# Patient Record
Sex: FEMALE | Race: WHITE | NOT HISPANIC OR LATINO | Employment: OTHER | URBAN - METROPOLITAN AREA
[De-identification: names, ages, dates, MRNs, and addresses within clinical notes are randomized per-mention and may not be internally consistent; named-entity substitution may affect disease eponyms.]

---

## 2017-01-26 ENCOUNTER — HOSPITAL ENCOUNTER (OUTPATIENT)
Dept: MAMMOGRAPHY | Facility: CLINIC | Age: 67
Discharge: HOME | End: 2017-01-26
Attending: FAMILY MEDICINE | Admitting: FAMILY MEDICINE
Payer: MEDICARE

## 2017-01-26 DIAGNOSIS — Z12.31 VISIT FOR SCREENING MAMMOGRAM: ICD-10-CM

## 2017-01-26 PROCEDURE — G0202 SCR MAMMO BI INCL CAD: HCPCS

## 2017-02-06 ENCOUNTER — OFFICE VISIT (OUTPATIENT)
Dept: FAMILY MEDICINE | Facility: CLINIC | Age: 67
End: 2017-02-06
Payer: COMMERCIAL

## 2017-02-06 VITALS
TEMPERATURE: 99.5 F | HEART RATE: 103 BPM | HEIGHT: 63 IN | RESPIRATION RATE: 10 BRPM | SYSTOLIC BLOOD PRESSURE: 110 MMHG | OXYGEN SATURATION: 99 % | DIASTOLIC BLOOD PRESSURE: 88 MMHG

## 2017-02-06 DIAGNOSIS — M53.3 SACROILIAC JOINT PAIN: ICD-10-CM

## 2017-02-06 DIAGNOSIS — G89.29 CHRONIC BILATERAL LOW BACK PAIN WITHOUT SCIATICA: ICD-10-CM

## 2017-02-06 DIAGNOSIS — J20.9 ACUTE BRONCHITIS WITH SYMPTOMS > 10 DAYS: Primary | ICD-10-CM

## 2017-02-06 DIAGNOSIS — M54.50 CHRONIC BILATERAL LOW BACK PAIN WITHOUT SCIATICA: ICD-10-CM

## 2017-02-06 DIAGNOSIS — Z98.1 S/P LUMBAR SPINAL FUSION: ICD-10-CM

## 2017-02-06 DIAGNOSIS — F33.1 MAJOR DEPRESSIVE DISORDER, RECURRENT EPISODE, MODERATE (H): ICD-10-CM

## 2017-02-06 DIAGNOSIS — F41.9 ANXIETY: ICD-10-CM

## 2017-02-06 PROCEDURE — 99214 OFFICE O/P EST MOD 30 MIN: CPT | Performed by: FAMILY MEDICINE

## 2017-02-06 RX ORDER — AZITHROMYCIN 250 MG/1
TABLET, FILM COATED ORAL
Qty: 6 TABLET | Refills: 0 | Status: SHIPPED | OUTPATIENT
Start: 2017-02-06 | End: 2017-05-23

## 2017-02-06 RX ORDER — CYCLOBENZAPRINE HCL 10 MG
10 TABLET ORAL 2 TIMES DAILY PRN
Qty: 180 TABLET | Refills: 1 | Status: SHIPPED | OUTPATIENT
Start: 2017-02-06 | End: 2017-11-27

## 2017-02-06 RX ORDER — GABAPENTIN 600 MG/1
600 TABLET ORAL 3 TIMES DAILY
Qty: 90 TABLET | Refills: 0 | Status: SHIPPED | OUTPATIENT
Start: 2017-02-06 | End: 2017-04-13

## 2017-02-06 RX ORDER — SERTRALINE HYDROCHLORIDE 100 MG/1
200 TABLET, FILM COATED ORAL DAILY
Qty: 180 TABLET | Refills: 1 | Status: SHIPPED | OUTPATIENT
Start: 2017-02-06 | End: 2017-08-15

## 2017-02-06 RX ORDER — QUETIAPINE FUMARATE 25 MG/1
25 TABLET, FILM COATED ORAL DAILY PRN
Qty: 30 TABLET | Refills: 3 | Status: SHIPPED | OUTPATIENT
Start: 2017-02-06 | End: 2018-07-16

## 2017-02-06 RX ORDER — OXYCODONE AND ACETAMINOPHEN 10; 325 MG/1; MG/1
2 TABLET ORAL EVERY 6 HOURS PRN
Qty: 180 TABLET | Refills: 0 | Status: SHIPPED | OUTPATIENT
Start: 2017-04-06 | End: 2017-06-01

## 2017-02-06 RX ORDER — OXYCODONE AND ACETAMINOPHEN 10; 325 MG/1; MG/1
2 TABLET ORAL EVERY 6 HOURS PRN
Qty: 180 TABLET | Refills: 0 | Status: SHIPPED | OUTPATIENT
Start: 2017-02-06 | End: 2017-06-01

## 2017-02-06 RX ORDER — OXYCODONE AND ACETAMINOPHEN 10; 325 MG/1; MG/1
2 TABLET ORAL EVERY 6 HOURS PRN
Qty: 180 TABLET | Refills: 0 | Status: SHIPPED | OUTPATIENT
Start: 2017-03-06 | End: 2017-06-01

## 2017-02-06 RX ORDER — GABAPENTIN 300 MG/1
300 CAPSULE ORAL 3 TIMES DAILY
Qty: 90 CAPSULE | Refills: 5 | Status: SHIPPED | OUTPATIENT
Start: 2017-02-06 | End: 2017-11-27

## 2017-02-06 NOTE — PATIENT INSTRUCTIONS
Increase the gabapentin to 900 mg three times daily (300mg + 600mg)    Try to minimize the use of the percocet.      Azithromycin - antibiotic for the bronchitis    Sarah Barriga M.D.        Thank you for choosing [facility].  You may be receiving a survey in the mail from [facility] regarding your visit today.  Please take a few minutes to complete and return the survey to let us know how we are doing.      Our Clinic hours are:  Mondays    7:20 am - 7 pm  Tues -  Fri  7:20 am - 5 pm    Clinic Phone: [phone]    The clinic lab opens at 7:30 am Mon - Fri and appointments are required.    [facility]. [phone]  Monday-Thursday 8 am - 7pm  Tues/Wed/Fri 8 am - 5:30 pm

## 2017-02-06 NOTE — PROGRESS NOTES
"  SUBJECTIVE:                                                    Jessica Ellison is a 66 year old female who presents to clinic today for the following health issues:      Acute Illness   Acute illness concerns: cough, loss of voice, body aches  Onset: 1 month     Fever: YES    Chills/Sweats: YES    Headache (location?): YES    Sinus Pressure:no    Conjunctivitis:  no    Ear Pain: no    Rhinorrhea: YES    Congestion: no    Sore Throat: YES     Cough: YES-productive of green sputum    Wheeze: no     Decreased Appetite: YES    Nausea: YES    Vomiting: YES    Diarrhea:  no     Dysuria/Freq.: no     Fatigue/Achiness: YES    Sick/Strep Exposure: YES- family    Loss of voice 10 days ago     Therapies Tried and outcome: night quil and day quil     last week -  has lost three of his best friends in two weeks.         Chronic Pain Follow-Up       Type / Location of Pain: low back  Analgesia/pain control:       Recent changes:  same      Overall control: Tolerable with discomfort  Activity level/function:      Daily activities:  Unable to perform most daily activities - chores, hobbies, social activities, driving    Work:  Unable to work  Adverse effects:  No  Adherance    Taking medication as directed?  Yes    Participating in other treatments: yes  Risk Factors:    Sleep:  Fair    Mood/anxiety:  Worsened - out of her sertraline since last week    Recent family or social stressors:  Three funerals - husbands friends- in the past few weeks    Other aggravating factors: none  PHQ-9 SCORE 2016   Total Score - - -   Total Score 7 5 14     DAJUAN-7 SCORE 10/22/2014 2015 2016   Total Score 13 3 -   Total Score - - 4     Encounter-Level CSA:     There are no encounter-level csa.             Problem list and histories reviewed & adjusted, as indicated.  Additional history: as documented    /88 mmHg  Pulse 103  Temp(Src) 99.5  F (37.5  C) (Tympanic)  Resp 10  Ht 5' 3\" (1.6 m)  Wt "   SpO2 99%  Breastfeeding? No   EXAM: GENERAL APPEARANCE: Alert, no acute distress  HENT: Ears and TMs normal, oral mucosa and posterior oropharynx normal, very weak sounding voice  RESP: lungs clear to auscultation   CV: normal rate, regular rhythm, no murmur or gallop  ABDOMEN: soft, no organomegaly, masses or tenderness    ASSESSMENT/PLAN:      ICD-10-CM    1. Acute bronchitis with symptoms > 10 days J20.9 azithromycin (ZITHROMAX) 250 MG tablet   2. Chronic bilateral low back pain without sciatica M54.5 oxyCODONE-acetaminophen (PERCOCET)  MG per tablet    G89.29 oxyCODONE-acetaminophen (PERCOCET)  MG per tablet     oxyCODONE-acetaminophen (PERCOCET)  MG per tablet     gabapentin (NEURONTIN) 300 MG capsule     gabapentin (NEURONTIN) 600 MG tablet   3. Sacroiliac joint pain M53.3 oxyCODONE-acetaminophen (PERCOCET)  MG per tablet     oxyCODONE-acetaminophen (PERCOCET)  MG per tablet     oxyCODONE-acetaminophen (PERCOCET)  MG per tablet   4. Major depressive disorder, recurrent episode, moderate (H) F33.1 sertraline (ZOLOFT) 100 MG tablet   5. S/P lumbar spinal fusion Z98.1 cyclobenzaprine (FLEXERIL) 10 MG tablet   6. Anxiety F41.9 QUEtiapine (SEROQUEL) 25 MG tablet     Wasn't out of her pain medications, had some left over at home from last summers surgery/rehab.    Given the length of symptoms (4 weeks) - recommend empiric treatment for mycoplasma with azithromycin.    Would like to further increase the gabapentin for the pain.  She is in agreement with this.  Again encouraged to minimize narcotic use.  She continues to work with her back specialist.    Out of her sertraline for a week - will restart this.  Moods worse due to several recent losses.     Patient Instructions   Increase the gabapentin to 900 mg three times daily (300mg + 600mg)    Try to minimize the use of the percocet.      Azithromycin - antibiotic for the bronchitis    Sarah Barriga M.D.        Thank you  for choosing [facility].  You may be receiving a survey in the mail from [facility] regarding your visit today.  Please take a few minutes to complete and return the survey to let us know how we are doing.      Our Clinic hours are:  Mondays    7:20 am - 7 pm  Tues -  Fri  7:20 am - 5 pm    Clinic Phone: [phone]    The clinic lab opens at 7:30 am Mon - Fri and appointments are required.    [facility]. [phone]  Monday-Thursday 8 am - 7pm  Tues/Wed/Fri 8 am - 5:30 pm

## 2017-02-06 NOTE — Clinical Note
My Depression Action Plan  Name: Jessica Ellison   Date of Birth 1950  Date: 2/6/2017    My doctor: Sarah Barriga   My clinic: [facility]  [address] [facility] [address]  [phone]          GREEN    ZONE   Good Control    What it looks like:     Things are going generally well. You have normal up s and down s. You may even feel depressed from time to time, but bad moods usually last less than a day.   What you need to do:  1. Continue to care for yourself (see self care plan)  2. Check your depression survival kit and update it as needed  3. Follow your physician s recommendations including any medication.  4. Do not stop taking medication unless you consult with your physician first.           YELLOW         ZONE Getting Worse    What it looks like:     Depression is starting to interfere with your life.     It may be hard to get out of bed; you may be starting to isolate yourself from others.    Symptoms of depression are starting to last most all day and this has happened for several days.     You may have suicidal thoughts but they are not constant.   What you need to do:     1. Call your care team, your response to treatment will improve if you keep your care team informed of your progress. Yellow periods are signs an adjustment may need to be made.     2. Continue your self-care, even if you have to fake it!    3. Talk to someone in your support network    4. Open up your depression survival kit           RED    ZONE Medical Alert - Get Help    What it looks like:     Depression is seriously interfering with your life.     You may experience these or other symptoms: You can t get out of bed most days, can t work or engage in other necessary activities, you have trouble taking care of basic hygiene, or basic responsibilities, thoughts of suicide or death that will not go away, self-injurious behavior.     What you need to do:  1. Call your care team  and request a same-day appointment. If they are not available (weekends or after hours) call your local crisis line, emergency room or 911.      Electronically signed by: Sonja Krishnamurthy, February 6, 2017    Depression Self Care Plan / Survival Kit    Self-Care for Depression  Here s the deal. Your body and mind are really not as separate as most people think.  What you do and think affects how you feel and how you feel influences what you do and think. This means if you do things that people who feel good do, it will help you feel better.  Sometimes this is all it takes.  There is also a place for medication and therapy depending on how severe your depression is, so be sure to consult with your medical provider and/ or Behavioral Health Consultant if your symptoms are worsening or not improving.     In order to better manage my stress, I will:    Exercise  Get some form of exercise, every day. This will help reduce pain and release endorphins, the  feel good  chemicals in your brain. This is almost as good as taking antidepressants!  This is not the same as joining a gym and then never going! (they count on that by the way ) It can be as simple as just going for a walk or doing some gardening, anything that will get you moving.      Hygiene   Maintain good hygiene (Get out of bed in the morning, Make your bed, Brush your teeth, Take a shower, and Get dressed like you were going to work, even if you are unemployed).  If your clothes don't fit try to get ones that do.    Diet  I will strive to eat foods that are good for me, drink plenty of water, and avoid excessive sugar, caffeine, alcohol, and other mood-altering substances.  Some foods that are helpful in depression are: complex carbohydrates, B vitamins, flaxseed, fish or fish oil, fresh fruits and vegetables.    Psychotherapy  I agree to participate in Individual Therapy (if recommended).    Medication  If prescribed medications, I agree to take them.  Missing  doses can result in serious side effects.  I understand that drinking alcohol, or other illicit drug use, may cause potential side effects.  I will not stop my medication abruptly without first discussing it with my provider.    Staying Connected With Others  I will stay in touch with my friends, family members, and my primary care provider/team.    Use your imagination  Be creative.  We all have a creative side; it doesn t matter if it s oil painting, sand castles, or mud pies! This will also kick up the endorphins.    Witness Beauty  (AKA stop and smell the roses) Take a look outside, even in mid-winter. Notice colors, textures. Watch the squirrels and birds.     Service to others  Be of service to others.  There is always someone else in need.  By helping others we can  get out of ourselves  and remember the really important things.  This also provides opportunities for practicing all the other parts of the program.    Humor  Laugh and be silly!  Adjust your TV habits for less news and crime-drama and more comedy.    Control your stress  Try breathing deep, massage therapy, biofeedback, and meditation. Find time to relax each day.     My support system    Clinic Contact:  Phone number:    Contact 1:  Phone number:    Contact 2:  Phone number:    [facility]/:  Phone number:    Therapist:  Phone number:    Local crisis center:    Phone number:    Other community support:  Phone number:

## 2017-02-06 NOTE — NURSING NOTE
"Chief Complaint   Patient presents with     URI     Refill Request       Initial /88 mmHg  Pulse 103  Temp(Src) 99.5  F (37.5  C) (Tympanic)  Resp 10  Ht 5' 3\" (1.6 m)  Wt   SpO2 99%  Breastfeeding? No Estimated body mass index is 29.59 kg/(m^2) as calculated from the following:    Height as of this encounter: 5' 3\" (1.6 m).    Weight as of 6/22/16: 167 lb (75.751 kg).  Medication Reconciliation: complete     Sonja Krishnamurthy MA      "

## 2017-02-06 NOTE — MR AVS SNAPSHOT
After Visit Summary   2/6/2017    Jessica Ellison    MRN: [id number]           Patient Information     Date Of Birth          1950        Visit Information        Provider Department      2/6/2017 11:20 AM Sarah Barriga MD [facility]        Today's Diagnoses     Acute bronchitis with symptoms > 10 days    -  1     Chronic bilateral low back pain without sciatica         Sacroiliac joint pain         Major depressive disorder, recurrent episode, moderate (H)         S/P lumbar spinal fusion         Anxiety           Care Instructions    Increase the gabapentin to 900 mg three times daily (300mg + 600mg)    Try to minimize the use of the percocet.      Azithromycin - antibiotic for the bronchitis    Sarah Barriga M.D.        Thank you for choosing [facility].  You may be receiving a survey in the mail from [employer] regarding your visit today.  Please take a few minutes to complete and return the survey to let us know how we are doing.      Our Clinic hours are:  Mondays    7:20 am - 7 pm  Tues -  Fri  7:20 am - 5 pm    Clinic Phone: [phone]    The clinic lab opens at 7:30 am Mon - Fri and appointments are required.    [facility]  Ph. [phone]  Monday-Thursday 8 am - 7pm  Tues/Wed/Fri 8 am - 5:30 pm               Follow-ups after your visit        Who to contact     If you have questions or need follow up information about today's clinic visit or your schedule please contact [facility] directly at [phone].  Normal or non-critical lab and imaging results will be communicated to you by MyChart, letter or phone within 4 business days after the clinic has received the results. If you do not hear from us within 7 days, please contact the clinic through [employer] or phone. If you have a critical or abnormal lab result, we will notify you by phone as soon as possible.  Submit refill requests through [employer] or call your  "pharmacy and they will forward the refill request to us. Please allow 3 business days for your refill to be completed.          Additional Information About Your Visit        [employer] Information     [email or web address] gives you secure access to your electronic health record. If you see a primary care provider, you can also send messages to your care team and make appointments. If you have questions, please call your primary care clinic.  If you do not have a primary care provider, please call [phone] and they will assist you.        Care EveryWhere ID     This is your Care EveryWhere ID. This could be used by other organizations to access your [place] medical records  [id number]        Your Vitals Were     Pulse Temperature Respirations Height Pulse Oximetry Breastfeeding?    103 99.5  F (37.5  C) (Tympanic) 10 5' 3\" (1.6 m) 99% No       Blood Pressure from Last 3 Encounters:   02/06/17 110/88   09/07/16 88/66   06/22/16 136/85    Weight from Last 3 Encounters:   06/22/16 167 lb (75.751 kg)   06/02/16 170 lb (77.111 kg)   01/29/16 166 lb (75.297 kg)              We Performed the Following     DEPRESSION ACTION PLAN (DAP)          Today's Medication Changes          These changes are accurate as of: 2/6/17 11:54 AM.  If you have any questions, ask your nurse or doctor.               Start taking these medicines.        Dose/Directions    azithromycin 250 MG tablet   Commonly known as:  ZITHROMAX   Used for:  Acute bronchitis with symptoms > 10 days   Started by:  Sarah Barriga MD        Two tablets first day, then one tablet daily for four days.   Quantity:  6 tablet   Refills:  0         These medicines have changed or have updated prescriptions.        Dose/Directions    * gabapentin 300 MG capsule   Commonly known as:  NEURONTIN   This may have changed:    - how much to take  - additional instructions   Used for:  Chronic bilateral low back pain without sciatica   Changed by:  Sarah Barriga MD        Dose:  300 " mg   Take 1 capsule (300 mg) by mouth 3 times daily To be taken with the 600 mg pill   Quantity:  90 capsule   Refills:  5       * gabapentin 600 MG tablet   Commonly known as:  NEURONTIN   This may have changed:  You were already taking a medication with the same name, and this prescription was added. Make sure you understand how and when to take each.   Used for:  Chronic bilateral low back pain without sciatica   Changed by:  Sarah Barriga MD        Dose:  600 mg   Take 1 tablet (600 mg) by mouth 3 times daily To be taken with the 300 mg pill   Quantity:  90 tablet   Refills:  0       * Notice:  This list has 2 medication(s) that are the same as other medications prescribed for you. Read the directions carefully, and ask your doctor or other care provider to review them with you.         Where to get your medicines      These medications were sent to [facility] [address]  [address] [facility] [address]     Phone:  [phone]    - azithromycin 250 MG tablet  - cyclobenzaprine 10 MG tablet  - gabapentin 300 MG capsule  - gabapentin 600 MG tablet  - QUEtiapine 25 MG tablet  - sertraline 100 MG tablet      Some of these will need a paper prescription and others can be bought over the counter.  Ask your nurse if you have questions.     Bring a paper prescription for each of these medications    - oxyCODONE-acetaminophen  MG per tablet  - oxyCODONE-acetaminophen  MG per tablet  - oxyCODONE-acetaminophen  MG per tablet             Primary Care Provider Office Phone # Fax #    Sarah Barriga [phone]       [facility] [address] [facility] [address]        Thank you!     Thank you for choosing [facility]  for your care. Our goal is always to provide you with excellent care. Hearing back from our patients is one way we can continue to improve our services. Please take a few  minutes to complete the written survey that you may receive in the mail after your visit with us. Thank you!             Your Updated Medication List - Protect others around you: Learn how to safely use, store and throw away your medicines at www.disposemymeds.org.          This list is accurate as of: 2/6/17 11:54 AM.  Always use your most recent med list.                   Brand Name Dispense Instructions for use    acyclovir 5 % ointment    ZOVIRAX    15 g    Apply topically 6 times daily       aspirin 325 MG tablet     180 tablet    Take 1 tablet (325 mg) by mouth daily       azithromycin 250 MG tablet    ZITHROMAX    6 tablet    Two tablets first day, then one tablet daily for four days.       calcium carbonate-vitamin D 600-400 MG-UNIT Chew    CALTRATE 600+D    180 tablet    Take 1 chew tab by mouth 2 times daily       cyclobenzaprine 10 MG tablet    FLEXERIL    180 tablet    Take 1 tablet (10 mg) by mouth 2 times daily as needed for muscle spasms       EPINEPHrine 0.3 MG/0.3ML injection     2 each    Inject 0.3 mLs (0.3 mg) into the muscle once as needed for anaphylaxis       * gabapentin 300 MG capsule    NEURONTIN    90 capsule    Take 1 capsule (300 mg) by mouth 3 times daily To be taken with the 600 mg pill       * gabapentin 600 MG tablet    NEURONTIN    90 tablet    Take 1 tablet (600 mg) by mouth 3 times daily To be taken with the 300 mg pill       levothyroxine 50 MCG tablet    LEVOTHROID    90 tablet    Take 1 tablet (50 mcg) by mouth daily       lisinopril 20 MG tablet    PRINIVIL/ZESTRIL    90 tablet    Take 1 tablet (20 mg) by mouth daily       multivitamin CF formula chewable tablet    CHOICEFUL    100 tablet    Take 1 tablet by mouth daily       * oxyCODONE-acetaminophen  MG per tablet    PERCOCET    180 tablet    Take 2 tablets by mouth every 6 hours as needed for moderate to severe pain Max 6/day       * oxyCODONE-acetaminophen  MG per tablet   Start taking on:  3/6/2017     PERCOCET    180 tablet    Take 2 tablets by mouth every 6 hours as needed for moderate to severe pain Max 6/day       * oxyCODONE-acetaminophen  MG per tablet   Start taking on:  4/6/2017    PERCOCET    180 tablet    Take 2 tablets by mouth every 6 hours as needed for moderate to severe pain Max 6/day       QUEtiapine 25 MG tablet    SEROQUEL    30 tablet    Take 1 tablet (25 mg) by mouth daily as needed Take 25 mg Every 6 hours As needed for anxiety.       sertraline 100 MG tablet    ZOLOFT    180 tablet    Take 2 tablets (200 mg) by mouth daily       traZODone 150 MG tablet    DESYREL    90 tablet    Take 1 tablet (150 mg) by mouth nightly as needed for sleep       * Notice:  This list has 5 medication(s) that are the same as other medications prescribed for you. Read the directions carefully, and ask your doctor or other care provider to review them with you.

## 2017-02-07 ASSESSMENT — PATIENT HEALTH QUESTIONNAIRE - PHQ9: SUM OF ALL RESPONSES TO PHQ QUESTIONS 1-9: 14

## 2017-03-22 ENCOUNTER — TRANSFERRED RECORDS (OUTPATIENT)
Dept: HEALTH INFORMATION MANAGEMENT | Facility: CLINIC | Age: 67
End: 2017-03-22

## 2017-04-17 ENCOUNTER — TELEPHONE (OUTPATIENT)
Dept: FAMILY MEDICINE | Facility: CLINIC | Age: 67
End: 2017-04-17

## 2017-04-17 NOTE — TELEPHONE ENCOUNTER
The prescription is for #12, taking 1 tablet every 4 to 6 hours as needed for pain from [facility] @ [address] [place] [address].  [address], [place], phone #[phone]    Jolene Avendaño, PharmD [facility]  Phone [phone]  Fax [phone]

## 2017-04-17 NOTE — TELEPHONE ENCOUNTER
Okay to fill, but would need to limit herself to TWO pills in 24 hours if she continues to take her percocet due to the tylenol limitations. You can let her know that I was notified of the prescription, in the future, she should contact me with a prescription as it's in violation of our controlled substance agreement.    Sarah Barriga M.D.

## 2017-04-17 NOTE — TELEPHONE ENCOUNTER
Per our contract, patient is supposed to let us know.  How many Norco is she getting?    Sarah Barriga M.D.

## 2017-04-17 NOTE — TELEPHONE ENCOUNTER
Jessica presented with a short term hydrocodone/APAP 5/325mg prescription from her dentist to fill today at the [facility].    In light of her ongoing prescription for percocet she has on contract with you, please advise if we should have her talk toy you prior to filling.    Thank you,  Jolene Avendaño, PharmD [facility] [place]  Phone [phone]  Fax [phone]

## 2017-05-08 DIAGNOSIS — I67.1 CEREBRAL ANEURYSM, NONRUPTURED: Primary | ICD-10-CM

## 2017-05-09 ENCOUNTER — CARE COORDINATION (OUTPATIENT)
Dept: NEUROLOGY | Facility: CLINIC | Age: 67
End: 2017-05-09

## 2017-05-09 NOTE — PROGRESS NOTES
Patient scheduled for a cerebral angiogram on 7/27/17 at 11:00 AM. Informed patient: Someone will need to drive you home from the hospital. Be sure to have someone that can stay with you through the night. Do not eat after 3:00 AM; you may drink clear liquids (includes water, Jell-O, clear broth, apple juice or any non-carbonated beverage that you can see through) until 9:00 AM. You may take your medications with a sip of water the morning of the procedure. Please go to the Gold waiting area at the [facility] to check in at 9:30 AM. Patient verbalized understanding. Map and instructions sent to patient.

## 2017-05-09 NOTE — PATIENT INSTRUCTIONS
You are scheduled for a cerebral angiogram on 7/27/17 at 11:00 AM. Someone will need to drive you home from the hospital. Be sure to have someone that can stay with you through the night. Do not eat after 3:00 AM; you may drink clear liquids (includes water, Jell-O, clear broth, apple juice or any non-carbonated beverage that you can see through) until 9:00 AM. You may take your medications with a sip of water the morning of the procedure. Please go to the Gold waiting area at the [facility] to check in at 9:30 AM.    If you have any questions please contact me at [phone], option 3.    Dirk Bailon RN, CNRN  Stroke & Endovascular Care Coordinator

## 2017-05-22 ENCOUNTER — TELEPHONE (OUTPATIENT)
Dept: FAMILY MEDICINE | Facility: CLINIC | Age: 67
End: 2017-05-22

## 2017-05-22 NOTE — TELEPHONE ENCOUNTER
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     Phone Number patient can be reached at:  Home number on file [phone] (home)    Best Time:  any    Can we leave a detailed message on this number:  YES    Call taken on 5/22/2017 at 12:33 PM by Sonja Davison

## 2017-05-23 ENCOUNTER — OFFICE VISIT (OUTPATIENT)
Dept: FAMILY MEDICINE | Facility: CLINIC | Age: 67
End: 2017-05-23
Payer: COMMERCIAL

## 2017-05-23 VITALS
WEIGHT: 161.8 LBS | SYSTOLIC BLOOD PRESSURE: 178 MMHG | DIASTOLIC BLOOD PRESSURE: 110 MMHG | BODY MASS INDEX: 28.67 KG/M2 | HEART RATE: 80 BPM | HEIGHT: 63 IN

## 2017-05-23 DIAGNOSIS — I10 ESSENTIAL HYPERTENSION WITH GOAL BLOOD PRESSURE LESS THAN 140/90: ICD-10-CM

## 2017-05-23 PROCEDURE — 99214 OFFICE O/P EST MOD 30 MIN: CPT | Performed by: FAMILY MEDICINE

## 2017-05-23 RX ORDER — LISINOPRIL AND HYDROCHLOROTHIAZIDE 12.5; 2 MG/1; MG/1
2 TABLET ORAL DAILY
Qty: 180 TABLET | Refills: 1 | Status: SHIPPED | OUTPATIENT
Start: 2017-05-23 | End: 2017-06-01

## 2017-05-23 NOTE — NURSING NOTE
"Chief Complaint   Patient presents with     Hypertension     pt. here today with concerns about BP being elevated X 1-2 weeks. pt. brought her machine with her to appt. and her reading today was 170/111 to compare to office reading.        Initial BP (!) 178/110 (BP Location: Right arm, Patient Position: Chair, Cuff Size: Adult Regular)  Pulse 80  Ht 5' 3\" (1.6 m)  Wt 161 lb 12.8 oz (73.4 kg)  BMI 28.66 kg/m2 Estimated body mass index is 28.66 kg/(m^2) as calculated from the following:    Height as of this encounter: 5' 3\" (1.6 m).    Weight as of this encounter: 161 lb 12.8 oz (73.4 kg).  Medication Reconciliation: complete     Jessica Vail, CMA      "

## 2017-05-23 NOTE — PROGRESS NOTES
Subjective:  Jessica Ellison is a 66 year old female   Chief Complaint   Patient presents with     Hypertension     pt. here today with concerns about BP being elevated X 1-2 weeks. pt. brought her machine with her to appt. and her reading today was 170/111 to compare to office reading.     She has been experiencing some headaches with these elevated readings also. She does not think that there has been any significant change in her lifestyle that would explain this. She has been compliant in taking her medications and has not had any recent change in her regimen. She recalls that many years ago she had a diuretic as part of her regimen and is not sure why this was discontinued. She is a recovering alcoholic and so it is possible that her blood pressure meds were reduced when she obtains sobriety and she did not need as much medication. She states that she is still abstaining from alcohol. She has not increased her salt intake or changed her activity level and has had no significant change in her weight. Patient denies any exertional chest pain, dyspnea, palpitations, syncope, orthopnea, edema or paroxysmal nocturnal dyspnea.     She also wants me to check her abdomen, as she feels would seems to be a mass in the epigastric area        Encounter Diagnosis   Name Primary?     Essential hypertension with goal blood pressure less than 140/90        ROS:other than noted above, general, HEENT, respiratory, cardiac, gastrointestinal systems are negative    Medical, surgical, social, and family histories, medications and allergies reviewed and updated.    Objective:  Exam:    GENERAL APPEARANCE ADULT: Alert, no acute distress  EYES: PERRL, EOM normal, conjunctiva and lids normal  NECK: No adenopathy,masses or thyromegaly  RESP: lungs clear to auscultation   CV: normal rate, regular rhythm, no murmur or gallop  Abdomen: Soft, nontender, no mass, no ventral hernia  MS: extremities normal, no peripheral  edema      ASSESSMENT:  1. Essential hypertension with goal blood pressure less than 140/90        PLAN:  Orders Placed This Encounter     lisinopril-hydrochlorothiazide (PRINZIDE/ZESTORETIC) 20-12.5 MG per tablet, 2 tablets daily        Patient Instructions   She has an appointment with Dr. Barriga in about one week. Will follow-up at that time to see how the blood pressure is responding      Thank you for choosing [facility].  You may be receiving a survey in the mail from [employer] regarding your visit today.  Please take a few minutes to complete and return the survey to let us know how we are doing.      Our Clinic hours are:  Mondays    7:20 am - 7 pm  Tues -  Fri  7:20 am - 5 pm    Clinic Phone: [phone]    Keep apptmt next week with Danny  The clinic lab opens at 7:30 am Mon - Fri and appointments are required.    [facility]  Ph. [phone]  Monday-Thursday 8 am - 7pm  Tues/Wed/Fri 8 am - 5:30 pm

## 2017-05-23 NOTE — PATIENT INSTRUCTIONS
Thank you for choosing [facility].  You may be receiving a survey in the mail from [facility] regarding your visit today.  Please take a few minutes to complete and return the survey to let us know how we are doing.      Our Clinic hours are:  Mondays    7:20 am - 7 pm  Tues -  Fri  7:20 am - 5 pm    Clinic Phone: [phone]    Keep apptmt next week with DrAmmishel  The clinic lab opens at 7:30 am Mon - Fri and appointments are required.    [facility]  Ph. [phone]  Monday-Thursday 8 am - 7pm  Tues/Wed/Fri 8 am - 5:30 pm

## 2017-05-23 NOTE — MR AVS SNAPSHOT
After Visit Summary   5/23/2017    Jessica Ellison    MRN: [id number]           Patient Information     Date Of Birth          1950        Visit Information        Provider Department      5/23/2017 10:40 AM SYLVIA Noel MD [facility]        Today's Diagnoses     Essential hypertension with goal blood pressure less than 140/90          Care Instructions          Thank you for choosing [facility].  You may be receiving a survey in the mail from [facility] regarding your visit today.  Please take a few minutes to complete and return the survey to let us know how we are doing.      Our Clinic hours are:  Mondays    7:20 am - 7 pm  Tues -  Fri  7:20 am - 5 pm    Clinic Phone: [phone]    Keep apptmt next week with DrAmy  The clinic lab opens at 7:30 am Mon - Fri and appointments are required.    [facility]  Ph. [phone]  Monday-Thursday 8 am - 7pm  Tues/Wed/Fri 8 am - 5:30 pm             Follow-ups after your visit        Your next 10 appointments already scheduled     Jun 01, 2017 10:40 AM CDT   [facility]   [facility]    [id number] [facility] [id number] [phone]            Jul 27, 2017  9:30 AM CDT   Procedure 6.5 hour with U2A ROOM 7   [address] [facility] [place] ([facility])    [address] [id number]               Jul 27, 2017 11:00 AM CDT   IR CAROTID CEREBRAL ANGIOGRAM BILATERAL with UUIR1   [facility] [place], Interventional Radiology ([facility])    [address] [id number] [phone]           1. Your doctor will need to do a history and physical within 30 days before this procedure. 2. Your doctor will determine whether you need a 12 lead EKG, as well as which medications should not be taken the morning of the exam. 3.  Laboratory tests are to be obtained by your doctor prior to the exam (creatinine, Hgb/Hct, platelet count, and INR) 4. If you have allergies to x-ray contrast or iodine, contact your doctor or a Radiology nurse prior to the exam day for instructions. 5. Someone will need to drive you to and from the hospital. 6. Bring a list of all drugs you are taking; include supplements and over-the-counter medications. 7. Wear comfortable clothes and leave your valuables at home. 8. If you are or may be pregnant, contact your doctor or a Radiology nurse prior to the day of the exam. 9.  If you have diabetes, check with your doctor or a Radiology nurse to see if your insulin needs to be adjusted for the exam. 10. If you are taking Coumadin (to thin you blood) please contact your doctor or a Radiology nurse at least 5 days before the exam for special instructions. 11. You should not have received barium (x-ray contrast) within 48 hours of this exam. 12. The day before your exam you may eat your regular diet and are encouraged to drink at least 2 quarts of clear liquids. Drink no alcoholic beverages for 24 hours prior to the exam. 13. If you have a colostomy you will need to irrigate it with tap water at 8PM the evening before and again at 6AM the morning of the exam. 14. Do not smoke for 24 hours prior to the procedure. 15. Do not eat any solid food or milk products for 6 hours prior to the exam. You may drink clear liquids until 2 hours prior to the exam. Clear liquids include the following: water, Jell-O, clear broth, apple juice or any non-carbonated drink that you can see through (no pop!) 16. The morning of the exam you may brush your teeth and take medications as directed with a sip of water. 17. Tell the Radiology nurse if you have any allergies. 18. You will be asked to empty your bladder before the exam begins. 19. Following the exam you will need to remain on complete bedrest for 4-6 hours. The nurse will monitor your  "vital signs, puncture site, and the pulses and temperature of the arm or leg that was punctured. 20. When discharged, you cannot drive until morning, and an adult must be with you until then. You should stay in the [facility] area overnight.              Who to contact     If you have questions or need follow up information about today's clinic visit or your schedule please contact [facility] directly at [phone].  Normal or non-critical lab and imaging results will be communicated to you by [employer], letter or phone within 4 business days after the clinic has received the results. If you do not hear from us within 7 days, please contact the clinic through [employer] or phone. If you have a critical or abnormal lab result, we will notify you by phone as soon as possible.  Submit refill requests through [employer] or call your pharmacy and they will forward the refill request to us. Please allow 3 business days for your refill to be completed.          Additional Information About Your Visit        [employer] Information     [employer] gives you secure access to your electronic health record. If you see a primary care provider, you can also send messages to your care team and make appointments. If you have questions, please call your primary care clinic.  If you do not have a primary care provider, please call [phone] and they will assist you.        Care EveryWhere ID     This is your Care EveryWhere ID. This could be used by other organizations to access your [facility] medical records  [id number]        Your Vitals Were     Pulse Height BMI (Body Mass Index)             80 5' 3\" (1.6 m) 28.66 kg/m2          Blood Pressure from Last 3 Encounters:   05/23/17 (!) 178/110   02/06/17 110/88   09/07/16 (!) 88/66    Weight from Last 3 Encounters:   05/23/17 161 lb 12.8 oz (73.4 kg)   06/22/16 167 lb (75.8 kg)   06/02/16 170 lb (77.1 kg)              Today, you had the following     No orders found for " display         Today's Medication Changes          These changes are accurate as of: 5/23/17 11:16 AM.  If you have any questions, ask your nurse or doctor.               Start taking these medicines.        Dose/Directions    lisinopril-hydrochlorothiazide 20-12.5 MG per tablet   Commonly known as:  PRINZIDE/ZESTORETIC   Used for:  Essential hypertension with goal blood pressure less than 140/90   Replaces:  lisinopril 20 MG tablet   Started by:  SYLVIA Noel MD        Dose:  2 tablet   Take 2 tablets by mouth daily   Quantity:  180 tablet   Refills:  1         Stop taking these medicines if you haven't already. Please contact your care team if you have questions.     lisinopril 20 MG tablet   Commonly known as:  PRINIVIL/ZESTRIL   Replaced by:  lisinopril-hydrochlorothiazide 20-12.5 MG per tablet   Stopped by:  SYLVIA Noel MD                Where to get your medicines      Some of these will need a paper prescription and others can be bought over the counter.  Ask your nurse if you have questions.     Bring a paper prescription for each of these medications     lisinopril-hydrochlorothiazide 20-12.5 MG per tablet                Primary Care Provider Office Phone # Fax #    Sarah Barriga [phone]       [address] [address]        Thank you!     Thank you for choosing [facility]  for your care. Our goal is always to provide you with excellent care. Hearing back from our patients is one way we can continue to improve our services. Please take a few minutes to complete the written survey that you may receive in the mail after your visit with us. Thank you!             Your Updated Medication List - Protect others around you: Learn how to safely use, store and throw away your medicines at www.disposemymeds.org.          This list is accurate as of: 5/23/17 11:16 AM.  Always use your most recent med list.                   Brand Name  Dispense Instructions for use    acyclovir 5 % ointment    ZOVIRAX    15 g    Apply topically 6 times daily       aspirin 325 MG tablet     180 tablet    Take 1 tablet (325 mg) by mouth daily       calcium carbonate-vitamin D 600-400 MG-UNIT Chew    CALTRATE 600+D    180 tablet    Take 1 chew tab by mouth 2 times daily       cyclobenzaprine 10 MG tablet    FLEXERIL    180 tablet    Take 1 tablet (10 mg) by mouth 2 times daily as needed for muscle spasms       EPINEPHrine 0.3 MG/0.3ML injection     2 each    Inject 0.3 mLs (0.3 mg) into the muscle once as needed for anaphylaxis       * gabapentin 300 MG capsule    NEURONTIN    90 capsule    Take 1 capsule (300 mg) by mouth 3 times daily To be taken with the 600 mg pill       * gabapentin 600 MG tablet    NEURONTIN    90 tablet    TAKE ONE TABLET BY MOUTH THREE TIMES A DAY (TO BE TAKEN WITH THE 300MG CAPSULE)       levothyroxine 50 MCG tablet    LEVOTHROID    90 tablet    Take 1 tablet (50 mcg) by mouth daily       lisinopril-hydrochlorothiazide 20-12.5 MG per tablet    PRINZIDE/ZESTORETIC    180 tablet    Take 2 tablets by mouth daily       multivitamin CF formula chewable tablet    CHOICEFUL    100 tablet    Take 1 tablet by mouth daily       * oxyCODONE-acetaminophen  MG per tablet    PERCOCET    180 tablet    Take 2 tablets by mouth every 6 hours as needed for moderate to severe pain Max 6/day       * oxyCODONE-acetaminophen  MG per tablet    PERCOCET    180 tablet    Take 2 tablets by mouth every 6 hours as needed for moderate to severe pain Max 6/day       * oxyCODONE-acetaminophen  MG per tablet    PERCOCET    180 tablet    Take 2 tablets by mouth every 6 hours as needed for moderate to severe pain Max 6/day       QUEtiapine 25 MG tablet    SEROQUEL    30 tablet    Take 1 tablet (25 mg) by mouth daily as needed Take 25 mg Every 6 hours As needed for anxiety.       sertraline 100 MG tablet    ZOLOFT    180 tablet    Take 2 tablets (200 mg) by  mouth daily       traZODone 150 MG tablet    DESYREL    90 tablet    Take 1 tablet (150 mg) by mouth nightly as needed for sleep       * Notice:  This list has 5 medication(s) that are the same as other medications prescribed for you. Read the directions carefully, and ask your doctor or other care provider to review them with you.

## 2017-06-01 ENCOUNTER — OFFICE VISIT (OUTPATIENT)
Dept: FAMILY MEDICINE | Facility: CLINIC | Age: 67
End: 2017-06-01
Payer: COMMERCIAL

## 2017-06-01 VITALS
BODY MASS INDEX: 27.46 KG/M2 | DIASTOLIC BLOOD PRESSURE: 87 MMHG | HEART RATE: 94 BPM | WEIGHT: 155 LBS | SYSTOLIC BLOOD PRESSURE: 138 MMHG | TEMPERATURE: 98.7 F | HEIGHT: 63 IN

## 2017-06-01 DIAGNOSIS — R94.4 DECREASED GFR: ICD-10-CM

## 2017-06-01 DIAGNOSIS — E03.9 HYPOTHYROIDISM, UNSPECIFIED: ICD-10-CM

## 2017-06-01 DIAGNOSIS — M54.50 CHRONIC BILATERAL LOW BACK PAIN WITHOUT SCIATICA: Primary | ICD-10-CM

## 2017-06-01 DIAGNOSIS — M53.3 SACROILIAC JOINT PAIN: ICD-10-CM

## 2017-06-01 DIAGNOSIS — G89.29 CHRONIC BILATERAL LOW BACK PAIN WITHOUT SCIATICA: Primary | ICD-10-CM

## 2017-06-01 DIAGNOSIS — R23.2 HOT FLASHES: ICD-10-CM

## 2017-06-01 DIAGNOSIS — K43.9 VENTRAL HERNIA WITHOUT OBSTRUCTION OR GANGRENE: ICD-10-CM

## 2017-06-01 DIAGNOSIS — I10 ESSENTIAL HYPERTENSION WITH GOAL BLOOD PRESSURE LESS THAN 140/90: ICD-10-CM

## 2017-06-01 LAB
ANION GAP SERPL CALCULATED.3IONS-SCNC: 13 MMOL/L (ref 3–14)
BASOPHILS # BLD AUTO: 0 10E9/L (ref 0–0.2)
BASOPHILS NFR BLD AUTO: 0.5 %
BUN SERPL-MCNC: 36 MG/DL (ref 7–30)
CALCIUM SERPL-MCNC: 9.8 MG/DL (ref 8.5–10.1)
CHLORIDE SERPL-SCNC: 102 MMOL/L (ref 94–109)
CO2 SERPL-SCNC: 19 MMOL/L (ref 20–32)
CREAT SERPL-MCNC: 1.2 MG/DL (ref 0.52–1.04)
DIFFERENTIAL METHOD BLD: ABNORMAL
EOSINOPHIL # BLD AUTO: 0.1 10E9/L (ref 0–0.7)
EOSINOPHIL NFR BLD AUTO: 2.1 %
ERYTHROCYTE [DISTWIDTH] IN BLOOD BY AUTOMATED COUNT: 15.4 % (ref 10–15)
GFR SERPL CREATININE-BSD FRML MDRD: 45 ML/MIN/1.7M2
GLUCOSE SERPL-MCNC: 105 MG/DL (ref 70–99)
HCT VFR BLD AUTO: 37.6 % (ref 35–47)
HGB BLD-MCNC: 12.1 G/DL (ref 11.7–15.7)
LYMPHOCYTES # BLD AUTO: 0.7 10E9/L (ref 0.8–5.3)
LYMPHOCYTES NFR BLD AUTO: 16.7 %
MCH RBC QN AUTO: 31 PG (ref 26.5–33)
MCHC RBC AUTO-ENTMCNC: 32.2 G/DL (ref 31.5–36.5)
MCV RBC AUTO: 96 FL (ref 78–100)
MONOCYTES # BLD AUTO: 0.6 10E9/L (ref 0–1.3)
MONOCYTES NFR BLD AUTO: 13 %
NEUTROPHILS # BLD AUTO: 2.9 10E9/L (ref 1.6–8.3)
NEUTROPHILS NFR BLD AUTO: 67.7 %
PLATELET # BLD AUTO: 202 10E9/L (ref 150–450)
POTASSIUM SERPL-SCNC: 4.1 MMOL/L (ref 3.4–5.3)
RBC # BLD AUTO: 3.9 10E12/L (ref 3.8–5.2)
SODIUM SERPL-SCNC: 134 MMOL/L (ref 133–144)
T4 FREE SERPL-MCNC: 1 NG/DL (ref 0.76–1.46)
TSH SERPL DL<=0.005 MIU/L-ACNC: 0.19 MU/L (ref 0.4–4)
WBC # BLD AUTO: 4.3 10E9/L (ref 4–11)

## 2017-06-01 PROCEDURE — 99214 OFFICE O/P EST MOD 30 MIN: CPT | Performed by: FAMILY MEDICINE

## 2017-06-01 PROCEDURE — 84439 ASSAY OF FREE THYROXINE: CPT | Performed by: FAMILY MEDICINE

## 2017-06-01 PROCEDURE — 85025 COMPLETE CBC W/AUTO DIFF WBC: CPT | Performed by: FAMILY MEDICINE

## 2017-06-01 PROCEDURE — 36415 COLL VENOUS BLD VENIPUNCTURE: CPT | Performed by: FAMILY MEDICINE

## 2017-06-01 PROCEDURE — 80048 BASIC METABOLIC PNL TOTAL CA: CPT | Performed by: FAMILY MEDICINE

## 2017-06-01 PROCEDURE — 84443 ASSAY THYROID STIM HORMONE: CPT | Performed by: FAMILY MEDICINE

## 2017-06-01 RX ORDER — OXYCODONE AND ACETAMINOPHEN 10; 325 MG/1; MG/1
2 TABLET ORAL EVERY 6 HOURS PRN
Qty: 180 TABLET | Refills: 0 | Status: ON HOLD | OUTPATIENT
Start: 2017-06-01 | End: 2017-07-27

## 2017-06-01 RX ORDER — OXYCODONE AND ACETAMINOPHEN 10; 325 MG/1; MG/1
2 TABLET ORAL EVERY 6 HOURS PRN
Qty: 180 TABLET | Refills: 0 | Status: SHIPPED | OUTPATIENT
Start: 2017-06-01 | End: 2017-09-07

## 2017-06-01 RX ORDER — LISINOPRIL AND HYDROCHLOROTHIAZIDE 12.5; 2 MG/1; MG/1
1 TABLET ORAL DAILY
Qty: 180 TABLET | Refills: 1 | Status: SHIPPED | OUTPATIENT
Start: 2017-06-01 | End: 2017-12-11

## 2017-06-01 RX ORDER — OXYCODONE AND ACETAMINOPHEN 10; 325 MG/1; MG/1
2 TABLET ORAL EVERY 6 HOURS PRN
Qty: 180 TABLET | Refills: 0 | Status: SHIPPED | OUTPATIENT
Start: 2017-06-01 | End: 2017-07-24

## 2017-06-01 NOTE — MR AVS SNAPSHOT
After Visit Summary   6/1/2017    Jessica Ellison    MRN: [id number]           Patient Information     Date Of Birth          1950        Visit Information        Provider Department      6/1/2017 10:40 AM Sarah Barriga MD [facility] [place] 2]        Today's Diagnoses     Chronic bilateral low back pain without sciatica    -  1    Sacroiliac joint pain        Ventral hernia without obstruction or gangrene        Hot flashes        Essential hypertension with goal blood pressure less than 140/90        Hypothyroidism, unspecified           Follow-ups after your visit        Additional Services     GENERAL SURG ADULT REFERRAL       Your provider has referred you to: G: [facility] [phone]   [email or web address]    Please be aware that coverage of these services is subject to the terms and limitations of your health insurance plan.  Call member services at your health plan with any benefit or coverage questions.      Please bring the following with you to your appointment:    (1) Any X-Rays, CTs or MRIs which have been performed.  Contact the facility where they were done to arrange for  prior to your scheduled appointment.   (2) List of current medications   (3) This referral request   (4) Any documents/labs given to you for this referral                  Your next 10 appointments already scheduled     Jul 27, 2017  9:30 AM CDT   Procedure 6.5 hour with U2A ROOM 7   [address] [facility] ([facility])    [address] [address]               Jul 27, 2017 11:00 AM CDT   IR CAROTID CEREBRAL ANGIOGRAM BILATERAL with UUIR1   [facility], Interventional Radiology ([facility])    [address] [id number] [phone]           1. Your doctor will need to do a history and physical within 30  days before this procedure. 2. Your doctor will determine whether you need a 12 lead EKG, as well as which medications should not be taken the morning of the exam. 3. Laboratory tests are to be obtained by your doctor prior to the exam (creatinine, Hgb/Hct, platelet count, and INR) 4. If you have allergies to x-ray contrast or iodine, contact your doctor or a Radiology nurse prior to the exam day for instructions. 5. Someone will need to drive you to and from the hospital. 6. Bring a list of all drugs you are taking; include supplements and over-the-counter medications. 7. Wear comfortable clothes and leave your valuables at home. 8. If you are or may be pregnant, contact your doctor or a Radiology nurse prior to the day of the exam. 9.  If you have diabetes, check with your doctor or a Radiology nurse to see if your insulin needs to be adjusted for the exam. 10. If you are taking Coumadin (to thin you blood) please contact your doctor or a Radiology nurse at least 5 days before the exam for special instructions. 11. You should not have received barium (x-ray contrast) within 48 hours of this exam. 12. The day before your exam you may eat your regular diet and are encouraged to drink at least 2 quarts of clear liquids. Drink no alcoholic beverages for 24 hours prior to the exam. 13. If you have a colostomy you will need to irrigate it with tap water at 8PM the evening before and again at 6AM the morning of the exam. 14. Do not smoke for 24 hours prior to the procedure. 15. Do not eat any solid food or milk products for 6 hours prior to the exam. You may drink clear liquids until 2 hours prior to the exam. Clear liquids include the following: water, Jell-O, clear broth, apple juice or any non-carbonated drink that you can see through (no pop!) 16. The morning of the exam you may brush your teeth and take medications as directed with a sip of water. 17. Tell the Radiology nurse if you have any allergies. 18. You will  "be asked to empty your bladder before the exam begins. 19. Following the exam you will need to remain on complete bedrest for 4-6 hours. The nurse will monitor your vital signs, puncture site, and the pulses and temperature of the arm or leg that was punctured. 20. When discharged, you cannot drive until morning, and an adult must be with you until then. You should stay in the [facility] area overnight.              Who to contact     If you have questions or need follow up information about today's clinic visit or your schedule please contact [facility] directly at [phone].  Normal or non-critical lab and imaging results will be communicated to you by [employer], letter or phone within 4 business days after the clinic has received the results. If you do not hear from us within 7 days, please contact the clinic through [employer] or phone. If you have a critical or abnormal lab result, we will notify you by phone as soon as possible.  Submit refill requests through [employer] or call your pharmacy and they will forward the refill request to us. Please allow 3 business days for your refill to be completed.          Additional Information About Your Visit        MyChart Information     [employer] gives you secure access to your electronic health record. If you see a primary care provider, you can also send messages to your care team and make appointments. If you have questions, please call your primary care clinic.  If you do not have a primary care provider, please call [phone] and they will assist you.        Care EveryWhere ID     This is your Care EveryWhere ID. This could be used by other organizations to access your [place] medical records  [id number]        Your Vitals Were     Pulse Temperature Height BMI (Body Mass Index)          94 98.7  F (37.1  C) (Tympanic) 5' 3\" (1.6 m) 27.46 kg/m2         Blood Pressure from Last 3 Encounters:   06/01/17 138/87   05/23/17 (!) 178/110   02/06/17 " 110/88    Weight from Last 3 Encounters:   06/01/17 155 lb (70.3 kg)   05/23/17 161 lb 12.8 oz (73.4 kg)   06/22/16 167 lb (75.8 kg)              We Performed the Following     Basic metabolic panel     CBC with platelets differential     GENERAL SURG ADULT REFERRAL     TSH with free T4 reflex          Today's Medication Changes          These changes are accurate as of: 6/1/17 11:15 AM.  If you have any questions, ask your nurse or doctor.               These medicines have changed or have updated prescriptions.        Dose/Directions    lisinopril-hydrochlorothiazide 20-12.5 MG per tablet   Commonly known as:  PRINZIDE/ZESTORETIC   This may have changed:  how much to take   Used for:  Essential hypertension with goal blood pressure less than 140/90        Dose:  1 tablet   Take 1 tablet by mouth daily   Quantity:  180 tablet   Refills:  1            Where to get your medicines      These medications were sent to [facility] [address]  [address] [address] [facility] [address]     Phone:  [phone]     lisinopril-hydrochlorothiazide 20-12.5 MG per tablet         Some of these will need a paper prescription and others can be bought over the counter.  Ask your nurse if you have questions.     Bring a paper prescription for each of these medications     oxyCODONE-acetaminophen  MG per tablet    oxyCODONE-acetaminophen  MG per tablet    oxyCODONE-acetaminophen  MG per tablet                Primary Care Provider Office Phone # Fax #    Sarah Barriga [phone]       [facility] [address] [address] [facility] [address]        Thank you!     Thank you for choosing [facility]  for your care. Our goal is always to provide you with excellent care. Hearing back from our patients is one way we can continue to improve our services. Please take a few minutes to complete the written survey that you may  receive in the mail after your visit with us. Thank you!             Your Updated Medication List - Protect others around you: Learn how to safely use, store and throw away your medicines at www.disposemymeds.org.          This list is accurate as of: 6/1/17 11:15 AM.  Always use your most recent med list.                   Brand Name Dispense Instructions for use    acyclovir 5 % ointment    ZOVIRAX    15 g    Apply topically 6 times daily       aspirin 325 MG tablet     180 tablet    Take 1 tablet (325 mg) by mouth daily       calcium carbonate-vitamin D 600-400 MG-UNIT Chew    CALTRATE 600+D    180 tablet    Take 1 chew tab by mouth 2 times daily       cyclobenzaprine 10 MG tablet    FLEXERIL    180 tablet    Take 1 tablet (10 mg) by mouth 2 times daily as needed for muscle spasms       EPINEPHrine 0.3 MG/0.3ML injection     2 each    Inject 0.3 mLs (0.3 mg) into the muscle once as needed for anaphylaxis       * gabapentin 300 MG capsule    NEURONTIN    90 capsule    Take 1 capsule (300 mg) by mouth 3 times daily To be taken with the 600 mg pill       * gabapentin 600 MG tablet    NEURONTIN    90 tablet    TAKE ONE TABLET BY MOUTH THREE TIMES A DAY (TO BE TAKEN WITH THE 300MG CAPSULE)       levothyroxine 50 MCG tablet    LEVOTHROID    90 tablet    Take 1 tablet (50 mcg) by mouth daily       lisinopril-hydrochlorothiazide 20-12.5 MG per tablet    PRINZIDE/ZESTORETIC    180 tablet    Take 1 tablet by mouth daily       multivitamin CF formula chewable tablet    CHOICEFUL    100 tablet    Take 1 tablet by mouth daily       * oxyCODONE-acetaminophen  MG per tablet    PERCOCET    180 tablet    Take 2 tablets by mouth every 6 hours as needed for moderate to severe pain Max 6/day       * oxyCODONE-acetaminophen  MG per tablet    PERCOCET    180 tablet    Take 2 tablets by mouth every 6 hours as needed for moderate to severe pain Max 6/day       * oxyCODONE-acetaminophen  MG per tablet    PERCOCET    180  tablet    Take 2 tablets by mouth every 6 hours as needed for moderate to severe pain Max 6/day       QUEtiapine 25 MG tablet    SEROQUEL    30 tablet    Take 1 tablet (25 mg) by mouth daily as needed Take 25 mg Every 6 hours As needed for anxiety.       sertraline 100 MG tablet    ZOLOFT    180 tablet    Take 2 tablets (200 mg) by mouth daily       traZODone 150 MG tablet    DESYREL    90 tablet    Take 1 tablet (150 mg) by mouth nightly as needed for sleep       * Notice:  This list has 5 medication(s) that are the same as other medications prescribed for you. Read the directions carefully, and ask your doctor or other care provider to review them with you.

## 2017-06-01 NOTE — NURSING NOTE
"Chief Complaint   Patient presents with     Recheck Medication       Initial /87  Pulse 94  Temp 98.7  F (37.1  C) (Tympanic)  Ht 5' 3\" (1.6 m)  Wt 155 lb (70.3 kg)  BMI 27.46 kg/m2 Estimated body mass index is 27.46 kg/(m^2) as calculated from the following:    Height as of this encounter: 5' 3\" (1.6 m).    Weight as of this encounter: 155 lb (70.3 kg).  Medication Reconciliation: complete    "

## 2017-06-01 NOTE — PROGRESS NOTES
"  SUBJECTIVE:                                                    Jessica Ellison is a 66 year old female who presents to clinic today for the following health issues:      Medication Followup of oxyCODONE-acetaminophen (PERCOCET)  MG per tablet    Taking Medication as prescribed: \"about 4 a day\" - prescribed to use up to six.  She has stretched her last three month prescriptions until almost 4 months, which is encouraging.     Side Effects:  None    Medication Helping Symptoms:  yes     Medication Followup of gabapentin (NEURONTIN) 600 MG     Taking Medication as prescribed: yes, 900 mg TIB    Side Effects:  None-pt has a question about dose, too much?    Medication Helping Symptoms:  Unknown    No side effects, we discussed FDA approved dosing for gabapentin.      Pt has concerns about hot flashes; started 4 months ago.  All day long, not just night sweats.  Unsure if any fever.   Up to date on cancer screenings.   No particular change in pain.  She does admit that she's had a hard \"mass\" in her upper abdomen that is painful at times.      Did have some norco (pharamcy notified me) in April after some dental work.      Does have some right hip pain from trochanteric bursitis.  Last injected sometime in March at her pain clinic.    /87  Pulse 94  Temp 98.7  F (37.1  C) (Tympanic)  Ht 5' 3\" (1.6 m)  Wt 155 lb (70.3 kg)  BMI 27.46 kg/m2  EXAM: GENERAL APPEARANCE: Alert, no acute distress  RESP: lungs clear to auscultation   CV: normal rate, regular rhythm, no murmur or gallop  ABDOMEN: visible bulging of the epigastric area.  When she laid down, I pressed on the epigastrium and felt a hernia reduce, this was then tender.  Palpable small facial defect 2 cm in size or so.  She has a well healed vertical scar just lateral to the umbilicus.      Results for orders placed or performed in visit on 06/01/17   CBC with platelets differential   Result Value Ref Range    WBC 4.3 4.0 - 11.0 10e9/L    RBC Count " 3.90 3.8 - 5.2 10e12/L    Hemoglobin 12.1 11.7 - 15.7 g/dL    Hematocrit 37.6 35.0 - 47.0 %    MCV 96 78 - 100 fl    MCH 31.0 26.5 - 33.0 pg    MCHC 32.2 31.5 - 36.5 g/dL    RDW 15.4 (H) 10.0 - 15.0 %    Platelet Count 202 150 - 450 10e9/L    Diff Method Automated Method     % Neutrophils 67.7 %    % Lymphocytes 16.7 %    % Monocytes 13.0 %    % Eosinophils 2.1 %    % Basophils 0.5 %    Absolute Neutrophil 2.9 1.6 - 8.3 10e9/L    Absolute Lymphocytes 0.7 (L) 0.8 - 5.3 10e9/L    Absolute Monocytes 0.6 0.0 - 1.3 10e9/L    Absolute Eosinophils 0.1 0.0 - 0.7 10e9/L    Absolute Basophils 0.0 0.0 - 0.2 10e9/L     ASSESSMENT/PLAN:      ICD-10-CM    1. Chronic bilateral low back pain without sciatica M54.5 oxyCODONE-acetaminophen (PERCOCET)  MG per tablet    G89.29 oxyCODONE-acetaminophen (PERCOCET)  MG per tablet     oxyCODONE-acetaminophen (PERCOCET)  MG per tablet   2. Sacroiliac joint pain M53.3 oxyCODONE-acetaminophen (PERCOCET)  MG per tablet     oxyCODONE-acetaminophen (PERCOCET)  MG per tablet     oxyCODONE-acetaminophen (PERCOCET)  MG per tablet   3. Ventral hernia without obstruction or gangrene K43.9    4. Hot flashes R23.2 GENERAL SURG ADULT REFERRAL     CBC with platelets differential   5. Essential hypertension with goal blood pressure less than 140/90 I10 Basic metabolic panel     lisinopril-hydrochlorothiazide (PRINZIDE/ZESTORETIC) 20-12.5 MG per tablet   6. Hypothyroidism, unspecified E03.9 TSH with free T4 reflex        Pain medication refilled. Again, encouraged to use as little as possible.  She continues to see a pain clinic for injections, etc.     Suspect she has a ventral/epigastric hernia based on history and exam today.  Refer to general surgery.  Discussed the general nature of hernias and complications, that not all need repair, timing of repair can be elective depending on symptoms and schedules.  Referred to Surgery for consultation.  No restrictions of  activities depending on symptoms.  If ever not able to reduce patient understands that this is a surgical emergency and she should present to the ER at once.    Dr. Noel recently increased her lisinopril and added hydrochlorothiazide due to elevated blood pressure, however then she stared having hypotension, so she has cut back to 20/12.5 just one tab a day.  Will check renal function/lytes. She will keep an eye on blood pressure.  If her blood pressure continues to spike along with the hot flashes, etc, may be worth ruling out pheochromocytoma.      Check TSH to r/o hyperthyroidism with the hot flashes.  WBC is normal, doubt infectious at this point.     Sarah Barriga M.D.

## 2017-06-02 NOTE — PROGRESS NOTES
TSH is a little low, but free T4 is normal.  Continue current dose of levothyroxine.      Kidney function has declined slightly.  Increase water intake and plan to recheck blood work in 3-4 weeks.  If this continues to decline we'll need to further evaluate.      White blood count and hemoglobin/platelets are normal.    Sarah Barriga M.D.

## 2017-07-18 ENCOUNTER — TRANSFERRED RECORDS (OUTPATIENT)
Dept: HEALTH INFORMATION MANAGEMENT | Facility: CLINIC | Age: 67
End: 2017-07-18

## 2017-07-24 ENCOUNTER — OFFICE VISIT (OUTPATIENT)
Dept: FAMILY MEDICINE | Facility: CLINIC | Age: 67
End: 2017-07-24
Payer: COMMERCIAL

## 2017-07-24 VITALS
DIASTOLIC BLOOD PRESSURE: 69 MMHG | WEIGHT: 155 LBS | RESPIRATION RATE: 18 BRPM | HEIGHT: 63 IN | HEART RATE: 92 BPM | SYSTOLIC BLOOD PRESSURE: 107 MMHG | BODY MASS INDEX: 27.46 KG/M2

## 2017-07-24 DIAGNOSIS — Z01.818 PREOP GENERAL PHYSICAL EXAM: Primary | ICD-10-CM

## 2017-07-24 DIAGNOSIS — I67.1 CEREBRAL ANEURYSM, NONRUPTURED: ICD-10-CM

## 2017-07-24 DIAGNOSIS — I10 ESSENTIAL HYPERTENSION WITH GOAL BLOOD PRESSURE LESS THAN 140/90: ICD-10-CM

## 2017-07-24 DIAGNOSIS — E03.9 HYPOTHYROIDISM, UNSPECIFIED: ICD-10-CM

## 2017-07-24 PROCEDURE — 80048 BASIC METABOLIC PNL TOTAL CA: CPT | Performed by: FAMILY MEDICINE

## 2017-07-24 PROCEDURE — 36415 COLL VENOUS BLD VENIPUNCTURE: CPT | Performed by: FAMILY MEDICINE

## 2017-07-24 PROCEDURE — 99215 OFFICE O/P EST HI 40 MIN: CPT | Performed by: FAMILY MEDICINE

## 2017-07-24 NOTE — PATIENT INSTRUCTIONS
Thank you for choosing [facility] (formerly [facility]  You may be receiving a survey in the mail from Hyun Mcneill regarding your visit today.  Please take a few minutes to complete and return the survey to let us know how we are doing.      Our Clinic hours are:  Mondays    7:20 am - 7 pm  Tues -  Fri  7:20 am - 5 pm    Clinic Phone: [phone]    The clinic lab opens at 7:30 am Mon - Fri and appointments are required.    [facility]. [phone]  Monday-Thursday 8 am - 7pm  Tues/Wed/Fri 8 am - 5:30 pm         Before Your Surgery      Call your surgeon if there is any change in your health. This includes signs of a cold or flu (such as a sore throat, runny nose, cough, rash or fever).    Do not smoke, drink alcohol or take over the counter medicine (unless your surgeon or primary care doctor tells you to) for the 24 hours before and after surgery.    If you take prescribed drugs: Follow your doctor s orders about which medicines to take and which to stop until after surgery.    Eating and drinking prior to surgery: follow the instructions from your surgeon    Take a shower or bath the night before surgery. Use the soap your surgeon gave you to gently clean your skin. If you do not have soap from your surgeon, use your regular soap. Do not shave or scrub the surgery site.  Wear clean pajamas and have clean sheets on your bed.

## 2017-07-24 NOTE — NURSING NOTE
"Chief Complaint   Patient presents with     Pre-Op Exam       Initial /69  Pulse 92  Resp 18  Ht 5' 3\" (1.6 m)  Wt 155 lb (70.3 kg)  Breastfeeding? No  BMI 27.46 kg/m2 Estimated body mass index is 27.46 kg/(m^2) as calculated from the following:    Height as of this encounter: 5' 3\" (1.6 m).    Weight as of this encounter: 155 lb (70.3 kg).  Medication Reconciliation: complete    "

## 2017-07-24 NOTE — PROGRESS NOTES
[facility]  [address]  [facility] [address]  [phone]  Dept: [phone]    PRE-OP EVALUATION:  Today's date: 2017    Jessica Ellison (: 1950) presents for pre-operative evaluation assessment as requested by Dr. García.  She requires evaluation and anesthesia risk assessment prior to undergoing surgery/procedure for monitoring of cerebral aneurysm.  Proposed procedure: cerebral angiography of the left internal carotid artery with right femoral arteriotomy    Date of Surgery/ Procedure: 17  Time of Surgery/ Procedure: 9:30 AM  Hospital/Surgical Facility: [facility]  Primary Physician: Sarah Barriga  Type of Anesthesia Anticipated: General    Patient has a Health Care Directive or Living Will:  NO    1. NO - Do you have a history of heart attack, stroke, stent, bypass or surgery on an artery in the head, neck, heart or legs?  2. NO - Do you ever have any pain or discomfort in your chest?  3. NO - Do you have a history of  Heart Failure?  4. NO - Are you troubled by shortness of breath when: walking on the level, up a slight hill or at night?  5. NO - Do you currently have a cold, bronchitis or other respiratory infection?  6. NO - Do you have a cough, shortness of breath or wheezing?  7. NO - Do you sometimes get pains in the calves of your legs when you walk?  8. NO - Do you or anyone in your family have previous history of blood clots?  9. NO - Do you or does anyone in your family have a serious bleeding problem such as prolonged bleeding following surgeries or cuts?  10. NO - Have you ever had problems with anemia or been told to take iron pills?  11. NO - Have you had any abnormal blood loss such as black, tarry or bloody stools, or abnormal vaginal bleeding?  12. YES - Have you ever had a blood transfusion?  13. NO - Have you or any of your relatives ever had problems with anesthesia?  14. NO - Do you have sleep apnea, excessive snoring or daytime  drowsiness?  15. NO - Do you have any prosthetic heart valves?  16. NO - Do you have prosthetic joints?  17. NO - Is there any chance that you may be pregnant?        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       HYPERTENSION - Patient has longstanding history of mod-severe HTN , currently denies any symptoms referable to elevated blood pressure. Specifically denies chest pain, palpitations, dyspnea, orthopnea, PND or peripheral edema. Blood pressure readings have been in normal range. Current medication regimen is as listed below. Patient denies any side effects of medication.                                                                                                                                                                                                                                                                                                    .  HYPOTHYROIDISM - Patient has a longstanding history of chronic Hypothyroidism. Patient has been doing well, noting no tremor, insomnia, hair loss or changes in skin texture. Last TSH value of 0.19 on 6/1/2017. Continues to take medications as directed, without adverse reactions or side effects.                                                                                                                                                                                                                        .    MEDICAL HISTORY:                                                    Patient Active Problem List    Diagnosis Date Noted     Hypothyroidism, unspecified 09/07/2016     Priority: Medium     Chronic bilateral low back pain without sciatica 06/02/2016     Priority: Medium     Essential  hypertension with goal blood pressure less than 140/90 06/02/2016     Priority: Medium     S/P lumbar spinal fusion 10/19/2015     Priority: Medium     Bee allergy status 06/30/2015     Priority: Medium     Cerebral aneurysm, nonruptured 07/24/2014     Priority: Medium     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 Marti       Major depressive disorder, recurrent episode, moderate (H) 07/10/2013     Priority: Medium     Advanced directives, counseling/discussion 09/05/2012     Priority: Medium     Patient does not have an Advance/Health Care Directive (HCD), has information at home.     Sonja Krishnamurthy  September 5, 2012         Anxiety 06/20/2012     Priority: Medium     CARDIOVASCULAR SCREENING; LDL GOAL LESS THAN 160 10/31/2010     Priority: Medium     ETOH abuse 03/19/2010     Priority: Medium      katie drinking, DUIs and probation violation       Insomnia 12/21/2009     Priority: Medium     Back pain 12/21/2009     Priority: Medium     Patient is followed by [facility] for ongoing prescription of narcotic pain medicine.  Med: oxycodone/acetaminophen 10/325 for chronic back pain.   Maximum use per month: 180  Expected duration: unknown  Narcotic agreement on file: YES 3/19/2010  Clinic visit recommended: Q 3 months    April 2013 - rhizotomy for SI (left) Jhon Mckay MD    Follows at [facility] - severe low back pain with work related injuries.  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  Spinal stenosis at most lumbar levels  Improvement with past epidural steroid injections  SI joint dysfunction with the pain        Past Medical History:   Diagnosis Date     Anemia      Aneurysm (H)      Chemical dependency (H)     Chem Dep RX     Depression      History of blood transfusion      Hypertension      Menopausal symptoms      Other chronic pain     Lower back and hips     Sleep disorder       Thyroid disease      Tobacco abuse      Past Surgical History:   Procedure Laterality Date     APPENDECTOMY  1960     C PART REMOVAL COLON W ANASTOMOSIS  5/2005    diverticulitis     C SPINE FUSION,ANTER,8+ SGMTS  2007    Anterior posterior fusion 10 levels, hardware removal and re-fusion 2009     cerebral aneurysm  2014    coiled     COLONOSCOPY  4/19/2005     HC EXCISION BREAST LESION, OPEN >=1      core biopsy 2006, lumpectomy 2006     orif left femoral neck Left 6/3/2016    stress fracture.  done at [facility]     SURGICAL HISTORY OF -   2004    Skin Graft     Current Outpatient Prescriptions   Medication Sig Dispense Refill     oxyCODONE-acetaminophen (PERCOCET)  MG per tablet Take 2 tablets by mouth every 6 hours as needed for moderate to severe pain Max 6/day 180 tablet 0     oxyCODONE-acetaminophen (PERCOCET)  MG per tablet Take 2 tablets by mouth every 6 hours as needed for moderate to severe pain Max 6/day 180 tablet 0     lisinopril-hydrochlorothiazide (PRINZIDE/ZESTORETIC) 20-12.5 MG per tablet Take 1 tablet by mouth daily 180 tablet 1     gabapentin (NEURONTIN) 600 MG tablet TAKE ONE TABLET BY MOUTH THREE TIMES A DAY (TO BE TAKEN WITH THE 300MG CAPSULE) 90 tablet 5     sertraline (ZOLOFT) 100 MG tablet Take 2 tablets (200 mg) by mouth daily 180 tablet 1     cyclobenzaprine (FLEXERIL) 10 MG tablet Take 1 tablet (10 mg) by mouth 2 times daily as needed for muscle spasms 180 tablet 1     QUEtiapine (SEROQUEL) 25 MG tablet Take 1 tablet (25 mg) by mouth daily as needed Take 25 mg Every 6 hours As needed for anxiety. 30 tablet 3     gabapentin (NEURONTIN) 300 MG capsule Take 1 capsule (300 mg) by mouth 3 times daily To be taken with the 600 mg pill 90 capsule 5     levothyroxine (LEVOTHROID) 50 MCG tablet Take 1 tablet (50 mcg) by mouth daily 90 tablet 3     traZODone (DESYREL) 150 MG tablet Take 1 tablet (150 mg) by mouth nightly as needed for sleep 90 tablet 3     acyclovir (ZOVIRAX) 5 %  "ointment Apply topically 6 times daily 15 g 3     EPINEPHrine (EPIPEN) 0.3 MG/0.3ML injection Inject 0.3 mLs (0.3 mg) into the muscle once as needed for anaphylaxis 2 each 3     calcium carbonate-vitamin D (CALTRATE 600+D) 600-400 MG-UNIT CHEW Take 1 chew tab by mouth 2 times daily 180 tablet 3     aspirin 325 MG tablet Take 1 tablet (325 mg) by mouth daily 180 tablet 6     multivitamin CF formula (CHOICEFUL) chewable tablet Take 1 tablet by mouth daily 100 tablet 0     OTC products: None, except as noted above    Allergies   Allergen Reactions     Bee Venom       Latex Allergy: NO    Social History   Substance Use Topics     Smoking status: Former Smoker     Quit date: 1/1/2004     Smokeless tobacco: Never Used     Alcohol use No      Comment: ETOH dependency, DUI 3/15/10     History   Drug Use No       REVIEW OF SYSTEMS:                                                    C: NEGATIVE for fever, chills, change in weight  E/M: NEGATIVE for ear, mouth and throat problems  R: NEGATIVE for significant cough or SOB  CV: NEGATIVE for chest pain, palpitations or peripheral edema    EXAM:                                                    /69  Pulse 92  Resp 18  Ht 5' 3\" (1.6 m)  Wt 155 lb (70.3 kg)  Breastfeeding? No  BMI 27.46 kg/m2  GENERAL APPEARANCE: healthy, alert and no distress  HENT: ear canals and TM's normal and nose and mouth without ulcers or lesions  RESP: lungs clear to auscultation - no rales, rhonchi or wheezes  CV: regular rate and rhythm, normal S1 S2, no S3 or S4 and no murmur, click or rub   ABDOMEN: soft, nontender, no HSM or masses and bowel sounds normal  NEURO: Normal strength and tone, sensory exam grossly normal, mentation intact and speech normal    DIAGNOSTICS:                                                    EKG: Not indicated due to non-vascular surgery and low risk of event (age <65 and without cardiac risk factors)    Recent Labs   Lab Test  06/01/17   1118 06/03/16 06/02/16   " 1635   08/05/15   0905   12/16/14   1040   HGB  12.1   --   11.7   --   10.2*   < >  9.8*   PLT  202   --   214   --   207   < >  278   INR   --    --    --    --   0.96   --   1.10   NA  134   --   141   < >  144   < >  138   POTASSIUM  4.1  4.2  3.8   < >  3.9   < >  4.1   CR  1.20*  0.79  1.01   < >  0.92   < >  1.28*    < > = values in this interval not displayed.        IMPRESSION:                                                    Reason for surgery/procedure: sedation for cerebral angiogram for monitoring of previous non-ruptured aneurysm  Diagnosis/reason for consult: preoperative evaluation and medical risk assessment    The proposed surgical procedure is considered LOW risk.    REVISED CARDIAC RISK INDEX  The patient has the following serious cardiovascular risks for perioperative complications such as (MI, PE, VFib and 3  AV Block):  No serious cardiac risks  INTERPRETATION: 0 risks: Class I (very low risk - 0.4% complication rate)    The patient has the following additional risks for perioperative complications:  High tolerance to opioid analgesics due to chronic use of oxycodone/acetaminopehn      ICD-10-CM    1. Preop general physical exam Z01.818    2. Cerebral aneurysm, nonruptured I67.1    3. Essential hypertension with goal blood pressure less than 140/90 I10        RECOMMENDATIONS:                                                          --Patient is to take all scheduled medications on the day of surgery EXCEPT for modifications listed below.    Anticoagulant or Antiplatelet Medication Use  ASPIRIN: Bleeding risk is low for this procedure and aspirin 81 mg daily should be continued in the perioperative period        ACE Inhibitor or Angiotensin Receptor Blocker (ARB) Use  Ace inhibitor or Angiotensin Receptor Blocker (ARB) and should HOLD this medication for the 24 hours prior to surgery.          APPROVAL GIVEN to proceed with proposed procedure, without further diagnostic evaluation        Signed Electronically by: Sarah Barriga MD    Copy of this evaluation report is provided to requesting physician.    [facility] Preop Guidelines

## 2017-07-24 NOTE — MR AVS SNAPSHOT
After Visit Summary   7/24/2017    Jessica Ellison    MRN: [id number]           Patient Information     Date Of Birth          1950        Visit Information        Provider Department      7/24/2017 1:20 PM Sarah Barriga MD [facility]        Today's Diagnoses     Preop general physical exam    -  1    Cerebral aneurysm, nonruptured        Essential hypertension with goal blood pressure less than 140/90        Hypothyroidism, unspecified          Care Instructions          Thank you for choosing [facility].  You may be receiving a survey in the mail from Hyun Mcneill regarding your visit today.  Please take a few minutes to complete and return the survey to let us know how we are doing.      Our Clinic hours are:  Mondays    7:20 am - 7 pm  Tues -  Fri  7:20 am - 5 pm    Clinic Phone: [phone]    The clinic lab opens at 7:30 am Mon - Fri and appointments are required.    [facility]. [phone]  Monday-Thursday 8 am - 7pm  Tues/Wed/Fri 8 am - 5:30 pm         Before Your Surgery      Call your surgeon if there is any change in your health. This includes signs of a cold or flu (such as a sore throat, runny nose, cough, rash or fever).    Do not smoke, drink alcohol or take over the counter medicine (unless your surgeon or primary care doctor tells you to) for the 24 hours before and after surgery.    If you take prescribed drugs: Follow your doctor s orders about which medicines to take and which to stop until after surgery.    Eating and drinking prior to surgery: follow the instructions from your surgeon    Take a shower or bath the night before surgery. Use the soap your surgeon gave you to gently clean your skin. If you do not have soap from your surgeon, use your regular soap. Do not shave or scrub the surgery site.  Wear clean pajamas and have clean sheets on your bed.           Follow-ups after your visit        Your next 10  appointments already scheduled     Jul 27, 2017  9:30 AM CDT   Procedure 6.5 hour with U2A ROOM 7   [address] [facility] [place] ([facility])    [address] [address]               Jul 27, 2017 11:00 AM CDT   IR CAROTID CEREBRAL ANGIOGRAM BILATERAL with UUIR3   [facility], [place], Interventional Radiology ([facility])    [address] [id number] [phone]           1. Your doctor will need to do a history and physical within 30 days before this procedure. 2. Your doctor will determine whether you need a 12 lead EKG, as well as which medications should not be taken the morning of the exam. 3. Laboratory tests are to be obtained by your doctor prior to the exam (creatinine, Hgb/Hct, platelet count, and INR) 4. If you have allergies to x-ray contrast or iodine, contact your doctor or a Radiology nurse prior to the exam day for instructions. 5. Someone will need to drive you to and from the hospital. 6. Bring a list of all drugs you are taking; include supplements and over-the-counter medications. 7. Wear comfortable clothes and leave your valuables at home. 8. If you are or may be pregnant, contact your doctor or a Radiology nurse prior to the day of the exam. 9.  If you have diabetes, check with your doctor or a Radiology nurse to see if your insulin needs to be adjusted for the exam. 10. If you are taking Coumadin (to thin you blood) please contact your doctor or a Radiology nurse at least 5 days before the exam for special instructions. 11. You should not have received barium (x-ray contrast) within 48 hours of this exam. 12. The day before your exam you may eat your regular diet and are encouraged to drink at least 2 quarts of clear liquids. Drink no alcoholic beverages for 24 hours prior to the exam. 13. If you have a colostomy you will need to irrigate it with tap water at 8PM the  evening before and again at 6AM the morning of the exam. 14. Do not smoke for 24 hours prior to the procedure. 15. Do not eat any solid food or milk products for 6 hours prior to the exam. You may drink clear liquids until 2 hours prior to the exam. Clear liquids include the following: water, Jell-O, clear broth, apple juice or any non-carbonated drink that you can see through (no pop!) 16. The morning of the exam you may brush your teeth and take medications as directed with a sip of water. 17. Tell the Radiology nurse if you have any allergies. 18. You will be asked to empty your bladder before the exam begins. 19. Following the exam you will need to remain on complete bedrest for 4-6 hours. The nurse will monitor your vital signs, puncture site, and the pulses and temperature of the arm or leg that was punctured. 20. When discharged, you cannot drive until morning, and an adult must be with you until then. You should stay in the [place] area overnight.              Who to contact     If you have questions or need follow up information about today's clinic visit or your schedule please contact [facility] directly at [phone].  Normal or non-critical lab and imaging results will be communicated to you by [employer], letter or phone within 4 business days after the clinic has received the results. If you do not hear from us within 7 days, please contact the clinic through [employer] or phone. If you have a critical or abnormal lab result, we will notify you by phone as soon as possible.  Submit refill requests through [employer] or call your pharmacy and they will forward the refill request to us. Please allow 3 business days for your refill to be completed.          Additional Information About Your Visit        [employer] Information     [employer] gives you secure access to your electronic health record. If you see a primary care provider, you can also send messages to your care team and make  "appointments. If you have questions, please call your primary care clinic.  If you do not have a primary care provider, please call [phone] and they will assist you.        Care EveryWhere ID     This is your Care EveryWhere ID. This could be used by other organizations to access your [place] medical records  [id number]        Your Vitals Were     Pulse Respirations Height Breastfeeding? BMI (Body Mass Index)       92 18 5' 3\" (1.6 m) No 27.46 kg/m2        Blood Pressure from Last 3 Encounters:   07/24/17 107/69   06/01/17 138/87   05/23/17 (!) 178/110    Weight from Last 3 Encounters:   07/24/17 155 lb (70.3 kg)   06/01/17 155 lb (70.3 kg)   05/23/17 161 lb 12.8 oz (73.4 kg)              Today, you had the following     No orders found for display         Today's Medication Changes          These changes are accurate as of: 7/24/17  1:52 PM.  If you have any questions, ask your nurse or doctor.               These medicines have changed or have updated prescriptions.        Dose/Directions    * oxyCODONE-acetaminophen  MG per tablet   Commonly known as:  PERCOCET   This may have changed:  Another medication with the same name was removed. Continue taking this medication, and follow the directions you see here.   Used for:  Chronic bilateral low back pain without sciatica, Sacroiliac joint pain   Changed by:  Sarah Barriga MD        Dose:  2 tablet   Take 2 tablets by mouth every 6 hours as needed for moderate to severe pain Max 6/day   Quantity:  180 tablet   Refills:  0       * oxyCODONE-acetaminophen  MG per tablet   Commonly known as:  PERCOCET   This may have changed:  Another medication with the same name was removed. Continue taking this medication, and follow the directions you see here.   Used for:  Chronic bilateral low back pain without sciatica, Sacroiliac joint pain   Changed by:  Sarah Barriga MD        Dose:  2 tablet   Take 2 tablets by mouth every 6 hours as needed for " moderate to severe pain Max 6/day   Quantity:  180 tablet   Refills:  0       * Notice:  This list has 2 medication(s) that are the same as other medications prescribed for you. Read the directions carefully, and ask your doctor or other care provider to review them with you.             Primary Care Provider Office Phone # Fax #    Sarah Barriga [phone]       [facility] [address] [facility] [id number]        Equal Access to Services     CHRISTEN MOURA : Hadii aad ku hadasho Soomaali, waaxda luqadaha, qaybta kaalmada adeegyada, waxay idiin hayaan adeeg kharash la'aan . So [facility] [phone].    ATENCIÓN: Si patrickla espga, tiene a mendez disposición servicios gratuitos de asistencia lingüística. [facility] [phone].    We comply with applicable federal civil rights laws and [place] laws. We do not discriminate on the basis of race, color, national origin, age, disability sex, sexual orientation or gender identity.            Thank you!     Thank you for choosing [facility]  for your care. Our goal is always to provide you with excellent care. Hearing back from our patients is one way we can continue to improve our services. Please take a few minutes to complete the written survey that you may receive in the mail after your visit with us. Thank you!             Your Updated Medication List - Protect others around you: Learn how to safely use, store and throw away your medicines at www.disposemymeds.org.          This list is accurate as of: 7/24/17  1:52 PM.  Always use your most recent med list.                   Brand Name Dispense Instructions for use Diagnosis    acyclovir 5 % ointment    ZOVIRAX    15 g    Apply topically 6 times daily    HSV (herpes simplex virus) infection       aspirin 325 MG tablet     180 tablet    Take 1 tablet (325 mg) by mouth daily    Cerebral aneurysm, nonruptured       calcium carbonate-vitamin D 600-400 MG-UNIT Chew    CALTRATE  600+D    180 tablet    Take 1 chew tab by mouth 2 times daily    Back pain       cyclobenzaprine 10 MG tablet    FLEXERIL    180 tablet    Take 1 tablet (10 mg) by mouth 2 times daily as needed for muscle spasms    S/P lumbar spinal fusion       EPINEPHrine 0.3 MG/0.3ML injection 2-pack    EPIPEN/ADRENACLICK/or ANY BX GENERIC EQUIV    2 each    Inject 0.3 mLs (0.3 mg) into the muscle once as needed for anaphylaxis    Allergy to bee sting       * gabapentin 300 MG capsule    NEURONTIN    90 capsule    Take 1 capsule (300 mg) by mouth 3 times daily To be taken with the 600 mg pill    Chronic bilateral low back pain without sciatica       * gabapentin 600 MG tablet    NEURONTIN    90 tablet    TAKE ONE TABLET BY MOUTH THREE TIMES A DAY (TO BE TAKEN WITH THE 300MG CAPSULE)    Chronic bilateral low back pain without sciatica       levothyroxine 50 MCG tablet    LEVOTHROID    90 tablet    Take 1 tablet (50 mcg) by mouth daily    Hypothyroidism, unspecified       lisinopril-hydrochlorothiazide 20-12.5 MG per tablet    PRINZIDE/ZESTORETIC    180 tablet    Take 1 tablet by mouth daily    Essential hypertension with goal blood pressure less than 140/90       multivitamin CF formula chewable tablet    CHOICEFUL    100 tablet    Take 1 tablet by mouth daily    Postmenopause       * oxyCODONE-acetaminophen  MG per tablet    PERCOCET    180 tablet    Take 2 tablets by mouth every 6 hours as needed for moderate to severe pain Max 6/day    Chronic bilateral low back pain without sciatica, Sacroiliac joint pain       * oxyCODONE-acetaminophen  MG per tablet    PERCOCET    180 tablet    Take 2 tablets by mouth every 6 hours as needed for moderate to severe pain Max 6/day    Chronic bilateral low back pain without sciatica, Sacroiliac joint pain       QUEtiapine 25 MG tablet    SEROQUEL    30 tablet    Take 1 tablet (25 mg) by mouth daily as needed Take 25 mg Every 6 hours As needed for anxiety.    Anxiety       sertraline  100 MG tablet    ZOLOFT    180 tablet    Take 2 tablets (200 mg) by mouth daily    Major depressive disorder, recurrent episode, moderate (H)       traZODone 150 MG tablet    DESYREL    90 tablet    Take 1 tablet (150 mg) by mouth nightly as needed for sleep    Insomnia, unspecified type       * Notice:  This list has 4 medication(s) that are the same as other medications prescribed for you. Read the directions carefully, and ask your doctor or other care provider to review them with you.

## 2017-07-25 LAB
ANION GAP SERPL CALCULATED.3IONS-SCNC: 5 MMOL/L (ref 3–14)
BUN SERPL-MCNC: 26 MG/DL (ref 7–30)
CALCIUM SERPL-MCNC: 9.8 MG/DL (ref 8.5–10.1)
CHLORIDE SERPL-SCNC: 102 MMOL/L (ref 94–109)
CO2 SERPL-SCNC: 28 MMOL/L (ref 20–32)
CREAT SERPL-MCNC: 1 MG/DL (ref 0.52–1.04)
GFR SERPL CREATININE-BSD FRML MDRD: 56 ML/MIN/1.7M2
GLUCOSE SERPL-MCNC: 86 MG/DL (ref 70–99)
POTASSIUM SERPL-SCNC: 4.5 MMOL/L (ref 3.4–5.3)
SODIUM SERPL-SCNC: 135 MMOL/L (ref 133–144)

## 2017-07-25 ASSESSMENT — PATIENT HEALTH QUESTIONNAIRE - PHQ9: SUM OF ALL RESPONSES TO PHQ QUESTIONS 1-9: 4

## 2017-07-27 ENCOUNTER — APPOINTMENT (OUTPATIENT)
Dept: INTERVENTIONAL RADIOLOGY/VASCULAR | Facility: CLINIC | Age: 67
End: 2017-07-27
Attending: NEUROLOGICAL SURGERY
Payer: MEDICARE

## 2017-07-27 ENCOUNTER — HOSPITAL ENCOUNTER (OUTPATIENT)
Facility: CLINIC | Age: 67
Discharge: HOME | End: 2017-07-27
Attending: NEUROLOGICAL SURGERY | Admitting: NEUROLOGICAL SURGERY
Payer: MEDICARE

## 2017-07-27 ENCOUNTER — APPOINTMENT (OUTPATIENT)
Dept: MEDSURG UNIT | Facility: CLINIC | Age: 67
End: 2017-07-27
Attending: NEUROLOGICAL SURGERY
Payer: MEDICARE

## 2017-07-27 VITALS
RESPIRATION RATE: 16 BRPM | TEMPERATURE: 97.7 F | SYSTOLIC BLOOD PRESSURE: 99 MMHG | DIASTOLIC BLOOD PRESSURE: 64 MMHG | HEART RATE: 90 BPM | OXYGEN SATURATION: 94 %

## 2017-07-27 DIAGNOSIS — I67.1 CEREBRAL ANEURYSM, NONRUPTURED: ICD-10-CM

## 2017-07-27 LAB
APTT PPP: 29 SEC (ref 22–37)
CREAT SERPL-MCNC: 0.83 MG/DL (ref 0.52–1.04)
ERYTHROCYTE [DISTWIDTH] IN BLOOD BY AUTOMATED COUNT: 14.6 % (ref 10–15)
GFR SERPL CREATININE-BSD FRML MDRD: 69 ML/MIN/1.7M2
HCT VFR BLD AUTO: 34.5 % (ref 35–47)
HGB BLD-MCNC: 10.9 G/DL (ref 11.7–15.7)
INR PPP: 0.94 (ref 0.86–1.14)
MCH RBC QN AUTO: 31.8 PG (ref 26.5–33)
MCHC RBC AUTO-ENTMCNC: 31.6 G/DL (ref 31.5–36.5)
MCV RBC AUTO: 101 FL (ref 78–100)
PLATELET # BLD AUTO: 181 10E9/L (ref 150–450)
RBC # BLD AUTO: 3.43 10E12/L (ref 3.8–5.2)
WBC # BLD AUTO: 4.7 10E9/L (ref 4–11)

## 2017-07-27 PROCEDURE — 82565 ASSAY OF CREATININE: CPT | Performed by: PSYCHIATRY & NEUROLOGY

## 2017-07-27 PROCEDURE — C1769 GUIDE WIRE: HCPCS

## 2017-07-27 PROCEDURE — 27210888 ZZH ACCESSORY CR7

## 2017-07-27 PROCEDURE — 36223 PLACE CATH CAROTID/INOM ART: CPT

## 2017-07-27 PROCEDURE — C1887 CATHETER, GUIDING: HCPCS

## 2017-07-27 PROCEDURE — 25000132 ZZH RX MED GY IP 250 OP 250 PS 637: Mod: GY | Performed by: NEUROLOGICAL SURGERY

## 2017-07-27 PROCEDURE — 25000128 H RX IP 250 OP 636: Performed by: NEUROLOGICAL SURGERY

## 2017-07-27 PROCEDURE — 25000128 H RX IP 250 OP 636: Performed by: PSYCHIATRY & NEUROLOGY

## 2017-07-27 PROCEDURE — 99153 MOD SED SAME PHYS/QHP EA: CPT

## 2017-07-27 PROCEDURE — 27210908 ZZH NEEDLE CR4

## 2017-07-27 PROCEDURE — 85027 COMPLETE CBC AUTOMATED: CPT | Performed by: PSYCHIATRY & NEUROLOGY

## 2017-07-27 PROCEDURE — A9270 NON-COVERED ITEM OR SERVICE: HCPCS | Mod: GY | Performed by: NEUROLOGICAL SURGERY

## 2017-07-27 PROCEDURE — 85610 PROTHROMBIN TIME: CPT | Performed by: PSYCHIATRY & NEUROLOGY

## 2017-07-27 PROCEDURE — C1760 CLOSURE DEV, VASC: HCPCS

## 2017-07-27 PROCEDURE — 40000168 ZZH STATISTIC PP CARE STAGE 3

## 2017-07-27 PROCEDURE — 85730 THROMBOPLASTIN TIME PARTIAL: CPT | Performed by: PSYCHIATRY & NEUROLOGY

## 2017-07-27 PROCEDURE — 99152 MOD SED SAME PHYS/QHP 5/>YRS: CPT

## 2017-07-27 PROCEDURE — 27210907 ZZH KIT CR9

## 2017-07-27 RX ORDER — PROCHLORPERAZINE 25 MG
25 SUPPOSITORY, RECTAL RECTAL EVERY 12 HOURS PRN
Status: DISCONTINUED | OUTPATIENT
Start: 2017-07-27 | End: 2017-07-27 | Stop reason: HOSPADM

## 2017-07-27 RX ORDER — METOCLOPRAMIDE HYDROCHLORIDE 5 MG/ML
10 INJECTION INTRAMUSCULAR; INTRAVENOUS EVERY 6 HOURS PRN
Status: DISCONTINUED | OUTPATIENT
Start: 2017-07-27 | End: 2017-07-27 | Stop reason: HOSPADM

## 2017-07-27 RX ORDER — LABETALOL HYDROCHLORIDE 5 MG/ML
10-40 INJECTION, SOLUTION INTRAVENOUS EVERY 10 MIN PRN
Status: DISCONTINUED | OUTPATIENT
Start: 2017-07-27 | End: 2017-07-27 | Stop reason: HOSPADM

## 2017-07-27 RX ORDER — FENTANYL CITRATE 50 UG/ML
25-50 INJECTION, SOLUTION INTRAMUSCULAR; INTRAVENOUS EVERY 5 MIN PRN
Status: DISCONTINUED | OUTPATIENT
Start: 2017-07-27 | End: 2017-07-27 | Stop reason: HOSPADM

## 2017-07-27 RX ORDER — OXYCODONE AND ACETAMINOPHEN 5; 325 MG/1; MG/1
1-2 TABLET ORAL EVERY 4 HOURS PRN
Status: DISCONTINUED | OUTPATIENT
Start: 2017-07-27 | End: 2017-07-27 | Stop reason: HOSPADM

## 2017-07-27 RX ORDER — METOCLOPRAMIDE 10 MG/1
10 TABLET ORAL EVERY 6 HOURS PRN
Status: DISCONTINUED | OUTPATIENT
Start: 2017-07-27 | End: 2017-07-27 | Stop reason: HOSPADM

## 2017-07-27 RX ORDER — ONDANSETRON 4 MG/1
4 TABLET, ORALLY DISINTEGRATING ORAL EVERY 6 HOURS PRN
Status: DISCONTINUED | OUTPATIENT
Start: 2017-07-27 | End: 2017-07-27 | Stop reason: HOSPADM

## 2017-07-27 RX ORDER — NALOXONE HYDROCHLORIDE 0.4 MG/ML
.1-.4 INJECTION, SOLUTION INTRAMUSCULAR; INTRAVENOUS; SUBCUTANEOUS
Status: DISCONTINUED | OUTPATIENT
Start: 2017-07-27 | End: 2017-07-27 | Stop reason: HOSPADM

## 2017-07-27 RX ORDER — PROCHLORPERAZINE MALEATE 5 MG
5-10 TABLET ORAL EVERY 6 HOURS PRN
Status: DISCONTINUED | OUTPATIENT
Start: 2017-07-27 | End: 2017-07-27 | Stop reason: HOSPADM

## 2017-07-27 RX ORDER — FLUMAZENIL 0.1 MG/ML
0.2 INJECTION, SOLUTION INTRAVENOUS
Status: DISCONTINUED | OUTPATIENT
Start: 2017-07-27 | End: 2017-07-27 | Stop reason: HOSPADM

## 2017-07-27 RX ORDER — IODIXANOL 320 MG/ML
100 INJECTION, SOLUTION INTRAVASCULAR ONCE
Status: COMPLETED | OUTPATIENT
Start: 2017-07-27 | End: 2017-07-27

## 2017-07-27 RX ORDER — HYDRALAZINE HYDROCHLORIDE 20 MG/ML
10-20 INJECTION INTRAMUSCULAR; INTRAVENOUS
Status: DISCONTINUED | OUTPATIENT
Start: 2017-07-27 | End: 2017-07-27 | Stop reason: HOSPADM

## 2017-07-27 RX ORDER — LIDOCAINE 40 MG/G
CREAM TOPICAL
Status: DISCONTINUED | OUTPATIENT
Start: 2017-07-27 | End: 2017-07-27 | Stop reason: HOSPADM

## 2017-07-27 RX ORDER — SODIUM CHLORIDE 9 MG/ML
INJECTION, SOLUTION INTRAVENOUS CONTINUOUS
Status: DISCONTINUED | OUTPATIENT
Start: 2017-07-27 | End: 2017-07-27 | Stop reason: HOSPADM

## 2017-07-27 RX ORDER — ONDANSETRON 2 MG/ML
4 INJECTION INTRAMUSCULAR; INTRAVENOUS EVERY 6 HOURS PRN
Status: DISCONTINUED | OUTPATIENT
Start: 2017-07-27 | End: 2017-07-27 | Stop reason: HOSPADM

## 2017-07-27 RX ADMIN — MIDAZOLAM HYDROCHLORIDE 4 MG: 1 INJECTION, SOLUTION INTRAMUSCULAR; INTRAVENOUS at 11:39

## 2017-07-27 RX ADMIN — MIDAZOLAM HYDROCHLORIDE 4 MG: 1 INJECTION, SOLUTION INTRAMUSCULAR; INTRAVENOUS at 16:52

## 2017-07-27 RX ADMIN — HYDRALAZINE HYDROCHLORIDE 10 MG: 20 INJECTION INTRAMUSCULAR; INTRAVENOUS at 12:18

## 2017-07-27 RX ADMIN — OXYCODONE HYDROCHLORIDE AND ACETAMINOPHEN 2 TABLET: 5; 325 TABLET ORAL at 12:43

## 2017-07-27 RX ADMIN — IODIXANOL 15 ML: 320 INJECTION, SOLUTION INTRAVASCULAR at 12:02

## 2017-07-27 RX ADMIN — SODIUM CHLORIDE: 9 INJECTION, SOLUTION INTRAVENOUS at 10:11

## 2017-07-27 RX ADMIN — Medication 7 ML: at 11:38

## 2017-07-27 RX ADMIN — FENTANYL CITRATE 200 MCG: 50 INJECTION, SOLUTION INTRAMUSCULAR; INTRAVENOUS at 16:51

## 2017-07-27 RX ADMIN — FENTANYL CITRATE 200 MCG: 50 INJECTION, SOLUTION INTRAMUSCULAR; INTRAVENOUS at 11:37

## 2017-07-27 ASSESSMENT — VISUAL ACUITY
OU: NORMAL ACUITY

## 2017-07-27 NOTE — H&P
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 Her follow-up angiogram in 2015 did not reveal any evidence of residual. She is here for 2-year follow up angiogram. She is currently taking Aspirin. Patient complains of having intermittent speech problems for the past 2 years where she has been having difficulty getting her words out. Its been happening every month lasting few days for the past 2 years. It is not associated with any weakness, numbness or other problems. During conversation, she was having these problems and she became emotional. When asked, patient denies there problems being associated with her anxiety disorder.     Medical History:  Past Medical History:   Diagnosis Date     Anemia      Aneurysm (H)      Chemical dependency (H)     Chem Dep RX     Depression      History of blood transfusion      Hypertension      Menopausal symptoms      Other chronic pain     Lower back and hips     Sleep disorder      Thyroid disease      Tobacco abuse        Surgical History:  Past Surgical History:   Procedure Laterality Date     APPENDECTOMY  1960     C PART REMOVAL COLON W ANASTOMOSIS  5/2005    diverticulitis     C SPINE FUSION,ANTER,8+ SGMTS  2007    Anterior posterior fusion 10 levels, hardware removal and re-fusion 2009     cerebral aneurysm  2014    coiled     COLONOSCOPY  4/19/2005     HC EXCISION BREAST LESION, OPEN >=1      core biopsy 2006, lumpectomy 2006     orif left femoral neck Left 6/3/2016    stress fracture.  done at [facility]     SURGICAL HISTORY OF -   2004    Skin Graft       Family History:  Family History   Problem Relation Age of Onset     CANCER Mother      breast/lung     Alcohol/Drug Mother      Depression Mother      Cancer -  colorectal Father      Alcohol/Drug Father      DIABETES Maternal Grandmother      DIABETES Maternal Grandfather      DIABETES Paternal Grandmother      DIABETES Paternal Grandfather      CANCER Paternal Grandfather      GASTROINTESTINAL DISEASE Paternal Grandfather      Congenital Anomalies Son      Hypertension Brother        Social History:  Social History     Social History     Marital status:      Spouse name: N/A     Number of children: N/A     Years of education: N/A     Occupational History     Not on file.     Social History Main Topics     Smoking status: Former Smoker     Quit date: 1/1/2004     Smokeless tobacco: Never Used     Alcohol use No      Comment: ETOH dependency, DUI 3/15/10     Drug use: No     Sexual activity: Not Currently     Partners: Male     Birth control/ protection: Surgical      Comment: VAS     Other Topics Concern     Parent/Sibling W/ Cabg, Mi Or Angioplasty Before 65f 55m? No     Social History Narrative     Allergies:  Allergies   Allergen Reactions     Bee Venom      Is there a contrast allergy?  No    Medications:  Prescriptions Prior to Admission   Medication Sig Dispense Refill Last Dose     oxyCODONE-acetaminophen (PERCOCET)  MG per tablet Take 2 tablets by mouth every 6 hours as needed for moderate to severe pain Max 6/day 180 tablet 0 7/26/2017 at Unknown time     lisinopril-hydrochlorothiazide (PRINZIDE/ZESTORETIC) 20-12.5 MG per tablet Take 1 tablet by mouth daily 180 tablet 1 7/26/2017 at 0800     gabapentin (NEURONTIN) 600 MG tablet TAKE ONE TABLET BY MOUTH THREE TIMES A DAY (TO BE TAKEN WITH THE 300MG CAPSULE) 90 tablet 5 7/26/2017 at 2200     sertraline (ZOLOFT) 100 MG tablet Take 2 tablets (200 mg) by mouth daily 180 tablet 1 7/27/2017 at 0800     cyclobenzaprine (FLEXERIL) 10 MG tablet Take 1 tablet (10 mg) by mouth 2 times daily as needed for muscle spasms 180 tablet 1 Past Week at Unknown time     QUEtiapine (SEROQUEL) 25 MG tablet Take 1 tablet (25  mg) by mouth daily as needed Take 25 mg Every 6 hours As needed for anxiety. 30 tablet 3 Past Month at Unknown time     gabapentin (NEURONTIN) 300 MG capsule Take 1 capsule (300 mg) by mouth 3 times daily To be taken with the 600 mg pill 90 capsule 5 2017 at 2200     levothyroxine (LEVOTHROID) 50 MCG tablet Take 1 tablet (50 mcg) by mouth daily 90 tablet 3 2017 at 0800     traZODone (DESYREL) 150 MG tablet Take 1 tablet (150 mg) by mouth nightly as needed for sleep 90 tablet 3 2017 at 2100     calcium carbonate-vitamin D (CALTRATE 600+D) 600-400 MG-UNIT CHEW Take 1 chew tab by mouth 2 times daily (Patient taking differently: Take 1 chew tab by mouth daily ) 180 tablet 3 2017 at 100     multivitamin CF formula (CHOICEFUL) chewable tablet Take 1 tablet by mouth daily 100 tablet 0 2017 at 0800     aspirin 325 MG tablet Take 1 tablet (325 mg) by mouth daily 180 tablet 6 2017 at 0800     acyclovir (ZOVIRAX) 5 % ointment Apply topically 6 times daily 15 g 3 Not Taking     EPINEPHrine (EPIPEN) 0.3 MG/0.3ML injection Inject 0.3 mLs (0.3 mg) into the muscle once as needed for anaphylaxis 2 each 3 Taking     ROS:  The 10 point Review of Systems is negative other than noted in the HPI or here.    PHYSICAL EXAMINATION  Temp: 97.7  F (36.5  C) Temp  Min: 97.7  F (36.5  C)  Max: 97.7  F (36.5  C)  Resp: 16 Resp  Min: 16  Max: 16    No Data Recorded  BP: (!) 152/97   Systolic (24hrs), Av , Min:152 , Max:152   Diastolic (24hrs), Av, Min:97, Max:97    Cardio:  RRR  Pulmonary:  no respiratory distress  Abdomen:  soft    Neurologic  Mental Status:  fully alert, attentive and oriented, follows commands, speech hesitancy noted. No evidence of dysarthria or aphasia.  Cranial Nerves:  visual fields intact, PERRL, EOMI with normal smooth pursuit, facial sensation intact and symmetric, facial movements symmetric, hearing not formally tested but intact to conversation, palate elevation symmetric and  uvula midline, no dysarthria, shoulder shrug strong bilaterally, tongue protrusion midline  Motor:  strength 5/5 throughout upper and lower extremities  Sensory:  intact/symmetric to light touch and pin prick throughout upper and lower extremities  Coordination:  FNF and HS intact without dysmetria    Pre-procedure National Institutes of Health Stroke Scale:   Not applicable    LABS  (most recent Cr, BUN, GFR, PLT, INR, PTT within the past 7 days):    Recent Labs  Lab 07/27/17  1000 07/24/17  1355   CR  --  1.00   BUN  --  26   GFRESTIMATED  --  56*   GFRESTBLACK  --  67     --         Platelet Function P2Y12 (PRU):  Not applicable    ASSESSMENT:  L ICA superior hypophyseal aneurysm s/p stent assisted coil embolization    PLAN:  - Diagnostic cerebral angiogram today  - Likely same day discharge after angiogram      PRE-PROCEDURE SEDATION ASSESSMENT     Pre-Procedure Sedation Assessment done at 1000.    Expected Level:  Moderate Sedation    Indication:  Sedation is required to allow for neurointerventional procedure.    Consent obtained from patient after discussing the risks, benefits and alternatives.     PO Intake:  Appropriately NPO for procedure    ASA Class:  Class 2 - MILD SYSTEMIC DISEASE, NO ACUTE PROBLEMS, NO FUNCTIONAL LIMITATIONS.    Mallampati:  Grade 2:  Soft palate, base of uvula, tonsillar pillars, and portion of posterior pharyngeal wall visible    History and physical reviewed and no updates needed. I have reviewed the lab findings, diagnostic data, medications, and the plan for sedation. I have determined this patient to be an appropriate candidate for the planned sedation and procedure and have reassessed the patient IMMEDIATELY PRIOR to sedation and procedure.    Patient was discussed with the Attending, Dr. García, who agrees with the plan.    Prashanth SBethanie Male   Pager:[phone]        I have reviewed the history.I have seen and examined the patient myself and agree with the assessment and  plan above.  JUD García MD

## 2017-07-27 NOTE — IP AVS SNAPSHOT
[address] [address]                                       After Visit Summary   7/27/2017    Jessica Ellison    MRN: [id number]           After Visit Summary Signature Page     I have received my discharge instructions, and my questions have been answered. I have discussed any challenges I see with this plan with the nurse or doctor.    ..........................................................................................................................................  Patient/Patient Representative Signature      ..........................................................................................................................................  Patient Representative Print Name and Relationship to Patient    ..................................................               ................................................  Date                                            Time    ..........................................................................................................................................  Reviewed by Signature/Title    ...................................................              ..............................................  Date                                                            Time

## 2017-07-27 NOTE — PROGRESS NOTES
1030 Pt on 2a prepped and ready for cerebral angiogram. PIV placed and labs sent. H&P current. Pt consented.

## 2017-07-27 NOTE — PROGRESS NOTES
1215 Hydralazine 10mg given for elevated BP. 1245 Percocet 2 tabs given for chronic back pain. Pt eating and drinking at this time. 1430 Pt states back pain is improving. Pt up amb in [place], susan well. Pt states that her upper inner thigh feels numb. Dr. Lobo here to speak with pt and pt's  and to assess leg. 1500 Dc instructions reviewed with pt, copy given to pt. PIV dc'd. Pt dc'd home acc. .

## 2017-07-27 NOTE — PROCEDURES
[facility], [place]     Endovascular Surgical Neuroradiology Post-Procedure Note    Pre-Procedure Diagnosis: History of unruptured Lt superior hypophyseal aneurysm s/p clipping  Post-Procedure Diagnosis:  History of unruptured Lt superior hypophyseal aneurysm s/p clipping     Procedure(s):  Diagnostic cervicocerebral angiography    Findings:  Stable coil mass and no residual in previously treated aneurysm. No in stent stenosis.     Primary Surgeon:  Dr. Vaishali García  Secondary Surgeon:  Not applicable  Secondary Surgeon Review:  None  Fellow:  Prashanth Lobo MD; Rohan Gonzales MD; Fouzia Quezada MD   Additional Assistants: None    Prior to the start of the procedure and with procedural staff participation, I verbally confirmed: the patient s identity using two indicators, relevant allergies, that the procedure was appropriate and matched the consent or emergent situation, and that the correct equipment/implants were available. Immediately prior to starting the procedure I conducted the Time Out with the procedural staff and re-confirmed the patient s name, procedure, and site/side. (The Joint Commission universal protocol was followed.)  Yes    PRU value: Not applicable    Anesthesia:  Conscious Sedation  Medications:  Fentanyl, Midazolam 200mcg & 4mg  Puncture site:  Right Femoral Artery    Fluoroscopy time (minutes):  12.4 mins  Radiation dose (mGy):  224.9    Contrast amount (mL):  15     TICI score:  Not applicable  Deviation from protocol:  No    Estimated blood loss (mL):  Minimal    IA tPA infusion start time:  Not applicable  Time of first pass of mechanical reperfusion device:  Not applicable    Closure:  Device; Angioseal   Procedure complications: None    Disposition:  Home after recovery.        Sedation Post-Procedure Summary    Sedatives: Fentanyl and Midazolam (Versed)    Vital signs and pulse oximetry were monitored and remained stable throughout the procedure, and  sedation was maintained until the procedure was complete.  The patient was monitored by staff until sedation discharge criteria were met.    Patient tolerance:  Oxygen Desaturation down to 85%, resolved with:   Supplementing/Increasing oxygen.    Time of sedation in minutes: 40 minutes from beginning to end of physician one to one monitoring.    Prashanth NICOLE Male  Pager:  [phone]

## 2017-07-27 NOTE — DISCHARGE INSTRUCTIONS
[facility]         Interventional Radiology  Discharge Instructions Post Angiogram (NEURO)    AFTER YOU GO HOME  ? DO NOT drive or operate machinery at home or at work for at least 24 hours  ? If you were given sedation; we recommend that an adult stay with you for the first 24 hours  ? DO relax and take it easy for 24 hours  ? DO drink plenty of fluids  ? DO resume your regular diet, unless otherwise instructed by your Primary Physician  ? DO NOT SMOKE FOR AT LEAST 24 HOURS, if you have been given any medications that were to help you relax or sedate you during your procedure  ? DO NOT drink alcoholic beverages the day of your procedure  ? DO NOT do any strenuous exercise or lifting for at least 2 days following your procedure  ? DO NOT take a bath or shower for at least 12 hours following your procedure  ? DO NOT scrub the procedure site vigorously for 5 days  ? DO NOT make any important or legal decisions for 24 hours following your procedure if you were given sedation    CALL THE PHYSICIAN IF:  ? You start bleeding from the procedure site.  If you do start to bleed from that site, lie down flat and hold pressure on the site.  Your physician will tell you if you need to return to the hospital.  It is common to have a small bruise or lump at the site  ? You have numbness, coolness or change in color of the arm or leg of the puncture site  ? You experience changes in your vision, hearing, balance, coordination, speech, thinking or memory  ? You experience weakness in one or more extremity  ? You experience pain or redness at the puncture site  ? You develop nausea or vomiting  ? You develop hives or a rash or unexplained itching  ? You develop a temperature of 101 degrees F or greater    ADDITIONAL INSTRUCTIONS  ? Support the puncture site for coughing, sneezing, or moving your bowels for the first 48 hours  ? No tub bath, hot tubs, or swimming for 5 days  ? No lotion or powder to the puncture  site for 3 days  ? Instruction booklet given: Angioseal Device    [facility] INTERVENTIONAL RADIOLOGY DEPARTMENT  Procedure Physician:         Dr. García         Date of procedure: July 27, 2017  Telephone Numbers: [phone] Monday-Friday 8:00 am to 4:30 pm  [phone] After 4:30 pm Monday-Friday, Weekends & Holidays.   Ask for the Neuro-Radiologist on call.  Someone is on call 24 hrs/day  [facility] toll free number: [phone] Monday-Friday 8:00 am to 4:30 pm  [facility] Emergency Dept: [phone]

## 2017-07-27 NOTE — PROGRESS NOTES
D: Patient arrived at 1100. Table ready at 1105              Jessica Ellison underwent a  Cerebral  angiogram  by Dr Gonzales, Dr. García on 7/27/2017   Time out done. Yes @1110               Patient identity confirmed with 2 identifiers  Yes               Site marked  No image guided               Support staff present for case :Dr. Ricky Gonzales in facility                                                           RN  Starr Acosta RN,BSN in facility                                                            [facility].Valentino RTR, Yulisa RTR    A:  Patient moved to table, monitoring equipment applied. Insertion site prepared by technologist.    Start time of procedure: 1110   Puncture made to : RFA at 1121 5Fr sheath    Sedation Medication given:  Yes, see MAR                Medication total dose given- Versed  4 mg                                                        Fentanyl    200 Mcg                                                         IR RN Monitoring Times: Start:1100                                                   [id number]     Introducer removed, Manual pressure held for 5 minutes,   Closure device deployed    Groin site appearance : WDL   End time of procedure : 1150       R:  Patient   Rc'd pt from 2A non-accompanied. Consent verified. Pt c/o anxiousness and back pain.Trasported via stretcher to IR-3 . Transferred in supine position to procedure table, pt c/o back pain that is chronic. Placed on continuous SP02 and cardiac monitoring,BP Q10 min. Pt hypertensive, notified MD no new orders at this time. Prepped and draped per policy by Yulisa RTR. See VS flowsheet, MAR for further information.  1145- 5Fr sheath removed by MD Gonzales Angioseal closure device used [id number] [id number] EXP 2018-05-31 to RFA. 1150 Hemastasis achieved, site covered with  tegaderm.  No oozing, bleeding, or hematoma noted to site. Pt educated on care site and importance to lay flat until further instructed.  Pt transferred  to 2A via stretcher.  Report given and site checked with receiving RN.  No bleeding, oozing, or hematoma noted to site.         P:  Patient to recover in 2A    Follow up : with MD as needed   Post Procedure Education:  The following information has been reviewed with patient    1. Maintain bedrest with RLE extremity straight as ordered by MD    2. Notify nurse immediately if having any bleeding from site, any changes in sensation,or changes in strength.   3. Notify nurse if you have to go the bathroom, the staff will assist you.   4. Nurses will be doing frequent assessments post procedure, which will include                   checking the pulses in your feet, groin/access site, vital signs, and neuro exam.    5. Please press your call light if you, or your family, have any questions or concerns        regarding your care.    Learner's response to angiogram education:  patient needs reinforcement due to sedation, patient verbalizes understanding.

## 2017-07-27 NOTE — PROGRESS NOTES
Acute care note:    Patient mentioning having numbness in her right leg post-procedure. It is restricted to the Rt groin and frontal thigh area up to the knee. No numbness in distal RLE and no numbness in face or arm. Symptoms likely due to local lidocaine injection during procedure. Patient continues to be concerned and nervous about her speech difficulty. Examination is again consistent with speech hesitancy rather than dysarthria or aphasia. She also requested another pocket card for her LVIS stent and its MRI compatibility for future reference. No other concerns or questions:    Plan:  1. Ok to discharge as planned. RLE numbness is not concerning for cerebral ischemic event.  2. F/U in stroke clinic per patient's request given her recurrent speech problems; although suspicion of recurrent TIAs is very low  3. Will attempt to get her a new LVIS stent card that specifies MRI compatibility  4. F/U MRA head in 2 years and f/u with Dr. García in 2 years    Prashanth Male  ESN fellow

## 2017-07-27 NOTE — IP AVS SNAPSHOT
MRN:[id number]                      After Visit Summary   7/27/2017    Jessica Ellison    MRN: [id number]           Visit Information        Department      7/27/2017  9:40 AM [address] [facility]          Review of your medicines      UNREVIEWED medicines. Ask your doctor about these medicines        Dose / Directions    acyclovir 5 % ointment   Commonly known as:  ZOVIRAX   Used for:  HSV (herpes simplex virus) infection        Apply topically 6 times daily   Quantity:  15 g   Refills:  3       aspirin 325 MG tablet   Used for:  Cerebral aneurysm, nonruptured        Dose:  325 mg   Take 1 tablet (325 mg) by mouth daily   Quantity:  180 tablet   Refills:  6       calcium carbonate-vitamin D 600-400 MG-UNIT Chew   Commonly known as:  CALTRATE 600+D   Used for:  Back pain        Dose:  1 chew tab   Take 1 chew tab by mouth 2 times daily   Quantity:  180 tablet   Refills:  3       cyclobenzaprine 10 MG tablet   Commonly known as:  FLEXERIL   Used for:  S/P lumbar spinal fusion        Dose:  10 mg   Take 1 tablet (10 mg) by mouth 2 times daily as needed for muscle spasms   Quantity:  180 tablet   Refills:  1       EPINEPHrine 0.3 MG/0.3ML injection 2-pack   Commonly known as:  EPIPEN/ADRENACLICK/or ANY BX GENERIC EQUIV   Used for:  Allergy to bee sting        Dose:  0.3 mg   Inject 0.3 mLs (0.3 mg) into the muscle once as needed for anaphylaxis   Quantity:  2 each   Refills:  3       * gabapentin 300 MG capsule   Commonly known as:  NEURONTIN   Used for:  Chronic bilateral low back pain without sciatica        Dose:  300 mg   Take 1 capsule (300 mg) by mouth 3 times daily To be taken with the 600 mg pill   Quantity:  90 capsule   Refills:  5       * gabapentin 600 MG tablet   Commonly known as:  NEURONTIN   Used for:  Chronic bilateral low back pain without sciatica        TAKE ONE TABLET BY MOUTH THREE TIMES A DAY (TO BE TAKEN WITH THE 300MG CAPSULE)   Quantity:  90 tablet   Refills:  5        levothyroxine 50 MCG tablet   Commonly known as:  LEVOTHROID   Used for:  Hypothyroidism, unspecified        Dose:  50 mcg   Take 1 tablet (50 mcg) by mouth daily   Quantity:  90 tablet   Refills:  3       lisinopril-hydrochlorothiazide 20-12.5 MG per tablet   Commonly known as:  PRINZIDE/ZESTORETIC   Used for:  Essential hypertension with goal blood pressure less than 140/90        Dose:  1 tablet   Take 1 tablet by mouth daily   Quantity:  180 tablet   Refills:  1       multivitamin CF formula chewable tablet   Commonly known as:  CHOICEFUL   Used for:  Postmenopause        Dose:  1 tablet   Take 1 tablet by mouth daily   Quantity:  100 tablet   Refills:  0       oxyCODONE-acetaminophen  MG per tablet   Commonly known as:  PERCOCET   Used for:  Chronic bilateral low back pain without sciatica, Sacroiliac joint pain        Dose:  2 tablet   Take 2 tablets by mouth every 6 hours as needed for moderate to severe pain Max 6/day   Quantity:  180 tablet   Refills:  0       QUEtiapine 25 MG tablet   Commonly known as:  SEROQUEL   Used for:  Anxiety        Dose:  25 mg   Take 1 tablet (25 mg) by mouth daily as needed Take 25 mg Every 6 hours As needed for anxiety.   Quantity:  30 tablet   Refills:  3       sertraline 100 MG tablet   Commonly known as:  ZOLOFT   Used for:  Major depressive disorder, recurrent episode, moderate (H)        Dose:  200 mg   Take 2 tablets (200 mg) by mouth daily   Quantity:  180 tablet   Refills:  1       traZODone 150 MG tablet   Commonly known as:  DESYREL   Used for:  Insomnia, unspecified type        Dose:  150 mg   Take 1 tablet (150 mg) by mouth nightly as needed for sleep   Quantity:  90 tablet   Refills:  3       * Notice:  This list has 2 medication(s) that are the same as other medications prescribed for you. Read the directions carefully, and ask your doctor or other care provider to review them with you.             Protect others around you: Learn how to safely use, store  and throw away your medicines at www.disposemymeds.org.         Follow-ups after your visit         Care Instructions        Further instructions from your care team       [facility]         Interventional Radiology  Discharge Instructions Post Angiogram (NEURO)    AFTER YOU GO HOME  ? DO NOT drive or operate machinery at home or at work for at least 24 hours  ? If you were given sedation; we recommend that an adult stay with you for the first 24 hours  ? DO relax and take it easy for 24 hours  ? DO drink plenty of fluids  ? DO resume your regular diet, unless otherwise instructed by your Primary Physician  ? DO NOT SMOKE FOR AT LEAST 24 HOURS, if you have been given any medications that were to help you relax or sedate you during your procedure  ? DO NOT drink alcoholic beverages the day of your procedure  ? DO NOT do any strenuous exercise or lifting for at least 2 days following your procedure  ? DO NOT take a bath or shower for at least 12 hours following your procedure  ? DO NOT scrub the procedure site vigorously for 5 days  ? DO NOT make any important or legal decisions for 24 hours following your procedure if you were given sedation    CALL THE PHYSICIAN IF:  ? You start bleeding from the procedure site.  If you do start to bleed from that site, lie down flat and hold pressure on the site.  Your physician will tell you if you need to return to the hospital.  It is common to have a small bruise or lump at the site  ? You have numbness, coolness or change in color of the arm or leg of the puncture site  ? You experience changes in your vision, hearing, balance, coordination, speech, thinking or memory  ? You experience weakness in one or more extremity  ? You experience pain or redness at the puncture site  ? You develop nausea or vomiting  ? You develop hives or a rash or unexplained itching  ? You develop a temperature of 101 degrees F or greater    ADDITIONAL INSTRUCTIONS  ? Support the  puncture site for coughing, sneezing, or moving your bowels for the first 48 hours  ? No tub bath, hot tubs, or swimming for 5 days  ? No lotion or powder to the puncture site for 3 days  ? Instruction booklet given: Angioseal Device    [facility] INTERVENTIONAL RADIOLOGY DEPARTMENT  Procedure Physician:         Dr. García         Date of procedure: July 27, 2017  Telephone Numbers: [phone] Monday-Friday 8:00 am to 4:30 pm  [phone] After 4:30 pm Monday-Friday, Weekends & Holidays.   Ask for the Neuro-Radiologist on call.  Someone is on call 24 hrs/day  [facility] toll free number: [phone] Monday-Friday 8:00 am to 4:30 pm  [facility] Emergency Dept: [phone]         Additional Information About Your Visit        MyChart Information     [employer] gives you secure access to your electronic health record. If you see a primary care provider, you can also send messages to your care team and make appointments. If you have questions, please call your primary care clinic.  If you do not have a primary care provider, please call [phone] and they will assist you.        Care EveryWhere ID     This is your Care EveryWhere ID. This could be used by other organizations to access your [place] medical records  [id number]        Your Vitals Were     Blood Pressure Pulse Temperature Respirations Pulse Oximetry       135/83 70 97.7  F (36.5  C) (Oral) 16 98%        Primary Care Provider Office Phone # Fax #    Sarah Barriga [phone]      Equal Access to Services     [facility] : Hadii aad ku hadasho Soomaali, waaxda luqadaha, qaybta kaalmada adeegyada, waxay annmarie cota . So [facility] [phone].    ATENCIÓN: Si habla español, tiene a mendez disposición servicios gratuitos de asistencia lingüística. Llame al [phone].    We comply with applicable federal civil rights laws and [place] laws. We do not discriminate on the basis of race, color, national origin, age, disability sex,  sexual orientation or gender identity.            Thank you!     Thank you for choosing [facility] for your care. Our goal is always to provide you with excellent care. Hearing back from our patients is one way we can continue to improve our services. Please take a few minutes to complete the written survey that you may receive in the mail after you visit with us. Thank you!             Medication List: This is a list of all your medications and when to take them. Check marks below indicate your daily home schedule. Keep this list as a reference.      Medications           Morning Afternoon Evening Bedtime As Needed    acyclovir 5 % ointment   Commonly known as:  ZOVIRAX   Apply topically 6 times daily                                aspirin 325 MG tablet   Take 1 tablet (325 mg) by mouth daily                                calcium carbonate-vitamin D 600-400 MG-UNIT Chew   Commonly known as:  CALTRATE 600+D   Take 1 chew tab by mouth 2 times daily                                cyclobenzaprine 10 MG tablet   Commonly known as:  FLEXERIL   Take 1 tablet (10 mg) by mouth 2 times daily as needed for muscle spasms                                EPINEPHrine 0.3 MG/0.3ML injection 2-pack   Commonly known as:  EPIPEN/ADRENACLICK/or ANY BX GENERIC EQUIV   Inject 0.3 mLs (0.3 mg) into the muscle once as needed for anaphylaxis                                * gabapentin 300 MG capsule   Commonly known as:  NEURONTIN   Take 1 capsule (300 mg) by mouth 3 times daily To be taken with the 600 mg pill                                * gabapentin 600 MG tablet   Commonly known as:  NEURONTIN   TAKE ONE TABLET BY MOUTH THREE TIMES A DAY (TO BE TAKEN WITH THE 300MG CAPSULE)                                levothyroxine 50 MCG tablet   Commonly known as:  LEVOTHROID   Take 1 tablet (50 mcg) by mouth daily                                lisinopril-hydrochlorothiazide 20-12.5 MG per tablet   Commonly known as:  PRINZIDE/ZESTORETIC   Take 1  tablet by mouth daily                                multivitamin CF formula chewable tablet   Commonly known as:  CHOICEFUL   Take 1 tablet by mouth daily                                oxyCODONE-acetaminophen  MG per tablet   Commonly known as:  PERCOCET   Take 2 tablets by mouth every 6 hours as needed for moderate to severe pain Max 6/day                                QUEtiapine 25 MG tablet   Commonly known as:  SEROQUEL   Take 1 tablet (25 mg) by mouth daily as needed Take 25 mg Every 6 hours As needed for anxiety.                                sertraline 100 MG tablet   Commonly known as:  ZOLOFT   Take 2 tablets (200 mg) by mouth daily                                traZODone 150 MG tablet   Commonly known as:  DESYREL   Take 1 tablet (150 mg) by mouth nightly as needed for sleep                                * Notice:  This list has 2 medication(s) that are the same as other medications prescribed for you. Read the directions carefully, and ask your doctor or other care provider to review them with you.

## 2017-07-31 ENCOUNTER — CARE COORDINATION (OUTPATIENT)
Dept: NEUROLOGY | Facility: CLINIC | Age: 67
End: 2017-07-31

## 2017-07-31 NOTE — PROGRESS NOTES
Neuro-Interventional Discharge Coordination Note     Responsible Attending physician: Dr. 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 Findings:  Stable coil mass and no residual in previously treated aneurysm. No in stent stenosis.                      Date of Discharge: 7/27/17    Discharge to: Home    Current Contact number: [phone]    Current Status: Patient states she is doing fine. Denies bleeding, drainage, swelling, redness, pain at groin puncture. Speech has been fine. No other issues. Patient is aware of the plan. Patient has my contact information and was encouraged to call with questions/concerns.     Plan: Follow-up with Dr. García in 2 years (7/2019) with MRA without contrast prior to appointment - will contact patient closer to that time (msg sent). Follow-up appointment with stroke clinic as patient had intermittent speech problems (low suspicion for TIA, however) - message sent to scheduling to contact patient to make appointment.

## 2017-08-15 DIAGNOSIS — F33.1 MAJOR DEPRESSIVE DISORDER, RECURRENT EPISODE, MODERATE (H): ICD-10-CM

## 2017-08-15 RX ORDER — SERTRALINE HYDROCHLORIDE 100 MG/1
TABLET, FILM COATED ORAL
Qty: 180 TABLET | Refills: 1 | Status: SHIPPED | OUTPATIENT
Start: 2017-08-15 | End: 2018-03-19

## 2017-08-15 NOTE — TELEPHONE ENCOUNTER
sertraline    Last Written Prescription Date: 02/06/2017  Last Fill Quantity: 180, # refills: 1  Last Office Visit with G primary care provider:  07/24/2017        Last PHQ-9 score on record=   PHQ-9 SCORE 7/24/2017   Total Score -   Total Score 4

## 2017-08-23 ENCOUNTER — CARE COORDINATION (OUTPATIENT)
Dept: NEUROLOGY | Facility: CLINIC | Age: 67
End: 2017-08-23

## 2017-09-07 ENCOUNTER — TELEPHONE (OUTPATIENT)
Dept: FAMILY MEDICINE | Facility: CLINIC | Age: 67
End: 2017-09-07

## 2017-09-07 DIAGNOSIS — M54.50 CHRONIC BILATERAL LOW BACK PAIN WITHOUT SCIATICA: ICD-10-CM

## 2017-09-07 DIAGNOSIS — M53.3 SACROILIAC JOINT PAIN: ICD-10-CM

## 2017-09-07 DIAGNOSIS — G47.00 INSOMNIA, UNSPECIFIED TYPE: ICD-10-CM

## 2017-09-07 DIAGNOSIS — I10 ESSENTIAL HYPERTENSION WITH GOAL BLOOD PRESSURE LESS THAN 140/90: ICD-10-CM

## 2017-09-07 DIAGNOSIS — G89.29 CHRONIC BILATERAL LOW BACK PAIN WITHOUT SCIATICA: ICD-10-CM

## 2017-09-07 RX ORDER — TRAZODONE HYDROCHLORIDE 150 MG/1
150 TABLET ORAL
Qty: 90 TABLET | Refills: 3 | Status: SHIPPED | OUTPATIENT
Start: 2017-09-07 | End: 2018-07-16

## 2017-09-07 RX ORDER — OXYCODONE AND ACETAMINOPHEN 10; 325 MG/1; MG/1
2 TABLET ORAL EVERY 6 HOURS PRN
Qty: 180 TABLET | Refills: 0 | Status: SHIPPED | OUTPATIENT
Start: 2017-09-07 | End: 2017-09-18

## 2017-09-07 RX ORDER — LISINOPRIL AND HYDROCHLOROTHIAZIDE 12.5; 2 MG/1; MG/1
1 TABLET ORAL DAILY
Qty: 180 TABLET | Refills: 1 | Status: CANCELLED | OUTPATIENT
Start: 2017-09-07

## 2017-09-07 NOTE — TELEPHONE ENCOUNTER
Pt calling stating she will run out of medication before her appt with you on 9-18-17.    lisinopril-hydrochlorothiazide (PRINZIDE/ZESTORETIC) 20-12.5 MG per tablet        Last Written Prescription Date: 06/01/17  Last Fill Quantity: 180, # refills: 1  Last Office Visit with [facility], [facility] or  Health prescribing provider: 07/24/17  Next 5 appointments (look out 90 days)     Sep 18, 2017 12:40 PM CDT   [facility]    [address] [address] [address] [address]   [phone]                   Potassium   Date Value Ref Range Status   07/24/2017 4.5 3.4 - 5.3 mmol/L Final     Creatinine   Date Value Ref Range Status   07/27/2017 0.83 0.52 - 1.04 mg/dL Final     BP Readings from Last 3 Encounters:   07/27/17 99/64   07/24/17 107/69   06/01/17 138/87       oxyCODONE-acetaminophen (PERCOCET)  MG per tablet        Last Written Prescription Date:  06/01/17  Last Fill Quantity: 180,   # refills: 0  Last Office Visit with [facility], [facility] or  Health prescribing provider: 07/24/17  Future Office visit:    Next 5 appointments (look out 90 days)     Sep 18, 2017 12:40 PM CDT   [facility]   [facility]    [address] [address] [address]   [phone]                   Routing refill request to provider for review/approval because:  Drug not on the [facility], [facility] or [facility] refill protocol or controlled substance    traZODone (DESYREL) 150 MG tablet         Last Written Prescription Date: 09/07/16  Last Fill Quantity: 90; # refills: 3  Last Office Visit with [facility], [facility] or [facility] prescribing provider:  07/24/17   Next 5 appointments (look out 90 days)     Sep 18, 2017 12:40 PM CDT   SHORT with [facility]iga MD   [facility]    [address] [facility] [address]   [phone]                   Last PHQ-9 score on  record=   PHQ-9 SCORE 7/24/2017   Total Score -   Total Score 4       Lab Results   Component Value Date    AST 13 02/06/2015     Lab Results   Component Value Date    ALT 19 02/06/2015       [facility]

## 2017-09-07 NOTE — TELEPHONE ENCOUNTER
Patient notified of Rx filled by provider and MD's recommendation regarding appt  Patient verbalized understanding.    Please call patient when Rx is ready for  on Percocet.    Tigist GONZALEZ Rn

## 2017-09-07 NOTE — TELEPHONE ENCOUNTER
Patient reports that she has a few tablets left of Oxycodone  She is taking 4 tablets per day  She has 3 tablets left of Trazodone, she uses for sleep  Requesting Rx for these meds until her appt 9/18/17 with PCP    Please advise  Pharm ready    Routing to provider.    Tigist GONZALEZ Rn

## 2017-09-12 RX ORDER — TRAZODONE HYDROCHLORIDE 150 MG/1
TABLET ORAL
Qty: 90 TABLET | Refills: 3 | OUTPATIENT
Start: 2017-09-12

## 2017-09-18 ENCOUNTER — OFFICE VISIT (OUTPATIENT)
Dept: FAMILY MEDICINE | Facility: CLINIC | Age: 67
End: 2017-09-18
Payer: COMMERCIAL

## 2017-09-18 VITALS
DIASTOLIC BLOOD PRESSURE: 79 MMHG | HEART RATE: 84 BPM | BODY MASS INDEX: 26.05 KG/M2 | WEIGHT: 147 LBS | HEIGHT: 63 IN | SYSTOLIC BLOOD PRESSURE: 111 MMHG

## 2017-09-18 DIAGNOSIS — R63.4 LOSS OF WEIGHT: ICD-10-CM

## 2017-09-18 DIAGNOSIS — Z23 NEED FOR PROPHYLACTIC VACCINATION AND INOCULATION AGAINST INFLUENZA: Primary | ICD-10-CM

## 2017-09-18 DIAGNOSIS — G89.29 CHRONIC BILATERAL LOW BACK PAIN WITHOUT SCIATICA: ICD-10-CM

## 2017-09-18 DIAGNOSIS — M54.50 CHRONIC BILATERAL LOW BACK PAIN WITHOUT SCIATICA: ICD-10-CM

## 2017-09-18 DIAGNOSIS — I95.9 HYPOTENSION, UNSPECIFIED HYPOTENSION TYPE: ICD-10-CM

## 2017-09-18 DIAGNOSIS — E03.9 HYPOTHYROIDISM, UNSPECIFIED: ICD-10-CM

## 2017-09-18 DIAGNOSIS — M53.3 SACROILIAC JOINT PAIN: ICD-10-CM

## 2017-09-18 DIAGNOSIS — K21.9 GASTROESOPHAGEAL REFLUX DISEASE WITHOUT ESOPHAGITIS: ICD-10-CM

## 2017-09-18 LAB
BASOPHILS # BLD AUTO: 0 10E9/L (ref 0–0.2)
BASOPHILS NFR BLD AUTO: 0.6 %
DIFFERENTIAL METHOD BLD: ABNORMAL
EOSINOPHIL # BLD AUTO: 0.3 10E9/L (ref 0–0.7)
EOSINOPHIL NFR BLD AUTO: 5.3 %
ERYTHROCYTE [DISTWIDTH] IN BLOOD BY AUTOMATED COUNT: 13.3 % (ref 10–15)
HCT VFR BLD AUTO: 32.5 % (ref 35–47)
HGB BLD-MCNC: 10.4 G/DL (ref 11.7–15.7)
LYMPHOCYTES # BLD AUTO: 0.9 10E9/L (ref 0.8–5.3)
LYMPHOCYTES NFR BLD AUTO: 18.6 %
MCH RBC QN AUTO: 32.3 PG (ref 26.5–33)
MCHC RBC AUTO-ENTMCNC: 32 G/DL (ref 31.5–36.5)
MCV RBC AUTO: 101 FL (ref 78–100)
MONOCYTES # BLD AUTO: 0.8 10E9/L (ref 0–1.3)
MONOCYTES NFR BLD AUTO: 16 %
NEUTROPHILS # BLD AUTO: 2.8 10E9/L (ref 1.6–8.3)
NEUTROPHILS NFR BLD AUTO: 59.5 %
PLATELET # BLD AUTO: 224 10E9/L (ref 150–450)
RBC # BLD AUTO: 3.22 10E12/L (ref 3.8–5.2)
WBC # BLD AUTO: 4.7 10E9/L (ref 4–11)

## 2017-09-18 PROCEDURE — 36415 COLL VENOUS BLD VENIPUNCTURE: CPT | Performed by: FAMILY MEDICINE

## 2017-09-18 PROCEDURE — 90686 IIV4 VACC NO PRSV 0.5 ML IM: CPT | Performed by: FAMILY MEDICINE

## 2017-09-18 PROCEDURE — G0008 ADMIN INFLUENZA VIRUS VAC: HCPCS | Performed by: FAMILY MEDICINE

## 2017-09-18 PROCEDURE — 99214 OFFICE O/P EST MOD 30 MIN: CPT | Mod: 25 | Performed by: FAMILY MEDICINE

## 2017-09-18 PROCEDURE — 84443 ASSAY THYROID STIM HORMONE: CPT | Performed by: FAMILY MEDICINE

## 2017-09-18 PROCEDURE — 85025 COMPLETE CBC W/AUTO DIFF WBC: CPT | Performed by: FAMILY MEDICINE

## 2017-09-18 PROCEDURE — 80048 BASIC METABOLIC PNL TOTAL CA: CPT | Performed by: FAMILY MEDICINE

## 2017-09-18 RX ORDER — OMEPRAZOLE 40 MG/1
40 CAPSULE, DELAYED RELEASE ORAL DAILY
Qty: 30 CAPSULE | Refills: 1 | Status: SHIPPED | OUTPATIENT
Start: 2017-09-18 | End: 2017-11-27

## 2017-09-18 RX ORDER — ESTRADIOL 0.1 MG/G
2 CREAM VAGINAL
COMMUNITY
End: 2018-07-16

## 2017-09-18 RX ORDER — OXYCODONE AND ACETAMINOPHEN 10; 325 MG/1; MG/1
2 TABLET ORAL EVERY 6 HOURS PRN
Qty: 180 TABLET | Refills: 0 | Status: SHIPPED | OUTPATIENT
Start: 2017-10-07 | End: 2017-12-11

## 2017-09-18 RX ORDER — OXYCODONE AND ACETAMINOPHEN 10; 325 MG/1; MG/1
2 TABLET ORAL EVERY 6 HOURS PRN
Qty: 180 TABLET | Refills: 0 | Status: SHIPPED | OUTPATIENT
Start: 2017-11-07 | End: 2017-12-11

## 2017-09-18 RX ORDER — LEVOTHYROXINE SODIUM 50 UG/1
50 TABLET ORAL DAILY
Qty: 90 TABLET | Refills: 3 | Status: SHIPPED | OUTPATIENT
Start: 2017-09-18 | End: 2018-07-16

## 2017-09-18 RX ORDER — OXYCODONE AND ACETAMINOPHEN 10; 325 MG/1; MG/1
2 TABLET ORAL EVERY 6 HOURS PRN
Qty: 180 TABLET | Refills: 0 | Status: SHIPPED | OUTPATIENT
Start: 2017-12-07 | End: 2017-12-11

## 2017-09-18 ASSESSMENT — PATIENT HEALTH QUESTIONNAIRE - PHQ9: SUM OF ALL RESPONSES TO PHQ QUESTIONS 1-9: 0

## 2017-09-18 NOTE — PATIENT INSTRUCTIONS
Stay off the lisinopril/hctz for a few weeks- keep track of your blood pressure and bring the results with preoperative evaluation in a few weeks.   If your blood pressure is running 140/90 restart just a 1/2 of the blood pressure pill.     Start omeprazole 40 mg daily - for the heartburn.    Sarah Barriga M.D.

## 2017-09-18 NOTE — PROGRESS NOTES
"  SUBJECTIVE:   Jessica Ellison is a 66 year old female who presents to clinic today for the following health issues:    Chief Complaint   Patient presents with     Recheck Medication     needs percocet refilled.      Medication Problem     Not taking blood pressure medication due to low blood pressure.      Flu Shot       Hypothyroidism Follow-up      Since last visit, patient describes the following symptoms: Weight stable, no hair loss, no skin changes, no constipation, no loose stools      Amount of exercise or physical activity: limited due to pain    Problems taking medications regularly: No    Medication side effects: none    Diet: regular (no restrictions)      Low blood pressure started last week.  Had had some dental extractions recently and was not on antibiotics.  No fever/chills.  No urinary symptoms, no cough, sore throat, etc.   Her blood pressure cuff will not read the blood pressure because it's so low.  Friday night she was at a [facility] and she had a pre-syncopal episode.    Stopped the lisinopril three days ago.      ROS: 5 point ROS negative except as noted above in HPI, including Gen., Resp., CV, GI &  system review.    /79  Pulse 84  Ht 5' 3\" (1.6 m)  Wt 147 lb (66.7 kg)  Breastfeeding? No  BMI 26.04 kg/m2    EXAM: GENERAL APPEARANCE: Alert, no acute distress  NECK: No adenopathy,masses or thyromegaly  RESP: lungs clear to auscultation   CV: normal rate, regular rhythm, no murmur or gallop  ABDOMEN: soft, no organomegaly, masses or tenderness  Large ventral hernia    Wt Readings from Last 5 Encounters:   09/18/17 147 lb (66.7 kg)   07/24/17 155 lb (70.3 kg)   06/01/17 155 lb (70.3 kg)   05/23/17 161 lb 12.8 oz (73.4 kg)   06/22/16 167 lb (75.8 kg)     ASSESSMENT/PLAN:      ICD-10-CM    1. Need for prophylactic vaccination and inoculation against influenza Z23 FLU VAC, SPLIT VIRUS IM > 3 YO (QUADRIVALENT) [22040]     Vaccine Administration, Initial [86766]   2. Chronic " bilateral low back pain without sciatica M54.5 oxyCODONE-acetaminophen (PERCOCET)  MG per tablet    G89.29 oxyCODONE-acetaminophen (PERCOCET)  MG per tablet     oxyCODONE-acetaminophen (PERCOCET)  MG per tablet   3. Sacroiliac joint pain M53.3 oxyCODONE-acetaminophen (PERCOCET)  MG per tablet     oxyCODONE-acetaminophen (PERCOCET)  MG per tablet     oxyCODONE-acetaminophen (PERCOCET)  MG per tablet   4. Hypothyroidism, unspecified E03.9 levothyroxine (LEVOTHROID) 50 MCG tablet     TSH with free T4 reflex   5. Hypotension, unspecified hypotension type I95.9 Basic metabolic panel     CBC with platelets differential   6. Loss of weight R63.4    7. Gastroesophageal reflux disease without esophagitis K21.9 omeprazole (PRILOSEC) 40 MG capsule     Hypotension of unclear etiology. R/o significant anemia, kidney disease, etc.      She will be seeing me in a few weeks for preoperative evaluation for ventral hernia and vag hyst.     Pain medications refilled- post dated.    Patient Instructions   Stay off the lisinopril/hctz for a few weeks- keep track of your blood pressure and bring the results with preoperative evaluation in a few weeks.   If your blood pressure is running 140/90 restart just a 1/2 of the blood pressure pill.       Sarah Barriga M.D.                Injectable Influenza Immunization Documentation    1.  Is the person to be vaccinated sick today?     2. Does the person to be vaccinated have an allergy to a component   of the vaccine?     3. Has the person to be vaccinated ever had a serious reaction   to influenza vaccine in the past?     4. Has the person to be vaccinated ever had Guillain-Barré syndrome?     Form completed by Sonja Krishnamurthy MA

## 2017-09-18 NOTE — NURSING NOTE
"Chief Complaint   Patient presents with     Recheck Medication     needs percocet refilled.      Medication Problem     Not taking blood pressure medication due to low blood pressure.      Flu Shot       Initial /79  Pulse 84  Ht 5' 3\" (1.6 m)  Wt 147 lb (66.7 kg)  Breastfeeding? No  BMI 26.04 kg/m2 Estimated body mass index is 26.04 kg/(m^2) as calculated from the following:    Height as of this encounter: 5' 3\" (1.6 m).    Weight as of this encounter: 147 lb (66.7 kg).  Medication Reconciliation: complete    "

## 2017-09-18 NOTE — LETTER
[facility]  [address] [address] [facility] [address] [facility] [address] [address]  Phone: [phone]      9/20/2017     Jessica Ellison  [address]   [address] [facility] [address] [address]      Dear Jessica:    Thank you for allowing me to participate in your care. Your recent test results were reviewed and listed below.        Your results are provided below for your review    Hemoglobin is mildly decreased again, not enough to hold up surgery, but something to follow up on after surgery is complete.     Kidney function has declined slightly.  Make sure you're getting enough fluids.  Plan also to recheck this in one month.  May be good to keep you off the hydrochlorothiazide portion of your blood pressure medication if we have to add back the lisinopril.  It's possible that this is low because of your recent low blood pressures.     Thyroid is in normal range.     Results for orders placed or performed in visit on 09/18/17   TSH with free T4 reflex   Result Value Ref Range    TSH 1.02 0.40 - 4.00 mU/L   Basic metabolic panel   Result Value Ref Range    Sodium 135 133 - 144 mmol/L    Potassium 4.2 3.4 - 5.3 mmol/L    Chloride 100 94 - 109 mmol/L    Carbon Dioxide 29 20 - 32 mmol/L    Anion Gap 6 3 - 14 mmol/L    Glucose 90 70 - 99 mg/dL    Urea Nitrogen 24 7 - 30 mg/dL    Creatinine 1.17 (H) 0.52 - 1.04 mg/dL    GFR Estimate 46 (L) >60 mL/min/1.7m2    GFR Estimate If Black 56 (L) >60 mL/min/1.7m2    Calcium 9.2 8.5 - 10.1 mg/dL   CBC with platelets differential   Result Value Ref Range    WBC 4.7 4.0 - 11.0 10e9/L    RBC Count 3.22 (L) 3.8 - 5.2 10e12/L    Hemoglobin 10.4 (L) 11.7 - 15.7 g/dL    Hematocrit 32.5 (L) 35.0 - 47.0 %     (H) 78 - 100 fl    MCH 32.3 26.5 - 33.0 pg    MCHC 32.0 31.5 - 36.5 g/dL    RDW 13.3 10.0 - 15.0 %    Platelet Count 224 150 - 450 10e9/L    Diff Method Automated Method     % Neutrophils 59.5 %    % Lymphocytes 18.6 %    % Monocytes 16.0 %    % Eosinophils 5.3 %    % Basophils  0.6 %    Absolute Neutrophil 2.8 1.6 - 8.3 10e9/L    Absolute Lymphocytes 0.9 0.8 - 5.3 10e9/L    Absolute Monocytes 0.8 0.0 - 1.3 10e9/L    Absolute Eosinophils 0.3 0.0 - 0.7 10e9/L    Absolute Basophils 0.0 0.0 - 0.2 10e9/L               Thank you for choosing [facility]. As a result, please continue with the treatment plan discussed in the office. Return as discussed or sooner if symptoms worsen or fail to improve. If you have any further questions or concerns, please do not hesitate to contact us.      Sincerely,        Dr. Sarah Barriga/[facility]

## 2017-09-18 NOTE — MR AVS SNAPSHOT
After Visit Summary   9/18/2017    Jessica Ellison    MRN: [id number]           Patient Information     Date Of Birth          1950        Visit Information        Provider Department      9/18/2017 12:40 PM Sarah Barriga MD [facility]        Today's Diagnoses     Need for prophylactic vaccination and inoculation against influenza    -  1    Chronic bilateral low back pain without sciatica        Sacroiliac joint pain        Hypothyroidism, unspecified        Hypotension, unspecified hypotension type        Loss of weight        Gastroesophageal reflux disease without esophagitis          Care Instructions    Stay off the lisinopril/hctz for a few weeks- keep track of your blood pressure and bring the results with preoperative evaluation in a few weeks.   If your blood pressure is running 140/90 restart just a 1/2 of the blood pressure pill.     Start omeprazole 40 mg daily - for the heartburn.    Sarah Barriga M.D.            Follow-ups after your visit        Your next 10 appointments already scheduled     Oct 03, 2017  1:20 PM CDT   (Arrive by 1:05 PM)   New Stroke with Pia Buck MD   [facility] ([facility] [place])    [address] [place] [address] [phone]              Who to contact     If you have questions or need follow up information about today's clinic visit or your schedule please contact [facility] directly at [phone].  Normal or non-critical lab and imaging results will be communicated to you by MyChart, letter or phone within 4 business days after the clinic has received the results. If you do not hear from us within 7 days, please contact the clinic through MyChart or phone. If you have a critical or abnormal lab result, we will notify you by phone as soon as possible.  Submit refill requests through [email or web address] or call your pharmacy and they will forward the  "refill request to us. Please allow 3 business days for your refill to be completed.          Additional Information About Your Visit        [employer] Information     [employer] gives you secure access to your electronic health record. If you see a primary care provider, you can also send messages to your care team and make appointments. If you have questions, please call your primary care clinic.  If you do not have a primary care provider, please call [phone] and they will assist you.        Care EveryWhere ID     This is your Care EveryWhere ID. This could be used by other organizations to access your [facility] medical records  [id number]        Your Vitals Were     Pulse Height Breastfeeding? BMI (Body Mass Index)          84 5' 3\" (1.6 m) No 26.04 kg/m2         Blood Pressure from Last 3 Encounters:   09/18/17 111/79   07/27/17 99/64   07/24/17 107/69    Weight from Last 3 Encounters:   09/18/17 147 lb (66.7 kg)   07/24/17 155 lb (70.3 kg)   06/01/17 155 lb (70.3 kg)              We Performed the Following     Basic metabolic panel     CBC with platelets differential     FLU VAC, SPLIT VIRUS IM > 3 YO (QUADRIVALENT) [60558]     TSH with free T4 reflex     Vaccine Administration, Initial [31533]          Today's Medication Changes          These changes are accurate as of: 9/18/17  1:01 PM.  If you have any questions, ask your nurse or doctor.               Start taking these medicines.        Dose/Directions    omeprazole 40 MG capsule   Commonly known as:  priLOSEC   Used for:  Gastroesophageal reflux disease without esophagitis        Dose:  40 mg   Take 1 capsule (40 mg) by mouth daily Take 30-60 minutes before a meal.   Quantity:  30 capsule   Refills:  1         These medicines have changed or have updated prescriptions.        Dose/Directions    calcium carbonate-vitamin D 600-400 MG-UNIT Chew   Commonly known as:  CALTRATE 600+D   This may have changed:  when to take this   Used for:  Back pain        " Dose:  1 chew tab   Take 1 chew tab by mouth 2 times daily   Quantity:  180 tablet   Refills:  3       * oxyCODONE-acetaminophen  MG per tablet   Commonly known as:  PERCOCET   This may have changed:  You were already taking a medication with the same name, and this prescription was added. Make sure you understand how and when to take each.   Used for:  Chronic bilateral low back pain without sciatica, Sacroiliac joint pain        Dose:  2 tablet   Start taking on:  10/7/2017   Take 2 tablets by mouth every 6 hours as needed for moderate to severe pain Max 6/day   Quantity:  180 tablet   Refills:  0       * oxyCODONE-acetaminophen  MG per tablet   Commonly known as:  PERCOCET   This may have changed:  You were already taking a medication with the same name, and this prescription was added. Make sure you understand how and when to take each.   Used for:  Chronic bilateral low back pain without sciatica, Sacroiliac joint pain        Dose:  2 tablet   Start taking on:  11/7/2017   Take 2 tablets by mouth every 6 hours as needed for moderate to severe pain Max 6/day   Quantity:  180 tablet   Refills:  0       * oxyCODONE-acetaminophen  MG per tablet   Commonly known as:  PERCOCET   This may have changed:  Another medication with the same name was added. Make sure you understand how and when to take each.   Used for:  Chronic bilateral low back pain without sciatica, Sacroiliac joint pain        Dose:  2 tablet   Start taking on:  12/7/2017   Take 2 tablets by mouth every 6 hours as needed for moderate to severe pain Max 6/day   Quantity:  180 tablet   Refills:  0       * Notice:  This list has 3 medication(s) that are the same as other medications prescribed for you. Read the directions carefully, and ask your doctor or other care provider to review them with you.         Where to get your medicines      These medications were sent to [place], [place] - [address]  [address] [facility] [address]     Phone:  [phone]     levothyroxine 50 MCG tablet    omeprazole 40 MG capsule         Some of these will need a paper prescription and others can be bought over the counter.  Ask your nurse if you have questions.     Bring a paper prescription for each of these medications     oxyCODONE-acetaminophen  MG per tablet    oxyCODONE-acetaminophen  MG per tablet    oxyCODONE-acetaminophen  MG per tablet                Primary Care Provider Office Phone # Fax #    Sarah Barriga [phone]       [address]  [facility] [address]        Equal Access to Services     CHRISTEN MOURA : Brittanie doveo [place], waaxda luqadaha, qaybta kaalmada adehussainyada, tawana maharaj. So [facility] [phone].    ATENCIÓN: Si habla español, tiene a mendez disposición servicios gratuitos de asistencia lingüística. Llame al [phone].    We comply with applicable federal civil rights laws and [place] laws. We do not discriminate on the basis of race, color, national origin, age, disability sex, sexual orientation or gender identity.            Thank you!     Thank you for choosing [facility]  for your care. Our goal is always to provide you with excellent care. Hearing back from our patients is one way we can continue to improve our services. Please take a few minutes to complete the written survey that you may receive in the mail after your visit with us. Thank you!             Your Updated Medication List - Protect others around you: Learn how to safely use, store and throw away your medicines at www.disposemymeds.org.          This list is accurate as of: 9/18/17  1:01 PM.  Always use your most recent med list.                   Brand Name Dispense Instructions for use Diagnosis    acyclovir 5 % ointment    ZOVIRAX    15 g    Apply topically 6 times daily    HSV (herpes simplex virus)  infection       aspirin 325 MG tablet     180 tablet    Take 1 tablet (325 mg) by mouth daily    Cerebral aneurysm, nonruptured       calcium carbonate-vitamin D 600-400 MG-UNIT Chew    CALTRATE 600+D    180 tablet    Take 1 chew tab by mouth 2 times daily    Back pain       cyclobenzaprine 10 MG tablet    FLEXERIL    180 tablet    Take 1 tablet (10 mg) by mouth 2 times daily as needed for muscle spasms    S/P lumbar spinal fusion       EPINEPHrine 0.3 MG/0.3ML injection 2-pack    EPIPEN/ADRENACLICK/or ANY BX GENERIC EQUIV    2 each    Inject 0.3 mLs (0.3 mg) into the muscle once as needed for anaphylaxis    Allergy to bee sting       estradiol 0.1 MG/GM cream    ESTRACE     Place 2 g vaginally twice a week        * gabapentin 300 MG capsule    NEURONTIN    90 capsule    Take 1 capsule (300 mg) by mouth 3 times daily To be taken with the 600 mg pill    Chronic bilateral low back pain without sciatica       * gabapentin 600 MG tablet    NEURONTIN    90 tablet    TAKE ONE TABLET BY MOUTH THREE TIMES A DAY (TO BE TAKEN WITH THE 300MG CAPSULE)    Chronic bilateral low back pain without sciatica       levothyroxine 50 MCG tablet    LEVOTHROID    90 tablet    Take 1 tablet (50 mcg) by mouth daily    Hypothyroidism, unspecified       lisinopril-hydrochlorothiazide 20-12.5 MG per tablet    PRINZIDE/ZESTORETIC    180 tablet    Take 1 tablet by mouth daily    Essential hypertension with goal blood pressure less than 140/90       multivitamin CF formula chewable tablet    CHOICEFUL    100 tablet    Take 1 tablet by mouth daily    Postmenopause       omeprazole 40 MG capsule    priLOSEC    30 capsule    Take 1 capsule (40 mg) by mouth daily Take 30-60 minutes before a meal.    Gastroesophageal reflux disease without esophagitis       * oxyCODONE-acetaminophen  MG per tablet   Start taking on:  10/7/2017    PERCOCET    180 tablet    Take 2 tablets by mouth every 6 hours as needed for moderate to severe pain Max 6/day     Chronic bilateral low back pain without sciatica, Sacroiliac joint pain       * oxyCODONE-acetaminophen  MG per tablet   Start taking on:  11/7/2017    PERCOCET    180 tablet    Take 2 tablets by mouth every 6 hours as needed for moderate to severe pain Max 6/day    Chronic bilateral low back pain without sciatica, Sacroiliac joint pain       * oxyCODONE-acetaminophen  MG per tablet   Start taking on:  12/7/2017    PERCOCET    180 tablet    Take 2 tablets by mouth every 6 hours as needed for moderate to severe pain Max 6/day    Chronic bilateral low back pain without sciatica, Sacroiliac joint pain       QUEtiapine 25 MG tablet    SEROQUEL    30 tablet    Take 1 tablet (25 mg) by mouth daily as needed Take 25 mg Every 6 hours As needed for anxiety.    Anxiety       sertraline 100 MG tablet    ZOLOFT    180 tablet    TAKE TWO TABLETS BY MOUTH EVERY DAY    Major depressive disorder, recurrent episode, moderate (H)       traZODone 150 MG tablet    DESYREL    90 tablet    Take 1 tablet (150 mg) by mouth nightly as needed for sleep    Insomnia, unspecified type       * Notice:  This list has 5 medication(s) that are the same as other medications prescribed for you. Read the directions carefully, and ask your doctor or other care provider to review them with you.

## 2017-09-19 LAB
ANION GAP SERPL CALCULATED.3IONS-SCNC: 6 MMOL/L (ref 3–14)
BUN SERPL-MCNC: 24 MG/DL (ref 7–30)
CALCIUM SERPL-MCNC: 9.2 MG/DL (ref 8.5–10.1)
CHLORIDE SERPL-SCNC: 100 MMOL/L (ref 94–109)
CO2 SERPL-SCNC: 29 MMOL/L (ref 20–32)
CREAT SERPL-MCNC: 1.17 MG/DL (ref 0.52–1.04)
GFR SERPL CREATININE-BSD FRML MDRD: 46 ML/MIN/1.7M2
GLUCOSE SERPL-MCNC: 90 MG/DL (ref 70–99)
POTASSIUM SERPL-SCNC: 4.2 MMOL/L (ref 3.4–5.3)
SODIUM SERPL-SCNC: 135 MMOL/L (ref 133–144)
TSH SERPL DL<=0.005 MIU/L-ACNC: 1.02 MU/L (ref 0.4–4)

## 2017-09-20 NOTE — ADDENDUM NOTE
Encounter addended by: Vaishali García MD on: 9/20/2017  9:25 AM<BR>     Actions taken: Sign clinical note

## 2017-09-21 ENCOUNTER — PRE VISIT (OUTPATIENT)
Dept: NEUROLOGY | Facility: CLINIC | Age: 67
End: 2017-09-21

## 2017-09-21 NOTE — TELEPHONE ENCOUNTER
1.  Date/reason for appt: 10/3/17, Stroke    2.  Referring provider: unknown     3.  Call to patient (Yes / No - short description): No, records are in epic.    4.  Previous care at / records requested from:   [facility]

## 2017-10-03 ENCOUNTER — OFFICE VISIT (OUTPATIENT)
Dept: NEUROLOGY | Facility: CLINIC | Age: 67
End: 2017-10-03

## 2017-10-03 VITALS — HEIGHT: 63 IN | DIASTOLIC BLOOD PRESSURE: 73 MMHG | HEART RATE: 93 BPM | SYSTOLIC BLOOD PRESSURE: 123 MMHG

## 2017-10-03 DIAGNOSIS — G45.9 TRANSIENT CEREBRAL ISCHEMIA, UNSPECIFIED TYPE: ICD-10-CM

## 2017-10-03 DIAGNOSIS — R51.9 NONINTRACTABLE EPISODIC HEADACHE, UNSPECIFIED HEADACHE TYPE: Primary | ICD-10-CM

## 2017-10-03 ASSESSMENT — ENCOUNTER SYMPTOMS
TREMORS: 0
SLEEP DISTURBANCES DUE TO BREATHING: 0
POLYDIPSIA: 0
MEMORY LOSS: 1
WEAKNESS: 0
TACHYCARDIA: 1
BOWEL INCONTINENCE: 0
SINUS PAIN: 0
CHILLS: 0
DIZZINESS: 1
FATIGUE: 1
HOARSE VOICE: 0
TASTE DISTURBANCE: 0
JOINT SWELLING: 0
INSOMNIA: 0
EYE IRRITATION: 0
RECTAL BLEEDING: 0
LIGHT-HEADEDNESS: 0
WEIGHT GAIN: 0
PARALYSIS: 0
LOSS OF CONSCIOUSNESS: 0
LEG SWELLING: 0
EYE WATERING: 0
ABDOMINAL PAIN: 1
EYE REDNESS: 0
PALPITATIONS: 1
POLYPHAGIA: 0
NERVOUS/ANXIOUS: 1
DOUBLE VISION: 0
JAUNDICE: 0
HYPERTENSION: 1
MYALGIAS: 1
PANIC: 0
HYPOTENSION: 1
FEVER: 0
NIGHT SWEATS: 1
SEIZURES: 0
BRUISES/BLEEDS EASILY: 0
BACK PAIN: 1
NAUSEA: 0
LEG PAIN: 0
VOMITING: 0
ALTERED TEMPERATURE REGULATION: 1
SORE THROAT: 0
BLOOD IN STOOL: 0
MUSCLE CRAMPS: 0
TINGLING: 0
INCREASED ENERGY: 1
DECREASED CONCENTRATION: 0
NUMBNESS: 0
EYE PAIN: 0
SYNCOPE: 0
SMELL DISTURBANCE: 0
DEPRESSION: 1
TROUBLE SWALLOWING: 0
STIFFNESS: 0
HALLUCINATIONS: 0
NECK PAIN: 1
CLAUDICATION: 0
SINUS CONGESTION: 0
BLOATING: 0
ORTHOPNEA: 0
WEIGHT LOSS: 1
RECTAL PAIN: 0
NECK MASS: 0
DISTURBANCES IN COORDINATION: 0
DIARRHEA: 0
DECREASED APPETITE: 1
MUSCLE WEAKNESS: 0
CONSTIPATION: 0
ARTHRALGIAS: 1
HEARTBURN: 0
HEADACHES: 1
SPEECH CHANGE: 1
SWOLLEN GLANDS: 0

## 2017-10-03 ASSESSMENT — PAIN SCALES - GENERAL: PAINLEVEL: EXTREME PAIN (8)

## 2017-10-03 NOTE — MR AVS SNAPSHOT
After Visit Summary   10/3/2017    Jessica Ellison    MRN: [id number]           Patient Information     Date Of Birth          1950        Visit Information        Provider Department      10/3/2017 1:20 PM Pia Buck MD [facility] Neurology        Today's Diagnoses     Nonintractable episodic headache, unspecified headache type    -  1    Transient cerebral ischemia, unspecified type           Follow-ups after your visit        Your next 10 appointments already scheduled     Oct 09, 2017  7:00 AM CDT   MR BRAIN FOR STROKE COMPLETE with [address] MRI ([facility])    [address] [facility] [address]   [phone]           Take your medicines as usual, unless your doctor tells you not to. Bring a list of your current medicines to your exam (including vitamins, minerals and over-the-counter drugs). Also bring the results of similar scans you may have had.  Please remove any body piercings and hair extensions before you arrive.  Follow your doctor s orders. If you do not, we may have to postpone your exam.  You will not have contrast for this exam. You do not need to do anything special to prepare.  The MRI machine uses a strong magnet. Please wear clothes without metal (snaps, zippers). A sweatsuit works well, or we may give you a hospital gown.   **IMPORTANT** THE INSTRUCTIONS BELOW ARE ONLY FOR THOSE PATIENTS WHO HAVE BEEN TOLD THEY WILL RECEIVE SEDATION OR GENERAL ANESTHESIA DURING THEIR MRI PROCEDURE:  IF YOU WILL RECEIVE SEDATION (take medicine to help you relax during your exam):   You must get the medicine from your doctor before you arrive. Bring the medicine to the exam. Do not take it at home.   Arrive one hour early. Bring someone who can take you home after the test. Your medicine will make you sleepy. After the exam, you may not drive, take a bus or take a taxi by yourself.   No eating 8 hours before your exam. You may have  clear liquids up until 4 hours before your exam. (Clear liquids include water, clear tea, black coffee and fruit juice without pulp.)  IF YOU WILL RECEIVE ANESTHESIA (be asleep for your exam):   Arrive 1 1/2 hours early. Bring someone who can take you home after the test. You may not drive, take a bus or take a taxi by yourself.   No eating 8 hours before your exam. You may have clear liquids up until 4 hours before your exam. (Clear liquids include water, clear tea, black coffee and fruit juice without pulp.)   You will spend four to five hours in the recovery room.  Please call the Imaging Department at your exam site with any questions.            Oct 09, 2017  8:00 AM CDT   MR HEAD W/O & W CONTRAST ANGIOGRAM with [facility]   [facility] MRI ([facility])    [address] [facility] [address] [phone]           Take your medicines as usual, unless your doctor tells you not to. Bring a list of your current medicines to your exam (including vitamins, minerals and over-the-counter drugs).  You will be given intravenous contrast for this exam. To prepare:   The day before your exam, drink extra fluids at least six 8-ounce glasses (unless your doctor tells you to restrict your fluids).   Have a blood test (creatinine test) within 30 days of your exam. Go to your clinic or Diagnostic Imaging Department for this test.  The MRI machine uses a strong magnet. Please wear clothes without metal (snaps, zippers). A sweatsuit works well, or we may give you a hospital gown.  Please remove any body piercings and hair extensions before you arrive. You will also remove watches, jewelry, hairpins, wallets, dentures, partial dental plates and hearing aids. You may wear contact lenses, and you may be able to wear your rings. We have a safe place to keep your personal items, but it is safer to leave them at home.   **IMPORTANT** THE INSTRUCTIONS BELOW ARE ONLY FOR THOSE PATIENTS WHO HAVE BEEN TOLD THEY  WILL RECEIVE SEDATION OR GENERAL ANESTHESIA DURING THEIR MRI PROCEDURE:  IF YOU WILL RECEIVE SEDATION (take medicine to help you relax during your exam):   You must get the medicine from your doctor before you arrive. Bring the medicine to the exam. Do not take it at home.   Arrive one hour early. Bring someone who can take you home after the test. Your medicine will make you sleepy. After the exam, you may not drive, take a bus or take a taxi by yourself.   No eating 8 hours before your exam. You may have clear liquids up until 4 hours before your exam. (Clear liquids include water, clear tea, black coffee and fruit juice without pulp.)  IF YOU WILL RECEIVE ANESTHESIA (be asleep for your exam):   Arrive 1 1/2 hours early. Bring someone who can take you home after the test. You may not drive, take a bus or take a taxi by yourself.   No eating 8 hours before your exam. You may have clear liquids up until 4 hours before your exam. (Clear liquids include water, clear tea, black coffee and fruit juice without pulp.)  Please call the Imaging Department at your exam site with any questions.            Oct 09, 2017  8:30 AM CDT   MR NECK W CONTRAST ANGIOGRAM with [facility]   [facility] ([facility])    [address] [facility] [address] [phone]           Take your medicines as usual, unless your doctor tells you not to. Bring a list of your current medicines to your exam (including vitamins, minerals and over-the-counter drugs).  You will be given intravenous contrast for this exam. To prepare:   The day before your exam, drink extra fluids at least six 8-ounce glasses (unless your doctor tells you to restrict your fluids).   Have a blood test (creatinine test) within 30 days of your exam. Go to your clinic or Diagnostic Imaging Department for this test.  The MRI machine uses a strong magnet. Please wear clothes without metal (snaps, zippers). A sweatsuit works well, or we may give you  a hospital gown.  Please remove any body piercings and hair extensions before you arrive. You will also remove watches, jewelry, hairpins, wallets, dentures, partial dental plates and hearing aids. You may wear contact lenses, and you may be able to wear your rings. We have a safe place to keep your personal items, but it is safer to leave them at home.   **IMPORTANT** THE INSTRUCTIONS BELOW ARE ONLY FOR THOSE PATIENTS WHO HAVE BEEN TOLD THEY WILL RECEIVE SEDATION OR GENERAL ANESTHESIA DURING THEIR MRI PROCEDURE:  IF YOU WILL RECEIVE SEDATION (take medicine to help you relax during your exam):   You must get the medicine from your doctor before you arrive. Bring the medicine to the exam. Do not take it at home.   Arrive one hour early. Bring someone who can take you home after the test. Your medicine will make you sleepy. After the exam, you may not drive, take a bus or take a taxi by yourself.   No eating 8 hours before your exam. You may have clear liquids up until 4 hours before your exam. (Clear liquids include water, clear tea, black coffee and fruit juice without pulp.)  IF YOU WILL RECEIVE ANESTHESIA (be asleep for your exam):   Arrive 1 1/2 hours early. Bring someone who can take you home after the test. You may not drive, take a bus or take a taxi by yourself.   No eating 8 hours before your exam. You may have clear liquids up until 4 hours before your exam. (Clear liquids include water, clear tea, black coffee and fruit juice without pulp.)  Please call the Imaging Department at your exam site with any questions.            Jan 16, 2018  1:50 PM CST   (Arrive by 1:35 PM)   Return Stroke with Pia Buck MD   [facility] Neurology ([facility])    [address]  3rd Floor  [facility] [address] [phone]              Future tests that were ordered for you today     Open Future Orders        Priority Expected Expires Ordered    MRI Brain for stroke complete  "Routine  10/3/2018 10/3/2017    MRI Angiogram head w & w/o contrast Routine  10/3/2018 10/3/2017    MRI Angiogram neck w contrast Routine  10/3/2018 10/3/2017            Who to contact     Please call your clinic at [phone] to:    Ask questions about your health    Make or cancel appointments    Discuss your medicines    Learn about your test results    Speak to your doctor   If you have compliments or concerns about an experience at your clinic, or if you wish to file a complaint, please contact [facility] Patient Relations at [phone] or email us at [email or web address]         Additional Information About Your Visit        SMR SITEharRounds Information     [employer] gives you secure access to your electronic health record. If you see a primary care provider, you can also send messages to your care team and make appointments. If you have questions, please call your primary care clinic.  If you do not have a primary care provider, please call [phone] and they will assist you.      [employer] is an electronic gateway that provides easy, online access to your medical records. With [employer], you can request a clinic appointment, read your test results, renew a prescription or communicate with your care team.     To access your existing account, please contact your [facility] or call [phone] for assistance.        Care EveryWhere ID     This is your Care EveryWhere ID. This could be used by other organizations to access your [facility] medical records  [id number]        Your Vitals Were     Pulse Height                93 1.6 m (5' 3\")           Blood Pressure from Last 3 Encounters:   10/03/17 123/73   09/18/17 111/79   07/27/17 99/64    Weight from Last 3 Encounters:   09/18/17 66.7 kg (147 lb)   07/24/17 70.3 kg (155 lb)   06/01/17 70.3 kg (155 lb)                 Today's Medication Changes          These changes are accurate as of: 10/3/17  3:05 " PM.  If you have any questions, ask your nurse or doctor.               These medicines have changed or have updated prescriptions.        Dose/Directions    calcium carbonate-vitamin D 600-400 MG-UNIT Chew   Commonly known as:  CALTRATE 600+D   This may have changed:  when to take this   Used for:  Back pain        Dose:  1 chew tab   Take 1 chew tab by mouth 2 times daily   Quantity:  180 tablet   Refills:  3                Primary Care Provider Office Phone # Fax #    Sarah Barriga [phone] [address] [address] [facility] [address]        Equal Access to Services     [facility]: Hadii radha ku hadasho Soomaali, waaxda luqadaha, qaybta kaalmada adeegyaalesha, tawana cota . So [facility] [phone].    ATENCIÓN: Si habla español, tiene a mendez disposición servicios gratuitos de asistencia lingüística. [facility] [phone].    We comply with applicable federal civil rights laws and [place] laws. We do not discriminate on the basis of race, color, national origin, age, disability, sex, sexual orientation, or gender identity.            Thank you!     Thank you for choosing [facility] NEUROLOGY  for your care. Our goal is always to provide you with excellent care. Hearing back from our patients is one way we can continue to improve our services. Please take a few minutes to complete the written survey that you may receive in the mail after your visit with us. Thank you!             Your Updated Medication List - Protect others around you: Learn how to safely use, store and throw away your medicines at www.disposemymeds.org.          This list is accurate as of: 10/3/17  3:05 PM.  Always use your most recent med list.                   Brand Name Dispense Instructions for use Diagnosis    acyclovir 5 % ointment    ZOVIRAX    15 g    Apply topically 6 times daily    HSV (herpes simplex virus) infection       aspirin 325 MG tablet     180 tablet    Take 1 tablet (325 mg) by  mouth daily    Cerebral aneurysm, nonruptured       calcium carbonate-vitamin D 600-400 MG-UNIT Chew    CALTRATE 600+D    180 tablet    Take 1 chew tab by mouth 2 times daily    Back pain       cyclobenzaprine 10 MG tablet    FLEXERIL    180 tablet    Take 1 tablet (10 mg) by mouth 2 times daily as needed for muscle spasms    S/P lumbar spinal fusion       EPINEPHrine 0.3 MG/0.3ML injection 2-pack    EPIPEN/ADRENACLICK/or ANY BX GENERIC EQUIV    2 each    Inject 0.3 mLs (0.3 mg) into the muscle once as needed for anaphylaxis    Allergy to bee sting       estradiol 0.1 MG/GM cream    ESTRACE     Place 2 g vaginally twice a week        * gabapentin 300 MG capsule    NEURONTIN    90 capsule    Take 1 capsule (300 mg) by mouth 3 times daily To be taken with the 600 mg pill    Chronic bilateral low back pain without sciatica       * gabapentin 600 MG tablet    NEURONTIN    90 tablet    TAKE ONE TABLET BY MOUTH THREE TIMES A DAY (TO BE TAKEN WITH THE 300MG CAPSULE)    Chronic bilateral low back pain without sciatica       levothyroxine 50 MCG tablet    LEVOTHROID    90 tablet    Take 1 tablet (50 mcg) by mouth daily    Hypothyroidism, unspecified       lisinopril-hydrochlorothiazide 20-12.5 MG per tablet    PRINZIDE/ZESTORETIC    180 tablet    Take 1 tablet by mouth daily    Essential hypertension with goal blood pressure less than 140/90       multivitamin CF formula chewable tablet    CHOICEFUL    100 tablet    Take 1 tablet by mouth daily    Postmenopause       omeprazole 40 MG capsule    priLOSEC    30 capsule    Take 1 capsule (40 mg) by mouth daily Take 30-60 minutes before a meal.    Gastroesophageal reflux disease without esophagitis       * oxyCODONE-acetaminophen  MG per tablet   Start taking on:  10/7/2017    PERCOCET    180 tablet    Take 2 tablets by mouth every 6 hours as needed for moderate to severe pain Max 6/day    Chronic bilateral low back pain without sciatica, Sacroiliac joint pain       *  oxyCODONE-acetaminophen  MG per tablet   Start taking on:  11/7/2017    PERCOCET    180 tablet    Take 2 tablets by mouth every 6 hours as needed for moderate to severe pain Max 6/day    Chronic bilateral low back pain without sciatica, Sacroiliac joint pain       * oxyCODONE-acetaminophen  MG per tablet   Start taking on:  12/7/2017    PERCOCET    180 tablet    Take 2 tablets by mouth every 6 hours as needed for moderate to severe pain Max 6/day    Chronic bilateral low back pain without sciatica, Sacroiliac joint pain       QUEtiapine 25 MG tablet    SEROQUEL    30 tablet    Take 1 tablet (25 mg) by mouth daily as needed Take 25 mg Every 6 hours As needed for anxiety.    Anxiety       sertraline 100 MG tablet    ZOLOFT    180 tablet    TAKE TWO TABLETS BY MOUTH EVERY DAY    Major depressive disorder, recurrent episode, moderate (H)       traZODone 150 MG tablet    DESYREL    90 tablet    Take 1 tablet (150 mg) by mouth nightly as needed for sleep    Insomnia, unspecified type       * Notice:  This list has 5 medication(s) that are the same as other medications prescribed for you. Read the directions carefully, and ask your doctor or other care provider to review them with you.

## 2017-10-03 NOTE — PROGRESS NOTES
STROKE CLINIC NOTE     Jessica Ellison is a 66 year old female referred to our clinic from Sarah Barriga. She used to work as charge nurse in orthopedics/surgery.       HPI     This is a 66 year old with history of HTN, hypothyroidism, L.superior hypophyseal aneurysm s/p coiling. Most recently a diagnostic angio was done in July 2017 and the patient had a stable coil mass and no residual in previously treated aneurysm. The reason the patient was referred to the stroke clinic was for her intermittent speech problems. She has chronic back problems for which she is on Diazepam, Flexeril , gabapentin, percocet, Trazodone. She is also on Trazodone and Sertraline for depression.    She started having these spells about 4 years back. These spells can last from 1.5- 4 days. She attributed these spells to the aneurysm in the past. But as she continued to have these spells after the coiling was done, She started attributing these spells from flexeril. She has these spells at a frequency of 1-2 spells/ month. Lately( since July), her spells have decreased in frequency. She almost cancelled her appointment. She has had only one spell in the last month. She can have associated headache and anxiety with these spells occasionally.      Stroke risk factors     CAD/MI: No  A.Fib: No   CHF: No  JOY : No   Diabetes: No  HTN: Yes  DVT/PE: No  HLD: Yes  Carotid artery disease: No  Valve disease: No  Cancer: No  Smoking: No  Alcohol: No  illicit drug use: No    PMH     Past Medical History:   Diagnosis Date     Anemia      Aneurysm (H)      Chemical dependency (H)     Chem Dep RX     Depression      History of blood transfusion      Hypertension      Menopausal symptoms      Other chronic pain     Lower back and hips     Sleep disorder      Thyroid disease      Tobacco abuse        Past Surgical History:   Procedure Laterality Date     APPENDECTOMY  1960     C PART REMOVAL COLON W ANASTOMOSIS  5/2005    diverticulitis     C SPINE  FUSION,ANTER,8+ SGMTS  2007    Anterior posterior fusion 10 levels, hardware removal and re-fusion 2009     cerebral aneurysm  2014    coiled     COLONOSCOPY  4/19/2005     HC EXCISION BREAST LESION, OPEN >=1      core biopsy 2006, lumpectomy 2006     orif left femoral neck Left 6/3/2016    stress fracture.  done at [facility]     SURGICAL HISTORY OF -   2004    Skin Graft       Medications: I have reviewed this patient's current medications.  Current Outpatient Prescriptions   Medication     estradiol (ESTRACE) 0.1 MG/GM cream     [START ON 12/7/2017] oxyCODONE-acetaminophen (PERCOCET)  MG per tablet     [START ON 11/7/2017] oxyCODONE-acetaminophen (PERCOCET)  MG per tablet     [START ON 10/7/2017] oxyCODONE-acetaminophen (PERCOCET)  MG per tablet     levothyroxine (LEVOTHROID) 50 MCG tablet     omeprazole (PRILOSEC) 40 MG capsule     traZODone (DESYREL) 150 MG tablet     sertraline (ZOLOFT) 100 MG tablet     lisinopril-hydrochlorothiazide (PRINZIDE/ZESTORETIC) 20-12.5 MG per tablet     gabapentin (NEURONTIN) 600 MG tablet     cyclobenzaprine (FLEXERIL) 10 MG tablet     QUEtiapine (SEROQUEL) 25 MG tablet     gabapentin (NEURONTIN) 300 MG capsule     acyclovir (ZOVIRAX) 5 % ointment     EPINEPHrine (EPIPEN) 0.3 MG/0.3ML injection     calcium carbonate-vitamin D (CALTRATE 600+D) 600-400 MG-UNIT CHEW     multivitamin CF formula (CHOICEFUL) chewable tablet     aspirin 325 MG tablet     No current facility-administered medications for this visit.        Allergies   Allergen Reactions     Bee Venom          ROS     General Symptoms: Yes  Skin Symptoms: No  HENT Symptoms: Yes  EYE SYMPTOMS: Yes  HEART SYMPTOMS: Yes  LUNG SYMPTOMS: No  INTESTINAL SYMPTOMS: Yes  URINARY SYMPTOMS: No  GYNECOLOGIC SYMPTOMS: No  BREAST SYMPTOMS: No  SKELETAL SYMPTOMS: Yes  BLOOD SYMPTOMS: Yes  NERVOUS SYSTEM SYMPTOMS: Yes  MENTAL HEALTH SYMPTOMS: Yes  Fever: No  Loss of appetite: Yes  Weight loss: Yes  Weight gain:  No  Fatigue: Yes  Night sweats: Yes  Chills: No  Increased stress: Yes  Excessive hunger: No  Excessive thirst: No  Feeling hot or cold when others believe the temperature is normal: Yes  Loss of height: No  Post-operative complications: No  Surgical site pain: No  Hallucinations: No  Change in or Loss of Energy: Yes  Hyperactivity: No  Confusion: No  Ear pain: Yes  Ear discharge: No  Hearing loss: No  Tinnitus: No  Nosebleeds: No  Congestion: No  Sinus pain: No  Trouble swallowing: No   Voice hoarseness: No  Mouth sores: No  Sore throat: No  Tooth pain: Yes  Gum tenderness: Yes  Bleeding gums: No  Change in taste: No  Change in sense of smell: No  Dry mouth: No  Hearing aid used: No  Neck lump: No  Eye pain: No  Vision loss: No  Dry eyes: No  Watery eyes: No  Eye bulging: No  Double vision: No  Flashing of lights: No  Spots: Yes  Floaters: Yes  Redness: No  Crossed eyes: No  Tunnel Vision: No  Yellowing of eyes: No  Eye irritation: No  Chest pain or pressure: No  Fast or irregular heartbeat: Yes  Pain in legs with walking: No  Swelling in feet or ankles: No  Trouble breathing while lying down: No  Fingers or Toes appear blue: No  High blood pressure: Yes  Low blood pressure: Yes  Fainting: No  Murmurs: No  Chest pain on exertion: No  Chest pain at rest: No  Cramping pain in leg during exercise: No  Pacemaker: No  Varicose veins: Yes  Edema or swelling: No  Fast heart beat: Yes  Wake up at night with shortness of breath: No  Heart flutters: No  Light-headedness: No  Heart burn or indigestion: No  Nausea: No  Vomiting: No  Abdominal pain: Yes  Bloating: No  Constipation: No  Diarrhea: No  Blood in stool: No  Black stools: No  Rectal or Anal pain: No  Fecal incontinence: No  Rectal bleeding: No  Yellowing of skin or eyes: No  Change in stools: No  Hemorrhoids: No  Back pain: Yes  Muscle aches: Yes  Neck pain: Yes  Swollen joints: No  Joint pain: Yes  Bone pain: Yes  Muscle cramps: No  Muscle weakness: No  Joint  "stiffness: No  Anemia: Yes  Swollen glands: No  Easy bleeding or bruising: No  Trouble with coordination: No  Dizziness or trouble with balance: Yes  Fainting or black-out spells: No  Memory loss: Yes  Headache: Yes  Seizures: No  Speech problems: Yes  Tingling: No  Tremor: No  Weakness: No  Difficulty walking: No  Paralysis: No  Numbness: No  Nervous or Anxious: Yes  Depression: Yes  Trouble sleeping: No  Trouble thinking or concentrating: No  Mood changes: Yes  Panic attacks: No    Objective       VITALS  /73  Pulse 93  Ht 1.6 m (5' 3\")    PHYSICAL EXAMINATION    General:  patient lying in bed without any acute distress    HEENT:  normocephalic/atraumatic  Cardio:  RRR  Pulmonary:  no respiratory distress  Abdomen:  soft, non-tender, bowel sounds present  Extremities:  no edema  Skin:  intact, warm/dry     Neurologic  Mental Status:  fully alert, attentive and oriented, follows commands, speech clear and fluent  Cranial Nerves:  visual fields intact, PERRL, EOMI with normal smooth pursuit, facial sensation intact and symmetric, facial movements symmetric, hearing not formally tested but intact to conversation, palate elevation symmetric and uvula midline, shoulder shrug strong bilaterally, tongue protrusion midline  Motor:  no abnormal movements, strength 5/5 throughout upper and lower extremities  Reflexes:  2+ and symmetric throughout  Sensory:  intact/symmetric to light touch and pin prick throughout upper and lower extremities  Coordination:  FNF and HS intact without dysmetria  Station/Gait:  Uses cane 2/2 chronic back pain. Difficulty with tandem walking.    LABS/IMAGING and other ancillary data     Recent Labs   Lab Test  11/02/15   1119  10/03/13   0813   CHOL  224*  230*   HDL  78  69   LDL  127  139*   TRIG  96  111   CHOLHDLRATIO  2.9  3.0       ASSESSMENT     # Spells- Late life migraine equivalent without headache ( likely) vs Medication related  Unlikely to be seizures or TIA's    PLAN "     Will get an MRI Brain, MRA head and neck.  Return to the clinic in 3 months.  Discussed with her to maintain a headache and spell diary.      Anatoliy BRICENO  2:19 PM October 3, 2017  PGY-5, Vascular Neurology Fellow    ATTENDING NOTE    Patient was seen and examined with Dr. Horne. I agree with the note above except as noted. Spells of speech difficulty. Lasting more tan 24 hours. Unclear etiology - possibly, late life onset migraine equivalent. We will image since she has not had any TIA/stroke related work up.

## 2017-10-03 NOTE — LETTER
10/3/2017       RE: Jessica Ellison  [address]   [id number]  [facility] [address]     Dear Colleague,    Thank you for referring your patient, Jessica Ellison, to the [facility] NEUROLOGY at [facility]. Please see a copy of my visit note below.    STROKE CLINIC NOTE     Jessica Ellison is a 66 year old female referred to our clinic from Sarah Barriga. She used to work as charge nurse in orthopedics/surgery.       HPI     This is a 66 year old with history of HTN, hypothyroidism, L.superior hypophyseal aneurysm s/p coiling. Most recently a diagnostic angio was done in July 2017 and the patient had a stable coil mass and no residual in previously treated aneurysm. The reason the patient was referred to the stroke clinic was for her intermittent speech problems. She has chronic back problems for which she is on Diazepam, Flexeril , gabapentin, percocet, Trazodone. She is also on Trazodone and Sertraline for depression.    She started having these spells about 4 years back. These spells can last from 1.5- 4 days. She attributed these spells to the aneurysm in the past. But as she continued to have these spells after the coiling was done, She started attributing these spells from flexeril. She has these spells at a frequency of 1-2 spells/ month. Lately( since July), her spells have decreased in frequency. She almost cancelled her appointment. She has had only one spell in the last month. She can have associated headache and anxiety with these spells occasionally.      Stroke risk factors     CAD/MI: No  A.Fib: No   CHF: No  JOY : No   Diabetes: No  HTN: Yes  DVT/PE: No  HLD: Yes  Carotid artery disease: No  Valve disease: No  Cancer: No  Smoking: No  Alcohol: No  illicit drug use: No    PMH     Past Medical History:   Diagnosis Date     Anemia      Aneurysm (H)      Chemical dependency (H)     Chem Dep RX     Depression      History of blood transfusion       Hypertension      Menopausal symptoms      Other chronic pain     Lower back and hips     Sleep disorder      Thyroid disease      Tobacco abuse        Past Surgical History:   Procedure Laterality Date     APPENDECTOMY  1960     C PART REMOVAL COLON W ANASTOMOSIS  5/2005    diverticulitis     C SPINE FUSION,ANTER,8+ SGMTS  2007    Anterior posterior fusion 10 levels, hardware removal and re-fusion 2009     cerebral aneurysm  2014    coiled     COLONOSCOPY  4/19/2005     HC EXCISION BREAST LESION, OPEN >=1      core biopsy 2006, lumpectomy 2006     orif left femoral neck Left 6/3/2016    stress fracture.  done at [facility]     SURGICAL HISTORY OF -   2004    Skin Graft       Medications: I have reviewed this patient's current medications.  Current Outpatient Prescriptions   Medication     estradiol (ESTRACE) 0.1 MG/GM cream     [START ON 12/7/2017] oxyCODONE-acetaminophen (PERCOCET)  MG per tablet     [START ON 11/7/2017] oxyCODONE-acetaminophen (PERCOCET)  MG per tablet     [START ON 10/7/2017] oxyCODONE-acetaminophen (PERCOCET)  MG per tablet     levothyroxine (LEVOTHROID) 50 MCG tablet     omeprazole (PRILOSEC) 40 MG capsule     traZODone (DESYREL) 150 MG tablet     sertraline (ZOLOFT) 100 MG tablet     lisinopril-hydrochlorothiazide (PRINZIDE/ZESTORETIC) 20-12.5 MG per tablet     gabapentin (NEURONTIN) 600 MG tablet     cyclobenzaprine (FLEXERIL) 10 MG tablet     QUEtiapine (SEROQUEL) 25 MG tablet     gabapentin (NEURONTIN) 300 MG capsule     acyclovir (ZOVIRAX) 5 % ointment     EPINEPHrine (EPIPEN) 0.3 MG/0.3ML injection     calcium carbonate-vitamin D (CALTRATE 600+D) 600-400 MG-UNIT CHEW     multivitamin CF formula (CHOICEFUL) chewable tablet     aspirin 325 MG tablet     No current facility-administered medications for this visit.        Allergies   Allergen Reactions     Bee Venom          ROS     General Symptoms: Yes  Skin Symptoms: No  HENT Symptoms: Yes  EYE SYMPTOMS: Yes  HEART  SYMPTOMS: Yes  LUNG SYMPTOMS: No  INTESTINAL SYMPTOMS: Yes  URINARY SYMPTOMS: No  GYNECOLOGIC SYMPTOMS: No  BREAST SYMPTOMS: No  SKELETAL SYMPTOMS: Yes  BLOOD SYMPTOMS: Yes  NERVOUS SYSTEM SYMPTOMS: Yes  MENTAL HEALTH SYMPTOMS: Yes  Fever: No  Loss of appetite: Yes  Weight loss: Yes  Weight gain: No  Fatigue: Yes  Night sweats: Yes  Chills: No  Increased stress: Yes  Excessive hunger: No  Excessive thirst: No  Feeling hot or cold when others believe the temperature is normal: Yes  Loss of height: No  Post-operative complications: No  Surgical site pain: No  Hallucinations: No  Change in or Loss of Energy: Yes  Hyperactivity: No  Confusion: No  Ear pain: Yes  Ear discharge: No  Hearing loss: No  Tinnitus: No  Nosebleeds: No  Congestion: No  Sinus pain: No  Trouble swallowing: No   Voice hoarseness: No  Mouth sores: No  Sore throat: No  Tooth pain: Yes  Gum tenderness: Yes  Bleeding gums: No  Change in taste: No  Change in sense of smell: No  Dry mouth: No  Hearing aid used: No  Neck lump: No  Eye pain: No  Vision loss: No  Dry eyes: No  Watery eyes: No  Eye bulging: No  Double vision: No  Flashing of lights: No  Spots: Yes  Floaters: Yes  Redness: No  Crossed eyes: No  Tunnel Vision: No  Yellowing of eyes: No  Eye irritation: No  Chest pain or pressure: No  Fast or irregular heartbeat: Yes  Pain in legs with walking: No  Swelling in feet or ankles: No  Trouble breathing while lying down: No  Fingers or Toes appear blue: No  High blood pressure: Yes  Low blood pressure: Yes  Fainting: No  Murmurs: No  Chest pain on exertion: No  Chest pain at rest: No  Cramping pain in leg during exercise: No  Pacemaker: No  Varicose veins: Yes  Edema or swelling: No  Fast heart beat: Yes  Wake up at night with shortness of breath: No  Heart flutters: No  Light-headedness: No  Heart burn or indigestion: No  Nausea: No  Vomiting: No  Abdominal pain: Yes  Bloating: No  Constipation: No  Diarrhea: No  Blood in stool: No  Black stools:  "No  Rectal or Anal pain: No  Fecal incontinence: No  Rectal bleeding: No  Yellowing of skin or eyes: No  Change in stools: No  Hemorrhoids: No  Back pain: Yes  Muscle aches: Yes  Neck pain: Yes  Swollen joints: No  Joint pain: Yes  Bone pain: Yes  Muscle cramps: No  Muscle weakness: No  Joint stiffness: No  Anemia: Yes  Swollen glands: No  Easy bleeding or bruising: No  Trouble with coordination: No  Dizziness or trouble with balance: Yes  Fainting or black-out spells: No  Memory loss: Yes  Headache: Yes  Seizures: No  Speech problems: Yes  Tingling: No  Tremor: No  Weakness: No  Difficulty walking: No  Paralysis: No  Numbness: No  Nervous or Anxious: Yes  Depression: Yes  Trouble sleeping: No  Trouble thinking or concentrating: No  Mood changes: Yes  Panic attacks: No    Objective       VITALS  /73  Pulse 93  Ht 1.6 m (5' 3\")    PHYSICAL EXAMINATION    General:  patient lying in bed without any acute distress    HEENT:  normocephalic/atraumatic  Cardio:  RRR  Pulmonary:  no respiratory distress  Abdomen:  soft, non-tender, bowel sounds present  Extremities:  no edema  Skin:  intact, warm/dry     Neurologic  Mental Status:  fully alert, attentive and oriented, follows commands, speech clear and fluent  Cranial Nerves:  visual fields intact, PERRL, EOMI with normal smooth pursuit, facial sensation intact and symmetric, facial movements symmetric, hearing not formally tested but intact to conversation, palate elevation symmetric and uvula midline, shoulder shrug strong bilaterally, tongue protrusion midline  Motor:  no abnormal movements, strength 5/5 throughout upper and lower extremities  Reflexes:  2+ and symmetric throughout  Sensory:  intact/symmetric to light touch and pin prick throughout upper and lower extremities  Coordination:  FNF and HS intact without dysmetria  Station/Gait:  Uses cane 2/2 chronic back pain. Difficulty with tandem walking.    LABS/IMAGING and other ancillary data     Recent Labs "   Lab Test  11/02/15   1119  10/03/13   0813   CHOL  224*  230*   HDL  78  69   LDL  127  139*   TRIG  96  111   CHOLHDLRATIO  2.9  3.0       ASSESSMENT     # Spells- Late life migraine equivalent without headache ( likely) vs Medication related  Unlikely to be seizures or TIA's    PLAN     Will get an MRI Brain, MRA head and neck.  Return to the clinic in 3 months.  Discussed with her to maintain a headache and spell diary.      Anatoliy BRICENO  2:19 PM October 3, 2017  PGY-5, Vascular Neurology Fellow    ATTENDING NOTE    Patient was seen and examined with Dr. Horne. I agree with the note above except as noted. Spells of speech difficulty. Lasting more tan 24 hours. Unclear etiology - possibly, late life onset migraine equivalent. We will image since she has not had any TIA/stroke related work up.     Again, thank you for allowing me to participate in the care of your patient.      Sincerely,    Pia Buck MD

## 2017-10-05 ENCOUNTER — TRANSFERRED RECORDS (OUTPATIENT)
Dept: HEALTH INFORMATION MANAGEMENT | Facility: CLINIC | Age: 67
End: 2017-10-05

## 2017-10-06 ENCOUNTER — TELEPHONE (OUTPATIENT)
Dept: FAMILY MEDICINE | Facility: CLINIC | Age: 67
End: 2017-10-06

## 2017-10-06 NOTE — TELEPHONE ENCOUNTER
"MRI's need an indication for ordering.  Her spine surgeon/pain doctor may be the best one to determine if updated imaging is necessary.  This is an expensive test to just \"add on\" when there is not an indication for this.    Sarah Barriga M.D.    "

## 2017-10-06 NOTE — TELEPHONE ENCOUNTER
"Reason for Call: Request for an order or referral:    Order or referral being requested: MRI Spine    Date needed: as soon as possible-MRI    Has the patient been seen by the PCP for this problem? YES    Additional comments: pt calling wondering if you could \"tack on an MRI for her spine\". She is having an MRI at the  on Monday for a stroke workup and said it's been awhile since she's had one on her spine.    Phone number Patient can be reached at:  Home number on file [phone] (home)    Best Time:  any    Can we leave a detailed message on this number?  YES    Call taken on 10/6/2017 at 11:06 AM by Bertha Hale    "

## 2017-10-09 ENCOUNTER — TELEPHONE (OUTPATIENT)
Dept: NEUROLOGY | Facility: CLINIC | Age: 67
End: 2017-10-09

## 2017-10-09 NOTE — TELEPHONE ENCOUNTER
Both numbers for [facility] are incorrect. LVMM for patient on both numbers to return call with which [facility] she would like orders faxed to.

## 2017-10-09 NOTE — TELEPHONE ENCOUNTER
----- Message from Adams Villarreal sent at 10/6/2017  3:54 PM CDT -----  Regarding: MRI ORDERS  Contact: [phone]  Hello!      Jessica is requesting her MRI orders be faxed to [facility] at FAX: [phone] PHONE: [phone] due to insurance purposes. Jessica can be reached at either [phone] or [phone] (ok to leave a message)      Thanks!  Vee in Call Center

## 2017-10-24 ENCOUNTER — TRANSFERRED RECORDS (OUTPATIENT)
Dept: HEALTH INFORMATION MANAGEMENT | Facility: CLINIC | Age: 67
End: 2017-10-24

## 2017-10-31 ENCOUNTER — TRANSFERRED RECORDS (OUTPATIENT)
Dept: HEALTH INFORMATION MANAGEMENT | Facility: CLINIC | Age: 67
End: 2017-10-31

## 2017-11-06 ENCOUNTER — OFFICE VISIT (OUTPATIENT)
Dept: FAMILY MEDICINE | Facility: CLINIC | Age: 67
End: 2017-11-06
Payer: COMMERCIAL

## 2017-11-06 VITALS
WEIGHT: 152 LBS | SYSTOLIC BLOOD PRESSURE: 142 MMHG | DIASTOLIC BLOOD PRESSURE: 103 MMHG | HEART RATE: 65 BPM | RESPIRATION RATE: 18 BRPM | BODY MASS INDEX: 26.93 KG/M2 | HEIGHT: 63 IN

## 2017-11-06 DIAGNOSIS — G89.29 CHRONIC BILATERAL LOW BACK PAIN WITHOUT SCIATICA: ICD-10-CM

## 2017-11-06 DIAGNOSIS — D64.9 ANEMIA, UNSPECIFIED TYPE: ICD-10-CM

## 2017-11-06 DIAGNOSIS — F33.1 MAJOR DEPRESSIVE DISORDER, RECURRENT EPISODE, MODERATE (H): ICD-10-CM

## 2017-11-06 DIAGNOSIS — I10 ESSENTIAL HYPERTENSION WITH GOAL BLOOD PRESSURE LESS THAN 140/90: ICD-10-CM

## 2017-11-06 DIAGNOSIS — E55.9 VITAMIN D DEFICIENCY: ICD-10-CM

## 2017-11-06 DIAGNOSIS — E03.9 HYPOTHYROIDISM, UNSPECIFIED TYPE: ICD-10-CM

## 2017-11-06 DIAGNOSIS — M54.50 CHRONIC BILATERAL LOW BACK PAIN WITHOUT SCIATICA: ICD-10-CM

## 2017-11-06 DIAGNOSIS — Z01.818 PREOP GENERAL PHYSICAL EXAM: Primary | ICD-10-CM

## 2017-11-06 DIAGNOSIS — N81.11 CYSTOCELE, MIDLINE: ICD-10-CM

## 2017-11-06 DIAGNOSIS — K43.9 VENTRAL HERNIA WITHOUT OBSTRUCTION OR GANGRENE: ICD-10-CM

## 2017-11-06 LAB
BASOPHILS # BLD AUTO: 0 10E9/L (ref 0–0.2)
BASOPHILS NFR BLD AUTO: 0.4 %
DIFFERENTIAL METHOD BLD: ABNORMAL
EOSINOPHIL # BLD AUTO: 0.4 10E9/L (ref 0–0.7)
EOSINOPHIL NFR BLD AUTO: 7.6 %
ERYTHROCYTE [DISTWIDTH] IN BLOOD BY AUTOMATED COUNT: 12.6 % (ref 10–15)
HCT VFR BLD AUTO: 30.8 % (ref 35–47)
HGB BLD-MCNC: 9.6 G/DL (ref 11.7–15.7)
HGB BLD-MCNC: 9.6 G/DL (ref 11.7–15.7)
LYMPHOCYTES # BLD AUTO: 1.1 10E9/L (ref 0.8–5.3)
LYMPHOCYTES NFR BLD AUTO: 23.8 %
MCH RBC QN AUTO: 30.9 PG (ref 26.5–33)
MCHC RBC AUTO-ENTMCNC: 31.2 G/DL (ref 31.5–36.5)
MCV RBC AUTO: 99 FL (ref 78–100)
MONOCYTES # BLD AUTO: 0.5 10E9/L (ref 0–1.3)
MONOCYTES NFR BLD AUTO: 10.7 %
NEUTROPHILS # BLD AUTO: 2.6 10E9/L (ref 1.6–8.3)
NEUTROPHILS NFR BLD AUTO: 57.5 %
PLATELET # BLD AUTO: 236 10E9/L (ref 150–450)
RBC # BLD AUTO: 3.11 10E12/L (ref 3.8–5.2)
WBC # BLD AUTO: 4.6 10E9/L (ref 4–11)

## 2017-11-06 PROCEDURE — 82306 VITAMIN D 25 HYDROXY: CPT | Performed by: FAMILY MEDICINE

## 2017-11-06 PROCEDURE — 85025 COMPLETE CBC W/AUTO DIFF WBC: CPT | Performed by: FAMILY MEDICINE

## 2017-11-06 PROCEDURE — 83540 ASSAY OF IRON: CPT | Performed by: FAMILY MEDICINE

## 2017-11-06 PROCEDURE — 36415 COLL VENOUS BLD VENIPUNCTURE: CPT | Performed by: FAMILY MEDICINE

## 2017-11-06 PROCEDURE — 82728 ASSAY OF FERRITIN: CPT | Performed by: FAMILY MEDICINE

## 2017-11-06 PROCEDURE — 93000 ELECTROCARDIOGRAM COMPLETE: CPT | Performed by: FAMILY MEDICINE

## 2017-11-06 PROCEDURE — 99214 OFFICE O/P EST MOD 30 MIN: CPT | Performed by: FAMILY MEDICINE

## 2017-11-06 PROCEDURE — 83550 IRON BINDING TEST: CPT | Performed by: FAMILY MEDICINE

## 2017-11-06 PROCEDURE — 80048 BASIC METABOLIC PNL TOTAL CA: CPT | Performed by: FAMILY MEDICINE

## 2017-11-06 RX ORDER — METOPROLOL SUCCINATE 25 MG/1
25 TABLET, EXTENDED RELEASE ORAL DAILY
Qty: 30 TABLET | Refills: 1 | Status: SHIPPED | OUTPATIENT
Start: 2017-11-06 | End: 2017-12-11 | Stop reason: DRUGHIGH

## 2017-11-06 RX ORDER — FERROUS SULFATE 325(65) MG
325 TABLET ORAL 2 TIMES DAILY
Qty: 60 TABLET | Refills: 2 | Status: SHIPPED | OUTPATIENT
Start: 2017-11-06 | End: 2019-01-02

## 2017-11-06 ASSESSMENT — ANXIETY QUESTIONNAIRES
6. BECOMING EASILY ANNOYED OR IRRITABLE: MORE THAN HALF THE DAYS
IF YOU CHECKED OFF ANY PROBLEMS ON THIS QUESTIONNAIRE, HOW DIFFICULT HAVE THESE PROBLEMS MADE IT FOR YOU TO DO YOUR WORK, TAKE CARE OF THINGS AT HOME, OR GET ALONG WITH OTHER PEOPLE: SOMEWHAT DIFFICULT
7. FEELING AFRAID AS IF SOMETHING AWFUL MIGHT HAPPEN: SEVERAL DAYS
2. NOT BEING ABLE TO STOP OR CONTROL WORRYING: SEVERAL DAYS
5. BEING SO RESTLESS THAT IT IS HARD TO SIT STILL: NOT AT ALL
3. WORRYING TOO MUCH ABOUT DIFFERENT THINGS: MORE THAN HALF THE DAYS
1. FEELING NERVOUS, ANXIOUS, OR ON EDGE: MORE THAN HALF THE DAYS
GAD7 TOTAL SCORE: 9

## 2017-11-06 ASSESSMENT — PATIENT HEALTH QUESTIONNAIRE - PHQ9
SUM OF ALL RESPONSES TO PHQ QUESTIONS 1-9: 13
5. POOR APPETITE OR OVEREATING: SEVERAL DAYS

## 2017-11-06 NOTE — LETTER
[facility]  [address] [address]  [phone]        November 6, 2017    Regarding:  Jessica Ellisno     [address] [address] [facility] [address]              To Whom It May Concern;      Jessica Ellison is under my medical care.  She is going to be having surgery on 11/15/2017 and will not be able to do the SCRAM monitoring that is required due to surgery on her abdomen/diaphragm.            Sincerely,        Sarah Barriga MD

## 2017-11-06 NOTE — MR AVS SNAPSHOT
After Visit Summary   11/6/2017    Jessica Ellison    MRN: [id number]           Patient Information     Date Of Birth          1950        Visit Information        Provider Department      11/6/2017 1:00 PM Sarah Barriga MD [facility]        Today's Diagnoses     Preop general physical exam    -  1    Ventral hernia without obstruction or gangrene        Cystocele, midline        Essential hypertension with goal blood pressure less than 140/90        Chronic bilateral low back pain without sciatica        Hypothyroidism, unspecified type        Anemia, unspecified type        Major depressive disorder, recurrent episode, moderate (H)        Vitamin D deficiency           Care Instructions      Start metoprolol 25 mg daily     Before Your Surgery      Call your surgeon if there is any change in your health. This includes signs of a cold or flu (such as a sore throat, runny nose, cough, rash or fever).    Do not smoke, drink alcohol or take over the counter medicine (unless your surgeon or primary care doctor tells you to) for the 24 hours before and after surgery.    If you take prescribed drugs: Follow your doctor s orders about which medicines to take and which to stop until after surgery.    Eating and drinking prior to surgery: follow the instructions from your surgeon    Take a shower or bath the night before surgery. Use the soap your surgeon gave you to gently clean your skin. If you do not have soap from your surgeon, use your regular soap. Do not shave or scrub the surgery site.  Wear clean pajamas and have clean sheets on your bed.           Follow-ups after your visit        Additional Services     MENTAL HEALTH REFERRAL       Your provider has referred you to: [facility]: [facility] - Counseling (Individual/Couples/Family) - [facility] [phone]   [email or web address]    *Patient will be contacted by [facility]'s scheduling partner, [facility]), to schedule an appointment.  Patients may also call P to schedule.    All scheduling is subject to the client's specific insurance plan & benefits, provider/location availability, and provider clinical specialities.  Please arrive 15 minutes early for your first appointment and bring your completed paperwork.    Please be aware that coverage of these services is subject to the terms and limitations of your health insurance plan.  Call member services at your health plan with any benefit or coverage questions.                  Your next 10 appointments already scheduled     Nov 27, 2017 11:20 AM CST   PHYSICAL with Sarah Barriga MD   [facility] ([facility])    [address] [facility] [address] [phone]            Jan 16, 2018  1:50 PM CST   (Arrive by 1:35 PM)   Return Stroke with Pai Buck MD   [facility] Neu[facility] ([facility])    [address] [place] [address]  3rd Floor  [facility] [address] [phone]              Who to contact     If you have questions or need follow up information about today's clinic visit or your schedule please contact [facility] directly at [phone].  Normal or non-critical lab and imaging results will be communicated to you by MyChart, letter or phone within 4 business days after the clinic has received the results. If you do not hear from us within 7 days, please contact the clinic through MyChart or phone. If you have a critical or abnormal lab result, we will notify you by phone as soon as possible.  Submit refill requests through [facility] or call your pharmacy and they will forward the refill request to us. Please allow 3 business days for your refill to be completed.          Additional Information About Your Visit        [facility] Information     [facility] gives you  "secure access to your electronic health record. If you see a primary care provider, you can also send messages to your care team and make appointments. If you have questions, please call your primary care clinic.  If you do not have a primary care provider, please call [phone] and they will assist you.        Care EveryWhere ID     This is your Care EveryWhere ID. This could be used by other organizations to access your [place] medical records  [id number]        Your Vitals Were     Pulse Respirations Height BMI (Body Mass Index)          65 18 5' 3\" (1.6 m) 26.93 kg/m2         Blood Pressure from Last 3 Encounters:   11/06/17 (!) 142/103   10/03/17 123/73   09/18/17 111/79    Weight from Last 3 Encounters:   11/06/17 152 lb (68.9 kg)   09/18/17 147 lb (66.7 kg)   07/24/17 155 lb (70.3 kg)              We Performed the Following     Basic metabolic panel     CBC with platelets differential     EKG 12-lead complete w/read - Clinics     Ferritin     Hemoglobin     Iron and iron binding capacity     MENTAL HEALTH REFERRAL     Vitamin D Deficiency          Today's Medication Changes          These changes are accurate as of: 11/6/17  2:54 PM.  If you have any questions, ask your nurse or doctor.               Start taking these medicines.        Dose/Directions    ferrous sulfate 325 (65 FE) MG tablet   Commonly known as:  IRON   Used for:  Anemia, unspecified type   Started by:  Sarah Barriga MD        Dose:  325 mg   Take 1 tablet (325 mg) by mouth 2 times daily   Quantity:  60 tablet   Refills:  2       metoprolol 25 MG 24 hr tablet   Commonly known as:  TOPROL-XL   Used for:  Essential hypertension with goal blood pressure less than 140/90   Started by:  Sarah Barriga MD        Dose:  25 mg   Take 1 tablet (25 mg) by mouth daily   Quantity:  30 tablet   Refills:  1         These medicines have changed or have updated prescriptions.        Dose/Directions    calcium carbonate-vitamin D 600-400 MG-UNIT " Chew   Commonly known as:  CALTRATE 600+D   This may have changed:  when to take this   Used for:  Back pain        Dose:  1 chew tab   Take 1 chew tab by mouth 2 times daily   Quantity:  180 tablet   Refills:  3            Where to get your medicines      These [place] [facility] [place] - [address] [facility]  [address] [facility] [address]     Phone:  [phone]     ferrous sulfate 325 (65 FE) MG tablet    metoprolol 25 MG 24 hr tablet                Primary Care Provider Office Phone # Fax #    Sarah Barriga [phone] [address]  [facility] [address]        Equal Access to Services     CHRISTEN MOURA : Hadii aad ku hadasho Socandidoali, waaxda luqadaha, qaybta kaalmada adeegyada, waxay annmarie maharaj. So [facility] [phone].    ATENCIÓN: Si habla español, tiene a mendez disposición servicios gratuitos de asistencia lingüística. [facility] [phone].    We comply with applicable federal civil rights laws and [place] laws. We do not discriminate on the basis of race, color, national origin, age, disability, sex, sexual orientation, or gender identity.            Thank you!     Thank you for choosing [facility]  for your care. Our goal is always to provide you with excellent care. Hearing back from our patients is one way we can continue to improve our services. Please take a few minutes to complete the written survey that you may receive in the mail after your visit with us. Thank you!             Your Updated Medication List - Protect others around you: Learn how to safely use, store and throw away your medicines at www.disposemymeds.org.          This list is accurate as of: 11/6/17  2:54 PM.  Always use your most recent med list.                   Brand Name Dispense Instructions for use Diagnosis    acyclovir 5 % ointment    ZOVIRAX    15 g    Apply topically 6 times daily     HSV (herpes simplex virus) infection       aspirin 325 MG tablet     180 tablet    Take 1 tablet (325 mg) by mouth daily    Cerebral aneurysm, nonruptured       calcium carbonate-vitamin D 600-400 MG-UNIT Chew    CALTRATE 600+D    180 tablet    Take 1 chew tab by mouth 2 times daily    Back pain       cyclobenzaprine 10 MG tablet    FLEXERIL    180 tablet    Take 1 tablet (10 mg) by mouth 2 times daily as needed for muscle spasms    S/P lumbar spinal fusion       EPINEPHrine 0.3 MG/0.3ML injection 2-pack    EPIPEN/ADRENACLICK/or ANY BX GENERIC EQUIV    2 each    Inject 0.3 mLs (0.3 mg) into the muscle once as needed for anaphylaxis    Allergy to bee sting       estradiol 0.1 MG/GM cream    ESTRACE     Place 2 g vaginally twice a week        ferrous sulfate 325 (65 FE) MG tablet    IRON    60 tablet    Take 1 tablet (325 mg) by mouth 2 times daily    Anemia, unspecified type       * gabapentin 300 MG capsule    NEURONTIN    90 capsule    Take 1 capsule (300 mg) by mouth 3 times daily To be taken with the 600 mg pill    Chronic bilateral low back pain without sciatica       * gabapentin 600 MG tablet    NEURONTIN    90 tablet    TAKE ONE TABLET BY MOUTH THREE TIMES A DAY (TO BE TAKEN WITH THE 300MG CAPSULE)    Chronic bilateral low back pain without sciatica       levothyroxine 50 MCG tablet    LEVOTHROID    90 tablet    Take 1 tablet (50 mcg) by mouth daily    Hypothyroidism, unspecified       lisinopril-hydrochlorothiazide 20-12.5 MG per tablet    PRINZIDE/ZESTORETIC    180 tablet    Take 1 tablet by mouth daily    Essential hypertension with goal blood pressure less than 140/90       metoprolol 25 MG 24 hr tablet    TOPROL-XL    30 tablet    Take 1 tablet (25 mg) by mouth daily    Essential hypertension with goal blood pressure less than 140/90       multivitamin CF formula chewable tablet    CHOICEFUL    100 tablet    Take 1 tablet by mouth daily    Postmenopause       omeprazole 40 MG capsule    priLOSEC    30  capsule    Take 1 capsule (40 mg) by mouth daily Take 30-60 minutes before a meal.    Gastroesophageal reflux disease without esophagitis       * oxyCODONE-acetaminophen  MG per tablet    PERCOCET    180 tablet    Take 2 tablets by mouth every 6 hours as needed for moderate to severe pain Max 6/day    Chronic bilateral low back pain without sciatica, Sacroiliac joint pain       * oxyCODONE-acetaminophen  MG per tablet   Start taking on:  11/7/2017    PERCOCET    180 tablet    Take 2 tablets by mouth every 6 hours as needed for moderate to severe pain Max 6/day    Chronic bilateral low back pain without sciatica, Sacroiliac joint pain       * oxyCODONE-acetaminophen  MG per tablet   Start taking on:  12/7/2017    PERCOCET    180 tablet    Take 2 tablets by mouth every 6 hours as needed for moderate to severe pain Max 6/day    Chronic bilateral low back pain without sciatica, Sacroiliac joint pain       QUEtiapine 25 MG tablet    SEROQUEL    30 tablet    Take 1 tablet (25 mg) by mouth daily as needed Take 25 mg Every 6 hours As needed for anxiety.    Anxiety       sertraline 100 MG tablet    ZOLOFT    180 tablet    TAKE TWO TABLETS BY MOUTH EVERY DAY    Major depressive disorder, recurrent episode, moderate (H)       traZODone 150 MG tablet    DESYREL    90 tablet    Take 1 tablet (150 mg) by mouth nightly as needed for sleep    Insomnia, unspecified type       * Notice:  This list has 5 medication(s) that are the same as other medications prescribed for you. Read the directions carefully, and ask your doctor or other care provider to review them with you.

## 2017-11-06 NOTE — NURSING NOTE
"Chief Complaint   Patient presents with     Pre-Op Exam     Depression       Initial BP (!) 153/97  Pulse 65  Resp 18  Ht 5' 3\" (1.6 m)  Wt 152 lb (68.9 kg)  BMI 26.93 kg/m2 Estimated body mass index is 26.93 kg/(m^2) as calculated from the following:    Height as of this encounter: 5' 3\" (1.6 m).    Weight as of this encounter: 152 lb (68.9 kg).  Medication Reconciliation: complete   Shannen Gaytan CMA      "

## 2017-11-06 NOTE — PATIENT INSTRUCTIONS
Start metoprolol 25 mg daily     Before Your Surgery      Call your surgeon if there is any change in your health. This includes signs of a cold or flu (such as a sore throat, runny nose, cough, rash or fever).    Do not smoke, drink alcohol or take over the counter medicine (unless your surgeon or primary care doctor tells you to) for the 24 hours before and after surgery.    If you take prescribed drugs: Follow your doctor s orders about which medicines to take and which to stop until after surgery.    Eating and drinking prior to surgery: follow the instructions from your surgeon    Take a shower or bath the night before surgery. Use the soap your surgeon gave you to gently clean your skin. If you do not have soap from your surgeon, use your regular soap. Do not shave or scrub the surgery site.  Wear clean pajamas and have clean sheets on your bed.

## 2017-11-06 NOTE — PROGRESS NOTES
[facility]  [address]  [facility] [address]  [phone]  Dept: [phone]    PRE-OP EVALUATION:  Today's date: 2017    Jessica Ellison (: 1950) presents for pre-operative evaluation assessment as requested by Dr. ALEENA Potts and Dr. Vj guerin .  She requires evaluation and anesthesia risk assessment prior to undergoing surgery/procedure for treatment of  prolapse uterus and ventral hernia  .  Proposed procedure: Laparoscopic  hysterectomy     Date of Surgery/ Procedure: 11/15/17  Time of Surgery/ Procedure: 7:30 am   Hospital/Surgical Facility:  [facility]   Fax number for surgical facility:    Primary Physician: Sarah Barriga  Type of Anesthesia Anticipated: General    Patient has a Health Care Directive or Living Will:  NO    1. NO - Do you have a history of heart attack, stroke, stent, bypass or surgery on an artery in the head, neck, heart or legs?  2. NO - Do you ever have any pain or discomfort in your chest?  3. NO - Do you have a history of  Heart Failure?  4. NO - Are you troubled by shortness of breath when: walking on the level, up a slight hill or at night?  5. NO - Do you currently have a cold, bronchitis or other respiratory infection?  6. NO - Do you have a cough, shortness of breath or wheezing?  7. YES - Do you sometimes get pains in the calves of your legs when you walk? Has neurogenic claudication from her back  8. YES - Do you or anyone in your family have previous history of blood clots? Brother, no personal history of clotting.  9. NO - Do you or does anyone in your family have a serious bleeding problem such as prolonged bleeding following surgeries or cuts?  10. NO - Have you ever had problems with anemia or been told to take iron pills?  11. NO - Have you had any abnormal blood loss such as black, tarry or bloody stools, or abnormal vaginal bleeding?  12. YES - Have you ever had a blood transfusion?    13. NO - Have you or any of your  relatives ever had problems with anesthesia?  14. NO - Do you have sleep apnea, excessive snoring or daytime drowsiness?  15. NO - Do you have any prosthetic heart valves?  16. NO - Do you have prosthetic joints?  17. NO - Is there any chance that you may be pregnant?        HPI:                                                      Brief HPI related to upcoming procedure: patient has a large ventral hernia and cystocele/uterine prolapse and will be undergoing repair of the hernia and hysterectomy.      HYPERTENSION - Patient has longstanding history of mod-severe HTN , currently denies any symptoms referable to elevated blood pressure. Specifically denies chest pain, palpitations, dyspnea, orthopnea, PND or peripheral edema. Blood pressure readings have been in normal range. Current medication regimen is as listed below. Patient denies any side effects of medication.                                                                                                                                                                                          .  DEPRESSION/Anxiety - Patient has a long history of Depression of moderate severity requiring medication for control with recent symptoms being waxing and waning..Current symptoms of depression include anxiety and worry over the upcoming surgery.                                                                                                                                                                                    .  HYPOTHYROIDISM - Patient has a longstanding history of chronic Hypothyroidism. Patient has been doing well, noting no tremor, insomnia, hair loss or changes in skin texture. Last TSH value of 1.02 on 9/18/2017. Continues to take medications as directed, without adverse reactions or side effects.                                                                                                                                                                                                                         .    MEDICAL HISTORY:                                                    Patient Active Problem List    Diagnosis Date Noted     Hypothyroidism, unspecified 09/07/2016     Priority: Medium     Chronic bilateral low back pain without sciatica 06/02/2016     Priority: Medium     Essential hypertension with goal blood pressure less than 140/90 06/02/2016     Priority: Medium     S/P lumbar spinal fusion 10/19/2015     Priority: Medium     Bee allergy status 06/30/2015     Priority: Medium     Cerebral aneurysm, nonruptured 07/24/2014     Priority: Medium     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 Marti       Major depressive disorder, recurrent episode, moderate (H) 07/10/2013     Priority: Medium     Advanced directives, counseling/discussion 09/05/2012     Priority: Medium     Patient does not have an Advance/Health Care Directive (HCD), has information at home.     Sonja Krishnamurthy  September 5, 2012         Anxiety 06/20/2012     Priority: Medium     CARDIOVASCULAR SCREENING; LDL GOAL LESS THAN 160 10/31/2010     Priority: Medium     ETOH abuse 03/19/2010     Priority: Medium      katie drinking, DUIs and probation violation       Insomnia 12/21/2009     Priority: Medium     Back pain 12/21/2009     Priority: Medium     Patient is followed by DOMINIQUE COPPOLA for ongoing prescription of narcotic pain medicine.  Med: oxycodone/acetaminophen 10/325 for chronic back pain.   Maximum use per month: 180  Expected duration: unknown  Narcotic agreement on file: YES 3/19/2010  Clinic visit recommended: Q 3 months    April 2013 - rhizotomy for SI (left) Jhon Mckay MD    Follows at [facility] - severe low back pain with work related injuries.  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  Spinal stenosis at most lumbar levels  Improvement with past  epidural steroid injections  SI joint dysfunction with the pain        Past Medical History:   Diagnosis Date     Anemia      Aneurysm (H)      Chemical dependency (H)     Chem Dep RX     Depression      History of blood transfusion      Hypertension      Menopausal symptoms      Other chronic pain     Lower back and hips     Sleep disorder      Thyroid disease      Tobacco abuse      Past Surgical History:   Procedure Laterality Date     APPENDECTOMY  1960     C PART REMOVAL COLON W ANASTOMOSIS  5/2005    diverticulitis     C SPINE FUSION,ANTER,8+ SGMTS  2007    Anterior posterior fusion 10 levels, hardware removal and re-fusion 2009     cerebral aneurysm  2014    coiled     COLONOSCOPY  4/19/2005     HC EXCISION BREAST LESION, OPEN >=1      core biopsy 2006, lumpectomy 2006     orif left femoral neck Left 6/3/2016    stress fracture.  done at [facility]     SURGICAL HISTORY OF -   2004    Skin Graft     Current Outpatient Prescriptions   Medication Sig Dispense Refill     metoprolol (TOPROL-XL) 25 MG 24 hr tablet Take 1 tablet (25 mg) by mouth daily 30 tablet 1     estradiol (ESTRACE) 0.1 MG/GM cream Place 2 g vaginally twice a week       [START ON 12/7/2017] oxyCODONE-acetaminophen (PERCOCET)  MG per tablet Take 2 tablets by mouth every 6 hours as needed for moderate to severe pain Max 6/day 180 tablet 0     levothyroxine (LEVOTHROID) 50 MCG tablet Take 1 tablet (50 mcg) by mouth daily 90 tablet 3     traZODone (DESYREL) 150 MG tablet Take 1 tablet (150 mg) by mouth nightly as needed for sleep 90 tablet 3     sertraline (ZOLOFT) 100 MG tablet TAKE TWO TABLETS BY MOUTH EVERY  tablet 1     lisinopril-hydrochlorothiazide (PRINZIDE/ZESTORETIC) 20-12.5 MG per tablet Take 1 tablet by mouth daily 180 tablet 1     gabapentin (NEURONTIN) 600 MG tablet TAKE ONE TABLET BY MOUTH THREE TIMES A DAY (TO BE TAKEN WITH THE 300MG CAPSULE) 90 tablet 5     cyclobenzaprine (FLEXERIL) 10 MG tablet Take 1 tablet (10 mg) by  mouth 2 times daily as needed for muscle spasms 180 tablet 1     QUEtiapine (SEROQUEL) 25 MG tablet Take 1 tablet (25 mg) by mouth daily as needed Take 25 mg Every 6 hours As needed for anxiety. 30 tablet 3     gabapentin (NEURONTIN) 300 MG capsule Take 1 capsule (300 mg) by mouth 3 times daily To be taken with the 600 mg pill 90 capsule 5     EPINEPHrine (EPIPEN) 0.3 MG/0.3ML injection Inject 0.3 mLs (0.3 mg) into the muscle once as needed for anaphylaxis 2 each 3     calcium carbonate-vitamin D (CALTRATE 600+D) 600-400 MG-UNIT CHEW Take 1 chew tab by mouth 2 times daily (Patient taking differently: Take 1 chew tab by mouth daily ) 180 tablet 3     multivitamin CF formula (CHOICEFUL) chewable tablet Take 1 tablet by mouth daily 100 tablet 0     aspirin 325 MG tablet Take 1 tablet (325 mg) by mouth daily 180 tablet 6     [START ON 11/7/2017] oxyCODONE-acetaminophen (PERCOCET)  MG per tablet Take 2 tablets by mouth every 6 hours as needed for moderate to severe pain Max 6/day (Patient not taking: Reported on 11/6/2017) 180 tablet 0     oxyCODONE-acetaminophen (PERCOCET)  MG per tablet Take 2 tablets by mouth every 6 hours as needed for moderate to severe pain Max 6/day (Patient not taking: Reported on 11/6/2017) 180 tablet 0     omeprazole (PRILOSEC) 40 MG capsule Take 1 capsule (40 mg) by mouth daily Take 30-60 minutes before a meal. (Patient not taking: Reported on 11/6/2017) 30 capsule 1     acyclovir (ZOVIRAX) 5 % ointment Apply topically 6 times daily (Patient not taking: Reported on 11/6/2017) 15 g 3     OTC products: None, except as noted above    Allergies   Allergen Reactions     Bee Venom       Latex Allergy: NO    Social History   Substance Use Topics     Smoking status: Former Smoker     Quit date: 1/1/2004     Smokeless tobacco: Never Used     Alcohol use No      Comment: ETOH dependency, DUI 3/15/10     History   Drug Use No       REVIEW OF SYSTEMS:                                             "        C: NEGATIVE for fever, chills, change in weight  E/M: NEGATIVE for ear, mouth and throat problems  R: NEGATIVE for significant cough or SOB  CV: NEGATIVE for chest pain, palpitations or peripheral edema    EXAM:                                                    BP (!) 142/103  Pulse 65  Resp 18  Ht 5' 3\" (1.6 m)  Wt 152 lb (68.9 kg)  BMI 26.93 kg/m2  GENERAL APPEARANCE: healthy, alert and no distress  HENT: ear canals and TM's normal and nose and mouth without ulcers or lesions  RESP: lungs clear to auscultation - no rales, rhonchi or wheezes  CV: regular rate and rhythm, normal S1 S2, no S3 or S4 and no murmur, click or rub   ABDOMEN: soft, nontender, no HSM or masses and bowel sounds normal  NEURO: Normal strength and tone, sensory exam grossly normal, mentation intact and speech normal    DIAGNOSTICS:                                                    EKG: appears normal, NSR, normal axis, normal intervals, no acute ST/T changes c/w ischemia, no LVH by voltage criteria, unchanged from previous tracings    Recent Labs   Lab Test  09/18/17   1308  07/27/17   1000  07/24/17   1355   08/05/15   0905   HGB  10.4*  10.9*   --    < >  10.2*   PLT  224  181   --    < >  207   INR   --   0.94   --    --   0.96   NA  135   --   135   < >  144   POTASSIUM  4.2   --   4.5   < >  3.9   CR  1.17*  0.83  1.00   < >  0.92    < > = values in this interval not displayed.      Results for orders placed or performed in visit on 11/06/17   Hemoglobin   Result Value Ref Range    Hemoglobin 9.6 (L) 11.7 - 15.7 g/dL         IMPRESSION:                                                    Reason for surgery/procedure: ventral hernia and cystocele/prolapse  Diagnosis/reason for consult: preoperative evaluation and medical risk assessment    The proposed surgical procedure is considered INTERMEDIATE risk.    REVISED CARDIAC RISK INDEX  The patient has the following serious cardiovascular risks for perioperative complications " such as (MI, PE, VFib and 3  AV Block):  No serious cardiac risks  INTERPRETATION: 0 risks: Class I (very low risk - 0.4% complication rate)    The patient has the following additional risks for perioperative complications:  No identified additional risks      ICD-10-CM    1. Preop general physical exam Z01.818 EKG 12-lead complete w/read - Clinics     Hemoglobin   2. Ventral hernia without obstruction or gangrene K43.9    3. Cystocele, midline N81.11    4. Essential hypertension with goal blood pressure less than 140/90 I10 Basic metabolic panel     metoprolol (TOPROL-XL) 25 MG 24 hr tablet   5. Chronic bilateral low back pain without sciatica M54.5     G89.29    6. Hypothyroidism, unspecified type E03.9    7. Anemia, unspecified type D64.9 Hemoglobin   8. Major depressive disorder, recurrent episode, moderate (H) F33.1 Vitamin D Deficiency     MENTAL HEALTH REFERRAL   9. Vitamin D deficiency  E55.9 Vitamin D Deficiency     Referral placed for counseling at our clinic (had seen Akosua Carl previously).    RECOMMENDATIONS:                                                      --Consult hospital rounder / IM to assist post-op medical management    Cardiovascular Risk  Beta blockers recommended Metoprolol Succinate XR (Toprol XL) 25 mg daily (low dose) - will start this due to suboptimal blood pressure control as well.       Anemia  Anemia and does not require treatment prior to surgery.  Monitor Hemoglobin postoperatively.  Will start her on Ferrous sulfate 325 mg twice daily       HYPERTENSION:  Start metoprolol xl 25 mg daily     --Patient is to take all scheduled medications on the day of surgery EXCEPT for modifications listed below.    Anticoagulant or Antiplatelet Medication Use  ASPIRIN: Patient is at increased risk of thrombosis (cerebral aneurysm s/p coil) and aspirin 81 mg daily should be continued in the perioperative period        ACE Inhibitor or Angiotensin Receptor Blocker (ARB) Use  Ace inhibitor  or Angiotensin Receptor Blocker (ARB) and should HOLD this medication for the 24 hours prior to surgery.          APPROVAL GIVEN to proceed with proposed procedure, without further diagnostic evaluation       Signed Electronically by: Sarah Barriga MD    Copy of this evaluation report is provided to requesting physician.    [facility] Preop Guidelines

## 2017-11-07 LAB
ANION GAP SERPL CALCULATED.3IONS-SCNC: 7 MMOL/L (ref 3–14)
BUN SERPL-MCNC: 15 MG/DL (ref 7–30)
CALCIUM SERPL-MCNC: 8.8 MG/DL (ref 8.5–10.1)
CHLORIDE SERPL-SCNC: 106 MMOL/L (ref 94–109)
CO2 SERPL-SCNC: 25 MMOL/L (ref 20–32)
CREAT SERPL-MCNC: 0.87 MG/DL (ref 0.52–1.04)
DEPRECATED CALCIDIOL+CALCIFEROL SERPL-MC: 45 UG/L (ref 20–75)
FERRITIN SERPL-MCNC: 21 NG/ML (ref 8–252)
GFR SERPL CREATININE-BSD FRML MDRD: 65 ML/MIN/1.7M2
GLUCOSE SERPL-MCNC: 80 MG/DL (ref 70–99)
IRON SATN MFR SERPL: 11 % (ref 15–46)
IRON SERPL-MCNC: 35 UG/DL (ref 35–180)
POTASSIUM SERPL-SCNC: 4.4 MMOL/L (ref 3.4–5.3)
SODIUM SERPL-SCNC: 138 MMOL/L (ref 133–144)
TIBC SERPL-MCNC: 317 UG/DL (ref 240–430)

## 2017-11-07 ASSESSMENT — ANXIETY QUESTIONNAIRES: GAD7 TOTAL SCORE: 9

## 2017-11-08 ENCOUNTER — TELEPHONE (OUTPATIENT)
Dept: FAMILY MEDICINE | Facility: CLINIC | Age: 67
End: 2017-11-08

## 2017-11-08 NOTE — TELEPHONE ENCOUNTER
"Yesterday, pt dropped off a copy of her Pre-op PE with Dr. Barriga from 11/6.  She was asking that Dr. Barriga change the name of the surgery.  Dr. Barriga states that she approved the surgery and the pt will have to sign the procedure consent and the entire name of the procedure being done will be on that consent.   Pt also wanted to remove \"ETOH Abuse\" and \"Alcohol Abuse\" from her problem list - MD is also not removing this form her history.    L.M. for pt that these changes will NOT be made on her Pre-op PE, per MD  "

## 2017-11-15 ENCOUNTER — TRANSFERRED RECORDS (OUTPATIENT)
Dept: HEALTH INFORMATION MANAGEMENT | Facility: CLINIC | Age: 67
End: 2017-11-15

## 2017-11-21 ENCOUNTER — TELEPHONE (OUTPATIENT)
Dept: FAMILY MEDICINE | Facility: CLINIC | Age: 67
End: 2017-11-21

## 2017-11-21 NOTE — TELEPHONE ENCOUNTER
Reason for Call: Request for an order or referral:    Order or referral being requested: Pt was discharged from the hospital 11/17/17 and she finishes her antibiotic Friday.  She is supposed to see Dr. Barriga and get a UC when antibiotics are finished, but Dr. Barriga schedule is booked.  Pt is scheduled for her yearly PE on Monday, 11/27 and she wants to know if this can wait that long or if she can just get an order for the UC and get the results at Monday's appt?    Date needed: at your convenience    Has the patient been seen by the PCP for this problem? NO    Additional comments:     Phone number Patient can be reached at:  Home number on file [phone] (home)    Best Time:  any    Can we leave a detailed message on this number?  YES    Call taken on 11/21/2017 at 11:41 AM by Cait Ortiz

## 2017-11-21 NOTE — TELEPHONE ENCOUNTER
Patient last dose of the antibiotics is on Friday evening.  Patient does have a physical appt on Monday but does not want this done at that visit because then the physical will not be covered by her insurance.  Patient is willing to schedule to separate appts or leave a sample on Saturday.  Patient can come in for UC on Saturday at [facility].  Please review and advise.    Thank you  Jolene MCDANIELS RN

## 2017-11-22 NOTE — TELEPHONE ENCOUNTER
Can wait until Monday.  I will not do an additional charge on the visit just for a urine culture.    Sarah Barriga M.D.

## 2017-11-27 ENCOUNTER — OFFICE VISIT (OUTPATIENT)
Dept: FAMILY MEDICINE | Facility: CLINIC | Age: 67
End: 2017-11-27
Payer: COMMERCIAL

## 2017-11-27 VITALS
DIASTOLIC BLOOD PRESSURE: 97 MMHG | HEART RATE: 71 BPM | HEIGHT: 63 IN | RESPIRATION RATE: 18 BRPM | SYSTOLIC BLOOD PRESSURE: 140 MMHG

## 2017-11-27 DIAGNOSIS — E03.9 HYPOTHYROIDISM, UNSPECIFIED TYPE: ICD-10-CM

## 2017-11-27 DIAGNOSIS — Z11.59 ENCOUNTER FOR HCV SCREENING TEST FOR LOW RISK PATIENT: ICD-10-CM

## 2017-11-27 DIAGNOSIS — M54.5 CHRONIC BILATERAL LOW BACK PAIN, WITH SCIATICA PRESENCE UNSPECIFIED: ICD-10-CM

## 2017-11-27 DIAGNOSIS — D64.9 NORMOCYTIC ANEMIA: ICD-10-CM

## 2017-11-27 DIAGNOSIS — N39.0 URINARY TRACT INFECTION, ACUTE: ICD-10-CM

## 2017-11-27 DIAGNOSIS — Z00.00 MEDICARE ANNUAL WELLNESS VISIT, SUBSEQUENT: Primary | ICD-10-CM

## 2017-11-27 DIAGNOSIS — I10 ESSENTIAL HYPERTENSION WITH GOAL BLOOD PRESSURE LESS THAN 140/90: ICD-10-CM

## 2017-11-27 DIAGNOSIS — Z23 NEED FOR VACCINATION: ICD-10-CM

## 2017-11-27 DIAGNOSIS — Z98.1 S/P LUMBAR SPINAL FUSION: ICD-10-CM

## 2017-11-27 DIAGNOSIS — G89.29 CHRONIC BILATERAL LOW BACK PAIN, WITH SCIATICA PRESENCE UNSPECIFIED: ICD-10-CM

## 2017-11-27 DIAGNOSIS — G89.29 CHRONIC BILATERAL LOW BACK PAIN WITHOUT SCIATICA: ICD-10-CM

## 2017-11-27 DIAGNOSIS — M54.50 CHRONIC BILATERAL LOW BACK PAIN WITHOUT SCIATICA: ICD-10-CM

## 2017-11-27 LAB
AMPHETAMINES UR QL: NOT DETECTED NG/ML
BARBITURATES UR QL SCN: NOT DETECTED NG/ML
BASOPHILS # BLD AUTO: 0 10E9/L (ref 0–0.2)
BASOPHILS NFR BLD AUTO: 0.6 %
BENZODIAZ UR QL SCN: ABNORMAL NG/ML
BUPRENORPHINE UR QL: NOT DETECTED NG/ML
CANNABINOIDS UR QL: NOT DETECTED NG/ML
COCAINE UR QL SCN: NOT DETECTED NG/ML
D-METHAMPHET UR QL: NOT DETECTED NG/ML
DIFFERENTIAL METHOD BLD: ABNORMAL
EOSINOPHIL # BLD AUTO: 0.4 10E9/L (ref 0–0.7)
EOSINOPHIL NFR BLD AUTO: 7.6 %
ERYTHROCYTE [DISTWIDTH] IN BLOOD BY AUTOMATED COUNT: 12.7 % (ref 10–15)
HCT VFR BLD AUTO: 35.2 % (ref 35–47)
HGB BLD-MCNC: 10.8 G/DL (ref 11.7–15.7)
LYMPHOCYTES # BLD AUTO: 0.8 10E9/L (ref 0.8–5.3)
LYMPHOCYTES NFR BLD AUTO: 15.1 %
MCH RBC QN AUTO: 29.8 PG (ref 26.5–33)
MCHC RBC AUTO-ENTMCNC: 30.7 G/DL (ref 31.5–36.5)
MCV RBC AUTO: 97 FL (ref 78–100)
METHADONE UR QL SCN: NOT DETECTED NG/ML
MONOCYTES # BLD AUTO: 0.5 10E9/L (ref 0–1.3)
MONOCYTES NFR BLD AUTO: 9.9 %
NEUTROPHILS # BLD AUTO: 3.6 10E9/L (ref 1.6–8.3)
NEUTROPHILS NFR BLD AUTO: 66.8 %
OPIATES UR QL SCN: ABNORMAL NG/ML
OXYCODONE UR QL SCN: ABNORMAL NG/ML
PCP UR QL SCN: NOT DETECTED NG/ML
PLATELET # BLD AUTO: 217 10E9/L (ref 150–450)
PROPOXYPH UR QL: NOT DETECTED NG/ML
RBC # BLD AUTO: 3.62 10E12/L (ref 3.8–5.2)
RETICS # AUTO: 30.6 10E9/L (ref 25–95)
RETICS/RBC NFR AUTO: 0.8 % (ref 0.5–2)
TRICYCLICS UR QL SCN: NOT DETECTED NG/ML
WBC # BLD AUTO: 5.4 10E9/L (ref 4–11)

## 2017-11-27 PROCEDURE — 99397 PER PM REEVAL EST PAT 65+ YR: CPT | Mod: 25 | Performed by: FAMILY MEDICINE

## 2017-11-27 PROCEDURE — 90472 IMMUNIZATION ADMIN EACH ADD: CPT | Performed by: FAMILY MEDICINE

## 2017-11-27 PROCEDURE — 86803 HEPATITIS C AB TEST: CPT | Performed by: FAMILY MEDICINE

## 2017-11-27 PROCEDURE — 85045 AUTOMATED RETICULOCYTE COUNT: CPT | Performed by: FAMILY MEDICINE

## 2017-11-27 PROCEDURE — G0009 ADMIN PNEUMOCOCCAL VACCINE: HCPCS | Performed by: FAMILY MEDICINE

## 2017-11-27 PROCEDURE — 90715 TDAP VACCINE 7 YRS/> IM: CPT | Performed by: FAMILY MEDICINE

## 2017-11-27 PROCEDURE — 85025 COMPLETE CBC W/AUTO DIFF WBC: CPT | Performed by: FAMILY MEDICINE

## 2017-11-27 PROCEDURE — 36415 COLL VENOUS BLD VENIPUNCTURE: CPT | Performed by: FAMILY MEDICINE

## 2017-11-27 PROCEDURE — 85060 BLOOD SMEAR INTERPRETATION: CPT | Performed by: FAMILY MEDICINE

## 2017-11-27 PROCEDURE — 82607 VITAMIN B-12: CPT | Performed by: FAMILY MEDICINE

## 2017-11-27 PROCEDURE — 80306 DRUG TEST PRSMV INSTRMNT: CPT | Performed by: FAMILY MEDICINE

## 2017-11-27 PROCEDURE — 87086 URINE CULTURE/COLONY COUNT: CPT | Performed by: FAMILY MEDICINE

## 2017-11-27 PROCEDURE — 99214 OFFICE O/P EST MOD 30 MIN: CPT | Mod: 25 | Performed by: FAMILY MEDICINE

## 2017-11-27 PROCEDURE — 90670 PCV13 VACCINE IM: CPT | Performed by: FAMILY MEDICINE

## 2017-11-27 RX ORDER — GABAPENTIN 600 MG/1
600 TABLET ORAL 3 TIMES DAILY
Qty: 90 TABLET | Refills: 5 | Status: SHIPPED | OUTPATIENT
Start: 2017-11-27 | End: 2018-07-04

## 2017-11-27 RX ORDER — METOPROLOL SUCCINATE 50 MG/1
50 TABLET, EXTENDED RELEASE ORAL DAILY
Qty: 90 TABLET | Refills: 3 | Status: SHIPPED | OUTPATIENT
Start: 2017-11-27 | End: 2018-04-12

## 2017-11-27 RX ORDER — CYCLOBENZAPRINE HCL 10 MG
10 TABLET ORAL 2 TIMES DAILY PRN
Qty: 180 TABLET | Refills: 1 | Status: SHIPPED | OUTPATIENT
Start: 2017-11-27 | End: 2018-07-16

## 2017-11-27 RX ORDER — METOPROLOL SUCCINATE 25 MG/1
25 TABLET, EXTENDED RELEASE ORAL DAILY
Qty: 90 TABLET | Refills: 3 | Status: CANCELLED | OUTPATIENT
Start: 2017-11-27

## 2017-11-27 ASSESSMENT — PATIENT HEALTH QUESTIONNAIRE - PHQ9: SUM OF ALL RESPONSES TO PHQ QUESTIONS 1-9: 7

## 2017-11-27 NOTE — MR AVS SNAPSHOT
After Visit Summary   11/27/2017    Jessica Ellison    MRN: [id number]           Patient Information     Date Of Birth          1950        Visit Information        Provider Department      11/27/2017 11:20 AM Sarah Barriga MD [facility]        Today's Diagnoses     Medicare annual wellness visit, subsequent    -  1    Essential hypertension with goal blood pressure less than 140/90        Chronic bilateral low back pain without sciatica        S/P lumbar spinal fusion        Urinary tract infection, acute        Hypothyroidism, unspecified type        Chronic bilateral low back pain, with sciatica presence unspecified        Normocytic anemia        Encounter for HCV screening test for low risk patient          Care Instructions      I'll let you know on blood/urine from today    Updated tetanus/pertussis (Adacel)  We also gave you Prevnar (pneurmonia) shot today    Check with [facility] on the last colonoscopy - you may need upper endoscopy and colonoscopy to further evaluate the low hemoglobin.  If blood work is normal/unremarkable, we'll also want you to see hematologist.    Sarah Barriga M.D.      Thank you for choosing [facility].  You may be receiving a survey in the mail from [employer] regarding your visit today.  Please take a few minutes to complete and return the survey to let us know how we are doing.      Our Clinic hours are:  Mondays    7:20 am - 7 pm  Tues -  Fri  7:20 am - 5 pm    Clinic Phone: [phone]    The clinic lab opens at 7:30 am Mon - Fri and appointments are required.    [facility]  Ph. [phone]  Monday-Thursday 8 am - 7pm  Tues/Wed/Fri 8 am - 5:30 pm         Preventive Health Recommendations    Female Ages 65 +    Yearly exam:     See your health care provider every year in order to  o Review health changes.   o Discuss preventive care.    o Review your medicines if your doctor has prescribed any.      You no  longer need a yearly Pap test unless you've had an abnormal Pap test in the past 10 years. If you have vaginal symptoms, such as bleeding or discharge, be sure to talk with your provider about a Pap test.      Every 1 to 2 years, have a mammogram.  If you are over 69, talk with your health care provider about whether or not you want to continue having screening mammograms.      Every 10 years, have a colonoscopy. Or, have a yearly FIT test (stool test). These exams will check for colon cancer.       Have a cholesterol test every 5 years, or more often if your doctor advises it.       Have a diabetes test (fasting glucose) every three years. If you are at risk for diabetes, you should have this test more often.       At age 65, have a bone density scan (DEXA) to check for osteoporosis (brittle bone disease).    Shots:    Get a flu shot each year.    Get a tetanus shot every 10 years.    Talk to your doctor about your pneumonia vaccines. There are now two you should receive - Pneumovax (PPSV 23) and Prevnar (PCV 13).    Talk to your doctor about the shingles vaccine.    Talk to your doctor about the hepatitis B vaccine.    Nutrition:     Eat at least 5 servings of fruits and vegetables each day.      Eat whole-grain bread, whole-wheat pasta and brown rice instead of white grains and rice.      Talk to your provider about Calcium and Vitamin D.     Lifestyle    Exercise at least 150 minutes a week (30 minutes a day, 5 days a week). This will help you control your weight and prevent disease.      Limit alcohol to one drink per day.      No smoking.       Wear sunscreen to prevent skin cancer.       See your dentist twice a year for an exam and cleaning.      See your eye doctor every 1 to 2 years to screen for conditions such as glaucoma, macular degeneration and cataracts.          Follow-ups after your visit        Your next 10 appointments already scheduled     Jan 16, 2018  1:50 PM CST   (Arrive by 1:35 PM)   Return  "Stroke with Pia Buck MD   [facility] ([facility])    [address]  3rd Floor  [place] [address] [phone]              Who to contact     If you have questions or need follow up information about today's clinic visit or your schedule please contact [facility] directly at [phone].  Normal or non-critical lab and imaging results will be communicated to you by [employer], letter or phone within 4 business days after the clinic has received the results. If you do not hear from us within 7 days, please contact the clinic through [employer] or phone. If you have a critical or abnormal lab result, we will notify you by phone as soon as possible.  Submit refill requests through [employer] or call your pharmacy and they will forward the refill request to us. Please allow 3 business days for your refill to be completed.          Additional Information About Your Visit        [employer] Information     [employer] gives you secure access to your electronic health record. If you see a primary care provider, you can also send messages to your care team and make appointments. If you have questions, please call your primary care clinic.  If you do not have a primary care provider, please call [phone] and they will assist you.        Care EveryWhere ID     This is your Care EveryWhere ID. This could be used by other organizations to access your [place] medical records  [id number]        Your Vitals Were     Pulse Respirations Height Breastfeeding?          71 18 5' 3\" (1.6 m) Yes         Blood Pressure from Last 3 Encounters:   11/27/17 (!) 140/97   11/06/17 (!) 142/103   10/03/17 123/73    Weight from Last 3 Encounters:   11/06/17 152 lb (68.9 kg)   09/18/17 147 lb (66.7 kg)   07/24/17 155 lb (70.3 kg)              We Performed the Following     Blood Morphology Pathologist Review     CBC with platelets differential     Drug Abuse Screen Panel " 13, Urine (Pain Care Package)     Hepatitis C antibody     Reticulocyte Count     Urine Culture Aerobic Bacterial     Vitamin B12          Today's Medication Changes          These changes are accurate as of: 11/27/17 12:18 PM.  If you have any questions, ask your nurse or doctor.               These medicines have changed or have updated prescriptions.        Dose/Directions    calcium carbonate-vitamin D 600-400 MG-UNIT Chew   Commonly known as:  CALTRATE 600+D   This may have changed:  when to take this   Used for:  Back pain        Dose:  1 chew tab   Take 1 chew tab by mouth 2 times daily   Quantity:  180 tablet   Refills:  3       gabapentin 600 MG tablet   Commonly known as:  NEURONTIN   This may have changed:  See the new instructions.   Used for:  Chronic bilateral low back pain without sciatica   Changed by:  Sarah Barriga MD        Dose:  600 mg   Take 1 tablet (600 mg) by mouth 3 times daily   Quantity:  90 tablet   Refills:  5       * metoprolol 25 MG 24 hr tablet   Commonly known as:  TOPROL-XL   This may have changed:  Another medication with the same name was added. Make sure you understand how and when to take each.   Used for:  Essential hypertension with goal blood pressure less than 140/90   Changed by:  Sarah Barriga MD        Dose:  25 mg   Take 1 tablet (25 mg) by mouth daily   Quantity:  30 tablet   Refills:  1       * metoprolol 50 MG 24 hr tablet   Commonly known as:  TOPROL-XL   This may have changed:  You were already taking a medication with the same name, and this prescription was added. Make sure you understand how and when to take each.   Used for:  Essential hypertension with goal blood pressure less than 140/90   Changed by:  Sarah Barriga MD        Dose:  50 mg   Take 1 tablet (50 mg) by mouth daily   Quantity:  90 tablet   Refills:  3       * Notice:  This list has 2 medication(s) that are the same as other medications prescribed for you. Read the directions carefully,  and ask your doctor or other care provider to review them with you.         Where to get your medicines      [place] [facility] [place] [address] [facility]  [address] [facility] [address]     Phone:  [phone]     cyclobenzaprine 10 MG tablet    gabapentin 600 MG tablet    metoprolol 50 MG 24 hr tablet                Primary Care Provider Office Phone # Fax #    Sarah Barriga [phone] [address]  [facility] [address]        Equal Access to Services     [facility]: Hadii aad ku hadasho Soomaali, waaxda luqadaha, qaybta kaalmada adeegyada, waxangela idiin hayaan adeeg sue cota . So [facility] [phone].    ATENCIÓN: Si habla español, tiene a mendez disposición servicios gratuitos de asistencia lingüística. Ll[facility] [phone].    We comply with applicable federal civil rights laws and [place] laws. We do not discriminate on the basis of race, color, national origin, age, disability, sex, sexual orientation, or gender identity.            Thank you!     Thank you for choosing [facility]  for your care. Our goal is always to provide you with excellent care. Hearing back from our patients is one way we can continue to improve our services. Please take a few minutes to complete the written survey that you may receive in the mail after your visit with us. Thank you!             Your Updated Medication List - Protect others around you: Learn how to safely use, store and throw away your medicines at www.disposemymeds.org.          This list is accurate as of: 11/27/17 12:18 PM.  Always use your most recent med list.                   Brand Name Dispense Instructions for use Diagnosis    acyclovir 5 % ointment    ZOVIRAX    15 g    Apply topically 6 times daily    HSV (herpes simplex virus) infection       aspirin 325 MG tablet     180 tablet    Take 1 tablet (325 mg) by mouth daily     Cerebral aneurysm, nonruptured       calcium carbonate-vitamin D 600-400 MG-UNIT Chew    CALTRATE 600+D    180 tablet    Take 1 chew tab by mouth 2 times daily    Back pain       cyclobenzaprine 10 MG tablet    FLEXERIL    180 tablet    Take 1 tablet (10 mg) by mouth 2 times daily as needed for muscle spasms    S/P lumbar spinal fusion       EPINEPHrine 0.3 MG/0.3ML injection 2-pack    EPIPEN/ADRENACLICK/or ANY BX GENERIC EQUIV    2 each    Inject 0.3 mLs (0.3 mg) into the muscle once as needed for anaphylaxis    Allergy to bee sting       estradiol 0.1 MG/GM cream    ESTRACE     Place 2 g vaginally twice a week        ferrous sulfate 325 (65 FE) MG tablet    IRON    60 tablet    Take 1 tablet (325 mg) by mouth 2 times daily    Anemia, unspecified type       gabapentin 600 MG tablet    NEURONTIN    90 tablet    Take 1 tablet (600 mg) by mouth 3 times daily    Chronic bilateral low back pain without sciatica       levothyroxine 50 MCG tablet    LEVOTHROID    90 tablet    Take 1 tablet (50 mcg) by mouth daily    Hypothyroidism, unspecified       lisinopril-hydrochlorothiazide 20-12.5 MG per tablet    PRINZIDE/ZESTORETIC    180 tablet    Take 1 tablet by mouth daily    Essential hypertension with goal blood pressure less than 140/90       * metoprolol 25 MG 24 hr tablet    TOPROL-XL    30 tablet    Take 1 tablet (25 mg) by mouth daily    Essential hypertension with goal blood pressure less than 140/90       * metoprolol 50 MG 24 hr tablet    TOPROL-XL    90 tablet    Take 1 tablet (50 mg) by mouth daily    Essential hypertension with goal blood pressure less than 140/90       multivitamin CF formula chewable tablet    CHOICEFUL    100 tablet    Take 1 tablet by mouth daily    Postmenopause       * oxyCODONE-acetaminophen  MG per tablet    PERCOCET    180 tablet    Take 2 tablets by mouth every 6 hours as needed for moderate to severe pain Max 6/day    Chronic bilateral low back pain without sciatica, Sacroiliac  joint pain       * oxyCODONE-acetaminophen  MG per tablet    PERCOCET    180 tablet    Take 2 tablets by mouth every 6 hours as needed for moderate to severe pain Max 6/day    Chronic bilateral low back pain without sciatica, Sacroiliac joint pain       * oxyCODONE-acetaminophen  MG per tablet   Start taking on:  12/7/2017    PERCOCET    180 tablet    Take 2 tablets by mouth every 6 hours as needed for moderate to severe pain Max 6/day    Chronic bilateral low back pain without sciatica, Sacroiliac joint pain       QUEtiapine 25 MG tablet    SEROQUEL    30 tablet    Take 1 tablet (25 mg) by mouth daily as needed Take 25 mg Every 6 hours As needed for anxiety.    Anxiety       sertraline 100 MG tablet    ZOLOFT    180 tablet    TAKE TWO TABLETS BY MOUTH EVERY DAY    Major depressive disorder, recurrent episode, moderate (H)       traZODone 150 MG tablet    DESYREL    90 tablet    Take 1 tablet (150 mg) by mouth nightly as needed for sleep    Insomnia, unspecified type       * Notice:  This list has 5 medication(s) that are the same as other medications prescribed for you. Read the directions carefully, and ask your doctor or other care provider to review them with you.

## 2017-11-27 NOTE — PROGRESS NOTES
SUBJECTIVE:   Jessica Ellison is a 67 year old female who presents for Preventive Visit.      Are you in the first 12 months of your Medicare Part B coverage?  No    Healthy Habits:    Do you get at least three servings of calcium containing foods daily (dairy, green leafy vegetables, etc.)? yes    Amount of exercise or daily activities, outside of work: pool exercises    Problems taking medications regularly No    Medication side effects: No    Have you had an eye exam in the past two years? yes    Do you see a dentist twice per year? yes    Do you have sleep apnea, excessive snoring or daytime drowsiness?trazodone helps    COGNITIVE SCREEN  1) Repeat 3 items (Banana, Sunrise, Chair)    2) Clock draw: NORMAL  3) 3 item recall: Recalls 3 objects  Results: 3 items recalled: COGNITIVE IMPAIRMENT LESS LIKELY    Mini-CogTM Copyright S Álvaro. Licensed by the author for use in [place] [employer]; reprinted with permission (nicolás@[facility]). All rights reserved.        Recently was admitted to [place] with sepsis secondary to klebsiella UTI.          Chronic Pain Follow-Up       Type / Location of Pain: back  Analgesia/pain control:       Recent changes:  same      Overall control: Tolerable with discomfort  Activity level/function:      Daily activities:  Able to do light housework, cooking    Work:  not applicable  Adverse effects:  No  Adherance    Taking medication as directed?  Yes - though only taking gabapentin 600 mg three times daily rather than 900 mg three times daily     Participating in other treatments: yes  Risk Factors:    Sleep:  Fair    Mood/anxiety:  stable    Recent family or social stressors:  Recently hospitalized with sepsis that was found when she was in preoperative evaluation for her hysterectomy/ventral hernia repair    Other aggravating factors: none  PHQ-9 SCORE 9/18/2017 11/6/2017 11/27/2017   Total Score - - -   Total Score 0 13 7     DAJUAN-7 SCORE 5/22/2015 6/2/2016 11/6/2017   Total  Score 3 - -   Total Score - 4 9     Encounter-Level CSA:     There are no encounter-level csa.              Reviewed and updated as needed this visit by clinical staffTobacco  Allergies  Med Hx  Surg Hx  Fam Hx  Soc Hx        Reviewed and updated as needed this visit by Provider        Social History   Substance Use Topics     Smoking status: Former Smoker     Quit date: 1/1/2004     Smokeless tobacco: Never Used     Alcohol use No      Comment: ETOH dependency, DUI 3/15/10       The patient does not drink >3 drinks per day nor >7 drinks per week.     Today's PHQ-2 Score:   PHQ-2 ( 1999 Pf[employer]) 7/23/2017 11/2/2015   Q1: Little interest or pleasure in doing things 1 0   Q2: Feeling down, depressed or hopeless 1 1   PHQ-2 Score 2 1   Q1: Little interest or pleasure in doing things - -   Q2: Feeling down, depressed or hopeless - -   PHQ-2 Score - -         Do you feel safe in your environment - Yes    Do you have a Health Care Directive?: No: Advance care planning reviewed with patient; information given to patient to review.      Current providers sharing in care for this patient include: Patient Care Team:  Sarah Barriga MD as PCP - General  Nay Bailon RN as Nurse Coordinator (Neurology)      Hearing impairment: No    Ability to successfully perform activities of daily living: Yes, no assistance needed     Fall risk:         Home safety:  none identified      The following health maintenance items are reviewed in Epic and correct as of today:  Health Maintenance   Topic Date Due     HEPATITIS C SCREENING  [birth year]/27/1968     URINE DRUG SCREEN Q1 YR  10/06/2012     ADVANCE DIRECTIVE PLANNING Q5 YRS  09/05/2017     FALL RISK ASSESSMENT  10/27/2017     PNEUMOCOCCAL (1 of 2 - PCV13) 10/27/2017     DEPRESSION ACTION PLAN Q1 YR  02/06/2018     PHQ-9 Q6 MONTHS  05/06/2018     TSH Q1 YEAR  09/18/2018     DAJUAN QUESTIONNAIRE 1 YEAR  11/06/2018     TETANUS IMMUNIZATION (SYSTEM ASSIGNED)  12/12/2018     MAMMO SCREEN Q2  "YR (SYSTEM ASSIGNED)  01/26/2019     PAP Q5 YEARS  11/02/2020     HPV Q5 YEARS (Complete with PAP)  11/02/2020     LIPID SCREEN Q5 YR FEMALE (SYSTEM ASSIGNED)  11/02/2020     COLON CANCER SCREEN (SYSTEM ASSIGNED)  12/03/2020     INFLUENZA VACCINE (SYSTEM ASSIGNED)  Completed     DEXA SCAN SCREENING (SYSTEM ASSIGNED)  Completed     Labs reviewed in EPIC  BP Readings from Last 3 Encounters:   11/27/17 (!) 140/97   11/06/17 (!) 142/103   10/03/17 123/73    Wt Readings from Last 3 Encounters:   11/06/17 152 lb (68.9 kg)   09/18/17 147 lb (66.7 kg)   07/24/17 155 lb (70.3 kg)                      Pneumonia Vaccine:Adults age 65+ who have not received previous Pneumovax (PPSV23) or PCV13 as an adult: Should first be given PCV13 AND then should be given PPSV23 6-12 months after PCV13    ROS:  Constitutional, HEENT, cardiovascular, pulmonary, gi and gu systems are negative, except as otherwise noted.  Urinary retention - difficult to void due to uterine prolapse.        OBJECTIVE:   BP (!) 140/97  Pulse 71  Resp 18  Ht 5' 3\" (1.6 m)  Breastfeeding? Yes Estimated body mass index is 26.93 kg/(m^2) as calculated from the following:    Height as of 11/6/17: 5' 3\" (1.6 m).    Weight as of 11/6/17: 152 lb (68.9 kg).  EXAM:   GENERAL: healthy, alert and no distress  EYES: Eyes grossly normal to inspection, PERRL and conjunctivae and sclerae normal  HENT: ear canals and TM's normal, nose and mouth without ulcers or lesions  NECK: no adenopathy, no asymmetry, masses, or scars and thyroid normal to palpation  RESP: lungs clear to auscultation - no rales, rhonchi or wheezes  Breast:  No discrete masses  CV: regular rate and rhythm, normal S1 S2, no S3 or S4, no murmur, click or rub, no peripheral edema and peripheral pulses strong  ABDOMEN: soft, nontender, no hepatosplenomegaly, midline ventral hernia noted  MS: no gross musculoskeletal defects noted, no edema  SKIN: no suspicious lesions or rashes  NEURO: Normal strength and " tone, mentation intact and speech normal  PSYCH: mentation appears normal, affect normal/bright    ASSESSMENT / PLAN:   1. Medicare annual wellness visit, subsequent       2. Essential hypertension with goal blood pressure less than 140/90  Increase metoprolol to 50 mg daily  - metoprolol (TOPROL-XL) 50 MG 24 hr tablet; Take 1 tablet (50 mg) by mouth daily  Dispense: 90 tablet; Refill: 3    3. Chronic bilateral low back pain without sciatica  Continue to follow with ortho/pain.  Has prescription to fill mid December  - gabapentin (NEURONTIN) 600 MG tablet; Take 1 tablet (600 mg) by mouth 3 times daily  Dispense: 90 tablet; Refill: 5  - Drug Abuse Screen Panel 13, Urine (Pain Care Package)    4. S/P lumbar spinal fusion     - cyclobenzaprine (FLEXERIL) 10 MG tablet; Take 1 tablet (10 mg) by mouth 2 times daily as needed for muscle spasms  Dispense: 180 tablet; Refill: 1    5. Urinary tract infection, acute  Recent sepsis/uti - do urine culture today - finished her cipro on Friday 11/24/2017.  - Urine Culture Aerobic Bacterial    6. Hypothyroidism, unspecified type  TSH done recently and normal.     7. Chronic bilateral low back pain, with sciatica presence unspecified       8. Normocytic anemia  Patient will [facility] on when her last colonoscopy was.  No blood in stools that she's aware .  With family history of colon cancer, she's about two years overdue for colonoscopy if the last one we have recorded is accurate.  Otherwise, may need EGD/colonosocpy  - Blood Morphology Pathologist Review  - CBC with platelets differential  - Reticulocyte Count  Vit B12    9. Encounter for HCV screening test for low risk patient     - Hepatitis C antibody       End of Life Planning:  Patient currently has an advanced directive: No.  I have verified the patient's ablity to prepare an advanced directive/make health care decisions.  Literature was provided to assist patient in preparing an advanced directive.    COUNSELING:  Reviewed  "preventive health counseling, as reflected in patient instructions       Hepatitis C screening        Estimated body mass index is 26.93 kg/(m^2) as calculated from the following:    Height as of 11/6/17: 5' 3\" (1.6 m).    Weight as of 11/6/17: 152 lb (68.9 kg).     reports that she quit smoking about 13 years ago. She has never used smokeless tobacco.        Appropriate preventive services were discussed with this patient, including applicable screening as appropriate for cardiovascular disease, diabetes, osteopenia/osteoporosis, and glaucoma.  As appropriate for age/gender, discussed screening for colorectal cancer, prostate cancer, breast cancer, and cervical cancer. Checklist reviewing preventive services available has been given to the patient.    Reviewed patients plan of care and provided an AVS. The Basic Care Plan (routine screening as documented in Health Maintenance) for Jessica meets the Care Plan requirement. This Care Plan has been established and reviewed with the Patient.    Counseling Resources:  ATP IV Guidelines  Pooled Cohorts Equation Calculator  Breast Cancer Risk Calculator  FRAX Risk Assessment  ICSI Preventive Guidelines  Dietary Guidelines for Americans, 2010  USDA's MyPlate  ASA Prophylaxis  Lung CA Screening    Sarah Barriga MD  [facility]  "

## 2017-11-27 NOTE — PATIENT INSTRUCTIONS
I'll let you know on blood/urine from today    Updated tetanus/pertussis (Adacel)  We also gave you Prevnar (pneurmonia) shot today    Check with [facility] on the last colonoscopy - you may need upper endoscopy and colonoscopy to further evaluate the low hemoglobin.  If blood work is normal/unremarkable, we'll also want you to see hematologist.    Sarah Barriga M.D.      Thank you for choosing [facility].  You may be receiving a survey in the mail from [employer]ne[employer] regarding your visit today.  Please take a few minutes to complete and return the survey to let us know how we are doing.      Our Clinic hours are:  Mondays    7:20 am - 7 pm  Tues -  Fri  7:20 am - 5 pm    Clinic Phone: [phone]    The clinic lab opens at 7:30 am Mon - Fri and appointments are required.    [facility]. [phone]  Monday-Thursday 8 am - 7pm  Tues/Wed/Fri 8 am - 5:30 pm         Preventive Health Recommendations    Female Ages 65 +    Yearly exam:     See your health care provider every year in order to  o Review health changes.   o Discuss preventive care.    o Review your medicines if your doctor has prescribed any.      You no longer need a yearly Pap test unless you've had an abnormal Pap test in the past 10 years. If you have vaginal symptoms, such as bleeding or discharge, be sure to talk with your provider about a Pap test.      Every 1 to 2 years, have a mammogram.  If you are over 69, talk with your health care provider about whether or not you want to continue having screening mammograms.      Every 10 years, have a colonoscopy. Or, have a yearly FIT test (stool test). These exams will check for colon cancer.       Have a cholesterol test every 5 years, or more often if your doctor advises it.       Have a diabetes test (fasting glucose) every three years. If you are at risk for diabetes, you should have this test more often.       At age 65, have a bone density scan (DEXA) to check for  osteoporosis (brittle bone disease).    Shots:    Get a flu shot each year.    Get a tetanus shot every 10 years.    Talk to your doctor about your pneumonia vaccines. There are now two you should receive - Pneumovax (PPSV 23) and Prevnar (PCV 13).    Talk to your doctor about the shingles vaccine.    Talk to your doctor about the hepatitis B vaccine.    Nutrition:     Eat at least 5 servings of fruits and vegetables each day.      Eat whole-grain bread, whole-wheat pasta and brown rice instead of white grains and rice.      Talk to your provider about Calcium and Vitamin D.     Lifestyle    Exercise at least 150 minutes a week (30 minutes a day, 5 days a week). This will help you control your weight and prevent disease.      Limit alcohol to one drink per day.      No smoking.       Wear sunscreen to prevent skin cancer.       See your dentist twice a year for an exam and cleaning.      See your eye doctor every 1 to 2 years to screen for conditions such as glaucoma, macular degeneration and cataracts.

## 2017-11-27 NOTE — NURSING NOTE
"Chief Complaint   Patient presents with     Wellness Visit       Initial BP (!) 138/92  Pulse 72  Resp 18  Ht 5' 3\" (1.6 m)  Breastfeeding? Yes Estimated body mass index is 26.93 kg/(m^2) as calculated from the following:    Height as of 11/6/17: 5' 3\" (1.6 m).    Weight as of 11/6/17: 152 lb (68.9 kg).  Medication Reconciliation: complete    "

## 2017-11-28 ENCOUNTER — TRANSFERRED RECORDS (OUTPATIENT)
Dept: HEALTH INFORMATION MANAGEMENT | Facility: CLINIC | Age: 67
End: 2017-11-28

## 2017-11-28 LAB
BACTERIA SPEC CULT: NO GROWTH
HCV AB SERPL QL IA: NONREACTIVE
Lab: NORMAL
SPECIMEN SOURCE: NORMAL
VIT B12 SERPL-MCNC: 323 PG/ML (ref 193–986)

## 2017-11-29 LAB — COPATH REPORT: NORMAL

## 2017-12-04 ENCOUNTER — TELEPHONE (OUTPATIENT)
Dept: FAMILY MEDICINE | Facility: CLINIC | Age: 67
End: 2017-12-04

## 2017-12-04 DIAGNOSIS — N39.0 RECURRENT UTI: Primary | ICD-10-CM

## 2017-12-04 RX ORDER — SULFAMETHOXAZOLE/TRIMETHOPRIM 800-160 MG
1 TABLET ORAL DAILY
Qty: 90 TABLET | Refills: 1 | Status: SHIPPED | OUTPATIENT
Start: 2017-12-04 | End: 2019-01-02

## 2017-12-04 NOTE — TELEPHONE ENCOUNTER
Reason for Call:  Patient has questions of labs taken last week.    Detailed comments: patient is calling stating she has her surgery rescheduled for 12/15, so I made her an appt for 12/11 for a pre-op. She is worried about her Hgb and UTI and is wondering if she should have those tests prior to her Pre-Op. She also does not understand the results of her latest labs from last week. Please call her.    Phone Number Patient can be reached at: Home number on file [phone] (home)    Best Time: any    Can we leave a detailed message on this number? YES   Kamilah Shannon  Clinic Station  Flex      Call taken on 12/4/2017 at 3:50 PM by Kamilah Shannon

## 2017-12-04 NOTE — TELEPHONE ENCOUNTER
Patient was informed of the information below.  Patient agrees with the plan and understands.      Thank you  Jolene MCDANIELS RN

## 2017-12-04 NOTE — TELEPHONE ENCOUNTER
Pt is concerned about her frequent UTI's and asking if she should be on prophylactic ABXs?  Pt is having a Lap Hysterectomy and Ventral Hernia on 12/15/17.  Advise.  Bill

## 2017-12-04 NOTE — TELEPHONE ENCOUNTER
I think a six month trial of prophylactic antibiotic is not unreasonable.   We can discuss the anemia further at her preoperative evaluation appointment.     Prescription for Bactrim 1 tab daily sent to pharmacy.    Sarah Barriga M.D.

## 2017-12-07 ENCOUNTER — TRANSFERRED RECORDS (OUTPATIENT)
Dept: HEALTH INFORMATION MANAGEMENT | Facility: CLINIC | Age: 67
End: 2017-12-07

## 2017-12-11 ENCOUNTER — OFFICE VISIT (OUTPATIENT)
Dept: FAMILY MEDICINE | Facility: CLINIC | Age: 67
End: 2017-12-11
Payer: COMMERCIAL

## 2017-12-11 VITALS
OXYGEN SATURATION: 92 % | TEMPERATURE: 97.4 F | HEART RATE: 63 BPM | SYSTOLIC BLOOD PRESSURE: 140 MMHG | DIASTOLIC BLOOD PRESSURE: 88 MMHG | HEIGHT: 63 IN | RESPIRATION RATE: 18 BRPM

## 2017-12-11 DIAGNOSIS — M53.3 SACROILIAC JOINT PAIN: ICD-10-CM

## 2017-12-11 DIAGNOSIS — F33.1 MAJOR DEPRESSIVE DISORDER, RECURRENT EPISODE, MODERATE (H): ICD-10-CM

## 2017-12-11 DIAGNOSIS — Z01.818 PREOP GENERAL PHYSICAL EXAM: Primary | ICD-10-CM

## 2017-12-11 DIAGNOSIS — M54.50 CHRONIC BILATERAL LOW BACK PAIN WITHOUT SCIATICA: ICD-10-CM

## 2017-12-11 DIAGNOSIS — K43.9 VENTRAL HERNIA WITHOUT OBSTRUCTION OR GANGRENE: ICD-10-CM

## 2017-12-11 DIAGNOSIS — E03.9 HYPOTHYROIDISM, UNSPECIFIED TYPE: ICD-10-CM

## 2017-12-11 DIAGNOSIS — N81.11 CYSTOCELE, MIDLINE: ICD-10-CM

## 2017-12-11 DIAGNOSIS — I10 ESSENTIAL HYPERTENSION WITH GOAL BLOOD PRESSURE LESS THAN 140/90: ICD-10-CM

## 2017-12-11 DIAGNOSIS — G89.29 CHRONIC BILATERAL LOW BACK PAIN WITHOUT SCIATICA: ICD-10-CM

## 2017-12-11 PROCEDURE — 99215 OFFICE O/P EST HI 40 MIN: CPT | Performed by: FAMILY MEDICINE

## 2017-12-11 RX ORDER — OXYCODONE AND ACETAMINOPHEN 10; 325 MG/1; MG/1
2 TABLET ORAL EVERY 6 HOURS PRN
Qty: 180 TABLET | Refills: 0 | Status: SHIPPED | OUTPATIENT
Start: 2018-01-05 | End: 2018-04-12

## 2017-12-11 RX ORDER — OXYCODONE AND ACETAMINOPHEN 10; 325 MG/1; MG/1
2 TABLET ORAL EVERY 6 HOURS PRN
Qty: 180 TABLET | Refills: 0 | Status: SHIPPED | OUTPATIENT
Start: 2018-03-07 | End: 2018-04-12

## 2017-12-11 RX ORDER — OXYCODONE AND ACETAMINOPHEN 10; 325 MG/1; MG/1
2 TABLET ORAL EVERY 6 HOURS PRN
Qty: 180 TABLET | Refills: 0 | Status: SHIPPED | OUTPATIENT
Start: 2018-02-07 | End: 2018-04-12

## 2017-12-11 NOTE — PATIENT INSTRUCTIONS
Thank you for choosing [facility].  You may be receiving a survey in the mail from Hyun Mcneill regarding your visit today.  Please take a few minutes to complete and return the survey to let us know how we are doing.      Our Clinic hours are:  Mondays    7:20 am - 7 pm  Tues -  Fri  7:20 am - 5 pm    Clinic Phone: [phone]    The clinic lab opens at 7:30 am Mon - Fri and appointments are required.    [facility]. [phone]  Monday-Thursday 8 am - 7pm  Tues/Wed/Fri 8 am - 5:30 pm         Before Your Surgery      Call your surgeon if there is any change in your health. This includes signs of a cold or flu (such as a sore throat, runny nose, cough, rash or fever).    Do not smoke, drink alcohol or take over the counter medicine (unless your surgeon or primary care doctor tells you to) for the 24 hours before and after surgery.    If you take prescribed drugs: Follow your doctor s orders about which medicines to take and which to stop until after surgery.    Eating and drinking prior to surgery: follow the instructions from your surgeon    Take a shower or bath the night before surgery. Use the soap your surgeon gave you to gently clean your skin. If you do not have soap from your surgeon, use your regular soap. Do not shave or scrub the surgery site.  Wear clean pajamas and have clean sheets on your bed.

## 2017-12-11 NOTE — MR AVS SNAPSHOT
After Visit Summary   12/11/2017    Jessica Ellison    MRN: [id number]           Patient Information     Date Of Birth          1950        Visit Information        Provider Department      12/11/2017 10:20 AM Sarah Barriga MD [facility][note 3]        Today's Diagnoses     Preop general physical exam    -  1    Ventral hernia without obstruction or gangrene        Cystocele, midline        Chronic bilateral low back pain without sciatica        Sacroiliac joint pain        Essential hypertension with goal blood pressure less than 140/90        Major depressive disorder, recurrent episode, moderate (H)        Hypothyroidism, unspecified type          Care Instructions          Thank you for choosing [facility].  You may be receiving a survey in the mail from [employer] regarding your visit today.  Please take a few minutes to complete and return the survey to let us know how we are doing.      Our Clinic hours are:  Mondays    7:20 am - 7 pm  Tues -  Fri  7:20 am - 5 pm    Clinic Phone: [phone]    The clinic lab opens at 7:30 am Mon - Fri and appointments are required.    [facility]. [phone]  Monday-Thursday 8 am - 7pm  Tues/Wed/Fri 8 am - 5:30 pm         Before Your Surgery      Call your surgeon if there is any change in your health. This includes signs of a cold or flu (such as a sore throat, runny nose, cough, rash or fever).    Do not smoke, drink alcohol or take over the counter medicine (unless your surgeon or primary care doctor tells you to) for the 24 hours before and after surgery.    If you take prescribed drugs: Follow your doctor s orders about which medicines to take and which to stop until after surgery.    Eating and drinking prior to surgery: follow the instructions from your surgeon    Take a shower or bath the night before surgery. Use the soap your surgeon gave you to gently clean your skin. If you do not have soap from  "your surgeon, use your regular soap. Do not shave or scrub the surgery site.  Wear clean pajamas and have clean sheets on your bed.           Follow-ups after your visit        Your next 10 appointments already scheduled     Jan 16, 2018  1:50 PM CST   (Arrive by 1:35 PM)   Return Stroke with Pia Buck MD   [facility] ([facility])    [address]  [address] [place] [address] [phone]              Who to contact     If you have questions or need follow up information about today's clinic visit or your schedule please contact [facility] directly at [phone].  Normal or non-critical lab and imaging results will be communicated to you by KitBoosthart, letter or phone within 4 business days after the clinic has received the results. If you do not hear from us within 7 days, please contact the clinic through [employer]osthart or phone. If you have a critical or abnormal lab result, we will notify you by phone as soon as possible.  Submit refill requests through [employer] or call your pharmacy and they will forward the refill request to us. Please allow 3 business days for your refill to be completed.          Additional Information About Your Visit        Kit[employer]ost[employer]SaleMove Information     [employer] gives you secure access to your electronic health record. If you see a primary care provider, you can also send messages to your care team and make appointments. If you have questions, please call your primary care clinic.  If you do not have a primary care provider, please call [phone] and they will assist you.        Care EveryWhere ID     This is your Care EveryWhere ID. This could be used by other organizations to access your [place] medical records  [id number]        Your Vitals Were     Pulse Temperature Respirations Height Pulse Oximetry       63 97.4  F (36.3  C) (Tympanic) 18 5' 3\" (1.6 m) 92%        Blood Pressure from Last 3 Encounters: "   12/11/17 140/88   11/27/17 (!) 140/97   11/06/17 (!) 142/103    Weight from Last 3 Encounters:   11/06/17 152 lb (68.9 kg)   09/18/17 147 lb (66.7 kg)   07/24/17 155 lb (70.3 kg)              Today, you had the following     No orders found for display         Today's Medication Changes          These changes are accurate as of: 12/11/17 11:25 AM.  If you have any questions, ask your nurse or doctor.               These medicines have changed or have updated prescriptions.        Dose/Directions    calcium carbonate-vitamin D 600-400 MG-UNIT Chew   Commonly known as:  CALTRATE 600+D   This may have changed:  when to take this   Used for:  Back pain        Dose:  1 chew tab   Take 1 chew tab by mouth 2 times daily   Quantity:  180 tablet   Refills:  3       metoprolol 50 MG 24 hr tablet   Commonly known as:  TOPROL-XL   This may have changed:  Another medication with the same name was removed. Continue taking this medication, and follow the directions you see here.   Used for:  Essential hypertension with goal blood pressure less than 140/90        Dose:  50 mg   Take 1 tablet (50 mg) by mouth daily   Quantity:  90 tablet   Refills:  3            Where to get your medicines      Some of these will need a paper prescription and others can be bought over the counter.  Ask your nurse if you have questions.     Bring a paper prescription for each of these medications     oxyCODONE-acetaminophen  MG per tablet    oxyCODONE-acetaminophen  MG per tablet    oxyCODONE-acetaminophen  MG per tablet                Primary Care Provider Office Phone # Fax #    Sarah Barriga [phone] [address] [address]        Equal Access to Services     [facility]: Brittanie Stapleton, darryn gilliam, tawana schafer. [facility] [phone].    ATENCIÓN: Si habla español, tiene a mendez disposición servicios gratuitos  de asistencia lingüística. Adriane hong [phone].    We comply with applicable federal civil rights laws and [place] laws. We do not discriminate on the basis of race, color, national origin, age, disability, sex, sexual orientation, or gender identity.            Thank you!     Thank you for choosing [facility]  for your care. Our goal is always to provide you with excellent care. Hearing back from our patients is one way we can continue to improve our services. Please take a few minutes to complete the written survey that you may receive in the mail after your visit with us. Thank you!             Your Updated Medication List - Protect others around you: Learn how to safely use, store and throw away your medicines at www.disposemymeds.org.          This list is accurate as of: 12/11/17 11:25 AM.  Always use your most recent med list.                   Brand Name Dispense Instructions for use Diagnosis    acyclovir 5 % ointment    ZOVIRAX    15 g    Apply topically 6 times daily    HSV (herpes simplex virus) infection       aspirin 325 MG tablet     180 tablet    Take 1 tablet (325 mg) by mouth daily    Cerebral aneurysm, nonruptured       calcium carbonate-vitamin D 600-400 MG-UNIT Chew    CALTRATE 600+D    180 tablet    Take 1 chew tab by mouth 2 times daily    Back pain       cyclobenzaprine 10 MG tablet    FLEXERIL    180 tablet    Take 1 tablet (10 mg) by mouth 2 times daily as needed for muscle spasms    S/P lumbar spinal fusion       EPINEPHrine 0.3 MG/0.3ML injection 2-pack    EPIPEN/ADRENACLICK/or ANY BX GENERIC EQUIV    2 each    Inject 0.3 mLs (0.3 mg) into the muscle once as needed for anaphylaxis    Allergy to bee sting       estradiol 0.1 MG/GM cream    ESTRACE     Place 2 g vaginally twice a week        ferrous sulfate 325 (65 FE) MG tablet    IRON    60 tablet    Take 1 tablet (325 mg) by mouth 2 times daily    Anemia, unspecified type       gabapentin 600 MG tablet    NEURONTIN     90 tablet    Take 1 tablet (600 mg) by mouth 3 times daily    Chronic bilateral low back pain without sciatica       levothyroxine 50 MCG tablet    LEVOTHROID    90 tablet    Take 1 tablet (50 mcg) by mouth daily    Hypothyroidism, unspecified       metoprolol 50 MG 24 hr tablet    TOPROL-XL    90 tablet    Take 1 tablet (50 mg) by mouth daily    Essential hypertension with goal blood pressure less than 140/90       multivitamin CF formula chewable tablet    CHOICEFUL    100 tablet    Take 1 tablet by mouth daily    Postmenopause       * oxyCODONE-acetaminophen  MG per tablet   Start taking on:  1/5/2018    PERCOCET    180 tablet    Take 2 tablets by mouth every 6 hours as needed for moderate to severe pain Max 6/day    Chronic bilateral low back pain without sciatica, Sacroiliac joint pain       * oxyCODONE-acetaminophen  MG per tablet   Start taking on:  2/7/2018    PERCOCET    180 tablet    Take 2 tablets by mouth every 6 hours as needed for moderate to severe pain Max 6/day    Chronic bilateral low back pain without sciatica, Sacroiliac joint pain       * oxyCODONE-acetaminophen  MG per tablet   Start taking on:  3/7/2018    PERCOCET    180 tablet    Take 2 tablets by mouth every 6 hours as needed for moderate to severe pain Max 6/day    Chronic bilateral low back pain without sciatica, Sacroiliac joint pain       QUEtiapine 25 MG tablet    SEROQUEL    30 tablet    Take 1 tablet (25 mg) by mouth daily as needed Take 25 mg Every 6 hours As needed for anxiety.    Anxiety       sertraline 100 MG tablet    ZOLOFT    180 tablet    TAKE TWO TABLETS BY MOUTH EVERY DAY    Major depressive disorder, recurrent episode, moderate (H)       sulfamethoxazole-trimethoprim 800-160 MG per tablet    BACTRIM DS/SEPTRA DS    90 tablet    Take 1 tablet by mouth daily    Recurrent UTI       traZODone 150 MG tablet    DESYREL    90 tablet    Take 1 tablet (150 mg) by mouth nightly as needed for sleep    Insomnia,  unspecified type       * Notice:  This list has 3 medication(s) that are the same as other medications prescribed for you. Read the directions carefully, and ask your doctor or other care provider to review them with you.

## 2017-12-11 NOTE — PROGRESS NOTES
[facility]  [address]  [facility] [address]  [phone]  Dept: [phone]    PRE-OP EVALUATION:  Today's date: 2017    Jessica Ellison (: 1950) presents for pre-operative evaluation assessment as requested by Dr. Jesus.  She requires evaluation and anesthesia risk assessment prior to undergoing surgery/procedure for treatment of ventral hernia .  Proposed procedure: Laparoscopic  hysterectomy     Date of Surgery/ Procedure: 12/15/17  Time of Surgery/ Procedure: 9:00 AM  Hospital/Surgical Facility: [facility]   Fax number for surgical facility:   Primary Physician: Sarah Barriga  Type of Anesthesia Anticipated: General    Patient has a Health Care Directive or Living Will:  NO    1. NO - Do you have a history of heart attack, stroke, stent, bypass or surgery on an artery in the head, neck, heart or legs?  2. NO - Do you ever have any pain or discomfort in your chest?  3. NO - Do you have a history of  Heart Failure?  4. NO - Are you troubled by shortness of breath when: walking on the level, up a slight hill or at night?  5. NO - Do you currently have a cold, bronchitis or other respiratory infection?  6. NO - Do you have a cough, shortness of breath or wheezing?  7. YES - DO YOU SOMETIMES GET PAINS IN THE CALVES OF YOUR LEGS WHEN YOU WALK? Back pain/radicular  8. NO - DO YOU OR ANYONE IN YOUR FAMILY HAVE PREVIOUS HISTORY OF BLOOD CLOTS?   9. NO - Do you or does anyone in your family have a serious bleeding problem such as prolonged bleeding following surgeries or cuts?  10. NO - Have you ever had problems with anemia or been told to take iron pills?  11. NO - Have you had any abnormal blood loss such as black, tarry or bloody stools, or abnormal vaginal bleeding?  12. YES - HAVE YOU EVER HAD A BLOOD TRANSFUSION? A few weeks ago in the hospital at [place]  13. NO - Have you or any of your relatives ever had problems with anesthesia?  14. NO - Do you have sleep  apnea, excessive snoring or daytime drowsiness?  15. NO - Do you have any prosthetic heart valves?  16. NO - Do you have prosthetic joints?  17. NO - Is there any chance that you may be pregnant?        HPI:                                                      Brief HPI related to upcoming procedure:  patient has a large ventral hernia and cystocele/uterine prolapse and will be undergoing repair of the hernia and hysterectomy.     Was discharged from the hospital just yesterday where she was hospitalized at [place] for dehydration.  Second urine culture was negative, no antibiotic started.       HYPERTENSION - Patient has longstanding history of mod-severe HTN , currently denies any symptoms referable to elevated blood pressure. Specifically denies chest pain, palpitations, dyspnea, orthopnea, PND or peripheral edema. Blood pressure readings have been in normal range. Current medication regimen is as listed below. Patient denies any side effects of medication.  Lisinopril/hctz was stopped while she was in the hospital.                                                                                                                                                                                            DEPRESSION/Anxiety - Patient has a long history of Depression of moderate severity requiring medication for control with recent symptoms being waxing and waning..Current symptoms of depression include anxiety and worry over the upcoming surgery.                                                                                                                                                                                    .  HYPOTHYROIDISM - Patient has a longstanding history of chronic Hypothyroidism. Patient has been doing well, noting no tremor, insomnia, hair loss or changes in skin texture. Last TSH value of 1.02 on 9/18/2017. Continues to take medications as directed, without adverse reactions or side effects.                                             MEDICAL HISTORY:                                                    Patient Active Problem List    Diagnosis Date Noted     Hypothyroidism 09/07/2016     Priority: Medium     Chronic bilateral low back pain without sciatica 06/02/2016     Priority: Medium     Essential hypertension with goal blood pressure less than 140/90 06/02/2016     Priority: Medium     S/P lumbar spinal fusion 10/19/2015     Priority: Medium     Bee allergy status 06/30/2015     Priority: Medium     Cerebral aneurysm, nonruptured 07/24/2014     Priority: Medium     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 Marti       Major depressive disorder, recurrent episode, moderate (H) 07/10/2013     Priority: Medium     Advanced directives, counseling/discussion 09/05/2012     Priority: Medium     Patient does not have an Advance/Health Care Directive (HCD), has information at home.     Sonja Krishnamurthy  September 5, 2012         Anxiety 06/20/2012     Priority: Medium     CARDIOVASCULAR SCREENING; LDL GOAL LESS THAN 160 10/31/2010     Priority: Medium     ETOH abuse 03/19/2010     Priority: Medium      katie drinking, DUIs and probation violation       Insomnia 12/21/2009     Priority: Medium     Back pain 12/21/2009     Priority: Medium     Patient is followed by [facility]INI[facility] COPPOLA for ongoing prescription of narcotic pain medicine.  Med: oxycodone/acetaminophen 10/325 for chronic back pain.   Maximum use per month: 180  Expected duration: unknown  Narcotic agreement on file: YES 3/19/2010  Clinic visit recommended: Q 3 months    April 2013 - rhizotomy for SI (left) Jhon Mckay MD    Follows at [facility] - severe low back pain with work related injuries.  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  Spinal stenosis at most lumbar levels  Improvement with past epidural steroid injections  SI joint  dysfunction with the pain        Past Medical History:   Diagnosis Date     Anemia      Aneurysm (H)      Chemical dependency (H)     Chem Dep RX     Depression      History of blood transfusion      Hypertension      Menopausal symptoms      Other chronic pain     Lower back and hips     Sleep disorder      Thyroid disease      Tobacco abuse      Past Surgical History:   Procedure Laterality Date     APPENDECTOMY  1960     C PART REMOVAL COLON W ANASTOMOSIS  5/2005    diverticulitis     C SPINE FUSION,ANTER,8+ SGMTS  2007    Anterior posterior fusion 10 levels, hardware removal and re-fusion 2009     cerebral aneurysm  2014    coiled     COLONOSCOPY  4/19/2005     HC EXCISION BREAST LESION, OPEN >=1      core biopsy 2006, lumpectomy 2006     orif left femoral neck Left 6/3/2016    stress fracture.  done at [facility]     SURGICAL HISTORY OF -   2004    Skin Graft     Current Outpatient Prescriptions   Medication Sig Dispense Refill     [START ON 3/7/2018] oxyCODONE-acetaminophen (PERCOCET)  MG per tablet Take 2 tablets by mouth every 6 hours as needed for moderate to severe pain Max 6/day 180 tablet 0     [START ON 2/7/2018] oxyCODONE-acetaminophen (PERCOCET)  MG per tablet Take 2 tablets by mouth every 6 hours as needed for moderate to severe pain Max 6/day 180 tablet 0     [START ON 1/5/2018] oxyCODONE-acetaminophen (PERCOCET)  MG per tablet Take 2 tablets by mouth every 6 hours as needed for moderate to severe pain Max 6/day 180 tablet 0     sulfamethoxazole-trimethoprim (BACTRIM DS/SEPTRA DS) 800-160 MG per tablet Take 1 tablet by mouth daily 90 tablet 1     gabapentin (NEURONTIN) 600 MG tablet Take 1 tablet (600 mg) by mouth 3 times daily 90 tablet 5     cyclobenzaprine (FLEXERIL) 10 MG tablet Take 1 tablet (10 mg) by mouth 2 times daily as needed for muscle spasms 180 tablet 1     metoprolol (TOPROL-XL) 50 MG 24 hr tablet Take 1 tablet (50 mg) by mouth daily 90 tablet 3     ferrous sulfate  (IRON) 325 (65 FE) MG tablet Take 1 tablet (325 mg) by mouth 2 times daily 60 tablet 2     estradiol (ESTRACE) 0.1 MG/GM cream Place 2 g vaginally twice a week       levothyroxine (LEVOTHROID) 50 MCG tablet Take 1 tablet (50 mcg) by mouth daily 90 tablet 3     traZODone (DESYREL) 150 MG tablet Take 1 tablet (150 mg) by mouth nightly as needed for sleep 90 tablet 3     sertraline (ZOLOFT) 100 MG tablet TAKE TWO TABLETS BY MOUTH EVERY  tablet 1     acyclovir (ZOVIRAX) 5 % ointment Apply topically 6 times daily 15 g 3     EPINEPHrine (EPIPEN) 0.3 MG/0.3ML injection Inject 0.3 mLs (0.3 mg) into the muscle once as needed for anaphylaxis 2 each 3     calcium carbonate-vitamin D (CALTRATE 600+D) 600-400 MG-UNIT CHEW Take 1 chew tab by mouth 2 times daily (Patient taking differently: Take 1 chew tab by mouth daily ) 180 tablet 3     multivitamin CF formula (CHOICEFUL) chewable tablet Take 1 tablet by mouth daily 100 tablet 0     aspirin 325 MG tablet Take 1 tablet (325 mg) by mouth daily 180 tablet 6     [DISCONTINUED] metoprolol (TOPROL-XL) 25 MG 24 hr tablet Take 1 tablet (25 mg) by mouth daily 30 tablet 1     QUEtiapine (SEROQUEL) 25 MG tablet Take 1 tablet (25 mg) by mouth daily as needed Take 25 mg Every 6 hours As needed for anxiety. 30 tablet 3     OTC products: None, except as noted above    Allergies   Allergen Reactions     Bee Venom       Latex Allergy: NO    Social History   Substance Use Topics     Smoking status: Former Smoker     Quit date: 1/1/2004     Smokeless tobacco: Never Used     Alcohol use No      Comment: ETOH dependency, DUI 3/15/10     History   Drug Use No       REVIEW OF SYSTEMS:                                                    C: NEGATIVE for fever, chills, change in weight  E/M: NEGATIVE for ear, mouth and throat problems  R: NEGATIVE for significant cough or SOB  CV: NEGATIVE for chest pain, palpitations or peripheral edema    EXAM:                                                   "  /88  Pulse 63  Temp 97.4  F (36.3  C) (Tympanic)  Resp 18  Ht 5' 3\" (1.6 m)  SpO2 92%  GENERAL APPEARANCE: healthy, alert and no distress  HENT: ear canals and TM's normal and nose and mouth without ulcers or lesions  RESP: lungs clear to auscultation - no rales, rhonchi or wheezes  CV: regular rate and rhythm, normal S1 S2, no S3 or S4 and no murmur, click or rub   ABDOMEN: soft, nontender, no HSM or masses and bowel sounds normal  NEURO: Normal strength and tone, sensory exam grossly normal, mentation intact and speech normal     DIAGNOSTICS:                                                    EKG from 11/6/2017 attached    More recent lab work from [facility] from over the weekend reviewed.      Recent Labs   Lab Test  11/27/17   1229  11/06/17   1426  09/18/17   1308  07/27/17   1000   08/05/15   0905   HGB  10.8*  9.6*  9.6*  10.4*  10.9*   < >  10.2*   PLT  217  236  224  181   < >  207   INR   --    --    --   0.94   --   0.96   NA   --   138  135   --    < >  144   POTASSIUM   --   4.4  4.2   --    < >  3.9   CR   --   0.87  1.17*  0.83   < >  0.92    < > = values in this interval not displayed.        IMPRESSION:                                                    Reason for surgery/procedure: hysterectomy and ventral hernia  Diagnosis/reason for consult: preoperative evaluation and medical risk assessment.     The proposed surgical procedure is considered INTERMEDIATE risk.    REVISED CARDIAC RISK INDEX  The patient has the following serious cardiovascular risks for perioperative complications such as (MI, PE, VFib and 3  AV Block):  No serious cardiac risks  INTERPRETATION: 0 risks: Class I (very low risk - 0.4% complication rate)    The patient has the following additional risks for perioperative complications:  High tolerance to opioid analgesics due to chronic narcotic use for back pain      ICD-10-CM    1. Preop general physical exam Z01.818    2. Ventral hernia without obstruction or gangrene " K43.9    3. Cystocele, midline N81.11    4. Chronic bilateral low back pain without sciatica M54.5 oxyCODONE-acetaminophen (PERCOCET)  MG per tablet    G89.29 oxyCODONE-acetaminophen (PERCOCET)  MG per tablet     oxyCODONE-acetaminophen (PERCOCET)  MG per tablet   5. Sacroiliac joint pain M53.3 oxyCODONE-acetaminophen (PERCOCET)  MG per tablet     oxyCODONE-acetaminophen (PERCOCET)  MG per tablet     oxyCODONE-acetaminophen (PERCOCET)  MG per tablet   6. Essential hypertension with goal blood pressure less than 140/90 I10    7. Major depressive disorder, recurrent episode, moderate (H) F33.1    8. Hypothyroidism, unspecified type E03.9        RECOMMENDATIONS:                                                      --Consult hospital rounder / IM to assist post-op medical management    Anemia  Anemia and does not require treatment prior to surgery.  Monitor Hemoglobin postoperatively.        --Patient is to take all scheduled medications on the day of surgery EXCEPT for modifications listed below.    Anticoagulant or Antiplatelet Medication Use  ASPIRIN: Patient is at increased risk of thrombosis (e.g. MI, CVA) and aspirin 81 mg daily should be continued in the perioperative period          APPROVAL GIVEN to proceed with proposed procedure, without further diagnostic evaluation       Signed Electronically by: Sarah Barriga MD    Copy of this evaluation report is provided to requesting physician.    [place] Preop Guidelines

## 2017-12-11 NOTE — NURSING NOTE
"Chief Complaint   Patient presents with     Pre-Op Exam       Initial /88  Pulse 63  Temp 97.4  F (36.3  C) (Tympanic)  Resp 18  Ht 5' 3\" (1.6 m)  SpO2 92% Estimated body mass index is 26.93 kg/(m^2) as calculated from the following:    Height as of 11/6/17: 5' 3\" (1.6 m).    Weight as of 11/6/17: 152 lb (68.9 kg).  Medication Reconciliation: complete    "

## 2017-12-16 ENCOUNTER — TRANSFERRED RECORDS (OUTPATIENT)
Dept: HEALTH INFORMATION MANAGEMENT | Facility: CLINIC | Age: 67
End: 2017-12-16

## 2018-01-09 ENCOUNTER — TRANSFERRED RECORDS (OUTPATIENT)
Dept: HEALTH INFORMATION MANAGEMENT | Facility: CLINIC | Age: 68
End: 2018-01-09

## 2018-02-13 DIAGNOSIS — F41.9 ANXIETY: ICD-10-CM

## 2018-02-13 NOTE — TELEPHONE ENCOUNTER
"Requested Prescriptions   Pending Prescriptions Disp Refills     QUEtiapine (SEROQUEL) 25 MG tablet [Pharmacy Med Name: QUETIAPINE FUMARATE 25MG TABS]  Last Written Prescription Date:  02/06/2017  Last Fill Quantity: 30,  # refills: 3   Last office visit: 12/11/2017 with prescribing provider:  Linus   Future Office Visit:     30 tablet 3     Sig: TAKE ONE TABLET BY MOUTH DAILY AS NEEDED FOR ANXIETY    Antipsychotic Medications Failed    2/13/2018 12:22 PM       Failed - BP is less then 140/90    BP Readings from Last 3 Encounters:   12/11/17 140/88   11/27/17 (!) 140/97   11/06/17 (!) 142/103                Failed - Lipid panel on file within the past 12 months    Recent Labs   Lab Test  11/02/15   1119   CHOL  224*   TRIG  96   HDL  78   LDL  127   VLDL  19   CHOLHDLRATIO  2.9              Passed - Patient is 12 years of age or older       Passed - CBC on file in past 12 months    Recent Labs   Lab Test  11/27/17   1229   WBC  5.4   RBC  3.62*   HGB  10.8*   HCT  35.2   PLT  217            Passed - Heart Rate on file within past 12 months    Pulse Readings from Last 3 Encounters:   12/11/17 63   11/27/17 71   11/06/17 65              Passed - A1c or Glucose on file in past 12 months    Recent Labs   Lab Test  11/06/17   1426   GLC  80       Please review patients last 3 weights. If a weight gain of >10 lbs exists, you may refill the prescription once after instructing the patient to schedule an appointment within the next 30 days.    Wt Readings from Last 3 Encounters:   11/06/17 152 lb (68.9 kg)   09/18/17 147 lb (66.7 kg)   07/24/17 155 lb (70.3 kg)            Passed - Patient is not pregnant       Passed - No positve pregnancy test on file in past 12 months       Passed - Recent or future visit with authorizing provider's specialty    Patient had office visit in the last 6 months or has a visit in the next 30 days with authorizing provider.  See \"Patient Info\" tab in inbasket, or \"Choose Columns\" in Meds & " Orders section of the refill encounter.            Sam StewartR)

## 2018-02-14 ENCOUNTER — TRANSFERRED RECORDS (OUTPATIENT)
Dept: HEALTH INFORMATION MANAGEMENT | Facility: CLINIC | Age: 68
End: 2018-02-14

## 2018-02-14 RX ORDER — QUETIAPINE FUMARATE 25 MG/1
TABLET, FILM COATED ORAL
Qty: 30 TABLET | Refills: 3 | Status: SHIPPED | OUTPATIENT
Start: 2018-02-14 | End: 2019-06-13

## 2018-02-14 NOTE — TELEPHONE ENCOUNTER
Routing refill request to provider for review/approval because:  Labs not current:  Lipids    Thank you  Jolene MCDANIELS RN

## 2018-03-19 DIAGNOSIS — F33.1 MAJOR DEPRESSIVE DISORDER, RECURRENT EPISODE, MODERATE (H): ICD-10-CM

## 2018-03-19 NOTE — TELEPHONE ENCOUNTER
"Requested Prescriptions   Pending Prescriptions Disp Refills     sertraline (ZOLOFT) 100 MG tablet [Pharmacy Med Name: SERTRALINE HCL 100MG TABS]  Last Written Prescription Date:  08/15/17  Last Fill Quantity: 180,  # refills: 1   Last office visit: 12/11/2017 with prescribing provider:  12/11/17   Future Office Visit:     180 tablet 1     Sig: TAKE TWO TABLETS BY MOUTH EVERY DAY    SSRIs Protocol Failed    3/19/2018  3:57 PM       Failed - PHQ-9 score less than 5 in past 6 months    Please review last PHQ-9 score.   PHQ-9 SCORE 9/18/2017 11/6/2017 11/27/2017   Total Score - - -   Total Score 0 13 7          Passed - Patient is age 18 or older       Passed - No active pregnancy on record       Passed - No positive pregnancy test in last 12 months       Passed - Recent (6 mo) or future (30 days) visit within the authorizing provider's specialty    Patient had office visit in the last 6 months or has a visit in the next 30 days with authorizing provider or within the authorizing provider's specialty.  See \"Patient Info\" tab in inbasket, or \"Choose Columns\" in Meds & Orders section of the refill encounter.              "

## 2018-03-21 RX ORDER — SERTRALINE HYDROCHLORIDE 100 MG/1
TABLET, FILM COATED ORAL
Qty: 180 TABLET | Refills: 1 | Status: SHIPPED | OUTPATIENT
Start: 2018-03-21 | End: 2018-07-16

## 2018-03-28 ENCOUNTER — HOSPITAL ENCOUNTER (OUTPATIENT)
Dept: MAMMOGRAPHY | Facility: CLINIC | Age: 68
Discharge: HOME | End: 2018-03-28
Attending: FAMILY MEDICINE | Admitting: FAMILY MEDICINE
Payer: MEDICARE

## 2018-03-28 DIAGNOSIS — Z12.31 VISIT FOR SCREENING MAMMOGRAM: ICD-10-CM

## 2018-03-28 PROCEDURE — 77063 BREAST TOMOSYNTHESIS BI: CPT

## 2018-04-03 ENCOUNTER — TELEPHONE (OUTPATIENT)
Dept: FAMILY MEDICINE | Facility: CLINIC | Age: 68
End: 2018-04-03

## 2018-04-03 NOTE — TELEPHONE ENCOUNTER
S-(situation): weak and sleeping alot    B-(background): patient was different a month ago when they went on vacation.  She was alert and active.  Now she is up for 3 hours and has to lay down.  I have spoken to the patient on the phone she is laying down and sounds weak.  She asks me to speak to her , Vj.  Patient is on many medications that can make her drowsy.  She also has a low hgb in the past and is on Feso4.  Last labs done in 11/2017.  Patient rates her pain 7/10 but seems to doze off while we are talking.  I have asked the  to count her pills.     A-(assessment): weakness, fatigue    R-(recommendations): to go to the Ed for assessment. Raquel SALINAS RN

## 2018-04-03 NOTE — TELEPHONE ENCOUNTER
"Reason for call:  Patient reporting a symptom    Symptom or request: Pt's back and hip pain are \"more intense\" and her fatigue is, \"a lot more than usual.\"  No fever.  Pt has an appt with Dr. Barriga 4/12 and she wants to know if she should wait that long to be seen?      Duration (how long have symptoms been present): Ongoing, getting worse    Have you been treated for this before? Yes    Additional comments:     Phone Number patient can be reached at:  Home number on file [phone] (home)    Best Time:  any    Can we leave a detailed message on this number:  YES    Call taken on 4/3/2018 at 12:34 PM by Cait Ortiz    "

## 2018-04-12 ENCOUNTER — OFFICE VISIT (OUTPATIENT)
Dept: FAMILY MEDICINE | Facility: CLINIC | Age: 68
End: 2018-04-12
Payer: COMMERCIAL

## 2018-04-12 VITALS
SYSTOLIC BLOOD PRESSURE: 133 MMHG | RESPIRATION RATE: 18 BRPM | WEIGHT: 162 LBS | BODY MASS INDEX: 28.7 KG/M2 | DIASTOLIC BLOOD PRESSURE: 98 MMHG | HEIGHT: 63 IN | HEART RATE: 73 BPM | TEMPERATURE: 97.8 F

## 2018-04-12 DIAGNOSIS — I10 ESSENTIAL HYPERTENSION WITH GOAL BLOOD PRESSURE LESS THAN 140/90: ICD-10-CM

## 2018-04-12 DIAGNOSIS — M53.3 SACROILIAC JOINT PAIN: ICD-10-CM

## 2018-04-12 DIAGNOSIS — D63.8 ANEMIA IN OTHER CHRONIC DISEASES CLASSIFIED ELSEWHERE: ICD-10-CM

## 2018-04-12 DIAGNOSIS — G47.19 EXCESSIVE DAYTIME SLEEPINESS: ICD-10-CM

## 2018-04-12 DIAGNOSIS — R53.83 OTHER FATIGUE: ICD-10-CM

## 2018-04-12 DIAGNOSIS — G89.29 CHRONIC BILATERAL LOW BACK PAIN WITHOUT SCIATICA: Primary | ICD-10-CM

## 2018-04-12 DIAGNOSIS — M54.50 CHRONIC BILATERAL LOW BACK PAIN WITHOUT SCIATICA: Primary | ICD-10-CM

## 2018-04-12 LAB
BASOPHILS # BLD AUTO: 0 10E9/L (ref 0–0.2)
BASOPHILS NFR BLD AUTO: 0.6 %
DIFFERENTIAL METHOD BLD: ABNORMAL
EOSINOPHIL # BLD AUTO: 0.5 10E9/L (ref 0–0.7)
EOSINOPHIL NFR BLD AUTO: 10.5 %
ERYTHROCYTE [DISTWIDTH] IN BLOOD BY AUTOMATED COUNT: 13.7 % (ref 10–15)
HCT VFR BLD AUTO: 31.8 % (ref 35–47)
HGB BLD-MCNC: 10 G/DL (ref 11.7–15.7)
LYMPHOCYTES # BLD AUTO: 1.1 10E9/L (ref 0.8–5.3)
LYMPHOCYTES NFR BLD AUTO: 22.2 %
MCH RBC QN AUTO: 29.4 PG (ref 26.5–33)
MCHC RBC AUTO-ENTMCNC: 31.4 G/DL (ref 31.5–36.5)
MCV RBC AUTO: 94 FL (ref 78–100)
MONOCYTES # BLD AUTO: 0.5 10E9/L (ref 0–1.3)
MONOCYTES NFR BLD AUTO: 10.7 %
NEUTROPHILS # BLD AUTO: 2.7 10E9/L (ref 1.6–8.3)
NEUTROPHILS NFR BLD AUTO: 56 %
PLATELET # BLD AUTO: 187 10E9/L (ref 150–450)
RBC # BLD AUTO: 3.4 10E12/L (ref 3.8–5.2)
TSH SERPL DL<=0.005 MIU/L-ACNC: 1.9 MU/L (ref 0.4–4)
WBC # BLD AUTO: 4.8 10E9/L (ref 4–11)

## 2018-04-12 PROCEDURE — 85025 COMPLETE CBC W/AUTO DIFF WBC: CPT | Performed by: FAMILY MEDICINE

## 2018-04-12 PROCEDURE — 99214 OFFICE O/P EST MOD 30 MIN: CPT | Performed by: FAMILY MEDICINE

## 2018-04-12 PROCEDURE — 84443 ASSAY THYROID STIM HORMONE: CPT | Performed by: FAMILY MEDICINE

## 2018-04-12 PROCEDURE — 36415 COLL VENOUS BLD VENIPUNCTURE: CPT | Performed by: FAMILY MEDICINE

## 2018-04-12 RX ORDER — OXYCODONE AND ACETAMINOPHEN 10; 325 MG/1; MG/1
2 TABLET ORAL EVERY 6 HOURS PRN
Qty: 180 TABLET | Refills: 0 | Status: SHIPPED | OUTPATIENT
Start: 2018-04-12 | End: 2018-07-16

## 2018-04-12 RX ORDER — OXYCODONE AND ACETAMINOPHEN 10; 325 MG/1; MG/1
2 TABLET ORAL EVERY 6 HOURS PRN
Qty: 180 TABLET | Refills: 0 | Status: SHIPPED | OUTPATIENT
Start: 2018-06-12 | End: 2018-07-16

## 2018-04-12 RX ORDER — OXYCODONE AND ACETAMINOPHEN 10; 325 MG/1; MG/1
2 TABLET ORAL EVERY 6 HOURS PRN
Qty: 180 TABLET | Refills: 0 | Status: SHIPPED | OUTPATIENT
Start: 2018-05-11 | End: 2018-07-16

## 2018-04-12 RX ORDER — METOPROLOL SUCCINATE 50 MG/1
75 TABLET, EXTENDED RELEASE ORAL DAILY
Qty: 135 TABLET | Refills: 3 | Status: SHIPPED | OUTPATIENT
Start: 2018-04-12 | End: 2019-05-16

## 2018-04-12 ASSESSMENT — PAIN SCALES - GENERAL: PAINLEVEL: MILD PAIN (2)

## 2018-04-12 NOTE — PROGRESS NOTES
SUBJECTIVE:   Jessica Ellison is a 67 year old female who presents to clinic today for the following health issues:      Chief Complaint   Patient presents with     Hernia     Patient has had hernia repaired in December and she feels like her hernia is back. Patient doesn't recall doing anything to re-trigger this. Patient rates pain at a 2/10 no specific pain just uncomfortable.      Medication Request     Patient will need percocet refilled. Patient was able to go to [place] and with the warm weather she was able to not take as many while being there.       SUBJECTIVE:  Jessica Ellison, a 67 year old female scheduled an appointment to discuss the following issues:     Chronic bilateral low back pain without sciatica  Sacroiliac joint pain  Sees pain clinic for injection for above causes of pain.  I did query [place]  today and she is not getting other narcotics.  She does get 10 mg diazepam #60 from Dr. Banerjee.  Doesn't appear to get these monthly, last prescription was filled 2/14/2018.    Other fatigue    Anemia in other chronic diseases classified elsewhere- has had peripheral smear done, seems stable.     Excessive daytime sleepiness - has never had a sleep study. We did talk about the multiple medications she's on including narcotics, trazodone, etc which can lead to increased fatigue, poor sleep, etc. May be helpful to get actigraphy/sleep study to r/o central apnea, parasomnias, etc.     Essential hypertension with goal blood pressure less than 140/90 - blood pressure still running high despite 50 mg metoprolol xl.      Patient had ventral hernia repaired by Dr. Jesus at [facility] 12/2017 when she had her hysterectomy done. She feels she's having more upper abdominal pain and bulging.  I have asked her to f/u with Dr. Jesus.  No Mesh was used.  She is having bowel movements, no vomiting, no obstructive symptoms.      Medical, social, surgical, and family histories reviewed.    ROS:  5 point ROS negative  "except as noted above in HPI, including Gen., Resp., CV, GI &  system review.    OBJECTIVE:  BP (!) 133/98  Pulse 73  Temp 97.8  F (36.6  C) (Tympanic)  Resp 18  Ht 5' 3\" (1.6 m)  Wt 162 lb (73.5 kg)  Breastfeeding? No  BMI 28.7 kg/m2  EXAM:  GENERAL APPEARANCE: Alert, no acute distress  RESP: lungs clear to auscultation   CV: normal rate, regular rhythm, no murmur or gallop  ABDOMEN: soft, no organomegaly or masses , bowel sounds normal, tender epigastric mild no rebound/guarding.  No definite hernia palpated    ASSESSMENT/PLAN:    (M54.5,  G89.29) Chronic bilateral low back pain without sciatica  (primary encounter diagnosis)  Comment:  Encouraged her to re-start the gabapentin. She admits to not being good at taking this.  I explained to her that we do need to find alternatives to her pain and will need to start backing down on the percocet, or she will need to find a pain clinic to manage.  She needs to be taking her gabapentin regularly as I feel this would allow us to use less of the narcotics.    Plan: oxyCODONE-acetaminophen (PERCOCET)  MG         per tablet, oxyCODONE-acetaminophen (PERCOCET)          MG per tablet, oxyCODONE-acetaminophen         (PERCOCET)  MG per tablet             (M53.3) Sacroiliac joint pain  Comment:    Plan: oxyCODONE-acetaminophen (PERCOCET)  MG         per tablet, oxyCODONE-acetaminophen (PERCOCET)          MG per tablet, oxyCODONE-acetaminophen         (PERCOCET)  MG per tablet             (R53.83) Other fatigue  Comment:    Plan: CBC with platelets differential, TSH with free         T4 reflex             (D63.8) Anemia in other chronic diseases classified elsewhere  Comment: stable  Plan: CBC with platelets differential             (G47.19) Excessive daytime sleepiness  Comment:    Plan: SLEEP EVALUATION & MANAGEMENT REFERRAL - [facility]  [phone]        (Age 2 and up)             (I10) " Essential hypertension with goal blood pressure less than 140/90  Comment:  Not well controlled, increase metoprolol to 75 mg daily.   Plan: metoprolol succinate (TOPROL-XL) 50 MG 24 hr         tablet           Sarah Barriga M.D.          Patient Instructions     Increase your metoprolol to 75 mg daily (1.5 of the 50 mg pills).     Start taking you Gabapentin again.      Thank you for choosing [facility].  You may be receiving a survey in the mail from [facility] regarding your visit today.  Please take a few minutes to complete and return the survey to let us know how we are doing.      Our Clinic hours are:  Mondays    7:20 am - 7 pm  Tues -  Fri  7:20 am - 5 pm    Clinic Phone: [phone]    The clinic lab opens at 7:30 am Mon - Fri and appointments are required.    [facility]. [phone]  Monday-Thursday 8 am - 7pm  Tues/Wed/Fri 8 am - 5:30 pm

## 2018-04-12 NOTE — MR AVS SNAPSHOT
After Visit Summary   4/12/2018    Jessica Ellison    MRN: [id number]           Patient Information     Date Of Birth          1950        Visit Information        Provider Department      4/12/2018 9:40 AM Sarah Barriga MD [facility]        Today's Diagnoses     Chronic bilateral low back pain without sciatica    -  1    Sacroiliac joint pain        Other fatigue        Anemia in other chronic diseases classified elsewhere        Excessive daytime sleepiness        Essential hypertension with goal blood pressure less than 140/90          Care Instructions      Increase your metoprolol to 75 mg daily (1.5 of the 50 mg pills).     Start taking you Gabapentin again.      Thank you for choosing [facility].  You may be receiving a survey in the mail from [employer] regarding your visit today.  Please take a few minutes to complete and return the survey to let us know how we are doing.      Our Clinic hours are:  Mondays    7:20 am - 7 pm  Tues -  Fri  7:20 am - 5 pm    Clinic Phone: [phone]    The clinic lab opens at 7:30 am Mon - Fri and appointments are required.    [facility]. [phone]  Monday-Thursday 8 am - 7pm  Tues/Wed/Fri 8 am - 5:30 pm                 Follow-ups after your visit        Additional Services     SLEEP EVALUATION & MANAGEMENT REFERRAL - ADULT -[facility]  [phone] (Age 2 and up)       Please be aware that coverage of these services is subject to the terms and limitations of your health insurance plan.  Call member services at your health plan with any benefit or coverage questions.      Please bring the following to your appointment:    >>   List of current medications   >>   This referral request   >>   Any documents/labs given to you for this referral                      Your next 10 appointments already scheduled     Apr 17, 2018 12:50 PM CDT   (Arrive by 12:35 PM)   Return Stroke with  "Pia Buck MD   [facility] ([facility])    [address]  3rd Floor  [place] [address] [phone]              Future tests that were ordered for you today     Open Future Orders        Priority Expected Expires Ordered    SLEEP EVALUATION & MANAGEMENT REFERRAL - ADULT -[facility]  [phone] (Age 2 and up) Routine  4/12/2019 4/12/2018            Who to contact     If you have questions or need follow up information about today's clinic visit or your schedule please contact [facility] directly at [phone].  Normal or non-critical lab and imaging results will be communicated to you by MyChart, letter or phone within 4 business days after the clinic has received the results. If you do not hear from us within 7 days, please contact the clinic through [email or web address] or phone. If you have a critical or abnormal lab result, we will notify you by phone as soon as possible.  Submit refill requests through [employer] or call your pharmacy and they will forward the refill request to us. Please allow 3 business days for your refill to be completed.          Additional Information About Your Visit        [email or web address] Information     [employer] gives you secure access to your electronic health record. If you see a primary care provider, you can also send messages to your care team and make appointments. If you have questions, please call your primary care clinic.  If you do not have a primary care provider, please call [phone] and they will assist you.        Care EveryWhere ID     This is your Care EveryWhere ID. This could be used by other organizations to access your [place] medical records  [id number]        Your Vitals Were     Pulse Temperature Respirations Height Breastfeeding? BMI (Body Mass Index)    73 97.8  F (36.6  C) (Tympanic) 18 5' 3\" (1.6 m) No 28.7 kg/m2       Blood Pressure from Last 3 Encounters:   04/12/18 " (!) 133/98   12/11/17 140/88   11/27/17 (!) 140/97    Weight from Last 3 Encounters:   04/12/18 162 lb (73.5 kg)   11/06/17 152 lb (68.9 kg)   09/18/17 147 lb (66.7 kg)              We Performed the Following     CBC with platelets differential     DEPRESSION ACTION PLAN (DAP)     TSH with free T4 reflex          Today's Medication Changes          These changes are accurate as of 4/12/18 10:13 AM.  If you have any questions, ask your nurse or doctor.               These medicines have changed or have updated prescriptions.        Dose/Directions    calcium carbonate-vitamin D 600-400 MG-UNIT Chew   Commonly known as:  CALTRATE 600+D   This may have changed:  when to take this   Used for:  Back pain        Dose:  1 chew tab   Take 1 chew tab by mouth 2 times daily   Quantity:  180 tablet   Refills:  3       metoprolol succinate 50 MG 24 hr tablet   Commonly known as:  TOPROL-XL   This may have changed:  how much to take   Used for:  Essential hypertension with goal blood pressure less than 140/90   Changed by:  Sarah Barriga MD        Dose:  75 mg   Take 1.5 tablets (75 mg) by mouth daily   Quantity:  135 tablet   Refills:  3            Where to get your medicines      These medications were sent to [place], [place] - [address] [facility]  [address] [place] [facility] [address]     Phone:  [phone]     metoprolol succinate 50 MG 24 hr tablet         Some of these will need a paper prescription and others can be bought over the counter.  Ask your nurse if you have questions.     Bring a paper prescription for each of these medications     oxyCODONE-acetaminophen  MG per tablet    oxyCODONE-acetaminophen  MG per tablet    oxyCODONE-acetaminophen  MG per tablet                Primary Care Provider Office Phone # Fax #    Sarah Barriga [phone] [address]  [facility] [address]        Equal Access to Services      CHRISTEN [facility] HospitalSYLVIA : Hadii aad ku benji [place], waaxda luqadaha, qaybta kaalmada adezaida, tawana annmarie blancaolinda garcia meganjosh cota . So [facility] [phone].    ATENCIÓN: Si michael najera, tiene a mendez disposición servicios gratuitos de asistencia lingüística. Adriane al [phone].    We comply with applicable federal civil rights laws and [place] laws. We do not discriminate on the basis of race, color, national origin, age, disability, sex, sexual orientation, or gender identity.            Thank you!     Thank you for choosing [facility]  for your care. Our goal is always to provide you with excellent care. Hearing back from our patients is one way we can continue to improve our services. Please take a few minutes to complete the written survey that you may receive in the mail after your visit with us. Thank you!             Your Updated Medication List - Protect others around you: Learn how to safely use, store and throw away your medicines at www.disposemymeds.org.          This list is accurate as of 4/12/18 10:13 AM.  Always use your most recent med list.                   Brand Name Dispense Instructions for use Diagnosis    acyclovir 5 % ointment    ZOVIRAX    15 g    Apply topically 6 times daily    HSV (herpes simplex virus) infection       aspirin 325 MG tablet     180 tablet    Take 1 tablet (325 mg) by mouth daily    Cerebral aneurysm, nonruptured       calcium carbonate-vitamin D 600-400 MG-UNIT Chew    CALTRATE 600+D    180 tablet    Take 1 chew tab by mouth 2 times daily    Back pain       cyclobenzaprine 10 MG tablet    FLEXERIL    180 tablet    Take 1 tablet (10 mg) by mouth 2 times daily as needed for muscle spasms    S/P lumbar spinal fusion       EPINEPHrine 0.3 MG/0.3ML injection 2-pack    EPIPEN/ADRENACLICK/or ANY BX GENERIC EQUIV    2 each    Inject 0.3 mLs (0.3 mg) into the muscle once as needed for anaphylaxis    Allergy to bee sting       estradiol 0.1 MG/GM cream     ESTRACE     Place 2 g vaginally twice a week        ferrous sulfate 325 (65 Fe) MG tablet    IRON    60 tablet    Take 1 tablet (325 mg) by mouth 2 times daily    Anemia, unspecified type       gabapentin 600 MG tablet    NEURONTIN    90 tablet    Take 1 tablet (600 mg) by mouth 3 times daily    Chronic bilateral low back pain without sciatica       levothyroxine 50 MCG tablet    LEVOTHROID    90 tablet    Take 1 tablet (50 mcg) by mouth daily    Hypothyroidism, unspecified       metoprolol succinate 50 MG 24 hr tablet    TOPROL-XL    135 tablet    Take 1.5 tablets (75 mg) by mouth daily    Essential hypertension with goal blood pressure less than 140/90       multivitamin CF formula chewable tablet    CHOICEFUL    100 tablet    Take 1 tablet by mouth daily    Postmenopause       * oxyCODONE-acetaminophen  MG per tablet    PERCOCET    180 tablet    Take 2 tablets by mouth every 6 hours as needed for moderate to severe pain Max 6/day    Chronic bilateral low back pain without sciatica, Sacroiliac joint pain       * oxyCODONE-acetaminophen  MG per tablet   Start taking on:  5/11/2018    PERCOCET    180 tablet    Take 2 tablets by mouth every 6 hours as needed for moderate to severe pain Max 6/day    Chronic bilateral low back pain without sciatica, Sacroiliac joint pain       * oxyCODONE-acetaminophen  MG per tablet   Start taking on:  6/12/2018    PERCOCET    180 tablet    Take 2 tablets by mouth every 6 hours as needed for moderate to severe pain Max 6/day    Chronic bilateral low back pain without sciatica, Sacroiliac joint pain       * QUEtiapine 25 MG tablet    SEROQUEL    30 tablet    Take 1 tablet (25 mg) by mouth daily as needed Take 25 mg Every 6 hours As needed for anxiety.    Anxiety       * QUEtiapine 25 MG tablet    SEROquel    30 tablet    TAKE ONE TABLET BY MOUTH DAILY AS NEEDED FOR ANXIETY    Anxiety       sertraline 100 MG tablet    ZOLOFT    180 tablet    TAKE TWO TABLETS BY MOUTH  EVERY DAY    Major depressive disorder, recurrent episode, moderate (H)       sulfamethoxazole-trimethoprim 800-160 MG per tablet    BACTRIM DS/SEPTRA DS    90 tablet    Take 1 tablet by mouth daily    Recurrent UTI       traZODone 150 MG tablet    DESYREL    90 tablet    Take 1 tablet (150 mg) by mouth nightly as needed for sleep    Insomnia, unspecified type       * Notice:  This list has 5 medication(s) that are the same as other medications prescribed for you. Read the directions carefully, and ask your doctor or other care provider to review them with you.

## 2018-04-12 NOTE — NURSING NOTE
"Chief Complaint   Patient presents with     Hernia     Patient has had hernia repaired in December and she feels like her hernia is back. Patient doesn't recall doing anything to re-trigger this. Patient rates pain at a 2/10 no specific pain just uncomfortable.      Medication Request     Patient will need percocet refilled. Patient was able to go to [place] and with the warm weather she was able to not take as many while being there.        Initial BP (!) 159/91  Pulse 77  Temp 97.8  F (36.6  C) (Tympanic)  Resp 18  Ht 5' 3\" (1.6 m)  Wt 162 lb (73.5 kg)  Breastfeeding? No  BMI 28.7 kg/m2 Estimated body mass index is 28.7 kg/(m^2) as calculated from the following:    Height as of this encounter: 5' 3\" (1.6 m).    Weight as of this encounter: 162 lb (73.5 kg).  Medication Reconciliation: complete    "

## 2018-04-12 NOTE — PATIENT INSTRUCTIONS
Increase your metoprolol to 75 mg daily (1.5 of the 50 mg pills).     Start taking you Gabapentin again.      Thank you for choosing [facility].  You may be receiving a survey in the mail from [employer] regarding your visit today.  Please take a few minutes to complete and return the survey to let us know how we are doing.      Our Clinic hours are:  Mondays    7:20 am - 7 pm  Tues -  Fri  7:20 am - 5 pm    Clinic Phone: [phone]    The clinic lab opens at 7:30 am Mon - Fri and appointments are required.    [facility]. [phone]  Monday-Thursday 8 am - 7pm  Tues/Wed/Fri 8 am - 5:30 pm

## 2018-04-17 ENCOUNTER — TELEPHONE (OUTPATIENT)
Dept: FAMILY MEDICINE | Facility: CLINIC | Age: 68
End: 2018-04-17

## 2018-04-17 ENCOUNTER — OFFICE VISIT (OUTPATIENT)
Dept: NEUROLOGY | Facility: CLINIC | Age: 68
End: 2018-04-17
Payer: COMMERCIAL

## 2018-04-17 VITALS
WEIGHT: 161.9 LBS | HEART RATE: 69 BPM | BODY MASS INDEX: 28.69 KG/M2 | SYSTOLIC BLOOD PRESSURE: 157 MMHG | HEIGHT: 63 IN | DIASTOLIC BLOOD PRESSURE: 103 MMHG

## 2018-04-17 DIAGNOSIS — I10 ESSENTIAL HYPERTENSION WITH GOAL BLOOD PRESSURE LESS THAN 140/90: Primary | ICD-10-CM

## 2018-04-17 DIAGNOSIS — D32.0 BENIGN NEOPLASM OF CEREBRAL MENINGES (H): Primary | ICD-10-CM

## 2018-04-17 DIAGNOSIS — R47.89 SPELL OF CHANGE IN SPEECH: Primary | ICD-10-CM

## 2018-04-17 ASSESSMENT — PAIN SCALES - GENERAL: PAINLEVEL: MILD PAIN (3)

## 2018-04-17 NOTE — TELEPHONE ENCOUNTER
Pt was seen on 4/12/18 with B/P of 159/91, 133/98. Metoprolol was increased from 50 mg to 75 mg @ that time.  Today, @ Neurology appt, B/P was 152/92. 157/103.  Five days of increased dose.  Advise.  DaynaN

## 2018-04-17 NOTE — PROGRESS NOTES
[facility] NEUROLOGY  [address]  [address] [facility] [address]          April 17, 2018    Jessica Ellison                                                                                                                       [address]  [facility] [address]    Ms. Ellison is apleasant 67 year old female seen in follow up for speech difficulties. She was initially seen in October, 2017. Her PMH is significant for a history of HTN, hypothyroidism, L.superior hypophyseal aneurysm s/p coiling. A diagnostic angio was done in July 2017 and the patient had a stable coil mass and no residual.  She has chronic back problems for which she is on Flexeril , gabapentin, oxycodone, Trazodone. She is  on Trazodone and Sertraline for depression. Her flexeril is PRN and takes seroquel very rarely. Details of her speech difficulties are described in our note of 10/3/2017. Since we saw her last, she has had a brain MRI/MRA and neck MRA done at [facility]. There were no acute strokes on MRI. 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   Imaging was reviewed with patient. Since we saw her last - she has had 3 stereotypic spells of speech arrest - these were similar to prior spells and last about 1.5 days. No other accompanying symptoms. No accompanying headache. Her BP is elevated in clinic today and she will call her PCP. She used to be on Lisinopril which was discontinued prior  to surgery.     Other Interval history    Hysterectomy  Cystocele  Rectocele repairs    Exam is documented below.    ASSESSMENT/PLAN  Spells of speech difficulty. Likely late life onset migrainous symptoms without headache.  Less likely TIA.  She is on aspirin for her stent.  Will complete MCOT monitor and if results are unremarkable, no further follow up is needed. She will call us after monitor is complete and we will communicate results and need for further follow  up. Right no no scheduled follow up is needed.....  She has long standing history of  back and hip pain which affects her exam as documented below.    Encounter Diagnosis   Name Primary?     Spell of change in speech Yes     Orders Placed This Encounter   Procedures     Cardiac Mobile Telemetry Monitor       Social History   Substance Use Topics     Smoking status: Former Smoker     Quit date: 1/1/2004     Smokeless tobacco: Never Used     Alcohol use No      Comment: ETOH dependency, DUI 3/15/10     Current Outpatient Prescriptions   Medication     [START ON 6/12/2018] oxyCODONE-acetaminophen (PERCOCET)  MG per tablet     [START ON 5/11/2018] oxyCODONE-acetaminophen (PERCOCET)  MG per tablet     oxyCODONE-acetaminophen (PERCOCET)  MG per tablet     metoprolol succinate (TOPROL-XL) 50 MG 24 hr tablet     sertraline (ZOLOFT) 100 MG tablet     QUEtiapine (SEROQUEL) 25 MG tablet     gabapentin (NEURONTIN) 600 MG tablet     cyclobenzaprine (FLEXERIL) 10 MG tablet     ferrous sulfate (IRON) 325 (65 FE) MG tablet     levothyroxine (LEVOTHROID) 50 MCG tablet     traZODone (DESYREL) 150 MG tablet     QUEtiapine (SEROQUEL) 25 MG tablet     EPINEPHrine (EPIPEN) 0.3 MG/0.3ML injection     calcium carbonate-vitamin D (CALTRATE 600+D) 600-400 MG-UNIT CHEW     multivitamin CF formula (CHOICEFUL) chewable tablet     aspirin 325 MG tablet     sulfamethoxazole-trimethoprim (BACTRIM DS/SEPTRA DS) 800-160 MG per tablet     estradiol (ESTRACE) 0.1 MG/GM cream     acyclovir (ZOVIRAX) 5 % ointment     No current facility-administered medications for this visit.        Past Medical History:   Diagnosis Date     Anemia      Aneurysm (H)      Chemical dependency (H)     Chem Dep RX     Depression      History of blood transfusion      Hypertension      Menopausal symptoms      Other chronic pain     Lower back and hips     Sleep disorder      Thyroid disease      Tobacco abuse      EXAM    BP (!) 152/92 (BP Location: Right  "arm, Patient Position: Sitting, Cuff Size: Adult Regular)  Pulse 69  Ht 1.6 m (5' 3\")  Wt 73.4 kg (161 lb 14.4 oz)  BMI 28.68 kg/m2    BP (!) 157/103  Pulse 69  Ht 1.6 m (5' 3\")  Wt 73.4 kg (161 lb 14.4 oz)  BMI 28.68 kg/m2    Orientation: Normal; Language normal; Attention: DLROW; normal  Cranial nerves: 2-12 examined normal  Motor: FFM normal bilaterally; No drift; Strength normal in both UE and LE;  Has difficult movement at  Hips due to pain but strength seems normal    SA EF EE WE FE; HF KF KE DF EHL tested  Has bursitis of R hip    Has significant back pain being addressed at [facility]    Sensory: no deficits to LT  Co-ordination normal in both UE - cannot cross legs for HS  Reflexes symmetric - decreased at ankle  But symmetric and normal ow    Gait: slow antalgic gait uses cane for many years          Pia Buck MD    "

## 2018-04-17 NOTE — LETTER
4/17/2018       RE: Jessica Ellison  [address]  [facility] [address]     Dear Colleague,    Thank you for referring your patient, Jessica Ellison, to the [facility] NEUROLOGY at [facility]. Please see a copy of my visit note below.          [facility] NEUROLOGY  [address]  3rd Floor  [facility] [address]          April 17, 2018    Jessica Ellison                                                                                                                     [address]  [facility] [address]    Ms. Ellison is apleasant 67 year old female seen in follow up for speech difficulties. She was initially seen in October, 2017. Her PMH is significant for a history of HTN, hypothyroidism, L.superior hypophyseal aneurysm s/p coiling.  A diagnostic angio was done in July 2017 and the patient had a stable coil mass and no residual.  She has chronic back problems for which she is on Flexeril , gabapentin, oxycodone, Trazodone. She is  on Trazodone and Sertraline for depression. Her flexeril is PRN and takes seroquel very rarely. Details of her speech difficulties are described in our note of 10/3/2017. Since we saw her last, she has had a brain MRI/MRA and neck MRA done at [facility]. There were no acute strokes on MRI. 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   Imaging was reviewed with patient. Since we saw her last - she has had 3 stereotypic spells of speech arrest - these were similar to prior spells and last about 1.5 days. No other accompanying symptoms. No accompanying headache. Her BP is elevated in clinic today and she will call her PCP. She used to be on Lisinopril which was discontinued prior  to surgery.     Other Interval history    Hysterectomy  Cystocele  Rectocele repairs    Exam is documented below.    ASSESSMENT/PLAN  Spells of speech difficulty. Likely  late life onset migrainous symptoms without headache.  Less likely TIA.  She is on aspirin for her stent.  Will complete MCOT monitor and if results are unremarkable, no further follow up is needed. She will call us after monitor is complete and we will communicate results and need for further follow up. Right no no scheduled follow up is needed.....  She has long standing history of  back and hip pain which affects her exam as documented below.    Encounter Diagnosis   Name Primary?     Spell of change in speech Yes     Orders Placed This Encounter   Procedures     Cardiac Mobile Telemetry Monitor       Social History   Substance Use Topics     Smoking status: Former Smoker     Quit date: 1/1/2004     Smokeless tobacco: Never Used     Alcohol use No      Comment: ETOH dependency, DUI 3/15/10     Current Outpatient Prescriptions   Medication     [START ON 6/12/2018] oxyCODONE-acetaminophen (PERCOCET)  MG per tablet     [START ON 5/11/2018] oxyCODONE-acetaminophen (PERCOCET)  MG per tablet     oxyCODONE-acetaminophen (PERCOCET)  MG per tablet     metoprolol succinate (TOPROL-XL) 50 MG 24 hr tablet     sertraline (ZOLOFT) 100 MG tablet     QUEtiapine (SEROQUEL) 25 MG tablet     gabapentin (NEURONTIN) 600 MG tablet     cyclobenzaprine (FLEXERIL) 10 MG tablet     ferrous sulfate (IRON) 325 (65 FE) MG tablet     levothyroxine (LEVOTHROID) 50 MCG tablet     traZODone (DESYREL) 150 MG tablet     QUEtiapine (SEROQUEL) 25 MG tablet     EPINEPHrine (EPIPEN) 0.3 MG/0.3ML injection     calcium carbonate-vitamin D (CALTRATE 600+D) 600-400 MG-UNIT CHEW     multivitamin CF formula (CHOICEFUL) chewable tablet     aspirin 325 MG tablet     sulfamethoxazole-trimethoprim (BACTRIM DS/SEPTRA DS) 800-160 MG per tablet     estradiol (ESTRACE) 0.1 MG/GM cream     acyclovir (ZOVIRAX) 5 % ointment     No current facility-administered medications for this visit.        Past Medical History:   Diagnosis Date     Anemia       "Aneurysm (H)      Chemical dependency (H)     Chem Dep RX     Depression      History of blood transfusion      Hypertension      Menopausal symptoms      Other chronic pain     Lower back and hips     Sleep disorder      Thyroid disease      Tobacco abuse      EXAM    BP (!) 152/92 (BP Location: Right arm, Patient Position: Sitting, Cuff Size: Adult Regular)  Pulse 69  Ht 1.6 m (5' 3\")  Wt 73.4 kg (161 lb 14.4 oz)  BMI 28.68 kg/m2    BP (!) 157/103  Pulse 69  Ht 1.6 m (5' 3\")  Wt 73.4 kg (161 lb 14.4 oz)  BMI 28.68 kg/m2    Orientation: Normal; Language normal; Attention: DLROW; normal  Cranial nerves: 2-12 examined normal  Motor: FFM normal bilaterally; No drift; Strength normal in both UE and LE;  Has difficult movement at  Hips due to pain but strength seems normal    SA EF EE WE FE; HF KF KE DF EHL tested  Has bursitis of R hip    Has significant back pain being addressed at [facility]    Sensory: no deficits to LT  Co-ordination normal in both UE - cannot cross legs for HS  Reflexes symmetric - decreased at ankle  But symmetric and normal ow    Gait: slow antalgic gait uses cane for many years    Again, thank you for allowing me to participate in the care of your patient.      Sincerely,    Pia Buck MD      "

## 2018-04-17 NOTE — TELEPHONE ENCOUNTER
Reason for call:  Patient reporting a symptom    Symptom or request: Pt is currently @ [facility], after seeing a Neurologist and was told to call her PCP to get her BP meds adjusted, due to her BP reading in clinic today.  157/103 - 67p    Duration (how long have symptoms been present): today    Have you been treated for this before? Yes    Additional comments:     Phone Number patient can be reached at:  Cell number on file:    Telephone Information:   Mobile [phone]       Best Time:  any    Can we leave a detailed message on this number:  YES    Call taken on 4/17/2018 at 1:15 PM by Cait Ortiz

## 2018-04-17 NOTE — MR AVS SNAPSHOT
After Visit Summary   4/17/2018    Jessica Ellison    MRN: [id number]           Patient Information     Date Of Birth          1950        Visit Information        Provider Department      4/17/2018 12:50 PM Pia Buck MD [facility] Neurology        Today's Diagnoses     Spell of change in speech    -  1       Follow-ups after your visit        Follow-up notes from your care team     Return if symptoms worsen or fail to improve, for BP Recheck.      Your next 10 appointments already scheduled     Apr 19, 2018  3:00 PM CDT   (Arrive by 2:45 PM)   Event Monitor Visit with  Cvc Monitor [facility] ([facility] and [facility] [place])    [address] [facility]  [address] [place] [id number] [phone]              Future tests that were ordered for you today     Open Future Orders        Priority Expected Expires Ordered    Cardiac Mobile Telemetry Monitor Routine  6/1/2018 4/17/2018    MRI Brain with & without gadolinium [[id number]] Routine  4/17/2019 4/17/2018            Who to contact     Please call your clinic at [phone] to:    Ask questions about your health    Make or cancel appointments    Discuss your medicines    Learn about your test results    Speak to your doctor            Additional Information About Your Visit        [employer] Information     [employer] gives you secure access to your electronic health record. If you see a primary care provider, you can also send messages to your care team and make appointments. If you have questions, please call your primary care clinic.  If you do not have a primary care provider, please call [phone] and they will assist you.      [employer] is an electronic gateway that provides easy, online access to your medical records. With [employer], you can request a clinic appointment, read your test results, renew a prescription or communicate with your care team.     To access your existing account, please contact  "your [facility] or call [phone] for assistance.        Care EveryWhere ID     This is your Care EveryWhere ID. This could be used by other organizations to access your [place] medical records  [id number]        Your Vitals Were     Pulse Height BMI (Body Mass Index)             69 1.6 m (5' 3\") 28.68 kg/m2          Blood Pressure from Last 3 Encounters:   04/17/18 (!) 157/103   04/12/18 (!) 133/98   12/11/17 140/88    Weight from Last 3 Encounters:   04/17/18 73.4 kg (161 lb 14.4 oz)   04/12/18 73.5 kg (162 lb)   11/06/17 68.9 kg (152 lb)                 Today's Medication Changes          These changes are accurate as of 4/17/18  3:11 PM.  If you have any questions, ask your nurse or doctor.               These medicines have changed or have updated prescriptions.        Dose/Directions    calcium carbonate-vitamin D 600-400 MG-UNIT Chew   Commonly known as:  CALTRATE 600+D   This may have changed:  when to take this   Used for:  Back pain        Dose:  1 chew tab   Take 1 chew tab by mouth 2 times daily   Quantity:  180 tablet   Refills:  3       gabapentin 600 MG tablet   Commonly known as:  NEURONTIN   This may have changed:  how much to take   Used for:  Chronic bilateral low back pain without sciatica        Dose:  600 mg   Take 1 tablet (600 mg) by mouth 3 times daily   Quantity:  90 tablet   Refills:  5                Primary Care Provider Office Phone # Fax #    Sarah Barriga [phone] [address] [facility] [address]        Equal Access to Services     [facility]: Hadii radha doveo [place], waaxda luqadaha, qaybta kaalmada tawana carroll idibranden maharaj. So [facility] [phone].    ATENCIÓN: Si habla español, tiene a mendez disposición servicios gratuitos de asistencia lingüística. Llame al [phone].    We comply with applicable federal civil rights laws and [place] laws. We do not discriminate on the " basis of race, color, national origin, age, disability, sex, sexual orientation, or gender identity.            Thank you!     Thank you for choosing [facility] NEUROLOGY  for your care. Our goal is always to provide you with excellent care. Hearing back from our patients is one way we can continue to improve our services. Please take a few minutes to complete the written survey that you may receive in the mail after your visit with us. Thank you!             Your Updated Medication List - Protect others around you: Learn how to safely use, store and throw away your medicines at www.disposemymeds.org.          This list is accurate as of 4/17/18  3:11 PM.  Always use your most recent med list.                   Brand Name Dispense Instructions for use Diagnosis    acyclovir 5 % ointment    ZOVIRAX    15 g    Apply topically 6 times daily    HSV (herpes simplex virus) infection       aspirin 325 MG tablet     180 tablet    Take 1 tablet (325 mg) by mouth daily    Cerebral aneurysm, nonruptured       calcium carbonate-vitamin D 600-400 MG-UNIT Chew    CALTRATE 600+D    180 tablet    Take 1 chew tab by mouth 2 times daily    Back pain       cyclobenzaprine 10 MG tablet    FLEXERIL    180 tablet    Take 1 tablet (10 mg) by mouth 2 times daily as needed for muscle spasms    S/P lumbar spinal fusion       EPINEPHrine 0.3 MG/0.3ML injection 2-pack    EPIPEN/ADRENACLICK/or ANY BX GENERIC EQUIV    2 each    Inject 0.3 mLs (0.3 mg) into the muscle once as needed for anaphylaxis    Allergy to bee sting       estradiol 0.1 MG/GM cream    ESTRACE     Place 2 g vaginally twice a week        ferrous sulfate 325 (65 Fe) MG tablet    IRON    60 tablet    Take 1 tablet (325 mg) by mouth 2 times daily    Anemia, unspecified type       gabapentin 600 MG tablet    NEURONTIN    90 tablet    Take 1 tablet (600 mg) by mouth 3 times daily    Chronic bilateral low back pain without sciatica       levothyroxine 50 MCG tablet    LEVOTHROID    90  tablet    Take 1 tablet (50 mcg) by mouth daily    Hypothyroidism, unspecified       metoprolol succinate 50 MG 24 hr tablet    TOPROL-XL    135 tablet    Take 1.5 tablets (75 mg) by mouth daily    Essential hypertension with goal blood pressure less than 140/90       multivitamin CF formula chewable tablet    CHOICEFUL    100 tablet    Take 1 tablet by mouth daily    Postmenopause       * oxyCODONE-acetaminophen  MG per tablet    PERCOCET    180 tablet    Take 2 tablets by mouth every 6 hours as needed for moderate to severe pain Max 6/day    Chronic bilateral low back pain without sciatica, Sacroiliac joint pain       * oxyCODONE-acetaminophen  MG per tablet   Start taking on:  5/11/2018    PERCOCET    180 tablet    Take 2 tablets by mouth every 6 hours as needed for moderate to severe pain Max 6/day    Chronic bilateral low back pain without sciatica, Sacroiliac joint pain       * oxyCODONE-acetaminophen  MG per tablet   Start taking on:  6/12/2018    PERCOCET    180 tablet    Take 2 tablets by mouth every 6 hours as needed for moderate to severe pain Max 6/day    Chronic bilateral low back pain without sciatica, Sacroiliac joint pain       * QUEtiapine 25 MG tablet    SEROQUEL    30 tablet    Take 1 tablet (25 mg) by mouth daily as needed Take 25 mg Every 6 hours As needed for anxiety.    Anxiety       * QUEtiapine 25 MG tablet    SEROquel    30 tablet    TAKE ONE TABLET BY MOUTH DAILY AS NEEDED FOR ANXIETY    Anxiety       sertraline 100 MG tablet    ZOLOFT    180 tablet    TAKE TWO TABLETS BY MOUTH EVERY DAY    Major depressive disorder, recurrent episode, moderate (H)       sulfamethoxazole-trimethoprim 800-160 MG per tablet    BACTRIM DS/SEPTRA DS    90 tablet    Take 1 tablet by mouth daily    Recurrent UTI       traZODone 150 MG tablet    DESYREL    90 tablet    Take 1 tablet (150 mg) by mouth nightly as needed for sleep    Insomnia, unspecified type       * Notice:  This list has 5  medication(s) that are the same as other medications prescribed for you. Read the directions carefully, and ask your doctor or other care provider to review them with you.

## 2018-04-18 RX ORDER — LISINOPRIL 5 MG/1
5 TABLET ORAL DAILY
Qty: 30 TABLET | Refills: 1 | Status: SHIPPED | OUTPATIENT
Start: 2018-04-18 | End: 2018-06-21

## 2018-04-19 ENCOUNTER — ALLIED HEALTH/NURSE VISIT (OUTPATIENT)
Dept: CARDIOLOGY | Facility: CLINIC | Age: 68
End: 2018-04-19
Payer: COMMERCIAL

## 2018-04-19 DIAGNOSIS — R47.89 SPELL OF CHANGE IN SPEECH: ICD-10-CM

## 2018-04-19 PROCEDURE — 40000988 CARDIAC MOBILE TELEMETRY MONITOR

## 2018-04-19 PROCEDURE — 93228 REMOTE 30 DAY ECG REV/REPORT: CPT | Performed by: INTERNAL MEDICINE

## 2018-04-19 NOTE — MR AVS SNAPSHOT
After Visit Summary   4/19/2018    Jessica Ellison    MRN: [id number]           Patient Information     Date Of Birth          1950        Visit Information        Provider Department      4/19/2018 3:00 PM Tech, [facility] Cvc Monitor, [facility]        Today's Diagnoses     Spell of change in speech           Follow-ups after your visit        Who to contact     If you have questions or need follow up information about today's clinic visit or your schedule please contact [facility] directly at [phone].  Normal or non-critical lab and imaging results will be communicated to you by [facility] [employer], letter or phone within 4 business days after the clinic has received the results. If you do not hear from us within 7 days, please contact the clinic through rubberitt or phone. If you have a critical or abnormal lab result, we will notify you by phone as soon as possible.  Submit refill requests through [employer] or call your pharmacy and they will forward the refill request to us. Please allow 3 business days for your refill to be completed.          Additional Information About Your Visit        MyChart Information     [employer] gives you secure access to your electronic health record. If you see a primary care provider, you can also send messages to your care team and make appointments. If you have questions, please call your primary care clinic.  If you do not have a primary care provider, please call [phone] and they will assist you.        Care EveryWhere ID     This is your Care EveryWhere ID. This could be used by other organizations to access your [place] medical records  [id number]         Blood Pressure from Last 3 Encounters:   04/17/18 (!) 157/103   04/12/18 (!) 133/98   12/11/17 140/88    Weight from Last 3 Encounters:   04/17/18 73.4 kg (161 lb 14.4 oz)   04/12/18 73.5 kg (162 lb)   11/06/17 68.9 kg (152 lb)              We Performed the Following     Cardiac Mobile  Telemetry Monitor          Today's Medication Changes          These changes are accurate as of 4/19/18 11:59 PM.  If you have any questions, ask your nurse or doctor.               These medicines have changed or have updated prescriptions.        Dose/Directions    calcium carbonate-vitamin D 600-400 MG-UNIT Chew   Commonly known as:  CALTRATE 600+D   This may have changed:  when to take this   Used for:  Back pain        Dose:  1 chew tab   Take 1 chew tab by mouth 2 times daily   Quantity:  180 tablet   Refills:  3       gabapentin 600 MG tablet   Commonly known as:  NEURONTIN   This may have changed:  how much to take   Used for:  Chronic bilateral low back pain without sciatica        Dose:  600 mg   Take 1 tablet (600 mg) by mouth 3 times daily   Quantity:  90 tablet   Refills:  5                Primary Care Provider Office Phone # Fax #    Sarah Barriga [phone] [address] [facility] [address]        Equal Access to Services     [facility]: Hadii radha cevallos hadasho [place], waaxda luqadaha, qaybta kaalmada adeegyada, tawana cota . So [facility] [phone].    ATENCIÓN: Si habla español, tiene a mendez disposición servicios gratuitos de asistencia lingüística. Adriane al [phone].    We comply with applicable federal civil rights laws and [place] laws. We do not discriminate on the basis of race, color, national origin, age, disability, sex, sexual orientation, or gender identity.            Thank you!     Thank you for choosing [facility]  for your care. Our goal is always to provide you with excellent care. Hearing back from our patients is one way we can continue to improve our services. Please take a few minutes to complete the written survey that you may receive in the mail after your visit with us. Thank you!             Your Updated Medication List - Protect others around you: Learn how to safely use, store and throw away your  medicines at www.disposemymeds.org.          This list is accurate as of 4/19/18 11:59 PM.  Always use your most recent med list.                   Brand Name Dispense Instructions for use Diagnosis    acyclovir 5 % ointment    ZOVIRAX    15 g    Apply topically 6 times daily    HSV (herpes simplex virus) infection       aspirin 325 MG tablet     180 tablet    Take 1 tablet (325 mg) by mouth daily    Cerebral aneurysm, nonruptured       calcium carbonate-vitamin D 600-400 MG-UNIT Chew    CALTRATE 600+D    180 tablet    Take 1 chew tab by mouth 2 times daily    Back pain       cyclobenzaprine 10 MG tablet    FLEXERIL    180 tablet    Take 1 tablet (10 mg) by mouth 2 times daily as needed for muscle spasms    S/P lumbar spinal fusion       EPINEPHrine 0.3 MG/0.3ML injection 2-pack    EPIPEN/ADRENACLICK/or ANY BX GENERIC EQUIV    2 each    Inject 0.3 mLs (0.3 mg) into the muscle once as needed for anaphylaxis    Allergy to bee sting       estradiol 0.1 MG/GM cream    ESTRACE     Place 2 g vaginally twice a week        ferrous sulfate 325 (65 Fe) MG tablet    IRON    60 tablet    Take 1 tablet (325 mg) by mouth 2 times daily    Anemia, unspecified type       gabapentin 600 MG tablet    NEURONTIN    90 tablet    Take 1 tablet (600 mg) by mouth 3 times daily    Chronic bilateral low back pain without sciatica       levothyroxine 50 MCG tablet    LEVOTHROID    90 tablet    Take 1 tablet (50 mcg) by mouth daily    Hypothyroidism, unspecified       lisinopril 5 MG tablet    PRINIVIL/ZESTRIL    30 tablet    Take 1 tablet (5 mg) by mouth daily    Essential hypertension with goal blood pressure less than 140/90       metoprolol succinate 50 MG 24 hr tablet    TOPROL-XL    135 tablet    Take 1.5 tablets (75 mg) by mouth daily    Essential hypertension with goal blood pressure less than 140/90       multivitamin CF formula chewable tablet    CHOICEFUL    100 tablet    Take 1 tablet by mouth daily    Postmenopause       *  oxyCODONE-acetaminophen  MG per tablet    PERCOCET    180 tablet    Take 2 tablets by mouth every 6 hours as needed for moderate to severe pain Max 6/day    Chronic bilateral low back pain without sciatica, Sacroiliac joint pain       * oxyCODONE-acetaminophen  MG per tablet   Start taking on:  5/11/2018    PERCOCET    180 tablet    Take 2 tablets by mouth every 6 hours as needed for moderate to severe pain Max 6/day    Chronic bilateral low back pain without sciatica, Sacroiliac joint pain       * oxyCODONE-acetaminophen  MG per tablet   Start taking on:  6/12/2018    PERCOCET    180 tablet    Take 2 tablets by mouth every 6 hours as needed for moderate to severe pain Max 6/day    Chronic bilateral low back pain without sciatica, Sacroiliac joint pain       * QUEtiapine 25 MG tablet    SEROQUEL    30 tablet    Take 1 tablet (25 mg) by mouth daily as needed Take 25 mg Every 6 hours As needed for anxiety.    Anxiety       * QUEtiapine 25 MG tablet    SEROquel    30 tablet    TAKE ONE TABLET BY MOUTH DAILY AS NEEDED FOR ANXIETY    Anxiety       sertraline 100 MG tablet    ZOLOFT    180 tablet    TAKE TWO TABLETS BY MOUTH EVERY DAY    Major depressive disorder, recurrent episode, moderate (H)       sulfamethoxazole-trimethoprim 800-160 MG per tablet    BACTRIM DS/SEPTRA DS    90 tablet    Take 1 tablet by mouth daily    Recurrent UTI       traZODone 150 MG tablet    DESYREL    90 tablet    Take 1 tablet (150 mg) by mouth nightly as needed for sleep    Insomnia, unspecified type       * Notice:  This list has 5 medication(s) that are the same as other medications prescribed for you. Read the directions carefully, and ask your doctor or other care provider to review them with you.

## 2018-04-20 NOTE — NURSING NOTE
Per Dr. Buck, patient to have 30 cardionet monitor placed.  Diagnosis: spells and dizziness  Monitor placed: Yes  Patient Instructed: Yes  Patient verbalized understanding: Yes  Holter # [id number]  Card ID: [id number]     Placed by Eli MAGALLANES

## 2018-04-20 NOTE — TELEPHONE ENCOUNTER
I have attempted to contact this patient by phone with the following results: left message to return my call on answering machine.    Leann Spicer RN

## 2018-04-25 NOTE — TELEPHONE ENCOUNTER
LM to call clinic/RN, on home #, concerning Lisinopril dose increase.  Unable to reach pt, have been trying since 4/17/18. Nohemi

## 2018-05-22 ENCOUNTER — TRANSFERRED RECORDS (OUTPATIENT)
Dept: HEALTH INFORMATION MANAGEMENT | Facility: CLINIC | Age: 68
End: 2018-05-22

## 2018-06-21 ENCOUNTER — TELEPHONE (OUTPATIENT)
Dept: FAMILY MEDICINE | Facility: CLINIC | Age: 68
End: 2018-06-21

## 2018-06-21 DIAGNOSIS — G89.29 CHRONIC BILATERAL LOW BACK PAIN WITHOUT SCIATICA: ICD-10-CM

## 2018-06-21 DIAGNOSIS — I10 ESSENTIAL HYPERTENSION WITH GOAL BLOOD PRESSURE LESS THAN 140/90: ICD-10-CM

## 2018-06-21 DIAGNOSIS — M54.50 CHRONIC BILATERAL LOW BACK PAIN WITHOUT SCIATICA: ICD-10-CM

## 2018-06-21 NOTE — TELEPHONE ENCOUNTER
"Requested Prescriptions   Pending Prescriptions Disp Refills     gabapentin (NEURONTIN) 300 MG capsule [Pharmacy Med Name: GABAPENTIN 300MG CAPS]  Last Written Prescription Date:  11/27/17  Last Fill Quantity: 90,  # refills: 5   Last office visit: 4/12/2018 with prescribing provider:  04/12/18   Future Office Visit:     90 capsule 5     Sig: TAKE ONE CAPSULE BY MOUTH THREE TIMES A DAY (TO BE TAKEN WITH THE 600MG TABLET).    There is no refill protocol information for this order        lisinopril (PRINIVIL/ZESTRIL) 5 MG tablet [Pharmacy Med Name: LISINOPRIL 5MG TABS]  Last Written Prescription Date:  04/18/18  Last Fill Quantity: 30,  # refills: 1   Last office visit: 4/12/2018 with prescribing provider:  04/12/18   Future Office Visit:     30 tablet 1     Sig: TAKE ONE TABLET BY MOUTH EVERY DAY    ACE Inhibitors (Including Combos) Protocol Failed    6/21/2018 12:19 PM       Failed - Blood pressure under 140/90 in past 12 months    BP Readings from Last 3 Encounters:   04/17/18 (!) 157/103   04/12/18 (!) 133/98   12/11/17 140/88          Passed - Recent (12 mo) or future (30 days) visit within the authorizing provider's specialty    Patient had office visit in the last 12 months or has a visit in the next 30 days with authorizing provider or within the authorizing provider's specialty.  See \"Patient Info\" tab in inbasket, or \"Choose Columns\" in Meds & Orders section of the refill encounter.           Passed - Patient is age 18 or older       Passed - No active pregnancy on record       Passed - Normal serum creatinine on file in past 12 months    Recent Labs   Lab Test  11/06/17   1426   CR  0.87          Passed - Normal serum potassium on file in past 12 months    Recent Labs   Lab Test  11/06/17   1426   POTASSIUM  4.4          Passed - No positive pregnancy test in past 12 months          "

## 2018-06-25 ENCOUNTER — TELEPHONE (OUTPATIENT)
Dept: NEUROLOGY | Facility: CLINIC | Age: 68
End: 2018-06-25

## 2018-06-25 NOTE — TELEPHONE ENCOUNTER
[facility] Call Center    Phone Message    May a detailed message be left on voicemail: yes    Reason for Call: Requesting Results   Name/type of test: 30 days Event Monitor  Date of test: 4/19/18  Was test done at a location other than [facility] (Please fill in the location if not [facility])?: No      Action Taken: Message routed to:  [facility] ([facility] Neurology

## 2018-06-26 RX ORDER — LISINOPRIL 5 MG/1
TABLET ORAL
Qty: 30 TABLET | Refills: 0 | Status: SHIPPED | OUTPATIENT
Start: 2018-06-26 | End: 2018-07-16

## 2018-06-26 RX ORDER — GABAPENTIN 600 MG/1
600 TABLET ORAL 3 TIMES DAILY
Qty: 90 TABLET | Refills: 5 | Status: CANCELLED | OUTPATIENT
Start: 2018-06-26

## 2018-06-26 RX ORDER — GABAPENTIN 300 MG/1
CAPSULE ORAL
Qty: 90 CAPSULE | Refills: 5 | OUTPATIENT
Start: 2018-06-26

## 2018-06-26 NOTE — TELEPHONE ENCOUNTER
There is no atrial fibrillation. So this is good news. There was a dizzy spell, but there was only regular rhytm at that time. Her heart rate ran fast a few times (though it was regular) and she should talk to PCP about that. No further follow up with me is needed.

## 2018-06-26 NOTE — TELEPHONE ENCOUNTER
[facility] informing patient call has been routed to Dr. Buck for review of cardiac monitoring results.

## 2018-06-26 NOTE — TELEPHONE ENCOUNTER
Patient has scheduled an appointment to discuss medication with provider on 7/16/18.  Refill the LIsinopril for one month.  Patient states her blood pressures are goos at home. Patient will have this checked at the .  Patient would like the gabapentin refilled but would like to change the dose,  She would like 600 mg 3 times a day.  The refill came in for 300 mg tablets.  Will send to provider to review. Patient has enough medication until 7/5/18.    Thank you  Jolene MCDANIELS RN

## 2018-06-27 NOTE — TELEPHONE ENCOUNTER
Informed patient of results below. She verbalized understanding and requests a copy of the result as well as notifying her primary care provider, Dr. Sarah Barriga of the results. Message sent to Dr. Barriga. Patient has my contact information and was encouraged to call with questions/concerns.

## 2018-07-04 RX ORDER — GABAPENTIN 600 MG/1
600 TABLET ORAL 3 TIMES DAILY
Qty: 90 TABLET | Refills: 5 | Status: SHIPPED | OUTPATIENT
Start: 2018-07-04 | End: 2019-06-13

## 2018-07-05 NOTE — TELEPHONE ENCOUNTER
Message left for patient to return call to clinic.  CSS - ok to deliver message below.    Fely GONZALEZ RN

## 2018-07-06 ENCOUNTER — ALLIED HEALTH/NURSE VISIT (OUTPATIENT)
Dept: FAMILY MEDICINE | Facility: CLINIC | Age: 68
End: 2018-07-06
Payer: COMMERCIAL

## 2018-07-06 VITALS — DIASTOLIC BLOOD PRESSURE: 88 MMHG | SYSTOLIC BLOOD PRESSURE: 138 MMHG
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
## 2018-07-06 NOTE — PROGRESS NOTES
"Pt brought in to clinic by her spouse. Reports she fell outside of [place] prior to coming in here and wanted to get her checked out. States there was a step she tripped on and was not using any of her assistive devices (has both a 4ww and a cane). Ambulated in to and through clinic with her 4ww.     Some dried blood on right toe, right knee bruised on medial side, approx nickel sized bruise on her right cheek. Says she did not  hit her head. Denies headache. Denies dizziness or any LOC with the fall. Says she has fallen before.    Ice pack wrapped in paper towels and applied to R knee.  Agreed to having BP checked: 138/88    Spouse had first stated the situation was an \"emergency,\" when attempting to room pt and ER visit was suggested. Assessment done and situation not emergent; informed pt & spouse there are no appts at clinic today. It is recommended she go to [facility]gent [facility] at [place] for further assessment, which is available at noon.  Agreed to this but first she was going to go home and take her \"meds.\"    Kathleen DAWN RN      "

## 2018-07-06 NOTE — TELEPHONE ENCOUNTER
Message left for patient to return call to clinic.  CSS - ok to deliver message below    Thank you  Jolene MCDANIELS RN

## 2018-07-16 ENCOUNTER — OFFICE VISIT (OUTPATIENT)
Dept: FAMILY MEDICINE | Facility: CLINIC | Age: 68
End: 2018-07-16
Payer: COMMERCIAL

## 2018-07-16 VITALS
TEMPERATURE: 97.4 F | OXYGEN SATURATION: 94 % | RESPIRATION RATE: 18 BRPM | DIASTOLIC BLOOD PRESSURE: 78 MMHG | HEIGHT: 63 IN | HEART RATE: 89 BPM | SYSTOLIC BLOOD PRESSURE: 124 MMHG

## 2018-07-16 DIAGNOSIS — Z98.1 S/P LUMBAR SPINAL FUSION: Primary | ICD-10-CM

## 2018-07-16 DIAGNOSIS — I10 ESSENTIAL HYPERTENSION WITH GOAL BLOOD PRESSURE LESS THAN 140/90: ICD-10-CM

## 2018-07-16 DIAGNOSIS — M53.3 SACROILIAC JOINT PAIN: ICD-10-CM

## 2018-07-16 DIAGNOSIS — G89.29 CHRONIC BILATERAL LOW BACK PAIN WITHOUT SCIATICA: ICD-10-CM

## 2018-07-16 DIAGNOSIS — E03.9 HYPOTHYROIDISM, UNSPECIFIED TYPE: ICD-10-CM

## 2018-07-16 DIAGNOSIS — M54.50 CHRONIC BILATERAL LOW BACK PAIN WITHOUT SCIATICA: ICD-10-CM

## 2018-07-16 DIAGNOSIS — F33.1 MAJOR DEPRESSIVE DISORDER, RECURRENT EPISODE, MODERATE (H): ICD-10-CM

## 2018-07-16 DIAGNOSIS — G47.00 INSOMNIA, UNSPECIFIED TYPE: ICD-10-CM

## 2018-07-16 DIAGNOSIS — M25.461 EFFUSION OF RIGHT KNEE: ICD-10-CM

## 2018-07-16 PROCEDURE — 99214 OFFICE O/P EST MOD 30 MIN: CPT | Performed by: FAMILY MEDICINE

## 2018-07-16 RX ORDER — OXYCODONE AND ACETAMINOPHEN 10; 325 MG/1; MG/1
2 TABLET ORAL EVERY 6 HOURS PRN
Qty: 180 TABLET | Refills: 0 | Status: SHIPPED | OUTPATIENT
Start: 2018-08-12 | End: 2019-01-28

## 2018-07-16 RX ORDER — LEVOTHYROXINE SODIUM 50 UG/1
50 TABLET ORAL DAILY
Qty: 90 TABLET | Refills: 3 | Status: SHIPPED | OUTPATIENT
Start: 2018-07-16 | End: 2019-06-13

## 2018-07-16 RX ORDER — OXYCODONE AND ACETAMINOPHEN 10; 325 MG/1; MG/1
2 TABLET ORAL EVERY 6 HOURS PRN
Qty: 180 TABLET | Refills: 0 | Status: SHIPPED | OUTPATIENT
Start: 2018-10-12 | End: 2019-01-28

## 2018-07-16 RX ORDER — OXYCODONE AND ACETAMINOPHEN 10; 325 MG/1; MG/1
2 TABLET ORAL EVERY 6 HOURS PRN
Qty: 180 TABLET | Refills: 0 | Status: SHIPPED | OUTPATIENT
Start: 2018-09-12 | End: 2019-01-28

## 2018-07-16 RX ORDER — CYCLOBENZAPRINE HCL 10 MG
10 TABLET ORAL 2 TIMES DAILY PRN
Qty: 180 TABLET | Refills: 1 | Status: SHIPPED | OUTPATIENT
Start: 2018-07-16 | End: 2019-06-13

## 2018-07-16 RX ORDER — TRAZODONE HYDROCHLORIDE 150 MG/1
150 TABLET ORAL
Qty: 90 TABLET | Refills: 3 | Status: SHIPPED | OUTPATIENT
Start: 2018-07-16 | End: 2019-06-13

## 2018-07-16 RX ORDER — LISINOPRIL 5 MG/1
5 TABLET ORAL DAILY
Qty: 90 TABLET | Refills: 3 | Status: SHIPPED | OUTPATIENT
Start: 2018-07-16 | End: 2019-01-02

## 2018-07-16 RX ORDER — SERTRALINE HYDROCHLORIDE 100 MG/1
TABLET, FILM COATED ORAL
Qty: 180 TABLET | Refills: 3 | Status: SHIPPED | OUTPATIENT
Start: 2018-07-16 | End: 2019-01-28

## 2018-07-16 ASSESSMENT — ANXIETY QUESTIONNAIRES
6. BECOMING EASILY ANNOYED OR IRRITABLE: SEVERAL DAYS
GAD7 TOTAL SCORE: 5
3. WORRYING TOO MUCH ABOUT DIFFERENT THINGS: SEVERAL DAYS
5. BEING SO RESTLESS THAT IT IS HARD TO SIT STILL: NOT AT ALL
7. FEELING AFRAID AS IF SOMETHING AWFUL MIGHT HAPPEN: SEVERAL DAYS
1. FEELING NERVOUS, ANXIOUS, OR ON EDGE: SEVERAL DAYS
2. NOT BEING ABLE TO STOP OR CONTROL WORRYING: SEVERAL DAYS
IF YOU CHECKED OFF ANY PROBLEMS ON THIS QUESTIONNAIRE, HOW DIFFICULT HAVE THESE PROBLEMS MADE IT FOR YOU TO DO YOUR WORK, TAKE CARE OF THINGS AT HOME, OR GET ALONG WITH OTHER PEOPLE: SOMEWHAT DIFFICULT

## 2018-07-16 ASSESSMENT — PAIN SCALES - GENERAL: PAINLEVEL: EXTREME PAIN (8)

## 2018-07-16 ASSESSMENT — PATIENT HEALTH QUESTIONNAIRE - PHQ9: 5. POOR APPETITE OR OVEREATING: NOT AT ALL

## 2018-07-16 NOTE — MR AVS SNAPSHOT
After Visit Summary   7/16/2018    Jessica Ellison    MRN: [id number]           Patient Information     Date Of Birth          1950        Visit Information        Provider Department      7/16/2018 11:20 AM Sarah Barriga MD [facility]        Today's Diagnoses     Hypothyroidism, unspecified type    -  1    S/P lumbar spinal fusion        Essential hypertension with goal blood pressure less than 140/90        Chronic bilateral low back pain without sciatica        Sacroiliac joint pain        Major depressive disorder, recurrent episode, moderate (H)        Insomnia, unspecified type          Care Instructions          Thank you for choosing [facility].  You may be receiving a survey in the mail from [employer] regarding your visit today.  Please take a few minutes to complete and return the survey to let us know how we are doing.      Our Clinic hours are:  Mondays    7:20 am - 7 pm  Tues - Fri  7:20 am - 5 pm    Clinic Phone: [phone]    The clinic lab opens at 7:30 am Mon - Fri and appointments are required.    [facility]  Ph. [phone]  Monday  8 am - 7pm  Tues - Fri 8 am - 5:30 pm                 Follow-ups after your visit        Who to contact     If you have questions or need follow up information about today's clinic visit or your schedule please contact [facility] directly at [phone].  Normal or non-critical lab and imaging results will be communicated to you by MyChart, letter or phone within 4 business days after the clinic has received the results. If you do not hear from us within 7 days, please contact the clinic through MyChart or phone. If you have a critical or abnormal lab result, we will notify you by phone as soon as possible.  Submit refill requests through [employer] or call your pharmacy and they will forward the refill request to us. Please allow 3 business days for your refill to be  "completed.          Additional Information About Your Visit        [employer] Information     [employer] gives you secure access to your electronic health record. If you see a primary care provider, you can also send messages to your care team and make appointments. If you have questions, please call your primary care clinic.  If you do not have a primary care provider, please call [phone] and they will assist you.        Care EveryWhere ID     This is your Care EveryWhere ID. This could be used by other organizations to access your [facility] medical records  [id number]        Your Vitals Were     Pulse Temperature Respirations Height Pulse Oximetry Breastfeeding?    89 97.4  F (36.3  C) (Tympanic) 18 5' 3\" (1.6 m) 94% No       Blood Pressure from Last 3 Encounters:   07/16/18 124/78   07/06/18 138/88   04/17/18 (!) 157/103    Weight from Last 3 Encounters:   04/17/18 161 lb 14.4 oz (73.4 kg)   04/12/18 162 lb (73.5 kg)   11/06/17 152 lb (68.9 kg)              Today, you had the following     No orders found for display         Today's Medication Changes          These changes are accurate as of 7/16/18 11:50 AM.  If you have any questions, ask your nurse or doctor.               These medicines have changed or have updated prescriptions.        Dose/Directions    calcium carbonate-vitamin D 600-400 MG-UNIT Chew   Commonly known as:  CALTRATE 600+D   This may have changed:  when to take this   Used for:  Back pain        Dose:  1 chew tab   Take 1 chew tab by mouth 2 times daily   Quantity:  180 tablet   Refills:  3       lisinopril 5 MG tablet   Commonly known as:  PRINIVIL/ZESTRIL   This may have changed:  See the new instructions.   Used for:  Essential hypertension with goal blood pressure less than 140/90   Changed by:  Sarah Barriga MD        Dose:  5 mg   Take 1 tablet (5 mg) by mouth daily   Quantity:  90 tablet   Refills:  3       * oxyCODONE-acetaminophen  MG per tablet   Commonly known as:  " PERCOCET   This may have changed:  These instructions start on 8/12/2018. If you are unsure what to do until then, ask your doctor or other care provider.   Used for:  Chronic bilateral low back pain without sciatica, Sacroiliac joint pain   Changed by:  Sarah Barriga MD        Dose:  2 tablet   Start taking on:  8/12/2018   Take 2 tablets by mouth every 6 hours as needed for moderate to severe pain Max 6/day   Quantity:  180 tablet   Refills:  0       * oxyCODONE-acetaminophen  MG per tablet   Commonly known as:  PERCOCET   This may have changed:  These instructions start on 9/12/2018. If you are unsure what to do until then, ask your doctor or other care provider.   Used for:  Chronic bilateral low back pain without sciatica, Sacroiliac joint pain   Changed by:  Sarah Barriga MD        Dose:  2 tablet   Start taking on:  9/12/2018   Take 2 tablets by mouth every 6 hours as needed for moderate to severe pain Max 6/day   Quantity:  180 tablet   Refills:  0       * oxyCODONE-acetaminophen  MG per tablet   Commonly known as:  PERCOCET   This may have changed:  These instructions start on 10/12/2018. If you are unsure what to do until then, ask your doctor or other care provider.   Used for:  Chronic bilateral low back pain without sciatica, Sacroiliac joint pain   Changed by:  Sarah Barriga MD        Dose:  2 tablet   Start taking on:  10/12/2018   Take 2 tablets by mouth every 6 hours as needed for moderate to severe pain Max 6/day   Quantity:  180 tablet   Refills:  0       QUEtiapine 25 MG tablet   Commonly known as:  SEROquel   This may have changed:  Another medication with the same name was removed. Continue taking this medication, and follow the directions you see here.   Used for:  Anxiety   Changed by:  Sarah Barriga MD        TAKE ONE TABLET BY MOUTH DAILY AS NEEDED FOR ANXIETY   Quantity:  30 tablet   Refills:  3       * Notice:  This list has 3 medication(s) that are the same as  other medications prescribed for you. Read the directions carefully, and ask your doctor or other care provider to review them with you.         Where to get your medicines      These medications were sent to [facility] [place] - [place], [place] - [address] [facility]  [address] [facility] [place], [place] [facility] [place] [address]     Phone:  [phone]     cyclobenzaprine 10 MG tablet    levothyroxine 50 MCG tablet    lisinopril 5 MG tablet    sertraline 100 MG tablet    traZODone 150 MG tablet         Some of these will need a paper prescription and others can be bought over the counter.  Ask your nurse if you have questions.     Bring a paper prescription for each of these medications     oxyCODONE-acetaminophen  MG per tablet    oxyCODONE-acetaminophen  MG per tablet    oxyCODONE-acetaminophen  MG per tablet               Information about OPIOIDS     PRESCRIPTION OPIOIDS: WHAT YOU NEED TO KNOW   We gave you an opioid (narcotic) pain medicine. It is important to manage your pain, but opioids are not always the best choice. You should first try all the other options your care team gave you. Take this medicine for as short a time (and as few doses) as possible.     These medicines have risks:    DO NOT drive when on new or higher doses of pain medicine. These medicines can affect your alertness and reaction times, and you could be arrested for driving under the influence (DUI). If you need to use opioids long-term, talk to your care team about driving.    DO NOT operate heave machinery    DO NOT do any other dangerous activities while taking these medicines.     DO NOT drink any alcohol while taking these medicines.      If the opioid prescribed includes acetaminophen, DO NOT take with any other medicines that contain acetaminophen. Read all labels carefully. Look for the word  acetaminophen  or  Tylenol.  Ask your pharmacist if you have questions or are unsure.    You can get addicted to  pain medicines, especially if you have a history of addiction (chemical, alcohol or substance dependence). Talk to your care team about ways to reduce this risk.    Store your pills in a secure place, locked if possible. We will not replace any lost or stolen medicine. If you don t finish your medicine, please throw away (dispose) as directed by your pharmacist. The [place] Pollution Control Agency has more information about safe disposal: https://www.pca.[facility].[place].us/living-green/managing-unwanted-medications.     All opioids tend to cause constipation. Drink plenty of water and eat foods that have a lot of fiber, such as fruits, vegetables, prune juice, apple juice and high-fiber cereal. Take a laxative (Miralax, milk of magnesia, Colace, Senna) if you don t move your bowels at least every other day.          Primary Care Provider Office Phone # Fax #    Sarah Barriga [phone] [address] [facility] [address]        Equal Access to Services     CHIRSTEN MOURA : Hadii aad ku hadasho Soomaali, waaxda luqadaha, qaybta kaalmada adeegyada, waxay idiin hayмария cota . So [facility] [phone].    ATENCIÓN: Si habla español, tiene a mendez disposición servicios gratuitos de asistencia lingüística. Llame al [phone].    We comply with applicable federal civil rights laws and [place] laws. We do not discriminate on the basis of race, color, national origin, age, disability, sex, sexual orientation, or gender identity.            Thank you!     Thank you for choosing [facility]  for your care. Our goal is always to provide you with excellent care. Hearing back from our patients is one way we can continue to improve our services. Please take a few minutes to complete the written survey that you may receive in the mail after your visit with us. Thank you!             Your Updated Medication List - Protect others around you: Learn how to safely use, store and  throw away your medicines at www.disposemymeds.org.          This list is accurate as of 7/16/18 11:50 AM.  Always use your most recent med list.                   Brand Name Dispense Instructions for use Diagnosis    acyclovir 5 % ointment    ZOVIRAX    15 g    Apply topically 6 times daily    HSV (herpes simplex virus) infection       aspirin 325 MG tablet     180 tablet    Take 1 tablet (325 mg) by mouth daily    Cerebral aneurysm, nonruptured       calcium carbonate-vitamin D 600-400 MG-UNIT Chew    CALTRATE 600+D    180 tablet    Take 1 chew tab by mouth 2 times daily    Back pain       cyclobenzaprine 10 MG tablet    FLEXERIL    180 tablet    Take 1 tablet (10 mg) by mouth 2 times daily as needed for muscle spasms    S/P lumbar spinal fusion       EPINEPHrine 0.3 MG/0.3ML injection 2-pack    EPIPEN/ADRENACLICK/or ANY BX GENERIC EQUIV    2 each    Inject 0.3 mLs (0.3 mg) into the muscle once as needed for anaphylaxis    Allergy to bee sting       ferrous sulfate 325 (65 Fe) MG tablet    IRON    60 tablet    Take 1 tablet (325 mg) by mouth 2 times daily    Anemia, unspecified type       gabapentin 600 MG tablet    NEURONTIN    90 tablet    Take 1 tablet (600 mg) by mouth 3 times daily    Chronic bilateral low back pain without sciatica       levothyroxine 50 MCG tablet    LEVOTHROID    90 tablet    Take 1 tablet (50 mcg) by mouth daily    Hypothyroidism, unspecified type       lisinopril 5 MG tablet    PRINIVIL/ZESTRIL    90 tablet    Take 1 tablet (5 mg) by mouth daily    Essential hypertension with goal blood pressure less than 140/90       metoprolol succinate 50 MG 24 hr tablet    TOPROL-XL    135 tablet    Take 1.5 tablets (75 mg) by mouth daily    Essential hypertension with goal blood pressure less than 140/90       multivitamin CF formula chewable tablet    CHOICEFUL    100 tablet    Take 1 tablet by mouth daily    Postmenopause       * oxyCODONE-acetaminophen  MG per tablet   Start taking on:   8/12/2018    PERCOCET    180 tablet    Take 2 tablets by mouth every 6 hours as needed for moderate to severe pain Max 6/day    Chronic bilateral low back pain without sciatica, Sacroiliac joint pain       * oxyCODONE-acetaminophen  MG per tablet   Start taking on:  9/12/2018    PERCOCET    180 tablet    Take 2 tablets by mouth every 6 hours as needed for moderate to severe pain Max 6/day    Chronic bilateral low back pain without sciatica, Sacroiliac joint pain       * oxyCODONE-acetaminophen  MG per tablet   Start taking on:  10/12/2018    PERCOCET    180 tablet    Take 2 tablets by mouth every 6 hours as needed for moderate to severe pain Max 6/day    Chronic bilateral low back pain without sciatica, Sacroiliac joint pain       QUEtiapine 25 MG tablet    SEROquel    30 tablet    TAKE ONE TABLET BY MOUTH DAILY AS NEEDED FOR ANXIETY    Anxiety       sertraline 100 MG tablet    ZOLOFT    180 tablet    TAKE TWO TABLETS BY MOUTH EVERY DAY    Major depressive disorder, recurrent episode, moderate (H)       sulfamethoxazole-trimethoprim 800-160 MG per tablet    BACTRIM DS/SEPTRA DS    90 tablet    Take 1 tablet by mouth daily    Recurrent UTI       traZODone 150 MG tablet    DESYREL    90 tablet    Take 1 tablet (150 mg) by mouth nightly as needed for sleep    Insomnia, unspecified type       * Notice:  This list has 3 medication(s) that are the same as other medications prescribed for you. Read the directions carefully, and ask your doctor or other care provider to review them with you.

## 2018-07-16 NOTE — PROGRESS NOTES
SUBJECTIVE:   Jessica Ellison is a 67 year old female who presents to clinic today for the following health issues:      Hypertension Follow-up      Outpatient blood pressures are being checked at home.  Results are 130/70.    Low Salt Diet: not monitoring salt    Depression Followup    Status since last visit: Stable     See PHQ-9 for current symptoms.  Other associated symptoms: None    Complicating factors:   Significant life event:  Yes-  Patient fell recently so she is in more pain and limited on her activites   Current substance abuse:  None  Anxiety or Panic symptoms:  No    PHQ-9 9/18/2017 11/6/2017 11/27/2017   Total Score 0 13 7   Q9: Suicide Ideation Not at all Not at all Not at all     In the past two weeks have you had thoughts of suicide or self-harm?  No.    Do you have concerns about your personal safety or the safety of others?   No  PHQ-9  English  PHQ-9   Any Language  Suicide Assessment Five-step Evaluation and Treatment (SAFE-T)  Hypothyroidism Follow-up      Since last visit, patient describes the following symptoms: Weight stable, no hair loss, no skin changes, no constipation, no loose stools      Amount of exercise or physical activity: None    Problems taking medications regularly: No    Medication side effects: none    Diet: regular (no restrictions) and low salt        Back Pain Follow Up      Description:   Location of pain:  bilateral  Character of pain: sharp and gnawing  Pain radiation: radiates into the right buttocks  Since last visit, pain is:  unchanged  New numbness or weakness in legs, not attributed to pain:  no     Intensity: moderate - is using her gabapentin more faithfully which has helped    History:   Pain interferes with job: Not applicable  Therapies tried without relief: Physical Therapy  Therapies tried with relief: opioids and steroid injection             Accompanying Signs & Symptoms:  Risk of Fracture:  None  Risk of Cauda Equina:  None  Risk of Infection:   "None  Risk of Cancer:  None         Problem list and histories reviewed & adjusted, as indicated.  Additional history: as documented        Reviewed and updated as needed this visit by clinical staff  Tobacco  Allergies  Med Hx  Surg Hx  Fam Hx  Soc Hx      Reviewed and updated as needed this visit by Provider          /78  Pulse 89  Temp 97.4  F (36.3  C) (Tympanic)  Resp 18  Ht 5' 3\" (1.6 m)  SpO2 94%  Breastfeeding? No  EXAM: GENERAL APPEARANCE: Alert, no acute distress  RESP: lungs clear to auscultation   CV: normal rate, regular rhythm, no murmur or gallop  ABDOMEN: soft, no organomegaly, masses or tenderness  MS: knee exam ecchymosis-right medial knee, effusion-moderate, range of motion limited to 70 degrees in flexion, collateral ligaments intact to stress, exam limited by pain  PSYCH: mentation appears normal., affect and mood normal      ASSESSMENT/PLAN:      ICD-10-CM    1. S/P lumbar spinal fusion Z98.1 cyclobenzaprine (FLEXERIL) 10 MG tablet   2. Essential hypertension with goal blood pressure less than 140/90 I10 lisinopril (PRINIVIL/ZESTRIL) 5 MG tablet   3. Chronic bilateral low back pain without sciatica M54.5 oxyCODONE-acetaminophen (PERCOCET)  MG per tablet    G89.29 oxyCODONE-acetaminophen (PERCOCET)  MG per tablet     oxyCODONE-acetaminophen (PERCOCET)  MG per tablet   4. Sacroiliac joint pain M53.3 oxyCODONE-acetaminophen (PERCOCET)  MG per tablet     oxyCODONE-acetaminophen (PERCOCET)  MG per tablet     oxyCODONE-acetaminophen (PERCOCET)  MG per tablet   5. Major depressive disorder, recurrent episode, moderate (H) F33.1 sertraline (ZOLOFT) 100 MG tablet   6. Insomnia, unspecified type G47.00 traZODone (DESYREL) 150 MG tablet   7. Hypothyroidism, unspecified type E03.9 levothyroxine (LEVOTHROID) 50 MCG tablet   8. Effusion of right knee M25.461      Monitor the knee pain.  May need MRI/ortho referral if not improving over time.     Sarah PLATT" RICK Barriga.      Patient Instructions         Thank you for choosing [facility].  You may be receiving a survey in the mail from [employer] [facility] regarding your visit today.  Please take a few minutes to complete and return the survey to let us know how we are doing.      Our Clinic hours are:  Mondays    7:20 am - 7 pm  Tues -  Fri  7:20 am - 5 pm    Clinic Phone: [phone]    The clinic lab opens at 7:30 am Mon - Fri and appointments are required.    [facility]. [phone]  Monday  8 am - 7pm  Tues - Fri 8 am - 5:30 pm

## 2018-07-16 NOTE — PATIENT INSTRUCTIONS
Thank you for choosing [facility].  You may be receiving a survey in the mail from [facility] regarding your visit today.  Please take a few minutes to complete and return the survey to let us know how we are doing.      Our Clinic hours are:  Mondays    7:20 am - 7 pm  Tues - Fri  7:20 am - 5 pm    Clinic Phone: [phone]    The clinic lab opens at 7:30 am Mon - Fri and appointments are required.    [facility] [place] [facility]. [phone]  Monday  8 am - 7pm  Tues - Fri 8 am - 5:30 pm

## 2018-07-17 ASSESSMENT — PATIENT HEALTH QUESTIONNAIRE - PHQ9: SUM OF ALL RESPONSES TO PHQ QUESTIONS 1-9: 8

## 2018-07-17 ASSESSMENT — ANXIETY QUESTIONNAIRES: GAD7 TOTAL SCORE: 5

## 2018-07-18 ENCOUNTER — TELEPHONE (OUTPATIENT)
Dept: FAMILY MEDICINE | Facility: CLINIC | Age: 68
End: 2018-07-18

## 2018-07-18 NOTE — TELEPHONE ENCOUNTER
Patients insurance is not covering the cyclobenzaprine 10mg tablets, a PA is required or change in med.     Ins: [employer] Part D  Id: [id number]  Phone Number: [phone]    Thank You-  Carla Marquez, [facility] [place]

## 2018-07-19 NOTE — TELEPHONE ENCOUNTER
Central Prior Authorization Team   Phone: [phone]    PA Initiation    Medication: Flexeril 10mg  Insurance Company: [employer] - Phone [phone] Fax [phone]  Pharmacy Filling the Rx: [place], [place] - [address]  Filling Pharmacy Phone: [phone]  Filling Pharmacy Fax: [phone]  Start Date: 7/19/2018

## 2018-07-19 NOTE — TELEPHONE ENCOUNTER
Prior Authorization Approval    Authorization Effective Date: 4/20/2018  Authorization Expiration Date: 7/19/2019  Medication: Flexeril 10mg-APPROVED  Approved Dose/Quantity:    Reference #:     Insurance Company: [employer] [phone] Fax [phone]  Expected CoPay:       CoPay Card Available:      Foundation Assistance Needed:    Which Pharmacy is filling the prescription (Not needed for infusion/clinic administered): [facility] [place], [place] - [address]  Pharmacy Notified: Yes  Patient Notified: Yes

## 2018-07-19 NOTE — TELEPHONE ENCOUNTER
PA submitted to [facility] PA POOL 7/18/18 by MAHSA Chi    Prior Authorization Retail Medication Request    Medication/Dose: Flexeril 10mg  ICD code (if different than what is on RX):    Previously Tried and Failed:  zanaflex 2 and 4 mg; tizanidine hcl 2 mg  Rationale:  Patient has used since 2011     Insurance Name:  [employer] Part D  Insurance ID:  [id number]      Pharmacy Information (if different than what is on RX)  Name:    Phone:

## 2018-08-03 ENCOUNTER — TRANSFERRED RECORDS (OUTPATIENT)
Dept: HEALTH INFORMATION MANAGEMENT | Facility: CLINIC | Age: 68
End: 2018-08-03

## 2018-09-24 DIAGNOSIS — G89.29 CHRONIC BILATERAL LOW BACK PAIN WITHOUT SCIATICA: ICD-10-CM

## 2018-09-24 DIAGNOSIS — M54.50 CHRONIC BILATERAL LOW BACK PAIN WITHOUT SCIATICA: ICD-10-CM

## 2018-09-24 RX ORDER — GABAPENTIN 300 MG/1
300 CAPSULE ORAL 3 TIMES DAILY
Qty: 90 CAPSULE | Refills: 5 | OUTPATIENT
Start: 2018-09-24

## 2018-10-04 ENCOUNTER — TELEPHONE (OUTPATIENT)
Dept: FAMILY MEDICINE | Facility: CLINIC | Age: 68
End: 2018-10-04

## 2018-10-04 NOTE — TELEPHONE ENCOUNTER
Reason for Call:  Dose of gabapentin    Detailed comments: patient is calling stating that she went to the Pharmacy at [facility] and they had no record of a 300 mg tablet for this. She did  the 600 mg order, but she thinks she should have 300mg also to equal 900 mg. Please advise.    Phone Number Patient can be reached at: Home number on file [phone] (home)    Best Time: any    Can we leave a detailed message on this number? YES   Kamilah Shannon  Clinic Station [place] [facility]      Call taken on 10/4/2018 at 2:43 PM by Kamilah Shannon

## 2018-10-04 NOTE — TELEPHONE ENCOUNTER
LM to call clinic/RN to clarify med order.  Gabapentin 600 mg 1 tab 3 x/day, last written on 7/4/18 for #90 + 5 refills.  KPavleRN

## 2018-10-09 NOTE — TELEPHONE ENCOUNTER
LM to call clinic/Rn if she has questions concerning her Gabapentin.  Will close this encounter.  KpavelRPHILIPP

## 2018-11-01 ENCOUNTER — TRANSFERRED RECORDS (OUTPATIENT)
Dept: HEALTH INFORMATION MANAGEMENT | Facility: CLINIC | Age: 68
End: 2018-11-01

## 2018-12-19 ENCOUNTER — ALLIED HEALTH/NURSE VISIT (OUTPATIENT)
Dept: FAMILY MEDICINE | Facility: CLINIC | Age: 68
End: 2018-12-19
Payer: COMMERCIAL

## 2018-12-19 DIAGNOSIS — Z98.1 S/P LUMBAR SPINAL FUSION: ICD-10-CM

## 2018-12-19 DIAGNOSIS — M54.9 BACK PAIN: Primary | ICD-10-CM

## 2018-12-19 PROCEDURE — 99207 ZZC NO CHARGE NURSE ONLY: CPT

## 2018-12-21 ENCOUNTER — TELEPHONE (OUTPATIENT)
Dept: FAMILY MEDICINE | Facility: CLINIC | Age: 68
End: 2018-12-21

## 2018-12-21 NOTE — TELEPHONE ENCOUNTER
Reason for Call:  Order for inpatient rehab    Detailed comments: patient is calling from long term and stating that she just found out there is a place for her at rehab at [facility], but she needs a MD order or letter for this. I told her she most likely would need to be seen so the  Could speak to her and access her. She also mentioned that she may need her medications refilled. Again, she is in residential. You can call [phone] and talk to her , she gave permission and he can text her and then she can use the residential phone to call us back.    Phone Number Patient can be reached at: Other phone number:  [phone]    Best Time: any    Can we leave a detailed message on this number? YES   Kamilah Shannon  Clinic Station  [facility]      Call taken on 12/21/2018 at 2:02 PM by Kamilah Shannon

## 2018-12-21 NOTE — TELEPHONE ENCOUNTER
Left message on answering machine for patient to call back.    Patient may schedule with another provider.    Thank you  Jolene MCDANIELS RN

## 2018-12-24 ENCOUNTER — ALLIED HEALTH/NURSE VISIT (OUTPATIENT)
Dept: FAMILY MEDICINE | Facility: CLINIC | Age: 68
End: 2018-12-24
Payer: COMMERCIAL

## 2018-12-24 ENCOUNTER — TELEPHONE (OUTPATIENT)
Dept: FAMILY MEDICINE | Facility: CLINIC | Age: 68
End: 2018-12-24

## 2018-12-24 DIAGNOSIS — Z98.1 S/P LUMBAR SPINAL FUSION: Primary | ICD-10-CM

## 2018-12-24 PROCEDURE — 99207 ZZC NO CHARGE NURSE ONLY: CPT

## 2018-12-24 NOTE — PROGRESS NOTES
Spouse came into clinic today to request a letter for rehab.  Patient is currently incarcerated and will be released on 1/2/19.  Spouse states he would like a letter for the rehab.      Spouse was advised the patient should be seen in clinic.  Patient can come in on 1/2/19.  Spouse was advised to schedule with any provider.  Spouse would like Dr. Barriga to be notified and if he will work her in the schedule.      Will send to provider to review and advise.    Thank you    Jolene MCDANIELS RN

## 2018-12-31 NOTE — TELEPHONE ENCOUNTER
I'm not sure what she needs a letter for, but agree that she needs an apt.    Sarah Barriga M.D.

## 2018-12-31 NOTE — TELEPHONE ENCOUNTER
I left a message for the patient to return call.  [facility] can assist pt's spouse with scheduling same day appt for 1/2/19 with Dr. Sarah Barriga. 10am may be best time to offer??     Kathleen DAWN RN

## 2018-12-31 NOTE — TELEPHONE ENCOUNTER
"Pt has an appt for 1/9/19 with Dr. Barriga.  There are no openings in [facility] with her or any other provider scheduled for 1/2/19, the date they want to come. There is also no openings with any provider in [place] on that date.    Called and spoke with spouse, offered appt 1/4/19 with RAUL Livingston:  \"Again that's not gonna work. She's in the process of going to [facility] for treatment.\"   She will get out of [facility] long-term on the 2nd, has to be to [facility] in the afternoon by 2pm on the 2nd.  Just hoping Dr. Barriga would be able to see her briefly: \"No exam no nothing, to assure yes you need rehab.\"    Advise,   Kathleen DAWN, RN            "

## 2018-12-31 NOTE — TELEPHONE ENCOUNTER
Spoke with spouse, schedule in 10am hold spot with Dr. Barriga on 1/2/19. Wants to keep 1/9/19 appt at this time, will discuss at 1/2/19 appt, whether to keep it or not. Not sure that pt would be out of treatment for that appt??     Kathleen DAWN RN

## 2019-01-02 ENCOUNTER — OFFICE VISIT (OUTPATIENT)
Dept: FAMILY MEDICINE | Facility: CLINIC | Age: 69
End: 2019-01-02
Payer: COMMERCIAL

## 2019-01-02 ENCOUNTER — TRANSFERRED RECORDS (OUTPATIENT)
Dept: HEALTH INFORMATION MANAGEMENT | Facility: CLINIC | Age: 69
End: 2019-01-02

## 2019-01-02 VITALS
HEART RATE: 108 BPM | HEIGHT: 63 IN | BODY MASS INDEX: 28.68 KG/M2 | OXYGEN SATURATION: 96 % | TEMPERATURE: 98.9 F | RESPIRATION RATE: 18 BRPM | DIASTOLIC BLOOD PRESSURE: 70 MMHG | SYSTOLIC BLOOD PRESSURE: 102 MMHG

## 2019-01-02 DIAGNOSIS — G89.29 CHRONIC BILATERAL LOW BACK PAIN WITHOUT SCIATICA: ICD-10-CM

## 2019-01-02 DIAGNOSIS — I10 ESSENTIAL HYPERTENSION WITH GOAL BLOOD PRESSURE LESS THAN 140/90: ICD-10-CM

## 2019-01-02 DIAGNOSIS — M54.50 CHRONIC BILATERAL LOW BACK PAIN WITHOUT SCIATICA: ICD-10-CM

## 2019-01-02 DIAGNOSIS — F10.10 ETOH ABUSE: Primary | ICD-10-CM

## 2019-01-02 LAB
ALT SERPL-CCNC: 13 U/L (ref 0–45)
AST SERPL-CCNC: 22 U/L (ref 0–40)
CREAT SERPL-MCNC: 1.46 MG/DL (ref 0.6–1.1)
GFR SERPL CREATININE-BSD FRML MDRD: 36 ML/MIN/1.73M2
GLUCOSE SERPL-MCNC: 86 MG/DL (ref 70–125)
POTASSIUM SERPL-SCNC: 4.8 MMOL/L (ref 3.5–5)
TSH SERPL-ACNC: 0.15 UIU/ML (ref 0.3–5)

## 2019-01-02 PROCEDURE — 99213 OFFICE O/P EST LOW 20 MIN: CPT | Performed by: FAMILY MEDICINE

## 2019-01-02 RX ORDER — LISINOPRIL 5 MG/1
20 TABLET ORAL DAILY
Qty: 1 TABLET | Refills: 0 | COMMUNITY
Start: 2019-01-02 | End: 2019-02-21

## 2019-01-02 RX ORDER — AMLODIPINE BESYLATE 2.5 MG/1
10 TABLET ORAL 2 TIMES DAILY
Qty: 1 TABLET | Refills: 0 | COMMUNITY
Start: 2019-01-02 | End: 2019-01-28

## 2019-01-02 RX ORDER — AMLODIPINE BESYLATE 2.5 MG/1
2.5 TABLET ORAL 2 TIMES DAILY
COMMUNITY
Start: 2016-06-06 | End: 2019-01-02

## 2019-01-02 ASSESSMENT — PAIN SCALES - GENERAL: PAINLEVEL: EXTREME PAIN (8)

## 2019-01-02 NOTE — PROGRESS NOTES
"  SUBJECTIVE:   Jessica Ellison is a 68 year old female who presents to clinic today for the following health issues:      Chief Complaint   Patient presents with     Referral     Patient needs a referral for rehab at [facility] for 21 days.      Relapsed with her drinking, probation violation and spent 4 months in [facility].    Her  feels she needs a letter saying she needs inpatient CD treatment.      She has had a change in some of her blood pressure medications, those adjustments were noted on her med list.      Plans 21 days at [facility] for inpatient treatment and then will go back home.  She will see me on discharge from her alcohol treatment.     Has an apt to see a surgeon regarding an abdominal wall hernia that has been causing her pain.     She's been on vicodin rather than percocet the past few months and \"at reduced doses\" while in [facility].  We discussed that this is a good thing and that the less chemicals she is using overall, the better.  No refills at this time as she has some at home having been in [facility] x 4 months.       /70   Pulse 108   Temp 98.9  F (37.2  C) (Tympanic)   Resp 18   Ht 1.6 m (5' 3\")   SpO2 96%   BMI 28.68 kg/m    EXAM: GENERAL APPEARANCE ADULT: Alert, no acute distress  PSYCH: mentation appears normal., affect and mood normal    ASSESSMENT/PLAN:      ICD-10-CM    1. ETOH abuse F10.10    2. Essential hypertension with goal blood pressure less than 140/90 I10 lisinopril (PRINIVIL/ZESTRIL) 5 MG tablet   3. Chronic bilateral low back pain without sciatica M54.5     G89.29    letter written for rehabilitation stay.    Sarah Barriga M.D.      Patient Instructions         Thank you for choosing [facility].  You may be receiving a survey in the mail from [employer] regarding your visit today.  Please take a few minutes to complete and return the survey to let us know how we are doing.      Our Clinic hours are:  Mondays    7:20 am - 7 pm  Tues -  Fri  7:20 am - 5 " pm    Clinic Phone: [phone]    The clinic lab opens at 7:30 am Mon - Fri and appointments are required.    [facility]. [phone]  Monday  8 am - 7pm  Tues - Fri 8 am - 5:30 pm

## 2019-01-02 NOTE — LETTER
[facility]  [address] [address]  [phone]        January 2, 2019    Regarding:  Jessica Ellison     [address] [address]              To Whom It May Concern;      Jessica Ellison is under my medical care.  She is in need of inpatient CD/alcohol treatment at this time.  Plans to go to [facility] for a 21 day program.            Sincerely,        Sarah Barriga MD

## 2019-01-02 NOTE — PATIENT INSTRUCTIONS
Thank you for choosing [facility].  You may be receiving a survey in the mail from [facility] regarding your visit today.  Please take a few minutes to complete and return the survey to let us know how we are doing.      Our Clinic hours are:  Mondays    7:20 am - 7 pm  Tues - Fri  7:20 am - 5 pm    Clinic Phone: [phone]    The clinic lab opens at 7:30 am Mon - Fri and appointments are required.    [facility]. [phone]  Monday  8 am - 7pm  Tues - Fri 8 am - 5:30 pm

## 2019-01-05 ENCOUNTER — TRANSFERRED RECORDS (OUTPATIENT)
Dept: HEALTH INFORMATION MANAGEMENT | Facility: CLINIC | Age: 69
End: 2019-01-05

## 2019-01-06 ENCOUNTER — TRANSFERRED RECORDS (OUTPATIENT)
Dept: HEALTH INFORMATION MANAGEMENT | Facility: CLINIC | Age: 69
End: 2019-01-06

## 2019-01-06 LAB
CREAT SERPL-MCNC: 0.87 MG/DL (ref 0.6–1.1)
GFR SERPL CREATININE-BSD FRML MDRD: >60 ML/MIN/1.73M2
GLUCOSE SERPL-MCNC: 86 MG/DL (ref 70–125)
POTASSIUM SERPL-SCNC: 4.8 MMOL/L (ref 3.5–5)

## 2019-01-13 LAB
CREAT SERPL-MCNC: 0.8 MG/DL (ref 0.6–1.1)
GFR SERPL CREATININE-BSD FRML MDRD: >60 ML/MIN/1.73M2
GLUCOSE SERPL-MCNC: 84 MG/DL (ref 70–125)
POTASSIUM SERPL-SCNC: 4.6 MMOL/L (ref 3.5–5)

## 2019-01-16 ENCOUNTER — AMBULATORY - HEALTHEAST (OUTPATIENT)
Dept: PULMONOLOGY | Facility: OTHER | Age: 69
End: 2019-01-16

## 2019-01-16 ENCOUNTER — COMMUNICATION - HEALTHEAST (OUTPATIENT)
Dept: ADMINISTRATIVE | Facility: CLINIC | Age: 69
End: 2019-01-16

## 2019-01-16 DIAGNOSIS — J43.9 EMPHYSEMA OF LUNG (H): ICD-10-CM

## 2019-01-17 ENCOUNTER — COMMUNICATION - HEALTHEAST (OUTPATIENT)
Dept: RESPIRATORY THERAPY | Facility: CLINIC | Age: 69
End: 2019-01-17

## 2019-01-22 ENCOUNTER — COMMUNICATION - HEALTHEAST (OUTPATIENT)
Dept: RESPIRATORY THERAPY | Facility: CLINIC | Age: 69
End: 2019-01-22

## 2019-01-23 ENCOUNTER — COMMUNICATION - HEALTHEAST (OUTPATIENT)
Dept: RESPIRATORY THERAPY | Facility: CLINIC | Age: 69
End: 2019-01-23

## 2019-01-28 ENCOUNTER — OFFICE VISIT (OUTPATIENT)
Dept: FAMILY MEDICINE | Facility: CLINIC | Age: 69
End: 2019-01-28
Payer: COMMERCIAL

## 2019-01-28 VITALS
HEIGHT: 63 IN | HEART RATE: 66 BPM | TEMPERATURE: 97.4 F | RESPIRATION RATE: 18 BRPM | OXYGEN SATURATION: 94 % | SYSTOLIC BLOOD PRESSURE: 84 MMHG | BODY MASS INDEX: 28.68 KG/M2 | DIASTOLIC BLOOD PRESSURE: 56 MMHG

## 2019-01-28 DIAGNOSIS — M54.50 CHRONIC BILATERAL LOW BACK PAIN WITHOUT SCIATICA: ICD-10-CM

## 2019-01-28 DIAGNOSIS — M53.3 SACROILIAC JOINT PAIN: ICD-10-CM

## 2019-01-28 DIAGNOSIS — F41.9 ANXIETY: ICD-10-CM

## 2019-01-28 DIAGNOSIS — F33.1 MAJOR DEPRESSIVE DISORDER, RECURRENT EPISODE, MODERATE (H): Primary | ICD-10-CM

## 2019-01-28 DIAGNOSIS — F11.90 CHRONIC NARCOTIC USE: ICD-10-CM

## 2019-01-28 DIAGNOSIS — R94.4 DECREASED GFR: ICD-10-CM

## 2019-01-28 DIAGNOSIS — M54.5 CHRONIC BILATERAL LOW BACK PAIN, WITH SCIATICA PRESENCE UNSPECIFIED: ICD-10-CM

## 2019-01-28 DIAGNOSIS — G89.29 CHRONIC BILATERAL LOW BACK PAIN WITHOUT SCIATICA: ICD-10-CM

## 2019-01-28 DIAGNOSIS — G89.29 CHRONIC BILATERAL LOW BACK PAIN, WITH SCIATICA PRESENCE UNSPECIFIED: ICD-10-CM

## 2019-01-28 LAB
ANION GAP SERPL CALCULATED.3IONS-SCNC: 8 MMOL/L (ref 3–14)
BUN SERPL-MCNC: 32 MG/DL (ref 7–30)
CALCIUM SERPL-MCNC: 8.9 MG/DL (ref 8.5–10.1)
CHLORIDE SERPL-SCNC: 106 MMOL/L (ref 94–109)
CO2 SERPL-SCNC: 24 MMOL/L (ref 20–32)
CREAT SERPL-MCNC: 1.55 MG/DL (ref 0.52–1.04)
GFR SERPL CREATININE-BSD FRML MDRD: 34 ML/MIN/{1.73_M2}
GLUCOSE SERPL-MCNC: 95 MG/DL (ref 70–99)
POTASSIUM SERPL-SCNC: 5.2 MMOL/L (ref 3.4–5.3)
SODIUM SERPL-SCNC: 138 MMOL/L (ref 133–144)

## 2019-01-28 PROCEDURE — 36415 COLL VENOUS BLD VENIPUNCTURE: CPT | Performed by: FAMILY MEDICINE

## 2019-01-28 PROCEDURE — 99214 OFFICE O/P EST MOD 30 MIN: CPT | Performed by: FAMILY MEDICINE

## 2019-01-28 PROCEDURE — 80048 BASIC METABOLIC PNL TOTAL CA: CPT | Performed by: FAMILY MEDICINE

## 2019-01-28 RX ORDER — OXYCODONE AND ACETAMINOPHEN 10; 325 MG/1; MG/1
2 TABLET ORAL EVERY 6 HOURS PRN
Qty: 120 TABLET | Refills: 0 | Status: SHIPPED | OUTPATIENT
Start: 2019-03-31 | End: 2019-06-13

## 2019-01-28 RX ORDER — DULOXETIN HYDROCHLORIDE 60 MG/1
60 CAPSULE, DELAYED RELEASE ORAL DAILY
Qty: 90 CAPSULE | Refills: 3 | Status: SHIPPED | OUTPATIENT
Start: 2019-01-28 | End: 2019-01-30

## 2019-01-28 RX ORDER — OXYCODONE AND ACETAMINOPHEN 10; 325 MG/1; MG/1
2 TABLET ORAL EVERY 6 HOURS PRN
Qty: 120 TABLET | Refills: 0 | Status: SHIPPED | OUTPATIENT
Start: 2019-03-01 | End: 2019-06-13

## 2019-01-28 RX ORDER — OXYCODONE AND ACETAMINOPHEN 10; 325 MG/1; MG/1
2 TABLET ORAL EVERY 6 HOURS PRN
Qty: 120 TABLET | Refills: 0 | Status: SHIPPED | OUTPATIENT
Start: 2019-04-30 | End: 2019-06-13

## 2019-01-28 ASSESSMENT — PAIN SCALES - GENERAL: PAINLEVEL: SEVERE PAIN (6)

## 2019-01-28 ASSESSMENT — PATIENT HEALTH QUESTIONNAIRE - PHQ9: SUM OF ALL RESPONSES TO PHQ QUESTIONS 1-9: 17

## 2019-01-28 NOTE — LETTER
[facility]  01/28/19    Patient: Jessica Ellison  YOB: 1950  Medical Record Number: [id number]                                                                  Opioid / Opioid Plus Controlled Substance Agreement    I understand that my care provider has prescribed an opioid (narcotic) controlled substance to help manage my condition(s). I am taking this medicine to help me function or work. I know this is strong medicine, and that it can cause serious side effects. Opioid medicine can be sedating, addicting and may cause a dependency on the drug. They can affect my ability to drive or think, and cause depression. They need to be taken exactly as prescribed. Combining opioids with certain medicines or chemicals (such as cocaine, sedatives and tranquilizers, sleeping pills, meth) can be dangerous or even fatal. Also, if I stop opioids suddenly, I may have severe withdrawal symptoms. Last, I understand that opioids do not work for all types of pain nor for all patients. If not helpful, I may be asked to stop them.    I am also being prescribed a benzodiazepine (tranquilizer) controlled substance (by pain clinic).   I understand this type of medication is sedating, and can increase the risk of death when taken together with opioids.  I have talked to my care team about the option of having a prescription for Narcan to use to reverse the opioid medicine, in case I get too sleepy. I will be very careful to take my medicines only as directed.    The risks, benefits, and side effects of these medicine(s) were explained to me. I agree that:    1. I will take part in other treatments as advised by my care team. This may be psychiatry or counseling, physical therapy, behavioral therapy, group treatment or a referral to a pain clinic. I will reduce or stop my medicine when my care team tells me to do so.  2. I will take my medicines as prescribed. I will not change the dose or schedule unless  my care team tells me to. There will be no refills if I  run out early.   I may be contactedwithout warning and asked to complete a urine drug test or pill count at any time.   3. I will keep all my appointments, and understand this is part of the monitoring of opioids. My care team may require an office visit for EVERY opioid/controlled substance refill. If I miss appointments or don t follow instructions, my care team may stop my medicine.  4. I will not ask other providers to prescribe controlled substances, and I will not accept controlled substances from other people. If I need another prescribed controlled substance for a new reason, I will tell my care team within 1 business day.  5. I will use one pharmacy to fill all of my controlled substance prescriptions, and it is up to me to make sure that I do not run out of my medicines on weekends or holidays. If my care team is willing to refill my opioid prescription without a visit, I must request refills only during office hours, refills may take up to 3 days to process, and it may take up to 5 to 7 days for my medicine to be mailed and ready at my pharmacy. Prescriptions will not be mailed anywhere except my pharmacy.        [id number]  Rev 12/18         Registration to scan to EHR                             Page 1 of 2               Controlled Substance Agreement Opioid        [facility]  01/28/19  Patient: Jessica Ellison  YOB: 1950  Medical Record Number: [id number]                                                                  6. I am responsible for my prescriptions. If the medicine/prescription is lost or stolen, it will not be replaced. I also agree not to share controlled substance medicines with anyone.  7. I agree to not use ANY illegal or recreational drugs. This includes marijuana, cocaine, bath salts or other drugs. I agree not to use alcohol unless my care team says I may.          I agree to give urine samples  whenever asked. If I don t give a urine sample, the care team may stop my medicine.    8. If I enroll in the [place] Medical Marijuana program, I will tell my care team. I will also sign an agreement to share my medical records with my care team.   9. I will bring in my list of medicines (or my medicine bottles) each time I come to the clinic.   10. I will tell my care team right away if I become pregnant or have a new medical problem treated outside of my regular clinic.  11. I understand that this medicine can affect my thinking and judgment. It may be unsafe for me to drive, use machinery and do dangerous tasks. I will not do any of these things until I know how the medicine affects me. If my dose changes, I will wait to see how it affects me. I will contact my care team if I have concerns about medicine side effects.    I understand that if I do not follow any of the conditions above, my prescriptions or treatment may be stopped.      I agree that my provider, clinic care team, and pharmacy may work with any city, state or federal law enforcement agency that investigates the misuse, sale, or other diversion of my controlled medicine. I will allow my provider to discuss my care with or share a copy of this agreement with any other treating provider, pharmacy or emergency room where I receive care. I agree to give up (waive) any right of privacy or confidentiality with respect to these consents.     I have read this agreement and have asked questions about anything I did not understand.      ________________________________________________________________________  Patient signature - Date/Time -  Jessica ARLETH Ellison                                      ________________________________________________________________________  Witness signature                                                            ________________________________________________________________________  Provider signature Mamie Barriga,  MD      [id number]  Rev 12/18         Registration to scan to EHR                         Page 2 of 2                   Controlled Substance Agreement Opioid           Page 1 of 2  Opioid Pain Medicines (also known as Narcotics)  What You Need to Know    What are opioids?   Opioids are pain medicines that must be prescribed by a doctor.  They are also known as narcotics.    Examples are:     morphine (MS Contin, Michelle)    oxycodone (Oxycontin)    oxycodone and acetaminophen (Percocet)    hydrocodone and acetaminophen (Vicodin, Norco)     fentanyl patch (Duragesic)     hydromorphone (Dilaudid)     methadone     What do opioids do well?   Opioids are best for short-term pain after a surgery or injury. They also work well for cancer pain. Unlike other pain medicines, they do not cause liver or kidney failure or ulcers. They may help some people with long-lasting (chronic) pain.     What do opioids NOT do well?   Opioids never get rid of pain entirely, and they do not work well for most patients with chronic pain. Opioids do not reduce swelling, one of the causes of pain. They also don t work well for nerve pain.                           For informational purposes only.  Not to replace the advice of your care provider.  Copyright [address] [facility]. All right reserved. [employer] 379726-Mst 02/18.      Page 2 of 2    Risks and side effects   Talk to your doctor before you start or decide to keep taking one of these medicines. Side effects include:    Lowering your breathing rate enough to cause death    Overdose, including death, especially if taking higher than prescribed doses    Long-term opioid use    Worse depression symptoms; less pleasure in things you usually enjoy    Feeling tired or sluggish    Slower thoughts or cloudy thinking    Being more sensitive to pain over time; pain is harder to control    Trouble sleeping or restless sleep    Changes in hormone levels (for example, less  testosterone)    Changes in sex drive or ability to have sex    Constipation    Unsafe driving    Itching and sweating    Feeling dizzy    Nausea, vomiting and dry mouth    What else should I know about opioids?  When someone takes opioids for too long or too often, they become dependent. This means that if you stop or reduce the medicine too quickly, you will have withdrawal symptoms.    Dependence is not the same as addiction. Addiction is when people keep using a substance that harms their body, their mind or their relations with others. If you have a history of drug or alcohol abuse, taking opioids can cause a relapse.    Over time, opioids don t work as well. Most people will need higher and higher doses. The higher the dose, the more serious the side effects. We don t know the long-term effects of opioids.      Prescribed opioids aren't the best way to manage chronic pain    Other ways to manage pain include:      Ibuprofen or acetaminophen.  You should always try this first.      Treat health problems that may be causing pain.      acupuncture or massage, deep breathing, meditation, visual imagery, aromatherapy.      Use heat or ice at the pain site      Physical therapy and exercise      Stop smoking      See a counselor or therapist                                                  People who have used opioids for a long time may have a lower quality of life, worse depression, higher levels of pain and more visits to doctors.    Never share your opioids with others. Be sure to store opioids in a secure place, locked if possible.Young children can easily swallow them and overdose.     You can overdose on opioids.  Signs of overdose include decrease or loss of consciousness, slowed breathing, trouble waking and blue lips.  If someone is worried about overdose, they should call 911.    If you are at risk for overdose, you may get naloxone (Narcan, a medicine that reverses the effects of opioids.  If you  overdose, a friend or family member can give you Narcan while waiting for the ambulance.  They need to know the signs of overdose and how to give Narcan.    While you're taking opioids:    Don't use alcohol or street drugs. Taking them together can cause death.    Don't take any of these medicines unless your doctor says its okay.  Taking these with opioids can cause death.    Benzodiazepines (such as lorazepam         or diazepam)    Muscle relaxers (such as cyclobenzaprine)    sleeping pills    other opioids    Safe disposal of opioids  Find your area drug take-back program, your pharmacy mail-back program, buy a special disposal bag (such as Deterra) from your pharmacy or flush them down the toilet.  Use the guidelines at:  www.fda.gov/drugs/resourcesforyou

## 2019-01-28 NOTE — PROGRESS NOTES
"  SUBJECTIVE:   Jessica Ellison is a 68 year old female who presents to clinic today for the following health issues:  Chief Complaint   Patient presents with     Hernia     Patient has 2 hernias. 1 is located on RLQ and the other one is by belly button. Patient has pain and would like to discuss plan.      Hospital F/U         Hospital Follow-up Visit:    Hospital/Nursing Home/IP Rehab Facility: [facility]  Date of Admission: 1/6/19  Date of Discharge: 1/16/19  Reason(s) for Admission: Bronchitis (Primary Dx);   COPD exacerbation (H)            Problems taking medications regularly:  None       Medication changes since discharge: None       Problems adhering to non-medication therapy:  None     Summary of hospitalization:  [facility] discharge summary reviewed  Diagnostic Tests/Treatments reviewed.  Follow up needed: GI, PFT/pulmonary, surgery (ventral hernia)  Other Healthcare Providers Involved in Patient s Care:         None  Update since discharge: stable.      Post Discharge Medication Reconciliation: discharge medications reconciled and changed, per note/orders (see AVS).  Plan of care communicated with patient     Coding guidelines for this visit:  Type of Medical   Decision Making Face-to-Face Visit       within 7 Days of discharge Face-to-Face Visit        within 14 days of discharge   Moderate Complexity [id number]   High Complexity 27384 04449          Patient was NOT receiving narcotics to any great extent while at [facility].  She feels she was, we reviewed their \"charges\" and I see that on 1/12 they gave her oxycodone 10 mg x 2, after that they changed to toradol.      She tells me she takes two of the percocet 10/325 mg twice daily - taking only one at a time \"does not help the pain\".    We talked about using the time in nursing home and rehabilitation/hospital as a good time to reduce the quantity of narcotics she is taking.  We discussed that with the diazepam prescription she also has (though she " "tells me she doesn't use this regularly) that she is at high risk of overdose.   She will also be given a narcan prescription.    Wants to cut back on her blood pressure medications, feels her blood pressure has been running low and the medications were started in [facility] \"when they weren't controlling my pain\".        BP (!) 84/56   Pulse 66   Temp 97.4  F (36.3  C) (Tympanic)   Resp 18   Ht 1.6 m (5' 3\")   SpO2 94%   BMI 28.68 kg/m    EXAM: GENERAL APPEARANCE ADULT: Alert, no acute distress  MS: extremities normal, no peripheral edema  PSYCH: mentation appears normal., affect and mood normal    ASSESSMENT/PLAN:      ICD-10-CM    1. Major depressive disorder, recurrent episode, moderate (H) F33.1 DULoxetine (CYMBALTA) 60 MG capsule   2. Chronic bilateral low back pain without sciatica M54.5 oxyCODONE-acetaminophen (PERCOCET)  MG per tablet    G89.29 oxyCODONE-acetaminophen (PERCOCET)  MG per tablet     oxyCODONE-acetaminophen (PERCOCET)  MG per tablet     naloxone (NARCAN) 4 MG/0.1ML nasal spray   3. Sacroiliac joint pain M53.3 oxyCODONE-acetaminophen (PERCOCET)  MG per tablet     oxyCODONE-acetaminophen (PERCOCET)  MG per tablet     oxyCODONE-acetaminophen (PERCOCET)  MG per tablet   4. Chronic bilateral low back pain, with sciatica presence unspecified M54.5     G89.29    5. Anxiety F41.9 DULoxetine (CYMBALTA) 60 MG capsule   6. Decreased GFR R94.4 Basic metabolic panel   7. Chronic narcotic use F11.90 naloxone (NARCAN) 4 MG/0.1ML nasal spray   see above.  New narcotic contract signed.   Dropping percocet from 180/month to 120/month with plans to further reduce over time.     Patient Instructions   Discontinue Sertraline    Start Cymbalta 60 mg daily - for depression and chronic pain.      Limit your narcotic use to 120/month.      Miralax - polyethylene glycol you can titrate this to keep your bowels more regular.      Discontinue Amlodipine    Have blood pressure checked " with RN in 2-3 weeks so we can further adjust your medication for blood pressure if needed.    Sarah Barriga M.D.        Our Clinic hours are:  Mondays    7:20 am - 7 pm  Tues -  Fri  7:20 am - 5 pm    Clinic Phone: [phone]    The clinic lab opens at 7:30 am Mon - Fri and appointments are required.    [facility]. [phone]  Monday  8 am - 7pm  Tues - Fri 8 am - 5:30 pm

## 2019-01-28 NOTE — PATIENT INSTRUCTIONS
Discontinue Sertraline    Start Cymbalta 60 mg daily - for depression and chronic pain.      Limit your narcotic use to 120/month.      Miralax - polyethylene glycol you can titrate this to keep your bowels more regular.      Discontinue Amlodipine    Have blood pressure checked with RN in 2-3 weeks so we can further adjust your medication for blood pressure if needed.    Sarah Barriga M.D.        Our Clinic hours are:  Mondays    7:20 am - 7 pm  Tues -  Fri  7:20 am - 5 pm    Clinic Phone: [phone]    The clinic lab opens at 7:30 am Mon - Fri and appointments are required.    [facility]  Ph. [phone]  Monday  8 am - 7pm  Tues - Fri 8 am - 5:30 pm

## 2019-01-28 NOTE — RESULT ENCOUNTER NOTE
Kidney function has declined rather significantly in the past year.  I would stop the lisinopril and we'll plan to recheck in 2-3 weeks.  Increase fluid/water intake.     Sarah Barriga M.D.

## 2019-01-30 ENCOUNTER — TELEPHONE (OUTPATIENT)
Dept: FAMILY MEDICINE | Facility: CLINIC | Age: 69
End: 2019-01-30

## 2019-01-30 ENCOUNTER — COMMUNICATION - HEALTHEAST (OUTPATIENT)
Dept: RESPIRATORY THERAPY | Facility: CLINIC | Age: 69
End: 2019-01-30

## 2019-01-30 DIAGNOSIS — F33.1 MAJOR DEPRESSIVE DISORDER, RECURRENT EPISODE, MODERATE (H): Primary | ICD-10-CM

## 2019-01-30 RX ORDER — VENLAFAXINE HYDROCHLORIDE 75 MG/1
75 CAPSULE, EXTENDED RELEASE ORAL DAILY
Qty: 90 CAPSULE | Refills: 3 | Status: SHIPPED | OUTPATIENT
Start: 2019-01-30 | End: 2019-06-13 | Stop reason: DRUGHIGH

## 2019-01-30 NOTE — TELEPHONE ENCOUNTER
Fax received from [facility] [employer].    Medication Cymbalta 60mg capsules  Sig: Take 1 Capsule daily  Qty: 90  Refills 3    The medication is non formulary on members plan  Current formulary alternatives include: Duloxetine, Lyrica capsule, Lyrica oral solution Savella, Savella Titration pack    New script or start PA?    Case # [id number]  If they do not receive information for PA by 2/2/19 they will issue a determination.  Phone [phone]  Fax [phone]

## 2019-01-30 NOTE — TELEPHONE ENCOUNTER
I'm confused.  Cymbalta is not covered but DULOXETINE is.  This is the same drug.  Duloxetine is the generic for Cymbalta.  Can we check into this further?    Otherwise, try for prior authorization.    Sarah Barriga M.D.

## 2019-01-30 NOTE — TELEPHONE ENCOUNTER
Writer contacted the [facility] and the medication Duloxetine is covered.  Writer contacted the pharmacy the medication  is still expensive for the patient.  It is $138.00 for 90 days with insurance and she has a high deductible.  Patient does not want to pay for this. Is there another option she could try?    Thank you    Jolene MCDANIELS RN

## 2019-01-30 NOTE — TELEPHONE ENCOUNTER
The issue is that we're trying the cymbalta for both her depression AND the chronic pain.  The other listed medications (lyrica, savella) are not meant to treat depression - just chronic pain.     Let's try Effexor rather than the Cymbalta.  Should be cheaper.      She can let me know after a few weeks how this is working for her, we may need to adjust the dose.     Should stop the Sertraline when she starts the Effexor.    Sarah Barriga M.D.

## 2019-01-30 NOTE — TELEPHONE ENCOUNTER
Patient was notified and will try the Effexor.  Patient will stop the sertraline when she starts the Effexor.    Jolene MCDANIELS RN

## 2019-02-15 ENCOUNTER — COMMUNICATION - HEALTHEAST (OUTPATIENT)
Dept: RESPIRATORY THERAPY | Facility: CLINIC | Age: 69
End: 2019-02-15

## 2019-02-21 ENCOUNTER — OFFICE VISIT (OUTPATIENT)
Dept: FAMILY MEDICINE | Facility: CLINIC | Age: 69
End: 2019-02-21
Payer: COMMERCIAL

## 2019-02-21 VITALS
DIASTOLIC BLOOD PRESSURE: 70 MMHG | HEART RATE: 71 BPM | HEIGHT: 63 IN | WEIGHT: 176 LBS | SYSTOLIC BLOOD PRESSURE: 118 MMHG | BODY MASS INDEX: 31.18 KG/M2 | TEMPERATURE: 98 F | RESPIRATION RATE: 18 BRPM | OXYGEN SATURATION: 94 %

## 2019-02-21 DIAGNOSIS — R94.4 DECREASED GFR: ICD-10-CM

## 2019-02-21 DIAGNOSIS — G89.4 CHRONIC PAIN SYNDROME: Primary | ICD-10-CM

## 2019-02-21 DIAGNOSIS — I10 ESSENTIAL HYPERTENSION WITH GOAL BLOOD PRESSURE LESS THAN 140/90: ICD-10-CM

## 2019-02-21 LAB
ANION GAP SERPL CALCULATED.3IONS-SCNC: 7 MMOL/L (ref 3–14)
BUN SERPL-MCNC: 14 MG/DL (ref 7–30)
CALCIUM SERPL-MCNC: 8.8 MG/DL (ref 8.5–10.1)
CHLORIDE SERPL-SCNC: 106 MMOL/L (ref 94–109)
CO2 SERPL-SCNC: 24 MMOL/L (ref 20–32)
CREAT SERPL-MCNC: 0.87 MG/DL (ref 0.52–1.04)
GFR SERPL CREATININE-BSD FRML MDRD: 69 ML/MIN/{1.73_M2}
GLUCOSE SERPL-MCNC: 80 MG/DL (ref 70–99)
POTASSIUM SERPL-SCNC: 4.3 MMOL/L (ref 3.4–5.3)
SODIUM SERPL-SCNC: 137 MMOL/L (ref 133–144)

## 2019-02-21 PROCEDURE — 99214 OFFICE O/P EST MOD 30 MIN: CPT | Performed by: FAMILY MEDICINE

## 2019-02-21 PROCEDURE — 80048 BASIC METABOLIC PNL TOTAL CA: CPT | Performed by: FAMILY MEDICINE

## 2019-02-21 PROCEDURE — 80307 DRUG TEST PRSMV CHEM ANLYZR: CPT | Mod: 90 | Performed by: FAMILY MEDICINE

## 2019-02-21 PROCEDURE — 36415 COLL VENOUS BLD VENIPUNCTURE: CPT | Performed by: FAMILY MEDICINE

## 2019-02-21 PROCEDURE — 99000 SPECIMEN HANDLING OFFICE-LAB: CPT | Performed by: FAMILY MEDICINE

## 2019-02-21 ASSESSMENT — ANXIETY QUESTIONNAIRES
6. BECOMING EASILY ANNOYED OR IRRITABLE: SEVERAL DAYS
5. BEING SO RESTLESS THAT IT IS HARD TO SIT STILL: NOT AT ALL
GAD7 TOTAL SCORE: 5
7. FEELING AFRAID AS IF SOMETHING AWFUL MIGHT HAPPEN: SEVERAL DAYS
3. WORRYING TOO MUCH ABOUT DIFFERENT THINGS: SEVERAL DAYS
1. FEELING NERVOUS, ANXIOUS, OR ON EDGE: NOT AT ALL
2. NOT BEING ABLE TO STOP OR CONTROL WORRYING: SEVERAL DAYS

## 2019-02-21 ASSESSMENT — PATIENT HEALTH QUESTIONNAIRE - PHQ9
5. POOR APPETITE OR OVEREATING: SEVERAL DAYS
SUM OF ALL RESPONSES TO PHQ QUESTIONS 1-9: 6

## 2019-02-21 ASSESSMENT — PAIN SCALES - GENERAL: PAINLEVEL: MODERATE PAIN (4)

## 2019-02-21 ASSESSMENT — MIFFLIN-ST. JEOR: SCORE: 1307.46

## 2019-02-21 NOTE — PATIENT INSTRUCTIONS
Our Clinic hours are:  Mondays    7:20 am - 7 pm  Tues -  Fri  7:20 am - 5 pm    Clinic Phone: [phone]    The clinic lab opens at 7:30 am Mon - Fri and appointments are required.    [facility]. [phone]  Monday  8 am - 7pm  Tues - Fri 8 am - 5:30 pm

## 2019-02-21 NOTE — PROGRESS NOTES
"  SUBJECTIVE:   Jessica Ellison is a 68 year old female who presents to clinic today for the following health issues:      Hypertension Follow-up      Outpatient blood pressures are being checked at home.  Results are low 140's.    Low Salt Diet: not monitoring salt    Depression Followup    Status since last visit: Stable     See PHQ-9 for current symptoms.  Other associated symptoms: None    Complicating factors:   Significant life event:  Yes-  trying to get in to outpatient CD treatment   Current substance abuse:  None  Anxiety or Panic symptoms:  Yes-  Anxiety    Feels there has been some improvement on the Effexor - wants to maintain on the same dose currently.     PHQ 11/27/2017 7/16/2018 1/28/2019   PHQ-9 Total Score 7 8 17   Q9: Suicide Ideation Not at all Not at all Several days     In the past two weeks have you had thoughts of suicide or self-harm?  No.    Do you have concerns about your personal safety or the safety of others?   No  PHQ-9  English  PHQ-9   Any Language  Suicide Assessment Five-step Evaluation and Treatment (SAFE-T)    Amount of exercise or physical activity: 3x a week pool therapy    Problems taking medications regularly: No    Medication side effects: none    Diet: regular (no restrictions)       /70   Pulse 71   Temp 98  F (36.7  C) (Tympanic)   Resp 18   Ht 1.6 m (5' 3\")   Wt 79.8 kg (176 lb)   SpO2 94%   BMI 31.18 kg/m    EXAM: GENERAL APPEARANCE ADULT: Alert, no acute distress  PSYCH: mentation appears normal., affect and mood normal    ASSESSMENT/PLAN:      ICD-10-CM    1. Chronic pain syndrome G89.4 Drug  Screen Comprehensive , Urine with Reported Meds (MedTox) (Pain Care Package)   2. Essential hypertension with goal blood pressure less than 140/90 I10 Basic metabolic panel   3. Decreased GFR R94.4 Basic metabolic panel     Due for urine tox screen as part of  policy on chronic narcotic use.    Blood pressure is good today, GFR was decreased at last visit and " lisinopril was held.  Will recheck BMP today.  It appears (from hospital records, etc) that she is very sensitive to fluid status and with rehydration her GFR tends to improve.  Encouraged good hydration.     Sarah Barriga M.D.    Patient Instructions       Our Clinic hours are:  Mondays    7:20 am - 7 pm  Tues -  Fri  7:20 am - 5 pm    Clinic Phone: [phone]    The clinic lab opens at 7:30 am Mon - Fri and appointments are required.    [facility] [place]  Ph. [phone]  Monday  8 am - 7pm  Tues - Fri 8 am - 5:30 pm

## 2019-02-22 ASSESSMENT — ANXIETY QUESTIONNAIRES: GAD7 TOTAL SCORE: 5

## 2019-02-25 ENCOUNTER — TRANSFERRED RECORDS (OUTPATIENT)
Dept: HEALTH INFORMATION MANAGEMENT | Facility: CLINIC | Age: 69
End: 2019-02-25

## 2019-02-25 ENCOUNTER — RECORDS - HEALTHEAST (OUTPATIENT)
Dept: PULMONOLOGY | Facility: OTHER | Age: 69
End: 2019-02-25

## 2019-02-25 ENCOUNTER — RECORDS - HEALTHEAST (OUTPATIENT)
Dept: ADMINISTRATIVE | Facility: OTHER | Age: 69
End: 2019-02-25

## 2019-02-25 ENCOUNTER — OFFICE VISIT - HEALTHEAST (OUTPATIENT)
Dept: PULMONOLOGY | Facility: OTHER | Age: 69
End: 2019-02-25

## 2019-02-25 DIAGNOSIS — J43.2 CENTRILOBULAR EMPHYSEMA (H): ICD-10-CM

## 2019-02-25 DIAGNOSIS — J43.9 EMPHYSEMA, UNSPECIFIED (H): ICD-10-CM

## 2019-02-25 ASSESSMENT — MIFFLIN-ST. JEOR: SCORE: 1287.94

## 2019-02-27 LAB — PAIN DRUG SCR UR W RPTD MEDS: NORMAL

## 2019-02-28 DIAGNOSIS — I67.1 CEREBRAL ANEURYSM, NONRUPTURED: Primary | ICD-10-CM

## 2019-02-28 NOTE — RESULT ENCOUNTER NOTE
"Drug test is as it should be EXCEPT there is no gabapentin present in her system.  This would indicate that she is perhaps not taking the medication as it is prescribed.  You should see me to discuss this.    The \"unexpected\" benzodiazepines are from the diazepam prescribed by another physician.      It also contains norpropoxyphene - this is not a drug available in the [place] any longer.  If you have old drugs at home, you should not be taking them.  I am not going to send this for confirmatory testing because it's is more than like a false positive.    Sarah Barriga M.D.      "

## 2019-02-28 NOTE — PROGRESS NOTES
Patient due for follow-up with Dr. García with MRA prior in 7/2019. Order placed. Message sent to scheduling to contact patient to make appointment.

## 2019-03-06 ENCOUNTER — PRE VISIT (OUTPATIENT)
Dept: NEUROSURGERY | Facility: CLINIC | Age: 69
End: 2019-03-06

## 2019-03-18 ENCOUNTER — OFFICE VISIT (OUTPATIENT)
Dept: FAMILY MEDICINE | Facility: CLINIC | Age: 69
End: 2019-03-18
Payer: COMMERCIAL

## 2019-03-18 VITALS
HEART RATE: 69 BPM | WEIGHT: 173.2 LBS | HEIGHT: 63 IN | SYSTOLIC BLOOD PRESSURE: 140 MMHG | RESPIRATION RATE: 12 BRPM | OXYGEN SATURATION: 96 % | TEMPERATURE: 98.2 F | BODY MASS INDEX: 30.69 KG/M2 | DIASTOLIC BLOOD PRESSURE: 92 MMHG

## 2019-03-18 DIAGNOSIS — K43.9 VENTRAL HERNIA WITHOUT OBSTRUCTION OR GANGRENE: ICD-10-CM

## 2019-03-18 DIAGNOSIS — D50.9 IRON DEFICIENCY ANEMIA, UNSPECIFIED IRON DEFICIENCY ANEMIA TYPE: ICD-10-CM

## 2019-03-18 DIAGNOSIS — K42.9 UMBILICAL HERNIA WITHOUT OBSTRUCTION AND WITHOUT GANGRENE: ICD-10-CM

## 2019-03-18 DIAGNOSIS — I67.1 CEREBRAL ANEURYSM, NONRUPTURED: ICD-10-CM

## 2019-03-18 DIAGNOSIS — Z01.818 PREOP GENERAL PHYSICAL EXAM: Primary | ICD-10-CM

## 2019-03-18 DIAGNOSIS — K80.70 CALCULUS OF GALLBLADDER AND BILE DUCT WITHOUT CHOLECYSTITIS OR OBSTRUCTION: ICD-10-CM

## 2019-03-18 DIAGNOSIS — E03.9 HYPOTHYROIDISM, UNSPECIFIED TYPE: ICD-10-CM

## 2019-03-18 LAB
ANION GAP SERPL CALCULATED.3IONS-SCNC: 6 MMOL/L (ref 3–14)
BUN SERPL-MCNC: 18 MG/DL (ref 7–30)
CALCIUM SERPL-MCNC: 8.9 MG/DL (ref 8.5–10.1)
CHLORIDE SERPL-SCNC: 104 MMOL/L (ref 94–109)
CO2 SERPL-SCNC: 26 MMOL/L (ref 20–32)
CREAT SERPL-MCNC: 0.78 MG/DL (ref 0.52–1.04)
ERYTHROCYTE [DISTWIDTH] IN BLOOD BY AUTOMATED COUNT: 13.8 % (ref 10–15)
FERRITIN SERPL-MCNC: 22 NG/ML (ref 8–252)
GFR SERPL CREATININE-BSD FRML MDRD: 79 ML/MIN/{1.73_M2}
GLUCOSE SERPL-MCNC: 71 MG/DL (ref 70–99)
HCT VFR BLD AUTO: 34.7 % (ref 35–47)
HGB BLD-MCNC: 10.7 G/DL (ref 11.7–15.7)
INR PPP: 1 (ref 0.86–1.14)
IRON SATN MFR SERPL: 16 % (ref 15–46)
IRON SERPL-MCNC: 51 UG/DL (ref 35–180)
MCH RBC QN AUTO: 27 PG (ref 26.5–33)
MCHC RBC AUTO-ENTMCNC: 30.8 G/DL (ref 31.5–36.5)
MCV RBC AUTO: 87 FL (ref 78–100)
PLATELET # BLD AUTO: 204 10E9/L (ref 150–450)
POTASSIUM SERPL-SCNC: 4.3 MMOL/L (ref 3.4–5.3)
RBC # BLD AUTO: 3.97 10E12/L (ref 3.8–5.2)
SODIUM SERPL-SCNC: 136 MMOL/L (ref 133–144)
TIBC SERPL-MCNC: 324 UG/DL (ref 240–430)
TSH SERPL DL<=0.005 MIU/L-ACNC: 1.08 MU/L (ref 0.4–4)
WBC # BLD AUTO: 6 10E9/L (ref 4–11)

## 2019-03-18 PROCEDURE — 36415 COLL VENOUS BLD VENIPUNCTURE: CPT | Performed by: NURSE PRACTITIONER

## 2019-03-18 PROCEDURE — 85027 COMPLETE CBC AUTOMATED: CPT | Performed by: NURSE PRACTITIONER

## 2019-03-18 PROCEDURE — 85610 PROTHROMBIN TIME: CPT | Performed by: NURSE PRACTITIONER

## 2019-03-18 PROCEDURE — 80048 BASIC METABOLIC PNL TOTAL CA: CPT | Performed by: NURSE PRACTITIONER

## 2019-03-18 PROCEDURE — 93000 ELECTROCARDIOGRAM COMPLETE: CPT | Performed by: NURSE PRACTITIONER

## 2019-03-18 PROCEDURE — 84443 ASSAY THYROID STIM HORMONE: CPT | Performed by: NURSE PRACTITIONER

## 2019-03-18 PROCEDURE — 83550 IRON BINDING TEST: CPT | Performed by: NURSE PRACTITIONER

## 2019-03-18 PROCEDURE — 82728 ASSAY OF FERRITIN: CPT | Performed by: NURSE PRACTITIONER

## 2019-03-18 PROCEDURE — 83540 ASSAY OF IRON: CPT | Performed by: NURSE PRACTITIONER

## 2019-03-18 PROCEDURE — 99215 OFFICE O/P EST HI 40 MIN: CPT | Performed by: NURSE PRACTITIONER

## 2019-03-18 ASSESSMENT — MIFFLIN-ST. JEOR: SCORE: 1294.76

## 2019-03-18 NOTE — PROGRESS NOTES
[facility]  [address]  [facility] [address]  [phone]  Dept: [phone]    PRE-OP EVALUATION:  Today's date: 3/18/2019    Jessica Ellison (: 1950) presents for pre-operative evaluation assessment as requested by Dr. Jesus.  She requires evaluation and anesthesia risk assessment prior to undergoing surgery/procedure for treatment of hernias .    Proposed Surgery/ Procedure: cholecystectomy, and hernia repair x2   Date of Surgery/ Procedure: 3/20/19  Time of Surgery/ Procedure: 730  Hospital/Surgical Facility: [facility]   Fax number for surgical facility: [phone]  Primary Physician: Sarah Barriga  Type of Anesthesia Anticipated: General    Patient has a Health Care Directive or Living Will:  NO    1. YES - DO YOU HAVE A HISTORY OF HEART ATTACK, STROKE, STENT, BYPASS OR SURGERY ON AN ARTERY IN THE HEAD, NECK, HEART OR LEG? Cerebral aneurysm , s/p stent, last MRA-brain 10/24/17 unremarkable   2. NO - Do you ever have any pain or discomfort in your chest?  3. NO - Do you have a history of  Heart Failure?  4. NO - Are you troubled by shortness of breath when: walking on the level, up a slight hill or at night?  5. NO - Do you currently have a cold, bronchitis or other respiratory infection?  6. NO - Do you have a cough, shortness of breath or wheezing?  7. NO - Do you sometimes get pains in the calves of your legs when you walk?  8. NO - Do you or anyone in your family have previous history of blood clots?  9. NO - Do you or does anyone in your family have a serious bleeding problem such as prolonged bleeding following surgeries or cuts?  10. YES - HAVE YOU EVER HAD PROBLEMS WITH ANEMIA OR BEEN TOLD TO TAKE IRON PILLS? Currently taking iron for anemia. Last colonoscopy 2016 unremarkable.   11. NO - Have you had any abnormal blood loss such as black, tarry or bloody stools, or abnormal vaginal bleeding?  12. YES - HAVE YOU EVER HAD A BLOOD  TRANSFUSION? 2017- after hysterectomy  13. NO - Have you or any of your relatives ever had problems with anesthesia?  14. NO - Do you have sleep apnea, excessive snoring or daytime drowsiness?  15. NO - Do you have any prosthetic heart valves?  16. NO - Do you have prosthetic joints?  17. NO - Is there any chance that you may be pregnant?      HPI:     HPI related to upcoming procedure: Ventral, umbilical hernias causing pain, without bowel obstruction. Gallbladder- small stone per ultrasound 01/2019 with mild dilation of bile and pancreatic ducts.      ANEMIA- Patient has a longstanding history of anemia. Hemoglobin has been stable. She is on oral iron supplementation. She denies dizziness, bloody or black stools, dyspnea, epigastric pain.    HYPERTENSION - Patient has longstanding history of HTN , currently denies any symptoms referable to elevated blood pressure. Specifically denies chest pain, palpitations, dyspnea, orthopnea, PND or peripheral edema. Blood pressure readings have been labile. Current medication regimen is as listed below. Patient denies any side effects of medication.                                                                                                                                                                                          .  HYPOTHYROIDISM - Patient has a longstanding history of chronic Hypothyroidism. Patient has been doing well, noting no tremor, insomnia, hair loss or changes in skin texture. Continues to take medications as directed, without adverse reactions or side effects. Last TSH   Lab Results   Component Value Date    TSH 0.15 (A) 01/02/2019   .                                                                                                                                                                                                                        .    MEDICAL HISTORY:     Patient Active Problem List    Diagnosis Date Noted     Spell of change in  speech 04/17/2018     Priority: Medium     Hypothyroidism 09/07/2016     Priority: Medium     Chronic bilateral low back pain without sciatica 06/02/2016     Priority: Medium     Essential hypertension with goal blood pressure less than 140/90 06/02/2016     Priority: Medium     S/P lumbar spinal fusion 10/19/2015     Priority: Medium     Bee allergy status 06/30/2015     Priority: Medium     Cerebral aneurysm, nonruptured 07/24/2014     Priority: Medium     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 Marti       Major depressive disorder, recurrent episode, moderate (H) 07/10/2013     Priority: Medium     Advanced directives, counseling/discussion 09/05/2012     Priority: Medium     Patient does not have an Advance/Health Care Directive (HCD), has information at home.     Sonja Krishnamurthy  September 5, 2012         Anxiety 06/20/2012     Priority: Medium     CARDIOVASCULAR SCREENING; LDL GOAL LESS THAN 160 10/31/2010     Priority: Medium     ETOH abuse 03/19/2010     Priority: Medium      katie drinking, DUIs and probation violation       Insomnia 12/21/2009     Priority: Medium     Back pain 12/21/2009     Priority: Medium     Patient is followed by DOMINIQUE COPPOLA for ongoing prescription of narcotic pain medicine.  Med: oxycodone/acetaminophen 10/325 for chronic back pain.   Maximum use per month: 120  Expected duration: unknown  Narcotic agreement on file: YES 3/19/2010  Clinic visit recommended: Q 3 months    April 2013 - rhizotomy for SI (left) Jhon Mckay MD    Follows at [facility] - severe low back pain with work related injuries.  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  Spinal stenosis at most lumbar levels  Improvement with past epidural steroid injections  SI joint dysfunction with the pain        Past Medical History:   Diagnosis Date     Anemia      Aneurysm (H)      Chemical  dependency (H)     Chem Dep RX     Depression      History of blood transfusion      Hypertension      Menopausal symptoms      Other chronic pain     Lower back and hips     Sleep disorder      Thyroid disease      Tobacco abuse      Past Surgical History:   Procedure Laterality Date     APPENDECTOMY  1960     C PART REMOVAL COLON W ANASTOMOSIS  5/2005    diverticulitis     C SPINE FUSION,ANTER,8+ SGMTS  2007    Anterior posterior fusion 10 levels, hardware removal and re-fusion 2009     cerebral aneurysm  2014    coiled     COLONOSCOPY  4/19/2005     HC EXCISION BREAST LESION, OPEN >=1      core biopsy 2006, lumpectomy 2006     LAP VENTRAL HERNIA REPAIR  12/2017    [place]     LAPAROSCOPIC ASSISTED HYSTERECTOMY VAGINAL  12/2017     orif left femoral neck Left 6/3/2016    stress fracture.  done at [facility]     SURGICAL HISTORY OF -   2004    Skin Graft     Current Outpatient Medications   Medication Sig Dispense Refill     aspirin 325 MG tablet Take 1 tablet (325 mg) by mouth daily 180 tablet 6     calcium carbonate-vitamin D (CALTRATE 600+D) 600-400 MG-UNIT CHEW Take 1 chew tab by mouth 2 times daily (Patient taking differently: Take 1 chew tab by mouth daily ) 180 tablet 3     cyclobenzaprine (FLEXERIL) 10 MG tablet Take 1 tablet (10 mg) by mouth 2 times daily as needed for muscle spasms 180 tablet 1     gabapentin (NEURONTIN) 600 MG tablet Take 1 tablet (600 mg) by mouth 3 times daily 90 tablet 5     levothyroxine (LEVOTHROID) 50 MCG tablet Take 1 tablet (50 mcg) by mouth daily 90 tablet 3     metoprolol succinate (TOPROL-XL) 50 MG 24 hr tablet Take 1.5 tablets (75 mg) by mouth daily 135 tablet 3     multivitamin CF formula (CHOICEFUL) chewable tablet Take 1 tablet by mouth daily 100 tablet 0     order for DME Equipment being ordered: Plastic donut hole air filled cushion 1 Device 0     [START ON 4/30/2019] oxyCODONE-acetaminophen (PERCOCET)  MG per tablet Take 2 tablets by mouth every 6 hours as needed  for moderate to severe pain Max 4/day 120 tablet 0     [START ON 3/31/2019] oxyCODONE-acetaminophen (PERCOCET)  MG per tablet Take 2 tablets by mouth every 6 hours as needed for moderate to severe pain Max 4/day 120 tablet 0     oxyCODONE-acetaminophen (PERCOCET)  MG per tablet Take 2 tablets by mouth every 6 hours as needed for moderate to severe pain Max 4/day 120 tablet 0     QUEtiapine (SEROQUEL) 25 MG tablet TAKE ONE TABLET BY MOUTH DAILY AS NEEDED FOR ANXIETY 30 tablet 3     traZODone (DESYREL) 150 MG tablet Take 1 tablet (150 mg) by mouth nightly as needed for sleep 90 tablet 3     venlafaxine (EFFEXOR-XR) 75 MG 24 hr capsule Take 1 capsule (75 mg) by mouth daily 90 capsule 3     EPINEPHrine (EPIPEN) 0.3 MG/0.3ML injection Inject 0.3 mLs (0.3 mg) into the muscle once as needed for anaphylaxis (Patient not taking: Reported on 3/18/2019) 2 each 3     naloxone (NARCAN) 4 MG/0.1ML nasal spray Spray 1 spray (4 mg) into one nostril alternating nostrils as needed for opioid reversal every 2-3 minutes until assistance arrives (Patient not taking: Reported on 3/18/2019) 0.2 mL 1     OTC products: Aspirin    Allergies   Allergen Reactions     Bee Venom       Latex Allergy: NO    Social History     Tobacco Use     Smoking status: Former Smoker     Last attempt to quit: 1/1/2004     Years since quitting: 15.2     Smokeless tobacco: Never Used   Substance Use Topics     Alcohol use: No     Comment: ETOH dependency, DUI 3/15/10     History   Drug Use No       REVIEW OF SYSTEMS:   CONSTITUTIONAL: NEGATIVE for fever, chills, change in weight  INTEGUMENTARY/SKIN: NEGATIVE for worrisome rashes, moles or lesions  EYES: NEGATIVE for vision changes or irritation  ENT/MOUTH: NEGATIVE for ear, mouth and throat problems  RESP: NEGATIVE for significant cough or SOB  BREAST: NEGATIVE for masses, tenderness or discharge  CV: NEGATIVE for chest pain, palpitations or peripheral edema  GI: POSITIVE for Hx hernias and  "gallstone  : NEGATIVE for frequency, dysuria, or hematuria  MUSCULOSKELETAL: NEGATIVE for significant arthralgias or myalgia  NEURO: NEGATIVE for weakness, dizziness or paresthesias  ENDOCRINE: NEGATIVE for temperature intolerance, skin/hair changes  HEME: NEGATIVE for bleeding problems  PSYCHIATRIC: NEGATIVE for changes in mood or affect    EXAM:   BP (!) 140/92   Pulse 69   Temp 98.2  F (36.8  C) (Tympanic)   Resp 12   Ht 1.6 m (5' 3\")   Wt 78.6 kg (173 lb 3.2 oz)   SpO2 96%   BMI 30.68 kg/m      GENERAL APPEARANCE: healthy, alert and no distress     EYES: EOMI, PERRL     HENT: ear canals and TM's normal and nose and mouth without ulcers or lesions     NECK: no adenopathy, no asymmetry, masses, or scars and thyroid normal to palpation     RESP: lungs clear to auscultation - no rales, rhonchi or wheezes     CV: regular rates and rhythm, normal S1 S2, no S3 or S4 and no murmur, click or rub     ABDOMEN: bowel sounds normal, no palpable masses, no organomegaly and tender over ventral hernia and umbilical hernia just to the left of midline. No erythema, warmth. Hernia's are soft and reducible.     MS: extremities normal- no gross deformities noted, no evidence of inflammation in joints, FROM in all extremities.     SKIN: no suspicious lesions or rashes     NEURO: Normal strength and tone, sensory exam grossly normal, mentation intact and speech normal     PSYCH: mentation appears normal. and affect normal/bright     LYMPHATICS: No cervical adenopathy    DIAGNOSTICS:     EKG: Normal Sinus Rhythm, normal axis, normal intervals, no acute ST/T changes c/w ischemia, no LVH by voltage criteria, unchanged from previous tracings  Labs Resulted Today:   Results for orders placed or performed in visit on 03/18/19   CBC with platelets   Result Value Ref Range    WBC 6.0 4.0 - 11.0 10e9/L    RBC Count 3.97 3.8 - 5.2 10e12/L    Hemoglobin 10.7 (L) 11.7 - 15.7 g/dL    Hematocrit 34.7 (L) 35.0 - 47.0 %    MCV 87 78 - 100 fl "    MCH 27.0 26.5 - 33.0 pg    MCHC 30.8 (L) 31.5 - 36.5 g/dL    RDW 13.8 10.0 - 15.0 %    Platelet Count 204 150 - 450 10e9/L   INR   Result Value Ref Range    INR 1.00 0.86 - 1.14   Basic metabolic panel  (Ca, Cl, CO2, Creat, Gluc, K, Na, BUN)   Result Value Ref Range    Sodium 136 133 - 144 mmol/L    Potassium 4.3 3.4 - 5.3 mmol/L    Chloride 104 94 - 109 mmol/L    Carbon Dioxide 26 20 - 32 mmol/L    Anion Gap 6 3 - 14 mmol/L    Glucose 71 70 - 99 mg/dL    Urea Nitrogen 18 7 - 30 mg/dL    Creatinine 0.78 0.52 - 1.04 mg/dL    GFR Estimate 79 >60 mL/min/[1.73_m2]    GFR Estimate If Black >90 >60 mL/min/[1.73_m2]    Calcium 8.9 8.5 - 10.1 mg/dL   TSH with free T4 reflex   Result Value Ref Range    TSH 1.08 0.40 - 4.00 mU/L   Ferritin   Result Value Ref Range    Ferritin 22 8 - 252 ng/mL   Iron and iron binding capacity   Result Value Ref Range    Iron 51 35 - 180 ug/dL    Iron Binding Cap 324 240 - 430 ug/dL    Iron Saturation Index 16 15 - 46 %       Recent Labs   Lab Test 02/21/19  1453 01/28/19  1152  04/12/18  1019 11/27/17  1229  07/27/17  1000  08/05/15  0905   HGB  --   --   --  10.0* 10.8*   < > 10.9*   < > 10.2*   PLT  --   --   --  187 217   < > 181   < > 207   INR  --   --   --   --   --   --  0.94  --  0.96    138  --   --   --    < >  --    < > 144   POTASSIUM 4.3 5.2   < >  --   --    < >  --    < > 3.9   CR 0.87 1.55*   < >  --   --    < > 0.83   < > 0.92    < > = values in this interval not displayed.        IMPRESSION:   Reason for surgery/procedure: Ventral, umbilical hernias causing pain, without bowel obstruction. Gallbladder- small stone per ultrasound 01/2019 with mild dilation of bile and pancreatic ducts.      The proposed surgical procedure is considered INTERMEDIATE risk.    REVISED CARDIAC RISK INDEX  The patient has the following serious cardiovascular risks for perioperative complications such as (MI, PE, VFib and 3  AV Block):  No serious cardiac risks  INTERPRETATION: 0 risks:  Class I (very low risk - 0.4% complication rate)    The patient has the following additional risks for perioperative complications:  No identified additional risks      ICD-10-CM    1. Preop general physical exam Z01.818 EKG 12-lead complete w/read - Clinics     CBC with platelets     INR     Basic metabolic panel  (Ca, Cl, CO2, Creat, Gluc, K, Na, BUN)   2. Calculus of gallbladder and bile duct without cholecystitis or obstruction K80.70 INR   3. Ventral hernia without obstruction or gangrene K43.9 INR   4. Umbilical hernia without obstruction and without gangrene K42.9    5. Cerebral aneurysm, nonruptured I67.1 INR   6. Iron deficiency anemia, unspecified iron deficiency anemia type  D50.9 INR     Ferritin     Iron and iron binding capacity   7. Hypothyroidism, unspecified type E03.9 TSH with free T4 reflex       RECOMMENDATIONS:       Cardiovascular Risk  Patient is already on a Beta Blocker. Continue Betablocker therapy after surgery, using Beta blocker order set as necessary for NPO status.    Anemia  Anemia and does not require treatment prior to surgery.  Monitor Hemoglobin postoperatively.      --Patient is to take all scheduled medications on the day of surgery EXCEPT for modifications listed below.    Anticoagulant or Antiplatelet Medication Use  ASPIRIN: per Patient Neurology was consulted by the surgeon and it was advised that ASA be continued.         APPROVAL GIVEN to proceed with proposed procedure, without further diagnostic evaluation       Signed Electronically by: LA Gorman CNP    Copy of this evaluation report is provided to requesting physician.    [place] Preop Guidelines    Revised Cardiac Risk Index

## 2019-03-19 NOTE — RESULT ENCOUNTER NOTE
Bam Lopez    Here is a copy of your pre-op labs. Thyroid function is normal. The iron levels are normal. The anemia is stable. The kidney function and electrolytes are normal/stable. Please let us know if you have any questions.     Thanks for coming in to the clinic.    LA Gomes CNP

## 2019-03-20 ENCOUNTER — TRANSFERRED RECORDS (OUTPATIENT)
Dept: HEALTH INFORMATION MANAGEMENT | Facility: CLINIC | Age: 69
End: 2019-03-20

## 2019-03-26 ENCOUNTER — TELEPHONE (OUTPATIENT)
Dept: FAMILY MEDICINE | Facility: CLINIC | Age: 69
End: 2019-03-26

## 2019-03-26 ENCOUNTER — AMBULATORY - HEALTHEAST (OUTPATIENT)
Dept: PULMONOLOGY | Facility: OTHER | Age: 69
End: 2019-03-26

## 2019-03-26 DIAGNOSIS — J40 BRONCHITIS: ICD-10-CM

## 2019-03-26 NOTE — TELEPHONE ENCOUNTER
Patient reports she has been prescribed this by the pulmonology. Writer reviewed the notes from them and did not see in the OV.  Advised patient to contact pulmonologist and request the inhalers from them.  Advised the patient if that is not successful to contact the care team and I will send a message to PCP to review.  Patient agrees with the plan.    Jolene MCDANIELS RN

## 2019-03-26 NOTE — TELEPHONE ENCOUNTER
PATIENT REQUESTING REFILLS ON VENTOLIN AND COMBIVENT FROM DR. COPPOLA, I DONT SEE THESE ON HER ACTIVE MED LIST

## 2019-04-04 ENCOUNTER — MYC MEDICAL ADVICE (OUTPATIENT)
Dept: FAMILY MEDICINE | Facility: CLINIC | Age: 69
End: 2019-04-04

## 2019-04-04 DIAGNOSIS — F33.1 MAJOR DEPRESSIVE DISORDER, RECURRENT EPISODE, MODERATE (H): ICD-10-CM

## 2019-04-04 DIAGNOSIS — F41.9 ANXIETY: Primary | ICD-10-CM

## 2019-04-04 RX ORDER — VENLAFAXINE HYDROCHLORIDE 150 MG/1
150 CAPSULE, EXTENDED RELEASE ORAL DAILY
Qty: 90 CAPSULE | Refills: 1 | Status: SHIPPED | OUTPATIENT
Start: 2019-04-04 | End: 2019-09-26

## 2019-04-04 ASSESSMENT — ANXIETY QUESTIONNAIRES
3. WORRYING TOO MUCH ABOUT DIFFERENT THINGS: MORE THAN HALF THE DAYS
1. FEELING NERVOUS, ANXIOUS, OR ON EDGE: SEVERAL DAYS
6. BECOMING EASILY ANNOYED OR IRRITABLE: MORE THAN HALF THE DAYS
GAD7 TOTAL SCORE: 10
5. BEING SO RESTLESS THAT IT IS HARD TO SIT STILL: NOT AT ALL
IF YOU CHECKED OFF ANY PROBLEMS ON THIS QUESTIONNAIRE, HOW DIFFICULT HAVE THESE PROBLEMS MADE IT FOR YOU TO DO YOUR WORK, TAKE CARE OF THINGS AT HOME, OR GET ALONG WITH OTHER PEOPLE: SOMEWHAT DIFFICULT
2. NOT BEING ABLE TO STOP OR CONTROL WORRYING: MORE THAN HALF THE DAYS
7. FEELING AFRAID AS IF SOMETHING AWFUL MIGHT HAPPEN: MORE THAN HALF THE DAYS

## 2019-04-04 ASSESSMENT — PATIENT HEALTH QUESTIONNAIRE - PHQ9
SUM OF ALL RESPONSES TO PHQ QUESTIONS 1-9: 8
5. POOR APPETITE OR OVEREATING: SEVERAL DAYS

## 2019-04-04 NOTE — TELEPHONE ENCOUNTER
PHQ-9 SCORE 7/16/2018 1/28/2019 2/21/2019   PHQ-9 Total Score - - -   PHQ-9 Total Score 8 17 6     DAJUAN-7 SCORE 11/6/2017 7/16/2018 2/21/2019   Total Score - - -   Total Score 9 5 5       I don't see an updated PHQ/DAJUAN?    I'm okay with increasing the dose, but we should get updated screening tests.     New prescription for Effexor 150 mg can be sent to pharmacy when we determine where she wants it sent.    RN to get more information.    Sarah Barriga M.D.

## 2019-04-05 ASSESSMENT — ANXIETY QUESTIONNAIRES: GAD7 TOTAL SCORE: 10

## 2019-04-23 ENCOUNTER — TELEPHONE (OUTPATIENT)
Dept: NEUROLOGY | Facility: CLINIC | Age: 69
End: 2019-04-23

## 2019-04-23 NOTE — TELEPHONE ENCOUNTER
Patient states she needs sedation for MRA tomorrow, 4/23/19. Patient currently has prescription for valium 10 mg for anxiety. Patient informed that she may take valium 30 minutes prior to imaging. Patient verbalized understanding.

## 2019-04-24 ENCOUNTER — ANCILLARY PROCEDURE (OUTPATIENT)
Dept: MRI IMAGING | Facility: CLINIC | Age: 69
End: 2019-04-24
Attending: NEUROLOGICAL SURGERY
Payer: COMMERCIAL

## 2019-04-24 ENCOUNTER — OFFICE VISIT (OUTPATIENT)
Dept: NEUROSURGERY | Facility: CLINIC | Age: 69
End: 2019-04-24
Payer: COMMERCIAL

## 2019-04-24 VITALS
OXYGEN SATURATION: 94 % | HEIGHT: 63 IN | DIASTOLIC BLOOD PRESSURE: 91 MMHG | RESPIRATION RATE: 16 BRPM | HEART RATE: 66 BPM | WEIGHT: 173.8 LBS | BODY MASS INDEX: 30.79 KG/M2 | SYSTOLIC BLOOD PRESSURE: 161 MMHG | TEMPERATURE: 98 F

## 2019-04-24 DIAGNOSIS — G89.29 CHRONIC BILATERAL LOW BACK PAIN WITHOUT SCIATICA: ICD-10-CM

## 2019-04-24 DIAGNOSIS — I67.1 CEREBRAL ANEURYSM, NONRUPTURED: ICD-10-CM

## 2019-04-24 DIAGNOSIS — D32.0 BENIGN NEOPLASM OF CEREBRAL MENINGES (H): ICD-10-CM

## 2019-04-24 DIAGNOSIS — I67.1 CEREBRAL ANEURYSM, NONRUPTURED: Primary | ICD-10-CM

## 2019-04-24 DIAGNOSIS — M54.50 CHRONIC BILATERAL LOW BACK PAIN WITHOUT SCIATICA: ICD-10-CM

## 2019-04-24 DIAGNOSIS — Z98.890 S/P CRANIOTOMY: ICD-10-CM

## 2019-04-24 RX ORDER — DIAZEPAM 10 MG
10 TABLET ORAL EVERY 6 HOURS PRN
COMMUNITY
Start: 2020-07-31 | End: 2022-06-24

## 2019-04-24 RX ORDER — GADOBUTROL 604.72 MG/ML
7.5 INJECTION INTRAVENOUS ONCE
Status: COMPLETED | OUTPATIENT
Start: 2019-04-24 | End: 2019-04-24

## 2019-04-24 RX ADMIN — GADOBUTROL 7.5 ML: 604.72 INJECTION INTRAVENOUS at 08:31

## 2019-04-24 ASSESSMENT — PAIN SCALES - GENERAL: PAINLEVEL: SEVERE PAIN (7)

## 2019-04-24 ASSESSMENT — MIFFLIN-ST. JEOR: SCORE: 1297.48

## 2019-04-24 NOTE — LETTER
4/24/2019      RE: Jessica Ellison  [address]  [address] [facility] [address]       Dear Dr. Barriga:    We saw Ms. Jessica Ellison back [facility] for MRI/MRA follow-up of her left superior hypophyseal aneurysm, for which she underwent successful LVIS stent-assisted coil embolization in 07/2014. Currently, she is doing well. She notes of intermittent, right-sided headaches which seem to be tolerable.     PHYSICAL EXAM: On exam, her general appearance is good. She appears comfortable. Extraocular movements intact. She moves all extremities equally and with good coordination. Her general neurological examination is unchanged and normal. NIH stroke scale score is 0.     REVIEW OF STUDIES: MRA shows no recanalization of the aneurysm. There is some signal change within the stent which we believe is artifact. We do not believe this represents any thrombus. Overall, her MRA looks favorable.     IMPRESSION/PLAN: Given her clinical and radiographic stability, we will increase the duration of follow-up. We will see her back in 3 years with another MRA. She will remain on aspirin indefinitely.      Please do not hesitate to contact us with questions. We will keep you informed of her progress.     BRAXTON FULLER MD    I, Mary Ruggiero, am serving as a scribe to document services personally performed by Braxton Fuller MD, based upon my observations and the provider's statements to me. All documentation has been reviewed by the aforementioned doctor prior to being entered into the official medical record.

## 2019-04-24 NOTE — LETTER
4/24/2019       RE: Jessica Ellison  [address]  [address]  [facility] [address]     Dear Colleague,    Thank you for referring your patient, Jessica Ellison, to the [facility] NEUROS[facility]GERY at [facility]. Please see a copy of my visit note below.    Dear Dr. Barriga:    We saw Ms. Jessica Ellison back in [facility] for MRI/MRA follow-up of her left superior hypophyseal aneurysm, for which she underwent successful LVIS stent-assisted coil embolization in 07/2014. Currently, she is doing well. She notes of intermittent, right-sided headaches which seem to be tolerable.     PHYSICAL EXAM: On exam, her general appearance is good. She appears comfortable. Extraocular movements intact. She moves all extremities equally and with good coordination. Her general neurological examination is unchanged and normal. NIH stroke scale score is 0.     REVIEW OF STUDIES: MRA shows no recanalization of the aneurysm. There is some signal change within the stent which we believe is artifact. We do not believe this represents any thrombus. Overall, her MRA looks favorable.     IMPRESSION/PLAN: Given her clinical and radiographic stability, we will increase the duration of follow-up. We will see her back in 3 years with another MRA. She will remain on aspirin indefinitely.      Please do not hesitate to contact us with questions. We will keep you informed of her progress.     MD ELIZABETH ROGERS, Mary Ruggiero, am serving as a scribe to document services personally performed by Vaishali García MD, based upon my observations and the provider's statements to me. All documentation has been reviewed by the aforementioned doctor prior to being entered into the official medical record.

## 2019-04-24 NOTE — NURSING NOTE
Chief Complaint   Patient presents with     RECHECK     [facility] RETURN - CEREBRAL ANEURYSM     Results     Yolis Rankin

## 2019-04-24 NOTE — PATIENT INSTRUCTIONS
Follow-up with Dr. García in 3 years with an MRA prior to your appointment. We will contact you closer to that time to schedule.    If you have any questions please contact me at [phone], option 3.    Dirk Bailon RN, CNRN  Stroke & Endovascular Care Coordinator    Thank you for choosing [facility] for your health care needs.

## 2019-04-24 NOTE — PROGRESS NOTES
Dear Dr. Barriga:    We saw Ms. Jessica Ellison back [facility] for MRI/MRA follow-up of her left superior hypophyseal aneurysm, for which she underwent successful LVIS stent-assisted coil embolization in 07/2014. Currently, she is doing well. She notes of intermittent, right-sided headaches which seem to be tolerable.     PHYSICAL EXAM: On exam, her general appearance is good. She appears comfortable. Extraocular movements intact. She moves all extremities equally and with good coordination. Her general neurological examination is unchanged and normal. NIH stroke scale score is 0.     REVIEW OF STUDIES: MRA shows no recanalization of the aneurysm. There is some signal change within the stent which we believe is artifact. We do not believe this represents any thrombus. Overall, her MRA looks favorable.     IMPRESSION/PLAN: Given her clinical and radiographic stability, we will increase the duration of follow-up. We will see her back in 3 years with another MRA. She will remain on aspirin indefinitely.      Please do not hesitate to contact us with questions. We will keep you informed of her progress.     BRAXTON FULLER MD    IMary, am serving as a scribe to document services personally performed by Braxton Fuller MD, based upon my observations and the provider's statements to me. All documentation has been reviewed by the aforementioned doctor prior to being entered into the official medical record.

## 2019-05-16 ENCOUNTER — HOSPITAL ENCOUNTER (OUTPATIENT)
Dept: MAMMOGRAPHY | Facility: CLINIC | Age: 69
Discharge: HOME | End: 2019-05-16
Attending: FAMILY MEDICINE | Admitting: FAMILY MEDICINE
Payer: COMMERCIAL

## 2019-05-16 DIAGNOSIS — Z12.31 VISIT FOR SCREENING MAMMOGRAM: ICD-10-CM

## 2019-05-16 DIAGNOSIS — I10 ESSENTIAL HYPERTENSION WITH GOAL BLOOD PRESSURE LESS THAN 140/90: ICD-10-CM

## 2019-05-16 PROCEDURE — 77067 SCR MAMMO BI INCL CAD: CPT

## 2019-05-17 RX ORDER — METOPROLOL SUCCINATE 50 MG/1
TABLET, EXTENDED RELEASE ORAL
Qty: 135 TABLET | Refills: 3 | Status: SHIPPED | OUTPATIENT
Start: 2019-05-17 | End: 2019-12-02

## 2019-05-17 NOTE — TELEPHONE ENCOUNTER
"Requested Prescriptions   Pending Prescriptions Disp Refills     metoprolol succinate ER (TOPROL-XL) 50 MG 24 hr tablet [Pharmacy Med Name: METOPROLOL SUCCINATE ER 50MG TB24] 135 tablet 3     Sig: TAKE ONE AND ONE-HALF TABLETS BY MOUTH EVERY DAY       Beta-Blockers Protocol Failed - 5/16/2019  9:29 PM        Failed - Blood pressure under 140/90 in past 12 months     BP Readings from Last 3 Encounters:   04/24/19 (!) 161/91   03/18/19 (!) 140/92   02/21/19 118/70                 Passed - Patient is age 6 or older        Passed - Recent (12 mo) or future (30 days) visit within the authorizing provider's specialty     Patient had office visit in the last 12 months or has a visit in the next 30 days with authorizing provider or within the authorizing provider's specialty.  See \"Patient Info\" tab in inbasket, or \"Choose Columns\" in Meds & Orders section of the refill encounter.              Passed - Medication is active on med list        Last Written Prescription Date:  4/12/18  Last Fill Quantity: 135,  # refills: 3   Last office visit: 3/18/2019 with prescribing provider:  Linus   Future Office Visit:   Next 5 appointments (look out 90 days)    Jun 13, 2019  2:00 PM CDT  SHORT with [facility]iga MD  [facility] [address] [facility] [address]  [phone]           "

## 2019-05-17 NOTE — TELEPHONE ENCOUNTER
Routing refill request to provider for review/approval because:  Blood pressure not in range.    Leann Spicer RN

## 2019-06-13 ENCOUNTER — OFFICE VISIT (OUTPATIENT)
Dept: FAMILY MEDICINE | Facility: CLINIC | Age: 69
End: 2019-06-13
Payer: COMMERCIAL

## 2019-06-13 ENCOUNTER — TELEPHONE (OUTPATIENT)
Dept: FAMILY MEDICINE | Facility: CLINIC | Age: 69
End: 2019-06-13

## 2019-06-13 VITALS
OXYGEN SATURATION: 98 % | WEIGHT: 174 LBS | TEMPERATURE: 98.3 F | HEART RATE: 72 BPM | RESPIRATION RATE: 18 BRPM | DIASTOLIC BLOOD PRESSURE: 78 MMHG | HEIGHT: 63 IN | SYSTOLIC BLOOD PRESSURE: 128 MMHG | BODY MASS INDEX: 30.83 KG/M2

## 2019-06-13 DIAGNOSIS — E03.9 HYPOTHYROIDISM, UNSPECIFIED TYPE: ICD-10-CM

## 2019-06-13 DIAGNOSIS — G89.29 CHRONIC BILATERAL LOW BACK PAIN WITHOUT SCIATICA: Primary | ICD-10-CM

## 2019-06-13 DIAGNOSIS — G47.19 EXCESSIVE DAYTIME SLEEPINESS: ICD-10-CM

## 2019-06-13 DIAGNOSIS — M54.50 CHRONIC BILATERAL LOW BACK PAIN WITHOUT SCIATICA: Primary | ICD-10-CM

## 2019-06-13 DIAGNOSIS — Z98.1 S/P LUMBAR SPINAL FUSION: ICD-10-CM

## 2019-06-13 DIAGNOSIS — R09.89 LABILE HYPERTENSION: ICD-10-CM

## 2019-06-13 DIAGNOSIS — F41.9 ANXIETY: ICD-10-CM

## 2019-06-13 DIAGNOSIS — G47.00 INSOMNIA, UNSPECIFIED TYPE: ICD-10-CM

## 2019-06-13 DIAGNOSIS — M53.3 SACROILIAC JOINT PAIN: ICD-10-CM

## 2019-06-13 PROCEDURE — 83835 ASSAY OF METANEPHRINES: CPT | Mod: 90 | Performed by: FAMILY MEDICINE

## 2019-06-13 PROCEDURE — 99000 SPECIMEN HANDLING OFFICE-LAB: CPT | Performed by: FAMILY MEDICINE

## 2019-06-13 PROCEDURE — 36415 COLL VENOUS BLD VENIPUNCTURE: CPT | Performed by: FAMILY MEDICINE

## 2019-06-13 PROCEDURE — 99207 C PAF COMPLETED  NO CHARGE: CPT | Performed by: FAMILY MEDICINE

## 2019-06-13 PROCEDURE — 99214 OFFICE O/P EST MOD 30 MIN: CPT | Performed by: FAMILY MEDICINE

## 2019-06-13 RX ORDER — QUETIAPINE FUMARATE 25 MG/1
TABLET, FILM COATED ORAL
Qty: 90 TABLET | Refills: 3 | Status: SHIPPED | OUTPATIENT
Start: 2019-06-13 | End: 2020-03-16

## 2019-06-13 RX ORDER — OXYCODONE AND ACETAMINOPHEN 10; 325 MG/1; MG/1
2 TABLET ORAL EVERY 6 HOURS PRN
Qty: 120 TABLET | Refills: 0 | Status: SHIPPED | OUTPATIENT
Start: 2019-06-13 | End: 2019-09-26

## 2019-06-13 RX ORDER — CYCLOBENZAPRINE HCL 10 MG
10 TABLET ORAL 2 TIMES DAILY PRN
Qty: 180 TABLET | Refills: 1 | Status: SHIPPED | OUTPATIENT
Start: 2019-06-13 | End: 2020-03-16

## 2019-06-13 RX ORDER — QUETIAPINE FUMARATE 25 MG/1
TABLET, FILM COATED ORAL
Qty: 30 TABLET | Refills: 3 | OUTPATIENT
Start: 2019-06-13

## 2019-06-13 RX ORDER — GABAPENTIN 600 MG/1
600 TABLET ORAL 3 TIMES DAILY
Qty: 90 TABLET | Refills: 5 | Status: SHIPPED | OUTPATIENT
Start: 2019-06-13 | End: 2020-01-02

## 2019-06-13 RX ORDER — LEVOTHYROXINE SODIUM 50 UG/1
50 TABLET ORAL DAILY
Qty: 90 TABLET | Refills: 3 | Status: SHIPPED | OUTPATIENT
Start: 2019-06-13 | End: 2020-09-03

## 2019-06-13 RX ORDER — OXYCODONE AND ACETAMINOPHEN 10; 325 MG/1; MG/1
2 TABLET ORAL EVERY 6 HOURS PRN
Qty: 120 TABLET | Refills: 0 | Status: SHIPPED | OUTPATIENT
Start: 2019-07-12 | End: 2019-07-26

## 2019-06-13 RX ORDER — OXYCODONE AND ACETAMINOPHEN 10; 325 MG/1; MG/1
2 TABLET ORAL EVERY 6 HOURS PRN
Qty: 120 TABLET | Refills: 0 | Status: SHIPPED | OUTPATIENT
Start: 2019-08-12 | End: 2019-07-25

## 2019-06-13 RX ORDER — TRAZODONE HYDROCHLORIDE 150 MG/1
150 TABLET ORAL
Qty: 90 TABLET | Refills: 3 | Status: SHIPPED | OUTPATIENT
Start: 2019-06-13 | End: 2020-09-03

## 2019-06-13 ASSESSMENT — PAIN SCALES - GENERAL: PAINLEVEL: NO PAIN (0)

## 2019-06-13 ASSESSMENT — MIFFLIN-ST. JEOR: SCORE: 1298.39

## 2019-06-13 NOTE — PATIENT INSTRUCTIONS
Our Clinic hours are:  Mondays    7:20 am - 7 pm  Tues -  Fri  7:20 am - 5 pm    Clinic Phone: [phone]    The clinic lab opens at 7:30 am Mon - Fri and appointments are required.    [facility]. [phone]  Monday  8 am - 7pm  Tues - Fri 8 am - 5:30 pm

## 2019-06-13 NOTE — PROGRESS NOTES
Subjective     Jessica Ellison is a 68 year old female who presents to clinic today for the following health issues:    HPI   Chief Complaint   Patient presents with     Recheck Medication     Patient is out of medication and feels like it is helping control her pain.        Hypothyroidism Follow-up      Since last visit, patient describes the following symptoms: weight gain of 20 lbs and fatigue  TSH done three months ago and normal.      Chronic/Recurring Back Pain Follow Up      Where is your back pain located? (Select all that apply) low back bilateral    How would you describe your back pain?  sharp and stabbing    Since your last clinic visit for back pain, how has your pain changed? always present, but gets better and worse    Does your back pain interfere with your job? Not applicable    Since your last visit, have you tried any new treatment? No  Does see a pain clinic for regular injections.     MNPMP reviewed.  She had a fill of pain medication from a surgeon after her surgery in March, otherwise has only had refills from me.  She is NOT filling her gabapentin regularly and we discussed that at length.  She claimed to be taking it twice daily and sometimes three times daily but by refill history there is no way this is accurate.  I have told her in no uncertain terms that I will not continue to refill her narcotics if she is not willing to comply with other recommended treatments.      Hypertension Follow-up      Do you check your blood pressure regularly outside of the clinic? Yes     Are you following a low salt diet? Yes    Are your blood pressures ever more than 140 on the top number (systolic) OR more   than 90 on the bottom number (diastolic), for example 140/90? Yes - about 2 x week will get a headache and check her blood pressure - last week it was 170/120.  She takes a left over lisinopril when this happens.        Reviewed and updated as needed this visit by Provider         Review of Systems  "  ROS COMP: Constitutional, HEENT, cardiovascular, pulmonary, gi and gu systems are negative, except as otherwise noted.      Objective    /78   Pulse 72   Temp 98.3  F (36.8  C) (Tympanic)   Resp 18   Ht 1.6 m (5' 3\")   Wt 78.9 kg (174 lb)   SpO2 98%   Breastfeeding? No   BMI 30.82 kg/m    Body mass index is 30.82 kg/m .  Physical Exam   GENERAL: healthy, alert and no distress  NECK: no adenopathy, no asymmetry, masses, or scars and thyroid normal to palpation  RESP: lungs clear to auscultation - no rales, rhonchi or wheezes  CV: regular rates and rhythm, normal S1 S2, no S3 or S4, grade 2/6 systolic murmur heard best over the rusb, peripheral pulses strong and no peripheral edema  ABDOMEN: soft, nontender, no hepatosplenomegaly, no masses and bowel sounds normal  MS: no gross musculoskeletal defects noted, no edema    Diagnostic Test Results:  none         Assessment & Plan     1. Chronic bilateral low back pain without sciatica     - oxyCODONE-acetaminophen (PERCOCET)  MG per tablet; Take 2 tablets by mouth every 6 hours as needed for moderate to severe pain Max 4/day  Dispense: 120 tablet; Refill: 0  - oxyCODONE-acetaminophen (PERCOCET)  MG per tablet; Take 2 tablets by mouth every 6 hours as needed for moderate to severe pain Max 4/day  Dispense: 120 tablet; Refill: 0  - oxyCODONE-acetaminophen (PERCOCET)  MG per tablet; Take 2 tablets by mouth every 6 hours as needed for moderate to severe pain Max 4/day  Dispense: 120 tablet; Refill: 0  - gabapentin (NEURONTIN) 600 MG tablet; Take 1 tablet (600 mg) by mouth 3 times daily  Dispense: 90 tablet; Refill: 5    2. Sacroiliac joint pain     - oxyCODONE-acetaminophen (PERCOCET)  MG per tablet; Take 2 tablets by mouth every 6 hours as needed for moderate to severe pain Max 4/day  Dispense: 120 tablet; Refill: 0  - oxyCODONE-acetaminophen (PERCOCET)  MG per tablet; Take 2 tablets by mouth every 6 hours as needed for moderate " "to severe pain Max 4/day  Dispense: 120 tablet; Refill: 0  - oxyCODONE-acetaminophen (PERCOCET)  MG per tablet; Take 2 tablets by mouth every 6 hours as needed for moderate to severe pain Max 4/day  Dispense: 120 tablet; Refill: 0    3. S/P lumbar spinal fusion     - cyclobenzaprine (FLEXERIL) 10 MG tablet; Take 1 tablet (10 mg) by mouth 2 times daily as needed for muscle spasms  Dispense: 180 tablet; Refill: 1    4. Hypothyroidism, unspecified type   TSH recently normal  - levothyroxine (LEVOTHROID) 50 MCG tablet; Take 1 tablet (50 mcg) by mouth daily  Dispense: 90 tablet; Refill: 3    5. Insomnia, unspecified type     - traZODone (DESYREL) 150 MG tablet; Take 1 tablet (150 mg) by mouth nightly as needed for sleep  Dispense: 90 tablet; Refill: 3    6. Labile hypertension  Check metanephrines, if abnormal will need 24 hour urine collection  - Metanephrines Plasma Free    7. Excessive daytime sleepiness  Patient interested in seeing sleep.  Initially referred over a year ago.   - SLEEP EVALUATION & MANAGEMENT REFERRAL - ADULT -[facility]  [phone] (Age 2 and up); Future    8. Anxiety     - QUEtiapine (SEROQUEL) 25 MG tablet; TAKE ONE TABLET BY MOUTH DAILY AS NEEDED FOR ANXIETY  Dispense: 90 tablet; Refill: 3     BMI:   Estimated body mass index is 30.82 kg/m  as calculated from the following:    Height as of this encounter: 1.6 m (5' 3\").    Weight as of this encounter: 78.9 kg (174 lb).   Weight management plan: Discussed healthy diet and exercise guidelines            No follow-ups on file.    Sarah Barriga MD  [facility]    "

## 2019-06-13 NOTE — TELEPHONE ENCOUNTER
Routing refill request to provider for review/approval because:  Labs not current:  Lipids - ready.  Taking for anxiety   She was just seen today - notes not yet complete.    Fely GONZALEZ RN

## 2019-06-13 NOTE — TELEPHONE ENCOUNTER
"Requested Prescriptions   Pending Prescriptions Disp Refills     QUEtiapine (SEROQUEL) 25 MG tablet [Pharmacy Med Name: QUETIAPINE FUMARATE 25MG TABS] 30 tablet 3     Sig: TAKE ONE TABLET BY MOUTH DAILY AS NEEDED FOR ANXIETY       Antipsychotic Medications Failed - 6/13/2019  3:07 PM        Failed - Lipid panel on file within the past 12 months     Recent Labs   Lab Test 11/02/15  1119   CHOL 224*   TRIG 96   HDL 78      VLDL 19   CHOLHDLRATIO 2.9               Passed - Blood pressure under 140/90 in past 12 months     BP Readings from Last 3 Encounters:   06/13/19 128/78   04/24/19 (!) 161/91   03/18/19 (!) 140/92                 Passed - Patient is 12 years of age or older        Passed - CBC on file in past 12 months     Recent Labs   Lab Test 03/18/19  1400   WBC 6.0   RBC 3.97   HGB 10.7*   HCT 34.7*                    Passed - Heart Rate on file within past 12 months     Pulse Readings from Last 3 Encounters:   06/13/19 72   04/24/19 66   03/18/19 69               Passed - A1c or Glucose on file in past 12 months     Recent Labs   Lab Test 03/18/19  1400   GLC 71       Please review patients last 3 weights. If a weight gain of >10 lbs exists, you may refill the prescription once after instructing the patient to schedule an appointment within the next 30 days.    Wt Readings from Last 3 Encounters:   06/13/19 78.9 kg (174 lb)   04/24/19 78.8 kg (173 lb 12.8 oz)   03/18/19 78.6 kg (173 lb 3.2 oz)             Passed - Medication is active on med list        Passed - Patient is not pregnant        Passed - No positve pregnancy test on file in past 12 months        Passed - Recent (6 mo) or future (30 days) visit within the authorizing provider's specialty     Patient had office visit in the last 6 months or has a visit in the next 30 days with authorizing provider or within the authorizing provider's specialty.  See \"Patient Info\" tab in inbasket, or \"Choose Columns\" in Meds & Orders section of the " refill encounter.

## 2019-06-17 LAB
ANNOTATION COMMENT IMP: ABNORMAL
METANEPHS SERPL-SCNC: 0.1 NMOL/L (ref 0–0.49)
NORMETANEPHRINE SERPL-SCNC: 0.97 NMOL/L (ref 0–0.89)

## 2019-06-17 NOTE — RESULT ENCOUNTER NOTE
Normetanephrines are slightly elevated. Not high enough to be overly concerning, but we do need to do the 24 hour urine collection to further evaluate this.     I will order that, patient will need to come in to collect the collection supplies.     Sarah Barriga M.D.

## 2019-06-30 PROCEDURE — 83835 ASSAY OF METANEPHRINES: CPT | Mod: 90 | Performed by: FAMILY MEDICINE

## 2019-06-30 PROCEDURE — 99000 SPECIMEN HANDLING OFFICE-LAB: CPT | Performed by: FAMILY MEDICINE

## 2019-07-01 DIAGNOSIS — R09.89 LABILE HYPERTENSION: ICD-10-CM

## 2019-07-05 LAB
COLLECT DURATION TIME SPEC: 24 H
CREAT 24H UR-MRATE: 990 MG/D (ref 500–1400)
CREAT UR-MCNC: 60 MG/DL
METANEPH 24H UR-MCNC: 39 UG/L
METANEPH 24H UR-MRATE: 64 UG/D (ref 39–143)
METANEPH+NORMETANEPH UR-IMP: NORMAL
METANEPH/CREAT 24H UR: 65 UG/G CRT (ref 0–300)
NORMETANEPHRINE 24H UR-MCNC: 115 UG/L
NORMETANEPHRINE 24H UR-MRATE: 190 UG/D (ref 109–393)
NORMETANEPHRINE/CREAT 24H UR: 192 UG/G CRT (ref 0–400)
SPECIMEN VOL ?TM UR: 1650 ML

## 2019-07-23 DIAGNOSIS — M54.50 CHRONIC BILATERAL LOW BACK PAIN WITHOUT SCIATICA: ICD-10-CM

## 2019-07-23 DIAGNOSIS — M53.3 SACROILIAC JOINT PAIN: ICD-10-CM

## 2019-07-23 DIAGNOSIS — G89.29 CHRONIC BILATERAL LOW BACK PAIN WITHOUT SCIATICA: ICD-10-CM

## 2019-07-23 NOTE — TELEPHONE ENCOUNTER
Okay to be filled x 1 month by someone in clinic unless she can wait until my return in 2 days.    Sarah Barriga M.D.

## 2019-07-23 NOTE — TELEPHONE ENCOUNTER
Dr. Barriga,    Routing refill request to provider for review/approval because:  Drug not on the [facility] refill protocol  Narcotic with new law needs new RX. Raquel SALINAS RN

## 2019-07-25 RX ORDER — OXYCODONE AND ACETAMINOPHEN 10; 325 MG/1; MG/1
2 TABLET ORAL EVERY 6 HOURS PRN
Qty: 120 TABLET | Refills: 0 | Status: SHIPPED | OUTPATIENT
Start: 2019-08-12 | End: 2019-07-26

## 2019-07-26 ENCOUNTER — TELEPHONE (OUTPATIENT)
Dept: FAMILY MEDICINE | Facility: CLINIC | Age: 69
End: 2019-07-26

## 2019-07-26 ENCOUNTER — ALLIED HEALTH/NURSE VISIT (OUTPATIENT)
Dept: FAMILY MEDICINE | Facility: CLINIC | Age: 69
End: 2019-07-26
Payer: COMMERCIAL

## 2019-07-26 DIAGNOSIS — M54.50 CHRONIC BILATERAL LOW BACK PAIN WITHOUT SCIATICA: ICD-10-CM

## 2019-07-26 DIAGNOSIS — M54.50 CHRONIC BILATERAL LOW BACK PAIN WITHOUT SCIATICA: Primary | ICD-10-CM

## 2019-07-26 DIAGNOSIS — G89.29 CHRONIC BILATERAL LOW BACK PAIN WITHOUT SCIATICA: Primary | ICD-10-CM

## 2019-07-26 DIAGNOSIS — M53.3 SACROILIAC JOINT PAIN: ICD-10-CM

## 2019-07-26 DIAGNOSIS — G89.29 CHRONIC BILATERAL LOW BACK PAIN WITHOUT SCIATICA: ICD-10-CM

## 2019-07-26 PROCEDURE — 99207 ZZC NO CHARGE NURSE ONLY: CPT

## 2019-07-26 RX ORDER — OXYCODONE AND ACETAMINOPHEN 10; 325 MG/1; MG/1
2 TABLET ORAL EVERY 6 HOURS PRN
Qty: 120 TABLET | Refills: 0 | Status: SHIPPED | OUTPATIENT
Start: 2019-07-26 | End: 2019-09-26

## 2019-07-26 NOTE — TELEPHONE ENCOUNTER
Patient in clinic today to clarify her percocet RX dated 18.  The start date is 19 (this is defaulted when you hit reorder) however it should have been 19.  She was unable to fill her original July RX dated 19 (written 19) because it  due to new law.    New RX ready with today's date as start.    Fely GONZALEZ RN

## 2019-08-15 ENCOUNTER — TELEPHONE (OUTPATIENT)
Dept: FAMILY MEDICINE | Facility: CLINIC | Age: 69
End: 2019-08-15

## 2019-08-15 ENCOUNTER — APPOINTMENT (OUTPATIENT)
Dept: FAMILY MEDICINE | Facility: CLINIC | Age: 69
End: 2019-08-15
Payer: COMMERCIAL

## 2019-08-15 NOTE — TELEPHONE ENCOUNTER
Patient notified.  She states she is taking 600mg gabapentin daily.  She will discuss the pain clinic referral in Sept at [facility].  PCP is aware of the above info.  Fely GONZALEZ RN

## 2019-08-15 NOTE — TELEPHONE ENCOUNTER
Pt walked in asking for her Percocet script to be changed back to maximum of 6 tabs/day.   Percocet 4 tabs/day was written on 7/26/19 to  on 8/23/19(script is in the [facility])  Pt is also taking Aleve and Gabapentin but is not enough to cover her pain.  Scheduled appt for 9/26/19.  Lexa Khan

## 2019-09-26 ENCOUNTER — OFFICE VISIT (OUTPATIENT)
Dept: FAMILY MEDICINE | Facility: CLINIC | Age: 69
End: 2019-09-26
Payer: COMMERCIAL

## 2019-09-26 VITALS
RESPIRATION RATE: 18 BRPM | TEMPERATURE: 97.8 F | SYSTOLIC BLOOD PRESSURE: 120 MMHG | BODY MASS INDEX: 29.59 KG/M2 | OXYGEN SATURATION: 94 % | HEART RATE: 87 BPM | DIASTOLIC BLOOD PRESSURE: 88 MMHG | HEIGHT: 63 IN | WEIGHT: 167 LBS

## 2019-09-26 DIAGNOSIS — Z23 NEED FOR PROPHYLACTIC VACCINATION AND INOCULATION AGAINST INFLUENZA: ICD-10-CM

## 2019-09-26 DIAGNOSIS — G89.29 CHRONIC BILATERAL LOW BACK PAIN WITHOUT SCIATICA: ICD-10-CM

## 2019-09-26 DIAGNOSIS — Z98.1 S/P LUMBAR SPINAL FUSION: Primary | ICD-10-CM

## 2019-09-26 DIAGNOSIS — M54.50 CHRONIC BILATERAL LOW BACK PAIN WITHOUT SCIATICA: ICD-10-CM

## 2019-09-26 DIAGNOSIS — M53.3 SACROILIAC JOINT PAIN: ICD-10-CM

## 2019-09-26 DIAGNOSIS — F41.9 ANXIETY: ICD-10-CM

## 2019-09-26 DIAGNOSIS — F33.1 MAJOR DEPRESSIVE DISORDER, RECURRENT EPISODE, MODERATE (H): ICD-10-CM

## 2019-09-26 PROCEDURE — 99214 OFFICE O/P EST MOD 30 MIN: CPT | Mod: 25 | Performed by: FAMILY MEDICINE

## 2019-09-26 PROCEDURE — 90732 PPSV23 VACC 2 YRS+ SUBQ/IM: CPT | Performed by: FAMILY MEDICINE

## 2019-09-26 PROCEDURE — 90662 IIV NO PRSV INCREASED AG IM: CPT | Performed by: FAMILY MEDICINE

## 2019-09-26 PROCEDURE — G0008 ADMIN INFLUENZA VIRUS VAC: HCPCS | Performed by: FAMILY MEDICINE

## 2019-09-26 PROCEDURE — G0009 ADMIN PNEUMOCOCCAL VACCINE: HCPCS | Performed by: FAMILY MEDICINE

## 2019-09-26 RX ORDER — VENLAFAXINE HYDROCHLORIDE 150 MG/1
150 CAPSULE, EXTENDED RELEASE ORAL DAILY
Qty: 90 CAPSULE | Refills: 1 | Status: SHIPPED | OUTPATIENT
Start: 2019-09-26 | End: 2020-05-07

## 2019-09-26 RX ORDER — OXYCODONE AND ACETAMINOPHEN 10; 325 MG/1; MG/1
2 TABLET ORAL EVERY 6 HOURS PRN
Qty: 120 TABLET | Refills: 0 | Status: SHIPPED | OUTPATIENT
Start: 2019-09-26 | End: 2019-12-19

## 2019-09-26 RX ORDER — OXYCODONE AND ACETAMINOPHEN 10; 325 MG/1; MG/1
2 TABLET ORAL EVERY 6 HOURS PRN
Qty: 120 TABLET | Refills: 0 | Status: SHIPPED | OUTPATIENT
Start: 2019-09-26 | End: 2019-11-15

## 2019-09-26 ASSESSMENT — PAIN SCALES - GENERAL: PAINLEVEL: EXTREME PAIN (8)

## 2019-09-26 ASSESSMENT — PATIENT HEALTH QUESTIONNAIRE - PHQ9: SUM OF ALL RESPONSES TO PHQ QUESTIONS 1-9: 0

## 2019-09-26 ASSESSMENT — MIFFLIN-ST. JEOR: SCORE: 1266.64

## 2019-09-26 NOTE — LETTER
[facility]  [address] [address]  [phone]        September 26, 2019    Regarding:  Jessica Ellison     [address] [address]              To Whom It May Concern;      Jessica Ellison is under my medical care.  She wishes to travel to [place] and that may be beneficial (certainly not harmful) for her chronic back pain.  There are no medical contraindications to travel at this point.           Sincerely,        Sarah Barriga MD

## 2019-09-26 NOTE — PATIENT INSTRUCTIONS
Can call in 2 months for a refill, see me in 3 months.    Consider more comprehensive pain review with Dr. Banerjee and his pain clinic if the pain is not adequately controlled with the current pain medication.      Our Clinic hours are:  Mondays    7:20 am - 7 pm  Tues -  Fri  7:20 am - 5 pm    Clinic Phone: [phone]    The clinic lab opens at 7:30 am Mon - Fri and appointments are required.    [facility] [place]  Ph. [phone]  Monday  8 am - 7pm  Tues - Fri 8 am - 5:30 pm

## 2019-09-26 NOTE — PROGRESS NOTES
"Chief Complaint   Patient presents with     Recheck Medication     Patient is here to have medication refilled. Patient rates overall pain at a 8/10. Morning and night pain is worse.      Musculoskeletal Problem     Patient is seeing a ortho for right foot that will swell and have bumps. Patient has right knee pain as well.     Flu Shot       SUBJECTIVE:  Jessica Ellison, a 68 year old female scheduled an appointment to discuss the following issues:     Chronic bilateral low back pain without sciatica  Sacroiliac joint pain  Anxiety  Major depressive disorder, recurrent episode, moderate (H)  S/P lumbar spinal fusion    Patient tells me that she is now taking the gabapentin 600 mg three times daily \"since I chastised her at her last visit\".  This is discordant with her telling the nurse six weeks ago that she was only taking it once a day.  She has filled #270 in the past year and has a half full bottle with her today - last filled 3 months ago.     She called 6 weeks ago wanting a refill of her narcotics and wanted the dose increased, I declined to add more narcotics.  She certainly has reason for pain, and yet I'm not comfortable increasing her narcotics as the only additional options.  She hasn't been on the gabapentin regularly at recommended doses, etc.  She does see Dr. Banerjee at the [facility] and his last note was reviewed.      I did encourage her to see his pain clinic for more comprehensive treatment or adjustment of medication.  She also would like to do some PT.  She has right knee and now right foot/ankle pain and I wonder if some of this is due to her abnormal gait and kinetic chain dysfunction. She is willing to do more PT and would like to do that with [employer] as she is already swimming there.     Patient tells us that she received her 1 year pin through AA and this is congratulated.     She would like a letter of support from me for a trip to [place] with her family.  She feels the warmth " "would be good for her, however she has to have approval from the courts to go out of the country.      Medical, social, surgical, and family histories reviewed.    ROS:  5 point ROS negative except as noted above in HPI, including Gen., Resp., CV, GI &  system review.    OBJECTIVE:  /88   Pulse 87   Temp 97.8  F (36.6  C) (Tympanic)   Resp 18   Ht 1.6 m (5' 3\")   Wt 75.8 kg (167 lb)   SpO2 94%   Breastfeeding? No   BMI 29.58 kg/m    EXAM:  GENERAL APPEARANCE ADULT: Alert, no acute distress  PSYCH: mentation appears normal., affect and mood normal    ASSESSMENT/PLAN:    (Z98.1) S/P lumbar spinal fusion  (primary encounter diagnosis)  Comment:    Plan: oxyCODONE-acetaminophen (PERCOCET)  MG         per tablet, oxyCODONE-acetaminophen (PERCOCET)          MG per tablet             (M54.5,  G89.29) Chronic bilateral low back pain without sciatica  Comment:  As above, consider more comprehensive pain review with [facility]. I will continue to provide her percocet but I will not increase the dose at this time.   Continue Gabapentin 600 mg three times daily and I'll continue to follow refill dates.  [place]  was queried today  Plan: oxyCODONE-acetaminophen (PERCOCET)  MG         per tablet, oxyCODONE-acetaminophen (PERCOCET)          MG per tablet             (M53.3) Sacroiliac joint pain  Comment:    Plan: oxyCODONE-acetaminophen (PERCOCET)  MG         per tablet, oxyCODONE-acetaminophen (PERCOCET)          MG per tablet             (F41.9) Anxiety  Comment:    Plan: venlafaxine (EFFEXOR-XR) 150 MG 24 hr capsule             (F33.1) Major depressive disorder, recurrent episode, moderate (H)  Comment:    Plan: venlafaxine (EFFEXOR-XR) 150 MG 24 hr capsule                 Patient Instructions   Can call in 2 months for a refill, see me in 3 months.    Consider more comprehensive pain review with Dr. Banerjee and his pain clinic if the pain is not adequately controlled with " the current pain medication.      Our Clinic hours are:  Mondays    7:20 am - 7 pm  Tues -  Fri  7:20 am - 5 pm    Clinic Phone: [phone]    The clinic lab opens at 7:30 am Mon - Fri and appointments are required.    [facility]. [phone]  Monday  8 am - 7pm  Tues - Fri 8 am - 5:30 pm

## 2019-11-03 ENCOUNTER — HEALTH MAINTENANCE LETTER (OUTPATIENT)
Age: 69
End: 2019-11-03

## 2019-11-15 ENCOUNTER — TELEPHONE (OUTPATIENT)
Dept: FAMILY MEDICINE | Facility: CLINIC | Age: 69
End: 2019-11-15

## 2019-11-15 DIAGNOSIS — M53.3 SACROILIAC JOINT PAIN: ICD-10-CM

## 2019-11-15 DIAGNOSIS — M54.50 CHRONIC BILATERAL LOW BACK PAIN WITHOUT SCIATICA: ICD-10-CM

## 2019-11-15 DIAGNOSIS — Z98.1 S/P LUMBAR SPINAL FUSION: ICD-10-CM

## 2019-11-15 DIAGNOSIS — G89.29 CHRONIC BILATERAL LOW BACK PAIN WITHOUT SCIATICA: ICD-10-CM

## 2019-11-15 RX ORDER — OXYCODONE AND ACETAMINOPHEN 10; 325 MG/1; MG/1
2 TABLET ORAL EVERY 6 HOURS PRN
Qty: 120 TABLET | Refills: 0 | Status: SHIPPED | OUTPATIENT
Start: 2019-11-22 | End: 2019-12-19

## 2019-11-15 NOTE — TELEPHONE ENCOUNTER
"Reason for Call:  Medication or medication refill:    Do you use a [facility]?  Name of the pharmacy and phone number for the current request:  [facility] [phone]    Name of the medication requested: Oxycodone (refill not due until 11/23 which is a Saturday, could she get it Friday, 11/22.    Can we leave a detailed message on this number? YES    Phone number patient can be reached at: Home number on file [phone] (home)  Other request: Patient would like appointment with  because her blood pressure is \"out of whack\". Patient said she would talk to RN about blood pressure before she made appointment. Noticed she does have appointment with Dr. Barriga for 12/19/19    Best Time: Any    Call taken on 11/15/2019 at 12:11 PM by Kamilah Choudhary    Last Written Prescription Date:  9/26/19  Last Fill Quantity: 120,  # refills: 0   Last office visit: 9/26/2019 with prescribing provider:  Linus   Future Office Visit:   Next 5 appointments (look out 90 days)    Dec 19, 2019 11:00 AM CST  SHORT with Sarah Barriga MD  [facility] [place] ([facility] [place]) [address] [address] [phone]           "

## 2019-11-18 NOTE — TELEPHONE ENCOUNTER
Patient was notified and agrees with plan. Patient has enough medication. Patient will contact us if the blood pressure if it gets to low or does not help.    Thank you    Jolene MCDANIELS RN

## 2019-11-18 NOTE — TELEPHONE ENCOUNTER
What is her heart rate?  If low, we can't increase the dose.  If >70 or so, we may be able to go to 100 mg of the metoprolol xl daily (currently I believe she's taking 75 mg daily).      Sarah Barriga M.D.

## 2019-11-18 NOTE — TELEPHONE ENCOUNTER
Patient states she her heart is running about  on her at home machine. Patient is taking Metoprolol 75 mg daily.    Thank you    Jolene MCDANIELS RN

## 2019-11-18 NOTE — TELEPHONE ENCOUNTER
Increase to 100 mg/day.  Does she need a new prescription or does she have enough until she sees me?    Sarah Barriga M.D.

## 2019-11-18 NOTE — TELEPHONE ENCOUNTER
S-(situation): Patient reports her blood pressure has been elevated at the 180's / 100's.  Patient would like to increase her metoprolol.    B-(background): Patient reports she has noticed her blood pressure has been elevated lately.       A-(assessment): Patient reports she checks her blood pressure in the am before she takes her medication. The patient reports it has been about 180's /100's. Patient states she will check it later sometimes and it will still be diastolic at about 100. Patient does reports having headaches but they usually improve after medication. Patient has appointment on 12/19/19.     R-(recommendations): patient was advised to be seen. Patient is going to check her blood pressure a few hours after taking her medication and call us in a couple of days.  Will send to provider review and advise.     Thank you  Jolene MCDANIELS RN

## 2019-12-02 ENCOUNTER — OFFICE VISIT (OUTPATIENT)
Dept: FAMILY MEDICINE | Facility: CLINIC | Age: 69
End: 2019-12-02
Payer: COMMERCIAL

## 2019-12-02 VITALS
BODY MASS INDEX: 30.65 KG/M2 | HEART RATE: 69 BPM | RESPIRATION RATE: 14 BRPM | WEIGHT: 173 LBS | OXYGEN SATURATION: 94 % | TEMPERATURE: 98 F | HEIGHT: 63 IN | DIASTOLIC BLOOD PRESSURE: 80 MMHG | SYSTOLIC BLOOD PRESSURE: 130 MMHG

## 2019-12-02 DIAGNOSIS — I67.1 CEREBRAL ANEURYSM, NONRUPTURED: ICD-10-CM

## 2019-12-02 DIAGNOSIS — G89.29 CHRONIC BILATERAL LOW BACK PAIN WITHOUT SCIATICA: ICD-10-CM

## 2019-12-02 DIAGNOSIS — Z79.82 ASPIRIN LONG-TERM USE: ICD-10-CM

## 2019-12-02 DIAGNOSIS — F33.1 MAJOR DEPRESSIVE DISORDER, RECURRENT EPISODE, MODERATE (H): ICD-10-CM

## 2019-12-02 DIAGNOSIS — E03.9 HYPOTHYROIDISM, UNSPECIFIED TYPE: ICD-10-CM

## 2019-12-02 DIAGNOSIS — G47.00 INSOMNIA, UNSPECIFIED TYPE: ICD-10-CM

## 2019-12-02 DIAGNOSIS — Z01.818 PREOP GENERAL PHYSICAL EXAM: Primary | ICD-10-CM

## 2019-12-02 DIAGNOSIS — M54.50 CHRONIC BILATERAL LOW BACK PAIN WITHOUT SCIATICA: ICD-10-CM

## 2019-12-02 DIAGNOSIS — H02.9 EYELID ABNORMALITY: ICD-10-CM

## 2019-12-02 DIAGNOSIS — I10 ESSENTIAL HYPERTENSION WITH GOAL BLOOD PRESSURE LESS THAN 140/90: ICD-10-CM

## 2019-12-02 PROCEDURE — 99214 OFFICE O/P EST MOD 30 MIN: CPT | Performed by: NURSE PRACTITIONER

## 2019-12-02 RX ORDER — METOPROLOL SUCCINATE 50 MG/1
100 TABLET, EXTENDED RELEASE ORAL DAILY
Qty: 135 TABLET | Refills: 3 | COMMUNITY
Start: 2019-12-02 | End: 2019-12-19

## 2019-12-02 ASSESSMENT — MIFFLIN-ST. JEOR: SCORE: 1288.85

## 2019-12-02 NOTE — PROGRESS NOTES
[facility]  [address] [address]  [phone]  Dept: [phone]    PRE-OP EVALUATION:  Today's date: 2019    Jessica Ellison (: 1950) presents for pre-operative evaluation assessment as requested by Dr. Goldstein.  She requires evaluation and anesthesia risk assessment prior to undergoing surgery/procedure for treatment of bilateral eye lid ptosis.    Proposed Surgery/ Procedure: Blepharoplasty- both eyes  Date of Surgery/ Procedure: 19  Time of Surgery/ Procedure: 7:40am  Hospital/Surgical Facility: [facility]  Fax number for surgical facility: [phone]  Primary Physician: Sarah Barriga  Type of Anesthesia Anticipated: Local MAC    Patient has a Health Care Directive or Living Will:  NO    1. YES - Do you have a history of heart attack, stroke, stent, bypass or surgery on an artery in the head, neck, heart or legs? History of cerebral aneurysm   2. NO - Do you ever have any pain or discomfort in your chest?  3. NO - Do you have a history of  Heart Failure?  4. NO - Are you troubled by shortness of breath when: walking on the level, up a slight hill or at night?  5. NO - Do you currently have a cold, bronchitis or other respiratory infection?  6. NO - Do you have a cough, shortness of breath or wheezing?  7. NO - Do you sometimes get pains in the calves of your legs when you walk?  8. NO - Do you or anyone in your family have previous history of blood clots?  9. NO - Do you or does anyone in your family have a serious bleeding problem such as prolonged bleeding following surgeries or cuts?  10. YES - Have you ever had problems with anemia or been told to take iron pills? History of anemia- not on regular replacement  11. NO - Have you had any abnormal blood loss such as black, tarry or bloody stools, or abnormal vaginal bleeding?  12. YES - Have you ever had a blood transfusion? Post spinal fusion  13. NO - Have you or any of your  relatives ever had problems with anesthesia?  14. YES - Do you have sleep apnea, excessive snoring or daytime drowsiness? Snores  15. NO - Do you have any prosthetic heart valves?  16. NO - Do you have prosthetic joints?  17. NO - Is there any chance that you may be pregnant?      HPI:     HPI related to upcoming procedure: bilateral eyelid drooping, affecting vision.       DEPRESSION - Patient has a long history of Depression of moderate severity requiring medication for control with recent symptoms being stable..Current symptoms of depression include depressed mood, insomnia, anxiety.     SLEEP PROBLEM - Patient has a longstanding history of snoring. Patient has tried OTC medications with limited success.     Hypothyroidism: stable on current therapy.     Chronic back pain: followed by PCP- controlled with Percocet    MEDICAL HISTORY:     Patient Active Problem List    Diagnosis Date Noted     Spell of change in speech 04/17/2018     Priority: Medium     Hypothyroidism 09/07/2016     Priority: Medium     Chronic bilateral low back pain without sciatica 06/02/2016     Priority: Medium     Essential hypertension with goal blood pressure less than 140/90 06/02/2016     Priority: Medium     S/P lumbar spinal fusion 10/19/2015     Priority: Medium     Bee allergy status 06/30/2015     Priority: Medium     Cerebral aneurysm, nonruptured 07/24/2014     Priority: Medium     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 Marti       Major depressive disorder, recurrent episode, moderate (H) 07/10/2013     Priority: Medium     Advanced directives, counseling/discussion 09/05/2012     Priority: Medium     Patient does not have an Advance/Health Care Directive (HCD), has information at home.     Sonja Krishnamurthy  September 5, 2012         Anxiety 06/20/2012     Priority: Medium     CARDIOVASCULAR SCREENING; LDL GOAL LESS THAN 160  10/31/2010     Priority: Medium     ETOH abuse 03/19/2010     Priority: Medium      katie drinking, DUIs and probation violation       Insomnia 12/21/2009     Priority: Medium     Back pain 12/21/2009     Priority: Medium     Patient is followed by DOMINI[facility] COPPOLA for ongoing prescription of narcotic pain medicine.  Med: oxycodone/acetaminophen 10/325 for chronic back pain.   Maximum use per month: 120  Expected duration: unknown  Narcotic agreement on file: YES 3/19/2010  Clinic visit recommended: Q 3 months    April 2013 - rhizotomy for SI (left) Jhon Mckay MD    Follows at [facility] - severe low back pain with work related injuries.  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  Spinal stenosis at most lumbar levels  Improvement with past epidural steroid injections  SI joint dysfunction with the pain        Past Medical History:   Diagnosis Date     Anemia      Aneurysm (H)      Chemical dependency (H)     Chem Dep RX     Depression      History of blood transfusion      Hypertension      Menopausal symptoms      Other chronic pain     Lower back and hips     Sleep disorder      Thyroid disease      Tobacco abuse      Past Surgical History:   Procedure Laterality Date     APPENDECTOMY  1960     C PART REMOVAL COLON W ANASTOMOSIS  5/2005    diverticulitis     C SPINE FUSION,ANTER,8+ SGMTS  2007    Anterior posterior fusion 10 levels, hardware removal and re-fusion 2009     cerebral aneurysm  2014    coiled     COLONOSCOPY  4/19/2005      EXCISION BREAST LESION, OPEN >=1      core biopsy 2006, lumpectomy 2006     LAP VENTRAL HERNIA REPAIR  12/2017    [facility]     LAPAROSCOPIC ASSISTED HYSTERECTOMY VAGINAL  12/2017     orif left femoral neck Left 6/3/2016    stress fracture.  done at [facility]     SURGICAL HISTORY OF -   2004    Skin Graft     Current Outpatient Medications   Medication Sig Dispense Refill     aspirin 325 MG tablet Take 1 tablet (325 mg) by mouth daily 180 tablet 6      calcium carbonate-vitamin D (CALTRATE 600+D) 600-400 MG-UNIT CHEW Take 1 chew tab by mouth 2 times daily (Patient taking differently: Take 1 chew tab by mouth daily ) 180 tablet 3     cyclobenzaprine (FLEXERIL) 10 MG tablet Take 1 tablet (10 mg) by mouth 2 times daily as needed for muscle spasms 180 tablet 1     diazepam (VALIUM) 10 MG tablet Take 10 mg by mouth       EPINEPHrine (EPIPEN) 0.3 MG/0.3ML injection Inject 0.3 mLs (0.3 mg) into the muscle once as needed for anaphylaxis 2 each 3     gabapentin (NEURONTIN) 600 MG tablet Take 1 tablet (600 mg) by mouth 3 times daily 90 tablet 5     levothyroxine (LEVOTHROID) 50 MCG tablet Take 1 tablet (50 mcg) by mouth daily 90 tablet 3     metoprolol succinate ER (TOPROL-XL) 50 MG 24 hr tablet TAKE ONE AND ONE-HALF TABLETS BY MOUTH EVERY  tablet 3     multivitamin CF formula (CHOICEFUL) chewable tablet Take 1 tablet by mouth daily 100 tablet 0     naloxone (NARCAN) 4 MG/0.1ML nasal spray Spray 1 spray (4 mg) into one nostril alternating nostrils as needed for opioid reversal every 2-3 minutes until assistance arrives 0.2 mL 1     order for DME Equipment being ordered: Plastic donut hole air filled cushion 1 Device 0     oxyCODONE-acetaminophen (PERCOCET)  MG per tablet Take 2 tablets by mouth every 6 hours as needed for moderate to severe pain Max 4/day 120 tablet 0     oxyCODONE-acetaminophen (PERCOCET)  MG per tablet Take 2 tablets by mouth every 6 hours as needed for moderate to severe pain Max 4/day 120 tablet 0     QUEtiapine (SEROQUEL) 25 MG tablet TAKE ONE TABLET BY MOUTH DAILY AS NEEDED FOR ANXIETY 90 tablet 3     traZODone (DESYREL) 150 MG tablet Take 1 tablet (150 mg) by mouth nightly as needed for sleep 90 tablet 3     venlafaxine (EFFEXOR-XR) 150 MG 24 hr capsule Take 1 capsule (150 mg) by mouth daily 90 capsule 1     OTC products: None, except as noted above    Allergies   Allergen Reactions     Bee Venom       Latex Allergy: NO    Social  History     Tobacco Use     Smoking status: Former Smoker     Last attempt to quit: 1/1/2004     Years since quitting: 15.9     Smokeless tobacco: Never Used   Substance Use Topics     Alcohol use: No     Comment: ETOH dependency, DUI 3/15/10     History   Drug Use No       REVIEW OF SYSTEMS:   CONSTITUTIONAL: NEGATIVE for fever, chills, change in weight  INTEGUMENTARY/SKIN: NEGATIVE for worrisome rashes, moles or lesions  EYES: POSITIVE for eyelid phytosis   ENT/MOUTH: NEGATIVE for ear, mouth and throat problems  RESP: NEGATIVE for significant cough or SOB  BREAST: NEGATIVE for masses, tenderness or discharge  CV: NEGATIVE for chest pain, palpitations or peripheral edema  GI: NEGATIVE for nausea, abdominal pain, heartburn, or change in bowel habits  : NEGATIVE for frequency, dysuria, or hematuria  MUSCULOSKELETAL:POSITIVE  for chronic back pain  NEURO: NEGATIVE for weakness, dizziness or paresthesias  ENDOCRINE: NEGATIVE for temperature intolerance, skin/hair changes  HEME: NEGATIVE for bleeding problems  PSYCHIATRIC: NEGATIVE for changes in mood or affect    EXAM:   There were no vitals taken for this visit.    GENERAL APPEARANCE: healthy, alert and no distress     EYES: EOMI, PERRL     HENT: ear canals and TM's normal and nose and mouth without ulcers or lesions     NECK: no adenopathy, no asymmetry, masses, or scars and thyroid normal to palpation     RESP: lungs clear to auscultation - no rales, rhonchi or wheezes     CV: regular rates and rhythm, normal S1 S2, no S3 or S4 and no murmur, click or rub     ABDOMEN:  soft, nontender, no HSM or masses and bowel sounds normal     MS: extremities normal- no gross deformities noted, no evidence of inflammation in joints, FROM in all extremities.     SKIN: no suspicious lesions or rashes     NEURO: Normal strength and tone, sensory exam grossly normal, mentation intact and speech normal     PSYCH: mentation appears normal. and affect normal/bright     LYMPHATICS: No  cervical adenopathy    DIAGNOSTICS:   No labs or EKG required for low risk surgery (cataract, skin procedure, breast biopsy, etc)    Recent Labs   Lab Test 03/18/19  1400 02/21/19  1453  04/12/18  1019  07/27/17  1000   HGB 10.7*  --   --  10.0*   < > 10.9*     --   --  187   < > 181   INR 1.00  --   --   --   --  0.94    137   < >  --    < >  --    POTASSIUM 4.3 4.3   < >  --    < >  --    CR 0.78 0.87   < >  --    < > 0.83    < > = values in this interval not displayed.        IMPRESSION:   Reason for surgery/procedure: treatment of bilateral eye lid ptosis.  Diagnosis/reason for consult: Preop exam    The proposed surgical procedure is considered LOW risk.    REVISED CARDIAC RISK INDEX  The patient has the following serious cardiovascular risks for perioperative complications such as (MI, PE, VFib and 3  AV Block):  No serious cardiac risks  INTERPRETATION: 0 risks: Class I (very low risk - 0.4% complication rate)    The patient has the following additional risks for perioperative complications:  No identified additional risks    No diagnosis found.    RECOMMENDATIONS:     --Consult hospital rounder / IM to assist post-op medical management    Cardiovascular Risk  Patient is already on a Beta Blocker. Continue Betablocker therapy after surgery, using Beta blocker order set as necessary for NPO status.      --Patient is to take all scheduled medications on the day of surgery EXCEPT for modifications listed below.    Anticoagulant or Antiplatelet Medication Use  ASPIRIN: Continue  mg daily         APPROVAL GIVEN to proceed with proposed procedure, without further diagnostic evaluation       Signed Electronically by: LA Shahid CNP    Copy of this evaluation report is provided to requesting physician.    [facility] Preop Guidelines    Revised Cardiac Risk Index

## 2019-12-13 ENCOUNTER — HOSPITAL ENCOUNTER (EMERGENCY)
Facility: CLINIC | Age: 69
Discharge: HOME | End: 2019-12-14
Attending: FAMILY MEDICINE | Admitting: FAMILY MEDICINE
Payer: COMMERCIAL

## 2019-12-13 ENCOUNTER — APPOINTMENT (OUTPATIENT)
Dept: CT IMAGING | Facility: CLINIC | Age: 69
End: 2019-12-13
Attending: FAMILY MEDICINE
Payer: COMMERCIAL

## 2019-12-13 DIAGNOSIS — H02.89 EYELID BLEEDING: ICD-10-CM

## 2019-12-13 DIAGNOSIS — I10 HYPERTENSION, UNSPECIFIED TYPE: ICD-10-CM

## 2019-12-13 LAB
ALBUMIN SERPL-MCNC: 3.5 G/DL (ref 3.4–5)
ALP SERPL-CCNC: 86 U/L (ref 40–150)
ALT SERPL W P-5'-P-CCNC: 35 U/L (ref 0–50)
ANION GAP SERPL CALCULATED.3IONS-SCNC: 5 MMOL/L (ref 3–14)
APTT PPP: 37 SEC (ref 22–37)
AST SERPL W P-5'-P-CCNC: 14 U/L (ref 0–45)
BASOPHILS # BLD AUTO: 0.1 10E9/L (ref 0–0.2)
BASOPHILS NFR BLD AUTO: 1.5 %
BILIRUB SERPL-MCNC: 0.2 MG/DL (ref 0.2–1.3)
BUN SERPL-MCNC: 23 MG/DL (ref 7–30)
CALCIUM SERPL-MCNC: 8.8 MG/DL (ref 8.5–10.1)
CHLORIDE SERPL-SCNC: 109 MMOL/L (ref 94–109)
CO2 SERPL-SCNC: 27 MMOL/L (ref 20–32)
CREAT SERPL-MCNC: 0.82 MG/DL (ref 0.52–1.04)
DIFFERENTIAL METHOD BLD: ABNORMAL
EOSINOPHIL # BLD AUTO: 0.6 10E9/L (ref 0–0.7)
EOSINOPHIL NFR BLD AUTO: 10.7 %
ERYTHROCYTE [DISTWIDTH] IN BLOOD BY AUTOMATED COUNT: 12.8 % (ref 10–15)
GFR SERPL CREATININE-BSD FRML MDRD: 75 ML/MIN/{1.73_M2}
GLUCOSE SERPL-MCNC: 87 MG/DL (ref 70–99)
HCT VFR BLD AUTO: 37.4 % (ref 35–47)
HGB BLD-MCNC: 11.5 G/DL (ref 11.7–15.7)
IMM GRANULOCYTES # BLD: 0 10E9/L (ref 0–0.4)
IMM GRANULOCYTES NFR BLD: 0.4 %
INR PPP: 0.97 (ref 0.86–1.14)
LYMPHOCYTES # BLD AUTO: 1.4 10E9/L (ref 0.8–5.3)
LYMPHOCYTES NFR BLD AUTO: 24.9 %
MCH RBC QN AUTO: 29.3 PG (ref 26.5–33)
MCHC RBC AUTO-ENTMCNC: 30.7 G/DL (ref 31.5–36.5)
MCV RBC AUTO: 95 FL (ref 78–100)
MONOCYTES # BLD AUTO: 0.6 10E9/L (ref 0–1.3)
MONOCYTES NFR BLD AUTO: 10.5 %
NEUTROPHILS # BLD AUTO: 2.8 10E9/L (ref 1.6–8.3)
NEUTROPHILS NFR BLD AUTO: 52 %
NRBC # BLD AUTO: 0 10*3/UL
NRBC BLD AUTO-RTO: 0 /100
PLATELET # BLD AUTO: 206 10E9/L (ref 150–450)
POTASSIUM SERPL-SCNC: 4 MMOL/L (ref 3.4–5.3)
PROT SERPL-MCNC: 6.9 G/DL (ref 6.8–8.8)
RBC # BLD AUTO: 3.92 10E12/L (ref 3.8–5.2)
SODIUM SERPL-SCNC: 141 MMOL/L (ref 133–144)
WBC # BLD AUTO: 5.4 10E9/L (ref 4–11)

## 2019-12-13 PROCEDURE — 70450 CT HEAD/BRAIN W/O DYE: CPT

## 2019-12-13 PROCEDURE — 80053 COMPREHEN METABOLIC PANEL: CPT | Performed by: FAMILY MEDICINE

## 2019-12-13 PROCEDURE — 99285 EMERGENCY DEPT VISIT HI MDM: CPT | Mod: Z6 | Performed by: FAMILY MEDICINE

## 2019-12-13 PROCEDURE — 96374 THER/PROPH/DIAG INJ IV PUSH: CPT | Performed by: FAMILY MEDICINE

## 2019-12-13 PROCEDURE — 25000132 ZZH RX MED GY IP 250 OP 250 PS 637: Performed by: FAMILY MEDICINE

## 2019-12-13 PROCEDURE — 85025 COMPLETE CBC W/AUTO DIFF WBC: CPT | Performed by: FAMILY MEDICINE

## 2019-12-13 PROCEDURE — 85610 PROTHROMBIN TIME: CPT | Performed by: FAMILY MEDICINE

## 2019-12-13 PROCEDURE — 99285 EMERGENCY DEPT VISIT HI MDM: CPT | Mod: 25 | Performed by: FAMILY MEDICINE

## 2019-12-13 PROCEDURE — 25000128 H RX IP 250 OP 636: Performed by: FAMILY MEDICINE

## 2019-12-13 PROCEDURE — 85730 THROMBOPLASTIN TIME PARTIAL: CPT | Performed by: FAMILY MEDICINE

## 2019-12-13 RX ORDER — IPRATROPIUM BROMIDE AND ALBUTEROL SULFATE 2.5; .5 MG/3ML; MG/3ML
3 SOLUTION RESPIRATORY (INHALATION) ONCE
Status: DISCONTINUED | OUTPATIENT
Start: 2019-12-13 | End: 2019-12-13

## 2019-12-13 RX ORDER — HYDROMORPHONE HYDROCHLORIDE 1 MG/ML
0.5 INJECTION, SOLUTION INTRAMUSCULAR; INTRAVENOUS; SUBCUTANEOUS
Status: DISCONTINUED | OUTPATIENT
Start: 2019-12-13 | End: 2019-12-14 | Stop reason: HOSPADM

## 2019-12-13 RX ORDER — ALBUTEROL SULFATE 90 UG/1
2 AEROSOL, METERED RESPIRATORY (INHALATION) EVERY 4 HOURS PRN
Qty: 1 INHALER | Refills: 0 | Status: SHIPPED | OUTPATIENT
Start: 2019-12-13 | End: 2019-12-13

## 2019-12-13 RX ORDER — PREDNISONE 20 MG/1
40 TABLET ORAL ONCE
Status: DISCONTINUED | OUTPATIENT
Start: 2019-12-13 | End: 2019-12-13

## 2019-12-13 RX ORDER — PREDNISONE 20 MG/1
TABLET ORAL
Qty: 10 TABLET | Refills: 0 | Status: SHIPPED | OUTPATIENT
Start: 2019-12-13 | End: 2019-12-13

## 2019-12-13 RX ORDER — CLONIDINE HYDROCHLORIDE 0.1 MG/1
0.1 TABLET ORAL ONCE
Status: COMPLETED | OUTPATIENT
Start: 2019-12-13 | End: 2019-12-13

## 2019-12-13 RX ADMIN — HYDROMORPHONE HYDROCHLORIDE 0.5 MG: 1 INJECTION, SOLUTION INTRAMUSCULAR; INTRAVENOUS; SUBCUTANEOUS at 22:33

## 2019-12-13 RX ADMIN — HYDROMORPHONE HYDROCHLORIDE 0.5 MG: 1 INJECTION, SOLUTION INTRAMUSCULAR; INTRAVENOUS; SUBCUTANEOUS at 22:09

## 2019-12-13 RX ADMIN — CLONIDINE HYDROCHLORIDE 0.1 MG: 0.1 TABLET ORAL at 23:04

## 2019-12-13 ASSESSMENT — VISUAL ACUITY: OU: OTHER (SEE COMMENT)

## 2019-12-13 NOTE — ED AVS SNAPSHOT
[facility] Emergency Department  [address] [address]  Phone:  [phone]  Fax:  [phone]                                    Jessica Ellison   MRN: [id number]    Department:  [facility] Emergency Department   Date of Visit:  12/13/2019           After Visit Summary Signature Page    I have received my discharge instructions, and my questions have been answered. I have discussed any challenges I see with this plan with the nurse or doctor.    ..........................................................................................................................................  Patient/Patient Representative Signature      ..........................................................................................................................................  Patient Representative Print Name and Relationship to Patient    ..................................................               ................................................  Date                                   Time    ..........................................................................................................................................  Reviewed by Signature/Title    ...................................................              ..............................................  Date                                               Time          22EPIC Rev 08/18

## 2019-12-14 VITALS
WEIGHT: 160 LBS | SYSTOLIC BLOOD PRESSURE: 139 MMHG | TEMPERATURE: 98.1 F | DIASTOLIC BLOOD PRESSURE: 109 MMHG | RESPIRATION RATE: 9 BRPM | HEART RATE: 64 BPM | OXYGEN SATURATION: 91 % | BODY MASS INDEX: 28.34 KG/M2

## 2019-12-14 RX ORDER — CLONIDINE HYDROCHLORIDE 0.1 MG/1
0.1 TABLET ORAL EVERY 6 HOURS PRN
Status: DISCONTINUED | OUTPATIENT
Start: 2019-12-14 | End: 2019-12-14 | Stop reason: HOSPADM

## 2019-12-14 RX ORDER — CLONIDINE HYDROCHLORIDE 0.1 MG/1
0.1 TABLET ORAL EVERY 6 HOURS PRN
Qty: 2 TABLET | Refills: 0 | Status: SHIPPED | OUTPATIENT
Start: 2019-12-14 | End: 2020-03-16

## 2019-12-14 NOTE — ED PROVIDER NOTES
History     Chief Complaint   Patient presents with     Bleeding/Bruising     Eye Problem     HPI    Jessica Ellison is a 69 year old female who presents with bleeding from the right eye and a headache.  She had surgery on December 4 for ptosis done by Tomasa Goldstein.  She had not had problems subsequently until this evening.  She developed a headache earlier today.  She has some pain behind the right eye.  She often has headaches that she attributes to hypertension for which she is treated.  This evening she noticed blood coming out of her right thigh.  Her vision has been somewhat blurrier the last few days but she has had no vision loss.  She has not noticed any other bleeding.  She has not had any hemoptysis, hematemesis, hematochezia, hematuria.  She is had no recent medication changes.  She does not smoke or use alcohol.  Her symptoms began at about 6:30 PM.  He is not on blood thinners.  She has had a prior cerebral aneurysm that was coiled and stented 5 or 6 years ago.  Brain MRI in April of this year showed this to be stable.  She has not noticed any focal weakness or numbness in the upper or lower extremities.  She is had no cough or fever or cold symptoms.  She has been sober for a bit over 1 year.  She is on chronic narcotic therapy for chronic low back pain managed through the [facility].  She also takes Valium for anxiety and back pain.  She takes aspirin 325 mg daily.    Allergies:  Allergies   Allergen Reactions     Bee Venom        Problem List:    Patient Active Problem List    Diagnosis Date Noted     Spell of change in speech 04/17/2018     Priority: Medium     Hypothyroidism 09/07/2016     Priority: Medium     Chronic bilateral low back pain without sciatica 06/02/2016     Priority: Medium     Essential hypertension with goal blood pressure less than 140/90 06/02/2016     Priority: Medium     S/P lumbar spinal fusion 10/19/2015     Priority: Medium     Bee allergy status 06/30/2015      Priority: Medium     Cerebral aneurysm, nonruptured 07/24/2014     Priority: Medium     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 Marti       Major depressive disorder, recurrent episode, moderate (H) 07/10/2013     Priority: Medium     Advanced directives, counseling/discussion 09/05/2012     Priority: Medium     Patient does not have an Advance/Health Care Directive (HCD), has information at home.     Sonja Krishnamurthy  September 5, 2012         Anxiety 06/20/2012     Priority: Medium     CARDIOVASCULAR SCREENING; LDL GOAL LESS THAN 160 10/31/2010     Priority: Medium     ETOH abuse 03/19/2010     Priority: Medium      katie drinking, DUIs and probation violation       Insomnia 12/21/2009     Priority: Medium     Back pain 12/21/2009     Priority: Medium     Patient is followed by DOMINIQUE COPPOLA for ongoing prescription of narcotic pain medicine.  Med: oxycodone/acetaminophen 10/325 for chronic back pain.   Maximum use per month: 120  Expected duration: unknown  Narcotic agreement on file: YES 3/19/2010  Clinic visit recommended: Q 3 months    April 2013 - rhizotomy for SI (left) Jhon Mckay MD    Follows at [facility] - severe low back pain with work related injuries.  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  Spinal stenosis at most lumbar levels  Improvement with past epidural steroid injections  SI joint dysfunction with the pain          Past Medical History:    Past Medical History:   Diagnosis Date     Anemia      Aneurysm (H)      Chemical dependency (H)      Depression      History of blood transfusion      Hypertension      Menopausal symptoms      Other chronic pain      Sleep disorder      Thyroid disease      Tobacco abuse        Past Surgical History:    Past Surgical History:   Procedure Laterality Date     APPENDECTOMY  1960     C PART REMOVAL COLON W ANASTOMOSIS  5/2005     diverticulitis     C SPINE FUSION,ANTER,8+ SGMTS  2007    Anterior posterior fusion 10 levels, hardware removal and re-fusion 2009     cerebral aneurysm  2014    coiled     COLONOSCOPY  4/19/2005     HC EXCISION BREAST LESION, OPEN >=1      core biopsy 2006, lumpectomy 2006     LAP VENTRAL HERNIA REPAIR  12/2017    [place]     LAPAROSCOPIC ASSISTED HYSTERECTOMY VAGINAL  12/2017     orif left femoral neck Left 6/3/2016    stress fracture.  done at [facility]     SURGICAL HISTORY OF -   2004    Skin Graft       Family History:    Family History   Problem Relation Age of Onset     Cancer Mother         breast/lung     Alcohol/Drug Mother      Depression Mother      Cancer - colorectal Father      Alcohol/Drug Father      Diabetes Maternal Grandmother      Diabetes Maternal Grandfather      Diabetes Paternal Grandmother      Diabetes Paternal Grandfather      Cancer Paternal Grandfather      Gastrointestinal Disease Paternal Grandfather      Congenital Anomalies Son      Hypertension Brother        Social History:  Marital Status:   [2]  Social History     Tobacco Use     Smoking status: Former Smoker     Last attempt to quit: 1/1/2004     Years since quitting: 15.9     Smokeless tobacco: Never Used   Substance Use Topics     Alcohol use: No     Comment: ETOH dependency, DUI 3/15/10     Drug use: No        Medications:    cloNIDine (CATAPRES) 0.1 MG tablet  aspirin 325 MG tablet  calcium carbonate-vitamin D (CALTRATE 600+D) 600-400 MG-UNIT CHEW  cyclobenzaprine (FLEXERIL) 10 MG tablet  diazepam (VALIUM) 10 MG tablet  EPINEPHrine (EPIPEN) 0.3 MG/0.3ML injection  gabapentin (NEURONTIN) 600 MG tablet  levothyroxine (LEVOTHROID) 50 MCG tablet  metoprolol succinate ER (TOPROL-XL) 50 MG 24 hr tablet  multivitamin CF formula (CHOICEFUL) chewable tablet  naloxone (NARCAN) 4 MG/0.1ML nasal spray  order for DME  oxyCODONE-acetaminophen (PERCOCET)  MG per tablet  oxyCODONE-acetaminophen (PERCOCET)  MG per  tablet  QUEtiapine (SEROQUEL) 25 MG tablet  traZODone (DESYREL) 150 MG tablet  venlafaxine (EFFEXOR-XR) 150 MG 24 hr capsule          Review of Systems    All other systems are reviewed and are negative    Physical Exam   BP: (!) 195/115  Pulse: 65  Heart Rate: 67  Temp: 98.1  F (36.7  C)  Resp: 20  Weight: 72.6 kg (160 lb)  SpO2: 95 %      Physical Exam  Nursing note and vitals were reviewed.  Constitutional: Awake and alert, adequately nourished and developed appearing 69-year-old in no apparent discomfort, who does not appear acutely ill, and who answers questions appropriately and cooperates with examination.  HEENT: There is fresh blood and a few clots in the right conjunctival sac.  When I examine the eye the blood is coming from the superior portion of the eye but the exact site cannot be identified.  The anterior chamber is clear.  Cornea and conjunctiva are intact.  PERRL.  EOMI. her incision over the upper lid is clean, dry, well-healed.  EACs are clear.  TMs are normal.  Neck: Freely mobile.  Cardiovascular: Cardiac examination reveals normal heart rate and regular rhythm without murmur.  Pulmonary/Chest: Breathing is unlabored.  Breath sounds are clear and equal bilaterally.  There no retractions, tachypnea, rales, wheezes, or rhonchi.  Abdomen: Soft, nontender, no HSM or masses rebound or guarding.  Musculoskeletal: Extremities are warm and well-perfused and without edema  Neurological: Alert, oriented, thought content logical, coherent.  Motor strength intact in the upper and lower extremities.  Motor tone is normal.  Cerebellar testing is normal.  Cranial nerve testing is normal.  Skin: Warm, dry, no rashes.  Psychiatric: Affect broad and appropriate.      ED Course        Procedures               Critical Care time:  none               Results for orders placed or performed during the hospital encounter of 12/13/19 (from the past 24 hour(s))   Comprehensive metabolic panel   Result Value Ref Range     Sodium 141 133 - 144 mmol/L    Potassium 4.0 3.4 - 5.3 mmol/L    Chloride 109 94 - 109 mmol/L    Carbon Dioxide 27 20 - 32 mmol/L    Anion Gap 5 3 - 14 mmol/L    Glucose 87 70 - 99 mg/dL    Urea Nitrogen 23 7 - 30 mg/dL    Creatinine 0.82 0.52 - 1.04 mg/dL    GFR Estimate 75 >60 mL/min/[1.73_m2]    GFR Estimate If Black 86 >60 mL/min/[1.73_m2]    Calcium 8.8 8.5 - 10.1 mg/dL    Bilirubin Total 0.2 0.2 - 1.3 mg/dL    Albumin 3.5 3.4 - 5.0 g/dL    Protein Total 6.9 6.8 - 8.8 g/dL    Alkaline Phosphatase 86 40 - 150 U/L    ALT 35 0 - 50 U/L    AST 14 0 - 45 U/L   CBC with platelets differential   Result Value Ref Range    WBC 5.4 4.0 - 11.0 10e9/L    RBC Count 3.92 3.8 - 5.2 10e12/L    Hemoglobin 11.5 (L) 11.7 - 15.7 g/dL    Hematocrit 37.4 35.0 - 47.0 %    MCV 95 78 - 100 fl    MCH 29.3 26.5 - 33.0 pg    MCHC 30.7 (L) 31.5 - 36.5 g/dL    RDW 12.8 10.0 - 15.0 %    Platelet Count 206 150 - 450 10e9/L    Diff Method Automated Method     % Neutrophils 52.0 %    % Lymphocytes 24.9 %    % Monocytes 10.5 %    % Eosinophils 10.7 %    % Basophils 1.5 %    % Immature Granulocytes 0.4 %    Nucleated RBCs 0 0 /100    Absolute Neutrophil 2.8 1.6 - 8.3 10e9/L    Absolute Lymphocytes 1.4 0.8 - 5.3 10e9/L    Absolute Monocytes 0.6 0.0 - 1.3 10e9/L    Absolute Eosinophils 0.6 0.0 - 0.7 10e9/L    Absolute Basophils 0.1 0.0 - 0.2 10e9/L    Abs Immature Granulocytes 0.0 0 - 0.4 10e9/L    Absolute Nucleated RBC 0.0    INR   Result Value Ref Range    INR 0.97 0.86 - 1.14   Partial thromboplastin time   Result Value Ref Range    PTT 37 22 - 37 sec   CT Head w/o Contrast    Narrative    EXAM: CT HEAD W/O CONTRAST  LOCATION: [facility]  DATE/TIME: 12/13/2019 10:08 PM    INDICATION: Headache. Eye pain.    TECHNIQUE: Serial axial images were obtained through the brain without contrast. Dose reduction techniques were used.    CONTRAST: None.    COMPARISON: None.     FINDINGS:   INTRACRANIAL CONTENTS: Patient is status post left ICA  aneurysm coiling and stenting. Dense streak artifact from the coil mass locally obscures assessment. Allowing for this, there is no finding for intracranial hemorrhage, mass, or acute infarct.    Mild prominence of the lateral ventricles. Mild presumed sequela of chronic microvascular ischemic change. No mass effect or midline shift.    Cerebellar tonsils are normally positioned. Sella is largely obscured by streak artifact.    OSSEOUS STRUCTURES/SOFT TISSUES: Calvarium is intact, without suspicious lytic or blastic foci. No scalp hematoma.    VISUALIZED ORBITS/SINUSES/MASTOIDS: Visualized paranasal sinuses, middle ear cavities, and mastoid air cells are clear. Visualized orbits are unremarkable.      Impression    IMPRESSION:   1. Prior aneurysm coiling and left ICA stenting. Streak artifact locally obscures assessment.    2. No finding for intracranial hemorrhage, mass, or acute infarct.    3. Mild volume loss and presumed sequela of chronic microvascular ischemic change.         Medications   HYDROmorphone (PF) (DILAUDID) injection 0.5 mg (0.5 mg Intravenous Given 12/13/19 2233)   cloNIDine (CATAPRES) tablet 0.1 mg (has no administration in time range)   cloNIDine (CATAPRES) tablet 0.1 mg (0.1 mg Oral Given 12/13/19 2304)       Assessments & Plan (with Medical Decision Making)     69-year-old female presents 10 days status post bilateral ptosis surgery with bleeding from under her right lid.  I spoke with Dr. Larry Kellogg on-call for patient's surgeon Jolene Goldstein who said this is likely coming from a surgical incision on the back of the lid.  Patient had significant hypertension on arrival in the ED and received a dose of clonidine in part to help reduce the bleeding.  The clots were removed and the eye was irrigated.  On Dr. Kellogg's advice I placed cotton eye pad over the upper lid with foam tape and tincture of benzoin to keep it in place to pry gentle pressure to the lid.  We did this after her blood  pressure improved significantly with a dose of oral clonidine.  She is to leave this on until morning and then remove it and if she has further bleeding she should call her oculoplastic surgeon for further advice.    In addition with her headache, hypertension, and history of cerebral aneurysm status post coiling and stenting, we perform CT scan of the brain to look for evidence of a new problem or bleeding.  Findings were stable without evidence of a new problem there.    She is to stop her aspirin today and tomorrow and then obtain further advice from her surgeon.    She requested additional doses of clonidine to use if her blood pressure again becomes quite elevated.  I discussed with her that there is some risks to doing this and her blood pressure could become too low.  She does have a cuff at home and is able to monitor it.  I have given her 2 doses that she may use 6 hours apart if her systolic blood pressure is greater than 180 or diastolic greater than 110.  She will follow-up next week with Sarah Barriga her primary physician to review her blood pressure and more long-term management.  She has recently had her metoprolol dose increased by 50% and then by 100% in the last month.    I have reviewed the nursing notes.    I have reviewed the findings, diagnosis, plan and need for follow up with the patient.          Discharge Medication List as of 12/14/2019 12:10 AM      START taking these medications    Details   cloNIDine (CATAPRES) 0.1 MG tablet Take 1 tablet (0.1 mg) by mouth every 6 hours as needed (SBP>180 or DBP>110), Disp-2 tablet, R-0, Local Print             Final diagnoses:   Eyelid bleeding   Hypertension, unspecified type       12/13/2019   [facility] EMERGENCY DEPARTMENT     Shoaib Maria MD  12/14/19 0102

## 2019-12-14 NOTE — ED NOTES
Pt reports 1800 onset of bleeding from right eye. Pt eye currently oozing slightly. Pt has headache, had headache all day (Pt reports frequent headaches). Pt reports she cannot see our of right eye at this time.

## 2019-12-14 NOTE — ED TRIAGE NOTES
Pt states had spontaneous bleeding from right eye started approx 30 minutes ago. Pt on ASA 325mg daily. Denies injury to eye. Pt had upper lid lift on Wednesday 12/4. Pt also has aneurism that has been coiled in head. Pt had HA earlier this evening at 1800, now HA worse.

## 2019-12-19 ENCOUNTER — OFFICE VISIT (OUTPATIENT)
Dept: FAMILY MEDICINE | Facility: CLINIC | Age: 69
End: 2019-12-19
Payer: COMMERCIAL

## 2019-12-19 VITALS
OXYGEN SATURATION: 95 % | HEART RATE: 74 BPM | HEIGHT: 63 IN | RESPIRATION RATE: 16 BRPM | SYSTOLIC BLOOD PRESSURE: 136 MMHG | WEIGHT: 169.8 LBS | TEMPERATURE: 98 F | DIASTOLIC BLOOD PRESSURE: 88 MMHG | BODY MASS INDEX: 30.09 KG/M2

## 2019-12-19 DIAGNOSIS — M54.50 CHRONIC BILATERAL LOW BACK PAIN WITHOUT SCIATICA: Primary | ICD-10-CM

## 2019-12-19 DIAGNOSIS — I10 ESSENTIAL HYPERTENSION WITH GOAL BLOOD PRESSURE LESS THAN 140/90: ICD-10-CM

## 2019-12-19 DIAGNOSIS — M53.3 SACROILIAC JOINT PAIN: ICD-10-CM

## 2019-12-19 DIAGNOSIS — G89.29 CHRONIC BILATERAL LOW BACK PAIN WITHOUT SCIATICA: Primary | ICD-10-CM

## 2019-12-19 DIAGNOSIS — Z98.1 S/P LUMBAR SPINAL FUSION: ICD-10-CM

## 2019-12-19 PROCEDURE — 99214 OFFICE O/P EST MOD 30 MIN: CPT | Performed by: FAMILY MEDICINE

## 2019-12-19 RX ORDER — OXYCODONE AND ACETAMINOPHEN 10; 325 MG/1; MG/1
2 TABLET ORAL EVERY 6 HOURS PRN
Qty: 120 TABLET | Refills: 0 | Status: SHIPPED | OUTPATIENT
Start: 2020-01-17 | End: 2020-01-21

## 2019-12-19 RX ORDER — OXYCODONE AND ACETAMINOPHEN 10; 325 MG/1; MG/1
2 TABLET ORAL EVERY 6 HOURS PRN
Qty: 120 TABLET | Refills: 0 | Status: SHIPPED | OUTPATIENT
Start: 2019-12-19 | End: 2020-03-16

## 2019-12-19 RX ORDER — LISINOPRIL 5 MG/1
5 TABLET ORAL DAILY
Qty: 90 TABLET | Refills: 0 | Status: SHIPPED | OUTPATIENT
Start: 2019-12-19 | End: 2020-01-27

## 2019-12-19 RX ORDER — METOPROLOL SUCCINATE 50 MG/1
50 TABLET, EXTENDED RELEASE ORAL 2 TIMES DAILY
Qty: 135 TABLET | Refills: 3
Start: 2019-12-19 | End: 2020-01-27

## 2019-12-19 ASSESSMENT — MIFFLIN-ST. JEOR: SCORE: 1274.34

## 2019-12-19 NOTE — PATIENT INSTRUCTIONS
Start lisinopril 5 mg daily    See RN in 2 week for blood pressure recheck and blood work (kidney function).    Call in 2 months for refills of your pain medication.  See me in 3 months.      Sarah Barriga M.D.

## 2019-12-19 NOTE — PROGRESS NOTES
Subjective     Jessica Ellison is a 69 year old female who presents to clinic today for the following health issues:    HPI   Chief Complaint   Patient presents with     Pain Management     recheck/refill on medication     Chronic/Recurring Back Pain Follow Up      Where is your back pain located? (Select all that apply) low back bilateral    How would you describe your back pain?  gnawing and shooting    Since your last clinic visit for back pain, how has your pain changed? always present, but gets better and worse    Does your back pain interfere with your job? Not applicable    Since your last visit, have you tried any new treatment? No    Gets regular injections with pain clinic.  No real change or improvement.         PT made her a program for the [employer] - at [facility].  Going through a routine in the pool 2-3x/week.   Uses her cane in the community.  Uses a walker at home.     Wondering about doing PT after the first of the year  - feels like she'd have less pain if she was able to walk more upright.     Ortho - Dr. Mckay has done xrays of her back.      Plans to see [facility] for her hip/knee.       Hypertension Follow-up      Do you check your blood pressure regularly outside of the clinic? Yes     Are you following a low salt diet? Yes    Are your blood pressures ever more than 140 on the top number (systolic) OR more   than 90 on the bottom number (diastolic), for example 140/90? Yes    ER told  Her to split her metoprolol to twice daily rather than once daily as her morning numbers were running high before she would take her medication.   She feels her diastolic is always high at home.   Rechecked today by me: 160/90      BP Readings from Last 6 Encounters:   12/19/19 136/88   12/14/19 (!) 139/109   12/02/19 130/80   09/26/19 120/88   06/13/19 128/78   04/24/19 (!) 161/91       Reviewed and updated as needed this visit by Provider         Review of Systems   ROS COMP: Constitutional, HEENT,  "cardiovascular, pulmonary, gi and gu systems are negative, except as otherwise noted.      Objective    /88   Pulse 74   Temp 98  F (36.7  C) (Tympanic)   Resp 16   Ht 1.6 m (5' 3\")   Wt 77 kg (169 lb 12.8 oz)   SpO2 95%   BMI 30.08 kg/m    Body mass index is 30.08 kg/m .  Physical Exam   GENERAL: healthy, alert and no distress  NECK: no adenopathy, no asymmetry, masses, or scars and thyroid normal to palpation  RESP: lungs clear to auscultation - no rales, rhonchi or wheezes  CV: regular rate and rhythm, normal S1 S2, no S3 or S4, no murmur, click or rub, no peripheral edema and peripheral pulses strong  ABDOMEN: soft, nontender, no hepatosplenomegaly, no masses and bowel sounds normal  MS: no gross musculoskeletal defects noted, no edema    Diagnostic Test Results:  Labs reviewed in Epic        Assessment & Plan     1. Chronic bilateral low back pain without sciatica     - oxyCODONE-acetaminophen (PERCOCET)  MG per tablet; Take 2 tablets by mouth every 6 hours as needed for moderate to severe pain Max 4/day  Dispense: 120 tablet; Refill: 0  - oxyCODONE-acetaminophen (PERCOCET)  MG per tablet; Take 2 tablets by mouth every 6 hours as needed for moderate to severe pain Max 4/day  Dispense: 120 tablet; Refill: 0    2. Sacroiliac joint pain     - oxyCODONE-acetaminophen (PERCOCET)  MG per tablet; Take 2 tablets by mouth every 6 hours as needed for moderate to severe pain Max 4/day  Dispense: 120 tablet; Refill: 0  - oxyCODONE-acetaminophen (PERCOCET)  MG per tablet; Take 2 tablets by mouth every 6 hours as needed for moderate to severe pain Max 4/day  Dispense: 120 tablet; Refill: 0    3. S/P lumbar spinal fusion     - oxyCODONE-acetaminophen (PERCOCET)  MG per tablet; Take 2 tablets by mouth every 6 hours as needed for moderate to severe pain Max 4/day  Dispense: 120 tablet; Refill: 0  - oxyCODONE-acetaminophen (PERCOCET)  MG per tablet; Take 2 tablets by mouth every 6 " hours as needed for moderate to severe pain Max 4/day  Dispense: 120 tablet; Refill: 0    4. Essential hypertension with goal blood pressure less than 140/90  Add lisinopril, continue metoprolol  Recheck in 2 weeks with RN on blood pressure and bmp.  Has been on lisinopril before, discontinued almost a year ago due to hypotension and decreased renal function - unsure if due to the lisinopril or due to the hypotension.  Will start with 5 mg and adjust prn.   - lisinopril (PRINIVIL/ZESTRIL) 5 MG tablet; Take 1 tablet (5 mg) by mouth daily  Dispense: 90 tablet; Refill: 0  - Basic metabolic panel; Future  - metoprolol succinate ER (TOPROL-XL) 50 MG 24 hr tablet; Take 1 tablet (50 mg) by mouth 2 times daily  Dispense: 135 tablet; Refill: 3     Return in about 3 months (around 3/19/2020) for pain recheck.    Sarah Barriga MD  [facility]

## 2020-01-02 ENCOUNTER — ALLIED HEALTH/NURSE VISIT (OUTPATIENT)
Dept: FAMILY MEDICINE | Facility: CLINIC | Age: 70
End: 2020-01-02
Payer: COMMERCIAL

## 2020-01-02 ENCOUNTER — TELEPHONE (OUTPATIENT)
Dept: FAMILY MEDICINE | Facility: CLINIC | Age: 70
End: 2020-01-02

## 2020-01-02 VITALS — SYSTOLIC BLOOD PRESSURE: 148 MMHG | DIASTOLIC BLOOD PRESSURE: 91 MMHG | HEART RATE: 64 BPM

## 2020-01-02 DIAGNOSIS — I10 ESSENTIAL HYPERTENSION WITH GOAL BLOOD PRESSURE LESS THAN 140/90: ICD-10-CM

## 2020-01-02 DIAGNOSIS — G89.29 CHRONIC BILATERAL LOW BACK PAIN WITHOUT SCIATICA: ICD-10-CM

## 2020-01-02 DIAGNOSIS — M54.50 CHRONIC BILATERAL LOW BACK PAIN WITHOUT SCIATICA: ICD-10-CM

## 2020-01-02 DIAGNOSIS — I10 ESSENTIAL HYPERTENSION WITH GOAL BLOOD PRESSURE LESS THAN 140/90: Primary | ICD-10-CM

## 2020-01-02 LAB
ANION GAP SERPL CALCULATED.3IONS-SCNC: 3 MMOL/L (ref 3–14)
BUN SERPL-MCNC: 22 MG/DL (ref 7–30)
CALCIUM SERPL-MCNC: 9.2 MG/DL (ref 8.5–10.1)
CHLORIDE SERPL-SCNC: 107 MMOL/L (ref 94–109)
CO2 SERPL-SCNC: 28 MMOL/L (ref 20–32)
CREAT SERPL-MCNC: 0.84 MG/DL (ref 0.52–1.04)
GFR SERPL CREATININE-BSD FRML MDRD: 72 ML/MIN/{1.73_M2}
GLUCOSE SERPL-MCNC: 103 MG/DL (ref 70–99)
POTASSIUM SERPL-SCNC: 4.4 MMOL/L (ref 3.4–5.3)
SODIUM SERPL-SCNC: 138 MMOL/L (ref 133–144)

## 2020-01-02 PROCEDURE — 99207 ZZC NO CHARGE NURSE ONLY: CPT

## 2020-01-02 PROCEDURE — 80048 BASIC METABOLIC PNL TOTAL CA: CPT | Performed by: FAMILY MEDICINE

## 2020-01-02 PROCEDURE — 36415 COLL VENOUS BLD VENIPUNCTURE: CPT | Performed by: FAMILY MEDICINE

## 2020-01-02 RX ORDER — GABAPENTIN 600 MG/1
600 TABLET ORAL 3 TIMES DAILY
Qty: 90 TABLET | Refills: 5 | Status: SHIPPED | OUTPATIENT
Start: 2020-01-02 | End: 2020-11-04

## 2020-01-02 NOTE — TELEPHONE ENCOUNTER
Gabapentin refilled.  Increase lisinopril to 10 mg - may take two pills.  Recheck again with RN in 2 weeks to have blood pressure rechecked.    Sarah Barriga M.D.

## 2020-01-02 NOTE — TELEPHONE ENCOUNTER
eJssica Ellison is a 69 year old year old patient who comes in today for a Blood Pressure check because of medication change and ongoing blood pressure monitoring.  Recently added lisinopril 5 mg.      Patient is taking medication as prescribed  Patient is tolerating medications well.    Patient is monitoring Blood Pressure at home.  Average readings, only one significantly high blood pressure reading in the 170s systolic but she is running 140s-150s more than half of the time. Today home blood pressure cuff is 15 points higher diastolic 142/94, 144/91, 138/78 148/91  Current complaints: none    Disposition:  To Dr. Barriga for review.  She did have labs today.  She is also asking for a refill on her gabapentin.  Please advise.     Melissa Swenson RN

## 2020-01-16 NOTE — TELEPHONE ENCOUNTER
LM to call clinic/RN.  B/P running high, needs appt to discuss.  Does pt have enough meds to cover until appt.  BMP was normal on 11/6/17.  Bill   Palliative care encounter

## 2020-01-21 DIAGNOSIS — M54.50 CHRONIC BILATERAL LOW BACK PAIN WITHOUT SCIATICA: ICD-10-CM

## 2020-01-21 DIAGNOSIS — Z98.1 S/P LUMBAR SPINAL FUSION: ICD-10-CM

## 2020-01-21 DIAGNOSIS — M53.3 SACROILIAC JOINT PAIN: ICD-10-CM

## 2020-01-21 DIAGNOSIS — G89.29 CHRONIC BILATERAL LOW BACK PAIN WITHOUT SCIATICA: ICD-10-CM

## 2020-01-21 RX ORDER — OXYCODONE AND ACETAMINOPHEN 10; 325 MG/1; MG/1
2 TABLET ORAL EVERY 6 HOURS PRN
Qty: 120 TABLET | Refills: 0 | Status: SHIPPED | OUTPATIENT
Start: 2020-01-21 | End: 2020-03-16

## 2020-01-27 ENCOUNTER — ALLIED HEALTH/NURSE VISIT (OUTPATIENT)
Dept: FAMILY MEDICINE | Facility: CLINIC | Age: 70
End: 2020-01-27
Payer: COMMERCIAL

## 2020-01-27 ENCOUNTER — TELEPHONE (OUTPATIENT)
Dept: FAMILY MEDICINE | Facility: CLINIC | Age: 70
End: 2020-01-27

## 2020-01-27 VITALS — SYSTOLIC BLOOD PRESSURE: 128 MMHG | HEART RATE: 64 BPM | DIASTOLIC BLOOD PRESSURE: 88 MMHG

## 2020-01-27 DIAGNOSIS — Z01.30 BLOOD PRESSURE CHECK: Primary | ICD-10-CM

## 2020-01-27 DIAGNOSIS — I10 ESSENTIAL HYPERTENSION WITH GOAL BLOOD PRESSURE LESS THAN 140/90: ICD-10-CM

## 2020-01-27 PROCEDURE — 99207 ZZC NO CHARGE NURSE ONLY: CPT

## 2020-01-27 RX ORDER — LISINOPRIL 10 MG/1
10 TABLET ORAL DAILY
Qty: 90 TABLET | Refills: 1 | Status: SHIPPED | OUTPATIENT
Start: 2020-01-27 | End: 2020-03-16 | Stop reason: DRUGHIGH

## 2020-01-27 RX ORDER — METOPROLOL SUCCINATE 50 MG/1
50 TABLET, EXTENDED RELEASE ORAL 2 TIMES DAILY
Qty: 135 TABLET | Refills: 3 | Status: SHIPPED | OUTPATIENT
Start: 2020-01-27 | End: 2020-12-21

## 2020-01-27 RX ORDER — LISINOPRIL 5 MG/1
5 TABLET ORAL DAILY
Qty: 90 TABLET | Refills: 0 | Status: CANCELLED | OUTPATIENT
Start: 2020-01-27

## 2020-01-27 NOTE — NURSING NOTE
Patient is here today for B/P recheck as Lisinopril was increased to 10 mg as of 1-2-2020. Also taking Metoprolol 50 mg ER twice daily, patient says her sig was never changed and it says to take 1 1/2, but the patient verifies she is taking 2 of the 50 mg Metoprolol. Patient states she took her B/P medication this morning. Patient is asymptomatic today, reports she takes her B/P at home but did not bring in log, says it runs 140's on top number, says diastolic runs high but did not remember last reading.    B/P taken 3 times:    128/84, 134/92, and 128/88, pulse is 64.    Patient requesting new script for the Metoprolol to be sent to pharmacy to reflect current sig. Spoke to pharmacy today and they say they have old sig to take 1 1/2 daily of the Metoprolol 50 mg ER.     Will cue up both the Lisinopril and Metoprolol for review and send to provider for review in separate telephone encounter.      TWILA Liu

## 2020-01-27 NOTE — TELEPHONE ENCOUNTER
New prescription for lisinopril 10 mg  - notify patient she will only take ONE of the 10 mg pills.      Med list updated.  Sarah Barriga M.D.

## 2020-01-27 NOTE — TELEPHONE ENCOUNTER
Patient is here today for B/P recheck as Lisinopril was increased to 10 mg as of 1-2-2020. Also taking Metoprolol 50 mg ER twice daily, patient says her sig was never changed and it says to take 1 1/2, but the patient verifies she is taking 2 of the 50 mg Metoprolol. Patient states she took her B/P medication this morning. Patient is asymptomatic today, reports she takes her B/P at home but did not bring in log, says it runs 140's on top number, says diastolic runs high but did not remember last reading.    B/P taken 3 times:    128/84, 134/92, and 128/88, pulse is 64.    Patient requesting new script for the Metoprolol to be sent to pharmacy to reflect current sig as will be needing it refilled, spoke to pharmacy today and they say patient has old sig  to take 1 and 1/2 of the Metoprolol 50 mg ER daily.    Also have the Lisinopril cued up for review, says to take 5 mg daily. Do you want to change the sig to reflect 10 mg?    TWILA Liu

## 2020-02-10 ENCOUNTER — HEALTH MAINTENANCE LETTER (OUTPATIENT)
Age: 70
End: 2020-02-10

## 2020-03-12 DIAGNOSIS — M53.3 SACROILIAC JOINT PAIN: ICD-10-CM

## 2020-03-12 DIAGNOSIS — Z98.1 S/P LUMBAR SPINAL FUSION: ICD-10-CM

## 2020-03-12 DIAGNOSIS — G89.29 CHRONIC BILATERAL LOW BACK PAIN WITHOUT SCIATICA: ICD-10-CM

## 2020-03-12 DIAGNOSIS — M54.50 CHRONIC BILATERAL LOW BACK PAIN WITHOUT SCIATICA: ICD-10-CM

## 2020-03-12 NOTE — TELEPHONE ENCOUNTER
Requested Prescriptions   Pending Prescriptions Disp Refills     oxyCODONE-acetaminophen (PERCOCET)  MG per tablet [Pharmacy Med Name: OXYCODONE-ACETAMINOPHEN  TABS] 120 tablet 0     Sig: TAKE TWO TABLETS BY MOUTH EVERY 6 HOURS AS NEEDED FOR MODERATE TO SEVERE PAIN (DO NOT TAKE MORE THAN 4 TABLETS PER DAY)       There is no refill protocol information for this order              Last Written Prescription Date:  1/27/2020  Last Fill Quantity: 90,   # refills: 1  Last Office Visit: 12/19/2019 with Linus   Future Office visit:    Next 5 appointments (look out 90 days)    Mar 16, 2020 12:40 PM CDT  SHORT with Sarah Barriga MD  [facility] ([facility]) [id number] [facility] [id number] [phone]           Routing refill request to provider for review/approval because:  Drug not on the [facility], [facility] or  Health refill protocol or controlled substance

## 2020-03-15 RX ORDER — OXYCODONE AND ACETAMINOPHEN 10; 325 MG/1; MG/1
TABLET ORAL
Qty: 120 TABLET | Refills: 0 | OUTPATIENT
Start: 2020-03-15

## 2020-03-16 ENCOUNTER — VIRTUAL VISIT (OUTPATIENT)
Dept: FAMILY MEDICINE | Facility: CLINIC | Age: 70
End: 2020-03-16
Payer: COMMERCIAL

## 2020-03-16 DIAGNOSIS — G89.29 CHRONIC BILATERAL LOW BACK PAIN WITHOUT SCIATICA: ICD-10-CM

## 2020-03-16 DIAGNOSIS — I10 ESSENTIAL HYPERTENSION WITH GOAL BLOOD PRESSURE LESS THAN 140/90: Primary | ICD-10-CM

## 2020-03-16 DIAGNOSIS — F41.9 ANXIETY: ICD-10-CM

## 2020-03-16 DIAGNOSIS — M54.50 CHRONIC BILATERAL LOW BACK PAIN WITHOUT SCIATICA: ICD-10-CM

## 2020-03-16 DIAGNOSIS — Z98.1 S/P LUMBAR SPINAL FUSION: ICD-10-CM

## 2020-03-16 DIAGNOSIS — M53.3 SACROILIAC JOINT PAIN: ICD-10-CM

## 2020-03-16 PROCEDURE — 99441 ZZC PHYSICIAN TELEPHONE EVALUATION 5-10 MIN: CPT | Performed by: FAMILY MEDICINE

## 2020-03-16 RX ORDER — OXYCODONE AND ACETAMINOPHEN 10; 325 MG/1; MG/1
2 TABLET ORAL EVERY 6 HOURS PRN
Qty: 120 TABLET | Refills: 0 | Status: SHIPPED | OUTPATIENT
Start: 2020-03-16 | End: 2020-05-07

## 2020-03-16 RX ORDER — QUETIAPINE FUMARATE 25 MG/1
TABLET, FILM COATED ORAL
Qty: 90 TABLET | Refills: 3 | Status: SHIPPED | OUTPATIENT
Start: 2020-03-16 | End: 2021-08-05

## 2020-03-16 RX ORDER — LISINOPRIL 20 MG/1
20 TABLET ORAL DAILY
Qty: 90 TABLET | Refills: 0 | Status: SHIPPED | OUTPATIENT
Start: 2020-03-16 | End: 2020-07-03

## 2020-03-16 RX ORDER — CYCLOBENZAPRINE HCL 10 MG
10 TABLET ORAL 2 TIMES DAILY PRN
Qty: 180 TABLET | Refills: 1 | Status: SHIPPED | OUTPATIENT
Start: 2020-03-16 | End: 2021-03-15

## 2020-03-16 NOTE — PROGRESS NOTES
"Jessica Ellison is a 69 year old female who is being evaluated via a billable telephone visit.      The patient has been notified of following:     \"This telephone visit will be conducted via a call between you and your physician/provider. We have found that certain health care needs can be provided without the need for a physical exam.  This service lets us provide the care you need with a short phone conversation.  If a prescription is necessary we can send it directly to your pharmacy.  If lab work is needed we can place an order for that and you can then stop by our lab to have the test done at a later time.    If during the course of the call the physician/provider feels a telephone visit is not appropriate, you will not be charged for this service.\"       Jessica Ellison complains of    Chief Complaint   Patient presents with     Medication Request     Pain medication. Patient rates pain currently at a 6/10 with pain medication.      Medication Reconciliation     not taking gabapentin         I have reviewed and updated the patient's Past Medical History, Social History, Family History and Medication List.    ALLERGIES  Bee venom    Sonja Krishnamurthy MA   (MA signature)    Had a steroid injection with Dr. Banerjee 2/6/2020 and for a time was able to cut back on her oxycodone for a while, being in [place] also helped her pain as it was warmer, etc.   She is now back to 4 tablets daily.      Diastolic blood pressure seems to be creeping up a bit.  Often >100 diastolic.  SBP is often 130-140s.  We discussed that I'd like to increase her lisinopril dose to 20 mg daily, repeating kidney function in 1 month.    This patient was seen virtually during the COVID-19 outbreak in attempts to keep healthy patients out of the clinic (practicing social distancing).  I evaluated them by phone and this note reflects our conversation.          Assessment/Plan:  (I10) Essential hypertension with goal blood pressure less than " 140/90  (primary encounter diagnosis)  Comment:  Running high at home.   Plan: Basic metabolic panel, lisinopril (ZESTRIL) 20         MG tablet  Increase lisinopril to 20 mg daily  Recheck blood pressure in 1 month and bmp at that time as well.              (Z98.1) S/P lumbar spinal fusion  Comment:    Plan: cyclobenzaprine (FLEXERIL) 10 MG tablet,         oxyCODONE-acetaminophen (PERCOCET)  MG         per tablet             (M54.5,  G89.29) Chronic bilateral low back pain without sciatica  Comment:    Plan: oxyCODONE-acetaminophen (PERCOCET)  MG         per tablet             (M53.3) Sacroiliac joint pain  Comment:  Continue care with Dr. Banerjee  Plan: oxyCODONE-acetaminophen (PERCOCET)  MG         per tablet             (F41.9) Anxiety  Comment:    Plan: QUEtiapine (SEROQUEL) 25 MG tablet              This patient was seen virtually during the COVID-19 outbreak in attempts to keep healthy patients out of the clinic (practicing social distancing).  I evaluated them by phone and this note reflects our conversation.        I have reviewed the note as documented above.  This accurately captures the substance of my conversation with the patient, Jessica REINOSO Scot     Phone call contact time  Call Started at 1240  Call Ended at 1247    Sarah Barriga MD

## 2020-04-08 ENCOUNTER — MYC MEDICAL ADVICE (OUTPATIENT)
Dept: FAMILY MEDICINE | Facility: CLINIC | Age: 70
End: 2020-04-08

## 2020-04-09 ENCOUNTER — VIRTUAL VISIT (OUTPATIENT)
Dept: FAMILY MEDICINE | Facility: CLINIC | Age: 70
End: 2020-04-09
Payer: COMMERCIAL

## 2020-04-09 DIAGNOSIS — I10 ESSENTIAL HYPERTENSION WITH GOAL BLOOD PRESSURE LESS THAN 140/90: Primary | ICD-10-CM

## 2020-04-09 DIAGNOSIS — J43.2 CENTRILOBULAR EMPHYSEMA (H): ICD-10-CM

## 2020-04-09 DIAGNOSIS — B34.9 VIRAL SYNDROME: ICD-10-CM

## 2020-04-09 PROCEDURE — 99214 OFFICE O/P EST MOD 30 MIN: CPT | Mod: GT | Performed by: FAMILY MEDICINE

## 2020-04-09 RX ORDER — AMLODIPINE BESYLATE 5 MG/1
5 TABLET ORAL DAILY
Qty: 30 TABLET | Refills: 1 | Status: SHIPPED | OUTPATIENT
Start: 2020-04-09 | End: 2020-06-02

## 2020-04-09 RX ORDER — ALBUTEROL SULFATE 90 UG/1
2 AEROSOL, METERED RESPIRATORY (INHALATION) EVERY 6 HOURS
Qty: 1 INHALER | Refills: 0 | Status: SHIPPED | OUTPATIENT
Start: 2020-04-09 | End: 2020-08-31

## 2020-04-09 NOTE — PATIENT INSTRUCTIONS
Our Clinic hours are:  Mondays    7:20 am - 7 pm  Tues -  Fri  7:20 am - 5 pm    Clinic Phone: [phone]    The clinic lab opens at 7:30 am Mon - Fri and appointments are required.    [facility]. [phone]  Monday  8 am - 7pm  Tues - Fri 8 am - 5:30 pm

## 2020-04-09 NOTE — PROGRESS NOTES
"Jessica Ellison is a 69 year old female who is being evaluated via a billable video visit.      The patient has been notified of following:     \"This video visit will be conducted via a call between you and your physician/provider. We have found that certain health care needs can be provided without the need for an in-person physical exam.  This service lets us provide the care you need with a video conversation.  If a prescription is necessary we can send it directly to your pharmacy.  If lab work is needed we can place an order for that and you can then stop by our lab to have the test done at a later time.    Video visits are billed at different rates depending on your insurance coverage.  Please reach out to your insurance provider with any questions.    If during the course of the call the physician/provider feels a video visit is not appropriate, you will not be charged for this service.\"    Patient has given verbal consent for Video visit? Yes    How would you like to obtain your AVS? Awais    Patient would like the video invitation sent by: Text to cell phone: [phone]    Subjective    Hypertension Follow-up      Do you check your blood pressure regularly outside of the clinic? Yes     Are you following a low salt diet? No    Are your blood pressures ever more than 140 on the top number (systolic) OR more   than 90 on the bottom number (diastolic), for example 140/90? Yes running over 160/116 flushed and headaches      Jessica Ellison is a 69 year old female who presents to clinic today for the following health issues:    HPI     Per Patient:     Wondering what to do about the following: my BP remains so variable as do my headaches when hi. Since ^ Lisinopril to 20mgm, diastolic remains hi, rarely amorb144.  You suggested a kidney function test due about now, shall I schedule?  I've been sick:  gaggy, cough vomit which is rare for me-5 day duration.  Little appetite.  Have been having recurring hot " "flashes for about 6 mos.-much worse now-low grade temp but I think it \"broke\" last nite.  My back pain has ^ significantly, but my activity has decreased !!!  Some SOB, has happened occasionally since pneumonia last year, a bit more now-could I get another Combivent?          How many servings of fruits and vegetables do you eat daily?  2-3    On average, how many sweetened beverages do you drink each day (Examples: soda, juice, sweet tea, etc.  Do NOT count diet or artificially sweetened beverages)?   0    How many days per week do you exercise enough to make your heart beat faster? 3 or less    How many minutes a day do you exercise enough to make your heart beat faster? 9 or less    How many days per week do you miss taking your medication? 0    Video Start Time: 12:35                  Hot flashes/sweats over the past 5- 6days - bad enough that she has to go outdoors.  Lasts 4 minutes or so at a time.  Gets them more in the evening. 10 times or so.    Has had low grade temps, but is on aspirin daily and percocet.      Diarrhea - has resolved now.  Was able to eat last night.  No diarrhea afterwards.   No nausea.  Last night was the first time in the past 5-6 days that she was able to eat much. She was coughing/gagging.  Has never had that before.  Is feeling more short of breath.  Diagnosed with mild COPD/centrilobular emphysema but hasn't really required treatment for that up until this point.      Nobody else is ill at home currently and she's had no ill contacts that she's aware of.  Has been out to get groceries, etc.      No significant myalgias this last week.    Back is worse.      Hasn't checked blood pressure while she's having a hot flash/episode.  She's going to do that now while we're on the phone.    150/94 - that was with a wrist cuff    Stopped the gabapentin \"it just didn't seem like it was doing anything for me\".  Stopped it in January.      Her lisinopril was increased from 10 to 20 mg about " "three weeks ago - she states that it didn't really improve her blood pressure.  She also has HR that seems to run all over the place recently     Reviewed and updated as needed this visit by Provider         Review of Systems   ROS COMP: Constitutional, HEENT, cardiovascular, pulmonary, gi and gu systems are negative, except as otherwise noted.      Objective    There were no vitals taken for this visit.  Estimated body mass index is 30.08 kg/m  as calculated from the following:    Height as of 12/19/19: 1.6 m (5' 3\").    Weight as of 12/19/19: 77 kg (169 lb 12.8 oz).  Physical Exam   GENERAL: healthy, alert and no distress    Diagnostic Test Results:  Labs reviewed in Epic        Assessment & Plan       ICD-10-CM    1. Essential hypertension with goal blood pressure less than 140/90  I10 amLODIPine (NORVASC) 5 MG tablet     **Basic metabolic panel FUTURE anytime   2. Centrilobular emphysema (H)  J43.2 albuterol (PROAIR HFA/PROVENTIL HFA/VENTOLIN HFA) 108 (90 Base) MCG/ACT inhaler   3. Viral syndrome  B34.9    I suspect that this could be COVID 19 with the constellation of symptoms (hot flahes/chills, diarrhea - 40% get GI symptoms, shortness of breath).  Obviously more mild symptoms, but did warn her that occasionally people can decompensate every 8-10 days into the course of illness.  At this time will have her self quarantine at home until symptoms have resolved and then wait 72 hours.    She is in agreement.  IF symptoms worsen to the point of severe shortness of breath, she will seek further medical care.     With the variable blood pressure, the other possibility is that she could have labile hypertension, if this is ongoing, would also want to r/o pheochromocytoma.  I'm more suspicious that this has come with illness right now however.     Blood pressure wasn't well controlled even prior to her feeling ill.    I will add amlodipine 5 mg daily.  Continue the metoprolol and lisinopril.  Blood work recommended " "in about 2 weeks - need to check renal function and electrolytes.  Patient verbalized understanding and will make lab only apt in [place] or [place].    Wants albuterol inhaler.  Has centrilobular emphysema and has had inhalers in the past.  This is reasonable if she can get one.        BMI:   Estimated body mass index is 30.08 kg/m  as calculated from the following:    Height as of 12/19/19: 1.6 m (5' 3\").    Weight as of 12/19/19: 77 kg (169 lb 12.8 oz).               No follow-ups on file.    Sarah Barriga MD  [facility]    This patient was seen virtually during the COVID-19 outbreak in attempts to keep healthy patients out of the clinic per CDC guidelines (practicing social distancing).  I evaluated them by phone/evisit/video visit and the note reflects our conversation/visit.        Video-Visit Details    Type of service:  Video Visit    Video End Time (time video stopped): 1259    Originating Location (pt. Location): Home    Distant Location (provider location):  [facility]     Mode of Communication:  Video Conference via Reveal    No follow-ups on file.       Sarah Barriga MD    "

## 2020-04-10 ENCOUNTER — MYC MEDICAL ADVICE (OUTPATIENT)
Dept: FAMILY MEDICINE | Facility: CLINIC | Age: 70
End: 2020-04-10

## 2020-04-10 DIAGNOSIS — R09.89 LABILE HYPERTENSION: Primary | ICD-10-CM

## 2020-04-23 DIAGNOSIS — I10 ESSENTIAL HYPERTENSION WITH GOAL BLOOD PRESSURE LESS THAN 140/90: ICD-10-CM

## 2020-04-23 LAB
ANION GAP SERPL CALCULATED.3IONS-SCNC: 4 MMOL/L (ref 3–14)
BUN SERPL-MCNC: 16 MG/DL (ref 7–30)
CALCIUM SERPL-MCNC: 9 MG/DL (ref 8.5–10.1)
CHLORIDE SERPL-SCNC: 104 MMOL/L (ref 94–109)
CO2 SERPL-SCNC: 29 MMOL/L (ref 20–32)
CREAT SERPL-MCNC: 0.82 MG/DL (ref 0.52–1.04)
GFR SERPL CREATININE-BSD FRML MDRD: 74 ML/MIN/{1.73_M2}
GLUCOSE SERPL-MCNC: 87 MG/DL (ref 70–99)
POTASSIUM SERPL-SCNC: 4.2 MMOL/L (ref 3.4–5.3)
SODIUM SERPL-SCNC: 137 MMOL/L (ref 133–144)

## 2020-04-23 PROCEDURE — 80048 BASIC METABOLIC PNL TOTAL CA: CPT | Performed by: FAMILY MEDICINE

## 2020-04-23 PROCEDURE — 36415 COLL VENOUS BLD VENIPUNCTURE: CPT | Performed by: FAMILY MEDICINE

## 2020-04-24 NOTE — RESULT ENCOUNTER NOTE
Jessica,    All of the labs were normal or acceptable.    Please contact my office if you have questions.    Sarah Barriga M.D.

## 2020-04-28 ENCOUNTER — TELEPHONE (OUTPATIENT)
Dept: FAMILY MEDICINE | Facility: CLINIC | Age: 70
End: 2020-04-28

## 2020-04-28 NOTE — TELEPHONE ENCOUNTER
"The lab guide indicates \"if possible, abstain from medication for 72 hours\".  I would recommend, given her hypertension and her risk, that she go ahead and continue the medications she is currently taking and do the urine collection as is.    Sarah Barriga M.D.    "

## 2020-04-28 NOTE — TELEPHONE ENCOUNTER
Reason for Call: Request for an order or referral:    Order or referral being requested: Pt is doing a 48 hour urine collection, that Dr. Barriga ordered and it says on the container to abstain from taking meds 72 hours prior to collection.  Please call patient and advise.      Date needed: at your convenience    Has the patient been seen by the PCP for this problem? YES    Additional comments:     Phone number Patient can be reached at:  Cell number on file:    Telephone Information:   Mobile [phone]       Best Time:  any    Can we leave a detailed message on this number?  YES    Call taken on 4/28/2020 at 10:24 AM by Cait Ortiz

## 2020-04-28 NOTE — TELEPHONE ENCOUNTER
LM to call clinic/Rn to discuss 24 hour urine collection.  Spoke with lab and pt is to be off meds x 3 days(72hrs) prior to the 24 hour urine collection.     - Should pt stop ALL meds for 72 hrs?  Advise.  Bill

## 2020-04-28 NOTE — TELEPHONE ENCOUNTER
Pt will stop meds on Th - AM, start collecting after voiding on Sum - AM and turn urine into Lab on Monday AM.  Waiting on  reply on if pt should stopp ALL meds?  KPacarliR

## 2020-05-06 DIAGNOSIS — R09.89 LABILE HYPERTENSION: ICD-10-CM

## 2020-05-06 PROCEDURE — 83835 ASSAY OF METANEPHRINES: CPT | Mod: 90 | Performed by: FAMILY MEDICINE

## 2020-05-06 PROCEDURE — 82384 ASSAY THREE CATECHOLAMINES: CPT | Mod: 90 | Performed by: FAMILY MEDICINE

## 2020-05-06 PROCEDURE — 99000 SPECIMEN HANDLING OFFICE-LAB: CPT | Performed by: FAMILY MEDICINE

## 2020-05-09 LAB
CATECHOLS UR-IMP: NORMAL
COLLECT DURATION TIME SPEC: 24 H
CREAT 24H UR-MRATE: 968 MG/D (ref 500–1400)
CREAT UR-MCNC: 32 MG/DL
DOPAMINE 24H UR-MRATE: 154 UG/D (ref 71–485)
DOPAMINE UR-MCNC: 1 UG/L
DOPAMINE/CREAT UR: 159 UG/G CRT (ref 0–250)
EPINEPH 24H UR-MRATE: 3 UG/D (ref 1–14)
EPINEPH UR-MCNC: 51 UG/L
EPINEPH/CREAT UR: 3 UG/G CRT (ref 0–20)
NOREPINEPH 24H UR-MRATE: 39 UG/D (ref 14–120)
NOREPINEPH UR-MCNC: 13 UG/L
NOREPINEPH/CREAT UR: 41 UG/G CRT (ref 0–45)
SPECIMEN VOL ?TM UR: 3025 ML

## 2020-05-10 LAB
COLLECT DURATION TIME SPEC: 24 H
CREAT 24H UR-MRATE: 968 MG/D (ref 500–1400)
CREAT UR-MCNC: 32 MG/DL
METANEPH 24H UR-MCNC: 24 UG/L
METANEPH 24H UR-MRATE: 73 UG/D (ref 36–229)
METANEPH+NORMETANEPH UR-IMP: NORMAL
METANEPH/CREAT 24H UR: 75 UG/G CRT (ref 0–300)
NORMETANEPHRINE 24H UR-MCNC: 112 UG/L
NORMETANEPHRINE 24H UR-MRATE: 339 UG/D (ref 95–650)
NORMETANEPHRINE/CREAT 24H UR: 350 UG/G CRT (ref 0–400)
SPECIMEN VOL ?TM UR: 3025 ML

## 2020-06-22 DIAGNOSIS — G89.29 CHRONIC BILATERAL LOW BACK PAIN WITHOUT SCIATICA: ICD-10-CM

## 2020-06-22 DIAGNOSIS — M53.3 SACROILIAC JOINT PAIN: ICD-10-CM

## 2020-06-22 DIAGNOSIS — M54.50 CHRONIC BILATERAL LOW BACK PAIN WITHOUT SCIATICA: ICD-10-CM

## 2020-06-22 DIAGNOSIS — Z98.1 S/P LUMBAR SPINAL FUSION: ICD-10-CM

## 2020-06-23 RX ORDER — OXYCODONE AND ACETAMINOPHEN 10; 325 MG/1; MG/1
TABLET ORAL
Qty: 120 TABLET | Refills: 0 | OUTPATIENT
Start: 2020-06-23

## 2020-08-07 DIAGNOSIS — I10 ESSENTIAL HYPERTENSION WITH GOAL BLOOD PRESSURE LESS THAN 140/90: ICD-10-CM

## 2020-08-07 RX ORDER — AMLODIPINE BESYLATE 5 MG/1
TABLET ORAL
Qty: 30 TABLET | Refills: 1 | Status: SHIPPED | OUTPATIENT
Start: 2020-08-07 | End: 2020-09-03

## 2020-08-12 ENCOUNTER — ANCILLARY PROCEDURE (OUTPATIENT)
Dept: MAMMOGRAPHY | Facility: CLINIC | Age: 70
End: 2020-08-12
Attending: FAMILY MEDICINE
Payer: COMMERCIAL

## 2020-08-12 DIAGNOSIS — Z12.31 VISIT FOR SCREENING MAMMOGRAM: ICD-10-CM

## 2020-08-12 PROCEDURE — 77063 BREAST TOMOSYNTHESIS BI: CPT | Mod: TC

## 2020-08-12 PROCEDURE — 77067 SCR MAMMO BI INCL CAD: CPT | Mod: TC

## 2020-08-20 ENCOUNTER — MYC MEDICAL ADVICE (OUTPATIENT)
Dept: FAMILY MEDICINE | Facility: CLINIC | Age: 70
End: 2020-08-20

## 2020-08-20 NOTE — TELEPHONE ENCOUNTER
Dr. Barriga - patient asking for 90 day supply on ALL meds.  Do you need to see her for an appointment?  Or ok to fill x 90 days?    I will call her to clarify meds before sending.     Melissa Swenson RN

## 2020-08-20 NOTE — TELEPHONE ENCOUNTER
Patient states that she only needs a 90 day supply on the venlafaxine and she already has 90 on that. She will call as she needs meds refilled.    Melissa Swenson RN

## 2020-08-31 ENCOUNTER — TELEPHONE (OUTPATIENT)
Dept: FAMILY MEDICINE | Facility: CLINIC | Age: 70
End: 2020-08-31

## 2020-08-31 DIAGNOSIS — M53.3 SACROILIAC JOINT PAIN: ICD-10-CM

## 2020-08-31 DIAGNOSIS — M54.50 CHRONIC BILATERAL LOW BACK PAIN WITHOUT SCIATICA: ICD-10-CM

## 2020-08-31 DIAGNOSIS — Z98.1 S/P LUMBAR SPINAL FUSION: ICD-10-CM

## 2020-08-31 DIAGNOSIS — J43.2 CENTRILOBULAR EMPHYSEMA (H): ICD-10-CM

## 2020-08-31 DIAGNOSIS — G89.29 CHRONIC BILATERAL LOW BACK PAIN WITHOUT SCIATICA: ICD-10-CM

## 2020-08-31 DIAGNOSIS — G47.00 INSOMNIA, UNSPECIFIED TYPE: ICD-10-CM

## 2020-08-31 RX ORDER — OXYCODONE AND ACETAMINOPHEN 10; 325 MG/1; MG/1
TABLET ORAL
Qty: 120 TABLET | Refills: 0 | OUTPATIENT
Start: 2020-09-04

## 2020-08-31 RX ORDER — ALBUTEROL SULFATE 90 UG/1
AEROSOL, METERED RESPIRATORY (INHALATION)
Qty: 18 G | Refills: 0 | Status: ON HOLD | OUTPATIENT
Start: 2020-08-31 | End: 2023-08-16

## 2020-08-31 RX ORDER — TRAZODONE HYDROCHLORIDE 150 MG/1
TABLET ORAL
Qty: 90 TABLET | Refills: 0 | OUTPATIENT
Start: 2020-08-31

## 2020-08-31 NOTE — TELEPHONE ENCOUNTER
Needs video/virtual visit for the narcotic refill.  Can get that done this week.  Okay for a short supply of trazodone if she is out.  Sarah Barriga M.D.

## 2020-08-31 NOTE — TELEPHONE ENCOUNTER
Ventolin inhaler Prescription approved per [facility] Refill Protocol.    Routing refill request to provider for review/approval because:  1.  Percocet drug not on the [facility] refill protocol     2.  Trazodone drug interaction warning with venlafaxine.    Joann Yu RN

## 2020-08-31 NOTE — TELEPHONE ENCOUNTER
"Requested Prescriptions   Pending Prescriptions Disp Refills     traZODone (DESYREL) 150 MG tablet [Pharmacy Med Name: TRAZODONE HCL 150MG TABS] 90 tablet 3     Sig: TAKE ONE TABLET BY MOUTH NIGHTLY AS NEEDED FOR SLEEP.       Serotonin Modulators Passed - 8/31/2020 10:21 AM        Passed - Recent (12 mo) or future (30 days) visit within the authorizing provider's specialty     Patient has had an office visit with the authorizing provider or a provider within the authorizing providers department within the previous 12 mos or has a future within next 30 days. See \"Patient Info\" tab in inbasket, or \"Choose Columns\" in Meds & Orders section of the refill encounter.              Passed - Medication is active on med list        Passed - Patient is age 18 or older        Passed - No active pregnancy on record        Passed - No positive pregnancy test in past 12 months           oxyCODONE-acetaminophen (PERCOCET)  MG per tablet [Pharmacy Med Name: OXYCODONE-ACETAMINOPHEN  TABS] 120 tablet 0     Sig: TAKE TWO TABLETS BY MOUTH EVERY 6 HOURS AS NEEDED FOR MODERATE TO SEVERE PAIN (DO NOT TAKE MORE THAN 4 TABLETS PER DAY)       There is no refill protocol information for this order        VENTOLIN  (90 Base) MCG/ACT inhaler [Pharmacy Med Name: VENTOLIN HFA 108MCG/ACT AERS] 18 g 0     Sig: INHALE TWO PUFFS BY MOUTH EVERY 6 HOURS       Asthma Maintenance Inhalers - Anticholinergics Passed - 8/31/2020 10:21 AM        Passed - Patient is age 12 years or older        Passed - Recent (12 mo) or future (30 days) visit within the authorizing provider's specialty     Patient has had an office visit with the authorizing provider or a provider within the authorizing providers department within the previous 12 mos or has a future within next 30 days. See \"Patient Info\" tab in inbasket, or \"Choose Columns\" in Meds & Orders section of the refill encounter.              Passed - Medication is active on med list       " "Short-Acting Beta Agonist Inhalers Protocol  Passed - 8/31/2020 10:21 AM        Passed - Patient is age 12 or older        Passed - Recent (12 mo) or future (30 days) visit within the authorizing provider's specialty     Patient has had an office visit with the authorizing provider or a provider within the authorizing providers department within the previous 12 mos or has a future within next 30 days. See \"Patient Info\" tab in inbasket, or \"Choose Columns\" in Meds & Orders section of the refill encounter.              Passed - Medication is active on med list             "

## 2020-09-03 ENCOUNTER — VIRTUAL VISIT (OUTPATIENT)
Dept: FAMILY MEDICINE | Facility: CLINIC | Age: 70
End: 2020-09-03
Payer: COMMERCIAL

## 2020-09-03 DIAGNOSIS — M53.3 SACROILIAC JOINT PAIN: ICD-10-CM

## 2020-09-03 DIAGNOSIS — F41.9 ANXIETY: ICD-10-CM

## 2020-09-03 DIAGNOSIS — G89.29 CHRONIC BILATERAL LOW BACK PAIN WITHOUT SCIATICA: ICD-10-CM

## 2020-09-03 DIAGNOSIS — Z98.1 S/P LUMBAR SPINAL FUSION: ICD-10-CM

## 2020-09-03 DIAGNOSIS — F33.1 MAJOR DEPRESSIVE DISORDER, RECURRENT EPISODE, MODERATE (H): ICD-10-CM

## 2020-09-03 DIAGNOSIS — E03.9 HYPOTHYROIDISM, UNSPECIFIED TYPE: ICD-10-CM

## 2020-09-03 DIAGNOSIS — I10 ESSENTIAL HYPERTENSION WITH GOAL BLOOD PRESSURE LESS THAN 140/90: ICD-10-CM

## 2020-09-03 DIAGNOSIS — G47.00 INSOMNIA, UNSPECIFIED TYPE: ICD-10-CM

## 2020-09-03 DIAGNOSIS — M54.50 CHRONIC BILATERAL LOW BACK PAIN WITHOUT SCIATICA: ICD-10-CM

## 2020-09-03 PROCEDURE — 99214 OFFICE O/P EST MOD 30 MIN: CPT | Mod: 95 | Performed by: FAMILY MEDICINE

## 2020-09-03 RX ORDER — LEVOTHYROXINE SODIUM 50 UG/1
50 TABLET ORAL DAILY
Qty: 90 TABLET | Refills: 3 | Status: SHIPPED | OUTPATIENT
Start: 2020-09-03 | End: 2021-10-26

## 2020-09-03 RX ORDER — OXYCODONE AND ACETAMINOPHEN 10; 325 MG/1; MG/1
TABLET ORAL
Qty: 120 TABLET | Refills: 0 | Status: SHIPPED | OUTPATIENT
Start: 2020-09-04 | End: 2020-10-15

## 2020-09-03 RX ORDER — VENLAFAXINE HYDROCHLORIDE 75 MG/1
75 CAPSULE, EXTENDED RELEASE ORAL DAILY
Qty: 90 CAPSULE | Refills: 1 | Status: SHIPPED | OUTPATIENT
Start: 2020-09-03 | End: 2021-03-15

## 2020-09-03 RX ORDER — LISINOPRIL 20 MG/1
20 TABLET ORAL DAILY
Qty: 90 TABLET | Refills: 1 | Status: SHIPPED | OUTPATIENT
Start: 2020-09-03 | End: 2020-12-22 | Stop reason: SINTOL

## 2020-09-03 RX ORDER — AMLODIPINE BESYLATE 5 MG/1
5 TABLET ORAL DAILY
Qty: 90 TABLET | Refills: 1 | Status: SHIPPED | OUTPATIENT
Start: 2020-09-03 | End: 2020-12-21 | Stop reason: DRUGHIGH

## 2020-09-03 RX ORDER — VENLAFAXINE HYDROCHLORIDE 150 MG/1
CAPSULE, EXTENDED RELEASE ORAL
Qty: 90 CAPSULE | Refills: 1 | Status: SHIPPED | OUTPATIENT
Start: 2020-09-03 | End: 2021-03-15

## 2020-09-03 RX ORDER — TRAZODONE HYDROCHLORIDE 150 MG/1
150 TABLET ORAL
Qty: 90 TABLET | Refills: 3 | Status: SHIPPED | OUTPATIENT
Start: 2020-09-03 | End: 2021-08-05

## 2020-09-03 ASSESSMENT — PATIENT HEALTH QUESTIONNAIRE - PHQ9: SUM OF ALL RESPONSES TO PHQ QUESTIONS 1-9: 7

## 2020-09-03 NOTE — PATIENT INSTRUCTIONS
Health Maintenance reviewed and plan for update discussed.  Future lab orders placed  schedule Flu shot      Our Clinic hours are:  Mondays    7:20 am - 7 pm  Tues -  Fri  7:20 am - 5 pm    Clinic Phone: [phone]    The clinic lab opens at 7:30 am Mon - Fri and appointments are required.    [facility]  Ph. [phone]  Monday  8 am - 7pm  Tues - Fri 8 am - 5:30 pm

## 2020-09-03 NOTE — PROGRESS NOTES
"Jessica Ellison is a 69 year old female who is being evaluated via a billable telephone visit.      The patient has been notified of following:     \"This telephone visit will be conducted via a call between you and your physician/provider. We have found that certain health care needs can be provided without the need for a physical exam.  This service lets us provide the care you need with a short phone conversation.  If a prescription is necessary we can send it directly to your pharmacy.  If lab work is needed we can place an order for that and you can then stop by our lab to have the test done at a later time.    Telephone visits are billed at different rates depending on your insurance coverage. During this emergency period, for some insurers they may be billed the same as an in-person visit.  Please reach out to your insurance provider with any questions.    If during the course of the call the physician/provider feels a telephone visit is not appropriate, you will not be charged for this service.\"    Patient has given verbal consent for Telephone visit?  Yes    What phone number would you like to be contacted at? [phone]    How would you like to obtain your AVS? Awais Bhatt     Jessica Ellison is a 69 year old female who presents via phone visit today for the following health issues:    HPI  Chief Complaint   Patient presents with     Hypertension     Depression     Thyroid Disease     Flu Shot     reminded to complete     Medication Reconciliation     not taking gabapentin       Hypertension Follow-up      Do you check your blood pressure regularly outside of the clinic? Yes     Are you following a low salt diet? No    Are your blood pressures ever more than 140 on the top number (systolic) OR more   than 90 on the bottom number (diastolic), for example 140/90? No    Depression Followup    How are you doing with your depression since your last visit? Worsened with the changes in the " world    Are you having other symptoms that might be associated with depression? Yes:  crying more often    Have you had a significant life event?  No     Are you feeling anxious or having panic attacks?   No    Do you have any concerns with your use of alcohol or other drugs? No     Is seeing a small group of family/friends.  Having more crying spells.   Would like to adjust her effexor dose.     Social History     Tobacco Use     Smoking status: Former Smoker     Last attempt to quit: 2004     Years since quittin.6     Smokeless tobacco: Never Used   Substance Use Topics     Alcohol use: No     Comment: ETOH dependency, DUI 3/15/10     Drug use: No     PHQ 2019 2019 9/3/2020   PHQ-9 Total Score 8 0 7   Q9: Thoughts of better off dead/self-harm past 2 weeks Not at all Not at all Not at all     DAJUAN-7 SCORE 2018   Total Score - - -   Total Score 5 5 10     Last PHQ-9 9/3/2020   1.  Little interest or pleasure in doing things 1   2.  Feeling down, depressed, or hopeless 1   3.  Trouble falling or staying asleep, or sleeping too much 1   4.  Feeling tired or having little energy 1   5.  Poor appetite or overeating 2   6.  Feeling bad about yourself 1   7.  Trouble concentrating 0   8.  Moving slowly or restless 0   Q9: Thoughts of better off dead/self-harm past 2 weeks 0   PHQ-9 Total Score 7   Difficulty at work, home, or with people Somewhat difficult     In the past two weeks have you had thoughts of suicide or self-harm?  No.    Do you have concerns about your personal safety or the safety of others?   No    Suicide Assessment Five-step Evaluation and Treatment (SAFE-T)    Hypothyroidism Follow-up      Since last visit, patient describes the following symptoms: dry skin, depression, fatigue and hair loss      How many servings of fruits and vegetables do you eat daily?  0-1    On average, how many sweetened beverages do you drink each day (Examples: soda, juice, sweet tea,  etc.  Do NOT count diet or artificially sweetened beverages)?   0    How many days per week do you exercise enough to make your heart beat faster? 3 or less    How many minutes a day do you exercise enough to make your heart beat faster? 9 or less    How many days per week do you miss taking your medication? 0             Review of Systems   Constitutional, HEENT, cardiovascular, pulmonary, gi and gu systems are negative, except as otherwise noted.      Biggest bother is the hot flashes and sweating that comes with it.    Objective          Vitals:  No vitals were obtained today due to virtual visit.    healthy, alert and no distress  PSYCH: Alert and oriented times 3; coherent speech, normal   rate and volume, able to articulate logical thoughts, able   to abstract reason, no tangential thoughts, no hallucinations   or delusions  Her affect is normal  RESP: No cough, no audible wheezing, able to talk in full sentences  Remainder of exam unable to be completed due to telephone visits            Assessment/Plan:    Assessment & Plan     Chronic bilateral low back pain without sciatica   MN  reviewed today  - oxyCODONE-acetaminophen (PERCOCET)  MG per tablet; TAKE TWO TABLETS BY MOUTH EVERY 6 HOURS AS NEEDED FOR MODERATE TO SEVERE PAIN (DO NOT TAKE MORE THAN 4 TABLETS PER DAY)    Sacroiliac joint pain     - oxyCODONE-acetaminophen (PERCOCET)  MG per tablet; TAKE TWO TABLETS BY MOUTH EVERY 6 HOURS AS NEEDED FOR MODERATE TO SEVERE PAIN (DO NOT TAKE MORE THAN 4 TABLETS PER DAY)    S/P lumbar spinal fusion     - oxyCODONE-acetaminophen (PERCOCET)  MG per tablet; TAKE TWO TABLETS BY MOUTH EVERY 6 HOURS AS NEEDED FOR MODERATE TO SEVERE PAIN (DO NOT TAKE MORE THAN 4 TABLETS PER DAY)    Insomnia, unspecified type  stable  - traZODone (DESYREL) 150 MG tablet; Take 1 tablet (150 mg) by mouth nightly as needed for sleep    Anxiety  Increase to 225 mg daily on the Effexor XR  - venlafaxine (EFFEXOR-XR) 150 MG  "24 hr capsule; TAKE ONE CAPSULE BY MOUTH ONCE DAILY to take with a 75 mg for total of 225 mg daily  - venlafaxine (EFFEXOR-XR) 75 MG 24 hr capsule; Take 1 capsule (75 mg) by mouth daily Take with 150 mg for a total of 225 mg daily    Major depressive disorder, recurrent episode, moderate (H)  Increase to 225 mg daily on the Effexor XR  - venlafaxine (EFFEXOR-XR) 150 MG 24 hr capsule; TAKE ONE CAPSULE BY MOUTH ONCE DAILY to take with a 75 mg for total of 225 mg daily  - venlafaxine (EFFEXOR-XR) 75 MG 24 hr capsule; Take 1 capsule (75 mg) by mouth daily Take with 150 mg for a total of 225 mg daily    Essential hypertension with goal blood pressure less than 140/90     - lisinopril (ZESTRIL) 20 MG tablet; Take 1 tablet (20 mg) by mouth daily  - amLODIPine (NORVASC) 5 MG tablet; Take 1 tablet (5 mg) by mouth daily  - Basic metabolic panel; Future    Hypothyroidism, unspecified type  Due for labs  - levothyroxine (LEVOTHROID) 50 MCG tablet; Take 1 tablet (50 mcg) by mouth daily  - **TSH with free T4 reflex FUTURE anytime; Future     BMI:   Estimated body mass index is 30.08 kg/m  as calculated from the following:    Height as of 12/19/19: 1.6 m (5' 3\").    Weight as of 12/19/19: 77 kg (169 lb 12.8 oz).               No follow-ups on file.    Sarah Barriga MD  [facility]    Phone call duration:  12 minutes                "

## 2020-10-15 DIAGNOSIS — Z98.1 S/P LUMBAR SPINAL FUSION: ICD-10-CM

## 2020-10-15 DIAGNOSIS — M53.3 SACROILIAC JOINT PAIN: ICD-10-CM

## 2020-10-15 DIAGNOSIS — M54.50 CHRONIC BILATERAL LOW BACK PAIN WITHOUT SCIATICA: ICD-10-CM

## 2020-10-15 DIAGNOSIS — G89.29 CHRONIC BILATERAL LOW BACK PAIN WITHOUT SCIATICA: ICD-10-CM

## 2020-10-15 RX ORDER — OXYCODONE AND ACETAMINOPHEN 10; 325 MG/1; MG/1
TABLET ORAL
Qty: 120 TABLET | Refills: 0 | Status: SHIPPED | OUTPATIENT
Start: 2020-10-15 | End: 2020-11-04

## 2020-11-04 ENCOUNTER — OFFICE VISIT (OUTPATIENT)
Dept: FAMILY MEDICINE | Facility: CLINIC | Age: 70
End: 2020-11-04
Payer: COMMERCIAL

## 2020-11-04 VITALS
OXYGEN SATURATION: 91 % | HEART RATE: 84 BPM | HEIGHT: 62 IN | DIASTOLIC BLOOD PRESSURE: 70 MMHG | WEIGHT: 164 LBS | BODY MASS INDEX: 30.18 KG/M2 | SYSTOLIC BLOOD PRESSURE: 128 MMHG | TEMPERATURE: 97.4 F | RESPIRATION RATE: 16 BRPM

## 2020-11-04 DIAGNOSIS — R23.3 EASY BRUISING: ICD-10-CM

## 2020-11-04 DIAGNOSIS — R40.0 DAYTIME SLEEPINESS: ICD-10-CM

## 2020-11-04 DIAGNOSIS — R61 NIGHT SWEATS: ICD-10-CM

## 2020-11-04 DIAGNOSIS — D75.89 MACROCYTOSIS: ICD-10-CM

## 2020-11-04 DIAGNOSIS — M53.3 SACROILIAC JOINT PAIN: ICD-10-CM

## 2020-11-04 DIAGNOSIS — Z13.6 CARDIOVASCULAR SCREENING; LDL GOAL LESS THAN 160: ICD-10-CM

## 2020-11-04 DIAGNOSIS — G89.29 CHRONIC BILATERAL LOW BACK PAIN WITHOUT SCIATICA: ICD-10-CM

## 2020-11-04 DIAGNOSIS — E03.9 HYPOTHYROIDISM, UNSPECIFIED TYPE: ICD-10-CM

## 2020-11-04 DIAGNOSIS — J44.9 CHRONIC OBSTRUCTIVE PULMONARY DISEASE, UNSPECIFIED COPD TYPE (H): ICD-10-CM

## 2020-11-04 DIAGNOSIS — Z98.1 S/P LUMBAR SPINAL FUSION: ICD-10-CM

## 2020-11-04 DIAGNOSIS — E78.5 HYPERLIPIDEMIA LDL GOAL <100: ICD-10-CM

## 2020-11-04 DIAGNOSIS — M54.50 CHRONIC BILATERAL LOW BACK PAIN WITHOUT SCIATICA: ICD-10-CM

## 2020-11-04 DIAGNOSIS — G47.00 INSOMNIA, UNSPECIFIED TYPE: ICD-10-CM

## 2020-11-04 DIAGNOSIS — Z00.00 ENCOUNTER FOR MEDICARE ANNUAL WELLNESS EXAM: Primary | ICD-10-CM

## 2020-11-04 DIAGNOSIS — I10 ESSENTIAL HYPERTENSION WITH GOAL BLOOD PRESSURE LESS THAN 140/90: ICD-10-CM

## 2020-11-04 LAB
ALBUMIN SERPL-MCNC: 4 G/DL (ref 3.4–5)
ALP SERPL-CCNC: 72 U/L (ref 40–150)
ALT SERPL W P-5'-P-CCNC: 32 U/L (ref 0–50)
ANION GAP SERPL CALCULATED.3IONS-SCNC: 6 MMOL/L (ref 3–14)
AST SERPL W P-5'-P-CCNC: 28 U/L (ref 0–45)
BILIRUB SERPL-MCNC: 0.2 MG/DL (ref 0.2–1.3)
BUN SERPL-MCNC: 23 MG/DL (ref 7–30)
CALCIUM SERPL-MCNC: 8.7 MG/DL (ref 8.5–10.1)
CHLORIDE SERPL-SCNC: 108 MMOL/L (ref 94–109)
CHOLEST SERPL-MCNC: 253 MG/DL
CO2 SERPL-SCNC: 27 MMOL/L (ref 20–32)
CREAT SERPL-MCNC: 0.91 MG/DL (ref 0.52–1.04)
ERYTHROCYTE [DISTWIDTH] IN BLOOD BY AUTOMATED COUNT: 13.1 % (ref 10–15)
GFR SERPL CREATININE-BSD FRML MDRD: 65 ML/MIN/{1.73_M2}
GLUCOSE SERPL-MCNC: 88 MG/DL (ref 70–99)
HCT VFR BLD AUTO: 35.5 % (ref 35–47)
HDLC SERPL-MCNC: 100 MG/DL
HGB BLD-MCNC: 11.1 G/DL (ref 11.7–15.7)
LDLC SERPL CALC-MCNC: 120 MG/DL
MCH RBC QN AUTO: 32 PG (ref 26.5–33)
MCHC RBC AUTO-ENTMCNC: 31.3 G/DL (ref 31.5–36.5)
MCV RBC AUTO: 102 FL (ref 78–100)
NONHDLC SERPL-MCNC: 153 MG/DL
PLATELET # BLD AUTO: 206 10E9/L (ref 150–450)
POTASSIUM SERPL-SCNC: 4 MMOL/L (ref 3.4–5.3)
PROT SERPL-MCNC: 7.1 G/DL (ref 6.8–8.8)
RBC # BLD AUTO: 3.47 10E12/L (ref 3.8–5.2)
SODIUM SERPL-SCNC: 141 MMOL/L (ref 133–144)
TRIGL SERPL-MCNC: 167 MG/DL
TSH SERPL DL<=0.005 MIU/L-ACNC: 1.5 MU/L (ref 0.4–4)
WBC # BLD AUTO: 4.6 10E9/L (ref 4–11)

## 2020-11-04 PROCEDURE — 85027 COMPLETE CBC AUTOMATED: CPT | Performed by: FAMILY MEDICINE

## 2020-11-04 PROCEDURE — 80061 LIPID PANEL: CPT | Performed by: FAMILY MEDICINE

## 2020-11-04 PROCEDURE — 84443 ASSAY THYROID STIM HORMONE: CPT | Performed by: FAMILY MEDICINE

## 2020-11-04 PROCEDURE — 36415 COLL VENOUS BLD VENIPUNCTURE: CPT | Performed by: FAMILY MEDICINE

## 2020-11-04 PROCEDURE — G0439 PPPS, SUBSEQ VISIT: HCPCS | Performed by: FAMILY MEDICINE

## 2020-11-04 PROCEDURE — 99214 OFFICE O/P EST MOD 30 MIN: CPT | Mod: 25 | Performed by: FAMILY MEDICINE

## 2020-11-04 PROCEDURE — 82607 VITAMIN B-12: CPT | Performed by: FAMILY MEDICINE

## 2020-11-04 PROCEDURE — 80053 COMPREHEN METABOLIC PANEL: CPT | Performed by: FAMILY MEDICINE

## 2020-11-04 RX ORDER — OXYCODONE AND ACETAMINOPHEN 10; 325 MG/1; MG/1
TABLET ORAL
Qty: 120 TABLET | Refills: 0 | Status: SHIPPED | OUTPATIENT
Start: 2020-11-13 | End: 2020-12-21

## 2020-11-04 RX ORDER — TIOTROPIUM BROMIDE 18 UG/1
18 CAPSULE ORAL; RESPIRATORY (INHALATION) DAILY
Qty: 30 CAPSULE | Refills: 11 | Status: SHIPPED | OUTPATIENT
Start: 2020-11-04 | End: 2021-12-15

## 2020-11-04 ASSESSMENT — MIFFLIN-ST. JEOR: SCORE: 1227.15

## 2020-11-04 NOTE — PROGRESS NOTES
Pt called back and rescheduled appt to 9/12/20 @ 3:30 pm in [place]  SUBJECTIVE:   Jessica Ellison is a 70 year old female who presents for Preventive Visit.      Patient has been advised of split billing requirements and indicates understanding: Yes   Are you in the first 12 months of your Medicare coverage?  No    HPI  Do you feel safe in your environment? Yes    Have you ever done Advance Care Planning? (For example, a Health Directive, POLST, or a discussion with a medical provider or your loved ones about your wishes):       Fall risk  Fallen 2 or more times in the past year?: Yes  Any fall with injury in the past year?: Yes  Timed Up and Go Test (>13.5 is fall risk; contact physician) : 0.6    Cognitive Screening   1) Repeat 3 items (Leader, Season, Table)    2) Clock draw: NORMAL  3) 3 item recall: Recalls 3 objects  Results: NORMAL clock, 1-2 items recalled: COGNITIVE IMPAIRMENT LESS LIKELY    Mini-CogTM Copyright SUJATHA Rea. Licensed by the author for use in [facility]; reprinted with permission (nicolás@[facility]). All rights reserved.      Do you have sleep apnea, excessive snoring or daytime drowsiness?: yes    Reviewed and updated as needed this visit by clinical staff  Tobacco  Allergies  Meds      Soc Hx        Hypertension Follow-up      Do you check your blood pressure regularly outside of the clinic? Yes     Are you following a low salt diet? No    Are your blood pressures ever more than 140 on the top number (systolic) OR more   than 90 on the bottom number (diastolic), for example 140/90? No          Reviewed and updated as needed this visit by Provider                Social History     Tobacco Use     Smoking status: Former Smoker     Quit date: 2004     Years since quittin.8     Smokeless tobacco: Never Used   Substance Use Topics     Alcohol use: No     Comment: ETOH dependency, DUI 3/15/10         Alcohol Use 2017   Prescreen: >3 drinks/day or >7 drinks/week? The patient does not drink >3 drinks per day nor >7 drinks per week.  "              Current providers sharing in care for this patient include:   Patient Care Team:  Sarah Barriga MD as PCP - General  Sarah Barriga MD as Assigned PCP  Vaishali García MD as Assigned Neuroscience Provider    The following health maintenance items are reviewed in Epic and correct as of today:  Health Maintenance   Topic Date Due     COPD ACTION PLAN  1950     ADVANCE CARE PLANNING  09/05/2017     MEDICARE ANNUAL WELLNESS VISIT  11/27/2018     URINE DRUG SCREEN  02/21/2020     TSH W/FREE T4 REFLEX  03/18/2020     LIPID  11/02/2020     PHQ-9  03/03/2021     FALL RISK ASSESSMENT  09/03/2021     MAMMO SCREENING  08/12/2022     COLORECTAL CANCER SCREENING  03/30/2026     DTAP/TDAP/TD IMMUNIZATION (3 - Td) 11/27/2027     DEXA  Completed     SPIROMETRY  Completed     HEPATITIS C SCREENING  Completed     DEPRESSION ACTION PLAN  Completed     INFLUENZA VACCINE  Completed     Pneumococcal Vaccine: 65+ Years  Completed     ZOSTER IMMUNIZATION  Completed     Pneumococcal Vaccine: Pediatrics (0 to 5 Years) and At-Risk Patients (6 to 64 Years)  Aged Out     IPV IMMUNIZATION  Aged Out     MENINGITIS IMMUNIZATION  Aged Out     HEPATITIS B IMMUNIZATION  Aged Out     Lab work is in process      Review of Systems  Constitutional, HEENT, cardiovascular, pulmonary, gi and gu systems are negative, except as otherwise noted.    Continues to have diaphoresis \"drenched\" at times.  Catecholamines and metanephrines checked in May - normal.   She had hot flashes with a normal menopause and then just in the past year or two has started to have drenching sweats.  She is up to date on mammogram and colonoscopy.    OBJECTIVE:   BP (!) 140/76 (Cuff Size: Adult Regular)   Pulse 84   Temp 97.4  F (36.3  C) (Tympanic)   Resp 16   Ht 1.575 m (5' 2\")   Wt 74.4 kg (164 lb)   SpO2 91%   BMI 30.00 kg/m   Estimated body mass index is 30 kg/m  as calculated from the following:    Height as of this encounter: 1.575 " "m (5' 2\").    Weight as of this encounter: 74.4 kg (164 lb).  Physical Exam  GENERAL: healthy, alert and no distress  NECK: no adenopathy, no asymmetry, masses, or scars and thyroid normal to palpation  RESP: lungs clear to auscultation - no rales, rhonchi or wheezes  CV: regular rate and rhythm, normal S1 S2, no S3 or S4, no murmur, click or rub, no peripheral edema and peripheral pulses strong  ABDOMEN: soft, nontender, no hepatosplenomegaly, no masses and bowel sounds normal  MS: no gross musculoskeletal defects noted, no edema    Diagnostic Test Results:  Labs reviewed in Epic  Results for orders placed or performed in visit on 11/04/20 (from the past 24 hour(s))   CBC with platelets   Result Value Ref Range    WBC 4.6 4.0 - 11.0 10e9/L    RBC Count 3.47 (L) 3.8 - 5.2 10e12/L    Hemoglobin 11.1 (L) 11.7 - 15.7 g/dL    Hematocrit 35.5 35.0 - 47.0 %     (H) 78 - 100 fl    MCH 32.0 26.5 - 33.0 pg    MCHC 31.3 (L) 31.5 - 36.5 g/dL    RDW 13.1 10.0 - 15.0 %    Platelet Count 206 150 - 450 10e9/L       ASSESSMENT / PLAN:   1. Encounter for Medicare annual wellness exam       2. Chronic obstructive pulmonary disease, unspecified COPD type (H)   start spiriva.  Has had PFTs and pulmonology evaluation. CT scan at outside hospital did show emphysema  - tiotropium (SPIRIVA) 18 MCG inhaled capsule; Inhale 1 capsule (18 mcg) into the lungs daily  Dispense: 30 capsule; Refill: 11    3. Essential hypertension with goal blood pressure less than 140/90  Well controlled  - Comprehensive metabolic panel    4. Hypothyroidism, unspecified type  Due for TSH  - TSH with free T4 reflex    5. Chronic bilateral low back pain without sciatica  Chronic pain, wants to see another spine specialist  - oxyCODONE-acetaminophen (PERCOCET)  MG per tablet; TAKE TWO TABLETS BY MOUTH EVERY 6 HOURS AS NEEDED FOR MODERATE TO SEVERE PAIN ( DO NOT TAKE MORE THAN 4 TABLETS PER DAY)  Dispense: 120 tablet; Refill: 0    6. Sacroiliac joint " "pain     - oxyCODONE-acetaminophen (PERCOCET)  MG per tablet; TAKE TWO TABLETS BY MOUTH EVERY 6 HOURS AS NEEDED FOR MODERATE TO SEVERE PAIN ( DO NOT TAKE MORE THAN 4 TABLETS PER DAY)  Dispense: 120 tablet; Refill: 0    7. S/P lumbar spinal fusion     - oxyCODONE-acetaminophen (PERCOCET)  MG per tablet; TAKE TWO TABLETS BY MOUTH EVERY 6 HOURS AS NEEDED FOR MODERATE TO SEVERE PAIN ( DO NOT TAKE MORE THAN 4 TABLETS PER DAY)  Dispense: 120 tablet; Refill: 0    8. Easy bruising     - CBC with platelets    9. Daytime sleepiness   has been referred multiple times, hasn't followed through with apt.    She would like to see sleep at this time to work on her insomnia/daytime sleepiness.   - SLEEP EVALUATION & MANAGEMENT REFERRAL - [facility]  [phone] (Age 15 and up); Future    10. Insomnia, unspecified type     - SLEEP EVALUATION & MANAGEMENT REFERRAL - [facility]  [phone] (Age 15 and up); Future    11. Night sweats   r/o malignancy  utd on mammogram/colonosocpy  - CT Chest Abdomen Pelvis w/o & w Contrast; Future    12. CARDIOVASCULAR SCREENING; LDL GOAL LESS THAN 160     - Lipid panel reflex to direct LDL Fasting  - Comprehensive metabolic panel    Patient has been advised of split billing requirements and indicates understanding: Yes  COUNSELING:  Reviewed preventive health counseling, as reflected in patient instructions       Regular exercise       Healthy diet/nutrition    Estimated body mass index is 30 kg/m  as calculated from the following:    Height as of this encounter: 1.575 m (5' 2\").    Weight as of this encounter: 74.4 kg (164 lb).        She reports that she quit smoking about 16 years ago. She has never used smokeless tobacco.      Appropriate preventive services were discussed with this patient, including applicable screening as appropriate for cardiovascular disease, diabetes, osteopenia/osteoporosis, and glaucoma.  " As appropriate for age/gender, discussed screening for colorectal cancer, prostate cancer, breast cancer, and cervical cancer. Checklist reviewing preventive services available has been given to the patient.    Reviewed patients plan of care and provided an AVS. The Basic Care Plan (routine screening as documented in Health Maintenance) for Jessica meets the Care Plan requirement. This Care Plan has been established and reviewed with the Patient.    Counseling Resources:  ATP IV Guidelines  Pooled Cohorts Equation Calculator  Breast Cancer Risk Calculator  Breast Cancer: Medication to Reduce Risk  FRAX Risk Assessment  ICSI Preventive Guidelines  Dietary Guidelines for Americans, 2010  USDA's MyPlate  ASA Prophylaxis  Lung CA Screening    Sarah Barriga MD  [facility]    Identified Health Risks:

## 2020-11-05 LAB — VIT B12 SERPL-MCNC: 262 PG/ML (ref 193–986)

## 2020-11-05 RX ORDER — ATORVASTATIN CALCIUM 20 MG/1
20 TABLET, FILM COATED ORAL DAILY
Qty: 90 TABLET | Refills: 3 | Status: SHIPPED | OUTPATIENT
Start: 2020-11-05 | End: 2021-12-06

## 2020-11-05 NOTE — RESULT ENCOUNTER NOTE
Anemia is stable.  Red blood cell size is enlarged, so I'm checking a B12 level.  This is mild at this time, we'll otherwise monitor - recheck in 6 months.     Thyroid is in normal range.     The American Heart Association and American College of Cardiology recommends statins for anyone who's 10 year risk exceeds 7.5%, your risk is 935%, therefore a statin (cholesterol lowering drug) IS recommended.  I am going to send a prescription to the pharmacy for atorvastatin 20 mg daily.      Kidney and liver function are normal.     Sarah Barriga M.D.        The 10-year ASCVD risk score (Christy GUZMAN Jr., et al., 2013) is: 9.5%    Values used to calculate the score:      Age: 70 years      Sex: Female      Is Non- : No      Diabetic: No      Tobacco smoker: No      Systolic Blood Pressure: 128 mmHg      Is BP treated: Yes      HDL Cholesterol: 100 mg/dL      Total Cholesterol: 253 mg/dL

## 2020-11-09 ENCOUNTER — HOSPITAL ENCOUNTER (OUTPATIENT)
Dept: CT IMAGING | Facility: CLINIC | Age: 70
Discharge: HOME | End: 2020-11-09
Attending: FAMILY MEDICINE | Admitting: FAMILY MEDICINE
Payer: COMMERCIAL

## 2020-11-09 ENCOUNTER — TELEPHONE (OUTPATIENT)
Dept: FAMILY MEDICINE | Facility: CLINIC | Age: 70
End: 2020-11-09

## 2020-11-09 DIAGNOSIS — R61 NIGHT SWEATS: ICD-10-CM

## 2020-11-09 DIAGNOSIS — H57.12 ORBITAL PAIN, LEFT: ICD-10-CM
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
## 2020-11-09 PROCEDURE — 250N000011 HC RX IP 250 OP 636: Performed by: RADIOLOGY

## 2020-11-09 PROCEDURE — 250N000009 HC RX 250: Performed by: RADIOLOGY

## 2020-11-09 PROCEDURE — 71260 CT THORAX DX C+: CPT

## 2020-11-09 RX ORDER — IOPAMIDOL 755 MG/ML
80 INJECTION, SOLUTION INTRAVASCULAR ONCE
Status: COMPLETED | OUTPATIENT
Start: 2020-11-09 | End: 2020-11-09

## 2020-11-09 RX ADMIN — IOPAMIDOL 80 ML: 755 INJECTION, SOLUTION INTRAVENOUS at 08:25

## 2020-11-09 RX ADMIN — SODIUM CHLORIDE 60 ML: 9 INJECTION, SOLUTION INTRAVENOUS at 08:25

## 2020-11-09 NOTE — TELEPHONE ENCOUNTER
Reason for Call: Request for an order or referral:    Order or referral being requested: CT of orbit    Date needed: as soon as possible    Has the patient been seen by the PCP for this problem? YES    Additional comments: Ct is calling asking about an order for a CT of the orbit. Pt there having CT done. Pt states this should have been ordered with the chest/abd CT. Pt previously hit her head and stated she talk to PCP while in her last OV.     Phone number Patient can be reached at:  Home number on file [phone] (home)    Best Time:  any    Can we leave a detailed message on this number?  YES    Call taken on 11/9/2020 at 8:17 AM by Ida Clark

## 2020-11-09 NOTE — RESULT ENCOUNTER NOTE
CT scan shows no specific reasons for weight loss.  No solid masses that are concerning.   Lab work normal recently.    If you have any urinary tract infection symptoms, please reach out to my care team to get a urine done.    Sarah Barriga M.D.

## 2020-11-09 NOTE — TELEPHONE ENCOUNTER
We had a very busy last visit.  I don't actually recall recommending a CT of the orbit. I would need more information about this.    Sarah Barriga M.D.

## 2020-11-13 NOTE — TELEPHONE ENCOUNTER
Patient states that she fell at the end of driveway when her walker brakes malfunctioned.  This happened 2-3 months ago. She hit the left side of her head/face on a pillar at the end of her driveway.  She can't lay on it because it is painful.  It does feel and appears bruised from time to time.  She does not report confusion, dizziness or headaches.  Was not examined after the fall, did not lose consciousness.  She is wondering if she can get an order for CT of orbit?    Melissa wSenson RN

## 2020-11-17 ENCOUNTER — HOSPITAL ENCOUNTER (OUTPATIENT)
Dept: CT IMAGING | Facility: CLINIC | Age: 70
Discharge: HOME | End: 2020-11-17
Attending: FAMILY MEDICINE | Admitting: FAMILY MEDICINE
Payer: COMMERCIAL

## 2020-11-17 DIAGNOSIS — H57.12 ORBITAL PAIN, LEFT: ICD-10-CM
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
## 2020-11-17 PROCEDURE — 70486 CT MAXILLOFACIAL W/O DYE: CPT

## 2020-12-03 ENCOUNTER — HOSPITAL ENCOUNTER (EMERGENCY)
Facility: CLINIC | Age: 70
Discharge: HOME | End: 2020-12-03
Attending: FAMILY MEDICINE | Admitting: FAMILY MEDICINE
Payer: COMMERCIAL

## 2020-12-03 ENCOUNTER — TELEPHONE (OUTPATIENT)
Dept: FAMILY MEDICINE | Facility: CLINIC | Age: 70
End: 2020-12-03

## 2020-12-03 VITALS
DIASTOLIC BLOOD PRESSURE: 82 MMHG | OXYGEN SATURATION: 91 % | WEIGHT: 160 LBS | TEMPERATURE: 98.3 F | HEART RATE: 82 BPM | RESPIRATION RATE: 14 BRPM | SYSTOLIC BLOOD PRESSURE: 124 MMHG | BODY MASS INDEX: 29.26 KG/M2

## 2020-12-03 DIAGNOSIS — R13.10 DYSPHAGIA, UNSPECIFIED TYPE: ICD-10-CM

## 2020-12-03 LAB
ALBUMIN SERPL-MCNC: 3.6 G/DL (ref 3.4–5)
ALP SERPL-CCNC: 103 U/L (ref 40–150)
ALT SERPL W P-5'-P-CCNC: 24 U/L (ref 0–50)
ANION GAP SERPL CALCULATED.3IONS-SCNC: 9 MMOL/L (ref 3–14)
AST SERPL W P-5'-P-CCNC: 20 U/L (ref 0–45)
BASOPHILS # BLD AUTO: 0.1 10E9/L (ref 0–0.2)
BASOPHILS NFR BLD AUTO: 0.9 %
BILIRUB SERPL-MCNC: 0.2 MG/DL (ref 0.2–1.3)
BUN SERPL-MCNC: 24 MG/DL (ref 7–30)
CALCIUM SERPL-MCNC: 9.1 MG/DL (ref 8.5–10.1)
CHLORIDE SERPL-SCNC: 109 MMOL/L (ref 94–109)
CO2 SERPL-SCNC: 24 MMOL/L (ref 20–32)
CREAT SERPL-MCNC: 1.11 MG/DL (ref 0.52–1.04)
DEPRECATED S PYO AG THROAT QL EIA: NEGATIVE
DIFFERENTIAL METHOD BLD: ABNORMAL
EOSINOPHIL # BLD AUTO: 0.2 10E9/L (ref 0–0.7)
EOSINOPHIL NFR BLD AUTO: 4.3 %
ERYTHROCYTE [DISTWIDTH] IN BLOOD BY AUTOMATED COUNT: 13.7 % (ref 10–15)
GFR SERPL CREATININE-BSD FRML MDRD: 51 ML/MIN/{1.73_M2}
GLUCOSE SERPL-MCNC: 96 MG/DL (ref 70–99)
HCT VFR BLD AUTO: 34.6 % (ref 35–47)
HGB BLD-MCNC: 11.2 G/DL (ref 11.7–15.7)
IMM GRANULOCYTES # BLD: 0 10E9/L (ref 0–0.4)
IMM GRANULOCYTES NFR BLD: 0.2 %
LYMPHOCYTES # BLD AUTO: 1 10E9/L (ref 0.8–5.3)
LYMPHOCYTES NFR BLD AUTO: 18.7 %
MCH RBC QN AUTO: 33.1 PG (ref 26.5–33)
MCHC RBC AUTO-ENTMCNC: 32.4 G/DL (ref 31.5–36.5)
MCV RBC AUTO: 102 FL (ref 78–100)
MONOCYTES # BLD AUTO: 0.6 10E9/L (ref 0–1.3)
MONOCYTES NFR BLD AUTO: 10.3 %
NEUTROPHILS # BLD AUTO: 3.6 10E9/L (ref 1.6–8.3)
NEUTROPHILS NFR BLD AUTO: 65.6 %
NRBC # BLD AUTO: 0 10*3/UL
NRBC BLD AUTO-RTO: 0 /100
PLATELET # BLD AUTO: 174 10E9/L (ref 150–450)
POTASSIUM SERPL-SCNC: 4.1 MMOL/L (ref 3.4–5.3)
PROT SERPL-MCNC: 7 G/DL (ref 6.8–8.8)
RBC # BLD AUTO: 3.38 10E12/L (ref 3.8–5.2)
SODIUM SERPL-SCNC: 142 MMOL/L (ref 133–144)
SPECIMEN SOURCE: NORMAL
SPECIMEN SOURCE: NORMAL
STREP GROUP A PCR: NOT DETECTED
TROPONIN I SERPL-MCNC: <0.015 UG/L (ref 0–0.04)
WBC # BLD AUTO: 5.5 10E9/L (ref 4–11)

## 2020-12-03 PROCEDURE — 80053 COMPREHEN METABOLIC PANEL: CPT | Performed by: FAMILY MEDICINE

## 2020-12-03 PROCEDURE — 96365 THER/PROPH/DIAG IV INF INIT: CPT | Performed by: FAMILY MEDICINE

## 2020-12-03 PROCEDURE — 96361 HYDRATE IV INFUSION ADD-ON: CPT | Performed by: FAMILY MEDICINE

## 2020-12-03 PROCEDURE — 250N000011 HC RX IP 250 OP 636: Performed by: FAMILY MEDICINE

## 2020-12-03 PROCEDURE — C9803 HOPD COVID-19 SPEC COLLECT: HCPCS | Performed by: FAMILY MEDICINE

## 2020-12-03 PROCEDURE — 84484 ASSAY OF TROPONIN QUANT: CPT | Performed by: FAMILY MEDICINE

## 2020-12-03 PROCEDURE — 96375 TX/PRO/DX INJ NEW DRUG ADDON: CPT | Performed by: FAMILY MEDICINE

## 2020-12-03 PROCEDURE — 85025 COMPLETE CBC W/AUTO DIFF WBC: CPT | Performed by: FAMILY MEDICINE

## 2020-12-03 PROCEDURE — U0003 INFECTIOUS AGENT DETECTION BY NUCLEIC ACID (DNA OR RNA); SEVERE ACUTE RESPIRATORY SYNDROME CORONAVIRUS 2 (SARS-COV-2) (CORONAVIRUS DISEASE [COVID-19]), AMPLIFIED PROBE TECHNIQUE, MAKING USE OF HIGH THROUGHPUT TECHNOLOGIES AS DESCRIBED BY CMS-2020-01-R: HCPCS | Performed by: FAMILY MEDICINE

## 2020-12-03 PROCEDURE — 258N000003 HC RX IP 258 OP 636: Performed by: FAMILY MEDICINE

## 2020-12-03 PROCEDURE — 93005 ELECTROCARDIOGRAM TRACING: CPT | Performed by: FAMILY MEDICINE

## 2020-12-03 PROCEDURE — 87651 STREP A DNA AMP PROBE: CPT | Performed by: FAMILY MEDICINE

## 2020-12-03 PROCEDURE — 99284 EMERGENCY DEPT VISIT MOD MDM: CPT | Mod: 25 | Performed by: FAMILY MEDICINE

## 2020-12-03 PROCEDURE — 999N001174 HC STATISTIC STREP A RAPID: Performed by: FAMILY MEDICINE

## 2020-12-03 PROCEDURE — 99284 EMERGENCY DEPT VISIT MOD MDM: CPT | Performed by: FAMILY MEDICINE

## 2020-12-03 RX ORDER — DIPHENHYDRAMINE HYDROCHLORIDE 50 MG/ML
25 INJECTION INTRAMUSCULAR; INTRAVENOUS ONCE
Status: COMPLETED | OUTPATIENT
Start: 2020-12-03 | End: 2020-12-03

## 2020-12-03 RX ORDER — METHYLPREDNISOLONE SODIUM SUCCINATE 125 MG/2ML
125 INJECTION, POWDER, LYOPHILIZED, FOR SOLUTION INTRAMUSCULAR; INTRAVENOUS ONCE
Status: COMPLETED | OUTPATIENT
Start: 2020-12-03 | End: 2020-12-03

## 2020-12-03 RX ORDER — SODIUM CHLORIDE, SODIUM LACTATE, POTASSIUM CHLORIDE, CALCIUM CHLORIDE 600; 310; 30; 20 MG/100ML; MG/100ML; MG/100ML; MG/100ML
INJECTION, SOLUTION INTRAVENOUS CONTINUOUS
Status: DISCONTINUED | OUTPATIENT
Start: 2020-12-03 | End: 2020-12-03 | Stop reason: HOSPADM

## 2020-12-03 RX ORDER — PREDNISONE 20 MG/1
TABLET ORAL
Qty: 10 TABLET | Refills: 0 | Status: SHIPPED | OUTPATIENT
Start: 2020-12-03 | End: 2020-12-22

## 2020-12-03 RX ADMIN — FAMOTIDINE 20 MG: 20 INJECTION, SOLUTION INTRAVENOUS at 16:24

## 2020-12-03 RX ADMIN — METHYLPREDNISOLONE SODIUM SUCCINATE 125 MG: 125 INJECTION, POWDER, FOR SOLUTION INTRAMUSCULAR; INTRAVENOUS at 15:48

## 2020-12-03 RX ADMIN — DIPHENHYDRAMINE HYDROCHLORIDE 25 MG: 50 INJECTION, SOLUTION INTRAMUSCULAR; INTRAVENOUS at 15:52

## 2020-12-03 RX ADMIN — SODIUM CHLORIDE, POTASSIUM CHLORIDE, SODIUM LACTATE AND CALCIUM CHLORIDE 1000 ML: 600; 310; 30; 20 INJECTION, SOLUTION INTRAVENOUS at 15:48

## 2020-12-03 NOTE — TELEPHONE ENCOUNTER
Dr. Barriga, patient says she can only swallow her secretions 50 % of the time as it is painful, denies reflux problems.    Patient is advised ER at [place], patient agrees with the plan.    TWILA Liu

## 2020-12-03 NOTE — ED PROVIDER NOTES
History     Chief Complaint   Patient presents with     Oral Swelling     Throat swelling and taking lisinopril     HPI  Jessica Ellison is a 70 year old female, past medical history is significant for COPD, hypothyroidism, chronic bilateral low back pain, hypertension, depression, anxiety, alcohol abuse, insomnia, presents to the emergency department concerns of throat swelling.  History is obtained from the patient who states that she began with a sensation of swelling, some sore throat and difficulty swallowing 9 days prior to presentation.  Sore throat with increased difficulty swallowing localized around the level of thyroid cartilage by the patient's description.  She has not experienced any swelling of her lips tongue or in the back of her throat by her account.  She is able to drink clear fluids and finds apple juice to be soothing for this.  She has had no new medications or changes in medications recently and historically reports that she has been on and off an ACE inhibitor for many years consistently on it for about the last 9 months.  She reports good blood pressure control in clinic.  She states compliance with all of her medications.  She notes no chest pain or shortness of air above baseline; she notes that she is required the use of inhaled pulmonary medications with greater frequency over the last couple of months but not specifically over the last 1 week.  She identified no fever chills or sweats and again as needed no cough above or different from baseline.  He has no known Covid contacts or concern at this time.  She called her primary care provider office today was directed to the emergency department as she stated that she was having difficulties clearing secretions.  Her primary MD is Dr. Sarah Barriga.  She was tested for Covid last week and was negative.          Allergies:  Allergies   Allergen Reactions     Bee Venom        Problem List:    Patient Active Problem List    Diagnosis Date  Noted     Chronic obstructive pulmonary disease, unspecified COPD type (H) 11/04/2020     Priority: Medium     Blood pressure check 01/27/2020     Priority: Medium     Spell of change in speech 04/17/2018     Priority: Medium     Hypothyroidism 09/07/2016     Priority: Medium     Chronic bilateral low back pain without sciatica 06/02/2016     Priority: Medium     Essential hypertension with goal blood pressure less than 140/90 06/02/2016     Priority: Medium     S/P lumbar spinal fusion 10/19/2015     Priority: Medium     Bee allergy status 06/30/2015     Priority: Medium     Cerebral aneurysm, nonruptured 07/24/2014     Priority: Medium     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 Marti       Major depressive disorder, recurrent episode, moderate (H) 07/10/2013     Priority: Medium     Advanced directives, counseling/discussion 09/05/2012     Priority: Medium     Patient does not have an Advance/Health Care Directive (HCD), has information at home.     Sonja Krishnamurthy  September 5, 2012         Anxiety 06/20/2012     Priority: Medium     CARDIOVASCULAR SCREENING; LDL GOAL LESS THAN 160 10/31/2010     Priority: Medium     ETOH abuse 03/19/2010     Priority: Medium      katie drinking, DUIs and probation violation       Insomnia 12/21/2009     Priority: Medium     Back pain 12/21/2009     Priority: Medium     Patient is followed by DOMINIQUE COPPOLA for ongoing prescription of narcotic pain medicine.  Med: oxycodone/acetaminophen 10/325 for chronic back pain.   Maximum use per month: 120  Expected duration: unknown  Narcotic agreement on file: YES 3/19/2010  Clinic visit recommended: Q 3 months    April 2013 - rhizotomy for SI (left) Jhon Mckay MD    Follows at [facility] - severe low back pain with work related injuries.  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  Spinal stenosis at most lumbar  levels  Improvement with past epidural steroid injections  SI joint dysfunction with the pain          Past Medical History:    Past Medical History:   Diagnosis Date     Anemia      Aneurysm (H)      Chemical dependency (H)      Depression      History of blood transfusion      Hypertension      Menopausal symptoms      Other chronic pain      Sleep disorder      Thyroid disease      Tobacco abuse        Past Surgical History:    Past Surgical History:   Procedure Laterality Date     APPENDECTOMY  1960     C PART REMOVAL COLON W ANASTOMOSIS  2005    diverticulitis     C SPINE FUSION,ANTER,8+ SGMTS      Anterior posterior fusion 10 levels, hardware removal and re-fusion      cerebral aneurysm  2014    coiled     COLONOSCOPY  2005      EXCISION BREAST LESION, OPEN >=1      core biopsy , lumpectomy      LAP VENTRAL HERNIA REPAIR  2017    [place]     LAPAROSCOPIC ASSISTED HYSTERECTOMY VAGINAL  2017     orif left femoral neck Left 6/3/2016    stress fracture.  done at [facility]     SURGICAL HISTORY OF -       Skin Graft       Family History:    Family History   Problem Relation Age of Onset     Cancer Mother         breast/lung     Alcohol/Drug Mother      Depression Mother      Cancer - colorectal Father      Alcohol/Drug Father      Diabetes Maternal Grandmother      Diabetes Maternal Grandfather      Diabetes Paternal Grandmother      Diabetes Paternal Grandfather      Cancer Paternal Grandfather      Gastrointestinal Disease Paternal Grandfather      Congenital Anomalies Son      Hypertension Brother      Adrenal Disorder Brother         had adrenalectomies     Cerebral palsy Granddaughter        Social History:  Marital Status:   [2]  Social History     Tobacco Use     Smoking status: Former Smoker     Packs/day: 1.00     Years: 30.00     Pack years: 30.00     Quit date: 2004     Years since quittin.9     Smokeless tobacco: Never Used   Substance Use Topics      Alcohol use: No     Comment: ETOH dependency, DUI 3/15/10     Drug use: No        Medications:         predniSONE (DELTASONE) 20 MG tablet       amLODIPine (NORVASC) 5 MG tablet       aspirin 325 MG tablet       atorvastatin (LIPITOR) 20 MG tablet       calcium carbonate-vitamin D (CALTRATE 600+D) 600-400 MG-UNIT CHEW       cyclobenzaprine (FLEXERIL) 10 MG tablet       diazepam (VALIUM) 10 MG tablet       EPINEPHrine (EPIPEN) 0.3 MG/0.3ML injection       levothyroxine (LEVOTHROID) 50 MCG tablet       lisinopril (ZESTRIL) 20 MG tablet       metoprolol succinate ER (TOPROL-XL) 50 MG 24 hr tablet       naloxone (NARCAN) 4 MG/0.1ML nasal spray       oxyCODONE-acetaminophen (PERCOCET)  MG per tablet       QUEtiapine (SEROQUEL) 25 MG tablet       tiotropium (SPIRIVA) 18 MCG inhaled capsule       traZODone (DESYREL) 150 MG tablet       venlafaxine (EFFEXOR-XR) 150 MG 24 hr capsule       venlafaxine (EFFEXOR-XR) 75 MG 24 hr capsule       VENTOLIN  (90 Base) MCG/ACT inhaler          Review of Systems   All other systems reviewed and are negative.      Physical Exam   BP: 99/64  Pulse: 81  Temp: 98.3  F (36.8  C)  Resp: 16  Weight: 72.6 kg (160 lb)  SpO2: 91 %      Physical Exam  Vitals signs and nursing note reviewed.   Constitutional:       Appearance: Normal appearance.      Comments: Chronically ill, somewhat cachectic in appearance   HENT:      Head: Normocephalic and atraumatic.      Right Ear: Tympanic membrane normal.      Left Ear: Tympanic membrane normal.      Nose: Nose normal.      Mouth/Throat:      Mouth: Mucous membranes are dry.      Pharynx: Oropharynx is clear.      Comments: The oropharynx is clear without evidence of swelling of the lips or tongue soft palate or uvula.  There is no stridor and the patient is able to clear secretions with the bed angled at 45 degrees.  Eyes:      Extraocular Movements: Extraocular movements intact.      Pupils: Pupils are equal, round, and reactive to light.    Neck:      Musculoskeletal: Normal range of motion and neck supple.   Cardiovascular:      Rate and Rhythm: Normal rate and regular rhythm.      Pulses: Normal pulses.      Heart sounds: Normal heart sounds.   Pulmonary:      Effort: Pulmonary effort is normal.      Breath sounds: Normal breath sounds.   Abdominal:      General: Bowel sounds are normal.      Palpations: Abdomen is soft.   Musculoskeletal: Normal range of motion.   Skin:     General: Skin is warm and dry.      Capillary Refill: Capillary refill takes less than 2 seconds.   Neurological:      General: No focal deficit present.      Mental Status: She is alert and oriented to person, place, and time.   Psychiatric:         Mood and Affect: Mood normal.         Behavior: Behavior normal.         ED Course        Procedures               EKG Interpretation:      Interpreted by Shawn Barrera MD  Time reviewed:        Critical Care time:  none               Results for orders placed or performed during the hospital encounter of 12/03/20 (from the past 24 hour(s))   CBC with platelets differential   Result Value Ref Range    WBC 5.5 4.0 - 11.0 10e9/L    RBC Count 3.38 (L) 3.8 - 5.2 10e12/L    Hemoglobin 11.2 (L) 11.7 - 15.7 g/dL    Hematocrit 34.6 (L) 35.0 - 47.0 %     (H) 78 - 100 fl    MCH 33.1 (H) 26.5 - 33.0 pg    MCHC 32.4 31.5 - 36.5 g/dL    RDW 13.7 10.0 - 15.0 %    Platelet Count 174 150 - 450 10e9/L    Diff Method Automated Method     % Neutrophils 65.6 %    % Lymphocytes 18.7 %    % Monocytes 10.3 %    % Eosinophils 4.3 %    % Basophils 0.9 %    % Immature Granulocytes 0.2 %    Nucleated RBCs 0 0 /100    Absolute Neutrophil 3.6 1.6 - 8.3 10e9/L    Absolute Lymphocytes 1.0 0.8 - 5.3 10e9/L    Absolute Monocytes 0.6 0.0 - 1.3 10e9/L    Absolute Eosinophils 0.2 0.0 - 0.7 10e9/L    Absolute Basophils 0.1 0.0 - 0.2 10e9/L    Abs Immature Granulocytes 0.0 0 - 0.4 10e9/L    Absolute Nucleated RBC 0.0    Comprehensive metabolic panel    Result Value Ref Range    Sodium 142 133 - 144 mmol/L    Potassium 4.1 3.4 - 5.3 mmol/L    Chloride 109 94 - 109 mmol/L    Carbon Dioxide 24 20 - 32 mmol/L    Anion Gap 9 3 - 14 mmol/L    Glucose 96 70 - 99 mg/dL    Urea Nitrogen 24 7 - 30 mg/dL    Creatinine 1.11 (H) 0.52 - 1.04 mg/dL    GFR Estimate 51 (L) >60 mL/min/[1.73_m2]    GFR Estimate If Black 59 (L) >60 mL/min/[1.73_m2]    Calcium 9.1 8.5 - 10.1 mg/dL    Bilirubin Total 0.2 0.2 - 1.3 mg/dL    Albumin 3.6 3.4 - 5.0 g/dL    Protein Total 7.0 6.8 - 8.8 g/dL    Alkaline Phosphatase 103 40 - 150 U/L    ALT 24 0 - 50 U/L    AST 20 0 - 45 U/L   Troponin I   Result Value Ref Range    Troponin I ES <0.015 0.000 - 0.045 ug/L   Streptococcus A Rapid Scr w Reflx to PCR    Specimen: Throat   Result Value Ref Range    Strep Specimen Description Throat     Streptococcus Group A Rapid Screen Negative NEG^Negative       Medications   lactated ringers BOLUS 1,000 mL (0 mLs Intravenous Stopped 12/3/20 1807)     Followed by   lactated ringers infusion (has no administration in time range)   methylPREDNISolone sodium succinate (solu-MEDROL) injection 125 mg (125 mg Intravenous Given 12/3/20 1548)   diphenhydrAMINE (BENADRYL) injection 25 mg (25 mg Intravenous Given 12/3/20 1552)   famotidine (PEPCID) infusion 20 mg (0 mg Intravenous Stopped 12/3/20 1742)     6:30 PM    Assessments & Plan (with Medical Decision Making)     I have reviewed the nursing notes.    I have reviewed the findings, diagnosis, plan and need for follow up with the patient.       New Prescriptions    PREDNISONE (DELTASONE) 20 MG TABLET    Take two tablets (= 40mg) each day for 5 (five) days       Final diagnoses:   Dysphagia, unspecified type - Possibly ACE inhibitor related.       12/3/2020   [facility] EMERGENCY DEPT     Shawn Barrera MD  12/04/20 2461

## 2020-12-03 NOTE — ED AVS SNAPSHOT
[facility]  [address] [facility] [address]  Phone: [phone]  Fax: [phone]                                    Jessica Ellison   MRN: [id number]    Department: [facility]   Date of Visit: 12/3/2020           After Visit Summary Signature Page    I have received my discharge instructions, and my questions have been answered. I have discussed any challenges I see with this plan with the nurse or doctor.    ..........................................................................................................................................  Patient/Patient Representative Signature      ..........................................................................................................................................  Patient Representative Print Name and Relationship to Patient    ..................................................               ................................................  Date                                   Time    ..........................................................................................................................................  Reviewed by Signature/Title    ...................................................              ..............................................  Date                                               Time          22EPIC Rev 08/18

## 2020-12-03 NOTE — TELEPHONE ENCOUNTER
Reason for call:  Patient reporting a symptom    Symptom or request: Pt has been having difficulty swallowing for several days and it is getting worse.  Transferred call to Clinic RN as high priority    Duration (how long have symptoms been present): days    Have you been treated for this before? No    Additional comments:     Phone Number patient can be reached at:  Home number on file [phone] (home)    Best Time:  any    Can we leave a detailed message on this number:  YES    Call taken on 12/3/2020 at 11:11 AM by Cait Trevino

## 2020-12-03 NOTE — TELEPHONE ENCOUNTER
Dr. Barriga,    2nd triage.  This patient was not very forth coming with her symptoms.     S-(situation): swallowing difficulties    B-(background): was seen for medicare wellness exam on 11/4 and states she mentioned this then but it is worse now. Has had this for a few years but is worse now.  Is able to swallow water but it hurts.  Denies a sore throat.  Denies FB in throat.  Can complete sentences.  At first she states she does not have SOA and then she has some.  Patient states she has to position her head a certain way to be able to have food go down.  Has no cp.  Has never had a EGD.  Hx of ETOH.       A-(assessment): swallowing difficulties    R-(recommendations): Varices?, FB? No difficulty with breathing and is spitting out her spit as it hurts to swallow.  I have advised ER but she is wanting Dr. Barriga's opinion.  Please advise. Raquel SALINAS RN

## 2020-12-03 NOTE — TELEPHONE ENCOUNTER
Unfortunately I did not document about this when I saw her last month.      If this has been going on a month, it may be reasonable to get a video swallow evaluation to look at swallowing mechanism (dysphagia evaluation).      Does she have reflux symptoms?    If she's truly unable to handle her own secretions, then she needs to be seen more urgently in the ER.    Sarah Barriga M.D.

## 2020-12-04 ENCOUNTER — TELEPHONE (OUTPATIENT)
Dept: FAMILY MEDICINE | Facility: CLINIC | Age: 70
End: 2020-12-04

## 2020-12-04 ENCOUNTER — PATIENT OUTREACH (OUTPATIENT)
Dept: CARE COORDINATION | Facility: CLINIC | Age: 70
End: 2020-12-04

## 2020-12-04 DIAGNOSIS — I10 ESSENTIAL HYPERTENSION WITH GOAL BLOOD PRESSURE LESS THAN 140/90: ICD-10-CM

## 2020-12-04 DIAGNOSIS — Z71.89 OTHER SPECIFIED COUNSELING: ICD-10-CM

## 2020-12-04 LAB
SARS-COV-2 RNA SPEC QL NAA+PROBE: NOT DETECTED
SPECIMEN SOURCE: NORMAL

## 2020-12-04 NOTE — PROGRESS NOTES
Clinic Care Coordination Contact  [facility]/Voicemail    CHW Outreach attempted x 1.  Left message on patient's voicemail with call back information and requested return call.  Plan: CHW will try to reach patient again in 1-2 business days.    Gina ALLEN Community Health Worker  [facility] Clinic Care Coordination  [facility]  Phone: [phone]

## 2020-12-04 NOTE — RESULT ENCOUNTER NOTE
Group A Streptococcus PCR is NEGATIVE  No treatment or change in treatment [facility] ED lab result protocol - Strep protocol.

## 2020-12-04 NOTE — TELEPHONE ENCOUNTER
Patient was told to follow up with PCP on new blood pressure medication.  She had an allergic reaction to her ACE inhibitor.  Was told to stop lisinopril immediately (found this on her AVS from ED 12/3/2020).     She is still taking the amlodipine and the metoprolol. She is wondering if she needs to increase her other meds?    Melissa Swenson RN

## 2020-12-04 NOTE — TELEPHONE ENCOUNTER
Reason for call:  Patient reporting a symptom    Symptom or request: Pt states that she was in ER yesterday and she has been waiting all day for a call from Dr. Barriga's RN regarding BP meds.  Please call patient and advise.      Duration (how long have symptoms been present): ongoing    Have you been treated for this before? Yes    Additional comments:     Phone Number patient can be reached at:  Home number on file [phone] (home)    Best Time:  any    Can we leave a detailed message on this number:  YES    Call taken on 12/4/2020 at 4:43 PM by Cait Trevino

## 2020-12-04 NOTE — LETTER
[place] CARE COORDINATION  [facility]  [address] [place] [address]  December 7, 2020    Jessica Ellison     [address]  [facility] [address]      Dear Jessica,    I am a clinic community health worker who works with Sarah Barriga MD at [facility]. I have been trying to reach you recently to introduce Clinic Care Coordination and to ask if there is anything our team could provide you with.  Below is a description of clinic care coordination and how we can further assist you.      The clinic care coordination team is made up of a registered nurse,  and community health worker who understand the health care system. The goal of clinic care coordination is to help you manage your health and improve access to the health care system in the most efficient manner. The team can assist you in meeting your health care goals by providing education, coordinating services, strengthening the communication among your providers and supporting you with any resource needs.    Please feel free to contact me with any questions or concerns. We are focused on providing you with the highest-quality healthcare experience possible and that all starts with you.     Sincerely,     Gina ALLEN, Community Health Worker  [facility] Clinic Care Coordination  [facility]  Phone: [phone]

## 2020-12-04 NOTE — DISCHARGE INSTRUCTIONS
Discontinue your lisinopril.  You should not take this medication or any medication in the ACE inhibitor class again.

## 2020-12-07 RX ORDER — AMLODIPINE BESYLATE 10 MG/1
10 TABLET ORAL DAILY
Qty: 30 TABLET | Refills: 0 | Status: SHIPPED | OUTPATIENT
Start: 2020-12-07 | End: 2020-12-22

## 2020-12-07 NOTE — TELEPHONE ENCOUNTER
Pt was in the ER on 12/3/20 for reaction to Lisinopril.  Pt told to stop Lisinopril @ that time.  Pt takes Amlodipine 5 mg 1 tab/day and Metoprolol 50 mg 1 tab bid.  B/P yesterday was 150/105, 149/92.  B/P today 138/99.  Med adjustment?  ER f/u appt?  Advise.  [email or web address]

## 2020-12-07 NOTE — TELEPHONE ENCOUNTER
Increase amlodipine to 10 mg daily, can send a 1 month supply.    Keep an eye on blood pressure and let us know what blood pressure is running in 1-2 weeks.    Sarah Barriga M.D.

## 2020-12-07 NOTE — TELEPHONE ENCOUNTER
Has she been checking her blood pressure at home?  This would be helpful information in determining if we need to adjust her medication?    Did the swallowing difficulty resolve?    Sarah Barriga M.D.

## 2020-12-07 NOTE — PROGRESS NOTES
Clinic Care Coordination Contact  [facility]/Voicemail    CHW Outreach attempted x 2.  Left message on patient's voicemail with call back information and requested return call.  Plan: CHW sent care coordination introduction letter on 12/7 via [employer]. CHW will do no further outreaches at this time.    Gina ALLEN Community Health Worker  [facility] Clinic Care Coordination  [facility]  Phone: [phone]

## 2020-12-21 ENCOUNTER — TELEPHONE (OUTPATIENT)
Dept: FAMILY MEDICINE | Facility: CLINIC | Age: 70
End: 2020-12-21

## 2020-12-21 DIAGNOSIS — I10 ESSENTIAL HYPERTENSION WITH GOAL BLOOD PRESSURE LESS THAN 140/90: ICD-10-CM

## 2020-12-21 RX ORDER — METOPROLOL SUCCINATE 50 MG/1
TABLET, EXTENDED RELEASE ORAL
Qty: 270 TABLET | Refills: 1 | Status: SHIPPED | OUTPATIENT
Start: 2020-12-21 | End: 2020-12-22

## 2020-12-21 NOTE — TELEPHONE ENCOUNTER
Increase metoprolol ER to 100 mg in the am (2 of the 50 mg tablets) and 1 in the evening.  No pharmacy selected.  Prescription is pending.    The shortness of breath needs to be evaluated in clinic.    Sarah Barriga M.D.

## 2020-12-21 NOTE — TELEPHONE ENCOUNTER
Update on B/P's :   Pt is on Amlodipine 10 mg (was increased from 5 mg 2 weeks ago).  Pt states the average is 130's/90's.  B/P today 118/92.  HR is 80-90 but has went up to 115 @ times.  Pt is also on Metoprolol 50 mg 2x/day.  Feels more SOB,is taking Spiriva 1 tab/day.  No noticeable edema.  Advise.  KPacarliRN

## 2020-12-21 NOTE — TELEPHONE ENCOUNTER
Reason for Call:  BP readings and SOA    Detailed comments: patient is calling and stating that last Friday she called in some BP readings, and someone was suppose to call back. No encounter in from last Friday. They have been running 140's/90's but her pulse is high at 115. Patient also states that her SOA is worsening, so much that she is woken up in the night by SOA. Please advise.    Phone Number Patient can be reached at: Cell number on file:    Telephone Information:   Mobile [phone]       Best Time: any    Can we leave a detailed message on this number? YES   Kamilah Unger  Clinic Station        Call taken on 12/21/2020 at 2:58 PM by Kamilah Shannon

## 2020-12-22 ENCOUNTER — OFFICE VISIT (OUTPATIENT)
Dept: FAMILY MEDICINE | Facility: CLINIC | Age: 70
End: 2020-12-22
Payer: COMMERCIAL

## 2020-12-22 ENCOUNTER — APPOINTMENT (OUTPATIENT)
Dept: CT IMAGING | Facility: CLINIC | Age: 70
DRG: 189 | End: 2020-12-22
Attending: EMERGENCY MEDICINE
Payer: COMMERCIAL

## 2020-12-22 ENCOUNTER — ANCILLARY PROCEDURE (OUTPATIENT)
Dept: GENERAL RADIOLOGY | Facility: CLINIC | Age: 70
End: 2020-12-22
Attending: NURSE PRACTITIONER
Payer: COMMERCIAL

## 2020-12-22 ENCOUNTER — HOSPITAL ENCOUNTER (INPATIENT)
Facility: CLINIC | Age: 70
LOS: 3 days | Discharge: HOME | DRG: 189 | End: 2020-12-25
Attending: EMERGENCY MEDICINE | Admitting: INTERNAL MEDICINE
Payer: COMMERCIAL

## 2020-12-22 VITALS
OXYGEN SATURATION: 86 % | DIASTOLIC BLOOD PRESSURE: 82 MMHG | TEMPERATURE: 100 F | RESPIRATION RATE: 18 BRPM | SYSTOLIC BLOOD PRESSURE: 120 MMHG | BODY MASS INDEX: 29.44 KG/M2 | HEIGHT: 62 IN | HEART RATE: 88 BPM | WEIGHT: 160 LBS

## 2020-12-22 DIAGNOSIS — R93.89 ABNORMAL CT OF THE CHEST: ICD-10-CM

## 2020-12-22 DIAGNOSIS — R09.02 HYPOXEMIA: ICD-10-CM

## 2020-12-22 DIAGNOSIS — R05.9 COUGH: ICD-10-CM

## 2020-12-22 DIAGNOSIS — J44.9 CHRONIC OBSTRUCTIVE PULMONARY DISEASE, UNSPECIFIED COPD TYPE (H): Primary | ICD-10-CM

## 2020-12-22 DIAGNOSIS — R06.02 SHORTNESS OF BREATH: Primary | ICD-10-CM

## 2020-12-22 DIAGNOSIS — R06.02 SHORTNESS OF BREATH: ICD-10-CM

## 2020-12-22 DIAGNOSIS — I10 ESSENTIAL HYPERTENSION WITH GOAL BLOOD PRESSURE LESS THAN 140/90: ICD-10-CM

## 2020-12-22 DIAGNOSIS — Z20.828 VIRAL DISEASE EXPOSURE: ICD-10-CM

## 2020-12-22 PROBLEM — Z20.822 SUSPECTED COVID-19 VIRUS INFECTION: Status: ACTIVE | Noted: 2020-12-22

## 2020-12-22 LAB
ANION GAP SERPL CALCULATED.3IONS-SCNC: 8 MMOL/L (ref 3–14)
BASE EXCESS BLDV CALC-SCNC: 1.4 MMOL/L
BASOPHILS # BLD AUTO: 0 10E9/L (ref 0–0.2)
BASOPHILS NFR BLD AUTO: 0.7 %
BUN SERPL-MCNC: 15 MG/DL (ref 7–30)
CALCIUM SERPL-MCNC: 9 MG/DL (ref 8.5–10.1)
CHLORIDE SERPL-SCNC: 105 MMOL/L (ref 94–109)
CO2 SERPL-SCNC: 26 MMOL/L (ref 20–32)
CREAT SERPL-MCNC: 0.76 MG/DL (ref 0.52–1.04)
D DIMER PPP FEU-MCNC: 0.9 UG/ML FEU (ref 0–0.5)
DIFFERENTIAL METHOD BLD: ABNORMAL
EOSINOPHIL # BLD AUTO: 0.3 10E9/L (ref 0–0.7)
EOSINOPHIL NFR BLD AUTO: 7.8 %
ERYTHROCYTE [DISTWIDTH] IN BLOOD BY AUTOMATED COUNT: 13.9 % (ref 10–15)
FLUABV+SARS-COV-2+RSV PNL RESP NAA+PROBE: NEGATIVE
FLUABV+SARS-COV-2+RSV PNL RESP NAA+PROBE: NEGATIVE
GFR SERPL CREATININE-BSD FRML MDRD: 80 ML/MIN/{1.73_M2}
GLUCOSE SERPL-MCNC: 93 MG/DL (ref 70–99)
HCO3 BLDV-SCNC: 27 MMOL/L (ref 21–28)
HCT VFR BLD AUTO: 34.3 % (ref 35–47)
HGB BLD-MCNC: 11 G/DL (ref 11.7–15.7)
IMM GRANULOCYTES # BLD: 0 10E9/L (ref 0–0.4)
IMM GRANULOCYTES NFR BLD: 0.5 %
LABORATORY COMMENT REPORT: NORMAL
LYMPHOCYTES # BLD AUTO: 0.6 10E9/L (ref 0.8–5.3)
LYMPHOCYTES NFR BLD AUTO: 13.4 %
MCH RBC QN AUTO: 33.1 PG (ref 26.5–33)
MCHC RBC AUTO-ENTMCNC: 32.1 G/DL (ref 31.5–36.5)
MCV RBC AUTO: 103 FL (ref 78–100)
MONOCYTES # BLD AUTO: 0.5 10E9/L (ref 0–1.3)
MONOCYTES NFR BLD AUTO: 13.1 %
NEUTROPHILS # BLD AUTO: 2.7 10E9/L (ref 1.6–8.3)
NEUTROPHILS NFR BLD AUTO: 64.5 %
NRBC # BLD AUTO: 0 10*3/UL
NRBC BLD AUTO-RTO: 0 /100
O2/TOTAL GAS SETTING VFR VENT: NORMAL %
PCO2 BLDV: 48 MM HG (ref 40–50)
PH BLDV: 7.36 PH (ref 7.32–7.43)
PLATELET # BLD AUTO: 160 10E9/L (ref 150–450)
PO2 BLDV: 34 MM HG (ref 25–47)
POTASSIUM SERPL-SCNC: 3.4 MMOL/L (ref 3.4–5.3)
RBC # BLD AUTO: 3.32 10E12/L (ref 3.8–5.2)
RSV RNA SPEC QL NAA+PROBE: NORMAL
SARS-COV-2 RNA SPEC QL NAA+PROBE: NEGATIVE
SODIUM SERPL-SCNC: 139 MMOL/L (ref 133–144)
SPECIMEN SOURCE: NORMAL
TROPONIN I SERPL-MCNC: <0.015 UG/L (ref 0–0.04)
WBC # BLD AUTO: 4.1 10E9/L (ref 4–11)

## 2020-12-22 PROCEDURE — 250N000013 HC RX MED GY IP 250 OP 250 PS 637: Performed by: PHYSICIAN ASSISTANT

## 2020-12-22 PROCEDURE — 99214 OFFICE O/P EST MOD 30 MIN: CPT | Performed by: NURSE PRACTITIONER

## 2020-12-22 PROCEDURE — 99285 EMERGENCY DEPT VISIT HI MDM: CPT | Mod: 25 | Performed by: EMERGENCY MEDICINE

## 2020-12-22 PROCEDURE — 80048 BASIC METABOLIC PNL TOTAL CA: CPT | Performed by: EMERGENCY MEDICINE

## 2020-12-22 PROCEDURE — 85379 FIBRIN DEGRADATION QUANT: CPT | Performed by: EMERGENCY MEDICINE

## 2020-12-22 PROCEDURE — 99223 1ST HOSP IP/OBS HIGH 75: CPT | Mod: AI | Performed by: PHYSICIAN ASSISTANT

## 2020-12-22 PROCEDURE — U0003 INFECTIOUS AGENT DETECTION BY NUCLEIC ACID (DNA OR RNA); SEVERE ACUTE RESPIRATORY SYNDROME CORONAVIRUS 2 (SARS-COV-2) (CORONAVIRUS DISEASE [COVID-19]), AMPLIFIED PROBE TECHNIQUE, MAKING USE OF HIGH THROUGHPUT TECHNOLOGIES AS DESCRIBED BY CMS-2020-01-R: HCPCS | Performed by: NURSE PRACTITIONER

## 2020-12-22 PROCEDURE — 82803 BLOOD GASES ANY COMBINATION: CPT | Performed by: EMERGENCY MEDICINE

## 2020-12-22 PROCEDURE — 258N000003 HC RX IP 258 OP 636: Performed by: EMERGENCY MEDICINE

## 2020-12-22 PROCEDURE — 99285 EMERGENCY DEPT VISIT HI MDM: CPT | Performed by: EMERGENCY MEDICINE

## 2020-12-22 PROCEDURE — 120N000001 HC R&B MED SURG/OB

## 2020-12-22 PROCEDURE — 71275 CT ANGIOGRAPHY CHEST: CPT

## 2020-12-22 PROCEDURE — 250N000012 HC RX MED GY IP 250 OP 636 PS 637: Performed by: EMERGENCY MEDICINE

## 2020-12-22 PROCEDURE — 96360 HYDRATION IV INFUSION INIT: CPT | Mod: 59 | Performed by: EMERGENCY MEDICINE

## 2020-12-22 PROCEDURE — 250N000012 HC RX MED GY IP 250 OP 636 PS 637: Performed by: PHYSICIAN ASSISTANT

## 2020-12-22 PROCEDURE — 71046 X-RAY EXAM CHEST 2 VIEWS: CPT | Mod: FY | Performed by: RADIOLOGY

## 2020-12-22 PROCEDURE — 87636 SARSCOV2 & INF A&B AMP PRB: CPT | Performed by: EMERGENCY MEDICINE

## 2020-12-22 PROCEDURE — 250N000011 HC RX IP 250 OP 636: Performed by: EMERGENCY MEDICINE

## 2020-12-22 PROCEDURE — 250N000009 HC RX 250: Performed by: EMERGENCY MEDICINE

## 2020-12-22 PROCEDURE — 85025 COMPLETE CBC W/AUTO DIFF WBC: CPT | Performed by: EMERGENCY MEDICINE

## 2020-12-22 PROCEDURE — 250N000011 HC RX IP 250 OP 636: Performed by: PHYSICIAN ASSISTANT

## 2020-12-22 PROCEDURE — 84484 ASSAY OF TROPONIN QUANT: CPT | Performed by: EMERGENCY MEDICINE

## 2020-12-22 RX ORDER — SENNOSIDES A AND B 8.6 MG/1
1 TABLET, FILM COATED ORAL EVERY OTHER DAY
COMMUNITY
End: 2023-11-07

## 2020-12-22 RX ORDER — METOPROLOL SUCCINATE 50 MG/1
50 TABLET, EXTENDED RELEASE ORAL 2 TIMES DAILY
Qty: 180 TABLET | Refills: 1 | Status: SHIPPED | OUTPATIENT
Start: 2020-12-22 | End: 2021-10-12

## 2020-12-22 RX ORDER — PROCHLORPERAZINE MALEATE 5 MG
5 TABLET ORAL EVERY 6 HOURS PRN
Status: DISCONTINUED | OUTPATIENT
Start: 2020-12-22 | End: 2020-12-25 | Stop reason: HOSPADM

## 2020-12-22 RX ORDER — IOPAMIDOL 755 MG/ML
71 INJECTION, SOLUTION INTRAVASCULAR ONCE
Status: COMPLETED | OUTPATIENT
Start: 2020-12-22 | End: 2020-12-22

## 2020-12-22 RX ORDER — ACETAMINOPHEN 325 MG/1
650 TABLET ORAL EVERY 4 HOURS PRN
Status: DISCONTINUED | OUTPATIENT
Start: 2020-12-22 | End: 2020-12-25 | Stop reason: HOSPADM

## 2020-12-22 RX ORDER — LIDOCAINE 40 MG/G
CREAM TOPICAL
Status: DISCONTINUED | OUTPATIENT
Start: 2020-12-22 | End: 2020-12-25 | Stop reason: HOSPADM

## 2020-12-22 RX ORDER — AMOXICILLIN 250 MG
2 CAPSULE ORAL 2 TIMES DAILY PRN
Status: DISCONTINUED | OUTPATIENT
Start: 2020-12-22 | End: 2020-12-25 | Stop reason: HOSPADM

## 2020-12-22 RX ORDER — CYCLOBENZAPRINE HCL 10 MG
10 TABLET ORAL 2 TIMES DAILY PRN
Status: DISCONTINUED | OUTPATIENT
Start: 2020-12-22 | End: 2020-12-25 | Stop reason: HOSPADM

## 2020-12-22 RX ORDER — ACETAMINOPHEN 650 MG/1
650 SUPPOSITORY RECTAL EVERY 4 HOURS PRN
Status: DISCONTINUED | OUTPATIENT
Start: 2020-12-22 | End: 2020-12-25 | Stop reason: HOSPADM

## 2020-12-22 RX ORDER — AMLODIPINE BESYLATE 10 MG/1
10 TABLET ORAL DAILY
Qty: 90 TABLET | Refills: 3 | Status: SHIPPED | OUTPATIENT
Start: 2020-12-22 | End: 2021-04-22 | Stop reason: DRUGHIGH

## 2020-12-22 RX ORDER — ONDANSETRON 2 MG/ML
4 INJECTION INTRAMUSCULAR; INTRAVENOUS EVERY 6 HOURS PRN
Status: DISCONTINUED | OUTPATIENT
Start: 2020-12-22 | End: 2020-12-25 | Stop reason: HOSPADM

## 2020-12-22 RX ORDER — FERROUS SULFATE 325(65) MG
325 TABLET ORAL EVERY OTHER DAY
COMMUNITY
End: 2023-11-07

## 2020-12-22 RX ORDER — POLYETHYLENE GLYCOL 3350 17 G/17G
17 POWDER, FOR SOLUTION ORAL DAILY PRN
Status: DISCONTINUED | OUTPATIENT
Start: 2020-12-22 | End: 2020-12-25 | Stop reason: HOSPADM

## 2020-12-22 RX ORDER — AMOXICILLIN 250 MG
1 CAPSULE ORAL 2 TIMES DAILY PRN
Status: DISCONTINUED | OUTPATIENT
Start: 2020-12-22 | End: 2020-12-25 | Stop reason: HOSPADM

## 2020-12-22 RX ORDER — ASPIRIN 325 MG
325 TABLET ORAL DAILY
Status: DISCONTINUED | OUTPATIENT
Start: 2020-12-22 | End: 2020-12-25 | Stop reason: HOSPADM

## 2020-12-22 RX ORDER — MULTIPLE VITAMINS W/ MINERALS TAB 9MG-400MCG
1 TAB ORAL EVERY EVENING
COMMUNITY
End: 2022-06-24

## 2020-12-22 RX ORDER — ATORVASTATIN CALCIUM 20 MG/1
20 TABLET, FILM COATED ORAL DAILY
Status: DISCONTINUED | OUTPATIENT
Start: 2020-12-23 | End: 2020-12-25 | Stop reason: HOSPADM

## 2020-12-22 RX ORDER — QUETIAPINE FUMARATE 25 MG/1
25 TABLET, FILM COATED ORAL DAILY PRN
Status: DISCONTINUED | OUTPATIENT
Start: 2020-12-22 | End: 2020-12-25 | Stop reason: HOSPADM

## 2020-12-22 RX ORDER — OXYCODONE AND ACETAMINOPHEN 10; 325 MG/1; MG/1
2 TABLET ORAL EVERY 6 HOURS PRN
Status: DISCONTINUED | OUTPATIENT
Start: 2020-12-22 | End: 2020-12-25 | Stop reason: HOSPADM

## 2020-12-22 RX ORDER — TIOTROPIUM BROMIDE 18 UG/1
18 CAPSULE ORAL; RESPIRATORY (INHALATION) DAILY
Status: DISCONTINUED | OUTPATIENT
Start: 2020-12-23 | End: 2020-12-22 | Stop reason: CLARIF

## 2020-12-22 RX ORDER — ALBUTEROL SULFATE 90 UG/1
2 AEROSOL, METERED RESPIRATORY (INHALATION)
Status: DISCONTINUED | OUTPATIENT
Start: 2020-12-22 | End: 2020-12-25 | Stop reason: HOSPADM

## 2020-12-22 RX ORDER — LEVOTHYROXINE SODIUM 50 UG/1
50 TABLET ORAL DAILY
Status: DISCONTINUED | OUTPATIENT
Start: 2020-12-23 | End: 2020-12-25 | Stop reason: HOSPADM

## 2020-12-22 RX ORDER — ONDANSETRON 4 MG/1
4 TABLET, ORALLY DISINTEGRATING ORAL EVERY 6 HOURS PRN
Status: DISCONTINUED | OUTPATIENT
Start: 2020-12-22 | End: 2020-12-25 | Stop reason: HOSPADM

## 2020-12-22 RX ORDER — AMLODIPINE BESYLATE 10 MG/1
10 TABLET ORAL DAILY
Status: DISCONTINUED | OUTPATIENT
Start: 2020-12-23 | End: 2020-12-25 | Stop reason: HOSPADM

## 2020-12-22 RX ORDER — PROCHLORPERAZINE 25 MG
12.5 SUPPOSITORY, RECTAL RECTAL EVERY 12 HOURS PRN
Status: DISCONTINUED | OUTPATIENT
Start: 2020-12-22 | End: 2020-12-25 | Stop reason: HOSPADM

## 2020-12-22 RX ORDER — DIAZEPAM 5 MG
10 TABLET ORAL EVERY 6 HOURS PRN
Status: DISCONTINUED | OUTPATIENT
Start: 2020-12-22 | End: 2020-12-25 | Stop reason: HOSPADM

## 2020-12-22 RX ORDER — METOPROLOL SUCCINATE 50 MG/1
50 TABLET, EXTENDED RELEASE ORAL 2 TIMES DAILY
Status: DISCONTINUED | OUTPATIENT
Start: 2020-12-22 | End: 2020-12-25 | Stop reason: HOSPADM

## 2020-12-22 RX ADMIN — METOPROLOL SUCCINATE 50 MG: 50 TABLET, EXTENDED RELEASE ORAL at 20:55

## 2020-12-22 RX ADMIN — DEXAMETHASONE 6 MG: 2 TABLET ORAL at 16:18

## 2020-12-22 RX ADMIN — OXYCODONE AND ACETAMINOPHEN 2 TABLET: 10; 325 TABLET ORAL at 23:15

## 2020-12-22 RX ADMIN — IOPAMIDOL 71 ML: 755 INJECTION, SOLUTION INTRAVENOUS at 11:21

## 2020-12-22 RX ADMIN — ENOXAPARIN SODIUM 40 MG: 40 INJECTION SUBCUTANEOUS at 16:19

## 2020-12-22 RX ADMIN — OXYCODONE AND ACETAMINOPHEN 2 TABLET: 10; 325 TABLET ORAL at 16:28

## 2020-12-22 RX ADMIN — TRAZODONE HYDROCHLORIDE 150 MG: 100 TABLET ORAL at 23:15

## 2020-12-22 RX ADMIN — SODIUM CHLORIDE 500 ML: 9 INJECTION, SOLUTION INTRAVENOUS at 11:49

## 2020-12-22 RX ADMIN — SODIUM CHLORIDE 98 ML: 9 INJECTION, SOLUTION INTRAVENOUS at 11:21

## 2020-12-22 RX ADMIN — ASPIRIN 325 MG ORAL TABLET 325 MG: 325 PILL ORAL at 16:18

## 2020-12-22 RX ADMIN — DEXAMETHASONE 6 MG: 2 TABLET ORAL at 10:44

## 2020-12-22 ASSESSMENT — ENCOUNTER SYMPTOMS
CARDIOVASCULAR NEGATIVE: 1
SHORTNESS OF BREATH: 1
COUGH: 1
ENDOCRINE NEGATIVE: 1
GASTROINTESTINAL NEGATIVE: 1
CONSTITUTIONAL NEGATIVE: 1
PSYCHIATRIC NEGATIVE: 1
ALLERGIC/IMMUNOLOGIC NEGATIVE: 1
EYES NEGATIVE: 1
HEMATOLOGIC/LYMPHATIC NEGATIVE: 1
MUSCULOSKELETAL NEGATIVE: 1
NEUROLOGICAL NEGATIVE: 1

## 2020-12-22 ASSESSMENT — MIFFLIN-ST. JEOR
SCORE: 1255.13
SCORE: 1224.89
SCORE: 1209.01

## 2020-12-22 ASSESSMENT — ACTIVITIES OF DAILY LIVING (ADL)
ADLS_ACUITY_SCORE: 13
ADLS_ACUITY_SCORE: 13

## 2020-12-22 ASSESSMENT — PAIN SCALES - GENERAL: PAINLEVEL: SEVERE PAIN (7)

## 2020-12-22 NOTE — H&P
[facility]     History and Physical - Hospitalist Service       Date of Admission:  12/22/2020    Assessment & Plan   Jessica Ellison is a 70 year old female admitted on 12/22/2020. She presented to the emergency department for evaluation of progressive shortness of breath and was found to have hypoxic respiratory failure due to presumed COVID for which she is being admitted for further evaluation and treatment.     Acute respiratory failure with hypoxia  Presents with O2 sat 88% on room air, improves to 91% on 2L. VBG normal. Chest CT PE shows emphysematous changes, no PE, and bilateral groundglass opacities / interstitial infiltrates. High suspicion for COVID-19 infection, although initial COVID / influenza PCR was negative.   - Continue supplemental O2 to maintain sats > 92%  - CRP, ferritin, procalcitonin pending  - Steroids as noted below  - Address possible COVID below    Suspected COVID-19 virus infection  Presents with hypoxia, ground glass opacities on chest CT, elevated d-dimer. Onset of symptoms around 12/3. History sounds moderately concerning for COVID, although no known exposures.   - Initial PCR negative on 12/22/2020   - Future repeat PCR on 12/25/20  - Precautions: airborne, contact (continue despite negative PCR)  - Dexamethasone 6 mg daily started 12/22/2020, continue  - VTE prophylaxis: Lovenox 40 mg daily     Recent throat tightness / swelling, possible ACE induced angioedema  Evaluated in emergency department 12/3 for throat tightness, thought possibly to be angioedema due to lisinopril. Has been off of lisinopril since 12/3, throat tightness still present but improving. Improved slightly with outpatient steroids. Sounds like could possibly be esophageal spasm but overall unclear cause. No stridor or significant dysphagia.   - Monitor  - On steroids for possible COVID as noted above, with possible additional benefit for throat swelling  - Would recommend outpatient  follow-up with ENT for further evaluation if not improving    Chronic obstructive pulmonary disease  Evidence of emphysema on chest CT. Admit VBG normal as above. No wheezing noted on exam. Managed prior to admission with Spiriva and prn albuterol.  - Continue Spiriva and albuterol    Essential hypertension with goal blood pressure less than 140/90  Managed prior to admission with amlodipine 10 mg daily and metoprolol XL 50 mg bid. Of note, recently on lisinopril but stopped early December 2020 due to concerns regarding angioedema.  - Continue amlodipine and metoprolol with holding parameters    Cerebral aneurysm, non ruptured  History of non-ruptured aneurysm, s/p coil in 2014. Follows with neurosurgery Dr. García. Managed prior to admission with aspirin 325.  - Continue aspirin     Hypothyroidism  Managed prior to admission with levothyroxine 50 mcg daily, continue.    Chronic bilateral low back pain without sciatica  S/P lumbar spinal fusion  Chronic back pain managed prior to admission with Percocet 2 pills q 6 hours prn and prn flexeril 10 mg bid.  - Continue prn Percocet at home dosing  - Prn Flexeril available  Hyperlipidemia   Managed prior to admission with Lipitor 20 mg daily, continue.    Major depressive disorder, recurrent episode, moderate  Anxiety  Stable mood on admission. Managed prior to admission with Effexor 225 mg daily, prn Seroquel 25 mg daily, and prn Valium 10 mg q 6 hour.   - Continue scheduled Effexor  - Prn Seroquel and Valium available    Insomnia  Managed prior to admission with Trazodone 150 mg q hs prn, continue.       Diet:   Regular  DVT Prophylaxis: Enoxaparin (Lovenox) SQ  Bruce Catheter: not present  Code Status:   Full - discussed with patient on admission  Rule Out COVID-19 Handoff:  Jessica is NOT A LOW SUSPICION PUI (needs further investigation).    Follow these instructions:    If COVID test is positive -> continue isolation precautions    If 1st COVID test is negative ->  "continue isolation precautions    -  Order a repeat COVID test to be done 72 hours after the 1st test  -  Place \"PUI Isolation\" nurse communication order  -  Consider ID consult    If 2nd COVID test performed after 72 hours is negative -> consider discontinuing COVID-specific isolation precautions if clinical course atypical for COVID and/or an alternative diagnosis emerges       Disposition Plan   Expected discharge: 2+ days, recommended to prior living arrangement once improvement in respiratory status, weaned from supplemental O2.  Entered: Nay Chou PA-C 12/22/2020, 1:56 PM     The patient's care was discussed with the Attending Physician, Dr. Selena Crespo and Patient.    Nay Chou PA-C  [facility]   Contact information available via [facility] Paging/Directory      ______________________________________________________________________    Chief Complaint   Progressive shortness of breath     History is obtained from the patient, review of EMR, and emergency department sign out from Dr. James Weiss.     History of Present Illness   Jessica Ellison is a 70 year old female who presented to the emergency department for evaluation of progressively worsening shortness of breath.    Symptoms started between 2-3 weeks ago, around December 3rd and have been progressively worsening since onset.  Today she was seen in clinic and was noted to be hypoxic, sent to the emergency department.  She feels short of breath at rest, but worsens with exertion.   Has a cough, initially worse but now slightly improved compared to a few weeks ago.  Has felt feverish but no documented fever at home (max temperature around 99.8), no significant chills.  Some myalgias, although has chronic pain at baseline.   Has some throat discomfort / tightness but not pain.  Some nasal congestion and occasional headaches.    Denies loss of taste / smell.  No known ill contacts, has been isolating and " "masking, trying to stay home as much as possible.  Denies orthopnea, edema (slight right ankle swelling but not bilateral symptoms), or PND.  Known COPD with some chest tightness, but does not feel significant wheezy. Has been using inhalers which help briefly but symptoms return after a few hours.    Of note, patient was seen in the emergency department on Dec 3rd for throat tightness and swelling.  Problem has been intermittently occurring for the past 6 months, but was becoming more frequent and severe.   She describes it as a \"choking\" sensation with a tight and swollen throat, making it difficult to swallow.   The emergency department had suspicion for lisinopril induced angioedema and she was told to discontinue all ACEi medications. She was sent home with a 5 day prescription for prednisone.   She still feels her throat is slightly tight, but improving compared to a few weeks ago.    On review of systems she notes chronic pain, frequent hot flashes at baseline.  Denies abdominal pain, nausea, vomiting, diarrhea, constipation.  Urinating without difficulty, no dysuria.  Feels fatigued and slightly weak.   The remainder review of systems is negative.    Review of Systems    The 10 point Review of Systems is negative other than noted in the HPI or here.     Past Medical History    I have reviewed this patient's medical history and updated it with pertinent information if needed.   Past Medical History:   Diagnosis Date     Anemia      Aneurysm (H)      Chemical dependency (H)     Chem Dep RX     Depression      History of blood transfusion      Hypertension      Menopausal symptoms      Other chronic pain     Lower back and hips     Sleep disorder      Thyroid disease      Tobacco abuse        Past Surgical History   I have reviewed this patient's surgical history and updated it with pertinent information if needed.  Past Surgical History:   Procedure Laterality Date     APPENDECTOMY  1960     C PART REMOVAL " COLON W ANASTOMOSIS  2005    diverticulitis     C SPINE FUSION,ANTER,8+ SGMTS      Anterior posterior fusion 10 levels, hardware removal and re-fusion      cerebral aneurysm  2014    coiled     COLONOSCOPY  2005     HC EXCISION BREAST LESION, OPEN >=1      core biopsy , lumpectomy 2006     LAP VENTRAL HERNIA REPAIR  2017    [place]     LAPAROSCOPIC ASSISTED HYSTERECTOMY VAGINAL  2017     orif left femoral neck Left 6/3/2016    stress fracture.  done at [facility]     SURGICAL HISTORY OF -       Skin Graft       Social History   I have reviewed this patient's social history and updated it with pertinent information if needed.  Social History     Tobacco Use     Smoking status: Former Smoker     Packs/day: 1.00     Years: 30.00     Pack years: 30.00     Quit date: 2004     Years since quittin.9     Smokeless tobacco: Never Used   Substance Use Topics     Alcohol use: No     Comment: ETOH dependency, DUI 3/15/10     Drug use: No       Family History   I have reviewed this patient's family history and updated it with pertinent information if needed.  Family History   Problem Relation Age of Onset     Cancer Mother         breast/lung     Alcohol/Drug Mother      Depression Mother      Cancer - colorectal Father      Alcohol/Drug Father      Diabetes Maternal Grandmother      Diabetes Maternal Grandfather      Diabetes Paternal Grandmother      Diabetes Paternal Grandfather      Cancer Paternal Grandfather      Gastrointestinal Disease Paternal Grandfather      Congenital Anomalies Son      Hypertension Brother      Adrenal Disorder Brother         had adrenalectomies     Cerebral palsy Granddaughter        Prior to Admission Medications   Prior to Admission Medications   Prescriptions Last Dose Informant Patient Reported? Taking?   EPINEPHrine (EPIPEN) 0.3 MG/0.3ML injection never used Self No No   Sig: Inject 0.3 mLs (0.3 mg) into the muscle once as needed for anaphylaxis    QUEtiapine (SEROQUEL) 25 MG tablet Past Week at Unknown time Self No Yes   Sig: TAKE ONE TABLET BY MOUTH DAILY AS NEEDED FOR ANXIETY   Patient taking differently: Take 25 mg by mouth daily as needed (anxiety) TAKE ONE TABLET BY MOUTH DAILY AS NEEDED FOR ANXIETY   VENTOLIN  (90 Base) MCG/ACT inhaler 12/21/2020 at Unknown time Self No Yes   Sig: INHALE TWO PUFFS BY MOUTH EVERY 6 HOURS   Patient taking differently: Inhale 2 puffs into the lungs every 6 hours as needed    amLODIPine (NORVASC) 10 MG tablet 12/22/2020 at am Self No Yes   Sig: Take 1 tablet (10 mg) by mouth daily   aspirin 325 MG tablet 12/22/2020 at a\m Self No Yes   Sig: Take 1 tablet (325 mg) by mouth daily   atorvastatin (LIPITOR) 20 MG tablet 12/22/2020 at am Self No Yes   Sig: Take 1 tablet (20 mg) by mouth daily   calcium carbonate 600 mg-vitamin D 400 units (CALTRATE) 600-400 MG-UNIT per tablet 12/21/2020 at pm Self Yes Yes   Sig: Take 1 tablet by mouth every evening   cyclobenzaprine (FLEXERIL) 10 MG tablet Past Week at Unknown time Self No Yes   Sig: Take 1 tablet (10 mg) by mouth 2 times daily as needed for muscle spasms   diazepam (VALIUM) 10 MG tablet Past Month at Unknown time Self Yes Yes   Sig: Take 10 mg by mouth every 6 hours as needed    ferrous sulfate (FEROSUL) 325 (65 Fe) MG tablet Past Month at held due to constipation issues Self Yes Yes   Sig: Take 325 mg by mouth every other day Alternating days with Senna tab   levothyroxine (LEVOTHROID) 50 MCG tablet 12/22/2020 at am Self No Yes   Sig: Take 1 tablet (50 mcg) by mouth daily   metoprolol succinate ER (TOPROL-XL) 50 MG 24 hr tablet 12/22/2020 at am Self No Yes   Sig: Take 1 tablet (50 mg) by mouth 2 times daily   multivitamin w/minerals (THERA-VIT-M) tablet 12/21/2020 at pm Self Yes Yes   Sig: Take 1 tablet by mouth every evening   naloxone (NARCAN) 4 MG/0.1ML nasal spray never used Self No No   Sig: Spray 1 spray (4 mg) into one nostril alternating nostrils as needed for  opioid reversal every 2-3 minutes until assistance arrives   oxyCODONE-acetaminophen (PERCOCET)  MG per tablet 12/21/2020 at Unknown time Self No Yes   Sig: TAKE TWO TABLETS BY MOUTH EVERY 6 HOURS AS NEEDED FOR MODERATE TO SEVERE PAIN ( DO NOT TAKE MORE THAN 4 TABLETS PER DAY)   senna (SENOKOT) 8.6 MG tablet Past Week at Unknown time Self Yes Yes   Sig: Take 1 tablet by mouth every other day Alternating days with Iron tab   tiotropium (SPIRIVA) 18 MCG inhaled capsule 12/22/2020 at am Self No Yes   Sig: Inhale 1 capsule (18 mcg) into the lungs daily   traZODone (DESYREL) 150 MG tablet 12/21/2020 at pm Self No Yes   Sig: Take 1 tablet (150 mg) by mouth nightly as needed for sleep   venlafaxine (EFFEXOR-XR) 150 MG 24 hr capsule 12/22/2020 at am Self No Yes   Sig: TAKE ONE CAPSULE BY MOUTH ONCE DAILY to take with a 75 mg for total of 225 mg daily   Patient taking differently: Take 150 mg by mouth daily TAKE ONE CAPSULE BY MOUTH ONCE DAILY to take with a 75 mg for total of 225 mg daily   venlafaxine (EFFEXOR-XR) 75 MG 24 hr capsule 12/22/2020 at am Self No Yes   Sig: Take 1 capsule (75 mg) by mouth daily Take with 150 mg for a total of 225 mg daily   Patient taking differently: Take 75 mg by mouth daily Take with 150 mg for a total of 225 mg daily. Is taking the 75 mg some times 2 times a day, thought she was told to increased not sure      Facility-Administered Medications: None     Allergies   Allergies   Allergen Reactions     Bee Venom      Lisinopril Swelling     Oral swelling       Physical Exam   Vital Signs: Temp: 96.6  F (35.9  C) Temp src: Oral BP: (!) 139/97 Pulse: 99   Resp: 8 SpO2: 97 % O2 Device: Nasal cannula Oxygen Delivery: 2 LPM  Weight: 160 lbs 0 oz    Constitutional: Alert, oriented, cooperative. At times slightly emotional but not distressed. Appears nontoxic, speaking in full sentences.     Eyes: Eyes are clear, pupils are reactive. No scleral icterus.    HEENT: Oropharynx is clear and moist,  no lesions. Normocephalic, no evidence of cranial trauma.      Cardiovascular: Regular rhythm and rate, normal S1 and S2. No murmur, rubs, or gallops. Peripheral pulses intact bilaterally. No lower extremity edema.    Respiratory: Crackles at bilateral bases, right more prominent than left. No wheeze or rhonchi.     GI: Soft, non-distended. Non-tender, no rebound or guarding. No hepatosplenomegaly or masses appreciated. Normal bowel sounds.   Musculoskeletal: Without obvious deformity, normal range of motion. Normal muscle bulk and tone. Distal CMS intact.      Skin: Warm and dry, no rashes or ecchymoses. No mottling of skin.      Neurologic: Patient moves all extremities.  is symmetric. Gross strength and sensation are equal bilaterally.    Genitourinary: Deferred      Data   Data reviewed today: I reviewed all medications, new labs and imaging results over the last 24 hours. I personally reviewed the chest CT image(s) showing bilateral groundglass opacities.    Recent Labs   Lab 12/22/20  1015   WBC 4.1   HGB 11.0*   *         POTASSIUM 3.4   CHLORIDE 105   CO2 26   BUN 15   CR 0.76   ANIONGAP 8   DELILAH 9.0   GLC 93   TROPI <0.015     Recent Results (from the past 24 hour(s))   XR Chest 2 Views    Narrative    CHEST TWO VIEWS  12/22/2020 9:07 AM    HISTORY: Rule out pneumonia, fever, cough. Shortness of breath.    COMPARISON: Chest CT from 11/9/2020    FINDINGS: Emphysematous changes in the lungs are present. Mild  interstitial prominence is suggestive of chronic interstitial  scarring. No sign of pneumothorax, pneumonic consolidation, or pleural  fluid. Heart size is upper limits of normal. Pulmonary vasculature is  normal appearing. There is prominence of the right hilum, which is  most likely secondary to patient rotation. Small calcified right hilar  lymph nodes are noted. Surgical clips are seen in the right upper  quadrant. Scoliotic curvature of the spine. Posterior spinal  fusion  hardware of the lower thoracic and upper lumbar spine. No acute  osseous abnormality is seen.      Impression    IMPRESSION: Stable, nonacute chest.    KAMLESH SALAZAR MD   CT Chest Pulmonary Embolism w Contrast    Narrative    CT CHEST PULMONARY EMBOLISM WITH CONTRAST December 22, 2020 11:45 AM    CLINICAL HISTORY: Pulmonary embolus suspected, intermediate  probability, positive D-dimer. Three week history of dyspnea now with  new oxygen requirement. Elevated D-dimer. History of emphysema.  Evaluate for pulmonary embolism versus COVID-19 pneumonia versus other  acute cardiothoracic process.    TECHNIQUE: CT angiogram chest during arterial phase injection IV  contrast. 2D and 3D MIP reconstructions were performed by the CT  technologist. Dose reduction techniques were used.     CONTRAST: 71 mL Isovue-[id number].    COMPARISON: Chest x-ray from 12/22/2020. CT chest, abdomen and pelvis  from 11/19/2020.    FINDINGS:  ANGIOGRAM CHEST: Pulmonary arteries are normal caliber and negative  for pulmonary emboli. Thoracic aorta is negative for dissection. No CT  evidence of right heart strain.    LUNGS AND PLEURA: Upper lobe predominant centrilobular and paraseptal  emphysematous changes are present. There are patchy peripheral  predominant interstitial and ground-glass opacities present, which are  nonspecific. No pleural effusion. Central airways are patent. Benign  densely calcified granuloma in the medial right lower lobe (7-188).    MEDIASTINUM/AXILLAE: The heart size is borderline enlarged. No  pericardial effusion. Thoracic aorta is normal in course and caliber.  No enlarged axillary, supraclavicular, or hilar lymphadenopathy by CT  size criteria. There is a borderline enlarged right precarinal lymph  node measuring 12 mm in short axis, which is slightly enlarged to  comparison, previously 10 mm in short axis. Calcified infrahilar lymph  nodes are seen on the right.    UPPER ABDOMEN: There is a small subcentimeter  low-density observation  in the medial segment left hepatic lobe, anteriorly (series 4, image  121), which is too small to characterize, but stable in comparison and  statistically favors a cyst or hemangioma. No follow-up is necessary.  The gallbladder is surgically absent.    MUSCULOSKELETAL: Suspected remote, healed nondisplaced anterior right  rib fractures. Postsurgical changes of the thoracolumbar spine.  Multilevel degenerative changes of the spine. Mild to moderate  scoliotic curvature of the spine. No acute osseous abnormality.      Impression    IMPRESSION:  1.  No pulmonary artery embolism.  2.  Commonly reported imaging features of COVID 19 pneumonia are  present. Other processes such as influenza pneumonia and organizing  pneumonia can cause a similar imaging pattern.  3.  Upper lobe predominant centrilobular and paraseptal emphysema.  4.  Sequelae of chronic granulomatous infection.  5.  Single mildly enlarged mediastinal lymph node, likely reactive in  nature.    KAMLESH SALAZAR MD

## 2020-12-22 NOTE — PLAN OF CARE
"[facility] ADMISSION NOTE    Patient admitted to room 2313 at approximately 1450 via cart from emergency room. Patient was accompanied by transport tech.     Verbal SBAR report received from TWILA Neal prior to patient arrival.     Patient ambulated to bed independently. Patient alert and oriented X 3. The patient is not having any pain.  . Admission vital signs: Blood pressure (!) 149/89, pulse 94, temperature 98  F (36.7  C), temperature source Oral, resp. rate 16, height 1.6 m (5' 3\"), weight 72.6 kg (160 lb), SpO2 97 %, not currently breastfeeding. Patient was oriented to plan of care, call light, bed controls, tv, telephone, bathroom, and visiting hours.     Risk Assessment    The following safety risks were identified during admission: none. Yellow risk band applied: NO.     Skin Initial Assessment    This writer admitted this patient and completed a full skin assessment and Bennett score in the Adult PCS flowsheet. Appropriate interventions initiated as needed.     Scab on left elbow.     Bennett Risk Assessment  Sensory Perception: 4-->no impairment  Moisture: 4-->rarely moist  Activity: 3-->walks occasionally  Mobility: 3-->slightly limited  Nutrition: 3-->adequate  Friction and Shear: 3-->no apparent problem  Bennett Score: 20  Bed Support Surface: Atmos Air mattress  Reassessed using Bed Algorithm: No    Education    Patient has a Sesser to Observation order: No  Observation education completed and documented: N/A      Anuja Barr RN      "

## 2020-12-22 NOTE — PROGRESS NOTES
Subjective     Jessica Ellison is a 70 year old female who presents to clinic today for the following health issues:    HPI           Concern for COVID-19  About how many days ago did these symptoms start? Shortness of breath 2-3 weeks ago but is worsening. COVID negative 12/03/2020- no known sick contacts.  Is this your first visit for this illness? Yes  In the 14 days before your symptoms started, have you had close contact with someone with COVID-19 (Coronavirus)? No  Do you have a fever or chills? Yes, I felt feverish or had chills- low grade temps  Are you having new or worsening difficulty breathing? Yes   Please describe what kind of difficulty you are having breathing:Moderate dyspnea (short of breath with ADLs, shortness of breath that limits the ability to walk up one flight of stairs without needing to rest)  Do you have new or worsening cough? Yes, it's a dry cough.   Have you had any new or unexplained body aches? YES    Have you experienced any of the following NEW symptoms?    Headache: No    Sore throat: No    Loss of taste or smell: No    Chest pain: No    Diarrhea: No    Rash: No  What treatments have you tried? nothing  Who do you live with?   Are you, or a household member, a healthcare worker or a ? No  Do you live in a nursing home, group home, or shelter? No  Do you have a way to get food/medications if quarantined? Yes, I have a friend or family member who can help me.    Hx of COPD- has been stable. Only having wheezing if she has a coughing fit. Taking inhalers as prescribed. Has never been on oxygen. Quit smoking many years ago. No hx of DVT. Having right ankle swelling and RLE calf pain for 1-2 months. Denies chest pain, dizziness, diaphoresis, abdominal pain, sinus pressure, sore throat, urinary symptoms, n/v/d, rash. Does have an abrasion on her left elbow that is taking a long time to heal, denies drainage, redness, or swelling at the site.       ED/UC  Followup:    Facility:  [facility]  Date of visit: 12/3/2020  Reason for visit: Oral swelling  HPI  Jessica Ellison is a 70 year old female, past medical history is significant for COPD, hypothyroidism, chronic bilateral low back pain, hypertension, depression, anxiety, alcohol abuse, insomnia, presents to the emergency department concerns of throat swelling.  History is obtained from the patient who states that she began with a sensation of swelling, some sore throat and difficulty swallowing 9 days prior to presentation.  Sore throat with increased difficulty swallowing localized around the level of thyroid cartilage by the patient's description.  She has not experienced any swelling of her lips tongue or in the back of her throat by her account.  She is able to drink clear fluids and finds apple juice to be soothing for this.  She has had no new medications or changes in medications recently and historically reports that she has been on and off an ACE inhibitor for many years consistently on it for about the last 9 months.  She reports good blood pressure control in clinic.  She states compliance with all of her medications.  She notes no chest pain or shortness of air above baseline; she notes that she is required the use of inhaled pulmonary medications with greater frequency over the last couple of months but not specifically over the last 1 week.  She identified no fever chills or sweats and again as needed no cough above or different from baseline.  He has no known Covid contacts or concern at this time.  She called her primary care provider office today was directed to the emergency department as she stated that she was having difficulties clearing secretions.  Her primary MD is Dr. Sarah Barriga.  She was tested for Covid last week and was negative.     Current Status: trouble and pain when swallowing it is getting better.          Review of Systems   Constitutional, HEENT, cardiovascular,  "pulmonary, gi and gu systems are negative, except as otherwise noted.      Objective    /82   Pulse 88   Temp 100  F (37.8  C)   Resp 18   Ht 1.575 m (5' 2\")   Wt 72.6 kg (160 lb)   SpO2 (!) 86%   Breastfeeding No   BMI 29.26 kg/m    Body mass index is 29.26 kg/m .  Physical Exam   GENERAL: healthy, alert and no distress  EYES: Eyes grossly normal to inspection, PERRL and conjunctivae and sclerae normal  HENT: ear canals and TM's normal, nose and mouth without ulcers or lesions  NECK: no adenopathy, no asymmetry, masses, or scars and thyroid normal to palpation  RESP: decreased breath sounds throughout  CV: regular rates and rhythm, normal S1 S2, no S3 or S4, no murmur, click or rub, peripheral pulses strong and 1+ right lower extremity non-pitting edema to lateral ankle    MS: tenderness to palpation - right calf and spine exam shows curvature  SKIN: scabbed abrasion to left elbow, no drainage, redness, edema, warmth.  NEURO: Normal strength and tone, mentation intact and speech normal    CXR - PRELIMINARY READ:  CHEST TWO VIEWS  12/22/2020 9:07 AM     HISTORY: Rule out pneumonia, fever, cough. Shortness of breath.     COMPARISON: Chest CT from 11/9/2020     FINDINGS: Emphysematous changes in the lungs are present. Mild  interstitial prominence is suggestive of chronic interstitial  scarring. No sign of pneumothorax, pneumonic consolidation, or pleural  fluid. Heart size is upper limits of normal. Pulmonary vasculature is  normal appearing. There is prominence of the right hilum, which is  most likely secondary to patient rotation. Small calcified right hilar  lymph nodes are noted. Surgical clips are seen in the right upper  quadrant. Scoliotic curvature of the spine. Posterior spinal fusion  hardware of the lower thoracic and upper lumbar spine. No acute  osseous abnormality is seen.                                                                      IMPRESSION: Stable, nonacute " chest.        awaiting formal final interpretation from Radiologist at this time        Assessment & Plan     Shortness of breath    - XR Chest 2 Views  - Symptomatic COVID-19 Virus (Coronavirus) by PCR    Cough    - XR Chest 2 Views  - Symptomatic COVID-19 Virus (Coronavirus) by PCR    Hypoxemia    - Symptomatic COVID-19 Virus (Coronavirus) by PCR    Essential hypertension with goal blood pressure less than 140/90    - amLODIPine (NORVASC) 10 MG tablet; Take 1 tablet (10 mg) by mouth daily  - metoprolol succinate ER (TOPROL-XL) 50 MG 24 hr tablet; Take 1 tablet (50 mg) by mouth 2 times daily        CONSULTATION/REFERRAL to ER- patient referred to ER for further work up of hypoxemia and dyspnea. Report called to charge nurse.    No follow-ups on file.    [facility]

## 2020-12-22 NOTE — ED NOTES
Pt placed back on 2 liters of oxygen via nasal cannula due to oxygen sats dropping to 86% on room air.

## 2020-12-22 NOTE — ED NOTES
Pt sent from  clinic due to SOA x 3 weeks, increased weakness.    Pt's oxygen sats room air 88%,  Applied 2 liters per nasal cannula and oxygen sats at 96%.  Decreased oxygen via nasal cannula to 1 liter.

## 2020-12-22 NOTE — PATIENT INSTRUCTIONS
Our Clinic hours are:  Mondays    7:20 am - 7 pm  Tues -  Fri  7:20 am - 5 pm    Clinic Phone: [phone]    The clinic lab opens at 7:30 am Mon - Fri and appointments are required.    [facility]. [phone]  Monday  8 am - 7pm  Tues - Fri 8 am - 5:30 pm

## 2020-12-22 NOTE — ED NOTES
Oxygen increased back to 2L per nasal cannula due to pt lying down.  Oxygen sats dropped below 90% lying down on 1 liter per nasal cannula.

## 2020-12-22 NOTE — ED NOTES
Pt reports intermittent right arm pain last couple days, denies right arm pain at this time.   Pt concerned for right arm blood clot due to elevated d dimer.   Pt wanted noted in chart.  Provider updated

## 2020-12-22 NOTE — PLAN OF CARE
Patient alert and orientated. Vital signs stable. Afebrile. Up independently. Patient has chronic back pain and takes percocet at home for it. PRN percocet was given.     Patient is on 2L of oxygen via nasal cannula. Patient reports shortness of breath with activity and an infrequent dry cough.     Covid result negative, however special precautions maintained per MD note.

## 2020-12-22 NOTE — ED PROVIDER NOTES
History     Chief Complaint   Patient presents with     Shortness of Breath     sent by clinic, SOA for 2 weeks, fever, dry cough, sats 80-82% RA, CXR -, covid swab done, refused ambulance     HPI  Jessica Ellison is a 70 year old female who presents for evaluation for shortness of breath. On arrival patient reports over the last 3 weeks has had progressive shortness of breath.  Patient lives at home with her  who is reported to be asymptomatic.  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  Patient reports she has Spiriva which she has used and occasionally provides some relief but not instantaneously.  No exertional dyspnea, no prior history of DVT or PE.  She is a non-smoker.  Patient reports no fever or chills.  Patient reports she completed 5-day course of oral prednisone after presenting about 19 days earlier with throat swelling felt to be related to lisinopril therapy.  She was dyspneic and hypoxic and referred from clinic for further evaluation and care.  Patient was evaluated in the clinic after presenting with shortness of breath.  She was noted to be hypoxic on room air.  Patient arrived by car after refusing EMS transport.  She was tested for COVID-19 and that test result is pending.  A chest x-ray was obtained prior to arrival in the emergency department showing no pneumothorax and no consolidation or pleural effusions.  Emphysematous changes were noted.  Patient has multiple medical diagnoses including history of alcohol use disorder, history of anxiety, history of chronic bilateral low back pain, history of hypothyroidism.  Patient is currently on amlodipine 10 mg/day, atorvastatin, Flexeril, levothyroxine, metoprolol, naloxone, Seroquel, Spiriva, trazodone, Effexor, Ventolin, and Valium.    Chart Review  CHEST TWO VIEWS  12/22/2020 9:07 AM     HISTORY: Rule out pneumonia, fever, cough. Shortness of breath.     COMPARISON: Chest CT from 11/9/2020     FINDINGS:  Emphysematous changes in the lungs are present. Mild  interstitial prominence is suggestive of chronic interstitial  scarring. No sign of pneumothorax, pneumonic consolidation, or pleural  fluid. Heart size is upper limits of normal. Pulmonary vasculature is  normal appearing. There is prominence of the right hilum, which is  most likely secondary to patient rotation. Small calcified right hilar  lymph nodes are noted. Surgical clips are seen in the right upper  quadrant. Scoliotic curvature of the spine. Posterior spinal fusion  hardware of the lower thoracic and upper lumbar spine. No acute  osseous abnormality is seen.                                                                      IMPRESSION: Stable, nonacute chest.      Allergies:  Allergies   Allergen Reactions     Bee Venom      Lisinopril Swelling     Oral swelling       Problem List:    Patient Active Problem List    Diagnosis Date Noted     Shortness of breath 12/22/2020     Priority: Medium     Chronic obstructive pulmonary disease, unspecified COPD type (H) 11/04/2020     Priority: Medium     Blood pressure check 01/27/2020     Priority: Medium     Spell of change in speech 04/17/2018     Priority: Medium     Hypothyroidism 09/07/2016     Priority: Medium     Chronic bilateral low back pain without sciatica 06/02/2016     Priority: Medium     Essential hypertension with goal blood pressure less than 140/90 06/02/2016     Priority: Medium     S/P lumbar spinal fusion 10/19/2015     Priority: Medium     Bee allergy status 06/30/2015     Priority: Medium     Cerebral aneurysm, nonruptured 07/24/2014     Priority: Medium     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 Marti       Major depressive disorder, recurrent episode, moderate (H) 07/10/2013     Priority: Medium     Advanced directives, counseling/discussion 09/05/2012     Priority: Medium     Patient does  not have an Advance/Health Care Directive (HCD), has information at home.     Sonja Krishnamurthy  September 5, 2012         Anxiety 06/20/2012     Priority: Medium     CARDIOVASCULAR SCREENING; LDL GOAL LESS THAN 160 10/31/2010     Priority: Medium     ETOH abuse 03/19/2010     Priority: Medium      katie drinking, DUIs and probation violation       Insomnia 12/21/2009     Priority: Medium     Back pain 12/21/2009     Priority: Medium     Patient is followed by DOMINIQUE COPPOLA for ongoing prescription of narcotic pain medicine.  Med: oxycodone/acetaminophen 10/325 for chronic back pain.   Maximum use per month: 120  Expected duration: unknown  Narcotic agreement on file: YES 3/19/2010  Clinic visit recommended: Q 3 months    April 2013 - rhizotomy for SI (left) Jhon Mckay MD    Follows at [facility] - severe low back pain with work related injuries.  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  Spinal stenosis at most lumbar levels  Improvement with past epidural steroid injections  SI joint dysfunction with the pain          Past Medical History:    Past Medical History:   Diagnosis Date     Anemia      Aneurysm (H)      Chemical dependency (H)      Depression      History of blood transfusion      Hypertension      Menopausal symptoms      Other chronic pain      Sleep disorder      Thyroid disease      Tobacco abuse        Past Surgical History:    Past Surgical History:   Procedure Laterality Date     APPENDECTOMY  1960     C PART REMOVAL COLON W ANASTOMOSIS  5/2005    diverticulitis     C SPINE FUSION,ANTER,8+ SGMTS  2007    Anterior posterior fusion 10 levels, hardware removal and re-fusion 2009     cerebral aneurysm  2014    coiled     COLONOSCOPY  4/19/2005      EXCISION BREAST LESION, OPEN >=1      core biopsy 2006, lumpectomy 2006     LAP VENTRAL HERNIA REPAIR  12/2017    [place]     LAPAROSCOPIC ASSISTED HYSTERECTOMY VAGINAL  12/2017     orif left femoral neck Left 6/3/2016     stress fracture.  done at [facility]     SURGICAL HISTORY OF -       Skin Graft       Family History:    Family History   Problem Relation Age of Onset     Cancer Mother         breast/lung     Alcohol/Drug Mother      Depression Mother      Cancer - colorectal Father      Alcohol/Drug Father      Diabetes Maternal Grandmother      Diabetes Maternal Grandfather      Diabetes Paternal Grandmother      Diabetes Paternal Grandfather      Cancer Paternal Grandfather      Gastrointestinal Disease Paternal Grandfather      Congenital Anomalies Son      Hypertension Brother      Adrenal Disorder Brother         had adrenalectomies     Cerebral palsy Granddaughter        Social History:  Marital Status:   [2]  Social History     Tobacco Use     Smoking status: Former Smoker     Packs/day: 1.00     Years: 30.00     Pack years: 30.00     Quit date: 2004     Years since quittin.9     Smokeless tobacco: Never Used   Substance Use Topics     Alcohol use: No     Comment: ETOH dependency, DUI 3/15/10     Drug use: No        Medications:         amLODIPine (NORVASC) 10 MG tablet       aspirin 325 MG tablet       atorvastatin (LIPITOR) 20 MG tablet       calcium carbonate 600 mg-vitamin D 400 units (CALTRATE) 600-400 MG-UNIT per tablet       cyclobenzaprine (FLEXERIL) 10 MG tablet       diazepam (VALIUM) 10 MG tablet       ferrous sulfate (FEROSUL) 325 (65 Fe) MG tablet       levothyroxine (LEVOTHROID) 50 MCG tablet       metoprolol succinate ER (TOPROL-XL) 50 MG 24 hr tablet       multivitamin w/minerals (THERA-VIT-M) tablet       oxyCODONE-acetaminophen (PERCOCET)  MG per tablet       QUEtiapine (SEROQUEL) 25 MG tablet       senna (SENOKOT) 8.6 MG tablet       tiotropium (SPIRIVA) 18 MCG inhaled capsule       traZODone (DESYREL) 150 MG tablet       venlafaxine (EFFEXOR-XR) 150 MG 24 hr capsule       venlafaxine (EFFEXOR-XR) 75 MG 24 hr capsule       VENTOLIN  (90 Base) MCG/ACT inhaler        "EPINEPHrine (EPIPEN) 0.3 MG/0.3ML injection       naloxone (NARCAN) 4 MG/0.1ML nasal spray          Review of Systems   Constitutional: Negative.    HENT: Negative.    Eyes: Negative.    Respiratory: Positive for cough and shortness of breath.    Cardiovascular: Negative.    Gastrointestinal: Negative.    Endocrine: Negative.    Genitourinary: Negative.    Musculoskeletal: Negative.    Skin: Negative.    Allergic/Immunologic: Negative.    Neurological: Negative.    Hematological: Negative.    Psychiatric/Behavioral: Negative.    All other systems reviewed and are negative.      Physical Exam   BP: 128/81  Pulse: 86  Temp: 96.6  F (35.9  C)  Resp: 20  Height: 160 cm (5' 3\")  Weight: 72.6 kg (160 lb)  SpO2: (!) 88 %      Physical Exam  Constitutional:       General: She is not in acute distress.     Appearance: She is not ill-appearing, toxic-appearing or diaphoretic.   HENT:      Head: Normocephalic and atraumatic.   Eyes:      Extraocular Movements: Extraocular movements intact.      Pupils: Pupils are equal, round, and reactive to light.   Neck:      Musculoskeletal: Normal range of motion and neck supple.      Thyroid: No thyromegaly.      Vascular: No hepatojugular reflux or JVD.      Trachea: No tracheal deviation.   Cardiovascular:      Rate and Rhythm: Normal rate and regular rhythm.   Pulmonary:      Effort: Tachypnea present.      Breath sounds: Examination of the right-lower field reveals rhonchi. Examination of the left-lower field reveals rhonchi. Rhonchi present. No rales.   Abdominal:      Palpations: There is no hepatomegaly, splenomegaly or mass.      Tenderness: There is no abdominal tenderness. There is no guarding or rebound.   Musculoskeletal:      Right lower leg: She exhibits no tenderness. No edema.      Left lower leg: She exhibits no tenderness. No edema.   Lymphadenopathy:      Cervical: No cervical adenopathy.   Skin:     Capillary Refill: Capillary refill takes less than 2 seconds.      " Coloration: Skin is not cyanotic or pale.      Findings: No ecchymosis, erythema or rash.      Nails: There is no clubbing.     Neurological:      General: No focal deficit present.      Mental Status: She is alert and oriented to person, place, and time.   Psychiatric:         Mood and Affect: Mood normal. Mood is not anxious.         Behavior: Behavior normal. Behavior is not agitated.         ED Course        Procedures               EKG Interpretation:            Critical Care time:  none                  ED medications:  Medications   dexamethasone (DECADRON) tablet 6 mg (6 mg Oral Given 12/22/20 1044)   0.9% sodium chloride BOLUS (0 mLs Intravenous Stopped 12/22/20 1254)   iopamidol (ISOVUE-370) solution 71 mL (71 mLs Intravenous Given 12/22/20 1121)   sodium chloride 0.9 % bag 500mL for CT scan flush use (98 mLs Intravenous Given 12/22/20 1121)       ED Vitals:  Vitals:    12/22/20 1211 12/22/20 1230 12/22/20 1231 12/22/20 1300   BP:  137/87 137/87 (!) 139/97   Pulse: 93 85 89 99   Resp: 12  8    Temp:       TempSrc:       SpO2: 93%  96% 97%   Weight:       Height:           ED labs and imaging    Results for orders placed or performed during the hospital encounter of 12/22/20 (from the past 24 hour(s))   D dimer quantitative   Result Value Ref Range    D Dimer 0.9 (H) 0.0 - 0.50 ug/ml FEU   CBC with platelets differential   Result Value Ref Range    WBC 4.1 4.0 - 11.0 10e9/L    RBC Count 3.32 (L) 3.8 - 5.2 10e12/L    Hemoglobin 11.0 (L) 11.7 - 15.7 g/dL    Hematocrit 34.3 (L) 35.0 - 47.0 %     (H) 78 - 100 fl    MCH 33.1 (H) 26.5 - 33.0 pg    MCHC 32.1 31.5 - 36.5 g/dL    RDW 13.9 10.0 - 15.0 %    Platelet Count 160 150 - 450 10e9/L    Diff Method Automated Method     % Neutrophils 64.5 %    % Lymphocytes 13.4 %    % Monocytes 13.1 %    % Eosinophils 7.8 %    % Basophils 0.7 %    % Immature Granulocytes 0.5 %    Nucleated RBCs 0 0 /100    Absolute Neutrophil 2.7 1.6 - 8.3 10e9/L    Absolute  Lymphocytes 0.6 (L) 0.8 - 5.3 10e9/L    Absolute Monocytes 0.5 0.0 - 1.3 10e9/L    Absolute Eosinophils 0.3 0.0 - 0.7 10e9/L    Absolute Basophils 0.0 0.0 - 0.2 10e9/L    Abs Immature Granulocytes 0.0 0 - 0.4 10e9/L    Absolute Nucleated RBC 0.0    Basic metabolic panel   Result Value Ref Range    Sodium 139 133 - 144 mmol/L    Potassium 3.4 3.4 - 5.3 mmol/L    Chloride 105 94 - 109 mmol/L    Carbon Dioxide 26 20 - 32 mmol/L    Anion Gap 8 3 - 14 mmol/L    Glucose 93 70 - 99 mg/dL    Urea Nitrogen 15 7 - 30 mg/dL    Creatinine 0.76 0.52 - 1.04 mg/dL    GFR Estimate 80 >60 mL/min/[1.73_m2]    GFR Estimate If Black >90 >60 mL/min/[1.73_m2]    Calcium 9.0 8.5 - 10.1 mg/dL   Blood gas venous   Result Value Ref Range    Ph Venous 7.36 7.32 - 7.43 pH    PCO2 Venous 48 40 - 50 mm Hg    PO2 Venous 34 25 - 47 mm Hg    Bicarbonate Venous 27 21 - 28 mmol/L    Base Excess Venous 1.4 mmol/L    FIO2 28%    Troponin I   Result Value Ref Range    Troponin I ES <0.015 0.000 - 0.045 ug/L   CT Chest Pulmonary Embolism w Contrast    Narrative    CT CHEST PULMONARY EMBOLISM WITH CONTRAST December 22, 2020 11:45 AM    CLINICAL HISTORY: Pulmonary embolus suspected, intermediate  probability, positive D-dimer. Three week history of dyspnea now with  new oxygen requirement. Elevated D-dimer. History of emphysema.  Evaluate for pulmonary embolism versus COVID-19 pneumonia versus other  acute cardiothoracic process.    TECHNIQUE: CT angiogram chest during arterial phase injection IV  contrast. 2D and 3D MIP reconstructions were performed by the CT  technologist. Dose reduction techniques were used.     CONTRAST: 71 mL Isovue-370.    COMPARISON: Chest x-ray from 12/22/2020. CT chest, abdomen and pelvis  from 11/19/2020.    FINDINGS:  ANGIOGRAM CHEST: Pulmonary arteries are normal caliber and negative  for pulmonary emboli. Thoracic aorta is negative for dissection. No CT  evidence of right heart strain.    LUNGS AND PLEURA: Upper lobe  predominant centrilobular and paraseptal  emphysematous changes are present. There are patchy peripheral  predominant interstitial and ground-glass opacities present, which are  nonspecific. No pleural effusion. Central airways are patent. Benign  densely calcified granuloma in the medial right lower lobe (7-188).    MEDIASTINUM/AXILLAE: The heart size is borderline enlarged. No  pericardial effusion. Thoracic aorta is normal in course and caliber.  No enlarged axillary, supraclavicular, or hilar lymphadenopathy by CT  size criteria. There is a borderline enlarged right precarinal lymph  node measuring 12 mm in short axis, which is slightly enlarged to  comparison, previously 10 mm in short axis. Calcified infrahilar lymph  nodes are seen on the right.    UPPER ABDOMEN: There is a small subcentimeter low-density observation  in the medial segment left hepatic lobe, anteriorly (series 4, image  121), which is too small to characterize, but stable in comparison and  statistically favors a cyst or hemangioma. No follow-up is necessary.  The gallbladder is surgically absent.    MUSCULOSKELETAL: Suspected remote, healed nondisplaced anterior right  rib fractures. Postsurgical changes of the thoracolumbar spine.  Multilevel degenerative changes of the spine. Mild to moderate  scoliotic curvature of the spine. No acute osseous abnormality.      Impression    IMPRESSION:  1.  No pulmonary artery embolism.  2.  Commonly reported imaging features of COVID 19 pneumonia are  present. Other processes such as influenza pneumonia and organizing  pneumonia can cause a similar imaging pattern.  3.  Upper lobe predominant centrilobular and paraseptal emphysema.  4.  Sequelae of chronic granulomatous infection.  5.  Single mildly enlarged mediastinal lymph node, likely reactive in  nature.    KAMLESH SALAZAR MD           Assessments & Plan (with Medical Decision Making)   Assessment Summary and Clinical Impression: 70-year-old  female who presented to the emergency department with progressive shortness of breath with cough over the last 3 weeks.  Symptoms are likely multifactorial with underlying history of emphysema however clinical suspicion for COVID-19 pneumonia based on abnormality noted on CT chest.  She is admitted to medicine for acute respiratory failure with hypoxia, new oxygen requirement and for further care. Patient presented stating she has had 3 weeks of progressive dyspnea after she was evaluated for concern for ACE-induced angioedema with oral swelling and difficulty swallowing- 19 days prior.  She completed a 5 day course of prednisone by report after evaluation 19 days.  Patient reports since that evaluation she developed increasing shortness of breath.  She was notably hypoxic in clinic prior to presenting to the department for care.  On exam she was 96% on 2 L nasal cannula, she was in no acute distress.    ED course and Plan:  Reviewed the medical record.  Patient was evaluated in clinic prior to presenting to the department for care.  She was evaluated 19 days earlier- for dysphagia and ACE inhibitor induced angioedema. Patient tested negative for COVID-19 on December 3, 2020.  We discussed and reviewed possible causes for her symptoms including COVID-19 pneumonia, pulmonary embolism, COPD exacerbation with history of emphysema.  She did report some improvement with Spiriva.  She was monitored with frequent vital signs on telemetry.  Blood work was obtained.  Patient had XR hest imaging completed in clinic prior to presenting to the department.  Chest imaging was reviewed and the radiologist interpretation was also reviewed. Patient's work-up revealed a mildly elevated D-dimer at 0.9 when age-adjusted, normal troponin.  With chest x-ray not showing convincing evidence for COVID-19 pneumonia and underlying history of emphysema CT chest PE protocol was obtained to exclude a pulmonary embolism.  Radiology interpretation  of the chest CT with contrast was reviewed. See additional additional details in  the medical record.  I reviewed chest CTfindings with patient and encouraged patient that it was in her best interest to be admitted to the hospital despite her strong preference for going home.  Patient also agreed to be admitted.  I spoke with Dr. SUJATHA Crespo-at 1:30 PM who agreed to accept patient for admission after reviewing rationale for admission with new oxygen requirement working diagnosis CT findings and interventions in the emergency department.         ED to Inpatient Handoff:    Discussed with Dr Crespo at 1.35pm  Patient accepted for Inpatient Stay  Pending studies include SARS COV-2 PCR  Code Status: Not Addressed         Disclaimer: This note consists of symbols derived from keyboarding, dictation and/or voice recognition software. As a result, there may be errors in the script that have gone undetected. Please consider this when interpreting information found in this chart.  I have reviewed the nursing notes.    I have reviewed the findings, diagnosis, plan and need for follow up with the patient.       New Prescriptions    No medications on file       Final diagnoses:   Shortness of breath - Over the last 3 weeks.  Likely multifactorial with history of emphysema, now with concern for COVID-19 pneumonia   Abnormal CT of the chest       12/22/2020   [facility] EMERGENCY DEPT     Michael Weiss MD  12/22/20 9418

## 2020-12-23 LAB
ALBUMIN SERPL-MCNC: 3.3 G/DL (ref 3.4–5)
ALP SERPL-CCNC: 103 U/L (ref 40–150)
ALT SERPL W P-5'-P-CCNC: 25 U/L (ref 0–50)
ANION GAP SERPL CALCULATED.3IONS-SCNC: 5 MMOL/L (ref 3–14)
AST SERPL W P-5'-P-CCNC: 17 U/L (ref 0–45)
BASOPHILS # BLD AUTO: 0 10E9/L (ref 0–0.2)
BASOPHILS NFR BLD AUTO: 0 %
BILIRUB SERPL-MCNC: 0.4 MG/DL (ref 0.2–1.3)
BUN SERPL-MCNC: 20 MG/DL (ref 7–30)
CALCIUM SERPL-MCNC: 8.8 MG/DL (ref 8.5–10.1)
CHLORIDE SERPL-SCNC: 107 MMOL/L (ref 94–109)
CO2 SERPL-SCNC: 27 MMOL/L (ref 20–32)
CREAT SERPL-MCNC: 0.63 MG/DL (ref 0.52–1.04)
CRP SERPL-MCNC: 62.6 MG/L (ref 0–8)
DIFFERENTIAL METHOD BLD: ABNORMAL
EOSINOPHIL # BLD AUTO: 0 10E9/L (ref 0–0.7)
EOSINOPHIL NFR BLD AUTO: 0 %
ERYTHROCYTE [DISTWIDTH] IN BLOOD BY AUTOMATED COUNT: 13.7 % (ref 10–15)
FERRITIN SERPL-MCNC: 52 NG/ML (ref 8–252)
GFR SERPL CREATININE-BSD FRML MDRD: >90 ML/MIN/{1.73_M2}
GLUCOSE SERPL-MCNC: 138 MG/DL (ref 70–99)
HCT VFR BLD AUTO: 33.7 % (ref 35–47)
HGB BLD-MCNC: 10.7 G/DL (ref 11.7–15.7)
IMM GRANULOCYTES # BLD: 0 10E9/L (ref 0–0.4)
IMM GRANULOCYTES NFR BLD: 0.4 %
L PNEUMO1 AG UR QL IA: NORMAL
LYMPHOCYTES # BLD AUTO: 0.4 10E9/L (ref 0.8–5.3)
LYMPHOCYTES NFR BLD AUTO: 12.6 %
MCH RBC QN AUTO: 32.3 PG (ref 26.5–33)
MCHC RBC AUTO-ENTMCNC: 31.8 G/DL (ref 31.5–36.5)
MCV RBC AUTO: 102 FL (ref 78–100)
MONOCYTES # BLD AUTO: 0.1 10E9/L (ref 0–1.3)
MONOCYTES NFR BLD AUTO: 3.9 %
NEUTROPHILS # BLD AUTO: 2.4 10E9/L (ref 1.6–8.3)
NEUTROPHILS NFR BLD AUTO: 83.1 %
NRBC # BLD AUTO: 0 10*3/UL
NRBC BLD AUTO-RTO: 0 /100
PLATELET # BLD AUTO: 153 10E9/L (ref 150–450)
POTASSIUM SERPL-SCNC: 3.9 MMOL/L (ref 3.4–5.3)
PROCALCITONIN SERPL-MCNC: 0.53 NG/ML
PROT SERPL-MCNC: 7.2 G/DL (ref 6.8–8.8)
RBC # BLD AUTO: 3.31 10E12/L (ref 3.8–5.2)
S PNEUM AG SPEC QL: NORMAL
SARS-COV-2 RNA SPEC QL NAA+PROBE: NOT DETECTED
SODIUM SERPL-SCNC: 139 MMOL/L (ref 133–144)
SPECIMEN SOURCE: NORMAL
WBC # BLD AUTO: 2.9 10E9/L (ref 4–11)

## 2020-12-23 PROCEDURE — 80053 COMPREHEN METABOLIC PANEL: CPT | Performed by: PHYSICIAN ASSISTANT

## 2020-12-23 PROCEDURE — 99233 SBSQ HOSP IP/OBS HIGH 50: CPT | Performed by: PHYSICIAN ASSISTANT

## 2020-12-23 PROCEDURE — 87205 SMEAR GRAM STAIN: CPT | Performed by: PHYSICIAN ASSISTANT

## 2020-12-23 PROCEDURE — 250N000013 HC RX MED GY IP 250 OP 250 PS 637: Performed by: PHYSICIAN ASSISTANT

## 2020-12-23 PROCEDURE — 120N000001 HC R&B MED SURG/OB

## 2020-12-23 PROCEDURE — 86140 C-REACTIVE PROTEIN: CPT | Performed by: PHYSICIAN ASSISTANT

## 2020-12-23 PROCEDURE — 87040 BLOOD CULTURE FOR BACTERIA: CPT | Performed by: PHYSICIAN ASSISTANT

## 2020-12-23 PROCEDURE — 87070 CULTURE OTHR SPECIMN AEROBIC: CPT | Performed by: PHYSICIAN ASSISTANT

## 2020-12-23 PROCEDURE — 258N000003 HC RX IP 258 OP 636: Performed by: PHYSICIAN ASSISTANT

## 2020-12-23 PROCEDURE — 250N000011 HC RX IP 250 OP 636: Performed by: PHYSICIAN ASSISTANT

## 2020-12-23 PROCEDURE — 84145 PROCALCITONIN (PCT): CPT | Performed by: PHYSICIAN ASSISTANT

## 2020-12-23 PROCEDURE — 250N000012 HC RX MED GY IP 250 OP 636 PS 637: Performed by: PHYSICIAN ASSISTANT

## 2020-12-23 PROCEDURE — 36415 COLL VENOUS BLD VENIPUNCTURE: CPT | Performed by: PHYSICIAN ASSISTANT

## 2020-12-23 PROCEDURE — 87899 AGENT NOS ASSAY W/OPTIC: CPT | Performed by: PHYSICIAN ASSISTANT

## 2020-12-23 PROCEDURE — 85025 COMPLETE CBC W/AUTO DIFF WBC: CPT | Performed by: PHYSICIAN ASSISTANT

## 2020-12-23 PROCEDURE — 82728 ASSAY OF FERRITIN: CPT | Performed by: PHYSICIAN ASSISTANT

## 2020-12-23 RX ORDER — CEFTRIAXONE SODIUM 1 G/50ML
1 INJECTION, SOLUTION INTRAVENOUS EVERY 24 HOURS
Status: DISCONTINUED | OUTPATIENT
Start: 2020-12-23 | End: 2020-12-25 | Stop reason: HOSPADM

## 2020-12-23 RX ADMIN — METOPROLOL SUCCINATE 50 MG: 50 TABLET, EXTENDED RELEASE ORAL at 19:50

## 2020-12-23 RX ADMIN — ENOXAPARIN SODIUM 40 MG: 40 INJECTION SUBCUTANEOUS at 16:42

## 2020-12-23 RX ADMIN — CEFTRIAXONE SODIUM 1 G: 1 INJECTION, SOLUTION INTRAVENOUS at 18:36

## 2020-12-23 RX ADMIN — ATORVASTATIN CALCIUM 20 MG: 20 TABLET, FILM COATED ORAL at 08:16

## 2020-12-23 RX ADMIN — DIAZEPAM 10 MG: 5 TABLET ORAL at 09:46

## 2020-12-23 RX ADMIN — DIAZEPAM 10 MG: 5 TABLET ORAL at 19:13

## 2020-12-23 RX ADMIN — VENLAFAXINE HYDROCHLORIDE 225 MG: 150 CAPSULE, EXTENDED RELEASE ORAL at 08:16

## 2020-12-23 RX ADMIN — UMECLIDINIUM 1 PUFF: 62.5 AEROSOL, POWDER ORAL at 10:38

## 2020-12-23 RX ADMIN — METOPROLOL SUCCINATE 50 MG: 50 TABLET, EXTENDED RELEASE ORAL at 08:16

## 2020-12-23 RX ADMIN — ASPIRIN 325 MG ORAL TABLET 325 MG: 325 PILL ORAL at 08:16

## 2020-12-23 RX ADMIN — LEVOTHYROXINE SODIUM 50 MCG: 50 TABLET ORAL at 08:16

## 2020-12-23 RX ADMIN — TRAZODONE HYDROCHLORIDE 150 MG: 100 TABLET ORAL at 23:39

## 2020-12-23 RX ADMIN — DEXAMETHASONE 6 MG: 2 TABLET ORAL at 08:16

## 2020-12-23 RX ADMIN — OXYCODONE AND ACETAMINOPHEN 2 TABLET: 10; 325 TABLET ORAL at 09:46

## 2020-12-23 RX ADMIN — OXYCODONE AND ACETAMINOPHEN 2 TABLET: 10; 325 TABLET ORAL at 19:13

## 2020-12-23 RX ADMIN — AZITHROMYCIN 500 MG: 500 INJECTION, POWDER, LYOPHILIZED, FOR SOLUTION INTRAVENOUS at 19:16

## 2020-12-23 RX ADMIN — AMLODIPINE BESYLATE 10 MG: 10 TABLET ORAL at 08:16

## 2020-12-23 ASSESSMENT — ACTIVITIES OF DAILY LIVING (ADL)
ADLS_ACUITY_SCORE: 13

## 2020-12-23 ASSESSMENT — MIFFLIN-ST. JEOR: SCORE: 1260.13

## 2020-12-23 NOTE — PROGRESS NOTES
[facility]     Medicine Progress Note - Hospitalist Service       Date of Admission:  12/22/2020  Assessment & Plan       Jessica Ellison is a 70 year old female admitted on 12/22/2020. She presented to the emergency department for evaluation of progressive shortness of breath and was found to have hypoxic respiratory failure due to presumed COVID for which she is being admitted for further evaluation and treatment.     Acute respiratory failure with hypoxia  Presents with O2 sat 88% on room air, improves to 91% on 2L. VBG normal. Chest CT PE shows emphysematous changes, no PE, and bilateral groundglass opacities / interstitial infiltrates. CT was compared w prior CT in Nov 2020, which showed clear lungs at that time. High suspicion for COVID-19 infection, although initial COVID / influenza PCR was negative, also with evidence of possible bacterial infection.     Procalcitonin 0.53 - moderate risk of systemic infection  New leukopenia (WBC 2.9) on 12/23/2020, afebrile.  Increased to 3L O2 on 12/23/2020, sats drop into 70's with exertion.    - Continue supplemental O2 to maintain sats > 92%  - Repeat CRP in am  - Ceftriaxone and azithromycin initiated 12/23/2020 due to procalcitonin elevation and leukopenia  - Blood cultures x 2 pending  - Urine antigens pending  - Sputum culture and gram stain pending  - Steroids as noted below  - Address possible COVID below    Suspected COVID-19 virus infection  Presents with hypoxia, ground glass opacities on chest CT, elevated d-dimer. Onset of symptoms around 12/3. History sounds moderately concerning for COVID, although no known exposures.   - Initial PCR negative on 12/22/2020   - Patient has an additional PCR collected in clinic on 12/22 still pending  - Future repeat PCR on 12/25/20  - Precautions: airborne, contact (continue despite negative PCR)  - Dexamethasone 6 mg daily started 12/22/2020, continue (currently day 2)  - VTE prophylaxis:  Lovenox 40 mg daily     Chronic obstructive pulmonary disease  Evidence of emphysema on chest CT. Admit VBG normal as above. No wheezing noted on exam. Managed prior to admission with Spiriva and prn albuterol.  - Continue Spiriva and albuterol    Recent throat tightness / swelling, possible ACE induced angioedema  Evaluated in emergency department 12/3 for throat tightness, thought possibly to be angioedema due to lisinopril. Has been off of lisinopril since 12/3, throat tightness still present but improving. Improved slightly with outpatient steroids. Sounds like could possibly be esophageal spasm but overall unclear cause. No stridor or significant dysphagia.   - Monitor  - On steroids for possible COVID as noted above, with possible additional benefit for throat swelling  - Would recommend outpatient follow-up with ENT for further evaluation if not improving    Essential hypertension with goal blood pressure less than 140/90  Managed prior to admission with amlodipine 10 mg daily and metoprolol XL 50 mg bid. Of note, recently on lisinopril but stopped early December 2020 due to concerns regarding angioedema.  - Continue amlodipine and metoprolol with holding parameters    Cerebral aneurysm, non ruptured  History of non-ruptured aneurysm, s/p coil in 2014. Follows with neurosurgery Dr. García. Managed prior to admission with aspirin 325.  - Continue aspirin     Hypothyroidism  Managed prior to admission with levothyroxine 50 mcg daily, continue.    Chronic bilateral low back pain without sciatica  S/P lumbar spinal fusion  Chronic back pain managed prior to admission with Percocet 2 pills q 6 hours prn and prn flexeril 10 mg bid.  - Continue prn Percocet at home dosing  - Prn Flexeril available  Hyperlipidemia   Managed prior to admission with Lipitor 20 mg daily, continue.    Major depressive disorder, recurrent episode, moderate  Anxiety  Stable mood on admission. Managed prior to admission with Effexor 225 mg  daily, prn Seroquel 25 mg daily, and prn Valium 10 mg q 6 hour.   - Continue scheduled Effexor  - Prn Seroquel and Valium available    Insomnia  Managed prior to admission with Trazodone 150 mg q hs prn, continue.         Diet: Combination Diet Regular Diet Adult    DVT Prophylaxis: Enoxaparin (Lovenox) SQ  Bruce Catheter: not present  Code Status: Full Code           Disposition Plan   Expected discharge: 2+ days, recommended to prior living arrangement once work-up completed, respiratory status improves, weaned from supplemental O2.  Entered: Nay Chou PA-C 12/23/2020, 11:58 AM       The patient's care was discussed with the Attending Physician, Dr. Selena Crespo, bedside RN, and Patient.    Nay Chou PA-C  Hospitalist Service  [facility]   Contact information available via [facility] Paging/Directory    ______________________________________________________________________    Interval History   Patient feeling slightly better today compared to yesterday, although now needing 3L O2 and sats drop into 70's with minimal exertion.  Has minor cough with sputum, not frequent.  Still short of breath.  Feels improvement with Incruse inhaler and albuterol, as well as steroids.  Throat swelling / tightness still present but slightly better today compared to yesterday, feels steroids help.  Hot flashes, but no fever or chills.   Chronic back pain unchanged, but no other new pains.    Data reviewed today: I reviewed all medications, new labs and imaging results over the last 24 hours. I personally reviewed no images or EKG's today.    Physical Exam   Vital Signs: Temp: 97.1  F (36.2  C) Temp src: Oral BP: (!) 143/75 Pulse: 100   Resp: 20 SpO2: 95 % O2 Device: Nasal cannula Oxygen Delivery: 2 LPM  Weight: 167 lbs 12.32 oz    General appearance: Awake, alert, and in no apparent distress. Pleasant and conversational, speaking in full sentences.  CV: Regular rhythm & rate, no murmurs.  No edema. Peripheral pulses intact.  Respiratory: Crackles present bilaterally. No wheezing or rhonchi.  GI: Non-distended, soft, nontender to palpation. No rebound or guarding. Normoactive bowel sounds.  Skin: Warm, dry, no rashes or ecchymoses. No mottling of skin.  Musculoskeletal / extremities: Moves all extremities equally, no obvious abnormalities. Distal CMS intact.  Neurologic: No focal deficits.    Data   Recent Labs   Lab 12/23/20  0504 12/22/20  1015   WBC 2.9* 4.1   HGB 10.7* 11.0*   * 103*    160    139   POTASSIUM 3.9 3.4   CHLORIDE 107 105   CO2 27 26   BUN 20 15   CR 0.63 0.76   ANIONGAP 5 8   DELILAH 8.8 9.0   * 93   ALBUMIN 3.3*  --    PROTTOTAL 7.2  --    BILITOTAL 0.4  --    ALKPHOS 103  --    ALT 25  --    AST 17  --    TROPI  --  <0.015     No results found for this or any previous visit (from the past 24 hour(s)).  Medications       amLODIPine  10 mg Oral Daily     aspirin  325 mg Oral Daily     atorvastatin  20 mg Oral Daily     azithromycin  500 mg Intravenous Q24H     cefTRIAXone  1 g Intravenous Q24H     dexamethasone  6 mg Oral Daily     enoxaparin ANTICOAGULANT  40 mg Subcutaneous Q24H     levothyroxine  50 mcg Oral Daily     metoprolol succinate ER  50 mg Oral BID     sodium chloride (PF)  3 mL Intracatheter Q8H     umeclidinium  1 puff Inhalation Daily     venlafaxine  225 mg Oral Daily with breakfast

## 2020-12-23 NOTE — PLAN OF CARE
Patient resting today. She has been up independently in her room. She is currently on 3L via NC as she was 88-90 on 2L. Given an IS. KIT Chou placed orders for a sputum culture. Patient will collect when able. She still experienced SOB. When she first got back to bed after walking to the bathroom with no oxygen on she was at 72%. She came up to about 80% on her own and then was placed back on O2. Extension tubing added to help with this. She did receive pain medications and valium to help with chronic back pain, it did improve. She is anxious to get home, but has no questions or concerns at this time. IV is SL flushes well. She is voiding and passing gas.

## 2020-12-23 NOTE — PLAN OF CARE
Oxygen on at 2 lpm overnight and saturation levels 92-93%, other vitals stable.  Independent in room, admits to SOB with activity but steady when up.

## 2020-12-24 LAB
ALBUMIN SERPL-MCNC: 3.1 G/DL (ref 3.4–5)
ALP SERPL-CCNC: 86 U/L (ref 40–150)
ALT SERPL W P-5'-P-CCNC: 22 U/L (ref 0–50)
ANION GAP SERPL CALCULATED.3IONS-SCNC: 5 MMOL/L (ref 3–14)
AST SERPL W P-5'-P-CCNC: 15 U/L (ref 0–45)
BACTERIA SPEC CULT: ABNORMAL
BACTERIA SPEC CULT: ABNORMAL
BASOPHILS # BLD AUTO: 0 10E9/L (ref 0–0.2)
BASOPHILS NFR BLD AUTO: 0.1 %
BILIRUB SERPL-MCNC: 0.3 MG/DL (ref 0.2–1.3)
BUN SERPL-MCNC: 17 MG/DL (ref 7–30)
CALCIUM SERPL-MCNC: 8.7 MG/DL (ref 8.5–10.1)
CHLORIDE SERPL-SCNC: 109 MMOL/L (ref 94–109)
CO2 SERPL-SCNC: 28 MMOL/L (ref 20–32)
CREAT SERPL-MCNC: 0.62 MG/DL (ref 0.52–1.04)
CRP SERPL-MCNC: 24.6 MG/L (ref 0–8)
DIFFERENTIAL METHOD BLD: ABNORMAL
EOSINOPHIL # BLD AUTO: 0 10E9/L (ref 0–0.7)
EOSINOPHIL NFR BLD AUTO: 0 %
ERYTHROCYTE [DISTWIDTH] IN BLOOD BY AUTOMATED COUNT: 13.9 % (ref 10–15)
GFR SERPL CREATININE-BSD FRML MDRD: >90 ML/MIN/{1.73_M2}
GLUCOSE BLDC GLUCOMTR-MCNC: 96 MG/DL (ref 70–99)
GLUCOSE SERPL-MCNC: 97 MG/DL (ref 70–99)
GRAM STN SPEC: ABNORMAL
HCT VFR BLD AUTO: 34.3 % (ref 35–47)
HGB BLD-MCNC: 11 G/DL (ref 11.7–15.7)
IMM GRANULOCYTES # BLD: 0 10E9/L (ref 0–0.4)
IMM GRANULOCYTES NFR BLD: 0.4 %
LYMPHOCYTES # BLD AUTO: 0.6 10E9/L (ref 0.8–5.3)
LYMPHOCYTES NFR BLD AUTO: 7.8 %
Lab: ABNORMAL
MCH RBC QN AUTO: 32.7 PG (ref 26.5–33)
MCHC RBC AUTO-ENTMCNC: 32.1 G/DL (ref 31.5–36.5)
MCV RBC AUTO: 102 FL (ref 78–100)
MONOCYTES # BLD AUTO: 0.5 10E9/L (ref 0–1.3)
MONOCYTES NFR BLD AUTO: 6.7 %
NEUTROPHILS # BLD AUTO: 6.2 10E9/L (ref 1.6–8.3)
NEUTROPHILS NFR BLD AUTO: 85 %
NRBC # BLD AUTO: 0 10*3/UL
NRBC BLD AUTO-RTO: 0 /100
PLATELET # BLD AUTO: 203 10E9/L (ref 150–450)
POTASSIUM SERPL-SCNC: 3.4 MMOL/L (ref 3.4–5.3)
PROT SERPL-MCNC: 6.7 G/DL (ref 6.8–8.8)
RBC # BLD AUTO: 3.36 10E12/L (ref 3.8–5.2)
SODIUM SERPL-SCNC: 142 MMOL/L (ref 133–144)
SPECIMEN SOURCE: ABNORMAL
SPECIMEN SOURCE: ABNORMAL
WBC # BLD AUTO: 7.3 10E9/L (ref 4–11)

## 2020-12-24 PROCEDURE — 999N001017 HC STATISTIC GLUCOSE BY METER IP

## 2020-12-24 PROCEDURE — 120N000001 HC R&B MED SURG/OB

## 2020-12-24 PROCEDURE — 250N000012 HC RX MED GY IP 250 OP 636 PS 637: Performed by: PHYSICIAN ASSISTANT

## 2020-12-24 PROCEDURE — 99233 SBSQ HOSP IP/OBS HIGH 50: CPT | Performed by: INTERNAL MEDICINE

## 2020-12-24 PROCEDURE — 85025 COMPLETE CBC W/AUTO DIFF WBC: CPT | Performed by: PHYSICIAN ASSISTANT

## 2020-12-24 PROCEDURE — 80053 COMPREHEN METABOLIC PANEL: CPT | Performed by: PHYSICIAN ASSISTANT

## 2020-12-24 PROCEDURE — 36415 COLL VENOUS BLD VENIPUNCTURE: CPT | Performed by: PHYSICIAN ASSISTANT

## 2020-12-24 PROCEDURE — 250N000013 HC RX MED GY IP 250 OP 250 PS 637: Performed by: PHYSICIAN ASSISTANT

## 2020-12-24 PROCEDURE — 250N000011 HC RX IP 250 OP 636: Performed by: PHYSICIAN ASSISTANT

## 2020-12-24 PROCEDURE — 86140 C-REACTIVE PROTEIN: CPT | Performed by: PHYSICIAN ASSISTANT

## 2020-12-24 PROCEDURE — 258N000003 HC RX IP 258 OP 636: Performed by: PHYSICIAN ASSISTANT

## 2020-12-24 RX ADMIN — CYCLOBENZAPRINE HYDROCHLORIDE 10 MG: 10 TABLET, FILM COATED ORAL at 09:26

## 2020-12-24 RX ADMIN — ENOXAPARIN SODIUM 40 MG: 40 INJECTION SUBCUTANEOUS at 17:29

## 2020-12-24 RX ADMIN — ASPIRIN 325 MG ORAL TABLET 325 MG: 325 PILL ORAL at 09:04

## 2020-12-24 RX ADMIN — CYCLOBENZAPRINE HYDROCHLORIDE 10 MG: 10 TABLET, FILM COATED ORAL at 22:08

## 2020-12-24 RX ADMIN — LEVOTHYROXINE SODIUM 50 MCG: 50 TABLET ORAL at 09:04

## 2020-12-24 RX ADMIN — UMECLIDINIUM 1 PUFF: 62.5 AEROSOL, POWDER ORAL at 09:10

## 2020-12-24 RX ADMIN — TRAZODONE HYDROCHLORIDE 150 MG: 100 TABLET ORAL at 23:18

## 2020-12-24 RX ADMIN — AZITHROMYCIN 500 MG: 500 INJECTION, POWDER, LYOPHILIZED, FOR SOLUTION INTRAVENOUS at 18:08

## 2020-12-24 RX ADMIN — OXYCODONE AND ACETAMINOPHEN 2 TABLET: 10; 325 TABLET ORAL at 09:26

## 2020-12-24 RX ADMIN — DEXAMETHASONE 6 MG: 2 TABLET ORAL at 09:04

## 2020-12-24 RX ADMIN — VENLAFAXINE HYDROCHLORIDE 225 MG: 150 CAPSULE, EXTENDED RELEASE ORAL at 09:04

## 2020-12-24 RX ADMIN — METOPROLOL SUCCINATE 50 MG: 50 TABLET, EXTENDED RELEASE ORAL at 09:04

## 2020-12-24 RX ADMIN — METOPROLOL SUCCINATE 50 MG: 50 TABLET, EXTENDED RELEASE ORAL at 19:50

## 2020-12-24 RX ADMIN — AMLODIPINE BESYLATE 10 MG: 10 TABLET ORAL at 09:04

## 2020-12-24 RX ADMIN — ATORVASTATIN CALCIUM 20 MG: 20 TABLET, FILM COATED ORAL at 09:04

## 2020-12-24 RX ADMIN — OXYCODONE AND ACETAMINOPHEN 2 TABLET: 10; 325 TABLET ORAL at 22:08

## 2020-12-24 RX ADMIN — CEFTRIAXONE SODIUM 1 G: 1 INJECTION, SOLUTION INTRAVENOUS at 17:30

## 2020-12-24 ASSESSMENT — ACTIVITIES OF DAILY LIVING (ADL)
ADLS_ACUITY_SCORE: 12
ADLS_ACUITY_SCORE: 13
ADLS_ACUITY_SCORE: 12

## 2020-12-24 NOTE — PROGRESS NOTES
[facility] Internal Medicine Progress Note     Date of Service (when I saw the patient): 12/24/2020    REASON FOR ADMISSION / INTERVAL HISTORY:  Jessica Ellison is a 70 year old female admitted on 12/22/2020. She presented to the emergency department for evaluation of progressive shortness of breath. Still SOB-but improved     ASSESSMENT/PLAN:     Acute respiratory failure with hypoxia  Presents with O2 sat 88% on room air, improves to 91% on 2L. VBG normal. Chest CT PE shows emphysematous changes, no PE, and bilateral groundglass opacities / interstitial infiltrates. High suspicion for COVID-19 infection, although initial COVID / influenza PCR was negative.   Still needing 1-2 L o2 to keep sats up  - Continue supplemental O2 to maintain sats > 92%  - Steroids as noted below  - Address possible COVID below     Suspected COVID-19 virus infection  Presents with hypoxia, ground glass opacities on chest CT, elevated d-dimer. Onset of symptoms around 12/3. History sounds moderately concerning for COVID, although no known exposures.   Initial PCR negative on 12/22/2020. Pt had become leucopenic yesterday 12/23. PCT was 0.53. started on emp rocephin/ zithromax.  - Future repeat PCR on 12/25/20  -continue  Precautions: airborne, contact (continue despite negative PCR)  -continue  Dexamethasone 6 mg daily started 12/22/2020, continue antbx  - continue VTE prophylaxis: Lovenox 40 mg daily      Recent throat tightness / swelling, possible ACE induced angioedema  Evaluated in emergency department 12/3 for throat tightness, thought possibly to be angioedema due to lisinopril. Has been off of lisinopril since 12/3, throat tightness still present but improving. Improved slightly with outpatient steroids. Sounds like could possibly be esophageal spasm but overall unclear cause. No stridor or significant dysphagia.   - Monitor  - On steroids for possible COVID as noted above, with possible additional benefit for throat  swelling  - Would recommend outpatient follow-up with ENT for further evaluation if not improving     Chronic obstructive pulmonary disease  Evidence of emphysema on chest CT. Admit VBG normal as above. No wheezing noted on exam. Managed prior to admission with Spiriva and prn albuterol.  - Continue Spiriva and albuterol     Essential hypertension with goal blood pressure less than 140/90  Managed prior to admission with amlodipine 10 mg daily and metoprolol XL 50 mg bid. Of note, recently on lisinopril but stopped early December 2020 due to concerns regarding angioedema.  - Continue amlodipine and metoprolol with holding parameters     Cerebral aneurysm, non ruptured  History of non-ruptured aneurysm, s/p coil in 2014. Follows with neurosurgery Dr. García. Managed prior to admission with aspirin 325.  - Continue aspirin      Hypothyroidism  Managed prior to admission with levothyroxine 50 mcg daily, continue.     Chronic bilateral low back pain without sciatica  S/P lumbar spinal fusion  Chronic back pain managed prior to admission with Percocet 2 pills q 6 hours prn and prn flexeril 10 mg bid.  - Continue prn Percocet at home dosing  - Prn Flexeril available    Hyperlipidemia   Managed prior to admission with Lipitor 20 mg daily, continue.     Major depressive disorder, recurrent episode, moderate  Anxiety  Stable mood on admission. Managed prior to admission with Effexor 225 mg daily, prn Seroquel 25 mg daily, and prn Valium 10 mg q 6 hour.   - Continue scheduled Effexor  - Prn Seroquel and Valium available     Insomnia  Managed prior to admission with Trazodone 150 mg q hs prn, continue.    DISPO  Discussed with pt lucy. Would wait atleast one more day in hospital. Agrees.          RYNE JOSE MD   Pg [phone]    DVT Prhylaxis: Enoxaparin (Lovenox) SQ  Code Status: Full Code    ROS:  As described in A/P and Exam.  Otherwise ALL are  negative.    PHYSICAL EXAM:  All vitals have been reviewed    Blood  "pressure 119/79, pulse 71, temperature 95.9  F (35.5  C), temperature source Oral, resp. rate 22, height 1.6 m (5' 3\"), weight 76.1 kg (167 lb 12.3 oz), SpO2 90 %, not currently breastfeeding.    No intake/output data recorded.    GENERAL APPEARANCE: healthy, alert and no distress  EYES: conjunctiva clear, eyes grossly normal  HENT: external ears and nose normal   NECK: supple, no masses or adenopathy  RESP: lungs-B diffuse crackles- no rales, rhonchi or wheezes  CV: regular rate and rhythm, normal S1 S2, no S3 or S4 and no murmur, click or rub   ABDOMEN: soft, nontender, no HSM or masses and bowel sounds normal  MS: no clubbing, cyanosis; no edema  SKIN: clear without significant rashes or lesions  NEURO: -non-focal moves all 4 extr    ROUTINE  LABS (Last four results)  CMP  Recent Labs   Lab 12/24/20  0642 12/23/20  0504 12/22/20  1015    139 139   POTASSIUM 3.4 3.9 3.4   CHLORIDE 109 107 105   CO2 28 27 26   ANIONGAP 5 5 8   GLC 97 138* 93   BUN 17 20 15   CR 0.62 0.63 0.76   GFRESTIMATED >90 >90 80   GFRESTBLACK >90 >90 >90   DELILAH 8.7 8.8 9.0   PROTTOTAL 6.7* 7.2  --    ALBUMIN 3.1* 3.3*  --    BILITOTAL 0.3 0.4  --    ALKPHOS 86 103  --    AST 15 17  --    ALT 22 25  --      CBC  Recent Labs   Lab 12/24/20  0642 12/23/20  0504 12/22/20  1015   WBC 7.3 2.9* 4.1   RBC 3.36* 3.31* 3.32*   HGB 11.0* 10.7* 11.0*   HCT 34.3* 33.7* 34.3*   * 102* 103*   MCH 32.7 32.3 33.1*   MCHC 32.1 31.8 32.1   RDW 13.9 13.7 13.9    153 160     INRNo lab results found in last 7 days.  Arterial Blood Gas  Recent Labs   Lab 12/22/20  1015   O2PER 28%       No results found for this or any previous visit (from the past 24 hour(s)).    "

## 2020-12-24 NOTE — PROGRESS NOTES
Pt requests trazodone now for sleep, 150 mg po given. VSS.Pt offers no complaints.Pt wishes to sleep now. Will continue to monitor.

## 2020-12-24 NOTE — PLAN OF CARE
Patient is alert and oriented. Up independently. Medicated with percocet and valium for chronic back pain. Aqua K pad applied to low back. Started IV antibiotics after urine specimen obtained and blood cultures. Patient reports shortness of breath with exertion. Oxygen saturation low to mid 90s on 2 LPM NC.

## 2020-12-24 NOTE — PLAN OF CARE
Pt reports feeling better today, less SOA.  She is afebrile, O2 sats 92-94% on RA at rest.  Up in chair for meals, appetite is fair.  Percocet and flexeril given for chronic back pain with relief.  Pt is hoping to discharge home tomorrow.

## 2020-12-25 VITALS
HEIGHT: 63 IN | TEMPERATURE: 97.7 F | BODY MASS INDEX: 29.1 KG/M2 | WEIGHT: 164.24 LBS | OXYGEN SATURATION: 92 % | SYSTOLIC BLOOD PRESSURE: 159 MMHG | DIASTOLIC BLOOD PRESSURE: 99 MMHG | HEART RATE: 79 BPM | RESPIRATION RATE: 18 BRPM

## 2020-12-25 LAB
ERYTHROCYTE [DISTWIDTH] IN BLOOD BY AUTOMATED COUNT: 13.8 % (ref 10–15)
FLUABV+SARS-COV-2+RSV PNL RESP NAA+PROBE: NEGATIVE
FLUABV+SARS-COV-2+RSV PNL RESP NAA+PROBE: NEGATIVE
HCT VFR BLD AUTO: 36.8 % (ref 35–47)
HGB BLD-MCNC: 11.8 G/DL (ref 11.7–15.7)
LABORATORY COMMENT REPORT: NORMAL
MCH RBC QN AUTO: 32.8 PG (ref 26.5–33)
MCHC RBC AUTO-ENTMCNC: 32.1 G/DL (ref 31.5–36.5)
MCV RBC AUTO: 102 FL (ref 78–100)
PLATELET # BLD AUTO: 200 10E9/L (ref 150–450)
PLATELET # BLD AUTO: 209 10E9/L (ref 150–450)
PROCALCITONIN SERPL-MCNC: 0.07 NG/ML
RBC # BLD AUTO: 3.6 10E12/L (ref 3.8–5.2)
RSV RNA SPEC QL NAA+PROBE: NORMAL
SARS-COV-2 RNA SPEC QL NAA+PROBE: NEGATIVE
SPECIMEN SOURCE: NORMAL
WBC # BLD AUTO: 6.6 10E9/L (ref 4–11)

## 2020-12-25 PROCEDURE — 85027 COMPLETE CBC AUTOMATED: CPT | Performed by: INTERNAL MEDICINE

## 2020-12-25 PROCEDURE — 85049 AUTOMATED PLATELET COUNT: CPT | Performed by: PHYSICIAN ASSISTANT

## 2020-12-25 PROCEDURE — 99239 HOSP IP/OBS DSCHRG MGMT >30: CPT | Performed by: INTERNAL MEDICINE

## 2020-12-25 PROCEDURE — 87636 SARSCOV2 & INF A&B AMP PRB: CPT | Performed by: INTERNAL MEDICINE

## 2020-12-25 PROCEDURE — 250N000012 HC RX MED GY IP 250 OP 636 PS 637: Performed by: PHYSICIAN ASSISTANT

## 2020-12-25 PROCEDURE — 250N000013 HC RX MED GY IP 250 OP 250 PS 637: Performed by: PHYSICIAN ASSISTANT

## 2020-12-25 PROCEDURE — 36415 COLL VENOUS BLD VENIPUNCTURE: CPT | Performed by: PHYSICIAN ASSISTANT

## 2020-12-25 PROCEDURE — 84145 PROCALCITONIN (PCT): CPT | Performed by: INTERNAL MEDICINE

## 2020-12-25 RX ORDER — PREDNISONE 20 MG/1
40 TABLET ORAL DAILY
Qty: 5 TABLET | Refills: 0 | Status: SHIPPED | OUTPATIENT
Start: 2020-12-25 | End: 2020-12-28

## 2020-12-25 RX ORDER — LEVOFLOXACIN 500 MG/1
500 TABLET, FILM COATED ORAL DAILY
Qty: 7 TABLET | Refills: 0 | Status: SHIPPED | OUTPATIENT
Start: 2020-12-25 | End: 2021-01-01

## 2020-12-25 RX ADMIN — UMECLIDINIUM 1 PUFF: 62.5 AEROSOL, POWDER ORAL at 08:53

## 2020-12-25 RX ADMIN — AMLODIPINE BESYLATE 10 MG: 10 TABLET ORAL at 08:53

## 2020-12-25 RX ADMIN — ATORVASTATIN CALCIUM 20 MG: 20 TABLET, FILM COATED ORAL at 08:52

## 2020-12-25 RX ADMIN — OXYCODONE AND ACETAMINOPHEN 2 TABLET: 10; 325 TABLET ORAL at 10:40

## 2020-12-25 RX ADMIN — ASPIRIN 325 MG ORAL TABLET 325 MG: 325 PILL ORAL at 08:53

## 2020-12-25 RX ADMIN — CYCLOBENZAPRINE HYDROCHLORIDE 10 MG: 10 TABLET, FILM COATED ORAL at 10:40

## 2020-12-25 RX ADMIN — LEVOTHYROXINE SODIUM 50 MCG: 50 TABLET ORAL at 08:53

## 2020-12-25 RX ADMIN — DEXAMETHASONE 6 MG: 2 TABLET ORAL at 08:52

## 2020-12-25 RX ADMIN — METOPROLOL SUCCINATE 50 MG: 50 TABLET, EXTENDED RELEASE ORAL at 08:53

## 2020-12-25 RX ADMIN — VENLAFAXINE HYDROCHLORIDE 225 MG: 150 CAPSULE, EXTENDED RELEASE ORAL at 08:52

## 2020-12-25 ASSESSMENT — ACTIVITIES OF DAILY LIVING (ADL)
ADLS_ACUITY_SCORE: 12

## 2020-12-25 ASSESSMENT — MIFFLIN-ST. JEOR: SCORE: 1244.13

## 2020-12-25 NOTE — PLAN OF CARE
[place] [facility] DISCHARGE NOTE    Patient discharged to home at 1430 PM via wheel chair. Accompanied by staff. Discharge instructions reviewed with patient, opportunity offered to ask questions. Prescriptions sent to patients preferred pharmacy. All belongings sent with patient.    Cara Haro RN

## 2020-12-25 NOTE — PLAN OF CARE
Patient's saturation dipped into 80's without OXYGEN   Patient is questioning how we take sat readings, explained to patient the importance of recovery also.    IV infiltrated at 1940 last evening.  IV dc'd, warm paks to IV site.    IV not started per patient thinking she is going home today

## 2020-12-29 LAB
BACTERIA SPEC CULT: NO GROWTH
BACTERIA SPEC CULT: NO GROWTH
SPECIMEN SOURCE: NORMAL
SPECIMEN SOURCE: NORMAL

## 2021-01-04 ENCOUNTER — ANCILLARY PROCEDURE (OUTPATIENT)
Dept: GENERAL RADIOLOGY | Facility: CLINIC | Age: 71
End: 2021-01-04
Attending: NURSE PRACTITIONER
Payer: COMMERCIAL

## 2021-01-04 ENCOUNTER — TELEPHONE (OUTPATIENT)
Dept: FAMILY MEDICINE | Facility: CLINIC | Age: 71
End: 2021-01-04

## 2021-01-04 ENCOUNTER — OFFICE VISIT (OUTPATIENT)
Dept: FAMILY MEDICINE | Facility: CLINIC | Age: 71
End: 2021-01-04
Payer: COMMERCIAL

## 2021-01-04 VITALS
WEIGHT: 162.6 LBS | BODY MASS INDEX: 28.81 KG/M2 | OXYGEN SATURATION: 92 % | RESPIRATION RATE: 16 BRPM | DIASTOLIC BLOOD PRESSURE: 76 MMHG | TEMPERATURE: 98.4 F | HEART RATE: 100 BPM | HEIGHT: 63 IN | SYSTOLIC BLOOD PRESSURE: 108 MMHG

## 2021-01-04 DIAGNOSIS — R09.02 HYPOXEMIA: Primary | ICD-10-CM

## 2021-01-04 DIAGNOSIS — G47.19 EXCESSIVE DAYTIME SLEEPINESS: ICD-10-CM

## 2021-01-04 DIAGNOSIS — M25.522 LEFT ELBOW PAIN: ICD-10-CM

## 2021-01-04 DIAGNOSIS — E55.9 VITAMIN D DEFICIENCY: ICD-10-CM

## 2021-01-04 DIAGNOSIS — I51.7 CARDIOMEGALY: ICD-10-CM

## 2021-01-04 PROBLEM — J43.2 CENTRILOBULAR EMPHYSEMA (H): Status: ACTIVE | Noted: 2020-11-04

## 2021-01-04 LAB — CRP SERPL-MCNC: 15.8 MG/L (ref 0–8)

## 2021-01-04 PROCEDURE — 36415 COLL VENOUS BLD VENIPUNCTURE: CPT | Performed by: NURSE PRACTITIONER

## 2021-01-04 PROCEDURE — 82306 VITAMIN D 25 HYDROXY: CPT | Performed by: NURSE PRACTITIONER

## 2021-01-04 PROCEDURE — 99214 OFFICE O/P EST MOD 30 MIN: CPT | Performed by: NURSE PRACTITIONER

## 2021-01-04 PROCEDURE — 86769 SARS-COV-2 COVID-19 ANTIBODY: CPT | Performed by: NURSE PRACTITIONER

## 2021-01-04 PROCEDURE — 86140 C-REACTIVE PROTEIN: CPT | Performed by: NURSE PRACTITIONER

## 2021-01-04 PROCEDURE — 73070 X-RAY EXAM OF ELBOW: CPT | Mod: LT | Performed by: RADIOLOGY

## 2021-01-04 ASSESSMENT — MIFFLIN-ST. JEOR: SCORE: 1236.68

## 2021-01-04 NOTE — TELEPHONE ENCOUNTER
Left message on answering machine to return call. Direct line provided. [employer] message sent to patient.  Daniella Dupree RN

## 2021-01-04 NOTE — PATIENT INSTRUCTIONS
Patient Education     Arthralgia    Arthralgia is the term for pain in or around the joint. It is a symptom, not a disease. This pain may involve one or more joints. In some cases, the pain moves from joint to joint.  There are many causes for joint pain. These include:    Injury    Wearing out the joint surface (osteoarthritis)    Inflammation of the joint because of crystals in the joint fluid (gout)    Infection inside the joint      Inflammation of the fluid-filled sacs around the joint (bursitis)    Autoimmune disorders such as rheumatoid arthritis or lupus    Inflammation of chords that attach muscle to bone (tendonitis)  Home care    Rest the involved joint(s) until your symptoms improve.     Eat a healthy diet, exercise as advised by your healthcare provider and stay at a healthy weight    You may be prescribed pain medicine. If none is prescribed, you may use acetaminophen or ibuprofen to control pain and inflammation.    Follow-up care  Follow up with your healthcare provider or as advised.  When to seek medical advice  Call your healthcare provider right away if any of the following occurs:    Pain, swelling, or redness of joint increases    Pain worsens or recurs after a period of improvement    Pain moves to other joints    You cannot bear weight on the affected joint     You cannot move the affected joint    Joint appears deformed    New rash appears    Fever of 100.4 F (38 C) or higher, or as directed by your healthcare provider    New symptoms appear  [employer] last reviewed this educational content on 8/1/2019 2000-2020 [employer]. [address] [place] [address], [place] [address]. All rights reserved. This information is not intended as a substitute for professional medical care. Always follow your healthcare professional's instructions.

## 2021-01-04 NOTE — LETTER
[facility]  [address] [id number]  [phone]        January 4, 2021    Regarding:  Jessica Ellison     [address] [id number]        To Whom It May Concern;        Jessica Ellison is under my medical care.  She wishes to travel to [place] and that may be beneficial (certainly not harmful) for her chronic back pain.  There are no medical contraindications to travel at this point other than the risk of COVID with travel at this time.              Sincerely,          Sarah Barriga MD

## 2021-01-04 NOTE — TELEPHONE ENCOUNTER
Hi Dr. Kiley Lopez is asking if you will write her a court letter for the year for [place]. She said you will know what it means. Thank you, LA Gomes CNP [facility]

## 2021-01-04 NOTE — PROGRESS NOTES
Assessment & Plan     Hypoxemia    - CRP, inflammation  - COVID-19 Virus (Coronavirus) Antibody Screen, IgG  - Echocardiogram Complete; Future    Vitamin D deficiency    - Vitamin D Deficiency    Left elbow pain    - XR Elbow Left 2 Views    Excessive daytime sleepiness    - SLEEP EVALUATION & MANAGEMENT REFERRAL - ADULT -[facility] - Marjorie Gordillo  [phone] (Age 15 and up); Future  - Echocardiogram Complete; Future    Cardiomegaly    - Echocardiogram Complete; Future    Review of the result(s) of each unique test - CT scan from inpatient stay                     FURTHER TESTING:       - echocardiogram    CONSULTATION/REFERRAL to Sleep Eval    FUTURE APPOINTMENTS:       - Follow-up for annual visit or as needed  Patient Instructions     Patient Education     Arthralgia    Arthralgia is the term for pain in or around the joint. It is a symptom, not a disease. This pain may involve one or more joints. In some cases, the pain moves from joint to joint.  There are many causes for joint pain. These include:    Injury    Wearing out the joint surface (osteoarthritis)    Inflammation of the joint because of crystals in the joint fluid (gout)    Infection inside the joint      Inflammation of the fluid-filled sacs around the joint (bursitis)    Autoimmune disorders such as rheumatoid arthritis or lupus    Inflammation of chords that attach muscle to bone (tendonitis)  Home care    Rest the involved joint(s) until your symptoms improve.     Eat a healthy diet, exercise as advised by your healthcare provider and stay at a healthy weight    You may be prescribed pain medicine. If none is prescribed, you may use acetaminophen or ibuprofen to control pain and inflammation.    Follow-up care  Follow up with your healthcare provider or as advised.  When to seek medical advice  Call your healthcare provider right away if any of the following occurs:    Pain, swelling, or redness of joint increases    Pain worsens  or recurs after a period of improvement    Pain moves to other joints    You cannot bear weight on the affected joint     You cannot move the affected joint    Joint appears deformed    New rash appears    Fever of 100.4 F (38 C) or higher, or as directed by your healthcare provider    New symptoms appear  [employer] last reviewed this educational content on 8/1/2019 2000-2020 [employer]. [address] [place] [address]. All rights reserved. This information is not intended as a substitute for professional medical care. Always follow your healthcare professional's instructions.               No follow-ups on file.    LA Gorman CNP  [facility]     Jessica Ellison is a 70 year old female who presents to clinic today for the following health issues  accompanied by her self:    [facility]         Hospital Follow-up Visit:    Hospital/Nursing Home/IP Rehab Facility: [facility]  Date of Admission: 12/22/20  Date of Discharge: 12/25/20  Reason(s) for Admission: acute respiratory failure with hypoxia      Was your hospitalization related to COVID-19? No   Problems taking medications regularly:  None  Medication changes since discharge: started on prednisone and levaquin, completed both   Problems adhering to non-medication therapy:  None    Summary of hospitalization:  [facility] discharge summary reviewed  Diagnostic Tests/Treatments reviewed.  Follow up needed: none  Other Healthcare Providers Involved in Patient s Care:         None  Update since discharge: improved. Post Discharge Medication Reconciliation: discharge medications reconciled, continue medications without change.  Plan of care communicated with patient              Musculoskeletal problem/pain  Onset/Duration: 1 month  Description  Location: elbow - left  Joint Swelling: no  Redness: YES  Pain: YES  Warmth: no  Intensity:  moderate  Progression of Symptoms:   "same  Accompanying signs and symptoms:   Fevers: no  Numbness/tingling/weakness: no  History  Trauma to the area: YES- fell  Recent illness:  YES- this was going on prior  Previous similar problem: no  Previous evaluation:  no  Precipitating or alleviating factors:  Aggravating factors include: overuse  Therapies tried and outcome: rest/inactivity    Concern - Excessive daytime sleepiness  Onset: months  Description: getting over 12 hours of sleep at night and taking naps during day, no energy  Intensity: moderate  Progression of Symptoms:  same  Accompanying Signs & Symptoms: denies snoring  Previous history of similar problem: none  Precipitating factors:        Worsened by: none  Alleviating factors:        Improved by: none  Therapies tried and outcome:  none       Review of Systems   Constitutional, HEENT, cardiovascular, pulmonary, gi and gu systems are negative, except as otherwise noted.      Objective    /76   Pulse 100   Temp 98.4  F (36.9  C) (Tympanic)   Resp 16   Ht 1.6 m (5' 3\")   Wt 73.8 kg (162 lb 9.6 oz)   SpO2 92%   BMI 28.80 kg/m    Body mass index is 28.8 kg/m .  Physical Exam   GENERAL: healthy, alert and no distress  EYES: Eyes grossly normal to inspection, PERRL and conjunctivae and sclerae normal  NECK: no adenopathy, no asymmetry, masses, or scars and thyroid normal to palpation  RESP: lungs clear to auscultation - no rales, rhonchi or wheezes  CV: regular rates and rhythm, normal S1 S2, no S3 or S4, no murmur, click or rub and no peripheral edema  MS: no gross musculoskeletal defects noted, no edema  NEURO: Normal strength and tone, mentation intact and speech normal                "

## 2021-01-05 LAB
COVID-19 ANTIBODY IGG: NEGATIVE
DEPRECATED CALCIDIOL+CALCIFEROL SERPL-MC: 61 UG/L (ref 20–75)
LAB TEST METHOD: NORMAL

## 2021-01-05 NOTE — RESULT ENCOUNTER NOTE
Bam Lopez    Your CRP is still slightly elevated, but improved from previous. On the xray there is no definite fracture seen but there are inflammatory changes consistent with possible fracture. I would follow up with [facility] in [place] or [place] to have the elbow further assessed and see if they recommend MRI. Please let us know if you have any questions.     Take care,    LA Goems CNP

## 2021-01-05 NOTE — RESULT ENCOUNTER NOTE
Bma Lopez    Your vitamin D level is good. The Covid antibody is negative. Please let us know if you have any questions.     Take care,    LA Gomes CNP

## 2021-01-22 ENCOUNTER — TELEPHONE (OUTPATIENT)
Dept: FAMILY MEDICINE | Facility: CLINIC | Age: 71
End: 2021-01-22

## 2021-01-22 DIAGNOSIS — R79.82 ELEVATED C-REACTIVE PROTEIN (CRP): Primary | ICD-10-CM

## 2021-01-22 NOTE — CONFIDENTIAL NOTE
Patient states that her CRP has been going down.  She is wondering if she can have this rechecked?  She is traveling out of town in 2 weeks to go to [place] and would like to make sure its getting better and not worse.  She states she is feeling better overall.     Melissa Swenson RN

## 2021-01-22 NOTE — TELEPHONE ENCOUNTER
Reason for Call: Request for an order or referral:    Order or referral being requested: Pt is going out of town in 2 weeks and wants to recheck her CRP and wants an order placed to [facility].  Please call patient and advise.      Date needed: at your convenience    Has the patient been seen by the PCP for this problem? YES    Additional comments:     Phone number Patient can be reached at:  Home number on file [phone] (home)    Best Time:  any    Can we leave a detailed message on this number?  YES    Call taken on 1/22/2021 at 2:06 PM by Cait Trevino

## 2021-01-22 NOTE — TELEPHONE ENCOUNTER
Patient notified and she will call next week to schedule for a lab appointment.  Daniella Dupree RN

## 2021-02-01 ENCOUNTER — TELEPHONE (OUTPATIENT)
Dept: FAMILY MEDICINE | Facility: CLINIC | Age: 71
End: 2021-02-01

## 2021-02-01 DIAGNOSIS — Z98.1 S/P LUMBAR SPINAL FUSION: ICD-10-CM

## 2021-02-01 DIAGNOSIS — M53.3 SACROILIAC JOINT PAIN: ICD-10-CM

## 2021-02-01 DIAGNOSIS — M54.50 CHRONIC BILATERAL LOW BACK PAIN WITHOUT SCIATICA: ICD-10-CM

## 2021-02-01 DIAGNOSIS — G89.29 CHRONIC BILATERAL LOW BACK PAIN WITHOUT SCIATICA: ICD-10-CM

## 2021-02-01 NOTE — TELEPHONE ENCOUNTER
Requested Prescriptions   Pending Prescriptions Disp Refills     oxyCODONE-acetaminophen (PERCOCET)  MG per tablet [Pharmacy Med Name: OXYCODONE-ACETAMINOPHEN  TABS] 120 tablet 0     Sig: TAKE TWO TABLETS BY MOUTH EVERY 6 HOURS AS NEEDED FOR MODERATE TO SEVERE PAIN ( DO NOT TAKE MORE THAN 4 TABLETS PER DAY)       There is no refill protocol information for this order

## 2021-02-02 ENCOUNTER — ALLIED HEALTH/NURSE VISIT (OUTPATIENT)
Dept: FAMILY MEDICINE | Facility: CLINIC | Age: 71
End: 2021-02-02
Payer: COMMERCIAL

## 2021-02-02 VITALS — OXYGEN SATURATION: 93 % | HEART RATE: 94 BPM

## 2021-02-02 DIAGNOSIS — R79.82 ELEVATED C-REACTIVE PROTEIN (CRP): ICD-10-CM

## 2021-02-02 DIAGNOSIS — R06.02 SHORTNESS OF BREATH: Primary | ICD-10-CM

## 2021-02-02 LAB — CRP SERPL-MCNC: <2.9 MG/L (ref 0–8)

## 2021-02-02 PROCEDURE — 99207 PR NO CHARGE NURSE ONLY: CPT

## 2021-02-02 PROCEDURE — 36415 COLL VENOUS BLD VENIPUNCTURE: CPT | Performed by: NURSE PRACTITIONER

## 2021-02-02 PROCEDURE — 86140 C-REACTIVE PROTEIN: CPT | Performed by: NURSE PRACTITIONER

## 2021-02-02 NOTE — PROGRESS NOTES
Pt in clinic to double check her pulse ox machine.  Pt pulse ox was 94-95%.  Clinic Pulse ox was 93-94%.  Pt was hospitalized and wanting to be able to check her O2 @ home.  Having no symptoms.  MarcelloRN

## 2021-02-02 NOTE — TELEPHONE ENCOUNTER
Routing refill request to provider for review/approval because:  Drug not on the [facility] refill protocol     Daniella Dupree RN

## 2021-02-03 RX ORDER — OXYCODONE AND ACETAMINOPHEN 10; 325 MG/1; MG/1
TABLET ORAL
Qty: 120 TABLET | Refills: 0 | Status: SHIPPED | OUTPATIENT
Start: 2021-02-03 | End: 2021-03-01

## 2021-02-03 NOTE — TELEPHONE ENCOUNTER
Pt scheduled appt to address pain med refills.  Pt will have just returned from [place] prior to appt.  KpavleRn

## 2021-02-03 NOTE — TELEPHONE ENCOUNTER
Refilled, last one without a visit.     Needs new pain contract as it .     Per our pain contract, she is to be seen every 3 months for refills.    Sarah Barriga M.D.

## 2021-02-04 NOTE — RESULT ENCOUNTER NOTE
Bam Lopez    Your lab results came back within normal limits. Please let us know if you have any questions.     Take care,    LA Gomes CNP

## 2021-03-01 DIAGNOSIS — G89.29 CHRONIC BILATERAL LOW BACK PAIN WITHOUT SCIATICA: ICD-10-CM

## 2021-03-01 DIAGNOSIS — Z98.1 S/P LUMBAR SPINAL FUSION: ICD-10-CM

## 2021-03-01 DIAGNOSIS — M53.3 SACROILIAC JOINT PAIN: ICD-10-CM

## 2021-03-01 DIAGNOSIS — M54.50 CHRONIC BILATERAL LOW BACK PAIN WITHOUT SCIATICA: ICD-10-CM

## 2021-03-01 RX ORDER — OXYCODONE AND ACETAMINOPHEN 10; 325 MG/1; MG/1
TABLET ORAL
Qty: 120 TABLET | Refills: 0 | Status: SHIPPED | OUTPATIENT
Start: 2021-03-01 | End: 2021-03-15

## 2021-03-15 ENCOUNTER — OFFICE VISIT (OUTPATIENT)
Dept: FAMILY MEDICINE | Facility: CLINIC | Age: 71
End: 2021-03-15
Payer: COMMERCIAL

## 2021-03-15 VITALS
RESPIRATION RATE: 16 BRPM | HEART RATE: 70 BPM | HEIGHT: 63 IN | OXYGEN SATURATION: 90 % | TEMPERATURE: 98.3 F | SYSTOLIC BLOOD PRESSURE: 116 MMHG | BODY MASS INDEX: 28.8 KG/M2 | DIASTOLIC BLOOD PRESSURE: 64 MMHG

## 2021-03-15 DIAGNOSIS — F41.9 ANXIETY: ICD-10-CM

## 2021-03-15 DIAGNOSIS — M54.50 CHRONIC BILATERAL LOW BACK PAIN WITHOUT SCIATICA: ICD-10-CM

## 2021-03-15 DIAGNOSIS — M53.3 SACROILIAC JOINT PAIN: ICD-10-CM

## 2021-03-15 DIAGNOSIS — F33.1 MAJOR DEPRESSIVE DISORDER, RECURRENT EPISODE, MODERATE (H): ICD-10-CM

## 2021-03-15 DIAGNOSIS — G89.29 CHRONIC BILATERAL LOW BACK PAIN WITHOUT SCIATICA: ICD-10-CM

## 2021-03-15 DIAGNOSIS — Z98.1 S/P LUMBAR SPINAL FUSION: ICD-10-CM

## 2021-03-15 PROCEDURE — 99214 OFFICE O/P EST MOD 30 MIN: CPT | Performed by: FAMILY MEDICINE

## 2021-03-15 RX ORDER — VENLAFAXINE HYDROCHLORIDE 150 MG/1
150 CAPSULE, EXTENDED RELEASE ORAL DAILY
Qty: 90 CAPSULE | Refills: 1 | Status: SHIPPED | OUTPATIENT
Start: 2021-03-15 | End: 2021-08-05

## 2021-03-15 RX ORDER — DIAZEPAM 10 MG
10 TABLET ORAL EVERY 6 HOURS PRN
Status: CANCELLED | OUTPATIENT
Start: 2021-03-15

## 2021-03-15 RX ORDER — OXYCODONE AND ACETAMINOPHEN 10; 325 MG/1; MG/1
TABLET ORAL
Qty: 120 TABLET | Refills: 0 | Status: SHIPPED | OUTPATIENT
Start: 2021-03-31 | End: 2021-04-22

## 2021-03-15 RX ORDER — CYCLOBENZAPRINE HCL 10 MG
10 TABLET ORAL 2 TIMES DAILY PRN
Qty: 180 TABLET | Refills: 1 | Status: SHIPPED | OUTPATIENT
Start: 2021-03-15 | End: 2022-10-13

## 2021-03-15 RX ORDER — VENLAFAXINE HYDROCHLORIDE 75 MG/1
75 CAPSULE, EXTENDED RELEASE ORAL DAILY
Qty: 90 CAPSULE | Refills: 1 | Status: SHIPPED | OUTPATIENT
Start: 2021-03-15 | End: 2021-08-05

## 2021-03-15 ASSESSMENT — ANXIETY QUESTIONNAIRES
IF YOU CHECKED OFF ANY PROBLEMS ON THIS QUESTIONNAIRE, HOW DIFFICULT HAVE THESE PROBLEMS MADE IT FOR YOU TO DO YOUR WORK, TAKE CARE OF THINGS AT HOME, OR GET ALONG WITH OTHER PEOPLE: SOMEWHAT DIFFICULT
5. BEING SO RESTLESS THAT IT IS HARD TO SIT STILL: NOT AT ALL
GAD7 TOTAL SCORE: 5
1. FEELING NERVOUS, ANXIOUS, OR ON EDGE: SEVERAL DAYS
2. NOT BEING ABLE TO STOP OR CONTROL WORRYING: SEVERAL DAYS
7. FEELING AFRAID AS IF SOMETHING AWFUL MIGHT HAPPEN: SEVERAL DAYS
6. BECOMING EASILY ANNOYED OR IRRITABLE: SEVERAL DAYS
3. WORRYING TOO MUCH ABOUT DIFFERENT THINGS: SEVERAL DAYS

## 2021-03-15 ASSESSMENT — PATIENT HEALTH QUESTIONNAIRE - PHQ9
5. POOR APPETITE OR OVEREATING: NOT AT ALL
SUM OF ALL RESPONSES TO PHQ QUESTIONS 1-9: 5

## 2021-03-15 ASSESSMENT — PAIN SCALES - GENERAL: PAINLEVEL: SEVERE PAIN (6)

## 2021-03-15 NOTE — PATIENT INSTRUCTIONS
Our Clinic hours are:  Mondays    7:20 am - 7 pm  Tues -  Fri  7:20 am - 5 pm    Clinic Phone: [phone]    The clinic lab opens at 7:30 am Mon - Fri and appointments are required.    [facility]. [phone]  Monday  8 am - 7pm  Tues - Fri 8 am - 5:30 pm

## 2021-03-15 NOTE — PROGRESS NOTES
"    Assessment & Plan     S/P lumbar spinal fusion  Follows with Dr. Banerjee at pain clinic as well for regular injections  - cyclobenzaprine (FLEXERIL) 10 MG tablet; Take 1 tablet (10 mg) by mouth 2 times daily as needed for muscle spasms  - oxyCODONE-acetaminophen (PERCOCET)  MG per tablet; TAKE TWO TABLETS BY MOUTH EVERY 6 HOURS AS NEEDED FOR MODERATE TO SEVERE PAIN ( DO NOT TAKE MORE THAN 4 TABLETS PER DAY)    Anxiety  stable  - venlafaxine (EFFEXOR-XR) 150 MG 24 hr capsule; Take 1 capsule (150 mg) by mouth daily TAKE ONE CAPSULE BY MOUTH ONCE DAILY to take with a 75 mg for total of 225 mg daily  - venlafaxine (EFFEXOR-XR) 75 MG 24 hr capsule; Take 1 capsule (75 mg) by mouth daily Take with 150 mg for a total of 225 mg daily. Is taking the 75 mg some times 2 times a day, thought she was told to increased not sure    Major depressive disorder, recurrent episode, moderate (H)  stable  - venlafaxine (EFFEXOR-XR) 150 MG 24 hr capsule; Take 1 capsule (150 mg) by mouth daily TAKE ONE CAPSULE BY MOUTH ONCE DAILY to take with a 75 mg for total of 225 mg daily  - venlafaxine (EFFEXOR-XR) 75 MG 24 hr capsule; Take 1 capsule (75 mg) by mouth daily Take with 150 mg for a total of 225 mg daily. Is taking the 75 mg some times 2 times a day, thought she was told to increased not sure    Chronic bilateral low back pain without sciatica     - oxyCODONE-acetaminophen (PERCOCET)  MG per tablet; TAKE TWO TABLETS BY MOUTH EVERY 6 HOURS AS NEEDED FOR MODERATE TO SEVERE PAIN ( DO NOT TAKE MORE THAN 4 TABLETS PER DAY)    Sacroiliac joint pain     - oxyCODONE-acetaminophen (PERCOCET)  MG per tablet; TAKE TWO TABLETS BY MOUTH EVERY 6 HOURS AS NEEDED FOR MODERATE TO SEVERE PAIN ( DO NOT TAKE MORE THAN 4 TABLETS PER DAY)    PDMP reviewed         BMI:   Estimated body mass index is 28.8 kg/m  as calculated from the following:    Height as of this encounter: 1.6 m (5' 3\").    Weight as of 1/4/21: 73.8 kg (162 lb 9.6 oz). "           No follow-ups on file.    Sarah Barriga MD  [facility]    Miller Lopez is a 70 year old who presents for the following health issues  accompanied by her self:    HPI   Chief Complaint   Patient presents with     Recheck Medication     Medication Question     Patient would like to discuss only taking metoprolol daily      Hypertension Follow-up      Do you check your blood pressure regularly outside of the clinic? Yes     Are you following a low salt diet? Yes    Are your blood pressures ever more than 140 on the top number (systolic) OR more   than 90 on the bottom number (diastolic), for example 140/90? No    Wondering if she can take the metoprolol xl 50 mg just once a day rather than twice daily  - blood pressure can run low at times.  HR usually in the 80s.    Depression Followup    How are you doing with your depression since your last visit? No change    Are you having other symptoms that might be associated with depression? No    Have you had a significant life event?  OTHER: situational with covid      Are you feeling anxious or having panic attacks?   No    Do you have any concerns with your use of alcohol or other drugs? No    Social History     Tobacco Use     Smoking status: Former Smoker     Packs/day: 1.00     Years: 30.00     Pack years: 30.00     Quit date: 2004     Years since quittin.2     Smokeless tobacco: Never Used   Substance Use Topics     Alcohol use: No     Comment: ETOH dependency, DUI 3/15/10     Drug use: No     PHQ 2019 9/3/2020 3/15/2021   PHQ-9 Total Score 0 7 5   Q9: Thoughts of better off dead/self-harm past 2 weeks Not at all Not at all Not at all     DAJUAN-7 SCORE 2019 2019 3/15/2021   Total Score - - -   Total Score 5 10 5     Last PHQ-9 3/15/2021   1.  Little interest or pleasure in doing things 0   2.  Feeling down, depressed, or hopeless 1   3.  Trouble falling or staying asleep, or sleeping too much 1   4.   "Feeling tired or having little energy 1   5.  Poor appetite or overeating 1   6.  Feeling bad about yourself 1   7.  Trouble concentrating 0   8.  Moving slowly or restless 0   Q9: Thoughts of better off dead/self-harm past 2 weeks 0   PHQ-9 Total Score 5   Difficulty at work, home, or with people Somewhat difficult     DAJUAN-7  3/15/2021   1. Feeling nervous, anxious, or on edge 1   2. Not being able to stop or control worrying 1   3. Worrying too much about different things 1   4. Trouble relaxing 0   5. Being so restless that it is hard to sit still 0   6. Becoming easily annoyed or irritable 1   7. Feeling afraid, as if something awful might happen 1   DAJUAN-7 Total Score 5   If you checked any problems, how difficult have they made it for you to do your work, take care of things at home, or get along with other people? Somewhat difficult       Suicide Assessment Five-step Evaluation and Treatment (SAFE-T)      How many servings of fruits and vegetables do you eat daily?  2-3    On average, how many sweetened beverages do you drink each day (Examples: soda, juice, sweet tea, etc.  Do NOT count diet or artificially sweetened beverages)?   1    How many days per week do you exercise enough to make your heart beat faster? 3 or less    How many minutes a day do you exercise enough to make your heart beat faster? 9 or less    How many days per week do you miss taking your medication? 0        Review of Systems   Constitutional, HEENT, cardiovascular, pulmonary, gi and gu systems are negative, except as otherwise noted.      Objective    /64   Pulse 70   Temp 98.3  F (36.8  C) (Tympanic)   Resp 16   Ht 1.6 m (5' 3\")   SpO2 90%   Breastfeeding No   BMI 28.80 kg/m    Body mass index is 28.8 kg/m .  Physical Exam   GENERAL: healthy, alert and no distress  NECK: no adenopathy, no asymmetry, masses, or scars and thyroid normal to palpation  RESP: lungs clear to auscultation - no rales, rhonchi or wheezes  CV: " regular rate and rhythm, normal S1 S2, no S3 or S4, no murmur, click or rub, no peripheral edema and peripheral pulses strong  ABDOMEN: soft, nontender, no hepatosplenomegaly, no masses and bowel sounds normal  MS: no gross musculoskeletal defects noted, edema 1+ bilaterally

## 2021-03-16 ASSESSMENT — ANXIETY QUESTIONNAIRES: GAD7 TOTAL SCORE: 5

## 2021-03-18 ENCOUNTER — HOSPITAL ENCOUNTER (OUTPATIENT)
Dept: CARDIOLOGY | Facility: CLINIC | Age: 71
Discharge: HOME | End: 2021-03-18
Attending: NURSE PRACTITIONER | Admitting: NURSE PRACTITIONER
Payer: COMMERCIAL

## 2021-03-18 DIAGNOSIS — R09.02 HYPOXEMIA: ICD-10-CM

## 2021-03-18 DIAGNOSIS — G47.19 EXCESSIVE DAYTIME SLEEPINESS: ICD-10-CM

## 2021-03-18 DIAGNOSIS — I51.7 CARDIOMEGALY: ICD-10-CM

## 2021-03-18 PROCEDURE — 93306 TTE W/DOPPLER COMPLETE: CPT

## 2021-03-18 PROCEDURE — 93306 TTE W/DOPPLER COMPLETE: CPT | Mod: 26 | Performed by: INTERNAL MEDICINE

## 2021-03-18 NOTE — RESULT ENCOUNTER NOTE
Bam Lopez    Your echocardiogram does not show any abnormalities to explain the fatigue. Follow through with the sleep study work up. Please let us know if you have any questions.     Take care,    LA Gomes CNP

## 2021-04-20 VITALS — HEIGHT: 63 IN | WEIGHT: 160 LBS | BODY MASS INDEX: 28.35 KG/M2

## 2021-04-20 ASSESSMENT — MIFFLIN-ST. JEOR: SCORE: 1224.89

## 2021-04-20 NOTE — PATIENT INSTRUCTIONS
Your BMI is Body mass index is 28.34 kg/m .  Weight management is a personal decision.  If you are interested in exploring weight loss strategies, the following discussion covers the approaches that may be successful. Body mass index (BMI) is one way to tell whether you are at a healthy weight, overweight, or obese. It measures your weight in relation to your height.  A BMI of 18.5 to 24.9 is in the healthy range. A person with a BMI of 25 to 29.9 is considered overweight, and someone with a BMI of 30 or greater is considered obese. More than two-thirds of American adults are considered overweight or obese.  Being overweight or obese increases the risk for further weight gain. Excess weight may lead to heart disease and diabetes.  Creating and following plans for healthy eating and physical activity may help you improve your health.  Weight control is part of healthy lifestyle and includes exercise, emotional health, and healthy eating habits. Careful eating habits lifelong are the mainstay of weight control. Though there are significant health benefits from weight loss, long-term weight loss with diet alone may be very difficult to achieve- studies show long-term success with dietary management in less than 10% of people. Attaining a healthy weight may be especially difficult to achieve in those with severe obesity. In some cases, medications, devices and surgical management might be considered.  What can you do?  If you are overweight or obese and are interested in methods for weight loss, you should discuss this with your provider.     Consider reducing daily calorie intake by 500 calories.     Keep a food journal.     Avoiding skipping meals, consider cutting portions instead.    Diet combined with exercise helps maintain muscle while optimizing fat loss. Strength training is particularly important for building and maintaining muscle mass. Exercise helps reduce stress, increase energy, and improves fitness.  Increasing exercise without diet control, however, may not burn enough calories to loose weight.       Start walking three days a week 10-20 minutes at a time    Work towards walking thirty minutes five days a week     Eventually, increase the speed of your walking for 1-2 minutes at time    In addition, we recommend that you review healthy lifestyles and methods for weight loss available through the National [employer] of [employer] patient information sites:  http://win.niddk.nih.gov/publications/index.htm    And look into health and wellness programs that may be available through your health insurance provider, employer, local community center, or [employer].

## 2021-04-20 NOTE — PROGRESS NOTES
Jessica is a 70 year old who is being evaluated via a billable video visit.      How would you like to obtain your AVS? MyChart  If the video visit is dropped, the invitation should be resent by: Send to e-mail at: [email or web address]  Will anyone else be joining your video visit? No    Melody Miles CMA    Video Start Time: 8 AM  Video-Visit Details    Type of service:  Video Visit    Video End Time:8:52 AM    Originating Location (pt. Location): Home    Distant Location (provider location):  [facility]     Platform used for Video Visit: [facility] [place]   Outpatient Sleep Medicine Consultation  April 21, 2021      Name: Jessica Ellison MRN# [id number]   Age: 70 year old YOB: 1950     Date of Consultation: April 21, 2021  Consultation is requested by: Janel Street, APRN CNP  [address] [place] [address] [address]  Primary care provider: Sarah Barriga         Reason for Sleep Consult:     Jessica Ellison is a 70 year old female for complaints of nightmares, yelling, nonrestorative sleep and snoring  Chief Complaint   Patient presents with     Sleep Problem     Terrible Dreams, Excessive daytime sleepiness            Assessment and Plan:     Summary Sleep Diagnoses:    Insomnia SHARON 16 on trazadone    Clinical signs and symptoms of obstructive sleep apnea    Possible REM behavior disorder with recent onset nightmares with yelling over the past 10 years    Low ferritin 21-52  over 3 years with motor restlesness suggestive of atypical restless leg syndrome    Menopausal symptoms with hot flashes     Comorbid Diagnoses:    Mood disturbance with anxiety and depression    Hypertension    Centrilobular emphysema with elevated PCO2-spirometry not available    Swelling of legs    Summary Recommendations:    Polysomnography for possible REM behavior disorder with additional diagnostic rule in assessing causes of  sleep disruption including periodic limb movements or obstructive sleep apnea    The patient should take her trazodone on the night of her sleep study           History of Present Illness:     Jessica Ellison is a 70 year old female with non-restorative sleep, early morning awakenings, loud vocalizations or screams 3x/week or muffled scream with occasional paralysis.  The content of her associated nightmares are consistently regarding her children and protecting them or occasionally having angry feelings regarding the behaviors.  These episodes have been occurring relatively recently over the past 10 years and are not temporally associated with the new therapy of metoprolol which is a beta-blocker has been associated with nightmare activity.  Although these episodes are occurring frequently they are not associated with falls from the bed or injuries.  Her  describes primarily vocalizations with infrequent arm movements.  She has not lost her sense of smell or taste.  She associates these nightmares and yelling answer most significant burden of suffering relative to sleep and feels it is contributing to her early morning awakenings given the disturbing nature of the content of her dreams.    Her  and the patient both describe motor restlessness with frequent displacement of bedcovers and movements without specific kicking.  The patient does not have sensory symptoms associated with this though she has consistently had low ferritin measures over the past few years raising the possibility of atypical restless leg syndrome.  In addition her  describes snoring on most nights not necessarily associated with apneas but occurring in the setting of hypertension at her current age is a moderate risk for obstructive sleep apnea.  This patient acknowledges a resurgence of her menopausal symptoms with hot flashes over the past few years.      SLEEP-WAKE SCHEDULE:   Enters bedroom 10-10:30 with 15 min  latency with 2 awakenings to void with prolonged awakening 5 am 4x/week with bad dreams with content about children feeling angry or protecting young children  Final awakening spontaneous 7:30 am feeling non-restored  Naps 1 hour 4x/week 2-3pm      SCALES: STOP BANG 3  EPWORTH SLEEPINESS SCALE    Sitting and reading 1   Watching TV 1   Sitting, inactive in a public place (theatre or mtg.) 1    As a passenger in a car 0   Lying down to rest in the afternoon when circumstance permit 3   Sitting and talking to someone 0   Sitting quietly after lunch without alcohol 2   In a car, while stopped for a few minutes in traffic 0   TOTAL SCORE 8     INSOMNIA SEVERITY INDEX     Difficulty falling asleep 2   Difficult staying asleep 2   Problems waking up to early 2   How SATISFIED/DISSATISFIED are you with your CURRENT sleep pattern? 2   How NOTICEABLE to others do you think your sleep pattern is in terms of your quality of life? 1   How WORRIED/DISTRESSED are you about your current sleep pattern? 4   To what extent do you consider your sleep problem to INTERFERE with your daily fuctioning(e.g. daytime fatigue, mood, ability to function at work/daily chores, concentration, mood,etc.) CURRENTLY? 3   INSOMNIA SEVERITY INDEX TOTAL SCORE 16      --absence of insomnia (0-7); sub-threshold insomnia (8-14); moderate insomnia (15-21); and severe insomnia (22-28)--    SLEEP COMPLAINTS:  Cardio-respiratory    Snoring- >3x/week  Dyspnea- denies  Morning headaches or confusion-denies    Coexisting Heart disease: denies    Does patient have a bed partner: yes  Has bed partner been sleeping separately because of snoring:  denies            RLS Screen: When you try to relax in the evening or sleep at  night, do you ever have unpleasant, restless feelings in your  legs that can be relieved by walking or movement? denies    Periodic limb movement: denies    Sleep Behaviors:    Leg symptoms/movements: denies    Motor  restlessness:denies    Night terrors: denies    Bruxism: denies    Automatic behaviors: none    Other subjective complaints:    Anxiety or rumination denies    Pain and discomfort at  night: denies    Waking up with heart pounding or racing: denies    GERD or aspiration:denies         Parasomnia:   NREM - denies recurrent persistent confusional arousal, night eating, sleep walking or sleep terrors   REM  - denies dream enactment; injuries            Problem List:     Patient Active Problem List   Diagnosis     Insomnia     Back pain     ETOH abuse     CARDIOVASCULAR SCREENING; LDL GOAL LESS THAN 160     Anxiety     Advanced directives, counseling/discussion     Major depressive disorder, recurrent episode, moderate (H)     Cerebral aneurysm, nonruptured     Bee allergy status     S/P lumbar spinal fusion     Chronic bilateral low back pain without sciatica     Essential hypertension with goal blood pressure less than 140/90     Hypothyroidism     Spell of change in speech     Centrilobular emphysema (H)     Acute respiratory failure with hypoxia (H)     Suspected COVID-19 virus infection     Hyperlipidemia            Medications:   Percoset in am   Rarely takes valium/flexiril for back and hip pain  Rarely takes quetiapine for anxiety  Potentially sedating  Implicated in nightmares  Current Outpatient Medications   Medication Sig     amLODIPine (NORVASC) 10 MG tablet Take 1 tablet (10 mg) by mouth daily     aspirin 325 MG tablet Take 1 tablet (325 mg) by mouth daily     atorvastatin (LIPITOR) 20 MG tablet Take 1 tablet (20 mg) by mouth daily     calcium carbonate 600 mg-vitamin D 400 units (CALTRATE) 600-400 MG-UNIT per tablet Take 1 tablet by mouth every evening     cyclobenzaprine (FLEXERIL) 10 MG tablet Take 1 tablet (10 mg) by mouth 2 times daily as needed for muscle spasms     diazepam (VALIUM) 10 MG tablet Take 10 mg by mouth every 6 hours as needed      EPINEPHrine (EPIPEN) 0.3 MG/0.3ML injection Inject 0.3  mLs (0.3 mg) into the muscle once as needed for anaphylaxis     ferrous sulfate (FEROSUL) 325 (65 Fe) MG tablet Take 325 mg by mouth every other day Alternating days with Senna tab     levothyroxine (LEVOTHROID) 50 MCG tablet Take 1 tablet (50 mcg) by mouth daily     metoprolol succinate ER (TOPROL-XL) 50 MG 24 hr tablet Take 1 tablet (50 mg) by mouth 2 times daily     multivitamin w/minerals (THERA-VIT-M) tablet Take 1 tablet by mouth every evening     oxyCODONE-acetaminophen (PERCOCET)  MG per tablet TAKE TWO TABLETS BY MOUTH EVERY 6 HOURS AS NEEDED FOR MODERATE TO SEVERE PAIN ( DO NOT TAKE MORE THAN 4 TABLETS PER DAY)     QUEtiapine (SEROQUEL) 25 MG tablet TAKE ONE TABLET BY MOUTH DAILY AS NEEDED FOR ANXIETY (Patient taking differently: Take 25 mg by mouth daily as needed (anxiety) TAKE ONE TABLET BY MOUTH DAILY AS NEEDED FOR ANXIETY)     senna (SENOKOT) 8.6 MG tablet Take 1 tablet by mouth every other day Alternating days with Iron tab     tiotropium (SPIRIVA) 18 MCG inhaled capsule Inhale 1 capsule (18 mcg) into the lungs daily     traZODone (DESYREL) 150 MG tablet Take 1 tablet (150 mg) by mouth nightly as needed for sleep     venlafaxine (EFFEXOR-XR) 150 MG 24 hr capsule Take 1 capsule (150 mg) by mouth daily TAKE ONE CAPSULE BY MOUTH ONCE DAILY to take with a 75 mg for total of 225 mg daily     venlafaxine (EFFEXOR-XR) 75 MG 24 hr capsule Take 1 capsule (75 mg) by mouth daily Take with 150 mg for a total of 225 mg daily. Is taking the 75 mg some times 2 times a day, thought she was told to increased not sure     VENTOLIN  (90 Base) MCG/ACT inhaler INHALE TWO PUFFS BY MOUTH EVERY 6 HOURS (Patient taking differently: Inhale 2 puffs into the lungs every 6 hours as needed )     naloxone (NARCAN) 4 MG/0.1ML nasal spray Spray 1 spray (4 mg) into one nostril alternating nostrils as needed for opioid reversal every 2-3 minutes until assistance arrives     No current facility-administered medications for  this visit.       Allergies   Allergen Reactions     Bee Venom      Lisinopril Swelling     Oral swelling            Past Medical History:       Past Medical History:   Diagnosis Date     Anemia      Aneurysm (H)      Chemical dependency (H)     Chem Dep RX     Depression      History of blood transfusion      Hypertension      Menopausal symptoms      Other chronic pain     Lower back and hips     Sleep disorder      Thyroid disease      Tobacco abuse              Past Surgical History:        Past Surgical History:   Procedure Laterality Date     APPENDECTOMY  1960     C PART REMOVAL COLON W ANASTOMOSIS  2005    diverticulitis     C SPINE FUSION,ANTER,8+ SGMTS      Anterior posterior fusion 10 levels, hardware removal and re-fusion      cerebral aneurysm  2014    coiled     COLONOSCOPY  2005     HC EXCISION BREAST LESION, OPEN >=1      core biopsy , lumpectomy      LAP VENTRAL HERNIA REPAIR  2017    [place]     LAPAROSCOPIC ASSISTED HYSTERECTOMY VAGINAL  2017     orif left femoral neck Left 6/3/2016    stress fracture.  done at [facility]     SURGICAL HISTORY OF -       Skin Graft            Social History:     Social History     Tobacco Use     Smoking status: Former Smoker     Packs/day: 1.00     Years: 30.00     Pack years: 30.00     Quit date: 2004     Years since quittin.3     Smokeless tobacco: Never Used   Substance Use Topics     Alcohol use: No     Comment: ETOH dependency, DUI 3/15/10                Family History:     Family History   Problem Relation Age of Onset     Cancer Mother         breast/lung     Alcohol/Drug Mother      Depression Mother      Cancer - colorectal Father      Alcohol/Drug Father      Diabetes Maternal Grandmother      Diabetes Maternal Grandfather      Diabetes Paternal Grandmother      Diabetes Paternal Grandfather      Cancer Paternal Grandfather      Gastrointestinal Disease Paternal Grandfather      Congenital Anomalies Son       Hypertension Brother      Adrenal Disorder Brother         had adrenalectomies     Cerebral palsy Granddaughter              Review of Systems:     A complete 10 point review of systems was negative other than HPI or as commented below:     Back pain         Physical Examination:     Good dentition  Mallampatti 2  Mandibular profile normal  Reported vitals:  There were no vitals taken for this visit.   GENERAL: Healthy, alert and no distress  EYES: Eyes grossly normal to inspection.  No discharge or erythema, or obvious scleral/conjunctival abnormalities.  RESP: No audible wheeze, cough, or visible cyanosis.  No visible retractions or increased work of breathing.    SKIN: Visible skin clear. No significant rash, abnormal pigmentation or lesions.  NEURO: Cranial nerves grossly intact.  Mentation and speech appropriate for age.  PSYCH: Mentation appears normal, affect normal/bright, judgement and insight intact, normal speech and appearance well-groomed.           Data: All pertinent previous laboratory data reviewed     No results found for: PH, PHARTERIAL, PO2, [id number], SAT, PCO2, HCO3, BASEEXCESS, HUONG, BEB  Lab Results   Component Value Date    TSH 1.50 11/04/2020    TSH 1.08 03/18/2019     Lab Results   Component Value Date    GLC 97 12/24/2020     (H) 12/23/2020     Lab Results   Component Value Date    HGB 11.8 12/25/2020    HGB 11.0 (L) 12/24/2020     Lab Results   Component Value Date    BUN 17 12/24/2020    BUN 20 12/23/2020    CR 0.62 12/24/2020    CR 0.63 12/23/2020     Lab Results   Component Value Date    AST 15 12/24/2020    AST 17 12/23/2020    ALT 22 12/24/2020    ALT 25 12/23/2020    ALKPHOS 86 12/24/2020    ALKPHOS 103 12/23/2020    BILITOTAL 0.3 12/24/2020    BILITOTAL 0.4 12/23/2020     Lab Results   Component Value Date    UAMP  02/06/2015     Negative   Cutoff for a negative amphetamine is 500 ng/mL or less.      UBARB  02/06/2015     Negative   Cutoff for a negative barbiturate is 200  ng/mL or less.      BENZODIAZEUR  02/06/2015     Negative   Cutoff for a negative benzodiazepine is 200 ng/mL or less.      UCANN  02/06/2015     Negative   Cutoff for a negative cannabinoid is 50 ng/mL or less.      UCOC  02/06/2015     Negative   Cutoff for a negative cocaine is 300 ng/mL or less.      OPIT  02/06/2015     Positive   Cutoff for a positive opiate is greater than 300 ng/mL. This is an unconfirmed   screening result to be used for medical purposes only.      UPCP  02/06/2015     Negative   Cutoff for a negative PCP is 25 ng/mL or less.         Echocardiology: 3/18/2021    Interpretation Summary    Left ventricular systolic function is normal.  The visual ejection fraction is estimated at 55-60%.  No regional wall motion abnormalities noted.  The study was technically difficult. There is no comparison study available.  [ Right sided pressures normal ]        Total time spent with patient: 45 min >50% counseling and 15 minutes documenting and reviewing records   Copy to: Sarah Barriga MD 4/21/2021

## 2021-04-21 ENCOUNTER — VIRTUAL VISIT (OUTPATIENT)
Dept: SLEEP MEDICINE | Facility: CLINIC | Age: 71
End: 2021-04-21
Attending: NURSE PRACTITIONER
Payer: COMMERCIAL

## 2021-04-21 DIAGNOSIS — G47.52 REM BEHAVIORAL DISORDER: Primary | ICD-10-CM

## 2021-04-21 DIAGNOSIS — G47.19 EXCESSIVE DAYTIME SLEEPINESS: ICD-10-CM

## 2021-04-21 PROBLEM — R09.02 HYPOXIA: Status: ACTIVE | Noted: 2019-01-05

## 2021-04-21 PROCEDURE — 99204 OFFICE O/P NEW MOD 45 MIN: CPT | Mod: 95 | Performed by: INTERNAL MEDICINE

## 2021-04-22 ENCOUNTER — OFFICE VISIT (OUTPATIENT)
Dept: FAMILY MEDICINE | Facility: CLINIC | Age: 71
End: 2021-04-22
Payer: COMMERCIAL

## 2021-04-22 VITALS
SYSTOLIC BLOOD PRESSURE: 132 MMHG | HEIGHT: 63 IN | DIASTOLIC BLOOD PRESSURE: 80 MMHG | OXYGEN SATURATION: 94 % | BODY MASS INDEX: 28.34 KG/M2 | RESPIRATION RATE: 16 BRPM | TEMPERATURE: 98.3 F | HEART RATE: 95 BPM

## 2021-04-22 DIAGNOSIS — I10 ESSENTIAL HYPERTENSION WITH GOAL BLOOD PRESSURE LESS THAN 140/90: ICD-10-CM

## 2021-04-22 DIAGNOSIS — Z98.1 S/P LUMBAR SPINAL FUSION: ICD-10-CM

## 2021-04-22 DIAGNOSIS — Z79.899 ENCOUNTER FOR LONG-TERM CURRENT USE OF MEDICATION: Primary | ICD-10-CM

## 2021-04-22 DIAGNOSIS — M54.50 CHRONIC BILATERAL LOW BACK PAIN WITHOUT SCIATICA: ICD-10-CM

## 2021-04-22 DIAGNOSIS — G89.29 CHRONIC BILATERAL LOW BACK PAIN WITHOUT SCIATICA: ICD-10-CM

## 2021-04-22 DIAGNOSIS — M53.3 SACROILIAC JOINT PAIN: ICD-10-CM

## 2021-04-22 LAB
AMPHETAMINES UR QL: NOT DETECTED NG/ML
BARBITURATES UR QL SCN: NOT DETECTED NG/ML
BENZODIAZ UR QL SCN: ABNORMAL NG/ML
BUPRENORPHINE UR QL: NOT DETECTED NG/ML
CANNABINOIDS UR QL: NOT DETECTED NG/ML
COCAINE UR QL SCN: NOT DETECTED NG/ML
D-METHAMPHET UR QL: NOT DETECTED NG/ML
METHADONE UR QL SCN: NOT DETECTED NG/ML
OPIATES UR QL SCN: NOT DETECTED NG/ML
OXYCODONE UR QL SCN: ABNORMAL NG/ML
PCP UR QL SCN: NOT DETECTED NG/ML
PROPOXYPH UR QL: NOT DETECTED NG/ML
TRICYCLICS UR QL SCN: NOT DETECTED NG/ML

## 2021-04-22 PROCEDURE — 99214 OFFICE O/P EST MOD 30 MIN: CPT | Performed by: FAMILY MEDICINE

## 2021-04-22 PROCEDURE — 80306 DRUG TEST PRSMV INSTRMNT: CPT | Performed by: FAMILY MEDICINE

## 2021-04-22 RX ORDER — HYDROCHLOROTHIAZIDE 12.5 MG/1
12.5 TABLET ORAL DAILY
Qty: 90 TABLET | Refills: 1 | Status: SHIPPED | OUTPATIENT
Start: 2021-04-22 | End: 2021-05-25 | Stop reason: SINTOL

## 2021-04-22 RX ORDER — AMLODIPINE BESYLATE 5 MG/1
5 TABLET ORAL DAILY
Qty: 90 TABLET | Refills: 1 | Status: SHIPPED | OUTPATIENT
Start: 2021-04-22 | End: 2021-12-06

## 2021-04-22 RX ORDER — OXYCODONE AND ACETAMINOPHEN 10; 325 MG/1; MG/1
TABLET ORAL
Qty: 120 TABLET | Refills: 0 | Status: SHIPPED | OUTPATIENT
Start: 2021-04-30 | End: 2021-06-24

## 2021-04-22 ASSESSMENT — PAIN SCALES - GENERAL: PAINLEVEL: MODERATE PAIN (5)

## 2021-04-22 NOTE — LETTER
Opioid / Opioid Plus Controlled Substance Agreement    This is an agreement between you and your provider about the safe and appropriate use of controlled substance/opioids prescribed by your care team. Controlled substances are medicines that can cause physical and mental dependence (abuse).    There are strict laws about having and using these medicines. We here at [facility] are committing to working with you in your efforts to get better. To support you in this work, we ll help you schedule regular office appointments for medicine refills. If we must cancel or change your appointment for any reason, we ll make sure you have enough medicine to last until your next appointment.     As a Provider, I will:    Listen carefully to your concerns and treat you with respect.     Recommend a treatment plan that I believe is in your best interest. This plan may involve therapies other than opioid pain medication.     Talk with you often about the possible benefits, and the risk of harm of any medicine that we prescribe for you.     Provide a plan on how to taper (discontinue or go off) using this medicine if the decision is made to stop its use.    As a Patient, I understand that opioid(s):     Are a controlled substance prescribed by my care team to help me function or work and manage my condition(s).     Are strong medicines and can cause serious side effects such as:    Drowsiness, which can seriously affect my driving ability    A lower breathing rate, enough to cause death    Harm to my thinking ability     Depression     Abuse of and addiction to this medicine    Need to be taken exactly as prescribed. Combining opioids with certain medicines or chemicals (such as illegal drugs, sedatives, sleeping pills, and benzodiazepines) can be dangerous or even fatal. If I stop opioids suddenly, I may have severe withdrawal symptoms.    Do not work for all types of pain nor for all patients. If they re not helpful, I may  be asked to stop them.        The risks, benefits and side effects of these medicine(s) were explained to me. I agree that:  1. I will take part in other treatments as advised by my care team. This may be psychiatry or counseling, physical therapy, behavioral therapy, group treatment or a referral to a specialist.     2. I will keep all my appointments. I understand that this is part of the monitoring of opioids. My care team may require an office visit for EVERY opioid/controlled substance refill. If I miss appointments or don t follow instructions, my care team may stop my medicine.    3. I will take my medicines as prescribed. I will not change the dose or schedule unless my care team tells me to. There will be no refills if I run out early.     4. I may be asked to come to the clinic and complete a urine drug test or complete a pill count at any time. If I don t give a urine sample or participate in a pill count, the care team may stop my medicine.    5. I will only receive prescriptions from this clinic for chronic pain. If I am treated by another provider for acute pain issues, I will tell them that I am taking opioid pain medication for chronic pain and that I have a treatment agreement with this provider. I will inform my [facility] care team within one business day if I am given a prescription for any pain medication by another healthcare provider. My [facility] care team can contact other providers and pharmacists about my use of any medicines.    6. It is up to me to make sure that I don t run out of my medicines on weekends or holidays. If my care team is willing to refill my opioid prescription without a visit, I must request refills only during office hours. Refills may take up to 3 business days to process. I will use one pharmacy to fill all my opioid and other controlled substance prescriptions. I will notify the clinic about any changes to my insurance or medication  availability.    7. I am responsible for my prescriptions. If the medicine/prescription is lost, stolen or destroyed, it will not be replaced. I also agree not to share controlled substance medicines with anyone.    8. I am aware I should not use any illegal or recreational drugs. I agree not to drink alcohol unless my care team says I can.       9. If I enroll in the [place] [facility] Cannabis program, I will tell my care team prior to my next refill.     10. I will tell my care team right away if I become pregnant, have a new medical problem treated outside of my regular clinic, or have a change in my medications.    11. I understand that this medicine can affect my thinking, judgment and reaction time. Alcohol and drugs affect the brain and body, which can affect the safety of my driving. Being under the influence of alcohol or drugs can affect my decision-making, behaviors, personal safety, and the safety of others. Driving while impaired (DWI) can occur if a person is driving, operating, or in physical control of a car, motorcycle, boat, snowmobile, ATV, motorbike, off-road vehicle, or any other motor vehicle ([place] Statute 169A.20). I understand the risk if I choose to drive or operate any vehicle or machinery.    I understand that if I do not follow any of the conditions above, my prescriptions or treatment may be stopped or changed.          Opioids  What You Need to Know    What are opioids?   Opioids are pain medicines that must be prescribed by a doctor. They are also known as narcotics.     Examples are:   1. morphine (MS Contin, Michelle)  2. oxycodone (Oxycontin)  3. oxycodone and acetaminophen (Percocet)  4. hydrocodone and acetaminophen (Vicodin, Norco)   5. fentanyl patch (Duragesic)   6. hydromorphone (Dilaudid)   7. methadone  8. codeine (Tylenol #3)     What do opioids do well?   Opioids are best for severe short-term pain such as after a surgery or injury. They may work well for cancer pain. They may  help some people with long-lasting (chronic) pain.     What do opioids NOT do well?   Opioids never get rid of pain entirely, and they don t work well for most patients with chronic pain. Opioids don t reduce swelling, one of the causes of pain.                                    Other ways to manage chronic pain and improve function include:       Treat the health problem that may be causing pain    Anti-inflammation medicines, which reduce swelling and tenderness, such as ibuprofen (Advil, Motrin) or naproxen (Aleve)    Acetaminophen (Tylenol)    Antidepressants and anti-seizure medicines, especially for nerve pain    Topical treatments such as patches or creams    Injections or nerve blocks    Chiropractic or osteopathic treatment    Acupuncture, massage, deep breathing, meditation, visual imagery, aromatherapy    Use heat or ice at the pain site    Physical therapy     Exercise    Stop smoking    Take part in therapy       Risks and side effects     Talk to your doctor before you start or decide to keep taking opioids. Possible side effects include:      Lowering your breathing rate enough to cause death    Overdose, including death, especially if taking higher than prescribed doses    Worse depression symptoms; less pleasure in things you usually enjoy    Feeling tired or sluggish    Slower thoughts or cloudy thinking    Being more sensitive to pain over time; pain is harder to control    Trouble sleeping or restless sleep    Changes in hormone levels (for example, less testosterone)    Changes in sex drive or ability to have sex    Constipation    Unsafe driving    Itching and sweating    Dizziness    Nausea, throwing up and dry mouth    What else should I know about opioids?    Opioids may lead to dependence, tolerance, or addiction.      Dependence means that if you stop or reduce the medicine too quickly, you will have withdrawal symptoms. These include loose poop (diarrhea), jitters, flu-like symptoms,  nervousness and tremors. Dependence is not the same as addiction.                       Tolerance means needing higher doses over time to get the same effect. This may increase the chance of serious side effects.      Addiction is when people improperly use a substance that harms their body, their mind or their relations with others. Use of opiates can cause a relapse of addiction if you have a history of drug or alcohol abuse.      People who have used opioids for a long time may have a lower quality of life, worse depression, higher levels of pain and more visits to doctors.    You can overdose on opioids. Take these steps to lower your risk of overdose:    1. Recognize the signs:  Signs of overdose include decrease or loss of consciousness (blackout), slowed breathing, trouble waking up and blue lips. If someone is worried about overdose, they should call 911.    2. Talk to your doctor about Narcan (naloxone).   If you are at risk for overdose, you may be given a prescription for Narcan. This medicine very quickly reverses the effects of opioids.   If you overdose, a friend or family member can give you Narcan while waiting for the ambulance. They need to know the signs of overdose and how to give Narcan.     3. Don't use alcohol or street drugs.   Taking them with opioids can cause death.    4. Do not take any of these medicines unless your doctor says it s OK. Taking these with opioids can cause death:    Benzodiazepines, such as lorazepam (Ativan), alprazolam (Xanax) or diazepam (Valium)    Muscle relaxers, such as cyclobenzaprine (Flexeril)    Sleeping pills like zolpidem (Ambien)     Other opioids      How to keep you and other people safe while taking opioids:    1. Never share your opioids with others.  Opioid medicines are regulated by the Drug Enforcement Agency ([place]IS[place]). Selling or sharing medications is a criminal act.    2. Be sure to store opioids in a secure place, locked up if possible. Young children  can easily swallow them and overdose.    3. When you are traveling with your medicines, keep them in the original bottles. If you use a pill box, be sure you also carry a copy of your medicine list from your clinic or pharmacy.    4. Safe disposal of opioids    Most pharmacies have places to get rid of medicine, called disposal kiosks. Medicine disposal options are also available in every [facility]. Search your county and  medication disposal  to find more options. You can find more details at:  [email or web address]-[email or web address]-[email or web address]     I agree that my provider, clinic care team, and pharmacy may work with any city, state or federal law enforcement agency that investigates the misuse, sale, or other diversion of my controlled medicine. I will allow my provider to discuss my care with, or share a copy of, this agreement with any other treating provider, pharmacy or emergency room where I receive care.    I have read this agreement and have asked questions about anything I did not understand.    _______________________________________________________  Patient Signature - Jessica Ellison _____________________                   Date     _______________________________________________________  Provider Signature - Sarah Barriga MD   _____________________                   Date     _______________________________________________________  Witness Signature (required if provider not present while patient signing)   _____________________                   Date

## 2021-04-22 NOTE — PROGRESS NOTES
Assessment & Plan     Essential hypertension with goal blood pressure less than 140/90  Ankle swelling correlates to when we increased her amlodipine from 5 mg to 10 mg  Will cut her back to 5 mg and add hydrochlorothiazide 12.5 mg   Recheck bmp in 2-3 weeks here in clinic  Keep an eye on blood pressure and follow up if >140/90    - amLODIPine (NORVASC) 5 MG tablet; Take 1 tablet (5 mg) by mouth daily  - hydrochlorothiazide (HYDRODIURIL) 12.5 MG tablet; Take 1 tablet (12.5 mg) by mouth daily  - Basic metabolic panel; Future    Chronic bilateral low back pain without sciatica  New CSA signed  Utox done today per protocol.   - oxyCODONE-acetaminophen (PERCOCET)  MG per tablet; TAKE TWO TABLETS BY MOUTH EVERY 6 HOURS AS NEEDED FOR MODERATE TO SEVERE PAIN ( DO NOT TAKE MORE THAN 4 TABLETS PER DAY)    Sacroiliac joint pain     - oxyCODONE-acetaminophen (PERCOCET)  MG per tablet; TAKE TWO TABLETS BY MOUTH EVERY 6 HOURS AS NEEDED FOR MODERATE TO SEVERE PAIN ( DO NOT TAKE MORE THAN 4 TABLETS PER DAY)    S/P lumbar spinal fusion     - oxyCODONE-acetaminophen (PERCOCET)  MG per tablet; TAKE TWO TABLETS BY MOUTH EVERY 6 HOURS AS NEEDED FOR MODERATE TO SEVERE PAIN ( DO NOT TAKE MORE THAN 4 TABLETS PER DAY)    Encounter for long-term current use of medication     - [id number] - Urine Drugs of Abuse Panel 13 - (Screening)                 No follow-ups on file.    Sarah Barriga MD  [facility] 70 [facility] who presents for the following health issues  accompanied by her self:    HPI     Chief Complaint   Patient presents with     Edema     Patient has had swollen ankles X 5 months and he feet are sore. Patient has tried elevation and rest, and compression stocking. Patient rates pain at a 5-7/10. Patient has no shortness of breath.      Labs recently normal.   Echo recently normal.    Looking back the onset of this swelling started with increased amlodipine  "dose.        Review of Systems   Some vaginal air release a few times a week when voiding.  No vaginal discharge        Objective    /80   Pulse 95   Temp 98.3  F (36.8  C) (Tympanic)   Resp 16   Ht 1.6 m (5' 3\")   SpO2 94%   Breastfeeding No   BMI 28.34 kg/m    Body mass index is 28.34 kg/m .  Physical Exam   GENERAL: healthy, alert and no distress  RESP: lungs clear to auscultation - no rales, rhonchi or wheezes  CV: regular rate and rhythm, normal S1 S2, no S3 or S4, no murmur, click or rub, no peripheral edema and peripheral pulses strong  ABDOMEN: soft, nontender, no hepatosplenomegaly, no masses and bowel sounds normal  MS: 1+ edema bilaterally in her ankles and feet    Labs/echo reviewed            "

## 2021-04-22 NOTE — PATIENT INSTRUCTIONS
Cut amlodipine to 5 mg daily     Start hydrochlorothiazide 12.5 mg daily    Check bmp in 2-3 weeks (blood work for kidneys and electrolytes).    Keep an eye on blood pressure over the next few weeks.  Let me know if >140/90 consistently.        Our Clinic hours are:  Mondays    7:20 am - 7 pm  Tues -  Fri  7:20 am - 5 pm    Clinic Phone: [phone]    The clinic lab opens at 7:30 am Mon - Fri and appointments are required.    [facility] [place]  Ph. [phone]  Monday  8 am - 7pm  Tues - Fri 8 am - 5:30 pm

## 2021-05-24 DIAGNOSIS — I10 ESSENTIAL HYPERTENSION WITH GOAL BLOOD PRESSURE LESS THAN 140/90: ICD-10-CM

## 2021-05-24 LAB
ANION GAP SERPL CALCULATED.3IONS-SCNC: 7 MMOL/L (ref 3–14)
BUN SERPL-MCNC: 22 MG/DL (ref 7–30)
CALCIUM SERPL-MCNC: 8.9 MG/DL (ref 8.5–10.1)
CHLORIDE SERPL-SCNC: 101 MMOL/L (ref 94–109)
CO2 SERPL-SCNC: 29 MMOL/L (ref 20–32)
CREAT SERPL-MCNC: 1.23 MG/DL (ref 0.52–1.04)
GFR SERPL CREATININE-BSD FRML MDRD: 45 ML/MIN/{1.73_M2}
GLUCOSE SERPL-MCNC: 93 MG/DL (ref 70–99)
POTASSIUM SERPL-SCNC: 3.3 MMOL/L (ref 3.4–5.3)
SODIUM SERPL-SCNC: 137 MMOL/L (ref 133–144)

## 2021-05-24 PROCEDURE — 36415 COLL VENOUS BLD VENIPUNCTURE: CPT | Performed by: FAMILY MEDICINE

## 2021-05-24 PROCEDURE — 80048 BASIC METABOLIC PNL TOTAL CA: CPT | Performed by: FAMILY MEDICINE

## 2021-05-31 ENCOUNTER — RECORDS - HEALTHEAST (OUTPATIENT)
Dept: ADMINISTRATIVE | Facility: CLINIC | Age: 71
End: 2021-05-31

## 2021-06-02 VITALS — WEIGHT: 173.9 LBS | HEIGHT: 63 IN | BODY MASS INDEX: 30.81 KG/M2

## 2021-06-16 ENCOUNTER — THERAPY VISIT (OUTPATIENT)
Dept: SLEEP MEDICINE | Facility: CLINIC | Age: 71
End: 2021-06-16
Payer: COMMERCIAL

## 2021-06-16 DIAGNOSIS — G47.52 REM BEHAVIORAL DISORDER: ICD-10-CM

## 2021-06-16 PROCEDURE — 95810 POLYSOM 6/> YRS 4/> PARAM: CPT | Performed by: INTERNAL MEDICINE

## 2021-06-18 NOTE — LETTER
Letter by Sonja Hartley MD at      Author: Sonja Hartley MD Service: -- Author Type: --    Filed:  Encounter Date: 2/25/2019 Status: (Other)       Sarah Barriga MD  [id number]  [place] [address]                                  February 25, 2019    Patient: Jessica Ellison   MR Number: [id number]   YOB: 1950   Date of Visit: 2/25/2019     Dear Dr. Linus MD:    Thank you for referring Jessica Ellison to me for evaluation. Below are the relevant portions of my assessment and plan of care.    If you have questions, please do not hesitate to call me. I look forward to following Jessica along with you.    Sincerely,        Sonja Hartley MD          CC  MD Naeem Dsouza Nicole Lynn, MD  2/25/2019 10:47 AM  Sign at close encounter  Pulmonary Clinic Outpatient Consultation    Assessment and Plan:   68 y F with a history of EtOH abuse, HTN, hypothyroidism, COPD/emphysema, who presents for hospital follow-up for COPD exacerbation.    We reviewed her imaging and PFTs - CT scan shows mild-moderate upper lobe emphysema, and PFTs have hyperinflation and mild diffusion impairment, consistent with her imaging. Prior to the Jan exacerbation, she has never exacerbated. We reviewed that her emphysema is from her prior smoking, and the chronic nature of the disease. She is using the spiriva sporadically and has not found it very beneficial. She has little-no symptoms from her COPD and denies exertional limitations.      PLAN:    Continue as needed albuterol - can hold on resuming spiriva, and assess need for any long acting maintenance inhalers if symptoms are worse (she did not require inhalers prior to this admission)    Does not meet criteria for lung cancer screening CT scans    She already quit smoking    No exertional oxygen needed per recent ambulatory testing    Recommended GI follow-up for esophageal dysmotility - denies GERD symptoms to necessitate any  treatment    She is up to date on vaccines    She is low-intermediate risk for post-operative pulmonary complications should she proceed with hernia surgery. Her only risk factor being her baseline SpO2 < 95%. We are happy to assist should any respiratory issues arise.     I will see her back prn should her COPD symptoms escalate/require more treatment.      Sonja Hartley MD  Pulmonary and Critical Care Medicine  [facility]  Office: [phone]  Pager: [phone]    ----------------------    Reason for Consult: Emphysema    HPI:   68 y F with a history of EtOH abuse, HTN, hypothyroidism, COPD/emphysema, who presents for hospital follow-up for COPD exacerbation.    She was hospitalized in January for chemical dependency, and was noted to have hypoxia with wheeze and shortness of breath and was treated for a COPD exacerbation. She did not require oxygen on discharge per ambulatory oximetry. She was discharged on spiriva and prn albuterol. She was also found to have esophageal dysmotility on an esophagram.    Prior to this admission she denies exacerbations, had not been prescribed/used inhalers. She was unaware that she had emphysema. Since discharge she has been using the spiriva sporadically due to cost, and has not required albuterol. She has no exertional dyspnea or chronic cough. She does exercises in the pool regularly. She does not have GERD symptoms. She plans to see GI for the abnormal esophagram, and is also looking into surgery for a ventral hernia.      ROS:  A 12-system review was obtained and was negative with the exception of the symptoms endorsed in the history of present illness.    PMH:  Past Medical History:   Diagnosis Date   ? Anxiety and depression 6/3/2016   ? Benign essential HTN 6/3/2016   ? COPD exacerbation (H)    ? Disease of thyroid gland    ? History of transfusion    ? Hx of cerebral aneurysm repair     Coiling   ? Hypertension    ? Hypothyroidism due to acquired atrophy  of thyroid 6/3/2016   ? Insomnia 6/3/2016     PSH:  Past Surgical History:   Procedure Laterality Date   ? ABDOMINAL SURGERY     ? APPENDECTOMY     ? BACK SURGERY     ? CEREBRAL ANEURYSM REPAIR     ? COLON SURGERY     ? FRACTURE SURGERY     ? HERNIA REPAIR       Allergies:  Allergies   Allergen Reactions   ? Venom-Honey Bee Anaphylaxis     Family HX:  Mother -  from lung cancer, heavy smoker  Father - colon cancer    Social Hx:  Social History     Socioeconomic History   ? Marital status:      Spouse name: Not on file   ? Number of children: Not on file   ? Years of education: Not on file   ? Highest education level: Not on file   Occupational History   ? Not on file   Social Needs   ? Financial resource strain: Not on file   ? Food insecurity:     Worry: Not on file     Inability: Not on file   ? Transportation needs:     Medical: Not on file     Non-medical: Not on file   Tobacco Use   ? Smoking status: Former Smoker     Packs/day: 1.00     Years: 0.00     Pack years: 0.00     Last attempt to quit: 2000     Years since quittin.1   ? Smokeless tobacco: Never Used   ? Tobacco comment: 20 years ago   Substance and Sexual Activity   ? Alcohol use: Yes     Alcohol/week: 5.4 oz     Types: 3 Glasses of wine, 6 Shots of liquor per week     Frequency: Never   ? Drug use: No   ? Sexual activity: Yes     Partners: Male   Lifestyle   ? Physical activity:     Days per week: Not on file     Minutes per session: Not on file   ? Stress: Not on file   Relationships   ? Social connections:     Talks on phone: Not on file     Gets together: Not on file     Attends Hoahaoism service: Not on file     Active member of club or organization: Not on file     Attends meetings of clubs or organizations: Not on file     Relationship status: Not on file   ? Intimate partner violence:     Fear of current or ex partner: Not on file     Emotionally abused: Not on file     Physically abused: Not on file     Forced sexual  activity: Not on file   Other Topics Concern   ? Not on file   Social History Narrative   ? Not on file   Tobacco: 26 pack years, quit in 1998  Worked as a RN, for [employer], currently retired  Lives her   3 healthy children    Current Meds:  Current Outpatient Medications   Medication Sig Dispense Refill   ? albuterol (PROAIR HFA;PROVENTIL HFA;VENTOLIN HFA) 90 mcg/actuation inhaler Inhale 2 puffs every 4 (four) hours as needed for wheezing. 1 Inhaler 0   ? aspirin 325 MG tablet Take 325 mg by mouth daily.     ? calcium carbonate-vitamin D3 (CALCIUM 600 + D,3,) 600 mg(1,500mg) -400 unit per tablet Take 1 tablet by mouth 2 (two) times a day.     ? cyclobenzaprine (FLEXERIL) 10 MG tablet Take 1 tablet (10 mg total) by mouth 2 (two) times a day as needed for muscle spasms. 30 tablet 0   ? EPINEPHrine (EPIPEN) 0.3 mg/0.3 mL atIn Inject 0.3 mg into the shoulder, thigh, or buttocks as needed (allergic reaction/anaphylaxis).     ? gabapentin (NEURONTIN) 600 MG tablet Take 600 mg by mouth 3 (three) times a day.            ? levothyroxine (SYNTHROID, LEVOTHROID) 50 MCG tablet Take 50 mcg by mouth daily.     ? metoprolol succinate (TOPROL XL) 50 MG 24 hr tablet Take 75 mg by mouth daily.     ? multivitamin therapeutic (THERAGRAN) tablet Take 1 tablet by mouth daily.     ? oxyCODONE-acetaminophen (PERCOCET/ENDOCET)  mg per tablet Take 1 tablet by mouth every 4 (four) hours as needed for pain.     ? QUEtiapine (SEROQUEL) 25 MG tablet Take 1 tablet (25 mg total) by mouth every 6 (six) hours as needed. 30 tablet 0   ? tiotropium (SPIRIVA WITH HANDIHALER) 18 mcg inhalation capsule Place 1 capsule (2 puffs total) into inhaler and inhale daily. Place 1 capsule into the inhaler device. Close and press button to crush the capsule. Inhale the contents of the crushed capsule in 2 breaths. 30 capsule 0   ? traZODone (DESYREL) 150 MG tablet Take 1 tablet (150 mg total) by mouth bedtime as needed for sleep. 30 tablet 0   ?  "venlafaxine (EFFEXOR-XR) 75 MG 24 hr capsule 1 capsule daily.       No current facility-administered medications for this visit.      Physical Exam:  /88   Pulse 68   Resp 20   Ht 5' 3\" (1.6 m)   Wt 173 lb 14.4 oz (78.9 kg)   SpO2 93% Comment: RA  BMI 30.81 kg/m     Gen: Alert, oriented, no distress  HEENT: nasal turbinates are unremarkable, no oropharyngeal lesions, no cervical or supraclavicular lymphadenopathy  CV: RRR, no M/G/R  Resp: CTAB, no focal crackles or wheezes  Abd: soft, nontender, no palpable organomegaly  Skin: no apparent rashes  Ext: no cyanosis, clubbing or edema  Neuro: alert, nonfocal    Labs:  Reviewed    Imaging studies:  Personally reviewed:    1/6/19 CT PE:  Pulmonary arteries are normal caliber and negative for pulmonary emboli. Normal caliber thoracic aorta with no dissection or aneurysm.     RV/LV RATIO: N/A     LUNGS AND PLEURA: Emphysematous change. Calcified granuloma in the right lower lobe. Linear atelectasis both lung bases with minimal atelectasis in the lingula.     MEDIASTINUM: No lymphadenopathy.     LIMITED UPPER ABDOMEN: Small calcified gallstone. Calcified granuloma in the spleen. Lumbar stabilization rods. Fat-containing upper ventral abdominal wall hernia. Duodenal diverticulum. Few small hepatic cysts.     MUSCULOSKELETAL: Postsurgical changes lumbar spine.     IMPRESSION:   1.  No lymphadenopathy. No pulmonary embolism.  2.  Postsurgical changes in the lumbar spine.  3.  Previous granulomatous infection.  4.  Cholelithiasis.    2/25/19 PFT's  F:F 89, FEV1 88% FVC 77%  %, RV/%  DLCO 74%    Hyperinflation, mild diffusion impairment           "

## 2021-06-18 NOTE — LETTER
Letter by Sonja Hartley MD at      Author: Sonja Hartley MD Service: -- Author Type: --    Filed:  Encounter Date: 2/25/2019 Status: (Other)       Sarah Barriga MD  [address] [facility] [address]  [facility] [address]                                  February 25, 2019    Patient: Jessica Ellison   MR Number: [id number]   YOB: 1950   Date of Visit: 2/25/2019     Dear Dr. Linus MD:    Thank you for referring Jessica Ellison to me for evaluation. Below are the relevant portions of my assessment and plan of care.    If you have questions, please do not hesitate to call me. I look forward to following Jessica along with you.    Sincerely,        Sonja Hartley MD          CC  MD Naeem Dsouza Nicole Lynn, MD  2/25/2019 10:47 AM  Sign at close encounter  Pulmonary Clinic Outpatient Consultation    Assessment and Plan:   68 y F with a history of EtOH abuse, HTN, hypothyroidism, COPD/emphysema, who presents for hospital follow-up for COPD exacerbation.    We reviewed her imaging and PFTs - CT scan shows mild-moderate upper lobe emphysema, and PFTs have hyperinflation and mild diffusion impairment, consistent with her imaging. Prior to the Jan exacerbation, she has never exacerbated. We reviewed that her emphysema is from her prior smoking, and the chronic nature of the disease. She is using the spiriva sporadically and has not found it very beneficial. She has little-no symptoms from her COPD and denies exertional limitations.      PLAN:    Continue as needed albuterol - can hold on resuming spiriva, and assess need for any long acting maintenance inhalers if symptoms are worse (she did not require inhalers prior to this admission)    Does not meet criteria for lung cancer screening CT scans    She already quit smoking    No exertional oxygen needed per recent ambulatory testing    Recommended GI follow-up for esophageal dysmotility - denies GERD symptoms to necessitate any  treatment    She is up to date on vaccines    She is low-intermediate risk for post-operative pulmonary complications should she proceed with hernia surgery. Her only risk factor being her baseline SpO2 < 95%. We are happy to assist should any respiratory issues arise.     I will see her back prn should her COPD symptoms escalate/require more treatment.      Sonja Hartley MD  Pulmonary and Critical Care Medicine  [facility]  Office: [phone]  Pager: [phone]    ----------------------    Reason for Consult: Emphysema    HPI:   68 y F with a history of EtOH abuse, HTN, hypothyroidism, COPD/emphysema, who presents for hospital follow-up for COPD exacerbation.    She was hospitalized in January for chemical dependency, and was noted to have hypoxia with wheeze and shortness of breath and was treated for a COPD exacerbation. She did not require oxygen on discharge per ambulatory oximetry. She was discharged on spiriva and prn albuterol. She was also found to have esophageal dysmotility on an esophagram.    Prior to this admission she denies exacerbations, had not been prescribed/used inhalers. She was unaware that she had emphysema. Since discharge she has been using the spiriva sporadically due to cost, and has not required albuterol. She has no exertional dyspnea or chronic cough. She does exercises in the pool regularly. She does not have GERD symptoms. She plans to see GI for the abnormal esophagram, and is also looking into surgery for a ventral hernia.      ROS:  A 12-system review was obtained and was negative with the exception of the symptoms endorsed in the history of present illness.    PMH:  Past Medical History:   Diagnosis Date   ? Anxiety and depression 6/3/2016   ? Benign essential HTN 6/3/2016   ? COPD exacerbation (H)    ? Disease of thyroid gland    ? History of transfusion    ? Hx of cerebral aneurysm repair     Coiling   ? Hypertension    ? Hypothyroidism due to acquired atrophy  of thyroid 6/3/2016   ? Insomnia 6/3/2016     PSH:  Past Surgical History:   Procedure Laterality Date   ? ABDOMINAL SURGERY     ? APPENDECTOMY     ? BACK SURGERY     ? CEREBRAL ANEURYSM REPAIR     ? COLON SURGERY     ? FRACTURE SURGERY     ? HERNIA REPAIR       Allergies:  Allergies   Allergen Reactions   ? Venom-Honey Bee Anaphylaxis     Family HX:  Mother -  from lung cancer, heavy smoker  Father - colon cancer    Social Hx:  Social History     Socioeconomic History   ? Marital status:      Spouse name: Not on file   ? Number of children: Not on file   ? Years of education: Not on file   ? Highest education level: Not on file   Occupational History   ? Not on file   Social Needs   ? Financial resource strain: Not on file   ? Food insecurity:     Worry: Not on file     Inability: Not on file   ? Transportation needs:     Medical: Not on file     Non-medical: Not on file   Tobacco Use   ? Smoking status: Former Smoker     Packs/day: 1.00     Years: 0.00     Pack years: 0.00     Last attempt to quit: 2000     Years since quittin.1   ? Smokeless tobacco: Never Used   ? Tobacco comment: 20 years ago   Substance and Sexual Activity   ? Alcohol use: Yes     Alcohol/week: 5.4 oz     Types: 3 Glasses of wine, 6 Shots of liquor per week     Frequency: Never   ? Drug use: No   ? Sexual activity: Yes     Partners: Male   Lifestyle   ? Physical activity:     Days per week: Not on file     Minutes per session: Not on file   ? Stress: Not on file   Relationships   ? Social connections:     Talks on phone: Not on file     Gets together: Not on file     Attends [employer]ism service: Not on file     Active member of club or organization: Not on file     Attends meetings of clubs or organizations: Not on file     Relationship status: Not on file   ? Intimate partner violence:     Fear of current or ex partner: Not on file     Emotionally abused: Not on file     Physically abused: Not on file     Forced sexual  activity: Not on file   Other Topics Concern   ? Not on file   Social History Narrative   ? Not on file   Tobacco: 26 pack years, quit in 1998  Worked as a RN, for [employer], currently retired  Lives her   3 healthy children    Current Meds:  Current Outpatient Medications   Medication Sig Dispense Refill   ? albuterol (PROAIR HFA;PROVENTIL HFA;VENTOLIN HFA) 90 mcg/actuation inhaler Inhale 2 puffs every 4 (four) hours as needed for wheezing. 1 Inhaler 0   ? aspirin 325 MG tablet Take 325 mg by mouth daily.     ? calcium carbonate-vitamin D3 (CALCIUM 600 + D,3,) 600 mg(1,500mg) -400 unit per tablet Take 1 tablet by mouth 2 (two) times a day.     ? cyclobenzaprine (FLEXERIL) 10 MG tablet Take 1 tablet (10 mg total) by mouth 2 (two) times a day as needed for muscle spasms. 30 tablet 0   ? EPINEPHrine (EPIPEN) 0.3 mg/0.3 mL atIn Inject 0.3 mg into the shoulder, thigh, or buttocks as needed (allergic reaction/anaphylaxis).     ? gabapentin (NEURONTIN) 600 MG tablet Take 600 mg by mouth 3 (three) times a day.            ? levothyroxine (SYNTHROID, LEVOTHROID) 50 MCG tablet Take 50 mcg by mouth daily.     ? metoprolol succinate (TOPROL XL) 50 MG 24 hr tablet Take 75 mg by mouth daily.     ? multivitamin therapeutic (THERAGRAN) tablet Take 1 tablet by mouth daily.     ? oxyCODONE-acetaminophen (PERCOCET/ENDOCET)  mg per tablet Take 1 tablet by mouth every 4 (four) hours as needed for pain.     ? QUEtiapine (SEROQUEL) 25 MG tablet Take 1 tablet (25 mg total) by mouth every 6 (six) hours as needed. 30 tablet 0   ? tiotropium (SPIRIVA WITH HANDIHALER) 18 mcg inhalation capsule Place 1 capsule (2 puffs total) into inhaler and inhale daily. Place 1 capsule into the inhaler device. Close and press button to crush the capsule. Inhale the contents of the crushed capsule in 2 breaths. 30 capsule 0   ? traZODone (DESYREL) 150 MG tablet Take 1 tablet (150 mg total) by mouth bedtime as needed for sleep. 30 tablet 0   ?  "venlafaxine (EFFEXOR-XR) 75 MG 24 hr capsule 1 capsule daily.       No current facility-administered medications for this visit.      Physical Exam:  /88   Pulse 68   Resp 20   Ht 5' 3\" (1.6 m)   Wt 173 lb 14.4 oz (78.9 kg)   SpO2 93% Comment: RA  BMI 30.81 kg/m     Gen: Alert, oriented, no distress  HEENT: nasal turbinates are unremarkable, no oropharyngeal lesions, no cervical or supraclavicular lymphadenopathy  CV: RRR, no M/G/R  Resp: CTAB, no focal crackles or wheezes  Abd: soft, nontender, no palpable organomegaly  Skin: no apparent rashes  Ext: no cyanosis, clubbing or edema  Neuro: alert, nonfocal    Labs:  Reviewed    Imaging studies:  Personally reviewed:    1/6/19 CT PE:  Pulmonary arteries are normal caliber and negative for pulmonary emboli. Normal caliber thoracic aorta with no dissection or aneurysm.     RV/LV RATIO: N/A     LUNGS AND PLEURA: Emphysematous change. Calcified granuloma in the right lower lobe. Linear atelectasis both lung bases with minimal atelectasis in the lingula.     MEDIASTINUM: No lymphadenopathy.     LIMITED UPPER ABDOMEN: Small calcified gallstone. Calcified granuloma in the spleen. Lumbar stabilization rods. Fat-containing upper ventral abdominal wall hernia. Duodenal diverticulum. Few small hepatic cysts.     MUSCULOSKELETAL: Postsurgical changes lumbar spine.     IMPRESSION:   1.  No lymphadenopathy. No pulmonary embolism.  2.  Postsurgical changes in the lumbar spine.  3.  Previous granulomatous infection.  4.  Cholelithiasis.    2/25/19 PFT's  F:F 89, FEV1 88% FVC 77%  %, RV/%  DLCO 74%    Hyperinflation, mild diffusion impairment           "

## 2021-06-18 NOTE — LETTER
Letter by Sonja Hartley MD at      Author: Sonja Hartley MD Service: -- Author Type: --    Filed:  Encounter Date: 1/16/2019 Status: (Other)       Jessica Ellison  [address]  [facility] [address]    January 16, 2019    Dear Ms. Jaquezelizabeth,    [facility]! Your appointment information is below.   Please bring the following to your appointment:    Insurance Card, so we may scan it for our records    Drivers license or valid ID, so we may scan it for our records    Co-pay (as applicable per your insurance plan)    A current list of your medications including over the counter products such as vitamins and supplements    Your medical records including copies of X-Ray films if you are transferring your care from another clinic.  If you do not have your records, please fill out the release of information form and we will request those records.     Provider: Sonja Hartley MD  Appointment Date: February 25, 2019  Arrival Time:8:00am for PFT, 9:00am for Dr. Hartley    Location: [address]        [facility], [address]    **Please allow adequate time for your commute and parking. If you are more than 10 minutes late, you may be asked to reschedule.     If you need to cancel or reschedule your appointment, please notify us at least 24 hours prior to your appointment time so we are able to make this time available for another patient.    Thank you for choosing the Critical access hospital for your health care needs. If you have any questions, please do not hesitate to contact us at any time at   [phone]. We look forward to caring for you.     Sincerely,     [facility] staff

## 2021-06-23 NOTE — TELEPHONE ENCOUNTER
COPD education follow up call   Spoke with Jessica. She had no problems or questions for me today.

## 2021-06-23 NOTE — TELEPHONE ENCOUNTER
COPD education follow up call  Spoke with Jessica.  She is feeling better, and doing well. Reviewed her Action Plan and answered some questions she had about her Spiriva.

## 2021-06-24 DIAGNOSIS — G89.29 CHRONIC BILATERAL LOW BACK PAIN WITHOUT SCIATICA: ICD-10-CM

## 2021-06-24 DIAGNOSIS — M53.3 SACROILIAC JOINT PAIN: ICD-10-CM

## 2021-06-24 DIAGNOSIS — M54.50 CHRONIC BILATERAL LOW BACK PAIN WITHOUT SCIATICA: ICD-10-CM

## 2021-06-24 DIAGNOSIS — Z98.1 S/P LUMBAR SPINAL FUSION: ICD-10-CM

## 2021-06-24 RX ORDER — OXYCODONE AND ACETAMINOPHEN 10; 325 MG/1; MG/1
TABLET ORAL
Qty: 120 TABLET | Refills: 0 | Status: SHIPPED | OUTPATIENT
Start: 2021-06-24 | End: 2021-08-05

## 2021-06-24 NOTE — PATIENT INSTRUCTIONS - HE
It was a pleasure to see you today.    You can stop the spiriva daily inhaler. Continue albuterol as needed.    Let me know if your symptoms worsen, and we can resume a daily maintenance inhaler    If you are stable on albuterol as needed this is all you will need.    Continue to get your annual flu vaccine and routine medical care.      Sonja Hartley MD  Pulmonary and Critical Care Medicine  [facility]  Office: [phone]  Pager: [phone]

## 2021-06-24 NOTE — CONFIDENTIAL NOTE
PDMP Review       Value Time User    State PDMP site checked  Yes 6/24/2021  3:09 PM Janel [address] APRN CNP        Covering for primary/ordering provider:  LA Caruso CNP  [facility]

## 2021-06-24 NOTE — TELEPHONE ENCOUNTER
COPD education follow up call  Spoke with Jessica.  She states she has been feeling well. She has been compliant with her Spiriva. Answered questions she had about Spiriva and Albuterol.  Reminded her of her upcoming PFT and Pulmonary appointments.

## 2021-06-28 LAB — SLPCOMP: NORMAL

## 2021-07-01 NOTE — PROGRESS NOTES
Jessica is a 70 year old who is being evaluated via a billable video visit.      How would you like to obtain your AVS? MyChart  If the video visit is dropped, the invitation should be resent by: Send to e-mail at: [email or web address]  Will anyone else be joining your video visit? Annika Romero MA on 7/1/2021 at 1:53 PM    Video Start Time: 2:04  Video-Visit Details    Type of service:  Video Visit    Video End Time:2:35 PM    Originating Location (pt. Location): Home    Distant Location (provider location):  [facility]     Platform used for Video Visit: [facility]   Outpatient Sleep Medicine Follow-up Visit  July 2, 2021    Name: Jessica Ellison MRN# [id number]   Age: 70 year old YOB: 1950     Date of Consultation: July 2, 2021  Consultation is requested by: No referring provider defined for this encounter.  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 in the setting of multiple SSRI use and sleep disruption during REM from obstructive sleep apnea    Low ferritin 21-52  over 3 years with motor restlesness suggestive of atypical restless leg syndrome without evidence of periodic limb movement on overnight testing    Menopausal symptoms with hot flashes      Comorbid Diagnoses:    Mood disturbance with anxiety and depression    Hypertension    Centrilobular emphysema with elevated PCO2-spirometry not available    Swelling of legs     Summary Recommendations:      Consider discontinuing trazodone at night and substituting Seroquel if you have difficulty sleeping-discuss this with your physician Dr. Barriga; SSRIs have been implicated in REM behavior disorder    Initiate effective therapy for sleep apnea with CPAP with  follow-up watch pat device to determine whether hypoxemia is corrected with correction of sleep apnea.    If there is persistent hypoxemia a repeat polysomnography with CO2 monitoring and possible bilevel titration study for CO2 greater than 60 or oxygen if hypercapnia is not documented.  Pulmonary function test should be completed for evaluation of obstructive lung disease in the event that hypoventilation documented.    Clinical reevaluation of REM behavior disorder after treatment of sleep apnea and decreased SSRI use    This patient will have her daughter to assist her in the home with a watch pat monitoring-we reviewed together the watch pat home sleep test procedure and the daughter can assist her with telephone            History of Present Illness:      Jessica Ellison is a 70 year old female with non-restorative sleep, early morning awakenings, loud vocalizations or screams 3x/week or muffled scream with occasional paralysis.  This occurs in the setting of severe obstructive sleep apnea with hypoxemia and sleep disruption.  Features of REM behavior disorder were found on polysomnography without dream enactment and occurred in the setting of multiple awakenings from sleep apnea during REM.  She is also taking multiple doses of venlafaxine as well as evening trazodone which may be implicated in worsening of motor activity during REM.   This patient has centrilobular emphysema without completion of pulmonary testing to determine severity of obstructive lung disease currently empirically treated with anticholinergic bronchodilator.  She has sustained hypoxemia during sleep in the setting of this possible lung disease as well as obesity and coexisting sleep apnea.        SLEEP-WAKE SCHEDULE:   Enters bedroom 10-10:30 with 15 min latency with 2 awakenings to void with prolonged awakening 5 am 4x/week with bad dreams with content about children feeling angry or protecting young children  Final awakening  spontaneous 7:30 am feeling non-restored  Naps 1 hour 4x/week 2-3pm              Problem list:     Patient Active Problem List   Diagnosis     Insomnia     Back pain     ETOH abuse     CARDIOVASCULAR SCREENING; LDL GOAL LESS THAN 160     Anxiety     Advanced directives, counseling/discussion     Major depressive disorder, recurrent episode, moderate (H)     Cerebral aneurysm, nonruptured     Bee allergy status     S/P lumbar spinal fusion     Chronic bilateral low back pain without sciatica     Essential hypertension with goal blood pressure less than 140/90     Hypothyroidism     Spell of change in speech     Centrilobular emphysema (H)     Acute respiratory failure with hypoxia (H)     Suspected COVID-19 virus infection     Hyperlipidemia     Hypoxia     Congenital musculoskeletal deformity of spine     Chronic pain        PREVIOUS SLEEP STUDIES:        Most Recent SCALES:    EPWORTH SLEEPINESS SCALE WITHIN 1 YEAR WITHIN 10 DAYS   Sitting and reading 2 2   Watching TV 2 2   Sitting, inactive in a public place (theatre or mtg.) 1  1    As a passenger in a car 2 2   Lying down to rest in the afternoon when circumstance permit 3 3   Sitting and talking to someone 0 0   Sitting quietly after lunch without alcohol 1 1   In a car, while stopped for a few minutes in traffic 1 1   TOTAL SCORE 12 12       INSOMNIA SEVERITY INDEX WITHIN 1 YEAR   Difficulty falling asleep 1   Difficult staying asleep 1   Problems waking up to early 3   How SATISFIED/DISSATISFIED are you with your CURRENT sleep pattern? 3   How NOTICEABLE to others do you think your sleep pattern is in terms of your quality of life? 1   How WORRIED/DISTRESSED are you about your current sleep pattern? 0   To what extent do you consider your sleep problem to INTERFERE with your daily fuctioning(e.g. daytime fatigue, mood, ability to function at work/daily chores, concentration, mood,etc.) CURRENTLY? 2   INSOMNIA SEVERITY INDEX TOTAL SCORE 11    --absence of  insomnia (0-7); sub-threshold insomnia (8-14); moderate insomnia (15-21); and severe insomnia (22-28)--               Medications:   Multiple selective serotonin reuptake inhibitor dosing;   Current Outpatient Medications   Medication Sig     amLODIPine (NORVASC) 5 MG tablet Take 1 tablet (5 mg) by mouth daily     aspirin 325 MG tablet Take 1 tablet (325 mg) by mouth daily     atorvastatin (LIPITOR) 20 MG tablet Take 1 tablet (20 mg) by mouth daily     calcium carbonate 600 mg-vitamin D 400 units (CALTRATE) 600-400 MG-UNIT per tablet Take 1 tablet by mouth every evening     cyclobenzaprine (FLEXERIL) 10 MG tablet Take 1 tablet (10 mg) by mouth 2 times daily as needed for muscle spasms     diazepam (VALIUM) 10 MG tablet Take 10 mg by mouth every 6 hours as needed      EPINEPHrine (EPIPEN) 0.3 MG/0.3ML injection Inject 0.3 mLs (0.3 mg) into the muscle once as needed for anaphylaxis     ferrous sulfate (FEROSUL) 325 (65 Fe) MG tablet Take 325 mg by mouth every other day Alternating days with Senna tab     levothyroxine (LEVOTHROID) 50 MCG tablet Take 1 tablet (50 mcg) by mouth daily     metoprolol succinate ER (TOPROL-XL) 50 MG 24 hr tablet Take 1 tablet (50 mg) by mouth 2 times daily     multivitamin w/minerals (THERA-VIT-M) tablet Take 1 tablet by mouth every evening     oxyCODONE-acetaminophen (PERCOCET)  MG per tablet TAKE TWO TABLETS BY MOUTH EVERY 6 HOURS AS NEEDED FOR MODERATE TO SEVERE PAIN ( DO NOT TAKE MORE THAN 4 TABLETS PER DAY)     QUEtiapine (SEROQUEL) 25 MG tablet TAKE ONE TABLET BY MOUTH DAILY AS NEEDED FOR ANXIETY (Patient taking differently: Take 25 mg by mouth daily as needed (anxiety) TAKE ONE TABLET BY MOUTH DAILY AS NEEDED FOR ANXIETY)     senna (SENOKOT) 8.6 MG tablet Take 1 tablet by mouth every other day Alternating days with Iron tab     tiotropium (SPIRIVA) 18 MCG inhaled capsule Inhale 1 capsule (18 mcg) into the lungs daily     traZODone (DESYREL) 150 MG tablet Take 1 tablet (150 mg)  by mouth nightly as needed for sleep     venlafaxine (EFFEXOR-XR) 150 MG 24 hr capsule Take 1 capsule (150 mg) by mouth daily TAKE ONE CAPSULE BY MOUTH ONCE DAILY to take with a 75 mg for total of 225 mg daily     venlafaxine (EFFEXOR-XR) 75 MG 24 hr capsule Take 1 capsule (75 mg) by mouth daily Take with 150 mg for a total of 225 mg daily. Is taking the 75 mg some times 2 times a day, thought she was told to increased not sure     VENTOLIN  (90 Base) MCG/ACT inhaler INHALE TWO PUFFS BY MOUTH EVERY 6 HOURS (Patient taking differently: Inhale 2 puffs into the lungs every 6 hours as needed )     naloxone (NARCAN) 4 MG/0.1ML nasal spray Spray 1 spray (4 mg) into one nostril alternating nostrils as needed for opioid reversal every 2-3 minutes until assistance arrives     No current facility-administered medications for this visit.         Allergies   Allergen Reactions     Bee Venom      Lisinopril Swelling     Oral swelling            Problem List:     Patient Active Problem List   Diagnosis     Insomnia     Back pain     ETOH abuse     CARDIOVASCULAR SCREENING; LDL GOAL LESS THAN 160     Anxiety     Advanced directives, counseling/discussion     Major depressive disorder, recurrent episode, moderate (H)     Cerebral aneurysm, nonruptured     Bee allergy status     S/P lumbar spinal fusion     Chronic bilateral low back pain without sciatica     Essential hypertension with goal blood pressure less than 140/90     Hypothyroidism     Spell of change in speech     Centrilobular emphysema (H)     Acute respiratory failure with hypoxia (H)     Suspected COVID-19 virus infection     Hyperlipidemia     Hypoxia     Congenital musculoskeletal deformity of spine     Chronic pain            Past Medical History:     Does not need 02 supplement at night   Past Medical History:   Diagnosis Date     Anemia      Aneurysm (H)      Chemical dependency (H)     Chem Dep RX     Depression      History of blood transfusion       Hypertension      Menopausal symptoms      Other chronic pain     Lower back and hips     Sleep disorder      Thyroid disease      Tobacco abuse              Past Surgical History:    No h/o  upper airway surgery  Past Surgical History:   Procedure Laterality Date     APPENDECTOMY  1960     C PART REMOVAL COLON W ANASTOMOSIS  5/2005    diverticulitis     C SPINE FUSION,ANTER,8+ SGMTS  2007    Anterior posterior fusion 10 levels, hardware removal and re-fusion 2009     cerebral aneurysm  2014    coiled     COLONOSCOPY  4/19/2005     HC EXCISION BREAST LESION, OPEN >=1      core biopsy 2006, lumpectomy 2006     LAP VENTRAL HERNIA REPAIR  12/2017    [place]     LAPAROSCOPIC ASSISTED HYSTERECTOMY VAGINAL  12/2017     orif left femoral neck Left 6/3/2016    stress fracture.  done at [facility]     SURGICAL HISTORY OF -   2004    Skin Graft              Physical Examination:     Mandibular profile  Reported vitals:  There were no vitals taken for this visit.   GENERAL: Healthy, alert and no distress  EYES: Eyes grossly normal to inspection.  No discharge or erythema, or obvious scleral/conjunctival abnormalities.  RESP: No audible wheeze, cough, or visible cyanosis.  No visible retractions or increased work of breathing.    SKIN: Visible skin clear. No significant rash, abnormal pigmentation or lesions.  NEURO: Cranial nerves grossly intact.  Mentation and speech appropriate for age.  PSYCH: Mentation appears normal, affect normal/bright, judgement and insight intact, normal speech and appearance well-groomed.        Copy to: Sarah Barriga MD 7/2/2021       Total time spent with patient: 20 min >50% counseling and 10 minutes documenting and reviewing records

## 2021-07-01 NOTE — PATIENT INSTRUCTIONS
"      MY TREATMENT INFORMATION FOR SLEEP APNEA-  Jessica ARLETH Ellison    DOCTOR : STEFANO MILLER MD    Am I having a sleep study at a sleep center?  --->Due to insurance clearance delays, you will be contacted within 2-4 weeks to schedule    Am I having a home sleep study?  --->Watch the video for the device you are using:    -/drop off device-   https://www.WiOffer[employer].com/watch?v=yGGFBdELGhk    -Disposable device sent out require phone/computer application-   https://www.[employer]Wing.com/watch?v=BCce_vbiwxE      Frequently asked questions:  1. What is Obstructive Sleep Apnea (JOY)? JOY is the most common type of sleep apnea. Apnea means, \"without breath.\"  Apnea is most often caused by narrowing or collapse of the upper airway as muscles relax during sleep.   Almost everyone has occasional apneas. Most people with sleep apnea have had brief interruptions at night frequently for many years.  The severity of sleep apnea is related to how frequent and severe the events are.   2. What are the consequences of JOY? Symptoms include: feeling sleepy during the day, snoring loudly, gasping or stopping of breathing, trouble sleeping, and occasionally morning headaches or heartburn at night.  Sleepiness can be serious and even increase the risk of falling asleep while driving. Other health consequences may include development of high blood pressure and other cardiovascular disease in persons who are susceptible. Untreated JOY  can contribute to heart disease, stroke and diabetes.   3. What are the treatment options? In most situations, sleep apnea is a lifelong disease that must be managed with daily therapy. Medications are not effective for sleep apnea and surgery is generally not considered until other therapies have been tried. Your treatment is your choice . Continuous Positive Airway (CPAP) works right away and is the therapy that is effective in nearly everyone. An oral device to hold your jaw forward is usually the next " most reliable option. Other options include postioning devices (to keep you off your back), weight loss, and surgery including a tongue pacing device. There is more detail about some of these options below.  4. Are my sleep studies covered by insurance? Although we will request verification of coverage, we advise you also check in advance of the study to ensure there is coverage.    Important tips for those choosing CPAP and similar devices   Know your equipment:  CPAP is continuous positive airway pressure that prevents obstructive sleep apnea by keeping the throat from collapsing while you are sleeping. In most cases, the device is  smart  and can slowly self-adjusts if your throat collapses and keeps a record every day of how well you are treated-this information is available to you and your care team.  BPAP is bilevel positive airway pressure that keeps your throat open and also assists each breath with a pressure boost to maintain adequate breathing.  Special kinds of BPAP are used in patients who have inadequate breathing from lung or heart disease. In most cases, the device is  smart  and can slowly self-adjusts to assist breathing. Like CPAP, the device keeps a record of how well you are treated.  Your mask is your connection to the device. You get to choose what feels most comfortable and the staff will help to make sure if fits. Here: are some examples of the different masks that are available:       Key points to remember on your journey with sleep apnea:  1. Sleep study.  PAP devices often need to be adjusted during a sleep study to show that they are effective and adjusted right.  2. Good tips to remember: Try wearing just the mask during a quiet time during the day so your body adapts to wearing it. A humidifier is recommended for comfort in most cases to prevent drying of your nose and throat. Allergy medication from your provider may help you if you are having nasal congestion.  3. Getting  settled-in. It takes more than one night for most of us to get used to wearing a mask. Try wearing just the mask during a quiet time during the day so your body adapts to wearing it. A humidifier is recommended for comfort in most cases. Our team will work with you carefully on the first day and will be in contact within 4 days and again at 2 and 4 weeks for advice and remote device adjustments. Your therapy is evaluated by the device each day.   4. Use it every night. The more you are able to sleep naturally for 7-8 hours, the more likely you will have good sleep and to prevent health risks or symptoms from sleep apnea. Even if you use it 4 hours it helps. Occasionally all of us are unable to use a medical therapy, in sleep apnea, it is not dangerous to miss one night.   5. Communicate. Call our skilled team on the number provided on the first day if your visit for problems that make it difficult to wear the device. Over 2 out of 3 patients can learn to wear the device long-term with help from our team. Remember to call our team or your sleep providers if you are unable to wear the device as we may have other solutions for those who cannot adapt to mask CPAP therapy. It is recommended that you sleep your sleep provider within the first 3 months and yearly after that if you are not having problems.   6. Use it for your health. We encourage use of CPAP masks during daytime quiet periods to allow your face and brain to adapt to the sensation of CPAP so that it will be a more natural sensation to awaken to at night or during naps. This can be very useful during the first few weeks or months of adapting to CPAP though it does not help medically to wear CPAP during wakefulness and  should not be used as a strategy just to meet guidelines.  7. Take care of your equipment. Make sure you clean your mask and tubing using directions every day and that your filter and mask are replaced as recommended or if they are not  working.     BESIDES CPAP, WHAT OTHER THERAPIES ARE THERE?    Positioning Device  Positioning devices are generally used when sleep apnea is mild and only occurs on your back.This example shows a pillow that straps around the waist. It may be appropriate for those whose sleep study shows milder sleep apnea that occurs primarily when lying flat on one's back. Preliminary studies have shown benefit but effectiveness at home may need to be verified by a home sleep test. These devices are generally not covered by medical insurance.  Examples of devices that maintain sleeping on the back to prevent snoring and mild sleep apnea.    Belt type body positioner  http://[employer]/    Electronic reminder  http://nightshifttherapy.com/  http://www.[employer]au/      Oral Appliance  What is oral appliance therapy?  An oral appliance device fits on your teeth at night like a retainer used after having braces. The device is made by a specialized dentist and requires several visits over 1-2 months before a manufactured device is made to fit your teeth and is adjusted to prevent your sleep apnea. Once an oral device is working properly, snoring should be improved. A home sleep test may be recommended at that time if to determine whether the sleep apnea is adequately treated.       Some things to remember:  -Oral devices are often, but not always, covered by your medical insurance. Be sure to check with your insurance provider.   -If you are referred for oral therapy, you will be given a list of specialized dentists to consider or you may choose to visit the Web site of the American Academy of Dental Sleep Medicine  -Oral devices are less likely to work if you have severe sleep apnea or are extremely overweight.     More detailed information  An oral appliance is a small acrylic device that fits over the upper and lower teeth  (similar to a retainer or a mouth guard). This device slightly moves jaw forward, which moves the base  of the tongue forward, opens the airway, improves breathing for effective treat snoring and obstructive sleep apnea in perhaps 7 out of 10 people .  The best working devices are custom-made by a dental device  after a mold is made of the teeth 1, 2, 3.  When is an oral appliance indicated?  Oral appliance therapy is recommended as a first-line treatment for patients with primary snoring, mild sleep apnea, and for patients with moderate sleep apnea who prefer appliance therapy to use of CPAP4, 5. Severity of sleep apnea is determined by sleep testing and is based on the number of respiratory events per hour of sleep.   How successful is oral appliance therapy?  The success rate of oral appliance therapy in patients with mild sleep apnea is 75-80% while in patients with moderate sleep apnea it is 50-70%. The chance of success in patients with severe sleep apnea is 40-50%. The research also shows that oral appliances have a beneficial effect on the cardiovascular health of JOY patients at the same magnitude as CPAP therapy7.  Oral appliances should be a second-line treatment in cases of severe sleep apnea, but if not completely successful then a combination therapy utilizing CPAP plus oral appliance therapy may be effective. Oral appliances tend to be effective in a broad range of patients although studies show that the patients who have the highest success are females, younger patients, those with milder disease, and less severe obesity. 3, 6.   Finding a dentist that practices dental sleep medicine  Specific training is available through the American Academy of Dental Sleep Medicine for dentists interested in working in the field of sleep. To find a dentist who is educated in the field of sleep and the use of oral appliances, near you, visit the Web site of the American Academy of Dental Sleep Medicine.    References  1. lor Cornelius al. Objectively measured vs self-reported compliance during oral  appliance therapy for sleep-disordered breathing. Chest 2013; 144(5): 6673-8452.  2. Danelle, et al. Objective measurement of compliance during oral appliance therapy for sleep-disordered breathing. Thorax 2013; 68(1): 91-96.  3. Marla et al. Mandibular advancement devices in 620 men and women with JOY and snoring: tolerability and predictors of treatment success. Chest 2004; 125: 9592-7111.  4. Chastity, et al. Oral appliances for snoring and JOY: a review. Sleep 2006; 29: 244-262.  5. Jose F et al. Oral appliance treatment for JOY: an update. J Clin Sleep Med 2014; 10(2): 215-227.  6. Reymundo et al. Predictors of OSAH treatment outcome. J Dent Res 2007; 86: 9305-0182.      Weight Loss:    Weight loss is a long-term strategy that may improve sleep apnea in some patients.    Weight management is a personal decision and the decision should be based on your interest and the potential benefits.  If you are interested in exploring weight loss strategies, the following discussion covers the impact on weight loss on sleep apnea and the approaches that may be successful.    Being overweight does not necessarily mean you will have health consequences.  Those who have BMI over 35 or over 27 with existing medical conditions carries greater risk.   Weight loss decreases severity of sleep apnea in most people with obesity. For those with mild obesity who have developed snoring with weight gain, even 15-30 pound weight loss can improve and occasionally eliminate sleep apnea.  Structured and life-long dietary and health habits are necessary to lose weight and keep healthier weight levels.     Though there may be significant health benefits from weight loss, long-term weight loss is very difficult to achieve- studies show success with dietary management in less than 10% of people. In addition, substantial weight loss may require years of dietary control and may be difficult if patients have severe obesity. In these  cases, surgical management may be considered.  Finally, older individuals who have tolerated obesity without health complications may be less likely to benefit from weight loss strategies.        Your BMI is There is no height or weight on file to calculate BMI.  Weight management is a personal decision.  If you are interested in exploring weight loss strategies, the following discussion covers the approaches that may be successful. Body mass index (BMI) is one way to tell whether you are at a healthy weight, overweight, or obese. It measures your weight in relation to your height.  A BMI of 18.5 to 24.9 is in the healthy range. A person with a BMI of 25 to 29.9 is considered overweight, and someone with a BMI of 30 or greater is considered obese. More than two-thirds of American adults are considered overweight or obese.  Being overweight or obese increases the risk for further weight gain. Excess weight may lead to heart disease and diabetes.  Creating and following plans for healthy eating and physical activity may help you improve your health.  Weight control is part of healthy lifestyle and includes exercise, emotional health, and healthy eating habits. Careful eating habits lifelong are the mainstay of weight control. Though there are significant health benefits from weight loss, long-term weight loss with diet alone may be very difficult to achieve- studies show long-term success with dietary management in less than 10% of people. Attaining a healthy weight may be especially difficult to achieve in those with severe obesity. In some cases, medications, devices and surgical management might be considered.  What can you do?  If you are overweight or obese and are interested in methods for weight loss, you should discuss this with your provider.     Consider reducing daily calorie intake by 500 calories.     Keep a food journal.     Avoiding skipping meals, consider cutting portions instead.    Diet combined  with exercise helps maintain muscle while optimizing fat loss. Strength training is particularly important for building and maintaining muscle mass. Exercise helps reduce stress, increase energy, and improves fitness. Increasing exercise without diet control, however, may not burn enough calories to loose weight.       Start walking three days a week 10-20 minutes at a time    Work towards walking thirty minutes five days a week     Eventually, increase the speed of your walking for 1-2 minutes at time    In addition, we recommend that you review healthy lifestyles and methods for weight loss available through the National Institutes of Health patient information sites:  http://win.niddk.nih.gov/publications/index.htm    And look into health and wellness programs that may be available through your health insurance provider, employer, local community center, or [employer].          Surgery:    Surgery for obstructive sleep apnea is considered generally only when other therapies fail to work. Surgery may be discussed with you if you are having a difficult time tolerating CPAP and or when there is an abnormal structure that requires surgical correction.  Nose and throat surgeries often enlarge the airway to prevent collapse.  Most of these surgeries create pain for 1-2 weeks and up to half of the most common surgeries are not effective throughout life.  You should carefully discuss the benefits and drawbacks to surgery with your sleep provider and surgeon to determine if it is the best solution for you.   More information  Surgery for JOY is directed at areas that are responsible for narrowing or complete obstruction of the airway during sleep.  There are a wide range of procedures available to enlarge and/or stabilize the airway to prevent blockage of breathing in the three major areas where it can occur: the palate, tongue, and nasal regions.  Successful surgical treatment depends on the accurate identification of  the factors responsible for obstructive sleep apnea in each person.  A personalized approach is required because there is no single treatment that works well for everyone.  Because of anatomic variation, consultation with an examination by a sleep surgeon is a critical first step in determining what surgical options are best for each patient.  In some cases, examination during sedation may be recommended in order to guide the selection of procedures.  Patients will be counseled about risks and benefits as well as the typical recovery course after surgery. Surgery is typically not a cure for a person s JOY.  However, surgery will often significantly improve one s JOY severity (termed  success rate ).  Even in the absence of a cure, surgery will decrease the cardiovascular risk associated with OSA7; improve overall quality of life8 (sleepiness, functionality, sleep quality, etc).      Palate Procedures:  Patients with JOY often have narrowing of their airway in the region of their tonsils and uvula.  The goals of palate procedures are to widen the airway in this region as well as to help the tissues resist collapse.  Modern palate procedure techniques focus on tissue conservation and soft tissue rearrangement, rather than tissue removal.  Often the uvula is preserved in this procedure. Residual sleep apnea is common in patient after pharyngoplasty with an average reduction in sleep apnea events of 33%2.      Tongue Procedures:  ExamWhile patients are awake, the muscles that surround the throat are active and keep this region open for breathing. These muscles relax during sleep, allowing the tongue and other structures to collapse and block breathing.  There are several different tongue procedures available.  Selection of a tongue base procedure depends on characteristics seen on physical exam.  Generally, procedures are aimed at removing bulky tissues in this area or preventing the back of the tongue from falling back  during sleep.  Success rates for tongue surgery range from 50-62%3.    Hypoglossal Nerve Stimulation:  Hypoglossal nerve stimulation has recently received approval from the [place] Food and Drug Administration for the treatment of obstructive sleep apnea.  This is based on research showing that the system was safe and effective in treating sleep apnea6.  Results showed that the median AHI score decreased 68%, from 29.3 to 9.0. This therapy uses an implant system that senses breathing patterns and delivers mild stimulation to airway muscles, which keeps the airway open during sleep.  The system consists of three fully implanted components: a small generator (similar in size to a pacemaker), a breathing sensor, and a stimulation lead.  Using a small handheld remote, a patient turns the therapy on before bed and off upon awakening.    Candidates for this device must be greater than 22 years of age, have moderate to severe JOY (AHI between 20-65), BMI less than 32, have tried CPAP/oral appliance without success, and have appropriate upper airway anatomy (determined by a sleep endoscopy performed by Dr. Maza).    Hypoglossal Nerve Stimulation Pathway:    The sleep surgeon s office will work with the patient through the insurance prior-authorization process (including communications and appeals).    Nasal Procedures:  Nasal obstruction can interfere with nasal breathing during the day and night.  Studies have shown that relief of nasal obstruction can improve the ability of some patients to tolerate positive airway pressure therapy for obstructive sleep apnea1.  Treatment options include medications such as nasal saline, topical corticosteroid and antihistamine sprays, and oral medications such as antihistamines or decongestants. Non-surgical treatments can include external nasal dilators for selected patients. If these are not successful by themselves, surgery can improve the nasal airway either alone or in  combination with these other options.      Combination Procedures:  Combination of surgical procedures and other treatments may be recommended, particularly if patients have more than one area of narrowing or persistent positional disease.  The success rate of combination surgery ranges from 66-80%2,3.    References  1. Roshni ALLEN. The Role of the Nose in Snoring and Obstructive Sleep Apnoea: An Update.  Eur Arch Otorhinolaryngol. 2011; 268: 1365-73.  2.  Bang SM; Robert JA; Wilver JR; Pallanch JF; Richard MB; Johanna SG; Claudia DORSEY. Surgical modifications of the upper airway for obstructive sleep apnea in adults: a systematic review and meta-analysis. SLEEP 2010;33(10):1488-6413. Triston SALCIDO. Hypopharyngeal surgery in obstructive sleep apnea: an evidence-based medicine review.  Arch Otolaryngol Head Neck Surg. 2006 Feb;132(2):206-13.  3. Dung YH1, Murtaza Y, Newton ANTONIO. The efficacy of anatomically based multilevel surgery for obstructive sleep apnea. Otolaryngol Head Neck Surg. 2003 Oct;129(4):327-35.  4. Triston SALCIDO, Goldberg A. Hypopharyngeal Surgery in Obstructive Sleep Apnea: An Evidence-Based Medicine Review. Arch Otolaryngol Head Neck Surg. 2006 Feb;132(2):206-13.  5. Solomon RANDOLPH et al. Upper-Airway Stimulation for Obstructive Sleep Apnea.  N Engl J Med. 2014 Jan 9;370(2):139-49.  6. Jeramy Y et al. Increased Incidence of Cardiovascular Disease in Middle-aged Men with Obstructive Sleep Apnea. Am J Respir Crit Care Med; 2002 166: 159-165  7. Camilo EM et al. Studying Life Effects and Effectiveness of Palatopharyngoplasty (SLEEP) study: Subjective Outcomes of Isolated Uvulopalatopharyngoplasty. Otolaryngol Head Neck Surg. 2011; 144: 623-631.    Drive Safe... Drive Alive     Sleep health profoundly affects your health, mood, and your safety.  Thirty three percent of the population (one in three of us) is not getting enough sleep and many have a sleep disorder. Not getting enough sleep or having an untreated /  undertreated sleep condition may make us sleepy without even knowing it. In fact, our driving could be dramatically impaired due to our sleep health. As your provider, here are some things I would like you to know about driving:     Here are some warning signs for impairment and dangerous drowsy driving:              -Having been awake more than 16 hours               -Looking tired               -Eyelid drooping              -Head nodding (it could be too late at this point)              -Driving for more than 30 minutes     Some things you could do to make the driving safer if you are experiencing some drowsiness:              -Stop driving and rest              -Call for transportation              -Make sure your sleep disorder is adequately treated     Some things that have been shown NOT to work when experiencing drowsiness while driving:              -Turning on the radio              -Opening windows              -Eating any  distracting  /  entertaining  foods (e.g., sunflower seeds, candy, or any other)              -Talking on the phone      Your decision may not only impact your life, but also the life of others. Please, remember to drive safe for yourself and all of us.          Your BMI is There is no height or weight on file to calculate BMI.  Weight management is a personal decision.  If you are interested in exploring weight loss strategies, the following discussion covers the approaches that may be successful. Body mass index (BMI) is one way to tell whether you are at a healthy weight, overweight, or obese. It measures your weight in relation to your height.  A BMI of 18.5 to 24.9 is in the healthy range. A person with a BMI of 25 to 29.9 is considered overweight, and someone with a BMI of 30 or greater is considered obese. More than two-thirds of American adults are considered overweight or obese.  Being overweight or obese increases the risk for further weight gain. Excess weight may lead to  heart disease and diabetes.  Creating and following plans for healthy eating and physical activity may help you improve your health.  Weight control is part of healthy lifestyle and includes exercise, emotional health, and healthy eating habits. Careful eating habits lifelong are the mainstay of weight control. Though there are significant health benefits from weight loss, long-term weight loss with diet alone may be very difficult to achieve- studies show long-term success with dietary management in less than 10% of people. Attaining a healthy weight may be especially difficult to achieve in those with severe obesity. In some cases, medications, devices and surgical management might be considered.  What can you do?  If you are overweight or obese and are interested in methods for weight loss, you should discuss this with your provider.     Consider reducing daily calorie intake by 500 calories.     Keep a food journal.     Avoiding skipping meals, consider cutting portions instead.    Diet combined with exercise helps maintain muscle while optimizing fat loss. Strength training is particularly important for building and maintaining muscle mass. Exercise helps reduce stress, increase energy, and improves fitness. Increasing exercise without diet control, however, may not burn enough calories to loose weight.       Start walking three days a week 10-20 minutes at a time    Work towards walking thirty minutes five days a week     Eventually, increase the speed of your walking for 1-2 minutes at time    In addition, we recommend that you review healthy lifestyles and methods for weight loss available through the National Institutes of Health patient information sites:  http://win.niddk.nih.gov/publications/index.htm    And look into health and wellness programs that may be available through your health insurance provider, employer, local community center, or [employer].    Weight management plan: Patient was referred  to their PCP to discuss a diet and exercise plan.

## 2021-07-02 ENCOUNTER — VIRTUAL VISIT (OUTPATIENT)
Dept: SLEEP MEDICINE | Facility: CLINIC | Age: 71
End: 2021-07-02
Payer: COMMERCIAL

## 2021-07-02 DIAGNOSIS — G47.33 OSA (OBSTRUCTIVE SLEEP APNEA): Primary | ICD-10-CM

## 2021-07-02 PROCEDURE — 99214 OFFICE O/P EST MOD 30 MIN: CPT | Mod: 95 | Performed by: INTERNAL MEDICINE

## 2021-07-21 ENCOUNTER — TELEPHONE (OUTPATIENT)
Dept: SLEEP MEDICINE | Facility: CLINIC | Age: 71
End: 2021-07-21

## 2021-07-21 NOTE — TELEPHONE ENCOUNTER
You are scheduled for new PAP setup at [facility] on 7/26/201 at 1PM with Anuja ALLEN. Address for [facility] is [address] [place] [address], [address] [place] [address]. You can find showroom by [facility] entrance, located left of the main entrance. Showroom contact number is [phone], contact number for [facility] is [phone]. Please call if you need to make any changes to your appointment. Please contact your insurance company prior to appointment to confirm your benefits for durable medical equipment.

## 2021-07-26 ENCOUNTER — DOCUMENTATION ONLY (OUTPATIENT)
Dept: SLEEP MEDICINE | Facility: CLINIC | Age: 71
End: 2021-07-26

## 2021-07-26 NOTE — PROGRESS NOTES
Patient was offered choice of vendor and chose [facility].  Patient Jessica Ellison was set up at [facility]  on July 26, 2021. Patient received a Resmed AirSense 10 Auto. Pressures were set at 7-15 cm H2O.   Patient s ramp is 5 cm H2O for Auto and FLEX/EPR is 2.  Patient received a Resmed Mask name: [id number]  Nasal mask size Small, heated tubing and heated humidifier.  Patient does need to meet compliance. Patient has a follow up on 10/6/21 with Dr. Romero.    Anuja Mcintyre

## 2021-07-29 ENCOUNTER — DOCUMENTATION ONLY (OUTPATIENT)
Dept: SLEEP MEDICINE | Facility: CLINIC | Age: 71
End: 2021-07-29

## 2021-07-29 NOTE — PROGRESS NOTES
3 day Sleep therapy management telephone visit    Diagnostic AHI: 56.9    PSG    Confirmed with patient at time of call- N/A Patient is still interested in STM service       Message left for patient to return call        Objective data     Order Settings for PAP  CPAP min 7    CPAP max 15        Device settings from machine CPAP min 7.0     CPAP max 15.0      EPR Setting TWO    RESMED soft response  OFF     Assessment: Nightly usage over four hours      Action plan: Patient to have 14 day STM visit. Patient has a follow up visit scheduled:   yes within 31-90 days of set up    Replacement device: No  STM ordered by provider: Yes     Total time spent on accessing and  interpreting remote patient PAP therapy data  10 minutes    Total time spent counseling, coaching  and reviewing PAP therapy data with patient  0 minutes    [id number] no

## 2021-07-30 ENCOUNTER — TELEPHONE (OUTPATIENT)
Dept: FAMILY MEDICINE | Facility: CLINIC | Age: 71
End: 2021-07-30

## 2021-07-30 DIAGNOSIS — F33.1 MAJOR DEPRESSIVE DISORDER, RECURRENT EPISODE, MODERATE (H): ICD-10-CM

## 2021-07-30 DIAGNOSIS — F41.9 ANXIETY: ICD-10-CM

## 2021-07-30 RX ORDER — VENLAFAXINE HYDROCHLORIDE 75 MG/1
75 CAPSULE, EXTENDED RELEASE ORAL DAILY
Status: CANCELLED | OUTPATIENT
Start: 2021-07-30

## 2021-07-30 RX ORDER — VENLAFAXINE HYDROCHLORIDE 150 MG/1
150 CAPSULE, EXTENDED RELEASE ORAL DAILY
Status: CANCELLED | OUTPATIENT
Start: 2021-07-30

## 2021-07-30 NOTE — TELEPHONE ENCOUNTER
Reason for call:  Patient reporting a symptom    Symptom or request:  Vj is calling having concerns about patients medication and her COPD. They went to the sleep doctors to have the sleep study for the CPAP machine and that doctor told the family that he is very concerned about all the medication she is on. Vj said that she has been on the machine for 3 nights and that her energy has decreased and that she is up for 2 hours and seems to be in bed for 6. Vj said that he is notices that she seems depressed. He is unsure if she has been drinking alcohol that he has found it in the house in the past.       Duration (how long have symptoms been present): 3 weeks or so    Have you been treated for this before? No    Phone Number patient can be reached at: Vj [phone]    Best Time:  any    Can we leave a detailed message on this number:  YES    Call taken on 7/30/2021 at 12:11 PM by Shannen Padilla

## 2021-07-30 NOTE — TELEPHONE ENCOUNTER
Patient's  is concerned. Patient recently has a sleep study. The sleep doctor is very concerned with how many prescriptions patient is on. Patient has a history of COPD and is sleeping with the CPAP for the past few nights. Patient is more tired now than before. She continues to have a decrease in energy, very irritable. Patient's health has been declining over the past year, but more so over the past week.  is requesting an appointment for his wife with patient's provider to discuss notes from sleep doctor and review medications she is currently on, and to discuss her decline in health.   Left message on home answering machine to talk with wife to schedule an appointment. Direct line provided.  Daniella Dupree RN

## 2021-07-30 NOTE — TELEPHONE ENCOUNTER
Reason for call:    Symptom or request:     Patient called stating that she is having problems fill effexor 225 mg which is 2 script 150 mg and 75 mg. Pharmacy is unable to dispense med due to two directions on script.      Disp Refills Start End JOSE   venlafaxine (EFFEXOR-XR) 150 MG 24 hr capsule 90 capsule 1 3/15/2021  No   Sig - Route: Take 1 capsule (150 mg) by mouth daily TAKE ONE CAPSULE BY MOUTH ONCE DAILY to take with a 75 mg for total of 225 mg daily - Oral   Sent to pharmacy as: Venlafaxine HCl  MG Oral Capsule Extended Release 24 Hour (EFFEXOR-XR)     venlafaxine (EFFEXOR-XR) 75 MG 24 hr capsule 90 capsule 1 3/15/2021  No   Sig - Route: Take 1 capsule (75 mg) by mouth daily Take with 150 mg for a total of 225 mg daily. Is taking the 75 mg some times 2 times a day, thought she was told to increased not sure - Oral   Sent to pharmacy as: Venlafaxine HCl ER 75 MG Oral Capsule Extended Release 24 Hour (EFFEXOR-XR)   Class: E-Prescribe   Order: [id number]   E-Prescribing Status: Receipt confirmed by pharmacy (3/15/2021  3:11 PM CDT)       [facility] pharmacy.     Best Time:  any    Can we leave a detailed message on this number?  YES     Jacquelyn Coleman

## 2021-07-30 NOTE — TELEPHONE ENCOUNTER
Left message on home answering machine to return call. Direct line provided. Please see previous telephone encounter. Needs an appointment.Can use a same day spot with Dr. Barriga on 8/2/21.  Daniella Dupree RN

## 2021-08-02 NOTE — TELEPHONE ENCOUNTER
I spoke with pt.    She updates that she is adjusting to her c-pap machine now and believes she is sleeping more soundly now.  She is sleeping 8-10 hours at night and taking an afternoon nap  For 3-4 hours in the afternoons.  Pt believes that she has recently been catching up on her sleep after being chronically sleep deprived.  She is feeling more rested in recent days.    Pt scheduled to see Dr Barriga for med review due to sleep med's urging on 8/5/21 at 9:20.  Same day appt taken.    Joann Yu RN

## 2021-08-02 NOTE — TELEPHONE ENCOUNTER
"Left non-detailed message for patient to return a call to the clinic RN.       Pt is scheduled in 3 days for med review with provider.    However, there is a question regarding how pt is taking venlafaxine and her statement that she is \"not sure\" below and  Pharmacy has 2 sets of directions.    Need to clarify this with pt.    DELICIA uY RN    "

## 2021-08-05 ENCOUNTER — OFFICE VISIT (OUTPATIENT)
Dept: FAMILY MEDICINE | Facility: CLINIC | Age: 71
End: 2021-08-05
Payer: COMMERCIAL

## 2021-08-05 VITALS
DIASTOLIC BLOOD PRESSURE: 76 MMHG | BODY MASS INDEX: 29.66 KG/M2 | RESPIRATION RATE: 16 BRPM | TEMPERATURE: 98.3 F | HEART RATE: 78 BPM | WEIGHT: 167.4 LBS | OXYGEN SATURATION: 95 % | SYSTOLIC BLOOD PRESSURE: 104 MMHG | HEIGHT: 63 IN

## 2021-08-05 DIAGNOSIS — M53.3 SACROILIAC JOINT PAIN: ICD-10-CM

## 2021-08-05 DIAGNOSIS — E03.9 HYPOTHYROIDISM, UNSPECIFIED TYPE: ICD-10-CM

## 2021-08-05 DIAGNOSIS — R53.83 FATIGUE, UNSPECIFIED TYPE: ICD-10-CM

## 2021-08-05 DIAGNOSIS — J44.9 CHRONIC OBSTRUCTIVE PULMONARY DISEASE, UNSPECIFIED COPD TYPE (H): ICD-10-CM

## 2021-08-05 DIAGNOSIS — G89.29 CHRONIC BILATERAL LOW BACK PAIN WITHOUT SCIATICA: ICD-10-CM

## 2021-08-05 DIAGNOSIS — M54.50 CHRONIC BILATERAL LOW BACK PAIN WITHOUT SCIATICA: ICD-10-CM

## 2021-08-05 DIAGNOSIS — F33.1 MAJOR DEPRESSIVE DISORDER, RECURRENT EPISODE, MODERATE (H): ICD-10-CM

## 2021-08-05 DIAGNOSIS — N18.31 STAGE 3A CHRONIC KIDNEY DISEASE (H): ICD-10-CM

## 2021-08-05 DIAGNOSIS — Z98.1 S/P LUMBAR SPINAL FUSION: ICD-10-CM

## 2021-08-05 DIAGNOSIS — G47.33 OSA (OBSTRUCTIVE SLEEP APNEA): ICD-10-CM

## 2021-08-05 DIAGNOSIS — F41.9 ANXIETY: Primary | ICD-10-CM

## 2021-08-05 PROBLEM — J96.01 ACUTE RESPIRATORY FAILURE WITH HYPOXIA (H): Status: RESOLVED | Noted: 2020-12-22 | Resolved: 2021-08-05

## 2021-08-05 PROBLEM — N18.30 CHRONIC KIDNEY DISEASE, STAGE 3 (H): Status: ACTIVE | Noted: 2021-08-05

## 2021-08-05 LAB
ALBUMIN SERPL-MCNC: 3.8 G/DL (ref 3.4–5)
ALP SERPL-CCNC: 92 U/L (ref 40–150)
ALT SERPL W P-5'-P-CCNC: 31 U/L (ref 0–50)
ANION GAP SERPL CALCULATED.3IONS-SCNC: 9 MMOL/L (ref 3–14)
AST SERPL W P-5'-P-CCNC: 34 U/L (ref 0–45)
BASOPHILS # BLD AUTO: 0 10E3/UL (ref 0–0.2)
BASOPHILS NFR BLD AUTO: 1 %
BILIRUB SERPL-MCNC: 0.7 MG/DL (ref 0.2–1.3)
BUN SERPL-MCNC: 20 MG/DL (ref 7–30)
CALCIUM SERPL-MCNC: 9.2 MG/DL (ref 8.5–10.1)
CHLORIDE BLD-SCNC: 105 MMOL/L (ref 94–109)
CO2 SERPL-SCNC: 26 MMOL/L (ref 20–32)
CREAT SERPL-MCNC: 0.91 MG/DL (ref 0.52–1.04)
EOSINOPHIL # BLD AUTO: 0.3 10E3/UL (ref 0–0.7)
EOSINOPHIL NFR BLD AUTO: 9 %
ERYTHROCYTE [DISTWIDTH] IN BLOOD BY AUTOMATED COUNT: 12.9 % (ref 10–15)
GFR SERPL CREATININE-BSD FRML MDRD: 65 ML/MIN/1.73M2
GLUCOSE BLD-MCNC: 81 MG/DL (ref 70–99)
HCT VFR BLD AUTO: 36.4 % (ref 35–47)
HGB BLD-MCNC: 11.6 G/DL (ref 11.7–15.7)
LYMPHOCYTES # BLD AUTO: 0.8 10E3/UL (ref 0.8–5.3)
LYMPHOCYTES NFR BLD AUTO: 23 %
MCH RBC QN AUTO: 31.6 PG (ref 26.5–33)
MCHC RBC AUTO-ENTMCNC: 31.9 G/DL (ref 31.5–36.5)
MCV RBC AUTO: 99 FL (ref 78–100)
MONOCYTES # BLD AUTO: 0.5 10E3/UL (ref 0–1.3)
MONOCYTES NFR BLD AUTO: 13 %
NEUTROPHILS # BLD AUTO: 1.9 10E3/UL (ref 1.6–8.3)
NEUTROPHILS NFR BLD AUTO: 54 %
PLATELET # BLD AUTO: 134 10E3/UL (ref 150–450)
POTASSIUM BLD-SCNC: 3.8 MMOL/L (ref 3.4–5.3)
PROT SERPL-MCNC: 7 G/DL (ref 6.8–8.8)
RBC # BLD AUTO: 3.67 10E6/UL (ref 3.8–5.2)
SODIUM SERPL-SCNC: 140 MMOL/L (ref 133–144)
TSH SERPL DL<=0.005 MIU/L-ACNC: 2.86 MU/L (ref 0.4–4)
WBC # BLD AUTO: 3.5 10E3/UL (ref 4–11)

## 2021-08-05 PROCEDURE — 99215 OFFICE O/P EST HI 40 MIN: CPT | Performed by: FAMILY MEDICINE

## 2021-08-05 PROCEDURE — 80053 COMPREHEN METABOLIC PANEL: CPT | Performed by: FAMILY MEDICINE

## 2021-08-05 PROCEDURE — 84443 ASSAY THYROID STIM HORMONE: CPT | Performed by: FAMILY MEDICINE

## 2021-08-05 PROCEDURE — 85025 COMPLETE CBC W/AUTO DIFF WBC: CPT | Performed by: FAMILY MEDICINE

## 2021-08-05 PROCEDURE — 36415 COLL VENOUS BLD VENIPUNCTURE: CPT | Performed by: FAMILY MEDICINE

## 2021-08-05 RX ORDER — OXYCODONE AND ACETAMINOPHEN 10; 325 MG/1; MG/1
1 TABLET ORAL EVERY 6 HOURS PRN
Qty: 90 TABLET | Refills: 0 | Status: SHIPPED | OUTPATIENT
Start: 2021-08-05 | End: 2021-09-02

## 2021-08-05 RX ORDER — VENLAFAXINE HYDROCHLORIDE 75 MG/1
75 CAPSULE, EXTENDED RELEASE ORAL DAILY
Qty: 90 CAPSULE | Refills: 1 | Status: SHIPPED | OUTPATIENT
Start: 2021-08-05 | End: 2021-10-11

## 2021-08-05 RX ORDER — VENLAFAXINE HYDROCHLORIDE 150 MG/1
150 CAPSULE, EXTENDED RELEASE ORAL DAILY
Qty: 90 CAPSULE | Refills: 1 | Status: SHIPPED | OUTPATIENT
Start: 2021-08-05 | End: 2021-10-11

## 2021-08-05 RX ORDER — QUETIAPINE FUMARATE 25 MG/1
TABLET, FILM COATED ORAL
Qty: 90 TABLET | Refills: 11 | Status: SHIPPED | OUTPATIENT
Start: 2021-08-05 | End: 2021-09-02 | Stop reason: DRUGHIGH

## 2021-08-05 ASSESSMENT — MIFFLIN-ST. JEOR: SCORE: 1258.45

## 2021-08-05 NOTE — PATIENT INSTRUCTIONS
Stop Trazodone    Use Seroquel 25-50 mg at bedtime for sleep  Keep using the CPAP      Mental Health referral for psych med management    Pain Clinic Management necessary - check with [facility] pain clinic on this.    Labs today to r/o other causes of fatigue

## 2021-08-05 NOTE — PROGRESS NOTES
Assessment & Plan     Anxiety   poorly controlled  - venlafaxine (EFFEXOR-XR) 150 MG 24 hr capsule; Take 1 capsule (150 mg) by mouth daily TAKE ONE CAPSULE BY MOUTH ONCE DAILY to take with a 75 mg for total of 225 mg daily  - venlafaxine (EFFEXOR-XR) 75 MG 24 hr capsule; Take 1 capsule (75 mg) by mouth daily Take with 150 mg for a total of 225 mg daily.  - QUEtiapine (SEROQUEL) 25 MG tablet; May take 2 tablets (50 mg) by mouth nightly as needed (sleep and anxiety). May also take 1 tablet (25 mg) 2 times daily as needed (sleep and anxiety). TAKE ONE TABLET BY MOUTH DAILY AS NEEDED FOR ANXIETY  - MENTAL HEALTH REFERRAL  - Adult; Psychiatry; Psychiatry; Collaborative Care Psychiatry Service/Bridge to Long-Term Psychiatry as indicated ([phone]); Yes; Chronic Mental Health without improvement; Yes; We will contact you to schedule the a...; Future    JOY  Recently started on CPAP and is noting improvement in some of the daytime somnolence.   Continue follow up with sleep clinic      Major depressive disorder, recurrent episode, moderate (H)   likely needs medication adjustment - mental health referral placed.     - venlafaxine (EFFEXOR-XR) 150 MG 24 hr capsule; Take 1 capsule (150 mg) by mouth daily TAKE ONE CAPSULE BY MOUTH ONCE DAILY to take with a 75 mg for total of 225 mg daily  - venlafaxine (EFFEXOR-XR) 75 MG 24 hr capsule; Take 1 capsule (75 mg) by mouth daily Take with 150 mg for a total of 225 mg daily.  - MENTAL HEALTH REFERRAL  - Adult; Psychiatry; Psychiatry; Collaborative Care Psychiatry Service/Bridge to Long-Term Psychiatry as indicated ([phone]); Yes; Chronic Mental Health without improvement; Yes; We will contact you to schedule the a...; Future    Chronic bilateral low back pain without sciatica   chronic narcotic use.  Unfortunately is still using alcohol and this in combination with the benzo (clonazepam) prescribed by her pain clinic is dangerous.   Has narcan on hand (will check to  see if it is  or not).  -has broken her pain contract with me now with use of other substances (alcohol).   -will start to wean down on the percocet.  Start by taking only ONE (had been taking two) in the am - I suspect the high dose percocet may be leading to some of the daytime somnolence.  She will now also be limited to #90/month and we will continue to work on weaning.   -needs to see a comprehensive pain clinic at this time. She will check with [facility] as she is already seen in their pain clinics.     - naloxone (NARCAN) 4 MG/0.1ML nasal spray; Spray 1 spray (4 mg) into one nostril alternating nostrils once as needed for opioid reversal every 2-3 minutes until assistance arrives  - oxyCODONE-acetaminophen (PERCOCET)  MG per tablet; Take 1 tablet by mouth every 6 hours as needed for severe pain Max of 90/month    Sacroiliac joint pain     - oxyCODONE-acetaminophen (PERCOCET)  MG per tablet; Take 1 tablet by mouth every 6 hours as needed for severe pain Max of 90/month    S/P lumbar spinal fusion  As above  - oxyCODONE-acetaminophen (PERCOCET)  MG per tablet; Take 1 tablet by mouth every 6 hours as needed for severe pain Max of 90/month    Hypothyroidism, unspecified type  Check TSH  - TSH with free T4 reflex; Future  - TSH with free T4 reflex    Fatigue, unspecified type     - CBC with platelets and differential; Future  - Comprehensive metabolic panel (BMP + Alb, Alk Phos, ALT, AST, Total. Bili, TP); Future  - CBC with platelets and differential  - Comprehensive metabolic panel (BMP + Alb, Alk Phos, ALT, AST, Total. Bili, TP)    Patient Instructions     Stop Trazodone    Use Seroquel 25-50 mg at bedtime for sleep  Keep using the CPAP      Mental Health referral for psych med management    Pain Clinic Management necessary - check with [facility] pain clinic on this.    Labs today to r/o other causes of fatigue            Review of prior external note(s) from - Sleep clinic  Assessment  "requiring an independent historian(s) - family -  and dtr (via phone)  55 minutes spent on the date of the encounter doing chart review, review of outside records, review of test results, patient visit, documentation and discussion with family            No follow-ups on file.    Sarah Barriga MD  [facility]    Miller Lopez is a 70 year old who presents for the following health issues  accompanied by her spouse:    HPI     Chief Complaint   Patient presents with     Recheck Medication     Patient's  is concerned. Patient recently has a sleep study. The sleep doctor is very concerned with how many prescriptions patient is on. Patient has a history of COPD and is sleeping with the CPAP for the past few nights. Patient is more tired now than before. She continues to have a decrease in energy, very irritable. Patient's health has been declining over the past year, but more so over the past week.  is requesting an appointment for his wife with patient's provider to discuss notes     Patient here today with her . Family is concerned about her somnolence and isolation.  She is better in the past few days since starting the CPAP which is good news, but there is still concern over medications (oxycodone, seroquel, trazodone, clonazepam, flexeril) causing some of the somnolence.  Unfortunately she also reports alcohol use again probably 3-4 days per week.  We discussed that this is in violation of her controlled substance agreement.     Patient reports taking 2 oxycodone/acteaminophen 10/325 in the morning and sometimes will take another \"one or two\" later in the day.  She is not filling q30 days at this point in time per PDMP.  She is not taking diazepam daily but will either take this or flexeril several days a week.     Currently only taking seroquel a few times a week.         Review of Systems   Constitutional, HEENT, cardiovascular, pulmonary, gi and gu " "systems are negative, except as otherwise noted.      Objective    /76   Pulse 78   Temp 98.3  F (36.8  C) (Tympanic)   Resp 16   Ht 1.6 m (5' 3\")   Wt 75.9 kg (167 lb 6.4 oz)   SpO2 95%   BMI 29.65 kg/m    Body mass index is 29.65 kg/m .  Physical Exam   GENERAL APPEARANCE: healthy, alert and no distress  PSYCH: mentation appears normal, affect normal/bright and judgement is limited    Results for orders placed or performed in visit on 08/05/21 (from the past 24 hour(s))   CBC with platelets and differential    Narrative    The following orders were created for panel order CBC with platelets and differential.  Procedure                               Abnormality         Status                     ---------                               -----------         ------                     CBC with platelets and d...[[id number]]  Abnormal            Final result                 Please view results for these tests on the individual orders.   CBC with platelets and differential   Result Value Ref Range    WBC Count 3.5 (L) 4.0 - 11.0 10e3/uL    RBC Count 3.67 (L) 3.80 - 5.20 10e6/uL    Hemoglobin 11.6 (L) 11.7 - 15.7 g/dL    Hematocrit 36.4 35.0 - 47.0 %    MCV 99 78 - 100 fL    MCH 31.6 26.5 - 33.0 pg    MCHC 31.9 31.5 - 36.5 g/dL    RDW 12.9 10.0 - 15.0 %    Platelet Count 134 (L) 150 - 450 10e3/uL    % Neutrophils 54 %    % Lymphocytes 23 %    % Monocytes 13 %    % Eosinophils 9 %    % Basophils 1 %    Absolute Neutrophils 1.9 1.6 - 8.3 10e3/uL    Absolute Lymphocytes 0.8 0.8 - 5.3 10e3/uL    Absolute Monocytes 0.5 0.0 - 1.3 10e3/uL    Absolute Eosinophils 0.3 0.0 - 0.7 10e3/uL    Absolute Basophils 0.0 0.0 - 0.2 10e3/uL               "

## 2021-08-10 ENCOUNTER — DOCUMENTATION ONLY (OUTPATIENT)
Dept: SLEEP MEDICINE | Facility: CLINIC | Age: 71
End: 2021-08-10
Payer: COMMERCIAL

## 2021-08-10 NOTE — PROGRESS NOTES
14  DAY STM VISIT    Diagnostic AHI: 56.9  PSG    Subjective measures:   Patient uses her machine and mask for about 5 hours and then takes it off. Patient having with CPAP she had a lot of questions about how her pressure works.     Assessment: Pt not meeting objective benchmarks for AHI and leak Patient meeting subjective benchmarks. Explained how the pressure works during the night.     Action plan: pt to have 30 day STM visit.      Device type: Auto-CPAP    PAP settings: CPAP min 7.0 cm  H20       CPAP max 15.0 cm  H20      95th% pressure 14.1 cm  H20        RESMED EPR level Setting: TWO    RESMED Soft response setting:  OFF    Mask type:  Nasal Mask    Objective measures: 14 day rolling measures      Compliance  64 %      Leak  25.04  lpm  last  upload      AHI 12.67   last  upload      Average number of minutes 344      Objective measure goal  Compliance   Goal >70%  Leak   Goal < 24 lpm  AHI  Goal < 5  Usage  Goal >240        Total time spent on accessing and interpreting remote patient PAP therapy data  10 minutes    Total time spent counseling, coaching  and reviewing PAP therapy data with patient  6 minutes    [id number]  [id number]  no (3 day STM)

## 2021-08-25 ENCOUNTER — OFFICE VISIT (OUTPATIENT)
Dept: SLEEP MEDICINE | Facility: CLINIC | Age: 71
End: 2021-08-25
Payer: COMMERCIAL

## 2021-08-25 DIAGNOSIS — G47.33 OSA (OBSTRUCTIVE SLEEP APNEA): ICD-10-CM

## 2021-08-25 PROCEDURE — 95800 SLP STDY UNATTENDED: CPT | Performed by: INTERNAL MEDICINE

## 2021-08-25 NOTE — PROGRESS NOTES
Device has been registered and shipped via [employer] on 8/25/21. Patient was notified that package was mailed out. Watch [facility] serial number [id number].

## 2021-08-26 ENCOUNTER — DOCUMENTATION ONLY (OUTPATIENT)
Dept: SLEEP MEDICINE | Facility: CLINIC | Age: 71
End: 2021-08-26

## 2021-08-26 NOTE — PROGRESS NOTES
30 DAY STM VISIT    Diagnostic AHI: 56.9  PSG    Unable to leave message. No answer.    Assessment: Pt not meeting objective benchmarks for AHI, leak and compliance     Action plan: pt to have 30 day STM visit.  Patient has scheduled a follow up visit with Dr. Romero on 10/06/21.   Device type: Auto-CPAP  PAP settings: CPAP min 7.0 cm  H20     CPAP max 15.0 cm  H20    95th% pressure 13.5 cm  H20      RESMED EPR level Setting: TWO    RESMED Soft response setting:  OFF  Mask type:  Nasal Mask  Objective measures: 14 day rolling measures      Compliance  64 %      Leak  33.68 lpm  last  upload      AHI 16.36   last  upload      Average number of minutes 356      Objective measure goal  Compliance   Goal >70%  Leak   Goal < 24 lpm  AHI  Goal < 5  Usage  Goal >240        Total time spent on accessing and interpreting remote patient PAP therapy data  1 minutes    Total time spent counseling, coaching  and reviewing PAP therapy data with patient  0 minutes     [id number] this call  [id number] no  at 3 or 14 day [facility]

## 2021-08-30 NOTE — PROGRESS NOTES
Watch pat has been scored using rule 1B, 4%.   Patient to follow up with provider to determine appropriate therapy.     Pat AHI: 38.7 (Study was performed on CPAP)    Ordering Provider: MD Sandra Lopes [facility], [facility]  Sleep Technologist

## 2021-09-02 ENCOUNTER — OFFICE VISIT (OUTPATIENT)
Dept: FAMILY MEDICINE | Facility: CLINIC | Age: 71
End: 2021-09-02
Payer: COMMERCIAL

## 2021-09-02 VITALS
OXYGEN SATURATION: 95 % | HEART RATE: 90 BPM | HEIGHT: 63 IN | BODY MASS INDEX: 28.7 KG/M2 | DIASTOLIC BLOOD PRESSURE: 86 MMHG | TEMPERATURE: 98.3 F | SYSTOLIC BLOOD PRESSURE: 124 MMHG | WEIGHT: 162 LBS | RESPIRATION RATE: 16 BRPM

## 2021-09-02 DIAGNOSIS — G89.29 CHRONIC BILATERAL LOW BACK PAIN WITHOUT SCIATICA: ICD-10-CM

## 2021-09-02 DIAGNOSIS — F41.9 ANXIETY: ICD-10-CM

## 2021-09-02 DIAGNOSIS — M54.50 CHRONIC BILATERAL LOW BACK PAIN WITHOUT SCIATICA: ICD-10-CM

## 2021-09-02 DIAGNOSIS — M53.3 SACROILIAC JOINT PAIN: ICD-10-CM

## 2021-09-02 DIAGNOSIS — Z98.1 S/P LUMBAR SPINAL FUSION: ICD-10-CM

## 2021-09-02 PROCEDURE — 99214 OFFICE O/P EST MOD 30 MIN: CPT | Performed by: FAMILY MEDICINE

## 2021-09-02 RX ORDER — OXYCODONE AND ACETAMINOPHEN 10; 325 MG/1; MG/1
1 TABLET ORAL EVERY 6 HOURS PRN
Qty: 90 TABLET | Refills: 0 | Status: SHIPPED | OUTPATIENT
Start: 2021-09-02 | End: 2021-10-15

## 2021-09-02 RX ORDER — QUETIAPINE FUMARATE 50 MG/1
50 TABLET, FILM COATED ORAL 2 TIMES DAILY
Qty: 180 TABLET | Refills: 1 | Status: SHIPPED | OUTPATIENT
Start: 2021-09-02 | End: 2022-02-10

## 2021-09-02 ASSESSMENT — ANXIETY QUESTIONNAIRES
2. NOT BEING ABLE TO STOP OR CONTROL WORRYING: MORE THAN HALF THE DAYS
5. BEING SO RESTLESS THAT IT IS HARD TO SIT STILL: NOT AT ALL
6. BECOMING EASILY ANNOYED OR IRRITABLE: NOT AT ALL
3. WORRYING TOO MUCH ABOUT DIFFERENT THINGS: MORE THAN HALF THE DAYS
GAD7 TOTAL SCORE: 6
7. FEELING AFRAID AS IF SOMETHING AWFUL MIGHT HAPPEN: NOT AT ALL
1. FEELING NERVOUS, ANXIOUS, OR ON EDGE: MORE THAN HALF THE DAYS
IF YOU CHECKED OFF ANY PROBLEMS ON THIS QUESTIONNAIRE, HOW DIFFICULT HAVE THESE PROBLEMS MADE IT FOR YOU TO DO YOUR WORK, TAKE CARE OF THINGS AT HOME, OR GET ALONG WITH OTHER PEOPLE: EXTREMELY DIFFICULT

## 2021-09-02 ASSESSMENT — MIFFLIN-ST. JEOR: SCORE: 1233.96

## 2021-09-02 ASSESSMENT — PAIN SCALES - GENERAL: PAINLEVEL: SEVERE PAIN (7)

## 2021-09-02 ASSESSMENT — PATIENT HEALTH QUESTIONNAIRE - PHQ9
SUM OF ALL RESPONSES TO PHQ QUESTIONS 1-9: 7
5. POOR APPETITE OR OVEREATING: NOT AT ALL

## 2021-09-02 NOTE — PATIENT INSTRUCTIONS
Make the pain clinic appointment soon.    Maximum use of oxycodone is 90/month and this should continue to be weaned by the pain clinic.       Seroquel - increase to 50 mg twice daily  Morning and bedtime.    New prescription is for 50 mg pills so only take one pill twice a day

## 2021-09-02 NOTE — PROGRESS NOTES
Assessment & Plan     Anxiety   increase seroquel to 50 mg twice daily   - QUEtiapine (SEROQUEL) 50 MG tablet; Take 1 tablet (50 mg) by mouth 2 times daily    Chronic bilateral low back pain without sciatica  Limit 90/month  Follow up with pain clinic    - oxyCODONE-acetaminophen (PERCOCET)  MG per tablet; Take 1 tablet by mouth every 6 hours as needed for severe pain Max of 90/month    Sacroiliac joint pain     - oxyCODONE-acetaminophen (PERCOCET)  MG per tablet; Take 1 tablet by mouth every 6 hours as needed for severe pain Max of 90/month    S/P lumbar spinal fusion   as above  - oxyCODONE-acetaminophen (PERCOCET)  MG per tablet; Take 1 tablet by mouth every 6 hours as needed for severe pain Max of 90/month                 No follow-ups on file.    Sarah Barriga MD  [facility]   Jessica is a 70 year old who presents for the following health issues     HPI     Depression and Anxiety Follow-Up    How are you doing with your depression since your last visit? No change    How are you doing with your anxiety since your last visit?  No change    Are you having other symptoms that might be associated with depression or anxiety? Yes:  worrying and fatigue    Have you had a significant life event? No     Do you have any concerns with your use of alcohol or other drugs? No     Last visit we cut back on oxycodone to 90/month    We stopped the trazodone and she's taking seroqel 50 mg at bedtime.  Occasionally will take seroquel during the day - maybe twice a week.  When she takes the extra seroquel, it does seem to help with her anxiety.       She will be scheduling an office visit with the pain clinic the next time she has an injection.   She has not yet scheduled a pain clinic visit for their comprehensive pain management clinic.     Social History     Tobacco Use     Smoking status: Former Smoker     Packs/day: 1.00     Years: 30.00     Pack years: 30.00      Quit date: 2004     Years since quittin.6     Smokeless tobacco: Never Used   Substance Use Topics     Alcohol use: No     Comment: ETOH dependency, DUI 3/15/10     Drug use: No     PHQ 9/3/2020 3/15/2021 2021   PHQ-9 Total Score 7 5 7   Q9: Thoughts of better off dead/self-harm past 2 weeks Not at all Not at all Not at all     DAJUAN-7 SCORE 2019 3/15/2021 2021   Total Score - - -   Total Score 10 5 6     Last PHQ-9 2021   1.  Little interest or pleasure in doing things 1   2.  Feeling down, depressed, or hopeless 1   3.  Trouble falling or staying asleep, or sleeping too much 1   4.  Feeling tired or having little energy 2   5.  Poor appetite or overeating 1   6.  Feeling bad about yourself 1   7.  Trouble concentrating 0   8.  Moving slowly or restless 0   Q9: Thoughts of better off dead/self-harm past 2 weeks 0   PHQ-9 Total Score 7   Difficulty at work, home, or with people Very difficult     DAJUAN-7  2021   1. Feeling nervous, anxious, or on edge 2   2. Not being able to stop or control worrying 2   3. Worrying too much about different things 2   4. Trouble relaxing 0   5. Being so restless that it is hard to sit still 0   6. Becoming easily annoyed or irritable 0   7. Feeling afraid, as if something awful might happen 0   DAJUAN-7 Total Score 6   If you checked any problems, how difficult have they made it for you to do your work, take care of things at home, or get along with other people? Extremely difficult       Suicide Assessment Five-step Evaluation and Treatment (SAFE-T)      How many servings of fruits and vegetables do you eat daily?  2-3    On average, how many sweetened beverages do you drink each day (Examples: soda, juice, sweet tea, etc.  Do NOT count diet or artificially sweetened beverages)?   0    How many days per week do you exercise enough to make your heart beat faster? 3 or less    How many minutes a day do you exercise enough to make your heart beat faster? 9 or  "less    How many days per week do you miss taking your medication? 0      Review of Systems   Constitutional, HEENT, cardiovascular, pulmonary, gi and gu systems are negative, except as otherwise noted.      Objective    /86   Pulse 90   Temp 98.3  F (36.8  C) (Tympanic)   Resp 16   Ht 1.6 m (5' 3\")   Wt 73.5 kg (162 lb)   SpO2 95%   Breastfeeding No   BMI 28.70 kg/m    Body mass index is 28.7 kg/m .  Physical Exam   GENERAL APPEARANCE: healthy, alert and no distress  PSYCH: mentation appears normal and crying                "

## 2021-09-03 ASSESSMENT — ANXIETY QUESTIONNAIRES: GAD7 TOTAL SCORE: 6

## 2021-09-03 NOTE — PROCEDURES
"WatchPAT - HOME SLEEP STUDY INTERPRETATION    Patient: Jessica Ellison  MRN: [id number]  YOB: 1950  STEFANO MILLER MD  Referring Provider: Sarah Barriga;   Ordering Provider: STEFANO MILLER MD         Indications for Home Study: Jessica Ellison is a 70 year old female with a history of severe obstructive sleep apnea and hypoxemia on CPAP therapy who underwent home monitoring without using CPAP the night of her study.    Estimated body mass index is 28.7 kg/m  as calculated from the following:    Height as of 9/2/21: 1.6 m (5' 3\").    Weight as of 9/2/21: 73.5 kg (162 lb).  Total score - [place]: 12 (7/1/2021  1:00 PM)      Data: A full night home sleep study was performed recording the standard physiologic parameters including peripheral arterial tonometry (PAT), sound/snoring, body position,  movement, sound, and oxygen saturation by pulse oximetry. Pulse rate was estimated by oximetry recording. Sleep staging (wake, REM, light, and deep sleep) was derived from PAT signal.  This study was considered adequate based on > 4 hours of quality oximetry and respiratory recording. As specified by the AASM Manual for the Scoring of Sleep and Associated events, version 2.3, Rule VIII.D 1B, 4% oxygen desaturation scoring for hypopneas is used as a standard of care on all home sleep apnea testing.    Total Recording Time: 9 hrs, 59 min  Total Sleep Time: 9 hrs, 20 min  % of Sleep Time REM: 25%    Respiratory:  Snoring: Snoring was present.  Respiratory events: The PAT respiratory disturbance index [pRDI] was 39 events per hour.  The PAT apnea/hypopnea index [pAHI] was 39 events per hour.  WILMAR was 38 events per hour.  During REM sleep the pAHI was 32.  Sleep Associated Hypoxemia: sustained hypoxemia was present. Mean oxygen saturation was 88%.  Minimum was 79%.  Time with saturation less than 88% was 291 minutes.    Heart Rate: By pulse oximetry normal rate was noted.     Position: Percent of time spent: " supine -46 %, prone -27%, on right -27%, on left -0%.  pAHI was 57 per hour supine, 21 per hour prone, 24 per hour on right side, and - per hour on left side.     Assessment:   Severe obstructive sleep apnea.  Sleep associated hypoxemia was present.    Recommendations:  Repeat watch pat on CPAP   Suggest optimizing sleep hygiene and avoiding sleep deprivation.  Weight management.    Diagnosis Code(s): Obstructive Sleep Apnea G47.33, Hypoxemia G47.36, Snoring R06.83    STEFANO MILLER MD, September 3, 2021   Diplomate, American Board of Internal Medicine, Sleep Medicine

## 2021-09-15 ENCOUNTER — OFFICE VISIT (OUTPATIENT)
Dept: SLEEP MEDICINE | Facility: CLINIC | Age: 71
End: 2021-09-15
Payer: COMMERCIAL

## 2021-09-15 DIAGNOSIS — R09.02 HYPOXIA: ICD-10-CM

## 2021-09-15 PROCEDURE — 95800 SLP STDY UNATTENDED: CPT | Performed by: INTERNAL MEDICINE

## 2021-09-15 NOTE — PROGRESS NOTES
Device has been registered and shipped via [employer] on 9/15/21. Patient was notified that package was mailed out. Watch [facility] serial number [id number].

## 2021-09-18 ENCOUNTER — HEALTH MAINTENANCE LETTER (OUTPATIENT)
Age: 71
End: 2021-09-18

## 2021-09-20 NOTE — PROGRESS NOTES
Watch pat has been scored using rule 1B, 4%.   Patient to follow up with provider to determine appropriate therapy.     Pat AHI: 15.7    Ordering Provider: MD Sandra Lopes [facility]  Sleep Technologist

## 2021-09-21 NOTE — PROCEDURES
"WatchPAT - HOME SLEEP STUDY INTERPRETATION    Patient: Jesisca Ellison  MRN: [id number]  YOB: 1950  Study Date: 9/15/2021  Referring Provider: Sarah Barriga;   Ordering Provider: Reuben Romero MD      Indications for Home Study: Jessica Ellison is a 70 year old female with a history of severe obstructive sleep apnea with hypoxemia, polysomnographic features of REM behavior disorder, mood disturbance with depression and anxiety, ephysema with hypoventilation who presents for evaluation of effectiveness of auto CPAP 7-15 therapy.      Estimated body mass index is 28.7 kg/m  as calculated from the following:    Height as of 9/2/21: 1.6 m (5' 3\").    Weight as of 9/2/21: 73.5 kg (162 lb).      Data: A full night home sleep study was performed recording the standard physiologic parameters including peripheral arterial tonometry (PAT), sound/snoring, body position,  movement, sound, and oxygen saturation by pulse oximetry. Pulse rate was estimated by oximetry recording. Sleep staging (wake, REM, light, and deep sleep) was derived from PAT signal.  This study was considered adequate based on > 4 hours of quality oximetry and respiratory recording. As specified by the AASM Manual for the Scoring of Sleep and Associated events, version 2.3, Rule VIII.D 1B, 4% oxygen desaturation scoring for hypopneas is used as a standard of care on all home sleep apnea testing.    Total Recording Time: 7 hrs, 27 min  Total Sleep Time: 6 hrs, 44 min  % of Sleep Time REM: 23%    Respiratory:  Snoring: Snoring was present.  Respiratory events: The PAT respiratory disturbance index [pRDI] was 15.7 events per hour.  The PAT apnea/hypopnea index [pAHI] was 15.7 events per hour.  WILMAR was 15.4 events per hour.  During REM sleep the pAHI was 27.  Sleep Associated Hypoxemia: sustained hypoxemia was present. Mean oxygen saturation was 87%.  Minimum was 77%.  Time with saturation less than 88% was 257 minutes.    Heart Rate: By " pulse oximetry normal rate was noted.     Position: Percent of time spent: supine - 83%, prone - 0%, on right - 1%, on left - 16%.  pAHI was 16.7 per hour supine, - per hour prone, - per hour on right side, and 6 per hour on left side.     Assessment:   Moderate obstructive sleep apnea with hypoxemia on CPAP and worsening during period off CPAP    Recommendations:    Consider evaluation of severity of lung disease and CPAP/oxygen titration study to assess hypoventilation and verify effective pressure and oxygen needs. Alternatively, pressure could be empirically increased with reassessment with home monitoring.    Suggest optimizing sleep hygiene and avoiding sleep deprivation.    Diagnosis Code(s): Obstructive Sleep Apnea G47.33, Hypoxemia G47.36    STEFANO MILLER MD, September 21, 2021   Diplomate, American Board of Internal Medicine, Sleep Medicine                               [<-----CPAP OFF--->]

## 2021-10-06 ENCOUNTER — VIRTUAL VISIT (OUTPATIENT)
Dept: SLEEP MEDICINE | Facility: CLINIC | Age: 71
End: 2021-10-06
Payer: COMMERCIAL

## 2021-10-06 DIAGNOSIS — G47.33 OSA (OBSTRUCTIVE SLEEP APNEA): Primary | ICD-10-CM

## 2021-10-06 PROCEDURE — 99214 OFFICE O/P EST MOD 30 MIN: CPT | Mod: 95 | Performed by: INTERNAL MEDICINE

## 2021-10-06 NOTE — PROGRESS NOTES
Virtual rooming notes:     Jessica Ellison is having a billable video or telephone visit questions  How would you like to obtain your AVS? [employer]      Video Start Time: 2:30 PM end time 3 PM          [facility]   Outpatient Sleep Medicine Follow-up Visit  October 6, 2021    Name: Jessica Ellison MRN# [id number]   Age: 70 year old YOB: 1950     Date of Consultation: October 6, 2021  Consultation is requested by: No referring provider defined for this encounter.  Primary care provider: Sarah Barriga    [id number]old female with severe complex sleep apnea and prolonged hypoxemia during recent polysomnography who was started on CPAP and has evidence of relatively good control of respiratory events with CPAP alone but persistent although improved hypoxemia.  She has had suboptimal use due to early awakening with discontinuation of CPAP in the morning.  She describes no physical limitations to use of the mask if does not have significant mask leak.         Assessment:     Sleep Diagnoses:    Severe complex and obstructive sleep apnea (AHI 56; 2021 with 5.5 hours of hypoxemia)    COPD based on upper lobe emphysema on CT scan with FEV1 88% of predicted in 2019 with mild hypoventilation (PvCO2 47-48 mmHg)    Chronic pain with morning dose of narcotic    Mood disturbance with anxiety/depression; alcohol use in the past    Plan:       Return to clinic in 2 to 3 weeks for reevaluation of compliance    Schedule repeat titration study at that time with CO2 measures and consideration for the use of bilevel support      Polysomnography June 2021   Sleep Architecture: All sleep stages present with sleep disruption attributable to respiratory events and prolonged REM sleep onset. The total recording time of the polysomnogram was 443.6 minutes. The total sleep time was 395.5 minutes. Sleep latency was normal at 21.4 minutes with the use of a sleep aid trazodone. REM latency was 321.5  minutes. Arousal index was increased at 56.7 arousals per hour. Sleep efficiency was normal at 89.2%. Wake after sleep onset was 25.5 minutes. The patient spent 12.9% of total sleep time in Stage N1, 60.6% in Stage N2, 16.7% in Stage N3, and 9.9% in REM. Time in REM supine was 3.5 minutes. Respiration: Severe complex sleep apnea with a predominance of central events and worse during REM associated with hypoxemia. Severe sleep disruption attributable to respiratory events.   Events ? The polysomnogram revealed a presence of 12 obstructive, 49 central, and 109 mixed apneas resulting in an apnea index of 25.8 events per hour. There were 205 obstructive hypopneas and - central hypopneas resulting in an obstructive hypopnea index of 31.1 and central hypopnea index of - events per hour. The combined apnea/hypopnea index was 56.9 events per hour (central apnea/hypopnea index was 7.4 events per hour). The REM AHI was 78.5 events per hour. The supine AHI was 120.7 events per hour. The RERA index was 0.8 events per hour. The RDI was 57.6 events per hour.   Snoring - was reported as loud.   Respiratory rate and pattern - was notable for normal respiratory rate and pattern.   Sustained Sleep Associated Hypoventilation - Transcutaneous carbon dioxide monitoring was not used   Sleep Associated Hypoxemia - (Greater than 5 minutes O2 sat at or below 88%) was present. Baseline oxygen saturation was 85.9%. Lowest oxygen saturation was 70.5%. Time spent less than or equal to 88% was 334.2 minutes. Time spent less than or equal to 89% was 358.9 minutes.    HOME SLEEP TEST (watch pat)  Study Date: 9/15/2021  Referring Provider: Sarah Barriga;   Ordering Provider: Reuben Romero MD        Indications for Home Study: Jessica Ellison is a 70 year old female with a history of severe obstructive sleep apnea with hypoxemia, polysomnographic features of REM behavior disorder, mood disturbance with depression and anxiety, ephysema who  "presents for evaluation of effectiveness of auto CPAP 7-15 therapy.       Estimated body mass index is 28.7 kg/m  as calculated from the following:    Height as of 9/2/21: 1.6 m (5' 3\").    Weight as of 9/2/21: 73.5 kg (162 lb).        Data: A full night home sleep study was performed recording the standard physiologic parameters including peripheral arterial tonometry (PAT), sound/snoring, body position,  movement, sound, and oxygen saturation by pulse oximetry. Pulse rate was estimated by oximetry recording. Sleep staging (wake, REM, light, and deep sleep) was derived from PAT signal.  This study was considered adequate based on > 4 hours of quality oximetry and respiratory recording. As specified by the AASM Manual for the Scoring of Sleep and Associated events, version 2.3, Rule VIII.D 1B, 4% oxygen desaturation scoring for hypopneas is used as a standard of care on all home sleep apnea testing.     Total Recording Time: 7 hrs, 27 min  Total Sleep Time: 6 hrs, 44 min  % of Sleep Time REM: 23%     Respiratory:  Snoring: Snoring was present.  Respiratory events: The PAT respiratory disturbance index [pRDI] was 15.7 events per hour.  The PAT apnea/hypopnea index [pAHI] was 15.7 events per hour.  WILMAR was 15.4 events per hour.  During REM sleep the pAHI was 27.  Sleep Associated Hypoxemia: sustained hypoxemia was present. Mean oxygen saturation was 87%.  Minimum was 77%.  Time with saturation less than 88% was 257 minutes.     Heart Rate: By pulse oximetry normal rate was noted.      Position: Percent of time spent: supine - 83%, prone - 0%, on right - 1%, on left - 16%.  pAHI was 16.7 per hour supine, - per hour prone, - per hour on right side, and 6 per hour on left side.          Diagnosis Code(s): Obstructive Sleep Apnea G47.33, Hypoxemia G47.36     STEFANO MILLER MD, September 21, 2021   Diplomate, American Board of Internal Medicine, Sleep Medicine                                                                  "                                                                      [<-----CPAP OFF------------> ]                                                        Most Recent SCALES:    EPWORTH SLEEPINESS SCALE WITHIN 1 YEAR    Sitting and reading 2    Watching TV 2    Sitting, inactive in a public place (theatre or mtg.) 1     As a passenger in a car 2    Lying down to rest in the afternoon when circumstance permit 3    Sitting and talking to someone 0    Sitting quietly after lunch without alcohol 1    In a car, while stopped for a few minutes in traffic 1    TOTAL SCORE 12        INSOMNIA SEVERITY INDEX WITHIN 1 YEAR   Difficulty falling asleep 1   Difficult staying asleep 1   Problems waking up to early 3   How SATISFIED/DISSATISFIED are you with your CURRENT sleep pattern? 3   How NOTICEABLE to others do you think your sleep pattern is in terms of your quality of life? 1   How WORRIED/DISTRESSED are you about your current sleep pattern? 0   To what extent do you consider your sleep problem to INTERFERE with your daily fuctioning(e.g. daytime fatigue, mood, ability to function at work/daily chores, concentration, mood,etc.) CURRENTLY? 2   INSOMNIA SEVERITY INDEX TOTAL SCORE 11    --absence of insomnia (0-7); sub-threshold insomnia (8-14); moderate insomnia (15-21); and severe insomnia (22-28)--        Objective:  CPAP Compliance Targets:   >70% days > 4 hours AHI < 5   30 days ending October 6, 2021  Mask type:  Nasal Mask   ResMed     Auto-PAP 7.0 - 15.0 cmH2O 30 day usage data:    60% of days with > 4 hours of use. 2/30 days with no use.   Average use 282 minutes per day.   95%ile Leak 25.49 L/min.   CPAP 95% pressure 13.3 cm.   AHI 11.06 events per hour.                   Medications:     Current Outpatient Medications   Medication Sig     amLODIPine (NORVASC) 5 MG tablet Take 1 tablet (5 mg) by mouth daily     aspirin 325 MG tablet Take 1 tablet (325 mg) by mouth daily     atorvastatin (LIPITOR) 20 MG tablet Take 1  tablet (20 mg) by mouth daily     calcium carbonate 600 mg-vitamin D 400 units (CALTRATE) 600-400 MG-UNIT per tablet Take 1 tablet by mouth every evening     cyclobenzaprine (FLEXERIL) 10 MG tablet Take 1 tablet (10 mg) by mouth 2 times daily as needed for muscle spasms     diazepam (VALIUM) 10 MG tablet Take 10 mg by mouth every 6 hours as needed      EPINEPHrine (EPIPEN) 0.3 MG/0.3ML injection Inject 0.3 mLs (0.3 mg) into the muscle once as needed for anaphylaxis     ferrous sulfate (FEROSUL) 325 (65 Fe) MG tablet Take 325 mg by mouth every other day Alternating days with Senna tab     levothyroxine (LEVOTHROID) 50 MCG tablet Take 1 tablet (50 mcg) by mouth daily     metoprolol succinate ER (TOPROL-XL) 50 MG 24 hr tablet Take 1 tablet (50 mg) by mouth 2 times daily     multivitamin w/minerals (THERA-VIT-M) tablet Take 1 tablet by mouth every evening     naloxone (NARCAN) 4 MG/0.1ML nasal spray Spray 1 spray (4 mg) into one nostril alternating nostrils once as needed for opioid reversal every 2-3 minutes until assistance arrives     oxyCODONE-acetaminophen (PERCOCET)  MG per tablet Take 1 tablet by mouth every 6 hours as needed for severe pain Max of 90/month     QUEtiapine (SEROQUEL) 50 MG tablet Take 1 tablet (50 mg) by mouth 2 times daily     senna (SENOKOT) 8.6 MG tablet Take 1 tablet by mouth every other day Alternating days with Iron tab     tiotropium (SPIRIVA) 18 MCG inhaled capsule Inhale 1 capsule (18 mcg) into the lungs daily     venlafaxine (EFFEXOR-XR) 150 MG 24 hr capsule Take 1 capsule (150 mg) by mouth daily TAKE ONE CAPSULE BY MOUTH ONCE DAILY to take with a 75 mg for total of 225 mg daily     venlafaxine (EFFEXOR-XR) 75 MG 24 hr capsule Take 1 capsule (75 mg) by mouth daily Take with 150 mg for a total of 225 mg daily.     VENTOLIN  (90 Base) MCG/ACT inhaler INHALE TWO PUFFS BY MOUTH EVERY 6 HOURS (Patient taking differently: Inhale 2 puffs into the lungs every 6 hours as needed )      No current facility-administered medications for this visit.        Allergies   Allergen Reactions     Bee Venom      Lisinopril Swelling     Oral swelling            Problem List:     Patient Active Problem List   Diagnosis     Insomnia     Back pain     ETOH abuse     CARDIOVASCULAR SCREENING; LDL GOAL LESS THAN 160     Anxiety     Advanced directives, counseling/discussion     Major depressive disorder, recurrent episode, moderate (H)     Cerebral aneurysm, nonruptured     Bee allergy status     S/P lumbar spinal fusion     Chronic bilateral low back pain without sciatica     Essential hypertension with goal blood pressure less than 140/90     Hypothyroidism     Spell of change in speech     Centrilobular emphysema (H)     Suspected COVID-19 virus infection     Hyperlipidemia     Hypoxia     Congenital musculoskeletal deformity of spine     Chronic pain     JOY (obstructive sleep apnea)     Chronic kidney disease, stage 3 (H)            Past Medical History:     Does not need 02 supplement at night   Past Medical History:   Diagnosis Date     Anemia      Aneurysm (H)      Chemical dependency (H)     Chem Dep RX     Depression      History of blood transfusion      Hypertension      Menopausal symptoms      Other chronic pain     Lower back and hips     Sleep disorder      Thyroid disease      Tobacco abuse              Past Surgical History:    No h/o  upper airway surgery  Past Surgical History:   Procedure Laterality Date     ABDOMEN SURGERY       APPENDECTOMY  1960     APPENDECTOMY       BACK SURGERY       C PART REMOVAL COLON W ANASTOMOSIS  5/2005    diverticulitis     C SPINE FUSION,ANTER,8+ SGMTS  2007    Anterior posterior fusion 10 levels, hardware removal and re-fusion 2009     cerebral aneurysm  2014    coiled     CEREBRAL ANEURYSM REPAIR       COLON SURGERY       COLONOSCOPY  4/19/2005     FRACTURE SURGERY       HC EXCISION BREAST LESION, OPEN >=1      core biopsy 2006, lumpectomy 2006     HERNIA  REPAIR       LAP VENTRAL HERNIA REPAIR  12/2017    [place]     LAPAROSCOPIC ASSISTED HYSTERECTOMY VAGINAL  12/2017     orif left femoral neck Left 6/3/2016    stress fracture.  done at [facility]     SURGICAL HISTORY OF -   2004    Skin Graft              Physical Examination:   Objective:    Reported vitals:  There were no vitals taken for this visit.   GENERAL: Healthy, alert and no distress  EYES: Eyes grossly normal to inspection.  No discharge or erythema, or obvious scleral/conjunctival abnormalities.  RESP: No audible wheeze, cough, or visible cyanosis.  No visible retractions or increased work of breathing.    SKIN: Visible skin clear. No significant rash, abnormal pigmentation or lesions.  NEURO: Cranial nerves grossly intact.  Mentation and speech appropriate for age.  PSYCH: Mentation appears normal, affect normal/bright, judgement and insight intact, normal speech and appearance well-groomed.        Copy to: Sarah Barriga MD 10/6/2021       Total time spent with patient: 25 min >50% counseling and 15 minutes documenting and reviewing records

## 2021-10-07 NOTE — NURSING NOTE
CPAP compliance instruction letter sent via [employer].         Amanda Callahan NYA  [facility]

## 2021-10-11 ENCOUNTER — VIRTUAL VISIT (OUTPATIENT)
Dept: PSYCHIATRY | Facility: CLINIC | Age: 71
End: 2021-10-11
Payer: COMMERCIAL

## 2021-10-11 DIAGNOSIS — F41.9 ANXIETY: ICD-10-CM

## 2021-10-11 DIAGNOSIS — F33.1 MAJOR DEPRESSIVE DISORDER, RECURRENT EPISODE, MODERATE (H): ICD-10-CM

## 2021-10-11 PROCEDURE — 99204 OFFICE O/P NEW MOD 45 MIN: CPT | Mod: 95 | Performed by: NURSE PRACTITIONER

## 2021-10-11 RX ORDER — DULOXETIN HYDROCHLORIDE 30 MG/1
30 CAPSULE, DELAYED RELEASE ORAL 2 TIMES DAILY
Qty: 60 CAPSULE | Refills: 1 | Status: SHIPPED | OUTPATIENT
Start: 2021-10-11 | End: 2022-05-04

## 2021-10-11 RX ORDER — VENLAFAXINE HYDROCHLORIDE 150 MG/1
150 CAPSULE, EXTENDED RELEASE ORAL DAILY
Qty: 90 CAPSULE | Refills: 1 | COMMUNITY
Start: 2021-10-11 | End: 2022-03-07

## 2021-10-11 RX ORDER — VENLAFAXINE HYDROCHLORIDE 75 MG/1
75 CAPSULE, EXTENDED RELEASE ORAL DAILY
Qty: 90 CAPSULE | Refills: 1 | Status: SHIPPED | OUTPATIENT
Start: 2021-10-11 | End: 2022-03-07

## 2021-10-11 NOTE — PATIENT INSTRUCTIONS
1.  Venlafaxine 150 mg daily for 7 days then stop  2.  After you finish the 150 mg dose of venlafaxine then start venlafaxine 75 mg for 7 days then stop  3.  Starting tomorrow begin duloxetine 30 mg twice daily  4.  Continue quetiapine 50 mg twice daily        Continue all other medical directions per primary care provider.     Continue all other medications as reviewed per electronic medical record today.     Safety plan reviewed. To the Emergency Department as needed or call after hours crisis line at [phone] or [phone]. [place] Crisis Text Line: Text [place] to 590298  or  Suicide LifeLine Chat: BugHerd.org/chat/    To schedule individual or family therapy, call [facility] at [phone].     Schedule an appointment with me in December or sooner as needed.  Call [facility] at [phone] to schedule.    Follow up with primary care provider as planned or for acute medical concerns.    Call the psychiatric nurse line with medication questions or concerns at [phone].    [employer] may be used to communicate with your provider, but this is not intended to be used for emergencies.    Crisis Resources:    National Suicide Prevention Lifeline: 912.406.5576 (TTY: 103.836.1551). Call anytime for help.  (www.suicidepreventionlifeline.org)  National [place] on Mental Illness (www.sarabjit.org): 763.937.6144 or 934-726-9708.   Mental Health Association (www.mentalhealth.org): 659.149.9718 or 423-618-2698.  [place] Crisis Text Line: Text [place] to 616272  Suicide LifeLine Chat: BugHerd.org/chat

## 2021-10-11 NOTE — PROGRESS NOTES
Jessica is a 70 year old who is being evaluated via a billable video visit.      How would you like to obtain your AVS? MyChart  If the video visit is dropped, the invitation should be resent by: Text to cell phone: [phone]  Will anyone else be joining your video visit? No    Start time: 12:50 PM       Stop Time:  1:45 PM                  Outpatient Psychiatric Evaluation - Standard Adult    Name:  Jessica Ellison  : 1950    Source of Referral:  Primary Care Provider: Sarah Barriga MD   Last visit: Entered 2021  Current Psychotherapist: None    Identifying Data:  Patient is a 70 year old,   White Not  or  female  who presents for initial visit with me.  Patient is currently retired. Patient attended the session alone. Consent to communicate signed for no one. Consent for treatment signed and included in electronic medical record. Discussed limits of confidentiality today. My Practice Policy was reviewed and signed.     Patient prefers to be called: Jessica     Chief Complaint:    Chief Complaint   Patient presents with      Treatment Plan         HPI:      Jessica is a 71-year-old female visiting with me today to review her medications that are managing her insomnia, depression, and anxiety symptoms.  With regards to her depression she often isolates to herself with low energy.  At no time during this interview did she endorse suicide thinking.  Jessica inform me she had a sleep study done and was diagnosed with hypoxia.  She was placed on a CPAP machine but still wakes up at 4 AM.  She has not noticed much change in her sleeping pattern but feels that, perhaps, she is more rested.  Jessica has noticed she has had less vivid dreams since starting the CPAP machine.  Jessica inform me that she has been experiencing mood swings through the years but things have gotten better.  She also has been dealing with grief issues but also informing that she has been feeling better.  She  "misses people that have  and she feels more bothered by this than she feels her  is.  Along with her mood dysregulation she tells me she has experienced menopause \"3 times\" with periods of diaphoresis.  Because of her back pain she has become more sedentary.  She has her condition monitored regularly with an appointment pending on Thursday.    Psychiatric Review of Symptoms:  Depression: Depressed Mood  Sleep: Decrease   Energy: Decrease  Suicide: No  Ruminations: Increase    PHQ-9 scores:   PHQ-9 SCORE 9/3/2020 3/15/2021 2021   PHQ-9 Total Score - - -   PHQ-9 Total Score 7 5 7     Rebekah:  No symptoms   MDQ Score: Negative Screen  Anxiety: Feeling nervous, anxious, or on edge  Uncontrolled worrying  Worrying too much about different things  Easily annoyed or irritable    DAJUAN-7 scores:    DAJUAN-7 SCORE 2019 3/15/2021 2021   Total Score - - -   Total Score 10 5 6     Panic:  No symptoms   Agoraphobia:  No   PTSD:  History of Trauma   OCD:  No symptoms   Psychosis: No symptoms   ADD / ADHD: No symptoms  Gambling or shoplifting: No   Eating Disorder:  No symptoms  Sleep:   Trouble staying asleep  Early morning awakening  Nightmares/strange dreams  Uses a CPAP machine  History of sleep consult/study     Psychiatric History:   Hospitalizations:None  History of Commitment? No   Past Treatment: counseling and medication(s) from physician / PCP  Suicide Attempts:  none  Self-injurious Behavior: Denies  Electroconvulsive Therapy (ECT) or Transcranial Magnetic Stimulation (TMS): No   Genetic Testing: No     Substance Use History:  Current use of drugs or alcohol: Denies   Patient reports no problems as a result of their drinking / drug use.   Based on the clinical interview, there  are not indications of drug or alcohol abuse.  Tobacco use: No  Caffeine: No  Patient has not received chemical dependency treatment in the past  Recovery Programming Involvement: None    Past Medical History:  Past Medical " History:   Diagnosis Date     Anemia      Aneurysm (H)      Chemical dependency (H)     Chem Dep RX     Depression      History of blood transfusion      Hypertension      Menopausal symptoms      Other chronic pain     Lower back and hips     Sleep disorder      Thyroid disease      Tobacco abuse       Surgery:   Past Surgical History:   Procedure Laterality Date     ABDOMEN SURGERY       APPENDECTOMY  1960     APPENDECTOMY       BACK SURGERY       C PART REMOVAL COLON W ANASTOMOSIS  5/2005    diverticulitis     C SPINE FUSION,ANTER,8+ SGMTS  2007    Anterior posterior fusion 10 levels, hardware removal and re-fusion 2009     cerebral aneurysm  2014    coiled     CEREBRAL ANEURYSM REPAIR       COLON SURGERY       COLONOSCOPY  4/19/2005     FRACTURE SURGERY       HC EXCISION BREAST LESION, OPEN >=1      core biopsy 2006, lumpectomy 2006     HERNIA REPAIR       LAP VENTRAL HERNIA REPAIR  12/2017    [place]     LAPAROSCOPIC ASSISTED HYSTERECTOMY VAGINAL  12/2017     orif left femoral neck Left 6/3/2016    stress fracture.  done at [facility]     SURGICAL HISTORY OF -   2004    Skin Graft     Allergies:     Allergies   Allergen Reactions     Bee Venom      Lisinopril Swelling     Oral swelling     Primary Care Provider: Sarah Barriga MD  Seizures or Head Injury: No  Diet: No Restrictions  Food Allergies: No   Exercise: No regular exercise program  Supplements: Reviewed per Electronic Medical Record Today    amLODIPine (NORVASC) 5 MG tablet, Take 1 tablet (5 mg) by mouth daily  aspirin 325 MG tablet, Take 1 tablet (325 mg) by mouth daily  atorvastatin (LIPITOR) 20 MG tablet, Take 1 tablet (20 mg) by mouth daily  calcium carbonate 600 mg-vitamin D 400 units (CALTRATE) 600-400 MG-UNIT per tablet, Take 1 tablet by mouth every evening  cyclobenzaprine (FLEXERIL) 10 MG tablet, Take 1 tablet (10 mg) by mouth 2 times daily as needed for muscle spasms  diazepam (VALIUM) 10 MG tablet, Take 10 mg by mouth every 6 hours as needed    EPINEPHrine (EPIPEN) 0.3 MG/0.3ML injection, Inject 0.3 mLs (0.3 mg) into the muscle once as needed for anaphylaxis  ferrous sulfate (FEROSUL) 325 (65 Fe) MG tablet, Take 325 mg by mouth every other day Alternating days with Senna tab  levothyroxine (LEVOTHROID) 50 MCG tablet, Take 1 tablet (50 mcg) by mouth daily  metoprolol succinate ER (TOPROL-XL) 50 MG 24 hr tablet, Take 1 tablet (50 mg) by mouth 2 times daily  multivitamin w/minerals (THERA-VIT-M) tablet, Take 1 tablet by mouth every evening  naloxone (NARCAN) 4 MG/0.1ML nasal spray, Spray 1 spray (4 mg) into one nostril alternating nostrils once as needed for opioid reversal every 2-3 minutes until assistance arrives  oxyCODONE-acetaminophen (PERCOCET)  MG per tablet, Take 1 tablet by mouth every 6 hours as needed for severe pain Max of 90/month  QUEtiapine (SEROQUEL) 50 MG tablet, Take 1 tablet (50 mg) by mouth 2 times daily  senna (SENOKOT) 8.6 MG tablet, Take 1 tablet by mouth every other day Alternating days with Iron tab  tiotropium (SPIRIVA) 18 MCG inhaled capsule, Inhale 1 capsule (18 mcg) into the lungs daily  venlafaxine (EFFEXOR-XR) 150 MG 24 hr capsule, Take 1 capsule (150 mg) by mouth daily TAKE ONE CAPSULE BY MOUTH ONCE DAILY to take with a 75 mg for total of 225 mg daily  venlafaxine (EFFEXOR-XR) 75 MG 24 hr capsule, Take 1 capsule (75 mg) by mouth daily Take with 150 mg for a total of 225 mg daily.  VENTOLIN  (90 Base) MCG/ACT inhaler, INHALE TWO PUFFS BY MOUTH EVERY 6 HOURS (Patient taking differently: Inhale 2 puffs into the lungs every 6 hours as needed )    No current facility-administered medications on file prior to visit.       The [place] Prescription Monitoring Program has been reviewed and there are no concerns about diversionary activity for controlled substances at this time.     Vital Signs:  Vitals: There were no vitals taken for this visit.    Labs:  Most recent laboratory results reviewed and the pertinent  "results include: Platelets 134    Most recent EKG from December 2020 reviewed. QTc interval 418.  Abnormal EKG.    Review of Systems:  10 systems (general, cardiovascular, respiratory, eyes, ENT, endocrine, GI, , M/S, neurological) were reviewed. Most pertinent finding(s) is/are: COPD- daily inhaler, back pain, headaches with elevated blood pressure , hx broken femur. The remaining systems are all unremarkable.  Family History:   Patient reported family history includes:   Family History   Problem Relation Age of Onset     Cancer Mother         breast/lung     Alcohol/Drug Mother      Depression Mother      Cancer - colorectal Father      Alcohol/Drug Father      Diabetes Maternal Grandmother      Diabetes Maternal Grandfather      Diabetes Paternal Grandmother      Diabetes Paternal Grandfather      Cancer Paternal Grandfather      Gastrointestinal Disease Paternal Grandfather      Congenital Anomalies Son      Hypertension Brother      Adrenal Disorder Brother         had adrenalectomies     Cerebral palsy Granddaughter      Mental Illness History: Yes: depression  Substance Abuse History: Yes: Alcoholism  Suicide History: Yes: first cousin  Medications: Unknown     Social History:   Born: [place], [place]  Raised: Same  Childhood: easy childhood, \"small things not reportable\",  Easy school, teased about her brother.    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  Employment History:  Not employed - forced to retire  Childhood illnesses: Denies  Current Living situation:  [place], [place]  with  of over 40 years . Feels safe at home.  Children: Boy, Girl, Boy   Firearms/Weapons Access: No: Patient denies   Service: No    Mental Status Examination:     Appearance:  awake, alert and casually dressed  Attitude:  cooperative   Eye Contact:  adequate  Gait and Station: No assistive Devices used and No dizziness or falls  Psychomotor Behavior:  no evidence of " tardive dyskinesia, dystonia, or tics and intact station, gait and muscle tone  Oriented to:  time, person, and place  Attention Span and Concentration:  Normal  Speech:  clear, coherent and Speaks: English  Mood:  anxious and depressed  Affect:  mood congruent  Associations:  no loose associations  Thought Process:  goal oriented  Thought Content:  no evidence of suicidal ideation or homicidal ideation, no auditory hallucinations present and no visual hallucinations present  Recent and Remote Memory:  intact Not formally assessed. No amnesia.  Fund of Knowledge: appropriate  Insight:  good  Judgment:  intact  Impulse Control:  intact    Suicide Risk Assessment:  Today Jessica Ellison reports that she is having no thoughts to want to end her life or to harm other people. In addition, there are notable risk factors for self-harm, including anxiety, comorbid medical condition of Chronic pain and withdrawing. However, risk is mitigated by history of seeking help when needed, future oriented, denies suicidal intent or plan and denies homicidal ideation, intent, or plan. Therefore, based on all available evidence including the factors cited above, Jessica Ellison does not appear to be at imminent risk for self-harm, does not meet criteria for a 72-hr hold, and therefore remains appropriate for ongoing outpatient level of care.  A thorough assessment of risk factors related to suicide and self-harm have been reviewed and are noted above. The patient convincingly denies acute suicidality on several occasions. Local community safety resources reviewed and printed for patient to use if needed. There was no deceit detected, and the patient presented in a manner that was believable.     DSM5  Diagnosis:  296.32 (F33.1) Major Depressive Disorder, Recurrent Episode, Moderate _ and With mixed features  300.02 (F41.1) Generalized Anxiety Disorder  780.52 (G47.00) Insomnia Disorder   With ohter medical comorbidity  Recurrent       Medical Comorbidities Include:   Patient Active Problem List    Diagnosis Date Noted     JOY (obstructive sleep apnea) 08/05/2021     Priority: Medium     Chronic kidney disease, stage 3 (H) 08/05/2021     Priority: Medium     Suspected COVID-19 virus infection 12/22/2020     Priority: Medium     Hyperlipidemia 12/22/2020     Priority: Medium     Centrilobular emphysema (H) 11/04/2020     Priority: Medium     Hypoxia 01/05/2019     Priority: Medium     Spell of change in speech 04/17/2018     Priority: Medium     Hypothyroidism 09/07/2016     Priority: Medium     Chronic bilateral low back pain without sciatica 06/02/2016     Priority: Medium     Essential hypertension with goal blood pressure less than 140/90 06/02/2016     Priority: Medium     S/P lumbar spinal fusion 10/19/2015     Priority: Medium     Bee allergy status 06/30/2015     Priority: Medium     Cerebral aneurysm, nonruptured 07/24/2014     Priority: Medium     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 Marti       Chronic pain 03/07/2014     Priority: Medium     Major depressive disorder, recurrent episode, moderate (H) 07/10/2013     Priority: Medium     Advanced directives, counseling/discussion 09/05/2012     Priority: Medium     Patient does not have an Advance/Health Care Directive (HCD), has information at home.     Sonja Krishnamurthy  September 5, 2012         Anxiety 06/20/2012     Priority: Medium     CARDIOVASCULAR SCREENING; LDL GOAL LESS THAN 160 10/31/2010     Priority: Medium     ETOH abuse 03/19/2010     Priority: Medium      katie drinking, DUIs and probation violation       Insomnia 12/21/2009     Priority: Medium     Back pain 12/21/2009     Priority: Medium     Patient is followed by DOMINIQUE COPPOLA for ongoing prescription of narcotic pain medicine.  Med: oxycodone/acetaminophen 10/325 for chronic back pain.   Maximum use per month:  120  Expected duration: unknown  Narcotic agreement on file: YES 3/19/2010  Clinic visit recommended: Q 3 months    April 2013 - rhizotomy for SI (left) Jhon Mckay MD    Follow[facility] at [facility] - severe low back pain with work related injuries.  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  No flowsheet data found.    The Patient Activation Measure (NOEL) score was completed and recorded in [employer]. This assesses patient knowledge, skill, and confidence for self-management.   NOEL Score (Last Two) 7/21/2014   NOEL Raw Score 39   Activation Score 56.4   NOEL Level 3                Impression:  Jessica REINOSO cSot met with me today to talk about medication options to better manage her depression and anxiety.  Multiple comorbidities, including chronic back pain, exacerbated her symptoms.  She would like to try a different medication for her depression and anxiety.  Venlafaxine will continue at 150 mg daily for 7 days then will be discontinued.  Then she will start venlafaxine 75 mg for 7 days before discontinuing it altogether.  She will begin starting tomorrow, duloxetine 30 mg twice daily.  I informed her this may help her pain become more manageable.  She will continue quetiapine 50 mg twice daily for mood regulation..    Medication side effects and alternatives reviewed. Health promotion activities recommended and reviewed today. All questions addressed. Education and counseling completed regarding risks and benefits of medications and psychotherapy options. Collaborative Care Psychiatry Service model reviewed today. Recommend therapy for additional support.     Treatment Plan:     1.  Venlafaxine  150 mg daily for 7 days then stop  2.  After you finish the 150 mg dose of venlafaxine then start venlafaxine 75 mg for 7 days then stop  3.  Starting tomorrow begin duloxetine 30 mg twice daily  4.  Continue quetiapine 50 mg twice daily        Continue all other medical directions per primary care provider.     Continue all other medications as reviewed per electronic medical record today.     Safety plan reviewed. To the Emergency Department as needed or call after hours crisis line at [phone] or [phone]. [place] Crisis Text Line: Text [place] to 254790  or  Suicide LifeLine Chat: suicideBomoda.org/chat/    To schedule individual or family therapy, call [facility] at [phone].     Schedule an appointment with me in December or sooner as needed.  Call [facility] at [phone] to schedule.    Follow up with primary care provider as planned or for acute medical concerns.    Call the psychiatric nurse line with medication questions or concerns at [phone].    Amulyte may be used to communicate with your provider, but this is not intended to be used for emergencies.    Crisis Resources:    National Suicide Prevention Lifeline: 899.735.6273 (TTY: 732.421.3906). Call anytime for help.  (www.suicidepreventionlifeline.org)  National Meridian on Mental Illness (www.sarabjit.org): 262.357.6535 or 309-967-2751.   Mental Health Association (www.mentalhealth.org): 904.506.4728 or 961-870-5229.  [place] Crisis Text Line: Text [place] to 094038  Suicide LifeLine Chat: suicideBomoda.org/chat    Administrative Billing:   Time spent with patient was 60 minutes and greater than 50% of time or 40 minutes was spent in counseling and coordination of care regarding above diagnoses and treatment plan.    Patient Status:  Patient will continue to be seen for ongoing consultation and stabilization.    Signed:   NATALY Gomez-BC   Psychiatry

## 2021-10-12 ENCOUNTER — TELEPHONE (OUTPATIENT)
Dept: PSYCHIATRY | Facility: CLINIC | Age: 71
End: 2021-10-12

## 2021-10-12 DIAGNOSIS — I10 ESSENTIAL HYPERTENSION WITH GOAL BLOOD PRESSURE LESS THAN 140/90: ICD-10-CM

## 2021-10-12 RX ORDER — METOPROLOL SUCCINATE 50 MG/1
TABLET, EXTENDED RELEASE ORAL
Qty: 180 TABLET | Refills: 1 | Status: SHIPPED | OUTPATIENT
Start: 2021-10-12 | End: 2022-04-19

## 2021-10-12 NOTE — TELEPHONE ENCOUNTER
Called pharm and relayed provider message. Pharm canceled both Rx of Venlafaxine.     Tona Goldstein on 10/12/2021 at 2:57 PM

## 2021-10-12 NOTE — TELEPHONE ENCOUNTER
Spoke to pharm they are confused about Venlafaxine order. Wondering why it was sent with 90 day supply with refill. Because Sig says to stop after 7 days. Provider to clarify and send new Sig.     Tona Goldstein on 10/12/2021 at 2:15 PM

## 2021-10-12 NOTE — TELEPHONE ENCOUNTER
"I thought that I marked these new orders as \"historical\" not e prescribe  So no scripts would be sent to pharmacy as she has enough of the venlafaxine to cover the taper?  "

## 2021-10-13 ENCOUNTER — TELEPHONE (OUTPATIENT)
Dept: FAMILY MEDICINE | Facility: CLINIC | Age: 71
End: 2021-10-13

## 2021-10-13 DIAGNOSIS — M53.3 SACROILIAC JOINT PAIN: ICD-10-CM

## 2021-10-13 DIAGNOSIS — M54.50 CHRONIC BILATERAL LOW BACK PAIN WITHOUT SCIATICA: ICD-10-CM

## 2021-10-13 DIAGNOSIS — G89.29 CHRONIC BILATERAL LOW BACK PAIN WITHOUT SCIATICA: ICD-10-CM

## 2021-10-13 DIAGNOSIS — Z98.1 S/P LUMBAR SPINAL FUSION: ICD-10-CM

## 2021-10-14 ENCOUNTER — TRANSFERRED RECORDS (OUTPATIENT)
Dept: HEALTH INFORMATION MANAGEMENT | Facility: CLINIC | Age: 71
End: 2021-10-14

## 2021-10-14 NOTE — TELEPHONE ENCOUNTER
Where is she at with scheduling an appointment with the pain clinic?  We discussed her seeing the pain clinic or weaning off the oxycodone.     Sarah Barriga M.D.

## 2021-10-15 RX ORDER — OXYCODONE AND ACETAMINOPHEN 10; 325 MG/1; MG/1
1 TABLET ORAL EVERY 6 HOURS PRN
Qty: 90 TABLET | Refills: 0 | Status: SHIPPED | OUTPATIENT
Start: 2021-10-15 | End: 2021-12-01

## 2021-10-22 DIAGNOSIS — E03.9 HYPOTHYROIDISM, UNSPECIFIED TYPE: ICD-10-CM

## 2021-10-26 RX ORDER — LEVOTHYROXINE SODIUM 50 UG/1
TABLET ORAL
Qty: 90 TABLET | Refills: 2 | Status: SHIPPED | OUTPATIENT
Start: 2021-10-26 | End: 2022-07-29

## 2021-11-17 ENCOUNTER — TELEPHONE (OUTPATIENT)
Dept: NEUROSURGERY | Facility: CLINIC | Age: 71
End: 2021-11-17
Payer: COMMERCIAL

## 2021-11-17 DIAGNOSIS — I67.1 CEREBRAL ANEURYSM, NONRUPTURED: Primary | ICD-10-CM

## 2021-11-17 NOTE — TELEPHONE ENCOUNTER
[facility] Call Center    Phone Message    May a detailed message be left on voicemail: yes     Reason for Call: Other: Patient is requesting a call back to schedule a follow up with Dr. García for dizziness and to have her stint checked, please call patient at [phone] to assist.     Action Taken: Message routed to:  [facility]    Travel Screening: Not Applicable

## 2021-11-22 NOTE — TELEPHONE ENCOUNTER
[facility] Call Center    Phone Message    May a detailed message be left on voicemail: yes     Reason for Call: Other: Jessica calling to request a call back to discuss scheduling options for her MRA and 3 year follow up. Writer did not see any details for scheduling per protocols. Please call Jessica at your earliest convenience to discuss.     Action Taken: Message routed to:  [facility]    Travel Screening: Not Applicable

## 2021-11-29 ENCOUNTER — TELEPHONE (OUTPATIENT)
Dept: SLEEP MEDICINE | Facility: CLINIC | Age: 71
End: 2021-11-29
Payer: COMMERCIAL

## 2021-11-29 NOTE — TELEPHONE ENCOUNTER
Patient states that she would like to try a full face mask.   Reviewed downloads, noted increased leak and AHI. Discussed that changing out her cushion more often would also be beneficial. She states that she would like to try a full face mask due to colds. Set appt to see me on Wednesday 12/1/21 for a mask consult.     Anuja Mcintyre, DME Coordinator

## 2021-12-01 ENCOUNTER — DOCUMENTATION ONLY (OUTPATIENT)
Dept: SLEEP MEDICINE | Facility: CLINIC | Age: 71
End: 2021-12-01
Payer: COMMERCIAL

## 2021-12-01 DIAGNOSIS — G47.33 OSA (OBSTRUCTIVE SLEEP APNEA): Primary | ICD-10-CM

## 2021-12-01 NOTE — PROGRESS NOTES
Patient came to [place]  for mask fitting appointment on December 1, 2021. Patient requested to switch masks because wants a FFM if gets a cold this winter. Discussed the following masks: P30i, F30i. Discussed that she has a slight leak and I would not recommend a FFM at this time, rather try pillows mask to see if that helps some. Patient selected a Resmed Mask name: P30i Pillow mask size Small    Anuja Mcintyre DME Coordinator

## 2021-12-02 ENCOUNTER — TRANSFERRED RECORDS (OUTPATIENT)
Dept: HEALTH INFORMATION MANAGEMENT | Facility: CLINIC | Age: 71
End: 2021-12-02
Payer: COMMERCIAL

## 2021-12-06 DIAGNOSIS — E78.5 HYPERLIPIDEMIA LDL GOAL <100: ICD-10-CM

## 2021-12-06 DIAGNOSIS — I10 ESSENTIAL HYPERTENSION WITH GOAL BLOOD PRESSURE LESS THAN 140/90: ICD-10-CM

## 2021-12-06 RX ORDER — AMLODIPINE BESYLATE 5 MG/1
5 TABLET ORAL DAILY
Qty: 90 TABLET | Refills: 1 | Status: SHIPPED | OUTPATIENT
Start: 2021-12-06 | End: 2022-06-24

## 2021-12-06 RX ORDER — ATORVASTATIN CALCIUM 20 MG/1
TABLET, FILM COATED ORAL
Qty: 90 TABLET | Refills: 3 | Status: SHIPPED | OUTPATIENT
Start: 2021-12-06 | End: 2023-01-04

## 2021-12-06 NOTE — TELEPHONE ENCOUNTER
"Has appointment scheduled for 1/3/22    Requested Prescriptions   Pending Prescriptions Disp Refills     amLODIPine (NORVASC) 5 MG tablet [Pharmacy Med Name: AMLODIPINE BES 5MG TABS] 90 tablet 1     Sig: TAKE 1 TABLET (5 MG) BY MOUTH DAILY       Calcium Channel Blockers Protocol  Passed - 12/6/2021 10:07 AM        Passed - Blood pressure under 140/90 in past 12 months     BP Readings from Last 3 Encounters:   09/02/21 124/86   08/05/21 104/76   04/22/21 132/80                 Passed - Recent (12 mo) or future (30 days) visit within the authorizing provider's specialty     Patient has had an office visit with the authorizing provider or a provider within the authorizing providers department within the previous 12 mos or has a future within next 30 days. See \"Patient Info\" tab in inbasket, or \"Choose Columns\" in Meds & Orders section of the refill encounter.              Passed - Medication is active on med list        Passed - Patient is age 18 or older        Passed - No active pregnancy on record        Passed - Normal serum creatinine on file in past 12 months     Recent Labs   Lab Test 08/05/21  1017   CR 0.91       Ok to refill medication if creatinine is low          Passed - No positive pregnancy test in past 12 months           atorvastatin (LIPITOR) 20 MG tablet [Pharmacy Med Name: ATORVASTATIN CALCIUM 20MG TABS] 90 tablet 3     Sig: TAKE ONE TABLET BY MOUTH ONCE DAILY       Statins Protocol Failed - 12/6/2021 10:07 AM        Failed - LDL on file in past 12 months     Recent Labs   Lab Test 11/04/20  1501   *             Passed - No abnormal creatine kinase in past 12 months     No lab results found.             Passed - Recent (12 mo) or future (30 days) visit within the authorizing provider's specialty     Patient has had an office visit with the authorizing provider or a provider within the authorizing providers department within the previous 12 mos or has a future within next 30 days. See \"Patient " "Info\" tab in inbasket, or \"Choose Columns\" in Meds & Orders section of the refill encounter.              Passed - Medication is active on med list        Passed - Patient is age 18 or older        Passed - No active pregnancy on record        Passed - No positive pregnancy test in past 12 months             "

## 2021-12-10 ENCOUNTER — ANCILLARY PROCEDURE (OUTPATIENT)
Dept: MAMMOGRAPHY | Facility: CLINIC | Age: 71
End: 2021-12-10
Attending: FAMILY MEDICINE
Payer: COMMERCIAL

## 2021-12-10 DIAGNOSIS — Z12.31 VISIT FOR SCREENING MAMMOGRAM: ICD-10-CM

## 2021-12-10 PROCEDURE — 77063 BREAST TOMOSYNTHESIS BI: CPT | Mod: TC | Performed by: RADIOLOGY

## 2021-12-10 PROCEDURE — 77067 SCR MAMMO BI INCL CAD: CPT | Mod: TC | Performed by: RADIOLOGY

## 2021-12-13 DIAGNOSIS — J44.9 CHRONIC OBSTRUCTIVE PULMONARY DISEASE, UNSPECIFIED COPD TYPE (H): ICD-10-CM

## 2021-12-15 RX ORDER — TIOTROPIUM BROMIDE 18 UG/1
CAPSULE ORAL; RESPIRATORY (INHALATION)
Qty: 30 CAPSULE | Refills: 3 | Status: SHIPPED | OUTPATIENT
Start: 2021-12-15 | End: 2022-03-21

## 2021-12-17 ENCOUNTER — ALLIED HEALTH/NURSE VISIT (OUTPATIENT)
Dept: FAMILY MEDICINE | Facility: CLINIC | Age: 71
End: 2021-12-17
Payer: COMMERCIAL

## 2021-12-17 DIAGNOSIS — F10.10 ETOH ABUSE: Primary | ICD-10-CM

## 2021-12-17 DIAGNOSIS — F33.1 MAJOR DEPRESSIVE DISORDER, RECURRENT EPISODE, MODERATE (H): ICD-10-CM

## 2021-12-17 PROCEDURE — 99207 PR NO CHARGE NURSE ONLY: CPT

## 2021-12-17 NOTE — PROGRESS NOTES
Patient's  walked in and has concerns about wife's condition. Wife has started drinking again. She is very depressed and has not gotten out of the bed in 3 days. She is not wanting to go to [facility] at this time.  is very concerned and would like patient to be seen in clinic. Appointment made. Advised  if she becomes suicidal or has thoughts, to call 911 or bring her to [place].  agreed.   Daniella Dupree RN

## 2021-12-20 ENCOUNTER — TELEPHONE (OUTPATIENT)
Dept: PSYCHIATRY | Facility: CLINIC | Age: 71
End: 2021-12-20

## 2021-12-20 NOTE — TELEPHONE ENCOUNTER
Reason for Call:  Other call back    Detailed comments: vd call from clients therapist Akosua Carl from [facility]# [phone]   She would like to collaborate care with Lolita Espino   She will send signed PEPPER to [phone].   Please contact Akosua once pepper is rcvd.     Phone Number Patient can be reached at: Other phone number:  [phone]  Best Time: any    Can we leave a detailed message on this number? YES    Call taken on 12/20/2021 at 2:51 PM by Martín West

## 2021-12-22 NOTE — TELEPHONE ENCOUNTER
Akosua from [employer] called intake back to return a call to Lolita Espino. Akosua is requesting a call back from Lolita or someone from her team. [phone]. Ok to leave a detailed message if need be.

## 2021-12-29 NOTE — TELEPHONE ENCOUNTER
PEPPER received from [employer] on 12/20/21. The form was e-mailed to ARSLAN Espino and faxed to med records as well.   Akosua Carl requesting a call from provider or team to colaborate care. Ph: [phone]. Please follow up.

## 2021-12-30 NOTE — TELEPHONE ENCOUNTER
Lolita Espino NP  Psych Rn - [facility] 10 minutes ago (10:08 AM)     [place]    I left a second message.  She is out of the office until January 3 and I asked her to call the 800-number to let me know of a time we can connect to coordinate care.

## 2022-01-02 ENCOUNTER — HEALTH MAINTENANCE LETTER (OUTPATIENT)
Age: 72
End: 2022-01-02

## 2022-01-03 ENCOUNTER — OFFICE VISIT (OUTPATIENT)
Dept: FAMILY MEDICINE | Facility: CLINIC | Age: 72
End: 2022-01-03
Payer: COMMERCIAL

## 2022-01-03 VITALS
HEIGHT: 61 IN | OXYGEN SATURATION: 91 % | TEMPERATURE: 96.8 F | SYSTOLIC BLOOD PRESSURE: 92 MMHG | DIASTOLIC BLOOD PRESSURE: 54 MMHG | RESPIRATION RATE: 16 BRPM | BODY MASS INDEX: 31.49 KG/M2 | HEART RATE: 78 BPM | WEIGHT: 166.8 LBS

## 2022-01-03 DIAGNOSIS — M54.50 CHRONIC BILATERAL LOW BACK PAIN WITHOUT SCIATICA: ICD-10-CM

## 2022-01-03 DIAGNOSIS — G89.4 CHRONIC PAIN SYNDROME: Primary | ICD-10-CM

## 2022-01-03 DIAGNOSIS — F33.1 MAJOR DEPRESSIVE DISORDER, RECURRENT EPISODE, MODERATE (H): ICD-10-CM

## 2022-01-03 DIAGNOSIS — G89.29 CHRONIC BILATERAL LOW BACK PAIN WITHOUT SCIATICA: ICD-10-CM

## 2022-01-03 DIAGNOSIS — M53.3 SACROILIAC JOINT PAIN: ICD-10-CM

## 2022-01-03 DIAGNOSIS — J44.9 CHRONIC OBSTRUCTIVE PULMONARY DISEASE, UNSPECIFIED COPD TYPE (H): ICD-10-CM

## 2022-01-03 DIAGNOSIS — Z98.1 S/P LUMBAR SPINAL FUSION: ICD-10-CM

## 2022-01-03 DIAGNOSIS — N18.31 STAGE 3A CHRONIC KIDNEY DISEASE (H): ICD-10-CM

## 2022-01-03 LAB
ANION GAP SERPL CALCULATED.3IONS-SCNC: 8 MMOL/L (ref 3–14)
BUN SERPL-MCNC: 16 MG/DL (ref 7–30)
CALCIUM SERPL-MCNC: 8.9 MG/DL (ref 8.5–10.1)
CHLORIDE BLD-SCNC: 106 MMOL/L (ref 94–109)
CO2 SERPL-SCNC: 27 MMOL/L (ref 20–32)
CREAT SERPL-MCNC: 0.8 MG/DL (ref 0.52–1.04)
GFR SERPL CREATININE-BSD FRML MDRD: 79 ML/MIN/1.73M2
GLUCOSE BLD-MCNC: 84 MG/DL (ref 70–99)
POTASSIUM BLD-SCNC: 3.8 MMOL/L (ref 3.4–5.3)
SODIUM SERPL-SCNC: 141 MMOL/L (ref 133–144)

## 2022-01-03 PROCEDURE — 36415 COLL VENOUS BLD VENIPUNCTURE: CPT | Performed by: FAMILY MEDICINE

## 2022-01-03 PROCEDURE — 99214 OFFICE O/P EST MOD 30 MIN: CPT | Performed by: FAMILY MEDICINE

## 2022-01-03 PROCEDURE — 80048 BASIC METABOLIC PNL TOTAL CA: CPT | Performed by: FAMILY MEDICINE

## 2022-01-03 RX ORDER — CELECOXIB 200 MG/1
200 CAPSULE ORAL DAILY
Qty: 90 CAPSULE | Refills: 1 | Status: SHIPPED | OUTPATIENT
Start: 2022-01-03 | End: 2022-06-24

## 2022-01-03 RX ORDER — CELECOXIB 200 MG/1
CAPSULE ORAL
COMMUNITY
Start: 2021-12-02 | End: 2022-01-03

## 2022-01-03 RX ORDER — OXYCODONE AND ACETAMINOPHEN 10; 325 MG/1; MG/1
1 TABLET ORAL EVERY 6 HOURS PRN
Qty: 90 TABLET | Refills: 0 | Status: SHIPPED | OUTPATIENT
Start: 2022-01-03 | End: 2022-02-10

## 2022-01-03 ASSESSMENT — MIFFLIN-ST. JEOR: SCORE: 1213.72

## 2022-01-03 ASSESSMENT — PAIN SCALES - GENERAL: PAINLEVEL: SEVERE PAIN (7)

## 2022-01-03 NOTE — PROGRESS NOTES
"  Assessment & Plan     Chronic pain syndrome  Chronic bilateral low back pain without sciatica  Sacroiliac joint pain    S/P lumbar spinal fusion    We further discussed weaning down on the oxycodone to #60/month and she was opposed to this idea.  I strongly feel she needs to be enrolled in a comprehensive pain program with pain psychology, PT, internationalists, etc. I am willing to do 90/month x 2 months, but she needs to have scheduled a follow up with pain clinic or we will be further weaning to 60/month thereafter.     - celecoxib (CELEBREX) 200 MG capsule; Take 1 capsule (200 mg) by mouth daily  - oxyCODONE-acetaminophen (PERCOCET)  MG per tablet; Take 1 tablet by mouth every 6 hours as needed for severe pain Max 90/month  - Basic metabolic panel  (Ca, Cl, CO2, Creat, Gluc, K, Na, BUN); Future  - Basic metabolic panel  (Ca, Cl, CO2, Creat, Gluc, K, Na, BUN)      Chronic obstructive pulmonary disease, unspecified COPD type (H)       Major depressive disorder, recurrent episode, moderate (H)  Seeing Lolita Espino NP  Started on duloxetine, only used this very intermittently - not twice daily as prescribed  Is also on Effexor regularly    Stage 3a chronic kidney disease (H)  Started on celebrex by Dr. Mckay - will check renal function               BMI:   Estimated body mass index is 31.86 kg/m  as calculated from the following:    Height as of this encounter: 1.541 m (5' 0.67\").    Weight as of this encounter: 75.7 kg (166 lb 12.8 oz).           No follow-ups on file.    Sarah Barriga MD  [facility]   Jessica is a 71 year old who presents for the following health issues     HPI     Chronic/Recurring Back Pain Follow Up      Where is your back pain located? (Select all that apply) low back and SI    How would you describe your back pain?  \"It's so ambiguous, variable with activity, it depends what I'm doing, aches all day. Sharp and shooting when I move.\" " "    Where does your back pain spread? nowhere    Since your last clinic visit for back pain, how has your pain changed? unchanged    Does your back pain interfere with your job? YES    Since your last visit, have you tried any new treatment? Yes -  celebrex       How many servings of fruits and vegetables do you eat daily?  2-3    On average, how many sweetened beverages do you drink each day (Examples: soda, juice, sweet tea, etc.  Do NOT count diet or artificially sweetened beverages)?   2    How many days per week do you exercise enough to make your heart beat faster? 3 or less    How many minutes a day do you exercise enough to make your heart beat faster? 9 or less    How many days per week do you miss taking your medication? 0       Did see Dr. Banerjee - he gave her the name of someone to do the SI fusion which \"I'm not crazy about pursuing\".   He has  PT prescription for her as well.  \"I've been down that road\".   She's willing to try that again.   Last injection was a little  More successful than some of the previous injections.      She has not yet talked to her pain clinic about the more comprehensive pain program and I reminded her that we discussed that in August with plan to move that way with her pain medications.     I worry about opoid induced hyperalgesia    We further discussed weaning down on the oxycodone to #60/month and she was opposed to this idea.  I strongly feel she needs to be enrolled in a comprehensive pain program with pain psychology, PT, internationalists, etc. I am willing to do 90/month x 2 months, but she needs to have scheduled a follow up with pain clinic or we will be further weaning to 60/month thereafter.           Review of Systems   Constitutional, HEENT, cardiovascular, pulmonary, gi and gu systems are negative, except as otherwise noted.      Objective    BP 92/54 (BP Location: Right arm, Patient Position: Sitting, Cuff Size: Adult Regular)   Pulse 78   Temp 96.8  F (36  C) " "(Tympanic)   Resp 16   Ht 1.541 m (5' 0.67\")   Wt 75.7 kg (166 lb 12.8 oz)   SpO2 91%   BMI 31.86 kg/m    Body mass index is 31.86 kg/m .  Physical Exam   GENERAL: healthy, alert and no distress  PSYCH: mentation appears normal, affect normal/bright                "

## 2022-01-26 ENCOUNTER — CARE COORDINATION (OUTPATIENT)
Dept: PSYCHIATRY | Facility: CLINIC | Age: 72
End: 2022-01-26
Payer: COMMERCIAL

## 2022-01-27 ENCOUNTER — MYC MEDICAL ADVICE (OUTPATIENT)
Dept: FAMILY MEDICINE | Facility: CLINIC | Age: 72
End: 2022-01-27
Payer: COMMERCIAL

## 2022-02-09 ENCOUNTER — MYC MEDICAL ADVICE (OUTPATIENT)
Dept: FAMILY MEDICINE | Facility: CLINIC | Age: 72
End: 2022-02-09
Payer: COMMERCIAL

## 2022-02-09 DIAGNOSIS — F41.9 ANXIETY: ICD-10-CM

## 2022-02-09 NOTE — TELEPHONE ENCOUNTER
Routing to Provider to review and advise. Oxycodone already pended in another encounter.     Refer to patients mychart message.     Trixie Mcclellan RN BSN

## 2022-02-10 RX ORDER — QUETIAPINE FUMARATE 50 MG/1
50 TABLET, FILM COATED ORAL 2 TIMES DAILY
Qty: 180 TABLET | Refills: 1 | Status: SHIPPED | OUTPATIENT
Start: 2022-02-10 | End: 2022-06-15

## 2022-03-07 ENCOUNTER — TELEPHONE (OUTPATIENT)
Dept: FAMILY MEDICINE | Facility: CLINIC | Age: 72
End: 2022-03-07
Payer: COMMERCIAL

## 2022-03-07 DIAGNOSIS — F41.9 ANXIETY: ICD-10-CM

## 2022-03-07 DIAGNOSIS — F33.1 MAJOR DEPRESSIVE DISORDER, RECURRENT EPISODE, MODERATE (H): ICD-10-CM

## 2022-03-07 RX ORDER — VENLAFAXINE HYDROCHLORIDE 75 MG/1
CAPSULE, EXTENDED RELEASE ORAL
Qty: 90 CAPSULE | Refills: 1 | OUTPATIENT
Start: 2022-03-07

## 2022-03-07 RX ORDER — VENLAFAXINE HYDROCHLORIDE 150 MG/1
CAPSULE, EXTENDED RELEASE ORAL
Qty: 90 CAPSULE | Refills: 1 | Status: SHIPPED | OUTPATIENT
Start: 2022-03-07 | End: 2022-03-11 | Stop reason: DRUGHIGH

## 2022-03-07 RX ORDER — VENLAFAXINE HYDROCHLORIDE 75 MG/1
75 CAPSULE, EXTENDED RELEASE ORAL DAILY
Qty: 90 CAPSULE | Refills: 1 | Status: SHIPPED | OUTPATIENT
Start: 2022-03-07 | End: 2022-03-11

## 2022-03-07 NOTE — TELEPHONE ENCOUNTER
Disp Refills Start End JOSE   venlafaxine (EFFEXOR-XR) 75 MG 24 hr capsule 90 capsule 1 10/11/2021  No   Sig - Route: Take 1 capsule (75 mg) by mouth daily After you finish 150 mg dose for 7  Days.  Then take 75 mg daily  For 7 days then stop - Oral   Sent to pharmacy as: Venlafaxine HCl ER 75 MG Oral Capsule Extended Release 24 Hour (EFFEXOR-XR)   Class: E-Prescribe   Order: [id number]   E-Prescribing Status: Receipt confirmed by pharmacy (10/11/2021  1:57 PM CDT)       Outpatient Medication Detail     Disp Refills Start End JOSE   venlafaxine (EFFEXOR-XR) 150 MG 24 hr capsule 90 capsule 1 10/11/2021  No   Sig - Route: Take 1 capsule (150 mg) by mouth daily For 7 days then stop - Oral   Class: Historical   Order: [id number]     Please review and order patient's venlafaxine 150 mg. It did not go to the pharmacy when it was last ordered-10/11/2021

## 2022-03-08 NOTE — TELEPHONE ENCOUNTER
Medication sent to patient's pharmacy yesterday. Please review prescription sig and make changes and adjustments as needed to script-resend to pharmacy. See pharmacy concerns in previous encounter.

## 2022-03-08 NOTE — TELEPHONE ENCOUNTER
Reason for call:  Medication    If this is a refill request, has the caller requested the refill from the pharmacy already? Yes     Will the patient be using a [facility]? Yes     Name of the pharmacy and phone number for the current request:   [facility] – [place], [place] - [address] [address]  [phone]    Name of the medication requested: Venlafaxine 150mg + 75mg    Other request: The instructions for the medications don't match.    Venlafaxine 150mg instructions are take 1 cap with 1 cap 75mg Venlafaxine    Venlafaxine 75mg instructions are to take 1 cap with 1 cap of 150mg Venlafaxine for 7 days, then just 1 75mg cap for 7 days, then stop taking.    Phone number to reach patient:  [phone]    Best Time:  NA    Can we leave a detailed message on this number?  YES    Travel screening: Not Applicable

## 2022-03-11 RX ORDER — VENLAFAXINE HYDROCHLORIDE 75 MG/1
75 CAPSULE, EXTENDED RELEASE ORAL DAILY
Qty: 7 CAPSULE | Refills: 0 | Status: SHIPPED | OUTPATIENT
Start: 2022-03-11 | End: 2022-06-24

## 2022-03-11 NOTE — TELEPHONE ENCOUNTER
Lolita Espino, NP  Psych Rn - g 7 hours ago (9:03 AM)     VT    Script changed to 75 mg daily for 7  days then stop.  She should be done with the 150 mg dose - we are tapering her off and starting duloxetine.  Thx    Message text       Disp Refills Start End JOSE   venlafaxine (EFFEXOR-XR) 75 MG 24 hr capsule 7 capsule 0 3/11/2022  No   Sig - Route: Take 1 capsule (75 mg) by mouth daily For 7 days then stop - Oral   Sent to pharmacy as: Venlafaxine HCl ER 75 MG Oral Capsule Extended Release 24 Hour (EFFEXOR-XR)   Class: E-Prescribe   Order: [id number]   E-Prescribing Status: Receipt confirmed by pharmacy (3/11/2022  9:01 AM CST)     Closing encounter.    Melanie Page RN on 3/11/2022 at 4:05 PM

## 2022-03-14 ENCOUNTER — MYC MEDICAL ADVICE (OUTPATIENT)
Dept: FAMILY MEDICINE | Facility: CLINIC | Age: 72
End: 2022-03-14
Payer: COMMERCIAL

## 2022-03-14 DIAGNOSIS — Z98.1 S/P LUMBAR SPINAL FUSION: ICD-10-CM

## 2022-03-14 DIAGNOSIS — M53.3 SACROILIAC JOINT PAIN: ICD-10-CM

## 2022-03-14 DIAGNOSIS — G89.29 CHRONIC BILATERAL LOW BACK PAIN WITHOUT SCIATICA: ICD-10-CM

## 2022-03-14 DIAGNOSIS — M54.50 CHRONIC BILATERAL LOW BACK PAIN WITHOUT SCIATICA: ICD-10-CM

## 2022-03-14 NOTE — TELEPHONE ENCOUNTER
Sent patient the information below regarding her concern of her medication.    Lolita Espino, NP  Psych Rn - [facility] 7 hours ago (9:03 AM)      VT     Script changed to 75 mg daily for 7  days then stop.  She should be done with the 150 mg dose - we are tapering her off and starting duloxetine.  Suzy Mcclellan RN BSN

## 2022-03-14 NOTE — TELEPHONE ENCOUNTER
Dr. Barriga,  Please see medical messages.  Patient would like your opinion.     1/3/22 copied from visit with you:    Major depressive disorder, recurrent episode, moderate (H)  Seeing Lolita Espino NP  Started on duloxetine, only used this very intermittently - not twice daily as prescribed  Is also on Effexor regularly    Michelle Avendaño RN on 3/14/2022 at 4:03 PM

## 2022-03-15 NOTE — TELEPHONE ENCOUNTER
From ARSLAN Degroot note on 10/11/2021:  Treatment Plan:     1.  Venlafaxine 150 mg daily for 7 days then stop  2.  After you finish the 150 mg dose of venlafaxine then start venlafaxine 75 mg for 7 days then stop  3.  Starting tomorrow begin duloxetine 30 mg twice daily  4.  Continue quetiapine 50 mg twice daily       This should probably be discussed with her psych provider (Lolita Espino, ERNESTO).  The Venlafaxine and the duloxetine are very closely related (same class of medication), so similar side effects and mechanism of action, doses are different.    Looks like the intent had been to wean off the venlafaxine (effexor) and start the Duloxetine (cymbalta).  What is she actually taking?  Probably should be evaluated by Psych.    Sarah Barriga M.D.

## 2022-03-21 DIAGNOSIS — G89.29 CHRONIC BILATERAL LOW BACK PAIN WITHOUT SCIATICA: ICD-10-CM

## 2022-03-21 DIAGNOSIS — M53.3 SACROILIAC JOINT PAIN: ICD-10-CM

## 2022-03-21 DIAGNOSIS — M54.50 CHRONIC BILATERAL LOW BACK PAIN WITHOUT SCIATICA: ICD-10-CM

## 2022-03-21 DIAGNOSIS — Z98.1 S/P LUMBAR SPINAL FUSION: ICD-10-CM

## 2022-03-21 DIAGNOSIS — J44.9 CHRONIC OBSTRUCTIVE PULMONARY DISEASE, UNSPECIFIED COPD TYPE (H): ICD-10-CM

## 2022-03-21 RX ORDER — OXYCODONE AND ACETAMINOPHEN 10; 325 MG/1; MG/1
1 TABLET ORAL EVERY 6 HOURS PRN
Qty: 70 TABLET | Refills: 0 | Status: SHIPPED | OUTPATIENT
Start: 2022-03-21 | End: 2022-05-02

## 2022-03-21 RX ORDER — OXYCODONE AND ACETAMINOPHEN 10; 325 MG/1; MG/1
1 TABLET ORAL EVERY 6 HOURS PRN
Qty: 90 TABLET | Refills: 0 | Status: CANCELLED | OUTPATIENT
Start: 2022-03-21

## 2022-03-21 RX ORDER — TIOTROPIUM BROMIDE 18 UG/1
CAPSULE ORAL; RESPIRATORY (INHALATION)
Qty: 30 CAPSULE | Refills: 3 | Status: SHIPPED | OUTPATIENT
Start: 2022-03-21 | End: 2022-11-16

## 2022-03-21 NOTE — TELEPHONE ENCOUNTER
Copied from note 1/3/22:       We further discussed weaning down on the oxycodone to #60/month and she was opposed to this idea.  I strongly feel she needs to be enrolled in a comprehensive pain program with pain psychology, PT, internationalists, etc. I am willing to do 90/month x 2 months, but she needs to have scheduled a follow up with pain clinic or we will be further weaning to 60/month thereafter.        Routing refill request to provider for review/approval because:  Drug not on the [facility] refill protocol     Michelle Avendaño RN on 3/21/2022 at 10:25 AM

## 2022-03-21 NOTE — TELEPHONE ENCOUNTER
Prescription approved per [facility] Refill Protocol.  Pending Prescriptions:                       Disp   Refills    tiotropium (SPIRIVA HANDIHALER) 18 MCG in*30 cap*3            Sig: USING THE HANDIHALER, INHALE THE CONTENTS OF ONE           CAPSULE BY MOUTH DAILY    oxyCODONE-acetaminophen (PERCOCET) *90 tab*0            Sig: Take 1 tablet by mouth every 6 hours as needed           for severe pain Max 90/month     Asthma Maintenance Inhalers - Anticholinergics Passed 03/21/2022 09:30 AM   Protocol Details  Patient is age 12 years or older    Recent (12 mo) or future (30 days) visit within the authorizing provider's specialty    Medication is active on med list   Inhaled Steroids Protocol Passed   Protocol Details  Patient is age 12 or older    Recent (12 mo) or future (30 days) visit within the authorizing provider's specialty    Medication is active on med list        Michelle Avendaño RN on 3/21/2022 at 10:24 AM

## 2022-03-21 NOTE — TELEPHONE ENCOUNTER
Medications pended in refill encounter.   See medical message.       Michelle Avendaño RN on 3/21/2022 at 9:32 AM

## 2022-03-25 ENCOUNTER — APPOINTMENT (OUTPATIENT)
Dept: CT IMAGING | Facility: CLINIC | Age: 72
End: 2022-03-25
Attending: EMERGENCY MEDICINE
Payer: COMMERCIAL

## 2022-03-25 ENCOUNTER — HOSPITAL ENCOUNTER (EMERGENCY)
Facility: CLINIC | Age: 72
Discharge: HOME | End: 2022-03-25
Attending: EMERGENCY MEDICINE | Admitting: EMERGENCY MEDICINE
Payer: COMMERCIAL

## 2022-03-25 VITALS
OXYGEN SATURATION: 92 % | TEMPERATURE: 98.5 F | BODY MASS INDEX: 32.47 KG/M2 | WEIGHT: 170 LBS | HEART RATE: 101 BPM | RESPIRATION RATE: 20 BRPM | SYSTOLIC BLOOD PRESSURE: 140 MMHG | DIASTOLIC BLOOD PRESSURE: 82 MMHG

## 2022-03-25 DIAGNOSIS — R06.02 SHORTNESS OF BREATH: ICD-10-CM

## 2022-03-25 DIAGNOSIS — R07.9 ACUTE CHEST PAIN: ICD-10-CM

## 2022-03-25 LAB
ANION GAP SERPL CALCULATED.3IONS-SCNC: 15 MMOL/L (ref 3–14)
BASOPHILS # BLD AUTO: 0.1 10E3/UL (ref 0–0.2)
BASOPHILS NFR BLD AUTO: 1 %
BUN SERPL-MCNC: 20 MG/DL (ref 7–30)
CALCIUM SERPL-MCNC: 9 MG/DL (ref 8.5–10.1)
CHLORIDE BLD-SCNC: 109 MMOL/L (ref 94–109)
CO2 SERPL-SCNC: 20 MMOL/L (ref 20–32)
CREAT SERPL-MCNC: 0.71 MG/DL (ref 0.52–1.04)
D DIMER PPP FEU-MCNC: 0.71 UG/ML FEU (ref 0–0.5)
EOSINOPHIL # BLD AUTO: 0 10E3/UL (ref 0–0.7)
EOSINOPHIL NFR BLD AUTO: 0 %
ERYTHROCYTE [DISTWIDTH] IN BLOOD BY AUTOMATED COUNT: 13.6 % (ref 10–15)
GFR SERPL CREATININE-BSD FRML MDRD: >90 ML/MIN/1.73M2
GLUCOSE BLD-MCNC: 114 MG/DL (ref 70–99)
HCT VFR BLD AUTO: 43.3 % (ref 35–47)
HGB BLD-MCNC: 14 G/DL (ref 11.7–15.7)
IMM GRANULOCYTES # BLD: 0 10E3/UL
IMM GRANULOCYTES NFR BLD: 0 %
LYMPHOCYTES # BLD AUTO: 1.2 10E3/UL (ref 0.8–5.3)
LYMPHOCYTES NFR BLD AUTO: 13 %
MCH RBC QN AUTO: 30.9 PG (ref 26.5–33)
MCHC RBC AUTO-ENTMCNC: 32.3 G/DL (ref 31.5–36.5)
MCV RBC AUTO: 96 FL (ref 78–100)
MONOCYTES # BLD AUTO: 0.5 10E3/UL (ref 0–1.3)
MONOCYTES NFR BLD AUTO: 5 %
NEUTROPHILS # BLD AUTO: 7.8 10E3/UL (ref 1.6–8.3)
NEUTROPHILS NFR BLD AUTO: 81 %
NRBC # BLD AUTO: 0 10E3/UL
NRBC BLD AUTO-RTO: 0 /100
PLATELET # BLD AUTO: 207 10E3/UL (ref 150–450)
POTASSIUM BLD-SCNC: 3.7 MMOL/L (ref 3.4–5.3)
RBC # BLD AUTO: 4.53 10E6/UL (ref 3.8–5.2)
SODIUM SERPL-SCNC: 144 MMOL/L (ref 133–144)
TROPONIN I SERPL HS-MCNC: 4 NG/L
TROPONIN I SERPL HS-MCNC: <3 NG/L
WBC # BLD AUTO: 9.6 10E3/UL (ref 4–11)

## 2022-03-25 PROCEDURE — 99285 EMERGENCY DEPT VISIT HI MDM: CPT | Mod: 25 | Performed by: EMERGENCY MEDICINE

## 2022-03-25 PROCEDURE — 250N000013 HC RX MED GY IP 250 OP 250 PS 637: Performed by: EMERGENCY MEDICINE

## 2022-03-25 PROCEDURE — 93010 ELECTROCARDIOGRAM REPORT: CPT | Performed by: EMERGENCY MEDICINE

## 2022-03-25 PROCEDURE — 71275 CT ANGIOGRAPHY CHEST: CPT

## 2022-03-25 PROCEDURE — 85025 COMPLETE CBC W/AUTO DIFF WBC: CPT | Performed by: EMERGENCY MEDICINE

## 2022-03-25 PROCEDURE — 85379 FIBRIN DEGRADATION QUANT: CPT | Performed by: EMERGENCY MEDICINE

## 2022-03-25 PROCEDURE — 93005 ELECTROCARDIOGRAM TRACING: CPT | Performed by: EMERGENCY MEDICINE

## 2022-03-25 PROCEDURE — 250N000011 HC RX IP 250 OP 636: Performed by: EMERGENCY MEDICINE

## 2022-03-25 PROCEDURE — 250N000009 HC RX 250: Performed by: EMERGENCY MEDICINE

## 2022-03-25 PROCEDURE — 80048 BASIC METABOLIC PNL TOTAL CA: CPT | Performed by: EMERGENCY MEDICINE

## 2022-03-25 PROCEDURE — 36415 COLL VENOUS BLD VENIPUNCTURE: CPT | Performed by: EMERGENCY MEDICINE

## 2022-03-25 PROCEDURE — 84484 ASSAY OF TROPONIN QUANT: CPT | Mod: 91 | Performed by: EMERGENCY MEDICINE

## 2022-03-25 RX ORDER — METOPROLOL TARTRATE 50 MG
50 TABLET ORAL ONCE
Status: COMPLETED | OUTPATIENT
Start: 2022-03-25 | End: 2022-03-25

## 2022-03-25 RX ORDER — IOPAMIDOL 755 MG/ML
75 INJECTION, SOLUTION INTRAVASCULAR ONCE
Status: COMPLETED | OUTPATIENT
Start: 2022-03-25 | End: 2022-03-25

## 2022-03-25 RX ORDER — QUETIAPINE FUMARATE 25 MG/1
50 TABLET, FILM COATED ORAL ONCE
Status: COMPLETED | OUTPATIENT
Start: 2022-03-25 | End: 2022-03-25

## 2022-03-25 RX ADMIN — METOPROLOL TARTRATE 50 MG: 50 TABLET, FILM COATED ORAL at 02:16

## 2022-03-25 RX ADMIN — SODIUM CHLORIDE 100 ML: 9 INJECTION, SOLUTION INTRAVENOUS at 03:16

## 2022-03-25 RX ADMIN — QUETIAPINE 50 MG: 25 TABLET ORAL at 02:38

## 2022-03-25 RX ADMIN — IOPAMIDOL 75 ML: 755 INJECTION, SOLUTION INTRAVENOUS at 03:16

## 2022-03-25 NOTE — ED PROVIDER NOTES
History     Chief Complaint   Patient presents with     Shortness of Breath     HPI  Jessica Ellison is a 71 year old female with a history of obstructive sleep apnea and COPD who presents for shortness of breath.  The patient reports that she was having trouble sleeping and is wearing her CPAP, however she became more uncomfortable and short of breath and had to take this off.  She reports that she felt tingling in her extremities and continue to fill winded and short of breath with some mild pain over her chest, nonradiating, described as pressure.  She also had a headache earlier but this is better.  Her  called EMS and EMS was able to help her relax and she felt a lot better.  She continues to feel mild shortness of breath but otherwise denies complaints.  No fevers, chills, sore throat, runny nose, abdominal pain, vomiting, diarrhea, bloody stools, or rash.  No lower extremity pain or swelling.  She says that she forgot to take her metoprolol and time pain this evening.    Allergies:  Allergies   Allergen Reactions     Bee Venom      Lisinopril Swelling     Oral swelling       Problem List:    Patient Active Problem List    Diagnosis Date Noted     JOY (obstructive sleep apnea) 08/05/2021     Priority: Medium     Chronic kidney disease, stage 3 (H) 08/05/2021     Priority: Medium     Suspected COVID-19 virus infection 12/22/2020     Priority: Medium     Hyperlipidemia 12/22/2020     Priority: Medium     Centrilobular emphysema (H) 11/04/2020     Priority: Medium     Hypoxia 01/05/2019     Priority: Medium     Spell of change in speech 04/17/2018     Priority: Medium     Hypothyroidism 09/07/2016     Priority: Medium     Chronic bilateral low back pain without sciatica 06/02/2016     Priority: Medium     Essential hypertension with goal blood pressure less than 140/90 06/02/2016     Priority: Medium     S/P lumbar spinal fusion 10/19/2015     Priority: Medium     Bee allergy status 06/30/2015      Priority: Medium     Cerebral aneurysm, nonruptured 07/24/2014     Priority: Medium     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 Marti       Chronic pain 03/07/2014     Priority: Medium     Major depressive disorder, recurrent episode, moderate (H) 07/10/2013     Priority: Medium     Advanced directives, counseling/discussion 09/05/2012     Priority: Medium     Patient does not have an Advance/Health Care Directive (HCD), has information at home.     Sonja Krishnamurthy  September 5, 2012         Anxiety 06/20/2012     Priority: Medium     CARDIOVASCULAR SCREENING; LDL GOAL LESS THAN 160 10/31/2010     Priority: Medium     ETOH abuse 03/19/2010     Priority: Medium      katie drinking, DUIs and probation violation       Insomnia 12/21/2009     Priority: Medium     Back pain 12/21/2009     Priority: Medium     Patient is followed by DOMINIQUE COPPOLA for ongoing prescription of narcotic pain medicine.  Med: oxycodone/acetaminophen 10/325 for chronic back pain.   Maximum use per month: 120  Expected duration: unknown  Narcotic agreement on file: YES 3/19/2010  Clinic visit recommended: Q 3 months    April 2013 - rhizotomy for SI (left) Jhon Mckay MD    Follows at [facility] - severe low back pain with work related injuries.  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     Priority: Medium        Past Medical History:    Past Medical History:   Diagnosis Date     Anemia      Aneurysm (H)      Chemical dependency (H)      Depression      History of blood transfusion      Hypertension      Menopausal symptoms      Other chronic pain      Sleep disorder      Thyroid disease      Tobacco abuse        Past Surgical History:     Past Surgical History:   Procedure Laterality Date     ABDOMEN SURGERY       APPENDECTOMY  1960     APPENDECTOMY       BACK SURGERY       cerebral aneurysm  2014    coiled     CEREBRAL ANEURYSM REPAIR       COLON SURGERY       COLONOSCOPY  2005     FRACTURE SURGERY       HC EXCISION BREAST LESION, OPEN >=1      core biopsy , lumpectomy 2006     HERNIA REPAIR       LAP VENTRAL HERNIA REPAIR  2017    [place]     LAPAROSCOPIC ASSISTED HYSTERECTOMY VAGINAL  2017     orif left femoral neck Left 6/3/2016    stress fracture.  done at [facility]     SURGICAL HISTORY OF -       Skin Graft     ZZC PART REMOVAL COLON W ANASTOMOSIS  2005    diverticulitis     ZZC SPINE FUSION,ANTER,8+ SGMTS      Anterior posterior fusion 10 levels, hardware removal and re-fusion        Family History:    Family History   Problem Relation Age of Onset     Cancer Mother         breast/lung     Alcohol/Drug Mother      Depression Mother      Cancer - colorectal Father      Alcohol/Drug Father      Diabetes Maternal Grandmother      Diabetes Maternal Grandfather      Diabetes Paternal Grandmother      Diabetes Paternal Grandfather      Cancer Paternal Grandfather      Gastrointestinal Disease Paternal Grandfather      Congenital Anomalies Son      Hypertension Brother      Adrenal Disorder Brother         had adrenalectomies     Cerebral palsy Granddaughter        Social History:  Marital Status:   [2]  Social History     Tobacco Use     Smoking status: Former Smoker     Packs/day: 1.00     Years: 30.00     Pack years: 30.00     Quit date: 2004     Years since quittin.2     Smokeless tobacco: Never Used   Vaping Use     Vaping Use: Never used   Substance Use Topics     Alcohol use: No     Comment: ETOH dependency, DUI 3/15/10     Drug use: No        Medications:    amLODIPine (NORVASC) 5 MG tablet  aspirin 325 MG tablet  atorvastatin (LIPITOR) 20 MG tablet  calcium carbonate 600 mg-vitamin D 400 units  (CALTRATE) 600-400 MG-UNIT per tablet  celecoxib (CELEBREX) 200 MG capsule  cyclobenzaprine (FLEXERIL) 10 MG tablet  diazepam (VALIUM) 10 MG tablet  DULoxetine (CYMBALTA) 30 MG capsule  EPINEPHrine (EPIPEN) 0.3 MG/0.3ML injection  ferrous sulfate (FEROSUL) 325 (65 Fe) MG tablet  levothyroxine (SYNTHROID/LEVOTHROID) 50 MCG tablet  metoprolol succinate ER (TOPROL-XL) 50 MG 24 hr tablet  multivitamin w/minerals (THERA-VIT-M) tablet  naloxone (NARCAN) 4 MG/0.1ML nasal spray  oxyCODONE-acetaminophen (PERCOCET)  MG per tablet  QUEtiapine (SEROQUEL) 50 MG tablet  senna (SENOKOT) 8.6 MG tablet  tiotropium (SPIRIVA HANDIHALER) 18 MCG inhaled capsule  venlafaxine (EFFEXOR-XR) 75 MG 24 hr capsule  VENTOLIN  (90 Base) MCG/ACT inhaler          Review of Systems  Pertinent positives and negatives listed in the HPI, all other systems reviewed and are negative.    Physical Exam   BP: (!) 163/119  Pulse: 118  Temp: 98.5  F (36.9  C)  Resp: 20  Weight: 77.1 kg (170 lb)  SpO2: 95 %      Physical Exam  Vitals and nursing note reviewed.   Constitutional:       General: She is in acute distress.      Appearance: She is well-developed. She is not diaphoretic.   HENT:      Head: Normocephalic and atraumatic.      Right Ear: External ear normal.      Left Ear: External ear normal.      Nose: Nose normal.   Eyes:      General: No scleral icterus.     Conjunctiva/sclera: Conjunctivae normal.   Cardiovascular:      Rate and Rhythm: Regular rhythm. Tachycardia present.      Heart sounds: No murmur heard.  Pulmonary:      Effort: Pulmonary effort is normal. No respiratory distress.      Breath sounds: Normal breath sounds. No stridor. No decreased breath sounds.   Abdominal:      General: There is no distension.      Palpations: Abdomen is soft.   Musculoskeletal:      Cervical back: Normal range of motion.      Right lower leg: No edema.      Left lower leg: No edema.   Skin:     General: Skin is warm and dry.   Neurological:       Mental Status: She is alert and oriented to person, place, and time.   Psychiatric:         Behavior: Behavior normal.         ED Course                 Procedures              EKG Interpretation:      Interpreted by Sam Phoenix MD  Time reviewed: 0230  Symptoms at time of EKG: Dyspnea   Rhythm: sinus tachycardia  Rate: 125  Axis: Normal  Ectopy: none  Conduction: normal  ST Segments/ T Waves: No acute ischemic changes  Q Waves: none  Comparison to prior: New sinus tachycardia    Clinical Impression: sinus tachycardia      Critical Care time:  none               Results for orders placed or performed during the hospital encounter of 03/25/22 (from the past 24 hour(s))   Basic metabolic panel   Result Value Ref Range    Sodium 144 133 - 144 mmol/L    Potassium 3.7 3.4 - 5.3 mmol/L    Chloride 109 94 - 109 mmol/L    Carbon Dioxide (CO2) 20 20 - 32 mmol/L    Anion Gap 15 (H) 3 - 14 mmol/L    Urea Nitrogen 20 7 - 30 mg/dL    Creatinine 0.71 0.52 - 1.04 mg/dL    Calcium 9.0 8.5 - 10.1 mg/dL    Glucose 114 (H) 70 - 99 mg/dL    GFR Estimate >90 >60 mL/min/1.73m2   CBC with Platelets & Differential    Narrative    The following orders were created for panel order CBC with Platelets & Differential.  Procedure                               Abnormality         Status                     ---------                               -----------         ------                     CBC with platelets and d...[[id number]]                      Final result                 Please view results for these tests on the individual orders.   Troponin I   Result Value Ref Range    Troponin I High Sensitivity 4 <54 ng/L   D dimer quantitative   Result Value Ref Range    D-Dimer Quantitative 0.71 (H) 0.00 - 0.50 ug/mL FEU    Narrative    This D-dimer assay is intended for use in conjunction with a clinical pretest probability assessment model to exclude pulmonary embolism (PE) and deep venous thrombosis (DVT) in outpatients suspected of PE  or DVT. The cut-off value is 0.50 ug/mL FEU.   CBC with platelets and differential   Result Value Ref Range    WBC Count 9.6 4.0 - 11.0 10e3/uL    RBC Count 4.53 3.80 - 5.20 10e6/uL    Hemoglobin 14.0 11.7 - 15.7 g/dL    Hematocrit 43.3 35.0 - 47.0 %    MCV 96 78 - 100 fL    MCH 30.9 26.5 - 33.0 pg    MCHC 32.3 31.5 - 36.5 g/dL    RDW 13.6 10.0 - 15.0 %    Platelet Count 207 150 - 450 10e3/uL    % Neutrophils 81 %    % Lymphocytes 13 %    % Monocytes 5 %    % Eosinophils 0 %    % Basophils 1 %    % Immature Granulocytes 0 %    NRBCs per 100 WBC 0 <1 /100    Absolute Neutrophils 7.8 1.6 - 8.3 10e3/uL    Absolute Lymphocytes 1.2 0.8 - 5.3 10e3/uL    Absolute Monocytes 0.5 0.0 - 1.3 10e3/uL    Absolute Eosinophils 0.0 0.0 - 0.7 10e3/uL    Absolute Basophils 0.1 0.0 - 0.2 10e3/uL    Absolute Immature Granulocytes 0.0 <=0.4 10e3/uL    Absolute NRBCs 0.0 10e3/uL   CT Chest Pulmonary Embolism w Contrast    Narrative    EXAM: CT CHEST PULMONARY EMBOLISM W CONTRAST  LOCATION: [facility]  DATE/TIME: 3/25/2022 3:05 AM    INDICATION: PE suspected, low/intermediate prob, positive d-dimer. Shortness of breath.  COMPARISON: 12/22/2020, 11/09/2020  TECHNIQUE: CT chest pulmonary angiogram during arterial phase injection of IV contrast. Multiplanar reformats and MIP reconstructions were performed. Dose reduction techniques were used.   CONTRAST: 75 mL Isovue 370    FINDINGS:  ANGIOGRAM CHEST: Pulmonary arteries are normal caliber and negative for pulmonary emboli. Normal caliber thoracic aorta without dissection. Minimal aortic calcification.    LUNGS AND PLEURA: Moderate emphysema. No pulmonary infiltrates or pleural fluid. Minimal linear atelectasis. Evidence for old granulomatous disease.    MEDIASTINUM/AXILLAE: Minimal sliding esophageal hiatal hernia. Minimal cardiac enlargement. No pericardial fluid. Calcified hilar lymph nodes up. No lymphadenopathy. Normal caliber esophagus.    CORONARY ARTERY  CALCIFICATION: Mild.    UPPER ABDOMEN: Stable hepatic cyst. Colonic diverticulosis. Adrenal glands negative. Dense atherosclerotic vascular calcification at the origin of the renal arteries.    MUSCULOSKELETAL: Pedicle screw and yanick fixation throughout the thoracic spine. Degenerative changes in the spine.      Impression    IMPRESSION:  1.  No pulmonary embolism.    2.  Normal caliber aorta without dissection.    3.  Moderate emphysema.    4.  Evidence for old granulomatous disease.    5.  Dense atherosclerotic vascular calcification at the origin of the renal arteries. Recommend correlation for renovascular hypertension.   Troponin I   Result Value Ref Range    Troponin I High Sensitivity <3 <54 ng/L       Medications   metoprolol tartrate (LOPRESSOR) tablet 50 mg (50 mg Oral Given 3/25/22 0216)   QUEtiapine (SEROquel) tablet 50 mg (50 mg Oral Given 3/25/22 0238)   iopamidol (ISOVUE-370) solution 75 mL (75 mLs Intravenous Given 3/25/22 0316)   sodium chloride 0.9 % bag 500mL for CT scan flush use (100 mLs Intravenous Given 3/25/22 0316)       Assessments & Plan (with Medical Decision Making)   71-year-old female with a history of obstructive sleep apnea and COPD who presents for shortness of breath.  Blood pressure is 163/119, temperature is 36.9  C, heart rate 118, SPO2 is 95% on room air.  She is given metoprolol and quetiapine.  EKG shows sinus tachycardia without signs of ischemia.  White blood cell count is 9.6 which is reassuring and hemoglobin is 14, no signs of anemia.  Electrolytes are within normal limits.  Troponin is normal.  D-dimer is elevated.  CT of the chest obtained, images reviewed independently as well as radiology read reviewed, no signs of pulmonary embolism or aortic dissection.  No signs of pneumonia.  On recheck she is feeling better, heart rate is improved and is now in the 80s.  She is breathing comfortably on room air.  Repeat troponin is again normal making ACS unlikely.  She is  feeling better and is safe to discharge with reassurance and instructions to return if she has worsening of her symptoms or other concerns, otherwise follow-up in clinic.  The patient is in agreement with this plan.      I have reviewed the nursing notes.    I have reviewed the findings, diagnosis, plan and need for follow up with the patient.       New Prescriptions    No medications on file       Final diagnoses:   Shortness of breath   Acute chest pain       3/25/2022   [facility] EMERGENCY DEPT     Sam Phoenix MD  03/25/22 0431

## 2022-03-25 NOTE — DISCHARGE INSTRUCTIONS
Continue your home medications.  Return to the emergency department for worsening symptoms, repeated vomiting, chest pain, or other concerns.  Follow-up in clinic for recheck in the next 1 to 2 weeks.

## 2022-04-14 ENCOUNTER — OFFICE VISIT (OUTPATIENT)
Dept: FAMILY MEDICINE | Facility: CLINIC | Age: 72
End: 2022-04-14
Payer: COMMERCIAL

## 2022-04-14 VITALS
RESPIRATION RATE: 16 BRPM | WEIGHT: 169.4 LBS | SYSTOLIC BLOOD PRESSURE: 112 MMHG | HEART RATE: 77 BPM | BODY MASS INDEX: 32.36 KG/M2 | DIASTOLIC BLOOD PRESSURE: 82 MMHG | OXYGEN SATURATION: 92 % | TEMPERATURE: 97 F

## 2022-04-14 DIAGNOSIS — B00.9 HSV-1 INFECTION: ICD-10-CM

## 2022-04-14 DIAGNOSIS — M26.609 TEMPOROMANDIBULAR JOINT DISORDER: ICD-10-CM

## 2022-04-14 DIAGNOSIS — E78.5 HYPERLIPIDEMIA, UNSPECIFIED HYPERLIPIDEMIA TYPE: ICD-10-CM

## 2022-04-14 DIAGNOSIS — Z79.899 ENCOUNTER FOR LONG-TERM (CURRENT) USE OF MEDICATIONS: ICD-10-CM

## 2022-04-14 DIAGNOSIS — N18.31 STAGE 3A CHRONIC KIDNEY DISEASE (H): ICD-10-CM

## 2022-04-14 DIAGNOSIS — B37.2 YEAST INFECTION OF THE SKIN: Primary | ICD-10-CM

## 2022-04-14 LAB
CHOLEST SERPL-MCNC: 180 MG/DL
CREAT UR-MCNC: 70 MG/DL
CREAT UR-MCNC: 71 MG/DL
FASTING STATUS PATIENT QL REPORTED: YES
HDLC SERPL-MCNC: 67 MG/DL
LDLC SERPL CALC-MCNC: 88 MG/DL
MICROALBUMIN UR-MCNC: 14 MG/L
MICROALBUMIN/CREAT UR: 19.72 MG/G CR (ref 0–25)
NONHDLC SERPL-MCNC: 113 MG/DL
TRIGL SERPL-MCNC: 127 MG/DL

## 2022-04-14 PROCEDURE — 36415 COLL VENOUS BLD VENIPUNCTURE: CPT | Performed by: NURSE PRACTITIONER

## 2022-04-14 PROCEDURE — 80061 LIPID PANEL: CPT | Performed by: NURSE PRACTITIONER

## 2022-04-14 PROCEDURE — 80307 DRUG TEST PRSMV CHEM ANLYZR: CPT | Performed by: NURSE PRACTITIONER

## 2022-04-14 PROCEDURE — 99214 OFFICE O/P EST MOD 30 MIN: CPT | Performed by: NURSE PRACTITIONER

## 2022-04-14 PROCEDURE — 82043 UR ALBUMIN QUANTITATIVE: CPT | Performed by: NURSE PRACTITIONER

## 2022-04-14 RX ORDER — VALACYCLOVIR HYDROCHLORIDE 1 G/1
2000 TABLET, FILM COATED ORAL 2 TIMES DAILY
Qty: 4 TABLET | Refills: 0 | Status: ON HOLD | OUTPATIENT
Start: 2022-04-14 | End: 2023-08-16

## 2022-04-14 RX ORDER — NYSTATIN 100000 [USP'U]/G
POWDER TOPICAL 3 TIMES DAILY
Qty: 30 G | Refills: 1 | Status: SHIPPED | OUTPATIENT
Start: 2022-04-14 | End: 2023-08-08

## 2022-04-14 ASSESSMENT — PATIENT HEALTH QUESTIONNAIRE - PHQ9
10. IF YOU CHECKED OFF ANY PROBLEMS, HOW DIFFICULT HAVE THESE PROBLEMS MADE IT FOR YOU TO DO YOUR WORK, TAKE CARE OF THINGS AT HOME, OR GET ALONG WITH OTHER PEOPLE: NOT DIFFICULT AT ALL
SUM OF ALL RESPONSES TO PHQ QUESTIONS 1-9: 4
SUM OF ALL RESPONSES TO PHQ QUESTIONS 1-9: 4

## 2022-04-14 ASSESSMENT — ANXIETY QUESTIONNAIRES
1. FEELING NERVOUS, ANXIOUS, OR ON EDGE: SEVERAL DAYS
4. TROUBLE RELAXING: NOT AT ALL
2. NOT BEING ABLE TO STOP OR CONTROL WORRYING: SEVERAL DAYS
GAD7 TOTAL SCORE: 4
3. WORRYING TOO MUCH ABOUT DIFFERENT THINGS: SEVERAL DAYS
GAD7 TOTAL SCORE: 4
7. FEELING AFRAID AS IF SOMETHING AWFUL MIGHT HAPPEN: SEVERAL DAYS
6. BECOMING EASILY ANNOYED OR IRRITABLE: NOT AT ALL
GAD7 TOTAL SCORE: 4
7. FEELING AFRAID AS IF SOMETHING AWFUL MIGHT HAPPEN: SEVERAL DAYS
5. BEING SO RESTLESS THAT IT IS HARD TO SIT STILL: NOT AT ALL

## 2022-04-14 NOTE — PATIENT INSTRUCTIONS
Start the powder for the yeast infection    Try the valacylovir for the cold sore.     Follow up with specialists as scheduled/ needed for your chronic conditions.

## 2022-04-14 NOTE — PROGRESS NOTES
"  Assessment & Plan     Yeast infection of the skin  Skinfold yeast infection.  Moist in nature will try powder if no improvement with the powder after 2 weeks we will switch to topical cream.  Patient is in agreement this plan.  - nystatin (MYCOSTATIN) 842768 UNIT/GM external powder; Apply topically 3 times daily To rash until healing is complete    HSV-1 infection  Acute cold sore present on right upper lip.  Will try valacyclovir.  Reviewed liver function and has been stable.  - valACYclovir (VALTREX) 1000 mg tablet; Take 2 tablets (2,000 mg) by mouth 2 times daily for 1 day    Hyperlipidemia  Labs ordered currently on atorvastatin.  - Lipid panel reflex to direct LDL Fasting; Future  - Lipid panel reflex to direct LDL Fasting    Temporomandibular joint disorder  Symptoms of tight TMJ on right side without pain but change in jaw motion discussed massage to the area and consideration of mouthguard.  Look into seeing dentist regarding this.    Stage 3a chronic kidney disease (H)  Chronic kidney disease.  Albumin ordered today.  - Albumin Random Urine Quantitative with Creat Ratio; Future  - Albumin Random Urine Quantitative with Creat Ratio    Encounter for long-term (current) use of medications  Urine drug screen recommended.  Currently on benzos and opioids.  Opioids prescribed monthly last refill of diazepam was in December.  According to the PDMP.  PDMP Review       Value Time User    State PDMP site checked  Yes 4/14/2022  9:09 AM Joann Ochoa APRN CNP        - [id number] - Urine Drug Confirmation Panel (Comprehensive); Future  - [id number] - Urine Drug Confirmation Panel (Comprehensive)             BMI:   Estimated body mass index is 32.36 kg/m  as calculated from the following:    Height as of 1/3/22: 1.541 m (5' 0.67\").    Weight as of this encounter: 76.8 kg (169 lb 6.4 oz).           Return in about 2 weeks (around 4/28/2022) for ongoing symptoms if not improving.    LA Lorenzo CNP   HEALTH " "[facility]    Miller Lopez is a 71 year old who presents for the following health issues  accompanied by her Self. .    [facility]     ED/UC Followup:    Facility:  [facility] ED   Date of visit: 3/25/2022  Reason for visit: Shortness of Breath, Anxiety, tingling in extremities   Current Status: Its been okay, improved.       Believed that she had a panic attack. She is  following with Mikayla for her mental health and is considering having Dr. Barriga manage her mental health. She states that she isn't sure what she is wanting to do. She is not sure if her mental health was better on the effexor. She was recently switched to cymbalta to see if this would both manage her mental health and pain. She has not noticed a significant change in pain with the cymbalta but believes the celebrex is helping.     Skin Lesion  Onset/Duration: On and Off for a year, constant for about 2 months.   Description  Location: Lower abdomen   Color: Red   Border description: flat, moist, irritated, has an odor.   Character: painful, red   Itching: no  Bleeding:  no  Intensity:  moderate  Progression of Symptoms:  worsening and constant  Accompanying signs and symptoms:   Bleeding: no  Scaling: no    History:           Any previous history of skin cancer: no  Any family history of melanoma: no  Previous episodes of similar lesion: YES  Precipitating or alleviating factors: Rubbing on the area.  Therapies tried and outcome: hydrocortisone cream -  not effective and Irrigation with peroxide, ointments. Some have helped a little but nothing that had taken care of it.     \"Clunk\" in jaw (right side). Bothersome while eating or moving jaw. Makes noise in ear. It is bothersome due to the noise. Denies pain related to this.     She also asks for something to help with her cold sore that she has on her right upper lip. Started a couple days ago. Topical otc has not helped.     She went to the pain clinic a " "month ago and had SI joint injection done. She feels that in general she is in limbo. She reports her anesthesiologist retired and goes as needed for her back. There are 2 other docs that she will establish with at her pain clinic. She goes to [facility].   Knee pain, even to the touch. Chronic in nature: plans to see orthopedics for this. She states that she has been told she needs a knee replacement.     Sleep pattern is commented on, \"still pretty crappy\". Not taking trazodone any more. She reports dreaming and a CPAP that interrupts her sleep. 3        Review of Systems   Constitutional, HEENT, cardiovascular, pulmonary, gi and gu systems are negative, except as otherwise noted.      Objective    /82   Pulse 77   Temp 97  F (36.1  C) (Tympanic)   Resp 16   Wt 76.8 kg (169 lb 6.4 oz)   SpO2 92%   BMI 32.36 kg/m    Body mass index is 32.36 kg/m .  Physical Exam   GENERAL: healthy, alert and no distress  Jaw: slight hinge subluxation on right side of jaw. TMJ is tight.   NECK: no adenopathy, no asymmetry, masses, or scars and thyroid normal to palpation  RESP: lungs clear to auscultation - no rales, rhonchi or wheezes  CV: regular rate and rhythm, normal S1 S2, no S3 or S4, no murmur, click or rub, no peripheral edema and peripheral pulses strong  ABDOMEN: soft, nontender, no hepatosplenomegaly, no masses and bowel sounds normal  MS: no gross musculoskeletal defects noted, no edema. Pain to touch on left knee. No further evaluation completed.   SKIN: Abdominal fold skin irritation midline to right approximately 6 cm in length and 4 cm in diameter with confluent erythremia and sloughing of skin centrally consistent with yeast.              Answers for HPI/ROS submitted by the patient on 4/14/2022  If you checked off any problems, how difficult have these problems made it for you to do your work, take care of things at home, or get along with other people?: Not difficult at " all  PHQ9 TOTAL SCORE: 4  DAJUAN 7 TOTAL SCORE: 4

## 2022-04-15 ASSESSMENT — ANXIETY QUESTIONNAIRES: GAD7 TOTAL SCORE: 4

## 2022-04-15 ASSESSMENT — PATIENT HEALTH QUESTIONNAIRE - PHQ9: SUM OF ALL RESPONSES TO PHQ QUESTIONS 1-9: 4

## 2022-04-18 ENCOUNTER — HOSPITAL ENCOUNTER (OUTPATIENT)
Dept: MRI IMAGING | Facility: HOSPITAL | Age: 72
Discharge: HOME | End: 2022-04-18
Attending: NEUROLOGICAL SURGERY | Admitting: NEUROLOGICAL SURGERY
Payer: COMMERCIAL

## 2022-04-18 DIAGNOSIS — I67.1 CEREBRAL ANEURYSM, NONRUPTURED: ICD-10-CM

## 2022-04-18 DIAGNOSIS — I10 ESSENTIAL HYPERTENSION WITH GOAL BLOOD PRESSURE LESS THAN 140/90: ICD-10-CM

## 2022-04-18 LAB
OXAZEPAM UR QL CFM: PRESENT
OXYCODONE UR CFM-MCNC: 2640 NG/ML
OXYCODONE/CREAT UR: 3771 NG/MG {CREAT}
OXYMORPHONE UR CFM-MCNC: 1440 NG/ML
OXYMORPHONE/CREAT UR: 2057 NG/MG {CREAT}
TEMAZEPAM UR QL CFM: PRESENT

## 2022-04-18 PROCEDURE — 70544 MR ANGIOGRAPHY HEAD W/O DYE: CPT

## 2022-04-19 RX ORDER — METOPROLOL SUCCINATE 50 MG/1
TABLET, EXTENDED RELEASE ORAL
Qty: 180 TABLET | Refills: 1 | Status: SHIPPED | OUTPATIENT
Start: 2022-04-19 | End: 2022-11-11

## 2022-04-26 ENCOUNTER — VIRTUAL VISIT (OUTPATIENT)
Dept: NEUROSURGERY | Facility: CLINIC | Age: 72
End: 2022-04-26
Payer: COMMERCIAL

## 2022-04-26 DIAGNOSIS — Z98.890 S/P CEREBRAL ANEURYSM REPAIR: ICD-10-CM

## 2022-04-26 DIAGNOSIS — Z86.79 S/P CEREBRAL ANEURYSM REPAIR: ICD-10-CM

## 2022-04-26 DIAGNOSIS — I67.1 CEREBRAL ANEURYSM, NONRUPTURED: Primary | ICD-10-CM

## 2022-04-26 PROCEDURE — 99207 PR NO DOCUMENTATION ON VISIT: CPT | Performed by: NEUROLOGICAL SURGERY

## 2022-04-26 NOTE — LETTER
2022       RE: Jessica Ellison  [address]  [address] [address]  [facility] [address]     Dear Colleague,    Thank you for referring your patient, Jessica Ellison, to the [facility] [place] [facility]. Please see a copy of my visit note below.    Jessica is a 71 year old who is being evaluated via a billable telephone visit.      What phone number would you like to be contacted at?   [phone]    How would you like to obtain your AVS? Mail a copy  Phone call duration: 15 minutes    Hysterectomy in the interim 2019. Anxiety and depression has worsened. Has a therapist once/ 2-3 weeks. Retired. Was in ED with SOB last month. On aspirin. Intermittent HA, more frequent, mild, she feels they correlate with BP. HTN is pretty well controlled. Frontal and vertex. Lasts 2-3 hours. Two paternal first cousins have  of aneurysms in the past year. 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 See dictated note.  JUD García MD      Again, thank you for allowing me to participate in the care of your patient.      Sincerely,    Vaishali García MD

## 2022-04-26 NOTE — PROGRESS NOTES
Jessica is a 71 year old who is being evaluated via a billable telephone visit.      What phone number would you like to be contacted at?   [phone]    How would you like to obtain your AVS? Mail a copy  Phone call duration: 15 minutes    Hysterectomy in the interim 2019. Anxiety and depression has worsened. Has a therapist once/ 2-3 weeks. Retired. Was in ED with SOB last month. On aspirin. Intermittent HA, more frequent, mild, she feels they correlate with BP. HTN is pretty well controlled. Frontal and vertex. Lasts 2-3 hours. Two paternal first cousins have  of aneurysms in the past year. 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 See dictated note.  JUD García MD

## 2022-04-26 NOTE — LETTER
2022      RE: Jessica Ellison  [address]  [address] [facility] [address]         Hysterectomy in the interim 2019. Anxiety and depression has worsened. Has a therapist once/ 2-3 weeks. Retired. Was in ED with SOB last month. On aspirin. Intermittent HA, more frequent, mild, she feels they correlate with BP. HTN is pretty well controlled. Frontal and vertex. Lasts 2-3 hours. Two paternal first cousins have  of aneurysms in the past year. 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 See dictated note.  JUD García MD    Service Date: 2022    Sarah Barriga MD  [facility]  [address] [place]  [address]    RE:  Jessica Ellison  MRN:  [id number]  :  1950    Dear Dr. Barriga:    We spoke to Ms. Ellison as part of a telephone followup in [facility].  We last saw her almost 5 years ago for followup of her treated left superior hypophyseal artery aneurysm.  She is nearly 8 years out from treatment of this aneurysm with stent assisted coiling.  She has some intermittent headaches that have been more frequent recently.  They remain mild and she feels that they correlate with her blood pressure.  The headaches are mostly in the bifrontal region and at the vertex and last 2-3 hours.  However, she reports good control of her blood pressure.  She was in the emergency department last month for dyspnea.  She has COPD and obstructive sleep apnea.    Since we last spoke to her, notable events are hysterectomy in 2019.  She also acknowledges that her anxiety and depression have worsened.  She is now retired.  She sees a therapist once every 2-3 weeks.  She has also learned that 2 paternal first cousins have  of intracranial aneurysms in the past year.    Over the phone, she sounded a bit anxious but overall well.  Her speech, language and phonation were normal.    REVIEW OF STUDIES:  We went over her MRA from 2022 and compared it to previous studies.  This  shows some artifact in the left ICA from the stent.  The stent appears patent.  The coil mass is also stable.  We do not see any other aneurysms.    IMPRESSION AND PLAN:  Ms. Gonzalez was relieved to hear the favorable news regarding the MRA.  From our standpoint, she should remain on a daily aspirin indefinitely. The 81 mg dose should be adequate from our standpoint.  We will continue to follow her, though less frequently.  We will repeat an MRA in 5 years.  We do not have any activity restrictions for her from a cerebrovascular perspective.  Please do not hesitate to contact us with questions.    Sincerely,    Vaishali García MD        D: 2022   T: 2022   MT: pancho    Name:     SHIRA GONZALEZ  MRN:      [id number]        Account:      [id number]   :      1950           Service Date: 2022       Document: [id number]

## 2022-04-26 NOTE — NURSING NOTE
Chief Complaint   Patient presents with     Follow Up     Follow up appt on aneurism. Patient states recently getting headaches when she normally doesn't. Rates pain a 4 out of 10. States she normally only has headaches when blood pressure is up.

## 2022-04-27 NOTE — PATIENT INSTRUCTIONS
Follow-up with Dr. García in 5 years with an MRA prior to your appointment. You may do the MRA at [facility].    If you have any questions please contact me at [phone], option 3.    Nay Bailon RN, CNRN  Stroke & Endovascular Care Coordinator    Thank you for choosing [facility] for your health care needs.

## 2022-05-01 NOTE — PROGRESS NOTES
Service Date: 2022    Sarah Barriga MD  [facility]  [address] [place]  [address]    RE:  Jessica Ellison  MRN:  [id number]  :  1950    Dear Dr. Barriga:    We spoke to Ms. Ellison as part of a telephone followup in [facility].  We last saw her almost 5 years ago for followup of her treated left superior hypophyseal artery aneurysm.  She is nearly 8 years out from treatment of this aneurysm with stent assisted coiling.  She has some intermittent headaches that have been more frequent recently.  They remain mild and she feels that they correlate with her blood pressure.  The headaches are mostly in the bifrontal region and at the vertex and last 2-3 hours.  However, she reports good control of her blood pressure.  She was in the emergency department last month for dyspnea.  She has COPD and obstructive sleep apnea.    Since we last spoke to her, notable events are hysterectomy in 2019.  She also acknowledges that her anxiety and depression have worsened.  She is now retired.  She sees a therapist once every 2-3 weeks.  She has also learned that 2 paternal first cousins have  of intracranial aneurysms in the past year.    Over the phone, she sounded a bit anxious but overall well.  Her speech, language and phonation were normal.    REVIEW OF STUDIES:  We went over her MRA from 2022 and compared it to previous studies.  This shows some artifact in the left ICA from the stent.  The stent appears patent.  The coil mass is also stable.  We do not see any other aneurysms.    IMPRESSION AND PLAN:  Ms. Ellison was relieved to hear the favorable news regarding the MRA.  From our standpoint, she should remain on a daily aspirin indefinitely. The 81 mg dose should be adequate from our standpoint.  We will continue to follow her, though less frequently.  We will repeat an MRA in 5 years.  We do not have any activity restrictions for her from a  cerebrovascular perspective.  Please do not hesitate to contact us with questions.    Sincerely,    Vaishali García MD        D: 2022   T: 2022   MT: pancho    Name:     SHIRA GONZALEZ RITA  MRN:      [id number]        Account:      [id number]   :      1950           Service Date: 2022       Document: [id number]

## 2022-05-04 RX ORDER — DIAZEPAM 10 MG
10 TABLET ORAL EVERY 6 HOURS PRN
OUTPATIENT
Start: 2022-05-04

## 2022-05-04 NOTE — TELEPHONE ENCOUNTER
Patient Contact    Attempt # 1    Was call answered?  No.  Left message on voicemail with information to call back.      Michelle Avendaño RN on 5/4/2022 at 12:33 PM

## 2022-05-04 NOTE — TELEPHONE ENCOUNTER
Diazepam is not ordered by me - has been prescribed by her pain clinic.  I am not taking over responsibility for this prescription.    Sarah Barriga M.D.

## 2022-05-04 NOTE — TELEPHONE ENCOUNTER
Pending Prescriptions:                       Disp   Refills    diazepam (VALIUM) 10 MG tablet                                Sig: Take 1 tablet (10 mg) by mouth every 6 hours as           needed    Routing refill request to provider for review/approval because:  Drug not on the [facility] refill protocol       Hugh Healy RN

## 2022-05-05 NOTE — TELEPHONE ENCOUNTER
Spoke with patient.   She will call the pain center, however her provider there has retired.     Advised to call back if further help is needed.    Michelle Avendaño RN on 5/5/2022 at 10:17 AM

## 2022-05-26 ENCOUNTER — TRANSFERRED RECORDS (OUTPATIENT)
Dept: HEALTH INFORMATION MANAGEMENT | Facility: CLINIC | Age: 72
End: 2022-05-26

## 2022-06-16 NOTE — PROGRESS NOTES
"Patient states that she has had some intermittent right leg symptoms, described as \"her leg has a headache and feels activated.\" This has been ongoing x 2 weeks and not bothersome. She is requesting clearance for ventral hernia surgery and cystocele/rectocele repair. Patient denies numbness, weakness, tingling, slurred speech, pain or falls. Advised patient to contact primary care provider to assess and determine if patient is medically stable to have surgery. Patient verbalized understanding and agreed to this plan. Patient has my contact information and was encouraged to call with questions/concerns.           " Discontinue Regimen: Clobetasol, dc x 2 weeks, then use one week and alternate with elidel. Detail Level: Detailed Plan: Continue exfoliating scalp to remove thick scales\\nCan’t rule out underlying psoriasis,  it plaques were very thick and adherent and that portion is much improved.

## 2022-06-24 ENCOUNTER — OFFICE VISIT (OUTPATIENT)
Dept: FAMILY MEDICINE | Facility: CLINIC | Age: 72
End: 2022-06-24
Payer: COMMERCIAL

## 2022-06-24 VITALS
HEIGHT: 61 IN | BODY MASS INDEX: 25.07 KG/M2 | DIASTOLIC BLOOD PRESSURE: 78 MMHG | OXYGEN SATURATION: 94 % | SYSTOLIC BLOOD PRESSURE: 124 MMHG | HEART RATE: 86 BPM | RESPIRATION RATE: 20 BRPM | TEMPERATURE: 98.3 F | WEIGHT: 132.8 LBS

## 2022-06-24 DIAGNOSIS — Z23 HIGH PRIORITY FOR 2019-NCOV VACCINE: ICD-10-CM

## 2022-06-24 DIAGNOSIS — E03.9 HYPOTHYROIDISM, UNSPECIFIED TYPE: ICD-10-CM

## 2022-06-24 DIAGNOSIS — Z98.1 S/P LUMBAR SPINAL FUSION: ICD-10-CM

## 2022-06-24 DIAGNOSIS — E55.9 VITAMIN D DEFICIENCY: ICD-10-CM

## 2022-06-24 DIAGNOSIS — M53.3 SACROILIAC JOINT PAIN: ICD-10-CM

## 2022-06-24 DIAGNOSIS — G89.29 CHRONIC BILATERAL LOW BACK PAIN, UNSPECIFIED WHETHER SCIATICA PRESENT: ICD-10-CM

## 2022-06-24 DIAGNOSIS — F33.1 MAJOR DEPRESSIVE DISORDER, RECURRENT EPISODE, MODERATE (H): ICD-10-CM

## 2022-06-24 DIAGNOSIS — I10 ESSENTIAL HYPERTENSION WITH GOAL BLOOD PRESSURE LESS THAN 140/90: Primary | ICD-10-CM

## 2022-06-24 DIAGNOSIS — R53.83 FATIGUE, UNSPECIFIED TYPE: ICD-10-CM

## 2022-06-24 DIAGNOSIS — M54.50 CHRONIC BILATERAL LOW BACK PAIN, UNSPECIFIED WHETHER SCIATICA PRESENT: ICD-10-CM

## 2022-06-24 DIAGNOSIS — G89.29 CHRONIC BILATERAL LOW BACK PAIN WITHOUT SCIATICA: ICD-10-CM

## 2022-06-24 DIAGNOSIS — M54.50 CHRONIC BILATERAL LOW BACK PAIN WITHOUT SCIATICA: ICD-10-CM

## 2022-06-24 LAB
ALBUMIN SERPL-MCNC: 4 G/DL (ref 3.4–5)
ALP SERPL-CCNC: 94 U/L (ref 40–150)
ALT SERPL W P-5'-P-CCNC: 57 U/L (ref 0–50)
ANION GAP SERPL CALCULATED.3IONS-SCNC: 6 MMOL/L (ref 3–14)
AST SERPL W P-5'-P-CCNC: 28 U/L (ref 0–45)
BASOPHILS # BLD AUTO: 0 10E3/UL (ref 0–0.2)
BASOPHILS NFR BLD AUTO: 1 %
BILIRUB SERPL-MCNC: 0.7 MG/DL (ref 0.2–1.3)
BUN SERPL-MCNC: 24 MG/DL (ref 7–30)
CALCIUM SERPL-MCNC: 9.7 MG/DL (ref 8.5–10.1)
CHLORIDE BLD-SCNC: 108 MMOL/L (ref 94–109)
CO2 SERPL-SCNC: 28 MMOL/L (ref 20–32)
CREAT SERPL-MCNC: 0.78 MG/DL (ref 0.52–1.04)
DEPRECATED CALCIDIOL+CALCIFEROL SERPL-MC: 44 UG/L (ref 20–75)
EOSINOPHIL # BLD AUTO: 0.3 10E3/UL (ref 0–0.7)
EOSINOPHIL NFR BLD AUTO: 9 %
ERYTHROCYTE [DISTWIDTH] IN BLOOD BY AUTOMATED COUNT: 13.5 % (ref 10–15)
GFR SERPL CREATININE-BSD FRML MDRD: 82 ML/MIN/1.73M2
GLUCOSE BLD-MCNC: 103 MG/DL (ref 70–99)
HCT VFR BLD AUTO: 38.4 % (ref 35–47)
HGB BLD-MCNC: 12.3 G/DL (ref 11.7–15.7)
IMM GRANULOCYTES # BLD: 0 10E3/UL
IMM GRANULOCYTES NFR BLD: 0 %
LYMPHOCYTES # BLD AUTO: 1 10E3/UL (ref 0.8–5.3)
LYMPHOCYTES NFR BLD AUTO: 27 %
MCH RBC QN AUTO: 31.5 PG (ref 26.5–33)
MCHC RBC AUTO-ENTMCNC: 32 G/DL (ref 31.5–36.5)
MCV RBC AUTO: 99 FL (ref 78–100)
MONOCYTES # BLD AUTO: 0.4 10E3/UL (ref 0–1.3)
MONOCYTES NFR BLD AUTO: 12 %
NEUTROPHILS # BLD AUTO: 1.9 10E3/UL (ref 1.6–8.3)
NEUTROPHILS NFR BLD AUTO: 52 %
PLATELET # BLD AUTO: 150 10E3/UL (ref 150–450)
POTASSIUM BLD-SCNC: 4.3 MMOL/L (ref 3.4–5.3)
PROT SERPL-MCNC: 7.3 G/DL (ref 6.8–8.8)
RBC # BLD AUTO: 3.9 10E6/UL (ref 3.8–5.2)
SODIUM SERPL-SCNC: 142 MMOL/L (ref 133–144)
TSH SERPL DL<=0.005 MIU/L-ACNC: 0.74 MU/L (ref 0.4–4)
VIT B12 SERPL-MCNC: 312 PG/ML (ref 232–1245)
WBC # BLD AUTO: 3.7 10E3/UL (ref 4–11)

## 2022-06-24 PROCEDURE — 82607 VITAMIN B-12: CPT | Performed by: FAMILY MEDICINE

## 2022-06-24 PROCEDURE — 85025 COMPLETE CBC W/AUTO DIFF WBC: CPT | Performed by: FAMILY MEDICINE

## 2022-06-24 PROCEDURE — 36415 COLL VENOUS BLD VENIPUNCTURE: CPT | Performed by: FAMILY MEDICINE

## 2022-06-24 PROCEDURE — 82306 VITAMIN D 25 HYDROXY: CPT | Performed by: FAMILY MEDICINE

## 2022-06-24 PROCEDURE — 99214 OFFICE O/P EST MOD 30 MIN: CPT | Mod: 25 | Performed by: FAMILY MEDICINE

## 2022-06-24 PROCEDURE — 84443 ASSAY THYROID STIM HORMONE: CPT | Performed by: FAMILY MEDICINE

## 2022-06-24 PROCEDURE — 91306 COVID-19,PF,MODERNA (18+ YRS BOOSTER .25ML): CPT | Performed by: FAMILY MEDICINE

## 2022-06-24 PROCEDURE — 0064A COVID-19,PF,MODERNA (18+ YRS BOOSTER .25ML): CPT | Performed by: FAMILY MEDICINE

## 2022-06-24 PROCEDURE — 80053 COMPREHEN METABOLIC PANEL: CPT | Performed by: FAMILY MEDICINE

## 2022-06-24 RX ORDER — AMLODIPINE BESYLATE 5 MG/1
5 TABLET ORAL DAILY
Qty: 90 TABLET | Refills: 1 | Status: SHIPPED | OUTPATIENT
Start: 2022-06-24 | End: 2023-02-08

## 2022-06-24 RX ORDER — DULOXETIN HYDROCHLORIDE 30 MG/1
30 CAPSULE, DELAYED RELEASE ORAL 2 TIMES DAILY
Qty: 60 CAPSULE | Refills: 5 | Status: SHIPPED | OUTPATIENT
Start: 2022-06-24 | End: 2023-02-08

## 2022-06-24 RX ORDER — OXYCODONE AND ACETAMINOPHEN 10; 325 MG/1; MG/1
1 TABLET ORAL EVERY 6 HOURS PRN
Qty: 70 TABLET | Refills: 0 | Status: SHIPPED | OUTPATIENT
Start: 2022-07-13 | End: 2022-08-16

## 2022-06-24 RX ORDER — CELECOXIB 200 MG/1
200 CAPSULE ORAL DAILY
Qty: 90 CAPSULE | Refills: 1 | Status: SHIPPED | OUTPATIENT
Start: 2022-06-24 | End: 2022-09-28

## 2022-06-24 ASSESSMENT — PAIN SCALES - GENERAL: PAINLEVEL: SEVERE PAIN (6)

## 2022-06-24 NOTE — PROGRESS NOTES
Assessment & Plan     Essential hypertension with goal blood pressure less than 140/90   well controlled  - amLODIPine (NORVASC) 5 MG tablet; Take 1 tablet (5 mg) by mouth daily  - Comprehensive metabolic panel (BMP + Alb, Alk Phos, ALT, AST, Total. Bili, TP); Future  - Comprehensive metabolic panel (BMP + Alb, Alk Phos, ALT, AST, Total. Bili, TP)    Fatigue, unspecified type  Likely multifactorial.   On multiple medications that may contribute: seroquel, percocet, cymbalta, flexeril.  Try taking both seroquel at bedtime to see if daytime fatigue gets better and she's able to sleep better at night    We discussed sleep hygiene     - CBC with platelets and differential; Future  - Vitamin B12; Future  - Comprehensive metabolic panel (BMP + Alb, Alk Phos, ALT, AST, Total. Bili, TP); Future  - CBC with platelets and differential  - Vitamin B12  - Comprehensive metabolic panel (BMP + Alb, Alk Phos, ALT, AST, Total. Bili, TP)    Chronic bilateral low back pain without sciatica  On chronic percocet #70/month  CSA signed  PDMP reviewed    - celecoxib (CELEBREX) 200 MG capsule; Take 1 capsule (200 mg) by mouth daily  - oxyCODONE-acetaminophen (PERCOCET)  MG per tablet; Take 1 tablet by mouth every 6 hours as needed for severe pain    Sacroiliac joint pain     - celecoxib (CELEBREX) 200 MG capsule; Take 1 capsule (200 mg) by mouth daily  - oxyCODONE-acetaminophen (PERCOCET)  MG per tablet; Take 1 tablet by mouth every 6 hours as needed for severe pain    S/P lumbar spinal fusion     - celecoxib (CELEBREX) 200 MG capsule; Take 1 capsule (200 mg) by mouth daily  - oxyCODONE-acetaminophen (PERCOCET)  MG per tablet; Take 1 tablet by mouth every 6 hours as needed for severe pain    Major depressive disorder, recurrent episode, moderate (H)  stable  - DULoxetine (CYMBALTA) 30 MG capsule; Take 1 capsule (30 mg) by mouth 2 times daily    Vitamin D deficiency     - Vitamin D Deficiency; Future  - Vitamin D  "Deficiency    Hypothyroidism, unspecified type   due to have thyroid rechecked    - TSH with free T4 reflex; Future  - TSH with free T4 reflex    Chronic bilateral low back pain, unspecified whether sciatica present       High priority for 2019-nCoV vaccine     - COVID-19,PF,MODERNA (18+ Yrs BOOSTER .25mL)             BMI:   Estimated body mass index is 25.2 kg/m  as calculated from the following:    Height as of this encounter: 1.546 m (5' 0.87\").    Weight as of this encounter: 60.2 kg (132 lb 12.8 oz).           No follow-ups on file.    Sarah Barriga MD  [facility]    Miller Lopez is a 71 year old, presenting for the following health issues:  Back Pain, Fatigue, and Vaginal Problem      History of Present Illness       Back Pain:  She presents for follow up of back pain. Patient's back pain is a recurring problem.  Location of back pain:  Right lower back, left lower back, right middle of back, left middle of back and left hip  Description of back pain: dull ache, gnawing and stabbing  Back pain spreads: nowhere    Since patient first noticed back pain, pain is: always present, but gets better and worse  Does back pain interfere with her job:  Not applicable      Reason for visit:  Fatigue,sleep,no energy  Symptom onset:  More than a month (2-3 months)  Symptoms include:  No energy, having trouble falling asleep, gets an average of 8 hours intermitten sleep, wakes up around 5 times a night sometimes.  Symptom intensity:  Moderate  Symptom progression:  Worsening  Had these symptoms before:  Yes  What makes it better:  Has used trazadone before    She eats 2-3 servings of fruits and vegetables daily.She consumes 2 sweetened beverage(s) daily.She exercises with enough effort to increase her heart rate 9 or less minutes per day.  She exercises with enough effort to increase her heart rate 3 or less days per week.   She is taking medications regularly.       Vaginal " "Symptoms  Onset/Duration: 6 months happening about 3 times a week when she is voiding she expells air from her vaginal and it \"sounds like a duck\" when it happens.   Description:  Vaginal Discharge: none   Itching (Pruritis): no  Burning sensation:  no  Odor: no  Accompanying Signs & Symptoms:  Urinary symptoms: no  Abdominal pain: no  Fever: no  History:   Sexually active: no  New Partner: no  Possibility of Pregnancy:  no  Recent antibiotic use: no  Previous vaginitis issues: YES  Precipitating or alleviating factors: None  Therapies tried and outcome: none    We've discussed this symptom multiple times in the past.  Discussed treatment options: pessary, surgical, etc. She declined referral at this time        Review of Systems         Objective    /78 (BP Location: Right arm, Patient Position: Sitting, Cuff Size: Adult Regular)   Pulse 86   Temp 98.3  F (36.8  C) (Tympanic)   Resp 20   Ht 1.546 m (5' 0.87\")   Wt 60.2 kg (132 lb 12.8 oz)   SpO2 94%   Breastfeeding No   BMI 25.20 kg/m    Body mass index is 25.2 kg/m .  Physical Exam   GENERAL: healthy, alert and no distress  NECK: no adenopathy, no asymmetry, masses, or scars and thyroid normal to palpation  RESP: lungs clear to auscultation - no rales, rhonchi or wheezes  CV: regular rate and rhythm, normal S1 S2, no S3 or S4, no murmur, click or rub, no peripheral edema and peripheral pulses strong  ABDOMEN: soft, nontender, no hepatosplenomegaly, no masses and bowel sounds normal  MS: no gross musculoskeletal defects noted, no edema    Results for orders placed or performed in visit on 06/24/22   CBC with platelets and differential     Status: Abnormal   Result Value Ref Range    WBC Count 3.7 (L) 4.0 - 11.0 10e3/uL    RBC Count 3.90 3.80 - 5.20 10e6/uL    Hemoglobin 12.3 11.7 - 15.7 g/dL    Hematocrit 38.4 35.0 - 47.0 %    MCV 99 78 - 100 fL    MCH 31.5 26.5 - 33.0 pg    MCHC 32.0 31.5 - 36.5 g/dL    RDW 13.5 10.0 - 15.0 %    Platelet Count 150 " 150 - 450 10e3/uL    % Neutrophils 52 %    % Lymphocytes 27 %    % Monocytes 12 %    % Eosinophils 9 %    % Basophils 1 %    % Immature Granulocytes 0 %    Absolute Neutrophils 1.9 1.6 - 8.3 10e3/uL    Absolute Lymphocytes 1.0 0.8 - 5.3 10e3/uL    Absolute Monocytes 0.4 0.0 - 1.3 10e3/uL    Absolute Eosinophils 0.3 0.0 - 0.7 10e3/uL    Absolute Basophils 0.0 0.0 - 0.2 10e3/uL    Absolute Immature Granulocytes 0.0 <=0.4 10e3/uL   CBC with platelets and differential     Status: Abnormal    Narrative    The following orders were created for panel order CBC with platelets and differential.  Procedure                               Abnormality         Status                     ---------                               -----------         ------                     CBC with platelets and d...[[id number]]  Abnormal            Final result                 Please view results for these tests on the individual orders.                     .  ..

## 2022-06-24 NOTE — PATIENT INSTRUCTIONS
Thank you for choosing [facility].      Our Clinic hours are:  Mondays    7:20 am - 7 pm  Tues -  Fri  7:20 am - 5 pm      The clinic lab opens at 7:30 am Mon - Fri and appointments are required.    [facility]  Ph. [phone]  Monday-Thursday 8 am - 7pm  Tues/Wed/Fri 8 am - 5:30 pm

## 2022-06-24 NOTE — LETTER
Opioid / Opioid Plus Controlled Substance Agreement    This is an agreement between you and your provider about the safe and appropriate use of controlled substance/opioids prescribed by your care team. Controlled substances are medicines that can cause physical and mental dependence (abuse).    There are strict laws about having and using these medicines. We here at [facility] are committing to working with you in your efforts to get better. To support you in this work, we ll help you schedule regular office appointments for medicine refills. If we must cancel or change your appointment for any reason, we ll make sure you have enough medicine to last until your next appointment.     As a Provider, I will:    Listen carefully to your concerns and treat you with respect.     Recommend a treatment plan that I believe is in your best interest. This plan may involve therapies other than opioid pain medication.     Talk with you often about the possible benefits, and the risk of harm of any medicine that we prescribe for you.     Provide a plan on how to taper (discontinue or go off) using this medicine if the decision is made to stop its use.    As a Patient, I understand that opioid(s):     Are a controlled substance prescribed by my care team to help me function or work and manage my condition(s).     Are strong medicines and can cause serious side effects such as:    Drowsiness, which can seriously affect my driving ability    A lower breathing rate, enough to cause death    Harm to my thinking ability     Depression     Abuse of and addiction to this medicine    Need to be taken exactly as prescribed. Combining opioids with certain medicines or chemicals (such as illegal drugs, sedatives, sleeping pills, and benzodiazepines) can be dangerous or even fatal. If I stop opioids suddenly, I may have severe withdrawal symptoms.    Do not work for all types of pain nor for all patients. If they re not helpful, I may  be asked to stop them.        The risks, benefits and side effects of these medicine(s) were explained to me. I agree that:  1. I will take part in other treatments as advised by my care team. This may be psychiatry or counseling, physical therapy, behavioral therapy, group treatment or a referral to a specialist.     2. I will keep all my appointments. I understand that this is part of the monitoring of opioids. My care team may require an office visit for EVERY opioid/controlled substance refill. If I miss appointments or don t follow instructions, my care team may stop my medicine.    3. I will take my medicines as prescribed. I will not change the dose or schedule unless my care team tells me to. There will be no refills if I run out early.     4. I may be asked to come to the clinic and complete a urine drug test or complete a pill count at any time. If I don t give a urine sample or participate in a pill count, the care team may stop my medicine.    5. I will only receive prescriptions from this clinic for chronic pain. If I am treated by another provider for acute pain issues, I will tell them that I am taking opioid pain medication for chronic pain and that I have a treatment agreement with this provider. I will inform my [facility] care team within one business day if I am given a prescription for any pain medication by another healthcare provider. My [facility] care team can contact other providers and pharmacists about my use of any medicines.    6. It is up to me to make sure that I don t run out of my medicines on weekends or holidays. If my care team is willing to refill my opioid prescription without a visit, I must request refills only during office hours. Refills may take up to 3 business days to process. I will use one pharmacy to fill all my opioid and other controlled substance prescriptions. I will notify the clinic about any changes to my insurance or medication  availability.    7. I am responsible for my prescriptions. If the medicine/prescription is lost, stolen or destroyed, it will not be replaced. I also agree not to share controlled substance medicines with anyone.    8. I am aware I should not use any illegal or recreational drugs. I agree not to drink alcohol unless my care team says I can.       9. If I enroll in the [place] [facility] program, I will tell my care team prior to my next refill.     10. I will tell my care team right away if I become pregnant, have a new medical problem treated outside of my regular clinic, or have a change in my medications.    11. I understand that this medicine can affect my thinking, judgment and reaction time. Alcohol and drugs affect the brain and body, which can affect the safety of my driving. Being under the influence of alcohol or drugs can affect my decision-making, behaviors, personal safety, and the safety of others. Driving while impaired (DWI) can occur if a person is driving, operating, or in physical control of a car, motorcycle, boat, snowmobile, ATV, motorbike, off-road vehicle, or any other motor vehicle ([place] Statute 169A.20). I understand the risk if I choose to drive or operate any vehicle or machinery.    I understand that if I do not follow any of the conditions above, my prescriptions or treatment may be stopped or changed.          Opioids  What You Need to Know    What are opioids?   Opioids are pain medicines that must be prescribed by a doctor. They are also known as narcotics.     Examples are:   1. morphine (MS Contin, Michelle)  2. oxycodone (Oxycontin)  3. oxycodone and acetaminophen (Percocet)  4. hydrocodone and acetaminophen (Vicodin, Norco)   5. fentanyl patch (Duragesic)   6. hydromorphone (Dilaudid)   7. methadone  8. codeine (Tylenol #3)     What do opioids do well?   Opioids are best for severe short-term pain such as after a surgery or injury. They may work well for cancer pain. They may  help some people with long-lasting (chronic) pain.     What do opioids NOT do well?   Opioids never get rid of pain entirely, and they don t work well for most patients with chronic pain. Opioids don t reduce swelling, one of the causes of pain.                                    Other ways to manage chronic pain and improve function include:       Treat the health problem that may be causing pain    Anti-inflammation medicines, which reduce swelling and tenderness, such as ibuprofen (Advil, Motrin) or naproxen (Aleve)    Acetaminophen (Tylenol)    Antidepressants and anti-seizure medicines, especially for nerve pain    Topical treatments such as patches or creams    Injections or nerve blocks    Chiropractic or osteopathic treatment    Acupuncture, massage, deep breathing, meditation, visual imagery, aromatherapy    Use heat or ice at the pain site    Physical therapy     Exercise    Stop smoking    Take part in therapy       Risks and side effects     Talk to your doctor before you start or decide to keep taking opioids. Possible side effects include:      Lowering your breathing rate enough to cause death    Overdose, including death, especially if taking higher than prescribed doses    Worse depression symptoms; less pleasure in things you usually enjoy    Feeling tired or sluggish    Slower thoughts or cloudy thinking    Being more sensitive to pain over time; pain is harder to control    Trouble sleeping or restless sleep    Changes in hormone levels (for example, less testosterone)    Changes in sex drive or ability to have sex    Constipation    Unsafe driving    Itching and sweating    Dizziness    Nausea, throwing up and dry mouth    What else should I know about opioids?    Opioids may lead to dependence, tolerance, or addiction.      Dependence means that if you stop or reduce the medicine too quickly, you will have withdrawal symptoms. These include loose poop (diarrhea), jitters, flu-like symptoms,  nervousness and tremors. Dependence is not the same as addiction.                       Tolerance means needing higher doses over time to get the same effect. This may increase the chance of serious side effects.      Addiction is when people improperly use a substance that harms their body, their mind or their relations with others. Use of opiates can cause a relapse of addiction if you have a history of drug or alcohol abuse.      People who have used opioids for a long time may have a lower quality of life, worse depression, higher levels of pain and more visits to doctors.    You can overdose on opioids. Take these steps to lower your risk of overdose:    1. Recognize the signs:  Signs of overdose include decrease or loss of consciousness (blackout), slowed breathing, trouble waking up and blue lips. If someone is worried about overdose, they should call 911.    2. Talk to your doctor about Narcan (naloxone).   If you are at risk for overdose, you may be given a prescription for Narcan. This medicine very quickly reverses the effects of opioids.   If you overdose, a friend or family member can give you Narcan while waiting for the ambulance. They need to know the signs of overdose and how to give Narcan.     3. Don't use alcohol or street drugs.   Taking them with opioids can cause death.    4. Do not take any of these medicines unless your doctor says it s OK. Taking these with opioids can cause death:    Benzodiazepines, such as lorazepam (Ativan), alprazolam (Xanax) or diazepam (Valium)    Muscle relaxers, such as cyclobenzaprine (Flexeril)    Sleeping pills like zolpidem (Ambien)     Other opioids      How to keep you and other people safe while taking opioids:    1. Never share your opioids with others.  Opioid medicines are regulated by the Drug Enforcement Agency ([place]IS[place]). Selling or sharing medications is a criminal act.    2. Be sure to store opioids in a secure place, locked up if possible. Young children  can easily swallow them and overdose.    3. When you are traveling with your medicines, keep them in the original bottles. If you use a pill box, be sure you also carry a copy of your medicine list from your clinic or pharmacy.    4. Safe disposal of opioids    Most pharmacies have places to get rid of medicine, called disposal kiosks. Medicine disposal options are also available in every [employer] [facility]. Search your county and  medication disposal  to find more options. You can find more details at:  [email or web address]     I agree that my provider, clinic care team, and pharmacy may work with any city, state or federal law enforcement agency that investigates the misuse, sale, or other diversion of my controlled medicine. I will allow my provider to discuss my care with, or share a copy of, this agreement with any other treating provider, pharmacy or emergency room where I receive care.    I have read this agreement and have asked questions about anything I did not understand.    _______________________________________________________  Patient Signature - Jessica Ellison _____________________                   Date     _______________________________________________________  Provider Signature - Sarah Barriga MD   _____________________                   Date     _______________________________________________________  Witness Signature (required if provider not present while patient signing)   _____________________                   Date

## 2022-07-14 ENCOUNTER — TRANSFERRED RECORDS (OUTPATIENT)
Dept: HEALTH INFORMATION MANAGEMENT | Facility: CLINIC | Age: 72
End: 2022-07-14

## 2022-07-18 NOTE — RESULT ENCOUNTER NOTE
Jessica,    All of the labs were normal or acceptable.    Please contact my office if you have questions.    Sarah Barriga M.D.       Patient placed on droplet and contact precautions. Appropriate isolation signs placed outside door.

## 2022-07-26 DIAGNOSIS — Z91.030 ALLERGY TO BEE STING: ICD-10-CM

## 2022-07-26 RX ORDER — EPINEPHRINE 0.3 MG/.3ML
0.3 INJECTION SUBCUTANEOUS
Qty: 2 EACH | Refills: 1 | Status: SHIPPED | OUTPATIENT
Start: 2022-07-26 | End: 2022-07-27

## 2022-07-26 RX ORDER — EPINEPHRINE 0.3 MG/.3ML
0.3 INJECTION SUBCUTANEOUS
Qty: 2 EACH | Refills: 1 | Status: SHIPPED | OUTPATIENT
Start: 2022-07-26 | End: 2022-07-26

## 2022-07-26 NOTE — TELEPHONE ENCOUNTER
Pt needs new Epi-pen Rx for trip she's taking next week for bee stings.  No need to call patient back, unless there are questions or problems.

## 2022-07-26 NOTE — TELEPHONE ENCOUNTER
"Pharmacy asked for order to be resent with the following addition to sig:    \"inject the contents of one device into the muscle as neeed for anaphylaxis; may repeat one time in 5 to 15 minutes if response to initial dose is inadequate\"    Writer resent as requested    Steven MATTSON [facility]    "

## 2022-07-29 DIAGNOSIS — E03.9 HYPOTHYROIDISM, UNSPECIFIED TYPE: ICD-10-CM

## 2022-07-29 RX ORDER — LEVOTHYROXINE SODIUM 50 UG/1
TABLET ORAL
Qty: 90 TABLET | Refills: 2 | Status: SHIPPED | OUTPATIENT
Start: 2022-07-29 | End: 2023-05-31

## 2022-07-29 NOTE — TELEPHONE ENCOUNTER
Pt is going out of the country in a couple days. Hoping to get this medication either today or tomorrow.     Phyllis Zuñiga Patient  [place]

## 2022-08-25 ENCOUNTER — TRANSFERRED RECORDS (OUTPATIENT)
Dept: HEALTH INFORMATION MANAGEMENT | Facility: CLINIC | Age: 72
End: 2022-08-25

## 2022-09-28 DIAGNOSIS — G89.29 CHRONIC BILATERAL LOW BACK PAIN WITHOUT SCIATICA: ICD-10-CM

## 2022-09-28 DIAGNOSIS — M54.50 CHRONIC BILATERAL LOW BACK PAIN WITHOUT SCIATICA: ICD-10-CM

## 2022-09-28 DIAGNOSIS — M53.3 SACROILIAC JOINT PAIN: ICD-10-CM

## 2022-09-28 DIAGNOSIS — Z98.1 S/P LUMBAR SPINAL FUSION: ICD-10-CM

## 2022-09-28 RX ORDER — CELECOXIB 200 MG/1
200 CAPSULE ORAL DAILY
Qty: 90 CAPSULE | Refills: 1 | Status: SHIPPED | OUTPATIENT
Start: 2022-09-28 | End: 2023-03-09

## 2022-09-28 NOTE — TELEPHONE ENCOUNTER
"Requested Prescriptions   Pending Prescriptions Disp Refills    celecoxib (CELEBREX) 200 MG capsule [Pharmacy Med Name: CELECOXIB 200MG CAPS] 90 capsule 1     Sig: Take 1 capsule (200 mg) by mouth daily        NSAID Medications Failed - 9/28/2022  1:45 PM        Failed - Normal ALT on file in past 12 months     Recent Labs   Lab Test 06/24/22  0746   ALT 57*               Failed - Patient is age 6-64 years        Failed - Normal CBC on file in past 12 months     Recent Labs   Lab Test 06/24/22  0746   WBC 3.7*   RBC 3.90   HGB 12.3   HCT 38.4                      Passed - Blood pressure under 140/90 in past 12 months       BP Readings from Last 3 Encounters:   06/24/22 124/78   04/14/22 112/82   03/25/22 (!) 140/82                 Passed - Normal AST on file in past 12 months       Recent Labs   Lab Test 06/24/22  0746   AST 28             Passed - Recent (12 mo) or future (30 days) visit within the authorizing provider's specialty     Patient has had an office visit with the authorizing provider or a provider within the authorizing providers department within the previous 12 mos or has a future within next 30 days. See \"Patient Info\" tab in inbasket, or \"Choose Columns\" in Meds & Orders section of the refill encounter.              Passed - Medication is active on med list        Passed - No active pregnancy on record        Passed - Normal serum creatinine on file in past 12 months     Recent Labs   Lab Test 06/24/22  0746   CR 0.78       Ok to refill medication if creatinine is low          Passed - No positive pregnancy test in past 12 months              "

## 2022-10-12 DIAGNOSIS — Z98.1 S/P LUMBAR SPINAL FUSION: ICD-10-CM

## 2022-10-13 RX ORDER — CYCLOBENZAPRINE HCL 10 MG
TABLET ORAL
Qty: 180 TABLET | Refills: 1 | Status: ON HOLD | OUTPATIENT
Start: 2022-10-13 | End: 2023-08-18

## 2022-10-13 NOTE — TELEPHONE ENCOUNTER
Routing to ordering provider for consideration, not on refill protocol.           Cata Jesus     RN MSN

## 2022-11-10 ENCOUNTER — TELEPHONE (OUTPATIENT)
Dept: FAMILY MEDICINE | Facility: CLINIC | Age: 72
End: 2022-11-10

## 2022-11-10 DIAGNOSIS — I10 ESSENTIAL HYPERTENSION WITH GOAL BLOOD PRESSURE LESS THAN 140/90: ICD-10-CM

## 2022-11-10 DIAGNOSIS — Z98.1 S/P LUMBAR SPINAL FUSION: ICD-10-CM

## 2022-11-10 DIAGNOSIS — M53.3 SACROILIAC JOINT PAIN: ICD-10-CM

## 2022-11-10 DIAGNOSIS — M54.50 CHRONIC BILATERAL LOW BACK PAIN WITHOUT SCIATICA: ICD-10-CM

## 2022-11-10 DIAGNOSIS — G89.29 CHRONIC BILATERAL LOW BACK PAIN WITHOUT SCIATICA: ICD-10-CM

## 2022-11-10 RX ORDER — OXYCODONE AND ACETAMINOPHEN 10; 325 MG/1; MG/1
1 TABLET ORAL EVERY 6 HOURS PRN
Qty: 70 TABLET | Refills: 0 | Status: SHIPPED | OUTPATIENT
Start: 2022-11-10 | End: 2022-12-07

## 2022-11-10 NOTE — TELEPHONE ENCOUNTER
Notify patient -   Per our controlled substance agreement, she is almost 2 months overdue for a visit.     Visit required every 3 months, last visit 6/24/2022    Last fill    Sarah Barriga M.D.

## 2022-11-10 NOTE — TELEPHONE ENCOUNTER
Writer left message for patient that she needs to schedule an appointment per contract agreement of medications for further refills.    Kamilah MATTSON

## 2022-11-11 RX ORDER — METOPROLOL SUCCINATE 50 MG/1
TABLET, EXTENDED RELEASE ORAL
Qty: 180 TABLET | Refills: 1 | Status: SHIPPED | OUTPATIENT
Start: 2022-11-11 | End: 2023-06-20

## 2022-11-15 DIAGNOSIS — J44.9 CHRONIC OBSTRUCTIVE PULMONARY DISEASE, UNSPECIFIED COPD TYPE (H): ICD-10-CM

## 2022-11-16 RX ORDER — TIOTROPIUM BROMIDE 18 UG/1
CAPSULE ORAL; RESPIRATORY (INHALATION)
Qty: 30 CAPSULE | Refills: 3 | Status: SHIPPED | OUTPATIENT
Start: 2022-11-16 | End: 2023-07-05

## 2022-11-19 ENCOUNTER — HEALTH MAINTENANCE LETTER (OUTPATIENT)
Age: 72
End: 2022-11-19

## 2022-12-07 ENCOUNTER — OFFICE VISIT (OUTPATIENT)
Dept: FAMILY MEDICINE | Facility: CLINIC | Age: 72
End: 2022-12-07
Payer: COMMERCIAL

## 2022-12-07 VITALS
DIASTOLIC BLOOD PRESSURE: 84 MMHG | OXYGEN SATURATION: 95 % | HEART RATE: 72 BPM | BODY MASS INDEX: 30.58 KG/M2 | WEIGHT: 162 LBS | SYSTOLIC BLOOD PRESSURE: 130 MMHG | HEIGHT: 61 IN | RESPIRATION RATE: 14 BRPM | TEMPERATURE: 97.9 F

## 2022-12-07 DIAGNOSIS — Z98.1 S/P LUMBAR SPINAL FUSION: ICD-10-CM

## 2022-12-07 DIAGNOSIS — M54.50 CHRONIC BILATERAL LOW BACK PAIN WITHOUT SCIATICA: Primary | ICD-10-CM

## 2022-12-07 DIAGNOSIS — K43.9 VENTRAL HERNIA WITHOUT OBSTRUCTION OR GANGRENE: ICD-10-CM

## 2022-12-07 DIAGNOSIS — F41.9 ANXIETY: ICD-10-CM

## 2022-12-07 DIAGNOSIS — G89.29 CHRONIC BILATERAL LOW BACK PAIN WITHOUT SCIATICA: Primary | ICD-10-CM

## 2022-12-07 DIAGNOSIS — M53.3 SACROILIAC JOINT PAIN: ICD-10-CM

## 2022-12-07 PROCEDURE — 99214 OFFICE O/P EST MOD 30 MIN: CPT | Performed by: FAMILY MEDICINE

## 2022-12-07 RX ORDER — OXYCODONE AND ACETAMINOPHEN 10; 325 MG/1; MG/1
1 TABLET ORAL EVERY 6 HOURS PRN
Qty: 70 TABLET | Refills: 0 | Status: SHIPPED | OUTPATIENT
Start: 2023-02-08 | End: 2023-03-13

## 2022-12-07 RX ORDER — OXYCODONE AND ACETAMINOPHEN 10; 325 MG/1; MG/1
1 TABLET ORAL EVERY 6 HOURS PRN
Qty: 70 TABLET | Refills: 0 | Status: SHIPPED | OUTPATIENT
Start: 2023-01-09 | End: 2023-02-08

## 2022-12-07 RX ORDER — OXYCODONE AND ACETAMINOPHEN 10; 325 MG/1; MG/1
1 TABLET ORAL EVERY 6 HOURS PRN
Qty: 70 TABLET | Refills: 0 | Status: SHIPPED | OUTPATIENT
Start: 2022-12-09 | End: 2023-01-08

## 2022-12-07 RX ORDER — QUETIAPINE FUMARATE 50 MG/1
TABLET, FILM COATED ORAL
Qty: 270 TABLET | Refills: 1 | Status: SHIPPED | OUTPATIENT
Start: 2022-12-07 | End: 2023-03-13

## 2022-12-07 ASSESSMENT — PATIENT HEALTH QUESTIONNAIRE - PHQ9
SUM OF ALL RESPONSES TO PHQ QUESTIONS 1-9: 6
SUM OF ALL RESPONSES TO PHQ QUESTIONS 1-9: 6

## 2022-12-07 ASSESSMENT — PAIN SCALES - GENERAL: PAINLEVEL: MODERATE PAIN (5)

## 2022-12-07 NOTE — PATIENT INSTRUCTIONS
"Increase Seroquel - take 50 mg in the am and 100 mg at bedtime.          Thank you for choosing [facility].      When you are out of refills or the refills say \"zero\", it is time to schedule your next appointment in clinic!    Our Clinic hours are:  Mondays    7:20 am - 7 pm  Tues -  Fri  7:20 am - 5 pm    To speak to the care team, call [phone] option #2      The clinic lab opens at 7:30 am Mon - Fri and appointments are required.    [facility]  Ph. [phone]  Monday-Thursday 8 am - 7pm  Tues/Wed/Fri 8 am - 5:30 pm       "

## 2022-12-07 NOTE — PROGRESS NOTES
Assessment & Plan     Chronic bilateral low back pain without sciatica  CSA is up to date  Follow up in 3 months  PDMP reviewed   - oxyCODONE-acetaminophen (PERCOCET)  MG per tablet; Take 1 tablet by mouth every 6 hours as needed for severe pain (7-10)  - oxyCODONE-acetaminophen (PERCOCET)  MG per tablet; Take 1 tablet by mouth every 6 hours as needed for severe pain (7-10)  - oxyCODONE-acetaminophen (PERCOCET)  MG per tablet; Take 1 tablet by mouth every 6 hours as needed for severe pain (7-10)    Sacroiliac joint pain  As above  - oxyCODONE-acetaminophen (PERCOCET)  MG per tablet; Take 1 tablet by mouth every 6 hours as needed for severe pain (7-10)  - oxyCODONE-acetaminophen (PERCOCET)  MG per tablet; Take 1 tablet by mouth every 6 hours as needed for severe pain (7-10)  - oxyCODONE-acetaminophen (PERCOCET)  MG per tablet; Take 1 tablet by mouth every 6 hours as needed for severe pain (7-10)    S/P lumbar spinal fusion   as above  - oxyCODONE-acetaminophen (PERCOCET)  MG per tablet; Take 1 tablet by mouth every 6 hours as needed for severe pain (7-10)  - oxyCODONE-acetaminophen (PERCOCET)  MG per tablet; Take 1 tablet by mouth every 6 hours as needed for severe pain (7-10)  - oxyCODONE-acetaminophen (PERCOCET)  MG per tablet; Take 1 tablet by mouth every 6 hours as needed for severe pain (7-10)    Anxiety  Insomnia  Increase seroquel to 50 mg in the am and 100 mg at HS, hopefully this will help with her sleep    - QUEtiapine (SEROQUEL) 50 MG tablet; Take 1 tablet (50 mg) by mouth daily AND 2 tablets (100 mg) At Bedtime.    Ventral hernia without obstruction or gangrene  Intermittent pain that is pretty mild  Offered consult with general surgeon, she declined  At this time with minimal symptoms I think it's reasonable to not pursue another surgery               BMI:   Estimated body mass index is 31.12 kg/m  as calculated from the following:    Height as of  "this encounter: 1.537 m (5' 0.5\").    Weight as of this encounter: 73.5 kg (162 lb).           Return in about 3 months (around 3/7/2023) for Pain follow up.    Sarah Barriga MD  [facility]    Miller Lopez is a 72 year old, presenting for the following health issues:  Back Pain      History of Present Illness       Reason for visit:  Back pain  Symptom onset:  More than a month  Symptoms include:  Whole back  Symptom intensity:  Moderate  Symptom progression:  Staying the same  Had these symptoms before:  Yes  Has tried/received treatment for these symptoms:  Yes  Previous treatment was successful:  No  What makes it worse:  Lifting,walking,  What makes it better:  Meds   laying downShe consumes 1 sweetened beverage(s) daily.She exercises with enough effort to increase her heart rate 9 or less minutes per day.  She exercises with enough effort to increase her heart rate 3 or less days per week.   She is taking medications regularly.    Today's PHQ-9         PHQ-9 Total Score: 6    PHQ-9 Q9 Thoughts of better off dead/self-harm past 2 weeks :   Not at all         Very dry mouth - chronic, already using biotin    Would like something for sleep - already on seroquel.  Used to be on Trazodone    ?if she has another hernia- occasional midline pain    PEG Score 12/7/2022   PEG Total Score 6               Review of Systems   Constitutional, HEENT, cardiovascular, pulmonary, gi and gu systems are negative, except as otherwise noted.      Objective    /84   Pulse 72   Temp 97.9  F (36.6  C) (Tympanic)   Resp 14   Ht 1.537 m (5' 0.5\")   Wt 73.5 kg (162 lb)   SpO2 95%   BMI 31.12 kg/m    Body mass index is 31.12 kg/m .  Physical Exam   GENERAL: healthy, alert and no distress  NECK: no adenopathy, no asymmetry, masses, or scars and thyroid normal to palpation  RESP: lungs clear to auscultation - no rales, rhonchi or wheezes  CV: regular rate and rhythm, normal S1 S2, no S3 or S4, " no murmur, click or rub, no peripheral edema and peripheral pulses strong  ABDOMEN: soft, nontender and hernia midline ventral hernia above umbilicus  MS: no gross musculoskeletal defects noted, no edema

## 2022-12-14 ENCOUNTER — HOSPITAL ENCOUNTER (OUTPATIENT)
Dept: MAMMOGRAPHY | Facility: CLINIC | Age: 72
Discharge: HOME | End: 2022-12-14
Attending: FAMILY MEDICINE | Admitting: FAMILY MEDICINE
Payer: COMMERCIAL

## 2022-12-14 DIAGNOSIS — Z12.31 VISIT FOR SCREENING MAMMOGRAM: ICD-10-CM

## 2022-12-14 DIAGNOSIS — G47.33 OSA (OBSTRUCTIVE SLEEP APNEA): Primary | ICD-10-CM

## 2022-12-14 PROCEDURE — 77067 SCR MAMMO BI INCL CAD: CPT

## 2022-12-28 ENCOUNTER — TRANSFERRED RECORDS (OUTPATIENT)
Dept: HEALTH INFORMATION MANAGEMENT | Facility: CLINIC | Age: 72
End: 2022-12-28

## 2023-01-04 DIAGNOSIS — E78.5 HYPERLIPIDEMIA LDL GOAL <100: ICD-10-CM

## 2023-01-04 RX ORDER — ATORVASTATIN CALCIUM 20 MG/1
TABLET, FILM COATED ORAL
Qty: 90 TABLET | Refills: 0 | Status: SHIPPED | OUTPATIENT
Start: 2023-01-04 | End: 2023-03-13

## 2023-02-02 ENCOUNTER — TRANSFERRED RECORDS (OUTPATIENT)
Dept: HEALTH INFORMATION MANAGEMENT | Facility: CLINIC | Age: 73
End: 2023-02-02

## 2023-02-08 DIAGNOSIS — F33.1 MAJOR DEPRESSIVE DISORDER, RECURRENT EPISODE, MODERATE (H): ICD-10-CM

## 2023-02-08 DIAGNOSIS — I10 ESSENTIAL HYPERTENSION WITH GOAL BLOOD PRESSURE LESS THAN 140/90: ICD-10-CM

## 2023-02-08 RX ORDER — AMLODIPINE BESYLATE 5 MG/1
5 TABLET ORAL DAILY
Qty: 90 TABLET | Refills: 1 | Status: SHIPPED | OUTPATIENT
Start: 2023-02-08 | End: 2023-10-10

## 2023-02-08 RX ORDER — DULOXETIN HYDROCHLORIDE 30 MG/1
30 CAPSULE, DELAYED RELEASE ORAL 2 TIMES DAILY
Qty: 60 CAPSULE | Refills: 5 | Status: SHIPPED | OUTPATIENT
Start: 2023-02-08 | End: 2023-07-11

## 2023-02-08 NOTE — TELEPHONE ENCOUNTER
"Has appointment scheduled for 3/23/23      Requested Prescriptions   Pending Prescriptions Disp Refills     DULoxetine (CYMBALTA) 30 MG capsule [Pharmacy Med Name: DULOXETINE HCL 30MG CPEP] 60 capsule 5     Sig: TAKE 1 CAPSULE (30 MG) BY MOUTH 2 TIMES DAILY       Serotonin-Norepinephrine Reuptake Inhibitors  Failed - 2/8/2023  1:48 PM        Failed - PHQ-9 score of less than 5 in past 6 months     Please review last PHQ-9 score.           Passed - Blood pressure under 140/90 in past 12 months     BP Readings from Last 3 Encounters:   12/07/22 130/84   06/24/22 124/78   04/14/22 112/82                 Passed - Medication is active on med list        Passed - Patient is age 18 or older        Passed - No active pregnancy on record        Passed - No positive pregnancy test in past 12 months        Passed - Recent (6 mo) or future (30 days) visit within the authorizing provider's specialty     Patient had office visit in the last 6 months or has a visit in the next 30 days with authorizing provider or within the authorizing provider's specialty.  See \"Patient Info\" tab in inbasket, or \"Choose Columns\" in Meds & Orders section of the refill encounter.               amLODIPine (NORVASC) 5 MG tablet [Pharmacy Med Name: AMLODIPINE BESYLATE 5MG TABS] 90 tablet 1     Sig: TAKE 1 TABLET (5 MG) BY MOUTH DAILY       Calcium Channel Blockers Protocol  Passed - 2/8/2023  1:48 PM        Passed - Blood pressure under 140/90 in past 12 months     BP Readings from Last 3 Encounters:   12/07/22 130/84   06/24/22 124/78   04/14/22 112/82                 Passed - Recent (12 mo) or future (30 days) visit within the authorizing provider's specialty     Patient has had an office visit with the authorizing provider or a provider within the authorizing providers department within the previous 12 mos or has a future within next 30 days. See \"Patient Info\" tab in inbasket, or \"Choose Columns\" in Meds & Orders section of the refill encounter.  "             Passed - Medication is active on med list        Passed - Patient is age 18 or older        Passed - No active pregnancy on record        Passed - Normal serum creatinine on file in past 12 months     Recent Labs   Lab Test 06/24/22  0746   CR 0.78       Ok to refill medication if creatinine is low          Passed - No positive pregnancy test in past 12 months

## 2023-03-13 ENCOUNTER — OFFICE VISIT (OUTPATIENT)
Dept: FAMILY MEDICINE | Facility: CLINIC | Age: 73
End: 2023-03-13
Payer: COMMERCIAL

## 2023-03-13 ENCOUNTER — TRANSFERRED RECORDS (OUTPATIENT)
Dept: HEALTH INFORMATION MANAGEMENT | Facility: CLINIC | Age: 73
End: 2023-03-13

## 2023-03-13 VITALS
BODY MASS INDEX: 32.52 KG/M2 | HEIGHT: 61 IN | RESPIRATION RATE: 16 BRPM | DIASTOLIC BLOOD PRESSURE: 84 MMHG | WEIGHT: 172.25 LBS | HEART RATE: 74 BPM | OXYGEN SATURATION: 94 % | SYSTOLIC BLOOD PRESSURE: 134 MMHG | TEMPERATURE: 98.4 F

## 2023-03-13 DIAGNOSIS — M54.50 CHRONIC BILATERAL LOW BACK PAIN WITHOUT SCIATICA: ICD-10-CM

## 2023-03-13 DIAGNOSIS — F33.1 MAJOR DEPRESSIVE DISORDER, RECURRENT EPISODE, MODERATE (H): ICD-10-CM

## 2023-03-13 DIAGNOSIS — F11.90 CHRONIC NARCOTIC USE: ICD-10-CM

## 2023-03-13 DIAGNOSIS — M53.3 SACROILIAC JOINT PAIN: ICD-10-CM

## 2023-03-13 DIAGNOSIS — E78.5 HYPERLIPIDEMIA LDL GOAL <100: ICD-10-CM

## 2023-03-13 DIAGNOSIS — G89.29 CHRONIC BILATERAL LOW BACK PAIN WITHOUT SCIATICA: ICD-10-CM

## 2023-03-13 DIAGNOSIS — N18.2 CKD (CHRONIC KIDNEY DISEASE) STAGE 2, GFR 60-89 ML/MIN: Primary | ICD-10-CM

## 2023-03-13 DIAGNOSIS — F41.9 ANXIETY: ICD-10-CM

## 2023-03-13 DIAGNOSIS — Z98.1 S/P LUMBAR SPINAL FUSION: ICD-10-CM

## 2023-03-13 DIAGNOSIS — J44.9 CHRONIC OBSTRUCTIVE PULMONARY DISEASE, UNSPECIFIED COPD TYPE (H): ICD-10-CM

## 2023-03-13 LAB
ALBUMIN UR-MCNC: NEGATIVE MG/DL
AMPHETAMINES UR QL: NOT DETECTED
ANION GAP SERPL CALCULATED.3IONS-SCNC: 11 MMOL/L (ref 7–15)
APPEARANCE UR: CLEAR
BACTERIA #/AREA URNS HPF: ABNORMAL /HPF
BARBITURATES UR QL SCN: NOT DETECTED
BENZODIAZ UR QL SCN: NOT DETECTED
BILIRUB UR QL STRIP: NEGATIVE
BUN SERPL-MCNC: 18.7 MG/DL (ref 8–23)
BUPRENORPHINE UR QL: NOT DETECTED
CALCIUM SERPL-MCNC: 9.7 MG/DL (ref 8.8–10.2)
CANNABINOIDS UR QL: NOT DETECTED
CHLORIDE SERPL-SCNC: 105 MMOL/L (ref 98–107)
CHOLEST SERPL-MCNC: 153 MG/DL
COCAINE UR QL SCN: NOT DETECTED
COLOR UR AUTO: YELLOW
CREAT SERPL-MCNC: 0.91 MG/DL (ref 0.51–0.95)
D-METHAMPHET UR QL: NOT DETECTED
DEPRECATED HCO3 PLAS-SCNC: 25 MMOL/L (ref 22–29)
GFR SERPL CREATININE-BSD FRML MDRD: 68 ML/MIN/1.73M2
GLUCOSE SERPL-MCNC: 95 MG/DL (ref 70–99)
GLUCOSE UR STRIP-MCNC: NEGATIVE MG/DL
HDLC SERPL-MCNC: 62 MG/DL
HGB UR QL STRIP: NEGATIVE
KETONES UR STRIP-MCNC: NEGATIVE MG/DL
LDLC SERPL CALC-MCNC: 66 MG/DL
LEUKOCYTE ESTERASE UR QL STRIP: ABNORMAL
METHADONE UR QL SCN: NOT DETECTED
MUCOUS THREADS #/AREA URNS LPF: PRESENT /LPF
NITRATE UR QL: NEGATIVE
NONHDLC SERPL-MCNC: 91 MG/DL
OPIATES UR QL SCN: NOT DETECTED
OXYCODONE UR QL SCN: DETECTED
PCP UR QL SCN: NOT DETECTED
PH UR STRIP: 6 [PH] (ref 5–7)
POTASSIUM SERPL-SCNC: 4.3 MMOL/L (ref 3.4–5.3)
PROPOXYPH UR QL: NOT DETECTED
RBC #/AREA URNS AUTO: ABNORMAL /HPF
SODIUM SERPL-SCNC: 141 MMOL/L (ref 136–145)
SP GR UR STRIP: 1.02 (ref 1–1.03)
SQUAMOUS #/AREA URNS AUTO: ABNORMAL /LPF
TRICYCLICS UR QL SCN: DETECTED
TRIGL SERPL-MCNC: 123 MG/DL
UROBILINOGEN UR STRIP-ACNC: 0.2 E.U./DL
WBC #/AREA URNS AUTO: ABNORMAL /HPF

## 2023-03-13 PROCEDURE — 36415 COLL VENOUS BLD VENIPUNCTURE: CPT | Performed by: FAMILY MEDICINE

## 2023-03-13 PROCEDURE — 81001 URINALYSIS AUTO W/SCOPE: CPT | Mod: 59 | Performed by: FAMILY MEDICINE

## 2023-03-13 PROCEDURE — 80306 DRUG TEST PRSMV INSTRMNT: CPT | Performed by: FAMILY MEDICINE

## 2023-03-13 PROCEDURE — 87086 URINE CULTURE/COLONY COUNT: CPT | Performed by: FAMILY MEDICINE

## 2023-03-13 PROCEDURE — 99214 OFFICE O/P EST MOD 30 MIN: CPT | Performed by: FAMILY MEDICINE

## 2023-03-13 PROCEDURE — 80048 BASIC METABOLIC PNL TOTAL CA: CPT | Performed by: FAMILY MEDICINE

## 2023-03-13 PROCEDURE — 80061 LIPID PANEL: CPT | Performed by: FAMILY MEDICINE

## 2023-03-13 RX ORDER — QUETIAPINE FUMARATE 50 MG/1
100 TABLET, FILM COATED ORAL DAILY
Qty: 180 TABLET | Refills: 1 | Status: SHIPPED | OUTPATIENT
Start: 2023-03-13 | End: 2023-10-10

## 2023-03-13 RX ORDER — OXYCODONE AND ACETAMINOPHEN 10; 325 MG/1; MG/1
1 TABLET ORAL EVERY 6 HOURS PRN
Qty: 70 TABLET | Refills: 0 | Status: SHIPPED | OUTPATIENT
Start: 2023-05-12 | End: 2023-06-21

## 2023-03-13 RX ORDER — OXYCODONE AND ACETAMINOPHEN 10; 325 MG/1; MG/1
1 TABLET ORAL EVERY 6 HOURS PRN
Qty: 70 TABLET | Refills: 0 | Status: SHIPPED | OUTPATIENT
Start: 2023-03-13 | End: 2023-04-12

## 2023-03-13 RX ORDER — OXYCODONE AND ACETAMINOPHEN 10; 325 MG/1; MG/1
1 TABLET ORAL EVERY 6 HOURS PRN
Qty: 70 TABLET | Refills: 0 | Status: SHIPPED | OUTPATIENT
Start: 2023-04-12 | End: 2023-05-12

## 2023-03-13 RX ORDER — ATORVASTATIN CALCIUM 20 MG/1
20 TABLET, FILM COATED ORAL DAILY
Qty: 90 TABLET | Refills: 3 | Status: SHIPPED | OUTPATIENT
Start: 2023-03-13 | End: 2023-10-19 | Stop reason: DRUGHIGH

## 2023-03-13 ASSESSMENT — ANXIETY QUESTIONNAIRES
GAD7 TOTAL SCORE: 0
1. FEELING NERVOUS, ANXIOUS, OR ON EDGE: NOT AT ALL
2. NOT BEING ABLE TO STOP OR CONTROL WORRYING: NOT AT ALL
7. FEELING AFRAID AS IF SOMETHING AWFUL MIGHT HAPPEN: NOT AT ALL
5. BEING SO RESTLESS THAT IT IS HARD TO SIT STILL: NOT AT ALL
6. BECOMING EASILY ANNOYED OR IRRITABLE: NOT AT ALL
GAD7 TOTAL SCORE: 0
3. WORRYING TOO MUCH ABOUT DIFFERENT THINGS: NOT AT ALL

## 2023-03-13 ASSESSMENT — PATIENT HEALTH QUESTIONNAIRE - PHQ9
SUM OF ALL RESPONSES TO PHQ QUESTIONS 1-9: 3
10. IF YOU CHECKED OFF ANY PROBLEMS, HOW DIFFICULT HAVE THESE PROBLEMS MADE IT FOR YOU TO DO YOUR WORK, TAKE CARE OF THINGS AT HOME, OR GET ALONG WITH OTHER PEOPLE: NOT DIFFICULT AT ALL
5. POOR APPETITE OR OVEREATING: NOT AT ALL
SUM OF ALL RESPONSES TO PHQ QUESTIONS 1-9: 3

## 2023-03-13 ASSESSMENT — PAIN SCALES - GENERAL: PAINLEVEL: SEVERE PAIN (6)

## 2023-03-13 NOTE — PROGRESS NOTES
Assessment & Plan     CKD (chronic kidney disease) stage 2, GFR 60-89 ml/min   check renal function  - UA reflex to Microscopic and Culture ([id number]); Future  - Basic metabolic panel  (Ca, Cl, CO2, Creat, Gluc, K, Na, BUN); Future    Chronic bilateral low back pain without sciatica     - oxyCODONE-acetaminophen (PERCOCET)  MG per tablet; Take 1 tablet by mouth every 6 hours as needed for severe pain (7-10)  - oxyCODONE-acetaminophen (PERCOCET)  MG per tablet; Take 1 tablet by mouth every 6 hours as needed for severe pain (7-10)  - oxyCODONE-acetaminophen (PERCOCET)  MG per tablet; Take 1 tablet by mouth every 6 hours as needed for severe pain (7-10)  - Drug Abuse Screen Panel 13, Urine (Pain Care Package) - lab collect; Future    Sacroiliac joint pain     - oxyCODONE-acetaminophen (PERCOCET)  MG per tablet; Take 1 tablet by mouth every 6 hours as needed for severe pain (7-10)  - oxyCODONE-acetaminophen (PERCOCET)  MG per tablet; Take 1 tablet by mouth every 6 hours as needed for severe pain (7-10)  - oxyCODONE-acetaminophen (PERCOCET)  MG per tablet; Take 1 tablet by mouth every 6 hours as needed for severe pain (7-10)    S/P lumbar spinal fusion     - oxyCODONE-acetaminophen (PERCOCET)  MG per tablet; Take 1 tablet by mouth every 6 hours as needed for severe pain (7-10)  - oxyCODONE-acetaminophen (PERCOCET)  MG per tablet; Take 1 tablet by mouth every 6 hours as needed for severe pain (7-10)  - oxyCODONE-acetaminophen (PERCOCET)  MG per tablet; Take 1 tablet by mouth every 6 hours as needed for severe pain (7-10)    Hyperlipidemia LDL goal <100     - atorvastatin (LIPITOR) 20 MG tablet; Take 1 tablet (20 mg) by mouth daily  - Lipid panel reflex to direct LDL Fasting; Future  - Basic metabolic panel  (Ca, Cl, CO2, Creat, Gluc, K, Na, BUN); Future    Chronic obstructive pulmonary disease, unspecified COPD type (H)  stable    Major depressive disorder, recurrent  "episode, moderate (H)  Stable, continue seroquel and cymbalta    Chronic narcotic use  UDS done today  Will need CSA in 3 months  - Drug Abuse Screen Panel 13, Urine (Pain Care Package) - lab collect; Future    Anxiety     - QUEtiapine (SEROQUEL) 50 MG tablet; Take 2 tablets (100 mg) by mouth daily             BMI:   Estimated body mass index is 33.09 kg/m  as calculated from the following:    Height as of this encounter: 1.537 m (5' 0.5\").    Weight as of this encounter: 78.1 kg (172 lb 4 oz).           Return in about 3 months (around 6/13/2023) for medication recheck.    Sarah Barriga MD  [facility]   Jessica is a 72 year old, presenting for the following health issues:  Hypertension      HPI     Hyperlipidemia Follow-Up  Atorvastatin 20mg qd    Are you regularly taking any medication or supplement to lower your cholesterol?   Yes- statin    Are you having muscle aches or other side effects that you think could be caused by your cholesterol lowering medication?  No    Hypertension Follow-up  Metoprolol 50mg bid, amlodipine 5mg qd    Do you check your blood pressure regularly outside of the clinic? Yes     Are you following a low salt diet? Yes    Are your blood pressures ever more than 140 on the top number (systolic) OR more   than 90 on the bottom number (diastolic), for example 140/90? Yes    BP Readings from Last 6 Encounters:   03/13/23 134/84   12/07/22 130/84   06/24/22 124/78   04/14/22 112/82   03/25/22 (!) 140/82   01/03/22 92/54       Depression and Anxiety Follow-Up  Seroquel 150mg every day, cymbalta 30mg bid    How are you doing with your depression since your last visit? No change    How are you doing with your anxiety since your last visit?  No change    Are you having other symptoms that might be associated with depression or anxiety? No    Have you had a significant life event? No     Do you have any concerns with your use of alcohol or other drugs? " No    Social History     Tobacco Use     Smoking status: Former     Packs/day: 1.00     Years: 30.00     Pack years: 30.00     Types: Cigarettes     Quit date: 2004     Years since quittin.2     Smokeless tobacco: Never   Vaping Use     Vaping Use: Never used   Substance Use Topics     Alcohol use: No     Comment: ETOH dependency, DUI 3/15/10     Drug use: No     PHQ 2022 3/13/2023 3/13/2023   PHQ-9 Total Score 6 3 3   Q9: Thoughts of better off dead/self-harm past 2 weeks Not at all Not at all Not at all     DAJUAN-7 SCORE 2021 2022 3/13/2023   Total Score - - -   Total Score - 4 (minimal anxiety) -   Total Score 6 4 0         Suicide Assessment Five-step Evaluation and Treatment (SAFE-T)    Hypothyroidism Follow-up  Levothyroxine 50mcg qd    Since last visit, patient describes the following symptoms: weight gain of 10 lbs, dry skin and hair loss    TSH   Date Value Ref Range Status   2022 0.74 0.40 - 4.00 mU/L Final   2020 1.50 0.40 - 4.00 mU/L Final     T4 Free   Date Value Ref Range Status   2017 1.00 0.76 - 1.46 ng/dL Final       Pain History:  When did you first notice your pain? - Chronic Pain   Have you seen this provider for your pain in the past?   Yes   Where in your body do you have pain? Low back, neck and left knee  Are you seeing anyone else for your pain? No    PHQ-9 SCORE 2022 3/13/2023 3/13/2023   PHQ-9 Total Score - - -   PHQ-9 Total Score MyChart 6 (Mild depression) - 3 (Minimal depression)   PHQ-9 Total Score 6 3 3       DAJUAN-7 SCORE 2021 2022 3/13/2023   Total Score - - -   Total Score - 4 (minimal anxiety) -   Total Score 6 4 0           PHQ-9 SCORE 2022 3/13/2023 3/13/2023   PHQ-9 Total Score - - -   PHQ-9 Total Score MyChart 6 (Mild depression) - 3 (Minimal depression)   PHQ-9 Total Score 6 3 3     DAJUAN-7 SCORE 2021 2022 3/13/2023   Total Score - - -   Total Score - 4 (minimal anxiety) -   Total Score 6 4 0     PEG Score  "12/7/2022 3/13/2023   PEG Total Score 6 8       Chronic Pain Follow Up:    Location of pain: Low back, neck and left knee  Analgesia/pain control:    - Recent changes:  Somewhat worsening    - Overall control: Inadequate pain control    - Current treatments: Aleve, percocet and she is having a steroid injection this afternoon     Adherence:     - Do you ever take more pain medicine than prescribed? No   Adverse effects: No   PDMP Review       Value Time User    State PDMP site checked  Yes 3/13/2023 10:44 AM Sarah Barriga MD        Last CSA Agreement:   CSA -- Patient Level:     [Media Unavailable] Controlled Substance Agreement - Opioid - Scan on 6/24/2022  7:50 AM   [Media Unavailable] Controlled Substance Agreement - Opioid - Scan on 4/30/2021 10:14 AM   [Media Unavailable] Controlled Substance Agreement - Opioid - Scan on 1/31/2019  1:16 PM: 01/28/19       Last UDS: 4/18/2022        Review of Systems   Constitutional, HEENT, cardiovascular, pulmonary, gi and gu systems are negative, except as otherwise noted.      Objective    /84 (BP Location: Right arm, Patient Position: Sitting, Cuff Size: Adult Regular)   Pulse 74   Temp 98.4  F (36.9  C) (Tympanic)   Resp 16   Ht 1.537 m (5' 0.5\")   Wt 78.1 kg (172 lb 4 oz)   SpO2 94%   BMI 33.09 kg/m    Body mass index is 33.09 kg/m .  Physical Exam   GENERAL: healthy, alert and no distress  NECK: no adenopathy, no asymmetry, masses, or scars and thyroid normal to palpation  RESP: lungs clear to auscultation - no rales, rhonchi or wheezes  CV: regular rate and rhythm, normal S1 S2, no S3 or S4, no murmur, click or rub, no peripheral edema and peripheral pulses strong  ABDOMEN: soft, nontender, no hepatosplenomegaly, no masses and bowel sounds normal  MS: no gross musculoskeletal defects noted, no edema    Labs pending today                "

## 2023-03-13 NOTE — RESULT ENCOUNTER NOTE
Jessica,    These labs are normal or acceptable.    Please contact my office if you have questions.    Sarah Barriga M.D.

## 2023-03-15 LAB — BACTERIA UR CULT: NORMAL

## 2023-03-24 ENCOUNTER — TELEPHONE (OUTPATIENT)
Dept: SLEEP MEDICINE | Facility: CLINIC | Age: 73
End: 2023-03-24
Payer: COMMERCIAL

## 2023-03-24 NOTE — TELEPHONE ENCOUNTER
PT RETURNED CALL AND WAS SCHEDULED ON 4/5/23 @ 3PM FOR PICKUP OF NEW CPAP AND SUPPLIES IN [place].

## 2023-04-05 ENCOUNTER — DOCUMENTATION ONLY (OUTPATIENT)
Dept: SLEEP MEDICINE | Facility: CLINIC | Age: 73
End: 2023-04-05
Payer: COMMERCIAL

## 2023-04-07 ENCOUNTER — DOCUMENTATION ONLY (OUTPATIENT)
Dept: SLEEP MEDICINE | Facility: CLINIC | Age: 73
End: 2023-04-07
Payer: COMMERCIAL

## 2023-04-09 ENCOUNTER — HEALTH MAINTENANCE LETTER (OUTPATIENT)
Age: 73
End: 2023-04-09

## 2023-04-25 ENCOUNTER — DOCUMENTATION ONLY (OUTPATIENT)
Dept: SLEEP MEDICINE | Facility: CLINIC | Age: 73
End: 2023-04-25
Payer: COMMERCIAL

## 2023-04-25 DIAGNOSIS — G47.33 OBSTRUCTIVE SLEEP APNEA (ADULT) (PEDIATRIC): Primary | ICD-10-CM

## 2023-04-25 NOTE — PROGRESS NOTES
Patient was offered choice of vendor and chose [facility].  Patient Jessica Ellison was set up at [place]  on April 25, 2023. Patient received a Resmed Airsense 10 Pressures were set at 7 - 15 cm H2O.   Patient s ramp is 5 cm H2O for Auto and FLEX/EPR is EPR, 2.  Patient received a Resmed Mask name: MIRAGE FX  Nasal mask size Standard, heated tubing and heated humidifier.  Patient has the following compliance requirements: using and visit requirements  Patient has a follow up on TBD with Dr. Romero.    Cas Hooks

## 2023-05-18 ENCOUNTER — TRANSFERRED RECORDS (OUTPATIENT)
Dept: HEALTH INFORMATION MANAGEMENT | Facility: CLINIC | Age: 73
End: 2023-05-18
Payer: COMMERCIAL

## 2023-05-30 NOTE — PROGRESS NOTES
Pt called back and rescheduled with [facility]   Pulmonary Clinic Outpatient Consultation    Assessment and Plan:   68 y F with a history of EtOH abuse, HTN, hypothyroidism, COPD/emphysema, who presents for hospital follow-up for COPD exacerbation.    We reviewed her imaging and PFTs - CT scan shows mild-moderate upper lobe emphysema, and PFTs have hyperinflation and mild diffusion impairment, consistent with her imaging. Prior to the Jan exacerbation, she has never exacerbated. We reviewed that her emphysema is from her prior smoking, and the chronic nature of the disease. She is using the spiriva sporadically and has not found it very beneficial. She has little-no symptoms from her COPD and denies exertional limitations.      PLAN:    Continue as needed albuterol - can hold on resuming spiriva, and assess need for any long acting maintenance inhalers if symptoms are worse (she did not require inhalers prior to this admission)    Does not meet criteria for lung cancer screening CT scans    She already quit smoking    No exertional oxygen needed per recent ambulatory testing    Recommended GI follow-up for esophageal dysmotility - denies GERD symptoms to necessitate any treatment    She is up to date on vaccines    She is low-intermediate risk for post-operative pulmonary complications should she proceed with hernia surgery. Her only risk factor being her baseline SpO2 < 95%. We are happy to assist should any respiratory issues arise.     I will see her back prn should her COPD symptoms escalate/require more treatment.      Sonja Hartley MD  Pulmonary and Critical Care Medicine  [facility]  Office: [phone]  Pager: [phone]    ----------------------    Reason for Consult: Emphysema    HPI:   68 y F with a history of EtOH abuse, HTN, hypothyroidism, COPD/emphysema, who presents for hospital follow-up for COPD exacerbation.    She was hospitalized in January for chemical dependency, and was noted to have hypoxia with wheeze and shortness of  breath and was treated for a COPD exacerbation. She did not require oxygen on discharge per ambulatory oximetry. She was discharged on spiriva and prn albuterol. She was also found to have esophageal dysmotility on an esophagram.    Prior to this admission she denies exacerbations, had not been prescribed/used inhalers. She was unaware that she had emphysema. Since discharge she has been using the spiriva sporadically due to cost, and has not required albuterol. She has no exertional dyspnea or chronic cough. She does exercises in the pool regularly. She does not have GERD symptoms. She plans to see GI for the abnormal esophagram, and is also looking into surgery for a ventral hernia.      ROS:  A 12-system review was obtained and was negative with the exception of the symptoms endorsed in the history of present illness.    PMH:  Past Medical History:   Diagnosis Date     Anxiety and depression 6/3/2016     Benign essential HTN 6/3/2016     COPD exacerbation (H)      Disease of thyroid gland      History of transfusion      Hx of cerebral aneurysm repair     Coiling     Hypertension      Hypothyroidism due to acquired atrophy of thyroid 6/3/2016     Insomnia 6/3/2016     PSH:  Past Surgical History:   Procedure Laterality Date     ABDOMINAL SURGERY       APPENDECTOMY       BACK SURGERY       CEREBRAL ANEURYSM REPAIR       COLON SURGERY       FRACTURE SURGERY       HERNIA REPAIR       Allergies:  Allergies   Allergen Reactions     Venom-Honey Bee Anaphylaxis     Family HX:  Mother -  from lung cancer, heavy smoker  Father - colon cancer    Social Hx:  Social History     Socioeconomic History     Marital status:      Spouse name: Not on file     Number of children: Not on file     Years of education: Not on file     Highest education level: Not on file   Occupational History     Not on file   Social Needs     Financial resource strain: Not on file     Food insecurity:     Worry: Not on file      Inability: Not on file     Transportation needs:     Medical: Not on file     Non-medical: Not on file   Tobacco Use     Smoking status: Former Smoker     Packs/day: 1.00     Years: 0.00     Pack years: 0.00     Last attempt to quit: 2000     Years since quittin.1     Smokeless tobacco: Never Used     Tobacco comment: 20 years ago   Substance and Sexual Activity     Alcohol use: Yes     Alcohol/week: 5.4 oz     Types: 3 Glasses of wine, 6 Shots of liquor per week     Frequency: Never     Drug use: No     Sexual activity: Yes     Partners: Male   Lifestyle     Physical activity:     Days per week: Not on file     Minutes per session: Not on file     Stress: Not on file   Relationships     Social connections:     Talks on phone: Not on file     Gets together: Not on file     Attends Rastafari service: Not on file     Active member of club or organization: Not on file     Attends meetings of clubs or organizations: Not on file     Relationship status: Not on file     Intimate partner violence:     Fear of current or ex partner: Not on file     Emotionally abused: Not on file     Physically abused: Not on file     Forced sexual activity: Not on file   Other Topics Concern     Not on file   Social History Narrative     Not on file   Tobacco: 26 pack years, quit in   Worked as a RN, for [employer], currently retired  Lives her   3 healthy children    Current Meds:  Current Outpatient Medications   Medication Sig Dispense Refill     albuterol (PROAIR HFA;PROVENTIL HFA;VENTOLIN HFA) 90 mcg/actuation inhaler Inhale 2 puffs every 4 (four) hours as needed for wheezing. 1 Inhaler 0     aspirin 325 MG tablet Take 325 mg by mouth daily.       calcium carbonate-vitamin D3 (CALCIUM 600 + D,3,) 600 mg(1,500mg) -400 unit per tablet Take 1 tablet by mouth 2 (two) times a day.       cyclobenzaprine (FLEXERIL) 10 MG tablet Take 1 tablet (10 mg total) by mouth 2 (two) times a day as needed for muscle spasms. 30 tablet 0  "    EPINEPHrine (EPIPEN) 0.3 mg/0.3 mL atIn Inject 0.3 mg into the shoulder, thigh, or buttocks as needed (allergic reaction/anaphylaxis).       gabapentin (NEURONTIN) 600 MG tablet Take 600 mg by mouth 3 (three) times a day.              levothyroxine (SYNTHROID, LEVOTHROID) 50 MCG tablet Take 50 mcg by mouth daily.       metoprolol succinate (TOPROL XL) 50 MG 24 hr tablet Take 75 mg by mouth daily.       multivitamin therapeutic (THERAGRAN) tablet Take 1 tablet by mouth daily.       oxyCODONE-acetaminophen (PERCOCET/ENDOCET)  mg per tablet Take 1 tablet by mouth every 4 (four) hours as needed for pain.       QUEtiapine (SEROQUEL) 25 MG tablet Take 1 tablet (25 mg total) by mouth every 6 (six) hours as needed. 30 tablet 0     tiotropium (SPIRIVA WITH HANDIHALER) 18 mcg inhalation capsule Place 1 capsule (2 puffs total) into inhaler and inhale daily. Place 1 capsule into the inhaler device. Close and press button to crush the capsule. Inhale the contents of the crushed capsule in 2 breaths. 30 capsule 0     traZODone (DESYREL) 150 MG tablet Take 1 tablet (150 mg total) by mouth bedtime as needed for sleep. 30 tablet 0     venlafaxine (EFFEXOR-XR) 75 MG 24 hr capsule 1 capsule daily.       No current facility-administered medications for this visit.      Physical Exam:  /88   Pulse 68   Resp 20   Ht 5' 3\" (1.6 m)   Wt 173 lb 14.4 oz (78.9 kg)   SpO2 93% Comment: RA  BMI 30.81 kg/m    Gen: Alert, oriented, no distress  HEENT: nasal turbinates are unremarkable, no oropharyngeal lesions, no cervical or supraclavicular lymphadenopathy  CV: RRR, no M/G/R  Resp: CTAB, no focal crackles or wheezes  Abd: soft, nontender, no palpable organomegaly  Skin: no apparent rashes  Ext: no cyanosis, clubbing or edema  Neuro: alert, nonfocal    Labs:  Reviewed    Imaging studies:  Personally reviewed:    1/6/19 CT PE:  Pulmonary arteries are normal caliber and negative for pulmonary emboli. Normal caliber thoracic " aorta with no dissection or aneurysm.     RV/LV RATIO: N/A     LUNGS AND PLEURA: Emphysematous change. Calcified granuloma in the right lower lobe. Linear atelectasis both lung bases with minimal atelectasis in the lingula.     MEDIASTINUM: No lymphadenopathy.     LIMITED UPPER ABDOMEN: Small calcified gallstone. Calcified granuloma in the spleen. Lumbar stabilization rods. Fat-containing upper ventral abdominal wall hernia. Duodenal diverticulum. Few small hepatic cysts.     MUSCULOSKELETAL: Postsurgical changes lumbar spine.     IMPRESSION:   1.  No lymphadenopathy. No pulmonary embolism.  2.  Postsurgical changes in the lumbar spine.  3.  Previous granulomatous infection.  4.  Cholelithiasis.    2/25/19 PFT's  F:F 89, FEV1 88% FVC 77%  %, RV/%  DLCO 74%    Hyperinflation, mild diffusion impairment

## 2023-05-31 DIAGNOSIS — E03.9 HYPOTHYROIDISM, UNSPECIFIED TYPE: ICD-10-CM

## 2023-05-31 RX ORDER — LEVOTHYROXINE SODIUM 50 UG/1
TABLET ORAL
Qty: 90 TABLET | Refills: 0 | Status: SHIPPED | OUTPATIENT
Start: 2023-05-31 | End: 2023-07-11

## 2023-05-31 NOTE — TELEPHONE ENCOUNTER
"Prescription approved per [facility] Refill Protocol.    Pending Prescriptions:                       Disp   Refills    levothyroxine (SYNTHROID/LEVOTHROID) 50 M*90 tab*2            Sig: TAKE ONE TABLET BY MOUTH ONCE DAILY    Medication is being filled for 1 time refill only due to:  needs to be seen in June     Requested Prescriptions   Pending Prescriptions Disp Refills     levothyroxine (SYNTHROID/LEVOTHROID) 50 MCG tablet [Pharmacy Med Name: LEVOTHYROXINE SODIUM 50MCG TABS] 90 tablet 2     Sig: TAKE ONE TABLET BY MOUTH ONCE DAILY       Thyroid Protocol Passed - 5/31/2023 12:25 PM        Passed - Patient is 12 years or older        Passed - Recent (12 mo) or future (30 days) visit within the authorizing provider's specialty     Patient has had an office visit with the authorizing provider or a provider within the authorizing providers department within the previous 12 mos or has a future within next 30 days. See \"Patient Info\" tab in inbasket, or \"Choose Columns\" in Meds & Orders section of the refill encounter.              Passed - Medication is active on med list        Passed - Normal TSH on file in past 12 months     Recent Labs   Lab Test 06/24/22  0746   TSH 0.74              Passed - No active pregnancy on record     If patient is pregnant or has had a positive pregnancy test, please check TSH.          Passed - No positive pregnancy test in past 12 months     If patient is pregnant or has had a positive pregnancy test, please check TSH.             Michelle Avendaño RN on 5/31/2023 at 2:24 PM      "

## 2023-06-20 DIAGNOSIS — I10 ESSENTIAL HYPERTENSION WITH GOAL BLOOD PRESSURE LESS THAN 140/90: ICD-10-CM

## 2023-06-20 RX ORDER — METOPROLOL SUCCINATE 50 MG/1
TABLET, EXTENDED RELEASE ORAL
Qty: 180 TABLET | Refills: 2 | Status: SHIPPED | OUTPATIENT
Start: 2023-06-20 | End: 2024-02-20

## 2023-07-05 DIAGNOSIS — J44.9 CHRONIC OBSTRUCTIVE PULMONARY DISEASE, UNSPECIFIED COPD TYPE (H): ICD-10-CM

## 2023-07-05 RX ORDER — TIOTROPIUM BROMIDE 18 UG/1
CAPSULE ORAL; RESPIRATORY (INHALATION)
Qty: 30 CAPSULE | Refills: 3 | Status: SHIPPED | OUTPATIENT
Start: 2023-07-05 | End: 2023-12-28

## 2023-07-11 ENCOUNTER — OFFICE VISIT (OUTPATIENT)
Dept: FAMILY MEDICINE | Facility: CLINIC | Age: 73
End: 2023-07-11
Payer: COMMERCIAL

## 2023-07-11 ENCOUNTER — LAB (OUTPATIENT)
Dept: LAB | Facility: CLINIC | Age: 73
End: 2023-07-11
Payer: COMMERCIAL

## 2023-07-11 VITALS
TEMPERATURE: 98.8 F | RESPIRATION RATE: 18 BRPM | BODY MASS INDEX: 30.63 KG/M2 | OXYGEN SATURATION: 93 % | HEART RATE: 78 BPM | WEIGHT: 162.25 LBS | SYSTOLIC BLOOD PRESSURE: 124 MMHG | HEIGHT: 61 IN | DIASTOLIC BLOOD PRESSURE: 80 MMHG

## 2023-07-11 DIAGNOSIS — M54.50 CHRONIC BILATERAL LOW BACK PAIN WITHOUT SCIATICA: ICD-10-CM

## 2023-07-11 DIAGNOSIS — R53.83 OTHER FATIGUE: ICD-10-CM

## 2023-07-11 DIAGNOSIS — E03.9 HYPOTHYROIDISM, UNSPECIFIED TYPE: ICD-10-CM

## 2023-07-11 DIAGNOSIS — M54.50 CHRONIC BILATERAL LOW BACK PAIN, UNSPECIFIED WHETHER SCIATICA PRESENT: ICD-10-CM

## 2023-07-11 DIAGNOSIS — G89.29 CHRONIC BILATERAL LOW BACK PAIN, UNSPECIFIED WHETHER SCIATICA PRESENT: ICD-10-CM

## 2023-07-11 DIAGNOSIS — Z98.1 S/P LUMBAR SPINAL FUSION: ICD-10-CM

## 2023-07-11 DIAGNOSIS — F11.20 CONTINUOUS OPIOID DEPENDENCE (H): Primary | ICD-10-CM

## 2023-07-11 DIAGNOSIS — G89.29 CHRONIC BILATERAL LOW BACK PAIN WITHOUT SCIATICA: ICD-10-CM

## 2023-07-11 DIAGNOSIS — N18.2 CKD (CHRONIC KIDNEY DISEASE) STAGE 2, GFR 60-89 ML/MIN: ICD-10-CM

## 2023-07-11 DIAGNOSIS — F33.1 MAJOR DEPRESSIVE DISORDER, RECURRENT EPISODE, MODERATE (H): ICD-10-CM

## 2023-07-11 DIAGNOSIS — M53.3 SACROILIAC JOINT PAIN: ICD-10-CM

## 2023-07-11 LAB
ERYTHROCYTE [DISTWIDTH] IN BLOOD BY AUTOMATED COUNT: 13.5 % (ref 10–15)
HCT VFR BLD AUTO: 38.7 % (ref 35–47)
HGB BLD-MCNC: 12.5 G/DL (ref 11.7–15.7)
MCH RBC QN AUTO: 32.1 PG (ref 26.5–33)
MCHC RBC AUTO-ENTMCNC: 32.3 G/DL (ref 31.5–36.5)
MCV RBC AUTO: 99 FL (ref 78–100)
PLATELET # BLD AUTO: 214 10E3/UL (ref 150–450)
RBC # BLD AUTO: 3.9 10E6/UL (ref 3.8–5.2)
TSH SERPL DL<=0.005 MIU/L-ACNC: 1.45 UIU/ML (ref 0.3–4.2)
VIT B12 SERPL-MCNC: 328 PG/ML (ref 232–1245)
WBC # BLD AUTO: 5.1 10E3/UL (ref 4–11)

## 2023-07-11 PROCEDURE — 85027 COMPLETE CBC AUTOMATED: CPT

## 2023-07-11 PROCEDURE — 84443 ASSAY THYROID STIM HORMONE: CPT

## 2023-07-11 PROCEDURE — 36415 COLL VENOUS BLD VENIPUNCTURE: CPT

## 2023-07-11 PROCEDURE — 99214 OFFICE O/P EST MOD 30 MIN: CPT | Performed by: FAMILY MEDICINE

## 2023-07-11 PROCEDURE — 82607 VITAMIN B-12: CPT

## 2023-07-11 RX ORDER — OXYCODONE AND ACETAMINOPHEN 10; 325 MG/1; MG/1
1 TABLET ORAL EVERY 6 HOURS PRN
Qty: 70 TABLET | Refills: 0 | Status: SHIPPED | OUTPATIENT
Start: 2023-09-18 | End: 2023-08-14

## 2023-07-11 RX ORDER — DULOXETIN HYDROCHLORIDE 30 MG/1
30 CAPSULE, DELAYED RELEASE ORAL 2 TIMES DAILY
Qty: 180 CAPSULE | Refills: 1 | Status: SHIPPED | OUTPATIENT
Start: 2023-07-11 | End: 2023-12-28

## 2023-07-11 RX ORDER — OXYCODONE AND ACETAMINOPHEN 10; 325 MG/1; MG/1
1 TABLET ORAL EVERY 6 HOURS PRN
Qty: 70 TABLET | Refills: 0 | Status: ON HOLD | OUTPATIENT
Start: 2023-07-19 | End: 2023-08-19

## 2023-07-11 RX ORDER — LEVOTHYROXINE SODIUM 50 UG/1
50 TABLET ORAL DAILY
Qty: 90 TABLET | Refills: 3 | Status: SHIPPED | OUTPATIENT
Start: 2023-07-11 | End: 2024-08-12

## 2023-07-11 RX ORDER — OXYCODONE AND ACETAMINOPHEN 10; 325 MG/1; MG/1
1 TABLET ORAL EVERY 6 HOURS PRN
Qty: 70 TABLET | Refills: 0 | Status: SHIPPED | OUTPATIENT
Start: 2023-08-18 | End: 2023-08-14

## 2023-07-11 ASSESSMENT — ANXIETY QUESTIONNAIRES
1. FEELING NERVOUS, ANXIOUS, OR ON EDGE: NOT AT ALL
6. BECOMING EASILY ANNOYED OR IRRITABLE: SEVERAL DAYS
2. NOT BEING ABLE TO STOP OR CONTROL WORRYING: NOT AT ALL
7. FEELING AFRAID AS IF SOMETHING AWFUL MIGHT HAPPEN: NOT AT ALL
GAD7 TOTAL SCORE: 2
3. WORRYING TOO MUCH ABOUT DIFFERENT THINGS: NOT AT ALL
5. BEING SO RESTLESS THAT IT IS HARD TO SIT STILL: NOT AT ALL
GAD7 TOTAL SCORE: 2

## 2023-07-11 ASSESSMENT — PAIN SCALES - GENERAL: PAINLEVEL: MODERATE PAIN (5)

## 2023-07-11 ASSESSMENT — PATIENT HEALTH QUESTIONNAIRE - PHQ9
5. POOR APPETITE OR OVEREATING: SEVERAL DAYS
SUM OF ALL RESPONSES TO PHQ QUESTIONS 1-9: 9

## 2023-07-11 NOTE — PROGRESS NOTES
Assessment & Plan     F11.2 - Continuous opioid dependence (H)   new CSA signed today    CKD (chronic kidney disease) stage 2, GFR 60-89 ml/min     - Albumin Random Urine Quantitative with Creat Ratio; Future    Hypothyroidism, unspecified type     - TSH WITH FREE T4 REFLEX; Future  - levothyroxine (SYNTHROID/LEVOTHROID) 50 MCG tablet; Take 1 tablet (50 mcg) by mouth daily    Chronic bilateral low back pain without sciatica     - oxyCODONE-acetaminophen (PERCOCET)  MG per tablet; Take 1 tablet by mouth every 6 hours as needed for severe pain  - oxyCODONE-acetaminophen (PERCOCET)  MG per tablet; Take 1 tablet by mouth every 6 hours as needed for severe pain  - oxyCODONE-acetaminophen (PERCOCET)  MG per tablet; Take 1 tablet by mouth every 6 hours as needed for severe pain    Sacroiliac joint pain     - oxyCODONE-acetaminophen (PERCOCET)  MG per tablet; Take 1 tablet by mouth every 6 hours as needed for severe pain  - oxyCODONE-acetaminophen (PERCOCET)  MG per tablet; Take 1 tablet by mouth every 6 hours as needed for severe pain  - oxyCODONE-acetaminophen (PERCOCET)  MG per tablet; Take 1 tablet by mouth every 6 hours as needed for severe pain    S/P lumbar spinal fusion     - oxyCODONE-acetaminophen (PERCOCET)  MG per tablet; Take 1 tablet by mouth every 6 hours as needed for severe pain  - oxyCODONE-acetaminophen (PERCOCET)  MG per tablet; Take 1 tablet by mouth every 6 hours as needed for severe pain  - oxyCODONE-acetaminophen (PERCOCET)  MG per tablet; Take 1 tablet by mouth every 6 hours as needed for severe pain    Major depressive disorder, recurrent episode, moderate (H)  Wondering if the cymbalta is making her tired.  We discussed trying to cut back to just 30 mg/day and keeping a close eye on moods/pain, etc  - DULoxetine (CYMBALTA) 30 MG capsule; Take 1 capsule (30 mg) by mouth 2 times daily    Other fatigue  R/o anemia, B12 deficiency (asked to take  "B12 last year and she is not)  - CBC with platelets; Future  - Vitamin B12; Future             BMI:   Estimated body mass index is 31.17 kg/m  as calculated from the following:    Height as of this encounter: 1.537 m (5' 0.5\").    Weight as of this encounter: 73.6 kg (162 lb 4 oz).           Sarah Barriga MD  [facility]    Miller Lopez is a 72 year old, presenting for the following health issues:  Back Pain      History of Present Illness       Reason for visit:  Meds    She eats 2-3 servings of fruits and vegetables daily.She consumes 1 sweetened beverage(s) daily.She exercises with enough effort to increase her heart rate 9 or less minutes per day.  She exercises with enough effort to increase her heart rate 3 or less days per week.   She is taking medications regularly.       Hyperlipidemia Follow-Up  Atorvastatin 20mg qd    Are you regularly taking any medication or supplement to lower your cholesterol?   Yes- statin    Are you having muscle aches or other side effects that you think could be caused by your cholesterol lowering medication?  No    Hypertension Follow-up  Amlodipine 5mg every day, metoprolol 50mg bid    Do you check your blood pressure regularly outside of the clinic? Yes     Are you following a low salt diet? Yes    Are your blood pressures ever more than 140 on the top number (systolic) OR more   than 90 on the bottom number (diastolic), for example 140/90? No    BP Readings from Last 6 Encounters:   07/11/23 124/80   03/13/23 134/84   12/07/22 130/84   06/24/22 124/78   04/14/22 112/82   03/25/22 (!) 140/82       Depression and Anxiety Follow-Up  Cymbalta 30mg bid, seroquel 100mg qd    How are you doing with your depression since your last visit? Worsened due to knee pain    How are you doing with your anxiety since your last visit?  No change    Are you having other symptoms that might be associated with depression or anxiety? Yes:  sleep problems    Have " you had a significant life event? Pain in knee, she will be having surgery in September     Do you have any concerns with your use of alcohol or other drugs? No    Social History     Tobacco Use     Smoking status: Former     Packs/day: 1.00     Years: 30.00     Pack years: 30.00     Types: Cigarettes     Quit date: 2004     Years since quittin.5     Smokeless tobacco: Never   Vaping Use     Vaping Use: Never used   Substance Use Topics     Alcohol use: No     Comment: ETOH dependency, DUI 3/15/10     Drug use: No         3/13/2023    10:27 AM 3/13/2023    10:36 AM 2023     8:52 AM   PHQ   PHQ-9 Total Score 3 3 9   Q9: Thoughts of better off dead/self-harm past 2 weeks Not at all Not at all Not at all         2022     7:47 AM 3/13/2023    10:36 AM 2023     8:52 AM   DAJUAN-7 SCORE   Total Score 4 (minimal anxiety)     Total Score 4 0 2         Suicide Assessment Five-step Evaluation and Treatment (SAFE-T)    Chronic Kidney Disease Follow-up    Do you take any over the counter pain medicine?: Yes  What over the counter medicine are you taking for your pain?:  Aleve, aspitin      How often do you take this medicine?:  One time daily    Hypothyroidism Follow-up  Levothyroxine 50mcg qd    Since last visit, patient describes the following symptoms: depression and fatigue    TSH   Date Value Ref Range Status   2022 0.74 0.40 - 4.00 mU/L Final   2020 1.50 0.40 - 4.00 mU/L Final     T4 Free   Date Value Ref Range Status   2017 1.00 0.76 - 1.46 ng/dL Final       Pain History:  When did you first notice your pain? Years   Have you seen this provider for your pain in the past?   Yes   Where in your body do you have pain? Left knee and left low back  Are you seeing anyone else for your pain? No        3/13/2023    10:27 AM 3/13/2023    10:36 AM 2023     8:52 AM   PHQ-9 SCORE   PHQ-9 Total Score MyChart 3 (Minimal depression)     PHQ-9 Total Score 3 3 9           2022     7:47 AM  "3/13/2023    10:36 AM 7/11/2023     8:52 AM   DAJUAN-7 SCORE   Total Score 4 (minimal anxiety)     Total Score 4 0 2               3/13/2023    10:27 AM 3/13/2023    10:36 AM 7/11/2023     8:52 AM   PHQ-9 SCORE   PHQ-9 Total Score MyChart 3 (Minimal depression)     PHQ-9 Total Score 3 3 9         4/14/2022     7:47 AM 3/13/2023    10:36 AM 7/11/2023     8:52 AM   DAJUAN-7 SCORE   Total Score 4 (minimal anxiety)     Total Score 4 0 2         12/7/2022     2:59 PM 3/13/2023    10:36 AM 7/11/2023     8:52 AM   PEG Score   PEG Total Score 6 8 7       Chronic Pain Follow Up:    Location of pain: Left knee and left low back  Analgesia/pain control:    - Recent changes:  Pain is worsening    - Overall control: Tolerable with discomfort    - Current treatments: opioids, muscle relaxers   Adherence:     - Do you ever take more pain medicine than prescribed? No    - When did you take your last dose of pain medicine?  This morning   Adverse effects: No   PDMP Review       Value Time User    State PDMP site checked  Yes 3/13/2023 10:44 AM Sarah Barriga MD        Last CSA Agreement:   CSA -- Patient Level:     [Media Unavailable] Controlled Substance Agreement - Opioid - Scan on 6/24/2022  7:50 AM   [Media Unavailable] Controlled Substance Agreement - Opioid - Scan on 4/30/2021 10:14 AM   [Media Unavailable] Controlled Substance Agreement - Opioid - Scan on 1/31/2019  1:16 PM: 01/28/19       Last UDS: 3/13/2023      Review of Systems   Constitutional, HEENT, cardiovascular, pulmonary, gi and gu systems are negative, except as otherwise noted.      Objective    /80   Pulse 78   Temp 98.8  F (37.1  C) (Tympanic)   Resp 18   Ht 1.537 m (5' 0.5\")   Wt 73.6 kg (162 lb 4 oz)   SpO2 93%   BMI 31.17 kg/m    Body mass index is 31.17 kg/m .  Physical Exam   GENERAL: healthy, alert and no distress  NECK: no adenopathy, no asymmetry, masses, or scars and thyroid normal to palpation  RESP: lungs clear to auscultation - no rales, " rhonchi or wheezes  CV: regular rate and rhythm, normal S1 S2, no S3 or S4, no murmur, click or rub, no peripheral edema and peripheral pulses strong  MS: no gross musculoskeletal defects noted, no edema

## 2023-07-11 NOTE — LETTER
Opioid / Opioid Plus Controlled Substance Agreement    This is an agreement between you and your provider about the safe and appropriate use of controlled substance/opioids prescribed by your care team. Controlled substances are medicines that can cause physical and mental dependence (abuse).    There are strict laws about having and using these medicines. We here at [facility] are committing to working with you in your efforts to get better. To support you in this work, we ll help you schedule regular office appointments for medicine refills. If we must cancel or change your appointment for any reason, we ll make sure you have enough medicine to last until your next appointment.     As a Provider, I will:  Listen carefully to your concerns and treat you with respect.   Recommend a treatment plan that I believe is in your best interest. This plan may involve therapies other than opioid pain medication.   Talk with you often about the possible benefits, and the risk of harm of any medicine that we prescribe for you.   Provide a plan on how to taper (discontinue or go off) using this medicine if the decision is made to stop its use.    As a Patient, I understand that opioid(s):   Are a controlled substance prescribed by my care team to help me function or work and manage my condition(s).   Are strong medicines and can cause serious side effects such as:  Drowsiness, which can seriously affect my driving ability  A lower breathing rate, enough to cause death  Harm to my thinking ability   Depression   Abuse of and addiction to this medicine  Need to be taken exactly as prescribed. Combining opioids with certain medicines or chemicals (such as illegal drugs, sedatives, sleeping pills, and benzodiazepines) can be dangerous or even fatal. If I stop opioids suddenly, I may have severe withdrawal symptoms.  Do not work for all types of pain nor for all patients. If they re not helpful, I may be asked to stop  them.        The risks, benefits and side effects of these medicine(s) were explained to me. I agree that:  I will take part in other treatments as advised by my care team. This may be psychiatry or counseling, physical therapy, behavioral therapy, group treatment or a referral to a specialist.     I will keep all my appointments. I understand that this is part of the monitoring of opioids. My care team may require an office visit for EVERY opioid/controlled substance refill. If I miss appointments or don t follow instructions, my care team may stop my medicine.    I will take my medicines as prescribed. I will not change the dose or schedule unless my care team tells me to. There will be no refills if I run out early.     I may be asked to come to the clinic and complete a urine drug test or complete a pill count at any time. If I don t give a urine sample or participate in a pill count, the care team may stop my medicine.    I will only receive prescriptions from this clinic for chronic pain. If I am treated by another provider for acute pain issues, I will tell them that I am taking opioid pain medication for chronic pain and that I have a treatment agreement with this provider. I will inform my [facility] care team within one business day if I am given a prescription for any pain medication by another healthcare provider. My [facility] care team can contact other providers and pharmacists about my use of any medicines.    It is up to me to make sure that I don t run out of my medicines on weekends or holidays. If my care team is willing to refill my opioid prescription without a visit, I must request refills only during office hours. Refills may take up to 3 business days to process. I will use one pharmacy to fill all my opioid and other controlled substance prescriptions. I will notify the clinic about any changes to my insurance or medication availability.    I am responsible for my  prescriptions. If the medicine/prescription is lost, stolen or destroyed, it will not be replaced. I also agree not to share controlled substance medicines with anyone.    I am aware I should not use any illegal or recreational drugs. I agree not to drink alcohol unless my care team says I can.       If I enroll in the [place] [facility] Cann[facility] program, I will tell my care team prior to my next refill.     I will tell my care team right away if I become pregnant, have a new medical problem treated outside of my regular clinic, or have a change in my medications.    I understand that this medicine can affect my thinking, judgment and reaction time. Alcohol and drugs affect the brain and body, which can affect the safety of my driving. Being under the influence of alcohol or drugs can affect my decision-making, behaviors, personal safety, and the safety of others. Driving while impaired (DWI) can occur if a person is driving, operating, or in physical control of a car, motorcycle, boat, snowmobile, ATV, motorbike, off-road vehicle, or any other motor vehicle ([place] Statute 169A.20). I understand the risk if I choose to drive or operate any vehicle or machinery.    I understand that if I do not follow any of the conditions above, my prescriptions or treatment may be stopped or changed.          Opioids  What You Need to Know    What are opioids?   Opioids are pain medicines that must be prescribed by a doctor. They are also known as narcotics.     Examples are:   morphine (MS Contin, Michelle)  oxycodone (Oxycontin)  oxycodone and acetaminophen (Percocet)  hydrocodone and acetaminophen (Vicodin, Norco)   fentanyl patch (Duragesic)   hydromorphone (Dilaudid)   methadone  codeine (Tylenol #3)     What do opioids do well?   Opioids are best for severe short-term pain such as after a surgery or injury. They may work well for cancer pain. They may help some people with long-lasting (chronic) pain.     What do opioids NOT do  well?   Opioids never get rid of pain entirely, and they don t work well for most patients with chronic pain. Opioids don t reduce swelling, one of the causes of pain.                                    Other ways to manage chronic pain and improve function include:     Treat the health problem that may be causing pain  Anti-inflammation medicines, which reduce swelling and tenderness, such as ibuprofen (Advil, Motrin) or naproxen (Aleve)  Acetaminophen (Tylenol)  Antidepressants and anti-seizure medicines, especially for nerve pain  Topical treatments such as patches or creams  Injections or nerve blocks  Chiropractic or osteopathic treatment  Acupuncture, massage, deep breathing, meditation, visual imagery, aromatherapy  Use heat or ice at the pain site  Physical therapy   Exercise  Stop smoking  Take part in therapy       Risks and side effects     Talk to your doctor before you start or decide to keep taking opioids. Possible side effects include:    Lowering your breathing rate enough to cause death  Overdose, including death, especially if taking higher than prescribed doses  Worse depression symptoms; less pleasure in things you usually enjoy  Feeling tired or sluggish  Slower thoughts or cloudy thinking  Being more sensitive to pain over time; pain is harder to control  Trouble sleeping or restless sleep  Changes in hormone levels (for example, less testosterone)  Changes in sex drive or ability to have sex  Constipation  Unsafe driving  Itching and sweating  Dizziness  Nausea, throwing up and dry mouth    What else should I know about opioids?    Opioids may lead to dependence, tolerance, or addiction.    Dependence means that if you stop or reduce the medicine too quickly, you will have withdrawal symptoms. These include loose poop (diarrhea), jitters, flu-like symptoms, nervousness and tremors. Dependence is not the same as addiction.                     Tolerance means needing higher doses over time to  get the same effect. This may increase the chance of serious side effects.    Addiction is when people improperly use a substance that harms their body, their mind or their relations with others. Use of opiates can cause a relapse of addiction if you have a history of drug or alcohol abuse.    People who have used opioids for a long time may have a lower quality of life, worse depression, higher levels of pain and more visits to doctors.    You can overdose on opioids. Take these steps to lower your risk of overdose:    Recognize the signs:  Signs of overdose include decrease or loss of consciousness (blackout), slowed breathing, trouble waking up and blue lips. If someone is worried about overdose, they should call 911.    Talk to your doctor about Narcan (naloxone).   If you are at risk for overdose, you may be given a prescription for Narcan. This medicine very quickly reverses the effects of opioids.   If you overdose, a friend or family member can give you Narcan while waiting for the ambulance. They need to know the signs of overdose and how to give Narcan.     Don't use alcohol or street drugs.   Taking them with opioids can cause death.    Do not take any of these medicines unless your doctor says it s OK. Taking these with opioids can cause death:  Benzodiazepines, such as lorazepam (Ativan), alprazolam (Xanax) or diazepam (Valium)  Muscle relaxers, such as cyclobenzaprine (Flexeril)  Sleeping pills like zolpidem (Ambien)   Other opioids      How to keep you and other people safe while taking opioids:    Never share your opioids with others.  Opioid medicines are regulated by the Drug Enforcement Agency ([place]IS[place]). Selling or sharing medications is a criminal act.    2. Be sure to store opioids in a secure place, locked up if possible. Young children can easily swallow them and overdose.    3. When you are traveling with your medicines, keep them in the original bottles. If you use a pill box, be sure you also  carry a copy of your medicine list from your clinic or pharmacy.    4. Safe disposal of opioids    Most pharmacies have places to get rid of medicine, called disposal kiosks. Medicine disposal options are also available in every [facility]. Search your county and  medication disposal  to find more options. You can find more details at:  [email or web address]://[email or web address]-[email or web address]/[email or web address]-[email or web address]-medications     I agree that my provider, clinic care team, and pharmacy may work with any city, state or federal law enforcement agency that investigates the misuse, sale, or other diversion of my controlled medicine. I will allow my provider to discuss my care with, or share a copy of, this agreement with any other treating provider, pharmacy or emergency room where I receive care.    I have read this agreement and have asked questions about anything I did not understand.    _______________________________________________________  Patient Signature - Jessica Ellison _____________________                   Date     _______________________________________________________  Provider Signature - Sarah Barriga MD   _____________________                   Date     _______________________________________________________  Witness Signature (required if provider not present while patient signing)   _____________________                   Date

## 2023-07-11 NOTE — PATIENT INSTRUCTIONS
"Doxylamine - okay for sleep (unisom)    Diphenhydramine - not okay for sleep - linked to dementia        Thank you for choosing [facility].      When you are out of refills or the refills say \"zero\", it is time to schedule your next appointment in clinic!    Labs are released to you almost immediately and sometimes before I have had a chance to review them.  I review labs regularly and once they are all in, you will be sent a letter with your results and/or if you are signed up for on-line services, you will be e-mailed the results with my interpretation. If there are serious findings, you typically will be called.    If you have any questions about your visit, your symptoms, your medication, your test results or it is not clear what your diagnosis or treatment plan is please contact me (via [employer]) or call the care team at [phone] option #2      Our Clinic hours are:  Mondays    7:20 am - 7 pm  Tues -  Fri  7:20 am - 5 pm      The clinic lab opens at 7:30 am Mon - Fri and appointments are required.    [facility]  Ph. [phone]  Monday-Thursday 8 am - 7pm  Tues/Wed/Fri 8 am - 5:30 pm       "

## 2023-07-13 ENCOUNTER — TELEPHONE (OUTPATIENT)
Dept: FAMILY MEDICINE | Facility: CLINIC | Age: 73
End: 2023-07-13
Payer: COMMERCIAL

## 2023-07-13 NOTE — TELEPHONE ENCOUNTER
Patient Quality Outreach    Patient is due for the following:   None    Next Steps:   - Schedule wellness visit    Type of outreach:    Sent [employer] Mobile message.      Questions for provider review:    None           Altagracia Rob, CMA

## 2023-07-14 RX ORDER — VENLAFAXINE 75 MG/1
75 TABLET ORAL 3 TIMES DAILY
Status: ON HOLD | COMMUNITY
End: 2023-08-16

## 2023-07-14 RX ORDER — DIAZEPAM 10 MG
10 TABLET ORAL EVERY 6 HOURS PRN
COMMUNITY
End: 2023-08-09

## 2023-07-27 ENCOUNTER — TELEPHONE (OUTPATIENT)
Dept: NEUROSURGERY | Facility: CLINIC | Age: 73
End: 2023-07-27
Payer: COMMERCIAL

## 2023-07-27 NOTE — TELEPHONE ENCOUNTER
Per Dr. García note from 5/1/22 visit.    From our standpoint, she should remain on a daily aspirin indefinitely. The 81 mg dose should be adequate from our standpoint.    LVMM for patient letting her know that Dr. García is okay with being on the 81mg Aspirin dose as he stated at her last visit. Left patient my contact information and encouraged her to call back or send a [employer] message with further questions.     Jojo York RN 7/27/2023 4:10 PM

## 2023-07-27 NOTE — TELEPHONE ENCOUNTER
Health Call Center    Phone Message    May a detailed message be left on voicemail: yes     Reason for Call: Medication Question or concern regarding medication   Prescription Clarification  Name of Medication: Aspirin  Prescribing Provider: Dr. García  Patient states since her surgery, she has been instructed to take aspirin every day by Dr. García. Patient states on 8/16/2023 she is going to have a total knee replacement, patient is wondering if she can cut the dosage down to children's aspirin during that time. Patient states that total knee replacements are done with a tourniquet which concerns her.   Patient states she has been having extreme bruising from her current dose of aspirin. Please call patient to discuss.       Action Taken: Message routed to:  [facility] ([facility] Neurosurgery    Travel Screening: Not Applicable

## 2023-07-31 ENCOUNTER — TELEPHONE (OUTPATIENT)
Dept: SLEEP MEDICINE | Facility: CLINIC | Age: 73
End: 2023-07-31
Payer: COMMERCIAL

## 2023-07-31 DIAGNOSIS — J44.9 COPD (CHRONIC OBSTRUCTIVE PULMONARY DISEASE) (H): Primary | ICD-10-CM

## 2023-08-01 ENCOUNTER — DOCUMENTATION ONLY (OUTPATIENT)
Dept: SLEEP MEDICINE | Facility: CLINIC | Age: 73
End: 2023-08-01
Payer: COMMERCIAL

## 2023-08-01 NOTE — PROGRESS NOTES
8/1/23- JL- SHE IS GOING TO CALL CENTRAL SCHEDULING TO SCHEDULE COMPLIANCE FOLLOW UP APPT TODAY. SHE IS GOING TO CALL [facility] WITH DATE OF APPT.

## 2023-08-07 ENCOUNTER — TRANSCRIBE ORDERS (OUTPATIENT)
Dept: OTHER | Age: 73
End: 2023-08-07

## 2023-08-07 DIAGNOSIS — Z47.1 AFTERCARE FOLLOWING JOINT REPLACEMENT: Primary | ICD-10-CM

## 2023-08-07 DIAGNOSIS — Z96.652 S/P TOTAL KNEE ARTHROPLASTY, LEFT: ICD-10-CM

## 2023-08-08 ENCOUNTER — LAB (OUTPATIENT)
Dept: LAB | Facility: CLINIC | Age: 73
End: 2023-08-08
Payer: COMMERCIAL

## 2023-08-08 ENCOUNTER — OFFICE VISIT (OUTPATIENT)
Dept: FAMILY MEDICINE | Facility: CLINIC | Age: 73
End: 2023-08-08
Payer: COMMERCIAL

## 2023-08-08 VITALS
SYSTOLIC BLOOD PRESSURE: 130 MMHG | RESPIRATION RATE: 12 BRPM | DIASTOLIC BLOOD PRESSURE: 98 MMHG | OXYGEN SATURATION: 98 % | HEIGHT: 61 IN | HEART RATE: 73 BPM | TEMPERATURE: 98 F | BODY MASS INDEX: 31.19 KG/M2 | WEIGHT: 165.2 LBS

## 2023-08-08 DIAGNOSIS — M17.12 PRIMARY OSTEOARTHRITIS OF LEFT KNEE: ICD-10-CM

## 2023-08-08 DIAGNOSIS — E78.5 HYPERLIPIDEMIA, UNSPECIFIED HYPERLIPIDEMIA TYPE: ICD-10-CM

## 2023-08-08 DIAGNOSIS — B37.2 YEAST INFECTION OF THE SKIN: ICD-10-CM

## 2023-08-08 DIAGNOSIS — N18.2 CKD (CHRONIC KIDNEY DISEASE) STAGE 2, GFR 60-89 ML/MIN: ICD-10-CM

## 2023-08-08 DIAGNOSIS — G47.33 OSA (OBSTRUCTIVE SLEEP APNEA): ICD-10-CM

## 2023-08-08 DIAGNOSIS — G89.29 CHRONIC BILATERAL LOW BACK PAIN WITHOUT SCIATICA: ICD-10-CM

## 2023-08-08 DIAGNOSIS — F11.20 CONTINUOUS OPIOID DEPENDENCE (H): ICD-10-CM

## 2023-08-08 DIAGNOSIS — E03.9 HYPOTHYROIDISM, UNSPECIFIED TYPE: ICD-10-CM

## 2023-08-08 DIAGNOSIS — M54.50 CHRONIC BILATERAL LOW BACK PAIN WITHOUT SCIATICA: ICD-10-CM

## 2023-08-08 DIAGNOSIS — J43.2 CENTRILOBULAR EMPHYSEMA (H): ICD-10-CM

## 2023-08-08 DIAGNOSIS — Z01.818 PREOP GENERAL PHYSICAL EXAM: ICD-10-CM

## 2023-08-08 DIAGNOSIS — I10 ESSENTIAL HYPERTENSION WITH GOAL BLOOD PRESSURE LESS THAN 140/90: ICD-10-CM

## 2023-08-08 DIAGNOSIS — Z01.818 PREOP GENERAL PHYSICAL EXAM: Primary | ICD-10-CM

## 2023-08-08 PROBLEM — Z20.822 SUSPECTED COVID-19 VIRUS INFECTION: Status: RESOLVED | Noted: 2020-12-22 | Resolved: 2023-08-08

## 2023-08-08 LAB
ANION GAP SERPL CALCULATED.3IONS-SCNC: 13 MMOL/L (ref 7–15)
BUN SERPL-MCNC: 14.1 MG/DL (ref 8–23)
CALCIUM SERPL-MCNC: 10.1 MG/DL (ref 8.8–10.2)
CHLORIDE SERPL-SCNC: 103 MMOL/L (ref 98–107)
CREAT SERPL-MCNC: 0.87 MG/DL (ref 0.51–0.95)
CREAT UR-MCNC: 58.7 MG/DL
DEPRECATED HCO3 PLAS-SCNC: 24 MMOL/L (ref 22–29)
ERYTHROCYTE [DISTWIDTH] IN BLOOD BY AUTOMATED COUNT: 13.1 % (ref 10–15)
GFR SERPL CREATININE-BSD FRML MDRD: 71 ML/MIN/1.73M2
GLUCOSE SERPL-MCNC: 84 MG/DL (ref 70–99)
HCT VFR BLD AUTO: 35.9 % (ref 35–47)
HGB BLD-MCNC: 11.7 G/DL (ref 11.7–15.7)
MCH RBC QN AUTO: 32.9 PG (ref 26.5–33)
MCHC RBC AUTO-ENTMCNC: 32.6 G/DL (ref 31.5–36.5)
MCV RBC AUTO: 101 FL (ref 78–100)
MICROALBUMIN UR-MCNC: 13.1 MG/L
MICROALBUMIN/CREAT UR: 22.32 MG/G CR (ref 0–25)
PLATELET # BLD AUTO: 262 10E3/UL (ref 150–450)
POTASSIUM SERPL-SCNC: 4.6 MMOL/L (ref 3.4–5.3)
RBC # BLD AUTO: 3.56 10E6/UL (ref 3.8–5.2)
SODIUM SERPL-SCNC: 140 MMOL/L (ref 136–145)
WBC # BLD AUTO: 4 10E3/UL (ref 4–11)

## 2023-08-08 PROCEDURE — 80048 BASIC METABOLIC PNL TOTAL CA: CPT

## 2023-08-08 PROCEDURE — 93000 ELECTROCARDIOGRAM COMPLETE: CPT | Performed by: NURSE PRACTITIONER

## 2023-08-08 PROCEDURE — 82570 ASSAY OF URINE CREATININE: CPT

## 2023-08-08 PROCEDURE — 0134A COVID-19 BIVALENT 18+ (MODERNA): CPT | Performed by: NURSE PRACTITIONER

## 2023-08-08 PROCEDURE — 82043 UR ALBUMIN QUANTITATIVE: CPT

## 2023-08-08 PROCEDURE — 91313 COVID-19 BIVALENT 18+ (MODERNA): CPT | Performed by: NURSE PRACTITIONER

## 2023-08-08 PROCEDURE — 85027 COMPLETE CBC AUTOMATED: CPT

## 2023-08-08 PROCEDURE — 99214 OFFICE O/P EST MOD 30 MIN: CPT | Mod: 25 | Performed by: NURSE PRACTITIONER

## 2023-08-08 PROCEDURE — 36415 COLL VENOUS BLD VENIPUNCTURE: CPT

## 2023-08-08 RX ORDER — OXYCODONE HYDROCHLORIDE 5 MG/1
5 TABLET ORAL EVERY 6 HOURS PRN
Qty: 10 TABLET | Refills: 0 | Status: ON HOLD | OUTPATIENT
Start: 2023-08-08 | End: 2023-08-19

## 2023-08-08 RX ORDER — OXYCODONE HYDROCHLORIDE 5 MG/1
5 TABLET ORAL EVERY 6 HOURS PRN
Qty: 16 TABLET | Refills: 0 | Status: SHIPPED | OUTPATIENT
Start: 2023-08-08 | End: 2023-08-08

## 2023-08-08 RX ORDER — NYSTATIN 100000 [USP'U]/G
POWDER TOPICAL 3 TIMES DAILY
Qty: 30 G | Refills: 1 | Status: ON HOLD | OUTPATIENT
Start: 2023-08-08 | End: 2023-08-16

## 2023-08-08 ASSESSMENT — ANXIETY QUESTIONNAIRES
6. BECOMING EASILY ANNOYED OR IRRITABLE: NOT AT ALL
3. WORRYING TOO MUCH ABOUT DIFFERENT THINGS: SEVERAL DAYS
2. NOT BEING ABLE TO STOP OR CONTROL WORRYING: SEVERAL DAYS
1. FEELING NERVOUS, ANXIOUS, OR ON EDGE: SEVERAL DAYS
4. TROUBLE RELAXING: SEVERAL DAYS
GAD7 TOTAL SCORE: 4
7. FEELING AFRAID AS IF SOMETHING AWFUL MIGHT HAPPEN: NOT AT ALL
IF YOU CHECKED OFF ANY PROBLEMS ON THIS QUESTIONNAIRE, HOW DIFFICULT HAVE THESE PROBLEMS MADE IT FOR YOU TO DO YOUR WORK, TAKE CARE OF THINGS AT HOME, OR GET ALONG WITH OTHER PEOPLE: NOT DIFFICULT AT ALL
GAD7 TOTAL SCORE: 4
5. BEING SO RESTLESS THAT IT IS HARD TO SIT STILL: NOT AT ALL

## 2023-08-08 ASSESSMENT — PAIN SCALES - GENERAL: PAINLEVEL: EXTREME PAIN (8)

## 2023-08-08 NOTE — PROGRESS NOTES
[facility]  [address]  [facility] [address]  Phone: [phone]  Primary Provider: Sarah Barriga  Pre-op Performing Provider: TOÑA CARBONE      PREOPERATIVE EVALUATION:  Today's date: 8/8/2023    Jessica Ellison is a 72 year old female who presents for a preoperative evaluation.      8/8/2023    10:11 AM   Additional Questions   Roomed by Melissa MARR CMA   Accompanied by self       Surgical Information:  Surgery/Procedure: LEFT TOTAL KNEE ARTHROPLASTY   Surgery Location: [facility]  Surgeon: Bryan Roque MD   Surgery Date: 8/16/23  Time of Surgery: 2:45pm  Where patient plans to recover: At home with family  Fax number for surgical facility: Note does not need to be faxed, will be available electronically in Epic.    Assessment & Plan     The proposed surgical procedure is considered INTERMEDIATE risk.    Preop general physical exam  Primary osteoarthritis of left knee  Preoperative exam completed today Labs updated as below.  EKG completed today no significant EKG changes from previous reads.  She is asymptomatic from a cardiac standpoint.  No significant cardiac history.  Echocardiogram March 2021 showed an EF of 55 to 60%.  Patient is cleared for upcoming surgical procedure without further clinical clarification.  - EKG 12-lead complete w/read - Clinics  - Basic metabolic panel  (Ca, Cl, CO2, Creat, Gluc, K, Na, BUN); Future  - CBC with platelets; Future  - oxyCODONE (ROXICODONE) 5 MG tablet; Take 1 tablet (5 mg) by mouth every 6 hours as needed for severe pain With a percocet    F11.2 - Continuous opioid dependence (H)  On oxycodone prescribed by PCP with a controlled substance agreement in place.  Limited supply of 5 mg oxycodone provided to patient for presurgical pain in the knee that is not being managed by current prescription of Percocet.  Advised patient to use this sparingly and only for severe pain when the current pain  regimen is not sufficient.  She has Narcan available.  She is not taking any benzodiazepines.    Chronic bilateral low back pain without sciatica  Chronic low back pain on Percocet for this has previous history of spinal fusions and congenital spine changes.    Essential hypertension with goal blood pressure less than 140/90  Blood pressure is generally well controlled with amlodipine 5 mg.  Mildly elevated today secondary to pain.    Hyperlipidemia, unspecified hyperlipidemia type  On atorvastatin tolerating well    Hypothyroidism, unspecified type  On levothyroxine levels have been stable    JOY (obstructive sleep apnea)  Wears a CPAP at night advised to bring to hospital for surgery.    Centrilobular emphysema (H)  Has a follow-up appointment with pulmonology with upcoming PFTs and follow-up appointment.  Generally feels stable.    CKD (chronic kidney disease) stage 2, GFR 60-89 ml/min  BMP to be checked    Yeast infection of the skin  Concern of into her groin rash has utilized nystatin in the past refills provided today  - nystatin (MYCOSTATIN) 699369 UNIT/GM external powder; Apply topically 3 times daily To rash until healing is complete     - No identified additional risk factors other than previously addressed    Antiplatelet or Anticoagulation Medication Instructions:   - aspirin: Discontinue aspirin 7-10 days prior to procedure to reduce bleeding risk. It should be resumed postoperatively.     Additional Medication Instructions:   - Calcium Channel Blockers: May be continued on the day of surgery.   - Statins: Continue taking on the day of surgery.    - celecoxib (Celebrex): HOLD 3 days before surgery. May continue without modification for management of severe pain.    - Psychostimulants: Hold the day of surgery   - SSRIs, SNRIs, TCAs, Antipsychotics: Continue without modification.    - Herbal medications and vitamins: HOLD 14 days prior to surgery.    RECOMMENDATION:  APPROVAL GIVEN to proceed with  proposed procedure, without further diagnostic evaluation.            Subjective       HPI related to upcoming procedure: has failed conservative management of chronic left knee pain. Symptoms have been progressive and ongoing. Left knee pain has impacted ability to do activities of daily living and activities of enjoyment.     History of spinal surgeries and spinal fusions. She has chronic back pain. Narcotics managed by PCP.  Pain is not well controlled at the moment between her back pain and the knee pain. She would like increase short term until post surgery recovery.         8/8/2023    10:14 AM   Preop Questions   1. Have you ever had a heart attack or stroke? No   2. Have you ever had surgery on your heart or blood vessels, such as a stent placement, a coronary artery bypass, or surgery on an artery in your head, neck, heart, or legs? YES - cerebral aneurysm about 8 years ago. No residuals, post coil. Doing well.    3. Do you have chest pain with activity? No   4. Do you have a history of  heart failure? No   5. Do you currently have a cold, bronchitis or symptoms of other infection? No   6. Do you have a cough, shortness of breath, or wheezing? YES - shortness of breath. Occasional uses Spiriva for COPD. Seeing Pulmonology on PFT on 8/10 and specialist on 8/14 Reports history of JOY.    7. Do you or anyone in your family have previous history of blood clots? No   8. Do you or does anyone in your family have a serious bleeding problem such as prolonged bleeding following surgeries or cuts? No   9. Have you ever had problems with anemia or been told to take iron pills? No   10. Have you had any abnormal blood loss such as black, tarry or bloody stools, or abnormal vaginal bleeding? No   11. Have you ever had a blood transfusion? YES - 2007   11a. Have you ever had a transfusion reaction? No   12. Are you willing to have a blood transfusion if it is medically needed before, during, or after your surgery? Yes    13. Have you or any of your relatives ever had problems with anesthesia? No   14. Do you have sleep apnea, excessive snoring or daytime drowsiness? YES - sleep apnea   14a. Do you have a CPAP machine? Yes   15. Do you have any artifical heart valves or other implanted medical devices like a pacemaker, defibrillator, or continuous glucose monitor? No   16. Do you have artificial joints? No   17. Are you allergic to latex? No       Health Care Directive:  Patient does not have a Health Care Directive or Living Will: Discussed advance care planning with patient; however, patient declined at this time.    Preoperative Review of :   reviewed - controlled substances reflected in medication list.      Status of Chronic Conditions:  HYPERLIPIDEMIA - Patient has a long history of significant Hyperlipidemia requiring medication for treatment with recent good control. Patient reports no problems or side effects with the medication.     HYPERTENSION - Patient has longstanding history of HTN , currently denies any symptoms referable to elevated blood pressure. Specifically denies chest pain, palpitations, dyspnea, orthopnea, PND or peripheral edema. Blood pressure readings have been in normal range. Current medication regimen is as listed below. Patient denies any side effects of medication.     SLEEP PROBLEM - Patient has a longstanding history of  JOY uses a CPAP . Patient has tried OTC medications with limited success.     Review of Systems  CONSTITUTIONAL: NEGATIVE for fever, chills, change in weight  INTEGUMENTARY/SKIN: NEGATIVE for worrisome rashes, moles or lesions  EYES: NEGATIVE for vision changes or irritation  ENT/MOUTH: NEGATIVE for ear, mouth and throat problems  RESP: NEGATIVE for significant cough or SOB  CV: NEGATIVE for chest pain, palpitations or peripheral edema  GI: NEGATIVE for nausea, abdominal pain, heartburn, or change in bowel habits  : NEGATIVE for frequency, dysuria, or  hematuria  MUSCULOSKELETAL: NEGATIVE for significant arthralgias or myalgia  NEURO: NEGATIVE for weakness, dizziness or paresthesias  ENDOCRINE: NEGATIVE for temperature intolerance, skin/hair changes  HEME: NEGATIVE for bleeding problems  PSYCHIATRIC: NEGATIVE for changes in mood or affect    Patient Active Problem List    Diagnosis Date Noted    F11.2 - Continuous opioid dependence (H) 07/11/2023     Priority: Medium     Patient is followed by DOMINIQUE COPPOLA for ongoing prescription of narcotic pain medicine.  Med: oxycodone/acetaminophen 10/325 for chronic back pain.   Maximum use per month: 70  Expected duration: unknown  Narcotic agreement on file: YES 7/11/2023   Clinic visit recommended: Q 3 months    PDMP reviewed 7/11/2023 April 2013 - rhizotomy for SI (left) Jhon Mckay MD    Follows at [facility] - severe low back pain with work related injuries.  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  Spinal stenosis at most lumbar levels  Improvement with past epidural steroid injections  SI joint dysfunction with the pain        JOY (obstructive sleep apnea) 08/05/2021     Priority: Medium    CKD (chronic kidney disease) stage 2, GFR 60-89 ml/min 08/05/2021     Priority: Medium    Hyperlipidemia 12/22/2020     Priority: Medium    Centrilobular emphysema (H) 11/04/2020     Priority: Medium    Hypoxia 01/05/2019     Priority: Medium    Spell of change in speech 04/17/2018     Priority: Medium    Hypothyroidism 09/07/2016     Priority: Medium    Chronic bilateral low back pain without sciatica 06/02/2016     Priority: Medium    Essential hypertension with goal blood pressure less than 140/90 06/02/2016     Priority: Medium    S/P lumbar spinal fusion 10/19/2015     Priority: Medium    Bee allergy status 06/30/2015     Priority: Medium    Cerebral aneurysm, nonruptured 07/24/2014     Priority: Medium     L ICA superior hypophyseal aneurysm s/p stent assisted coil embolization  Has  diagnostic cerebral angiography every 2 years with Dr. Marti    repeat an MRA in 5 years -5/2027      Chronic pain 03/07/2014     Priority: Medium    Major depressive disorder, recurrent episode, moderate (H) 07/10/2013     Priority: Medium    Advanced directives, counseling/discussion 09/05/2012     Priority: Medium     Patient does not have an Advance/Health Care Directive (HCD), has information at home.     Sonja Krishnamurthy  September 5, 2012        Anxiety 06/20/2012     Priority: Medium    CARDIOVASCULAR SCREENING; LDL GOAL LESS THAN 160 10/31/2010     Priority: Medium    Insomnia 12/21/2009     Priority: Medium    Back pain 12/21/2009     Priority: Medium     Patient is followed by DOMINIQUE COPPOLA for ongoing prescription of narcotic pain medicine.  Med: oxycodone/acetaminophen 10/325 for chronic back pain.   Maximum use per month: 70  Expected duration: unknown  Narcotic agreement on file: YES 7/11/2023   Clinic visit recommended: Q 3 months    PDMP reviewed 7/11/2023 April 2013 - rhizotomy for SI (left) Jhon Mckay MD    Follows at [facility] - severe low back pain with work related injuries.  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     Priority: Medium      Past Medical History:   Diagnosis Date    Anemia     Aneurysm (H)     Arthritis     Chemical dependency (H)     Chem Dep RX    Depression     History of blood transfusion     Hypertension     Menopausal symptoms     Other chronic pain     Lower back and hips    Sleep apnea     Sleep disorder     Thyroid disease     Tobacco abuse     Uncomplicated asthma      Past Surgical History:   Procedure Laterality Date    ABDOMEN SURGERY      APPENDECTOMY  1960    APPENDECTOMY      BACK SURGERY      BIOPSY BREAST      cerebral aneurysm  2014     coiled    CEREBRAL ANEURYSM REPAIR      COLON SURGERY      COLONOSCOPY  04/19/2005    FRACTURE SURGERY      HC EXCISION BREAST LESION, OPEN >=1      core biopsy 2006, lumpectomy 2006    HERNIA REPAIR      LAP VENTRAL HERNIA REPAIR  12/2017    [place]    LAPAROSCOPIC ASSISTED HYSTERECTOMY VAGINAL  12/2017    LUMPECTOMY BREAST      orif left femoral neck Left 06/03/2016    stress fracture.  done at [facility]    SURGICAL HISTORY OF -   2004    Skin Graft    ZZC PART REMOVAL COLON W ANASTOMOSIS  05/2005    diverticulitis    ZZC SPINE FUSION,ANTER,8+ SGMTS  2007    Anterior posterior fusion 10 levels, hardware removal and re-fusion 2009     Current Outpatient Medications   Medication Sig Dispense Refill    amLODIPine (NORVASC) 5 MG tablet TAKE 1 TABLET (5 MG) BY MOUTH DAILY 90 tablet 1    aspirin 325 MG tablet Take 1 tablet (325 mg) by mouth daily (Patient taking differently: Take 81 mg by mouth daily) 180 tablet 6    atorvastatin (LIPITOR) 20 MG tablet Take 1 tablet (20 mg) by mouth daily 90 tablet 3    calcium carbonate 600 mg-vitamin D 400 units (CALTRATE) 600-400 MG-UNIT per tablet Take 1 tablet by mouth every evening      celecoxib (CELEBREX) 200 MG capsule TAKE 1 CAPSULE (200 MG) BY MOUTH DAILY 90 capsule 1    cyclobenzaprine (FLEXERIL) 10 MG tablet TAKE ONE TABLET BY MOUTH TWICE A DAY AS NEEDED FOR MUSCLE SPASMS 180 tablet 1    DULoxetine (CYMBALTA) 30 MG capsule Take 1 capsule (30 mg) by mouth 2 times daily 180 capsule 1    ferrous sulfate (FEROSUL) 325 (65 Fe) MG tablet Take 325 mg by mouth every other day Alternating days with Senna tab      levothyroxine (SYNTHROID/LEVOTHROID) 50 MCG tablet Take 1 tablet (50 mcg) by mouth daily 90 tablet 3    MELATONIN GUMMIES PO Take 20 mg by mouth At Bedtime      metoprolol succinate ER (TOPROL XL) 50 MG 24 hr tablet TAKE ONE TABLET BY MOUTH TWICE DAILY 180 tablet 2    Multiple Vitamin (MULTIVITAMIN ADULT PO) Take by mouth daily      naloxone (NARCAN) 4 MG/0.1ML nasal  spray Spray 1 spray (4 mg) into one nostril alternating nostrils once as needed for opioid reversal every 2-3 minutes until assistance arrives 0.2 mL 1    nystatin (MYCOSTATIN) 836609 UNIT/GM external powder Apply topically 3 times daily To rash until healing is complete 30 g 1    oxyCODONE (ROXICODONE) 5 MG tablet Take 1 tablet (5 mg) by mouth every 6 hours as needed for severe pain With a percocet 10 tablet 0    oxyCODONE-acetaminophen (PERCOCET)  MG per tablet Take 1 tablet by mouth every 6 hours as needed for severe pain 70 tablet 0    [START ON 2023] oxyCODONE-acetaminophen (PERCOCET)  MG per tablet Take 1 tablet by mouth every 6 hours as needed for severe pain 70 tablet 0    [START ON 2023] oxyCODONE-acetaminophen (PERCOCET)  MG per tablet Take 1 tablet by mouth every 6 hours as needed for severe pain 70 tablet 0    QUEtiapine (SEROQUEL) 50 MG tablet Take 2 tablets (100 mg) by mouth daily 180 tablet 1    senna (SENOKOT) 8.6 MG tablet Take 1 tablet by mouth every other day Alternating days with Iron tab      SPIRIVA HANDIHALER 18 MCG inhaled capsule USING THE HANDIHALER, INHALE THE CONTENTS OF ONE CAPSULE BY MOUTH DAILY 30 capsule 3    venlafaxine (EFFEXOR) 75 MG tablet Take 75 mg by mouth 3 times daily      VENTOLIN  (90 Base) MCG/ACT inhaler INHALE TWO PUFFS BY MOUTH EVERY 6 HOURS (Patient taking differently: as needed) 18 g 0    diazepam (VALIUM) 10 MG tablet Take 10 mg by mouth every 6 hours as needed for anxiety (Patient not taking: Reported on 2023)      valACYclovir (VALTREX) 1000 mg tablet Take 2 tablets (2,000 mg) by mouth 2 times daily for 1 day 4 tablet 0       Allergies   Allergen Reactions    Bee Venom     Lisinopril Swelling     Oral swelling        Social History     Tobacco Use    Smoking status: Former     Packs/day: 1.00     Years: 30.00     Pack years: 30.00     Types: Cigarettes     Quit date: 2004     Years since quittin.6    Smokeless  "tobacco: Never   Substance Use Topics    Alcohol use: No     Comment: ETOH dependency, DUI 3/15/10; 1-2 ETOH per week     Family History   Problem Relation Age of Onset    Cancer Mother         breast/lung    Alcohol/Drug Mother     Depression Mother     Cancer - colorectal Father     Alcohol/Drug Father     Diabetes Maternal Grandmother     Diabetes Maternal Grandfather     Diabetes Paternal Grandmother     Diabetes Paternal Grandfather     Cancer Paternal Grandfather     Gastrointestinal Disease Paternal Grandfather     Congenital Anomalies Son     Hypertension Brother     Adrenal Disorder Brother         had adrenalectomies    Cerebral palsy Granddaughter      History   Drug Use    Types: Oxycodone     Comment: percocet for the past several years         Objective     BP (!) 130/98 (BP Location: Right arm, Patient Position: Sitting, Cuff Size: Adult Regular)   Pulse 73   Temp 98  F (36.7  C) (Tympanic)   Resp 12   Ht 1.537 m (5' 0.5\")   Wt 74.9 kg (165 lb 3.2 oz)   SpO2 98%   BMI 31.73 kg/m      Physical Exam    GENERAL APPEARANCE: healthy, alert and no distress     EYES: EOMI, PERRL     HENT: ear canals and TM's normal and nose and mouth without ulcers or lesions     NECK: no adenopathy, no asymmetry, masses, or scars and thyroid normal to palpation     RESP: lungs clear to auscultation - no rales, rhonchi or wheezes     CV: regular rates and rhythm, normal S1 S2, no S3 or S4 and no murmur, click or rub     ABDOMEN:  soft, nontender, no HSM or masses and bowel sounds normal     MS: extremities normal- no gross deformities noted, no evidence of inflammation in joints, FROM in all extremities.     SKIN: no suspicious lesions or rashes     NEURO: Normal strength and tone, sensory exam grossly normal, mentation intact and speech normal     PSYCH: mentation appears normal. and affect normal/bright     LYMPHATICS: No cervical adenopathy    Recent Labs   Lab Test 07/11/23  0921 03/13/23  1103 06/24/22  0746 "   HGB 12.5  --  12.3     --  150   NA  --  141 142   POTASSIUM  --  4.3 4.3   CR  --  0.91 0.78        Diagnostics:  Labs pending at this time.  Results will be reviewed when available.   EKG required for orthopedic surgery and not completed in the last 90 days.     Revised Cardiac Risk Index (RCRI):  The patient has the following serious cardiovascular risks for perioperative complications:   - No serious cardiac risks = 0 points     RCRI Interpretation: 0 points: Class I (very low risk - 0.4% complication rate)      Signed Electronically by: LA Lorenzo CNP  Copy of this evaluation report is provided to requesting physician.    Answers submitted by the patient for this visit:  DAJUAN-7 (Submitted on 8/8/2023)  DAJUAN 7 TOTAL SCORE: 4

## 2023-08-08 NOTE — PROGRESS NOTES
Discharge plan according to [facility]:       07/14/23 5096   Discharge Planning   Patient/Family Anticipates Transition to home with family   Living Arrangements   People in Home spouse;child(jaxson), adult;grandchild(jaxson)   Type of Residence Private Residence   Is your private residence a single family home or apartment? Single family home   Number of Stairs, Within Home, Primary ten   Stair Railings, Within Home, Primary railings safe and in good condition   Once home, are you able to live on one level? Yes   Which level? Main Level   Bathroom Shower/Tub Walk-in shower   Equipment Currently Used at Home cane, straight;raised toilet seat   Support System   Support Systems Spouse/Significant Other;Children   Do you have someone available to stay with you one or two nights once you are home? Yes

## 2023-08-08 NOTE — PROGRESS NOTES
Sleep Apnea - Follow-up Visit:    Impression/Plan:  (G47.33) JOY (obstructive sleep apnea) (primary encounter diagnosis)  Comment: Jessica is a 72-year-old female who presents to the sleep clinic for follow-up of her severe apnea managed with CPAP. She feels things are going well with CPAP besides difficulty finding the perfect mask style. She alternates between nasal pillows and a nasal mask. She is using CPAP every night and benefiting from use. She is tolerating the pressures. Her residual AHI is normal at 4.5 events/hr. Leak is acceptable. She had some central events on her diagnostic PSG. She continues to take oxycodone-acetaminophen  mg daily but she reports she is taking less compared to the night of her sleep study. She was prescribed 5 mg oxycodone to take with the oxycodone-acetaminophen for her upcoming TKA. Her Echo in 2021 was normal with a LVEF of 55-60%. She is up 5 lbs. from the night of her sleep study. Her last WatchPAT completed in 09/2021 showed 257 minutes with oxygen saturation less than 88% on Auto-PAP 7-15 cm H2O. A repeat titration study was recommended at that time but never completed.   Plan: Comprehensive Sleep Study & Comprehensive DME  Continue auto-PAP 7-15 cm H2O. A prescription was written for new supplies. We reviewed recommendations for cleaning and replacing supplies. Informed Jessica that I would like her to complete an all night titration study as I am concerned about her residual hypoxemia despite being on CPAP. She is willing to complete a titration study after she has her knee replacement. An order was placed for an all night titration study with instructions to titrate CPAP until AHI <10/hr then titrate oxygen. If she continues to have sleep disruption with an arousal index of >25/hr, she can be switched to ASV. I showed her how to adjust her humidity settings on her machine, and I discussed ways to prevent dry mouth including mouth tape, chin strap, or a full face mask.  She is already using Biotene.    Patient will follow up 2 weeks after her titration study.  Ruel Woodward, AUDIE-ALEJANDRO.    Total time spent reviewing medical records, history and physical examination, review of previous testing and interpretation as well as documentation on this date: 40 minutes    CC: Sarah Barriga MD    History of Present Illness:  Chief Complaint   Patient presents with    CPAP Follow Up     CPAP working pretty good, between two mast one is uncomfortable and other makes to much noise (leakage)      Jessica Ellison presents for follow-up of her severe complex and obstructive sleep apnea (AHI 56; 2021 with 5.5 hours of hypoxemia), managed with CPAP. She was last seen virtually by Dr. Romero on 10/06/2021. She feels things are going ok with CPAP besides finding the perfect mask. Her past medical history is significant for the following:     COPD based on upper lobe emphysema on CT scan with FEV1 88% of predicted in 2019 with mild hypoventilation (PvCO2 47-48 mmHg)  Chronic pain with morning dose of narcotic  Mood disturbance with anxiety/depression; alcohol use in the past  ECHO on 03/18/2021 was normal. LVEF 55-60%.   She reports an upcoming PFT.     6/16/2021 [place] [facility] Sleep Study (160.0 lbs) - AHI 56.9, RDI 57.6, Supine .7, REM AHI 78.5, Low O2 70.5%, Time Spent ?88% 334.2 minutes / Time Spent ?89% 358.9 minutes.    WatchPAT Home Sleep Study on 08/25/2021 without CPAP- 39 events/hr, mean oxygen saturation was 88%. Minimum was 79%. Time with saturation less than 88% was 291 minutes.     WatchPAT Home Sleep Study on 09/15/2021 with CPAP 7-15 cm H2O- 15.7 events/hr, REM AHI 27/hr, supine AHI 16.7/hr. Mean oxygen saturation was 87%. Minimum was 77%. Time with saturation less than 88% was 257 minutes.     Do you use a CPAP Machine at home: Yes    What type of mask do you use: She switches between the nasal mask and nasal pillows   Is your mask comfortable: The nasal mask rubs on one side  of her nose and it is bothersome. Nasal pillows are quiet but then leak throughout the night.   If not, why:      Is your mask leaking: Yes, when the nasal pillows shift in the night.   If yes, where do you feel it: right at her nose    Do you notice snoring with mask on: No, not that her  has complained of.  Do you notice gasping arousals with mask on: No  Are you having significant oral or nasal dryness: She says the dry mouth is awful but thinks it is a side effect of her medications. She doesn't have a dry mouth every night. She uses Biotene mouthwash and lozenges and keeps water at her bedside.   Is the pressure setting comfortable: Yes  If not, why:      She uses the water chamber. It does run empty most times.     What is your typical bedtime: 10:30 PM  How long does it take you to go to sleep on PAP therapy: Varies. She takes melatonin 20 mg. She reports she has been taking 20 mg for quite a while. Pain keeps her awake. Her knee pain is severe right now. She doesn't take her oxycodone-acetaminophen at bedtime. She used to take trazodone too.   What time do you typically get out of bed for the day: 8-9 AM.   How many hours on average per night are you using PAP therapy: almost 6 hours. Lately she keeps it on all night but sometimes she takes it off early in the morning.  How many hours are you sleeping per night: 8-9 hours  Do you feel well rested in the morning: Fairly rested in the morning.   She takes a short nap during the day as well. She feels her energy is low during the day.  Lately she hasn't been waking in the middle of the night. She used to wake religiously at 4 AM.    ResMed AirSense 10  Auto-PAP 7.0 - 15.0 cmH2O 30 day usage data: 07/07/2023-08/05/2023   77% of days with > 4 hours of use. 0/30 days with no use.   Average use 5 hours 47 minutes per day.   95%ile Leak 19.7 L/min.   CPAP 95% pressure 12.3 cm.   AHI 4.5 events per hour (CA 1.2, OA 1.9, unknown 0.3).     EPWORTH SLEEPINESS  SCALE         7/1/2021     1:00 PM    Fullerton Sleepiness Scale ( RITASOCORROBethanie Hardy  [id number]<br>ESS - [place]/English - Final version - 21 Nov 07 - [facility] [place].)   Fullerton Score (Sleep) 12       INSOMNIA SEVERITY INDEX (SHARON)          8/9/2023     8:46 AM   Insomnia Severity Index (SHARON)   Difficulty falling asleep 2   Difficulty staying asleep 1   Problems waking up too early 1   How SATISFIED/DISSATISFIED are you with your CURRENT sleep pattern? 2   How NOTICEABLE to others do you think your sleep problem is in terms of impairing the quality of your life? 1   How WORRIED/DISTRESSED are you about your current sleep problem? 1   To what extent do you consider your sleep problem to INTERFERE with your daily functioning (e.g. daytime fatigue, mood, ability to function at work/daily chores, concentration, memory, mood, etc.) CURRENTLY? 2   SHARON Total Score 10       Guidelines for Scoring/Interpretation:  Total score categories:  0-7 = No clinically significant insomnia   8-14 = Subthreshold insomnia   15-21 = Clinical insomnia (moderate severity)  22-28 = Clinical insomnia (severe)  Used via courtesy of www.Iotumth.va.gov with permission from Dwayne Short PhD., [facility]      Past medical/surgical history, family history, social history, medications and allergies were reviewed.        Problem List:  Patient Active Problem List    Diagnosis Date Noted    F11.2 - Continuous opioid dependence (H) 07/11/2023     Priority: Medium     Patient is followed by DOMINIQUE COPPOLA for ongoing prescription of narcotic pain medicine.  Med: oxycodone/acetaminophen 10/325 for chronic back pain.   Maximum use per month: 70  Expected duration: unknown  Narcotic agreement on file: YES 7/11/2023   Clinic visit recommended: Q 3 months    PDMP reviewed 7/11/2023 April 2013 - rhizotomy for SI (left) Jhon Mckay MD    Follows at [facility] - severe low back pain with work related injuries.  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  Spinal stenosis at most lumbar levels  Improvement with past epidural steroid injections  SI joint dysfunction with the pain        JOY (obstructive sleep apnea) 08/05/2021     Priority: Medium    CKD (chronic kidney disease) stage 2, GFR 60-89 ml/min 08/05/2021     Priority: Medium    Hyperlipidemia 12/22/2020     Priority: Medium    Centrilobular emphysema (H) 11/04/2020     Priority: Medium    Hypoxia 01/05/2019     Priority: Medium    Spell of change in speech 04/17/2018     Priority: Medium    Hypothyroidism 09/07/2016     Priority: Medium    Chronic bilateral low back pain without sciatica 06/02/2016     Priority: Medium    Essential hypertension with goal blood pressure less than 140/90 06/02/2016     Priority: Medium    S/P lumbar spinal fusion 10/19/2015     Priority: Medium    Bee allergy status 06/30/2015     Priority: Medium    Cerebral aneurysm, nonruptured 07/24/2014     Priority: Medium     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 Marti    repeat an MRA in 5 years -5/2027      Chronic pain 03/07/2014     Priority: Medium    Major depressive disorder, recurrent episode, moderate (H) 07/10/2013     Priority: Medium    Advanced directives, counseling/discussion 09/05/2012     Priority: Medium     Patient does not have an Advance/Health Care Directive (HCD), has information at home.     Sonja Krishnamurthy  September 5, 2012        Anxiety 06/20/2012     Priority: Medium    CARDIOVASCULAR SCREENING; LDL GOAL LESS THAN 160 10/31/2010     Priority: Medium    Insomnia 12/21/2009     Priority: Medium    Back pain 12/21/2009     Priority: Medium     Patient is followed by DOMINIQUE COPPOLA for ongoing prescription of narcotic pain medicine.  Med: oxycodone/acetaminophen 10/325 for chronic back pain.   Maximum use per month: 70  Expected duration: unknown  Narcotic agreement on file: YES  "7/11/2023   Clinic visit recommended: Q 3 months    PDMP reviewed 7/11/2023 April 2013 - rhizotomy for SI (left) Jhon Mckay MD    Follows at [facility] - severe low back pain with work related injuries.  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

## 2023-08-08 NOTE — H&P (VIEW-ONLY)
[facility]  [address]  [facility] [address]  Phone: [phone]  Primary Provider: Sarah Barriga  Pre-op Performing Provider: TOÑA CARBONE      PREOPERATIVE EVALUATION:  Today's date: 8/8/2023    Jessica Ellison is a 72 year old female who presents for a preoperative evaluation.      8/8/2023    10:11 AM   Additional Questions   Roomed by Melissa MARR CMA   Accompanied by self       Surgical Information:  Surgery/Procedure: LEFT TOTAL KNEE ARTHROPLASTY   Surgery Location: [facility]  Surgeon: Bryan Roque MD   Surgery Date: 8/16/23  Time of Surgery: 2:45pm  Where patient plans to recover: At home with family  Fax number for surgical facility: Note does not need to be faxed, will be available electronically in Epic.    Assessment & Plan     The proposed surgical procedure is considered INTERMEDIATE risk.    Preop general physical exam  Primary osteoarthritis of left knee  Preoperative exam completed today Labs updated as below.  EKG completed today no significant EKG changes from previous reads.  She is asymptomatic from a cardiac standpoint.  No significant cardiac history.  Echocardiogram March 2021 showed an EF of 55 to 60%.  Patient is cleared for upcoming surgical procedure without further clinical clarification.  - EKG 12-lead complete w/read - Clinics  - Basic metabolic panel  (Ca, Cl, CO2, Creat, Gluc, K, Na, BUN); Future  - CBC with platelets; Future  - oxyCODONE (ROXICODONE) 5 MG tablet; Take 1 tablet (5 mg) by mouth every 6 hours as needed for severe pain With a percocet    F11.2 - Continuous opioid dependence (H)  On oxycodone prescribed by PCP with a controlled substance agreement in place.  Limited supply of 5 mg oxycodone provided to patient for presurgical pain in the knee that is not being managed by current prescription of Percocet.  Advised patient to use this sparingly and only for severe pain when the current pain  regimen is not sufficient.  She has Narcan available.  She is not taking any benzodiazepines.    Chronic bilateral low back pain without sciatica  Chronic low back pain on Percocet for this has previous history of spinal fusions and congenital spine changes.    Essential hypertension with goal blood pressure less than 140/90  Blood pressure is generally well controlled with amlodipine 5 mg.  Mildly elevated today secondary to pain.    Hyperlipidemia, unspecified hyperlipidemia type  On atorvastatin tolerating well    Hypothyroidism, unspecified type  On levothyroxine levels have been stable    JOY (obstructive sleep apnea)  Wears a CPAP at night advised to bring to hospital for surgery.    Centrilobular emphysema (H)  Has a follow-up appointment with pulmonology with upcoming PFTs and follow-up appointment.  Generally feels stable.    CKD (chronic kidney disease) stage 2, GFR 60-89 ml/min  BMP to be checked    Yeast infection of the skin  Concern of into her groin rash has utilized nystatin in the past refills provided today  - nystatin (MYCOSTATIN) 353581 UNIT/GM external powder; Apply topically 3 times daily To rash until healing is complete     - No identified additional risk factors other than previously addressed    Antiplatelet or Anticoagulation Medication Instructions:   - aspirin: Discontinue aspirin 7-10 days prior to procedure to reduce bleeding risk. It should be resumed postoperatively.     Additional Medication Instructions:   - Calcium Channel Blockers: May be continued on the day of surgery.   - Statins: Continue taking on the day of surgery.    - celecoxib (Celebrex): HOLD 3 days before surgery. May continue without modification for management of severe pain.    - Psychostimulants: Hold the day of surgery   - SSRIs, SNRIs, TCAs, Antipsychotics: Continue without modification.    - Herbal medications and vitamins: HOLD 14 days prior to surgery.    RECOMMENDATION:  APPROVAL GIVEN to proceed with  proposed procedure, without further diagnostic evaluation.            Subjective       HPI related to upcoming procedure: has failed conservative management of chronic left knee pain. Symptoms have been progressive and ongoing. Left knee pain has impacted ability to do activities of daily living and activities of enjoyment.     History of spinal surgeries and spinal fusions. She has chronic back pain. Narcotics managed by PCP.  Pain is not well controlled at the moment between her back pain and the knee pain. She would like increase short term until post surgery recovery.         8/8/2023    10:14 AM   Preop Questions   1. Have you ever had a heart attack or stroke? No   2. Have you ever had surgery on your heart or blood vessels, such as a stent placement, a coronary artery bypass, or surgery on an artery in your head, neck, heart, or legs? YES - cerebral aneurysm about 8 years ago. No residuals, post coil. Doing well.    3. Do you have chest pain with activity? No   4. Do you have a history of  heart failure? No   5. Do you currently have a cold, bronchitis or symptoms of other infection? No   6. Do you have a cough, shortness of breath, or wheezing? YES - shortness of breath. Occasional uses Spiriva for COPD. Seeing Pulmonology on PFT on 8/10 and specialist on 8/14 Reports history of JOY.    7. Do you or anyone in your family have previous history of blood clots? No   8. Do you or does anyone in your family have a serious bleeding problem such as prolonged bleeding following surgeries or cuts? No   9. Have you ever had problems with anemia or been told to take iron pills? No   10. Have you had any abnormal blood loss such as black, tarry or bloody stools, or abnormal vaginal bleeding? No   11. Have you ever had a blood transfusion? YES - 2007   11a. Have you ever had a transfusion reaction? No   12. Are you willing to have a blood transfusion if it is medically needed before, during, or after your surgery? Yes    13. Have you or any of your relatives ever had problems with anesthesia? No   14. Do you have sleep apnea, excessive snoring or daytime drowsiness? YES - sleep apnea   14a. Do you have a CPAP machine? Yes   15. Do you have any artifical heart valves or other implanted medical devices like a pacemaker, defibrillator, or continuous glucose monitor? No   16. Do you have artificial joints? No   17. Are you allergic to latex? No       Health Care Directive:  Patient does not have a Health Care Directive or Living Will: Discussed advance care planning with patient; however, patient declined at this time.    Preoperative Review of :   reviewed - controlled substances reflected in medication list.      Status of Chronic Conditions:  HYPERLIPIDEMIA - Patient has a long history of significant Hyperlipidemia requiring medication for treatment with recent good control. Patient reports no problems or side effects with the medication.     HYPERTENSION - Patient has longstanding history of HTN , currently denies any symptoms referable to elevated blood pressure. Specifically denies chest pain, palpitations, dyspnea, orthopnea, PND or peripheral edema. Blood pressure readings have been in normal range. Current medication regimen is as listed below. Patient denies any side effects of medication.     SLEEP PROBLEM - Patient has a longstanding history of  JOY uses a CPAP . Patient has tried OTC medications with limited success.     Review of Systems  CONSTITUTIONAL: NEGATIVE for fever, chills, change in weight  INTEGUMENTARY/SKIN: NEGATIVE for worrisome rashes, moles or lesions  EYES: NEGATIVE for vision changes or irritation  ENT/MOUTH: NEGATIVE for ear, mouth and throat problems  RESP: NEGATIVE for significant cough or SOB  CV: NEGATIVE for chest pain, palpitations or peripheral edema  GI: NEGATIVE for nausea, abdominal pain, heartburn, or change in bowel habits  : NEGATIVE for frequency, dysuria, or  hematuria  MUSCULOSKELETAL: NEGATIVE for significant arthralgias or myalgia  NEURO: NEGATIVE for weakness, dizziness or paresthesias  ENDOCRINE: NEGATIVE for temperature intolerance, skin/hair changes  HEME: NEGATIVE for bleeding problems  PSYCHIATRIC: NEGATIVE for changes in mood or affect    Patient Active Problem List    Diagnosis Date Noted    F11.2 - Continuous opioid dependence (H) 07/11/2023     Priority: Medium     Patient is followed by DOMINIQUE COPPOLA for ongoing prescription of narcotic pain medicine.  Med: oxycodone/acetaminophen 10/325 for chronic back pain.   Maximum use per month: 70  Expected duration: unknown  Narcotic agreement on file: YES 7/11/2023   Clinic visit recommended: Q 3 months    PDMP reviewed 7/11/2023 April 2013 - rhizotomy for SI (left) Jhon Mckay MD    Follows at [facility] - severe low back pain with work related injuries.  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  Spinal stenosis at most lumbar levels  Improvement with past epidural steroid injections  SI joint dysfunction with the pain        JOY (obstructive sleep apnea) 08/05/2021     Priority: Medium    CKD (chronic kidney disease) stage 2, GFR 60-89 ml/min 08/05/2021     Priority: Medium    Hyperlipidemia 12/22/2020     Priority: Medium    Centrilobular emphysema (H) 11/04/2020     Priority: Medium    Hypoxia 01/05/2019     Priority: Medium    Spell of change in speech 04/17/2018     Priority: Medium    Hypothyroidism 09/07/2016     Priority: Medium    Chronic bilateral low back pain without sciatica 06/02/2016     Priority: Medium    Essential hypertension with goal blood pressure less than 140/90 06/02/2016     Priority: Medium    S/P lumbar spinal fusion 10/19/2015     Priority: Medium    Bee allergy status 06/30/2015     Priority: Medium    Cerebral aneurysm, nonruptured 07/24/2014     Priority: Medium     L ICA superior hypophyseal aneurysm s/p stent assisted coil embolization  Has  diagnostic cerebral angiography every 2 years with Dr. Marti    repeat an MRA in 5 years -5/2027      Chronic pain 03/07/2014     Priority: Medium    Major depressive disorder, recurrent episode, moderate (H) 07/10/2013     Priority: Medium    Advanced directives, counseling/discussion 09/05/2012     Priority: Medium     Patient does not have an Advance/Health Care Directive (HCD), has information at home.     Sonja Krishnamurthy  September 5, 2012        Anxiety 06/20/2012     Priority: Medium    CARDIOVASCULAR SCREENING; LDL GOAL LESS THAN 160 10/31/2010     Priority: Medium    Insomnia 12/21/2009     Priority: Medium    Back pain 12/21/2009     Priority: Medium     Patient is followed by DOMINIQUE COPPOLA for ongoing prescription of narcotic pain medicine.  Med: oxycodone/acetaminophen 10/325 for chronic back pain.   Maximum use per month: 70  Expected duration: unknown  Narcotic agreement on file: YES 7/11/2023   Clinic visit recommended: Q 3 months    PDMP reviewed 7/11/2023 April 2013 - rhizotomy for SI (left) Jhon Mckay MD    Follows at [facility] - severe low back pain with work related injuries.  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     Priority: Medium      Past Medical History:   Diagnosis Date    Anemia     Aneurysm (H)     Arthritis     Chemical dependency (H)     Chem Dep RX    Depression     History of blood transfusion     Hypertension     Menopausal symptoms     Other chronic pain     Lower back and hips    Sleep apnea     Sleep disorder     Thyroid disease     Tobacco abuse     Uncomplicated asthma      Past Surgical History:   Procedure Laterality Date    ABDOMEN SURGERY      APPENDECTOMY  1960    APPENDECTOMY      BACK SURGERY      BIOPSY BREAST      cerebral aneurysm  2014     coiled    CEREBRAL ANEURYSM REPAIR      COLON SURGERY      COLONOSCOPY  04/19/2005    FRACTURE SURGERY      HC EXCISION BREAST LESION, OPEN >=1      core biopsy 2006, lumpectomy 2006    HERNIA REPAIR      LAP VENTRAL HERNIA REPAIR  12/2017    [place]    LAPAROSCOPIC ASSISTED HYSTERECTOMY VAGINAL  12/2017    LUMPECTOMY BREAST      orif left femoral neck Left 06/03/2016    stress fracture.  done at [facility]    SURGICAL HISTORY OF -   2004    Skin Graft    ZZC PART REMOVAL COLON W ANASTOMOSIS  05/2005    diverticulitis    ZZC SPINE FUSION,ANTER,8+ SGMTS  2007    Anterior posterior fusion 10 levels, hardware removal and re-fusion 2009     Current Outpatient Medications   Medication Sig Dispense Refill    amLODIPine (NORVASC) 5 MG tablet TAKE 1 TABLET (5 MG) BY MOUTH DAILY 90 tablet 1    aspirin 325 MG tablet Take 1 tablet (325 mg) by mouth daily (Patient taking differently: Take 81 mg by mouth daily) 180 tablet 6    atorvastatin (LIPITOR) 20 MG tablet Take 1 tablet (20 mg) by mouth daily 90 tablet 3    calcium carbonate 600 mg-vitamin D 400 units (CALTRATE) 600-400 MG-UNIT per tablet Take 1 tablet by mouth every evening      celecoxib (CELEBREX) 200 MG capsule TAKE 1 CAPSULE (200 MG) BY MOUTH DAILY 90 capsule 1    cyclobenzaprine (FLEXERIL) 10 MG tablet TAKE ONE TABLET BY MOUTH TWICE A DAY AS NEEDED FOR MUSCLE SPASMS 180 tablet 1    DULoxetine (CYMBALTA) 30 MG capsule Take 1 capsule (30 mg) by mouth 2 times daily 180 capsule 1    ferrous sulfate (FEROSUL) 325 (65 Fe) MG tablet Take 325 mg by mouth every other day Alternating days with Senna tab      levothyroxine (SYNTHROID/LEVOTHROID) 50 MCG tablet Take 1 tablet (50 mcg) by mouth daily 90 tablet 3    MELATONIN GUMMIES PO Take 20 mg by mouth At Bedtime      metoprolol succinate ER (TOPROL XL) 50 MG 24 hr tablet TAKE ONE TABLET BY MOUTH TWICE DAILY 180 tablet 2    Multiple Vitamin (MULTIVITAMIN ADULT PO) Take by mouth daily      naloxone (NARCAN) 4 MG/0.1ML nasal  spray Spray 1 spray (4 mg) into one nostril alternating nostrils once as needed for opioid reversal every 2-3 minutes until assistance arrives 0.2 mL 1    nystatin (MYCOSTATIN) 030457 UNIT/GM external powder Apply topically 3 times daily To rash until healing is complete 30 g 1    oxyCODONE (ROXICODONE) 5 MG tablet Take 1 tablet (5 mg) by mouth every 6 hours as needed for severe pain With a percocet 10 tablet 0    oxyCODONE-acetaminophen (PERCOCET)  MG per tablet Take 1 tablet by mouth every 6 hours as needed for severe pain 70 tablet 0    [START ON 2023] oxyCODONE-acetaminophen (PERCOCET)  MG per tablet Take 1 tablet by mouth every 6 hours as needed for severe pain 70 tablet 0    [START ON 2023] oxyCODONE-acetaminophen (PERCOCET)  MG per tablet Take 1 tablet by mouth every 6 hours as needed for severe pain 70 tablet 0    QUEtiapine (SEROQUEL) 50 MG tablet Take 2 tablets (100 mg) by mouth daily 180 tablet 1    senna (SENOKOT) 8.6 MG tablet Take 1 tablet by mouth every other day Alternating days with Iron tab      SPIRIVA HANDIHALER 18 MCG inhaled capsule USING THE HANDIHALER, INHALE THE CONTENTS OF ONE CAPSULE BY MOUTH DAILY 30 capsule 3    venlafaxine (EFFEXOR) 75 MG tablet Take 75 mg by mouth 3 times daily      VENTOLIN  (90 Base) MCG/ACT inhaler INHALE TWO PUFFS BY MOUTH EVERY 6 HOURS (Patient taking differently: as needed) 18 g 0    diazepam (VALIUM) 10 MG tablet Take 10 mg by mouth every 6 hours as needed for anxiety (Patient not taking: Reported on 2023)      valACYclovir (VALTREX) 1000 mg tablet Take 2 tablets (2,000 mg) by mouth 2 times daily for 1 day 4 tablet 0       Allergies   Allergen Reactions    Bee Venom     Lisinopril Swelling     Oral swelling        Social History     Tobacco Use    Smoking status: Former     Packs/day: 1.00     Years: 30.00     Pack years: 30.00     Types: Cigarettes     Quit date: 2004     Years since quittin.6    Smokeless  "tobacco: Never   Substance Use Topics    Alcohol use: No     Comment: ETOH dependency, DUI 3/15/10; 1-2 ETOH per week     Family History   Problem Relation Age of Onset    Cancer Mother         breast/lung    Alcohol/Drug Mother     Depression Mother     Cancer - colorectal Father     Alcohol/Drug Father     Diabetes Maternal Grandmother     Diabetes Maternal Grandfather     Diabetes Paternal Grandmother     Diabetes Paternal Grandfather     Cancer Paternal Grandfather     Gastrointestinal Disease Paternal Grandfather     Congenital Anomalies Son     Hypertension Brother     Adrenal Disorder Brother         had adrenalectomies    Cerebral palsy Granddaughter      History   Drug Use    Types: Oxycodone     Comment: percocet for the past several years         Objective     BP (!) 130/98 (BP Location: Right arm, Patient Position: Sitting, Cuff Size: Adult Regular)   Pulse 73   Temp 98  F (36.7  C) (Tympanic)   Resp 12   Ht 1.537 m (5' 0.5\")   Wt 74.9 kg (165 lb 3.2 oz)   SpO2 98%   BMI 31.73 kg/m      Physical Exam    GENERAL APPEARANCE: healthy, alert and no distress     EYES: EOMI, PERRL     HENT: ear canals and TM's normal and nose and mouth without ulcers or lesions     NECK: no adenopathy, no asymmetry, masses, or scars and thyroid normal to palpation     RESP: lungs clear to auscultation - no rales, rhonchi or wheezes     CV: regular rates and rhythm, normal S1 S2, no S3 or S4 and no murmur, click or rub     ABDOMEN:  soft, nontender, no HSM or masses and bowel sounds normal     MS: extremities normal- no gross deformities noted, no evidence of inflammation in joints, FROM in all extremities.     SKIN: no suspicious lesions or rashes     NEURO: Normal strength and tone, sensory exam grossly normal, mentation intact and speech normal     PSYCH: mentation appears normal. and affect normal/bright     LYMPHATICS: No cervical adenopathy    Recent Labs   Lab Test 07/11/23  0921 03/13/23  1103 06/24/22  0746 "   HGB 12.5  --  12.3     --  150   NA  --  141 142   POTASSIUM  --  4.3 4.3   CR  --  0.91 0.78        Diagnostics:  Labs pending at this time.  Results will be reviewed when available.   EKG required for orthopedic surgery and not completed in the last 90 days.     Revised Cardiac Risk Index (RCRI):  The patient has the following serious cardiovascular risks for perioperative complications:   - No serious cardiac risks = 0 points     RCRI Interpretation: 0 points: Class I (very low risk - 0.4% complication rate)      Signed Electronically by: LA Lorenzo CNP  Copy of this evaluation report is provided to requesting physician.    Answers submitted by the patient for this visit:  DAJUAN-7 (Submitted on 8/8/2023)  DAJUAN 7 TOTAL SCORE: 4

## 2023-08-08 NOTE — PATIENT INSTRUCTIONS
For informational purposes only. Not to replace the advice of your health care provider. Copyright   2003,  [place] [facility]. All rights reserved. Clinically reviewed by Cara Cevallos MD. [employer] S[facility] [id number] - REV .  Preparing for Your Surgery  Getting started  A nurse will call you to review your health history and instructions. They will give you an arrival time based on your scheduled surgery time. Please be ready to share:  Your doctor's clinic name and phone number  Your medical, surgical, and anesthesia history  A list of allergies and sensitivities  A list of medicines, including herbal treatments and over-the-counter drugs  Whether the patient has a legal guardian (ask how to send us the papers in advance)  Please tell us if you're pregnant--or if there's any chance you might be pregnant. Some surgeries may injure a fetus (unborn baby), so they require a pregnancy test. Surgeries that are safe for a fetus don't always need a test, and you can choose whether to have one.   If you have a child who's having surgery, please ask for a copy of Preparing for Your Child's Surgery.    Preparing for surgery  Within 10 to 30 days of surgery: Have a pre-op exam (sometimes called an H&P, or History and Physical). This can be done at a clinic or pre-operative center.  If you're having a , you may not need this exam. Talk to your care team.  At your pre-op exam, talk to your care team about all medicines you take. If you need to stop any medicines before surgery, ask when to start taking them again.  We do this for your safety. Many medicines can make you bleed too much during surgery. Some change how well surgery (anesthesia) drugs work.  Call your insurance company to let them know you're having surgery. (If you don't have insurance, call [phone].)  Call your clinic if there's any change in your health. This includes signs of a cold or flu (sore throat, runny nose, cough, rash, fever). It also  includes a scrape or scratch near the surgery site.  If you have questions on the day of surgery, call your hospital or surgery center.  Eating and drinking guidelines  For your safety: Unless your surgeon tells you otherwise, follow the guidelines below.  Eat and drink as usual until 8 hours before you arrive for surgery. After that, no food or milk.  Drink clear liquids until 2 hours before you arrive. These are liquids you can see through, like water, Gatorade, and Propel Water. They also include plain black coffee and tea (no cream or milk), candy, and breath mints. You can spit out gum when you arrive.  If you drink alcohol: Stop drinking it the night before surgery.  If your care team tells you to take medicine on the morning of surgery, it's okay to take it with a sip of water.  Preventing infection  Shower or bathe the night before and morning of your surgery. Follow the instructions your clinic gave you. (If no instructions, use regular soap.)  Don't shave or clip hair near your surgery site. We'll remove the hair if needed.  Don't smoke or vape the morning of surgery. You may chew nicotine gum up to 2 hours before surgery. A nicotine patch is okay.  Note: Some surgeries require you to completely quit smoking and nicotine. Check with your surgeon.  Your care team will make every effort to keep you safe from infection. We will:  Clean our hands often with soap and water (or an alcohol-based hand rub).  Clean the skin at your surgery site with a special soap that kills germs.  Give you a special gown to keep you warm. (Cold raises the risk of infection.)  Wear special hair covers, masks, gowns and gloves during surgery.  Give antibiotic medicine, if prescribed. Not all surgeries need antibiotics.  What to bring on the day of surgery  Photo ID and insurance card  Copy of your health care directive, if you have one  Glasses and hearing aids (bring cases)  You can't wear contacts during surgery  Inhaler and eye  drops, if you use them (tell us about these when you arrive)  CPAP machine or breathing device, if you use them  A few personal items, if spending the night  If you have . . .  A pacemaker, ICD (cardiac defibrillator) or other implant: Bring the ID card.  An implanted stimulator: Bring the remote control.  A legal guardian: Bring a copy of the certified (court-stamped) guardianship papers.  Please remove any jewelry, including body piercings. Leave jewelry and other valuables at home.  If you're going home the day of surgery  You must have a responsible adult drive you home. They should stay with you overnight as well.  If you don't have someone to stay with you, and you aren't safe to go home alone, we may keep you overnight. Insurance often won't pay for this.  After surgery  If it's hard to control your pain or you need more pain medicine, please call your surgeon's office.  Questions?   If you have any questions for your care team, list them here: _________________________________________________________________________________________________________________________________________________________________________ ____________________________________ ____________________________________ ____________________________________    How to Take Your Medication Before Surgery  Antiplatelet or Anticoagulation Medication Instructions:   - aspirin: Discontinue aspirin 7-10 days prior to procedure to reduce bleeding risk. It should be resumed postoperatively.     Additional Medication Instructions:   - Calcium Channel Blockers: May be continued on the day of surgery.   - Statins: Continue taking on the day of surgery.    - celecoxib (Celebrex): HOLD 3 days before surgery. May continue without modification for management of severe pain.    - Psychostimulants: Hold the day of surgery   - SSRIs, SNRIs, TCAs, Antipsychotics: Continue without modification.    - Herbal medications and vitamins: HOLD 14 days prior to surgery.

## 2023-08-09 ENCOUNTER — OFFICE VISIT (OUTPATIENT)
Dept: SLEEP MEDICINE | Facility: CLINIC | Age: 73
End: 2023-08-09
Payer: COMMERCIAL

## 2023-08-09 VITALS
SYSTOLIC BLOOD PRESSURE: 125 MMHG | DIASTOLIC BLOOD PRESSURE: 86 MMHG | HEIGHT: 61 IN | OXYGEN SATURATION: 94 % | HEART RATE: 68 BPM | WEIGHT: 165 LBS | BODY MASS INDEX: 31.15 KG/M2 | RESPIRATION RATE: 14 BRPM

## 2023-08-09 DIAGNOSIS — G47.33 OSA (OBSTRUCTIVE SLEEP APNEA): Primary | ICD-10-CM

## 2023-08-09 PROCEDURE — 99215 OFFICE O/P EST HI 40 MIN: CPT

## 2023-08-09 ASSESSMENT — SLEEP AND FATIGUE QUESTIONNAIRES
HOW LIKELY ARE YOU TO NOD OFF OR FALL ASLEEP WHILE SITTING QUIETLY AFTER LUNCH WITHOUT ALCOHOL: SLIGHT CHANCE OF DOZING
HOW LIKELY ARE YOU TO NOD OFF OR FALL ASLEEP WHILE WATCHING TV: MODERATE CHANCE OF DOZING
HOW LIKELY ARE YOU TO NOD OFF OR FALL ASLEEP WHEN YOU ARE A PASSENGER IN A CAR FOR AN HOUR WITHOUT A BREAK: SLIGHT CHANCE OF DOZING
HOW LIKELY ARE YOU TO NOD OFF OR FALL ASLEEP IN A CAR, WHILE STOPPED FOR A FEW MINUTES IN TRAFFIC: WOULD NEVER DOZE
HOW LIKELY ARE YOU TO NOD OFF OR FALL ASLEEP WHILE SITTING AND TALKING TO SOMEONE: WOULD NEVER DOZE
HOW LIKELY ARE YOU TO NOD OFF OR FALL ASLEEP WHILE SITTING INACTIVE IN A PUBLIC PLACE: SLIGHT CHANCE OF DOZING
HOW LIKELY ARE YOU TO NOD OFF OR FALL ASLEEP WHILE LYING DOWN TO REST IN THE AFTERNOON WHEN CIRCUMSTANCES PERMIT: HIGH CHANCE OF DOZING
HOW LIKELY ARE YOU TO NOD OFF OR FALL ASLEEP WHILE SITTING AND READING: MODERATE CHANCE OF DOZING

## 2023-08-09 NOTE — PATIENT INSTRUCTIONS

## 2023-08-10 ENCOUNTER — OFFICE VISIT (OUTPATIENT)
Dept: PULMONOLOGY | Facility: CLINIC | Age: 73
End: 2023-08-10
Payer: COMMERCIAL

## 2023-08-10 DIAGNOSIS — J44.9 COPD (CHRONIC OBSTRUCTIVE PULMONARY DISEASE) (H): ICD-10-CM

## 2023-08-10 DIAGNOSIS — J44.9 COPD (CHRONIC OBSTRUCTIVE PULMONARY DISEASE) (H): Primary | ICD-10-CM

## 2023-08-10 PROCEDURE — 94060 EVALUATION OF WHEEZING: CPT | Performed by: INTERNAL MEDICINE

## 2023-08-10 PROCEDURE — 94729 DIFFUSING CAPACITY: CPT | Performed by: INTERNAL MEDICINE

## 2023-08-10 PROCEDURE — 94726 PLETHYSMOGRAPHY LUNG VOLUMES: CPT | Performed by: INTERNAL MEDICINE

## 2023-08-11 LAB
DLCOCOR-%PRED-PRE: 82 %
DLCOCOR-PRE: 14.41 ML/MIN/MMHG
DLCOUNC-%PRED-PRE: 77 %
DLCOUNC-PRE: 13.6 ML/MIN/MMHG
DLCOUNC-PRED: 17.56 ML/MIN/MMHG
ERV-%PRED-PRE: 33 %
ERV-PRE: 0.21 L
ERV-PRED: 0.63 L
EXPTIME-PRE: 5.39 SEC
FEF2575-%PRED-POST: 217 %
FEF2575-%PRED-PRE: 222 %
FEF2575-POST: 3.62 L/SEC
FEF2575-PRE: 3.7 L/SEC
FEF2575-PRED: 1.66 L/SEC
FEFMAX-%PRED-PRE: 118 %
FEFMAX-PRE: 6.14 L/SEC
FEFMAX-PRED: 5.2 L/SEC
FEV1-%PRED-PRE: 119 %
FEV1-PRE: 2.22 L
FEV1FEV6-PRE: 90 %
FEV1FEV6-PRED: 79 %
FEV1FVC-PRE: 90 %
FEV1FVC-PRED: 79 %
FEV1SVC-PRE: 81 %
FEV1SVC-PRED: 68 %
FIFMAX-PRE: 4.99 L/SEC
FRCPLETH-%PRED-PRE: 69 %
FRCPLETH-PRE: 1.7 L
FRCPLETH-PRED: 2.45 L
FVC-%PRED-PRE: 105 %
FVC-PRE: 2.48 L
FVC-PRED: 2.35 L
IC-%PRED-PRE: 119 %
IC-PRE: 2.4 L
IC-PRED: 2.01 L
RVPLETH-%PRED-PRE: 78 %
RVPLETH-PRE: 1.37 L
RVPLETH-PRED: 1.75 L
TLCPLETH-%PRED-PRE: 91 %
TLCPLETH-PRE: 4.1 L
TLCPLETH-PRED: 4.48 L
VA-%PRED-PRE: 94 %
VA-PRE: 3.89 L
VC-%PRED-PRE: 100 %
VC-PRE: 2.73 L
VC-PRED: 2.71 L

## 2023-08-11 NOTE — PROGRESS NOTES
Pulmonary Clinic Consultation          Assessment/Plan:     72 year old female with a history of COPD, emphysema, JOY on cpap, HTN - presenting to re-establish care in our clinic.    Emphysema  Dyspnea on exertion  Former smoker, 30 pack year history, quit in 2004.  Previously established care in our clinic in 2019, with Dr Hartley.  At that time her symptoms were mild and she did not require daily inhaler medications, so she was told to follow-up as needed.  Pulmonary function testing at that time showed normal spirometry, air-trapping, and normal diffusion capacity.  Moderate radiographic emphysema noted per chest CT in 3/2022.  Repeat pulmonary function testing 8/10/23 shows normal spirometry, lung volumes, and diffusion capacity.  Currently on Spiriva daily, she does not remember what prompted her to initiate this and does not know if she finds much benefit, although states her  believes it is helping with her JACKSON.  She does state that the inhaler is costly.  She has never had an exacerbation, rare URIs.  Denies cough or mucous production.  Plan:  - we discussed her pulmonary function testing results and that she does not have obstruction based on this testing.  We also discussed chest imaging and her diagnosis of emphysema.  - continue Spiriva (tiotropium) one inhalation daily.  Okay to trial weaning Spiriva (after TKA) if she desires d/t cost, and just utilize Combivent (albuterol/ipratropium) every 4-6 hours as needed for shortness of breath, or prior to activity.  I have sent a prescription for Combivent today.  She knows to watch for increase in symptoms, mostly JACKSON, and restart Spiriva if this happens.  - she is UTD with COVID vaccine and booster, annual influenza vaccine, and pneumococcal vaccines.  - Action plan: prednisone 40mg x5 days, + azithromycin x5 days (if increased SOB + sputum)    JOY  Followed by  sleep [facility], last visit 8/9/23.    Plan:  - upcoming sleep titration study  after knee replacement, due to residual hypoxemia despite cpap.      Follow-up  - 6 months    Cait Hernandez CNP  Pulmonary Medicine  [facility]  [phone]       CC:     Emphysema/COPD evaluation     HPI:     72 year old female with a history of COPD, emphysema, JOY on cpap, HTN - presenting to re-establish care in our clinic.    She was last seen in our clinic in 2019 by Dr Hartley, no symptoms at that time and not requiring maintenance inhalers.  She reports her symptoms are mostly just JACKSON, although she is not very active now d/t needing knee replacement.  She does not remember when she started Spiriva or if it is beneficial.   notes Spiriva may have improved some dyspnea.  She notes this is costly.  Denies cough, mucous production.  No exacerbations ever.  Last URI maybe once in the past year.    Medical records reviewed for this visit include sleep medicine notes, previous pulmonology notes.     ROS:     A 12-system review was obtained and was negative with the exception of the symptoms endorsed in the HPI.       Medical history:       PMH:  Past Medical History:   Diagnosis Date    Anemia     Aneurysm (H)     Antiplatelet or antithrombotic long-term use     Arthritis     Chemical dependency (H)     Chem Dep RX    Depression     History of blood transfusion     Hypertension     Menopausal symptoms     Other chronic pain     Lower back and hips    Sleep apnea     Sleep disorder     Thyroid disease     Tobacco abuse     Uncomplicated asthma        PSH:  Past Surgical History:   Procedure Laterality Date    ABDOMEN SURGERY      APPENDECTOMY  1960    APPENDECTOMY      BACK SURGERY      BIOPSY BREAST      cerebral aneurysm  2014    coiled    CEREBRAL ANEURYSM REPAIR      COLON SURGERY      COLONOSCOPY  04/19/2005    FRACTURE SURGERY      HC EXCISION BREAST LESION, OPEN >=1      core biopsy 2006, lumpectomy 2006    HERNIA REPAIR      LAP VENTRAL HERNIA REPAIR  12/2017     [place]    LAPAROSCOPIC ASSISTED HYSTERECTOMY VAGINAL  2017    LUMPECTOMY BREAST      orif left femoral neck Left 2016    stress fracture.  done at [facility]    SURGICAL HISTORY OF -       Skin Graft    ZZC PART REMOVAL COLON W ANASTOMOSIS  2005    diverticulitis    ZZC SPINE FUSION,ANTER,8+ SGMTS      Anterior posterior fusion 10 levels, hardware removal and re-fusion        Allergies:  Allergies   Allergen Reactions    Bee Venom     Lisinopril Swelling     Oral swelling       Family Hx:  Family History   Problem Relation Age of Onset    Cancer Mother         breast/lung    Alcohol/Drug Mother     Depression Mother     Cancer - colorectal Father     Alcohol/Drug Father     Diabetes Maternal Grandmother     Diabetes Maternal Grandfather     Diabetes Paternal Grandmother     Diabetes Paternal Grandfather     Cancer Paternal Grandfather     Gastrointestinal Disease Paternal Grandfather     Congenital Anomalies Son     Hypertension Brother     Adrenal Disorder Brother         had adrenalectomies    Cerebral palsy Granddaughter        Social Hx:  Social History     Socioeconomic History    Marital status:      Spouse name: Not on file    Number of children: Not on file    Years of education: Not on file    Highest education level: Not on file   Occupational History    Not on file   Tobacco Use    Smoking status: Former     Packs/day: 1.00     Years: 30.00     Pack years: 30.00     Types: Cigarettes     Quit date: 2004     Years since quittin.6    Smokeless tobacco: Never   Vaping Use    Vaping Use: Never used   Substance and Sexual Activity    Alcohol use: No     Comment: ETOH dependency, DUI 3/15/10; 1-2 ETOH per week    Drug use: Yes     Types: Oxycodone     Comment: percocet for the past several years    Sexual activity: Not Currently     Partners: Male     Birth control/protection: Surgical     Comment: VAS   Other Topics Concern    Parent/sibling w/ CABG, MI or angioplasty  before 65F 55M? No   Social History Narrative    Not on file     Social Determinants of Health     Financial Resource Strain: Not on file   Food Insecurity: Not on file   Transportation Needs: Not on file   Physical Activity: Not on file   Stress: Not on file   Social Connections: Not on file   Intimate Partner Violence: Not on file   Housing Stability: Not on file       Current Meds:  Current Outpatient Medications   Medication Sig Dispense Refill    amLODIPine (NORVASC) 5 MG tablet TAKE 1 TABLET (5 MG) BY MOUTH DAILY 90 tablet 1    aspirin 81 MG EC tablet Take 81 mg by mouth daily      atorvastatin (LIPITOR) 20 MG tablet Take 1 tablet (20 mg) by mouth daily 90 tablet 3    calcium carbonate 600 mg-vitamin D 400 units (CALTRATE) 600-400 MG-UNIT per tablet Take 1 tablet by mouth every evening      cyclobenzaprine (FLEXERIL) 10 MG tablet TAKE ONE TABLET BY MOUTH TWICE A DAY AS NEEDED FOR MUSCLE SPASMS 180 tablet 1    DULoxetine (CYMBALTA) 30 MG capsule Take 1 capsule (30 mg) by mouth 2 times daily 180 capsule 1    ferrous sulfate (FEROSUL) 325 (65 Fe) MG tablet Take 325 mg by mouth every other day Alternating days with Senna tab      ipratropium-albuterol (COMBIVENT RESPIMAT)  MCG/ACT inhaler Inhale 1 puff into the lungs every 6 hours as needed for shortness of breath, wheezing or cough 4 g 11    levothyroxine (SYNTHROID/LEVOTHROID) 50 MCG tablet Take 1 tablet (50 mcg) by mouth daily 90 tablet 3    MELATONIN GUMMIES PO Take 20 mg by mouth At Bedtime      metoprolol succinate ER (TOPROL XL) 50 MG 24 hr tablet TAKE ONE TABLET BY MOUTH TWICE DAILY 180 tablet 2    nystatin (MYCOSTATIN) 227425 UNIT/GM external powder Apply topically 3 times daily To rash until healing is complete 30 g 1    oxyCODONE (ROXICODONE) 5 MG tablet Take 1 tablet (5 mg) by mouth every 6 hours as needed for severe pain With a percocet 10 tablet 0    oxyCODONE-acetaminophen (PERCOCET)  MG per tablet Take 1 tablet by mouth every 6 hours as  "needed for severe pain 70 tablet 0    QUEtiapine (SEROQUEL) 50 MG tablet Take 2 tablets (100 mg) by mouth daily 180 tablet 1    senna (SENOKOT) 8.6 MG tablet Take 1 tablet by mouth every other day Alternating days with Iron tab      SPIRIVA HANDIHALER 18 MCG inhaled capsule USING THE HANDIHALER, INHALE THE CONTENTS OF ONE CAPSULE BY MOUTH DAILY 30 capsule 3    VENTOLIN  (90 Base) MCG/ACT inhaler INHALE TWO PUFFS BY MOUTH EVERY 6 HOURS (Patient taking differently: as needed) 18 g 0    celecoxib (CELEBREX) 200 MG capsule TAKE 1 CAPSULE (200 MG) BY MOUTH DAILY (Patient not taking: Reported on 8/14/2023) 90 capsule 1    Multiple Vitamin (MULTIVITAMIN ADULT PO) Take by mouth daily (Patient not taking: Reported on 8/14/2023)      naloxone (NARCAN) 4 MG/0.1ML nasal spray Spray 1 spray (4 mg) into one nostril alternating nostrils once as needed for opioid reversal every 2-3 minutes until assistance arrives (Patient not taking: Reported on 8/14/2023) 0.2 mL 1    valACYclovir (VALTREX) 1000 mg tablet Take 2 tablets (2,000 mg) by mouth 2 times daily for 1 day 4 tablet 0    venlafaxine (EFFEXOR) 75 MG tablet Take 75 mg by mouth 3 times daily (Patient not taking: Reported on 8/14/2023)            Physical Exam:     /86 (BP Location: Right arm, Patient Position: Chair, Cuff Size: Adult Regular)   Pulse 74   Ht 1.549 m (5' 1\")   Wt 73.9 kg (163 lb)   SpO2 93%   BMI 30.80 kg/m    Gen: adult female , appears in NAD  HEENT: clear conjunctivae, moist mucous membranes  CV: RRR, no M/G/R  Resp: CTAB, no focal crackles or wheezes.  Respirations even and unlabored.  On RA.  No cough.  Skin: no apparent rashes on visible skin  Ext: no cyanosis, clubbing or edema  Neuro: alert and answering questions appropriately       Data:     Labs:  reviewed    Imaging studies:  I have personally reviewed all pertinent imaging studies and PFT results unless otherwise noted.    Chest CT 3/2022:  IMPRESSION:  1.  No pulmonary " embolism.  2.  Normal caliber aorta without dissection.  3.  Moderate emphysema.  4.  Evidence for old granulomatous disease.  5.  Dense atherosclerotic vascular calcification at the origin of the renal arteries. Recommend correlation for renovascular hypertension.      Pulmonary Function Testing     8/10/23:  FEV1/FVC is normal  FEV1 is normal  FVC is normal  TLC is normal  DLCO is normal when it is corrected for hgb.  Impression:  Full pulmonary function test is normal.      2019:  The spirometry was performed with adequate reproducibility.  The flow volume loop is essentially normal.  FEV1 is 1.98 L (88% predicted) and is normal.  FVC is 2.21 L (77% predicted) and is mildly reduced.  FEV1/FVC is 89% and is normal.  There was no improvement in spirometry after a single inhaled dose of bronchodilator.  TLC is 4.02 L (84% predicted) and is normal.  RV is 2.19 L (111% predicted) and is normal.  RV/TLC is 55% and is increased.  DLCO is 15.21 mL/min/mmHg (74% predicted) and is normal when it is corrected for hemoglobin.  Impression:  This pulmonary function study is abnormal.  Although the forced vital capacity is mildly reduced, the total lung volume measurement is normal and therefore not indicative of respiratory ventilatory defect. There is no bronchodilator   response.  The elevated RV/TLC ratio is suggestive of air trapping.  Diffusion capacity, when corrected for hemoglobin, is normal.

## 2023-08-14 ENCOUNTER — MYC MEDICAL ADVICE (OUTPATIENT)
Dept: FAMILY MEDICINE | Facility: CLINIC | Age: 73
End: 2023-08-14

## 2023-08-14 ENCOUNTER — OFFICE VISIT (OUTPATIENT)
Dept: PULMONOLOGY | Facility: CLINIC | Age: 73
End: 2023-08-14
Payer: COMMERCIAL

## 2023-08-14 VITALS
WEIGHT: 163 LBS | HEIGHT: 61 IN | DIASTOLIC BLOOD PRESSURE: 86 MMHG | SYSTOLIC BLOOD PRESSURE: 136 MMHG | HEART RATE: 74 BPM | BODY MASS INDEX: 30.78 KG/M2 | OXYGEN SATURATION: 93 %

## 2023-08-14 DIAGNOSIS — G47.33 OSA (OBSTRUCTIVE SLEEP APNEA): ICD-10-CM

## 2023-08-14 DIAGNOSIS — R06.09 DYSPNEA ON EXERTION: ICD-10-CM

## 2023-08-14 DIAGNOSIS — J43.2 CENTRILOBULAR EMPHYSEMA (H): Primary | ICD-10-CM

## 2023-08-14 PROCEDURE — 99204 OFFICE O/P NEW MOD 45 MIN: CPT | Performed by: NURSE PRACTITIONER

## 2023-08-14 RX ORDER — ASPIRIN 81 MG/1
81 TABLET ORAL DAILY
Status: ON HOLD | COMMUNITY
End: 2023-08-18

## 2023-08-14 NOTE — PATIENT INSTRUCTIONS
It was a pleasure to see you in clinic today.   Here is what we discussed:    Start Combivent inhaler every 4-6 hours as needed for shortness of breath or wheezing.  Try using this prior to activity.    Continue Spiriva one inhalation daily.  You can trial weaning off of this and see how your breathing is, with just the Combivent.  Continue Albuterol every 4-6 hours as needed for shortness of breath or wheezing.  Call us with any change or worsening of your breathing.  Follow-up in 6 months.    Cait Hernandez, CNP  Pulmonary Medicine  [facility]  [phone]

## 2023-08-15 NOTE — TELEPHONE ENCOUNTER
"Needs to be investigated by RN further.  The Venlafaxine is on the med list as \"patient reported\" so I'm not sure how this got added or who added it.    If she is NOT taking it, likely she should not resume it.    Sarah Barriga M.D.    "

## 2023-08-16 ENCOUNTER — HOSPITAL ENCOUNTER (INPATIENT)
Facility: CLINIC | Age: 73
LOS: 3 days | Discharge: HOME | DRG: 470 | End: 2023-08-19
Attending: ORTHOPAEDIC SURGERY | Admitting: ORTHOPAEDIC SURGERY
Payer: COMMERCIAL

## 2023-08-16 ENCOUNTER — APPOINTMENT (OUTPATIENT)
Dept: RADIOLOGY | Facility: CLINIC | Age: 73
DRG: 470 | End: 2023-08-16
Attending: HOSPITALIST
Payer: COMMERCIAL

## 2023-08-16 ENCOUNTER — ANESTHESIA EVENT (OUTPATIENT)
Dept: SURGERY | Facility: CLINIC | Age: 73
DRG: 470 | End: 2023-08-16
Payer: COMMERCIAL

## 2023-08-16 ENCOUNTER — ANESTHESIA (OUTPATIENT)
Dept: SURGERY | Facility: CLINIC | Age: 73
DRG: 470 | End: 2023-08-16
Payer: COMMERCIAL

## 2023-08-16 ENCOUNTER — ANCILLARY PROCEDURE (OUTPATIENT)
Dept: ULTRASOUND IMAGING | Facility: CLINIC | Age: 73
End: 2023-08-16
Attending: ANESTHESIOLOGY
Payer: COMMERCIAL

## 2023-08-16 DIAGNOSIS — M17.10 PRIMARY OSTEOARTHRITIS OF KNEE, UNSPECIFIED LATERALITY: ICD-10-CM

## 2023-08-16 DIAGNOSIS — M17.12 PRIMARY OSTEOARTHRITIS OF LEFT KNEE: Primary | ICD-10-CM

## 2023-08-16 PROBLEM — M17.9 KNEE OSTEOARTHRITIS: Status: ACTIVE | Noted: 2023-08-16

## 2023-08-16 LAB — GLUCOSE BLDC GLUCOMTR-MCNC: 73 MG/DL (ref 70–99)

## 2023-08-16 PROCEDURE — 250N000009 HC RX 250: Performed by: PHYSICIAN ASSISTANT

## 2023-08-16 PROCEDURE — 250N000011 HC RX IP 250 OP 636: Performed by: NURSE ANESTHETIST, CERTIFIED REGISTERED

## 2023-08-16 PROCEDURE — 250N000013 HC RX MED GY IP 250 OP 250 PS 637: Performed by: INTERNAL MEDICINE

## 2023-08-16 PROCEDURE — 272N000001 HC OR GENERAL SUPPLY STERILE: Performed by: ORTHOPAEDIC SURGERY

## 2023-08-16 PROCEDURE — 250N000009 HC RX 250: Performed by: ORTHOPAEDIC SURGERY

## 2023-08-16 PROCEDURE — 370N000017 HC ANESTHESIA TECHNICAL FEE, PER MIN: Performed by: ORTHOPAEDIC SURGERY

## 2023-08-16 PROCEDURE — 258N000003 HC RX IP 258 OP 636: Performed by: ORTHOPAEDIC SURGERY

## 2023-08-16 PROCEDURE — 250N000009 HC RX 250: Performed by: HOSPITALIST

## 2023-08-16 PROCEDURE — 250N000013 HC RX MED GY IP 250 OP 250 PS 637: Performed by: ANESTHESIOLOGY

## 2023-08-16 PROCEDURE — 250N000013 HC RX MED GY IP 250 OP 250 PS 637: Performed by: ORTHOPAEDIC SURGERY

## 2023-08-16 PROCEDURE — 710N000010 HC RECOVERY PHASE 1, LEVEL 2, PER MIN: Performed by: ORTHOPAEDIC SURGERY

## 2023-08-16 PROCEDURE — 99222 1ST HOSP IP/OBS MODERATE 55: CPT | Performed by: HOSPITALIST

## 2023-08-16 PROCEDURE — 999N000157 HC STATISTIC RCP TIME EA 10 MIN

## 2023-08-16 PROCEDURE — 250N000011 HC RX IP 250 OP 636: Performed by: ANESTHESIOLOGY

## 2023-08-16 PROCEDURE — 71045 X-RAY EXAM CHEST 1 VIEW: CPT

## 2023-08-16 PROCEDURE — C1713 ANCHOR/SCREW BN/BN,TIS/BN: HCPCS | Performed by: ORTHOPAEDIC SURGERY

## 2023-08-16 PROCEDURE — 250N000009 HC RX 250: Performed by: NURSE ANESTHETIST, CERTIFIED REGISTERED

## 2023-08-16 PROCEDURE — 258N000003 HC RX IP 258 OP 636: Performed by: ANESTHESIOLOGY

## 2023-08-16 PROCEDURE — 94640 AIRWAY INHALATION TREATMENT: CPT | Mod: 76

## 2023-08-16 PROCEDURE — 250N000011 HC RX IP 250 OP 636: Performed by: ORTHOPAEDIC SURGERY

## 2023-08-16 PROCEDURE — 0SRD0J9 REPLACEMENT OF LEFT KNEE JOINT WITH SYNTHETIC SUBSTITUTE, CEMENTED, OPEN APPROACH: ICD-10-PCS | Performed by: ORTHOPAEDIC SURGERY

## 2023-08-16 PROCEDURE — 250N000009 HC RX 250

## 2023-08-16 PROCEDURE — 360N000077 HC SURGERY LEVEL 4, PER MIN: Performed by: ORTHOPAEDIC SURGERY

## 2023-08-16 PROCEDURE — 250N000025 HC SEVOFLURANE, PER MIN: Performed by: ORTHOPAEDIC SURGERY

## 2023-08-16 PROCEDURE — 999N000141 HC STATISTIC PRE-PROCEDURE NURSING ASSESSMENT: Performed by: ORTHOPAEDIC SURGERY

## 2023-08-16 PROCEDURE — 250N000011 HC RX IP 250 OP 636: Performed by: PHYSICIAN ASSISTANT

## 2023-08-16 PROCEDURE — 120N000001 HC R&B MED SURG/OB

## 2023-08-16 PROCEDURE — C1776 JOINT DEVICE (IMPLANTABLE): HCPCS | Performed by: ORTHOPAEDIC SURGERY

## 2023-08-16 PROCEDURE — 250N000013 HC RX MED GY IP 250 OP 250 PS 637: Performed by: HOSPITALIST

## 2023-08-16 PROCEDURE — 250N000013 HC RX MED GY IP 250 OP 250 PS 637: Performed by: PHYSICIAN ASSISTANT

## 2023-08-16 PROCEDURE — 258N000001 HC RX 258: Performed by: ORTHOPAEDIC SURGERY

## 2023-08-16 DEVICE — IMPLANTABLE DEVICE
Type: IMPLANTABLE DEVICE | Site: KNEE | Status: FUNCTIONAL
Brand: PERSONA®

## 2023-08-16 DEVICE — IMPLANTABLE DEVICE
Type: IMPLANTABLE DEVICE | Site: KNEE | Status: FUNCTIONAL
Brand: PERSONA™

## 2023-08-16 DEVICE — SIMPLEX® HV IS A FAST-SETTING ACRYLIC RESIN FOR USE IN BONE SURGERY. MIXING THE TWO SEPARATE STERILE COMPONENTS PRODUCES A DUCTILE BONE CEMENT WHICH, AFTER HARDENING, FIXES THE IMPLANT AND TRANSFERS STRESSES PRODUCED DURING MOVEMENT EVENLY TO THE BONE. SIMPLEX® HV CEMENT POWDER ALSO CONTAINS INSOLUBLE ZIRCONIUM DIOXIDE AS AN X-RAY CONTRAST MEDIUM. SIMPLEX® HV DOES NOT EMIT A SIGNAL AND DOES NOT POSE A SAFETY RISK IN A MAGNETIC RESONANCE ENVIRONMENT.
Type: IMPLANTABLE DEVICE | Site: KNEE | Status: FUNCTIONAL
Brand: SIMPLEX HV

## 2023-08-16 DEVICE — IMPLANTABLE DEVICE
Type: IMPLANTABLE DEVICE | Site: KNEE | Status: FUNCTIONAL
Brand: PERSONA® NATURAL TIBIA®

## 2023-08-16 RX ORDER — PHENOL 1.4 %
20 AEROSOL, SPRAY (ML) MUCOUS MEMBRANE AT BEDTIME
Status: ON HOLD | COMMUNITY

## 2023-08-16 RX ORDER — ONDANSETRON 2 MG/ML
4 INJECTION INTRAMUSCULAR; INTRAVENOUS EVERY 30 MIN PRN
Status: DISCONTINUED | OUTPATIENT
Start: 2023-08-16 | End: 2023-08-16 | Stop reason: HOSPADM

## 2023-08-16 RX ORDER — HYDROMORPHONE HCL IN WATER/PF 6 MG/30 ML
0.2 PATIENT CONTROLLED ANALGESIA SYRINGE INTRAVENOUS EVERY 5 MIN PRN
Status: DISCONTINUED | OUTPATIENT
Start: 2023-08-16 | End: 2023-08-16 | Stop reason: HOSPADM

## 2023-08-16 RX ORDER — KETAMINE HYDROCHLORIDE 10 MG/ML
INJECTION INTRAMUSCULAR; INTRAVENOUS PRN
Status: DISCONTINUED | OUTPATIENT
Start: 2023-08-16 | End: 2023-08-16

## 2023-08-16 RX ORDER — NYSTATIN 100000 [USP'U]/G
POWDER TOPICAL 3 TIMES DAILY PRN
Status: ON HOLD | COMMUNITY

## 2023-08-16 RX ORDER — PROCHLORPERAZINE MALEATE 5 MG
5 TABLET ORAL EVERY 6 HOURS PRN
Status: DISCONTINUED | OUTPATIENT
Start: 2023-08-16 | End: 2023-08-19 | Stop reason: HOSPADM

## 2023-08-16 RX ORDER — HYDROMORPHONE HYDROCHLORIDE 1 MG/ML
0.5 INJECTION, SOLUTION INTRAMUSCULAR; INTRAVENOUS; SUBCUTANEOUS
Status: DISCONTINUED | OUTPATIENT
Start: 2023-08-16 | End: 2023-08-19 | Stop reason: HOSPADM

## 2023-08-16 RX ORDER — LIDOCAINE 40 MG/G
CREAM TOPICAL
Status: DISCONTINUED | OUTPATIENT
Start: 2023-08-16 | End: 2023-08-19 | Stop reason: HOSPADM

## 2023-08-16 RX ORDER — ALBUTEROL SULFATE 90 UG/1
2 AEROSOL, METERED RESPIRATORY (INHALATION) EVERY 6 HOURS PRN
Status: DISCONTINUED | OUTPATIENT
Start: 2023-08-16 | End: 2023-08-16

## 2023-08-16 RX ORDER — ONDANSETRON 2 MG/ML
4 INJECTION INTRAMUSCULAR; INTRAVENOUS EVERY 6 HOURS PRN
Status: DISCONTINUED | OUTPATIENT
Start: 2023-08-16 | End: 2023-08-19 | Stop reason: HOSPADM

## 2023-08-16 RX ORDER — NALOXONE HYDROCHLORIDE 0.4 MG/ML
0.4 INJECTION, SOLUTION INTRAMUSCULAR; INTRAVENOUS; SUBCUTANEOUS
Status: DISCONTINUED | OUTPATIENT
Start: 2023-08-16 | End: 2023-08-19 | Stop reason: HOSPADM

## 2023-08-16 RX ORDER — OXYCODONE HYDROCHLORIDE 5 MG/1
5 TABLET ORAL EVERY 4 HOURS PRN
Status: DISCONTINUED | OUTPATIENT
Start: 2023-08-16 | End: 2023-08-19

## 2023-08-16 RX ORDER — LIDOCAINE 40 MG/G
CREAM TOPICAL
Status: DISCONTINUED | OUTPATIENT
Start: 2023-08-16 | End: 2023-08-16 | Stop reason: HOSPADM

## 2023-08-16 RX ORDER — SODIUM CHLORIDE, SODIUM LACTATE, POTASSIUM CHLORIDE, CALCIUM CHLORIDE 600; 310; 30; 20 MG/100ML; MG/100ML; MG/100ML; MG/100ML
INJECTION, SOLUTION INTRAVENOUS CONTINUOUS
Status: DISCONTINUED | OUTPATIENT
Start: 2023-08-16 | End: 2023-08-16 | Stop reason: HOSPADM

## 2023-08-16 RX ORDER — QUETIAPINE FUMARATE 100 MG/1
100 TABLET, FILM COATED ORAL DAILY
Status: DISCONTINUED | OUTPATIENT
Start: 2023-08-16 | End: 2023-08-19 | Stop reason: HOSPADM

## 2023-08-16 RX ORDER — FENTANYL CITRATE 50 UG/ML
50 INJECTION, SOLUTION INTRAMUSCULAR; INTRAVENOUS EVERY 5 MIN PRN
Status: DISCONTINUED | OUTPATIENT
Start: 2023-08-16 | End: 2023-08-16 | Stop reason: HOSPADM

## 2023-08-16 RX ORDER — PROPOFOL 10 MG/ML
INJECTION, EMULSION INTRAVENOUS CONTINUOUS PRN
Status: DISCONTINUED | OUTPATIENT
Start: 2023-08-16 | End: 2023-08-16

## 2023-08-16 RX ORDER — CEFAZOLIN SODIUM 1 G/3ML
1 INJECTION, POWDER, FOR SOLUTION INTRAMUSCULAR; INTRAVENOUS EVERY 8 HOURS
Status: COMPLETED | OUTPATIENT
Start: 2023-08-16 | End: 2023-08-17

## 2023-08-16 RX ORDER — NALOXONE HYDROCHLORIDE 0.4 MG/ML
0.2 INJECTION, SOLUTION INTRAMUSCULAR; INTRAVENOUS; SUBCUTANEOUS
Status: DISCONTINUED | OUTPATIENT
Start: 2023-08-16 | End: 2023-08-19 | Stop reason: HOSPADM

## 2023-08-16 RX ORDER — ONDANSETRON 4 MG/1
4 TABLET, ORALLY DISINTEGRATING ORAL EVERY 6 HOURS PRN
Status: DISCONTINUED | OUTPATIENT
Start: 2023-08-16 | End: 2023-08-19 | Stop reason: HOSPADM

## 2023-08-16 RX ORDER — MAGNESIUM HYDROXIDE 1200 MG/15ML
LIQUID ORAL PRN
Status: DISCONTINUED | OUTPATIENT
Start: 2023-08-16 | End: 2023-08-16 | Stop reason: HOSPADM

## 2023-08-16 RX ORDER — DEXAMETHASONE SODIUM PHOSPHATE 10 MG/ML
INJECTION, SOLUTION INTRAMUSCULAR; INTRAVENOUS PRN
Status: DISCONTINUED | OUTPATIENT
Start: 2023-08-16 | End: 2023-08-16

## 2023-08-16 RX ORDER — ONDANSETRON 2 MG/ML
4 INJECTION INTRAMUSCULAR; INTRAVENOUS EVERY 30 MIN PRN
Status: DISCONTINUED | OUTPATIENT
Start: 2023-08-16 | End: 2023-08-16

## 2023-08-16 RX ORDER — ACETAMINOPHEN 325 MG/1
650 TABLET ORAL EVERY 4 HOURS PRN
Status: DISCONTINUED | OUTPATIENT
Start: 2023-08-19 | End: 2023-08-19 | Stop reason: HOSPADM

## 2023-08-16 RX ORDER — LANOLIN ALCOHOL/MO/W.PET/CERES
3 CREAM (GRAM) TOPICAL
Status: DISCONTINUED | OUTPATIENT
Start: 2023-08-16 | End: 2023-08-19 | Stop reason: HOSPADM

## 2023-08-16 RX ORDER — LIDOCAINE HYDROCHLORIDE 10 MG/ML
INJECTION, SOLUTION INFILTRATION; PERINEURAL PRN
Status: DISCONTINUED | OUTPATIENT
Start: 2023-08-16 | End: 2023-08-16

## 2023-08-16 RX ORDER — FENTANYL CITRATE 50 UG/ML
100 INJECTION, SOLUTION INTRAMUSCULAR; INTRAVENOUS ONCE
Status: DISCONTINUED | OUTPATIENT
Start: 2023-08-16 | End: 2023-08-16 | Stop reason: HOSPADM

## 2023-08-16 RX ORDER — IPRATROPIUM BROMIDE AND ALBUTEROL SULFATE 2.5; .5 MG/3ML; MG/3ML
SOLUTION RESPIRATORY (INHALATION)
Status: COMPLETED
Start: 2023-08-16 | End: 2023-08-16

## 2023-08-16 RX ORDER — EPHEDRINE SULFATE 50 MG/ML
INJECTION, SOLUTION INTRAMUSCULAR; INTRAVENOUS; SUBCUTANEOUS PRN
Status: DISCONTINUED | OUTPATIENT
Start: 2023-08-16 | End: 2023-08-16

## 2023-08-16 RX ORDER — AMLODIPINE BESYLATE 5 MG/1
5 TABLET ORAL DAILY
Status: DISCONTINUED | OUTPATIENT
Start: 2023-08-17 | End: 2023-08-19 | Stop reason: HOSPADM

## 2023-08-16 RX ORDER — CEFAZOLIN SODIUM/WATER 2 G/20 ML
2 SYRINGE (ML) INTRAVENOUS
Status: COMPLETED | OUTPATIENT
Start: 2023-08-16 | End: 2023-08-16

## 2023-08-16 RX ORDER — PROPOFOL 10 MG/ML
INJECTION, EMULSION INTRAVENOUS PRN
Status: DISCONTINUED | OUTPATIENT
Start: 2023-08-16 | End: 2023-08-16

## 2023-08-16 RX ORDER — CEFAZOLIN SODIUM/WATER 2 G/20 ML
2 SYRINGE (ML) INTRAVENOUS SEE ADMIN INSTRUCTIONS
Status: DISCONTINUED | OUTPATIENT
Start: 2023-08-16 | End: 2023-08-16 | Stop reason: HOSPADM

## 2023-08-16 RX ORDER — AMOXICILLIN 250 MG
1 CAPSULE ORAL 2 TIMES DAILY
Status: DISCONTINUED | OUTPATIENT
Start: 2023-08-16 | End: 2023-08-19 | Stop reason: HOSPADM

## 2023-08-16 RX ORDER — FENTANYL CITRATE 50 UG/ML
100 INJECTION, SOLUTION INTRAMUSCULAR; INTRAVENOUS
Status: DISCONTINUED | OUTPATIENT
Start: 2023-08-16 | End: 2023-08-16 | Stop reason: HOSPADM

## 2023-08-16 RX ORDER — DULOXETIN HYDROCHLORIDE 30 MG/1
30 CAPSULE, DELAYED RELEASE ORAL 2 TIMES DAILY
Status: DISCONTINUED | OUTPATIENT
Start: 2023-08-16 | End: 2023-08-19 | Stop reason: HOSPADM

## 2023-08-16 RX ORDER — POLYETHYLENE GLYCOL 3350 17 G/17G
17 POWDER, FOR SOLUTION ORAL DAILY
Status: DISCONTINUED | OUTPATIENT
Start: 2023-08-17 | End: 2023-08-19 | Stop reason: HOSPADM

## 2023-08-16 RX ORDER — LEVOTHYROXINE SODIUM 50 UG/1
50 TABLET ORAL
Status: DISCONTINUED | OUTPATIENT
Start: 2023-08-17 | End: 2023-08-19 | Stop reason: HOSPADM

## 2023-08-16 RX ORDER — BISACODYL 10 MG
10 SUPPOSITORY, RECTAL RECTAL DAILY PRN
Status: DISCONTINUED | OUTPATIENT
Start: 2023-08-16 | End: 2023-08-19 | Stop reason: HOSPADM

## 2023-08-16 RX ORDER — ATORVASTATIN CALCIUM 10 MG/1
20 TABLET, FILM COATED ORAL DAILY
Status: DISCONTINUED | OUTPATIENT
Start: 2023-08-17 | End: 2023-08-19 | Stop reason: HOSPADM

## 2023-08-16 RX ORDER — TRANEXAMIC ACID 650 MG/1
1950 TABLET ORAL ONCE
Status: COMPLETED | OUTPATIENT
Start: 2023-08-16 | End: 2023-08-16

## 2023-08-16 RX ORDER — MULTIVITAMIN,THERAPEUTIC
1 TABLET ORAL DAILY
Status: ON HOLD | COMMUNITY

## 2023-08-16 RX ORDER — ASPIRIN 325 MG
325 TABLET, DELAYED RELEASE (ENTERIC COATED) ORAL
Status: DISCONTINUED | OUTPATIENT
Start: 2023-08-16 | End: 2023-08-19 | Stop reason: HOSPADM

## 2023-08-16 RX ORDER — ONDANSETRON 4 MG/1
4 TABLET, ORALLY DISINTEGRATING ORAL EVERY 30 MIN PRN
Status: DISCONTINUED | OUTPATIENT
Start: 2023-08-16 | End: 2023-08-16

## 2023-08-16 RX ORDER — SODIUM CHLORIDE, SODIUM LACTATE, POTASSIUM CHLORIDE, CALCIUM CHLORIDE 600; 310; 30; 20 MG/100ML; MG/100ML; MG/100ML; MG/100ML
INJECTION, SOLUTION INTRAVENOUS CONTINUOUS
Status: DISCONTINUED | OUTPATIENT
Start: 2023-08-16 | End: 2023-08-19 | Stop reason: HOSPADM

## 2023-08-16 RX ORDER — ALBUTEROL SULFATE 90 UG/1
2 AEROSOL, METERED RESPIRATORY (INHALATION) EVERY 6 HOURS PRN
COMMUNITY
End: 2023-11-29

## 2023-08-16 RX ORDER — FENTANYL CITRATE 50 UG/ML
25 INJECTION, SOLUTION INTRAMUSCULAR; INTRAVENOUS EVERY 5 MIN PRN
Status: DISCONTINUED | OUTPATIENT
Start: 2023-08-16 | End: 2023-08-16 | Stop reason: HOSPADM

## 2023-08-16 RX ORDER — ONDANSETRON 2 MG/ML
INJECTION INTRAMUSCULAR; INTRAVENOUS PRN
Status: DISCONTINUED | OUTPATIENT
Start: 2023-08-16 | End: 2023-08-16

## 2023-08-16 RX ORDER — ONDANSETRON 4 MG/1
4 TABLET, ORALLY DISINTEGRATING ORAL EVERY 30 MIN PRN
Status: DISCONTINUED | OUTPATIENT
Start: 2023-08-16 | End: 2023-08-16 | Stop reason: HOSPADM

## 2023-08-16 RX ORDER — METOPROLOL SUCCINATE 50 MG/1
50 TABLET, EXTENDED RELEASE ORAL 2 TIMES DAILY
Status: DISCONTINUED | OUTPATIENT
Start: 2023-08-16 | End: 2023-08-19 | Stop reason: HOSPADM

## 2023-08-16 RX ORDER — BUPIVACAINE HYDROCHLORIDE 5 MG/ML
INJECTION, SOLUTION EPIDURAL; INTRACAUDAL PRN
Status: DISCONTINUED | OUTPATIENT
Start: 2023-08-16 | End: 2023-08-16

## 2023-08-16 RX ORDER — HYDROMORPHONE HCL IN WATER/PF 6 MG/30 ML
0.4 PATIENT CONTROLLED ANALGESIA SYRINGE INTRAVENOUS EVERY 5 MIN PRN
Status: DISCONTINUED | OUTPATIENT
Start: 2023-08-16 | End: 2023-08-16 | Stop reason: HOSPADM

## 2023-08-16 RX ORDER — OXYCODONE HYDROCHLORIDE 5 MG/1
10 TABLET ORAL EVERY 4 HOURS PRN
Status: DISCONTINUED | OUTPATIENT
Start: 2023-08-16 | End: 2023-08-18

## 2023-08-16 RX ORDER — OXYCODONE HYDROCHLORIDE 5 MG/1
10 TABLET ORAL
Status: DISCONTINUED | OUTPATIENT
Start: 2023-08-16 | End: 2023-08-16 | Stop reason: HOSPADM

## 2023-08-16 RX ORDER — IPRATROPIUM BROMIDE AND ALBUTEROL SULFATE 2.5; .5 MG/3ML; MG/3ML
3 SOLUTION RESPIRATORY (INHALATION)
Status: DISCONTINUED | OUTPATIENT
Start: 2023-08-16 | End: 2023-08-17

## 2023-08-16 RX ORDER — HYDROMORPHONE HCL IN WATER/PF 6 MG/30 ML
0.2 PATIENT CONTROLLED ANALGESIA SYRINGE INTRAVENOUS
Status: DISCONTINUED | OUTPATIENT
Start: 2023-08-16 | End: 2023-08-19 | Stop reason: HOSPADM

## 2023-08-16 RX ORDER — IPRATROPIUM BROMIDE AND ALBUTEROL SULFATE 2.5; .5 MG/3ML; MG/3ML
3 SOLUTION RESPIRATORY (INHALATION) ONCE
Status: DISCONTINUED | OUTPATIENT
Start: 2023-08-16 | End: 2023-08-16 | Stop reason: HOSPADM

## 2023-08-16 RX ORDER — OXYCODONE HYDROCHLORIDE 5 MG/1
5 TABLET ORAL
Status: COMPLETED | OUTPATIENT
Start: 2023-08-16 | End: 2023-08-16

## 2023-08-16 RX ORDER — ACETAMINOPHEN 325 MG/1
975 TABLET ORAL EVERY 8 HOURS
Status: COMPLETED | OUTPATIENT
Start: 2023-08-16 | End: 2023-08-19

## 2023-08-16 RX ADMIN — OXYCODONE HYDROCHLORIDE 5 MG: 5 TABLET ORAL at 16:36

## 2023-08-16 RX ADMIN — FENTANYL CITRATE 25 MCG: 50 INJECTION, SOLUTION INTRAMUSCULAR; INTRAVENOUS at 16:09

## 2023-08-16 RX ADMIN — PROPOFOL 125 MCG/KG/MIN: 10 INJECTION, EMULSION INTRAVENOUS at 14:22

## 2023-08-16 RX ADMIN — SODIUM CHLORIDE, POTASSIUM CHLORIDE, SODIUM LACTATE AND CALCIUM CHLORIDE: 600; 310; 30; 20 INJECTION, SOLUTION INTRAVENOUS at 15:07

## 2023-08-16 RX ADMIN — CEFAZOLIN 1 G: 1 INJECTION, POWDER, FOR SOLUTION INTRAMUSCULAR; INTRAVENOUS at 21:09

## 2023-08-16 RX ADMIN — SENNOSIDES AND DOCUSATE SODIUM 1 TABLET: 50; 8.6 TABLET ORAL at 19:34

## 2023-08-16 RX ADMIN — ASPIRIN 325 MG: 325 TABLET, DELAYED RELEASE ORAL at 19:33

## 2023-08-16 RX ADMIN — QUETIAPINE 100 MG: 100 TABLET ORAL at 21:12

## 2023-08-16 RX ADMIN — DEXAMETHASONE SODIUM PHOSPHATE 10 MG: 10 INJECTION, SOLUTION INTRAMUSCULAR; INTRAVENOUS at 14:32

## 2023-08-16 RX ADMIN — METOPROLOL SUCCINATE 50 MG: 50 TABLET, EXTENDED RELEASE ORAL at 19:35

## 2023-08-16 RX ADMIN — Medication 2 G: at 14:11

## 2023-08-16 RX ADMIN — HYDROMORPHONE HYDROCHLORIDE 0.5 MG: 1 INJECTION, SOLUTION INTRAMUSCULAR; INTRAVENOUS; SUBCUTANEOUS at 14:58

## 2023-08-16 RX ADMIN — Medication 10 MG: at 14:42

## 2023-08-16 RX ADMIN — KETAMINE HYDROCHLORIDE 25 MG: 10 INJECTION INTRAMUSCULAR; INTRAVENOUS at 14:47

## 2023-08-16 RX ADMIN — HYDROMORPHONE HYDROCHLORIDE 0.5 MG: 1 INJECTION, SOLUTION INTRAMUSCULAR; INTRAVENOUS; SUBCUTANEOUS at 14:51

## 2023-08-16 RX ADMIN — Medication 3 MG: at 21:13

## 2023-08-16 RX ADMIN — HYDROMORPHONE HYDROCHLORIDE 0.5 MG: 1 INJECTION, SOLUTION INTRAMUSCULAR; INTRAVENOUS; SUBCUTANEOUS at 15:51

## 2023-08-16 RX ADMIN — BUPIVACAINE HYDROCHLORIDE 15 ML: 5 INJECTION, SOLUTION EPIDURAL; INTRACAUDAL; PERINEURAL at 14:21

## 2023-08-16 RX ADMIN — IPRATROPIUM BROMIDE AND ALBUTEROL SULFATE 3 ML: .5; 3 SOLUTION RESPIRATORY (INHALATION) at 18:10

## 2023-08-16 RX ADMIN — DULOXETINE 30 MG: 30 CAPSULE, DELAYED RELEASE ORAL at 19:34

## 2023-08-16 RX ADMIN — IPRATROPIUM BROMIDE AND ALBUTEROL SULFATE 3 ML: .5; 3 SOLUTION RESPIRATORY (INHALATION) at 20:47

## 2023-08-16 RX ADMIN — SODIUM CHLORIDE, POTASSIUM CHLORIDE, SODIUM LACTATE AND CALCIUM CHLORIDE: 600; 310; 30; 20 INJECTION, SOLUTION INTRAVENOUS at 14:11

## 2023-08-16 RX ADMIN — Medication 10 MG: at 14:40

## 2023-08-16 RX ADMIN — Medication 5 MG: at 14:44

## 2023-08-16 RX ADMIN — HYDROMORPHONE HYDROCHLORIDE 0.5 MG: 1 INJECTION, SOLUTION INTRAMUSCULAR; INTRAVENOUS; SUBCUTANEOUS at 14:50

## 2023-08-16 RX ADMIN — LIDOCAINE HYDROCHLORIDE 2 ML: 10 INJECTION, SOLUTION INFILTRATION; PERINEURAL at 14:22

## 2023-08-16 RX ADMIN — TRANEXAMIC ACID 1950 MG: 650 TABLET ORAL at 13:00

## 2023-08-16 RX ADMIN — ONDANSETRON 4 MG: 2 INJECTION INTRAMUSCULAR; INTRAVENOUS at 14:32

## 2023-08-16 RX ADMIN — KETAMINE HYDROCHLORIDE 25 MG: 10 INJECTION INTRAMUSCULAR; INTRAVENOUS at 14:52

## 2023-08-16 RX ADMIN — PROPOFOL 170 MG: 10 INJECTION, EMULSION INTRAVENOUS at 14:22

## 2023-08-16 RX ADMIN — MIDAZOLAM 2 MG: 1 INJECTION INTRAMUSCULAR; INTRAVENOUS at 14:11

## 2023-08-16 RX ADMIN — ACETAMINOPHEN 975 MG: 325 TABLET ORAL at 19:33

## 2023-08-16 RX ADMIN — OXYCODONE HYDROCHLORIDE 10 MG: 5 TABLET ORAL at 19:44

## 2023-08-16 RX ADMIN — UMECLIDINIUM 1 PUFF: 62.5 AEROSOL, POWDER ORAL at 19:39

## 2023-08-16 RX ADMIN — SODIUM CHLORIDE, POTASSIUM CHLORIDE, SODIUM LACTATE AND CALCIUM CHLORIDE: 600; 310; 30; 20 INJECTION, SOLUTION INTRAVENOUS at 19:29

## 2023-08-16 RX ADMIN — HYDROMORPHONE HYDROCHLORIDE 0.5 MG: 1 INJECTION, SOLUTION INTRAMUSCULAR; INTRAVENOUS; SUBCUTANEOUS at 23:55

## 2023-08-16 ASSESSMENT — ACTIVITIES OF DAILY LIVING (ADL)
ADLS_ACUITY_SCORE: 37

## 2023-08-16 NOTE — ANESTHESIA PROCEDURE NOTES
Adductor canal Procedure Note    Pre-Procedure   Staff -        Anesthesiologist:  Darvin Chao MD       Performed By: anesthesiologist       Location: pre-op       Procedure Start/Stop Times: 8/16/2023 2:20 PM and 8/16/2023 2:22 PM       Pre-Anesthestic Checklist: patient identified, IV checked, site marked, risks and benefits discussed, informed consent, monitors and equipment checked, pre-op evaluation, at physician/surgeon's request and post-op pain management  Timeout:       Correct Patient: Yes        Correct Procedure: Yes        Correct Site: Yes        Correct Position: Yes        Correct Laterality: Yes        Site Marked: Yes  Procedure Documentation  Procedure: Adductor canal       Laterality: left       Patient Position: supine       Skin prep: Chloraprep       Local skin infiltrated with 3 mL of 1% lidocaine.        Needle Type: other       Needle Gauge: 20.        Needle Length (Inches): 6        Ultrasound guided       1. Ultrasound was used to identify targeted nerve, plexus, vascular marker, or fascial plane and place a needle adjacent to it in real-time.       2. Ultrasound was used to visualize the spread of anesthetic in close proximity to the above referenced structure.       3. A permanent image is entered into the patient's record.       4. The visualized anatomic structures appeared normal.       5. There were no apparent abnormal pathologic findings.    Assessment/Narrative         The placement was negative for: blood aspirated, painful injection and site bleeding       Paresthesias: No.       Test dose of 3 mL at.         Test dose negative, 3 minutes after injection, for signs of intravascular, subdural, or intrathecal injection.       Bolus given via needle. no blood aspirated via catheter.        Secured via.        Insertion/Infusion Method: Single Shot       Complications: none       Injection made incrementally with aspirations every 5 mL.    Medication(s) Administered  "  Medication Administration Time: 8/16/2023 2:20 PM      FOR [facility] ONLY:   Pain Team Contact information: please page the Pain Team Via [email or web address]. Search \"Pain\". During daytime hours, please page the attending first. At night please page the resident first.      "

## 2023-08-16 NOTE — ANESTHESIA CARE TRANSFER NOTE
Patient: Jessica Ellison    Procedure: Procedure(s):  LEFT TOTAL KNEE ARTHROPLASTY       Diagnosis: Osteoarthritis of left knee [M17.12]  Diagnosis Additional Information: No value filed.    Anesthesia Type:   Spinal     Note:    Oropharynx: oropharynx clear of all foreign objects and spontaneously breathing  Level of Consciousness: awake    Level of Supplemental Oxygen (L/min / FiO2): 8  Independent Airway: airway patency satisfactory and stable  Dentition: dentition unchanged  Vital Signs Stable: post-procedure vital signs reviewed and stable    Patient transferred to: PACU    Handoff Report: Identifed the Patient, Identified the Reponsible Provider, Reviewed the pertinent medical history, Discussed the surgical course, Reviewed Intra-OP anesthesia mangement and issues during anesthesia, Set expectations for post-procedure period and Allowed opportunity for questions and acknowledgement of understanding      Vitals:  Vitals Value Taken Time   /69 08/16/23 1551   Temp 37.1  C (98.7  F) 08/16/23 1551   Pulse 88 08/16/23 1554   Resp 13 08/16/23 1554   SpO2 95 % 08/16/23 1554   Vitals shown include unvalidated device data.    Electronically Signed By: LA Leslie CRNA  August 16, 2023  3:55 PM

## 2023-08-16 NOTE — PHARMACY-ADMISSION MEDICATION HISTORY
Pharmacist CARLOS Medication History    Admission medication history is complete. The information provided in this note is only as accurate as the sources available at the time of the update.    Medication reconciliation/reorder completed by provider prior to medication history? No    Information Source(s): Patient and Clinic records and Care Everywhere via in-person    Pertinent Information: N/A    Medication Affordability:  Not including over the counter (OTC) medications, was there a time in the past 3 months when you did not take your medications as prescribed because of cost?: No    Allergies reviewed with patient and updates made in EHR: yes    Medications available for use during hospital stay: None.      Medication History Completed By: Chapin Berry [facility] 8/16/2023 1:41 PM    PTA Med List   Medication Sig Last Dose    albuterol (PROAIR HFA/PROVENTIL HFA/VENTOLIN HFA) 108 (90 Base) MCG/ACT inhaler Inhale 2 puffs into the lungs every 6 hours as needed for shortness of breath or wheezing Unknown at not with    amLODIPine (NORVASC) 5 MG tablet TAKE 1 TABLET (5 MG) BY MOUTH DAILY 8/16/2023 at AM    aspirin 81 MG EC tablet Take 81 mg by mouth daily 8/16/2023 at AM    atorvastatin (LIPITOR) 20 MG tablet Take 1 tablet (20 mg) by mouth daily 8/16/2023 at AM    calcium carbonate 600 mg-vitamin D 400 units (CALTRATE) 600-400 MG-UNIT per tablet Take 1 tablet by mouth every evening 8/15/2023 at PM    celecoxib (CELEBREX) 200 MG capsule TAKE 1 CAPSULE (200 MG) BY MOUTH DAILY 8/9/2023 at AM    cyclobenzaprine (FLEXERIL) 10 MG tablet TAKE ONE TABLET BY MOUTH TWICE A DAY AS NEEDED FOR MUSCLE SPASMS Unknown    DULoxetine (CYMBALTA) 30 MG capsule Take 1 capsule (30 mg) by mouth 2 times daily 8/16/2023 at AM    ferrous sulfate (FEROSUL) 325 (65 Fe) MG tablet Take 325 mg by mouth every other day Alternating days with Senna tab Past Week    levothyroxine (SYNTHROID/LEVOTHROID) 50 MCG tablet Take 1 tablet (50 mcg) by mouth  daily 8/16/2023 at AM    Melatonin 10 MG TABS tablet Take 20 mg by mouth At Bedtime 8/15/2023 at PM    metoprolol succinate ER (TOPROL XL) 50 MG 24 hr tablet TAKE ONE TABLET BY MOUTH TWICE DAILY 8/16/2023 at AM    multivitamin, therapeutic (THERA-VIT) TABS tablet Take 1 tablet by mouth daily 8/9/2023    naloxone (NARCAN) 4 MG/0.1ML nasal spray Spray 1 spray (4 mg) into one nostril alternating nostrils once as needed for opioid reversal every 2-3 minutes until assistance arrives Unknown    nystatin (MYCOSTATIN) 536654 UNIT/GM external powder Apply topically 3 times daily as needed (rash) Unknown    oxyCODONE (ROXICODONE) 5 MG tablet Take 1 tablet (5 mg) by mouth every 6 hours as needed for severe pain With a percocet Unknown    oxyCODONE-acetaminophen (PERCOCET)  MG per tablet Take 1 tablet by mouth every 6 hours as needed for severe pain 8/16/2023 at 0300    QUEtiapine (SEROQUEL) 50 MG tablet Take 2 tablets (100 mg) by mouth daily 8/15/2023 at PM    senna (SENOKOT) 8.6 MG tablet Take 1 tablet by mouth every other day Alternating days with Iron tab Past Week    SPIRIVA HANDIHALER 18 MCG inhaled capsule USING THE HANDIHALER, INHALE THE CONTENTS OF ONE CAPSULE BY MOUTH DAILY 8/15/2023 at AM, not with    [DISCONTINUED] diazepam (VALIUM) 10 MG tablet Take 10 mg by mouth every 6 hours as needed for anxiety (Patient not taking: Reported on 8/8/2023)

## 2023-08-16 NOTE — INTERVAL H&P NOTE
"I have reviewed the surgical (or preoperative) H&P that is linked to this encounter, and examined the patient. There are no significant changes    Clinical Conditions Present on Arrival:  Clinically Significant Risk Factors Present on Admission                 # Drug Induced Platelet Defect: home medication list includes an antiplatelet medication  # Obesity: Estimated body mass index is 30.61 kg/m  as calculated from the following:    Height as of this encounter: 1.549 m (5' 1\").    Weight as of this encounter: 73.5 kg (162 lb).       "

## 2023-08-16 NOTE — OP NOTE
Operative Report    PATIENT:  Jessica Ellison    DATE OF SURGERY:  8/16/2023    SURGEON  Bryan Roque MD.      FIRST ASSISTANT  Sam Huerta PA-C  (Expert NENA assist was required throughout for patient positioning, soft tissue retraction, appropriate use of knee instrumentation, and patient safety)     PREOPERATIVE DIAGNOSIS  left knee osteoarthritis     POSTOPERATIVE DIAGNOSIS  left knee osteoarthritis.         PROCEDURE  left Total Knee Arthroplasty.         ANESTHESIA  Spinal    SPECIMENS: none     ESTIMATED BLOOD LOSS  50cc     INDICATIONS  Ms. Jessica Ellison is a pleasant 72 year old-year-old female with an ongoing history of increasing and progressive pain in the left knee with severe disability. Pain and disability due to knee arthritis are severely affecting quality of life and ability to perform even simple activities of daily living.  X-rays have shown bone-on-bone degenerative change. Consequently after trying and failing all conservative management of knee arthritis, discussion regarding the risk and benefits of knee replacement was undertaken and the patient elected to proceed.     FINDINGS:  The operative knee showed a severe full-thickness cartilage loss on the femur and tibia in the lateral compartment of the knee.  The patellofemoral joint also showed advanced arthritic changes.      IMPLANTS  1. Britney, Persona, CR femoral component, size 5 .  2. Britney, Persona, tibial component, size E.  3. Britney, Persona,  all polyethylene articular surface 13 mm thickness.  UC  4. Britney, Persona, all polyethylene patellar button, 32mm diameter      PROCEDURE  Once consent was obtained and the operative site marked in the preop holding area, the patient was brought to the operating room.  Anesthesia was established without difficulty. All bony prominences and the non-operative leg were padded appropriately. The left leg was sterilely prepped and draped in the usual fashion after placement of a  proximal tourniquet.  Tourniquet was  inflated.     A longitudinal incision made over the knee.  Dissection was carried down through the extensor mechanism.  A medial parapatellar arthrotomy  was performed.  The patella was luxed laterally and protected. Standard medial release performed.    An intramedullary guide was used in the femur.  The distal femoral was made at 4 degrees of valgus.  The femoral cutting block  was applied and the rest of the femoral cuts completed. ACL was removed and the PCL was recessed.    Attention was then turned to the tibia.  This was cut using an extramedullary tibial cutting guide perpendicular to its mechanical axis.  The trial tibial and femoral components were then placed and the knee reduced. The patella was resurfaced and found to track well. Flexion and extension gaps were appropriate and varus valgus stability was good.     The knee was copiously irrigated and all bony surfaces dried.  Cement was mixed and all components were cemented and held until firm.  The knee was again trialed.  The  Polyethylene was opened and snapped into place, the locking mechanism was ensured.  The knee was copiously irrigated. The extensor mechanism was closed with # 1 interrupted Vicryl suture. Deep dermis was closed layer-wise of # 2-0 interrupted inverted Vicryl sutures followed by running # 3-0 Monocryl in the skin.  Dressings were applied. The patient tolerated the procedure well and was returned to the postop recovery area in stable condition.          BRYAN HART MD    @C(1)@  Bryan Hart MD    @C(2)@  Sarah Barriga

## 2023-08-16 NOTE — TREATMENT PLAN
Orthopedic Surgery Pre-Op Plan: Jessica Ellison  pre-op review. This is NOT an H&P   Surgeon: Dr. Roque   Hospital: [facility]  Name of Surgery: Left Total Knee Arthroplasty   Date of Surgery: 8/16/23  H&P: Completed on 8/8/23 by Joann Ochoa at [facility].   History of ASA, NSAIDS, vitamin and/or herbal supplements within 10 days: Yes- ASA 81 mg, Celebrex, Multivitamin-patient instructed to hold these medications and vitamins for 7 days before surgery.  History of blood thinners: No    Plan:   1) Discharge Plan: Home POD 1 or POD 2 when medically stable for discharge with assist of Family (Spouse, Children and Grandchildren). Please see Discharge Planning section near bottom of this note for further details.     2) History of Cerebral Aneurysm: S/P Coiling 8 years ago: Doing well with no residual symptoms or deficits.    3) Yeast Infection: in groin at preop exam on 8/8/23. PCP prescribed nystatin 3 x daily. Patient informed to notify Dr. Roque's office if this does not improve prior to surgery.     4) Hyperlipidemia: On atorvastatin.    5) Hypertension: Well-controlled on amlodipine and metoprolol.     6) Emphysema: Reviewed recent Pulmonary Consult with Cait Galeano NP, [facility] from 8/14/23: Recent repeat pulmonary function testing on 8/10/2023 shows normal spirometry, lung volumes, and diffusion capacity.  Doing well on Spiriva and Combivent inhalers.     7) Obstructive Sleep Apnea: on CPAP: Reviewed recent Sleep Apnea follow up visit with Ruel Woodward CNP, on 8/9/23: will continue on CPAP with plan to complete an all night titration study after knee replacement surgery due to concerns for residual hypoxemia despite being on CPAP.  Patient was reminded to bring CPAP machine to the hospital and use it whenever sleeping or napping.     8) Chronic Pain with Continuous Opioid Dependence: Chronic low back pain on chronic opioids which are  prescribed by her PCP with a controlled substance agreement in place.  On oxycodone, duloxetine, and cyclobenzaprine.  I will order Inpatient Pain Team consult to assist with postop pain management plan given this history.  Their recommendations and assistance are greatly appreciated.      Patient appears medically optimized for upcoming surgery. I would recommend Hospitalist Consult to assist with medical management.  I will order Inpatient Pain Team consult to assist with postop pain management plan given history of chronic pain with opioid dependence.  Please call me below with any questions on this patient.       Review of Systems Notable for: History of cerebral aneurysm-s/p coiling 8 years ago-no residual deficits, yeast infection-in the groin at preop exam on 8/8/2023, hyperlipidemia, hypertension, emphysema, obstructive sleep apnea-on CPAP, chronic pain with continuous opioid dependence.    Past Medical History:   Past Medical History:   Diagnosis Date    Anemia     Aneurysm (H)     Antiplatelet or antithrombotic long-term use     Arthritis     Chemical dependency (H)     Chem Dep RX    Depression     History of blood transfusion     Hypertension     Menopausal symptoms     Other chronic pain     Lower back and hips    Sleep apnea     Sleep disorder     Thyroid disease     Tobacco abuse     Uncomplicated asthma      Past Surgical History:   Procedure Laterality Date    ABDOMEN SURGERY      APPENDECTOMY  1960    APPENDECTOMY      BACK SURGERY      BIOPSY BREAST      cerebral aneurysm  2014    coiled    CEREBRAL ANEURYSM REPAIR      COLON SURGERY      COLONOSCOPY  04/19/2005    FRACTURE SURGERY      HC EXCISION BREAST LESION, OPEN >=1      core biopsy 2006, lumpectomy 2006    HERNIA REPAIR      LAP VENTRAL HERNIA REPAIR  12/2017    [place]    LAPAROSCOPIC ASSISTED HYSTERECTOMY VAGINAL  12/2017    LUMPECTOMY BREAST      orif left femoral neck Left 06/03/2016    stress fracture.  done at [facility]  HISTORY OF -   2004    Skin Graft    ZZC PART REMOVAL COLON W ANASTOMOSIS  05/2005    diverticulitis    ZZC SPINE FUSION,ANTER,8+ SGMTS  2007    Anterior posterior fusion 10 levels, hardware removal and re-fusion 2009       Current Medications:  Patient's Medications   New Prescriptions    No medications on file   Previous Medications    AMLODIPINE (NORVASC) 5 MG TABLET    TAKE 1 TABLET (5 MG) BY MOUTH DAILY    ASPIRIN 81 MG EC TABLET    Take 81 mg by mouth daily    ATORVASTATIN (LIPITOR) 20 MG TABLET    Take 1 tablet (20 mg) by mouth daily    CALCIUM CARBONATE 600 MG-VITAMIN D 400 UNITS (CALTRATE) 600-400 MG-UNIT PER TABLET    Take 1 tablet by mouth every evening    CELECOXIB (CELEBREX) 200 MG CAPSULE    TAKE 1 CAPSULE (200 MG) BY MOUTH DAILY    CYCLOBENZAPRINE (FLEXERIL) 10 MG TABLET    TAKE ONE TABLET BY MOUTH TWICE A DAY AS NEEDED FOR MUSCLE SPASMS    DULOXETINE (CYMBALTA) 30 MG CAPSULE    Take 1 capsule (30 mg) by mouth 2 times daily    FERROUS SULFATE (FEROSUL) 325 (65 FE) MG TABLET    Take 325 mg by mouth every other day Alternating days with Senna tab    IPRATROPIUM-ALBUTEROL (COMBIVENT RESPIMAT)  MCG/ACT INHALER    Inhale 1 puff into the lungs every 6 hours as needed for shortness of breath, wheezing or cough    LEVOTHYROXINE (SYNTHROID/LEVOTHROID) 50 MCG TABLET    Take 1 tablet (50 mcg) by mouth daily    MELATONIN GUMMIES PO    Take 20 mg by mouth At Bedtime    METOPROLOL SUCCINATE ER (TOPROL XL) 50 MG 24 HR TABLET    TAKE ONE TABLET BY MOUTH TWICE DAILY    MULTIPLE VITAMIN (MULTIVITAMIN ADULT PO)    Take by mouth daily    NALOXONE (NARCAN) 4 MG/0.1ML NASAL SPRAY    Spray 1 spray (4 mg) into one nostril alternating nostrils once as needed for opioid reversal every 2-3 minutes until assistance arrives    NYSTATIN (MYCOSTATIN) 321104 UNIT/GM EXTERNAL POWDER    Apply topically 3 times daily To rash until healing is complete    OXYCODONE (ROXICODONE) 5 MG TABLET    Take 1 tablet (5 mg) by mouth every 6  hours as needed for severe pain With a percocet    OXYCODONE-ACETAMINOPHEN (PERCOCET)  MG PER TABLET    Take 1 tablet by mouth every 6 hours as needed for severe pain    QUETIAPINE (SEROQUEL) 50 MG TABLET    Take 2 tablets (100 mg) by mouth daily    SENNA (SENOKOT) 8.6 MG TABLET    Take 1 tablet by mouth every other day Alternating days with Iron tab    SPIRIVA HANDIHALER 18 MCG INHALED CAPSULE    USING THE HANDIHALER, INHALE THE CONTENTS OF ONE CAPSULE BY MOUTH DAILY    VALACYCLOVIR (VALTREX) 1000 MG TABLET    Take 2 tablets (2,000 mg) by mouth 2 times daily for 1 day    VENLAFAXINE (EFFEXOR) 75 MG TABLET    Take 75 mg by mouth 3 times daily    VENTOLIN  (90 BASE) MCG/ACT INHALER    INHALE TWO PUFFS BY MOUTH EVERY 6 HOURS   Modified Medications    No medications on file   Discontinued Medications    No medications on file       ALLERGIES:  Allergies   Allergen Reactions    Bee Venom     Lisinopril Swelling     Oral swelling       Social History  Social History     Tobacco Use    Smoking status: Former     Packs/day: 1.00     Years: 30.00     Pack years: 30.00     Types: Cigarettes     Quit date: 2004     Years since quittin.6    Smokeless tobacco: Never   Vaping Use    Vaping Use: Never used   Substance Use Topics    Alcohol use: No     Comment: ETOH dependency, DUI 3/15/10; 1-2 ETOH per week    Drug use: Yes     Types: Oxycodone     Comment: percocet for the past several years       Any Abnormal Recent Diagnostics? No    Discharge Planning:   Discharge plan according to [facility]:      Home POD 1 or POD 2 when medically stable for discharge with assist of Spouse,   Children and Grandchildren.     [phone]   Discharge Planning   Patient/Family Anticipates Transition to home with family   Living Arrangements   People in Home spouse;child(jaxson), adult;grandchild(jaxson)   Type of Residence Private Residence   Is your private residence a single family home or apartment? Single family  home   Number of Stairs, Within Home, Primary ten   Stair Railings, Within Home, Primary railings safe and in good condition   Once home, are you able to live on one level? Yes   Which level? Main Level   Bathroom Shower/Tub Walk-in shower   Equipment Currently Used at Home cane, straight;raised toilet seat   Support System   Support Systems Spouse/Significant Other;Children   Do you have someone available to stay with you one or two nights once you are home? Yes       LA Rodriguez, CNP   Advanced Practice Nurse Navigator- Orthopedics  [facility]   Phone: [phone]

## 2023-08-16 NOTE — ANESTHESIA PREPROCEDURE EVALUATION
Anesthesia Pre-Procedure Evaluation    Patient: Jessica Ellison   MRN: [id number] : 1950        Procedure : Procedure(s):  LEFT TOTAL KNEE ARTHROPLASTY          Past Medical History:   Diagnosis Date    Anemia     Aneurysm (H)     Antiplatelet or antithrombotic long-term use     Arthritis     Chemical dependency (H)     Chem Dep RX    Depression     History of blood transfusion     Hypertension     Menopausal symptoms     Other chronic pain     Lower back and hips    Sleep apnea     Sleep disorder     Thyroid disease     Tobacco abuse     Uncomplicated asthma       Past Surgical History:   Procedure Laterality Date    ABDOMEN SURGERY      APPENDECTOMY  1960    APPENDECTOMY      BACK SURGERY      BIOPSY BREAST      cerebral aneurysm  2014    coiled    CEREBRAL ANEURYSM REPAIR      COLON SURGERY      COLONOSCOPY  2005    FRACTURE SURGERY      HC EXCISION BREAST LESION, OPEN >=1      core biopsy , lumpectomy 2006    HERNIA REPAIR      LAP VENTRAL HERNIA REPAIR  2017    [place]    LAPAROSCOPIC ASSISTED HYSTERECTOMY VAGINAL  2017    LUMPECTOMY BREAST      orif left femoral neck Left 2016    stress fracture.  done at [facility]    SURGICAL HISTORY OF -       Skin Graft    ZZC PART REMOVAL COLON W ANASTOMOSIS  2005    diverticulitis    ZZC SPINE FUSION,ANTER,8+ SGMTS      Anterior posterior fusion 10 levels, hardware removal and re-fusion 2009      Allergies   Allergen Reactions    Bee Venom     Lisinopril Swelling     Oral swelling      Social History     Tobacco Use    Smoking status: Former     Packs/day: 1.00     Years: 30.00     Pack years: 30.00     Types: Cigarettes     Quit date: 2004     Years since quittin.6    Smokeless tobacco: Never   Substance Use Topics    Alcohol use: No     Comment: ETOH dependency, DUI 3/15/10; 1-2 ETOH per week      Wt Readings from Last 1 Encounters:   23 73.5 kg (162 lb)        Anesthesia Evaluation            ROS/MED  HX  ENT/Pulmonary:       Neurologic:  - neg neurologic ROS     Cardiovascular:       METS/Exercise Tolerance: >4 METS    Hematologic:  - neg hematologic  ROS     Musculoskeletal:  - neg musculoskeletal ROS     GI/Hepatic:  - neg GI/hepatic ROS     Renal/Genitourinary:       Endo:       Psychiatric/Substance Use:  - neg psychiatric ROS     Infectious Disease:  - neg infectious disease ROS     Malignancy:  - neg malignancy ROS     Other:  - neg other ROS          Physical Exam    Airway  airway exam normal      Mallampati: II       Respiratory Devices and Support         Dental  no notable dental history         Cardiovascular   cardiovascular exam normal          Pulmonary   pulmonary exam normal                OUTSIDE LABS:  CBC:   Lab Results   Component Value Date    WBC 4.0 08/08/2023    WBC 5.1 07/11/2023    HGB 11.7 08/08/2023    HGB 12.5 07/11/2023    HCT 35.9 08/08/2023    HCT 38.7 07/11/2023     08/08/2023     07/11/2023     BMP:   Lab Results   Component Value Date     08/08/2023     03/13/2023    POTASSIUM 4.6 08/08/2023    POTASSIUM 4.3 03/13/2023    CHLORIDE 103 08/08/2023    CHLORIDE 105 03/13/2023    CO2 24 08/08/2023    CO2 25 03/13/2023    BUN 14.1 08/08/2023    BUN 18.7 03/13/2023    CR 0.87 08/08/2023    CR 0.91 03/13/2023    GLC 73 08/16/2023    GLC 84 08/08/2023     COAGS:   Lab Results   Component Value Date    PTT 37 12/13/2019    INR 0.97 12/13/2019     POC:   Lab Results   Component Value Date    BGM 96 12/24/2020     HEPATIC:   Lab Results   Component Value Date    ALBUMIN 4.0 06/24/2022    PROTTOTAL 7.3 06/24/2022    ALT 57 (H) 06/24/2022    AST 28 06/24/2022    ALKPHOS 94 06/24/2022    BILITOTAL 0.7 06/24/2022     OTHER:   Lab Results   Component Value Date    LACT 0.7 01/05/2019    DELILAH 10.1 08/08/2023    MAG 1.7 09/15/2013    TSH 1.45 07/11/2023    T4 1.00 06/01/2017    CRP <2.9 02/02/2021       Anesthesia Plan    ASA Status:  3       Anesthesia Type: Spinal.               Consents    Anesthesia Plan(s) and associated risks, benefits, and realistic alternatives discussed. Questions answered and patient/representative(s) expressed understanding.     - Discussed:     - Discussed with:  Patient            Postoperative Care    Pain management: Peripheral nerve block (Single Shot).        Comments:                Darvin Chao MD

## 2023-08-17 ENCOUNTER — APPOINTMENT (OUTPATIENT)
Dept: OCCUPATIONAL THERAPY | Facility: CLINIC | Age: 73
DRG: 470 | End: 2023-08-17
Attending: ORTHOPAEDIC SURGERY
Payer: COMMERCIAL

## 2023-08-17 ENCOUNTER — APPOINTMENT (OUTPATIENT)
Dept: PHYSICAL THERAPY | Facility: CLINIC | Age: 73
DRG: 470 | End: 2023-08-17
Attending: ORTHOPAEDIC SURGERY
Payer: COMMERCIAL

## 2023-08-17 ENCOUNTER — APPOINTMENT (OUTPATIENT)
Dept: ULTRASOUND IMAGING | Facility: CLINIC | Age: 73
DRG: 470 | End: 2023-08-17
Payer: COMMERCIAL

## 2023-08-17 LAB
GLUCOSE BLDC GLUCOMTR-MCNC: 167 MG/DL (ref 70–99)
HGB BLD-MCNC: 10.4 G/DL (ref 11.7–15.7)

## 2023-08-17 PROCEDURE — 85018 HEMOGLOBIN: CPT | Performed by: ORTHOPAEDIC SURGERY

## 2023-08-17 PROCEDURE — 250N000011 HC RX IP 250 OP 636: Performed by: ORTHOPAEDIC SURGERY

## 2023-08-17 PROCEDURE — 94640 AIRWAY INHALATION TREATMENT: CPT | Mod: 76

## 2023-08-17 PROCEDURE — 120N000001 HC R&B MED SURG/OB

## 2023-08-17 PROCEDURE — 36415 COLL VENOUS BLD VENIPUNCTURE: CPT | Performed by: ORTHOPAEDIC SURGERY

## 2023-08-17 PROCEDURE — 97166 OT EVAL MOD COMPLEX 45 MIN: CPT | Mod: GO

## 2023-08-17 PROCEDURE — 250N000009 HC RX 250: Performed by: ORTHOPAEDIC SURGERY

## 2023-08-17 PROCEDURE — 97535 SELF CARE MNGMENT TRAINING: CPT | Mod: GO

## 2023-08-17 PROCEDURE — 250N000013 HC RX MED GY IP 250 OP 250 PS 637: Performed by: ORTHOPAEDIC SURGERY

## 2023-08-17 PROCEDURE — 250N000013 HC RX MED GY IP 250 OP 250 PS 637: Performed by: HOSPITALIST

## 2023-08-17 PROCEDURE — 94640 AIRWAY INHALATION TREATMENT: CPT

## 2023-08-17 PROCEDURE — 99232 SBSQ HOSP IP/OBS MODERATE 35: CPT | Performed by: HOSPITALIST

## 2023-08-17 PROCEDURE — 250N000013 HC RX MED GY IP 250 OP 250 PS 637: Performed by: INTERNAL MEDICINE

## 2023-08-17 PROCEDURE — 97161 PT EVAL LOW COMPLEX 20 MIN: CPT | Mod: GP

## 2023-08-17 PROCEDURE — 97116 GAIT TRAINING THERAPY: CPT | Mod: GP

## 2023-08-17 PROCEDURE — 250N000009 HC RX 250: Performed by: HOSPITALIST

## 2023-08-17 PROCEDURE — 97110 THERAPEUTIC EXERCISES: CPT | Mod: GP

## 2023-08-17 PROCEDURE — 93971 EXTREMITY STUDY: CPT | Mod: LT

## 2023-08-17 PROCEDURE — 999N000157 HC STATISTIC RCP TIME EA 10 MIN

## 2023-08-17 RX ORDER — HYDRALAZINE HYDROCHLORIDE 10 MG/1
10 TABLET, FILM COATED ORAL ONCE
Status: COMPLETED | OUTPATIENT
Start: 2023-08-17 | End: 2023-08-17

## 2023-08-17 RX ORDER — IPRATROPIUM BROMIDE AND ALBUTEROL SULFATE 2.5; .5 MG/3ML; MG/3ML
3 SOLUTION RESPIRATORY (INHALATION) 2 TIMES DAILY
Status: DISCONTINUED | OUTPATIENT
Start: 2023-08-17 | End: 2023-08-18

## 2023-08-17 RX ORDER — METHOCARBAMOL 500 MG/1
500 TABLET, FILM COATED ORAL 3 TIMES DAILY
Status: DISCONTINUED | OUTPATIENT
Start: 2023-08-17 | End: 2023-08-19 | Stop reason: HOSPADM

## 2023-08-17 RX ADMIN — IPRATROPIUM BROMIDE AND ALBUTEROL SULFATE 3 ML: .5; 3 SOLUTION RESPIRATORY (INHALATION) at 20:40

## 2023-08-17 RX ADMIN — HYDROMORPHONE HYDROCHLORIDE 0.2 MG: 0.2 INJECTION, SOLUTION INTRAMUSCULAR; INTRAVENOUS; SUBCUTANEOUS at 16:45

## 2023-08-17 RX ADMIN — HYDROMORPHONE HYDROCHLORIDE 0.5 MG: 1 INJECTION, SOLUTION INTRAMUSCULAR; INTRAVENOUS; SUBCUTANEOUS at 21:01

## 2023-08-17 RX ADMIN — METHOCARBAMOL TABLETS 500 MG: 500 TABLET, COATED ORAL at 21:00

## 2023-08-17 RX ADMIN — ACETAMINOPHEN 975 MG: 325 TABLET ORAL at 04:52

## 2023-08-17 RX ADMIN — LEVOTHYROXINE SODIUM 50 MCG: 0.05 TABLET ORAL at 08:08

## 2023-08-17 RX ADMIN — Medication 3 MG: at 21:00

## 2023-08-17 RX ADMIN — DULOXETINE 30 MG: 30 CAPSULE, DELAYED RELEASE ORAL at 19:11

## 2023-08-17 RX ADMIN — AMLODIPINE BESYLATE 5 MG: 5 TABLET ORAL at 08:08

## 2023-08-17 RX ADMIN — METOPROLOL SUCCINATE 50 MG: 50 TABLET, EXTENDED RELEASE ORAL at 08:08

## 2023-08-17 RX ADMIN — OXYCODONE HYDROCHLORIDE 10 MG: 5 TABLET ORAL at 08:17

## 2023-08-17 RX ADMIN — SENNOSIDES AND DOCUSATE SODIUM 1 TABLET: 50; 8.6 TABLET ORAL at 08:08

## 2023-08-17 RX ADMIN — HYDROMORPHONE HYDROCHLORIDE 0.5 MG: 1 INJECTION, SOLUTION INTRAMUSCULAR; INTRAVENOUS; SUBCUTANEOUS at 04:52

## 2023-08-17 RX ADMIN — HYDRALAZINE HYDROCHLORIDE 10 MG: 10 TABLET, FILM COATED ORAL at 14:20

## 2023-08-17 RX ADMIN — HYDROMORPHONE HYDROCHLORIDE 0.2 MG: 0.2 INJECTION, SOLUTION INTRAMUSCULAR; INTRAVENOUS; SUBCUTANEOUS at 09:41

## 2023-08-17 RX ADMIN — UMECLIDINIUM 1 PUFF: 62.5 AEROSOL, POWDER ORAL at 08:08

## 2023-08-17 RX ADMIN — ASPIRIN 325 MG: 325 TABLET, DELAYED RELEASE ORAL at 16:32

## 2023-08-17 RX ADMIN — DULOXETINE 30 MG: 30 CAPSULE, DELAYED RELEASE ORAL at 08:08

## 2023-08-17 RX ADMIN — METOPROLOL SUCCINATE 50 MG: 50 TABLET, EXTENDED RELEASE ORAL at 19:11

## 2023-08-17 RX ADMIN — CEFAZOLIN 1 G: 1 INJECTION, POWDER, FOR SOLUTION INTRAMUSCULAR; INTRAVENOUS at 05:08

## 2023-08-17 RX ADMIN — POLYETHYLENE GLYCOL 3350 17 G: 17 POWDER, FOR SOLUTION ORAL at 08:08

## 2023-08-17 RX ADMIN — ATORVASTATIN CALCIUM 20 MG: 10 TABLET, FILM COATED ORAL at 08:08

## 2023-08-17 RX ADMIN — ACETAMINOPHEN 975 MG: 325 TABLET ORAL at 11:01

## 2023-08-17 RX ADMIN — OXYCODONE HYDROCHLORIDE 10 MG: 5 TABLET ORAL at 02:12

## 2023-08-17 RX ADMIN — OXYCODONE HYDROCHLORIDE 10 MG: 5 TABLET ORAL at 12:12

## 2023-08-17 RX ADMIN — METHOCARBAMOL TABLETS 500 MG: 500 TABLET, COATED ORAL at 10:50

## 2023-08-17 RX ADMIN — QUETIAPINE 100 MG: 100 TABLET ORAL at 21:00

## 2023-08-17 RX ADMIN — METHOCARBAMOL TABLETS 500 MG: 500 TABLET, COATED ORAL at 14:20

## 2023-08-17 RX ADMIN — ACETAMINOPHEN 975 MG: 325 TABLET ORAL at 19:11

## 2023-08-17 RX ADMIN — IPRATROPIUM BROMIDE AND ALBUTEROL SULFATE 3 ML: .5; 3 SOLUTION RESPIRATORY (INHALATION) at 07:25

## 2023-08-17 RX ADMIN — SENNOSIDES AND DOCUSATE SODIUM 1 TABLET: 50; 8.6 TABLET ORAL at 19:11

## 2023-08-17 ASSESSMENT — ACTIVITIES OF DAILY LIVING (ADL)
ADLS_ACUITY_SCORE: 39

## 2023-08-17 NOTE — PROGRESS NOTES
08/17/23 0919   Appointment Info   Signing Clinician's Name / Credentials (PT) Elo Young, PT, DPT   Living Environment   People in Home spouse   Current Living Arrangements house   Home Accessibility no concerns   Transportation Anticipated family or friend will provide   Self-Care   Usual Activity Tolerance good   Equipment Currently Used at Home cane, straight;walker, rolling  (4WW)   Fall history within last six months yes   Number of times patient has fallen within last six months 2   Activity/Exercise/Self-Care Comment Patient reports previous independence with mobility and ADLs. Does the cooking, cleaning, and laundry at home.   General Information   Onset of Illness/Injury or Date of Surgery 08/16/23   Referring Physician Bryan Roque MD   Patient/Family Therapy Goals Statement (PT) To go home   Pertinent History of Current Problem (include personal factors and/or comorbidities that impact the POC) Status post L TKA 8/16/23   Existing Precautions/Restrictions fall;weight bearing   Weight-Bearing Status - LLE weight-bearing as tolerated   Weight-Bearing Status - RLE full weight-bearing   Cognition   Affect/Mental Status (Cognition) WFL   Orientation Status (Cognition) oriented x 3   Follows Commands (Cognition) WFL   Range of Motion (ROM)   Range of Motion ROM deficits secondary to pain   Strength (Manual Muscle Testing)   Strength (Manual Muscle Testing) strength is WFL   Bed Mobility   Bed Mobility supine-sit   Supine-Sit [place] (Bed Mobility) supervision   Bed Mobility Limitations decreased ability to use legs for bridging/pushing   Impairments Contributing to Impaired Bed Mobility pain   Assistive Device (Bed Mobility) bed rails   Transfers   Transfers sit-stand transfer   Impairments Contributing to Impaired Transfers pain   Sit-Stand Transfer   Sit-Stand [place] (Transfers) contact guard;1 person to manage equipment   Assistive Device (Sit-Stand Transfers) walker,  front-wheeled   Gait/Stairs (Locomotion)   [place] Level (Gait) contact guard;1 person to manage equipment   Assistive Device (Gait) walker, front-wheeled   Distance in Feet 10   Distance in Feet (Gait) 70   Pattern (Gait) step-to   Deviations/Abnormal Patterns (Gait) antalgic;saloni decreased;gait speed decreased;stride length decreased;weight shifting decreased   Clinical Impression   Criteria for Skilled Therapeutic Intervention Yes, treatment indicated   PT Diagnosis (PT) Impaired functional mobility   Influenced by the following impairments Pain, weakness   Functional limitations due to impairments Bed mobility, transfers, gait   Clinical Presentation (PT Evaluation Complexity) Evolving/Changing   Clinical Presentation Rationale Patient presents as medically diagnosed   Clinical Decision Making (Complexity) low complexity   Planned Therapy Interventions (PT) bed mobility training;gait training;home exercise program;neuromuscular re-education;patient/family education;strengthening;transfer training   Anticipated Equipment Needs at Discharge (PT) walker, rolling   Risk & Benefits of therapy have been explained evaluation/treatment results reviewed;care plan/treatment goals reviewed;participants voiced agreement with care plan;participants included;patient   PT Total Evaluation Time   PT Eval, Low Complexity Minutes (30702) 10   Physical Therapy Goals   PT Frequency Daily   PT Predicted Duration/Target Date for Goal Attainment 08/24/23   PT Goals Transfers;Gait;PT Goal 1   PT: Transfers Modified independent;Sit to/from stand;Bed to/from chair;Assistive device;Within precautions   PT: Gait Supervision/stand-by assist;Assistive device;Greater than 200 feet;Within precautions   PT: Goal 1 Patient will be independent with HEP in order to improve strength and overall independence with mobility.   Interventions   Interventions Quick Adds Gait Training;Therapeutic Procedure   Therapeutic Procedure/Exercise   Ther.  Procedure: strength, endurance, ROM, flexibillity Minutes (28154) 15   Symptoms Noted During/After Treatment increased pain;fatigue   Treatment Detail/Skilled Intervention Patient performed HEP TKA exercises x 10 with verbal cueing and visual demonstration required for proper technique. Patient reported increased pain during exercises, RN notifed. Patient's O2 also decreasing to mid 80s during exercises, RN notified and titrated O2 up to 2L, O2 sats improved to 92-94% during rest of session.   Gait Training   Gait Training Minutes (54681) 15   Symptoms Noted During/After Treatment (Gait Training) fatigue;increased pain   Treatment Detail/Skilled Intervention Patient ambulated in hallway and room with CGA x 1 and FWW. Verbal cueing to use BUE through FWW to offweight sore LLE. Verbal cueing for step sequencing and walker proximity, good follow through noted. Patient reported increased pain during gait. Patient seated in bedside chair with call light within reach at end of session.   [place] Level (Gait Training) contact guard   Physical Assistance Level (Gait Training) verbal cues;1 person assist   Weight Bearing (Gait Training) weight-bearing as tolerated   Assistive Device (Gait Training) rolling walker   Gait Analysis Deviations decreased saloni;decreased step length;decreased toe-to-floor clearance;decreased weight-shifting ability   Impairments (Gait Analysis/Training) pain;strength decreased   PT Discharge Planning   PT Plan Bed mobility, transfers, gait, HEP   PT Discharge Recommendation (DC Rec)   (defer to ortho PA/surgeon)   PT Rationale for DC Rec Patient currently requires assist of 1 for bed mobility, transfers, gait.   PT Brief overview of current status Assist of 1 for bed mobility, transfers, and gait   Total Session Time   Timed Code Treatment Minutes 30   Total Session Time (sum of timed and untimed services) 40     Elo Young, PT, DPT

## 2023-08-17 NOTE — PROGRESS NOTES
[facility] ACUTE PAIN SERVICE    ([facility], [facility], [facility])   Consult Note    Date of Admission:  8/16/2023  Date of Consult: 08/17/23  Physician requesting consult: ortho   Reason for consult: post op pain, opioid tolerant     Assessment/Plan:     Jessica Ellison is a 72 year old female who was admitted on 8/16/2023.  1 Day Post-Op. I was asked to see the patient for post op pain. Admitted for planned procedure. Did have hypoxia post op, resolved. History of emphysema, HTN. Pain began chronically and  and has persisted more acutely for 2 days since surgery.  Pain rated at 'moderate', did just finish therapy (less than 5 min ago), pain only increased mildly during therapy. Feels the oxycodone works well. No OTTONIEL's. No nausea, SOB, chest pain, calf pain. Feeling spasms. No neuropathic pain described. Overall feeling hopeful, in chair, says current plan Is 'decent'.  Oxycodone working well despite being home pain med, will continue.    Home pain plan = celebrex, flexeril PRN, Cymbalta, oxycodone 5mg q6hprn (MME at home up to 30.), or Percocet (similar MME). Borderline opioid tolerant.  Patient is doing well post op, PMT will sign off.       PLAN:   1) Pain is consistent with post op pain. The patient's home MME was recently 25-30 but previously in 2023 (prior to 1 month ago) up to 58MME  mg daily.   2)Multimodal Medication Therapy  Topical: none  NSAID'S: crcl = 55ml/min, none  Muscle Relaxants: add robaxin 500mg tid  Adjuvants: APA 975mg q8h  Antidepressants/anxiolytics: cymbalta 30mg BID  Opioids: oxycodone [id number]  IV Pain medication: IV Dilaudid 0.2-0.5mg q2hprn  3)Non-medication interventions  Pharmacy consult- appreciate recommendations   Acupuncture consult- as available Mon and Friday     Integrative consult - called referral to [phone]   4)Constipation Prophylaxis:miralax, sennaS scheduled   5) Follow up   -Opioid prescriber has been Sarah Dias  -Discharge  Recommendations - We recommend prescribing the following at the time of discharge: per ICSI guidelines, 250mME for post op knee, she may require more due to opioid use at home, 250mme would be 33 tab of oxycodone 5mg.          History of Present Illness (HPI):       Jessica Ellison is a 72 year old old female .  The pain is reported to be acute on chronic, located in the knee, and doesn't radiate.   The patient has a mild/devloping opioid tolerance. Opioid induced side effects noted, including sedation: no, nausea: denies, and constipation: denies.  Noted sleep dysfunction : no    Review of medical record/Summary of labs and care everywhere.  Last UA:n/a       [place]  pulled from system on 08/17/23. Last refill on . This indicated intermittent opioid use.   8/9 oxycodone 10 tab for 3 day supply  Prior to that, semi consistent percocet, up to 58 MME  Appears trying to reduce MME pre-op for better pain control post op.    Medical History  Patient Active Problem List    Diagnosis Date Noted    Knee osteoarthritis 08/16/2023     Priority: Medium    F11.2 - Continuous opioid dependence (H) 07/11/2023     Priority: Medium     Patient is followed by DOMINIQUE COPPOLA for ongoing prescription of narcotic pain medicine.  Med: oxycodone/acetaminophen 10/325 for chronic back pain.   Maximum use per month: 70  Expected duration: unknown  Narcotic agreement on file: YES 7/11/2023   Clinic visit recommended: Q 3 months    PDMP reviewed 7/11/2023 April 2013 - rhizotomy for SI (left) Jhon Mckay MD    Follows at [facility] - severe low back pain with work related injuries.  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  Spinal stenosis at most lumbar levels  Improvement with past epidural steroid injections  SI joint dysfunction with the pain        JOY (obstructive sleep apnea) 08/05/2021     Priority: Medium    CKD (chronic kidney disease) stage 2, GFR 60-89 ml/min 08/05/2021     Priority: Medium     Hyperlipidemia 12/22/2020     Priority: Medium    Centrilobular emphysema (H) 11/04/2020     Priority: Medium    Hypoxia 01/05/2019     Priority: Medium    Spell of change in speech 04/17/2018     Priority: Medium    Hypothyroidism 09/07/2016     Priority: Medium    Chronic bilateral low back pain without sciatica 06/02/2016     Priority: Medium    Essential hypertension with goal blood pressure less than 140/90 06/02/2016     Priority: Medium    S/P lumbar spinal fusion 10/19/2015     Priority: Medium    Bee allergy status 06/30/2015     Priority: Medium    Cerebral aneurysm, nonruptured 07/24/2014     Priority: Medium     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 Marti    repeat an MRA in 5 years -5/2027      Chronic pain 03/07/2014     Priority: Medium    Major depressive disorder, recurrent episode, moderate (H) 07/10/2013     Priority: Medium    Advanced directives, counseling/discussion 09/05/2012     Priority: Medium     Patient does not have an Advance/Health Care Directive (HCD), has information at home.     Sonja Krishnamurthy  September 5, 2012        Anxiety 06/20/2012     Priority: Medium    CARDIOVASCULAR SCREENING; LDL GOAL LESS THAN 160 10/31/2010     Priority: Medium    Insomnia 12/21/2009     Priority: Medium    Back pain 12/21/2009     Priority: Medium     Patient is followed by DOMINIQUE COPPOLA for ongoing prescription of narcotic pain medicine.  Med: oxycodone/acetaminophen 10/325 for chronic back pain.   Maximum use per month: 70  Expected duration: unknown  Narcotic agreement on file: YES 7/11/2023   Clinic visit recommended: Q 3 months    PDMP reviewed 7/11/2023 April 2013 - rhizotomy for SI (left) Jhon Mckay MD    Follows at [facility] - severe low back pain with work related injuries.  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     Priority: Medium        Surgical History  She  has a past surgical history that includes PART REMOVAL COLON W ANASTOMOSIS (05/2005); EXCISION BREAST LESION, OPEN >=1; SPINE FUSION,ANTER,8+ SGMTS (2007); colonoscopy (04/19/2005); appendectomy (1960); surgical history of -  (2004); cerebral aneurysm (2014); orif left femoral neck (Left, 06/03/2016); Laparoscopic assisted hysterectomy vaginal (12/2017); lap ventral hernia repair (12/2017); appendectomy; Colon surgery; back surgery; Cerebral Aneurysm Repair; Abdomen surgery; fracture surgery; hernia repair; Biopsy breast; and Lumpectomy breast.     Past Surgical History:   Procedure Laterality Date    ABDOMEN SURGERY      APPENDECTOMY  1960    APPENDECTOMY      BACK SURGERY      BIOPSY BREAST      cerebral aneurysm  2014    coiled    CEREBRAL ANEURYSM REPAIR      COLON SURGERY      COLONOSCOPY  04/19/2005    FRACTURE SURGERY      HC EXCISION BREAST LESION, OPEN >=1      core biopsy 2006, lumpectomy 2006    HERNIA REPAIR      LAP VENTRAL HERNIA REPAIR  12/2017    [place]    LAPAROSCOPIC ASSISTED HYSTERECTOMY VAGINAL  12/2017    LUMPECTOMY BREAST      orif left femoral neck Left 06/03/2016    stress fracture.  done at [facility]    SURGICAL HISTORY OF -   2004    Skin Graft    ZZC PART REMOVAL COLON W ANASTOMOSIS  05/2005    diverticulitis    ZZC SPINE FUSION,ANTER,8+ SGMTS  2007    Anterior posterior fusion 10 levels, hardware removal and re-fusion 2009       Allergies  Allergies   Allergen Reactions    Bee Venom     Lisinopril Swelling     Oral swelling     Lucy Mcknight, [place]  Acute Care Pain Management Program  [facility] ([place], [place])   Preference if for [facility] Paging - [place]  Click HERE to page [place]

## 2023-08-17 NOTE — PROVIDER NOTIFICATION
08/17/23 0800   RCAT Assessment   Reason for Assessment   (POST OP KNEE)   Pulmonary Status 2  (quit smoking 2004)   Surgical Status 1   Chest X-ray 0   Respiratory Pattern 0   Mental Status 0   Breath Sounds 0   Cough Effectiveness 0   Level of Activity 1   O2 Required for SpO2>=92% 1   Acuity Level (points) 5   Acuity Level  5

## 2023-08-17 NOTE — PLAN OF CARE
Problem: Pain Acute  Goal: Optimal Pain Control and Function  Outcome: Progressing  Intervention: Prevent or Manage Pain  Recent Flowsheet Documentation  Taken 8/17/2023 0800 by Josefina Gandhi RN  Medication Review/Management: medications reviewed     Patient vital signs are at baseline: No,  Reason:  BP got up to 186/94. MD paged. Prn 10mg hydralazine ordered and given. BP decreased to 143/76.  Patient able to ambulate as they were prior to admission or with assist devices provided by therapies during their stay:  Yes  Patient MUST void prior to discharge:  Yes  Patient able to tolerate oral intake:  Yes  Pain has adequate pain control using Oral analgesics:  No,  Reason:  Pt requiring IV dilaudid.   Does patient have an identified :  Yes  Has goal D/C date and time been discussed with patient:  Yes     Pt is A&Ox4, VSS on 2L NC. A1 with walker and gait belt when ambulating. Voiding adequately. Dressing is CDI. IV saline locked, patent. No new skin issues noted. Pt is rating pain at a 6-9/10 for most of shift. Medications given for pain relief.

## 2023-08-17 NOTE — PROGRESS NOTES
Pt received neb treatment and tolerated well.       08/16/23 2047   Tech Time   $Tech Time (10 minute increments) 3   Nebulizer Assessment & Treatment   $RT Use ONLY Delivery Method Nebulizer - Additional   Nebulizer Device Mask   Pretreatment Heart Rate (beats/min) 91   Pretreatment Resp Rate (breaths/min) 18   Pretreatment O2 sats - (TCU only) 91   Pretreat Breath Sounds - Bilat - All Lobes diminished   Breath Sounds Post-Respiratory Treatment   Posttreatment Heart Rate (beats/min) 93   Posttreatment Resp Rate (breaths/min) 18   Post treatment O2 Sats - (TCU only) 94   Breath Sounds Posttreatment All Fields All Fields   Breath Sounds Posttreatment All Fields no change       Leonor Landon, RT

## 2023-08-17 NOTE — PROGRESS NOTES
08/17/23 0945   Appointment Info   Signing Clinician's Name / Credentials (OT) Steffanie Restrepo OTR/L OTD   Living Environment   People in Home spouse   Current Living Arrangements house   Home Accessibility no concerns   Living Environment Comments Walk in shower with grab bars and shower chair   Self-Care   Equipment Currently Used at Home cane, straight;walker, rolling   Activity/Exercise/Self-Care Comment Pt ind with ADLs and IADLs typically,  helps as needed   General Information   Onset of Illness/Injury or Date of Surgery 08/16/23   Referring Physician Bryan Roque MD   Patient/Family Therapy Goal Statement (OT) To decrease pain   Additional Occupational Profile Info/Pertinent History of Current Problem Jessica Ellison is a 72 year old female admitted s/p L TKA.  She is currently clinically stable.  She has hypoxia in the immediate post operative period.  She does not carry a formal diagnosis of COPD based on recent PFTs.  She did have documentation of emphysema.  Able to wean patient to 2L at bedside   Existing Precautions/Restrictions fall;weight bearing   Left Lower Extremity (Weight-bearing Status) weight-bearing as tolerated (WBAT)   Cognitive Status Examination   Orientation Status orientation to person, place and time   Affect/Mental Status (Cognitive) WNL  (slightly anxious)   Follows Commands WNL   Visual Perception   Visual Impairment/Limitations WFL   Pain Assessment   Patient Currently in Pain Yes, see Vital Sign flowsheet   Posture   Posture not impaired   Range of Motion Comprehensive   General Range of Motion bilateral upper extremity ROM WNL   Strength Comprehensive (MMT)   General Manual Muscle Testing (MMT) Assessment no strength deficits identified   Bed Mobility   Bed Mobility supine-sit   Supine-Sit Lane (Bed Mobility) contact guard   Transfers   Transfers sit-stand transfer;toilet transfer   Sit-Stand Transfer   Sit-Stand Lane (Transfers) contact guard    Toilet Transfer   [place] Level (Toilet Transfer) contact guard   Activities of Daily Living   BADL Assessment/Intervention lower body dressing;grooming;toileting   Lower Body Dressing Assessment/Training   [place] Level (Lower Body Dressing) minimum assist (75% patient effort)   Grooming Assessment/Training   [place] Level (Grooming) supervision   Toileting   [place] Level (Toileting) minimum assist (75% patient effort)   Clinical Impression   Criteria for Skilled Therapeutic Interventions Met (OT) Yes, treatment indicated   OT Diagnosis Decreased ind with ADLs and safety   Influenced by the following impairments S/p TKA   OT Problem List-Impairments impacting ADL pain;post-surgical precautions;activity tolerance impaired   Assessment of Occupational Performance 3-5 Performance Deficits   Identified Performance Deficits dressing, toileting, fxl transfers, bed mobility   Planned Therapy Interventions (OT) ADL retraining;bed mobility training;strengthening;transfer training   Clinical Decision Making Complexity (OT) moderate complexity   Risk & Benefits of therapy have been explained evaluation/treatment results reviewed;care plan/treatment goals reviewed;risks/benefits reviewed;current/potential barriers reviewed;patient   OT Total Evaluation Time   OT Eval, Moderate Complexity Minutes (06080) 10   OT Goals   Therapy Frequency (OT) Daily   OT Predicted Duration/Target Date for Goal Attainment 08/21/23   OT Goals Lower Body Dressing;Toilet Transfer/Toileting;Bed Mobility   OT: Lower Body Dressing Supervision/stand-by assist;using adaptive equipment;within precautions   OT: Bed Mobility Modified independent;supine to/from sitting;within precautions   OT: Toilet Transfer/Toileting Supervision/stand-by assist;toilet transfer;cleaning and garment management;within precautions   Interventions   Interventions Quick Adds Self-Care/Home Management   Self-Care/Home Management   Self-Care/Home Mgmt/ADL,  Compensatory, Meal Prep Minutes (02765) 23   Symptoms Noted During/After Treatment (Meal Preparation/Planning Training) increased pain   Treatment Detail/Skilled Intervention Pt reporting 9/10 pain following PT session, RN aware and provided IV pain medications, pt reporting some relief and agreeable to therapy. Fxl mob to/from bathroom CGA w/ fWW, min cueing for walker safety with mobility and transfers. Cueing for LE placement with transfers and hand placement, completed toileting CGA with pericares and garment mgmt. Stand g/h ~ 5 minutes at sinkSBA, cueing for hip/trunk placement onto sink for balance, demos good carryover. Fxl mob to chair SBA. Instructed on LB dressing techniques within precautions, treading surgical LE first - pt donned shorts CGA with cueing for safety. Declined socks/shoes trial d/t pain. LEft in bedside chair with call light on, ice applied.   OT Discharge Planning   OT Plan Review LB/full body dressing, toileting, shower transfer   OT Discharge Recommendation (DC Rec)   (Defer to ortho)   OT Rationale for DC Rec Pt mobilizing well and progressing toward OT goals, limited by pain - has assist from    OT Brief overview of current status Limited by pain - CGA mobility and ADLs   Total Session Time   Timed Code Treatment Minutes 23   Total Session Time (sum of timed and untimed services) 33

## 2023-08-17 NOTE — PLAN OF CARE
Patient vital signs are at baseline: No, 5 liters O2 via n/c. Weaned down to 4 Liters overnight. Possible to wean down to 3L, but unknown how O2 was while ambulation.  Patient able to ambulate as they were prior to admission or with assist devices provided by therapies during their stay:  Yes  Patient MUST void prior to discharge:  Yes  Patient able to tolerate oral intake:  Yes  Pain has adequate pain control using Oral analgesics:  No,  Reason:  IV dilaudid give multiple times during overnight. Last at approx 0500. Pt states 10 mg oxcodone is not effective, she states she took at home prior to surgery. IV 0.5 mg dilaudid helps, but doesn't completely take away pain.  Does patient have an identified :  Yes  Has goal D/C date and time been discussed with patient:  Yes     Alert and oriented x4. Able to make needs known via call light. Able to tolerate weight on leg.

## 2023-08-17 NOTE — PROGRESS NOTES
"Orthopedic Progress Note      Assessment: 1 Day Post-Op  S/P Procedure(s):  LEFT TOTAL KNEE ARTHROPLASTY     Plan:   - Continue PT/OT.   - Weightbearing status: WBAT.  - Anticoagulation: ASA in addition to SCDs, carlitos stockings and early ambulation.  - US LLE to rule out DVT  - Discharge planning: pending Pain control, O2 needs     Subjective:  Pain: Well controlled on Tylenol & oxycodone.  Nausea, Vomiting:  No  Chest pain: No  Lightheadedness, Dizziness:  No  Neuro:  Patient denies new onset numbness or paresthesias in left lower extremity     Patient is doing well on POD #1. Ambulating, tolerating oral intake, voiding & pain is controlled with oral medication. Ready for discharge. Hgb 10.4 (pre-op 11.7).     Objective:  BP (!) 163/83 (BP Location: Left arm)   Pulse 90   Temp 98.5  F (36.9  C) (Oral)   Resp 16   Ht 1.549 m (5' 1\")   Wt 73.5 kg (162 lb)   SpO2 95%   BMI 30.61 kg/m    The patient is A&Ox3. Appears comfortable, sitting up at bedside.  Sensation is intact to light touch & equal bilaterally in the L2 through S1 dermatomes.  Calves are soft and tender.  Dorsiflexion and plantar flexion is intact bilaterally.  Appropriate flexion and extension of the toes bilaterally.   Brisk capillary refill in the toes bilaterally.   Palpable left dorsalis pedis pulse.  left knee dressing C/D/I.      Pertinent Labs   Lab Results: personally reviewed.   Lab Results   Component Value Date    INR 0.97 12/13/2019    INR 1.00 03/18/2019    INR 0.94 07/27/2017     Lab Results   Component Value Date    WBC 4.0 08/08/2023    HGB 10.4 (L) 08/17/2023    HCT 35.9 08/08/2023     (H) 08/08/2023     08/08/2023     Lab Results   Component Value Date     08/08/2023    CO2 24 08/08/2023         Report completed by:  Jeanna Zhong PA-C/Dr[facility]    Date: 8/17/2023  Time: 10:33 AM    "

## 2023-08-17 NOTE — PLAN OF CARE
Patient's BP was at 179/77. Page was sent out to Hospitalist. Earlier patient had elevated BP, Hydralazine was given. For now, Hospitalist order to just monitor.

## 2023-08-17 NOTE — CONSULTS
"[facility]  Consult Note - Hospitalist Service  Date of Admission:  8/16/2023  Consult Requested by: orthopaedics  Reason for Consult: post operative medical management    Assessment & Plan   Jessica Ellison is a 72 year old female admitted s/p L TKA.  She is currently clinically stable.  She has hypoxia in the immediate post operative period.  She does not carry a formal diagnosis of COPD based on recent PFTs.  She did have documentation of emphysema.  Able to wean patient to 2L at bedside.  Advised to perform incentive spirometry.  Check CXR.  Treat with incruse, scheduled duonebs for the next day.    # s/p L TKA  - per orthopaedics    # Hx emphysema  - incruse  - scheduled duonebs    # HTN  - amlodipine       Clinically Significant Risk Factors Present on Admission                # Drug Induced Platelet Defect: home medication list includes an antiplatelet medication   # Hypertension: Noted on problem list      # Obesity: Estimated body mass index is 30.61 kg/m  as calculated from the following:    Height as of this encounter: 1.549 m (5' 1\").    Weight as of this encounter: 73.5 kg (162 lb).       # COPD: noted on problem list        Gil Sol MD  Hospitalist Service  Securely message with [employer] (more info)  Text page via [facility] Paging/Directory   ______________________________________________________________________    Chief Complaint   S/p L TKA    History is obtained from the patient    History of Present Illness   Jessica Ellison is a 72 year old female admitted s/p L TKA.  She reports numbness in her left foot.  She denies chest pain/pressure or SOB.  Knee is painful and tight.  Reports she recently had testing for her lungs and doesn't formally have COPD.      Past Medical History    Past Medical History:   Diagnosis Date    Anemia     Aneurysm (H)     Antiplatelet or antithrombotic long-term use     Arthritis     Chemical dependency (H)     Chem Dep RX    Depression     " History of blood transfusion     Hypertension     Menopausal symptoms     Other chronic pain     Lower back and hips    Sleep apnea     Sleep disorder     Thyroid disease     Tobacco abuse     Uncomplicated asthma        Past Surgical History   Past Surgical History:   Procedure Laterality Date    ABDOMEN SURGERY      APPENDECTOMY  1960    APPENDECTOMY      BACK SURGERY      BIOPSY BREAST      cerebral aneurysm  2014    coiled    CEREBRAL ANEURYSM REPAIR      COLON SURGERY      COLONOSCOPY  04/19/2005    FRACTURE SURGERY      HC EXCISION BREAST LESION, OPEN >=1      core biopsy 2006, lumpectomy 2006    HERNIA REPAIR      LAP VENTRAL HERNIA REPAIR  12/2017    [place]    LAPAROSCOPIC ASSISTED HYSTERECTOMY VAGINAL  12/2017    LUMPECTOMY BREAST      orif left femoral neck Left 06/03/2016    stress fracture.  done at [facility]    SURGICAL HISTORY OF -   2004    Skin Graft    ZZC PART REMOVAL COLON W ANASTOMOSIS  05/2005    diverticulitis    ZZC SPINE FUSION,ANTER,8+ SGMTS  2007    Anterior posterior fusion 10 levels, hardware removal and re-fusion 2009       Medications   I have reviewed this patient's current medications          Physical Exam   Vital Signs: Temp: 97.5  F (36.4  C) Temp src: Oral BP: (!) 142/79 Pulse: 92   Resp: 16 SpO2: 92 % O2 Device: Nasal cannula Oxygen Delivery: 5 LPM  Weight: 162 lbs 0 oz    Gen:  lying in bed in no acute distress  Neuro: alert, conversant  CV:  nl rate, regular rhythm  Pulm:  conversational dyspnea; otherwise in no acute respiratory distress, bibasilar coarse breath sounds, crackles at right base  GI:  abdomen soft, NTTP  Ext:  sensation grossly intact in left foot; LLE wrapped    Medical Decision Making             Data

## 2023-08-17 NOTE — PROGRESS NOTES
"[facility]    Medicine Progress Note - Hospitalist Service    Date of Admission:  8/16/2023    Assessment & Plan   Jessica Ellison is a 72 year old female admitted s/p L TKA.  She is currently clinically stable.  She has hypoxia in the immediate post operative period, which has improved but not resolved.   No fevers.  Exam demonstrated pulmonary crackles with improvement after coughing.  Suspect post operative atelectasis based.  Continue with incentive spirometry and nebs.    # s/p L TKA  - per orthopaedics     # Hx emphysema  # Post operative atelectasis  - incruse  - scheduled duonebs  - incentive spirometry     # HTN  - amlodipine  - metoprolol  - hydralazine x1       Diet: Regular Diet Adult    Bruce Catheter: Not present  Lines: None     Cardiac Monitoring: ACTIVE order. Indication: Procedural area  Code Status: Full Code      Clinically Significant Risk Factors                  # Hypertension: Noted on problem list        # Obesity: Estimated body mass index is 30.61 kg/m  as calculated from the following:    Height as of this encounter: 1.549 m (5' 1\").    Weight as of this encounter: 73.5 kg (162 lb)., PRESENT ON ADMISSION     # COPD: noted on problem list        Disposition Plan      Expected Discharge Date: 08/18/2023        Discharge Comments: Pain not controlled, still requiring O2          Gil Sol MD  Hospitalist Service  [facility]  Securely message with [employer] (more info)  Text page via [employer] Paging/Directory   ______________________________________________________________________    Interval History   Denies shortness of breath, chest pain/pressure, or abdominal pain.  Has tolerated oral intake.  Has urinated on her own and passed gas.  Has been ambulatory.    Physical Exam   Vital Signs: Temp: 97.7  F (36.5  C) Temp src: Oral BP: (!) 181/91 Pulse: 77   Resp: 18 SpO2: 97 % O2 Device: Nasal cannula Oxygen Delivery: 2 LPM  Weight: 162 lbs 0 " oz    /94    Gen: lying comfortably in bed, nad  Neuro: alert, conversant  CV:  nl rate, regular rhythm  Pulm: no acute resp distress, faint left basilar crackles;  more notable right sided crackles with improvement in more superior field after coughing  GI:  abdomen soft, NTTP    Medical Decision Making             Data   Reviewed:  Hgb 10    CXR yesterday  IMPRESSION: Negative chest. Scoliosis hardware in place.

## 2023-08-17 NOTE — PLAN OF CARE
Problem: Pain Acute  Goal: Optimal Pain Control and Function  Outcome: Progressing  Intervention: Prevent or Manage Pain  Recent Flowsheet Documentation  Taken 8/16/2023 1930 by Nimesh Parisi, RN  Medication Review/Management: medications reviewed     Problem: Plan of Care - These are the overarching goals to be used throughout the patient stay.    Goal: Optimal Comfort and Wellbeing  8/16/2023 2339 by Nimesh Parisi, RN  Outcome: Progressing  8/16/2023 2338 by Nimesh Parisi, RN  Outcome: Progressing   Goal Outcome Evaluation:         Patient alert and oriented x4. VSS. ON 5L of oxygen, Has CPAP on.  LR running at 75ml/hr. Ambulated inside room and voiding spontaneously. Tolerating Regular diet. Utilizing scheduled and prn pain medications. Patient resting in bed comfortably. Expresses no concern at this time. Call light within reach. Plan of care on going.

## 2023-08-17 NOTE — PROGRESS NOTES
Care Management Follow Up    Length of Stay (days): 1    Expected Discharge Date: 08/18/2023     Concerns to be Addressed: no discharge needs identified     Patient plan of care discussed at interdisciplinary rounds: Yes    Anticipated Discharge Disposition: Home     Anticipated Discharge Services: None  Anticipated Discharge DME: None    Patient/family educated on Medicare website which has current facility and service quality ratings: no  Education Provided on the Discharge Plan: No  Patient/Family in Agreement with the Plan: yes    Referrals Placed by CM/SW:  None at this time.   Private pay costs discussed: Not applicable    Additional Information:  Chart reviewed.  Pt has  who can assist. Pt currently having pain and on 02. Possible need for home care pending progress.  Family to transport.     GALLITO Lamb

## 2023-08-17 NOTE — ANESTHESIA POSTPROCEDURE EVALUATION
Patient: Jessica Ellison    Procedure: Procedure(s):  LEFT TOTAL KNEE ARTHROPLASTY       Anesthesia Type:  Spinal    Note:     Postop Pain Control: Uneventful            Sign Out: Well controlled pain   PONV: No   Neuro/Psych: Uneventful            Sign Out: Acceptable/Baseline neuro status   Airway/Respiratory: Uneventful            Sign Out: Acceptable/Baseline resp. status   CV/Hemodynamics: Uneventful            Sign Out: Acceptable CV status; No obvious hypovolemia; No obvious fluid overload   Other NRE: NONE   DID A NON-ROUTINE EVENT OCCUR? No           Last vitals:  Vitals Value Taken Time   /80 08/16/23 1831   Temp 37.1  C (98.7  F) 08/16/23 1551   Pulse 84 08/16/23 1835   Resp 14 08/16/23 1727   SpO2 95 % 08/16/23 1835   Vitals shown include unvalidated device data.    Electronically Signed By: Rafa Avendaño MD

## 2023-08-18 ENCOUNTER — APPOINTMENT (OUTPATIENT)
Dept: CT IMAGING | Facility: CLINIC | Age: 73
DRG: 470 | End: 2023-08-18
Attending: HOSPITALIST
Payer: COMMERCIAL

## 2023-08-18 ENCOUNTER — APPOINTMENT (OUTPATIENT)
Dept: PHYSICAL THERAPY | Facility: CLINIC | Age: 73
DRG: 470 | End: 2023-08-18
Attending: ORTHOPAEDIC SURGERY
Payer: COMMERCIAL

## 2023-08-18 ENCOUNTER — APPOINTMENT (OUTPATIENT)
Dept: OCCUPATIONAL THERAPY | Facility: CLINIC | Age: 73
DRG: 470 | End: 2023-08-18
Attending: ORTHOPAEDIC SURGERY
Payer: COMMERCIAL

## 2023-08-18 LAB
GLUCOSE BLD-MCNC: 95 MG/DL (ref 70–125)
GLUCOSE BLDC GLUCOMTR-MCNC: 92 MG/DL (ref 70–99)
HGB BLD-MCNC: 9.9 G/DL (ref 11.7–15.7)

## 2023-08-18 PROCEDURE — 250N000011 HC RX IP 250 OP 636: Performed by: ORTHOPAEDIC SURGERY

## 2023-08-18 PROCEDURE — 250N000011 HC RX IP 250 OP 636: Mod: JZ | Performed by: ORTHOPAEDIC SURGERY

## 2023-08-18 PROCEDURE — 36415 COLL VENOUS BLD VENIPUNCTURE: CPT | Performed by: ORTHOPAEDIC SURGERY

## 2023-08-18 PROCEDURE — 250N000013 HC RX MED GY IP 250 OP 250 PS 637: Performed by: ORTHOPAEDIC SURGERY

## 2023-08-18 PROCEDURE — 250N000013 HC RX MED GY IP 250 OP 250 PS 637

## 2023-08-18 PROCEDURE — 97116 GAIT TRAINING THERAPY: CPT | Mod: GP

## 2023-08-18 PROCEDURE — 85018 HEMOGLOBIN: CPT | Performed by: ORTHOPAEDIC SURGERY

## 2023-08-18 PROCEDURE — 71275 CT ANGIOGRAPHY CHEST: CPT

## 2023-08-18 PROCEDURE — 120N000001 HC R&B MED SURG/OB

## 2023-08-18 PROCEDURE — 97110 THERAPEUTIC EXERCISES: CPT | Mod: GP

## 2023-08-18 PROCEDURE — 82947 ASSAY GLUCOSE BLOOD QUANT: CPT | Performed by: ORTHOPAEDIC SURGERY

## 2023-08-18 PROCEDURE — 250N000013 HC RX MED GY IP 250 OP 250 PS 637: Performed by: HOSPITALIST

## 2023-08-18 PROCEDURE — 97535 SELF CARE MNGMENT TRAINING: CPT | Mod: GO

## 2023-08-18 PROCEDURE — 99232 SBSQ HOSP IP/OBS MODERATE 35: CPT | Performed by: HOSPITALIST

## 2023-08-18 RX ORDER — IOPAMIDOL 755 MG/ML
75 INJECTION, SOLUTION INTRAVASCULAR ONCE
Status: COMPLETED | OUTPATIENT
Start: 2023-08-18 | End: 2023-08-18

## 2023-08-18 RX ORDER — OXYCODONE AND ACETAMINOPHEN 10; 325 MG/1; MG/1
1 TABLET ORAL EVERY 4 HOURS PRN
Status: DISCONTINUED | OUTPATIENT
Start: 2023-08-18 | End: 2023-08-19

## 2023-08-18 RX ORDER — ASPIRIN 325 MG
325 TABLET, DELAYED RELEASE (ENTERIC COATED) ORAL
Qty: 30 TABLET | Refills: 0 | Status: SHIPPED | OUTPATIENT
Start: 2023-08-18 | End: 2024-08-16

## 2023-08-18 RX ORDER — METHOCARBAMOL 500 MG/1
500 TABLET, FILM COATED ORAL 3 TIMES DAILY
Qty: 60 TABLET | Refills: 0 | Status: SHIPPED | OUTPATIENT
Start: 2023-08-18 | End: 2023-11-07

## 2023-08-18 RX ORDER — IPRATROPIUM BROMIDE AND ALBUTEROL SULFATE 2.5; .5 MG/3ML; MG/3ML
3 SOLUTION RESPIRATORY (INHALATION) EVERY 4 HOURS PRN
Status: DISCONTINUED | OUTPATIENT
Start: 2023-08-18 | End: 2023-08-19 | Stop reason: HOSPADM

## 2023-08-18 RX ADMIN — OXYCODONE HYDROCHLORIDE AND ACETAMINOPHEN 1 TABLET: 10; 325 TABLET ORAL at 19:41

## 2023-08-18 RX ADMIN — SENNOSIDES AND DOCUSATE SODIUM 1 TABLET: 50; 8.6 TABLET ORAL at 08:07

## 2023-08-18 RX ADMIN — QUETIAPINE 100 MG: 100 TABLET ORAL at 20:38

## 2023-08-18 RX ADMIN — METHOCARBAMOL TABLETS 500 MG: 500 TABLET, COATED ORAL at 08:07

## 2023-08-18 RX ADMIN — OXYCODONE HYDROCHLORIDE 10 MG: 5 TABLET ORAL at 06:47

## 2023-08-18 RX ADMIN — METHOCARBAMOL TABLETS 500 MG: 500 TABLET, COATED ORAL at 13:06

## 2023-08-18 RX ADMIN — HYDROMORPHONE HYDROCHLORIDE 0.5 MG: 1 INJECTION, SOLUTION INTRAMUSCULAR; INTRAVENOUS; SUBCUTANEOUS at 16:50

## 2023-08-18 RX ADMIN — LEVOTHYROXINE SODIUM 50 MCG: 0.05 TABLET ORAL at 06:48

## 2023-08-18 RX ADMIN — POLYETHYLENE GLYCOL 3350 17 G: 17 POWDER, FOR SOLUTION ORAL at 08:09

## 2023-08-18 RX ADMIN — METHOCARBAMOL TABLETS 500 MG: 500 TABLET, COATED ORAL at 20:38

## 2023-08-18 RX ADMIN — ACETAMINOPHEN 975 MG: 325 TABLET ORAL at 04:21

## 2023-08-18 RX ADMIN — ACETAMINOPHEN 975 MG: 325 TABLET ORAL at 20:38

## 2023-08-18 RX ADMIN — OXYCODONE HYDROCHLORIDE 5 MG: 5 TABLET ORAL at 15:36

## 2023-08-18 RX ADMIN — IOPAMIDOL 75 ML: 755 INJECTION, SOLUTION INTRAVENOUS at 14:14

## 2023-08-18 RX ADMIN — METOPROLOL SUCCINATE 50 MG: 50 TABLET, EXTENDED RELEASE ORAL at 08:06

## 2023-08-18 RX ADMIN — ASPIRIN 325 MG: 325 TABLET, DELAYED RELEASE ORAL at 16:50

## 2023-08-18 RX ADMIN — ACETAMINOPHEN 975 MG: 325 TABLET ORAL at 11:51

## 2023-08-18 RX ADMIN — OXYCODONE HYDROCHLORIDE AND ACETAMINOPHEN 1 TABLET: 10; 325 TABLET ORAL at 15:36

## 2023-08-18 RX ADMIN — OXYCODONE HYDROCHLORIDE 5 MG: 5 TABLET ORAL at 19:41

## 2023-08-18 RX ADMIN — OXYCODONE HYDROCHLORIDE 5 MG: 5 TABLET ORAL at 10:26

## 2023-08-18 RX ADMIN — OXYCODONE HYDROCHLORIDE AND ACETAMINOPHEN 1 TABLET: 10; 325 TABLET ORAL at 10:26

## 2023-08-18 RX ADMIN — DULOXETINE 30 MG: 30 CAPSULE, DELAYED RELEASE ORAL at 08:07

## 2023-08-18 RX ADMIN — METOPROLOL SUCCINATE 50 MG: 50 TABLET, EXTENDED RELEASE ORAL at 20:38

## 2023-08-18 RX ADMIN — ATORVASTATIN CALCIUM 20 MG: 10 TABLET, FILM COATED ORAL at 08:07

## 2023-08-18 RX ADMIN — DULOXETINE 30 MG: 30 CAPSULE, DELAYED RELEASE ORAL at 20:38

## 2023-08-18 RX ADMIN — HYDROMORPHONE HYDROCHLORIDE 0.5 MG: 1 INJECTION, SOLUTION INTRAMUSCULAR; INTRAVENOUS; SUBCUTANEOUS at 00:40

## 2023-08-18 RX ADMIN — HYDROMORPHONE HYDROCHLORIDE 0.5 MG: 1 INJECTION, SOLUTION INTRAMUSCULAR; INTRAVENOUS; SUBCUTANEOUS at 04:18

## 2023-08-18 RX ADMIN — AMLODIPINE BESYLATE 5 MG: 5 TABLET ORAL at 08:07

## 2023-08-18 RX ADMIN — UMECLIDINIUM 1 PUFF: 62.5 AEROSOL, POWDER ORAL at 08:07

## 2023-08-18 RX ADMIN — OXYCODONE HYDROCHLORIDE 10 MG: 5 TABLET ORAL at 01:39

## 2023-08-18 ASSESSMENT — ACTIVITIES OF DAILY LIVING (ADL)
ADLS_ACUITY_SCORE: 37
HEARING_DIFFICULTY_OR_DEAF: NO
FALL_HISTORY_WITHIN_LAST_SIX_MONTHS: YES
ADLS_ACUITY_SCORE: 39
TOILETING_ISSUES: NO
ADLS_ACUITY_SCORE: 37
DOING_ERRANDS_INDEPENDENTLY_DIFFICULTY: NO
ADLS_ACUITY_SCORE: 39
ADLS_ACUITY_SCORE: 39
NUMBER_OF_TIMES_PATIENT_HAS_FALLEN_WITHIN_LAST_SIX_MONTHS: 2
ADLS_ACUITY_SCORE: 39
DRESSING/BATHING_DIFFICULTY: NO
WEAR_GLASSES_OR_BLIND: NO
WALKING_OR_CLIMBING_STAIRS_DIFFICULTY: NO
ADLS_ACUITY_SCORE: 37
ADLS_ACUITY_SCORE: 39
ADLS_ACUITY_SCORE: 37
DIFFICULTY_EATING/SWALLOWING: NO
CHANGE_IN_FUNCTIONAL_STATUS_SINCE_ONSET_OF_CURRENT_ILLNESS/INJURY: NO
ADLS_ACUITY_SCORE: 22
CONCENTRATING,_REMEMBERING_OR_MAKING_DECISIONS_DIFFICULTY: NO
ADLS_ACUITY_SCORE: 37
DIFFICULTY_COMMUNICATING: NO
ADLS_ACUITY_SCORE: 22

## 2023-08-18 NOTE — PROGRESS NOTES
Patient has been assessed for Home Oxygen needs.     Pulse oximetry (SpO2) and Oxygen flow readings:    SpO2 = 90-92% on room air at rest while awake.    SpO2 = 82-89% on room air during activity/with exercise.    *SpO2 improved to 90-94% on 2 liters/minute during activity/with exercise.

## 2023-08-18 NOTE — PLAN OF CARE
Problem: Plan of Care - These are the overarching goals to be used throughout the patient stay.    Goal: Optimal Comfort and Wellbeing  Outcome: Progressing     Problem: Pain Acute  Goal: Optimal Pain Control and Function  Outcome: Progressing  Intervention: Prevent or Manage Pain  Recent Flowsheet Documentation  Taken 8/17/2023 4669 by Nimesh Parisi RN  Medication Review/Management: medications reviewed            Patient alert and oriented x 4. Saline locked. Has elevated BP at start of shift. 179/79. Latest BP is at 142/81. On 2 L of O2, has CPAP on at bedtime. A1 w walker and gait belt. Voiding spontaneously and ambulating, Dressing is CDI. Patient utilizing PRN IV dilaudid for pain. Patient resting in bed comfortably, expresses no other concerns at this time. Call light within reach. Plan of care on going.

## 2023-08-18 NOTE — PROGRESS NOTES
Occupational Therapy Discharge Summary    Reason for therapy discharge:    All goals and outcomes met, no further needs identified.    Progress towards therapy goal(s). See goals on Care Plan in [facility] electronic health record for goal details.  Goals met    Therapy recommendation(s):    No further therapy is recommended.

## 2023-08-18 NOTE — PROGRESS NOTES
Albuterol BID changed to prn per [facility] protocol. Pt does have a COPD hx, but it is well controlled with Symbicort. Pt has clear bilateral breath sounds and is not short of breath.       Taylor Nguyen, RT

## 2023-08-18 NOTE — PLAN OF CARE
Patient vital signs are at baseline: No,  Reason:  Pt is on 2L O2 via NC and elevated BP.  Patient able to ambulate as they were prior to admission or with assist devices provided by therapies during their stay:  Yes  Patient MUST void prior to discharge:  Yes  Patient able to tolerate oral intake:  Yes  Pain has adequate pain control using Oral analgesics:  No,  Reason:Pt getting IV Dilaudid for breakthrough pain. Pain rating 6-9/10.   Does patient have an identified :  Yes  Has goal D/C date and time been discussed with patient:  Yes  Goal Outcome Evaluation:

## 2023-08-18 NOTE — PROGRESS NOTES
"Orthopedic Progress Note      Assessment: 2 Day Post-Op  S/P Procedure(s):  LEFT TOTAL KNEE ARTHROPLASTY     Plan:   - Continue PT/OT.   - Weightbearing status: WBAT.  - Anticoagulation: ASA in addition to SCDs, carlitos stockings and early ambulation.  - US LLE to rule out DVT  - Discharge planning: pending Pain control, off O2 now     Subjective:  Pain: Well controlled on Tylenol & oxycodone.  Nausea, Vomiting:  No  Chest pain: No  Lightheadedness, Dizziness:  No  Neuro:  Patient denies new onset numbness or paresthesias in left lower extremity     Patient is doing well on POD #1. Ambulating, tolerating oral intake, voiding & pain is controlled with oral medication. Ready for discharge pending pain. Hgb 10.4 (pre-op 11.7).     Objective:  BP (!) 161/84 (BP Location: Right arm)   Pulse 73   Temp 97.8  F (36.6  C) (Oral)   Resp 18   Ht 1.549 m (5' 1\")   Wt 78.4 kg (172 lb 14.4 oz)   SpO2 95%   BMI 32.67 kg/m    The patient is A&Ox3. Appears comfortable, sitting up at bedside.  Sensation is intact to light touch & equal bilaterally in the L2 through S1 dermatomes.  Calves are soft and tender.  Dorsiflexion and plantar flexion is intact bilaterally.  Appropriate flexion and extension of the toes bilaterally.   Brisk capillary refill in the toes bilaterally.   Palpable left dorsalis pedis pulse.  left knee dressing C/D/I.      Pertinent Labs   Lab Results: personally reviewed.   Lab Results   Component Value Date    INR 0.97 12/13/2019    INR 1.00 03/18/2019    INR 0.94 07/27/2017     Lab Results   Component Value Date    WBC 4.0 08/08/2023    HGB 9.9 (L) 08/18/2023    HCT 35.9 08/08/2023     (H) 08/08/2023     08/08/2023     Lab Results   Component Value Date     08/08/2023    CO2 24 08/08/2023         Report completed by:  Jeanna Zhong PA-C/[facility]    08/18/23     "

## 2023-08-18 NOTE — PLAN OF CARE
Patient vital signs are at baseline: No. Patient requiring 2L O2 while awake and 4L O2 while asleep. Home Oxygen assessment completed, patient requiring 2L O2 with activity. MD notified and chest CT scan ordered.  Patient able to ambulate as they were prior to admission or with assist devices provided by therapies during their stay:  Yes  Patient MUST void prior to discharge:  Yes  Patient able to tolerate oral intake:  Yes  Pain has adequate pain control using Oral analgesics:  Yes  Does patient have an identified :  Yes  Has goal D/C date and time been discussed with patient:  Yes  Patient is A/Ox4. 1 assist with walker and gait belt when ambulating. Voiding adequately. Dressing is CDI, full sensation per Pt. IV saline locked, patent. No new skin issues noted. Patient rating pain 8-10/10 during shift. MD notified, Percocet and 5mg Oxycodone ordered and given. Pain managed with pain medications, cold therapy, rest, and repositioning.

## 2023-08-18 NOTE — PROGRESS NOTES
"[facility]    Medicine Progress Note - Hospitalist Service    Date of Admission:  8/16/2023    Assessment & Plan   Jessica Ellison is a 72 year old female admitted s/p L TKA.  She is currently clinically stable.  She continues to have hypoxia.  Lack of fevers and immobility post op are suggestive of atelectasis.  Perform ambulatory oximetry.  If she continues to require oxygen, plan for further evaluation with CT chest.    # s/p L TKA  - per orthopaedics     # Hx emphysema  # Post operative atelectasis  - incruse  - incentive spirometry  - home oxygen assessment     # HTN  - amlodipine  - metoprolol    Addendum:  Ongoing hypoxia.  She is on a beta blocker which may blunt tachycardia, therefore will evaluate further with CT PE.       Diet: Regular Diet Adult    Bruce Catheter: Not present  Lines: None     Cardiac Monitoring: None  Code Status: Full Code      Clinically Significant Risk Factors                  # Hypertension: Noted on problem list        # Obesity: Estimated body mass index is 32.67 kg/m  as calculated from the following:    Height as of this encounter: 1.549 m (5' 1\").    Weight as of this encounter: 78.4 kg (172 lb 14.4 oz)., PRESENT ON ADMISSION     # COPD: noted on problem list        Disposition Plan      Expected Discharge Date: 08/18/2023      Destination: home;home with family  Discharge Comments: Pain not controlled, still requiring O2 - weaned down to 2 L during day          Gil Sol MD  Hospitalist Service  [facility]  Securely message with [employer] (more info)  Text page via [employer] Paging/Directory   ______________________________________________________________________    Interval History   Denies cough, SOB, chest pain/pressure, or abdominal pain.  Reports pain in her knee and thigh.  Has been up to chair and bathroom today.    Physical Exam   Vital Signs: Temp: 97.8  F (36.6  C) Temp src: Oral BP: (!) 161/84 Pulse: 73   Resp: 18 " SpO2: 95 % O2 Device: Nasal cannula Oxygen Delivery: 2 LPM  Weight: 172 lbs 14.4 oz    Spo2 83-85% on room air; 91%on 2L    Gen:  lying in bed in no distress  Neuro: alert, conversant  CV:  nl rate, regular rhythm  Pulm: no acute resp distress, left basilar crackles and right sided crackles to posterior auscultation  GI:  abdomen soft, NTTP  MSK: sensation grossly intact at left foot, able to wiggle toes in left foot    Medical Decision Making             Data   Reviewed:  Hgb 9.9

## 2023-08-18 NOTE — PLAN OF CARE
Problem: Plan of Care - These are the overarching goals to be used throughout the patient stay.    Goal: Plan of Care Review  Description: The Plan of Care Review/Shift note should be completed every shift.  The Outcome Evaluation is a brief statement about your assessment that the patient is improving, declining, or no change.  This information will be displayed automatically on your shift note.  Outcome: Progressing     Problem: Pain Acute  Goal: Optimal Pain Control and Function  Intervention: Prevent or Manage Pain  Recent Flowsheet Documentation  Taken 8/18/2023 1700 by Josie Bardales RN  Sensory Stimulation Regulation:   lighting decreased   care clustered   Goal Outcome Evaluation:      Patient A&O x 4, calls appropriately. VSS. CO 8/10 pain to knee, PRN oxycodone not effective per patient. Gave IV dilaudid and pain decreased to 6/10. Old drainage present on dressing. Ice applied. Patient still reporting numbness/tingling to LLE. Bed alarm on for safety.

## 2023-08-18 NOTE — PROGRESS NOTES
"BP (!) 142/81 (BP Location: Left arm)   Pulse 85   Temp 98.3  F (36.8  C) (Oral)   Resp 17   Ht 1.549 m (5' 1\")   Wt 73.5 kg (162 lb)   SpO2 92%   BMI 30.61 kg/m      The PT was provided a neb per MD order. BS: generally clear both pre and post, though her cough was more loose post neb. RT will continue to follow as directed.  "

## 2023-08-19 ENCOUNTER — APPOINTMENT (OUTPATIENT)
Dept: PHYSICAL THERAPY | Facility: CLINIC | Age: 73
DRG: 470 | End: 2023-08-19
Attending: ORTHOPAEDIC SURGERY
Payer: COMMERCIAL

## 2023-08-19 VITALS
BODY MASS INDEX: 32.65 KG/M2 | RESPIRATION RATE: 18 BRPM | SYSTOLIC BLOOD PRESSURE: 137 MMHG | DIASTOLIC BLOOD PRESSURE: 80 MMHG | WEIGHT: 172.9 LBS | TEMPERATURE: 98.2 F | HEART RATE: 86 BPM | HEIGHT: 61 IN | OXYGEN SATURATION: 98 %

## 2023-08-19 PROCEDURE — 250N000011 HC RX IP 250 OP 636: Performed by: ORTHOPAEDIC SURGERY

## 2023-08-19 PROCEDURE — 97110 THERAPEUTIC EXERCISES: CPT | Mod: GP

## 2023-08-19 PROCEDURE — 250N000013 HC RX MED GY IP 250 OP 250 PS 637: Performed by: ORTHOPAEDIC SURGERY

## 2023-08-19 PROCEDURE — 250N000013 HC RX MED GY IP 250 OP 250 PS 637

## 2023-08-19 PROCEDURE — 97116 GAIT TRAINING THERAPY: CPT | Mod: GP

## 2023-08-19 PROCEDURE — 250N000013 HC RX MED GY IP 250 OP 250 PS 637: Performed by: PHYSICIAN ASSISTANT

## 2023-08-19 PROCEDURE — 250N000013 HC RX MED GY IP 250 OP 250 PS 637: Performed by: HOSPITALIST

## 2023-08-19 PROCEDURE — 99232 SBSQ HOSP IP/OBS MODERATE 35: CPT | Performed by: INTERNAL MEDICINE

## 2023-08-19 RX ORDER — HYDROMORPHONE HYDROCHLORIDE 4 MG/1
4 TABLET ORAL EVERY 4 HOURS PRN
Refills: 0
Start: 2023-08-19 | End: 2023-10-10

## 2023-08-19 RX ORDER — HYDROMORPHONE HYDROCHLORIDE 4 MG/1
4 TABLET ORAL EVERY 4 HOURS PRN
Status: DISCONTINUED | OUTPATIENT
Start: 2023-08-19 | End: 2023-08-19 | Stop reason: HOSPADM

## 2023-08-19 RX ADMIN — UMECLIDINIUM 1 PUFF: 62.5 AEROSOL, POWDER ORAL at 08:45

## 2023-08-19 RX ADMIN — METHOCARBAMOL TABLETS 500 MG: 500 TABLET, COATED ORAL at 13:00

## 2023-08-19 RX ADMIN — ACETAMINOPHEN 975 MG: 325 TABLET ORAL at 11:32

## 2023-08-19 RX ADMIN — METOPROLOL SUCCINATE 50 MG: 50 TABLET, EXTENDED RELEASE ORAL at 08:41

## 2023-08-19 RX ADMIN — HYDROMORPHONE HYDROCHLORIDE 4 MG: 4 TABLET ORAL at 09:28

## 2023-08-19 RX ADMIN — DULOXETINE 30 MG: 30 CAPSULE, DELAYED RELEASE ORAL at 08:39

## 2023-08-19 RX ADMIN — LEVOTHYROXINE SODIUM 50 MCG: 0.05 TABLET ORAL at 08:41

## 2023-08-19 RX ADMIN — OXYCODONE HYDROCHLORIDE 5 MG: 5 TABLET ORAL at 03:51

## 2023-08-19 RX ADMIN — AMLODIPINE BESYLATE 5 MG: 5 TABLET ORAL at 08:41

## 2023-08-19 RX ADMIN — METHOCARBAMOL TABLETS 500 MG: 500 TABLET, COATED ORAL at 08:39

## 2023-08-19 RX ADMIN — HYDROMORPHONE HYDROCHLORIDE 0.5 MG: 1 INJECTION, SOLUTION INTRAMUSCULAR; INTRAVENOUS; SUBCUTANEOUS at 03:56

## 2023-08-19 RX ADMIN — ATORVASTATIN CALCIUM 20 MG: 10 TABLET, FILM COATED ORAL at 08:39

## 2023-08-19 RX ADMIN — OXYCODONE HYDROCHLORIDE AND ACETAMINOPHEN 1 TABLET: 10; 325 TABLET ORAL at 04:24

## 2023-08-19 ASSESSMENT — ACTIVITIES OF DAILY LIVING (ADL)
ADLS_ACUITY_SCORE: 22

## 2023-08-19 NOTE — PLAN OF CARE
Problem: Plan of Care - These are the overarching goals to be used throughout the patient stay.    Goal: Optimal Comfort and Wellbeing  Outcome: Not Progressing   Goal Outcome Evaluation:         Patient vital signs are at baseline: No,  Reason:  Pt requiring supplemental O2  Patient able to ambulate as they were prior to admission or with assist devices provided by therapies during their stay:  Yes  Patient MUST void prior to discharge:  Yes  Patient able to tolerate oral intake:  Yes  Pain has adequate pain control using Oral analgesics:  No,  Reason:  Still requiring IV   Does patient have an identified :  Yes  Has goal D/C date and time been discussed with patient:  Yes             Ivan Hallman RN

## 2023-08-19 NOTE — PLAN OF CARE
Patient vital signs are at baseline: Yes.   Patient able to ambulate as they were prior to admission or with assist devices provided by therapies during their stay:  Yes  Patient MUST void prior to discharge:  Yes  Patient able to tolerate oral intake:  Yes  Pain has adequate pain control using Oral analgesics:  Yes  Does patient have an identified :  Yes  Has goal D/C date and time been discussed with patient:  Yes    Patient is A/Ox4, VSS on RA. 1 assist with walker and gait belt when ambulating. Voiding adequately. New dressing applied this morning per MD, dressing is CDI, full sensation per Pt. IV saline locked, patent. No new skin issues noted. Patient rating pain 7-9/10 during shift, cold therapy, pain medications, rest, and repositioning used to manage pain.

## 2023-08-19 NOTE — PLAN OF CARE
Patient ready for discharge. Discharge instructions discussed and questions answered. Incentive spirometer and deep breathing education given. All belongings sent with patient.

## 2023-08-19 NOTE — DISCHARGE SUMMARY
Orthopedic Discharge Summary   Patient: Jessica Ellison    Admission Date: 8/16/2023    Discharge Date: 8/19/2023     Date of Service: 8/19/2023    Attending Provider: Bryan Roque MD    Admission Diagnosis: Osteoarthritis of left knee [M17.12]  Knee osteoarthritis [M17.9]  Chronic pain  H/o emphysema  HTN  Hyperlipidmeia  JOY    Discharge Diagnosis: same with acute on chronic pain and hypoxemia - resolved    Procedures Performed: L TKA 8/16/23  CT chest PE c contrast 8/18/23    Complications: post-op hypoxia and acute on chronic pain     History of Present Illness: Ms. Jessica Ellison is a pleasant 72 year old-year-old female with an ongoing history of increasing and progressive pain in the left knee with severe disability. Pain and disability due to knee arthritis are severely affecting quality of life and ability to perform even simple activities of daily living.  X-rays have shown bone-on-bone degenerative change. Consequently after trying and failing all conservative management of knee arthritis, discussion regarding the risk and benefits of knee replacement was undertaken and the patient elected to proceed.      Past Medical History:   Past Medical History:   Diagnosis Date    Anemia     Aneurysm (H)     Antiplatelet or antithrombotic long-term use     Arthritis     Chemical dependency (H)     Chem Dep RX    Depression     History of blood transfusion     Hypertension     Menopausal symptoms     Other chronic pain     Lower back and hips    Sleep apnea     Sleep disorder     Thyroid disease     Tobacco abuse     Uncomplicated asthma      Patient Active Problem List   Diagnosis    Insomnia    Back pain    CARDIOVASCULAR SCREENING; LDL GOAL LESS THAN 160    Anxiety    Advanced directives, counseling/discussion    Major depressive disorder, recurrent episode, moderate (H)    Cerebral aneurysm, nonruptured    Bee allergy status    S/P lumbar spinal fusion    Chronic bilateral low back pain without  sciatica    Essential hypertension with goal blood pressure less than 140/90    Hypothyroidism    Spell of change in speech    Centrilobular emphysema (H)    Hyperlipidemia    Hypoxia    Congenital musculoskeletal deformity of spine    Chronic pain    JOY (obstructive sleep apnea)    CKD (chronic kidney disease) stage 2, GFR 60-89 ml/min    F11.2 - Continuous opioid dependence (H)    Knee osteoarthritis     Past Surgical History:   Procedure Laterality Date    ABDOMEN SURGERY      APPENDECTOMY  1960    APPENDECTOMY      ARTHROPLASTY KNEE Left 2023    Procedure: LEFT TOTAL KNEE ARTHROPLASTY;  Surgeon: Bryan Roque MD;  Location: [facility] Main OR    BACK SURGERY      BIOPSY BREAST      cerebral aneurysm      coiled    CEREBRAL ANEURYSM REPAIR      COLON SURGERY      COLONOSCOPY  2005    FRACTURE SURGERY      HC EXCISION BREAST LESION, OPEN >=1      core biopsy , lumpectomy 2006    HERNIA REPAIR      LAP VENTRAL HERNIA REPAIR  2017    [facility]    LAPAROSCOPIC ASSISTED HYSTERECTOMY VAGINAL  2017    LUMPECTOMY BREAST      orif left femoral neck Left 2016    stress fracture.  done at [facility]    SURGICAL HISTORY OF -       Skin Graft    ZZC PART REMOVAL COLON W ANASTOMOSIS  2005    diverticulitis    ZZC SPINE FUSION,ANTER,8+ SGMTS      Anterior posterior fusion 10 levels, hardware removal and re-fusion      Social History     Socioeconomic History    Marital status:      Spouse name: Not on file    Number of children: Not on file    Years of education: Not on file    Highest education level: Not on file   Occupational History    Not on file   Tobacco Use    Smoking status: Former     Packs/day: 1.00     Years: 30.00     Pack years: 30.00     Types: Cigarettes     Quit date: 2004     Years since quittin.6    Smokeless tobacco: Never   Vaping Use    Vaping Use: Never used   Substance and Sexual Activity    Alcohol use: No     Comment: ETOH dependency, DUI  3/15/10; 1-2 ETOH per week    Drug use: Yes     Types: Oxycodone     Comment: percocet for the past several years    Sexual activity: Not Currently     Partners: Male     Birth control/protection: Surgical     Comment: VAS   Other Topics Concern    Parent/sibling w/ CABG, MI or angioplasty before 65F 55M? No   Social History Narrative    Not on file     Social Determinants of Health     Financial Resource Strain: Not on file   Food Insecurity: Not on file   Transportation Needs: Not on file   Physical Activity: Not on file   Stress: Not on file   Social Connections: Not on file   Intimate Partner Violence: Not on file   Housing Stability: Not on file     Medications on admission:   Medications Prior to Admission   Medication Sig Dispense Refill Last Dose    albuterol (PROAIR HFA/PROVENTIL HFA/VENTOLIN HFA) 108 (90 Base) MCG/ACT inhaler Inhale 2 puffs into the lungs every 6 hours as needed for shortness of breath or wheezing   Unknown at not with    amLODIPine (NORVASC) 5 MG tablet TAKE 1 TABLET (5 MG) BY MOUTH DAILY 90 tablet 1 8/16/2023 at AM    atorvastatin (LIPITOR) 20 MG tablet Take 1 tablet (20 mg) by mouth daily 90 tablet 3 8/16/2023 at AM    calcium carbonate 600 mg-vitamin D 400 units (CALTRATE) 600-400 MG-UNIT per tablet Take 1 tablet by mouth every evening   8/15/2023 at PM    celecoxib (CELEBREX) 200 MG capsule TAKE 1 CAPSULE (200 MG) BY MOUTH DAILY 90 capsule 1 8/9/2023 at AM    DULoxetine (CYMBALTA) 30 MG capsule Take 1 capsule (30 mg) by mouth 2 times daily 180 capsule 1 8/16/2023 at AM    ferrous sulfate (FEROSUL) 325 (65 Fe) MG tablet Take 325 mg by mouth every other day Alternating days with Senna tab   Past Week    levothyroxine (SYNTHROID/LEVOTHROID) 50 MCG tablet Take 1 tablet (50 mcg) by mouth daily 90 tablet 3 8/16/2023 at AM    Melatonin 10 MG TABS tablet Take 20 mg by mouth At Bedtime   8/15/2023 at PM    metoprolol succinate ER (TOPROL XL) 50 MG 24 hr tablet TAKE ONE TABLET BY MOUTH TWICE  DAILY 180 tablet 2 8/16/2023 at AM    multivitamin, therapeutic (THERA-VIT) TABS tablet Take 1 tablet by mouth daily   8/9/2023    naloxone (NARCAN) 4 MG/0.1ML nasal spray Spray 1 spray (4 mg) into one nostril alternating nostrils once as needed for opioid reversal every 2-3 minutes until assistance arrives 0.2 mL 1 Unknown    nystatin (MYCOSTATIN) 055250 UNIT/GM external powder Apply topically 3 times daily as needed (rash)   Unknown    QUEtiapine (SEROQUEL) 50 MG tablet Take 2 tablets (100 mg) by mouth daily 180 tablet 1 8/15/2023 at PM    senna (SENOKOT) 8.6 MG tablet Take 1 tablet by mouth every other day Alternating days with Iron tab   Past Week    SPIRIVA HANDIHALER 18 MCG inhaled capsule USING THE HANDIHALER, INHALE THE CONTENTS OF ONE CAPSULE BY MOUTH DAILY 30 capsule 3 8/15/2023 at AM, not with    [DISCONTINUED] aspirin 81 MG EC tablet Take 81 mg by mouth daily   8/16/2023 at AM    [DISCONTINUED] cyclobenzaprine (FLEXERIL) 10 MG tablet TAKE ONE TABLET BY MOUTH TWICE A DAY AS NEEDED FOR MUSCLE SPASMS 180 tablet 1 Unknown    [DISCONTINUED] oxyCODONE (ROXICODONE) 5 MG tablet Take 1 tablet (5 mg) by mouth every 6 hours as needed for severe pain With a percocet 10 tablet 0 Unknown    [DISCONTINUED] oxyCODONE-acetaminophen (PERCOCET)  MG per tablet Take 1 tablet by mouth every 6 hours as needed for severe pain 70 tablet 0 8/16/2023 at 0300     Allergies:    Allergies   Allergen Reactions    Bee Venom     Lisinopril Swelling     Oral swelling       Hospital Course: Patient was brought to the OR on 8/16/23 where she underwent the above named procedure. Postoperatively she developed acute hypoxia, and also had issues with post-operative pain management. Eventually she was switched to oral dilaudid 4mg which improved her pain. She had a venous doppler U/S (negative for DVT) as well as a CT chest PE study which was also negative for PE or pneumonia. Ancef was given for 24 hours after surgery. She was given  Aspirin twice daily for DVT prophylaxis. She progressed well with physical therapy and discharge to home was recommended. Her chronic medical problems were followed by the medicine team. By POD #3 she was stable for discharge.     Consultations Obtained During Hospitalization:  1. Internal medicine for management of comorbid conditions and home medication management.  2. Physical Therapy for gait training, mobilization, range of motion and strengthening exercises  3. Occupational Therapy for activities of daily living.  4. Social Work for disposition planning.  5. Pain management for post-op pain control    Principal Problem:    Knee osteoarthritis    Discharge Disposition: home    Discharge Diet: regular    Discharge Medications:   Current Discharge Medication List        START taking these medications    Details   HYDROmorphone (DILAUDID) 4 MG tablet Take 1 tablet (4 mg) by mouth every 4 hours as needed for moderate pain  Refills: 0    Associated Diagnoses: Primary osteoarthritis of knee, unspecified laterality      methocarbamol (ROBAXIN) 500 MG tablet Take 1 tablet (500 mg) by mouth 3 times daily  Qty: 60 tablet, Refills: 0    Associated Diagnoses: Primary osteoarthritis of left knee           CONTINUE these medications which have CHANGED    Details   aspirin (ASA) 325 MG EC tablet Take 1 tablet (325 mg) by mouth daily (with dinner) for 30 days  Qty: 30 tablet, Refills: 0    Associated Diagnoses: Primary osteoarthritis of left knee           CONTINUE these medications which have NOT CHANGED    Details   albuterol (PROAIR HFA/PROVENTIL HFA/VENTOLIN HFA) 108 (90 Base) MCG/ACT inhaler Inhale 2 puffs into the lungs every 6 hours as needed for shortness of breath or wheezing      amLODIPine (NORVASC) 5 MG tablet TAKE 1 TABLET (5 MG) BY MOUTH DAILY  Qty: 90 tablet, Refills: 1    Associated Diagnoses: Essential hypertension with goal blood pressure less than 140/90      atorvastatin (LIPITOR) 20 MG tablet Take 1 tablet  (20 mg) by mouth daily  Qty: 90 tablet, Refills: 3    Associated Diagnoses: Hyperlipidemia LDL goal <100      calcium carbonate 600 mg-vitamin D 400 units (CALTRATE) 600-400 MG-UNIT per tablet Take 1 tablet by mouth every evening      celecoxib (CELEBREX) 200 MG capsule TAKE 1 CAPSULE (200 MG) BY MOUTH DAILY  Qty: 90 capsule, Refills: 1    Associated Diagnoses: Chronic bilateral low back pain without sciatica; Sacroiliac joint pain; S/P lumbar spinal fusion      DULoxetine (CYMBALTA) 30 MG capsule Take 1 capsule (30 mg) by mouth 2 times daily  Qty: 180 capsule, Refills: 1    Associated Diagnoses: Major depressive disorder, recurrent episode, moderate (H)      ferrous sulfate (FEROSUL) 325 (65 Fe) MG tablet Take 325 mg by mouth every other day Alternating days with Senna tab      levothyroxine (SYNTHROID/LEVOTHROID) 50 MCG tablet Take 1 tablet (50 mcg) by mouth daily  Qty: 90 tablet, Refills: 3    Associated Diagnoses: Hypothyroidism, unspecified type      Melatonin 10 MG TABS tablet Take 20 mg by mouth At Bedtime      metoprolol succinate ER (TOPROL XL) 50 MG 24 hr tablet TAKE ONE TABLET BY MOUTH TWICE DAILY  Qty: 180 tablet, Refills: 2    Associated Diagnoses: Essential hypertension with goal blood pressure less than 140/90      multivitamin, therapeutic (THERA-VIT) TABS tablet Take 1 tablet by mouth daily      naloxone (NARCAN) 4 MG/0.1ML nasal spray Spray 1 spray (4 mg) into one nostril alternating nostrils once as needed for opioid reversal every 2-3 minutes until assistance arrives  Qty: 0.2 mL, Refills: 1    Associated Diagnoses: Chronic bilateral low back pain without sciatica      nystatin (MYCOSTATIN) 897840 UNIT/GM external powder Apply topically 3 times daily as needed (rash)      QUEtiapine (SEROQUEL) 50 MG tablet Take 2 tablets (100 mg) by mouth daily  Qty: 180 tablet, Refills: 1    Associated Diagnoses: Anxiety      senna (SENOKOT) 8.6 MG tablet Take 1 tablet by mouth every other day Alternating days  with Iron tab      SPIRIVA HANDIHALER 18 MCG inhaled capsule USING THE HANDIHALER, INHALE THE CONTENTS OF ONE CAPSULE BY MOUTH DAILY  Qty: 30 capsule, Refills: 3    Associated Diagnoses: Chronic obstructive pulmonary disease, unspecified COPD type (H)           STOP taking these medications       cyclobenzaprine (FLEXERIL) 10 MG tablet Comments:   Reason for Stopping:         oxyCODONE (ROXICODONE) 5 MG tablet Comments:   Reason for Stopping:         oxyCODONE-acetaminophen (PERCOCET)  MG per tablet Comments:   Reason for Stopping:             Code Status: full    X-rays needed at the follow up visit: 3 view L knee    Shoaib Mercado PA-C  8/19/2023 2:31 PM   [facility]  [phone] office

## 2023-08-19 NOTE — PLAN OF CARE
Patient vital signs are at baseline: No,  Reason:  Pt is requiring O2 via NC; 1.5 LPM O2 via NC sats 91%. Bleeding in 3LPM O2 via CPAP at HS sats >90%.  Patient able to ambulate as they were prior to admission or with assist devices provided by therapies during their stay: Yes.  Patient MUST void prior to discharge:  Yes  Patient able to tolerate oral intake:  Yes  Pain has adequate pain control using Oral analgesics:  No,  Reason:  Pt requested IV dilaudid x 1 for breakthrough severe pain.  Does patient have an identified :  Yes  Has goal D/C date and time been discussed with patient:  Yes

## 2023-08-19 NOTE — PROGRESS NOTES
[facility] Daily Progress Note    Assessment/Plan:  Jessica Ellison is a 72 year old female admitted s/p L TKA.  She is currently clinically stable.  Awaiting better pain control.     Acute on chronic pain  Pain team following  Pain medication changed to Dilaudid today.    History of emphysema  Hypoxemia resolved  Encourage incentive spirometry  CT PE run no acute changes    # s/p L TKA  PT OT, pain control, DVT prophylaxis per orthopedics     # HTN  - amlodipine  - metoprolol    Dyslipidemia  Hypothyroidism  Continue home meds    Obstructive sleep apnea  On CPAP at night    Principal Problem:    Knee osteoarthritis     LOS: 3 days     Subjective:  Her main complaint is poor pain control.  Denies any nausea vomiting shortness of breath, chest pain, urinary complaints.   acetaminophen  975 mg Oral Q8H    amLODIPine  5 mg Oral Daily    aspirin  325 mg Oral Daily with supper    atorvastatin  20 mg Oral Daily    DULoxetine  30 mg Oral BID    levothyroxine  50 mcg Oral QAM AC    methocarbamol  500 mg Oral TID    metoprolol succinate ER  50 mg Oral BID    polyethylene glycol  17 g Oral Daily    QUEtiapine  100 mg Oral Daily    senna-docusate  1 tablet Oral BID    sodium chloride (PF)  3 mL Intracatheter Q8H    umeclidinium  1 puff Inhalation Daily       Objective:  Vital signs in last 24 hours:  Temp:  [97.4  F (36.3  C)-98.3  F (36.8  C)] 98.2  F (36.8  C)  Pulse:  [69-94] 79  Resp:  [12-18] 18  BP: (118-177)/(64-90) 131/75  SpO2:  [90 %-99 %] 99 %  Weight:   [unfilled]    Intake/Output last 3 shifts:  I/O last 3 completed shifts:  In: 540 [P.O.:540]  Out: -   Intake/Output this shift:  No intake/output data recorded.    Review of Systems:   As per subjective, all others negative.    Physical Exam:    General Appearance:  Alert, cooperative, no distress, appears stated age   Head:  Normocephalic, without obvious abnormality, atraumatic   Lungs:   Clear to auscultation bilaterally, respirations unlabored   Chest Wall:  No  tenderness or deformity   Heart:  Regular rate and rhythm, S1, S2 normal,no murmur, rub or gallop   Abdomen:   Soft, non tender, non distended, bowel sounds present, no guarding or rigidity   Extremities: Left TKA   Skin: Skin color, texture, turgor normal, no rashes or lesions   Neurologic: Alert and oriented X 3, Moves all 4 extremities       Lab Results:  Personally Reviewed.   No results found for this or any previous visit (from the past 24 hour(s)).      Melissa Barrera M.D  [facility]  Internal Medicine

## 2023-08-19 NOTE — PROGRESS NOTES
Orthopedic Progress Note  Jessica Ellison  72 year old female  Subjective: POD #3, s/p L TKA. Denies CP/SOB/N/V. Is having significant pain but was on 10mg Percocet 1-2/day at home prior to surgery. Requesting to mix up pain regimen. Also is worried about the compression/ice machine that she is supposed to be fit for. Hypoxia continues. CT completed yesterday.  Vitals:    08/18/23 2122 08/18/23 2333 08/19/23 0400 08/19/23 0747   BP:  118/64 (!) 177/90 131/75   BP Location:  Right arm Right arm Right arm   Patient Position:       Cuff Size:       Pulse:  69 94 79   Resp:  12 18 18   Temp:  97.4  F (36.3  C) 98.3  F (36.8  C) 98.2  F (36.8  C)   TempSrc:  Oral Oral Oral   SpO2: 90% 94% 92% 99%   Weight:       Height:         O: 72 year old, female in NAD, A&Ox3, resting comfortably on bed. On O2.  Resp: Breathing regular and non-labored.  Incision: Dressing peeling off but c/d/I. No erythema, warmth or drainage.  Extremities: SILT DP/SP/plantar n dist. Motor intact EHL/FHL/TA/GSC. Calf soft and non-tender. Legg warm and well perfused  Hemoglobin   Date Value Ref Range Status   08/18/2023 9.9 (L) 11.7 - 15.7 g/dL Final   12/25/2020 11.8 11.7 - 15.7 g/dL Final   ]    A/P: 72 year old female POD#3 s/p L TKA and ongoing hypoxia  -CT negative for PE/pneumonia. Remains on O2.  -WBAT  -Pain management - switched to dilaudid po q4hrs  -ASA/mechanical DVT prophylaxis  -Dressing change today  -Ice/elevate  -Cont PT/OT  -Medicine consult appreciated  -F/U with Dr. Roque in 10-14 days  -discharge pending medical clearance  -Will request cryocuff and recommend elevating with pillows under heel/foot.  Shoaib Mercado PA-C  8/19/2023 7:53 AM   [facility]  [phone] office

## 2023-08-22 ENCOUNTER — PATIENT OUTREACH (OUTPATIENT)
Dept: CARE COORDINATION | Facility: CLINIC | Age: 73
End: 2023-08-22
Payer: COMMERCIAL

## 2023-08-22 ENCOUNTER — THERAPY VISIT (OUTPATIENT)
Dept: PHYSICAL THERAPY | Facility: CLINIC | Age: 73
End: 2023-08-22
Attending: ORTHOPAEDIC SURGERY
Payer: COMMERCIAL

## 2023-08-22 DIAGNOSIS — Z47.1 AFTERCARE FOLLOWING JOINT REPLACEMENT: ICD-10-CM

## 2023-08-22 DIAGNOSIS — Z96.652 S/P TOTAL KNEE ARTHROPLASTY, LEFT: ICD-10-CM

## 2023-08-22 PROCEDURE — 97161 PT EVAL LOW COMPLEX 20 MIN: CPT | Mod: GP

## 2023-08-22 PROCEDURE — 97110 THERAPEUTIC EXERCISES: CPT | Mod: GP

## 2023-08-22 NOTE — PROGRESS NOTES
PHYSICAL THERAPY EVALUATION  Type of Visit: Evaluation    See electronic medical record for Abuse and Falls Screening details.    Subjective       Presenting condition or subjective complaint: Pt underwent L TKA 23  Date of onset: 23    Relevant medical history: Bladder or bowel problems; Depression; High blood pressure; History of fractures; Incontinence; Thyroid problems   Dates & types of surgery: appy, spinal fusion x 10 levels, hysterectomy    Prior diagnostic imaging/testing results: CT scan     Prior therapy history for the same diagnosis, illness or injury: No      Prior Level of Function  Independent    Living Environment  Social support: With a significant other or spouse   Type of home: House   Stairs to enter the home: No       Ramp: No   Stairs inside the home:         Help at home: None  Equipment owned: Walker with wheels; Straight Cane     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  Hobbies/Interests: play cards, [employer]    Patient goals for therapy: walk without assistance    Pain assessment: Pain present  Location: the whole L leg/Ratin/10     Objective   KNEE EVALUATION  PAIN: Pain Level at Rest: 6/10  Pain Level with Use: 9/10  Pain Location: knee  Pain Quality: Aching and Stabbing  Pain Frequency: constant  Pain is Worst: with movement  Pain is Exacerbated By: getting out of bed/car, sit <> stand, walking, standing, bending  Pain is Relieved By: cold, otc medications, and moving around  Pain Progression: Improved  GAIT:  Weightbearing Status: WBAT  Assistive Device(s): Cane (single end)  Gait Deviations: Antalgic  Base of support increased  Stance time decreased  Stride length decreased  Latesha decreased  ROM:   (Degrees) Left AROM Left PROM  Right AROM Right PROM   Knee Flexion 88* 92* 120* 128*   Knee Extension -15* -3* 0* 0*     STRENGTH: WFL  Except L hip flex 3-/5, knee flex/ext 3/5, ankle 4/5      Assessment & Plan   CLINICAL IMPRESSIONS  Medical Diagnosis: s/p L TKA    Treatment Diagnosis:  s/p L TKA   Impression/Assessment: Patient is a 72 year old female with L knee complaints.  The following significant findings have been identified: Pain, Decreased ROM/flexibility, Decreased strength, Impaired balance, and Impaired gait. These impairments interfere with their ability to perform self care tasks, recreational activities, household chores, driving , household mobility, and community mobility as compared to previous level of function.     Clinical Decision Making (Complexity):  Clinical Presentation: Stable/Uncomplicated  Clinical Presentation Rationale: based on medical and personal factors listed in PT evaluation  Clinical Decision Making (Complexity): Low complexity    PLAN OF CARE  Treatment Interventions:  Interventions: Gait Training, Therapeutic Exercise    Long Term Goals     PT Goal 1  Goal Identifier: 1  Goal Description: Pt will be able to walk without an AD with minimal to no limp.  Target Date: 09/12/23  PT Goal 2  Goal Identifier: 2  Goal Description: Pt will be able to get in/out of bed/car with < 3/10 pain.  Target Date: 09/26/23  PT Goal 3  Goal Identifier: 3  Goal Description: Pt will be able to squat with minimal difficulty  Rationale: to maximize safety and independence with performance of ADLs and functional tasks  Target Date: 10/03/23  PT Goal 4  Goal Identifier: 4  Goal Description: Pt will be independent with HEP for optimal functional recovery.  Target Date: 10/03/23      Frequency of Treatment: 2x/week  Duration of Treatment: 6 weeks    Education Assessment:   Learner/Method: Patient;Listening;Demonstration;Pictures/Video;No Barriers to Learning    Risks and benefits of evaluation/treatment have been explained.   Patient/Family/caregiver agrees with Plan of Care.     Evaluation Time:     PT Eval, Low Complexity Minutes (71224): 20     Signing Clinician: Kamilah Avendaño PT      [facility]                                                                                    OUTPATIENT PHYSICAL THERAPY      PLAN OF TREATMENT FOR OUTPATIENT REHABILITATION   Patient's Last Name, First Name, Jessica Tripp YOB: 1950   Provider's Name   [facility]   Medical Record No.  [id number]     Onset Date: 08/16/23  Start of Care Date: 08/22/23     Medical Diagnosis:  s/p L TKA      PT Treatment Diagnosis:  s/p L TKA Plan of Treatment  Frequency/Duration: 2x/week/ 6 weeks    Certification date from 08/22/23 to 10/03/23         See note for plan of treatment details and functional goals     Kamilah Avendaño, PT                         I CERTIFY THE NEED FOR THESE SERVICES FURNISHED UNDER        THIS PLAN OF TREATMENT AND WHILE UNDER MY CARE     (Physician attestation of this document indicates review and certification of the therapy plan).                Referring Provider:  Bryan Roque      Initial Assessment  See Epic Evaluation- Start of Care Date: 08/22/23

## 2023-08-22 NOTE — PROGRESS NOTES
[facility] Contact  [facility] [place]/Voicemail     Clinical Data: Post-Discharge Outreach     Outreach attempted x 2.  Unable to leave vm     Plan:  General acute hospital will do no further outreaches at this time.       Anat Dukes  Community Health Worker  [facility] [place], [place]  [phone]      *Connected Care Resource Team does NOT follow patient ongoing. Referrals are identified based on internal discharge reports and the outreach is to ensure patient has an understanding of their discharge instructions.

## 2023-08-23 ENCOUNTER — TRANSFERRED RECORDS (OUTPATIENT)
Dept: HEALTH INFORMATION MANAGEMENT | Facility: CLINIC | Age: 73
End: 2023-08-23
Payer: COMMERCIAL

## 2023-08-24 ENCOUNTER — THERAPY VISIT (OUTPATIENT)
Dept: PHYSICAL THERAPY | Facility: CLINIC | Age: 73
End: 2023-08-24
Attending: ORTHOPAEDIC SURGERY
Payer: COMMERCIAL

## 2023-08-24 DIAGNOSIS — Z96.652 S/P TOTAL KNEE ARTHROPLASTY, LEFT: Primary | ICD-10-CM

## 2023-08-24 PROCEDURE — 97110 THERAPEUTIC EXERCISES: CPT | Mod: GP

## 2023-08-30 ENCOUNTER — THERAPY VISIT (OUTPATIENT)
Dept: PHYSICAL THERAPY | Facility: CLINIC | Age: 73
End: 2023-08-30
Attending: ORTHOPAEDIC SURGERY
Payer: COMMERCIAL

## 2023-08-30 DIAGNOSIS — Z96.652 S/P TOTAL KNEE ARTHROPLASTY, LEFT: Primary | ICD-10-CM

## 2023-08-30 PROCEDURE — 97110 THERAPEUTIC EXERCISES: CPT | Mod: GP | Performed by: PHYSICAL THERAPIST

## 2023-08-31 ENCOUNTER — TRANSFERRED RECORDS (OUTPATIENT)
Dept: HEALTH INFORMATION MANAGEMENT | Facility: CLINIC | Age: 73
End: 2023-08-31
Payer: COMMERCIAL

## 2023-09-01 ENCOUNTER — THERAPY VISIT (OUTPATIENT)
Dept: PHYSICAL THERAPY | Facility: CLINIC | Age: 73
End: 2023-09-01
Attending: ORTHOPAEDIC SURGERY
Payer: COMMERCIAL

## 2023-09-01 DIAGNOSIS — Z96.652 S/P TOTAL KNEE ARTHROPLASTY, LEFT: Primary | ICD-10-CM

## 2023-09-01 PROCEDURE — 97110 THERAPEUTIC EXERCISES: CPT | Mod: GP | Performed by: PHYSICAL THERAPIST

## 2023-09-05 ENCOUNTER — THERAPY VISIT (OUTPATIENT)
Dept: PHYSICAL THERAPY | Facility: CLINIC | Age: 73
End: 2023-09-05
Attending: ORTHOPAEDIC SURGERY
Payer: COMMERCIAL

## 2023-09-05 DIAGNOSIS — Z96.652 S/P TOTAL KNEE ARTHROPLASTY, LEFT: Primary | ICD-10-CM

## 2023-09-05 PROCEDURE — 97110 THERAPEUTIC EXERCISES: CPT | Mod: GP | Performed by: PHYSICAL THERAPIST

## 2023-09-07 ENCOUNTER — THERAPY VISIT (OUTPATIENT)
Dept: PHYSICAL THERAPY | Facility: CLINIC | Age: 73
End: 2023-09-07
Attending: ORTHOPAEDIC SURGERY
Payer: COMMERCIAL

## 2023-09-07 DIAGNOSIS — Z96.652 S/P TOTAL KNEE ARTHROPLASTY, LEFT: Primary | ICD-10-CM

## 2023-09-07 PROCEDURE — 97110 THERAPEUTIC EXERCISES: CPT | Mod: GP | Performed by: PHYSICAL THERAPIST

## 2023-09-28 ENCOUNTER — TRANSFERRED RECORDS (OUTPATIENT)
Dept: HEALTH INFORMATION MANAGEMENT | Facility: CLINIC | Age: 73
End: 2023-09-28
Payer: COMMERCIAL

## 2023-10-06 ENCOUNTER — HOSPITAL ENCOUNTER (EMERGENCY)
Facility: CLINIC | Age: 73
Discharge: HOME | End: 2023-10-06
Attending: EMERGENCY MEDICINE | Admitting: EMERGENCY MEDICINE
Payer: COMMERCIAL

## 2023-10-06 ENCOUNTER — APPOINTMENT (OUTPATIENT)
Dept: CT IMAGING | Facility: CLINIC | Age: 73
End: 2023-10-06
Attending: EMERGENCY MEDICINE
Payer: COMMERCIAL

## 2023-10-06 ENCOUNTER — DOCUMENTATION ONLY (OUTPATIENT)
Dept: HOME HEALTH SERVICES | Facility: CLINIC | Age: 73
End: 2023-10-06

## 2023-10-06 VITALS
TEMPERATURE: 98 F | DIASTOLIC BLOOD PRESSURE: 139 MMHG | RESPIRATION RATE: 16 BRPM | HEART RATE: 104 BPM | OXYGEN SATURATION: 90 % | SYSTOLIC BLOOD PRESSURE: 165 MMHG | BODY MASS INDEX: 28.35 KG/M2 | WEIGHT: 160 LBS | HEIGHT: 63 IN

## 2023-10-06 DIAGNOSIS — J43.9 PULMONARY EMPHYSEMA, UNSPECIFIED EMPHYSEMA TYPE (H): ICD-10-CM

## 2023-10-06 DIAGNOSIS — E83.42 HYPOMAGNESEMIA: ICD-10-CM

## 2023-10-06 DIAGNOSIS — J96.11 CHRONIC RESPIRATORY FAILURE WITH HYPOXIA AND HYPERCAPNIA (H): ICD-10-CM

## 2023-10-06 DIAGNOSIS — J96.12 CHRONIC RESPIRATORY FAILURE WITH HYPOXIA AND HYPERCAPNIA (H): ICD-10-CM

## 2023-10-06 DIAGNOSIS — D72.819 LEUKOPENIA, UNSPECIFIED TYPE: ICD-10-CM

## 2023-10-06 DIAGNOSIS — E86.0 MILD DEHYDRATION: ICD-10-CM

## 2023-10-06 DIAGNOSIS — Z86.73 CEREBRAL INFARCTION, CHRONIC: ICD-10-CM

## 2023-10-06 DIAGNOSIS — R55 SYNCOPE AND COLLAPSE: ICD-10-CM

## 2023-10-06 DIAGNOSIS — I10 ACCELERATED HYPERTENSION: ICD-10-CM

## 2023-10-06 LAB
ALBUMIN SERPL BCG-MCNC: 4.2 G/DL (ref 3.5–5.2)
ALP SERPL-CCNC: 107 U/L (ref 35–104)
ALT SERPL W P-5'-P-CCNC: 18 U/L (ref 0–50)
ANION GAP SERPL CALCULATED.3IONS-SCNC: 15 MMOL/L (ref 7–15)
AST SERPL W P-5'-P-CCNC: 32 U/L (ref 0–45)
BASE EXCESS BLDV CALC-SCNC: -0.3 MMOL/L (ref -7.7–1.9)
BASO+EOS+MONOS # BLD AUTO: ABNORMAL 10*3/UL
BASO+EOS+MONOS NFR BLD AUTO: ABNORMAL %
BASOPHILS # BLD AUTO: 0 10E3/UL (ref 0–0.2)
BASOPHILS NFR BLD AUTO: 2 %
BILIRUB SERPL-MCNC: 0.7 MG/DL
BUN SERPL-MCNC: 15 MG/DL (ref 8–23)
CALCIUM SERPL-MCNC: 9.5 MG/DL (ref 8.8–10.2)
CHLORIDE SERPL-SCNC: 100 MMOL/L (ref 98–107)
CREAT SERPL-MCNC: 0.84 MG/DL (ref 0.51–0.95)
D DIMER PPP FEU-MCNC: 2.18 UG/ML FEU (ref 0–0.5)
DEPRECATED HCO3 PLAS-SCNC: 26 MMOL/L (ref 22–29)
EGFRCR SERPLBLD CKD-EPI 2021: 74 ML/MIN/1.73M2
EOSINOPHIL # BLD AUTO: 0.2 10E3/UL (ref 0–0.7)
EOSINOPHIL NFR BLD AUTO: 6 %
ERYTHROCYTE [DISTWIDTH] IN BLOOD BY AUTOMATED COUNT: 13.5 % (ref 10–15)
FLUAV RNA SPEC QL NAA+PROBE: NEGATIVE
FLUBV RNA RESP QL NAA+PROBE: NEGATIVE
GLUCOSE SERPL-MCNC: 122 MG/DL (ref 70–99)
HCO3 BLDV-SCNC: 27 MMOL/L (ref 21–28)
HCT VFR BLD AUTO: 36.7 % (ref 35–47)
HGB BLD-MCNC: 11.6 G/DL (ref 11.7–15.7)
HOLD SPECIMEN: NORMAL
IMM GRANULOCYTES # BLD: 0 10E3/UL
IMM GRANULOCYTES NFR BLD: 1 %
LACTATE SERPL-SCNC: 4.3 MMOL/L (ref 0.7–2)
LYMPHOCYTES # BLD AUTO: 0.5 10E3/UL (ref 0.8–5.3)
LYMPHOCYTES NFR BLD AUTO: 20 %
MAGNESIUM SERPL-MCNC: 1.3 MG/DL (ref 1.7–2.3)
MCH RBC QN AUTO: 32.2 PG (ref 26.5–33)
MCHC RBC AUTO-ENTMCNC: 31.6 G/DL (ref 31.5–36.5)
MCV RBC AUTO: 102 FL (ref 78–100)
MONOCYTES # BLD AUTO: 0.5 10E3/UL (ref 0–1.3)
MONOCYTES NFR BLD AUTO: 18 %
NEUTROPHILS # BLD AUTO: 1.5 10E3/UL (ref 1.6–8.3)
NEUTROPHILS NFR BLD AUTO: 53 %
NRBC # BLD AUTO: 0 10E3/UL
NRBC BLD AUTO-RTO: 0 /100
O2/TOTAL GAS SETTING VFR VENT: 0 %
PCO2 BLDV: 54 MM HG (ref 40–50)
PH BLDV: 7.31 [PH] (ref 7.32–7.43)
PHOSPHATE SERPL-MCNC: 3.4 MG/DL (ref 2.5–4.5)
PLATELET # BLD AUTO: 171 10E3/UL (ref 150–450)
PO2 BLDV: 27 MM HG (ref 25–47)
POTASSIUM SERPL-SCNC: 3.6 MMOL/L (ref 3.4–5.3)
PROT SERPL-MCNC: 6.6 G/DL (ref 6.4–8.3)
RBC # BLD AUTO: 3.6 10E6/UL (ref 3.8–5.2)
RSV RNA SPEC NAA+PROBE: NEGATIVE
SARS-COV-2 RNA RESP QL NAA+PROBE: NEGATIVE
SODIUM SERPL-SCNC: 141 MMOL/L (ref 135–145)
TSH SERPL DL<=0.005 MIU/L-ACNC: 2.54 UIU/ML (ref 0.3–4.2)
WBC # BLD AUTO: 2.7 10E3/UL (ref 4–11)

## 2023-10-06 PROCEDURE — 83605 ASSAY OF LACTIC ACID: CPT | Performed by: EMERGENCY MEDICINE

## 2023-10-06 PROCEDURE — 93005 ELECTROCARDIOGRAM TRACING: CPT | Performed by: EMERGENCY MEDICINE

## 2023-10-06 PROCEDURE — 70450 CT HEAD/BRAIN W/O DYE: CPT

## 2023-10-06 PROCEDURE — 83735 ASSAY OF MAGNESIUM: CPT | Performed by: EMERGENCY MEDICINE

## 2023-10-06 PROCEDURE — 96360 HYDRATION IV INFUSION INIT: CPT | Mod: 59 | Performed by: EMERGENCY MEDICINE

## 2023-10-06 PROCEDURE — 99285 EMERGENCY DEPT VISIT HI MDM: CPT | Mod: 25 | Performed by: EMERGENCY MEDICINE

## 2023-10-06 PROCEDURE — 258N000003 HC RX IP 258 OP 636: Performed by: EMERGENCY MEDICINE

## 2023-10-06 PROCEDURE — 71275 CT ANGIOGRAPHY CHEST: CPT

## 2023-10-06 PROCEDURE — 250N000011 HC RX IP 250 OP 636: Performed by: EMERGENCY MEDICINE

## 2023-10-06 PROCEDURE — 250N000009 HC RX 250: Performed by: EMERGENCY MEDICINE

## 2023-10-06 PROCEDURE — 96361 HYDRATE IV INFUSION ADD-ON: CPT | Performed by: EMERGENCY MEDICINE

## 2023-10-06 PROCEDURE — 80053 COMPREHEN METABOLIC PANEL: CPT | Performed by: EMERGENCY MEDICINE

## 2023-10-06 PROCEDURE — 84100 ASSAY OF PHOSPHORUS: CPT | Performed by: EMERGENCY MEDICINE

## 2023-10-06 PROCEDURE — 85004 AUTOMATED DIFF WBC COUNT: CPT | Performed by: EMERGENCY MEDICINE

## 2023-10-06 PROCEDURE — 82803 BLOOD GASES ANY COMBINATION: CPT | Performed by: EMERGENCY MEDICINE

## 2023-10-06 PROCEDURE — 93010 ELECTROCARDIOGRAM REPORT: CPT | Performed by: EMERGENCY MEDICINE

## 2023-10-06 PROCEDURE — 87637 SARSCOV2&INF A&B&RSV AMP PRB: CPT | Performed by: EMERGENCY MEDICINE

## 2023-10-06 PROCEDURE — 36415 COLL VENOUS BLD VENIPUNCTURE: CPT | Performed by: EMERGENCY MEDICINE

## 2023-10-06 PROCEDURE — 85379 FIBRIN DEGRADATION QUANT: CPT | Performed by: EMERGENCY MEDICINE

## 2023-10-06 PROCEDURE — 84443 ASSAY THYROID STIM HORMONE: CPT | Performed by: EMERGENCY MEDICINE

## 2023-10-06 PROCEDURE — 94640 AIRWAY INHALATION TREATMENT: CPT

## 2023-10-06 RX ORDER — IOPAMIDOL 755 MG/ML
71 INJECTION, SOLUTION INTRAVASCULAR ONCE
Status: COMPLETED | OUTPATIENT
Start: 2023-10-06 | End: 2023-10-06

## 2023-10-06 RX ORDER — SODIUM CHLORIDE 9 MG/ML
1000 INJECTION, SOLUTION INTRAVENOUS CONTINUOUS
Status: DISCONTINUED | OUTPATIENT
Start: 2023-10-06 | End: 2023-10-06 | Stop reason: HOSPADM

## 2023-10-06 RX ORDER — IPRATROPIUM BROMIDE AND ALBUTEROL SULFATE 2.5; .5 MG/3ML; MG/3ML
3 SOLUTION RESPIRATORY (INHALATION) ONCE
Status: COMPLETED | OUTPATIENT
Start: 2023-10-06 | End: 2023-10-06

## 2023-10-06 RX ADMIN — SODIUM CHLORIDE 98 ML: 9 INJECTION, SOLUTION INTRAVENOUS at 16:27

## 2023-10-06 RX ADMIN — THIAMINE HYDROCHLORIDE: 100 INJECTION, SOLUTION INTRAMUSCULAR; INTRAVENOUS at 16:40

## 2023-10-06 RX ADMIN — IOPAMIDOL 71 ML: 755 INJECTION, SOLUTION INTRAVENOUS at 16:27

## 2023-10-06 RX ADMIN — SODIUM CHLORIDE 500 ML: 9 INJECTION, SOLUTION INTRAVENOUS at 18:11

## 2023-10-06 RX ADMIN — IPRATROPIUM BROMIDE AND ALBUTEROL SULFATE 3 ML: .5; 3 SOLUTION RESPIRATORY (INHALATION) at 16:55

## 2023-10-06 ASSESSMENT — ACTIVITIES OF DAILY LIVING (ADL)
ADLS_ACUITY_SCORE: 37

## 2023-10-06 NOTE — ED PROVIDER NOTES
I certify that this patient, Jessica Ellison has been under my care (or a nurse practitioner or physican's assistant working with me). This is the face-to-face encounter for oxygen medical necessity.      At the time of this encounter supplemental oxygen is reasonable and necessary and is expected to improve the patient's condition in a home setting.       Patient has continued oxygen desaturation due to COPD J44.9.    If portability is ordered, is the patient mobile within the home? Yes      Patient has been assessed for Home Oxygen needs. Oxygen readings:    *Pulse oximetry (SpO2) = 85% on room air at rest while awake.    *SpO2 improved to 95% on 2 liters/minute at rest.    *SpO2 = 83% on room air during activity/with exercise.    *SpO2 improved to 93% on 2 liters/minute during activity/with exercise.     *Goal SpO2 of 94% on home oxygen 2 L/minute.      Roly Espinoza MD  10/06/23 0908       Roly Espinoza MD  10/06/23 3052

## 2023-10-06 NOTE — ED TRIAGE NOTES
"Pt arrives via [facility] after passing out in the liquor store with her . Per EMS,  reports \"she has been off all day, I don't know if she took her medicines today\". Pt cannot recall taking her medications today. Pt denies drugs or ETOH.   Per EMS \"pt was almost like she was post ictal on scene\". No known seizure history.      Triage Assessment       Row Name 10/06/23 5800       Triage Assessment (Adult)    Airway WDL WDL       Respiratory WDL    Respiratory WDL WDL       Skin Circulation/Temperature WDL    Skin Circulation/Temperature WDL WDL       Cardiac WDL    Cardiac WDL WDL       Peripheral/Neurovascular WDL    Peripheral Neurovascular WDL WDL       Cognitive/Neuro/Behavioral WDL    Cognitive/Neuro/Behavioral WDL all    Level of Consciousness confused;alert    Speech slow                    "

## 2023-10-06 NOTE — ED PROVIDER NOTES
History     Chief Complaint   Patient presents with    Loss of Consciousness     HPI  History per patient, her  who witnessed this event and review of [facility] EMR and Care Everywhere EMR, including extensive chart review of multiple prior imaging studies, multiple clinic notes and multiple prior laboratory evaluations.   Jessica Ellison is a 72 year old female with history of alcohol dependence and abuse, opiate dependence, COPD, hypertension, hyperlipidemia, chronic kidney disease stage II and hypothyroidism who had a syncopal episode and ? seizure at the liquor store shortly PTA.  Her  states that she was standing, collapsed next to her after developing a sensation of feeling flushed and lightheaded. Her  assisted her to the floor, no injury or trauma, her eyes rolled back and she had diffuse coarse tremulousness but no tonic-clonic type seizure activity.  She bit her tongue but she did not have bowel or bladder incontinence.  No preceding chest pain, shortness of breath, palpitations, nausea or diaphoresis.  No headache, visual disturbance, motor or sensory deficit or abnormality. EMS only concern was report that she appeared postictal. She has had 1 prior syncopal episode in the distant past, she and her  know of no details regarding this or its evaluation or etiology. Today she has had malaise and her  reports recent decreased activity and appetite/oral intake. Last Etoh was last evening, ~ 1.5 glasses of wine.  She has recently had 1 bottle of wine over several days. Normally drinks Vodka, but has not had vodka recently, last was ~ 1 week ago. She denies symptoms of Etoh WD and denies history of DT or WD seizures.  She is s/p total left knee arthroplasty 8/15/23. She had post-op LLE swelling and a negative LLE US on 8/31/23.  She has had no chest pain, cough or hemoptysis or current leg pain or swelling. She takes aspirin 325 mg daily but no other anticoagulation therapy.    She has had no nausea, vomiting or signs or symptoms of GI bleeding.  No other pertinent history or acute complaints or concerns.      Allergies:  Allergies   Allergen Reactions    Bee Venom     Lisinopril Swelling     Oral swelling       Problem List:    Patient Active Problem List    Diagnosis Date Noted    S/P total knee arthroplasty, left 08/22/2023     Priority: Medium    Knee osteoarthritis 08/16/2023     Priority: Medium    F11.2 - Continuous opioid dependence (H) 07/11/2023     Priority: Medium     Patient is followed by DOMINIQUE COPPOLA for ongoing prescription of narcotic pain medicine.  Med: oxycodone/acetaminophen 10/325 for chronic back pain.   Maximum use per month: 70  Expected duration: unknown  Narcotic agreement on file: YES 7/11/2023   Clinic visit recommended: Q 3 months    PDMP reviewed 7/11/2023 April 2013 - rhizotomy for SI (left) Jhon Mckay MD    Follows at [facility] - severe low back pain with work related injuries.  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  Spinal stenosis at most lumbar levels  Improvement with past epidural steroid injections  SI joint dysfunction with the pain        JOY (obstructive sleep apnea) 08/05/2021     Priority: Medium    CKD (chronic kidney disease) stage 2, GFR 60-89 ml/min 08/05/2021     Priority: Medium    Hyperlipidemia 12/22/2020     Priority: Medium    Centrilobular emphysema (H) 11/04/2020     Priority: Medium    Hypoxia 01/05/2019     Priority: Medium    Spell of change in speech 04/17/2018     Priority: Medium    Hypothyroidism 09/07/2016     Priority: Medium    Chronic bilateral low back pain without sciatica 06/02/2016     Priority: Medium    Essential hypertension with goal blood pressure less than 140/90 06/02/2016     Priority: Medium    S/P lumbar spinal fusion 10/19/2015     Priority: Medium    Bee allergy status 06/30/2015     Priority: Medium    Cerebral aneurysm, nonruptured 07/24/2014     Priority:  Medium     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 Marti    repeat an MRA in 5 years -5/2027      Chronic pain 03/07/2014     Priority: Medium    Major depressive disorder, recurrent episode, moderate (H) 07/10/2013     Priority: Medium    Advanced directives, counseling/discussion 09/05/2012     Priority: Medium     Patient does not have an Advance/Health Care Directive (HCD), has information at home.     Sonja Krishnamurthy  September 5, 2012        Anxiety 06/20/2012     Priority: Medium    CARDIOVASCULAR SCREENING; LDL GOAL LESS THAN 160 10/31/2010     Priority: Medium    Insomnia 12/21/2009     Priority: Medium    Back pain 12/21/2009     Priority: Medium     Patient is followed by DOMINIQUE COPPOLA for ongoing prescription of narcotic pain medicine.  Med: oxycodone/acetaminophen 10/325 for chronic back pain.   Maximum use per month: 70  Expected duration: unknown  Narcotic agreement on file: YES 7/11/2023   Clinic visit recommended: Q 3 months    PDMP reviewed 7/11/2023 April 2013 - rhizotomy for SI (left) Jhon Mckay MD    Follows at [facility] - severe low back pain with work related injuries.  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     Priority: Medium        Past Medical History:    Past Medical History:   Diagnosis Date    Anemia     Aneurysm (H24)     Antiplatelet or antithrombotic long-term use     Arthritis     Chemical dependency (H)     Depression     History of blood transfusion     Hypertension     Menopausal symptoms     Other chronic pain     Sleep apnea     Sleep disorder     Thyroid disease     Tobacco abuse     Uncomplicated asthma        Past Surgical History:    Past Surgical  History:   Procedure Laterality Date    ABDOMEN SURGERY      APPENDECTOMY  1960    APPENDECTOMY      ARTHROPLASTY KNEE Left 2023    Procedure: LEFT TOTAL KNEE ARTHROPLASTY;  Surgeon: Bryan Roque MD;  Location: [facility] Main OR    BACK SURGERY      BIOPSY BREAST      cerebral aneurysm      coiled    CEREBRAL ANEURYSM REPAIR      COLON SURGERY      COLONOSCOPY  2005    FRACTURE SURGERY      HC EXCISION BREAST LESION, OPEN >=1      core biopsy , lumpectomy 2006    HERNIA REPAIR      LAP VENTRAL HERNIA REPAIR  2017    [place]    LAPAROSCOPIC ASSISTED HYSTERECTOMY VAGINAL  2017    LUMPECTOMY BREAST      orif left femoral neck Left 2016    stress fracture.  done at [facility]    SURGICAL HISTORY OF -       Skin Graft    ZZC PART REMOVAL COLON W ANASTOMOSIS  2005    diverticulitis    ZZC SPINE FUSION,ANTER,8+ SGMTS      Anterior posterior fusion 10 levels, hardware removal and re-fusion        Family History:    Family History   Problem Relation Age of Onset    Cancer Mother         breast/lung    Alcohol/Drug Mother     Depression Mother     Cancer - colorectal Father     Alcohol/Drug Father     Diabetes Maternal Grandmother     Diabetes Maternal Grandfather     Diabetes Paternal Grandmother     Diabetes Paternal Grandfather     Cancer Paternal Grandfather     Gastrointestinal Disease Paternal Grandfather     Congenital Anomalies Son     Hypertension Brother     Adrenal Disorder Brother         had adrenalectomies    Cerebral palsy Granddaughter        Social History:  Marital Status:   [2]  Social History     Tobacco Use    Smoking status: Former     Packs/day: 1.00     Years: 30.00     Pack years: 30.00     Types: Cigarettes     Quit date: 2004     Years since quittin.7    Smokeless tobacco: Never   Vaping Use    Vaping Use: Never used   Substance Use Topics    Alcohol use: No     Comment: ETOH dependency, DUI 3/15/10; 1-2 ETOH per week    Drug use:  "Yes     Types: Oxycodone     Comment: percocet for the past several years        Medications:    magnesium oxide (MAG-OX) 400 MG tablet  albuterol (PROAIR HFA/PROVENTIL HFA/VENTOLIN HFA) 108 (90 Base) MCG/ACT inhaler  amLODIPine (NORVASC) 5 MG tablet  atorvastatin (LIPITOR) 20 MG tablet  calcium carbonate 600 mg-vitamin D 400 units (CALTRATE) 600-400 MG-UNIT per tablet  celecoxib (CELEBREX) 200 MG capsule  DULoxetine (CYMBALTA) 30 MG capsule  ferrous sulfate (FEROSUL) 325 (65 Fe) MG tablet  HYDROmorphone (DILAUDID) 4 MG tablet  levothyroxine (SYNTHROID/LEVOTHROID) 50 MCG tablet  Melatonin 10 MG TABS tablet  methocarbamol (ROBAXIN) 500 MG tablet  metoprolol succinate ER (TOPROL XL) 50 MG 24 hr tablet  multivitamin, therapeutic (THERA-VIT) TABS tablet  naloxone (NARCAN) 4 MG/0.1ML nasal spray  nystatin (MYCOSTATIN) 112957 UNIT/GM external powder  oxyCODONE-acetaminophen (PERCOCET)  MG per tablet  QUEtiapine (SEROQUEL) 50 MG tablet  senna (SENOKOT) 8.6 MG tablet  SPIRIVA HANDIHALER 18 MCG inhaled capsule        Review of Systems  Hot flashes daily x months.  As mentioned in the HPI, in addition focused review of systems was negative.    Physical Exam   BP: (!) 192/118  Pulse: 95  Temp: 98  F (36.7  C)  Resp: 18  Height: 160 cm (5' 3\")  Weight: 72.6 kg (160 lb)  SpO2: (!) 83 %  Lying Orthostatic BP: 178/101  Lying Orthostatic Pulse: 99 bpm  Standing Orthostatic BP: 145/90  Standing Orthostatic Pulse: 113 bpm      Physical Exam  Vitals and nursing note reviewed.   Constitutional:       General: She is not in acute distress.     Appearance: Normal appearance. She is well-developed. She is not ill-appearing, toxic-appearing or diaphoretic.      Comments: She is alert, oriented, pleasant, cooperative and in no distress.  She feels at baseline.   HENT:      Head: Normocephalic and atraumatic.      Right Ear: External ear normal.      Left Ear: External ear normal.      Nose: Nose normal.      Mouth/Throat:      Mouth: " Mucous membranes are dry.      Comments: Left anterior tongue has been bitten, mild swelling, no suturable or repairable laceration.  Eyes:      General: Vision grossly intact. No scleral icterus.     Extraocular Movements: Extraocular movements intact.      Conjunctiva/sclera: Conjunctivae normal.      Pupils: Pupils are equal, round, and reactive to light.   Neck:      Vascular: Normal carotid pulses.      Trachea: Trachea and phonation normal. No tracheal deviation.   Cardiovascular:      Rate and Rhythm: Normal rate and regular rhythm.      Pulses: Normal pulses.      Heart sounds: Normal heart sounds. No murmur heard.     No friction rub. No gallop.   Pulmonary:      Effort: Pulmonary effort is normal. No respiratory distress.      Breath sounds: Normal breath sounds. No wheezing, rhonchi or rales.   Abdominal:      General: Bowel sounds are normal. There is no distension.      Palpations: Abdomen is soft.      Tenderness: There is no abdominal tenderness.   Musculoskeletal:         General: No swelling, tenderness or signs of injury. Normal range of motion.      Cervical back: Full passive range of motion without pain, normal range of motion and neck supple. No tenderness. No pain with movement, spinous process tenderness or muscular tenderness.      Right lower leg: No edema.      Left lower leg: No edema.      Comments: Left knee surgical incision is healing normally.  There is minimal normal-appearing postop swelling without erythema of the knee.   Skin:     General: Skin is warm and dry.      Coloration: Skin is not pale.      Findings: No erythema or rash.   Neurological:      General: No focal deficit present.      Mental Status: She is alert and oriented to person, place, and time. Mental status is at baseline.      GCS: GCS eye subscore is 4. GCS verbal subscore is 5. GCS motor subscore is 6.      Cranial Nerves: Cranial nerves 2-12 are intact. No cranial nerve deficit, dysarthria or facial asymmetry.       Sensory: Sensation is intact. No sensory deficit.      Motor: Motor function is intact. No weakness, tremor, abnormal muscle tone or seizure activity.      Coordination: Coordination is intact. Coordination normal.   Psychiatric:         Behavior: Behavior normal.      Comments: Flat affect.         ED Course           Procedures              EKG Interpretation:      Interpreted by Roly Espinoza MD  Time reviewed: Upon completion  Symptoms at time of EKG: Status post syncope/? seizure  Rhythm: normal sinus   Rate: normal  Axis: normal  Ectopy: none  Conduction: normal  ST Segments/ T Waves: No ST-T wave changes  Q Waves: none  Comparison to prior: Versus 8/8/2023, and unchanged from 3/25/2022  Clinical Impression: normal EKG         Results for orders placed or performed during the hospital encounter of 10/06/23 (from the past 24 hour(s))   [place] Draw    Narrative    The following orders were created for panel order [place] Draw.  Procedure                               Abnormality         Status                     ---------                               -----------         ------                     Extra Blue Top Tube[[id number]]                              Final result               Extra Red Top Tube[[id number]]                               Final result               Extra Green Top (Lithium...[[id number]]                      Final result               Extra Purple Top Tube[[id number]]                            Final result                 Please view results for these tests on the individual orders.   Extra Blue Top Tube   Result Value Ref Range    Hold Specimen JIC    Extra Red Top Tube   Result Value Ref Range    Hold Specimen JIC    Extra Green Top (Lithium Heparin) Tube   Result Value Ref Range    Hold Specimen JIC    Extra Purple Top Tube   Result Value Ref Range    Hold Specimen JIC    [place] Draw    Narrative    The following orders were created for panel order [place] Draw.  Procedure                                Abnormality         Status                     ---------                               -----------         ------                     Extra Heparinized Syringe[[id number]]                        Final result                 Please view results for these tests on the individual orders.   Extra Heparinized Syringe   Result Value Ref Range    Hold Specimen [facility]    CBC with platelets, differential    Narrative    The following orders were created for panel order CBC with platelets, differential.  Procedure                               Abnormality         Status                     ---------                               -----------         ------                     CBC with platelets and d...[[id number]]  Abnormal            Final result                 Please view results for these tests on the individual orders.   Comprehensive metabolic panel   Result Value Ref Range    Sodium 141 135 - 145 mmol/L    Potassium 3.6 3.4 - 5.3 mmol/L    Carbon Dioxide (CO2) 26 22 - 29 mmol/L    Anion Gap 15 7 - 15 mmol/L    Urea Nitrogen 15.0 8.0 - 23.0 mg/dL    Creatinine 0.84 0.51 - 0.95 mg/dL    GFR Estimate 74 >60 mL/min/1.73m2    Calcium 9.5 8.8 - 10.2 mg/dL    Chloride 100 98 - 107 mmol/L    Glucose 122 (H) 70 - 99 mg/dL    Alkaline Phosphatase 107 (H) 35 - 104 U/L    AST 32 0 - 45 U/L    ALT 18 0 - 50 U/L    Protein Total 6.6 6.4 - 8.3 g/dL    Albumin 4.2 3.5 - 5.2 g/dL    Bilirubin Total 0.7 <=1.2 mg/dL   Magnesium   Result Value Ref Range    Magnesium 1.3 (L) 1.7 - 2.3 mg/dL   TSH with free T4 reflex   Result Value Ref Range    TSH 2.54 0.30 - 4.20 uIU/mL   CBC with platelets and differential   Result Value Ref Range    WBC Count 2.7 (L) 4.0 - 11.0 10e3/uL    RBC Count 3.60 (L) 3.80 - 5.20 10e6/uL    Hemoglobin 11.6 (L) 11.7 - 15.7 g/dL    Hematocrit 36.7 35.0 - 47.0 %     (H) 78 - 100 fL    MCH 32.2 26.5 - 33.0 pg    MCHC 31.6 31.5 - 36.5 g/dL    RDW 13.5 10.0 - 15.0 %    Platelet Count 171 150 -  450 10e3/uL    % Neutrophils 53 %    % Lymphocytes 20 %    % Monocytes 18 %    Mids % (Monos, Eos, Basos)      % Eosinophils 6 %    % Basophils 2 %    % Immature Granulocytes 1 %    NRBCs per 100 WBC 0 <1 /100    Absolute Neutrophils 1.5 (L) 1.6 - 8.3 10e3/uL    Absolute Lymphocytes 0.5 (L) 0.8 - 5.3 10e3/uL    Absolute Monocytes 0.5 0.0 - 1.3 10e3/uL    Mids Abs (Monos, Eos, Basos)      Absolute Eosinophils 0.2 0.0 - 0.7 10e3/uL    Absolute Basophils 0.0 0.0 - 0.2 10e3/uL    Absolute Immature Granulocytes 0.0 <=0.4 10e3/uL    Absolute NRBCs 0.0 10e3/uL   Phosphorus   Result Value Ref Range    Phosphorus 3.4 2.5 - 4.5 mg/dL   D dimer quantitative   Result Value Ref Range    D-Dimer Quantitative 2.18 (H) 0.00 - 0.50 ug/mL FEU    Narrative    This D-dimer assay is intended for use in conjunction with a clinical pretest probability assessment model to exclude pulmonary embolism (PE) and deep venous thrombosis (DVT) in outpatients suspected of PE or DVT. The cut-off value is 0.50 ug/mL FEU.    For patients 50 years of age or older, the application of age-adjusted cut-off values for D-Dimer may increase the specificity without significant effect on sensitivity. The literature suggested calculation age adjusted cut-off in ug/L = age in years x 10 ug/L. The results in this laboratory are reported as ug/mL rather than ug/L. The calculation for age adjusted cut off in ug/mL= age in years x 0.01 ug/mL. For example, the cut off for a 76 year old male is 78 x 0.01 ug/mL = 0.76 ug/mL (760 ug/L).    M Aspen et al. Age adjusted D-dimer cut-off levels to rule out pulmonary embolism: The ADJUST-PE Study. DARON 2014;311:0534-9987.; GENE Hartman et al. Diagnostic accuracy of conventional or age adjusted D-dimer cutoff values in older patients with suspected venous thromboembolism. Systemic review and meta-analysis. BMJ 2013:346:f2492.   Blood gas venous   Result Value Ref Range    pH Venous 7.31 (L) 7.32 - 7.43    pCO2 Venous 54 (H)  40 - 50 mm Hg    pO2 Venous 27 25 - 47 mm Hg    Bicarbonate Venous 27 21 - 28 mmol/L    Base Excess/Deficit -0.3 -7.7 - 1.9 mmol/L    FIO2 0    Lactic acid whole blood   Result Value Ref Range    Lactic Acid 4.3 (HH) 0.7 - 2.0 mmol/L   Symptomatic Influenza A/B, RSV, & SARS-CoV2 PCR (COVID-19) Nose    Specimen: Nose; Swab   Result Value Ref Range    Influenza A PCR Negative Negative    Influenza B PCR Negative Negative    RSV PCR Negative Negative    SARS CoV2 PCR Negative Negative    Narrative    Testing was performed using the Xpert Xpress CoV2/Flu/RSV Assay on the [employer] GeneXpert Instrument. This test should be ordered for the detection of SARS-CoV-2, influenza, and RSV viruses in individuals who meet clinical and/or epidemiological criteria. Test performance is unknown in asymptomatic patients. This test is for in vitro diagnostic use under the FDA EUA for laboratories certified under CLIA to perform high or moderate complexity testing. This test has not been FDA cleared or approved. A negative result does not rule out the presence of PCR inhibitors in the specimen or target RNA in concentration below the limit of detection for the assay. If only one viral target is positive but coinfection with multiple targets is suspected, the sample should be re-tested with another FDA cleared, approved, or authorized test, if coinfection would change clinical management. This test was validated by the [facility]. These laboratories are certified under the Clinical Laboratory Improvement Amendments of 1988 (CLIA-88) as qualified to perform high complexity laboratory testing.   CT Head w/o Contrast    Narrative    EXAM: CT HEAD W/O CONTRAST  LOCATION: [facility]  DATE: 10/6/2023    INDICATION: Syncope seizure  COMPARISON: 12/13/2019, 04/18/2022  TECHNIQUE: Routine CT Head without IV contrast. Multiplanar reformats. Dose reduction techniques were  used.    FINDINGS:  INTRACRANIAL CONTENTS: Artifact relating to prior left internal carotid artery aneurysm coiling and stenting is noted. No intracranial hemorrhage, extraaxial collection, or mass effect.  No CT evidence of acute infarct. A chronic appearing infarct is   noted in the right occipital lobe which is new from 2019 and contains a few areas of scattered calcification. Mild to moderate presumed chronic small vessel ischemic changes. Mild generalized volume loss. No hydrocephalus.     VISUALIZED ORBITS/SINUSES/MASTOIDS: No intraorbital abnormality. No paranasal sinus mucosal disease. No middle ear or mastoid effusion.    BONES/SOFT TISSUES: No acute abnormality.      Impression    IMPRESSION:  1.  No acute intracranial hemorrhage, extra-axial collection, or midline shift.  2.  Chronic appearing right occipital infarct, new from 2019. Area of infarct does contain a few areas of scattered calcification.  3.  Mild generalized brain parenchymal volume loss.     CT Chest Pulmonary Embolism w Contrast    Narrative    EXAM: CT CHEST PULMONARY EMBOLISM W CONTRAST  LOCATION: [facility]  DATE: 10/6/2023    INDICATION: Low oxygen saturation. Syncope. Recent knee surgery.  COMPARISON: None.  TECHNIQUE: CT chest pulmonary angiogram during arterial phase injection of IV contrast. Multiplanar reformats and MIP reconstructions were performed. Dose reduction techniques were used.   CONTRAST: 71 ml Isovue 370    FINDINGS: Motion artifact.    ANGIOGRAM CHEST: No pulmonary embolism. Pulmonary trunk enlargement measuring 3.6 cm. Tortuous aorta without focal aneurysm or dissection.    LUNGS AND PLEURA: Mild to moderate emphysema. Mild basilar predominant bandlike opacities.     MEDIASTINUM/AXILLAE: No adenopathy. Upper normal heart size. No pericardial effusion. Small hiatus hernia.    CORONARY ARTERY CALCIFICATION: Present    UPPER ABDOMEN: Cholecystectomy.    MUSCULOSKELETAL: Bony demineralization  and degenerative changes. Incompletely seen surgical changes thoracolumbar spine. Scoliosis.      Impression    IMPRESSION:    1.  No pulmonary embolism seen.    2.  Moderate pulmonary arterial enlargement, suggesting pulmonary hypertension.    3.  Mild to moderate pulmonary emphysema.    4.  Mild basilar predominant atelectasis. No consolidation.    5.  No pleural effusion or pneumothorax.       Previous Records Reviewed: Has has decreased WBC in the past, when admitted for COPD and groundglass opacities on CT chest with negative COVID study concerning for pneumonia.  I doubt infectious etiology today.    WBC   Date Value Ref Range Status   12/25/2020 6.6 4.0 - 11.0 10e9/L Final   12/24/2020 7.3 4.0 - 11.0 10e9/L Final   12/23/2020 2.9 (L) 4.0 - 11.0 10e9/L Final     WBC Count   Date Value Ref Range Status   10/06/2023 2.7 (L) 4.0 - 11.0 10e3/uL Final   08/08/2023 4.0 4.0 - 11.0 10e3/uL Final   07/11/2023 5.1 4.0 - 11.0 10e3/uL Final       Medications   sodium chloride 0.9% BOLUS 500 mL (0 mLs Intravenous Stopped 10/6/23 2119)   lactated ringers 1,000 mL with Infuvite Adult 10 mL, thiamine 100 mg, folic acid 1 mg infusion ( Intravenous Stopped 10/6/23 2119)   iopamidol (ISOVUE-370) solution 71 mL (71 mLs Intravenous $Given 10/6/23 1627)   sodium chloride 0.9 % bag 500mL for CT scan flush use (98 mLs As instructed $Given 10/6/23 1627)   ipratropium - albuterol 0.5 mg/2.5 mg/3 mL (DUONEB) neb solution 3 mL (3 mLs Nebulization $Given 10/6/23 5457)     4:01 PM - O2 sat 85-90% on RA.  She reports chronic hypoxia with baseline O2 sats in the upper 80s to low 90s.  She confirms she is only been on O2 while hospitalized.  She also reports that blood pressure is normally well controlled at home, within normal.    5:44 PM - I reviewed the case and consulted with Dr. Farr, Neurology.  We discussed differential diagnosis and her discharge and follow-up plan.  Although seizure is a possibility, this is not definite and no  anticonvulsant therapy was recommended.  He recommended Neurology clinic follow-up and outpatient work-up of the incidental finding of a chronic appearing right occipital infarct.  She will need an outpatient echo, cardiac monitor study and CTA head and neck study.  I will order the Echo and cardiac monitor study, make a referral for her primary care provider for follow-up and refer her to Neurology.  The CTA head and neck study can be ordered by her primary care provider or Neurologist. The Neurologist did not feel that emergent MRI imaging evaluation is indicated today.    I recommended admission for observation and continued care but she declined this and wants to be discharged home.  Her  agreed with the decision.  We discussed the differential diagnosis of her syncopal event and other clinical findings today and they expressed understanding of these issues and the potential complications and consequences but still refused/declined admission today.  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       Assessments & Plan (with Medical Decision Making)   72 year old female with history of alcohol dependence and abuse, opiate dependence, COPD, hypertension, hyperlipidemia, chronic kidney disease stage II and hypothyroidism who had a syncopal episode and ? seizure at the liquor store shortly PTA.  Her  states that she was standing, collapsed next to her after developing a sensation of feeling flushed and lightheaded. Her  assisted her to the floor, no injury or trauma, her eyes rolled back and she had diffuse coarse tremulousness but no tonic-clonic type seizure activity.  She bit her tongue but she did not have bowel or bladder incontinence.  No preceding chest pain, shortness of breath, palpitations, nausea or diaphoresis.  EMS only concern was report that she appeared postictal.  She has had 1 prior syncopal episode in the distant past, she and her  know of no details regarding  this or its evaluation or etiology. Today she has had malaise and her  reports recent decreased activity and appetite/oral intake. Last Etoh was last evening, ~ 1.5 glasses of wine.  She has recently had 1 bottle of wine over several days. Normally drinks Vodka, but has not had vodka recently, last was ~ 1 week ago. She denies symptoms of Etoh WD and denies history of DT or WD seizures.  Extensive evaluation was remarkable for CT head showing a chronic appearing right occipital infarct which is new from last imaging in 2019, decreased WBC 2.7, elevated lactate 4.3, which I suspect is elevated due to his seizure and not an infectious process, decreased magnesium 1.3, venous blood gas with mildly elevated PCO2 54 consistent with her COPD, and elevated D-dimer which appears to be a false positive with CT chest showing no evidence of PE. Initially found to be hypoxic with O2 saturations on room air as low as 83-85%.  She reports home O2 sats are normally in the upper 80s to low 90s. She refused to be admitted to the hospital for observation and I arranged for discharge with home O2, 2 L/nasal cannula continuous therapy.  She will continue use of Spiriva and albuterol inhaler.  She was also hypertensive but reports well-controlled blood pressures at home.  I will have her monitor this and follow-up with her primary care provider for reevaluation in several days.  No evidence of hypertensive emergency or PRES. I reviewed consulted with Dr. Calixto and although seizure is a possibility, this is not definite and no anticonvulsant therapy was recommended. He recommended Neurology clinic follow-up and outpatient work-up of the incidental finding of a chronic appearing right occipital infarct. I will order an Echo and cardiac monitor study, make a referral for her primary care provider for follow-up and refer her to Neurology.  The CTA head and neck study can be ordered by her primary care provider or Neurologist,  this was not ordered today because she has already received IV contrast for her CT chest PE study. The  Neurologist did not feel that emergent MRI imaging is currently indicated. She may have had an alcohol withdrawal seizure, as evidenced by tongue biting, elevated lactate and postictal appearance per EMS.  She has no current evidence of alcohol withdrawal.  She was counseled on alcohol use/abuse.  This also could have been a vasovagal incident given she had preceding symptoms of oncoming syncope, feeling flushed and lightheaded prior to syncope.  She appeared mildly dehydrated and was given IV multivitamin, folate and thiamine, and an additional total of 2nd L IV fluid bolus.  I inadvertently did not give her IV magnesium, but will Rx a magnesium supplement for her.  Decreased WBC can be followed up in work-up through primary care clinic, ? referral to Hematology.   I made a primary care referral for her follow-up and an appt was made for her for Tuesday 10/10/23 (today is Friday).  She and her  were provided instructions for supportive care and will return as needed for worsened condition or worsening symptoms, or new problems or concerns.  10/7/2023 12:20 PM - I called to check on her and she is doing well, blood pressures 130s/60s, O2 saturation in the mid 90s on 2 L/nasal cannula.  She will begin her magnesium supplement.  She reiterated understanding of need for follow-up with her primary care provider as scheduled in 3 days, 10/10/2023      I have reviewed the nursing notes.    I have reviewed the findings, diagnosis, plan and need for follow up with the patient and her .    Medical Decision Making: High complexity  Hospitalization for observation and cardiac monitoring was recommended, but refused by her.  Her  concurs with her decision to be discharged.  I arranged for discharge with home O2 she appears stable and appropriate for outpatient management with close follow-up per her  wishes with full understanding of the risks and benefits of admission versus discharge.  She is retired nurse.      Discharge Medication List as of 10/6/2023  9:48 PM   magnesium oxide (MAG-OX) 400 MG tablet 20 tablet 0 10/7/2023  No   Sig: Take 2 tablets by mouth for first dose and then begin 1 tablet twice daily (first dose of 1 tablet taken this evening) for 10 days (medication may cause diarrhea).   Sent to pharmacy as: magnesium oxide 400 MG PO tablet   Class: E-Prescribe          Final diagnoses:   Syncope and collapse   Chronic respiratory failure with hypoxia and hypercapnia (H)   Pulmonary emphysema, unspecified emphysema type (H)   Cerebral infarction, chronic - Found incidentally on CT head imaging today   Accelerated hypertension - Elevated blood pressure in the ED tonight, reports it has been NL at home   Mild dehydration   Hypomagnesemia   Leukopenia, unspecified type - decreased WBC of 2.7 today       10/6/2023   [facility] EMERGENCY DEPT       Roly Espinoza MD  10/07/23 [id number]

## 2023-10-07 RX ORDER — MAGNESIUM OXIDE 400 MG/1
TABLET ORAL
Qty: 20 TABLET | Refills: 0 | Status: SHIPPED | OUTPATIENT
Start: 2023-10-07 | End: 2023-11-07

## 2023-10-07 ASSESSMENT — VISUAL ACUITY: OU: 1

## 2023-10-07 NOTE — PROGRESS NOTES
Received intake call for home oxygen at 6:35PM. Reviewed patient's chart; Patient qualifies under insurance guidelines and all documentation is in the chart including a good order.     6:51PM- Spoke with care coordinator, RICARDO, confirmed we received the order, and provided them with ETA of oxygen.     7:00PM- Spoke with spouse to offer choice and they are okay with [facility] setting them up. Discussed equipment with patient and informed them that we would be to bedside with oxygen in the next 4 hours due to after hours delivery

## 2023-10-07 NOTE — ED NOTES
Pt went to bathroom via WC, O@2 not on HR up to 120 with sats 82% on RA. O2 at w  2 lpm and pt recovering

## 2023-10-07 NOTE — DISCHARGE INSTRUCTIONS
Oxygen Provider:  Arranged through [email or web address], contact number [phone].  If you have any questions or concerns please call the oxygen company directly.      For evaluation of an old stroke seen on CT head scan today, you should call Cardiac Services to schedule the Echocardiogram and cardiac monitor study as soon as possible: [phone].  You also need a CT angiogram study of the head and neck, Dr. Barriga should order the study for you.    Follow-up in Neurology clinic, a referral was made to help expedite your follow-up.    You will need to follow-up with Dr. Barriga for the results of the study and further follow-up.

## 2023-10-10 ENCOUNTER — OFFICE VISIT (OUTPATIENT)
Dept: FAMILY MEDICINE | Facility: CLINIC | Age: 73
End: 2023-10-10
Payer: COMMERCIAL

## 2023-10-10 VITALS
HEART RATE: 101 BPM | TEMPERATURE: 97.6 F | HEIGHT: 63 IN | OXYGEN SATURATION: 87 % | SYSTOLIC BLOOD PRESSURE: 128 MMHG | BODY MASS INDEX: 28.34 KG/M2 | RESPIRATION RATE: 20 BRPM | DIASTOLIC BLOOD PRESSURE: 80 MMHG

## 2023-10-10 DIAGNOSIS — F41.9 ANXIETY: ICD-10-CM

## 2023-10-10 DIAGNOSIS — E83.42 HYPOMAGNESEMIA: ICD-10-CM

## 2023-10-10 DIAGNOSIS — M54.50 CHRONIC BILATERAL LOW BACK PAIN WITHOUT SCIATICA: ICD-10-CM

## 2023-10-10 DIAGNOSIS — G89.29 CHRONIC BILATERAL LOW BACK PAIN WITHOUT SCIATICA: ICD-10-CM

## 2023-10-10 DIAGNOSIS — M53.3 SACROILIAC JOINT PAIN: ICD-10-CM

## 2023-10-10 DIAGNOSIS — I10 ESSENTIAL HYPERTENSION WITH GOAL BLOOD PRESSURE LESS THAN 140/90: ICD-10-CM

## 2023-10-10 DIAGNOSIS — D72.819 LEUKOPENIA, UNSPECIFIED TYPE: ICD-10-CM

## 2023-10-10 DIAGNOSIS — Z86.73 CEREBRAL INFARCTION, CHRONIC: ICD-10-CM

## 2023-10-10 DIAGNOSIS — Z98.1 S/P LUMBAR SPINAL FUSION: ICD-10-CM

## 2023-10-10 DIAGNOSIS — R55 SYNCOPE AND COLLAPSE: Primary | ICD-10-CM

## 2023-10-10 PROCEDURE — 91320 SARSCV2 VAC 30MCG TRS-SUC IM: CPT | Performed by: FAMILY MEDICINE

## 2023-10-10 PROCEDURE — 90662 IIV NO PRSV INCREASED AG IM: CPT | Performed by: FAMILY MEDICINE

## 2023-10-10 PROCEDURE — 90480 ADMN SARSCOV2 VAC 1/ONLY CMP: CPT | Performed by: FAMILY MEDICINE

## 2023-10-10 PROCEDURE — 99214 OFFICE O/P EST MOD 30 MIN: CPT | Mod: 25 | Performed by: FAMILY MEDICINE

## 2023-10-10 PROCEDURE — G0008 ADMIN INFLUENZA VIRUS VAC: HCPCS | Performed by: FAMILY MEDICINE

## 2023-10-10 PROCEDURE — 96127 BRIEF EMOTIONAL/BEHAV ASSMT: CPT | Performed by: FAMILY MEDICINE

## 2023-10-10 RX ORDER — AMLODIPINE BESYLATE 5 MG/1
5 TABLET ORAL DAILY
Qty: 90 TABLET | Refills: 1 | Status: SHIPPED | OUTPATIENT
Start: 2023-10-10 | End: 2024-02-20

## 2023-10-10 RX ORDER — OXYCODONE AND ACETAMINOPHEN 10; 325 MG/1; MG/1
1 TABLET ORAL EVERY 6 HOURS PRN
Qty: 60 TABLET | Refills: 0 | Status: SHIPPED | OUTPATIENT
Start: 2023-10-18 | End: 2023-11-13

## 2023-10-10 RX ORDER — CELECOXIB 200 MG/1
200 CAPSULE ORAL DAILY
Qty: 90 CAPSULE | Refills: 1 | Status: SHIPPED | OUTPATIENT
Start: 2023-10-10 | End: 2024-02-20

## 2023-10-10 RX ORDER — QUETIAPINE FUMARATE 50 MG/1
100 TABLET, FILM COATED ORAL DAILY
Qty: 180 TABLET | Refills: 1 | Status: SHIPPED | OUTPATIENT
Start: 2023-10-10 | End: 2024-02-20

## 2023-10-10 ASSESSMENT — PATIENT HEALTH QUESTIONNAIRE - PHQ9
SUM OF ALL RESPONSES TO PHQ QUESTIONS 1-9: 19
10. IF YOU CHECKED OFF ANY PROBLEMS, HOW DIFFICULT HAVE THESE PROBLEMS MADE IT FOR YOU TO DO YOUR WORK, TAKE CARE OF THINGS AT HOME, OR GET ALONG WITH OTHER PEOPLE: VERY DIFFICULT
SUM OF ALL RESPONSES TO PHQ QUESTIONS 1-9: 19

## 2023-10-10 ASSESSMENT — PAIN SCALES - GENERAL: PAINLEVEL: EXTREME PAIN (8)

## 2023-10-10 NOTE — PATIENT INSTRUCTIONS
"Oxycodone - we need to wean this, max #60 in the next month    See me in 4 weeks    Schedule to see the neurologist - referral given    Schedule echocardiogram and zio patch with cardiology [phone]    Lab in 1 week to check magnesium, blood counts, kidney/liver function      Thank you for choosing [facility]      When you are out of refills or the refills say \"zero\", it is time to schedule your next appointment in clinic!    Labs are released to you almost immediately and sometimes before I have had a chance to review them.  I review labs regularly and once they are all in, you will be sent a letter with your results and/or if you are signed up for on-line services, you will be e-mailed the results with my interpretation. If there are serious findings, you typically will be called.    If you have any questions about your visit, your symptoms, your medication, your test results or it is not clear what your diagnosis or treatment plan is please contact me (via [employer]) or call the care team at [phone] option #2      Our Clinic hours are:  Mondays    7:20 am - 7 pm  Tues -  Fri  7:20 am - 5 pm      The clinic lab opens at 7:30 am Mon - Fri and appointments are required.    [facility]  Ph. [phone]  Monday-Thursday 8 am - 7pm  Tues/Wed/Fri 8 am - 5:30 pm       "

## 2023-10-10 NOTE — PROGRESS NOTES
Assessment & Plan     Syncope and collapse  Has been given a referral to neurology - I provided her with that number again  Needs CTA head/neck  Has echo and zio patch ordered from ER    - CTA Head Neck with Contrast; Future    Cerebral infarction, chronic  Old/chronic appearing infarct.     - CTA Head Neck with Contrast; Future    Hypoxia  She has home oxygen but is not wearing that now and oxygen sats on room air are 87%  Encouraged regular use of oxygen  Encourage deep breathing several times an hour due to the atelectasis seen on CT scan  Has echo ordered to look more at the pulmonary hypertension but she may need to see cardiology  Has established care with pulmonology and saw them last in August before her total knee, but she was not hypoxic at the time of that visit.   The hypoxia may be contributing to her headaches, malaise.     Leukopenia, unspecified type  Plan to recheck with lab only next week  Concern for developing MDS from the alcohol abuse    - CBC with platelets and differential; Future  - Comprehensive metabolic panel (BMP + Alb, Alk Phos, ALT, AST, Total. Bili, TP); Future    Hypomagnesemia  Just started Mg yesterday  She will recheck in 1 week  - Magnesium; Future  - Comprehensive metabolic panel (BMP + Alb, Alk Phos, ALT, AST, Total. Bili, TP); Future      S/P lumbar spinal fusion  Chronic bilateral low back pain without sciatica  Sacroiliac joint pain  Patient has had chronic narcotic use for many years due to her longstanding low back pain, SI joint pain, etc  She also has a h/o alcohol abuse and dependence, she has been telling me for many years that she is sober and in remission.    We discussed that alcohol abuse and chronic narcotic use is a dangerous combination and that we will need to wean her off of the oxycodone over the next few months, she is unhappy about this but understands my position.   See me in 1 month for ongoing weaning.     - oxyCODONE-acetaminophen (PERCOCET)  MG  "per tablet; Take 1 tablet by mouth every 6 hours as needed for severe pain Weaning.  Max 60 for the next 30 days  - celecoxib (CELEBREX) 200 MG capsule; Take 1 capsule (200 mg) by mouth daily    Anxiety     - QUEtiapine (SEROQUEL) 50 MG tablet; Take 2 tablets (100 mg) by mouth daily    Essential hypertension with goal blood pressure less than 140/90  Well controlled today  - amLODIPine (NORVASC) 5 MG tablet; Take 1 tablet (5 mg) by mouth daily  - Comprehensive metabolic panel (BMP + Alb, Alk Phos, ALT, AST, Total. Bili, TP); Future           MED REC REQUIRED  Post Medication Reconciliation Status: discharge medications reconciled and changed, per note/orders  BMI:   Estimated body mass index is 28.34 kg/m  as calculated from the following:    Height as of this encounter: 1.6 m (5' 3\").    Weight as of 10/6/23: 72.6 kg (160 lb).       Depression Screening Follow Up        10/10/2023    12:02 PM   PHQ   PHQ-9 Total Score 19   Q9: Thoughts of better off dead/self-harm past 2 weeks Several days   F/U: Thoughts of suicide or self-harm No   F/U: Safety concerns No                 Follow Up    Follow Up Actions Taken      Discussed the following ways the patient can remain in a safe environment:        Sarah Barriga MD  [facility]   Jessica is a 72 year old, presenting for the following health issues:  Syncope        10/10/2023    12:06 PM   Additional Questions   Roomed by Altagracia SALCIDO CMA   Accompanied by        HPI       ED/UC Followup:    Facility:  [facility]  Date of visit: 10/6/2023  Reason for visit: Syncope and collapse  Current Status: No since falls or LOC. She notes today feeling very tired and is having headaches. No chest pain, vision/speech changes, ringing in the ears, left sided numbness/tingling, nausea or emesis. She is using oxygen at home when needed. No alcohol use.    Patient has been less than truthful over the past several years regarding " "her sobriety, I am just learning about that now.      states that she has a motorized scooter and friends and is not far from a liquor store.  Says she uses vodka to dull the pain.   I discussed with her that I cannot continue to provide narcotics when she is using alcohol and so we will begin weaning.     Review of Systems         Objective    /80   Pulse 101   Temp 97.6  F (36.4  C) (Tympanic)   Resp 20   Ht 1.6 m (5' 3\")   SpO2 (!) 87%   BMI 28.34 kg/m    Body mass index is 28.34 kg/m .  Physical Exam   GENERAL: alert and no distress  NECK: no adenopathy, no asymmetry, masses, or scars and thyroid normal to palpation  RESP: lungs clear to auscultation - no rales, rhonchi or wheezes  CV: regular rate and rhythm, normal S1 S2, no S3 or S4, no murmur, click or rub, no peripheral edema and peripheral pulses strong  ABDOMEN: soft, nontender, no hepatosplenomegaly, no masses and bowel sounds normal  MS: no gross musculoskeletal defects noted, no edema        Labs/imaging reviewed from ER visit              "

## 2023-10-12 NOTE — TELEPHONE ENCOUNTER
RECORDS RECEIVED FROM: Internal    REASON FOR VISIT: Syncope and collapse   Date of Appt: 10/13/23 1:45 pm    NOTES (FOR ALL VISITS) STATUS DETAILS   OFFICE NOTE from referring provider Internal ED Referral    OFFICE NOTE from other specialist Internal 10/10/23 Sarah Barriga MD @[facility]    4/26/22 Vaishali García MD @[facility]Neuro[facility]     DISCHARGE REPORT from the ER Internal 10/6/23 Roly Espinoza MD @[facility]     MEDICATION LIST Internal    IMAGING  (FOR ALL VISITS)     MRI (HEAD, NECK, SPINE) Internal [facility]  4/18/22 MRA Brain ([facility])      CT (HEAD, NECK, SPINE) Internal [facility]  10/6/23 CT Head

## 2023-10-13 ENCOUNTER — PRE VISIT (OUTPATIENT)
Dept: NEUROLOGY | Facility: CLINIC | Age: 73
End: 2023-10-13

## 2023-10-13 ENCOUNTER — OFFICE VISIT (OUTPATIENT)
Dept: NEUROLOGY | Facility: CLINIC | Age: 73
End: 2023-10-13
Attending: EMERGENCY MEDICINE
Payer: COMMERCIAL

## 2023-10-13 VITALS
DIASTOLIC BLOOD PRESSURE: 106 MMHG | HEART RATE: 107 BPM | SYSTOLIC BLOOD PRESSURE: 143 MMHG | RESPIRATION RATE: 16 BRPM | OXYGEN SATURATION: 93 %

## 2023-10-13 DIAGNOSIS — I63.9 CEREBROVASCULAR ACCIDENT (CVA), UNSPECIFIED MECHANISM (H): ICD-10-CM

## 2023-10-13 DIAGNOSIS — R56.9 SEIZURE (H): Primary | ICD-10-CM

## 2023-10-13 PROCEDURE — 99205 OFFICE O/P NEW HI 60 MIN: CPT | Performed by: INTERNAL MEDICINE

## 2023-10-13 RX ORDER — LEVETIRACETAM 500 MG/1
500 TABLET ORAL 2 TIMES DAILY
Qty: 60 TABLET | Refills: 6 | Status: SHIPPED | OUTPATIENT
Start: 2023-10-13 | End: 2024-04-12

## 2023-10-13 ASSESSMENT — PAIN SCALES - GENERAL: PAINLEVEL: MODERATE PAIN (5)

## 2023-10-13 NOTE — PROGRESS NOTES
[facility] Neurology Consultation    Jessica Ellison MRN# [id number]   Age: 72 year old YOB: 1950     Requesting physician: Sarah Gore     Reason for Consultation: loss of consciousness      History of Presenting Symptoms:   Jessica Ellison is a 72 year old female who presents today for evaluation of loss of consciousness.    Patient had an episode on 10/6/2023 as follows. She was in the store and she felt like she was hot and close to blacking out. She didn't make it outside and she lost consciousness. Her  supported her from falling and eased her down to the ground. Her eyes were open while she was being laid down. Her eyes were fluttering. Her  thinks she had some jerking. It is not clear if there was repetitive jerking. Her eyes were opening and closing and she wasn't responding. She bit her tongue. She didn't lose her urine. She wasn't talking for about 10 minutes. She seemed confused when she became a little more responsive. Someone called 911. She was brought to the ER. She remembers being awake in the ambulance. She was answering orientation questions appropriately about 20 minutes after the episode. She denies similar episodes in the past.     She has had episodes in the last few years where she has problems chewing and swallowing. She has no other neurological symptoms during these episodes.     She denies any history of childhood seizures, TBI, or meningitis/encephalitis.     She has history of three back surgeries and chronic back pain.     She has had issues with excessive alcohol use in the past, but not currently. She is currently having a glass of wine about 3 times a week.       Past Medical History:     Patient Active Problem List   Diagnosis    Insomnia    Back pain    CARDIOVASCULAR SCREENING; LDL GOAL LESS THAN 160    Anxiety    Advanced directives, counseling/discussion    Major depressive disorder, recurrent episode, moderate (H)    Cerebral  aneurysm, nonruptured    Bee allergy status    S/P lumbar spinal fusion    Chronic bilateral low back pain without sciatica    Essential hypertension with goal blood pressure less than 140/90    Hypothyroidism    Spell of change in speech    Centrilobular emphysema (H)    Hyperlipidemia    Hypoxia    Congenital musculoskeletal deformity of spine    Chronic pain    JOY (obstructive sleep apnea)    CKD (chronic kidney disease) stage 2, GFR 60-89 ml/min    F11.2 - Continuous opioid dependence (H)    Knee osteoarthritis    S/P total knee arthroplasty, left     Past Medical History:   Diagnosis Date    Anemia     Aneurysm (H24)     Antiplatelet or antithrombotic long-term use     Arthritis     Chemical dependency (H)     Chem Dep RX    Depression     History of blood transfusion     Hypertension     Menopausal symptoms     Other chronic pain     Lower back and hips    Sleep apnea     Sleep disorder     Thyroid disease     Tobacco abuse     Uncomplicated asthma         Past Surgical History:     Past Surgical History:   Procedure Laterality Date    ABDOMEN SURGERY      APPENDECTOMY  1960    APPENDECTOMY      ARTHROPLASTY KNEE Left 8/16/2023    Procedure: LEFT TOTAL KNEE ARTHROPLASTY;  Surgeon: Bryan Roque MD;  Location: [facility] Main OR    BACK SURGERY      BIOPSY BREAST      cerebral aneurysm  2014    coiled    CEREBRAL ANEURYSM REPAIR      COLON SURGERY      COLONOSCOPY  04/19/2005    FRACTURE SURGERY      HC EXCISION BREAST LESION, OPEN >=1      core biopsy 2006, lumpectomy 2006    HERNIA REPAIR      LAP VENTRAL HERNIA REPAIR  12/2017    [place]    LAPAROSCOPIC ASSISTED HYSTERECTOMY VAGINAL  12/2017    LUMPECTOMY BREAST      orif left femoral neck Left 06/03/2016    stress fracture.  done at [facility]    SURGICAL HISTORY OF -   2004    Skin Graft    ZZC PART REMOVAL COLON W ANASTOMOSIS  05/2005    diverticulitis    ZZC SPINE FUSION,ANTER,8+ SGMTS  2007    Anterior posterior fusion 10 levels, hardware removal  and re-fusion         Social History:     Social History     Tobacco Use    Smoking status: Former     Packs/day: 1.00     Years: 30.00     Additional pack years: 0.00     Total pack years: 30.00     Types: Cigarettes     Quit date: 2004     Years since quittin.7    Smokeless tobacco: Never   Vaping Use    Vaping Use: Never used   Substance Use Topics    Alcohol use: No     Comment: ETOH dependency, DUI 3/15/10; 1-2 ETOH per week    Drug use: Yes     Types: Oxycodone     Comment: percocet for the past several years        Family History:     Family History   Problem Relation Age of Onset    Cancer Mother         breast/lung    Alcohol/Drug Mother     Depression Mother     Cancer - colorectal Father     Alcohol/Drug Father     Diabetes Maternal Grandmother     Diabetes Maternal Grandfather     Diabetes Paternal Grandmother     Diabetes Paternal Grandfather     Cancer Paternal Grandfather     Gastrointestinal Disease Paternal Grandfather     Congenital Anomalies Son     Hypertension Brother     Adrenal Disorder Brother         had adrenalectomies    Cerebral palsy Granddaughter         Medications:     Current Outpatient Medications   Medication Sig    albuterol (PROAIR HFA/PROVENTIL HFA/VENTOLIN HFA) 108 (90 Base) MCG/ACT inhaler Inhale 2 puffs into the lungs every 6 hours as needed for shortness of breath or wheezing    amLODIPine (NORVASC) 5 MG tablet Take 1 tablet (5 mg) by mouth daily    atorvastatin (LIPITOR) 20 MG tablet Take 1 tablet (20 mg) by mouth daily    calcium carbonate 600 mg-vitamin D 400 units (CALTRATE) 600-400 MG-UNIT per tablet Take 1 tablet by mouth every evening    celecoxib (CELEBREX) 200 MG capsule Take 1 capsule (200 mg) by mouth daily    DULoxetine (CYMBALTA) 30 MG capsule Take 1 capsule (30 mg) by mouth 2 times daily    ferrous sulfate (FEROSUL) 325 (65 Fe) MG tablet Take 325 mg by mouth every other day Alternating days with Senna tab    levothyroxine (SYNTHROID/LEVOTHROID) 50  MCG tablet Take 1 tablet (50 mcg) by mouth daily    magnesium oxide (MAG-OX) 400 MG tablet Take 2 tablets by mouth for first dose and then begin 1 tablet twice daily (first dose of 1 tablet taken this evening) for 10 days (medication may cause diarrhea).    Melatonin 10 MG TABS tablet Take 20 mg by mouth At Bedtime    methocarbamol (ROBAXIN) 500 MG tablet Take 1 tablet (500 mg) by mouth 3 times daily    metoprolol succinate ER (TOPROL XL) 50 MG 24 hr tablet TAKE ONE TABLET BY MOUTH TWICE DAILY (Patient taking differently: Take 50 mg by mouth 2 times daily)    multivitamin, therapeutic (THERA-VIT) TABS tablet Take 1 tablet by mouth daily    naloxone (NARCAN) 4 MG/0.1ML nasal spray Spray 1 spray (4 mg) into one nostril alternating nostrils once as needed for opioid reversal every 2-3 minutes until assistance arrives    nystatin (MYCOSTATIN) 584823 UNIT/GM external powder Apply topically 3 times daily as needed (rash)    [START ON 10/18/2023] oxyCODONE-acetaminophen (PERCOCET)  MG per tablet Take 1 tablet by mouth every 6 hours as needed for severe pain Weaning.  Max 60 for the next 30 days    QUEtiapine (SEROQUEL) 50 MG tablet Take 2 tablets (100 mg) by mouth daily    senna (SENOKOT) 8.6 MG tablet Take 1 tablet by mouth every other day Alternating days with Iron tab    SPIRIVA HANDIHALER 18 MCG inhaled capsule USING THE HANDIHALER, INHALE THE CONTENTS OF ONE CAPSULE BY MOUTH DAILY (Patient taking differently: Inhale 18 mcg into the lungs daily USING THE HANDIHALER, INHALE THE CONTENTS OF ONE CAPSULE BY MOUTH DAILY)     No current facility-administered medications for this visit.        Allergies:     Allergies   Allergen Reactions    Bee Venom     Lisinopril Swelling     Oral swelling        Review of Systems:   As above     Physical Exam:   Vitals: BP (!) 143/106 (BP Location: Left arm, Patient Position: Sitting, Cuff Size: Adult Regular)   Pulse 107   Resp 16   SpO2 93%    General: Seated comfortably in no  acute distress.  HEENT: Optic discs not well visualized on non dilated exam.  Lungs: breathing comfortably on oxygen machine  Neurologic:     Mental Status: Fully alert, attentive. Normal memory and fund of knowledge. Language normal, speech clear and fluent, no paraphasic errors.      Cranial Nerves: Visual fields intact. PERRL. EOMI with normal smooth pursuit. Facial sensation intact/symmetric. Facial movements symmetric. Hearing not formally tested but intact to conversation. Palate elevation symmetric, uvula midline. No dysarthria. Shoulder shrug strong bilaterally. Tongue protrusion midline.     Motor: No tremors or other abnormal movements observed. Muscle tone normal throughout. Strength 5/5 throughout upper and lower extremities.     Deep Tendon Reflexes: 1+/symmetric throughout upper and lower extremities (left patella reflex not elicited due patient preference). No clonus.      Sensory: Intact/symmetric to light touch throughout upper and lower extremities. Negative Romberg.      Coordination: Finger-nose-finger and heel-shin intact without dysmetria.      Gait: Very antalgic casual gait causing instability issues and limping on right leg. Moderate issues with heel/toe gait and severe issues with tandem in the setting of significant pain.          Data: Pertinent prior to visit   Imaging:  CT head 10/2023  IMPRESSION:  1.  No acute intracranial hemorrhage, extra-axial collection, or midline shift.  2.  Chronic appearing right occipital infarct, new from 2019. Area of infarct does contain a few areas of scattered calcification.  3.  Mild generalized brain parenchymal volume loss.        MRI brain, MRA head 4/2019  Impression:  1. Stable postembolization changes without evidence of residual  aneurysm. No new aneurysm or significant stenosis of the major  intracranial arteries.  2. Mild generalized parenchymal volume loss and chronic small vessel  ischemic disease.    Procedures:  EKG sinus rhythm  (10/2023)    Laboratory:  CBC with mild anemia, CMP unremarkable (10/2023)  LDL 66 (3/2023)         Assessment and Plan:   Assessment:  Jessica Ellison is a 72 year old female who presents today for evaluation of loss of consciousness. The episode in question occurred on 10/6/2023 and is concerning for possible epileptic seizure. CT head showed chronic right occipital stroke, not seen on prior scans. Because of concern for new onset seizures in the setting of brain lesion, we discussed starting Keppra for seizure prevention. MRI head imaging ordered to confirm that lesion is a stroke and to evaluate for other additional lesions. Stroke work-up was also ordered as below. Patient is already on  mg per neurosurgery for history of coiled aneurysm. She is bothered by significant bruising on aspirin. We will discuss with neurosurgery if it would be ok to switch to Plavix.      Plan:  - Start Keppra 500 mg BID  - Lipid panel, A1c, PTT, INR  - MRA head/neck  - MRI brain  - BP goal <140/90 and <130/80 if tolerated  - LDL goal 40-70  - 30 day cardiac monitor  - 3 hour EEG    Follow up in Neurology clinic in 6 months or earlier as needed should new concerns arise.    Jet Greco MD   of Neurology  [facility]      The total time of this encounter today amounted to 60 minutes. This time included time spent with the patient, prep work, ordering tests, and performing post visit documentation.      Addendum 10/16/2023 - Discussed switching from aspirin to Plavix 75 mg daily. Will instruct patient to discontinue aspirin.     Jet Greco MD

## 2023-10-13 NOTE — NURSING NOTE
Chief Complaint   Patient presents with    New Patient     BP (!) 143/106 (BP Location: Left arm, Patient Position: Sitting, Cuff Size: Adult Regular)   Pulse 107   Resp 16   SpO2 93%     ANDREI YOLANDA

## 2023-10-13 NOTE — LETTER
10/13/2023       RE: Jessica Ellison  [address]  [place] [address]     Dear Colleague,    Thank you for referring your patient, Jessica Ellison, to the [facility] NEUROLOGY CLINIC [place] [facility]. Please see a copy of my visit note below.    [facility] Neurology Consultation    Jesisca Ellison MRN# [id number]   Age: 72 year old YOB: 1950     Requesting physician: Sarah Gore     Reason for Consultation: loss of consciousness      History of Presenting Symptoms:   Jessica Ellison is a 72 year old female who presents today for evaluation of loss of consciousness.    Patient had an episode on 10/6/2023 as follows. She was in the store and she felt like she was hot and close to blacking out. She didn't make it outside and she lost consciousness. Her  supported her from falling and eased her down to the ground. Her eyes were open while she was being laid down. Her eyes were fluttering. Her  thinks she had some jerking. It is not clear if there was repetitive jerking. Her eyes were opening and closing and she wasn't responding. She bit her tongue. She didn't lose her urine. She wasn't talking for about 10 minutes. She seemed confused when she became a little more responsive. Someone called 911. She was brought to the ER. She remembers being awake in the ambulance. She was answering orientation questions appropriately about 20 minutes after the episode. She denies similar episodes in the past.     She has had episodes in the last few years where she has problems chewing and swallowing. She has no other neurological symptoms during these episodes.     She denies any history of childhood seizures, TBI, or meningitis/encephalitis.     She has history of three back surgeries and chronic back pain.     She has had issues with excessive alcohol use in the past, but not currently. She is currently having a  glass of wine about 3 times a week.       Past Medical History:     Patient Active Problem List   Diagnosis    Insomnia    Back pain    CARDIOVASCULAR SCREENING; LDL GOAL LESS THAN 160    Anxiety    Advanced directives, counseling/discussion    Major depressive disorder, recurrent episode, moderate (H)    Cerebral aneurysm, nonruptured    Bee allergy status    S/P lumbar spinal fusion    Chronic bilateral low back pain without sciatica    Essential hypertension with goal blood pressure less than 140/90    Hypothyroidism    Spell of change in speech    Centrilobular emphysema (H)    Hyperlipidemia    Hypoxia    Congenital musculoskeletal deformity of spine    Chronic pain    JOY (obstructive sleep apnea)    CKD (chronic kidney disease) stage 2, GFR 60-89 ml/min    F11.2 - Continuous opioid dependence (H)    Knee osteoarthritis    S/P total knee arthroplasty, left     Past Medical History:   Diagnosis Date    Anemia     Aneurysm (H24)     Antiplatelet or antithrombotic long-term use     Arthritis     Chemical dependency (H)     Chem Dep RX    Depression     History of blood transfusion     Hypertension     Menopausal symptoms     Other chronic pain     Lower back and hips    Sleep apnea     Sleep disorder     Thyroid disease     Tobacco abuse     Uncomplicated asthma         Past Surgical History:     Past Surgical History:   Procedure Laterality Date    ABDOMEN SURGERY      APPENDECTOMY  1960    APPENDECTOMY      ARTHROPLASTY KNEE Left 8/16/2023    Procedure: LEFT TOTAL KNEE ARTHROPLASTY;  Surgeon: Bryan Roque MD;  Location: [facility] Main OR    BACK SURGERY      BIOPSY BREAST      cerebral aneurysm  2014    coiled    CEREBRAL ANEURYSM REPAIR      COLON SURGERY      COLONOSCOPY  04/19/2005    FRACTURE SURGERY      HC EXCISION BREAST LESION, OPEN >=1      core biopsy 2006, lumpectomy 2006    HERNIA REPAIR      LAP VENTRAL HERNIA REPAIR  12/2017    [place]    LAPAROSCOPIC ASSISTED HYSTERECTOMY VAGINAL  12/2017     LUMPECTOMY BREAST      orif left femoral neck Left 2016    stress fracture.  done at [facility]    SURGICAL HISTORY OF -       Skin Graft    ZZC PART REMOVAL COLON W ANASTOMOSIS  2005    diverticulitis    ZZC SPINE FUSION,ANTER,8+ SGMTS      Anterior posterior fusion 10 levels, hardware removal and re-fusion         Social History:     Social History     Tobacco Use    Smoking status: Former     Packs/day: 1.00     Years: 30.00     Additional pack years: 0.00     Total pack years: 30.00     Types: Cigarettes     Quit date: 2004     Years since quittin.7    Smokeless tobacco: Never   Vaping Use    Vaping Use: Never used   Substance Use Topics    Alcohol use: No     Comment: ETOH dependency, DUI 3/15/10; 1-2 ETOH per week    Drug use: Yes     Types: Oxycodone     Comment: percocet for the past several years        Family History:     Family History   Problem Relation Age of Onset    Cancer Mother         breast/lung    Alcohol/Drug Mother     Depression Mother     Cancer - colorectal Father     Alcohol/Drug Father     Diabetes Maternal Grandmother     Diabetes Maternal Grandfather     Diabetes Paternal Grandmother     Diabetes Paternal Grandfather     Cancer Paternal Grandfather     Gastrointestinal Disease Paternal Grandfather     Congenital Anomalies Son     Hypertension Brother     Adrenal Disorder Brother         had adrenalectomies    Cerebral palsy Granddaughter         Medications:     Current Outpatient Medications   Medication Sig    albuterol (PROAIR HFA/PROVENTIL HFA/VENTOLIN HFA) 108 (90 Base) MCG/ACT inhaler Inhale 2 puffs into the lungs every 6 hours as needed for shortness of breath or wheezing    amLODIPine (NORVASC) 5 MG tablet Take 1 tablet (5 mg) by mouth daily    atorvastatin (LIPITOR) 20 MG tablet Take 1 tablet (20 mg) by mouth daily    calcium carbonate 600 mg-vitamin D 400 units (CALTRATE) 600-400 MG-UNIT per tablet Take 1 tablet by mouth every evening     celecoxib (CELEBREX) 200 MG capsule Take 1 capsule (200 mg) by mouth daily    DULoxetine (CYMBALTA) 30 MG capsule Take 1 capsule (30 mg) by mouth 2 times daily    ferrous sulfate (FEROSUL) 325 (65 Fe) MG tablet Take 325 mg by mouth every other day Alternating days with Senna tab    levothyroxine (SYNTHROID/LEVOTHROID) 50 MCG tablet Take 1 tablet (50 mcg) by mouth daily    magnesium oxide (MAG-OX) 400 MG tablet Take 2 tablets by mouth for first dose and then begin 1 tablet twice daily (first dose of 1 tablet taken this evening) for 10 days (medication may cause diarrhea).    Melatonin 10 MG TABS tablet Take 20 mg by mouth At Bedtime    methocarbamol (ROBAXIN) 500 MG tablet Take 1 tablet (500 mg) by mouth 3 times daily    metoprolol succinate ER (TOPROL XL) 50 MG 24 hr tablet TAKE ONE TABLET BY MOUTH TWICE DAILY (Patient taking differently: Take 50 mg by mouth 2 times daily)    multivitamin, therapeutic (THERA-VIT) TABS tablet Take 1 tablet by mouth daily    naloxone (NARCAN) 4 MG/0.1ML nasal spray Spray 1 spray (4 mg) into one nostril alternating nostrils once as needed for opioid reversal every 2-3 minutes until assistance arrives    nystatin (MYCOSTATIN) 903544 UNIT/GM external powder Apply topically 3 times daily as needed (rash)    [START ON 10/18/2023] oxyCODONE-acetaminophen (PERCOCET)  MG per tablet Take 1 tablet by mouth every 6 hours as needed for severe pain Weaning.  Max 60 for the next 30 days    QUEtiapine (SEROQUEL) 50 MG tablet Take 2 tablets (100 mg) by mouth daily    senna (SENOKOT) 8.6 MG tablet Take 1 tablet by mouth every other day Alternating days with Iron tab    SPIRIVA HANDIHALER 18 MCG inhaled capsule USING THE HANDIHALER, INHALE THE CONTENTS OF ONE CAPSULE BY MOUTH DAILY (Patient taking differently: Inhale 18 mcg into the lungs daily USING THE HANDIHALER, INHALE THE CONTENTS OF ONE CAPSULE BY MOUTH DAILY)     No current facility-administered medications for this visit.         Allergies:     Allergies   Allergen Reactions    Bee Venom     Lisinopril Swelling     Oral swelling        Review of Systems:   As above     Physical Exam:   Vitals: BP (!) 143/106 (BP Location: Left arm, Patient Position: Sitting, Cuff Size: Adult Regular)   Pulse 107   Resp 16   SpO2 93%    General: Seated comfortably in no acute distress.  HEENT: Optic discs not well visualized on non dilated exam.  Lungs: breathing comfortably on oxygen machine  Neurologic:     Mental Status: Fully alert, attentive. Normal memory and fund of knowledge. Language normal, speech clear and fluent, no paraphasic errors.      Cranial Nerves: Visual fields intact. PERRL. EOMI with normal smooth pursuit. Facial sensation intact/symmetric. Facial movements symmetric. Hearing not formally tested but intact to conversation. Palate elevation symmetric, uvula midline. No dysarthria. Shoulder shrug strong bilaterally. Tongue protrusion midline.     Motor: No tremors or other abnormal movements observed. Muscle tone normal throughout. Strength 5/5 throughout upper and lower extremities.     Deep Tendon Reflexes: 1+/symmetric throughout upper and lower extremities (left patella reflex not elicited due patient preference). No clonus.      Sensory: Intact/symmetric to light touch throughout upper and lower extremities. Negative Romberg.      Coordination: Finger-nose-finger and heel-shin intact without dysmetria.      Gait: Very antalgic casual gait causing instability issues and limping on right leg. Moderate issues with heel/toe gait and severe issues with tandem in the setting of significant pain.          Data: Pertinent prior to visit   Imaging:  CT head 10/2023  IMPRESSION:  1.  No acute intracranial hemorrhage, extra-axial collection, or midline shift.  2.  Chronic appearing right occipital infarct, new from 2019. Area of infarct does contain a few areas of scattered calcification.  3.  Mild generalized brain parenchymal volume  loss.        MRI brain, MRA head 4/2019  Impression:  1. Stable postembolization changes without evidence of residual  aneurysm. No new aneurysm or significant stenosis of the major  intracranial arteries.  2. Mild generalized parenchymal volume loss and chronic small vessel  ischemic disease.    Procedures:  EKG sinus rhythm (10/2023)    Laboratory:  CBC with mild anemia, CMP unremarkable (10/2023)  LDL 66 (3/2023)         Assessment and Plan:   Assessment:  Jessica Ellison is a 72 year old female who presents today for evaluation of loss of consciousness. The episode in question occurred on 10/6/2023 and is concerning for possible epileptic seizure. CT head showed chronic right occipital stroke, not seen on prior scans. Because of concern for new onset seizures in the setting of brain lesion, we discussed starting Keppra for seizure prevention. MRI head imaging ordered to confirm that lesion is a stroke and to evaluate for other additional lesions. Stroke work-up was also ordered as below. Patient is already on  mg per neurosurgery for history of coiled aneurysm. She is bothered by significant bruising on aspirin. We will discuss with neurosurgery if it would be ok to switch to Plavix.      Plan:  - Start Keppra 500 mg BID  - Lipid panel, A1c, PTT, INR  - MRA head/neck  - MRI brain  - BP goal <140/90 and <130/80 if tolerated  - LDL goal 40-70  - 30 day cardiac monitor  - 3 hour EEG    Follow up in Neurology clinic in 6 months or earlier as needed should new concerns arise.      The total time of this encounter today amounted to 60 minutes. This time included time spent with the patient, prep work, ordering tests, and performing post visit documentation.        Again, thank you for allowing me to participate in the care of your patient.      Sincerely,    Jet Greco MD

## 2023-10-16 RX ORDER — CLOPIDOGREL BISULFATE 75 MG/1
75 TABLET ORAL DAILY
Qty: 90 TABLET | Refills: 3 | Status: SHIPPED | OUTPATIENT
Start: 2023-10-16 | End: 2024-01-12

## 2023-10-17 ENCOUNTER — LAB (OUTPATIENT)
Dept: LAB | Facility: CLINIC | Age: 73
End: 2023-10-17
Payer: COMMERCIAL

## 2023-10-17 DIAGNOSIS — I63.9 CEREBROVASCULAR ACCIDENT (CVA), UNSPECIFIED MECHANISM (H): ICD-10-CM

## 2023-10-17 DIAGNOSIS — D72.819 LEUKOPENIA, UNSPECIFIED TYPE: ICD-10-CM

## 2023-10-17 DIAGNOSIS — E83.42 HYPOMAGNESEMIA: ICD-10-CM

## 2023-10-17 DIAGNOSIS — I10 ESSENTIAL HYPERTENSION WITH GOAL BLOOD PRESSURE LESS THAN 140/90: ICD-10-CM

## 2023-10-17 LAB
ALBUMIN SERPL BCG-MCNC: 4 G/DL (ref 3.5–5.2)
ALP SERPL-CCNC: 100 U/L (ref 35–104)
ALT SERPL W P-5'-P-CCNC: 16 U/L (ref 0–50)
ANION GAP SERPL CALCULATED.3IONS-SCNC: 15 MMOL/L (ref 7–15)
APTT PPP: 28 SECONDS (ref 22–38)
AST SERPL W P-5'-P-CCNC: 31 U/L (ref 0–45)
BASO+EOS+MONOS # BLD AUTO: ABNORMAL 10*3/UL
BASO+EOS+MONOS NFR BLD AUTO: ABNORMAL %
BASOPHILS # BLD AUTO: 0 10E3/UL (ref 0–0.2)
BASOPHILS NFR BLD AUTO: 1 %
BILIRUB SERPL-MCNC: 0.4 MG/DL
BUN SERPL-MCNC: 15.5 MG/DL (ref 8–23)
CALCIUM SERPL-MCNC: 9.7 MG/DL (ref 8.8–10.2)
CHLORIDE SERPL-SCNC: 99 MMOL/L (ref 98–107)
CHOLEST SERPL-MCNC: 174 MG/DL
CREAT SERPL-MCNC: 1.09 MG/DL (ref 0.51–0.95)
DEPRECATED HCO3 PLAS-SCNC: 25 MMOL/L (ref 22–29)
EGFRCR SERPLBLD CKD-EPI 2021: 54 ML/MIN/1.73M2
EOSINOPHIL # BLD AUTO: 0.3 10E3/UL (ref 0–0.7)
EOSINOPHIL NFR BLD AUTO: 7 %
ERYTHROCYTE [DISTWIDTH] IN BLOOD BY AUTOMATED COUNT: 13.8 % (ref 10–15)
GLUCOSE SERPL-MCNC: 128 MG/DL (ref 70–99)
HBA1C MFR BLD: 5.4 % (ref 0–5.6)
HCT VFR BLD AUTO: 37.2 % (ref 35–47)
HDLC SERPL-MCNC: 70 MG/DL
HGB BLD-MCNC: 11.5 G/DL (ref 11.7–15.7)
IMM GRANULOCYTES # BLD: 0 10E3/UL
IMM GRANULOCYTES NFR BLD: 0 %
INR PPP: 0.94 (ref 0.85–1.15)
LDLC SERPL CALC-MCNC: 81 MG/DL
LYMPHOCYTES # BLD AUTO: 0.6 10E3/UL (ref 0.8–5.3)
LYMPHOCYTES NFR BLD AUTO: 15 %
MAGNESIUM SERPL-MCNC: 1.8 MG/DL (ref 1.7–2.3)
MCH RBC QN AUTO: 32.1 PG (ref 26.5–33)
MCHC RBC AUTO-ENTMCNC: 30.9 G/DL (ref 31.5–36.5)
MCV RBC AUTO: 104 FL (ref 78–100)
MONOCYTES # BLD AUTO: 0.7 10E3/UL (ref 0–1.3)
MONOCYTES NFR BLD AUTO: 16 %
NEUTROPHILS # BLD AUTO: 2.6 10E3/UL (ref 1.6–8.3)
NEUTROPHILS NFR BLD AUTO: 62 %
NONHDLC SERPL-MCNC: 104 MG/DL
PLATELET # BLD AUTO: 197 10E3/UL (ref 150–450)
POTASSIUM SERPL-SCNC: 4.9 MMOL/L (ref 3.4–5.3)
PROT SERPL-MCNC: 6.9 G/DL (ref 6.4–8.3)
RBC # BLD AUTO: 3.58 10E6/UL (ref 3.8–5.2)
SODIUM SERPL-SCNC: 139 MMOL/L (ref 135–145)
TRIGL SERPL-MCNC: 115 MG/DL
WBC # BLD AUTO: 4.2 10E3/UL (ref 4–11)

## 2023-10-17 PROCEDURE — 83735 ASSAY OF MAGNESIUM: CPT

## 2023-10-17 PROCEDURE — 80053 COMPREHEN METABOLIC PANEL: CPT

## 2023-10-17 PROCEDURE — 83036 HEMOGLOBIN GLYCOSYLATED A1C: CPT | Mod: GZ

## 2023-10-17 PROCEDURE — 85730 THROMBOPLASTIN TIME PARTIAL: CPT

## 2023-10-17 PROCEDURE — 80061 LIPID PANEL: CPT

## 2023-10-17 PROCEDURE — 36415 COLL VENOUS BLD VENIPUNCTURE: CPT

## 2023-10-17 PROCEDURE — 85025 COMPLETE CBC W/AUTO DIFF WBC: CPT

## 2023-10-17 PROCEDURE — 85610 PROTHROMBIN TIME: CPT | Mod: GZ

## 2023-10-18 DIAGNOSIS — R94.4 DECREASED GFR: Primary | ICD-10-CM

## 2023-10-19 ENCOUNTER — TELEPHONE (OUTPATIENT)
Dept: FAMILY MEDICINE | Facility: CLINIC | Age: 73
End: 2023-10-19

## 2023-10-19 ENCOUNTER — HOSPITAL ENCOUNTER (OUTPATIENT)
Dept: CARDIOLOGY | Facility: CLINIC | Age: 73
Discharge: HOME | End: 2023-10-19
Attending: EMERGENCY MEDICINE | Admitting: EMERGENCY MEDICINE
Payer: COMMERCIAL

## 2023-10-19 DIAGNOSIS — E78.5 HYPERLIPIDEMIA LDL GOAL <70: Primary | ICD-10-CM

## 2023-10-19 DIAGNOSIS — Z86.73 CEREBRAL INFARCTION, CHRONIC: ICD-10-CM

## 2023-10-19 LAB
BI-PLANE LVEF ECHO: NORMAL
LVEF ECHO: NORMAL

## 2023-10-19 PROCEDURE — 93306 TTE W/DOPPLER COMPLETE: CPT | Mod: 26 | Performed by: INTERNAL MEDICINE

## 2023-10-19 PROCEDURE — 999N000208 ECHOCARDIOGRAM COMPLETE

## 2023-10-19 RX ORDER — ATORVASTATIN CALCIUM 20 MG/1
30 TABLET, FILM COATED ORAL DAILY
Qty: 135 TABLET | Refills: 0 | Status: CANCELLED | OUTPATIENT
Start: 2023-10-19

## 2023-10-19 RX ORDER — ATORVASTATIN CALCIUM 40 MG/1
40 TABLET, FILM COATED ORAL DAILY
Qty: 90 TABLET | Refills: 3 | Status: SHIPPED | OUTPATIENT
Start: 2023-10-19 | End: 2024-08-12

## 2023-10-19 NOTE — TELEPHONE ENCOUNTER
Hi Dr. Barriga,     Copied from result note from Neurology:      Bam Lopez,     Your bad cholesterol (LDL) was 81. In individuals with a history of a stroke, we recommend this number to be less than 70. I would recommend discussing with your primary care provider slightly increasing your Lipitor dosage.     The rest of the labs tests came out ok.     Regards,  Jet Greco MD    Patient informed of this result.30 and 40 mg Atorvastatin pended    Michelle Avendaño RN on 10/19/2023 at 3:08 PM

## 2023-10-19 NOTE — TELEPHONE ENCOUNTER
Atorvastatin/Lipitor 40 mg sent to pharmacy.    LDL goal updated in problem list.    Sarah Barriga M.D.

## 2023-10-20 ENCOUNTER — TELEPHONE (OUTPATIENT)
Dept: FAMILY MEDICINE | Facility: CLINIC | Age: 73
End: 2023-10-20
Payer: COMMERCIAL

## 2023-10-20 NOTE — TELEPHONE ENCOUNTER
Patient Contact    Attempt # 1    Was call answered?  No.  Left message with spouse to have Jessica call back    My chart message sent    Michelle MATTSON

## 2023-10-20 NOTE — TELEPHONE ENCOUNTER
Patient returned call and informed of increase Atorvastatin dose.     Michelle Avendaño RN on 10/20/2023 at 2:35 PM

## 2023-10-26 ENCOUNTER — HOSPITAL ENCOUNTER (OUTPATIENT)
Dept: CT IMAGING | Facility: CLINIC | Age: 73
Discharge: HOME | End: 2023-10-26
Attending: FAMILY MEDICINE | Admitting: FAMILY MEDICINE
Payer: COMMERCIAL

## 2023-10-26 DIAGNOSIS — R55 SYNCOPE AND COLLAPSE: ICD-10-CM

## 2023-10-26 DIAGNOSIS — Z86.73 CEREBRAL INFARCTION, CHRONIC: ICD-10-CM

## 2023-10-26 PROCEDURE — 70496 CT ANGIOGRAPHY HEAD: CPT

## 2023-10-26 PROCEDURE — 70498 CT ANGIOGRAPHY NECK: CPT

## 2023-10-26 PROCEDURE — 250N000009 HC RX 250: Performed by: FAMILY MEDICINE

## 2023-10-26 PROCEDURE — 250N000011 HC RX IP 250 OP 636: Performed by: FAMILY MEDICINE

## 2023-10-26 RX ORDER — IOPAMIDOL 755 MG/ML
67 INJECTION, SOLUTION INTRAVASCULAR ONCE
Status: COMPLETED | OUTPATIENT
Start: 2023-10-26 | End: 2023-10-26

## 2023-10-26 RX ADMIN — SODIUM CHLORIDE 100 ML: 9 INJECTION, SOLUTION INTRAVENOUS at 14:18

## 2023-10-26 RX ADMIN — IOPAMIDOL 67 ML: 755 INJECTION, SOLUTION INTRAVENOUS at 14:18

## 2023-11-07 ENCOUNTER — APPOINTMENT (OUTPATIENT)
Dept: CT IMAGING | Facility: CLINIC | Age: 73
DRG: 191 | End: 2023-11-07
Attending: EMERGENCY MEDICINE
Payer: COMMERCIAL

## 2023-11-07 ENCOUNTER — HOSPITAL ENCOUNTER (INPATIENT)
Facility: CLINIC | Age: 73
LOS: 1 days | Discharge: HOME | DRG: 191 | End: 2023-11-09
Attending: EMERGENCY MEDICINE | Admitting: STUDENT IN AN ORGANIZED HEALTH CARE EDUCATION/TRAINING PROGRAM
Payer: COMMERCIAL

## 2023-11-07 DIAGNOSIS — R06.02 SOB (SHORTNESS OF BREATH): ICD-10-CM

## 2023-11-07 DIAGNOSIS — F10.129 ALCOHOL ABUSE WITH INTOXICATION (H): ICD-10-CM

## 2023-11-07 DIAGNOSIS — Z87.891 PERSONAL HISTORY OF TOBACCO USE, PRESENTING HAZARDS TO HEALTH: Primary | ICD-10-CM

## 2023-11-07 DIAGNOSIS — J44.1 CHRONIC OBSTRUCTIVE PULMONARY DISEASE WITH ACUTE EXACERBATION (H): ICD-10-CM

## 2023-11-07 DIAGNOSIS — I10 ESSENTIAL HYPERTENSION WITH GOAL BLOOD PRESSURE LESS THAN 140/90: ICD-10-CM

## 2023-11-07 DIAGNOSIS — G47.33 OSA (OBSTRUCTIVE SLEEP APNEA): ICD-10-CM

## 2023-11-07 DIAGNOSIS — J43.2 CENTRILOBULAR EMPHYSEMA (H): ICD-10-CM

## 2023-11-07 DIAGNOSIS — R09.02 HYPOXIA: ICD-10-CM

## 2023-11-07 DIAGNOSIS — G89.4 CHRONIC PAIN SYNDROME: ICD-10-CM

## 2023-11-07 DIAGNOSIS — R00.0 SINUS TACHYCARDIA: ICD-10-CM

## 2023-11-07 LAB
ALBUMIN SERPL BCG-MCNC: 3.9 G/DL (ref 3.5–5.2)
ALP SERPL-CCNC: 109 U/L (ref 35–104)
ALT SERPL W P-5'-P-CCNC: 25 U/L (ref 0–50)
ANION GAP SERPL CALCULATED.3IONS-SCNC: 15 MMOL/L (ref 7–15)
AST SERPL W P-5'-P-CCNC: 42 U/L (ref 0–45)
BASE EXCESS BLDV CALC-SCNC: -1.2 MMOL/L (ref -7.7–1.9)
BASOPHILS # BLD AUTO: 0.1 10E3/UL (ref 0–0.2)
BASOPHILS NFR BLD AUTO: 2 %
BILIRUB DIRECT SERPL-MCNC: <0.2 MG/DL (ref 0–0.3)
BILIRUB SERPL-MCNC: 0.4 MG/DL
BUN SERPL-MCNC: 12.6 MG/DL (ref 8–23)
CALCIUM SERPL-MCNC: 9 MG/DL (ref 8.8–10.2)
CHLORIDE SERPL-SCNC: 107 MMOL/L (ref 98–107)
CREAT SERPL-MCNC: 0.73 MG/DL (ref 0.51–0.95)
DEPRECATED HCO3 PLAS-SCNC: 24 MMOL/L (ref 22–29)
EGFRCR SERPLBLD CKD-EPI 2021: 87 ML/MIN/1.73M2
EOSINOPHIL # BLD AUTO: 0.1 10E3/UL (ref 0–0.7)
EOSINOPHIL NFR BLD AUTO: 2 %
ERYTHROCYTE [DISTWIDTH] IN BLOOD BY AUTOMATED COUNT: 15.5 % (ref 10–15)
ETHANOL SERPL-MCNC: 0.2 G/DL
FLUAV RNA SPEC QL NAA+PROBE: NEGATIVE
FLUBV RNA RESP QL NAA+PROBE: NEGATIVE
GLUCOSE BLDC GLUCOMTR-MCNC: 97 MG/DL (ref 70–99)
GLUCOSE SERPL-MCNC: 101 MG/DL (ref 70–99)
HCO3 BLDV-SCNC: 24 MMOL/L (ref 21–28)
HCT VFR BLD AUTO: 34.9 % (ref 35–47)
HGB BLD-MCNC: 11.2 G/DL (ref 11.7–15.7)
IMM GRANULOCYTES # BLD: 0 10E3/UL
IMM GRANULOCYTES NFR BLD: 1 %
LYMPHOCYTES # BLD AUTO: 0.9 10E3/UL (ref 0.8–5.3)
LYMPHOCYTES NFR BLD AUTO: 26 %
MAGNESIUM SERPL-MCNC: 1.4 MG/DL (ref 1.7–2.3)
MCH RBC QN AUTO: 32.7 PG (ref 26.5–33)
MCHC RBC AUTO-ENTMCNC: 32.1 G/DL (ref 31.5–36.5)
MCV RBC AUTO: 102 FL (ref 78–100)
MONOCYTES # BLD AUTO: 0.5 10E3/UL (ref 0–1.3)
MONOCYTES NFR BLD AUTO: 14 %
NEUTROPHILS # BLD AUTO: 1.9 10E3/UL (ref 1.6–8.3)
NEUTROPHILS NFR BLD AUTO: 55 %
NRBC # BLD AUTO: 0 10E3/UL
NRBC BLD AUTO-RTO: 0 /100
NT-PROBNP SERPL-MCNC: 143 PG/ML (ref 0–900)
NT-PROBNP SERPL-MCNC: 152 PG/ML (ref 0–900)
O2/TOTAL GAS SETTING VFR VENT: 21 %
PCO2 BLDV: 42 MM HG (ref 40–50)
PH BLDV: 7.37 [PH] (ref 7.32–7.43)
PHOSPHATE SERPL-MCNC: 3.1 MG/DL (ref 2.5–4.5)
PLATELET # BLD AUTO: 211 10E3/UL (ref 150–450)
PO2 BLDV: 32 MM HG (ref 25–47)
POTASSIUM SERPL-SCNC: 3.7 MMOL/L (ref 3.4–5.3)
PROT SERPL-MCNC: 6.4 G/DL (ref 6.4–8.3)
RBC # BLD AUTO: 3.42 10E6/UL (ref 3.8–5.2)
RSV RNA SPEC NAA+PROBE: NEGATIVE
SARS-COV-2 RNA RESP QL NAA+PROBE: NEGATIVE
SODIUM SERPL-SCNC: 146 MMOL/L (ref 135–145)
TROPONIN T SERPL HS-MCNC: 14 NG/L
TROPONIN T SERPL HS-MCNC: 17 NG/L
TSH SERPL DL<=0.005 MIU/L-ACNC: 1.05 UIU/ML (ref 0.3–4.2)
WBC # BLD AUTO: 3.4 10E3/UL (ref 4–11)

## 2023-11-07 PROCEDURE — 99223 1ST HOSP IP/OBS HIGH 75: CPT | Mod: AI

## 2023-11-07 PROCEDURE — 258N000003 HC RX IP 258 OP 636: Performed by: EMERGENCY MEDICINE

## 2023-11-07 PROCEDURE — 85025 COMPLETE CBC W/AUTO DIFF WBC: CPT | Performed by: EMERGENCY MEDICINE

## 2023-11-07 PROCEDURE — G0378 HOSPITAL OBSERVATION PER HR: HCPCS

## 2023-11-07 PROCEDURE — 36415 COLL VENOUS BLD VENIPUNCTURE: CPT | Performed by: EMERGENCY MEDICINE

## 2023-11-07 PROCEDURE — 93010 ELECTROCARDIOGRAM REPORT: CPT | Performed by: EMERGENCY MEDICINE

## 2023-11-07 PROCEDURE — 87637 SARSCOV2&INF A&B&RSV AMP PRB: CPT | Performed by: EMERGENCY MEDICINE

## 2023-11-07 PROCEDURE — 83880 ASSAY OF NATRIURETIC PEPTIDE: CPT | Performed by: EMERGENCY MEDICINE

## 2023-11-07 PROCEDURE — 250N000011 HC RX IP 250 OP 636

## 2023-11-07 PROCEDURE — 94660 CPAP INITIATION&MGMT: CPT

## 2023-11-07 PROCEDURE — 999N000157 HC STATISTIC RCP TIME EA 10 MIN

## 2023-11-07 PROCEDURE — 71275 CT ANGIOGRAPHY CHEST: CPT

## 2023-11-07 PROCEDURE — 83735 ASSAY OF MAGNESIUM: CPT

## 2023-11-07 PROCEDURE — 96372 THER/PROPH/DIAG INJ SC/IM: CPT

## 2023-11-07 PROCEDURE — 99285 EMERGENCY DEPT VISIT HI MDM: CPT | Mod: 25 | Performed by: EMERGENCY MEDICINE

## 2023-11-07 PROCEDURE — 84100 ASSAY OF PHOSPHORUS: CPT

## 2023-11-07 PROCEDURE — 99207 PR NO BILLABLE SERVICE THIS VISIT: CPT | Performed by: STUDENT IN AN ORGANIZED HEALTH CARE EDUCATION/TRAINING PROGRAM

## 2023-11-07 PROCEDURE — 96361 HYDRATE IV INFUSION ADD-ON: CPT | Performed by: EMERGENCY MEDICINE

## 2023-11-07 PROCEDURE — 82077 ASSAY SPEC XCP UR&BREATH IA: CPT | Performed by: EMERGENCY MEDICINE

## 2023-11-07 PROCEDURE — 250N000011 HC RX IP 250 OP 636: Performed by: EMERGENCY MEDICINE

## 2023-11-07 PROCEDURE — 96374 THER/PROPH/DIAG INJ IV PUSH: CPT | Mod: 59 | Performed by: EMERGENCY MEDICINE

## 2023-11-07 PROCEDURE — 94640 AIRWAY INHALATION TREATMENT: CPT

## 2023-11-07 PROCEDURE — 82803 BLOOD GASES ANY COMBINATION: CPT | Performed by: EMERGENCY MEDICINE

## 2023-11-07 PROCEDURE — 82962 GLUCOSE BLOOD TEST: CPT

## 2023-11-07 PROCEDURE — 250N000013 HC RX MED GY IP 250 OP 250 PS 637: Performed by: FAMILY MEDICINE

## 2023-11-07 PROCEDURE — 84484 ASSAY OF TROPONIN QUANT: CPT | Performed by: EMERGENCY MEDICINE

## 2023-11-07 PROCEDURE — 250N000013 HC RX MED GY IP 250 OP 250 PS 637: Performed by: EMERGENCY MEDICINE

## 2023-11-07 PROCEDURE — 250N000009 HC RX 250: Performed by: EMERGENCY MEDICINE

## 2023-11-07 PROCEDURE — 250N000009 HC RX 250

## 2023-11-07 PROCEDURE — 80053 COMPREHEN METABOLIC PANEL: CPT | Performed by: EMERGENCY MEDICINE

## 2023-11-07 PROCEDURE — 82248 BILIRUBIN DIRECT: CPT

## 2023-11-07 PROCEDURE — 250N000012 HC RX MED GY IP 250 OP 636 PS 637

## 2023-11-07 PROCEDURE — 999N000215 HC STATISTIC HFNC ADULT NON-CPAP

## 2023-11-07 PROCEDURE — 250N000013 HC RX MED GY IP 250 OP 250 PS 637

## 2023-11-07 PROCEDURE — 83880 ASSAY OF NATRIURETIC PEPTIDE: CPT

## 2023-11-07 PROCEDURE — 93005 ELECTROCARDIOGRAM TRACING: CPT | Performed by: EMERGENCY MEDICINE

## 2023-11-07 PROCEDURE — 84443 ASSAY THYROID STIM HORMONE: CPT | Performed by: EMERGENCY MEDICINE

## 2023-11-07 RX ORDER — IOPAMIDOL 755 MG/ML
70 INJECTION, SOLUTION INTRAVASCULAR ONCE
Status: COMPLETED | OUTPATIENT
Start: 2023-11-07 | End: 2023-11-07

## 2023-11-07 RX ORDER — ASPIRIN 325 MG
325 TABLET ORAL DAILY
Status: DISCONTINUED | OUTPATIENT
Start: 2023-11-07 | End: 2023-11-07

## 2023-11-07 RX ORDER — HYDROCODONE BITARTRATE AND ACETAMINOPHEN 5; 325 MG/1; MG/1
1 TABLET ORAL EVERY 8 HOURS PRN
Status: DISCONTINUED | OUTPATIENT
Start: 2023-11-07 | End: 2023-11-07

## 2023-11-07 RX ORDER — SODIUM CHLORIDE 9 MG/ML
1000 INJECTION, SOLUTION INTRAVENOUS CONTINUOUS
Status: DISCONTINUED | OUTPATIENT
Start: 2023-11-07 | End: 2023-11-07 | Stop reason: ALTCHOICE

## 2023-11-07 RX ORDER — PREDNISONE 20 MG/1
40 TABLET ORAL DAILY
Status: DISCONTINUED | OUTPATIENT
Start: 2023-11-07 | End: 2023-11-09

## 2023-11-07 RX ORDER — METOPROLOL SUCCINATE 50 MG/1
50 TABLET, EXTENDED RELEASE ORAL 2 TIMES DAILY
Status: DISCONTINUED | OUTPATIENT
Start: 2023-11-07 | End: 2023-11-09 | Stop reason: HOSPADM

## 2023-11-07 RX ORDER — NALOXONE HYDROCHLORIDE 0.4 MG/ML
0.2 INJECTION, SOLUTION INTRAMUSCULAR; INTRAVENOUS; SUBCUTANEOUS
Status: DISCONTINUED | OUTPATIENT
Start: 2023-11-07 | End: 2023-11-09 | Stop reason: HOSPADM

## 2023-11-07 RX ORDER — ONDANSETRON 4 MG/1
4 TABLET, ORALLY DISINTEGRATING ORAL EVERY 6 HOURS PRN
Status: DISCONTINUED | OUTPATIENT
Start: 2023-11-07 | End: 2023-11-09 | Stop reason: HOSPADM

## 2023-11-07 RX ORDER — OXYCODONE AND ACETAMINOPHEN 10; 325 MG/1; MG/1
1 TABLET ORAL EVERY 6 HOURS PRN
Status: DISCONTINUED | OUTPATIENT
Start: 2023-11-07 | End: 2023-11-07

## 2023-11-07 RX ORDER — AMLODIPINE BESYLATE 5 MG/1
5 TABLET ORAL DAILY
Status: DISCONTINUED | OUTPATIENT
Start: 2023-11-08 | End: 2023-11-07

## 2023-11-07 RX ORDER — ENOXAPARIN SODIUM 100 MG/ML
40 INJECTION SUBCUTANEOUS EVERY 24 HOURS
Status: DISCONTINUED | OUTPATIENT
Start: 2023-11-08 | End: 2023-11-09 | Stop reason: HOSPADM

## 2023-11-07 RX ORDER — FOLIC ACID 1 MG/1
1 TABLET ORAL DAILY
Status: DISCONTINUED | OUTPATIENT
Start: 2023-11-07 | End: 2023-11-09 | Stop reason: HOSPADM

## 2023-11-07 RX ORDER — PROCHLORPERAZINE MALEATE 5 MG
5 TABLET ORAL EVERY 6 HOURS PRN
Status: DISCONTINUED | OUTPATIENT
Start: 2023-11-07 | End: 2023-11-09 | Stop reason: HOSPADM

## 2023-11-07 RX ORDER — AMOXICILLIN 250 MG
1 CAPSULE ORAL 2 TIMES DAILY PRN
Status: DISCONTINUED | OUTPATIENT
Start: 2023-11-07 | End: 2023-11-09 | Stop reason: HOSPADM

## 2023-11-07 RX ORDER — ACETAMINOPHEN 325 MG/1
650 TABLET ORAL ONCE
Status: COMPLETED | OUTPATIENT
Start: 2023-11-07 | End: 2023-11-07

## 2023-11-07 RX ORDER — ASPIRIN 325 MG
325 TABLET ORAL DAILY
COMMUNITY
End: 2024-09-09

## 2023-11-07 RX ORDER — ATORVASTATIN CALCIUM 20 MG/1
40 TABLET, FILM COATED ORAL DAILY
Status: DISCONTINUED | OUTPATIENT
Start: 2023-11-07 | End: 2023-11-09 | Stop reason: HOSPADM

## 2023-11-07 RX ORDER — IPRATROPIUM BROMIDE AND ALBUTEROL SULFATE 2.5; .5 MG/3ML; MG/3ML
3 SOLUTION RESPIRATORY (INHALATION) ONCE
Status: COMPLETED | OUTPATIENT
Start: 2023-11-07 | End: 2023-11-07

## 2023-11-07 RX ORDER — LEVOTHYROXINE SODIUM 50 UG/1
50 TABLET ORAL DAILY
Status: DISCONTINUED | OUTPATIENT
Start: 2023-11-07 | End: 2023-11-09 | Stop reason: HOSPADM

## 2023-11-07 RX ORDER — QUETIAPINE FUMARATE 100 MG/1
100 TABLET, FILM COATED ORAL EVERY EVENING
Status: DISCONTINUED | OUTPATIENT
Start: 2023-11-07 | End: 2023-11-09 | Stop reason: HOSPADM

## 2023-11-07 RX ORDER — CLOPIDOGREL BISULFATE 75 MG/1
75 TABLET ORAL DAILY
Status: DISCONTINUED | OUTPATIENT
Start: 2023-11-07 | End: 2023-11-09 | Stop reason: HOSPADM

## 2023-11-07 RX ORDER — OXYCODONE HYDROCHLORIDE 5 MG/1
10 TABLET ORAL EVERY 6 HOURS PRN
Status: DISCONTINUED | OUTPATIENT
Start: 2023-11-07 | End: 2023-11-09 | Stop reason: HOSPADM

## 2023-11-07 RX ORDER — ACETAMINOPHEN 325 MG/1
325 TABLET ORAL EVERY 6 HOURS PRN
Status: DISCONTINUED | OUTPATIENT
Start: 2023-11-07 | End: 2023-11-09 | Stop reason: HOSPADM

## 2023-11-07 RX ORDER — MULTIPLE VITAMINS W/ MINERALS TAB 9MG-400MCG
1 TAB ORAL DAILY
Status: DISCONTINUED | OUTPATIENT
Start: 2023-11-07 | End: 2023-11-09 | Stop reason: HOSPADM

## 2023-11-07 RX ORDER — LORAZEPAM 0.5 MG/1
0.25 TABLET ORAL ONCE
Status: COMPLETED | OUTPATIENT
Start: 2023-11-07 | End: 2023-11-07

## 2023-11-07 RX ORDER — CELECOXIB 200 MG/1
200 CAPSULE ORAL DAILY
Status: DISCONTINUED | OUTPATIENT
Start: 2023-11-07 | End: 2023-11-09 | Stop reason: HOSPADM

## 2023-11-07 RX ORDER — AMLODIPINE BESYLATE 5 MG/1
5 TABLET ORAL DAILY
Status: DISCONTINUED | OUTPATIENT
Start: 2023-11-07 | End: 2023-11-09 | Stop reason: HOSPADM

## 2023-11-07 RX ORDER — METOPROLOL TARTRATE 1 MG/ML
5 INJECTION, SOLUTION INTRAVENOUS ONCE
Status: COMPLETED | OUTPATIENT
Start: 2023-11-07 | End: 2023-11-07

## 2023-11-07 RX ORDER — ENOXAPARIN SODIUM 100 MG/ML
0.5 INJECTION SUBCUTANEOUS EVERY 24 HOURS
Status: DISCONTINUED | OUTPATIENT
Start: 2023-11-07 | End: 2023-11-07

## 2023-11-07 RX ORDER — IPRATROPIUM BROMIDE AND ALBUTEROL SULFATE 2.5; .5 MG/3ML; MG/3ML
3 SOLUTION RESPIRATORY (INHALATION)
Status: DISCONTINUED | OUTPATIENT
Start: 2023-11-07 | End: 2023-11-09 | Stop reason: HOSPADM

## 2023-11-07 RX ORDER — NALOXONE HYDROCHLORIDE 0.4 MG/ML
0.4 INJECTION, SOLUTION INTRAMUSCULAR; INTRAVENOUS; SUBCUTANEOUS
Status: DISCONTINUED | OUTPATIENT
Start: 2023-11-07 | End: 2023-11-09 | Stop reason: HOSPADM

## 2023-11-07 RX ORDER — LEVETIRACETAM 500 MG/1
500 TABLET ORAL 2 TIMES DAILY
Status: DISCONTINUED | OUTPATIENT
Start: 2023-11-07 | End: 2023-11-09 | Stop reason: HOSPADM

## 2023-11-07 RX ORDER — LORAZEPAM 2 MG/ML
1-2 INJECTION INTRAMUSCULAR EVERY 30 MIN PRN
Status: DISCONTINUED | OUTPATIENT
Start: 2023-11-07 | End: 2023-11-09 | Stop reason: HOSPADM

## 2023-11-07 RX ORDER — SODIUM CHLORIDE 9 MG/ML
INJECTION, SOLUTION INTRAVENOUS CONTINUOUS
Status: DISCONTINUED | OUTPATIENT
Start: 2023-11-07 | End: 2023-11-08

## 2023-11-07 RX ORDER — ONDANSETRON 2 MG/ML
4 INJECTION INTRAMUSCULAR; INTRAVENOUS EVERY 6 HOURS PRN
Status: DISCONTINUED | OUTPATIENT
Start: 2023-11-07 | End: 2023-11-09 | Stop reason: HOSPADM

## 2023-11-07 RX ORDER — DULOXETIN HYDROCHLORIDE 30 MG/1
30 CAPSULE, DELAYED RELEASE ORAL 2 TIMES DAILY
Status: DISCONTINUED | OUTPATIENT
Start: 2023-11-07 | End: 2023-11-09 | Stop reason: HOSPADM

## 2023-11-07 RX ORDER — LORAZEPAM 1 MG/1
1-2 TABLET ORAL EVERY 30 MIN PRN
Status: DISCONTINUED | OUTPATIENT
Start: 2023-11-07 | End: 2023-11-09 | Stop reason: HOSPADM

## 2023-11-07 RX ORDER — AMOXICILLIN 250 MG
2 CAPSULE ORAL 2 TIMES DAILY PRN
Status: DISCONTINUED | OUTPATIENT
Start: 2023-11-07 | End: 2023-11-09 | Stop reason: HOSPADM

## 2023-11-07 RX ORDER — PROCHLORPERAZINE 25 MG
12.5 SUPPOSITORY, RECTAL RECTAL EVERY 12 HOURS PRN
Status: DISCONTINUED | OUTPATIENT
Start: 2023-11-07 | End: 2023-11-09 | Stop reason: HOSPADM

## 2023-11-07 RX ORDER — AZITHROMYCIN 250 MG/1
500 TABLET, FILM COATED ORAL ONCE
Qty: 2 TABLET | Refills: 0 | Status: COMPLETED | OUTPATIENT
Start: 2023-11-07 | End: 2023-11-07

## 2023-11-07 RX ORDER — FLUMAZENIL 0.1 MG/ML
0.2 INJECTION, SOLUTION INTRAVENOUS
Status: DISCONTINUED | OUTPATIENT
Start: 2023-11-07 | End: 2023-11-09 | Stop reason: HOSPADM

## 2023-11-07 RX ORDER — METOPROLOL SUCCINATE 50 MG/1
50 TABLET, EXTENDED RELEASE ORAL DAILY
Status: DISCONTINUED | OUTPATIENT
Start: 2023-11-07 | End: 2023-11-07

## 2023-11-07 RX ORDER — ACETAMINOPHEN 325 MG/1
650 TABLET ORAL EVERY 4 HOURS PRN
Status: DISCONTINUED | OUTPATIENT
Start: 2023-11-07 | End: 2023-11-09 | Stop reason: HOSPADM

## 2023-11-07 RX ORDER — AZITHROMYCIN 250 MG/1
250 TABLET, FILM COATED ORAL DAILY
Qty: 4 TABLET | Refills: 0 | Status: DISCONTINUED | OUTPATIENT
Start: 2023-11-08 | End: 2023-11-09 | Stop reason: HOSPADM

## 2023-11-07 RX ADMIN — METOPROLOL SUCCINATE 50 MG: 25 TABLET, FILM COATED, EXTENDED RELEASE ORAL at 10:19

## 2023-11-07 RX ADMIN — LORAZEPAM 1 MG: 1 TABLET ORAL at 21:28

## 2023-11-07 RX ADMIN — ACETAMINOPHEN 650 MG: 325 TABLET, FILM COATED ORAL at 16:21

## 2023-11-07 RX ADMIN — OXYCODONE HYDROCHLORIDE 10 MG: 5 TABLET ORAL at 20:13

## 2023-11-07 RX ADMIN — SODIUM CHLORIDE 97 ML: 9 INJECTION, SOLUTION INTRAVENOUS at 06:31

## 2023-11-07 RX ADMIN — LORAZEPAM 0.25 MG: 0.5 TABLET ORAL at 12:11

## 2023-11-07 RX ADMIN — IOPAMIDOL 70 ML: 755 INJECTION, SOLUTION INTRAVENOUS at 06:31

## 2023-11-07 RX ADMIN — ONDANSETRON 4 MG: 4 TABLET, ORALLY DISINTEGRATING ORAL at 18:39

## 2023-11-07 RX ADMIN — Medication 5 MG: at 21:21

## 2023-11-07 RX ADMIN — ATORVASTATIN CALCIUM 40 MG: 20 TABLET, FILM COATED ORAL at 18:11

## 2023-11-07 RX ADMIN — SODIUM CHLORIDE 500 ML: 9 INJECTION, SOLUTION INTRAVENOUS at 07:13

## 2023-11-07 RX ADMIN — SODIUM CHLORIDE: 9 INJECTION, SOLUTION INTRAVENOUS at 18:40

## 2023-11-07 RX ADMIN — CELECOXIB 200 MG: 200 CAPSULE ORAL at 21:20

## 2023-11-07 RX ADMIN — AMLODIPINE BESYLATE 5 MG: 5 TABLET ORAL at 13:26

## 2023-11-07 RX ADMIN — SODIUM CHLORIDE 500 ML: 9 INJECTION, SOLUTION INTRAVENOUS at 11:38

## 2023-11-07 RX ADMIN — LORAZEPAM 1 MG: 1 TABLET ORAL at 19:40

## 2023-11-07 RX ADMIN — MULTIPLE VITAMINS W/ MINERALS TAB 1 TABLET: TAB at 15:43

## 2023-11-07 RX ADMIN — DULOXETINE HYDROCHLORIDE 30 MG: 30 CAPSULE, DELAYED RELEASE ORAL at 19:28

## 2023-11-07 RX ADMIN — METOPROLOL SUCCINATE 50 MG: 50 TABLET, EXTENDED RELEASE ORAL at 19:28

## 2023-11-07 RX ADMIN — LEVETIRACETAM 500 MG: 500 TABLET, FILM COATED ORAL at 19:29

## 2023-11-07 RX ADMIN — SODIUM CHLORIDE 1000 ML: 9 INJECTION, SOLUTION INTRAVENOUS at 08:52

## 2023-11-07 RX ADMIN — LEVOTHYROXINE SODIUM 50 MCG: 50 TABLET ORAL at 18:11

## 2023-11-07 RX ADMIN — QUETIAPINE 100 MG: 100 TABLET ORAL at 21:20

## 2023-11-07 RX ADMIN — AZITHROMYCIN MONOHYDRATE 500 MG: 250 TABLET ORAL at 15:43

## 2023-11-07 RX ADMIN — ENOXAPARIN SODIUM 40 MG: 100 INJECTION SUBCUTANEOUS at 18:10

## 2023-11-07 RX ADMIN — METOPROLOL TARTRATE 5 MG: 5 INJECTION INTRAVENOUS at 11:39

## 2023-11-07 RX ADMIN — CLOPIDOGREL BISULFATE 75 MG: 75 TABLET ORAL at 19:28

## 2023-11-07 RX ADMIN — THIAMINE HCL TAB 100 MG 100 MG: 100 TAB at 15:43

## 2023-11-07 RX ADMIN — LORAZEPAM 1 MG: 1 TABLET ORAL at 18:05

## 2023-11-07 RX ADMIN — IPRATROPIUM BROMIDE AND ALBUTEROL SULFATE 3 ML: .5; 3 SOLUTION RESPIRATORY (INHALATION) at 12:44

## 2023-11-07 RX ADMIN — IPRATROPIUM BROMIDE AND ALBUTEROL SULFATE 3 ML: 2.5; .5 SOLUTION RESPIRATORY (INHALATION) at 20:25

## 2023-11-07 RX ADMIN — PREDNISONE 40 MG: 20 TABLET ORAL at 15:43

## 2023-11-07 RX ADMIN — FOLIC ACID 1 MG: 1 TABLET ORAL at 15:46

## 2023-11-07 RX ADMIN — DEXTROSE AND SODIUM CHLORIDE: 5; 450 INJECTION, SOLUTION INTRAVENOUS at 17:57

## 2023-11-07 ASSESSMENT — ACTIVITIES OF DAILY LIVING (ADL)
CONCENTRATING,_REMEMBERING_OR_MAKING_DECISIONS_DIFFICULTY: NO
NUMBER_OF_TIMES_PATIENT_HAS_FALLEN_WITHIN_LAST_SIX_MONTHS: 2
ADLS_ACUITY_SCORE: 37
FALL_HISTORY_WITHIN_LAST_SIX_MONTHS: YES
WALKING_OR_CLIMBING_STAIRS_DIFFICULTY: YES
DRESSING/BATHING_DIFFICULTY: NO
CHANGE_IN_FUNCTIONAL_STATUS_SINCE_ONSET_OF_CURRENT_ILLNESS/INJURY: NO
WEAR_GLASSES_OR_BLIND: NO
ADLS_ACUITY_SCORE: 37
ADLS_ACUITY_SCORE: 37
TOILETING_ISSUES: NO
ADLS_ACUITY_SCORE: 22
ADLS_ACUITY_SCORE: 24
ADLS_ACUITY_SCORE: 22
DOING_ERRANDS_INDEPENDENTLY_DIFFICULTY: NO
DIFFICULTY_COMMUNICATING: NO
DIFFICULTY_EATING/SWALLOWING: NO
ADLS_ACUITY_SCORE: 37
ADLS_ACUITY_SCORE: 37
HEARING_DIFFICULTY_OR_DEAF: NO
ADLS_ACUITY_SCORE: 37
ADLS_ACUITY_SCORE: 37
WALKING_OR_CLIMBING_STAIRS: AMBULATION DIFFICULTY, REQUIRES EQUIPMENT

## 2023-11-07 NOTE — ED NOTES
[facility]   Admission Handoff    The patient is Jessica Ellison, 73 year old who arrived in the ED by AMBULANCE from home with a complaint of Shortness of Breath  . The patient's current symptoms are new and during this time the symptoms have remained the same. In the ED, patient was diagnosed with   Final diagnoses:   SOB (shortness of breath)   JOY (obstructive sleep apnea)   Chronic pain syndrome   Essential hypertension with goal blood pressure less than 140/90   Hypoxia   Alcohol abuse with intoxication (H24)   Sinus tachycardia         Needed?: No    Allergies:    Allergies   Allergen Reactions    Bee Venom     Lisinopril Swelling     Oral swelling       Past Medical Hx:   Past Medical History:   Diagnosis Date    Anemia     Aneurysm (H24)     Antiplatelet or antithrombotic long-term use     Arthritis     Chemical dependency (H)     Chem Dep RX    Depression     History of blood transfusion     Hypertension     Menopausal symptoms     Other chronic pain     Lower back and hips    Sleep apnea     Sleep disorder     Thyroid disease     Tobacco abuse     Uncomplicated asthma        Initial vitals were: BP: (!) 144/87  Pulse: 111  Temp: 98.7  F (37.1  C)  Resp: 12  Weight: 70.3 kg (155 lb) (per pt)  SpO2: 96 %   Recent vital Signs: BP (!) 173/118   Pulse 102   Temp 98.7  F (37.1  C) (Oral)   Resp 10   Wt 70.3 kg (155 lb)   SpO2 94%   BMI 27.46 kg/m      Elimination Status: Continent: Yes     Activity Level: SBA    Fall Status: Reason for falls risk:  Mobility  arm band in place    Baseline Mental status: WDL  Current Mental Status changes: at basesline    Infection present or suspected this encounter: no  Sepsis suspected: No    Isolation type:     Bariatric equipment needed?: No    In the ED these meds were given:   Medications   sodium chloride 0.9 % infusion (1,000 mLs Intravenous $New Bag 11/7/23 8294)   metoprolol succinate ER (TOPROL XL) 24 hr tablet 50 mg  (50 mg Oral $Given 11/7/23 1019)   amLODIPine (NORVASC) tablet 5 mg (5 mg Oral $Given 11/7/23 1326)   melatonin tablet 1 mg (has no administration in time range)   flumazenil (ROMAZICON) injection 0.2 mg (has no administration in time range)   melatonin tablet 5 mg (has no administration in time range)   Patient is already receiving anticoagulation with heparin, enoxaparin (LOVENOX), warfarin (COUMADIN)  or other anticoagulant medication (has no administration in time range)   senna-docusate (SENOKOT-S/PERICOLACE) 8.6-50 MG per tablet 1 tablet (has no administration in time range)     Or   senna-docusate (SENOKOT-S/PERICOLACE) 8.6-50 MG per tablet 2 tablet (has no administration in time range)   prochlorperazine (COMPAZINE) injection 5 mg (has no administration in time range)     Or   prochlorperazine (COMPAZINE) tablet 5 mg (has no administration in time range)     Or   prochlorperazine (COMPAZINE) suppository 12.5 mg (has no administration in time range)   LORazepam (ATIVAN) tablet 1-2 mg (has no administration in time range)     Or   LORazepam (ATIVAN) injection 1-2 mg (has no administration in time range)   thiamine (B-1) tablet 100 mg (has no administration in time range)   folic acid (FOLVITE) tablet 1 mg (has no administration in time range)   multivitamin w/minerals (THERA-VIT-M) tablet 1 tablet (has no administration in time range)   ipratropium - albuterol 0.5 mg/2.5 mg/3 mL (DUONEB) neb solution 3 mL (has no administration in time range)   predniSONE (DELTASONE) tablet 40 mg (has no administration in time range)   azithromycin (ZITHROMAX) tablet 500 mg (has no administration in time range)     Followed by   azithromycin (ZITHROMAX) tablet 250 mg (has no administration in time range)   iopamidol (ISOVUE-370) solution 70 mL (70 mLs Intravenous $Given 11/7/23 0631)   sodium chloride 0.9 % bag 500mL for CT scan flush use (97 mLs Intravenous $Given 11/7/23 0631)   sodium chloride 0.9% BOLUS 500 mL (0 mLs  Intravenous Stopped 11/7/23 0848)   metoprolol (LOPRESSOR) injection 5 mg (5 mg Intravenous $Given 11/7/23 1139)   sodium chloride 0.9% BOLUS 500 mL (500 mLs Intravenous $New Bag 11/7/23 1138)   LORazepam (ATIVAN) half-tab 0.25 mg (0.25 mg Oral $Given 11/7/23 1211)   ipratropium - albuterol 0.5 mg/2.5 mg/3 mL (DUONEB) neb solution 3 mL (3 mLs Nebulization $Given 11/7/23 1244)       Drips running?  No    Home pump  No    Current LDAs: Peripheral IV: Site right lower forearm; Gauge 20  none     Results:   Labs/Imaging  Ordered and Resulted from Time of ED Arrival Up to the Time of Departure from the ED  Results for orders placed or performed during the hospital encounter of 11/07/23 (from the past 24 hour(s))   Glucose by meter   Result Value Ref Range    GLUCOSE BY METER POCT 97 70 - 99 mg/dL   CBC with platelets differential    Narrative    The following orders were created for panel order CBC with platelets differential.  Procedure                               Abnormality         Status                     ---------                               -----------         ------                     CBC with platelets and d...[[id number]]  Abnormal            Final result                 Please view results for these tests on the individual orders.   Basic metabolic panel   Result Value Ref Range    Sodium 146 (H) 135 - 145 mmol/L    Potassium 3.7 3.4 - 5.3 mmol/L    Chloride 107 98 - 107 mmol/L    Carbon Dioxide (CO2) 24 22 - 29 mmol/L    Anion Gap 15 7 - 15 mmol/L    Urea Nitrogen 12.6 8.0 - 23.0 mg/dL    Creatinine 0.73 0.51 - 0.95 mg/dL    GFR Estimate 87 >60 mL/min/1.73m2    Calcium 9.0 8.8 - 10.2 mg/dL    Glucose 101 (H) 70 - 99 mg/dL   Troponin T, High Sensitivity   Result Value Ref Range    Troponin T, High Sensitivity 17 (H) <=14 ng/L   Nt probnp inpatient (BNP)   Result Value Ref Range    N terminal Pro BNP Inpatient 143 0 - 900 pg/mL   Blood gas venous   Result Value Ref Range    pH Venous 7.37 7.32 - 7.43     pCO2 Venous 42 40 - 50 mm Hg    pO2 Venous 32 25 - 47 mm Hg    Bicarbonate Venous 24 21 - 28 mmol/L    Base Excess/Deficit -1.2 -7.7 - 1.9 mmol/L    FIO2 21    TSH with free T4 reflex   Result Value Ref Range    TSH 1.05 0.30 - 4.20 uIU/mL   Alcohol level blood   Result Value Ref Range    Alcohol ethyl 0.20 (H) <=0.01 g/dL   CBC with platelets and differential   Result Value Ref Range    WBC Count 3.4 (L) 4.0 - 11.0 10e3/uL    RBC Count 3.42 (L) 3.80 - 5.20 10e6/uL    Hemoglobin 11.2 (L) 11.7 - 15.7 g/dL    Hematocrit 34.9 (L) 35.0 - 47.0 %     (H) 78 - 100 fL    MCH 32.7 26.5 - 33.0 pg    MCHC 32.1 31.5 - 36.5 g/dL    RDW 15.5 (H) 10.0 - 15.0 %    Platelet Count 211 150 - 450 10e3/uL    % Neutrophils 55 %    % Lymphocytes 26 %    % Monocytes 14 %    % Eosinophils 2 %    % Basophils 2 %    % Immature Granulocytes 1 %    NRBCs per 100 WBC 0 <1 /100    Absolute Neutrophils 1.9 1.6 - 8.3 10e3/uL    Absolute Lymphocytes 0.9 0.8 - 5.3 10e3/uL    Absolute Monocytes 0.5 0.0 - 1.3 10e3/uL    Absolute Eosinophils 0.1 0.0 - 0.7 10e3/uL    Absolute Basophils 0.1 0.0 - 0.2 10e3/uL    Absolute Immature Granulocytes 0.0 <=0.4 10e3/uL    Absolute NRBCs 0.0 10e3/uL   Symptomatic Influenza A/B, RSV, & SARS-CoV2 PCR (COVID-19) Nose    Specimen: Nose; Swab   Result Value Ref Range    Influenza A PCR Negative Negative    Influenza B PCR Negative Negative    RSV PCR Negative Negative    SARS CoV2 PCR Negative Negative    Narrative    Testing was performed using the Xpert Xpress CoV2/Flu/RSV Assay on the Cepheid GeneXpert Instrument. This test should be ordered for the detection of SARS-CoV-2, influenza, and RSV viruses in individuals who meet clinical and/or epidemiological criteria. Test performance is unknown in asymptomatic patients. This test is for in vitro diagnostic use under the FDA EUA for laboratories certified under CLIA to perform high or moderate complexity testing. This test has not been FDA cleared or approved. A  negative result does not rule out the presence of PCR inhibitors in the specimen or target RNA in concentration below the limit of detection for the assay. If only one viral target is positive but coinfection with multiple targets is suspected, the sample should be re-tested with another FDA cleared, approved, or authorized test, if coinfection would change clinical management. This test was validated by the [facility] [employer]. These laboratories are certified under the Clinical Laboratory Improvement Amendments of 1988 (CLIA-88) as qualified to perform high complexity laboratory testing.   CT Chest Pulmonary Embolism w Contrast    Narrative    EXAM: CT CHEST PULMONARY EMBOLISM W CONTRAST  LOCATION: [facility]  DATE: 11/7/2023    INDICATION: SOB.  hypoxia  COMPARISON: CT chest 10/06/2023  TECHNIQUE: CT chest pulmonary angiogram during arterial phase injection of IV contrast. Multiplanar reformats and MIP reconstructions were performed. Dose reduction techniques were used.   CONTRAST: 70 mL Isovue 370    FINDINGS:  ANGIOGRAM CHEST: No pulmonary embolism. Enlarged main pulmonary artery, 3.5 cm, unchanged. Thoracic aorta is negative for dissection. No CT evidence of right heart strain.    LUNGS AND PLEURA: Moderate emphysema. Mild bibasilar bandlike opacities likely atelectasis. Mild dependent atelectasis. No consolidation. No pleural effusion.    MEDIASTINUM/AXILLAE: No pericardial effusion. No lymphadenopathy. Normal esophagus.    CORONARY ARTERY CALCIFICATION: Mild.    UPPER ABDOMEN: Cholecystectomy.    MUSCULOSKELETAL: Spinal degenerative changes.] Lumbar fusion hardware..      Impression    IMPRESSION:  1.  No pulmonary embolism.  2.  Mildly enlarged central pulmonary artery, stable, which can be seen in pulmonary hypertension.  3.  Moderate emphysema.   Troponin T, High Sensitivity   Result Value Ref Range    Troponin T, High Sensitivity 14 <=14 ng/L   Hepatic panel    Result Value Ref Range    Protein Total 6.4 6.4 - 8.3 g/dL    Albumin 3.9 3.5 - 5.2 g/dL    Bilirubin Total 0.4 <=1.2 mg/dL    Alkaline Phosphatase 109 (H) 35 - 104 U/L    AST 42 0 - 45 U/L    ALT 25 0 - 50 U/L    Bilirubin Direct <0.20 0.00 - 0.30 mg/dL   Magnesium   Result Value Ref Range    Magnesium 1.4 (L) 1.7 - 2.3 mg/dL   Phosphorus   Result Value Ref Range    Phosphorus 3.1 2.5 - 4.5 mg/dL   Nt probnp inpatient   Result Value Ref Range    N terminal Pro BNP Inpatient 152 0 - 900 pg/mL       For the majority of the shift this patient's behavior was Green     Cardiac Rhythm: Tachycardia  Pt needs tele? Yes  Skin/wound Issues: None    Code Status: Full Code    Pain control: fair    Nausea control: pt had none    Abnormal labs/tests/findings requiring intervention:     Patient tested for COVID 19 prior to admission: NO     OBS brochure/video discussed/provided to patient/family: Yes     Family present during ED course? Yes     Family Comments/Social Situation comments:     Tasks needing completion: None    Jessica Fay RN

## 2023-11-07 NOTE — ED PROVIDER NOTES
ED Provider Note  [facility]      History     Chief Complaint   Patient presents with    Shortness of Breath     HPI  Jessica Ellison is a 73 year old female who has medical history significant for multiple medical issues.  Patient has alcohol dependence and abuse, opiate dependence, COPD, hypertension, hyperlipidemia, chronic kidney disease, hypothyroidism, and presents the emergency department via EMS.  Initial history obtained from EMS.  They report that they were called by patient's  for concerns regarding increased amounts of shortness of breath.    Patient provides additional history information.  However, patient states she is tired, and frequently falling back asleep.  States that she was short of breath during the overnight hours, waking her from sleep.  Denies any chest pain.  No recent travel.  No lower extremity swelling.  No abdominal pain.  No fever.  No cough.        Independent Historian:    EMS as above    Review of External Notes:  Reviewed prior medical records including October 6, 2023 syncopal episode where patient was also noted to be hypoxic.      Allergies:  Allergies   Allergen Reactions    Bee Venom     Lisinopril Swelling     Oral swelling       Problem List:    Patient Active Problem List    Diagnosis Date Noted    S/P total knee arthroplasty, left 08/22/2023     Priority: Medium    Knee osteoarthritis 08/16/2023     Priority: Medium    F11.2 - Continuous opioid dependence (H) 07/11/2023     Priority: Medium     Patient is followed by DOMINIQUE COPPOLA for ongoing prescription of narcotic pain medicine.  Med: oxycodone/acetaminophen 10/325 for chronic back pain.   Maximum use per month: 70  Expected duration: unknown  Narcotic agreement on file: YES 7/11/2023   Clinic visit recommended: Q 3 months    PDMP reviewed 7/11/2023 April 2013 - rhizotomy for SI (left) Jhon Mckay MD    Follows at [facility] - severe low back pain with work related  injuries.  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  Spinal stenosis at most lumbar levels  Improvement with past epidural steroid injections  SI joint dysfunction with the pain        OJY (obstructive sleep apnea) 08/05/2021     Priority: Medium    CKD (chronic kidney disease) stage 2, GFR 60-89 ml/min 08/05/2021     Priority: Medium    Hyperlipidemia LDL goal <70 12/22/2020     Priority: Medium    Centrilobular emphysema (H) 11/04/2020     Priority: Medium    Hypoxia 01/05/2019     Priority: Medium    Spell of change in speech 04/17/2018     Priority: Medium    Hypothyroidism 09/07/2016     Priority: Medium    Chronic bilateral low back pain without sciatica 06/02/2016     Priority: Medium    Essential hypertension with goal blood pressure less than 140/90 06/02/2016     Priority: Medium    S/P lumbar spinal fusion 10/19/2015     Priority: Medium    Bee allergy status 06/30/2015     Priority: Medium    Cerebral aneurysm, nonruptured 07/24/2014     Priority: Medium     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 Marti    repeat an MRA in 5 years -5/2027      Chronic pain 03/07/2014     Priority: Medium    Major depressive disorder, recurrent episode, moderate (H) 07/10/2013     Priority: Medium    Advanced directives, counseling/discussion 09/05/2012     Priority: Medium     Patient does not have an Advance/Health Care Directive (HCD), has information at home.     Sonja Krishnamurthy  September 5, 2012        Anxiety 06/20/2012     Priority: Medium    CARDIOVASCULAR SCREENING; LDL GOAL LESS THAN 160 10/31/2010     Priority: Medium    Insomnia 12/21/2009     Priority: Medium    Back pain 12/21/2009     Priority: Medium     Patient is followed by DOMINIQUE COPPOLA for ongoing prescription of narcotic pain medicine.  Med: oxycodone/acetaminophen 10/325 for chronic back pain.   Maximum use per month: 70  Expected  duration: unknown  Narcotic agreement on file: YES 7/11/2023   Clinic visit recommended: Q 3 months    PDMP reviewed 7/11/2023 April 2013 - rhizotomy for SI (left) Jhon Mckay MD    Follows at [facility] - severe low back pain with work related injuries.  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     Priority: Medium        Past Medical History:    Past Medical History:   Diagnosis Date    Anemia     Aneurysm (H24)     Antiplatelet or antithrombotic long-term use     Arthritis     Chemical dependency (H)     Depression     History of blood transfusion     Hypertension     Menopausal symptoms     Other chronic pain     Sleep apnea     Sleep disorder     Thyroid disease     Tobacco abuse     Uncomplicated asthma        Past Surgical History:    Past Surgical History:   Procedure Laterality Date    ABDOMEN SURGERY      APPENDECTOMY  1960    APPENDECTOMY      ARTHROPLASTY KNEE Left 8/16/2023    Procedure: LEFT TOTAL KNEE ARTHROPLASTY;  Surgeon: Bryan Roque MD;  Location: [facility] Main OR    BACK SURGERY      BIOPSY BREAST      cerebral aneurysm  2014    coiled    CEREBRAL ANEURYSM REPAIR      COLON SURGERY      COLONOSCOPY  04/19/2005    FRACTURE SURGERY      HC EXCISION BREAST LESION, OPEN >=1      core biopsy 2006, lumpectomy 2006    HERNIA REPAIR      LAP VENTRAL HERNIA REPAIR  12/2017    [facility]    LAPAROSCOPIC ASSISTED HYSTERECTOMY VAGINAL  12/2017    LUMPECTOMY BREAST      orif left femoral neck Left 06/03/2016    stress fracture.  done at [facility]    SURGICAL HISTORY OF -   2004    Skin Graft    ZZC PART REMOVAL COLON W ANASTOMOSIS  05/2005    diverticulitis    ZZC SPINE FUSION,ANTER,8+ SGMTS  2007    Anterior posterior fusion 10 levels, hardware removal and re-fusion 2009       Family  History:    Family History   Problem Relation Age of Onset    Cancer Mother         breast/lung    Alcohol/Drug Mother     Depression Mother     Cancer - colorectal Father     Alcohol/Drug Father     Diabetes Maternal Grandmother     Diabetes Maternal Grandfather     Diabetes Paternal Grandmother     Diabetes Paternal Grandfather     Cancer Paternal Grandfather     Gastrointestinal Disease Paternal Grandfather     Congenital Anomalies Son     Hypertension Brother     Adrenal Disorder Brother         had adrenalectomies    Cerebral palsy Granddaughter        Social History:  Marital Status:   [2]  Social History     Tobacco Use    Smoking status: Former     Packs/day: 1.00     Years: 30.00     Additional pack years: 0.00     Total pack years: 30.00     Types: Cigarettes     Quit date: 2004     Years since quittin.8    Smokeless tobacco: Never   Vaping Use    Vaping Use: Never used   Substance Use Topics    Alcohol use: Yes     Comment: Glass of wine a few times a week. ETOH dependency, DUI 3/15/10; 1-2 ETOH per week    Drug use: Yes     Types: Oxycodone     Comment: percocet for the past several years        Medications:    albuterol (PROAIR HFA/PROVENTIL HFA/VENTOLIN HFA) 108 (90 Base) MCG/ACT inhaler  amLODIPine (NORVASC) 5 MG tablet  atorvastatin (LIPITOR) 40 MG tablet  calcium carbonate 600 mg-vitamin D 400 units (CALTRATE) 600-400 MG-UNIT per tablet  celecoxib (CELEBREX) 200 MG capsule  clopidogrel (PLAVIX) 75 MG tablet  DULoxetine (CYMBALTA) 30 MG capsule  ferrous sulfate (FEROSUL) 325 (65 Fe) MG tablet  levETIRAcetam (KEPPRA) 500 MG tablet  levothyroxine (SYNTHROID/LEVOTHROID) 50 MCG tablet  magnesium oxide (MAG-OX) 400 MG tablet  Melatonin 10 MG TABS tablet  methocarbamol (ROBAXIN) 500 MG tablet  metoprolol succinate ER (TOPROL XL) 50 MG 24 hr tablet  multivitamin, therapeutic (THERA-VIT) TABS tablet  naloxone (NARCAN) 4 MG/0.1ML nasal spray  nystatin (MYCOSTATIN) 230042 UNIT/GM external  powder  oxyCODONE-acetaminophen (PERCOCET)  MG per tablet  QUEtiapine (SEROQUEL) 50 MG tablet  senna (SENOKOT) 8.6 MG tablet  SPIRIVA HANDIHALER 18 MCG inhaled capsule          Review of Systems  A medically appropriate review of systems was performed with pertinent positives and negatives noted in the HPI, and all other systems negative.    Physical Exam   Patient Vitals for the past 24 hrs:   BP Temp Temp src Pulse Resp SpO2 Weight   11/07/23 0544 -- -- -- 116 10 97 % --   11/07/23 0529 -- -- -- 114 12 94 % --   11/07/23 0514 -- -- -- (!) 121 26 92 % --   11/07/23 0459 (!) 144/87 98.7  F (37.1  C) Oral 111 12 96 % 70.3 kg (155 lb)          Physical Exam  General: alert and in no acute distress on arrival.  Frequently falling back asleep  Head: atraumatic, normocephalic  Lungs:  nonlabored.  Clear to auscultation  CV:  extremities warm and perfused.  Slightly tachycardic  Abd: nondistended  Skin: no rashes, no diaphoresis and skin color normal  Neuro: Patient awake, alert, speech is fluent,   Psychiatric: affect/mood normal,        ED Course                 Procedures         EKG, reviewed by myself shows sinus tachycardia.  Rate 116 bpm.  No ectopy.  Nonspecific [id number]-bespulh changes.  No acute ischemic appearing changes.                 Results for orders placed or performed during the hospital encounter of 11/07/23 (from the past 24 hour(s))   Glucose by meter   Result Value Ref Range    GLUCOSE BY METER POCT 97 70 - 99 mg/dL   CBC with platelets differential    Narrative    The following orders were created for panel order CBC with platelets differential.  Procedure                               Abnormality         Status                     ---------                               -----------         ------                     CBC with platelets and d...[[id number]]                                                   Please view results for these tests on the individual orders.       MEDICATIONS GIVEN IN THE  EMERGENCY DEPARTMENT:  Medications - No data to display        Independent Interpretation (X-rays, CTs, rhythm strip):  CT with no lobar infiltrate.  Images personally reviewed.    Consultations/Discussion of Management or Tests:  None       Social Determinants of Health affecting care:         Assessments & Plan (with Medical Decision Making)  73 year old female who presents to the Emergency Department for evaluation of shortness of breath.  This occurred during overnight hours.  Patient arrives afebrile, with oxygen saturations which are normal, in the low 90s on room air.  According to EMS report first responders had placed patient on supplemental oxygen for O2 sats that were in the 80s.    Patient does have some frequent falling asleep.  Her pupils are approximately midpoint, however does have opioid abuse, in addition to alcohol abuse on her medication list.  Some of her polypharmacy may be contributing to her sleepiness.    Patient, when awake, breathing normally, has normal oxygen saturations.  Does have underlying sleep apnea, in addition to asthma.    Patient with labs which are drawn, and CT scan is ordered.  Patient will be signed out to oncoming daytime provider pending repeat assessment, in addition to CT scan.  If feeling symptomatically improved, could consider discharge home, however will require close monitoring of oxygen saturations.       I have reviewed the nursing notes.    I have reviewed the findings, diagnosis, plan and need for follow up with the patient.       Critical Care time:  none      NEW PRESCRIPTIONS STARTED AT TODAY'S ER VISIT  New Prescriptions    No medications on file       Final diagnoses:   SOB (shortness of breath)   JOY (obstructive sleep apnea)   Chronic pain syndrome   Essential hypertension with goal blood pressure less than 140/90       11/7/2023   [facility] EMERGENCY DEPT       Alvarado Taveras MD  11/07/23 0553       Alvarado Taveras,  MD  11/07/23 0554

## 2023-11-07 NOTE — MEDICATION SCRIBE - ADMISSION MEDICATION HISTORY
Medication Scribe Admission Medication History    Admission medication history is complete. The information provided in this note is only as accurate as the sources available at the time of the update.    Information Source(s): Patient and CareEverywhere/SureScripts via  in room with patient.    Pertinent Information: Patient filled Clopidogrel but never started taking it.  She continued to take 325 mg Aspirin daily.  She finished the script for Magnesium.    Stopped taking Ferrous Sulfate and Senokot.  Does not have Inhalers with her today.    Changes made to PTA medication list:  Added: Aspirin 325 mg tab.  Deleted: Ferrous Sulfate, Magnesium, Methocarbamol(for knee pain, had surgery and no longer needs this med), Senokot  Changed: Seroquel from daily to pm.    Medication Affordability:  Not including over the counter (OTC) medications, was there a time in the past 3 months when you did not take your medications as prescribed because of cost?: No    Allergies reviewed with patient and updates made in EHR: yes, no change.    Medication History Completed By: Silvia Mcmanus 11/7/2023 1:56 PM    PTA Med List   Medication Sig Last Dose    albuterol (PROAIR HFA/PROVENTIL HFA/VENTOLIN HFA) 108 (90 Base) MCG/ACT inhaler Inhale 2 puffs into the lungs every 6 hours as needed for shortness of breath or wheezing Past Month at not with today    amLODIPine (NORVASC) 5 MG tablet Take 1 tablet (5 mg) by mouth daily 11/6/2023 at am    aspirin (ASA) 325 MG tablet Take 325 mg by mouth daily 11/6/2023 at am    atorvastatin (LIPITOR) 40 MG tablet Take 1 tablet (40 mg) by mouth daily 11/6/2023 at am    calcium carbonate 600 mg-vitamin D 400 units (CALTRATE) 600-400 MG-UNIT per tablet Take 1 tablet by mouth every evening 11/6/2023 at pm    celecoxib (CELEBREX) 200 MG capsule Take 1 capsule (200 mg) by mouth daily 11/6/2023 at am    DULoxetine (CYMBALTA) 30 MG capsule Take 1 capsule (30 mg) by mouth 2 times daily 11/6/2023 at pm     levETIRAcetam (KEPPRA) 500 MG tablet Take 1 tablet (500 mg) by mouth 2 times daily 11/6/2023 at pm    levothyroxine (SYNTHROID/LEVOTHROID) 50 MCG tablet Take 1 tablet (50 mcg) by mouth daily 11/6/2023 at am    Melatonin 10 MG TABS tablet Take 20 mg by mouth At Bedtime 11/6/2023 at hs    metoprolol succinate ER (TOPROL XL) 50 MG 24 hr tablet TAKE ONE TABLET BY MOUTH TWICE DAILY 11/6/2023 at pm    multivitamin, therapeutic (THERA-VIT) TABS tablet Take 1 tablet by mouth daily 11/6/2023 at am    naloxone (NARCAN) 4 MG/0.1ML nasal spray Spray 1 spray (4 mg) into one nostril alternating nostrils once as needed for opioid reversal every 2-3 minutes until assistance arrives never used at on hand if needed    nystatin (MYCOSTATIN) 029058 UNIT/GM external powder Apply topically 3 times daily as needed (rash) More than a month at on hand if needed    oxyCODONE-acetaminophen (PERCOCET)  MG per tablet Take 1 tablet by mouth every 6 hours as needed for severe pain Weaning.  Max 60 for the next 30 days Past Week at on hand if needed    QUEtiapine (SEROQUEL) 50 MG tablet Take 2 tablets (100 mg) by mouth daily (Patient taking differently: Take 100 mg by mouth every evening) 11/6/2023 at pm    SPIRIVA HANDIHALER 18 MCG inhaled capsule USING THE HANDIHALER, INHALE THE CONTENTS OF ONE CAPSULE BY MOUTH DAILY 11/6/2023 at am, not with today

## 2023-11-07 NOTE — ED PROVIDER NOTES
Emergency Department Patient Sign-out       Brief HPI:  This is a 73 year old female signed out to me by Dr. Taveras.  See initial ED Provider note for details of the presentation.           Assessments & Plan (with Medical Decision Making)  73 year old female who presents to the Emergency Department for evaluation of shortness of breath.  This occurred during overnight hours.  Patient arrives afebrile, with oxygen saturations which are normal, in the low 90s on room air.  According to EMS report first responders had placed patient on supplemental oxygen for O2 sats that were in the 80s.     Patient does have some frequent falling asleep.  Her pupils are approximately midpoint, however does have opioid abuse, in addition to alcohol abuse on her medication list.  Some of her polypharmacy may be contributing to her sleepiness.     Patient, when awake, breathing normally, has normal oxygen saturations.  Does have underlying sleep apnea, in addition to asthma.     Patient with labs which are drawn, and CT scan is ordered.  Patient will be signed out to oncoming daytime provider pending repeat assessment, in addition to CT scan.  If feeling symptomatically improved, could consider discharge home, however will require close monitoring of oxygen saturations.      Patient's labs were reviewed.  Her alcohol level was noted to be 0.2.  Patient's white count was 3.4 hemoglobin 11.2 platelet count 211.  No left shift comprehensive metabolic panel without significant abnormality.  Patient's pH was 7.37 CO2 42 O2 32 bicarb 24 FiO2 of 21%.  Patient continued to desat at times and CT was obtained.  COVID influenza RSV negative.  CT PE protocol was reviewed this revealed no pulmonary embolism mildly enlarged central pulmonary arteries stable this can be seen in pulmonary hypertension moderate emphysema.  Patient's tachycardia remained and she received another liter of fluids without improvement.  I feel some of this may be due  to alcohol withdrawal and was given her Ativan.  She continues to desat down into the 80s with activity and although she has oxygen at home she is unstable with walking and I feel it would be dangerous to send her home at this time.  She remains on oxygen she is more alert and oriented at this time but I do not feel she should go home and  is agree with this.  I discussed case with  hospitalist at [facility] and he was in agreement with excepting the patient.  Patient was given her blood pressure medications and IV dose of metoprolol to try to slow the heart rate down I feel patient may need to receive more Ativan as she may be withdrawing from alcohol.  CIWA observation was ordered.  Patient admitted to hospitalist service in stable condition      Exam:   Patient Vitals for the past 24 hrs:   BP Temp Temp src Pulse Resp SpO2 Weight   11/07/23 0544 -- -- -- 116 10 97 % --   11/07/23 0529 -- -- -- 114 12 94 % --   11/07/23 0514 -- -- -- (!) 121 26 92 % --   11/07/23 0459 (!) 144/87 98.7  F (37.1  C) Oral 111 12 96 % 70.3 kg (155 lb)           ED RESULTS:   Results for orders placed or performed during the hospital encounter of 11/07/23 (from the past 24 hour(s))   Glucose by meter     Status: Normal    Collection Time: 11/07/23  5:08 AM   Result Value Ref Range    GLUCOSE BY METER POCT 97 70 - 99 mg/dL   CBC with platelets differential     Status: Abnormal    Collection Time: 11/07/23  5:50 AM    Narrative    The following orders were created for panel order CBC with platelets differential.  Procedure                               Abnormality         Status                     ---------                               -----------         ------                     CBC with platelets and d...[[id number]]  Abnormal            Final result                 Please view results for these tests on the individual orders.   Blood gas venous     Status: None    Collection Time: 11/07/23  5:50 AM   Result Value  Ref Range    pH Venous 7.37 7.32 - 7.43    pCO2 Venous 42 40 - 50 mm Hg    pO2 Venous 32 25 - 47 mm Hg    Bicarbonate Venous 24 21 - 28 mmol/L    Base Excess/Deficit -1.2 -7.7 - 1.9 mmol/L    FIO2 21    CBC with platelets and differential     Status: Abnormal    Collection Time: 11/07/23  5:50 AM   Result Value Ref Range    WBC Count 3.4 (L) 4.0 - 11.0 10e3/uL    RBC Count 3.42 (L) 3.80 - 5.20 10e6/uL    Hemoglobin 11.2 (L) 11.7 - 15.7 g/dL    Hematocrit 34.9 (L) 35.0 - 47.0 %     (H) 78 - 100 fL    MCH 32.7 26.5 - 33.0 pg    MCHC 32.1 31.5 - 36.5 g/dL    RDW 15.5 (H) 10.0 - 15.0 %    Platelet Count 211 150 - 450 10e3/uL    % Neutrophils 55 %    % Lymphocytes 26 %    % Monocytes 14 %    % Eosinophils 2 %    % Basophils 2 %    % Immature Granulocytes 1 %    NRBCs per 100 WBC 0 <1 /100    Absolute Neutrophils 1.9 1.6 - 8.3 10e3/uL    Absolute Lymphocytes 0.9 0.8 - 5.3 10e3/uL    Absolute Monocytes 0.5 0.0 - 1.3 10e3/uL    Absolute Eosinophils 0.1 0.0 - 0.7 10e3/uL    Absolute Basophils 0.1 0.0 - 0.2 10e3/uL    Absolute Immature Granulocytes 0.0 <=0.4 10e3/uL    Absolute NRBCs 0.0 10e3/uL       ED MEDICATIONS:   Medications - No data to display      Impression:    ICD-10-CM    1. SOB (shortness of breath)  R06.02       2. JOY (obstructive sleep apnea)  G47.33       3. Chronic pain syndrome  G89.4       4. Essential hypertension with goal blood pressure less than 140/90  I10       5. Hypoxia  R09.02       6. Alcohol abuse with intoxication (H24)  F10.129       7. Sinus tachycardia  R00.0           Plan:    Admit to hospitalist service [facility]      MD Ras Olivo, Alvarado Rice MD  11/07/23 [id number]

## 2023-11-07 NOTE — ED TRIAGE NOTES
Patient arrived via EMS after her spouse called for SHORTNESS OF AIR she is in/out of consciousness but does respond to her situation     Triage Assessment (Adult)       Row Name 11/07/23 0501          Respiratory WDL    Respiratory WDL X;rhythm/pattern        Skin Circulation/Temperature WDL    Skin Circulation/Temperature WDL X  multiple bruising        Cardiac WDL    Cardiac WDL X;rhythm     Pulse Rate & Regularity tachycardic        Peripheral/Neurovascular WDL    Peripheral Neurovascular WDL WDL        Cognitive/Neuro/Behavioral WDL    Cognitive/Neuro/Behavioral WDL X     Level of Consciousness intermittent confusion     Arousal Level arouses to touch/gentle shaking     Orientation person;situation     Mood/Behavior flat affect

## 2023-11-07 NOTE — ED NOTES
Patient states she used home O2 at 1.5 L prn. Oxygen per NC applied at 2 L/NC for sats dipping to 82%

## 2023-11-07 NOTE — H&P
"[facility]    History and Physical - Hospitalist Service       Date of Admission:  11/7/2023    Assessment & Plan      Jessica Ellison is a 73 year old female admitted on 11/7/2023. She presented to the ED with sudden onset of shortness of breath and is being admitted for workup and treatment.    COPD exacerbation with shortness of breath and hypoxia  Former smoker with 30 pack year history, quit in 2004. Uses home oxygen 1.5-2L. Currently on Spiriva daily and is compliant. Woke up this morning with severe shortness of breath, which started suddenly. SpO2 in ER in the low-mid 90s on 4L NC, but drop to mid-high 80s when oxygen is removed. Afebrile. Sustained tachycardia, which she states isn't normal for her. Troponin in ER 17 then 14. EKG in ER shows NSR with old anterior infarct. CTA of chest reveals \"No pulmonary embolism. Mildly enlarged central pulmonary artery, stable, which can be seen in pulmonary hypertension. Moderate emphysema.\" . Venous blood gas WNL. Influenza and COVID swabs negative. She received one treatment with a duoneb in the ER.   - Continue Spiriva and Duonebs  - Start Azithromycin 500mg once then 250mg every day   - Start Prednisone  - Supplemental oxygen as needed    Alcohol use disorder  She states she drinks \"a couple here and there throughout the day\" but cannot quantify how many per day. She had a BAC of 0.2 in the ER. States she has been in rehab \"a couple times\" with the last one being roughly 10 years ago. She and her  both want her to cut back on drinking. Denies hx of DT or withdrawal seizures. States she had tremors, chills, and diaphoresis when she arrived to the ER this morning, but those have since resolved. CIWA score was 10 in the ER this morning. She's received Ativan and 2L of NS in ER. Low risk for withdrawal since lack of prior episodes and symptoms have since resolved, so prophylactic treatment withheld at this time.  - " "Symptomatic treatment per order set.    Chronic pain with continuous opioid dependence  Patient has chronic opioid use for several years due to longstanding back and knee pain. Patient had lumbar spinal fusion surgery and left total knee replacement. Per family medicine note on 10/10/23 patient is weaning off of oxycodone over several months.   - Continue Oxycodone-Acetaminophen  Q6h PRN  - Continue Celecoxib     Hypertension  Longstanding history of well controlled hypertension. On Amlodipine and Metoprolol outpatient.   - Continue Amlodipine and Metoprolol    Obstructive sleep apnea  Uses CPAP \"most of the time\" at home.   - Continue CPAP    Anxiety  Last DAJUAN-7 score 3/13/23 was 0.   - Continue Queiapine    Major depressive disorder  Last PHQ-9 on 10/10/23 19, which was up from 3 on 3/13/23. She denied thoughts of self-harm or suicide on 10/10/23. On Cymbalta at home.   - Continue Cymbalta    Seizure disorder  Per neurology note on 10/13/23, possible epileptic seizure occurred on 10/6/23. Patient started on Keppra after that appointment.   - Continue Keppra    Hypothyroidism  On Levothyroxine. TSH levels in ER was 1.05.   - Continue Levothyroxine    History of cerebral aneurysm, nonruptured  On aspirin due to coiled aneurysm. Started on Plavix 10/16/23 by neurology.  - Continue Aspirin and Plavix.    Hyperlipidemia  - Continue Atorvastatin.    CKD stage 2  Creatinine in ER 0.73 (baseline 0.7-0.9).     S/p total knee arthroplasty, left  Total left knee replacement on 8/16/23.         Diet: Combination Diet Regular Diet Adult    DVT Prophylaxis: Aspirin and Clopidogrel  Bruce Catheter: Not present  Lines: None     Cardiac Monitoring: None  Code Status: Full Code      Clinically Significant Risk Factors Present on Admission         # Hypernatremia: Highest Na = 146 mmol/L in last 2 days, will monitor as appropriate    # Hypomagnesemia: Lowest Mg = 1.4 mg/dL in last 2 days, will replace as needed     # Drug " "Induced Platelet Defect: home medication list includes an antiplatelet medication   # Hypertension: Noted on problem list      # Overweight: Estimated body mass index is 27.46 kg/m  as calculated from the following:    Height as of 10/10/23: 1.6 m (5' 3\").    Weight as of this encounter: 70.3 kg (155 lb).       # COPD: noted on problem list        Disposition Plan      Expected Discharge Date: 11/08/2023                The patient's care was discussed with the Attending Physician, Dr. Myers .    Omer Gutierrez PA-C  Hospitalist Service  [facility]  Securely message with [employer] (more info)  Text page via [facility] Paging/Directory     ______________________________________________________________________    Chief Complaint   Shortness of breath     History is obtained from the patient and her spouse, Vj.    History of Present Illness   Jessica Ellison is a 73 year old female with a past medical history notable for COPD, alcohol use disorder, hypertension, chronic pain with continuous opioid dependence, JOY, non-ruptured cerebral aneurysm, hyperlipidemia, hypothyroidism, seizure disorder, and CKD stage 2.    She presents today with her  Vj due to shortness of breath that started suddenly this morning. Her  states her breathing has been progressively getting worse over the last month. She has oxygen at home and uses 1.5-2L. She states she uses her CPAP \"most of the time.\" She also has alcohol use disorder and states she drinks \"a couple here and there throughout the day\" but cannot quantify it. She has been in rehab \"a couple times\" in the past with the most recent being roughly 10 years ago. She denies a history of withdrawal seizures or DT. She states she was initially having tremors and diaphoresis when she arrived to the ER, but those have since resolved.     She denies weight changes, suicidal ideations, abdominal pain, n/v, chills, fevers, chest pain, confusion, " tremors, diaphoresis, visual or auditory hallucinations.    In the ER, her alcohol level was 0.2. Her troponin was 17 -> 14. EKG showed NSR with old anterior infarct. CT chest was negative for PE but revealed moderate emphysema and a stable mildly enlarged central pulmonary artery which can be seen in pulmonary hypertension. She's received 1 dose of Ativan, 1 duoneb, and 2L of NS in ER.      Past Medical History    Past Medical History:   Diagnosis Date    Anemia     Aneurysm (H24)     Antiplatelet or antithrombotic long-term use     Arthritis     Chemical dependency (H)     Chem Dep RX    Depression     History of blood transfusion     Hypertension     Menopausal symptoms     Other chronic pain     Lower back and hips    Sleep apnea     Sleep disorder     Thyroid disease     Tobacco abuse     Uncomplicated asthma        Past Surgical History   Past Surgical History:   Procedure Laterality Date    ABDOMEN SURGERY      APPENDECTOMY  1960    APPENDECTOMY      ARTHROPLASTY KNEE Left 8/16/2023    Procedure: LEFT TOTAL KNEE ARTHROPLASTY;  Surgeon: Bryan Roque MD;  Location: [facility] Main OR    BACK SURGERY      BIOPSY BREAST      cerebral aneurysm  2014    coiled    CEREBRAL ANEURYSM REPAIR      COLON SURGERY      COLONOSCOPY  04/19/2005    FRACTURE SURGERY      HC EXCISION BREAST LESION, OPEN >=1      core biopsy 2006, lumpectomy 2006    HERNIA REPAIR      LAP VENTRAL HERNIA REPAIR  12/2017    [place]    LAPAROSCOPIC ASSISTED HYSTERECTOMY VAGINAL  12/2017    LUMPECTOMY BREAST      orif left femoral neck Left 06/03/2016    stress fracture.  done at [facility]    SURGICAL HISTORY OF -   2004    Skin Graft    ZZC PART REMOVAL COLON W ANASTOMOSIS  05/2005    diverticulitis    ZZC SPINE FUSION,ANTER,8+ SGMTS  2007    Anterior posterior fusion 10 levels, hardware removal and re-fusion 2009       Prior to Admission Medications   Prior to Admission Medications   Prescriptions Last Dose Informant Patient Reported? Taking?    DULoxetine (CYMBALTA) 30 MG capsule 11/6/2023 at pm Self No Yes   Sig: Take 1 capsule (30 mg) by mouth 2 times daily   Melatonin 10 MG TABS tablet 11/6/2023 at hs Self Yes Yes   Sig: Take 20 mg by mouth At Bedtime   QUEtiapine (SEROQUEL) 50 MG tablet 11/6/2023 at pm Self No Yes   Sig: Take 2 tablets (100 mg) by mouth daily   Patient taking differently: Take 100 mg by mouth every evening   SPIRIVA HANDIHALER 18 MCG inhaled capsule 11/6/2023 at am, not with today Self No Yes   Sig: USING THE HANDIHALER, INHALE THE CONTENTS OF ONE CAPSULE BY MOUTH DAILY   albuterol (PROAIR HFA/PROVENTIL HFA/VENTOLIN HFA) 108 (90 Base) MCG/ACT inhaler Past Month at not with today Self Yes Yes   Sig: Inhale 2 puffs into the lungs every 6 hours as needed for shortness of breath or wheezing   amLODIPine (NORVASC) 5 MG tablet 11/6/2023 at am Self No Yes   Sig: Take 1 tablet (5 mg) by mouth daily   aspirin (ASA) 325 MG tablet 11/6/2023 at am Self Yes Yes   Sig: Take 325 mg by mouth daily   atorvastatin (LIPITOR) 40 MG tablet 11/6/2023 at am Self No Yes   Sig: Take 1 tablet (40 mg) by mouth daily   calcium carbonate 600 mg-vitamin D 400 units (CALTRATE) 600-400 MG-UNIT per tablet 11/6/2023 at pm Self Yes Yes   Sig: Take 1 tablet by mouth every evening   celecoxib (CELEBREX) 200 MG capsule 11/6/2023 at am Self No Yes   Sig: Take 1 capsule (200 mg) by mouth daily   clopidogrel (PLAVIX) 75 MG tablet filled, never took any at N/A Self No No   Sig: Take 1 tablet (75 mg) by mouth daily   levETIRAcetam (KEPPRA) 500 MG tablet 11/6/2023 at pm Self No Yes   Sig: Take 1 tablet (500 mg) by mouth 2 times daily   levothyroxine (SYNTHROID/LEVOTHROID) 50 MCG tablet 11/6/2023 at am Self No Yes   Sig: Take 1 tablet (50 mcg) by mouth daily   metoprolol succinate ER (TOPROL XL) 50 MG 24 hr tablet 11/6/2023 at pm Self No Yes   Sig: TAKE ONE TABLET BY MOUTH TWICE DAILY   multivitamin, therapeutic (THERA-VIT) TABS tablet 11/6/2023 at am Self Yes Yes   Sig: Take 1  tablet by mouth daily   naloxone (NARCAN) 4 MG/0.1ML nasal spray never used at on hand if needed Self No Yes   Sig: Spray 1 spray (4 mg) into one nostril alternating nostrils once as needed for opioid reversal every 2-3 minutes until assistance arrives   nystatin (MYCOSTATIN) 915904 UNIT/GM external powder More than a month at on hand if needed Self Yes Yes   Sig: Apply topically 3 times daily as needed (rash)   oxyCODONE-acetaminophen (PERCOCET)  MG per tablet Past Week at on hand if needed Self No Yes   Sig: Take 1 tablet by mouth every 6 hours as needed for severe pain Weaning.  Max 60 for the next 30 days      Facility-Administered Medications: None        Social History   I have reviewed this patient's social history and updated it with pertinent information if needed.  Social History     Tobacco Use    Smoking status: Former     Packs/day: 1.00     Years: 30.00     Additional pack years: 0.00     Total pack years: 30.00     Types: Cigarettes     Quit date: 2004     Years since quittin.8    Smokeless tobacco: Never   Vaping Use    Vaping Use: Never used   Substance Use Topics    Alcohol use: Yes     Comment: Glass of wine a few times a week. ETOH dependency, DUI 3/15/10; 1-2 ETOH per week    Drug use: Yes     Types: Oxycodone     Comment: percocet for the past several years         Family History   I have reviewed this patient's family history and updated it with pertinent information if needed.  Family History   Problem Relation Age of Onset    Cancer Mother         breast/lung    Alcohol/Drug Mother     Depression Mother     Cancer - colorectal Father     Alcohol/Drug Father     Diabetes Maternal Grandmother     Diabetes Maternal Grandfather     Diabetes Paternal Grandmother     Diabetes Paternal Grandfather     Cancer Paternal Grandfather     Gastrointestinal Disease Paternal Grandfather     Congenital Anomalies Son     Hypertension Brother     Adrenal Disorder Brother         had  adrenalectomies    Cerebral palsy Granddaughter          Allergies   Allergies   Allergen Reactions    Bee Venom     Lisinopril Swelling     Oral swelling        Physical Exam   Vital Signs: Temp: 98.7  F (37.1  C) Temp src: Oral BP: (!) 173/118 Pulse: 102   Resp: 10 SpO2: 94 % O2 Device: None (Room air)    Weight: 155 lbs 0 oz    Constitutional: awake, alert, cooperative, no apparent distress, and appears stated age  Eyes: pupils equal, round and reactive to light, sclera non-icteric, and conjunctiva normal  Respiratory: tachypneic, mildly decreased air exchange, and scattered crackles throughout all lung fields  Cardiovascular: regular rate and rhythm and normal S1 and S2 without murmurs, rubs, or gallops. Pulses equal bilaterally. Trace bilateral lower extremity edema.  GI: normal bowel sounds, non-distended, non-tender, and no masses palpated  Skin: no redness, warmth, or swelling  Musculoskeletal: full range of motion noted  Neurologic: Awake, alert, oriented to name, place and time.  Cranial nerves II-XII are grossly intact.     Medical Decision Making       60 MINUTES SPENT BY ME on the date of service doing chart review, history, exam, documentation & further activities per the note.      Data     I have personally reviewed the following data over the past 24 hrs:    3.4 (L)  \   11.2 (L)   / 211     146 (H) 107 12.6 /  101 (H)   3.7 24 0.73 \     ALT: 25 AST: 42 AP: 109 (H) TBILI: 0.4   ALB: 3.9 TOT PROTEIN: 6.4 LIPASE: N/A     Trop: 14 BNP: 152     TSH: 1.05 T4: N/A A1C: N/A       Imaging results reviewed over the past 24 hrs:   Recent Results (from the past 24 hour(s))   CT Chest Pulmonary Embolism w Contrast    Narrative    EXAM: CT CHEST PULMONARY EMBOLISM W CONTRAST  LOCATION: [facility]  DATE: 11/7/2023    INDICATION: SOB.  hypoxia  COMPARISON: CT chest 10/06/2023  TECHNIQUE: CT chest pulmonary angiogram during arterial phase injection of IV contrast. Multiplanar reformats  and MIP reconstructions were performed. Dose reduction techniques were used.   CONTRAST: 70 mL Isovue 370    FINDINGS:  ANGIOGRAM CHEST: No pulmonary embolism. Enlarged main pulmonary artery, 3.5 cm, unchanged. Thoracic aorta is negative for dissection. No CT evidence of right heart strain.    LUNGS AND PLEURA: Moderate emphysema. Mild bibasilar bandlike opacities likely atelectasis. Mild dependent atelectasis. No consolidation. No pleural effusion.    MEDIASTINUM/AXILLAE: No pericardial effusion. No lymphadenopathy. Normal esophagus.    CORONARY ARTERY CALCIFICATION: Mild.    UPPER ABDOMEN: Cholecystectomy.    MUSCULOSKELETAL: Spinal degenerative changes.] Lumbar fusion hardware..      Impression    IMPRESSION:  1.  No pulmonary embolism.  2.  Mildly enlarged central pulmonary artery, stable, which can be seen in pulmonary hypertension.  3.  Moderate emphysema.

## 2023-11-08 PROBLEM — Z87.891 PERSONAL HISTORY OF TOBACCO USE, PRESENTING HAZARDS TO HEALTH: Status: ACTIVE | Noted: 2023-11-08

## 2023-11-08 PROBLEM — R00.0 SINUS TACHYCARDIA: Status: ACTIVE | Noted: 2023-11-08

## 2023-11-08 LAB
ANION GAP SERPL CALCULATED.3IONS-SCNC: 10 MMOL/L (ref 7–15)
BASOPHILS # BLD AUTO: 0 10E3/UL (ref 0–0.2)
BASOPHILS NFR BLD AUTO: 0 %
BUN SERPL-MCNC: 13.6 MG/DL (ref 8–23)
CALCIUM SERPL-MCNC: 8.1 MG/DL (ref 8.8–10.2)
CHLORIDE SERPL-SCNC: 106 MMOL/L (ref 98–107)
CREAT SERPL-MCNC: 0.61 MG/DL (ref 0.51–0.95)
DEPRECATED HCO3 PLAS-SCNC: 22 MMOL/L (ref 22–29)
EGFRCR SERPLBLD CKD-EPI 2021: >90 ML/MIN/1.73M2
EOSINOPHIL # BLD AUTO: 0 10E3/UL (ref 0–0.7)
EOSINOPHIL NFR BLD AUTO: 0 %
ERYTHROCYTE [DISTWIDTH] IN BLOOD BY AUTOMATED COUNT: 15.4 % (ref 10–15)
GLUCOSE SERPL-MCNC: 130 MG/DL (ref 70–99)
HCT VFR BLD AUTO: 30.5 % (ref 35–47)
HGB BLD-MCNC: 9.6 G/DL (ref 11.7–15.7)
IMM GRANULOCYTES # BLD: 0.1 10E3/UL
IMM GRANULOCYTES NFR BLD: 2 %
LYMPHOCYTES # BLD AUTO: 0.3 10E3/UL (ref 0.8–5.3)
LYMPHOCYTES NFR BLD AUTO: 11 %
MAGNESIUM SERPL-MCNC: 1.5 MG/DL (ref 1.7–2.3)
MAGNESIUM SERPL-MCNC: 3 MG/DL (ref 1.7–2.3)
MCH RBC QN AUTO: 32.2 PG (ref 26.5–33)
MCHC RBC AUTO-ENTMCNC: 31.5 G/DL (ref 31.5–36.5)
MCV RBC AUTO: 102 FL (ref 78–100)
MONOCYTES # BLD AUTO: 0.3 10E3/UL (ref 0–1.3)
MONOCYTES NFR BLD AUTO: 9 %
NEUTROPHILS # BLD AUTO: 2.5 10E3/UL (ref 1.6–8.3)
NEUTROPHILS NFR BLD AUTO: 78 %
NRBC # BLD AUTO: 0 10E3/UL
NRBC BLD AUTO-RTO: 0 /100
PLATELET # BLD AUTO: 195 10E3/UL (ref 150–450)
POTASSIUM SERPL-SCNC: 4.1 MMOL/L (ref 3.4–5.3)
RBC # BLD AUTO: 2.98 10E6/UL (ref 3.8–5.2)
SODIUM SERPL-SCNC: 138 MMOL/L (ref 135–145)
WBC # BLD AUTO: 3.2 10E3/UL (ref 4–11)

## 2023-11-08 PROCEDURE — 250N000013 HC RX MED GY IP 250 OP 250 PS 637: Performed by: EMERGENCY MEDICINE

## 2023-11-08 PROCEDURE — 999N000157 HC STATISTIC RCP TIME EA 10 MIN

## 2023-11-08 PROCEDURE — 250N000012 HC RX MED GY IP 250 OP 636 PS 637

## 2023-11-08 PROCEDURE — 250N000011 HC RX IP 250 OP 636: Mod: JZ | Performed by: STUDENT IN AN ORGANIZED HEALTH CARE EDUCATION/TRAINING PROGRAM

## 2023-11-08 PROCEDURE — 120N000001 HC R&B MED SURG/OB

## 2023-11-08 PROCEDURE — 250N000009 HC RX 250

## 2023-11-08 PROCEDURE — 250N000013 HC RX MED GY IP 250 OP 250 PS 637: Performed by: STUDENT IN AN ORGANIZED HEALTH CARE EDUCATION/TRAINING PROGRAM

## 2023-11-08 PROCEDURE — 96375 TX/PRO/DX INJ NEW DRUG ADDON: CPT

## 2023-11-08 PROCEDURE — 94640 AIRWAY INHALATION TREATMENT: CPT

## 2023-11-08 PROCEDURE — 36415 COLL VENOUS BLD VENIPUNCTURE: CPT | Performed by: STUDENT IN AN ORGANIZED HEALTH CARE EDUCATION/TRAINING PROGRAM

## 2023-11-08 PROCEDURE — 83735 ASSAY OF MAGNESIUM: CPT | Performed by: STUDENT IN AN ORGANIZED HEALTH CARE EDUCATION/TRAINING PROGRAM

## 2023-11-08 PROCEDURE — 82310 ASSAY OF CALCIUM: CPT | Performed by: EMERGENCY MEDICINE

## 2023-11-08 PROCEDURE — 36415 COLL VENOUS BLD VENIPUNCTURE: CPT | Performed by: EMERGENCY MEDICINE

## 2023-11-08 PROCEDURE — 85025 COMPLETE CBC W/AUTO DIFF WBC: CPT | Performed by: EMERGENCY MEDICINE

## 2023-11-08 PROCEDURE — 94640 AIRWAY INHALATION TREATMENT: CPT | Mod: 76

## 2023-11-08 PROCEDURE — 250N000013 HC RX MED GY IP 250 OP 250 PS 637

## 2023-11-08 PROCEDURE — 99232 SBSQ HOSP IP/OBS MODERATE 35: CPT | Performed by: STUDENT IN AN ORGANIZED HEALTH CARE EDUCATION/TRAINING PROGRAM

## 2023-11-08 PROCEDURE — G0378 HOSPITAL OBSERVATION PER HR: HCPCS

## 2023-11-08 RX ORDER — FLUTICASONE FUROATE AND VILANTEROL 100; 25 UG/1; UG/1
1 POWDER RESPIRATORY (INHALATION) DAILY
Status: DISCONTINUED | OUTPATIENT
Start: 2023-11-08 | End: 2023-11-09 | Stop reason: HOSPADM

## 2023-11-08 RX ORDER — MAGNESIUM SULFATE HEPTAHYDRATE 40 MG/ML
4 INJECTION, SOLUTION INTRAVENOUS ONCE
Status: COMPLETED | OUTPATIENT
Start: 2023-11-08 | End: 2023-11-08

## 2023-11-08 RX ADMIN — LEVOTHYROXINE SODIUM 50 MCG: 50 TABLET ORAL at 09:02

## 2023-11-08 RX ADMIN — IPRATROPIUM BROMIDE AND ALBUTEROL SULFATE 3 ML: 2.5; .5 SOLUTION RESPIRATORY (INHALATION) at 08:26

## 2023-11-08 RX ADMIN — OXYCODONE HYDROCHLORIDE 10 MG: 5 TABLET ORAL at 21:39

## 2023-11-08 RX ADMIN — PREDNISONE 40 MG: 20 TABLET ORAL at 09:02

## 2023-11-08 RX ADMIN — METOPROLOL SUCCINATE 50 MG: 50 TABLET, EXTENDED RELEASE ORAL at 09:02

## 2023-11-08 RX ADMIN — CLOPIDOGREL BISULFATE 75 MG: 75 TABLET ORAL at 09:02

## 2023-11-08 RX ADMIN — DULOXETINE HYDROCHLORIDE 30 MG: 30 CAPSULE, DELAYED RELEASE ORAL at 09:02

## 2023-11-08 RX ADMIN — LEVETIRACETAM 500 MG: 500 TABLET, FILM COATED ORAL at 09:02

## 2023-11-08 RX ADMIN — ACETAMINOPHEN 325 MG: 325 TABLET, FILM COATED ORAL at 09:16

## 2023-11-08 RX ADMIN — IPRATROPIUM BROMIDE AND ALBUTEROL SULFATE 3 ML: 2.5; .5 SOLUTION RESPIRATORY (INHALATION) at 20:16

## 2023-11-08 RX ADMIN — MAGNESIUM SULFATE HEPTAHYDRATE 4 G: 40 INJECTION, SOLUTION INTRAVENOUS at 09:02

## 2023-11-08 RX ADMIN — OXYCODONE HYDROCHLORIDE 10 MG: 5 TABLET ORAL at 09:16

## 2023-11-08 RX ADMIN — Medication 5 MG: at 21:39

## 2023-11-08 RX ADMIN — DULOXETINE HYDROCHLORIDE 30 MG: 30 CAPSULE, DELAYED RELEASE ORAL at 19:06

## 2023-11-08 RX ADMIN — ENOXAPARIN SODIUM 40 MG: 40 INJECTION SUBCUTANEOUS at 19:07

## 2023-11-08 RX ADMIN — LEVETIRACETAM 500 MG: 500 TABLET, FILM COATED ORAL at 19:06

## 2023-11-08 RX ADMIN — FOLIC ACID 1 MG: 1 TABLET ORAL at 09:02

## 2023-11-08 RX ADMIN — ATORVASTATIN CALCIUM 40 MG: 20 TABLET, FILM COATED ORAL at 09:02

## 2023-11-08 RX ADMIN — METOPROLOL SUCCINATE 50 MG: 50 TABLET, EXTENDED RELEASE ORAL at 19:06

## 2023-11-08 RX ADMIN — AMLODIPINE BESYLATE 5 MG: 5 TABLET ORAL at 09:02

## 2023-11-08 RX ADMIN — THIAMINE HCL TAB 100 MG 100 MG: 100 TAB at 09:02

## 2023-11-08 RX ADMIN — FLUTICASONE FUROATE AND VILANTEROL TRIFENATATE 1 PUFF: 100; 25 POWDER RESPIRATORY (INHALATION) at 15:56

## 2023-11-08 RX ADMIN — CELECOXIB 200 MG: 200 CAPSULE ORAL at 10:03

## 2023-11-08 RX ADMIN — QUETIAPINE 100 MG: 100 TABLET ORAL at 21:39

## 2023-11-08 RX ADMIN — AZITHROMYCIN DIHYDRATE 250 MG: 250 TABLET, FILM COATED ORAL at 15:56

## 2023-11-08 RX ADMIN — MULTIPLE VITAMINS W/ MINERALS TAB 1 TABLET: TAB at 09:02

## 2023-11-08 ASSESSMENT — ACTIVITIES OF DAILY LIVING (ADL)
ADLS_ACUITY_SCORE: 26
ADLS_ACUITY_SCORE: 24
ADLS_ACUITY_SCORE: 26
ADLS_ACUITY_SCORE: 24
ADLS_ACUITY_SCORE: 24
ADLS_ACUITY_SCORE: 26
ADLS_ACUITY_SCORE: 26

## 2023-11-08 NOTE — PROGRESS NOTES
"[facility] ADMISSION NOTE    Patient admitted to room 2311 at approximately 1730 via cart from emergency room. Patient was accompanied by transport tech.     Verbal SBAR report received from TWILA Lopez prior to patient arrival.     Patient ambulated to bed with one assist. Patient alert and oriented X 3. The patient is not having any pain.  . Admission vital signs: Blood pressure (!) 181/101, pulse 107, temperature 98.6  F (37  C), temperature source Oral, resp. rate 14, height 1.588 m (5' 2.5\"), weight 75.1 kg (165 lb 9.1 oz), SpO2 95%, not currently breastfeeding. Patient was oriented to plan of care, call light, bed controls, tv, telephone, bathroom, and visiting hours.     Risk Assessment    The following safety risks were identified during admission: fall. Yellow risk band applied: YES.     Skin Initial Assessment    This writer admitted this patient and completed a full skin assessment and Bennett score in the Adult PCS flowsheet. Appropriate interventions initiated as needed.     Secondary skin check completed by TWILA Alves.         Education    Patient has a [place] to Observation order: Yes  Observation education completed and documented: Yes      Alina Goodwin RN      "

## 2023-11-08 NOTE — PROGRESS NOTES
"[facility]    Medicine Progress Note - Hospitalist Service    Date of Admission:  11/7/2023    Assessment & Plan      Jessica Ellison is a 73 year old female admitted on 11/7/2023. She presented to the ED with sudden onset of shortness of breath and is being admitted for workup and treatment.    COPD exacerbation with shortness of breath and hypoxia  Chronic Respiratory Failure  Home O2 prn 1-2L  Former smoker with 30 pack year history, quit in 2004. Uses home oxygen 1.5-2L. Currently on Spiriva daily and is compliant. Woke up this morning with severe shortness of breath, which started suddenly. SpO2 in ER in the low-mid 90s on 4L NC, but drop to mid-high 80s when oxygen is removed. Afebrile. Sustained tachycardia, which she states isn't normal for her. Troponin in ER 17 then 14. EKG in ER shows NSR with old anterior infarct. CTA of chest reveals \"No pulmonary embolism. Mildly enlarged central pulmonary artery, stable, which can be seen in pulmonary hypertension. Moderate emphysema.\" . Venous blood gas WNL. Influenza and COVID swabs negative. She received one treatment with a duoneb in the ER.   - Continue Duonebs scheduled today 11/08, then plan to add back spiriva tomorrow with prn albuterol nebs  - Start ICS/LABA with pharmacy consult to check which combo inhaler would be affordable  - Start Azithromycin 500mg once then 250mg every day   - Start Prednisone  - Supplemental oxygen as needed    Alcohol use disorder  She states she drinks \"a couple here and there throughout the day\" but cannot quantify how many per day. She had a BAC of 0.2 in the ER. States she has been in rehab \"a couple times\" with the last one being roughly 10 years ago. She and her  both want her to cut back on drinking. Denies hx of DT or withdrawal seizures. States she had tremors, chills, and diaphoresis when she arrived to the ER this morning, but those have since resolved. CIWA score was 10 in the " "ER this morning. She's received Ativan and 2L of NS in ER. Low risk for withdrawal since lack of prior episodes and symptoms have since resolved, so prophylactic treatment withheld at this time.  - Symptomatic treatment per order set.    Chronic pain with continuous opioid dependence  Patient has chronic opioid use for several years due to longstanding back and knee pain. Patient had lumbar spinal fusion surgery and left total knee replacement. Per family medicine note on 10/10/23 patient is weaning off of oxycodone over several months.   - Continue Oxycodone-Acetaminophen  Q6h PRN  - Continue Celecoxib     Hypertension  Longstanding history of well controlled hypertension. On Amlodipine and Metoprolol outpatient.   - Continue Amlodipine and Metoprolol    Obstructive sleep apnea  Uses CPAP \"most of the time\" at home.   - Continue CPAP    Anxiety  Last DAJUAN-7 score 3/13/23 was 0.   - Continue Queiapine    Major depressive disorder  Last PHQ-9 on 10/10/23 19, which was up from 3 on 3/13/23. She denied thoughts of self-harm or suicide on 10/10/23. On Cymbalta at home.   - Continue Cymbalta    Seizure disorder  Per neurology note on 10/13/23, possible epileptic seizure occurred on 10/6/23. Patient started on Keppra after that appointment.   - Continue Keppra    Hypothyroidism  On Levothyroxine. TSH levels in ER was 1.05.   - Continue Levothyroxine    History of cerebral aneurysm, nonruptured  S/p coiling of aneurysm. Started on Plavix 10/16/23 by neurology.  - Continue  Plavix.    Hyperlipidemia  - Continue Atorvastatin.    CKD stage 2  Creatinine in ER 0.73 (baseline 0.7-0.9).     S/p total knee arthroplasty, left  Total left knee replacement on 8/16/23.           Diet: Low Saturated Fat Na <2400 mg    DVT Prophylaxis: Enoxaparin (Lovenox) SQ  Bruce Catheter: Not present  Lines: None     Cardiac Monitoring: None  Code Status: Full Code      Clinically Significant Risk Factors Present on Admission         # " "Hypernatremia: Highest Na = 146 mmol/L in last 2 days, will monitor as appropriate  # Hypocalcemia: Lowest Ca = 8.1 mg/dL in last 2 days, will monitor and replace as appropriate   # Hypomagnesemia: Lowest Mg = 1.4 mg/dL in last 2 days, will replace as needed     # Drug Induced Platelet Defect: home medication list includes an antiplatelet medication   # Hypertension: Noted on problem list      # Overweight: Estimated body mass index is 29.8 kg/m  as calculated from the following:    Height as of this encounter: 1.588 m (5' 2.5\").    Weight as of this encounter: 75.1 kg (165 lb 9.1 oz).       # COPD: noted on problem list        Disposition Plan      Expected Discharge Date: 11/09/2023                    Waylon Myers DO  Hospitalist Service  [facility]  Securely message with [employer] (more info)  Text page via [employer] Paging/Directory   ______________________________________________________________________    Interval History   No acute events overnight. Received 3 doses of CIWA sx triggered ativan.     Physical Exam   Vital Signs: Temp: 97.9  F (36.6  C) Temp src: Oral BP: 134/76 Pulse: 88   Resp: 16 SpO2: 95 % O2 Device: Nasal cannula Oxygen Delivery: 1.5 LPM  Weight: 165 lbs 9.05 oz    Physical Exam  Constitutional:       Appearance: She is not toxic-appearing.   HENT:      Head: Normocephalic and atraumatic.      Nose: Nose normal.   Eyes:      General: No scleral icterus.     Conjunctiva/sclera: Conjunctivae normal.   Pulmonary:      Effort: Pulmonary effort is normal.   Skin:     General: Skin is dry.      Findings: No rash.   Neurological:      General: No focal deficit present.      Mental Status: She is alert.   Psychiatric:         Mood and Affect: Mood normal.         Behavior: Behavior normal.           Medical Decision Making       35 MINUTES SPENT BY ME on the date of service doing chart review, history, exam, documentation & further activities per the note.      Data     I have " personally reviewed the following data over the past 24 hrs:    3.2 (L)  \   9.6 (L)   / 195     138 106 13.6 /  130 (H)   4.1 22 0.61 \     Trop: N/A BNP: N/A       Imaging results reviewed over the past 24 hrs:   No results found for this or any previous visit (from the past 24 hour(s)).

## 2023-11-08 NOTE — PLAN OF CARE
"  Problem: Adult Inpatient Plan of Care  Goal: Plan of Care Review  Description: The Plan of Care Review/Shift note should be completed every shift.  The Outcome Evaluation is a brief statement about your assessment that the patient is improving, declining, or no change.  This information will be displayed automatically on your shift  note.  Outcome: Not Progressing  Goal: Patient-Specific Goal (Individualized)  Description: You can add care plan individualizations to a care plan. Examples of Individualization might be:  \"Parent requests to be called daily at 9am for status\", \"I have a hard time hearing out of my right ear\", or \"Do not touch me to wake me up as it startles  me\".  Outcome: Not Progressing  Goal: Absence of Hospital-Acquired Illness or Injury  Outcome: Not Progressing  Intervention: Identify and Manage Fall Risk  Recent Flowsheet Documentation  Taken 11/7/2023 1745 by Alina Goodwin, RN  Safety Promotion/Fall Prevention: activity supervised  Goal: Optimal Comfort and Wellbeing  Outcome: Not Progressing  Goal: Readiness for Transition of Care  Outcome: Not Progressing  Intervention: Mutually Develop Transition Plan  Recent Flowsheet Documentation  Taken 11/7/2023 1740 by Alina Goodwin, RN  Equipment Currently Used at Home:   cane, straight   raised toilet seat     Problem: Alcohol Withdrawal  Goal: Alcohol Withdrawal Symptom Control  Outcome: Not Progressing  Goal: Optimal Neurologic Function  Outcome: Not Progressing  Goal: Readiness for Change Identified  Outcome: Not Progressing   Goal Outcome Evaluation:       CIWA 8 for tremors and anxiety. 1 mg PO ativan given. Assist x 1 with walker.  Alert and oriented.                  "

## 2023-11-08 NOTE — PROGRESS NOTES
Skin affirmation note    Admitting nurse completed full skin assessment, Bennett score and Bennett interventions. This writer agrees with the initial skin assessment findings.

## 2023-11-08 NOTE — PLAN OF CARE
Problem: Adult Inpatient Plan of Care  Goal: Absence of Hospital-Acquired Illness or Injury  Intervention: Identify and Manage Fall Risk  Recent Flowsheet Documentation  Taken 11/8/2023 0000 by Devick, Karen M, RN  Safety Promotion/Fall Prevention:   clutter free environment maintained   activity supervised   safety round/check completed   room organization consistent   mobility aid in reach   nonskid shoes/slippers when out of bed     Problem: Adult Inpatient Plan of Care  Goal: Optimal Comfort and Wellbeing  Outcome: Progressing     Problem: Alcohol Withdrawal  Goal: Alcohol Withdrawal Symptom Control  Outcome: Progressing  Goal: Optimal Neurologic Function  Outcome: Progressing     Goal Outcome Evaluation: CIWA's trending down; patient has anxiety at baseline, continues to have some tremors. Up with assist of one walker and gait belt to prevent falls; recent left knee replacement surgery (8/2023). Managing chronic back pain with meds (see MAR). CPAP at night on 3 Liters O2; during the day patient was on 2 Liters O2. Tachycardic in the low 100's.

## 2023-11-08 NOTE — PROGRESS NOTES
Alert and oriented. Ciwa score improving, slight tremor with arms extended.  Denies nausea or headache.  Patient chronic back pain controlled with as needed Oxycodone and Tylenol.   Patient wearing 2 lmp oxygen via nasal cannula during daytime with oxygen saturation 88% to 95%.

## 2023-11-08 NOTE — CONSULTS
Care Management Note:     Care Management team received referral due to elevated unplanned re-admission risk score.       Per IDT rounds, EMR review, discussion with pt's hospital medical provider, and discussion with bedside RN, it has been discussed that pt will discharge to home with no identifiable discharge needs.      SW requested scheduling of hospital follow-up appointment with [facility] team and pt will need hand off to clinic care coordination team at discharge due to elevated unplanned readmission risk score.     Care Management will close referral at this time, but please place new consult should pt develop new needs during this stay.        GALLITO William  Care Transitions   Tele: [phone]

## 2023-11-09 VITALS
WEIGHT: 165.57 LBS | OXYGEN SATURATION: 96 % | SYSTOLIC BLOOD PRESSURE: 106 MMHG | HEIGHT: 63 IN | DIASTOLIC BLOOD PRESSURE: 69 MMHG | RESPIRATION RATE: 12 BRPM | HEART RATE: 89 BPM | TEMPERATURE: 96.1 F | BODY MASS INDEX: 29.34 KG/M2

## 2023-11-09 LAB
FERRITIN SERPL-MCNC: 243 NG/ML (ref 11–328)
FOLATE SERPL-MCNC: >40 NG/ML (ref 4.6–34.8)
HOLD SPECIMEN: NORMAL
IRON BINDING CAPACITY (ROCHE): 261 UG/DL (ref 240–430)
IRON SATN MFR SERPL: 15 % (ref 15–46)
IRON SERPL-MCNC: 40 UG/DL (ref 37–145)
MAGNESIUM SERPL-MCNC: 2.2 MG/DL (ref 1.7–2.3)
POTASSIUM SERPL-SCNC: 3.8 MMOL/L (ref 3.4–5.3)
VIT B12 SERPL-MCNC: 279 PG/ML (ref 232–1245)

## 2023-11-09 PROCEDURE — 82728 ASSAY OF FERRITIN: CPT | Performed by: STUDENT IN AN ORGANIZED HEALTH CARE EDUCATION/TRAINING PROGRAM

## 2023-11-09 PROCEDURE — 99239 HOSP IP/OBS DSCHRG MGMT >30: CPT | Performed by: STUDENT IN AN ORGANIZED HEALTH CARE EDUCATION/TRAINING PROGRAM

## 2023-11-09 PROCEDURE — 82607 VITAMIN B-12: CPT | Performed by: STUDENT IN AN ORGANIZED HEALTH CARE EDUCATION/TRAINING PROGRAM

## 2023-11-09 PROCEDURE — 94640 AIRWAY INHALATION TREATMENT: CPT

## 2023-11-09 PROCEDURE — 250N000013 HC RX MED GY IP 250 OP 250 PS 637: Performed by: EMERGENCY MEDICINE

## 2023-11-09 PROCEDURE — 999N000157 HC STATISTIC RCP TIME EA 10 MIN

## 2023-11-09 PROCEDURE — 999N000215 HC STATISTIC HFNC ADULT NON-CPAP

## 2023-11-09 PROCEDURE — 250N000012 HC RX MED GY IP 250 OP 636 PS 637

## 2023-11-09 PROCEDURE — 250N000009 HC RX 250

## 2023-11-09 PROCEDURE — 83550 IRON BINDING TEST: CPT | Performed by: STUDENT IN AN ORGANIZED HEALTH CARE EDUCATION/TRAINING PROGRAM

## 2023-11-09 PROCEDURE — 36415 COLL VENOUS BLD VENIPUNCTURE: CPT | Performed by: STUDENT IN AN ORGANIZED HEALTH CARE EDUCATION/TRAINING PROGRAM

## 2023-11-09 PROCEDURE — 84132 ASSAY OF SERUM POTASSIUM: CPT | Performed by: STUDENT IN AN ORGANIZED HEALTH CARE EDUCATION/TRAINING PROGRAM

## 2023-11-09 PROCEDURE — 94640 AIRWAY INHALATION TREATMENT: CPT | Mod: 76

## 2023-11-09 PROCEDURE — 250N000013 HC RX MED GY IP 250 OP 250 PS 637

## 2023-11-09 PROCEDURE — 82746 ASSAY OF FOLIC ACID SERUM: CPT | Performed by: STUDENT IN AN ORGANIZED HEALTH CARE EDUCATION/TRAINING PROGRAM

## 2023-11-09 PROCEDURE — 83735 ASSAY OF MAGNESIUM: CPT | Performed by: STUDENT IN AN ORGANIZED HEALTH CARE EDUCATION/TRAINING PROGRAM

## 2023-11-09 RX ORDER — AZITHROMYCIN 250 MG/1
250 TABLET, FILM COATED ORAL DAILY
Qty: 2 TABLET | Refills: 0 | Status: SHIPPED | OUTPATIENT
Start: 2023-11-10 | End: 2023-11-12

## 2023-11-09 RX ORDER — PREDNISONE 20 MG/1
40 TABLET ORAL DAILY
Qty: 4 TABLET | Refills: 0 | Status: SHIPPED | OUTPATIENT
Start: 2023-11-10 | End: 2023-11-12

## 2023-11-09 RX ORDER — FOLIC ACID 1 MG/1
1 TABLET ORAL DAILY
Qty: 30 TABLET | Refills: 0 | Status: SHIPPED | OUTPATIENT
Start: 2023-11-10 | End: 2023-12-10

## 2023-11-09 RX ORDER — MULTIPLE VITAMINS W/ MINERALS TAB 9MG-400MCG
1 TAB ORAL DAILY
Qty: 30 TABLET | Refills: 0 | Status: SHIPPED | OUTPATIENT
Start: 2023-11-10 | End: 2023-12-10

## 2023-11-09 RX ORDER — PREDNISONE 20 MG/1
40 TABLET ORAL DAILY
Status: DISCONTINUED | OUTPATIENT
Start: 2023-11-10 | End: 2023-11-09 | Stop reason: HOSPADM

## 2023-11-09 RX ORDER — BUDESONIDE AND FORMOTEROL FUMARATE DIHYDRATE 80; 4.5 UG/1; UG/1
2 AEROSOL RESPIRATORY (INHALATION) 2 TIMES DAILY
Qty: 6.9 G | Refills: 1 | Status: SHIPPED | OUTPATIENT
Start: 2023-11-09 | End: 2023-12-21

## 2023-11-09 RX ADMIN — IPRATROPIUM BROMIDE AND ALBUTEROL SULFATE 3 ML: 2.5; .5 SOLUTION RESPIRATORY (INHALATION) at 03:15

## 2023-11-09 RX ADMIN — METOPROLOL SUCCINATE 50 MG: 50 TABLET, EXTENDED RELEASE ORAL at 08:30

## 2023-11-09 RX ADMIN — ATORVASTATIN CALCIUM 40 MG: 20 TABLET, FILM COATED ORAL at 08:30

## 2023-11-09 RX ADMIN — PREDNISONE 40 MG: 20 TABLET ORAL at 08:28

## 2023-11-09 RX ADMIN — LEVOTHYROXINE SODIUM 50 MCG: 50 TABLET ORAL at 08:28

## 2023-11-09 RX ADMIN — LEVETIRACETAM 500 MG: 500 TABLET, FILM COATED ORAL at 08:27

## 2023-11-09 RX ADMIN — FLUTICASONE FUROATE AND VILANTEROL TRIFENATATE 1 PUFF: 100; 25 POWDER RESPIRATORY (INHALATION) at 08:31

## 2023-11-09 RX ADMIN — MULTIPLE VITAMINS W/ MINERALS TAB 1 TABLET: TAB at 08:29

## 2023-11-09 RX ADMIN — IPRATROPIUM BROMIDE AND ALBUTEROL SULFATE 3 ML: 2.5; .5 SOLUTION RESPIRATORY (INHALATION) at 08:07

## 2023-11-09 RX ADMIN — CLOPIDOGREL BISULFATE 75 MG: 75 TABLET ORAL at 08:30

## 2023-11-09 RX ADMIN — CELECOXIB 200 MG: 200 CAPSULE ORAL at 08:27

## 2023-11-09 RX ADMIN — DULOXETINE HYDROCHLORIDE 30 MG: 30 CAPSULE, DELAYED RELEASE ORAL at 08:28

## 2023-11-09 RX ADMIN — AMLODIPINE BESYLATE 5 MG: 5 TABLET ORAL at 08:29

## 2023-11-09 RX ADMIN — AZITHROMYCIN DIHYDRATE 250 MG: 250 TABLET, FILM COATED ORAL at 08:28

## 2023-11-09 RX ADMIN — THIAMINE HCL TAB 100 MG 100 MG: 100 TAB at 08:29

## 2023-11-09 RX ADMIN — FOLIC ACID 1 MG: 1 TABLET ORAL at 08:27

## 2023-11-09 ASSESSMENT — ACTIVITIES OF DAILY LIVING (ADL)
ADLS_ACUITY_SCORE: 26

## 2023-11-09 NOTE — PLAN OF CARE
Problem: Adult Inpatient Plan of Care  Goal: Plan of Care Review  Description: The Plan of Care Review/Shift note should be completed every shift.  The Outcome Evaluation is a brief statement about your assessment that the patient is improving, declining, or no change.  This information will be displayed automatically on your shift  note.  Goal: Absence of Hospital-Acquired Illness or Injury  Outcome: Progressing  Intervention: Identify and Manage Fall Risk  Recent Flowsheet Documentation  Taken 11/8/2023 1549 by Oneyda Buckner RN  Safety Promotion/Fall Prevention:   activity supervised   nonskid shoes/slippers when out of bed  Goal: Optimal Comfort and Wellbeing  Outcome: Progressing  Goal: Readiness for Transition of Care  Outcome: Progressing     Problem: Alcohol Withdrawal  Goal: Alcohol Withdrawal Symptom Control  Outcome: Progressing  Goal: Optimal Neurologic Function  Outcome: Progressing  Goal: Readiness for Change Identified  Outcome: Progressing     Problem: Risk for Delirium  Goal: Optimal Coping  Outcome: Progressing  Goal: Improved Behavioral Control  Outcome: Progressing  Intervention: Minimize Safety Risk  Recent Flowsheet Documentation  Taken 11/8/2023 1546 by Oneyda Buckner RN  Enhanced Safety Measures: room near unit station  Goal: Improved Attention and Thought Clarity  Outcome: Progressing  Goal: Improved Sleep  Outcome: Progressing      Goal Outcome Evaluation:      Plan of Care Reviewed With: patient    Overall Patient Progress: improvingOverall Patient Progress: improving    Outcome Evaluation: Patient improving with decreased tremors, pain and headache. Oxygenation needs remain consistent.

## 2023-11-09 NOTE — DISCHARGE SUMMARY
"[facility]  Hospitalist Discharge Summary      Date of Admission:  11/7/2023  Date of Discharge:  11/9/2023  Discharging Provider: Waylon Myers DO  Discharge Service: Hospitalist Service    Discharge Diagnoses   Jessica Ellison is a 73 year old female admitted on 11/7/2023. She presented to the ED with sudden onset of shortness of breath.  Patient has chronic respiratory failure and uses 1 to 2 L of oxygen at home.  After discussion with patient states that she uses this more after being prescribed from the ER and will use oxygen when her pulse ox has her O2 sat is in the low 90s sometimes high 80s usually above 88%.  Patient states that she was unable to afford inhalers for her COPD other than albuterol and she also has Spiriva.  After pharmacy's assistance we were able to find an affordable ICS/LABA inhaler (Symbicort) that patient states she could pay for) discharge the patient home with follow-up with your PCP as well as a referral for pulmonology.     COPD exacerbation with shortness of breath and hypoxia  Chronic Respiratory Failure       Alcohol use disorder    Chronic pain with continuous opioid dependence       Hypertension    Obstructive sleep apnea       Anxiety       Major depressive disorder       Seizure disorder       Hypothyroidism       History of cerebral aneurysm, nonruptured       Hyperlipidemia       CKD stage 2        S/p total knee arthroplasty, left         Clinically Significant Risk Factors     # Overweight: Estimated body mass index is 29.8 kg/m  as calculated from the following:    Height as of this encounter: 1.588 m (5' 2.5\").    Weight as of this encounter: 75.1 kg (165 lb 9.1 oz).       Follow-ups Needed After Discharge   Follow-up Appointments     Follow-up and recommended labs and tests       Follow up with primary care provider, Sarah Barriga, within 7 days for   hospital follow- up. Follow up anemia labs.            Unresulted Labs Ordered in the " "Past 30 Days of this Admission       Date and Time Order Name Status Description    11/9/2023  9:04 AM Vitamin B12 In process     11/9/2023  9:04 AM Folate In process         These results will be followed up by PCP    Discharge Disposition   Discharged to home  Condition at discharge: Fair    Hospital Course   Jessica Ellison is a 73 year old female admitted on 11/7/2023. She presented to the ED with sudden onset of shortness of breath and is being admitted for workup and treatment.     COPD exacerbation with shortness of breath and hypoxia  Chronic Respiratory Failure  Home O2 prn 1-2L  Former smoker with 30 pack year history, quit in 2004. Uses home oxygen 1.5-2L. Currently on Spiriva daily and is compliant. Woke up this morning with severe shortness of breath, which started suddenly. SpO2 in ER in the low-mid 90s on 4L NC, but drop to mid-high 80s when oxygen is removed. Afebrile. Sustained tachycardia, which she states isn't normal for her. Troponin in ER 17 then 14. EKG in ER shows NSR with old anterior infarct. CTA of chest reveals \"No pulmonary embolism. Mildly enlarged central pulmonary artery, stable, which can be seen in pulmonary hypertension. Moderate emphysema.\" . Venous blood gas WNL. Influenza and COVID swabs negative. She received one treatment with a duoneb in the ER.   - Continue Duonebs scheduled today 11/08, then plan to add back spiriva tomorrow with prn albuterol nebs  - Start ICS/LABA  - pharmacy consult to check which combo inhaler would be affordable:  -Breo Ellipta 100-25mcg = $113   -Symbicort 80/4.5mcg = $73   - Start Azithromycin 500mg once then 250mg every day   - Start Prednisone  - Supplemental oxygen as needed  - OP follow up with pulmonology recommended for COPD management and evaluation for need of supplemental O2. Patient states she was prescribed it when she came into an ER one time and has been using it since. Has pulse ox at home and ranges from 88-96% on room air and " "will use oxygen to \"keep my oxygen up\"  - SpO2 goal >88%     Alcohol use disorder  She states she drinks \"a couple here and there throughout the day\" but cannot quantify how many per day. She had a VIET of 0.2 in the ER. States she has been in rehab \"a couple times\" with the last one being roughly 10 years ago. She and her  both want her to cut back on drinking. Denies hx of DT or withdrawal seizures. States she had tremors, chills, and diaphoresis when she arrived to the ER this morning, but those have since resolved. CIWA score was 10 in the ER this morning. She's received Ativan and 2L of NS in ER.   Low risk for withdrawal since lack of prior episodes and symptoms have since resolved, so prophylactic treatment withheld at this time.  - Symptomatic treatment per order set.     Chronic pain with continuous opioid dependence  Patient has chronic opioid use for several years due to longstanding back and knee pain. Patient had lumbar spinal fusion surgery and left total knee replacement. Per family medicine note on 10/10/23 patient is weaning off of oxycodone over several months.   - Continue Oxycodone-Acetaminophen  Q6h PRN  - Continue Celecoxib      Hypertension  Longstanding history of well controlled hypertension. On Amlodipine and Metoprolol outpatient.   - Continue Amlodipine and Metoprolol     Obstructive sleep apnea  Uses CPAP \"most of the time\" at home.   - Continue CPAP     Anxiety  Last DAJUAN-7 score 3/13/23 was 0.   - Continue Queiapine     Major depressive disorder  Last PHQ-9 on 10/10/23 19, which was up from 3 on 3/13/23. She denied thoughts of self-harm or suicide on 10/10/23. On Cymbalta at home.   - Continue Cymbalta     Seizure disorder  Per neurology note on 10/13/23, possible epileptic seizure occurred on 10/6/23. Patient started on Keppra after that appointment.   - Continue Keppra     Hypothyroidism  On Levothyroxine. TSH levels in ER was 1.05.   - Continue Levothyroxine     History " of cerebral aneurysm, nonruptured  S/p coiling of aneurysm. Started on Plavix 10/16/23 by neurology.  - Continue  Plavix.     Hyperlipidemia  - Continue Atorvastatin.     CKD stage 2  Creatinine in ER 0.73 (baseline 0.7-0.9).      S/p total knee arthroplasty, left  Total left knee replacement on 8/16/23.        Consultations This Hospital Stay   RESPIRATORY CARE IP CONSULT  CARE MANAGEMENT / SOCIAL WORK IP CONSULT  PHARMACY IP CONSULT    Code Status   Full Code    Time Spent on this Encounter   Waylon JOHNSON DO, personally saw the patient today and spent greater than 30 minutes discharging this patient.       Waylon Myers DO  [facility] SURGICAL  [address] [address]  Phone: [phone]  Fax: [phone]  ______________________________________________________________________    Physical Exam   Vital Signs: Temp: (!) 96.1  F (35.6  C) Temp src: Oral BP: 106/69 Pulse: 89   Resp: 12 SpO2: 96 % O2 Device: Nasal cannula Oxygen Delivery: 1 LPM  Weight: 165 lbs 9.05 oz  Physical Exam  Constitutional:       General: Pt is not in acute distress.  HENT:      Head: Normocephalic and atraumatic.      Nose: Nose normal.   Eyes:      Conjunctiva/sclera: Conjunctivae normal.   Pulmonary:      Effort: Pulmonary effort is normal.   Abdominal:      General: Abdomen is flat.   Skin:     Findings: No rash.   Neurological:      General: No focal deficit present.      Mental Status: He is alert.   Psychiatric:         Mood and Affect: Mood normal.          Primary Care Physician   Sarah Barriga    Discharge Orders      Adult Pulmonary Medicine  Referral      Reason for your hospital stay    You were hospitalized for an acute exacerbation of COPD.  After being treated with nebulizers your breathing improved.  I suspect the cause of your COPD exacerbation was under medication (inability to afford certain inhalers).  After consulting with pharmacy we found an inhaler that was more  affordable for you (Symbicort) and you were discharged home with a prescription and a referral for pulmonology.     Follow-up and recommended labs and tests     Follow up with primary care provider, Sarah Barriga, within 7 days for hospital follow- up. Follow up anemia labs.     Activity    Your activity upon discharge: activity as tolerated     Diet    Follow this diet upon discharge: Orders Placed This Encounter      Low Saturated Fat Na <2400 mg       Significant Results and Procedures   Results for orders placed or performed during the hospital encounter of 11/07/23   CT Chest Pulmonary Embolism w Contrast    Narrative    EXAM: CT CHEST PULMONARY EMBOLISM W CONTRAST  LOCATION: [facility]  DATE: 11/7/2023    INDICATION: SOB.  hypoxia  COMPARISON: CT chest 10/06/2023  TECHNIQUE: CT chest pulmonary angiogram during arterial phase injection of IV contrast. Multiplanar reformats and MIP reconstructions were performed. Dose reduction techniques were used.   CONTRAST: 70 mL Isovue 370    FINDINGS:  ANGIOGRAM CHEST: No pulmonary embolism. Enlarged main pulmonary artery, 3.5 cm, unchanged. Thoracic aorta is negative for dissection. No CT evidence of right heart strain.    LUNGS AND PLEURA: Moderate emphysema. Mild bibasilar bandlike opacities likely atelectasis. Mild dependent atelectasis. No consolidation. No pleural effusion.    MEDIASTINUM/AXILLAE: No pericardial effusion. No lymphadenopathy. Normal esophagus.    CORONARY ARTERY CALCIFICATION: Mild.    UPPER ABDOMEN: Cholecystectomy.    MUSCULOSKELETAL: Spinal degenerative changes.] Lumbar fusion hardware..      Impression    IMPRESSION:  1.  No pulmonary embolism.  2.  Mildly enlarged central pulmonary artery, stable, which can be seen in pulmonary hypertension.  3.  Moderate emphysema.       Discharge Medications   Current Discharge Medication List        START taking these medications    Details   azithromycin (ZITHROMAX) 250 MG tablet  Take 1 tablet (250 mg) by mouth daily for 2 days  Qty: 2 tablet, Refills: 0    Associated Diagnoses: Chronic obstructive pulmonary disease with acute exacerbation (H)      budesonide-formoterol (SYMBICORT) 80-4.5 MCG/ACT Inhaler Inhale 2 puffs into the lungs 2 times daily for 30 days  Qty: 6.9 g, Refills: 1    Associated Diagnoses: Chronic obstructive pulmonary disease with acute exacerbation (H)      folic acid (FOLVITE) 1 MG tablet Take 1 tablet (1 mg) by mouth daily for 30 days  Qty: 30 tablet, Refills: 0    Associated Diagnoses: Chronic obstructive pulmonary disease with acute exacerbation (H)      multivitamin w/minerals (THERA-VIT-M) tablet Take 1 tablet by mouth daily for 30 days  Qty: 30 tablet, Refills: 0    Associated Diagnoses: Chronic obstructive pulmonary disease with acute exacerbation (H)      predniSONE (DELTASONE) 20 MG tablet Take 2 tablets (40 mg) by mouth daily for 2 days  Qty: 4 tablet, Refills: 0    Associated Diagnoses: Chronic obstructive pulmonary disease with acute exacerbation (H)           CONTINUE these medications which have NOT CHANGED    Details   albuterol (PROAIR HFA/PROVENTIL HFA/VENTOLIN HFA) 108 (90 Base) MCG/ACT inhaler Inhale 2 puffs into the lungs every 6 hours as needed for shortness of breath or wheezing      amLODIPine (NORVASC) 5 MG tablet Take 1 tablet (5 mg) by mouth daily  Qty: 90 tablet, Refills: 1    Associated Diagnoses: Essential hypertension with goal blood pressure less than 140/90      aspirin (ASA) 325 MG tablet Take 325 mg by mouth daily      atorvastatin (LIPITOR) 40 MG tablet Take 1 tablet (40 mg) by mouth daily  Qty: 90 tablet, Refills: 3    Associated Diagnoses: Hyperlipidemia LDL goal <70      calcium carbonate 600 mg-vitamin D 400 units (CALTRATE) 600-400 MG-UNIT per tablet Take 1 tablet by mouth every evening      celecoxib (CELEBREX) 200 MG capsule Take 1 capsule (200 mg) by mouth daily  Qty: 90 capsule, Refills: 1    Associated Diagnoses: Chronic  bilateral low back pain without sciatica; Sacroiliac joint pain; S/P lumbar spinal fusion      DULoxetine (CYMBALTA) 30 MG capsule Take 1 capsule (30 mg) by mouth 2 times daily  Qty: 180 capsule, Refills: 1    Associated Diagnoses: Major depressive disorder, recurrent episode, moderate (H)      levETIRAcetam (KEPPRA) 500 MG tablet Take 1 tablet (500 mg) by mouth 2 times daily  Qty: 60 tablet, Refills: 6    Associated Diagnoses: Seizure (H)      levothyroxine (SYNTHROID/LEVOTHROID) 50 MCG tablet Take 1 tablet (50 mcg) by mouth daily  Qty: 90 tablet, Refills: 3    Associated Diagnoses: Hypothyroidism, unspecified type      Melatonin 10 MG TABS tablet Take 20 mg by mouth At Bedtime      metoprolol succinate ER (TOPROL XL) 50 MG 24 hr tablet TAKE ONE TABLET BY MOUTH TWICE DAILY  Qty: 180 tablet, Refills: 2    Associated Diagnoses: Essential hypertension with goal blood pressure less than 140/90      multivitamin, therapeutic (THERA-VIT) TABS tablet Take 1 tablet by mouth daily      naloxone (NARCAN) 4 MG/0.1ML nasal spray Spray 1 spray (4 mg) into one nostril alternating nostrils once as needed for opioid reversal every 2-3 minutes until assistance arrives  Qty: 0.2 mL, Refills: 1    Associated Diagnoses: Chronic bilateral low back pain without sciatica      nystatin (MYCOSTATIN) 950351 UNIT/GM external powder Apply topically 3 times daily as needed (rash)      oxyCODONE-acetaminophen (PERCOCET)  MG per tablet Take 1 tablet by mouth every 6 hours as needed for severe pain Weaning.  Max 60 for the next 30 days  Qty: 60 tablet, Refills: 0    Associated Diagnoses: Chronic bilateral low back pain without sciatica; Sacroiliac joint pain; S/P lumbar spinal fusion      QUEtiapine (SEROQUEL) 50 MG tablet Take 2 tablets (100 mg) by mouth daily  Qty: 180 tablet, Refills: 1    Associated Diagnoses: Anxiety      SPIRIVA HANDIHALER 18 MCG inhaled capsule USING THE HANDIHALER, INHALE THE CONTENTS OF ONE CAPSULE BY MOUTH  DAILY  Qty: 30 capsule, Refills: 3    Associated Diagnoses: Chronic obstructive pulmonary disease, unspecified COPD type (H)      clopidogrel (PLAVIX) 75 MG tablet Take 1 tablet (75 mg) by mouth daily  Qty: 90 tablet, Refills: 3    Associated Diagnoses: Cerebrovascular accident (CVA), unspecified mechanism (H)           Allergies   Allergies   Allergen Reactions    Bee Venom     Lisinopril Swelling     Oral swelling

## 2023-11-09 NOTE — PROGRESS NOTES
[facility] DISCHARGE NOTE    Patient discharged to home at 1:52 PM via wheel chair. Accompanied by spouse and staff. Discharge instructions reviewed with patient, opportunity offered to ask questions. Prescriptions sent to patients preferred pharmacy. All belongings sent with patient.    Steven Harp RN

## 2023-11-09 NOTE — PROGRESS NOTES
"  Reason for Admission: COPD exacerbation and ETOH  Activity: SBA  Neuro: A&O x3 with intermittent confusion; CIWA score 0/0 tonight. No tremors noted during assessments.   Cardiac: WDL; denies any chest pain or palpations.   Respiratory: WDL; Lung sounds clear bilaterally, denies any SOB. Pt on CPAP with 2 lpm from 2300 - 0330, pt requested to have CPAP removed and placed on 2 lpm via NC. Pt received schedule neb tonight and tolerated well.   GI/: WDL; Continent of urine and bowel.   Skin: Scattered bruises.   Diet: Low fat diet  IV Access: 20 gauge R forearm, IV is saline locked.   Vitals: /86 (BP Location: Left arm, Patient Position: Semi-Stroud's, Cuff Size: Adult Regular)   Pulse 80   Temp 98.6  F (37  C) (Oral)   Resp 16   Ht 1.588 m (5' 2.5\")   Wt 75.1 kg (165 lb 9.1 oz)   SpO2 96%   BMI 29.80 kg/m    Pain: Denies any pain.   Plan: Discharge home with  today.     "

## 2023-11-09 NOTE — PROGRESS NOTES
"Situation: Admitted for COPD exacerbation      Subjective/Objective: /69 (BP Location: Left arm)   Pulse 89   Temp (!) 96.1  F (35.6  C) (Oral)   Resp 12   Ht 1.588 m (5' 2.5\")   Wt 75.1 kg (165 lb 9.1 oz)   SpO2 96%   BMI 29.80 kg/m       Neuro: A&Ox4       Cardiac: WNL       Respiratory: Clear Lung sounds, has weaned down to room air and is maintaining oxygen saturations in low to mid 90s on room air       GI/: WNL       Mobility: Independent in room       Skin: WNL       LDAs: IV Right arm     Pain: Denies       Misc: Pleasant cooperative [place] Harris RN [facility]             "

## 2023-11-10 ENCOUNTER — PATIENT OUTREACH (OUTPATIENT)
Dept: CARE COORDINATION | Facility: CLINIC | Age: 73
End: 2023-11-10
Payer: COMMERCIAL

## 2023-11-10 NOTE — LETTER
Health Care Home - Access Care Plan    About Me:    Patient Name:  Jessica Gonzalez    YOB: 1950  Age:                      73 year old   Lora MRN:     [id number] Telephone Information:   Home Phone [phone]   Mobile [phone]       Address:  [address] [address] Email address:  [email or web address]      Emergency Contact(s)   Name Relationship Lgl Grd Work Phone Home Phone Mobile Phone   1. KATERINA GONZALEZ Spouse   [phone]   2. YUMIKO STONER Daughter   [phone]              Health Maintenance: Routine Health maintenance Reviewed: Not assessed    My Access Plan  Medical Emergency 911   Questions or concerns during clinic hours Primary Clinic Line, I will call the clinic directly: [facility] - [phone]   24 Hour Appointment Line [phone] or  [phone] [facility] ([phone]) (toll free)   24 Hour Nurse Line [phone] (toll free)   Questions or concerns outside clinic hours 24 Hour Appointment Line, I will call the after-hours on-call line:   [facility] [phone] or [phone] ([phone]) (toll-free)   Preferred Urgent Care     Preferred Hospital [place] [place]  [phone]   Preferred Pharmacy [facility] [address] [facility] AT [facility]     Behavioral Health Crisis Line The National Suicide Prevention Lifeline at 1-891.651.1222 or 919                     My Care Team Members  Patient Care Team         Relationship Specialty Notifications Start End    Sarah Barriga MD PCP - General   3/19/09     Phone: [phone] Pager: [phone] Fax: [phone] [address] [facility] [address]    Sarah Barriga MD Assigned PCP   10/4/12     Phone: [phone] Pager: [phone] Fax: [phone] [address] [facility] [address]    Sarah Barriga MD Assigned Pain Medication  Provider   1/9/23     Phone: [phone] Pager: [phone] Fax: [phone] [address] [address] [facility] [address]    Cait Hernandez, NP Nurse Practitioner Pulmonary Disease  7/31/23     Phone: [phone] Fax: [phone]         [address] [facility] [address]    Jet Greco MD MD Neurology  10/12/23     Phone: [phone] Fax: [phone] [address] [facility] [address]    Jet Greco MD Assigned Neuroscience Provider   10/21/23     Phone: [phone] Fax: [phone] [address] [facility] [address]    Akua Hinojosa, RN Lead Care Coordinator Primary Care - CC Admissions 11/10/23 11/10/23    Phone: [phone]                  My Medical and Care Information  Problem List   Patient Active Problem List   Diagnosis    Insomnia    Back pain    CARDIOVASCULAR SCREENING; LDL GOAL LESS THAN 160    Anxiety    Advanced directives, counseling/discussion    Major depressive disorder, recurrent episode, moderate (H)    Cerebral aneurysm, nonruptured    Bee allergy status    S/P lumbar spinal fusion    Chronic bilateral low back pain without sciatica    Essential hypertension with goal blood pressure less than 140/90    Hypothyroidism    Spell of change in speech    Centrilobular emphysema (H)    Hyperlipidemia LDL goal <70    Hypoxia    Congenital musculoskeletal deformity of spine    Chronic pain    JOY (obstructive sleep apnea)    CKD (chronic kidney disease) stage 2, GFR 60-89 ml/min    F11.2 - Continuous opioid dependence (H)    Knee osteoarthritis    S/P total knee arthroplasty, left    Chronic pain syndrome    SOB (shortness of breath)    Alcohol abuse with intoxication (H24)    Personal history of tobacco use, presenting hazards to health    Sinus tachycardia      Current Medications and Allergies:    Current Outpatient Medications   Medication    albuterol (PROAIR HFA/PROVENTIL HFA/VENTOLIN HFA) 108 (90 Base) MCG/ACT inhaler    amLODIPine (NORVASC) 5 MG  tablet    aspirin (ASA) 325 MG tablet    atorvastatin (LIPITOR) 40 MG tablet    azithromycin (ZITHROMAX) 250 MG tablet    budesonide-formoterol (SYMBICORT) 80-4.5 MCG/ACT Inhaler    calcium carbonate 600 mg-vitamin D 400 units (CALTRATE) 600-400 MG-UNIT per tablet    celecoxib (CELEBREX) 200 MG capsule    clopidogrel (PLAVIX) 75 MG tablet    DULoxetine (CYMBALTA) 30 MG capsule    folic acid (FOLVITE) 1 MG tablet    levETIRAcetam (KEPPRA) 500 MG tablet    levothyroxine (SYNTHROID/LEVOTHROID) 50 MCG tablet    Melatonin 10 MG TABS tablet    metoprolol succinate ER (TOPROL XL) 50 MG 24 hr tablet    multivitamin w/minerals (THERA-VIT-M) tablet    multivitamin, therapeutic (THERA-VIT) TABS tablet    naloxone (NARCAN) 4 MG/0.1ML nasal spray    nystatin (MYCOSTATIN) 373171 UNIT/GM external powder    oxyCODONE-acetaminophen (PERCOCET)  MG per tablet    predniSONE (DELTASONE) 20 MG tablet    QUEtiapine (SEROQUEL) 50 MG tablet    SPIRIVA HANDIHALER 18 MCG inhaled capsule     No current facility-administered medications for this visit.

## 2023-11-10 NOTE — PROGRESS NOTES
Clinic Care Coordination Contact  Clinic Care Coordination Contact  OUTREACH    Referral Information:  Referral Source: IP Handoff    Primary Diagnosis: COPD    Chief Complaint   Patient presents with    Clinic Care Coordination - Post Hospital     Clinic care Coordination RN          Universal Utilization: 11/7/2023 - 11/9/2023 (2 days)  [facility]     Waylon Myers   Last attending  Treatment team Centrilobular emphysema (H)  Principal problem     Clinic Utilization  Difficulty keeping appointments:: No  Compliance Concerns: No  No-Show Concerns: No  No PCP office visit in Past Year: No  Utilization      No Show Count (past year)  2             ED Visits  2             Hospital Admissions  2                    Current as of: 11/9/2023 10:31 PM                Clinical Concerns:  Current Medical Concerns:  Brief conversation with patient  Sounds bothered by CC RN assessment questions  Patient reports she is doing fine  Started to review medication and she stopped CC RN stating who have medications all listed  Using Oxygen 1-2 liters as needed  Patients Oximeter reading is 98% pulse 99   CC RN asked patient if she runs high o  her pulse and she reports it was high in the hospital and it is fine as long it is below 100  Patient plans to make a future hospital follow up appointment with PCP   Current Behavioral Concerns: No    Education Provided to patient: CC RN role introduced CC introductory  letter and access plan sent via My Chart .  Patient agrees to call CC RN in the future if any needs arise    Pain  Pain (GOAL):: No  Health Maintenance Reviewed: Not assessed  Clinical Pathway: Clinic Care Coordination COPD Assessment    Discharge:      Day of hospital discharge: 11/9/23  What recommendations were made for follow up after your recent hospitalization? PCP and Pulmonary future appointments   Have the follow up appointments been scheduled? No  If not, can I help you set up these  "appointments? No     Is there a referral for Pulmonary Rehab? No    Symptom Review:    How have you been feeling now that you are home? better  Are you having any increased shortness of breath? No         What number would you call if you were in the YELLOW zones:  [phone]    Medications:    Were you started on any new medications?  Yes,   Were any of your previous medications changed? Changed to a more affordable inhaler   Do you have all of your medications? Yes,   Was MTM or Diabetic Education referral placed (*Make sure to put transitions as reason for referral)? No    Oxygen/DME:    Are you currently on oxygen?  Yes   Is this new for you? No   Is it set up at home? Yes   What liter flow were you instructed to use and how often do you use it? 1-2 liters prn   Review with patient how to use/maintain nebulizers and inhalers: Yes    Diet:      Are there any current diet restrictions or changes per discharge instructions? Low fat low salt     Emotions/Lifestyle:    Do you smoke?  reports that she quit smoking about 19 years ago. Her smoking use included cigarettes. She has a 30.00 pack-year smoking history. She has never used smokeless tobacco.      Medication Management:  Medication review status: Unable patient refused to go over entire medication list stating \"you have the medication listed \"\"       Functional Status:  Dependent ADLs:: Ambulation-walker  Bed or wheelchair confined:: No  Mobility Status: Independent w/Device    Living Situation:  Current living arrangement:: I live in a private home with spouse    Lifestyle & Psychosocial Needs:    Social Determinants of Health     Food Insecurity: Low Risk  (10/10/2023)    Food Insecurity     Within the past 12 months, did you worry that your food would run out before you got money to buy more?: No     Within the past 12 months, did the food you bought just not last and you didn t have money to get more?: No   Depression: At risk (10/10/2023)    PHQ-2     " PHQ-2 Score: 4   Housing Stability: Low Risk  (10/10/2023)    Housing Stability     Do you have housing? : Yes     Are you worried about losing your housing?: No   Tobacco Use: Medium Risk (10/13/2023)    Patient History     Smoking Tobacco Use: Former     Smokeless Tobacco Use: Never     Passive Exposure: Not on file   Financial Resource Strain: Low Risk  (10/10/2023)    Financial Resource Strain     Within the past 12 months, have you or your family members you live with been unable to get utilities (heat, electricity) when it was really needed?: No   Alcohol Use: Not on file   Transportation Needs: Low Risk  (10/10/2023)    Transportation Needs     Within the past 12 months, has lack of transportation kept you from medical appointments, getting your medicines, non-medical meetings or appointments, work, or from getting things that you need?: No   Physical Activity: Not on file   Interpersonal Safety: Low Risk  (10/10/2023)    Interpersonal Safety     Do you feel physically and emotionally safe where you currently live?: Yes     Within the past 12 months, have you been hit, slapped, kicked or otherwise physically hurt by someone?: No     Within the past 12 months, have you been humiliated or emotionally abused in other ways by your partner or ex-partner?: No   Stress: Not on file   Social Connections: Not on file     Diet:: Low saturated fat, No added salt  Inadequate nutrition (GOAL):: No  Tube Feeding: No  Inadequate activity/exercise (GOAL):: No  Significant changes in sleep pattern (GOAL): No        Judaism or spiritual beliefs that impact treatment:: No  Mental health DX:: Yes  Mental health DX how managed:: Medication  Mental health management concern (GOAL):: No  Chemical Dependency Status: No Current Concerns  Informal Support system:: Spouse             Resources and Interventions:  Current Resources:      Community Resources: None  Supplies Currently Used at Home: Oxygen Tubing/Supplies  Equipment  Currently Used at Home: walker, standard            Advance Care Plan/Directive  Advanced Care Plans/Directives on file:: No    Referrals Placed: None        Patient/Caregiver understanding: Patient states she has no further questions        Future Appointments                In 6 days [id number] [facility] [place]    In 6 days [id number] [facility] Imaging [place] [facility] [place]    In 6 days [id number] [facility] [place]    In 6 days BK BED 2 [facility] [place], BK SLEEP    In 1 week UC CVC MONITOR TECH [facility]    In 1 week Sarah Barriga MD [facility]    In 1 week [id number] [facility] Neurology Clinic [facility]    In 2 weeks Ruel Woodward, APRN CNP [facility] Sleep [facility]    In 3 months Cait Hernandez NP [facility] Specialty Clinic [place], [place]    In 5 months Jet Greco MD [facility] Neurology Clinic [facility]            Plan:   Patient will make a future Hospital follow up with PCP   No unmet needs, no further care coordination is needed at this time      [facility]   Akua Hinojosa RN, Care Coordinator   [facility]'s   E-mail [email or web address]   [phone]

## 2023-11-10 NOTE — LETTER
[facility] CARE COORDINATION  [address]  [place] [address]     November 10, 2023    Jessica Ellison  [address]   [place] [address]      Dear Jessica,    I am a clinic care coordinator who works with Sarah Barriga MD with the [facility]. I wanted to thank you for spending the time to talk with me.  Below is a description of clinic care coordination and how I can further assist you.       The clinic care coordination team is made up of a registered nurse, , financial resource worker and community health worker who understand the health care system. The goal of clinic care coordination is to help you manage your health and improve access to the health care system. Our team works alongside your provider to assist you in determining your health and social needs. We can help you obtain health care and community resources, providing you with necessary information and education. We can work with you through any barriers and develop a care plan that helps coordinate and strengthen the communication between you and your care team.  Our services are voluntary and are offered without charge to you personally.    Please feel free to contact me with any questions or concerns regarding care coordination and what we can offer.      We are focused on providing you with the highest-quality healthcare experience possible.    Sincerely,     [facility]   Akua Hinojosa RN, Care Coordinator   [facility]'s   E-mail [email or web address]   [phone]     Enclosed: I have enclosed a copy of a 24 Hour Access Plan. This has helpful phone numbers for you to call when needed. Please keep this in an easy to access place to use as needed.

## 2023-11-16 ENCOUNTER — THERAPY VISIT (OUTPATIENT)
Dept: SLEEP MEDICINE | Facility: CLINIC | Age: 73
End: 2023-11-16
Payer: COMMERCIAL

## 2023-11-16 ENCOUNTER — ANCILLARY PROCEDURE (OUTPATIENT)
Dept: MRI IMAGING | Facility: CLINIC | Age: 73
End: 2023-11-16
Attending: INTERNAL MEDICINE
Payer: COMMERCIAL

## 2023-11-16 DIAGNOSIS — I63.9 CEREBROVASCULAR ACCIDENT (CVA), UNSPECIFIED MECHANISM (H): ICD-10-CM

## 2023-11-16 DIAGNOSIS — G47.33 OSA (OBSTRUCTIVE SLEEP APNEA): ICD-10-CM

## 2023-11-16 PROCEDURE — 70553 MRI BRAIN STEM W/O & W/DYE: CPT | Performed by: RADIOLOGY

## 2023-11-16 PROCEDURE — A9585 GADOBUTROL INJECTION: HCPCS | Mod: JZ | Performed by: RADIOLOGY

## 2023-11-16 PROCEDURE — 95811 POLYSOM 6/>YRS CPAP 4/> PARM: CPT | Performed by: INTERNAL MEDICINE

## 2023-11-16 PROCEDURE — 70549 MR ANGIOGRAPH NECK W/O&W/DYE: CPT | Performed by: RADIOLOGY

## 2023-11-16 PROCEDURE — 99207 MRA BRAIN (CIRCLE OF WILLIS) W/O CONTRAST: CPT | Performed by: RADIOLOGY

## 2023-11-16 RX ORDER — GADOBUTROL 604.72 MG/ML
7.5 INJECTION INTRAVENOUS ONCE
Status: COMPLETED | OUTPATIENT
Start: 2023-11-16 | End: 2023-11-16

## 2023-11-16 RX ADMIN — GADOBUTROL 7.5 ML: 604.72 INJECTION INTRAVENOUS at 17:01

## 2023-11-17 ENCOUNTER — ANCILLARY PROCEDURE (OUTPATIENT)
Dept: CARDIOLOGY | Facility: CLINIC | Age: 73
End: 2023-11-17
Attending: INTERNAL MEDICINE
Payer: COMMERCIAL

## 2023-11-17 DIAGNOSIS — I63.9 CEREBROVASCULAR ACCIDENT (CVA), UNSPECIFIED MECHANISM (H): ICD-10-CM

## 2023-11-17 PROCEDURE — 93270 REMOTE 30 DAY ECG REV/REPORT: CPT

## 2023-11-17 PROCEDURE — 93272 ECG/REVIEW INTERPRET ONLY: CPT | Performed by: INTERNAL MEDICINE

## 2023-11-17 NOTE — PROGRESS NOTES
Per Dr. Greco, patient to have 30 day Event monitor placed.  Diagnosis: CVA [I63.9]  Monitor placed: Yes  Patient Instructed: Yes  Patient verbalized understanding: Yes  Holter # [id number]

## 2023-11-20 ENCOUNTER — ANCILLARY PROCEDURE (OUTPATIENT)
Dept: GENERAL RADIOLOGY | Facility: CLINIC | Age: 73
End: 2023-11-20
Attending: FAMILY MEDICINE
Payer: COMMERCIAL

## 2023-11-20 ENCOUNTER — OFFICE VISIT (OUTPATIENT)
Dept: FAMILY MEDICINE | Facility: CLINIC | Age: 73
End: 2023-11-20
Payer: COMMERCIAL

## 2023-11-20 VITALS
RESPIRATION RATE: 20 BRPM | SYSTOLIC BLOOD PRESSURE: 122 MMHG | OXYGEN SATURATION: 96 % | HEART RATE: 75 BPM | TEMPERATURE: 97.6 F | DIASTOLIC BLOOD PRESSURE: 70 MMHG

## 2023-11-20 DIAGNOSIS — J44.9 CHRONIC OBSTRUCTIVE PULMONARY DISEASE, UNSPECIFIED COPD TYPE (H): ICD-10-CM

## 2023-11-20 DIAGNOSIS — S42.255A CLOSED NONDISPLACED FRACTURE OF GREATER TUBEROSITY OF LEFT HUMERUS, INITIAL ENCOUNTER: ICD-10-CM

## 2023-11-20 DIAGNOSIS — F11.20 CONTINUOUS OPIOID DEPENDENCE (H): ICD-10-CM

## 2023-11-20 DIAGNOSIS — M25.512 ACUTE PAIN OF LEFT SHOULDER: Primary | ICD-10-CM

## 2023-11-20 DIAGNOSIS — M25.512 ACUTE PAIN OF LEFT SHOULDER: ICD-10-CM

## 2023-11-20 PROCEDURE — 73030 X-RAY EXAM OF SHOULDER: CPT | Mod: TC | Performed by: RADIOLOGY

## 2023-11-20 PROCEDURE — 99495 TRANSJ CARE MGMT MOD F2F 14D: CPT | Performed by: FAMILY MEDICINE

## 2023-11-20 RX ORDER — RESPIRATORY SYNCYTIAL VIRUS VACCINE 120MCG/0.5
0.5 KIT INTRAMUSCULAR ONCE
Qty: 1 EACH | Refills: 0 | Status: CANCELLED | OUTPATIENT
Start: 2023-11-20 | End: 2023-11-20

## 2023-11-20 ASSESSMENT — PAIN SCALES - GENERAL: PAINLEVEL: NO PAIN (0)

## 2023-11-20 NOTE — PROGRESS NOTES
"  Assessment & Plan     Acute pain of left shoulder  Concern for rotator cuff tear/calcific tendonitis vs fracture  Rather benign mechanism of injury with just reaching for something  She would like to avoid any surgery if possible  - XR Shoulder Left G/E 3 Views; Future  - Orthopedic  Referral; Future    F11.2 - Continuous opioid dependence (H)  I refilled her oxycodone for #50 for the next month last week  We'll probably hold steady here for a bit.  She takes one every morning and usually 1/2-1 later in the day.    Understands the risk with the narcotic and alcohol use    Chronic obstructive pulmonary disease, unspecified COPD type (H)  Recent hospitalization  Started on Symbicort while inpatient  Has pulmonology follow up in a few months  Home oxygen will be discontinued    Fracture of greater tuberosity  Ortho referral       MED REC REQUIRED  Post Medication Reconciliation Status: discharge medications reconciled and changed, per note/orders  BMI:   Estimated body mass index is 29.8 kg/m  as calculated from the following:    Height as of 11/7/23: 1.588 m (5' 2.5\").    Weight as of 11/7/23: 75.1 kg (165 lb 9.1 oz).           Sarah Barriga MD  [facility] is a 73 year old, presenting for the following health issues:  Breathing Problem        11/20/2023     1:36 PM   Additional Questions   Roomed by Altagracia weldon CMA   Accompanied by Self       HPI       Hospital Follow-up Visit:    Hospital/Nursing Home/IP Rehab Facility: [facility]  Date of Admission: 11/7/2023  Date of Discharge: 11/9/2023  Reason(s) for Admission: Centrilobular emphysema    Was your hospitalization related to COVID-19? No   Problems taking medications regularly:  None  Medication changes since discharge: Azithromycin 250mg bid, symbicort 80-4.5mcg/ACT 2 puffs bid, folic acid 1mg every day, multivitamin every day,prednisone 40mg bid  Problems adhering to " non-medication therapy:  None    Summary of hospitalization:  [facility] discharge summary reviewed  Diagnostic Tests/Treatments reviewed.  Follow up needed: pulmonology  Other Healthcare Providers Involved in Patient s Care:         None  Update since discharge: Breathing has improved. She notes that she is now having left shoulder pain after reaching for an item and hearing a popping sound. No redness, bruising or swelling. Pain is worse when reaching up or outward or when lifting an item.    Shoulder pain started when she was reaching for something.      Had a rotator cuff tear several years ago, no h/o surgery on that shoulder.        Plan of care communicated with patient    Since leaving the hospital, oxygen saturations have been >90  [employer] is picking up her home O2 tomorrow    Has follow up with pulmonology in a few months  Just did a sleep study a few days ago.    Review of Systems   Constitutional, HEENT, cardiovascular, pulmonary, gi and gu systems are negative, except as otherwise noted.      Objective    /70   Pulse 75   Temp 97.6  F (36.4  C) (Tympanic)   Resp 20   SpO2 96%   There is no height or weight on file to calculate BMI.  Physical Exam   GENERAL: healthy, alert and no distress  NECK: no adenopathy, no asymmetry, masses, or scars and thyroid normal to palpation  RESP: lungs clear to auscultation - no rales, rhonchi or wheezes  CV: regular rate and rhythm, normal S1 S2, no S3 or S4, no murmur, click or rub, no peripheral edema and peripheral pulses strong  ABDOMEN: soft, nontender, no hepatosplenomegaly, no masses and bowel sounds normal  MS: left arm in a sling  Very limited range of motion (almost zero ability to move it actively).   Passive range of motion I'm able to abduct to 80 degrees and forward flex to almost 90 degrees  Exam limited by her pain    Results for orders placed or performed in visit on 11/20/23   XR Shoulder Left G/E 3 Views     Status: None     Narrative    XR SHOULDER LEFT G/E 3 VIEWS   11/20/2023 2:12 PM     HISTORY: acute left shoudler pain, no injury; Acute pain of left  shoulder  COMPARISON: None.       Impression    IMPRESSION: Normal alignment. Acute or early subacute mildly displaced  fracture of the greater tuberosity. Mild degenerative arthrosis  glenohumeral joint. Diffuse bony demineralization. Degenerative and  postoperative changes in the spine.    SEBASTIEN LALA MD         SYSTEM ID:  [id number]       Xray shoulder: ?old calcific tendonitis.

## 2023-11-22 ENCOUNTER — TRANSFERRED RECORDS (OUTPATIENT)
Dept: HEALTH INFORMATION MANAGEMENT | Facility: CLINIC | Age: 73
End: 2023-11-22
Payer: COMMERCIAL

## 2023-11-27 LAB — SLPCOMP: NORMAL

## 2023-12-07 ENCOUNTER — TRANSFERRED RECORDS (OUTPATIENT)
Dept: HEALTH INFORMATION MANAGEMENT | Facility: CLINIC | Age: 73
End: 2023-12-07
Payer: COMMERCIAL

## 2023-12-08 ENCOUNTER — TRANSCRIBE ORDERS (OUTPATIENT)
Dept: OTHER | Age: 73
End: 2023-12-08

## 2023-12-08 DIAGNOSIS — S42.202A FRACTURE OF HUMERUS, PROXIMAL, LEFT, CLOSED: Primary | ICD-10-CM

## 2023-12-21 DIAGNOSIS — J44.1 CHRONIC OBSTRUCTIVE PULMONARY DISEASE WITH ACUTE EXACERBATION (H): ICD-10-CM

## 2023-12-21 RX ORDER — BUDESONIDE AND FORMOTEROL FUMARATE DIHYDRATE 80; 4.5 UG/1; UG/1
2 AEROSOL RESPIRATORY (INHALATION) 2 TIMES DAILY
Qty: 6.9 G | Refills: 1 | Status: ON HOLD | OUTPATIENT
Start: 2023-12-21

## 2023-12-27 ENCOUNTER — TRANSFERRED RECORDS (OUTPATIENT)
Dept: HEALTH INFORMATION MANAGEMENT | Facility: CLINIC | Age: 73
End: 2023-12-27
Payer: COMMERCIAL

## 2023-12-28 DIAGNOSIS — S42.255A CLOSED NONDISPLACED FRACTURE OF GREATER TUBEROSITY OF LEFT HUMERUS, INITIAL ENCOUNTER: ICD-10-CM

## 2023-12-28 DIAGNOSIS — Z98.1 S/P LUMBAR SPINAL FUSION: ICD-10-CM

## 2023-12-28 DIAGNOSIS — G89.29 CHRONIC BILATERAL LOW BACK PAIN WITHOUT SCIATICA: ICD-10-CM

## 2023-12-28 DIAGNOSIS — M54.50 CHRONIC BILATERAL LOW BACK PAIN WITHOUT SCIATICA: ICD-10-CM

## 2023-12-28 DIAGNOSIS — F33.1 MAJOR DEPRESSIVE DISORDER, RECURRENT EPISODE, MODERATE (H): ICD-10-CM

## 2023-12-28 DIAGNOSIS — J44.9 CHRONIC OBSTRUCTIVE PULMONARY DISEASE, UNSPECIFIED COPD TYPE (H): ICD-10-CM

## 2023-12-28 DIAGNOSIS — M53.3 SACROILIAC JOINT PAIN: ICD-10-CM

## 2023-12-28 RX ORDER — ALBUTEROL SULFATE 90 UG/1
2 AEROSOL, METERED RESPIRATORY (INHALATION) EVERY 6 HOURS PRN
Qty: 18 G | Refills: 1 | Status: ON HOLD | OUTPATIENT
Start: 2023-12-28

## 2023-12-28 RX ORDER — DULOXETIN HYDROCHLORIDE 30 MG/1
30 CAPSULE, DELAYED RELEASE ORAL 2 TIMES DAILY
Qty: 180 CAPSULE | Refills: 1 | Status: SHIPPED | OUTPATIENT
Start: 2023-12-28 | End: 2024-08-12

## 2023-12-28 RX ORDER — OXYCODONE AND ACETAMINOPHEN 10; 325 MG/1; MG/1
1 TABLET ORAL EVERY 6 HOURS PRN
Qty: 90 TABLET | Refills: 0 | Status: SHIPPED | OUTPATIENT
Start: 2023-12-28 | End: 2024-01-30

## 2023-12-28 RX ORDER — TIOTROPIUM BROMIDE 18 UG/1
CAPSULE ORAL; RESPIRATORY (INHALATION)
Qty: 30 CAPSULE | Refills: 3 | Status: SHIPPED | OUTPATIENT
Start: 2023-12-28 | End: 2024-02-20

## 2023-12-29 ENCOUNTER — ANCILLARY PROCEDURE (OUTPATIENT)
Dept: NEUROLOGY | Facility: CLINIC | Age: 73
End: 2023-12-29
Attending: INTERNAL MEDICINE
Payer: COMMERCIAL

## 2023-12-29 DIAGNOSIS — R56.9 SEIZURE (H): ICD-10-CM

## 2023-12-29 PROCEDURE — 95700 EEG CONT REC W/VID EEG TECH: CPT | Performed by: PSYCHIATRY & NEUROLOGY

## 2023-12-29 PROCEDURE — 95718 EEG PHYS/QHP 2-12 HR W/VEEG: CPT | Performed by: PSYCHIATRY & NEUROLOGY

## 2023-12-29 PROCEDURE — 95713 VEEG 2-12 HR CONT MNTR: CPT | Performed by: PSYCHIATRY & NEUROLOGY

## 2024-01-04 ENCOUNTER — THERAPY VISIT (OUTPATIENT)
Dept: PHYSICAL THERAPY | Facility: CLINIC | Age: 74
End: 2024-01-04
Attending: PHYSICIAN ASSISTANT
Payer: COMMERCIAL

## 2024-01-04 DIAGNOSIS — S42.202D CLOSED FRACTURE OF PROXIMAL END OF LEFT HUMERUS WITH ROUTINE HEALING, UNSPECIFIED FRACTURE MORPHOLOGY, SUBSEQUENT ENCOUNTER: Primary | ICD-10-CM

## 2024-01-04 PROBLEM — Z96.652 S/P TOTAL KNEE ARTHROPLASTY, LEFT: Status: RESOLVED | Noted: 2023-08-22 | Resolved: 2024-01-04

## 2024-01-04 PROCEDURE — 97140 MANUAL THERAPY 1/> REGIONS: CPT | Mod: GP

## 2024-01-04 PROCEDURE — 97110 THERAPEUTIC EXERCISES: CPT | Mod: GP

## 2024-01-04 PROCEDURE — 97161 PT EVAL LOW COMPLEX 20 MIN: CPT | Mod: GP

## 2024-01-04 NOTE — PROGRESS NOTES
DISCHARGE  Reason for Discharge: Patient has met all goals.  No further expectation of progress.    Equipment Issued: HEP    Discharge Plan: Patient to continue home program.    Referring Provider:  Bryan Roque

## 2024-01-04 NOTE — PROGRESS NOTES
"PHYSICAL THERAPY EVALUATION  Type of Visit: Evaluation    See electronic medical record for Abuse and Falls Screening details.    Subjective       Presenting condition or subjective complaint: Pt was pulling a large pot to her and felt a \"pop\". Pt sustained a L humeral fx 2023.  Date of onset: 23    Relevant medical history: Bladder or bowel problems; Depression; High blood pressure; History of fractures; Incontinence; Thyroid problems; Arthritis; COPD; Menopause; Sleep disorder like apnea   Dates & types of surgery: appy, spinal fusion x 10 levels, hysterectomy, L TKA 2023    Prior diagnostic imaging/testing results: X-ray     Prior therapy history for the same diagnosis, illness or injury: No      Prior Level of Function  Independent    Living Environment  Social support: With a significant other or spouse   Type of home: House; 1 level   Stairs to enter the home: No       Ramp: No   Stairs inside the home: Yes       Help at home: Home and Yard maintenance tasks; Home management tasks (cooking, cleaning); Assist for driving and community activities  Equipment owned: Walker with wheels; Straight Cane; Four-point cane; Walker     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  Hobbies/Interests: play cards, movies, reading, sewing    Patient goals for therapy: light lifting, overhead movement    Pain assessment: Pain present  Location: L shoulder/Ratin-2/10     Objective   SHOULDER EVALUATION  PAIN: Pain Level at Rest: 1/10  Pain Level with Use: 9/10  Pain Location: shoulder  Pain Quality: Aching  Pain Frequency: constant  Pain is Worst: as the day progresses  Pain is Exacerbated By: reaching, lifting, carrying, putting on clothes overhead, styling hair  Pain is Relieved By: heat, otc medications, and rest  Pain Progression: Improved  POSTURE: Sitting Posture: Rounded shoulders, Forward head  GAIT:   Weightbearing Status: WBAT  Assistive Device(s): Cane (single end)  Gait Deviations: Antalgic  Base of support " increased  Stance time decreased  Stride length decreased  Latesha decreased  Fwd flexed and R lat lean due to LBP  ROM:   (Degrees) Left AROM Left PROM Right AROM  Right PROM   Shoulder Flexion  162*  175*   Shoulder Abduction  95*  140*   Shoulder Internal Rotation  90*  90*   Shoulder External Rotation  77*  90*   Elbow Extension  -5*  0*   Pain: all endranges  End feel: hard  STRENGTH:  R UE 5/5, L UE 4/5 except IR 5/5   JOINT MOBILITY:  hypomobile L shoulder all directions    Assessment & Plan   CLINICAL IMPRESSIONS  Medical Diagnosis: Fracture of humerus, proximal, left, closed    Treatment Diagnosis: L shld pain and decreased ROM/strength   Impression/Assessment: Patient is a 73 year old female with L shoulder complaints.  The following significant findings have been identified: Pain, Decreased ROM/flexibility, Decreased joint mobility, and Decreased strength. These impairments interfere with their ability to perform self care tasks, recreational activities, and household chores as compared to previous level of function.     Clinical Decision Making (Complexity):  Clinical Presentation: Stable/Uncomplicated  Clinical Presentation Rationale: based on medical and personal factors listed in PT evaluation  Clinical Decision Making (Complexity): Low complexity    PLAN OF CARE  Treatment Interventions:  Interventions: Manual Therapy, Therapeutic Exercise    Long Term Goals     PT Goal 1  Goal Identifier: 1  Goal Description: Pt will be able to wash back/hair with < 3/10 pain  Target Date: 02/15/24  PT Goal 2  Goal Identifier: 2  Goal Description: Pt will be able to hook her bra behind her back with < 3/10 pain  Target Date: 03/07/24  PT Goal 3  Goal Identifier: 3  Goal Description: Pt will be able to reach for an object on a high shelf with < 3/10 pain  Target Date: 03/28/24  PT Goal 4  Goal Identifier: 4  Goal Description: Pt will be independent with the HEP for optimal functional recovery.  Target Date:  03/28/24      Frequency of Treatment: 1x/week  Duration of Treatment: 12 weeks    Education Assessment:   Learner/Method: Patient;Listening;Demonstration;Pictures/Video;No Barriers to Learning    Risks and benefits of evaluation/treatment have been explained.   Patient/Family/caregiver agrees with Plan of Care.     Evaluation Time:     PT Eval, Low Complexity Minutes (49518): 20    Signing Clinician: MADELEINE Dale [facility]                                                                                   OUTPATIENT PHYSICAL THERAPY      PLAN OF TREATMENT FOR OUTPATIENT REHABILITATION   Patient's Last Name, First Name, ARLETHCHA JaquezelizabethJessica  ARLETH YOB: 1950   Provider's Name   [facility]   Medical Record No.  [id number]     Onset Date: 11/20/23  Start of Care Date: 01/04/24     Medical Diagnosis:  Fracture of humerus, proximal, left, closed      PT Treatment Diagnosis:  L shld pain and decreased ROM/strength Plan of Treatment  Frequency/Duration: 1x/week/ 12 weeks    Certification date from 01/04/24 to 03/28/24         See note for plan of treatment details and functional goals     Kamilah Avendaño, PT                         I CERTIFY THE NEED FOR THESE SERVICES FURNISHED UNDER        THIS PLAN OF TREATMENT AND WHILE UNDER MY CARE     (Physician attestation of this document indicates review and certification of the therapy plan).              Referring Provider:  Jeremiah Delarosa    Initial Assessment  See Epic Evaluation- Start of Care Date: 01/04/24

## 2024-01-12 ENCOUNTER — HOSPITAL ENCOUNTER (EMERGENCY)
Facility: CLINIC | Age: 74
Discharge: HOME | End: 2024-01-12
Attending: NURSE PRACTITIONER | Admitting: NURSE PRACTITIONER
Payer: COMMERCIAL

## 2024-01-12 ENCOUNTER — TELEPHONE (OUTPATIENT)
Dept: FAMILY MEDICINE | Facility: CLINIC | Age: 74
End: 2024-01-12
Payer: COMMERCIAL

## 2024-01-12 VITALS
TEMPERATURE: 97.9 F | SYSTOLIC BLOOD PRESSURE: 161 MMHG | RESPIRATION RATE: 16 BRPM | OXYGEN SATURATION: 96 % | DIASTOLIC BLOOD PRESSURE: 95 MMHG | HEART RATE: 108 BPM

## 2024-01-12 DIAGNOSIS — U07.1 COVID-19 VIRUS INFECTION: ICD-10-CM

## 2024-01-12 PROCEDURE — G0463 HOSPITAL OUTPT CLINIC VISIT: HCPCS | Performed by: NURSE PRACTITIONER

## 2024-01-12 PROCEDURE — 99213 OFFICE O/P EST LOW 20 MIN: CPT | Performed by: NURSE PRACTITIONER

## 2024-01-12 ASSESSMENT — ACTIVITIES OF DAILY LIVING (ADL): ADLS_ACUITY_SCORE: 37

## 2024-01-12 NOTE — TELEPHONE ENCOUNTER
COVID Positive/Requesting COVID treatment    Patient is positive for COVID and requesting treatment options.    Date of positive COVID test (PCR or at home)? 1/12  Current COVID symptoms: fever or chills, shortness of breath or difficulty breathing, fatigue, headache, congestion or runny nose, and nausea or vomiting  Date COVID symptoms began: 1/12    Message should be routed to clinic RN pool. Best phone number to use for call back: [phone]    .Betty Barriga PSC

## 2024-01-12 NOTE — TELEPHONE ENCOUNTER
Patient is not able to be provided Paxlovid by a RN due to medications on the MAR that are outside of RN Paxlovid treatment protocol.   Patient has Percocet listed.     RN called patient and informed her she can try to start an eVisit or go to Urgent care for Covid Treatment.    Patient expressed understanding.    Juliette High RN on 1/12/2024 at 5:08 PM

## 2024-01-13 NOTE — DISCHARGE INSTRUCTIONS
Increase oral fluid intake, Tylenol for fever and pain is recommended. Return if symptoms worsen despite recommendations.

## 2024-01-13 NOTE — ED PROVIDER NOTES
ED Provider Note  [facility]      History     Chief Complaint   Patient presents with    Chills     HPI  Jessica Ellison is a 73 year old female who reports testing positive today for COVID, has had exposure over the last week and a half to her  who tested positive for COVID and was started on Paxlovid.  Reported symptoms are sinus congestion, nasal congestion denies having fever or chills.  Decreased appetite for 2 days tolerating oral fluid intake.  Has a history of asthma which is well-controlled on long-acting steroid and albuterol MDI as needed recently taken off of clopidogrel, currently taking Keppra 500 mg twice daily for history of seizures, Seroquel at at bedtime for depression symptoms.  Both of these medications were reviewed with the pharmacist here at [place] and it is not recommended that these medications be altered or stopped which would be needed to start taking Paxlovid for COVID.  Reports having symptoms for 4 days.  Initially patient reports that she was anxious and reported that her pulse was elevated, while talking to her in the room her pulse was rechecked and significantly improved.  Denies any shortness of breath, chest pain, denies current coughing.            Allergies:  Allergies   Allergen Reactions    Bee Venom     Lisinopril Swelling     Oral swelling       Problem List:    Patient Active Problem List    Diagnosis Date Noted    Closed fracture of proximal end of left humerus with routine healing, unspecified fracture morphology, subsequent encounter 01/04/2024     Priority: Medium    Personal history of tobacco use, presenting hazards to health 11/08/2023     Priority: Medium    Sinus tachycardia 11/08/2023     Priority: Medium    Chronic pain syndrome 11/07/2023     Priority: Medium    SOB (shortness of breath) 11/07/2023     Priority: Medium    Alcohol abuse with intoxication (H24) 11/07/2023     Priority: Medium    Knee osteoarthritis 08/16/2023      Priority: Medium    F11.2 - Continuous opioid dependence (H) 07/11/2023     Priority: Medium     Patient is followed by DOMINIQUE COPPOLA for ongoing prescription of narcotic pain medicine.  Med: oxycodone/acetaminophen 10/325 for chronic back pain.   Maximum use per month: 70  Expected duration: unknown  Narcotic agreement on file: YES 7/11/2023   Clinic visit recommended: Q 3 months    PDMP reviewed 7/11/2023 April 2013 - rhizotomy for SI (left) Jhon Mckay MD    Follows at [facility] - severe low back pain with work related injuries.  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  Spinal stenosis at most lumbar levels  Improvement with past epidural steroid injections  SI joint dysfunction with the pain        JOY (obstructive sleep apnea) 08/05/2021     Priority: Medium    CKD (chronic kidney disease) stage 2, GFR 60-89 ml/min 08/05/2021     Priority: Medium    Hyperlipidemia LDL goal <70 12/22/2020     Priority: Medium    Centrilobular emphysema (H) 11/04/2020     Priority: Medium    Hypoxia 01/05/2019     Priority: Medium    Spell of change in speech 04/17/2018     Priority: Medium    Hypothyroidism 09/07/2016     Priority: Medium    Chronic bilateral low back pain without sciatica 06/02/2016     Priority: Medium    Essential hypertension with goal blood pressure less than 140/90 06/02/2016     Priority: Medium    S/P lumbar spinal fusion 10/19/2015     Priority: Medium    Bee allergy status 06/30/2015     Priority: Medium    Cerebral aneurysm, nonruptured 07/24/2014     Priority: Medium     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 Marti    repeat an MRA in 5 years -5/2027      Chronic pain 03/07/2014     Priority: Medium    Major depressive disorder, recurrent episode, moderate (H) 07/10/2013     Priority: Medium    Advanced directives, counseling/discussion 09/05/2012     Priority:  Medium     Patient does not have an Advance/Health Care Directive (HCD), has information at home.     Sonja Krishnamurthy  September 5, 2012        Anxiety 06/20/2012     Priority: Medium    CARDIOVASCULAR SCREENING; LDL GOAL LESS THAN 160 10/31/2010     Priority: Medium    Insomnia 12/21/2009     Priority: Medium    Back pain 12/21/2009     Priority: Medium     Patient is followed by DOMINIQUE COPPOLA for ongoing prescription of narcotic pain medicine.  Med: oxycodone/acetaminophen 10/325 for chronic back pain.   Maximum use per month: 70  Expected duration: unknown  Narcotic agreement on file: YES 7/11/2023   Clinic visit recommended: Q 3 months    PDMP reviewed 7/11/2023 April 2013 - rhizotomy for SI (left) Jhon Mckay MD    Follows at [facility] - severe low back pain with work related injuries.  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     Priority: Medium        Past Medical History:    Past Medical History:   Diagnosis Date    Anemia     Aneurysm (H24)     Antiplatelet or antithrombotic long-term use     Arthritis     Chemical dependency (H)     Depression     History of blood transfusion     Hypertension     Menopausal symptoms     Other chronic pain     Sleep apnea     Sleep disorder     Thyroid disease     Tobacco abuse     Uncomplicated asthma        Past Surgical History:    Past Surgical History:   Procedure Laterality Date    ABDOMEN SURGERY      APPENDECTOMY  1960    APPENDECTOMY      ARTHROPLASTY KNEE Left 8/16/2023    Procedure: LEFT TOTAL KNEE ARTHROPLASTY;  Surgeon: Bryan Roque MD;  Location: [facility] Main OR    BACK SURGERY      BIOPSY BREAST      cerebral aneurysm  2014    coiled    CEREBRAL ANEURYSM REPAIR      COLON SURGERY      COLONOSCOPY  04/19/2005    FRACTURE  SURGERY      HC EXCISION BREAST LESION, OPEN >=1      core biopsy 2006, lumpectomy 2006    HERNIA REPAIR      LAP VENTRAL HERNIA REPAIR  2017    [facility]    LAPAROSCOPIC ASSISTED HYSTERECTOMY VAGINAL  2017    LUMPECTOMY BREAST      orif left femoral neck Left 2016    stress fracture.  done at [facility]    SURGICAL HISTORY OF -       Skin Graft    ZZC PART REMOVAL COLON W ANASTOMOSIS  2005    diverticulitis    ZZC SPINE FUSION,ANTER,8+ SGMTS      Anterior posterior fusion 10 levels, hardware removal and re-fusion        Family History:    Family History   Problem Relation Age of Onset    Cancer Mother         breast/lung    Alcohol/Drug Mother     Depression Mother     Cancer - colorectal Father     Alcohol/Drug Father     Diabetes Maternal Grandmother     Diabetes Maternal Grandfather     Diabetes Paternal Grandmother     Diabetes Paternal Grandfather     Cancer Paternal Grandfather     Gastrointestinal Disease Paternal Grandfather     Congenital Anomalies Son     Hypertension Brother     Adrenal Disorder Brother         had adrenalectomies    Cerebral palsy Granddaughter        Social History:  Marital Status:   [2]  Social History     Tobacco Use    Smoking status: Former     Packs/day: 1.00     Years: 30.00     Additional pack years: 0.00     Total pack years: 30.00     Types: Cigarettes     Quit date: 2004     Years since quittin.0    Smokeless tobacco: Never   Vaping Use    Vaping Use: Never used   Substance Use Topics    Alcohol use: Yes     Comment: Glass of wine a few times a week. ETOH dependency, DUI 3/15/10; 1-2 ETOH per week    Drug use: Yes     Types: Oxycodone     Comment: percocet for the past several years        Medications:    amLODIPine (NORVASC) 5 MG tablet  aspirin (ASA) 325 MG tablet  atorvastatin (LIPITOR) 40 MG tablet  budesonide-formoterol (SYMBICORT) 80-4.5 MCG/ACT Inhaler  calcium carbonate 600 mg-vitamin D 400 units (CALTRATE) 600-400 MG-UNIT per  tablet  celecoxib (CELEBREX) 200 MG capsule  DULoxetine (CYMBALTA) 30 MG capsule  levETIRAcetam (KEPPRA) 500 MG tablet  levothyroxine (SYNTHROID/LEVOTHROID) 50 MCG tablet  Melatonin 10 MG TABS tablet  metoprolol succinate ER (TOPROL XL) 50 MG 24 hr tablet  multivitamin, therapeutic (THERA-VIT) TABS tablet  naloxone (NARCAN) 4 MG/0.1ML nasal spray  nystatin (MYCOSTATIN) 357435 UNIT/GM external powder  oxyCODONE-acetaminophen (PERCOCET)  MG per tablet  QUEtiapine (SEROQUEL) 50 MG tablet  tiotropium (SPIRIVA HANDIHALER) 18 MCG inhaled capsule  VENTOLIN  (90 Base) MCG/ACT inhaler          Review of Systems  A medically appropriate review of systems was performed with pertinent positives and negatives noted in the HPI, and all other systems negative.    Physical Exam   Patient Vitals for the past 24 hrs:   BP Temp Temp src Pulse Resp SpO2   01/12/24 1902 -- -- -- 108 -- --   01/12/24 1844 (!) 161/95 97.9  F (36.6  C) Oral (!) 137 16 96 %          Physical Exam  General: alert and in no acute distress on arrival  Head: atraumatic, normocephalic  Lungs:  nonlabored, CTA bilateral throughout.  CV: Mildly tachycardic, normal S1-S2, extremities warm and perfused, no new edema in lower extremities.  Skin: no rashes, no diaphoresis and skin color normal  Neuro: Patient awake, alert, speech is fluent, appropriate historian.  Psychiatric: affect/mood normal, mildly anxious, pleasant      ED Course                 Procedures                    No results found for this or any previous visit (from the past 24 hour(s)).    MEDICATIONS GIVEN IN THE EMERGENCY DEPARTMENT:  Medications - No data to display             Assessments & Plan (with Medical Decision Making)  73 year old female who presents to the Urgent Care for evaluation of COVID-19 infection.  Reviewed medications extensively with pharmacist and medical records and chart review.  Patient advised that Paxlovid would not be recommended at this time due to her  current medications and not that these are medications that she take cannot be stopped to take Paxlovid.  Recommended supportive treatments such as increasing oral fluid intake Tylenol for fever and pain.  Reassurance provided: Pulse oxygen saturation 96 to 98%, pulse rate reduced as anxiety decreased.  Lungs are clear bilateral throughout.  Recommended that she return if she is short of breath, has fever that is uncontrolled or she is unable to take her medications that she is prescribed.       I have reviewed the nursing notes.    I have reviewed the findings, diagnosis, plan and need for follow up with the patient.        NEW PRESCRIPTIONS STARTED AT TODAY'S ER VISIT  New Prescriptions    No medications on file       Final diagnoses:   COVID-19 virus infection       1/12/2024   [facility] EMERGENCY DEPT       Lazara Clark, APRN CNP  01/12/24 1920

## 2024-01-24 ENCOUNTER — TRANSFERRED RECORDS (OUTPATIENT)
Dept: HEALTH INFORMATION MANAGEMENT | Facility: CLINIC | Age: 74
End: 2024-01-24
Payer: COMMERCIAL

## 2024-01-30 ENCOUNTER — THERAPY VISIT (OUTPATIENT)
Dept: PHYSICAL THERAPY | Facility: CLINIC | Age: 74
End: 2024-01-30
Attending: PHYSICIAN ASSISTANT
Payer: COMMERCIAL

## 2024-01-30 DIAGNOSIS — M53.3 SACROILIAC JOINT PAIN: ICD-10-CM

## 2024-01-30 DIAGNOSIS — G89.29 CHRONIC BILATERAL LOW BACK PAIN WITHOUT SCIATICA: ICD-10-CM

## 2024-01-30 DIAGNOSIS — Z98.1 S/P LUMBAR SPINAL FUSION: ICD-10-CM

## 2024-01-30 DIAGNOSIS — S42.202D CLOSED FRACTURE OF PROXIMAL END OF LEFT HUMERUS WITH ROUTINE HEALING, UNSPECIFIED FRACTURE MORPHOLOGY, SUBSEQUENT ENCOUNTER: Primary | ICD-10-CM

## 2024-01-30 DIAGNOSIS — S42.255A CLOSED NONDISPLACED FRACTURE OF GREATER TUBEROSITY OF LEFT HUMERUS, INITIAL ENCOUNTER: ICD-10-CM

## 2024-01-30 DIAGNOSIS — M54.50 CHRONIC BILATERAL LOW BACK PAIN WITHOUT SCIATICA: ICD-10-CM

## 2024-01-30 PROCEDURE — 97110 THERAPEUTIC EXERCISES: CPT | Mod: GP

## 2024-01-30 PROCEDURE — 97140 MANUAL THERAPY 1/> REGIONS: CPT | Mod: GP

## 2024-01-30 RX ORDER — OXYCODONE AND ACETAMINOPHEN 10; 325 MG/1; MG/1
1 TABLET ORAL EVERY 6 HOURS PRN
Qty: 50 TABLET | Refills: 0 | Status: SHIPPED | OUTPATIENT
Start: 2024-01-30 | End: 2024-02-20

## 2024-01-30 NOTE — TELEPHONE ENCOUNTER
RN called patient and notified her of the medication instructions from Dr. Barriga.     Patient is still having ongoing shoulder and back pain.     RN was able to schedule patient for Medicare Annual Wellness visit with Dr. Barriga 2/20/24.    Juliette High RN on 1/30/2024 at 1:27 PM

## 2024-01-30 NOTE — TELEPHONE ENCOUNTER
Notify patient of refill.  I cut back to #50/month again.  This is where we had been before the fracture and we need to scale back on the use as we were weaning this narcotic before the injury.    Sarah Barriga M.D.

## 2024-02-20 ENCOUNTER — LAB (OUTPATIENT)
Dept: LAB | Facility: CLINIC | Age: 74
End: 2024-02-20
Payer: COMMERCIAL

## 2024-02-20 ENCOUNTER — OFFICE VISIT (OUTPATIENT)
Dept: FAMILY MEDICINE | Facility: CLINIC | Age: 74
End: 2024-02-20
Payer: COMMERCIAL

## 2024-02-20 VITALS
BODY MASS INDEX: 28.93 KG/M2 | SYSTOLIC BLOOD PRESSURE: 132 MMHG | WEIGHT: 163.25 LBS | OXYGEN SATURATION: 98 % | HEIGHT: 63 IN | RESPIRATION RATE: 20 BRPM | TEMPERATURE: 98 F | DIASTOLIC BLOOD PRESSURE: 80 MMHG | HEART RATE: 73 BPM

## 2024-02-20 DIAGNOSIS — Z98.1 S/P LUMBAR SPINAL FUSION: ICD-10-CM

## 2024-02-20 DIAGNOSIS — F11.20 CONTINUOUS OPIOID DEPENDENCE (H): ICD-10-CM

## 2024-02-20 DIAGNOSIS — R56.9 SEIZURE (H): ICD-10-CM

## 2024-02-20 DIAGNOSIS — M54.50 CHRONIC BILATERAL LOW BACK PAIN WITHOUT SCIATICA: ICD-10-CM

## 2024-02-20 DIAGNOSIS — F33.1 MAJOR DEPRESSIVE DISORDER, RECURRENT EPISODE, MODERATE (H): ICD-10-CM

## 2024-02-20 DIAGNOSIS — F10.129 ALCOHOL ABUSE WITH INTOXICATION (H): ICD-10-CM

## 2024-02-20 DIAGNOSIS — S42.255A CLOSED NONDISPLACED FRACTURE OF GREATER TUBEROSITY OF LEFT HUMERUS, INITIAL ENCOUNTER: ICD-10-CM

## 2024-02-20 DIAGNOSIS — J44.9 CHRONIC OBSTRUCTIVE PULMONARY DISEASE, UNSPECIFIED COPD TYPE (H): ICD-10-CM

## 2024-02-20 DIAGNOSIS — Z00.00 ENCOUNTER FOR MEDICARE ANNUAL WELLNESS EXAM: Primary | ICD-10-CM

## 2024-02-20 DIAGNOSIS — J43.2 CENTRILOBULAR EMPHYSEMA (H): ICD-10-CM

## 2024-02-20 DIAGNOSIS — F41.9 ANXIETY: ICD-10-CM

## 2024-02-20 DIAGNOSIS — Z79.891 LONG TERM (CURRENT) USE OF OPIATE ANALGESIC: ICD-10-CM

## 2024-02-20 DIAGNOSIS — G89.29 CHRONIC BILATERAL LOW BACK PAIN WITHOUT SCIATICA: ICD-10-CM

## 2024-02-20 DIAGNOSIS — I10 ESSENTIAL HYPERTENSION WITH GOAL BLOOD PRESSURE LESS THAN 140/90: ICD-10-CM

## 2024-02-20 DIAGNOSIS — M53.3 SACROILIAC JOINT PAIN: ICD-10-CM

## 2024-02-20 LAB — CREAT UR-MCNC: 66 MG/DL

## 2024-02-20 PROCEDURE — 99214 OFFICE O/P EST MOD 30 MIN: CPT | Mod: 25 | Performed by: FAMILY MEDICINE

## 2024-02-20 PROCEDURE — G0481 DRUG TEST DEF 8-14 CLASSES: HCPCS

## 2024-02-20 PROCEDURE — G0439 PPPS, SUBSEQ VISIT: HCPCS | Performed by: FAMILY MEDICINE

## 2024-02-20 RX ORDER — CELECOXIB 200 MG/1
200 CAPSULE ORAL DAILY
Qty: 90 CAPSULE | Refills: 1 | Status: SHIPPED | OUTPATIENT
Start: 2024-02-20 | End: 2024-08-12

## 2024-02-20 RX ORDER — AMLODIPINE BESYLATE 5 MG/1
5 TABLET ORAL DAILY
Qty: 90 TABLET | Refills: 1 | Status: SHIPPED | OUTPATIENT
Start: 2024-02-20 | End: 2024-08-12

## 2024-02-20 RX ORDER — OXYCODONE AND ACETAMINOPHEN 10; 325 MG/1; MG/1
1 TABLET ORAL EVERY 6 HOURS PRN
Qty: 50 TABLET | Refills: 0 | Status: SHIPPED | OUTPATIENT
Start: 2024-02-29 | End: 2024-03-28

## 2024-02-20 RX ORDER — QUETIAPINE FUMARATE 50 MG/1
100 TABLET, FILM COATED ORAL AT BEDTIME
Qty: 90 TABLET | Refills: 1 | Status: SHIPPED | OUTPATIENT
Start: 2024-02-20 | End: 2024-06-18

## 2024-02-20 RX ORDER — TIOTROPIUM BROMIDE 18 UG/1
CAPSULE ORAL; RESPIRATORY (INHALATION)
Qty: 30 CAPSULE | Refills: 11 | Status: ON HOLD | OUTPATIENT
Start: 2024-02-20

## 2024-02-20 RX ORDER — RESPIRATORY SYNCYTIAL VIRUS VACCINE 120MCG/0.5
0.5 KIT INTRAMUSCULAR ONCE
Qty: 1 EACH | Refills: 0 | Status: CANCELLED | OUTPATIENT
Start: 2024-02-20 | End: 2024-02-20

## 2024-02-20 RX ORDER — METOPROLOL SUCCINATE 50 MG/1
50 TABLET, EXTENDED RELEASE ORAL 2 TIMES DAILY
Qty: 180 TABLET | Refills: 3 | Status: ON HOLD | OUTPATIENT
Start: 2024-02-20

## 2024-02-20 SDOH — HEALTH STABILITY: PHYSICAL HEALTH: ON AVERAGE, HOW MANY MINUTES DO YOU ENGAGE IN EXERCISE AT THIS LEVEL?: 0 MIN

## 2024-02-20 SDOH — HEALTH STABILITY: PHYSICAL HEALTH: ON AVERAGE, HOW MANY DAYS PER WEEK DO YOU ENGAGE IN MODERATE TO STRENUOUS EXERCISE (LIKE A BRISK WALK)?: 0 DAYS

## 2024-02-20 ASSESSMENT — ANXIETY QUESTIONNAIRES
6. BECOMING EASILY ANNOYED OR IRRITABLE: NOT AT ALL
7. FEELING AFRAID AS IF SOMETHING AWFUL MIGHT HAPPEN: NOT AT ALL
GAD7 TOTAL SCORE: 0
2. NOT BEING ABLE TO STOP OR CONTROL WORRYING: NOT AT ALL
5. BEING SO RESTLESS THAT IT IS HARD TO SIT STILL: NOT AT ALL
GAD7 TOTAL SCORE: 0
3. WORRYING TOO MUCH ABOUT DIFFERENT THINGS: NOT AT ALL
1. FEELING NERVOUS, ANXIOUS, OR ON EDGE: NOT AT ALL

## 2024-02-20 ASSESSMENT — PATIENT HEALTH QUESTIONNAIRE - PHQ9
SUM OF ALL RESPONSES TO PHQ QUESTIONS 1-9: 3
5. POOR APPETITE OR OVEREATING: NOT AT ALL
10. IF YOU CHECKED OFF ANY PROBLEMS, HOW DIFFICULT HAVE THESE PROBLEMS MADE IT FOR YOU TO DO YOUR WORK, TAKE CARE OF THINGS AT HOME, OR GET ALONG WITH OTHER PEOPLE: NOT DIFFICULT AT ALL
SUM OF ALL RESPONSES TO PHQ QUESTIONS 1-9: 3

## 2024-02-20 ASSESSMENT — PAIN SCALES - GENERAL: PAINLEVEL: EXTREME PAIN (8)

## 2024-02-20 ASSESSMENT — SOCIAL DETERMINANTS OF HEALTH (SDOH): HOW OFTEN DO YOU GET TOGETHER WITH FRIENDS OR RELATIVES?: MORE THAN THREE TIMES A WEEK

## 2024-02-20 NOTE — PROGRESS NOTES
Preventive Care Visit  [facility]  Sarah Barriga MD, Family Medicine  Feb 20, 2024    Assessment & Plan     Encounter for Medicare annual wellness exam       Alcohol abuse with intoxication (H24)  Discussed again that I will continue weaning her off of the narcotics given her ongoing alcohol use.      - [id number] - Urine Drug Confirmation Panel (Comprehensive); Future    Seizure (H)  Sees neurology    Continuous opioid dependence (H)  In October we talked about decreasing her narcotics, while she wasn't happy, she agreed to this.   Fractured left humerus in November resulting in acute on chronic pain, narcotics were increased temporarily.   Last month I cut her back down to #50 oxycodone a month and we'll plan to wean further every 2-3 months by about 10%.    Centrilobular emphysema (H)  Continue current inhalers    Major depressive disorder, recurrent episode, moderate (H)  stable    Essential hypertension with goal blood pressure less than 140/90  Well controlled  - metoprolol succinate ER (TOPROL XL) 50 MG 24 hr tablet; Take 1 tablet (50 mg) by mouth 2 times daily  - amLODIPine (NORVASC) 5 MG tablet; Take 1 tablet (5 mg) by mouth daily    Chronic bilateral low back pain without sciatica  Still seeing her spine specialist  - celecoxib (CELEBREX) 200 MG capsule; Take 1 capsule (200 mg) by mouth daily  - oxyCODONE-acetaminophen (PERCOCET)  MG per tablet; Take 1 tablet by mouth every 6 hours as needed for severe pain Max #50/month    Sacroiliac joint pain     - celecoxib (CELEBREX) 200 MG capsule; Take 1 capsule (200 mg) by mouth daily  - oxyCODONE-acetaminophen (PERCOCET)  MG per tablet; Take 1 tablet by mouth every 6 hours as needed for severe pain Max #50/month    S/P lumbar spinal fusion     - celecoxib (CELEBREX) 200 MG capsule; Take 1 capsule (200 mg) by mouth daily  - oxyCODONE-acetaminophen (PERCOCET)  MG per tablet; Take 1 tablet by mouth every 6 hours as needed  "for severe pain Max #50/month    Anxiety  Stable.  She cut her seroqel back from twice daily to once daily.   - QUEtiapine (SEROQUEL) 50 MG tablet; Take 2 tablets (100 mg) by mouth at bedtime    Closed nondisplaced fracture of greater tuberosity of left humerus, initial encounter  Seeing ortho, they recommended ongoing PT and said a steroid injection could be considered in a few months  - oxyCODONE-acetaminophen (PERCOCET)  MG per tablet; Take 1 tablet by mouth every 6 hours as needed for severe pain Max #50/month    Chronic obstructive pulmonary disease, unspecified COPD type (H)  As above  - tiotropium (SPIRIVA HANDIHALER) 18 MCG inhaled capsule; USING THE HANDIHALER, INHALE THE CONTENTS OF ONE CAPSULE BY MOUTH ONCE DAILY    Long term (current) use of opiate analgesic     - [id number] - Urine Drug Confirmation Panel (Comprehensive); Future    Patient has been advised of split billing requirements and indicates understanding: Yes          BMI  Estimated body mass index is 29.38 kg/m  as calculated from the following:    Height as of this encounter: 1.588 m (5' 2.5\").    Weight as of this encounter: 74 kg (163 lb 4 oz).       Counseling  Appropriate preventive services were discussed with this patient, including applicable screening as appropriate for fall prevention, nutrition, physical activity, Tobacco-use cessation, weight loss and cognition.  Checklist reviewing preventive services available has been given to the patient.  Reviewed patient's diet, addressing concerns and/or questions.   She is at risk for psychosocial distress and has been provided with information to reduce risk.   Discussed possible causes of fatigue. I have reviewed Opioid Use Disorder and Substance Use Disorder risk factors and made any needed referrals.     Patient has been advised of split billing requirements and indicates understanding: Yes        Miller Lopez is a 73 year old, presenting for the following:  Medicare " Visit        2/20/2024    11:36 AM   Additional Questions   Roomed by Altagracia weldon CMA   Accompanied by Self         Health Care Directive  Patient does not have a Health Care Directive or Living Will:     HPI    Hyperlipidemia Follow-Up  Atorvastatin 40mg qd  Are you regularly taking any medication or supplement to lower your cholesterol?   Yes- statin  Are you having muscle aches or other side effects that you think could be caused by your cholesterol lowering medication?  No    Hypertension Follow-up  Amlodipine 5mg every day, metoprolol 50mg qd  Do you check your blood pressure regularly outside of the clinic? Yes   Are you following a low salt diet? Yes  Are your blood pressures ever more than 140 on the top number (systolic) OR more   than 90 on the bottom number (diastolic), for example 140/90? No    Hypothyroidism Follow-up  Levothyroxine 50mcg qd  Since last visit, patient describes the following symptoms: Weight stable, no hair loss, no skin changes, no constipation, no loose stools    BP Readings from Last 6 Encounters:   02/20/24 132/80   01/12/24 (!) 161/95   11/20/23 122/70   11/09/23 106/69   10/13/23 (!) 143/106   10/10/23 128/80       Pain History:  When did you first notice your pain? Chronic   Have you seen this provider for your pain in the past? Yes   Where in your body do you have pain? Low back, left hip, left shoulder  Are you seeing anyone else for your pain? No        7/11/2023     8:52 AM 10/10/2023    12:02 PM 2/20/2024    11:33 AM   PHQ-9 SCORE   PHQ-9 Total Score MyChart  19 (Moderately severe depression) 3 (Minimal depression)   PHQ-9 Total Score 9 19 3           7/11/2023     8:52 AM 8/8/2023    10:16 AM 2/20/2024    11:47 AM   DAJUAN-7 SCORE   Total Score  4 (minimal anxiety)    Total Score 2 4 0               7/11/2023     8:52 AM 10/10/2023    12:02 PM 2/20/2024    11:33 AM   PHQ-9 SCORE   PHQ-9 Total Score MyChart  19 (Moderately severe depression) 3 (Minimal depression)   PHQ-9 Total  Score 9 19 3           7/11/2023     8:52 AM 8/8/2023    10:16 AM 2/20/2024    11:47 AM   DAJUAN-7 SCORE   Total Score  4 (minimal anxiety)    Total Score 2 4 0           3/13/2023    10:36 AM 7/11/2023     8:52 AM 2/20/2024    11:47 AM   PEG Score   PEG Total Score 8 7 7       Chronic Pain Follow Up:    Location of pain: Lumbar spine, hip and shoulder  Analgesia/pain control:    - Recent changes:  Worsening shoulder pain    - Overall control: Tolerable with discomfort    - Current treatments: Opioids   Adherence:     - Do you ever take more pain medicine than prescribed? No    - When did you take your last dose of pain medicine?  Last night   Adverse effects: No           PDMP Review         Value Time User    State PDMP site checked  Yes 2/20/2024 11:14 AM Sarah Barriga MD          Last CSA Agreement:   CSA -- Patient Level:     [Media Unavailable] Controlled Substance Agreement - Opioid - Scan on 7/11/2023 11:26 AM   [Media Unavailable] Controlled Substance Agreement - Opioid - Scan on 6/24/2022  7:50 AM   [Media Unavailable] Controlled Substance Agreement - Opioid - Scan on 4/30/2021 10:14 AM   [Media Unavailable] Controlled Substance Agreement - Opioid - Scan on 1/31/2019  1:16 PM: 01/28/19       Last UDS: 3/13/2023                    2/20/2024   General Health   How would you rate your overall physical health? (!) FAIR   Feel stress (tense, anxious, or unable to sleep) Only a little   (!) STRESS CONCERN      2/20/2024   Nutrition   Diet: Regular (no restrictions)         2/20/2024   Exercise   Days per week of moderate/strenous exercise 0 days   Average minutes spent exercising at this level 0 min   (!) EXERCISE CONCERN      2/20/2024   Social Factors   Frequency of gathering with friends or relatives More than three times a week   Worry food won't last until get money to buy more No   Food not last or not have enough money for food? No   Do you have housing?  Yes   Are you worried about losing your housing?  No   Lack of transportation? No   Unable to get utilities (heat,electricity)? No         2024   Fall Risk   Fallen 2 or more times in the past year? No   Trouble with walking or balance? No          2024   Activities of Daily Living- Home Safety   Needs help with the following daily activites None of the above   Safety concerns in the home None of the above         2024   Dental   Dentist two times every year? Yes         2024   Hearing Screening   Hearing concerns? None of the above         2024   Driving Risk Screening   Patient/family members have concerns about driving No         2024   General Alertness/Fatigue Screening   Have you been more tired than usual lately? (!) YES         2024   Urinary Incontinence Screening   Bothered by leaking urine in past 6 months No          No data to display                Today's PHQ-9 Score:       2024    11:33 AM   PHQ-9 SCORE   PHQ-9 Total Score MyChart 3 (Minimal depression)   PHQ-9 Total Score 3         2024   Substance Use   Alcohol more than 3/day or more than 7/wk No   Do you have a current opioid prescription? (!) YES   How severe/bad is pain from 1 to 10? 8/10   Do you use any other substances recreationally? No         2024   Substance Use   Alcohol more than 3/day or more than 7/wk No   Do you have a current opioid prescription? (!) YES   How severe/bad is pain from 1 to 10? 8/10   Do you use any other substances recreationally? No   @[facility]([id number],,,1)->[facility]([id number],,,1)(CIRCULAR REFERENCE)@  Social History     Tobacco Use    Smoking status: Former     Packs/day: 1.00     Years: 30.00     Additional pack years: 0.00     Total pack years: 30.00     Types: Cigarettes     Quit date: 2004     Years since quittin.1    Smokeless tobacco: Never   Vaping Use    Vaping Use: Never used   Substance Use Topics    Alcohol use: Yes     Comment: Glass of wine a few times a week. ETOH dependency, DUI  3/15/10; 1-2 ETOH per week    Drug use: Yes     Types: Oxycodone     Comment: percocet for the past several years            No data to display                 Mammogram Screening - Mammogram every 1-2 years updated in Health Maintenance based on mutual decision making    The ASCVD Risk score (Milton FLORES, et al., 2019) failed to calculate for the following reasons:    The patient has a prior MI or stroke diagnosis            Reviewed and updated as needed this visit by Provider                      Current providers sharing in care for this patient include:  Patient Care Team:  Sarah Barriga MD as PCP - General  Sarah Barriga MD as Assigned PCP  Sarah Barriga MD as Assigned Pain Medication Provider  Cait Hernandez NP as Nurse Practitioner (Pulmonary Disease)  Jet Greco MD as MD (Neurology)  Jet Greco MD as Assigned Neuroscience Provider  Cait Hernandez NP as Assigned Pulmonology Provider    The following health maintenance items are reviewed in Epic and correct as of today:  Health Maintenance   Topic Date Due    COPD ACTION PLAN  Never done    HEPATITIS A IMMUNIZATION (1 of 2 - Risk 2-dose series) Never done    RSV VACCINE (Pregnancy & 60+) (1 - 1-dose 60+ series) Never done    ADVANCE CARE PLANNING  09/05/2017    URINE DRUG SCREEN  03/13/2024    ANNUAL REVIEW OF HM ORDERS  07/11/2024    CONTROLLED SUBSTANCE AGREEMENT FOR CHRONIC PAIN MANAGEMENT  07/11/2024    MICROALBUMIN  08/08/2024    LIPID  10/17/2024    TSH W/FREE T4 REFLEX  11/07/2024    BMP  11/08/2024    MAMMO SCREENING  12/14/2024    MEDICARE ANNUAL WELLNESS VISIT  02/20/2025    DAJUAN ASSESSMENT  02/20/2025    FALL RISK ASSESSMENT  02/20/2025    PHQ-9  02/20/2025    COLORECTAL CANCER SCREENING  03/30/2026    GLUCOSE  11/08/2026    DTAP/TDAP/TD IMMUNIZATION (2 - Td or Tdap) 11/27/2027    DEXA  12/01/2030    SPIROMETRY  Completed    HEPATITIS C SCREENING  Completed    DEPRESSION ACTION PLAN  Completed    INFLUENZA VACCINE   "Completed    Pneumococcal Vaccine: 65+ Years  Completed    URINALYSIS  Completed    ZOSTER IMMUNIZATION  Completed    COVID-19 Vaccine  Completed    IPV IMMUNIZATION  Aged Out    HPV IMMUNIZATION  Aged Out    MENINGITIS IMMUNIZATION  Aged Out    RSV MONOCLONAL ANTIBODY  Aged Out            Objective    Exam  /80   Pulse 73   Temp 98  F (36.7  C) (Tympanic)   Resp 20   Ht 1.588 m (5' 2.5\")   Wt 74 kg (163 lb 4 oz)   SpO2 98%   BMI 29.38 kg/m     Estimated body mass index is 29.38 kg/m  as calculated from the following:    Height as of this encounter: 1.588 m (5' 2.5\").    Weight as of this encounter: 74 kg (163 lb 4 oz).    Physical Exam  GENERAL: alert and mild distress  NECK: no adenopathy, no asymmetry, masses, or scars  RESP: lungs clear to auscultation - no rales, rhonchi or wheezes  CV: regular rate and rhythm, normal S1 S2, no S3 or S4, no murmur, click or rub, no peripheral edema  ABDOMEN: soft, nontender, no hepatosplenomegaly, no masses and bowel sounds normal  MS: no gross musculoskeletal defects noted, no edema  Psych: alert, oriented x 3        2/20/2024   Mini Cog   Clock Draw Score 2 Normal   3 Item Recall 3 objects recalled   Mini Cog Total Score 5            Signed Electronically by: Sarah Barriga MD    "

## 2024-02-20 NOTE — COMMUNITY RESOURCES LIST (ENGLISH)
02/20/2024   [facility]  N/A  For additional resource needs, please contact your health insurance member services or your primary care team.  Phone: [phone]   Email: N/A   Address: [address] [place] [id number]   Hours: N/A        Exercise and Recreation       Gym or workout facility  1  [employer] - [place] - [address] Distance: 19.76 miles      In-Person   [address] [place] [address]  Language: English  Hours: Mon - Sun Open 24 Hours  Fees: Insurance, Self Pay, Sliding Fee   Phone: [phone]) [phone] Email: [email or web address] Website: [email or web address]     2  [employer] - [place] Distance: 20.03 miles      In-Person   [address] [place] [address]  Language: English, Sinhala  Hours: Mon - Sun Open 24 Hours  Fees: Insurance, Self Pay, Sliding Fee   Phone: [phone]) [phone] Email: [email or web address] Website: [email or web address]          Important Numbers & Websites       [address].org  Poison Control   (202) 183-5568 [place]  Suicide and Crisis Lifeline   [address] Center[facility].org  Childhelp [place] Child Abuse Hotline   891.984.6260 Childhelphotline.org  National Sexual Assault Hotline   (221) 293-7516 (HOPE) Rainn.org  National Runaway Safeline   (446) 432-9670 (RUNAWAY) [facility]  Pregnancy & Postpartum Support [place]   Call/text 337-888-5967 Ppsupport[place].org  Substance Abuse National Helpline ([facility]   287-288-HELP (0297) Findtreatment.gov  Emergency Services   911

## 2024-02-20 NOTE — PATIENT INSTRUCTIONS
Preventive Care Advice   This is general advice given by our system to help you stay healthy. However, your care team may have specific advice just for you. Please talk to your care team about your preventive care needs.  Nutrition  Eat 5 or more servings of fruits and vegetables each day.  Try wheat bread, brown rice and whole grain pasta (instead of white bread, rice, and pasta).  Get enough calcium and vitamin D. Check the label on foods and aim for 100% of the RDA (recommended daily allowance).  Lifestyle  Exercise at least 150 minutes each week  (30 minutes a day, 5 days a week).  Do muscle strengthening activities 2 days a week. These help control your weight and prevent disease.  No smoking.  Wear sunscreen to prevent skin cancer.  Have a dental exam and cleaning every 6 months.  Yearly exams  See your health care team every year to talk about:  Any changes in your health.  Any medicines your care team has prescribed.  Preventive care, family planning, and ways to prevent chronic diseases.  Shots (vaccines)   HPV shots (up to age 26), if you've never had them before.  Hepatitis B shots (up to age 59), if you've never had them before.  COVID-19 shot: Get this shot when it's due.  Flu shot: Get a flu shot every year.  Tetanus shot: Get a tetanus shot every 10 years.  Pneumococcal, hepatitis A, and RSV shots: Ask your care team if you need these based on your risk.  Shingles shot (for age 50 and up)  General health tests  Diabetes screening:  Starting at age 35, Get screened for diabetes at least every 3 years.  If you are younger than age 35, ask your care team if you should be screened for diabetes.  Cholesterol test: At age 39, start having a cholesterol test every 5 years, or more often if advised.  Bone density scan (DEXA): At age 50, ask your care team if you should have this scan for osteoporosis (brittle bones).  Hepatitis C: Get tested at least once in your life.  STIs (sexually transmitted  infections)  Before age 24: Ask your care team if you should be screened for STIs.  After age 24: Get screened for STIs if you're at risk. You are at risk for STIs (including HIV) if:  You are sexually active with more than one person.  You don't use condoms every time.  You or a partner was diagnosed with a sexually transmitted infection.  If you are at risk for HIV, ask about PrEP medicine to prevent HIV.  Get tested for HIV at least once in your life, whether you are at risk for HIV or not.  Cancer screening tests  Cervical cancer screening: If you have a cervix, begin getting regular cervical cancer screening tests starting at age 21.  Breast cancer scan (mammogram): If you've ever had breasts, begin having regular mammograms starting at age 40. This is a scan to check for breast cancer.  Colon cancer screening: It is important to start screening for colon cancer at age 45.  Have a colonoscopy test every 10 years (or more often if you're at risk) Or, ask your provider about stool tests like a FIT test every year or Cologuard test every 3 years.  To learn more about your testing options, visit:   https://www.[email or web address]/448941.pdf.  For help making a decision, visit:   https://bit.ly/qc42988.  Prostate cancer screening test: If you have a prostate, ask your care team if a prostate cancer screening test (PSA) at age 55 is right for you.  Lung cancer screening: If you are a current or former smoker ages 50 to 80, ask your care team if ongoing lung cancer screenings are right for you.  For informational purposes only. Not to replace the advice of your health care provider. Copyright   2023 [employer]. All rights reserved. Clinically reviewed by the [facility] Transitions Program. [employer] [id number] - REV 01/24.

## 2024-02-22 LAB
OXYCODONE UR CFM-MCNC: 711 NG/ML
OXYCODONE/CREAT UR: 1077 NG/MG {CREAT}
OXYMORPHONE UR CFM-MCNC: 920 NG/ML
OXYMORPHONE/CREAT UR: 1394 NG/MG {CREAT}

## 2024-02-26 ENCOUNTER — ANCILLARY PROCEDURE (OUTPATIENT)
Dept: MAMMOGRAPHY | Facility: CLINIC | Age: 74
End: 2024-02-26
Attending: FAMILY MEDICINE
Payer: COMMERCIAL

## 2024-02-26 DIAGNOSIS — Z12.31 VISIT FOR SCREENING MAMMOGRAM: ICD-10-CM

## 2024-02-26 PROCEDURE — 77067 SCR MAMMO BI INCL CAD: CPT | Mod: TC | Performed by: RADIOLOGY

## 2024-02-26 PROCEDURE — 77063 BREAST TOMOSYNTHESIS BI: CPT | Mod: TC | Performed by: RADIOLOGY

## 2024-03-05 ENCOUNTER — VIRTUAL VISIT (OUTPATIENT)
Dept: SLEEP MEDICINE | Facility: CLINIC | Age: 74
End: 2024-03-05
Payer: COMMERCIAL

## 2024-03-05 VITALS — HEIGHT: 62 IN | BODY MASS INDEX: 28.52 KG/M2 | WEIGHT: 155 LBS

## 2024-03-05 DIAGNOSIS — G25.81 RESTLESS LEGS SYNDROME (RLS): ICD-10-CM

## 2024-03-05 DIAGNOSIS — G47.33 OSA (OBSTRUCTIVE SLEEP APNEA): Primary | ICD-10-CM

## 2024-03-05 DIAGNOSIS — G47.00 INSOMNIA, UNSPECIFIED TYPE: ICD-10-CM

## 2024-03-05 PROCEDURE — 99215 OFFICE O/P EST HI 40 MIN: CPT | Mod: 95 | Performed by: INTERNAL MEDICINE

## 2024-03-05 ASSESSMENT — PAIN SCALES - GENERAL: PAINLEVEL: SEVERE PAIN (6)

## 2024-03-05 NOTE — NURSING NOTE
Patient declined qnrs Is the patient currently in the state of [place]? YES    Visit mode:VIDEO    If the visit is dropped, the patient can be reconnected by: VIDEO VISIT: Text to cell phone:   Telephone Information:   Mobile [phone]       Will anyone else be joining the visit? NO  (If patient encounters technical issues they should call [phone]    How would you like to obtain your AVS? MyChart    Are changes needed to the allergy or medication list? No    Reason for visit: RECHPAVAN BEAVER

## 2024-03-05 NOTE — PATIENT INSTRUCTIONS
For general sleep health questions:   https://www.thensf.org/sleep-health-topics/  http://sleepeducation.org    Medicare and Medicaid patients starting on CPAP or biPAP on or after 5/12/2023  Medicare patients should schedule an IN PERSON CLINIC appointment to provide your CPAP/biPAP usage data to a provider within 90 days of starting CPAP    For new ResMed devices, sign up for device billie to monitor your device for your followup visits  We encourage you to utilize the [employer] billie or website ([employer] web ([employer]) to monitor your therapy progress and share the data with your healthcare team when you discuss your sleep apnea.                                                      Know your equipment:  CPAP is continuous positive airway pressure that prevents obstructive sleep apnea by keeping the throat from collapsing while you are sleeping. In most cases, the device is  smart  and can slowly self-adjusts if your throat collapses and keeps a record every day of how well you are treated-this information is available to you and your care team.  BPAP is bilevel positive airway pressure that keeps your throat open and also assists each breath with a pressure boost to maintain adequate breathing.  Special kinds of BPAP are used in patients who have inadequate breathing from lung or heart disease. In most cases, the device is  smart  and can slowly self-adjusts to assist breathing. Like CPAP, the device keeps a record of how well you are treated.  Your mask is your connection to the device. You get to choose what feels most comfortable and the staff will help to make sure if fits.     *Masks with magnets:  Updated Contraindications  Masks with magnetic components are contraindicated for use by patients where they, or anyone in close physical contact while using the mask, have the following:   Active medical implants that interact with magnets (i.e., pacemakers, implantable cardioverter defibrillators (ICD),  neurostimulators, cerebrospinal fluid (CSF) shunts, insulin/infusion pumps)   Metallic implants/objects containing ferromagnetic material (i.e., aneurysm clips/flow disruption devices, embolic coils, stents, valves, electrodes, implants to restore hearing or balance with implanted magnets, ocular implants, metallic splinters in the eye)  Updated Warning  Keep the mask magnets at a safe distance of at least 6 inches (150 mm) away from implants or medical devices that may be adversely affected by magnetic interference. This warning applies to you or anyone in close physical contact with your mask. The magnets are in the frame and lower headgear clips, with a magnetic field strength of up to 400mT. When worn, they connect to secure the mask but may inadvertently detach while asleep.  Implants/medical devices, including those listed within contraindications, may be adversely affected if they change function under external magnetic fields or contain ferromagnetic materials that attract/repel to magnetic fields (some metallic implants, e.g., contact lenses with metal, dental implants, metallic cranial plates, screws, raymond hole covers, and bone substitute devices). Consult your physician and  of your implant / other medical device for information on the potential adverse effects of magnetic fields.      Continue PAP therapy every night, for all hours that you are sleeping (including naps.)  As always, try to get at least 8 hours of sleep or more each day, keep a regular sleep schedule, and avoid sleep deprivation. Avoid alcohol.  Reasons that you might need a change to your pressure therapy would be weight gain or loss, waking having inadvertently removed your PAP overnight, having previously felt refreshed by sleep with CPAP use and now waking un-refreshed, and return of daytime sleepiness. Also, the development of new medical problems  (such as heart failure, stroke, medications such as narcotics) can  sometimes affect breathing at night and change your PAP therapy needs.  Please bring PAP with you if you are hospitalized.  If anticipating surgery be sure to discuss with your surgeon that you have sleep apnea and use PAP therapy.    Maintain your equipment as recommended which includes routine cleaning and replacement of supplies.      Call DME for any questions regarding supplies or maintenance.    [facility], [facility] [phone]    Do not drive on engage in potentially dangerous activities if feeling sleepy.    Please follow up in sleep clinic again in 4 months.        Tips for your PAP use-    Mask fitting tips  Mask fitting exercise:    To improve your mask seal and your mobility at night, put mask on and secure in place.  Lie down in bed with full pressure and roll to one side, adjust headgear while in that position to eliminate any leaks. Repeat process rolling to other side.     The mask seal does not have to be perfect:   CPAP machines are designed to make up for small leaks. However, you will not tolerate leaks blowing in your eyes so you will need to adjust.   Any leak should only be near or at the bottom of the mask.  We expect your mask to leak slightly at night.    Do not over-tighten the headgear straps, tighter IS NOT better, we expect minimal leak.    First try re-positioning the mask or headgear before tightening the headgear straps.  Mask leaks are expected due to changing sleeping positions. Try pulling the mask away from your skin allowing the cushion to re-inflate will minimize the leak.  If you struggle for a good fit, try turning the CPAP off and then readjust the mask by pulling it away from your face and then turning back on the CPAP.        Humidifier tips  Humidifiers can be adjusted to increase or decrease the amount of moisture according to your comfort level. You may need to adjust this frequently at first, but then might only change it  with seasonal weather changes.     Try INCREASING the humidity if:  You experience a dry, irritated nasal passage or throat.  You have a runny, drippy nose or sneezing fits after using CPAP.  You experience nasal congestion during or after CPAP use.    Try DECREASING the humidity if:  You have excessive condensation or  rain out  in the tubing or mask.  Otherwise keep the tubing warm during the night by running it underneath the blankets or pillow.      Clinic visit after initial PAP set-up   Bring your equipment with you to your 5-8 week follow up clinic visit.  We will be extracting your data from the machine if not available from the cloud based [employer].        Travel  Always take your equipment with you when you travel.  If you fly with your equipment bring it on with you as a carry on.  Medical equipment does not count as a carry on.    If you travel international the machines take 110-240v.  The only adapter needed is the adapter that will fit into the receptacle (outlet).    You may also want to bring an extension cord as many hotel rooms have limited outlets at the bedside.  Do not travel with water in your humidifier chamber.     Cleaning and Maintenance Guidelines    Equipment Frequency Cleaning Method   Mask First Day    Daily      Weekly Soak mask in hot soapy water for 30 minutes, rinse and air dry.  Wipe nasal cushion with a hot soapy (Ivory, baby shampoo) cloth and rinse.  Baby wipes may also be used.  Do not use anti-bacterial soaps,Trudi  liquid soap, rubbing alcohol, bleach or ammonia.  Wash frame in hot soapy water (Ivory, baby shampoo) rinse and let air dry   Headgear Biweekly Wash in hot soapy water, rinse and air dry   Reusable Gray Filter Weekly Wash in hot soapy water, rinse, put in towel squeeze moisture out, let air dry   Disposable White Filter Check Weekly Replace when brown or gray in color; at least every 2 to 3 months   Humidifier Chamber Daily    Weekly Empty distilled water from  humidifier and let air dry    Hand wash in hot soapy water, rinse and air dry   Tubing Weekly Wash in hot soapy water, rinse and let air dry   Mask, Tubing and Humidifier Chamber As needed Disinfect: Soak in 1 part distilled white vinegar to 3 parts hot water for 30 minutes, rinse well and air dry  Not the material headgear        MASK AND SUPPLY REORDERING and EQUIPMENT NEEDS through your DME and per your insurance  Reminder: Most insurance companies will allow for a new mask, headgear, tubing, and reusable gray filter every six months.  Disposable white ultra-fine filters are covered monthly.      HOME AND SAFETY INSTRUCTIONS  Do not use frayed or cracked electrical cords, multi plug adaptors, or switched receptacles  Do not immerse electrical equipment into water  Assure that electrical cords do not become a tripping hazard

## 2024-03-05 NOTE — PROGRESS NOTES
Virtual Visit Details    Type of service:  Video Visit   Video Start Time:  10:55 AM  Video End Time:11:17 AM    Originating Location (pt. Location): Home    Distant Location (provider location):  Off-site  Platform used for Video Visit: Wanda    Chief complaint: Follow-up sleep issues    LOV Sleep 8/9/2023 LA Walker CNP    History of Present Illness: 73-year-old female who reports being evaluated for insomnia couple of years ago and found to have sleep apnea.  She is not sure why she is here today.  This is her first visit with me.  She uses CPAP maybe half the nights.  She finds that it does not seem to help her fall asleep or stay asleep.  She takes melatonin and Seroquel around 10 PM and then gets into bed.  If things go well she usually will fall asleep within 30 minutes but if they do not she can take her many hours.  She usually does not get out of bed until 8 or 10 in the morning.  She occasionally will take a very short nap during the day.  She does suffer back pain as well as shoulder pain and motor restlessness that impacts her sleep.  She has been treated with iron which did not improve her motor restlessness.  She does not routinely take pain medication.  But she does have it.  She has never done cognitive behavioral therapy for insomnia.  She has gabapentin at home.  She recalls trying it during the day for back pain but not finding it effective.  She has never tried it for motor restlessness.    She had a in lab titration study done a few months ago.  Those results were reviewed with her in detail today pressure change was recommended however does not appear that it was ordered.  Patient is interested in trying that.    [place] Sleepiness Scale  Total score - [place]: 6 (3/4/2024  2:03 PM)   (Less than 10 normal)      Past Medical History:   Diagnosis Date    Anemia     Aneurysm (H24)     Antiplatelet or antithrombotic long-term use     Arthritis     Chemical dependency (H)     Chem Dep RX     Depression     History of blood transfusion     Hypertension     Menopausal symptoms     Other chronic pain     Lower back and hips    Seizure (H) 2024    Sleep apnea     Sleep disorder     Thyroid disease     Tobacco abuse     Uncomplicated asthma        Allergies   Allergen Reactions    Bee Venom     Lisinopril Swelling     Oral swelling       Current Outpatient Medications   Medication    amLODIPine (NORVASC) 5 MG tablet    aspirin (ASA) 325 MG tablet    atorvastatin (LIPITOR) 40 MG tablet    budesonide-formoterol (SYMBICORT) 80-4.5 MCG/ACT Inhaler    calcium carbonate 600 mg-vitamin D 400 units (CALTRATE) 600-400 MG-UNIT per tablet    celecoxib (CELEBREX) 200 MG capsule    DULoxetine (CYMBALTA) 30 MG capsule    folic acid (FOLVITE) 1 MG tablet    levETIRAcetam (KEPPRA) 500 MG tablet    levothyroxine (SYNTHROID/LEVOTHROID) 50 MCG tablet    Melatonin 10 MG TABS tablet    metoprolol succinate ER (TOPROL XL) 50 MG 24 hr tablet    multivitamin, therapeutic (THERA-VIT) TABS tablet    naloxone (NARCAN) 4 MG/0.1ML nasal spray    nystatin (MYCOSTATIN) 880896 UNIT/GM external powder    oxyCODONE-acetaminophen (PERCOCET)  MG per tablet    QUEtiapine (SEROQUEL) 50 MG tablet    tiotropium (SPIRIVA HANDIHALER) 18 MCG inhaled capsule    VENTOLIN  (90 Base) MCG/ACT inhaler     No current facility-administered medications for this visit.       Social History     Socioeconomic History    Marital status:      Spouse name: Not on file    Number of children: Not on file    Years of education: Not on file    Highest education level: Not on file   Occupational History    Not on file   Tobacco Use    Smoking status: Former     Packs/day: 1.00     Years: 30.00     Additional pack years: 0.00     Total pack years: 30.00     Types: Cigarettes     Quit date: 2004     Years since quittin.1    Smokeless tobacco: Never   Vaping Use    Vaping Use: Never used   Substance and Sexual Activity    Alcohol use:  Yes     Comment: Glass of wine a few times a week. ETOH dependency, DUI 3/15/10; 1-2 ETOH per week    Drug use: Yes     Types: Oxycodone     Comment: percocet for the past several years    Sexual activity: Not Currently     Partners: Male     Birth control/protection: Surgical     Comment: VAS   Other Topics Concern    Parent/sibling w/ CABG, MI or angioplasty before 65F 55M? No   Social History Narrative    Not on file     Social Determinants of Health     Financial Resource Strain: Low Risk  (2/20/2024)    Financial Resource Strain     Within the past 12 months, have you or your family members you live with been unable to get utilities (heat, electricity) when it was really needed?: No   Food Insecurity: Low Risk  (2/20/2024)    Food Insecurity     Within the past 12 months, did you worry that your food would run out before you got money to buy more?: No     Within the past 12 months, did the food you bought just not last and you didn t have money to get more?: No   Transportation Needs: Low Risk  (2/20/2024)    Transportation Needs     Within the past 12 months, has lack of transportation kept you from medical appointments, getting your medicines, non-medical meetings or appointments, work, or from getting things that you need?: No   Physical Activity: Inactive (2/20/2024)    Exercise Vital Sign     Days of Exercise per Week: 0 days     Minutes of Exercise per Session: 0 min   Stress: No Stress Concern Present (2/20/2024)    [employer] [place] [employer] - Occupational Stress Questionnaire     Feeling of Stress : Only a little   Social Connections: Unknown (2/20/2024)    Social Connection and Isolation Panel [NHANES]     Frequency of Communication with Friends and Family: Not on file     Frequency of Social Gatherings with Friends and Family: More than three times a week     Attends Church Services: Not on file     Active Member of Clubs or Organizations: Not on file     Attends Club or Organization  "Meetings: Not on file     Marital Status: Not on file   Interpersonal Safety: Low Risk  (2/20/2024)    Interpersonal Safety     Do you feel physically and emotionally safe where you currently live?: Yes     Within the past 12 months, have you been hit, slapped, kicked or otherwise physically hurt by someone?: No     Within the past 12 months, have you been humiliated or emotionally abused in other ways by your partner or ex-partner?: No   Housing Stability: Low Risk  (2/20/2024)    Housing Stability     Do you have housing? : Yes     Are you worried about losing your housing?: No       Family History   Problem Relation Age of Onset    Cancer Mother         breast/lung    Alcohol/Drug Mother     Depression Mother     Cancer - colorectal Father     Alcohol/Drug Father     Diabetes Maternal Grandmother     Diabetes Maternal Grandfather     Diabetes Paternal Grandmother     Diabetes Paternal Grandfather     Cancer Paternal Grandfather     Gastrointestinal Disease Paternal Grandfather     Congenital Anomalies Son     Hypertension Brother     Adrenal Disorder Brother         had adrenalectomies    Cerebral palsy Granddaughter            EXAM:  Ht 1.575 m (5' 2\")   Wt 70.3 kg (155 lb)   BMI 28.35 kg/m    GENERAL: Alert and no distress  EYES: Eyes grossly normal to inspection.  No discharge or erythema, or obvious scleral/conjunctival abnormalities.  RESP: No audible wheeze, cough, or visible cyanosis.    SKIN: Visible skin clear. No significant rash, abnormal pigmentation or lesions.  NEURO: Cranial nerves grossly intact.  Mentation and speech appropriate for age.  PSYCH: Appropriate affect, tone, and pace of words       PSG Titration 11/16/2023  Weight 165 lbs BMI 30  Rec APAP 12-16   PLMI 39.5 PLMAI 17    HSAT WatchPat 9/15/2021 on CPAP 7-15   Weight 162 lbs BMI 28.7  AHI 15.7, Lowest O2 Sat 77%  Time with saturation less than 88% was 257 minutes.      PSG 6/16/2021  Weight 160 lbs BMI 28.3  AHI 56.9, RDI 57.6, Lowest " O2 Sat 70%  Time with O2 sat at or below 88% 358 min  No TCM  Increased EMG activity in REM        ResMed AirSense 10 auto PAP download from 2/2/2024 to 3/2/2024 reviewed:  Per cent of days used greater than 4 Hours 50% (minimum goal greater than 70%)  Average use on days used: 7 hours 35 min  Settings: Min EPAP 7 cmH2O    Max EPAP 15 cmH2O  Pressures delivered 90/95th percentile for pressure 12.7 cmH2O  Average AHI 10.1 events per hour (Central index 5.4) Hello  Leak acceptable    TSH   Date Value Ref Range Status   11/07/2023 1.05 0.30 - 4.20 uIU/mL Final   06/24/2022 0.74 0.40 - 4.00 mU/L Final   11/04/2020 1.50 0.40 - 4.00 mU/L Final         ASSESSMENT:  73-year-old female with complaints of insomnia, also with severe obstructive sleep apnea with hypoxemia, not quite meeting compliance goals with PAP therapy.  Recent titration study suggests increased pressure requirements to adequately treat sleep disordered breathing.  Complains of motor restlessness could be impacting sleep initiation and maintenance.  Frequent arousals on PSG associated with periodic limb movements.    PLAN:  Orders generated for a pressure change.  Recommended overnight oximetry to be completed after pressure change.  Patient should use CPAP all night every night.  Offered a trial of gabapentin 300 mg at bedtime.  Patient reports she has some at home and like to try that.  Sent her message in 2 weeks to follow-up how she is doing.  Offered referral for CBT-I, patient declined at this time.  Patient should follow-up in sleep medicine in 4 months.      41 minutes spent by me on the date of the encounter doing chart review, history and exam, documentation and further activities per the note    Laila Crocker M.D.  Pulmonary/Critical Care/Sleep Medicine    [facility]   Floor 1, [address]   [address] [place] [address]   Appointments: [phone]    The above note  was dictated using voice recognition software and may include typographical errors. Please contact the author for any clarifications.

## 2024-03-06 NOTE — NURSING NOTE
Attempted to reach patient via phone unsuccessful left message to call back to schedule 4 month follow up appointment.     Amanda Callahan MA

## 2024-03-14 NOTE — PROGRESS NOTES
"   01/30/24 0500   Appointment Info   Signing clinician's name / credentials Kamilah Avendaño, PT   Total/Authorized Visits 12 [facility]   Visits Used 2   Medical Diagnosis Fracture of humerus, proximal, left, closed   PT Tx Diagnosis L shld pain and decreased ROM/strength   Quick Adds Certification   Progress Note/Certification   Start of Care Date 01/04/24   Onset of illness/injury or Date of Surgery 11/20/23   Therapy Frequency 1x/week   Predicted Duration 12 weeks   Certification date from 01/04/24   Certification date to 03/28/24   GOALS   PT Goals 2;3;4   PT Goal 1   Goal Identifier 1   Goal Description Pt will be able to wash back/hair with < 3/10 pain   Target Date 02/15/24   PT Goal 2   Goal Identifier 2   Goal Description Pt will be able to hook her bra behind her back with < 3/10 pain   Target Date 03/07/24   PT Goal 3   Goal Identifier 3   Goal Description Pt will be able to reach for an object on a high shelf with < 3/10 pain   Target Date 03/28/24   PT Goal 4   Goal Identifier 4   Goal Description Pt will be independent with the HEP for optimal functional recovery.   Target Date 03/28/24   Subjective Report   Subjective Report Pt will be leaving for [place] this Sat for 2-3 weeks. Plans on resuming to PT when she returns.   Objective Measures   Objective Measures Objective Measure 1   Objective Measure 1   Objective Measure L shld PROM   Details flex 162*, abd 95*, ER 77*, IR 90*   Treatment Interventions (PT)   Interventions Therapeutic Procedure/Exercise;Manual Therapy   Therapeutic Procedure/Exercise   Therapeutic Procedures: strength, endurance, ROM, flexibility minutes (72300) 25   Ther Proc 1 PROM with OP all directions. Tabletop stretches for flex, abd. IR stretch behind back with towel, doorway ER stretch 90\" ea. wall climb 90\"   Skilled Intervention PROM, ROM   Patient Response/Progress susan fair due to pain   Manual Therapy   Manual Therapy: Mobilization, MFR, MLD, friction massage minutes (97822) 15 "   Manual Therapy 1 joint mobs   Manual Therapy 1 - Details axial distraction, caudal glide, post glide, lat distraction gr III-IV   Education   Learner/Method Patient;Listening;Demonstration;Pictures/Video;No Barriers to Learning   Plan   Plan for next session wand exs   Total Session Time   Timed Code Treatment Minutes 40   Total Treatment Time (sum of timed and untimed services) 40         DISCHARGE  Reason for Discharge: Patient has failed to schedule further appointments.  Current status unknown. Goals were not met.    Discharge Plan: Patient to continue home program.    Referring Provider:  Jeremiah Delarosa

## 2024-03-28 DIAGNOSIS — G89.29 CHRONIC BILATERAL LOW BACK PAIN WITHOUT SCIATICA: ICD-10-CM

## 2024-03-28 DIAGNOSIS — Z98.1 S/P LUMBAR SPINAL FUSION: ICD-10-CM

## 2024-03-28 DIAGNOSIS — M54.50 CHRONIC BILATERAL LOW BACK PAIN WITHOUT SCIATICA: ICD-10-CM

## 2024-03-28 DIAGNOSIS — S42.255A CLOSED NONDISPLACED FRACTURE OF GREATER TUBEROSITY OF LEFT HUMERUS, INITIAL ENCOUNTER: ICD-10-CM

## 2024-03-28 DIAGNOSIS — M53.3 SACROILIAC JOINT PAIN: ICD-10-CM

## 2024-03-28 RX ORDER — OXYCODONE AND ACETAMINOPHEN 10; 325 MG/1; MG/1
1 TABLET ORAL EVERY 6 HOURS PRN
Qty: 50 TABLET | Refills: 0 | Status: SHIPPED | OUTPATIENT
Start: 2024-03-28 | End: 2024-05-14

## 2024-03-29 ENCOUNTER — NURSE TRIAGE (OUTPATIENT)
Dept: FAMILY MEDICINE | Facility: CLINIC | Age: 74
End: 2024-03-29
Payer: COMMERCIAL

## 2024-03-29 NOTE — TELEPHONE ENCOUNTER
Jessica has been having diarrhea for 3-4 weeks with some blood noted, hx of colon cancer in family. 3-4 episodes a week. Pt reports some abd discomfort prior to stools. Pt is asking for a colonoscopy. Pt is on a 3 way call with insurance nurse, pt refusing ER  or same day eval and would like to see Dr howard next week. No red flag signs and pt reports the bleeding is intermittent. Appt made for MOnday.  Desiree Berman RN on 3/29/2024 at 10:29 AM     Reason for Disposition   Bloody, black, or tarry bowel movements  (Exception: Chronic-unchanged black-grey bowel movements and is taking iron pills or Pepto-Bismol.)    Additional Information   Negative: Shock suspected (e.g., cold/pale/clammy skin, too weak to stand, low BP, rapid pulse)   Negative: Difficult to awaken or acting confused (e.g., disoriented, slurred speech)   Negative: Sounds like a life-threatening emergency to the triager   Negative: Vomiting also present and worse than the diarrhea   Negative: Blood in stool and without diarrhea    Protocols used: Diarrhea-A-OH

## 2024-04-01 ENCOUNTER — OFFICE VISIT (OUTPATIENT)
Dept: FAMILY MEDICINE | Facility: CLINIC | Age: 74
End: 2024-04-01
Payer: COMMERCIAL

## 2024-04-01 VITALS
HEART RATE: 60 BPM | DIASTOLIC BLOOD PRESSURE: 80 MMHG | RESPIRATION RATE: 22 BRPM | WEIGHT: 156.13 LBS | HEIGHT: 63 IN | SYSTOLIC BLOOD PRESSURE: 114 MMHG | TEMPERATURE: 98.4 F | BODY MASS INDEX: 27.66 KG/M2

## 2024-04-01 DIAGNOSIS — K52.9 CHRONIC DIARRHEA OF UNKNOWN ORIGIN: Primary | ICD-10-CM

## 2024-04-01 DIAGNOSIS — Z80.0 FAMILY HISTORY OF COLON CANCER: ICD-10-CM

## 2024-04-01 PROCEDURE — 99213 OFFICE O/P EST LOW 20 MIN: CPT | Performed by: FAMILY MEDICINE

## 2024-04-01 RX ORDER — DICYCLOMINE HYDROCHLORIDE 10 MG/1
10 CAPSULE ORAL 4 TIMES DAILY PRN
Qty: 20 CAPSULE | Refills: 0 | Status: SHIPPED | OUTPATIENT
Start: 2024-04-01 | End: 2024-09-09

## 2024-04-01 ASSESSMENT — PAIN SCALES - GENERAL: PAINLEVEL: NO PAIN (0)

## 2024-04-01 NOTE — PROGRESS NOTES
"  Assessment & Plan     Chronic diarrhea of unknown origin  Given family history, will get colonoscopy  Given diarrhea, will have them do some biopsies to r/o microscopic colitis    Patient prefers [facility] as she has had previous colonoscopies with them.   - Adult GI  Referral - Procedure Only; Future  - dicyclomine (BENTYL) 10 MG capsule; Take 1 capsule (10 mg) by mouth 4 times daily as needed    Family history of colon cancer   As above  - Adult GI  Referral - Procedure Only; Future      BMI  Estimated body mass index is 28.1 kg/m  as calculated from the following:    Height as of this encounter: 1.588 m (5' 2.5\").    Weight as of this encounter: 70.8 kg (156 lb 2 oz).       Subjective   Jessica is a 73 year old, presenting for the following health issues:  Rectal Problem        4/1/2024     3:31 PM   Additional Questions   Roomed by Altagracia weldon CMA   Accompanied by Self     HPI       Diarrhea  Onset/Duration: 3-4 weeks. PMHx hemorrhoids, colon resection.  She notes FHx colon cancer, dad. Pt last completed colonoscopy in 2016 with normal findings  Description:       Consistency of stool: formed and small amount of blood       Blood in stool: YES       Number of loose stools past 24 hours: 2  Progression of Symptoms: same  Accompanying signs and symptoms:       Fever: No       Nausea/Vomiting: YES- nausea with diarrhea       Abdominal pain: YES       Weight loss: No       Episodes of constipation: No  History   Ill contacts: No  Recent use of antibiotics: No  Recent travels: No  Recent medication-new or changes(Rx or OTC): No  Precipitating or alleviating factors: None  Therapies tried and outcome: None      Review of Systems  Constitutional, HEENT, cardiovascular, pulmonary, gi and gu systems are negative, except as otherwise noted.      Objective    /80   Pulse 60   Temp 98.4  F (36.9  C) (Tympanic)   Resp 22   Ht 1.588 m (5' 2.5\")   Wt 70.8 kg (156 lb 2 oz)   BMI 28.10 kg/m    Body " mass index is 28.1 kg/m .  Physical Exam   GENERAL: alert and no distress            Signed Electronically by: Sarah Barriga MD

## 2024-04-02 ENCOUNTER — TELEPHONE (OUTPATIENT)
Dept: FAMILY MEDICINE | Facility: CLINIC | Age: 74
End: 2024-04-02

## 2024-04-02 NOTE — TELEPHONE ENCOUNTER
Prior Authorization Retail Medication Request    Medication/Dose: Prior auth request for dicyclomine 10mg capsule  Diagnosis and ICD code (if different than what is on RX):  na  New/renewal/insurance change PA/secondary ins. PA:  Previously Tried and Failed:  na  Rationale:  na    Insurance   [place] [place]  Insurance ID:  [id number]    Secondary (if applicable):adams  Insurance ID:  adams    Pharmacy Information (if different than what is on RX)  Name:  [facility] [address]  Phone:  [phone]  Fax:[phone]

## 2024-04-10 ENCOUNTER — TELEPHONE (OUTPATIENT)
Dept: NEUROLOGY | Facility: CLINIC | Age: 74
End: 2024-04-10

## 2024-04-12 ENCOUNTER — OFFICE VISIT (OUTPATIENT)
Dept: NEUROLOGY | Facility: CLINIC | Age: 74
End: 2024-04-12
Payer: COMMERCIAL

## 2024-04-12 VITALS
SYSTOLIC BLOOD PRESSURE: 112 MMHG | OXYGEN SATURATION: 92 % | DIASTOLIC BLOOD PRESSURE: 76 MMHG | HEART RATE: 71 BPM | RESPIRATION RATE: 20 BRPM

## 2024-04-12 DIAGNOSIS — R56.9 SEIZURE (H): Primary | ICD-10-CM

## 2024-04-12 PROCEDURE — 99214 OFFICE O/P EST MOD 30 MIN: CPT | Performed by: INTERNAL MEDICINE

## 2024-04-12 RX ORDER — LEVETIRACETAM 500 MG/1
500 TABLET ORAL 2 TIMES DAILY
Qty: 180 TABLET | Refills: 3 | Status: ON HOLD | OUTPATIENT
Start: 2024-04-12

## 2024-04-12 ASSESSMENT — PAIN SCALES - GENERAL: PAINLEVEL: MODERATE PAIN (5)

## 2024-04-12 NOTE — NURSING NOTE
Chief Complaint   Patient presents with    RECHECK     /76 (BP Location: Right arm, Patient Position: Sitting, Cuff Size: Adult Regular)   Pulse 71   Resp 20   SpO2 92%     ANDREI YOLANDA

## 2024-04-12 NOTE — PROGRESS NOTES
[facility] Neurology Follow Up Visit    Jessica Ellison MRN# [id number]   Age: 73 year old YOB: 1950     Brief history of symptoms: The patient was initially seen in neurologic consultation on 10/13/2024 for evaluation of loss of consciousness. Please see the comprehensive neurologic consultation note from that date in the Epic records for details.     Interval history:   She hasn't had any seizures since last visit. She has a lot of fatigue. She had it before the Keppra, but it might be worse. She has some irritability, but unsure if it is worse since Keppra.     She has had some colon issues since last visit.       Past Medical History:     Patient Active Problem List   Diagnosis    Insomnia    Back pain    CARDIOVASCULAR SCREENING; LDL GOAL LESS THAN 160    Anxiety    Advanced directives, counseling/discussion    Major depressive disorder, recurrent episode, moderate (H)    Cerebral aneurysm, nonruptured    Bee allergy status    S/P lumbar spinal fusion    Chronic bilateral low back pain without sciatica    Essential hypertension with goal blood pressure less than 140/90    Hypothyroidism    Spell of change in speech    Centrilobular emphysema (H)    Hyperlipidemia LDL goal <70    Hypoxia    Congenital musculoskeletal deformity of spine    Chronic pain    JOY (obstructive sleep apnea)    CKD (chronic kidney disease) stage 2, GFR 60-89 ml/min    F11.2 - Continuous opioid dependence (H)    Knee osteoarthritis    Chronic pain syndrome    SOB (shortness of breath)    Alcohol abuse with intoxication (H24)    Personal history of tobacco use, presenting hazards to health    Sinus tachycardia    Closed fracture of proximal end of left humerus with routine healing, unspecified fracture morphology, subsequent encounter    Seizure (H)     Past Medical History:   Diagnosis Date    Anemia     Aneurysm (H24)     Antiplatelet or antithrombotic long-term use     Arthritis     Chemical dependency (H)     Chem Dep RX     Depression     History of blood transfusion     Hypertension     Menopausal symptoms     Other chronic pain     Lower back and hips    Seizure (H) 2024    Sleep apnea     Sleep disorder     Thyroid disease     Tobacco abuse     Uncomplicated asthma         Past Surgical History:     Past Surgical History:   Procedure Laterality Date    ABDOMEN SURGERY      APPENDECTOMY  1960    APPENDECTOMY      ARTHROPLASTY KNEE Left 2023    Procedure: LEFT TOTAL KNEE ARTHROPLASTY;  Surgeon: Bryan Roque MD;  Location: [facility] Main OR    BACK SURGERY      BIOPSY BREAST      cerebral aneurysm      coiled    CEREBRAL ANEURYSM REPAIR      cholecystectomy      COLON SURGERY      COLONOSCOPY  2005    FRACTURE SURGERY      HC EXCISION BREAST LESION, OPEN >=1      core biopsy , lumpectomy 2006    HERNIA REPAIR      LAP VENTRAL HERNIA REPAIR  2017    [place]    LAPAROSCOPIC ASSISTED HYSTERECTOMY VAGINAL  2017    LUMPECTOMY BREAST      orif left femoral neck Left 2016    stress fracture.  done at [facility]    SURGICAL HISTORY OF -       Skin Graft    ZZC PART REMOVAL COLON W ANASTOMOSIS  2005    diverticulitis    ZZC SPINE FUSION,ANTER,8+ SGMTS      Anterior posterior fusion 10 levels, hardware removal and re-fusion         Social History:     Social History     Tobacco Use    Smoking status: Former     Current packs/day: 0.00     Average packs/day: 1 pack/day for 30.0 years (30.0 ttl pk-yrs)     Types: Cigarettes     Start date: 1974     Quit date: 2004     Years since quittin.2    Smokeless tobacco: Never   Vaping Use    Vaping status: Never Used   Substance Use Topics    Alcohol use: Yes     Comment: Glass of wine a few times a week. ETOH dependency, DUI 3/15/10; 1-2 ETOH per week    Drug use: Yes     Types: Oxycodone     Comment: percocet for the past several years        Family History:     Family History   Problem Relation Age of Onset    Cancer Mother          breast/lung    Alcohol/Drug Mother     Depression Mother     Cancer - colorectal Father     Alcohol/Drug Father     Diabetes Maternal Grandmother     Diabetes Maternal Grandfather     Diabetes Paternal Grandmother     Diabetes Paternal Grandfather     Cancer Paternal Grandfather     Gastrointestinal Disease Paternal Grandfather     Congenital Anomalies Son     Hypertension Brother     Adrenal Disorder Brother         had adrenalectomies    Cerebral palsy Granddaughter         Medications:     Current Outpatient Medications   Medication Sig Dispense Refill    amLODIPine (NORVASC) 5 MG tablet Take 1 tablet (5 mg) by mouth daily 90 tablet 1    aspirin (ASA) 325 MG tablet Take 325 mg by mouth daily      atorvastatin (LIPITOR) 40 MG tablet Take 1 tablet (40 mg) by mouth daily 90 tablet 3    budesonide-formoterol (SYMBICORT) 80-4.5 MCG/ACT Inhaler Inhale 2 puffs into the lungs 2 times daily 6.9 g 1    calcium carbonate 600 mg-vitamin D 400 units (CALTRATE) 600-400 MG-UNIT per tablet Take 1 tablet by mouth every evening      celecoxib (CELEBREX) 200 MG capsule Take 1 capsule (200 mg) by mouth daily 90 capsule 1    dicyclomine (BENTYL) 10 MG capsule Take 1 capsule (10 mg) by mouth 4 times daily as needed 20 capsule 0    DULoxetine (CYMBALTA) 30 MG capsule TAKE 1 CAPSULE BY MOUTH 2 TIMES DAILY 180 capsule 1    folic acid (FOLVITE) 1 MG tablet Take 1 tablet (1 mg) by mouth daily 90 tablet 3    levETIRAcetam (KEPPRA) 500 MG tablet Take 1 tablet (500 mg) by mouth 2 times daily 180 tablet 3    levothyroxine (SYNTHROID/LEVOTHROID) 50 MCG tablet Take 1 tablet (50 mcg) by mouth daily 90 tablet 3    Melatonin 10 MG TABS tablet Take 20 mg by mouth At Bedtime      metoprolol succinate ER (TOPROL XL) 50 MG 24 hr tablet Take 1 tablet (50 mg) by mouth 2 times daily 180 tablet 3    multivitamin, therapeutic (THERA-VIT) TABS tablet Take 1 tablet by mouth daily      naloxone (NARCAN) 4 MG/0.1ML nasal spray Spray 1 spray (4 mg) into one  nostril alternating nostrils once as needed for opioid reversal every 2-3 minutes until assistance arrives 0.2 mL 1    nystatin (MYCOSTATIN) 956008 UNIT/GM external powder Apply topically 3 times daily as needed (rash)      oxyCODONE-acetaminophen (PERCOCET)  MG per tablet TAKE 1 TABLET BY MOUTH EVERY 6 HOURS AS NEEDED FOR SEVERE PAIN MAX #50/MONTH 50 tablet 0    QUEtiapine (SEROQUEL) 50 MG tablet Take 2 tablets (100 mg) by mouth at bedtime 90 tablet 1    tiotropium (SPIRIVA HANDIHALER) 18 MCG inhaled capsule USING THE HANDIHALER, INHALE THE CONTENTS OF ONE CAPSULE BY MOUTH ONCE DAILY 30 capsule 11    VENTOLIN  (90 Base) MCG/ACT inhaler INHALE 2 PUFFS INTO THE LUNGS EVERY 6 HOURS AS NEEDED FOR SHORTNESS OF BREATH OR WHEEZING 18 g 1     No current facility-administered medications for this visit.        Allergies:     Allergies   Allergen Reactions    Bee Venom     Lisinopril Swelling     Oral swelling        Review of Systems:   As above     Physical Exam:   Vitals: /76 (BP Location: Right arm, Patient Position: Sitting, Cuff Size: Adult Regular)   Pulse 71   Resp 20   SpO2 92%    General: Seated comfortably in no acute distress.  Lungs: breathing comfortably  Neurologic:     Mental Status: Fully alert, attentive. Language normal, speech clear and fluent, no paraphasic errors.      Cranial Nerves: Visual fields intact. PERRL. EOMI with normal smooth pursuit. Facial sensation intact/symmetric. Facial movements symmetric. Hearing not formally tested but intact to conversation. No dysarthria. Shoulder shrug strong bilaterally. Tongue protrusion midline.     Motor: No tremors or other abnormal movements observed. Strength 5/5 throughout upper and lower extremities.        Sensory: Intact/symmetric to light touch throughout upper and lower extremities.      Coordination: Finger-nose-finger and heel-shin intact without dysmetria.      Gait: Antalgic, but steady gait     Data reviewed on previous  visits    Imaging:  CT head 10/2023  IMPRESSION:  1.  No acute intracranial hemorrhage, extra-axial collection, or midline shift.  2.  Chronic appearing right occipital infarct, new from 2019. Area of infarct does contain a few areas of scattered calcification.  3.  Mild generalized brain parenchymal volume loss.          MRI brain, MRA head 4/2019  Impression:  1. Stable postembolization changes without evidence of residual  aneurysm. No new aneurysm or significant stenosis of the major  intracranial arteries.  2. Mild generalized parenchymal volume loss and chronic small vessel  ischemic disease.     TTE 10/2023  Interpretation Summary  Global and regional left ventricular function is normal with an EF of 55-60%.  Global right ventricular function is normal.  Both atria appear normal.  No significant valvular abnormalities present.  The atrial septum is intact as assessed by agitated saline bubble study .  No significant changes noted.    Procedures:  EKG sinus rhythm (10/2023)    Laboratory:  CBC with mild anemia, CMP unremarkable (10/2023)  LDL 66 (3/2023)    Pertinent Investigations since last visit:   MRI brain, MRA head/neck 11/2023  Impression:   1. No acute intracranial pathology. Right occipital chronic insult  similar to prior head CT.  2. Status post stent-assisted coil embolization of a paraclinoid left  internal carotid artery aneurysm, without evidence for recurrent  aneurysm.  3. No severe stenosis of the major intracranial or cervical surgical  arteries. Approximately 50% stenosis is noted at the mid V2 segment of  the left vertebral artery.     EEG 12/2023  Impression: This is an abnormal EEG due to diffusely slow background in theta range that suggests a nonspecific generalized cerebral dysfunction. Such slowing can be seen in metabolic or toxic abnormalities, clinical correlation is recommended. No sharp discharges or spikes were recorded during this recording to suggest a seizure disorder.      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 Patient had a suspected seizure on 10/6/2023. She was found to have an incidental chronic right occipital stroke, which may be an epileptogenic focus. Work-up for stroke was unremarkable. 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 She continues on aspirin and Keppra.      Plan:  - Continue Keppra 500 mg BID    Follow up in Neurology clinic in 1 year or earlier as needed should new concerns arise.    Jet Greco MD   of Neurology  [facility]

## 2024-04-12 NOTE — LETTER
4/12/2024       RE: Jessica Ellison  [address]  [facility] [address]     Dear Colleague,    Thank you for referring your patient, Jessica Ellison, to the [facility] NEUROLOGY CL[facility]IC [place] at [facility]. Please see a copy of my visit note below.    [facility] Neurology Follow Up Visit    Jessica Ellison MRN# [id number]   Age: 73 year old YOB: 1950     Brief history of symptoms: The patient was initially seen in neurologic consultation on 10/13/2024 for evaluation of loss of consciousness. Please see the comprehensive neurologic consultation note from that date in the Epic records for details.     Interval history:   She hasn't had any seizures since last visit. She has a lot of fatigue. She had it before the Keppra, but it might be worse. She has some irritability, but unsure if it is worse since Keppra.     She has had some colon issues since last visit.       Past Medical History:     Patient Active Problem List   Diagnosis    Insomnia    Back pain    CARDIOVASCULAR SCREENING; LDL GOAL LESS THAN 160    Anxiety    Advanced directives, counseling/discussion    Major depressive disorder, recurrent episode, moderate (H)    Cerebral aneurysm, nonruptured    Bee allergy status    S/P lumbar spinal fusion    Chronic bilateral low back pain without sciatica    Essential hypertension with goal blood pressure less than 140/90    Hypothyroidism    Spell of change in speech    Centrilobular emphysema (H)    Hyperlipidemia LDL goal <70    Hypoxia    Congenital musculoskeletal deformity of spine    Chronic pain    JOY (obstructive sleep apnea)    CKD (chronic kidney disease) stage 2, GFR 60-89 ml/min    F11.2 - Continuous opioid dependence (H)    Knee osteoarthritis    Chronic pain syndrome    SOB (shortness of breath)    Alcohol abuse with intoxication (H24)    Personal history of tobacco use, presenting hazards to health    Sinus tachycardia     Closed fracture of proximal end of left humerus with routine healing, unspecified fracture morphology, subsequent encounter    Seizure (H)     Past Medical History:   Diagnosis Date    Anemia     Aneurysm (H24)     Antiplatelet or antithrombotic long-term use     Arthritis     Chemical dependency (H)     Chem Dep RX    Depression     History of blood transfusion     Hypertension     Menopausal symptoms     Other chronic pain     Lower back and hips    Seizure (H) 2024    Sleep apnea     Sleep disorder     Thyroid disease     Tobacco abuse     Uncomplicated asthma         Past Surgical History:     Past Surgical History:   Procedure Laterality Date    ABDOMEN SURGERY      APPENDECTOMY  1960    APPENDECTOMY      ARTHROPLASTY KNEE Left 2023    Procedure: LEFT TOTAL KNEE ARTHROPLASTY;  Surgeon: Bryan Roque MD;  Location: [facility] Main OR    BACK SURGERY      BIOPSY BREAST      cerebral aneurysm      coiled    CEREBRAL ANEURYSM REPAIR      cholecystectomy      COLON SURGERY      COLONOSCOPY  2005    FRACTURE SURGERY      HC EXCISION BREAST LESION, OPEN >=1      core biopsy , lumpectomy 2006    HERNIA REPAIR      LAP VENTRAL HERNIA REPAIR  2017    [place]    LAPAROSCOPIC ASSISTED HYSTERECTOMY VAGINAL  2017    LUMPECTOMY BREAST      orif left femoral neck Left 2016    stress fracture.  done at [facility]    SURGICAL HISTORY OF -       Skin Graft    ZZC PART REMOVAL COLON W ANASTOMOSIS  2005    diverticulitis    ZZC SPINE FUSION,ANTER,8+ SGMTS      Anterior posterior fusion 10 levels, hardware removal and re-fusion         Social History:     Social History     Tobacco Use    Smoking status: Former     Current packs/day: 0.00     Average packs/day: 1 pack/day for 30.0 years (30.0 ttl pk-yrs)     Types: Cigarettes     Start date: 1974     Quit date: 2004     Years since quittin.2    Smokeless tobacco: Never   Vaping Use    Vaping status: Never Used    Substance Use Topics    Alcohol use: Yes     Comment: Glass of wine a few times a week. ETOH dependency, DUI 3/15/10; 1-2 ETOH per week    Drug use: Yes     Types: Oxycodone     Comment: percocet for the past several years        Family History:     Family History   Problem Relation Age of Onset    Cancer Mother         breast/lung    Alcohol/Drug Mother     Depression Mother     Cancer - colorectal Father     Alcohol/Drug Father     Diabetes Maternal Grandmother     Diabetes Maternal Grandfather     Diabetes Paternal Grandmother     Diabetes Paternal Grandfather     Cancer Paternal Grandfather     Gastrointestinal Disease Paternal Grandfather     Congenital Anomalies Son     Hypertension Brother     Adrenal Disorder Brother         had adrenalectomies    Cerebral palsy Granddaughter         Medications:     Current Outpatient Medications   Medication Sig Dispense Refill    amLODIPine (NORVASC) 5 MG tablet Take 1 tablet (5 mg) by mouth daily 90 tablet 1    aspirin (ASA) 325 MG tablet Take 325 mg by mouth daily      atorvastatin (LIPITOR) 40 MG tablet Take 1 tablet (40 mg) by mouth daily 90 tablet 3    budesonide-formoterol (SYMBICORT) 80-4.5 MCG/ACT Inhaler Inhale 2 puffs into the lungs 2 times daily 6.9 g 1    calcium carbonate 600 mg-vitamin D 400 units (CALTRATE) 600-400 MG-UNIT per tablet Take 1 tablet by mouth every evening      celecoxib (CELEBREX) 200 MG capsule Take 1 capsule (200 mg) by mouth daily 90 capsule 1    dicyclomine (BENTYL) 10 MG capsule Take 1 capsule (10 mg) by mouth 4 times daily as needed 20 capsule 0    DULoxetine (CYMBALTA) 30 MG capsule TAKE 1 CAPSULE BY MOUTH 2 TIMES DAILY 180 capsule 1    folic acid (FOLVITE) 1 MG tablet Take 1 tablet (1 mg) by mouth daily 90 tablet 3    levETIRAcetam (KEPPRA) 500 MG tablet Take 1 tablet (500 mg) by mouth 2 times daily 180 tablet 3    levothyroxine (SYNTHROID/LEVOTHROID) 50 MCG tablet Take 1 tablet (50 mcg) by mouth daily 90 tablet 3    Melatonin 10 MG  TABS tablet Take 20 mg by mouth At Bedtime      metoprolol succinate ER (TOPROL XL) 50 MG 24 hr tablet Take 1 tablet (50 mg) by mouth 2 times daily 180 tablet 3    multivitamin, therapeutic (THERA-VIT) TABS tablet Take 1 tablet by mouth daily      naloxone (NARCAN) 4 MG/0.1ML nasal spray Spray 1 spray (4 mg) into one nostril alternating nostrils once as needed for opioid reversal every 2-3 minutes until assistance arrives 0.2 mL 1    nystatin (MYCOSTATIN) 345054 UNIT/GM external powder Apply topically 3 times daily as needed (rash)      oxyCODONE-acetaminophen (PERCOCET)  MG per tablet TAKE 1 TABLET BY MOUTH EVERY 6 HOURS AS NEEDED FOR SEVERE PAIN MAX #50/MONTH 50 tablet 0    QUEtiapine (SEROQUEL) 50 MG tablet Take 2 tablets (100 mg) by mouth at bedtime 90 tablet 1    tiotropium (SPIRIVA HANDIHALER) 18 MCG inhaled capsule USING THE HANDIHALER, INHALE THE CONTENTS OF ONE CAPSULE BY MOUTH ONCE DAILY 30 capsule 11    VENTOLIN  (90 Base) MCG/ACT inhaler INHALE 2 PUFFS INTO THE LUNGS EVERY 6 HOURS AS NEEDED FOR SHORTNESS OF BREATH OR WHEEZING 18 g 1     No current facility-administered medications for this visit.        Allergies:     Allergies   Allergen Reactions    Bee Venom     Lisinopril Swelling     Oral swelling        Review of Systems:   As above     Physical Exam:   Vitals: /76 (BP Location: Right arm, Patient Position: Sitting, Cuff Size: Adult Regular)   Pulse 71   Resp 20   SpO2 92%    General: Seated comfortably in no acute distress.  Lungs: breathing comfortably  Neurologic:     Mental Status: Fully alert, attentive. Language normal, speech clear and fluent, no paraphasic errors.      Cranial Nerves: Visual fields intact. PERRL. EOMI with normal smooth pursuit. Facial sensation intact/symmetric. Facial movements symmetric. Hearing not formally tested but intact to conversation. No dysarthria. Shoulder shrug strong bilaterally. Tongue protrusion midline.     Motor: No tremors or other  abnormal movements observed. Strength 5/5 throughout upper and lower extremities.        Sensory: Intact/symmetric to light touch throughout upper and lower extremities.      Coordination: Finger-nose-finger and heel-shin intact without dysmetria.      Gait: Antalgic, but steady gait     Data reviewed on previous visits    Imaging:  CT head 10/2023  IMPRESSION:  1.  No acute intracranial hemorrhage, extra-axial collection, or midline shift.  2.  Chronic appearing right occipital infarct, new from 2019. Area of infarct does contain a few areas of scattered calcification.  3.  Mild generalized brain parenchymal volume loss.          MRI brain, MRA head 4/2019  Impression:  1. Stable postembolization changes without evidence of residual  aneurysm. No new aneurysm or significant stenosis of the major  intracranial arteries.  2. Mild generalized parenchymal volume loss and chronic small vessel  ischemic disease.     TTE 10/2023  Interpretation Summary  Global and regional left ventricular function is normal with an EF of 55-60%.  Global right ventricular function is normal.  Both atria appear normal.  No significant valvular abnormalities present.  The atrial septum is intact as assessed by agitated saline bubble study .  No significant changes noted.    Procedures:  EKG sinus rhythm (10/2023)    Laboratory:  CBC with mild anemia, CMP unremarkable (10/2023)  LDL 66 (3/2023)    Pertinent Investigations since last visit:   MRI brain, MRA head/neck 11/2023  Impression:   1. No acute intracranial pathology. Right occipital chronic insult  similar to prior head CT.  2. Status post stent-assisted coil embolization of a paraclinoid left  internal carotid artery aneurysm, without evidence for recurrent  aneurysm.  3. No severe stenosis of the major intracranial or cervical surgical  arteries. Approximately 50% stenosis is noted at the mid V2 segment of  the left vertebral artery.     EEG 12/2023  Impression: This is an abnormal  EEG due to diffusely slow background in theta range that suggests a nonspecific generalized cerebral dysfunction. Such slowing can be seen in metabolic or toxic abnormalities, clinical correlation is recommended. No sharp discharges or spikes were recorded during this recording to suggest a seizure disorder.     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 Patient had a suspected seizure on 10/6/2023. She was found to have an incidental chronic right occipital stroke, which may be an epileptogenic focus. Work-up for stroke was unremarkable. 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 She continues on aspirin and Keppra.      Plan:  - Continue Keppra 500 mg BID    Follow up in Neurology clinic in 1 year or earlier as needed should new concerns arise.        Again, thank you for allowing me to participate in the care of your patient.      Sincerely,    Jet Greco MD

## 2024-04-14 NOTE — TELEPHONE ENCOUNTER
Retail Pharmacy Prior Authorization Team   Phone: [phone]    PA Initiation    Medication: DICYCLOMINE HCL 10 MG PO CAPS  Insurance Company: [employer] - Phone [phone] Fax [phone]  Pharmacy Filling the Rx: [place], [place] - [address]  Filling Pharmacy Phone: [phone]  Filling Pharmacy Fax: [phone]  Start Date: 4/14/2024

## 2024-04-15 NOTE — TELEPHONE ENCOUNTER
PRIOR AUTHORIZATION DENIED    Medication: DICYCLOMINE HCL 10 MG PO CAPS  Insurance Company: [employer] - Phone [phone] Fax [phone]  Denial Date: 4/14/2024  Denial Reason(s):       Appeal Information:

## 2024-04-15 NOTE — TELEPHONE ENCOUNTER
Lazara Mcgowan  [facility] Pool1 hour ago (11:50 AM)     MANJIT  Hi,  This medication was denied. If the provider would like to appeal please provide a letter of medical necessity and route back to the team. Otherwise please notify the patient and close the encounter.  Thank you,  Lazara

## 2024-05-10 ENCOUNTER — ANESTHESIA EVENT (OUTPATIENT)
Dept: SURGERY | Facility: AMBULATORY SURGERY CENTER | Age: 74
End: 2024-05-10
Payer: COMMERCIAL

## 2024-05-13 ENCOUNTER — HOSPITAL ENCOUNTER (OUTPATIENT)
Facility: AMBULATORY SURGERY CENTER | Age: 74
Discharge: HOME | End: 2024-05-13
Attending: COLON & RECTAL SURGERY
Payer: COMMERCIAL

## 2024-05-13 ENCOUNTER — ANESTHESIA (OUTPATIENT)
Dept: SURGERY | Facility: AMBULATORY SURGERY CENTER | Age: 74
End: 2024-05-13
Payer: COMMERCIAL

## 2024-05-13 VITALS
WEIGHT: 150 LBS | RESPIRATION RATE: 16 BRPM | HEIGHT: 63 IN | BODY MASS INDEX: 26.58 KG/M2 | HEART RATE: 88 BPM | SYSTOLIC BLOOD PRESSURE: 124 MMHG | TEMPERATURE: 98 F | DIASTOLIC BLOOD PRESSURE: 62 MMHG | OXYGEN SATURATION: 91 %

## 2024-05-13 DIAGNOSIS — S42.255A CLOSED NONDISPLACED FRACTURE OF GREATER TUBEROSITY OF LEFT HUMERUS, INITIAL ENCOUNTER: ICD-10-CM

## 2024-05-13 DIAGNOSIS — R19.7 DIARRHEA: ICD-10-CM

## 2024-05-13 DIAGNOSIS — M54.50 CHRONIC BILATERAL LOW BACK PAIN WITHOUT SCIATICA: ICD-10-CM

## 2024-05-13 DIAGNOSIS — K62.5 RECTAL BLEEDING: ICD-10-CM

## 2024-05-13 DIAGNOSIS — Z98.1 S/P LUMBAR SPINAL FUSION: ICD-10-CM

## 2024-05-13 DIAGNOSIS — M53.3 SACROILIAC JOINT PAIN: ICD-10-CM

## 2024-05-13 DIAGNOSIS — G89.29 CHRONIC BILATERAL LOW BACK PAIN WITHOUT SCIATICA: ICD-10-CM

## 2024-05-13 LAB — COLONOSCOPY: NORMAL

## 2024-05-13 RX ORDER — OXYCODONE HYDROCHLORIDE 10 MG/1
10 TABLET ORAL
Status: DISCONTINUED | OUTPATIENT
Start: 2024-05-13 | End: 2024-05-14 | Stop reason: HOSPADM

## 2024-05-13 RX ORDER — NALOXONE HYDROCHLORIDE 0.4 MG/ML
0.1 INJECTION, SOLUTION INTRAMUSCULAR; INTRAVENOUS; SUBCUTANEOUS
Status: DISCONTINUED | OUTPATIENT
Start: 2024-05-13 | End: 2024-05-14 | Stop reason: HOSPADM

## 2024-05-13 RX ORDER — PROPOFOL 10 MG/ML
INJECTION, EMULSION INTRAVENOUS PRN
Status: DISCONTINUED | OUTPATIENT
Start: 2024-05-13 | End: 2024-05-13

## 2024-05-13 RX ORDER — OXYCODONE HYDROCHLORIDE 5 MG/1
5 TABLET ORAL
Status: DISCONTINUED | OUTPATIENT
Start: 2024-05-13 | End: 2024-05-14 | Stop reason: HOSPADM

## 2024-05-13 RX ORDER — ONDANSETRON 4 MG/1
4 TABLET, ORALLY DISINTEGRATING ORAL
Status: DISCONTINUED | OUTPATIENT
Start: 2024-05-13 | End: 2024-05-14 | Stop reason: HOSPADM

## 2024-05-13 RX ORDER — ONDANSETRON 4 MG/1
4 TABLET, ORALLY DISINTEGRATING ORAL EVERY 30 MIN PRN
Status: DISCONTINUED | OUTPATIENT
Start: 2024-05-13 | End: 2024-05-14 | Stop reason: HOSPADM

## 2024-05-13 RX ORDER — PROPOFOL 10 MG/ML
INJECTION, EMULSION INTRAVENOUS CONTINUOUS PRN
Status: DISCONTINUED | OUTPATIENT
Start: 2024-05-13 | End: 2024-05-13

## 2024-05-13 RX ORDER — DEXAMETHASONE SODIUM PHOSPHATE 4 MG/ML
4 INJECTION, SOLUTION INTRA-ARTICULAR; INTRALESIONAL; INTRAMUSCULAR; INTRAVENOUS; SOFT TISSUE
Status: DISCONTINUED | OUTPATIENT
Start: 2024-05-13 | End: 2024-05-14 | Stop reason: HOSPADM

## 2024-05-13 RX ORDER — LIDOCAINE 40 MG/G
CREAM TOPICAL
Status: DISCONTINUED | OUTPATIENT
Start: 2024-05-13 | End: 2024-05-14 | Stop reason: HOSPADM

## 2024-05-13 RX ORDER — ONDANSETRON 2 MG/ML
4 INJECTION INTRAMUSCULAR; INTRAVENOUS EVERY 30 MIN PRN
Status: DISCONTINUED | OUTPATIENT
Start: 2024-05-13 | End: 2024-05-14 | Stop reason: HOSPADM

## 2024-05-13 RX ORDER — SODIUM CHLORIDE, SODIUM LACTATE, POTASSIUM CHLORIDE, CALCIUM CHLORIDE 600; 310; 30; 20 MG/100ML; MG/100ML; MG/100ML; MG/100ML
INJECTION, SOLUTION INTRAVENOUS CONTINUOUS
Status: DISCONTINUED | OUTPATIENT
Start: 2024-05-13 | End: 2024-05-14 | Stop reason: HOSPADM

## 2024-05-13 RX ORDER — LIDOCAINE HYDROCHLORIDE 20 MG/ML
INJECTION, SOLUTION INFILTRATION; PERINEURAL PRN
Status: DISCONTINUED | OUTPATIENT
Start: 2024-05-13 | End: 2024-05-13

## 2024-05-13 RX ADMIN — PROPOFOL 200 MCG/KG/MIN: 10 INJECTION, EMULSION INTRAVENOUS at 10:57

## 2024-05-13 RX ADMIN — PROPOFOL 30 MG: 10 INJECTION, EMULSION INTRAVENOUS at 11:01

## 2024-05-13 RX ADMIN — SODIUM CHLORIDE, SODIUM LACTATE, POTASSIUM CHLORIDE, CALCIUM CHLORIDE: 600; 310; 30; 20 INJECTION, SOLUTION INTRAVENOUS at 10:22

## 2024-05-13 RX ADMIN — PROPOFOL 30 MG: 10 INJECTION, EMULSION INTRAVENOUS at 10:58

## 2024-05-13 RX ADMIN — LIDOCAINE HYDROCHLORIDE 3 ML: 20 INJECTION, SOLUTION INFILTRATION; PERINEURAL at 10:55

## 2024-05-13 ASSESSMENT — COPD QUESTIONNAIRES
CAT_SEVERITY: MILD
COPD: 1

## 2024-05-13 ASSESSMENT — ENCOUNTER SYMPTOMS: SEIZURES: 1

## 2024-05-13 NOTE — ANESTHESIA PREPROCEDURE EVALUATION
Anesthesia Pre-Procedure Evaluation    Patient: Jessica Ellison   MRN: [id number] : 1950        Procedure : Procedure(s):  COLONOSCOPY          Past Medical History:   Diagnosis Date    Anemia     Aneurysm (H24)     Antiplatelet or antithrombotic long-term use     Arthritis     Chemical dependency (H)     Chem Dep RX    Depression     History of blood transfusion     Hypertension     Menopausal symptoms     Other chronic pain     Lower back and hips    Seizure (H) 2024    Sleep apnea     Sleep disorder     Thyroid disease     Tobacco abuse     Uncomplicated asthma       Past Surgical History:   Procedure Laterality Date    ABDOMEN SURGERY      APPENDECTOMY  1960    APPENDECTOMY      ARTHROPLASTY KNEE Left 2023    Procedure: LEFT TOTAL KNEE ARTHROPLASTY;  Surgeon: Bryan Roque MD;  Location: [facility] Main OR    BACK SURGERY      BIOPSY BREAST      cerebral aneurysm      coiled    CEREBRAL ANEURYSM REPAIR      cholecystectomy      COLON SURGERY      COLONOSCOPY  2005    FRACTURE SURGERY      HC EXCISION BREAST LESION, OPEN >=1      core biopsy , lumpectomy 2006    HERNIA REPAIR      LAP VENTRAL HERNIA REPAIR  2017    [place]    LAPAROSCOPIC ASSISTED HYSTERECTOMY VAGINAL  2017    LUMPECTOMY BREAST      orif left femoral neck Left 2016    stress fracture.  done at [facility]    SURGICAL HISTORY OF -       Skin Graft    ZZC PART REMOVAL COLON W ANASTOMOSIS  2005    diverticulitis    ZZC SPINE FUSION,ANTER,8+ SGMTS      Anterior posterior fusion 10 levels, hardware removal and re-fusion       Allergies   Allergen Reactions    Bee Venom     Lisinopril Swelling     Oral swelling      Social History     Tobacco Use    Smoking status: Former     Current packs/day: 0.00     Average packs/day: 1 pack/day for 30.0 years (30.0 ttl pk-yrs)     Types: Cigarettes     Start date: 1974     Quit date: 2004     Years since quittin.3    Smokeless tobacco:  Never   Substance Use Topics    Alcohol use: Yes     Comment: Glass of wine a few times a week. ETOH dependency, DUI 3/15/10; 1-2 ETOH per week      Wt Readings from Last 1 Encounters:   04/01/24 70.8 kg (156 lb 2 oz)        Anesthesia Evaluation            ROS/MED HX  ENT/Pulmonary:     (+) sleep apnea, uses CPAP,                    asthma   mild,  COPD,              Neurologic: Comment: 11/2023 MRA neck  1. No acute intracranial pathology. Right occipital chronic insult  similar to prior head CT.  2. Status post stent-assisted coil embolization of a paraclinoid left  internal carotid artery aneurysm, without evidence for recurrent  aneurysm.  3. No severe stenosis of the major intracranial or cervical surgical  arteries. Approximately 50% stenosis is noted at the mid V2 segment of  the left vertebral artery.      (+)       seizures,                         Cardiovascular:     (+)  hypertension- -   -  - -                                 Previous cardiac testing   Echo: Date: 10/2023 Results:  Global and regional left ventricular function is normal with an EF of 55-60%.  Global right ventricular function is normal.  Both atria appear normal.  No significant valvular abnormalities present.  The atrial septum is intact as assessed by agitated saline bubble study .  No significant changes noted.    Stress Test:  Date: Results:    ECG Reviewed:  Date: Results:    Cath:  Date: Results:      METS/Exercise Tolerance:     Hematologic:       Musculoskeletal: Comment: Cervical and lumbar fusions      GI/Hepatic:  - neg GI/hepatic ROS     Renal/Genitourinary:       Endo:     (+)          thyroid problem, hypothyroidism,           Psychiatric/Substance Use:     (+) psychiatric history depression       Infectious Disease:       Malignancy:       Other:      (+)  , H/O Chronic Pain,         Physical Exam    Airway  airway exam normal      Mallampati: II   TM distance: > 3 FB   Neck ROM: full   Mouth opening: > 3  cm    Respiratory Devices and Support         Dental       (+) Minor Abnormalities - some fillings, tiny chips      Cardiovascular   cardiovascular exam normal       Rhythm and rate: regular and normal     Pulmonary   pulmonary exam normal        breath sounds clear to auscultation           OUTSIDE LABS:  CBC:   Lab Results   Component Value Date    WBC 3.2 (L) 11/08/2023    WBC 3.4 (L) 11/07/2023    HGB 9.6 (L) 11/08/2023    HGB 11.2 (L) 11/07/2023    HCT 30.5 (L) 11/08/2023    HCT 34.9 (L) 11/07/2023     11/08/2023     11/07/2023     BMP:   Lab Results   Component Value Date     11/08/2023     (H) 11/07/2023    POTASSIUM 3.8 11/09/2023    POTASSIUM 4.1 11/08/2023    CHLORIDE 106 11/08/2023    CHLORIDE 107 11/07/2023    CO2 22 11/08/2023    CO2 24 11/07/2023    BUN 13.6 11/08/2023    BUN 12.6 11/07/2023    CR 0.61 11/08/2023    CR 0.73 11/07/2023     (H) 11/08/2023     (H) 11/07/2023     COAGS:   Lab Results   Component Value Date    PTT 28 10/17/2023    INR 0.94 10/17/2023     POC:   Lab Results   Component Value Date    BGM 96 12/24/2020     HEPATIC:   Lab Results   Component Value Date    ALBUMIN 3.9 11/07/2023    PROTTOTAL 6.4 11/07/2023    ALT 25 11/07/2023    AST 42 11/07/2023    ALKPHOS 109 (H) 11/07/2023    BILITOTAL 0.4 11/07/2023     OTHER:   Lab Results   Component Value Date    LACT 4.3 (HH) 10/06/2023    A1C 5.4 10/17/2023    DELILAH 8.1 (L) 11/08/2023    PHOS 3.1 11/07/2023    MAG 2.2 11/09/2023    TSH 1.05 11/07/2023    T4 1.00 06/01/2017    CRP <2.9 02/02/2021       Anesthesia Plan    ASA Status:  3       Anesthesia Type: MAC.              Consents    Anesthesia Plan(s) and associated risks, benefits, and realistic alternatives discussed. Questions answered and patient/representative(s) expressed understanding.     - Discussed:     - Discussed with:  Patient            Postoperative Care    Pain management: Multi-modal analgesia.   PONV prophylaxis: Ondansetron  (or other 5HT-3)     Comments:               Giorgio Stevens MD    I have reviewed the pertinent notes and labs in the chart from the past 30 days and (re)examined the patient.  Any updates or changes from those notes are reflected in this note.

## 2024-05-13 NOTE — H&P
Colon & Rectal Surgery Pre-procedure H&P    5/13/2024     Primary Care Physician     Chief Complaint/Reason for Procedure:             diagnostic; rectal bleeding and diarrhea    History and Physical:   Jessica Ellison is a 73 year old female presenting for colonoscopy for diagnostic purposes.       Past Medical History   I have reviewed this patient's medical history and updated it with pertinent information if needed.   Past Medical History:   Diagnosis Date    Anemia     Aneurysm (H24)     Antiplatelet or antithrombotic long-term use     Arthritis     Chemical dependency (H)     Chem Dep RX    Depression     History of blood transfusion     Hypertension     Menopausal symptoms     Other chronic pain     Lower back and hips    Seizure (H) 02/20/2024    Sleep apnea     Sleep disorder     Thyroid disease     Tobacco abuse     Uncomplicated asthma        Past Surgical History   I have reviewed this patient's surgical history and updated it with pertinent information if needed.  Past Surgical History:   Procedure Laterality Date    ABDOMEN SURGERY      APPENDECTOMY  1960    APPENDECTOMY      ARTHROPLASTY KNEE Left 08/16/2023    Procedure: LEFT TOTAL KNEE ARTHROPLASTY;  Surgeon: Bryan Roque MD;  Location: [facility] Main OR    BACK SURGERY      BIOPSY BREAST      cerebral aneurysm  2014    coiled    CEREBRAL ANEURYSM REPAIR      cholecystectomy      COLON SURGERY      COLONOSCOPY  04/19/2005    FRACTURE SURGERY      HC EXCISION BREAST LESION, OPEN >=1      core biopsy 2006, lumpectomy 2006    HERNIA REPAIR      LAP VENTRAL HERNIA REPAIR  12/2017    [place]    LAPAROSCOPIC ASSISTED HYSTERECTOMY VAGINAL  12/2017    LUMPECTOMY BREAST      orif left femoral neck Left 06/03/2016    stress fracture.  done at [facility]    SURGICAL HISTORY OF -   2004    Skin Graft    ZZC PART REMOVAL COLON W ANASTOMOSIS  05/2005    diverticulitis    ZZC SPINE FUSION,ANTER,8+ SGMTS  2007    Anterior posterior fusion 10 levels, hardware  removal and re-fusion 2009       Allergies:    Allergies   Allergen Reactions    Bee Venom     Lisinopril Swelling     Oral swelling         Prior to Admission Medications   Cannot display prior to admission medications because the patient has not been admitted in this contact.     Allergies   Allergies   Allergen Reactions    Bee Venom     Lisinopril Swelling     Oral swelling       Social History   I have reviewed this patient's social history and updated it with pertinent information if needed. Jessica Elliosn  reports that she quit smoking about 20 years ago. Her smoking use included cigarettes. She started smoking about 50 years ago. She has a 30 pack-year smoking history. She has never used smokeless tobacco. She reports current alcohol use. She reports that she does not use drugs.    Family History   I have reviewed this patient's family history and updated it with pertinent information if needed.   Family History   Problem Relation Age of Onset    Cancer Mother         breast/lung    Alcohol/Drug Mother     Depression Mother     Cancer - colorectal Father     Alcohol/Drug Father     Diabetes Maternal Grandmother     Diabetes Maternal Grandfather     Diabetes Paternal Grandmother     Diabetes Paternal Grandfather     Cancer Paternal Grandfather     Gastrointestinal Disease Paternal Grandfather     Congenital Anomalies Son     Hypertension Brother     Adrenal Disorder Brother         had adrenalectomies    Cerebral palsy Granddaughter        Review of Systems   The 5 point Review of Systems is negative other than noted in the HPI or here.     Physical Exam   Temp: 97.1  F (36.2  C) Temp src: Temporal BP: 124/81     Resp: 16 SpO2: 91 % O2 Device: None (Room air)    Vital Signs with Ranges  Temp:  [97.1  F (36.2  C)] 97.1  F (36.2  C)  Resp:  [16] 16  BP: (124)/(81) 124/81  SpO2:  [91 %] 91 %  150 lbs 0 oz    Aaox3, nad  Lungs clear B  RRR    Data         Assessment/Plan:  Jessica Ellison presents for  colonoscopy. Risks and benefits of colonoscopy discussed in detail and informed consent obtained.      - proceed with colonoscopy    Radha Glez MD, MS  [facility]  Office: [phone]  5/13/2024 10:32 AM

## 2024-05-13 NOTE — DISCHARGE INSTRUCTIONS
If you have any questions or concerns regarding your procedure, please contact SANDY Vásquez, her office number is [phone].     Discharge Instructions:    Take you medications as directed and follow up with you primary provider as scheduled.   You should expect to pass air from your rectum after the procedure.   Follow these care guidelines during your recovery for the next 24 hours.   If you have any questions or concerns please contact the provider that performed your procedure.     You were given medicine for sedation:  You have been given medicines during your procedure that might make you sleepy and weak. To prevent problems:    *Rest for the rest of the day after you are home. You should be back to your normal activity tomorrow.  *For the next 24 hours:   -Do not drink alcoholic beverages.   -Do not make any important decisions or sign any legal forms.   -Do not work around machinery or power equipment.     The medicines used for sedation may make you feel nauseated.   *Start with clear liquids, such as tea, jell-o, broth and ginger ale. As you feel better you may add soft foods such as pudding and ice cream.   *When you no longer feel nauseated, you may try your normal diet.     You should be back to eating your normal after 24 hours.     Call if you have any of these problems:  *Fever of 101 degree F or 38 degrees C  *Bleeding from the rectum  *Black stool or blood in your bowel movements  *Nausea with vomiting that does not ease after a few hours.  *Abdominal pain or bloating  *Fainting

## 2024-05-13 NOTE — ANESTHESIA CARE TRANSFER NOTE
Patient: Jessica Ellison    Procedure: Procedure(s):  COLONOSCOPY WITH BIOPSY       Diagnosis: Rectal bleeding [K62.5]  Diarrhea [R19.7]  Diagnosis Additional Information: No value filed.    Anesthesia Type:   MAC     Note:    Oropharynx: oropharynx clear of all foreign objects  Level of Consciousness: awake  Oxygen Supplementation: room air    Independent Airway: airway patency satisfactory and stable  Dentition: dentition unchanged  Vital Signs Stable: post-procedure vital signs reviewed and stable  Report to RN Given: handoff report given  Patient transferred to: Phase II    Handoff Report: Identifed the Patient, Identified the Reponsible Provider, Reviewed the pertinent medical history, Discussed the surgical course, Reviewed Intra-OP anesthesia mangement and issues during anesthesia, Set expectations for post-procedure period and Allowed opportunity for questions and acknowledgement of understanding      Vitals:  Vitals Value Taken Time   /60 05/13/24 1125   Temp 97.7  F (36.5  C) 05/13/24 1125   Pulse 75 05/13/24 1125   Resp 16 05/13/24 1125   SpO2 90 % 05/13/24 1125       Electronically Signed By: LA Elliott CRNA  May 13, 2024  11:28 AM

## 2024-05-13 NOTE — ANESTHESIA POSTPROCEDURE EVALUATION
"Patient: Jessica Ellison    Procedure: Procedure(s):  COLONOSCOPY WITH BIOPSY       Anesthesia Type:  MAC    Note:  Disposition: Outpatient   Postop Pain Control: Uneventful            Sign Out: Well controlled pain   PONV: No   Neuro/Psych: Uneventful            Sign Out: Acceptable/Baseline neuro status   Airway/Respiratory: Uneventful            Sign Out: Acceptable/Baseline resp. status   CV/Hemodynamics: Uneventful            Sign Out: Acceptable CV status; No obvious hypovolemia; No obvious fluid overload   Other NRE: NONE   DID A NON-ROUTINE EVENT OCCUR? No    Event details/Postop Comments:      O2 sats low at times postop (mid to upper 80s) but recover quickly. She states at home she \"tries to keep above 90\". I suspect this is near her baseline. She is otherwise quite awake and feeling well. Instructed to use her CPAP when sleeping and resume inhalers when she gets home.           Last vitals:  Vitals Value Taken Time   /62 05/13/24 1213   Temp 98  F (36.7  C) 05/13/24 1213   Pulse 95 05/13/24 1223   Resp 16 05/13/24 1213   SpO2 84 % 05/13/24 1223   Vitals shown include unfiled device data.    Electronically Signed By: Giorgio Stevens MD  May 13, 2024  12:26 PM  "

## 2024-05-13 NOTE — ANESTHESIA POSTPROCEDURE EVALUATION
Patient: Jessica Ellison    Procedure: Procedure(s):  COLONOSCOPY WITH BIOPSY       Anesthesia Type:  MAC    Note:  Disposition: Outpatient   Postop Pain Control: Uneventful            Sign Out: Well controlled pain   PONV: No   Neuro/Psych: Uneventful            Sign Out: Acceptable/Baseline neuro status   Airway/Respiratory: Uneventful            Sign Out: Acceptable/Baseline resp. status   CV/Hemodynamics: Uneventful            Sign Out: Acceptable CV status; No obvious hypovolemia; No obvious fluid overload   Other NRE: NONE   DID A NON-ROUTINE EVENT OCCUR? No           Last vitals:  Vitals Value Taken Time   /77 05/13/24 1131   Temp 97.7  F (36.5  C) 05/13/24 1125   Pulse 73 05/13/24 1134   Resp 16 05/13/24 1125   SpO2 91 % 05/13/24 1134   Vitals shown include unfiled device data.    Electronically Signed By: Giorgio Stevens MD  May 13, 2024  11:36 AM

## 2024-05-14 RX ORDER — OXYCODONE AND ACETAMINOPHEN 10; 325 MG/1; MG/1
1 TABLET ORAL EVERY 6 HOURS PRN
Qty: 50 TABLET | Refills: 0 | Status: SHIPPED | OUTPATIENT
Start: 2024-05-14 | End: 2024-06-18

## 2024-06-11 ENCOUNTER — VIRTUAL VISIT (OUTPATIENT)
Dept: SLEEP MEDICINE | Facility: CLINIC | Age: 74
End: 2024-06-11
Payer: COMMERCIAL

## 2024-06-11 VITALS — WEIGHT: 155 LBS | BODY MASS INDEX: 28.52 KG/M2 | HEIGHT: 62 IN

## 2024-06-11 DIAGNOSIS — G47.33 OSA (OBSTRUCTIVE SLEEP APNEA): Primary | ICD-10-CM

## 2024-06-11 PROCEDURE — 99214 OFFICE O/P EST MOD 30 MIN: CPT | Mod: 95 | Performed by: INTERNAL MEDICINE

## 2024-06-11 RX ORDER — GABAPENTIN 600 MG/1
600 TABLET ORAL AT BEDTIME
COMMUNITY
End: 2024-08-12

## 2024-06-11 ASSESSMENT — SLEEP AND FATIGUE QUESTIONNAIRES
HOW LIKELY ARE YOU TO NOD OFF OR FALL ASLEEP WHILE WATCHING TV: SLIGHT CHANCE OF DOZING
HOW LIKELY ARE YOU TO NOD OFF OR FALL ASLEEP WHILE SITTING INACTIVE IN A PUBLIC PLACE: WOULD NEVER DOZE
HOW LIKELY ARE YOU TO NOD OFF OR FALL ASLEEP WHEN YOU ARE A PASSENGER IN A CAR FOR AN HOUR WITHOUT A BREAK: SLIGHT CHANCE OF DOZING
HOW LIKELY ARE YOU TO NOD OFF OR FALL ASLEEP WHILE SITTING AND READING: SLIGHT CHANCE OF DOZING
HOW LIKELY ARE YOU TO NOD OFF OR FALL ASLEEP WHILE SITTING AND TALKING TO SOMEONE: WOULD NEVER DOZE
HOW LIKELY ARE YOU TO NOD OFF OR FALL ASLEEP WHILE SITTING QUIETLY AFTER LUNCH WITHOUT ALCOHOL: WOULD NEVER DOZE
HOW LIKELY ARE YOU TO NOD OFF OR FALL ASLEEP WHILE LYING DOWN TO REST IN THE AFTERNOON WHEN CIRCUMSTANCES PERMIT: MODERATE CHANCE OF DOZING
HOW LIKELY ARE YOU TO NOD OFF OR FALL ASLEEP IN A CAR, WHILE STOPPED FOR A FEW MINUTES IN TRAFFIC: WOULD NEVER DOZE

## 2024-06-11 ASSESSMENT — PAIN SCALES - GENERAL: PAINLEVEL: SEVERE PAIN (7)

## 2024-06-11 NOTE — PROGRESS NOTES
Virtual Visit Details    Type of service:  Video Visit   Video Start Time: 11:32 AM  Video End Time:11:57 AM    Originating Location (pt. Location): Home    Distant Location (provider location):  Off-site  Platform used for Video Visit: Wanda    Chief complaint: Follow-up motor restlessness    History of Present Illness: 73-year-old female with history of insomnia and severe obstructive sleep apnea.  At her last visit she had a pressure change based on a titration study and also initiated gabapentin for her motor restlessness.  She reports today that she is taking 600 mg and this has been beneficial.  She no longer feels really restless in her legs.  She continues to take the other's medications at bedtime including 20 mg of melatonin and Seroquel.  She is happy with the response.  She typically will get up at night to go to the bathroom and take CPAP off and then does not usually put it back on.  She recalls being told she uses she should be using it 3 to 4 hours.  She also has a history of vivid dreams and sleep talking but no clear dream enactment behavior.  At last visit overnight oximetry was ordered but has not been completed.    [place] Sleepiness Scale  Total score - [place]: 5 (6/11/2024 11:10 AM)   (Less than 10 normal)    Insomnia Severity Scale  SAHRON Total Score: 7  (normal 0-7, mild 8-14, moderate 15-21, severe 22-28)    Past Medical History:   Diagnosis Date    Anemia     Aneurysm (H24)     Antiplatelet or antithrombotic long-term use     Arthritis     Chemical dependency (H)     Chem Dep RX    Depression     History of blood transfusion     Hypertension     Menopausal symptoms     Other chronic pain     Lower back and hips    Seizure (H) 02/20/2024    Sleep apnea     Sleep disorder     Thyroid disease     Tobacco abuse     Uncomplicated asthma        Allergies   Allergen Reactions    Bee Venom     Lisinopril Swelling     Oral swelling       Current Outpatient Medications   Medication Sig Dispense  Refill    amLODIPine (NORVASC) 5 MG tablet Take 1 tablet (5 mg) by mouth daily 90 tablet 1    aspirin (ASA) 325 MG tablet Take 325 mg by mouth daily      atorvastatin (LIPITOR) 40 MG tablet Take 1 tablet (40 mg) by mouth daily 90 tablet 3    budesonide-formoterol (SYMBICORT) 80-4.5 MCG/ACT Inhaler Inhale 2 puffs into the lungs 2 times daily 6.9 g 1    calcium carbonate 600 mg-vitamin D 400 units (CALTRATE) 600-400 MG-UNIT per tablet Take 1 tablet by mouth every evening      celecoxib (CELEBREX) 200 MG capsule Take 1 capsule (200 mg) by mouth daily 90 capsule 1    dicyclomine (BENTYL) 10 MG capsule Take 1 capsule (10 mg) by mouth 4 times daily as needed 20 capsule 0    DULoxetine (CYMBALTA) 30 MG capsule TAKE 1 CAPSULE BY MOUTH 2 TIMES DAILY 180 capsule 1    folic acid (FOLVITE) 1 MG tablet Take 1 tablet (1 mg) by mouth daily 90 tablet 3    levETIRAcetam (KEPPRA) 500 MG tablet Take 1 tablet (500 mg) by mouth 2 times daily 180 tablet 3    levothyroxine (SYNTHROID/LEVOTHROID) 50 MCG tablet Take 1 tablet (50 mcg) by mouth daily 90 tablet 3    Melatonin 10 MG TABS tablet Take 20 mg by mouth At Bedtime      metoprolol succinate ER (TOPROL XL) 50 MG 24 hr tablet Take 1 tablet (50 mg) by mouth 2 times daily 180 tablet 3    multivitamin, therapeutic (THERA-VIT) TABS tablet Take 1 tablet by mouth daily      naloxone (NARCAN) 4 MG/0.1ML nasal spray Spray 1 spray (4 mg) into one nostril alternating nostrils once as needed for opioid reversal every 2-3 minutes until assistance arrives 0.2 mL 1    nystatin (MYCOSTATIN) 091334 UNIT/GM external powder Apply topically 3 times daily as needed (rash)      oxyCODONE-acetaminophen (PERCOCET)  MG per tablet Take 1 tablet by mouth every 6 hours as needed for severe pain Max #50/month 50 tablet 0    QUEtiapine (SEROQUEL) 50 MG tablet Take 2 tablets (100 mg) by mouth at bedtime 90 tablet 1    tiotropium (SPIRIVA HANDIHALER) 18 MCG inhaled capsule USING THE HANDIHALER, INHALE THE  CONTENTS OF ONE CAPSULE BY MOUTH ONCE DAILY 30 capsule 11    VENTOLIN  (90 Base) MCG/ACT inhaler INHALE 2 PUFFS INTO THE LUNGS EVERY 6 HOURS AS NEEDED FOR SHORTNESS OF BREATH OR WHEEZING 18 g 1     No current facility-administered medications for this visit.       Social History     Socioeconomic History    Marital status:      Spouse name: Not on file    Number of children: Not on file    Years of education: Not on file    Highest education level: Not on file   Occupational History    Not on file   Tobacco Use    Smoking status: Former     Current packs/day: 0.00     Average packs/day: 1 pack/day for 30.0 years (30.0 ttl pk-yrs)     Types: Cigarettes     Start date: 1974     Quit date: 2004     Years since quittin.4    Smokeless tobacco: Never   Vaping Use    Vaping status: Never Used   Substance and Sexual Activity    Alcohol use: Yes     Comment: Glass of wine a few times a week. ETOH dependency, DUI 3/15/10; 1-2 ETOH per week    Drug use: Never     Comment: percocet for the past several years    Sexual activity: Not Currently     Partners: Male     Birth control/protection: Surgical     Comment: VAS   Other Topics Concern    Parent/sibling w/ CABG, MI or angioplasty before 65F 55M? No   Social History Narrative    Not on file     Social Determinants of Health     Financial Resource Strain: Low Risk  (2024)    Financial Resource Strain     Within the past 12 months, have you or your family members you live with been unable to get utilities (heat, electricity) when it was really needed?: No   Food Insecurity: Low Risk  (2024)    Food Insecurity     Within the past 12 months, did you worry that your food would run out before you got money to buy more?: No     Within the past 12 months, did the food you bought just not last and you didn t have money to get more?: No   Transportation Needs: Low Risk  (2024)    Transportation Needs     Within the past 12 months, has lack of  transportation kept you from medical appointments, getting your medicines, non-medical meetings or appointments, work, or from getting things that you need?: No   Physical Activity: Inactive (2/20/2024)    Exercise Vital Sign     Days of Exercise per Week: 0 days     Minutes of Exercise per Session: 0 min   Stress: No Stress Concern Present (2/20/2024)    [place] [place] [employer] - Occupational Stress Questionnaire     Feeling of Stress : Only a little   Social Connections: Unknown (2/20/2024)    Social Connection and Isolation Panel [NHANES]     Frequency of Communication with Friends and Family: Not on file     Frequency of Social Gatherings with Friends and Family: More than three times a week     Attends Nondenominational Services: Not on file     Active Member of Clubs or Organizations: Not on file     Attends Club or Organization Meetings: Not on file     Marital Status: Not on file   Interpersonal Safety: Low Risk  (2/20/2024)    Interpersonal Safety     Do you feel physically and emotionally safe where you currently live?: Yes     Within the past 12 months, have you been hit, slapped, kicked or otherwise physically hurt by someone?: No     Within the past 12 months, have you been humiliated or emotionally abused in other ways by your partner or ex-partner?: No   Housing Stability: Low Risk  (2/20/2024)    Housing Stability     Do you have housing? : Yes     Are you worried about losing your housing?: No       Family History   Problem Relation Age of Onset    Cancer Mother         breast/lung    Alcohol/Drug Mother     Depression Mother     Cancer - colorectal Father     Alcohol/Drug Father     Diabetes Maternal Grandmother     Diabetes Maternal Grandfather     Diabetes Paternal Grandmother     Diabetes Paternal Grandfather     Cancer Paternal Grandfather     Gastrointestinal Disease Paternal Grandfather     Congenital Anomalies Son     Hypertension Brother     Adrenal Disorder Brother         had  "adrenalectomies    Cerebral palsy Granddaughter            EXAM:  Ht 1.575 m (5' 2\")   Wt 70.3 kg (155 lb)   BMI 28.35 kg/m    GENERAL: alert and no distress  EYES: Eyes grossly normal to inspection.  No discharge or erythema, or obvious scleral/conjunctival abnormalities.  RESP: No audible wheeze, cough, or visible cyanosis.    SKIN: Visible skin clear. No significant rash, abnormal pigmentation or lesions.  NEURO: Cranial nerves grossly intact.  Mentation and speech appropriate for age.  PSYCH: Appropriate affect, tone, and pace of words       PSG Titration 11/16/2023  Weight 165 lbs BMI 30  Rec APAP 12-16   PLMI 39.5 PLMAI 17     HSAT WatchPat 9/15/2021 on CPAP 7-15   Weight 162 lbs BMI 28.7  AHI 15.7, Lowest O2 Sat 77%  Time with saturation less than 88% was 257 minutes.       PSG 6/16/2021  Weight 160 lbs BMI 28.3  AHI 56.9, RDI 57.6, Lowest O2 Sat 70%  Time with O2 sat at or below 88% 358 min  No TCM  Increased EMG activity in REM    ResMed AirSense 10 auto PAP download from 5/10/2024 to 6/8/2024 reviewed:  Per cent of days used greater than 4 Hours 43% (minimum goal greater than 70%)  Average use on days used: 6 hours 30 min  Settings: Min EPAP 13 cmH2O    Max EPAP 15 cmH2O  Pressures delivered 90/95th percentile for pressure 14.5 cmH2O  Average AHI 10.9 events per hour (6.1 central )(goal less than 5)  Leak intermittently excessive    TSH   Date Value Ref Range Status   11/07/2023 1.05 0.30 - 4.20 uIU/mL Final   06/24/2022 0.74 0.40 - 4.00 mU/L Final   11/04/2020 1.50 0.40 - 4.00 mU/L Final         ASSESSMENT:  73-year-old female with insomnia, severe obstructive sleep apnea with hypoxemia, motor restlessness.  Motor restlessness significantly improved with gabapentin in addition.  No longer suffering insomnia.  She is not meeting compliance goals.  This may be related to middle the night mask removal when she is up to go to the bathroom.  And was understanding the goals of care.    PLAN:  Strongly urged " her to use CPAP all night every night.  Counseled her to put it back on when she gets back into bed after getting up for what ever reason.  Goal is for to her to use it every time she sleeps.  It is possible she would notice less vivid and disturbing dreams.  It is also possible she would notice a shorter sleep requirement.  Follow-up with me in 4 months.  If she is using the CPAP with improved compliance with then try to complete the oximetry.  Continue gabapentin at 600 mg before bed.  Will renew that prescription when needed.      37 minutes spent by me on the date of the encounter doing chart review, history and exam, documentation and further activities per the note    Laila Crocker M.D.  Pulmonary/Critical Care/Sleep Medicine    [facility]   Floor 1, [address]   [address] [place] [address]. [place] [address]   Appointments: [phone]    The above note was dictated using voice recognition software and may include typographical errors. Please contact the author for any clarifications.

## 2024-06-11 NOTE — PATIENT INSTRUCTIONS
Keep using the gabapentin 600 mg    Always use CPAP when you sleep--put it back on after you go to the bathroom    For general sleep health questions:   https://www.thensf.org/sleep-health-topics/  http://sleepeducation.org    Medicare and Medicaid patients starting on CPAP or biPAP on or after 5/12/2023  Medicare patients should schedule an IN PERSON CLINIC appointment to provide your CPAP/biPAP usage data to a provider within 90 days of starting CPAP    For new ResMed devices, sign up for device billie to monitor your device for your followup visits  We encourage you to utilize the [employer] billie or website (myAir web ([employer]) to monitor your therapy progress and share the data with your healthcare team when you discuss your sleep apnea.                                                      Know your equipment:  CPAP is continuous positive airway pressure that prevents obstructive sleep apnea by keeping the throat from collapsing while you are sleeping. In most cases, the device is  smart  and can slowly self-adjusts if your throat collapses and keeps a record every day of how well you are treated-this information is available to you and your care team.  BPAP is bilevel positive airway pressure that keeps your throat open and also assists each breath with a pressure boost to maintain adequate breathing.  Special kinds of BPAP are used in patients who have inadequate breathing from lung or heart disease. In most cases, the device is  smart  and can slowly self-adjusts to assist breathing. Like CPAP, the device keeps a record of how well you are treated.  Your mask is your connection to the device. You get to choose what feels most comfortable and the staff will help to make sure if fits.     *Masks with magnets:  Updated Contraindications  Masks with magnetic components are contraindicated for use by patients where they, or anyone in close physical contact while using the mask, have the following:   Active  medical implants that interact with magnets (i.e., pacemakers, implantable cardioverter defibrillators (ICD), neurostimulators, cerebrospinal fluid (CSF) shunts, insulin/infusion pumps)   Metallic implants/objects containing ferromagnetic material (i.e., aneurysm clips/flow disruption devices, embolic coils, stents, valves, electrodes, implants to restore hearing or balance with implanted magnets, ocular implants, metallic splinters in the eye)  Updated Warning  Keep the mask magnets at a safe distance of at least 6 inches (150 mm) away from implants or medical devices that may be adversely affected by magnetic interference. This warning applies to you or anyone in close physical contact with your mask. The magnets are in the frame and lower headgear clips, with a magnetic field strength of up to 400mT. When worn, they connect to secure the mask but may inadvertently detach while asleep.  Implants/medical devices, including those listed within contraindications, may be adversely affected if they change function under external magnetic fields or contain ferromagnetic materials that attract/repel to magnetic fields (some metallic implants, e.g., contact lenses with metal, dental implants, metallic cranial plates, screws, raymond hole covers, and bone substitute devices). Consult your physician and  of your implant / other medical device for information on the potential adverse effects of magnetic fields.      Continue PAP therapy every night, for all hours that you are sleeping (including naps.)  As always, try to get at least 8 hours of sleep or more each day, keep a regular sleep schedule, and avoid sleep deprivation. Avoid alcohol.  Reasons that you might need a change to your pressure therapy would be weight gain or loss, waking having inadvertently removed your PAP overnight, having previously felt refreshed by sleep with CPAP use and now waking un-refreshed, and return of daytime sleepiness. Also, the  development of new medical problems  (such as heart failure, stroke, medications such as narcotics) can sometimes affect breathing at night and change your PAP therapy needs.  Please bring PAP with you if you are hospitalized.  If anticipating surgery be sure to discuss with your surgeon that you have sleep apnea and use PAP therapy.    Maintain your equipment as recommended which includes routine cleaning and replacement of supplies.      Call DME for any questions regarding supplies or maintenance.    [facility], [facility] [phone]    Do not drive on engage in potentially dangerous activities if feeling sleepy.    Please follow up in sleep clinic again in 4 months.        Tips for your PAP use-    Mask fitting tips  Mask fitting exercise:    To improve your mask seal and your mobility at night, put mask on and secure in place.  Lie down in bed with full pressure and roll to one side, adjust headgear while in that position to eliminate any leaks. Repeat process rolling to other side.     The mask seal does not have to be perfect:   CPAP machines are designed to make up for small leaks. However, you will not tolerate leaks blowing in your eyes so you will need to adjust.   Any leak should only be near or at the bottom of the mask.  We expect your mask to leak slightly at night.    Do not over-tighten the headgear straps, tighter IS NOT better, we expect minimal leak.    First try re-positioning the mask or headgear before tightening the headgear straps.  Mask leaks are expected due to changing sleeping positions. Try pulling the mask away from your skin allowing the cushion to re-inflate will minimize the leak.  If you struggle for a good fit, try turning the CPAP off and then readjust the mask by pulling it away from your face and then turning back on the CPAP.        Humidifier tips  Humidifiers can be adjusted to increase or decrease the amount of moisture  according to your comfort level. You may need to adjust this frequently at first, but then might only change it with seasonal weather changes.     Try INCREASING the humidity if:  You experience a dry, irritated nasal passage or throat.  You have a runny, drippy nose or sneezing fits after using CPAP.  You experience nasal congestion during or after CPAP use.    Try DECREASING the humidity if:  You have excessive condensation or  rain out  in the tubing or mask.  Otherwise keep the tubing warm during the night by running it underneath the blankets or pillow.      Clinic visit after initial PAP set-up   Bring your equipment with you to your 5-8 week follow up clinic visit.  We will be extracting your data from the machine if not available from the cloud based [employer].        Travel  Always take your equipment with you when you travel.  If you fly with your equipment bring it on with you as a carry on.  Medical equipment does not count as a carry on.    If you travel international the machines take 110-240v.  The only adapter needed is the adapter that will fit into the receptacle (outlet).    You may also want to bring an extension cord as many hotel rooms have limited outlets at the bedside.  Do not travel with water in your humidifier chamber.     Cleaning and Maintenance Guidelines    Equipment Frequency Cleaning Method   Mask First Day    Daily      Weekly Soak mask in hot soapy water for 30 minutes, rinse and air dry.  Wipe nasal cushion with a hot soapy (Ivory, baby shampoo) cloth and rinse.  Baby wipes may also be used.  Do not use anti-bacterial soaps,Trudi  liquid soap, rubbing alcohol, bleach or ammonia.  Wash frame in hot soapy water (Ivory, baby shampoo) rinse and let air dry   Headgear Biweekly Wash in hot soapy water, rinse and air dry   Reusable Gray Filter Weekly Wash in hot soapy water, rinse, put in towel squeeze moisture out, let air dry   Disposable White Filter Check Weekly Replace when brown or  gray in color; at least every 2 to 3 months   Humidifier Chamber Daily    Weekly Empty distilled water from humidifier and let air dry    Hand wash in hot soapy water, rinse and air dry   Tubing Weekly Wash in hot soapy water, rinse and let air dry   Mask, Tubing and Humidifier Chamber As needed Disinfect: Soak in 1 part distilled white vinegar to 3 parts hot water for 30 minutes, rinse well and air dry  Not the material headgear        MASK AND SUPPLY REORDERING and EQUIPMENT NEEDS through your DME and per your insurance  Reminder: Most insurance companies will allow for a new mask, headgear, tubing, and reusable gray filter every six months.  Disposable white ultra-fine filters are covered monthly.      HOME AND SAFETY INSTRUCTIONS  Do not use frayed or cracked electrical cords, multi plug adaptors, or switched receptacles  Do not immerse electrical equipment into water  Assure that electrical cords do not become a tripping hazard

## 2024-06-11 NOTE — NURSING NOTE
Patient confirms medications and allergies are accurate via patients echeck in completion, and or denies any changes since last reviewed/verified.     Patient declined individual allergy and medication review by support staff because states all up today already     Melissa Starks, Virtual Facilitator   Is the patient currently in the state of [place]? YES    Visit mode:VIDEO    If the visit is dropped, the patient can be reconnected by: VIDEO VISIT: Text to cell phone:   Telephone Information:   Mobile [phone]       Will anyone else be joining the visit? NO  (If patient encounters technical issues they should call [phone] :[phone])    How would you like to obtain your AVS? MyChart    Are changes needed to the allergy or medication list? No    Are refills needed on medications prescribed by this physician? NO    Reason for visit: RECHECK    Melissa Starks VVF

## 2024-06-18 DIAGNOSIS — M54.50 CHRONIC BILATERAL LOW BACK PAIN WITHOUT SCIATICA: ICD-10-CM

## 2024-06-18 DIAGNOSIS — G89.29 CHRONIC BILATERAL LOW BACK PAIN WITHOUT SCIATICA: ICD-10-CM

## 2024-06-18 DIAGNOSIS — F41.9 ANXIETY: ICD-10-CM

## 2024-06-18 DIAGNOSIS — Z98.1 S/P LUMBAR SPINAL FUSION: ICD-10-CM

## 2024-06-18 DIAGNOSIS — S42.255A CLOSED NONDISPLACED FRACTURE OF GREATER TUBEROSITY OF LEFT HUMERUS, INITIAL ENCOUNTER: ICD-10-CM

## 2024-06-18 DIAGNOSIS — M53.3 SACROILIAC JOINT PAIN: ICD-10-CM

## 2024-06-18 RX ORDER — QUETIAPINE FUMARATE 50 MG/1
100 TABLET, FILM COATED ORAL AT BEDTIME
Qty: 90 TABLET | Refills: 1 | Status: ON HOLD | OUTPATIENT
Start: 2024-06-18

## 2024-06-18 RX ORDER — OXYCODONE AND ACETAMINOPHEN 10; 325 MG/1; MG/1
1 TABLET ORAL EVERY 6 HOURS PRN
Qty: 50 TABLET | Refills: 0 | Status: SHIPPED | OUTPATIENT
Start: 2024-06-18 | End: 2024-07-29

## 2024-07-29 DIAGNOSIS — S42.255A CLOSED NONDISPLACED FRACTURE OF GREATER TUBEROSITY OF LEFT HUMERUS, INITIAL ENCOUNTER: ICD-10-CM

## 2024-07-29 DIAGNOSIS — G89.29 CHRONIC BILATERAL LOW BACK PAIN WITHOUT SCIATICA: ICD-10-CM

## 2024-07-29 DIAGNOSIS — M53.3 SACROILIAC JOINT PAIN: ICD-10-CM

## 2024-07-29 DIAGNOSIS — M54.50 CHRONIC BILATERAL LOW BACK PAIN WITHOUT SCIATICA: ICD-10-CM

## 2024-07-29 DIAGNOSIS — Z98.1 S/P LUMBAR SPINAL FUSION: ICD-10-CM

## 2024-07-29 RX ORDER — OXYCODONE AND ACETAMINOPHEN 10; 325 MG/1; MG/1
1 TABLET ORAL EVERY 6 HOURS PRN
Qty: 50 TABLET | Refills: 0 | Status: SHIPPED | OUTPATIENT
Start: 2024-07-29 | End: 2024-08-12

## 2024-08-02 ENCOUNTER — TELEPHONE (OUTPATIENT)
Dept: FAMILY MEDICINE | Facility: CLINIC | Age: 74
End: 2024-08-02
Payer: COMMERCIAL

## 2024-08-02 NOTE — TELEPHONE ENCOUNTER
Patient Contact     Attempt # 1     Was call answered?  No.  Left message on voicemail with information to call back.     When calling back please triage and scheduled patient for an appointment.    Juliette High RN on 8/2/2024 at 3:14 PM

## 2024-08-02 NOTE — TELEPHONE ENCOUNTER
Symptoms    Describe your symptoms: Depression    Any pain: No    How long have you been having symptoms:  1 week, lost her brother about a week ago.  Vj, , calling wanting to talk to Dr Barriga about patient's depression.        Could we send this information to you in [facility] or would you prefer to receive a phone call?:   Patient would prefer a phone call   Okay to leave a detailed message?: Yes at Cell number on file:    Telephone Information:   Mobile [phone]  Vj ,  (has C2C)    Damaris Bass on 8/2/2024 at 12:32 PM

## 2024-08-05 NOTE — TELEPHONE ENCOUNTER
RN called patient and she reports she has been having grief and sadness after her brother has passed away.    Patient denies thoughts of self harm or thoughts of wanting to take her own life or to be dead. Patient reports feeling sad, decreased motivation, tearful, a sense of loss, and feeling so tired.     Patient was not sure if she needed an appointment but RN provided education about the importance of processing through grief to avoid worsening of chronic pain.     RN scheduled patient for an office visit with Dr. Barriga in a same day appointment spot on 8/12/24  due to ongoing use of controlled substances and depression.       Juliette High RN on 8/5/2024 at 11:15 AM

## 2024-08-12 ENCOUNTER — OFFICE VISIT (OUTPATIENT)
Dept: FAMILY MEDICINE | Facility: CLINIC | Age: 74
End: 2024-08-12
Payer: COMMERCIAL

## 2024-08-12 VITALS
WEIGHT: 160.25 LBS | OXYGEN SATURATION: 98 % | TEMPERATURE: 99.1 F | DIASTOLIC BLOOD PRESSURE: 84 MMHG | HEIGHT: 63 IN | BODY MASS INDEX: 28.39 KG/M2 | HEART RATE: 92 BPM | SYSTOLIC BLOOD PRESSURE: 122 MMHG | RESPIRATION RATE: 20 BRPM

## 2024-08-12 DIAGNOSIS — M53.3 SACROILIAC JOINT PAIN: ICD-10-CM

## 2024-08-12 DIAGNOSIS — F11.20 CONTINUOUS OPIOID DEPENDENCE (H): ICD-10-CM

## 2024-08-12 DIAGNOSIS — E03.9 HYPOTHYROIDISM, UNSPECIFIED TYPE: ICD-10-CM

## 2024-08-12 DIAGNOSIS — F33.1 MAJOR DEPRESSIVE DISORDER, RECURRENT EPISODE, MODERATE (H): ICD-10-CM

## 2024-08-12 DIAGNOSIS — S42.255A CLOSED NONDISPLACED FRACTURE OF GREATER TUBEROSITY OF LEFT HUMERUS, INITIAL ENCOUNTER: ICD-10-CM

## 2024-08-12 DIAGNOSIS — M54.50 CHRONIC BILATERAL LOW BACK PAIN WITHOUT SCIATICA: ICD-10-CM

## 2024-08-12 DIAGNOSIS — N18.2 CKD (CHRONIC KIDNEY DISEASE) STAGE 2, GFR 60-89 ML/MIN: ICD-10-CM

## 2024-08-12 DIAGNOSIS — E78.5 HYPERLIPIDEMIA LDL GOAL <70: ICD-10-CM

## 2024-08-12 DIAGNOSIS — Z98.1 S/P LUMBAR SPINAL FUSION: ICD-10-CM

## 2024-08-12 DIAGNOSIS — I10 ESSENTIAL HYPERTENSION WITH GOAL BLOOD PRESSURE LESS THAN 140/90: ICD-10-CM

## 2024-08-12 DIAGNOSIS — G89.29 CHRONIC BILATERAL LOW BACK PAIN WITHOUT SCIATICA: ICD-10-CM

## 2024-08-12 DIAGNOSIS — F43.21 GRIEF REACTION: Primary | ICD-10-CM

## 2024-08-12 LAB
ANION GAP SERPL CALCULATED.3IONS-SCNC: 18 MMOL/L (ref 7–15)
BUN SERPL-MCNC: 18 MG/DL (ref 8–23)
CALCIUM SERPL-MCNC: 10 MG/DL (ref 8.8–10.4)
CHLORIDE SERPL-SCNC: 100 MMOL/L (ref 98–107)
CHOLEST SERPL-MCNC: 208 MG/DL
CREAT SERPL-MCNC: 1.05 MG/DL (ref 0.51–0.95)
EGFRCR SERPLBLD CKD-EPI 2021: 57 ML/MIN/1.73M2
FASTING STATUS PATIENT QL REPORTED: ABNORMAL
FASTING STATUS PATIENT QL REPORTED: ABNORMAL
GLUCOSE SERPL-MCNC: 89 MG/DL (ref 70–99)
HCO3 SERPL-SCNC: 22 MMOL/L (ref 22–29)
HDLC SERPL-MCNC: 67 MG/DL
LDLC SERPL CALC-MCNC: 107 MG/DL
NONHDLC SERPL-MCNC: 141 MG/DL
POTASSIUM SERPL-SCNC: 5.2 MMOL/L (ref 3.4–5.3)
SODIUM SERPL-SCNC: 140 MMOL/L (ref 135–145)
TRIGL SERPL-MCNC: 168 MG/DL
TSH SERPL DL<=0.005 MIU/L-ACNC: 1.33 UIU/ML (ref 0.3–4.2)

## 2024-08-12 PROCEDURE — 80048 BASIC METABOLIC PNL TOTAL CA: CPT | Performed by: FAMILY MEDICINE

## 2024-08-12 PROCEDURE — 96127 BRIEF EMOTIONAL/BEHAV ASSMT: CPT | Performed by: FAMILY MEDICINE

## 2024-08-12 PROCEDURE — 84443 ASSAY THYROID STIM HORMONE: CPT | Performed by: FAMILY MEDICINE

## 2024-08-12 PROCEDURE — 36415 COLL VENOUS BLD VENIPUNCTURE: CPT | Performed by: FAMILY MEDICINE

## 2024-08-12 PROCEDURE — 99214 OFFICE O/P EST MOD 30 MIN: CPT | Performed by: FAMILY MEDICINE

## 2024-08-12 PROCEDURE — 80061 LIPID PANEL: CPT | Performed by: FAMILY MEDICINE

## 2024-08-12 RX ORDER — AMLODIPINE BESYLATE 5 MG/1
5 TABLET ORAL DAILY
Qty: 90 TABLET | Refills: 1 | Status: ON HOLD | OUTPATIENT
Start: 2024-08-12

## 2024-08-12 RX ORDER — LEVOTHYROXINE SODIUM 50 UG/1
50 TABLET ORAL DAILY
Qty: 90 TABLET | Refills: 3 | Status: ON HOLD | OUTPATIENT
Start: 2024-08-12

## 2024-08-12 RX ORDER — DULOXETIN HYDROCHLORIDE 30 MG/1
30 CAPSULE, DELAYED RELEASE ORAL 2 TIMES DAILY
Qty: 180 CAPSULE | Refills: 1 | Status: ON HOLD | OUTPATIENT
Start: 2024-08-12

## 2024-08-12 RX ORDER — OXYCODONE AND ACETAMINOPHEN 10; 325 MG/1; MG/1
1 TABLET ORAL EVERY 6 HOURS PRN
Qty: 50 TABLET | Refills: 0 | Status: ON HOLD | OUTPATIENT
Start: 2024-08-28

## 2024-08-12 RX ORDER — ATORVASTATIN CALCIUM 40 MG/1
40 TABLET, FILM COATED ORAL DAILY
Qty: 90 TABLET | Refills: 3 | Status: ON HOLD | OUTPATIENT
Start: 2024-08-12

## 2024-08-12 RX ORDER — CELECOXIB 200 MG/1
200 CAPSULE ORAL DAILY
Qty: 90 CAPSULE | Refills: 1 | Status: ON HOLD | OUTPATIENT
Start: 2024-08-12

## 2024-08-12 RX ORDER — GABAPENTIN 600 MG/1
600 TABLET ORAL AT BEDTIME
Qty: 90 TABLET | Refills: 1 | Status: ON HOLD | OUTPATIENT
Start: 2024-08-12

## 2024-08-12 ASSESSMENT — PATIENT HEALTH QUESTIONNAIRE - PHQ9
5. POOR APPETITE OR OVEREATING: NOT AT ALL
SUM OF ALL RESPONSES TO PHQ QUESTIONS 1-9: 10

## 2024-08-12 ASSESSMENT — ANXIETY QUESTIONNAIRES
5. BEING SO RESTLESS THAT IT IS HARD TO SIT STILL: NOT AT ALL
6. BECOMING EASILY ANNOYED OR IRRITABLE: MORE THAN HALF THE DAYS
2. NOT BEING ABLE TO STOP OR CONTROL WORRYING: NOT AT ALL
GAD7 TOTAL SCORE: 2
1. FEELING NERVOUS, ANXIOUS, OR ON EDGE: NOT AT ALL
3. WORRYING TOO MUCH ABOUT DIFFERENT THINGS: NOT AT ALL
7. FEELING AFRAID AS IF SOMETHING AWFUL MIGHT HAPPEN: NOT AT ALL
GAD7 TOTAL SCORE: 2

## 2024-08-12 ASSESSMENT — PAIN SCALES - GENERAL: PAINLEVEL: SEVERE PAIN (6)

## 2024-08-12 NOTE — LETTER
August 19, 2024      Jessica Ellison  [address]   [facility] [address]        Dear Jessica    We are writing to inform you of your test results.    Kidney function decreased slightly.  Consistent with last October, but it did improve with increased fluid intake.  Try to get more water/fluids.       Thyroid is in normal range.     Cholesterol is up a bit.  Continue atorvastatin 40 mg at this time.    Resulted Orders   Lipid panel reflex to direct LDL Fasting   Result Value Ref Range    Cholesterol 208 (H) <200 mg/dL    Triglycerides 168 (H) <150 mg/dL    Direct Measure HDL 67 >=50 mg/dL    LDL Cholesterol Calculated 107 (H) <=100 mg/dL    Non HDL Cholesterol 141 (H) <130 mg/dL    Patient Fasting > 8hrs? Unknown     Narrative    Cholesterol  Desirable:  <200 mg/dL    Triglycerides  Normal:  Less than 150 mg/dL  Borderline High:  150-199 mg/dL  High:  200-499 mg/dL  Very High:  Greater than or equal to 500 mg/dL    Direct Measure HDL  Female:  Greater than or equal to 50 mg/dL   Male:  Greater than or equal to 40 mg/dL    LDL Cholesterol  Desirable:  <100mg/dL  Above Desirable:  100-129 mg/dL   Borderline High:  130-159 mg/dL   High:  160-189 mg/dL   Very High:  >= 190 mg/dL    Non HDL Cholesterol  Desirable:  130 mg/dL  Above Desirable:  130-159 mg/dL  Borderline High:  160-189 mg/dL  High:  190-219 mg/dL  Very High:  Greater than or equal to 220 mg/dL   TSH with free T4 reflex   Result Value Ref Range    TSH 1.33 0.30 - 4.20 uIU/mL   Basic metabolic panel  (Ca, Cl, CO2, Creat, Gluc, K, Na, BUN)   Result Value Ref Range    Sodium 140 135 - 145 mmol/L    Potassium 5.2 3.4 - 5.3 mmol/L    Chloride 100 98 - 107 mmol/L    Carbon Dioxide (CO2) 22 22 - 29 mmol/L    Anion Gap 18 (H) 7 - 15 mmol/L    Urea Nitrogen 18.0 8.0 - 23.0 mg/dL    Creatinine 1.05 (H) 0.51 - 0.95 mg/dL    GFR Estimate 57 (L) >60 mL/min/1.73m2      Comment:      eGFR calculated using 2021 CKD-EPI equation.    Calcium 10.0 8.8 - 10.4 mg/dL       Comment:      Reference intervals for this test were updated on 7/16/2024 to reflect our healthy population more accurately. There may be differences in the flagging of prior results with similar values performed with this method. Those prior results can be interpreted in the context of the updated reference intervals.    Glucose 89 70 - 99 mg/dL    Patient Fasting > 8hrs? Unknown        If you have any questions or concerns, please call the clinic at the number listed above.       Sincerely,      Sarah Barriga MD

## 2024-08-12 NOTE — LETTER

## 2024-08-12 NOTE — PATIENT INSTRUCTIONS
"BEHAVIORAL/MENTAL HEALTH RESOURCES: [place] , [place], [facility] AND [facility]    Counseling/Psychotherapy (please note this list does not include all agencies that provide services)  **Patient should check with their health insurance to identify providers in network**  - [facility] - [place]/[facility]/[place]/other locations: [phone]  - [facility] - [place]: [phone]  - Behavioral Healthcare Providers - can help find a provider near you: [phone]  - [facility] - [place]: [phone]  - [facility] - [place]: [phone]  - [facility] - [place]:[phone]  - [facility] - [place]/[facility]/other locations: [phone]  - [facility] - [facility]/[place]: [phone]  - [facility] - [facility]: [phone]  - [facility] - [place]: [phone]  [facility] - radha-based in [place]: [phone]  - [facility] - [place]: [phone]  - [facility] - [place]:[phone]  - [facility] - [place]: [phone]/ [place]: [phone]  - [facility]: [place]: [phone]  - [facility] - [place]: [phone]  - [facility] - [place]:[phone]  - [facility], [facility]. - [place]: [phone]  - [facility] - [place]: [phone]        When you are out of refills or the refills say \"zero\", it is time to schedule your next appointment in clinic!    Labs are released to you almost immediately and sometimes before I have had a chance to review them.  I review labs regularly and once they are all in, you will either be sent a letter with your results or if you are signed up for on-line services, " "you will be notified that results are available to you on [facility]. If there are serious findings, you typically will be called.    If you have any questions about your visit, your symptoms, your medication, your test results or it is not clear what your diagnosis or treatment plan is please contact me (via [facility]) or call the care team at [phone] and say \"Care Team\"          "

## 2024-08-12 NOTE — PROGRESS NOTES
"  Assessment & Plan     Grief reaction  Brother  suddenly two weeks ago  Counseling recommended - referral and resources given  No change in antidepressant at this time as moods were \"good\" before this recent loss.     - Adult Mental Health  Referral; Future    Major depressive disorder, recurrent episode, moderate (H)  As above  - gabapentin (NEURONTIN) 600 MG tablet; Take 1 tablet (600 mg) by mouth at bedtime  - DULoxetine (CYMBALTA) 30 MG capsule; Take 1 capsule (30 mg) by mouth 2 times daily    CKD (chronic kidney disease) stage 2, GFR 60-89 ml/min  Continue monitoring  - Albumin Random Urine Quantitative with Creat Ratio; Future  - Basic metabolic panel  (Ca, Cl, CO2, Creat, Gluc, K, Na, BUN); Future    Chronic bilateral low back pain without sciatica     - gabapentin (NEURONTIN) 600 MG tablet; Take 1 tablet (600 mg) by mouth at bedtime  - celecoxib (CELEBREX) 200 MG capsule; Take 1 capsule (200 mg) by mouth daily    Sacroiliac joint pain     - celecoxib (CELEBREX) 200 MG capsule; Take 1 capsule (200 mg) by mouth daily    Essential hypertension with goal blood pressure less than 140/90  Well controlled  - amLODIPine (NORVASC) 5 MG tablet; Take 1 tablet (5 mg) by mouth daily    Hyperlipidemia LDL goal <70     - atorvastatin (LIPITOR) 40 MG tablet; Take 1 tablet (40 mg) by mouth daily  - Lipid panel reflex to direct LDL Fasting; Future    Hypothyroidism, unspecified type   Clinically euthyroid  - levothyroxine (SYNTHROID/LEVOTHROID) 50 MCG tablet; Take 1 tablet (50 mcg) by mouth daily  - TSH with free T4 reflex; Future          BMI  Estimated body mass index is 28.84 kg/m  as calculated from the following:    Height as of this encounter: 1.588 m (5' 2.5\").    Weight as of this encounter: 72.7 kg (160 lb 4 oz).             Miller Lopez is a 73 year old, presenting for the following health issues:  Depression        2024     2:18 PM   Additional Questions   Roomed by Altagracia weldon CMA   Accompanied " by Self     History of Present Illness       Reason for visit:  Some depression    She eats 2-3 servings of fruits and vegetables daily.She consumes 1 sweetened beverage(s) daily.She exercises with enough effort to increase her heart rate 9 or less minutes per day.  She exercises with enough effort to increase her heart rate 3 or less days per week.   She is taking medications regularly.         Hyperlipidemia Follow-Up  Atorvastatin 40mg qd  Are you regularly taking any medication or supplement to lower your cholesterol?   Yes- statin  Are you having muscle aches or other side effects that you think could be caused by your cholesterol lowering medication?  No    Hypertension Follow-up  Amlodipine 5mg every day, metoprolol 50mg qd  Do you check your blood pressure regularly outside of the clinic? Yes   Are you following a low salt diet? Yes  Are your blood pressures ever more than 140 on the top number (systolic) OR more   than 90 on the bottom number (diastolic), for example 140/90? No    BP Readings from Last 6 Encounters:   24 122/84   24 124/62   24 112/76   24 114/80   24 132/80   24 (!) 161/95       Depression and Anxiety   Cymbalta 30mg bid, seroquel 100m  How are you doing with your depression since your last visit? Worsened due to her brother recently passing away  How are you doing with your anxiety since your last visit?  No change  Are you having other symptoms that might be associated with depression or anxiety? YES, sleeping more  Have you had a significant life event? Her brother passed away recently   Do you have any concerns with your use of alcohol or other drugs? No    Social History     Tobacco Use    Smoking status: Former     Current packs/day: 0.00     Average packs/day: 1 pack/day for 30.0 years (30.0 ttl pk-yrs)     Types: Cigarettes     Start date: 1974     Quit date: 2004     Years since quittin.6    Smokeless tobacco: Never   Vaping Use     Vaping status: Never Used   Substance Use Topics    Alcohol use: Yes     Comment: Glass of wine a few times a week. ETOH dependency, DUI 3/15/10; 1-2 ETOH per week    Drug use: Never     Comment: percocet for the past several years         10/10/2023    12:02 PM 2/20/2024    11:33 AM 8/12/2024     2:25 PM   PHQ   PHQ-9 Total Score 19 3 10   Q9: Thoughts of better off dead/self-harm past 2 weeks Several days Not at all Not at all   F/U: Thoughts of suicide or self-harm No     F/U: Safety concerns No           8/8/2023    10:16 AM 2/20/2024    11:47 AM 8/12/2024     2:25 PM   DAJUAN-7 SCORE   Total Score 4 (minimal anxiety)     Total Score 4 0 2         Suicide Assessment Five-step Evaluation and Treatment (SAFE-T)    Hypothyroidism Follow-up  Levothyroxine 50mcg qd  Since last visit, patient describes the following symptoms: Weight stable, no hair loss, no skin changes, no constipation, no loose stools    TSH   Date Value Ref Range Status   11/07/2023 1.05 0.30 - 4.20 uIU/mL Final   06/24/2022 0.74 0.40 - 4.00 mU/L Final   11/04/2020 1.50 0.40 - 4.00 mU/L Final     T4 Free   Date Value Ref Range Status   06/01/2017 1.00 0.76 - 1.46 ng/dL Final       Pain History:  When did you first notice your pain? Chronic   Have you seen this provider for your pain in the past? Yes   Where in your body do you have pain? Lumbar spine  Are you seeing anyone else for your pain? No        10/10/2023    12:02 PM 2/20/2024    11:33 AM 8/12/2024     2:25 PM   PHQ-9 SCORE   PHQ-9 Total Score MyChart 19 (Moderately severe depression) 3 (Minimal depression)    PHQ-9 Total Score 19 3 10           8/8/2023    10:16 AM 2/20/2024    11:47 AM 8/12/2024     2:25 PM   DAJUAN-7 SCORE   Total Score 4 (minimal anxiety)     Total Score 4 0 2               10/10/2023    12:02 PM 2/20/2024    11:33 AM 8/12/2024     2:25 PM   PHQ-9 SCORE   PHQ-9 Total Score MyChart 19 (Moderately severe depression) 3 (Minimal depression)    PHQ-9 Total Score 19 3 10            "8/8/2023    10:16 AM 2/20/2024    11:47 AM 8/12/2024     2:25 PM   DAJUAN-7 SCORE   Total Score 4 (minimal anxiety)     Total Score 4 0 2           7/11/2023     8:52 AM 2/20/2024    11:47 AM 8/12/2024     2:25 PM   PEG Score   PEG Total Score 7 7 7       Chronic Pain Follow Up:    Location of pain: Lumbar spine  Analgesia/pain control:    - Recent changes:  Same    - Overall control: Tolerable with discomfort    - Current treatments: Opioids,  muscle relaxer's  Adherence:     - Do you ever take more pain medicine than prescribed? No    - When did you take your last dose of pain medicine?  This morning   Adverse effects: No   PDMP Review         Value Time User    State PDMP site checked  Yes 8/12/2024  2:31 PM Sarah Barriga MD          Last CSA Agreement:   CSA -- Patient Level:     [Media Unavailable] Controlled Substance Agreement - Opioid - Scan on 7/11/2023 11:26 AM   [Media Unavailable] Controlled Substance Agreement - Opioid - Scan on 6/24/2022  7:50 AM   [Media Unavailable] Controlled Substance Agreement - Opioid - Scan on 4/30/2021 10:14 AM   [Media Unavailable] Controlled Substance Agreement - Opioid - Scan on 1/31/2019  1:16 PM: 01/28/19       Last UDS: 2/22/2024      Review of Systems  Constitutional, HEENT, cardiovascular, pulmonary, gi and gu systems are negative, except as otherwise noted.      Objective    /84   Pulse 92   Temp 99.1  F (37.3  C) (Tympanic)   Resp 20   Ht 1.588 m (5' 2.5\")   Wt 72.7 kg (160 lb 4 oz)   SpO2 98%   BMI 28.84 kg/m    Body mass index is 28.84 kg/m .  Physical Exam   GENERAL: alert and no distress  PSYCH: mentation appears normal, affect flat, tearful, judgement and insight intact, and appearance well groomed            Signed Electronically by: Sarah Barriga MD        "

## 2024-08-14 DIAGNOSIS — M17.12 PRIMARY OSTEOARTHRITIS OF LEFT KNEE: ICD-10-CM

## 2024-08-16 RX ORDER — ASPIRIN 325 MG
TABLET, DELAYED RELEASE (ENTERIC COATED) ORAL
Qty: 90 TABLET | Refills: 3 | Status: ON HOLD | OUTPATIENT
Start: 2024-08-16

## 2024-09-09 ENCOUNTER — APPOINTMENT (OUTPATIENT)
Dept: GENERAL RADIOLOGY | Facility: CLINIC | Age: 74
DRG: 193 | End: 2024-09-09
Attending: EMERGENCY MEDICINE
Payer: COMMERCIAL

## 2024-09-09 ENCOUNTER — HOSPITAL ENCOUNTER (INPATIENT)
Facility: CLINIC | Age: 74
LOS: 2 days | Discharge: HOME | DRG: 193 | End: 2024-09-11
Attending: EMERGENCY MEDICINE | Admitting: FAMILY MEDICINE
Payer: COMMERCIAL

## 2024-09-09 ENCOUNTER — NURSE TRIAGE (OUTPATIENT)
Dept: FAMILY MEDICINE | Facility: CLINIC | Age: 74
End: 2024-09-09
Payer: COMMERCIAL

## 2024-09-09 ENCOUNTER — APPOINTMENT (OUTPATIENT)
Dept: MRI IMAGING | Facility: CLINIC | Age: 74
DRG: 193 | End: 2024-09-09
Attending: EMERGENCY MEDICINE
Payer: COMMERCIAL

## 2024-09-09 ENCOUNTER — APPOINTMENT (OUTPATIENT)
Dept: CT IMAGING | Facility: CLINIC | Age: 74
DRG: 193 | End: 2024-09-09
Attending: EMERGENCY MEDICINE
Payer: COMMERCIAL

## 2024-09-09 ENCOUNTER — MYC MEDICAL ADVICE (OUTPATIENT)
Dept: FAMILY MEDICINE | Facility: CLINIC | Age: 74
End: 2024-09-09
Payer: COMMERCIAL

## 2024-09-09 DIAGNOSIS — N17.9 ACUTE KIDNEY INJURY (H): ICD-10-CM

## 2024-09-09 DIAGNOSIS — J96.02 ACUTE RESPIRATORY FAILURE WITH HYPOXIA AND HYPERCAPNIA (H): ICD-10-CM

## 2024-09-09 DIAGNOSIS — S12.120A OTHER CLOSED DISPLACED ODONTOID FRACTURE, INITIAL ENCOUNTER (H): ICD-10-CM

## 2024-09-09 DIAGNOSIS — G89.4 CHRONIC PAIN SYNDROME: Primary | ICD-10-CM

## 2024-09-09 DIAGNOSIS — J43.2 CENTRILOBULAR EMPHYSEMA (H): ICD-10-CM

## 2024-09-09 DIAGNOSIS — J96.01 ACUTE RESPIRATORY FAILURE WITH HYPOXIA AND HYPERCAPNIA (H): ICD-10-CM

## 2024-09-09 LAB
ALBUMIN SERPL BCG-MCNC: 4.1 G/DL (ref 3.5–5.2)
ALP SERPL-CCNC: 101 U/L (ref 40–150)
ALT SERPL W P-5'-P-CCNC: 18 U/L (ref 0–50)
ANION GAP SERPL CALCULATED.3IONS-SCNC: 11 MMOL/L (ref 7–15)
AST SERPL W P-5'-P-CCNC: 39 U/L (ref 0–45)
BASE EXCESS BLDV CALC-SCNC: 0.1 MMOL/L (ref -3–3)
BASE EXCESS BLDV CALC-SCNC: 0.4 MMOL/L (ref -3–3)
BASOPHILS # BLD AUTO: 0.1 10E3/UL (ref 0–0.2)
BASOPHILS NFR BLD AUTO: 1 %
BILIRUB SERPL-MCNC: 0.3 MG/DL
BUN SERPL-MCNC: 27.3 MG/DL (ref 8–23)
CALCIUM SERPL-MCNC: 8.9 MG/DL (ref 8.8–10.4)
CHLORIDE SERPL-SCNC: 104 MMOL/L (ref 98–107)
CREAT SERPL-MCNC: 1.22 MG/DL (ref 0.51–0.95)
EGFRCR SERPLBLD CKD-EPI 2021: 47 ML/MIN/1.73M2
EOSINOPHIL # BLD AUTO: 0.3 10E3/UL (ref 0–0.7)
EOSINOPHIL NFR BLD AUTO: 7 %
ERYTHROCYTE [DISTWIDTH] IN BLOOD BY AUTOMATED COUNT: 13.9 % (ref 10–15)
ETHANOL SERPL-MCNC: <0.01 G/DL
GLUCOSE SERPL-MCNC: 101 MG/DL (ref 70–99)
HCO3 BLDV-SCNC: 28 MMOL/L (ref 21–28)
HCO3 BLDV-SCNC: 28 MMOL/L (ref 21–28)
HCO3 SERPL-SCNC: 25 MMOL/L (ref 22–29)
HCT VFR BLD AUTO: 33.8 % (ref 35–47)
HGB BLD-MCNC: 10.5 G/DL (ref 11.7–15.7)
IMM GRANULOCYTES # BLD: 0 10E3/UL
IMM GRANULOCYTES NFR BLD: 1 %
LYMPHOCYTES # BLD AUTO: 0.9 10E3/UL (ref 0.8–5.3)
LYMPHOCYTES NFR BLD AUTO: 19 %
MCH RBC QN AUTO: 30.8 PG (ref 26.5–33)
MCHC RBC AUTO-ENTMCNC: 31.1 G/DL (ref 31.5–36.5)
MCV RBC AUTO: 99 FL (ref 78–100)
MONOCYTES # BLD AUTO: 0.5 10E3/UL (ref 0–1.3)
MONOCYTES NFR BLD AUTO: 10 %
NEUTROPHILS # BLD AUTO: 3 10E3/UL (ref 1.6–8.3)
NEUTROPHILS NFR BLD AUTO: 62 %
NRBC # BLD AUTO: 0 10E3/UL
NRBC BLD AUTO-RTO: 0 /100
O2/TOTAL GAS SETTING VFR VENT: 0 %
O2/TOTAL GAS SETTING VFR VENT: 0 %
OXYHGB MFR BLDV: 28 % (ref 70–75)
OXYHGB MFR BLDV: 71 % (ref 70–75)
PCO2 BLDV: 59 MM HG (ref 40–50)
PCO2 BLDV: 62 MM HG (ref 40–50)
PH BLDV: 7.27 [PH] (ref 7.32–7.43)
PH BLDV: 7.28 [PH] (ref 7.32–7.43)
PLATELET # BLD AUTO: 158 10E3/UL (ref 150–450)
PO2 BLDV: 24 MM HG (ref 25–47)
PO2 BLDV: 46 MM HG (ref 25–47)
POTASSIUM SERPL-SCNC: 4.7 MMOL/L (ref 3.4–5.3)
PROT SERPL-MCNC: 6.7 G/DL (ref 6.4–8.3)
RADIOLOGIST FLAGS: ABNORMAL
RADIOLOGIST FLAGS: ABNORMAL
RBC # BLD AUTO: 3.41 10E6/UL (ref 3.8–5.2)
SAO2 % BLDV: 29 % (ref 70–75)
SAO2 % BLDV: 72.8 % (ref 70–75)
SODIUM SERPL-SCNC: 140 MMOL/L (ref 135–145)
WBC # BLD AUTO: 4.8 10E3/UL (ref 4–11)

## 2024-09-09 PROCEDURE — 85025 COMPLETE CBC W/AUTO DIFF WBC: CPT | Performed by: EMERGENCY MEDICINE

## 2024-09-09 PROCEDURE — 99223 1ST HOSP IP/OBS HIGH 75: CPT

## 2024-09-09 PROCEDURE — 99285 EMERGENCY DEPT VISIT HI MDM: CPT | Performed by: EMERGENCY MEDICINE

## 2024-09-09 PROCEDURE — 258N000003 HC RX IP 258 OP 636: Performed by: EMERGENCY MEDICINE

## 2024-09-09 PROCEDURE — 250N000013 HC RX MED GY IP 250 OP 250 PS 637: Performed by: EMERGENCY MEDICINE

## 2024-09-09 PROCEDURE — 72141 MRI NECK SPINE W/O DYE: CPT

## 2024-09-09 PROCEDURE — 250N000011 HC RX IP 250 OP 636: Performed by: EMERGENCY MEDICINE

## 2024-09-09 PROCEDURE — 72125 CT NECK SPINE W/O DYE: CPT

## 2024-09-09 PROCEDURE — 80053 COMPREHEN METABOLIC PANEL: CPT | Performed by: EMERGENCY MEDICINE

## 2024-09-09 PROCEDURE — 36415 COLL VENOUS BLD VENIPUNCTURE: CPT

## 2024-09-09 PROCEDURE — 70450 CT HEAD/BRAIN W/O DYE: CPT

## 2024-09-09 PROCEDURE — 82805 BLOOD GASES W/O2 SATURATION: CPT | Performed by: EMERGENCY MEDICINE

## 2024-09-09 PROCEDURE — 250N000009 HC RX 250: Performed by: EMERGENCY MEDICINE

## 2024-09-09 PROCEDURE — 82805 BLOOD GASES W/O2 SATURATION: CPT

## 2024-09-09 PROCEDURE — 82077 ASSAY SPEC XCP UR&BREATH IA: CPT | Performed by: EMERGENCY MEDICINE

## 2024-09-09 PROCEDURE — 120N000001 HC R&B MED SURG/OB

## 2024-09-09 PROCEDURE — 99285 EMERGENCY DEPT VISIT HI MDM: CPT | Mod: 25 | Performed by: EMERGENCY MEDICINE

## 2024-09-09 PROCEDURE — 36415 COLL VENOUS BLD VENIPUNCTURE: CPT | Performed by: EMERGENCY MEDICINE

## 2024-09-09 PROCEDURE — 250N000012 HC RX MED GY IP 250 OP 636 PS 637

## 2024-09-09 PROCEDURE — 71046 X-RAY EXAM CHEST 2 VIEWS: CPT

## 2024-09-09 RX ORDER — AZITHROMYCIN 250 MG/1
500 TABLET, FILM COATED ORAL ONCE
Status: COMPLETED | OUTPATIENT
Start: 2024-09-09 | End: 2024-09-09

## 2024-09-09 RX ORDER — ONDANSETRON 4 MG/1
4 TABLET, ORALLY DISINTEGRATING ORAL EVERY 6 HOURS PRN
Status: DISCONTINUED | OUTPATIENT
Start: 2024-09-09 | End: 2024-09-10

## 2024-09-09 RX ORDER — LEVOTHYROXINE SODIUM 50 UG/1
50 TABLET ORAL DAILY
Status: DISCONTINUED | OUTPATIENT
Start: 2024-09-10 | End: 2024-09-11 | Stop reason: HOSPADM

## 2024-09-09 RX ORDER — CALCIUM CARBONATE 500 MG/1
1000 TABLET, CHEWABLE ORAL 4 TIMES DAILY PRN
Status: DISCONTINUED | OUTPATIENT
Start: 2024-09-09 | End: 2024-09-10

## 2024-09-09 RX ORDER — LEVETIRACETAM 500 MG/1
500 TABLET ORAL 2 TIMES DAILY
Status: DISCONTINUED | OUTPATIENT
Start: 2024-09-10 | End: 2024-09-11 | Stop reason: HOSPADM

## 2024-09-09 RX ORDER — DULOXETIN HYDROCHLORIDE 30 MG/1
30 CAPSULE, DELAYED RELEASE ORAL 2 TIMES DAILY
Status: DISCONTINUED | OUTPATIENT
Start: 2024-09-10 | End: 2024-09-11 | Stop reason: HOSPADM

## 2024-09-09 RX ORDER — POLYETHYLENE GLYCOL 3350 17 G/17G
17 POWDER, FOR SOLUTION ORAL 2 TIMES DAILY PRN
Status: DISCONTINUED | OUTPATIENT
Start: 2024-09-09 | End: 2024-09-11 | Stop reason: HOSPADM

## 2024-09-09 RX ORDER — CELECOXIB 200 MG/1
200 CAPSULE ORAL DAILY
Status: DISCONTINUED | OUTPATIENT
Start: 2024-09-10 | End: 2024-09-11 | Stop reason: HOSPADM

## 2024-09-09 RX ORDER — ONDANSETRON 2 MG/ML
4 INJECTION INTRAMUSCULAR; INTRAVENOUS EVERY 6 HOURS PRN
Status: DISCONTINUED | OUTPATIENT
Start: 2024-09-09 | End: 2024-09-11 | Stop reason: HOSPADM

## 2024-09-09 RX ORDER — GABAPENTIN 600 MG/1
600 TABLET ORAL AT BEDTIME
Status: DISCONTINUED | OUTPATIENT
Start: 2024-09-10 | End: 2024-09-11 | Stop reason: HOSPADM

## 2024-09-09 RX ORDER — FOLIC ACID 1 MG/1
1 TABLET ORAL DAILY
Status: DISCONTINUED | OUTPATIENT
Start: 2024-09-10 | End: 2024-09-11 | Stop reason: HOSPADM

## 2024-09-09 RX ORDER — ONDANSETRON 4 MG/1
4 TABLET, ORALLY DISINTEGRATING ORAL EVERY 6 HOURS PRN
Status: DISCONTINUED | OUTPATIENT
Start: 2024-09-09 | End: 2024-09-11 | Stop reason: HOSPADM

## 2024-09-09 RX ORDER — AMOXICILLIN 250 MG
1 CAPSULE ORAL 2 TIMES DAILY PRN
Status: DISCONTINUED | OUTPATIENT
Start: 2024-09-09 | End: 2024-09-11 | Stop reason: HOSPADM

## 2024-09-09 RX ORDER — AMLODIPINE BESYLATE 5 MG/1
5 TABLET ORAL AT BEDTIME
Status: DISCONTINUED | OUTPATIENT
Start: 2024-09-10 | End: 2024-09-11 | Stop reason: HOSPADM

## 2024-09-09 RX ORDER — ACETAMINOPHEN 500 MG
1000 TABLET ORAL ONCE
Status: COMPLETED | OUTPATIENT
Start: 2024-09-09 | End: 2024-09-09

## 2024-09-09 RX ORDER — ACETAMINOPHEN 325 MG/1
650 TABLET ORAL EVERY 4 HOURS PRN
Status: DISCONTINUED | OUTPATIENT
Start: 2024-09-09 | End: 2024-09-11 | Stop reason: HOSPADM

## 2024-09-09 RX ORDER — AMOXICILLIN 250 MG
2 CAPSULE ORAL 2 TIMES DAILY PRN
Status: DISCONTINUED | OUTPATIENT
Start: 2024-09-09 | End: 2024-09-11 | Stop reason: HOSPADM

## 2024-09-09 RX ORDER — LIDOCAINE 40 MG/G
CREAM TOPICAL
Status: DISCONTINUED | OUTPATIENT
Start: 2024-09-09 | End: 2024-09-11 | Stop reason: HOSPADM

## 2024-09-09 RX ORDER — METOPROLOL SUCCINATE 50 MG/1
50 TABLET, EXTENDED RELEASE ORAL 2 TIMES DAILY
Status: DISCONTINUED | OUTPATIENT
Start: 2024-09-10 | End: 2024-09-11 | Stop reason: HOSPADM

## 2024-09-09 RX ORDER — PREDNISONE 20 MG/1
40 TABLET ORAL DAILY
Status: DISCONTINUED | OUTPATIENT
Start: 2024-09-09 | End: 2024-09-09

## 2024-09-09 RX ORDER — ONDANSETRON 2 MG/ML
4 INJECTION INTRAMUSCULAR; INTRAVENOUS EVERY 6 HOURS PRN
Status: DISCONTINUED | OUTPATIENT
Start: 2024-09-09 | End: 2024-09-10

## 2024-09-09 RX ORDER — QUETIAPINE FUMARATE 100 MG/1
100 TABLET, FILM COATED ORAL AT BEDTIME
Status: DISCONTINUED | OUTPATIENT
Start: 2024-09-10 | End: 2024-09-10

## 2024-09-09 RX ORDER — IPRATROPIUM BROMIDE AND ALBUTEROL SULFATE 2.5; .5 MG/3ML; MG/3ML
3 SOLUTION RESPIRATORY (INHALATION)
Status: DISCONTINUED | OUTPATIENT
Start: 2024-09-10 | End: 2024-09-09

## 2024-09-09 RX ORDER — METHYLPREDNISOLONE SODIUM SUCCINATE 125 MG/2ML
125 INJECTION, POWDER, LYOPHILIZED, FOR SOLUTION INTRAMUSCULAR; INTRAVENOUS ONCE
Status: COMPLETED | OUTPATIENT
Start: 2024-09-09 | End: 2024-09-09

## 2024-09-09 RX ORDER — IPRATROPIUM BROMIDE AND ALBUTEROL SULFATE 2.5; .5 MG/3ML; MG/3ML
3 SOLUTION RESPIRATORY (INHALATION) ONCE
Status: COMPLETED | OUTPATIENT
Start: 2024-09-09 | End: 2024-09-09

## 2024-09-09 RX ORDER — CALCIUM CARBONATE 500 MG/1
1000 TABLET, CHEWABLE ORAL 4 TIMES DAILY PRN
Status: DISCONTINUED | OUTPATIENT
Start: 2024-09-09 | End: 2024-09-11 | Stop reason: HOSPADM

## 2024-09-09 RX ORDER — CEFTRIAXONE 1 G/1
1 INJECTION, POWDER, FOR SOLUTION INTRAMUSCULAR; INTRAVENOUS ONCE
Status: COMPLETED | OUTPATIENT
Start: 2024-09-09 | End: 2024-09-09

## 2024-09-09 RX ORDER — OXYCODONE AND ACETAMINOPHEN 10; 325 MG/1; MG/1
1 TABLET ORAL EVERY 6 HOURS PRN
Status: DISCONTINUED | OUTPATIENT
Start: 2024-09-09 | End: 2024-09-10

## 2024-09-09 RX ORDER — ALBUTEROL SULFATE 90 UG/1
2 AEROSOL, METERED RESPIRATORY (INHALATION) EVERY 6 HOURS PRN
Status: DISCONTINUED | OUTPATIENT
Start: 2024-09-09 | End: 2024-09-11 | Stop reason: HOSPADM

## 2024-09-09 RX ORDER — ATORVASTATIN CALCIUM 20 MG/1
40 TABLET, FILM COATED ORAL DAILY
Status: DISCONTINUED | OUTPATIENT
Start: 2024-09-10 | End: 2024-09-11 | Stop reason: HOSPADM

## 2024-09-09 RX ORDER — FLUTICASONE FUROATE AND VILANTEROL 100; 25 UG/1; UG/1
1 POWDER RESPIRATORY (INHALATION) DAILY
Status: DISCONTINUED | OUTPATIENT
Start: 2024-09-10 | End: 2024-09-11 | Stop reason: HOSPADM

## 2024-09-09 RX ADMIN — ACETAMINOPHEN 1000 MG: 500 TABLET, FILM COATED ORAL at 18:18

## 2024-09-09 RX ADMIN — AZITHROMYCIN DIHYDRATE 500 MG: 250 TABLET ORAL at 21:57

## 2024-09-09 RX ADMIN — PREDNISONE 40 MG: 20 TABLET ORAL at 22:25

## 2024-09-09 RX ADMIN — CEFTRIAXONE SODIUM 1 G: 1 INJECTION, POWDER, FOR SOLUTION INTRAMUSCULAR; INTRAVENOUS at 22:16

## 2024-09-09 RX ADMIN — METHYLPREDNISOLONE SODIUM SUCCINATE 125 MG: 125 INJECTION, POWDER, FOR SOLUTION INTRAMUSCULAR; INTRAVENOUS at 22:15

## 2024-09-09 RX ADMIN — SODIUM CHLORIDE 250 ML: 9 INJECTION, SOLUTION INTRAVENOUS at 22:18

## 2024-09-09 RX ADMIN — IPRATROPIUM BROMIDE AND ALBUTEROL SULFATE 3 ML: 2.5; .5 SOLUTION RESPIRATORY (INHALATION) at 22:02

## 2024-09-09 ASSESSMENT — COLUMBIA-SUICIDE SEVERITY RATING SCALE - C-SSRS
1. IN THE PAST MONTH, HAVE YOU WISHED YOU WERE DEAD OR WISHED YOU COULD GO TO SLEEP AND NOT WAKE UP?: NO
2. HAVE YOU ACTUALLY HAD ANY THOUGHTS OF KILLING YOURSELF IN THE PAST MONTH?: NO
6. HAVE YOU EVER DONE ANYTHING, STARTED TO DO ANYTHING, OR PREPARED TO DO ANYTHING TO END YOUR LIFE?: NO

## 2024-09-09 ASSESSMENT — ACTIVITIES OF DAILY LIVING (ADL)
ADLS_ACUITY_SCORE: 40

## 2024-09-09 NOTE — TELEPHONE ENCOUNTER
"Dr. Barriga:    Patient was called and triaged due to my charting head injury last Friday and today has dangerously low oxygen saturation 68-80% on room air and patient is symptomatic.    Patient is advised to call 9-1-1, patient is agreeable to this plan.    This is only an update, no need for second level triage.    Reason for Disposition   Sounds like a life-threatening emergency to the triager    Answer Assessment - Initial Assessment Questions  1. MECHANISM: \"How did the injury happen?\" For falls, ask: \"What height did you fall from?\" and \"What surface did you fall against?\"       Patient reported she tripped over rugs in her garage last Friday and fell and hit her left side of her head. She did not lose consciousness.  2. ONSET: \"When did the injury happen?\" (Minutes or hours ago)       Happened last Friday.  3. NEUROLOGIC SYMPTOMS: \"Was there any loss of consciousness?\" \"Are there any other neurological symptoms?\"       Denies.  4. MENTAL STATUS: \"Does the person know who they are, who you are, and where they are?\"       Patient is alert and orientated.  5. LOCATION: \"What part of the head was hit?\"       Left side of head and left side of cheek.  6. SCALP APPEARANCE: \"What does the scalp look like? Is it bleeding now?\" If Yes, ask: \"Is it difficult to stop?\"       No open areas to the scalp, however patient reports 2 months ago she fell and cut her scalp, but that has since healed.  7. SIZE: For cuts, bruises, or swelling, ask: \"How large is it?\" (e.g., inches or centimeters)       Has a scrape on the left side of her head, no bruises or swelling on the left side of head and no swelling reported anywhere else.  8. PAIN: \"Is there any pain?\" If Yes, ask: \"How bad is it?\"  (e.g., Scale 1-10; or mild, moderate, severe)      Yes, moderate to severe 7/10 when sitting and worse pain when she is walking.  9. TETANUS: For any breaks in the skin, ask: \"When was the last tetanus booster?\"      See immunization " "record.  10. BLOOD THINNERS: \"Do you take any blood thinners?\" (e.g., aspirin, clopidogrel / Plavix, coumadin, heparin). Notes: Other strong blood thinners include: Arixtra (fondaparinux), Eliquis (apixaban), Pradaxa (dabigatran), and Xarelto (rivaroxaban).        Daily regular aspirin tablet, she states she took that today.  11. OTHER SYMPTOMS: \"Do you have any other symptoms?\" (e.g., neck pain, vomiting)        Neck and back pain, feels like her balance is off, feels dizzy, not short of breath, but does report her oxygen saturation meter on her finger is registering 68%, she is told to take deep breaths in and the level is now 80-82%, she says she does not feel winded but very dizzy.  12. PREGNANCY: \"Is there any chance you are pregnant?\" \"When was your last menstrual period?\"        NA    Protocols used: Head Injury-A-[place]        TWILA Liu    "

## 2024-09-09 NOTE — TELEPHONE ENCOUNTER
See telephone triage note. Nurse encounter routed to PCP to update only, patient is to call 9-1-1 now.        TWILA Liu

## 2024-09-09 NOTE — MEDICATION SCRIBE - ADMISSION MEDICATION HISTORY
Medication Scribe Admission Medication History    Admission medication history is complete. The information provided in this note is only as accurate as the sources available at the time of the update.    Information Source(s): Patient and CareEverywhere/SureScripts via  with patient in room and finished at desk.    Pertinent Information: She knew her medicines quite well.  Does not have Inhalers with her today.    Changes made to PTA medication list:  Added: None  Deleted: Old Aspirin 325 mg tab, Dicyclomine 10 mg.  Changed: None    Allergies reviewed with patient and updates made in EHR: yes, no change.    Medication History Completed By: Silvia Mcmanus 9/9/2024 6:35 PM    PTA Med List   Medication Sig Last Dose    amLODIPine (NORVASC) 5 MG tablet Take 1 tablet (5 mg) by mouth daily 9/8/2024 at prn    aspirin (SM ASPIRIN EC) 325 MG EC tablet TAKE ONE TABLET BY MOUTH ONCE DAILY WITH DINNER FOR 30 DAYS 9/9/2024 at am    atorvastatin (LIPITOR) 40 MG tablet Take 1 tablet (40 mg) by mouth daily 9/9/2024 at am    budesonide-formoterol (SYMBICORT) 80-4.5 MCG/ACT Inhaler Inhale 2 puffs into the lungs 2 times daily 9/8/2024 at am    calcium carbonate 600 mg-vitamin D 400 units (CALTRATE) 600-400 MG-UNIT per tablet Take 1 tablet by mouth every evening 9/8/2024 at pm    celecoxib (CELEBREX) 200 MG capsule Take 1 capsule (200 mg) by mouth daily 9/9/2024 at am    DULoxetine (CYMBALTA) 30 MG capsule Take 1 capsule (30 mg) by mouth 2 times daily 9/9/2024 at am    folic acid (FOLVITE) 1 MG tablet Take 1 tablet (1 mg) by mouth daily 9/9/2024 at am    gabapentin (NEURONTIN) 600 MG tablet Take 1 tablet (600 mg) by mouth at bedtime 9/8/2024 at hs    levETIRAcetam (KEPPRA) 500 MG tablet Take 1 tablet (500 mg) by mouth 2 times daily 9/9/2024 at am    levothyroxine (SYNTHROID/LEVOTHROID) 50 MCG tablet Take 1 tablet (50 mcg) by mouth daily 9/9/2024 at am    Melatonin 10 MG TABS tablet Take 20 mg by mouth At Bedtime 9/8/2024 at hs     metoprolol succinate ER (TOPROL XL) 50 MG 24 hr tablet Take 1 tablet (50 mg) by mouth 2 times daily 9/9/2024 at am    multivitamin, therapeutic (THERA-VIT) TABS tablet Take 1 tablet by mouth daily 9/8/2024 at mid morning    naloxone (NARCAN) 4 MG/0.1ML nasal spray Spray 1 spray (4 mg) into one nostril alternating nostrils once as needed for opioid reversal every 2-3 minutes until assistance arrives never used at on hand if needed    nystatin (MYCOSTATIN) 021379 UNIT/GM external powder Apply topically 3 times daily as needed (rash) More than a month at on hand if needed    oxyCODONE-acetaminophen (PERCOCET)  MG per tablet Take 1 tablet by mouth every 6 hours as needed for severe pain 9/9/2024 at am    QUEtiapine (SEROQUEL) 50 MG tablet TAKE 2 TABLETS BY MOUTH AT BEDTIME 9/8/2024 at hs    tiotropium (SPIRIVA HANDIHALER) 18 MCG inhaled capsule USING THE HANDIHALER, INHALE THE CONTENTS OF ONE CAPSULE BY MOUTH ONCE DAILY 9/9/2024 at afternoon    VENTOLIN  (90 Base) MCG/ACT inhaler INHALE 2 PUFFS INTO THE LUNGS EVERY 6 HOURS AS NEEDED FOR SHORTNESS OF BREATH OR WHEEZING 9/8/2024 at prn

## 2024-09-09 NOTE — ED PROVIDER NOTES
History     Chief Complaint   Patient presents with    Fall    Headache     HPI  Jessica Ellison is a 73 year old female with history of chronic bilateral low back pain without sciatica status post lumbar fusion (2015), centrilobular emphysema,, chronic pain syndrome, who presents for evaluation of headache, neck pain and low oxygen in setting of fall 3 days ago.  Reports that she tripped on a throw rug in the garage Friday evening.  Has had pain since that time.  Has low back pain as well but this is unchanged from her typical back pain.  Takes Percocet in the morning for chronic pain syndrome.  Denies any fever, cough, chest pain or shortness of breath.  Has a pulse oximeter at home and was giving her low readings this morning.  Former smoker.  Denies any recent alcohol use.  Uses a walker and cane at home.  Lives with her .  Not anticoagulated.    The patient's PMHx, Surgical Hx, Allergies, and Medications were all reviewed with the patient.    Allergies:  Allergies   Allergen Reactions    Bee Venom     Lisinopril Swelling     Oral swelling       Problem List:    Patient Active Problem List    Diagnosis Date Noted    Acute respiratory failure with hypoxia and hypercapnia (H) 09/09/2024     Priority: Medium    Other closed displaced odontoid fracture, initial encounter (H) 09/09/2024     Priority: Medium    Seizure (H) 02/20/2024     Priority: Medium    Closed fracture of proximal end of left humerus with routine healing, unspecified fracture morphology, subsequent encounter 01/04/2024     Priority: Medium    Personal history of tobacco use, presenting hazards to health 11/08/2023     Priority: Medium    Sinus tachycardia 11/08/2023     Priority: Medium    Chronic pain syndrome 11/07/2023     Priority: Medium    SOB (shortness of breath) 11/07/2023     Priority: Medium    Alcohol abuse with intoxication (H24) 11/07/2023     Priority: Medium    Knee osteoarthritis 08/16/2023     Priority: Medium    F11.2  - Continuous opioid dependence (H) 07/11/2023     Priority: Medium     Patient is followed by DOMINIQUE COPPOLA for ongoing prescription of narcotic pain medicine.  Med: oxycodone/acetaminophen 10/325 for chronic back pain.   Maximum use per month: 50  Expected duration: unknown  Narcotic agreement on file: YES 8/12/2024   Clinic visit recommended: Q 3 months    PDMP reviewed 8/12/2024 April 2013 - rhizotomy for SI (left) Jhon Mckay MD    Follows at [facility] - severe low back pain with work related injuries.  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  Spinal stenosis at most lumbar levels  Improvement with past epidural steroid injections  SI joint dysfunction with the pain        JOY (obstructive sleep apnea) 08/05/2021     Priority: Medium    CKD (chronic kidney disease) stage 2, GFR 60-89 ml/min 08/05/2021     Priority: Medium    Hyperlipidemia LDL goal <70 12/22/2020     Priority: Medium    Centrilobular emphysema (H) 11/04/2020     Priority: Medium    Hypoxia 01/05/2019     Priority: Medium    Spell of change in speech 04/17/2018     Priority: Medium    Hypothyroidism 09/07/2016     Priority: Medium    Chronic bilateral low back pain without sciatica 06/02/2016     Priority: Medium    Essential hypertension with goal blood pressure less than 140/90 06/02/2016     Priority: Medium    S/P lumbar spinal fusion 10/19/2015     Priority: Medium    Bee allergy status 06/30/2015     Priority: Medium    Cerebral aneurysm, nonruptured 07/24/2014     Priority: Medium     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 Marti    repeat an MRA in 5 years -5/2027      Chronic pain 03/07/2014     Priority: Medium    Major depressive disorder, recurrent episode, moderate (H) 07/10/2013     Priority: Medium    Anxiety 06/20/2012     Priority: Medium    Insomnia 12/21/2009     Priority: Medium    Back pain  12/21/2009     Priority: Medium     Patient is followed by DOMINIQUE COPPOLA for ongoing prescription of narcotic pain medicine.  Med: oxycodone/acetaminophen 10/325 for chronic back pain.   Maximum use per month: 70  Expected duration: unknown  Narcotic agreement on file: YES 7/11/2023   Clinic visit recommended: Q 3 months    PDMP reviewed 7/11/2023 April 2013 - rhizotomy for SI (left) Jhon Mckay MD    Follows at [facility] - severe low back pain with work related injuries.  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     Priority: Medium        Past Medical History:    Past Medical History:   Diagnosis Date    Anemia     Aneurysm (H24)     Antiplatelet or antithrombotic long-term use     Arthritis     Chemical dependency (H)     Depression     History of blood transfusion     Hypertension     Menopausal symptoms     Other chronic pain     Seizure (H) 02/20/2024    Sleep apnea     Sleep disorder     Thyroid disease     Tobacco abuse     Uncomplicated asthma        Past Surgical History:    Past Surgical History:   Procedure Laterality Date    ABDOMEN SURGERY      APPENDECTOMY  1960    APPENDECTOMY      ARTHROPLASTY KNEE Left 08/16/2023    Procedure: LEFT TOTAL KNEE ARTHROPLASTY;  Surgeon: Bryan Roque MD;  Location: [facility] Main OR    BACK SURGERY      BIOPSY BREAST      cerebral aneurysm  2014    coiled    CEREBRAL ANEURYSM REPAIR      cholecystectomy      COLON SURGERY      COLONOSCOPY  04/19/2005    COLONOSCOPY N/A 5/13/2024    Procedure: COLONOSCOPY WITH BIOPSY;  Surgeon: Radha Glez MD;  Location: [facility] Main OR    FRACTURE SURGERY      HC EXCISION BREAST LESION, OPEN >=1      core biopsy 2006, lumpectomy 2006    HERNIA REPAIR      LAP VENTRAL HERNIA REPAIR  12/2017     [place]    LAPAROSCOPIC ASSISTED HYSTERECTOMY VAGINAL  2017    LUMPECTOMY BREAST      orif left femoral neck Left 2016    stress fracture.  done at [facility]    SURGICAL HISTORY OF -       Skin Graft    ZZC PART REMOVAL COLON W ANASTOMOSIS  2005    diverticulitis    ZZC SPINE FUSION,ANTER,8+ SGMTS      Anterior posterior fusion 10 levels, hardware removal and re-fusion        Family History:    Family History   Problem Relation Age of Onset    Cancer Mother         breast/lung    Alcohol/Drug Mother     Depression Mother     Cancer - colorectal Father     Alcohol/Drug Father     Diabetes Maternal Grandmother     Diabetes Maternal Grandfather     Diabetes Paternal Grandmother     Diabetes Paternal Grandfather     Cancer Paternal Grandfather     Gastrointestinal Disease Paternal Grandfather     Congenital Anomalies Son     Hypertension Brother     Adrenal Disorder Brother         had adrenalectomies    Cerebral palsy Granddaughter        Social History:  Marital Status:   [2]  Social History     Tobacco Use    Smoking status: Former     Current packs/day: 0.00     Average packs/day: 1 pack/day for 30.0 years (30.0 ttl pk-yrs)     Types: Cigarettes     Start date: 1974     Quit date: 2004     Years since quittin.7    Smokeless tobacco: Never   Vaping Use    Vaping status: Never Used   Substance Use Topics    Alcohol use: Yes     Comment: Glass of wine a few times a week. ETOH dependency, DUI 3/15/10; 1-2 ETOH per week    Drug use: Never     Comment: percocet for the past several years        Medications:    amLODIPine (NORVASC) 5 MG tablet  aspirin (SM ASPIRIN EC) 325 MG EC tablet  atorvastatin (LIPITOR) 40 MG tablet  budesonide-formoterol (SYMBICORT) 80-4.5 MCG/ACT Inhaler  calcium carbonate 600 mg-vitamin D 400 units (CALTRATE) 600-400 MG-UNIT per tablet  celecoxib (CELEBREX) 200 MG capsule  DULoxetine (CYMBALTA) 30 MG capsule  folic acid (FOLVITE) 1 MG tablet  gabapentin  "(NEURONTIN) 600 MG tablet  levETIRAcetam (KEPPRA) 500 MG tablet  levothyroxine (SYNTHROID/LEVOTHROID) 50 MCG tablet  Melatonin 10 MG TABS tablet  metoprolol succinate ER (TOPROL XL) 50 MG 24 hr tablet  multivitamin, therapeutic (THERA-VIT) TABS tablet  naloxone (NARCAN) 4 MG/0.1ML nasal spray  nystatin (MYCOSTATIN) 255879 UNIT/GM external powder  oxyCODONE-acetaminophen (PERCOCET)  MG per tablet  QUEtiapine (SEROQUEL) 50 MG tablet  tiotropium (SPIRIVA HANDIHALER) 18 MCG inhaled capsule  VENTOLIN  (90 Base) MCG/ACT inhaler          Review of Systems  Pertinent positives and negatives mentioned in HPI    Physical Exam   BP: 133/84  Pulse: 78  Temp: 98.1  F (36.7  C)  Resp: 20  Height: 162.6 cm (5' 4\")  Weight: 72.6 kg (160 lb)  SpO2: (!) 81 %    GEN: Awake, alert, and cooperative.  Appears distressed secondary to pain   HENT no overt signs of trauma.  TM view partially obstructed by cerumen but no obvious hemotympanum.  Right posterior auricular tenderness.   EYES: EOM intact. Conjunctiva clear. No discharge.  No nystagmus.  No RAPD.  NECK: Symmetric, freely mobile.  Tenderness posteriorly both paraspinal and midline.  CV : Regular rate and rhythm.  PULM: Normal effort.  Rales on right side with expiratory wheezing.  ABD: Soft, non-tender, non-distended. No rebound or guarding.   NEURO: Normal speech. Following commands. Answering questions and interacting appropriately. No focal motor or sensory deficit in upper or lower extremities.   EXT: No gross deformity. Warm and well perfused.  No pitting pedal or pretibial edema.  INT: Warm. No diaphoresis. Normal color.        ED Course        Procedures                 Critical Care time:  none               Results for orders placed or performed during the hospital encounter of 09/09/24 (from the past 24 hour(s))   [place] Draw *Canceled*    Narrative    The following orders were created for panel order [place] Draw.  Procedure                               " Abnormality         Status                     ---------                               -----------         ------                       Please view results for these tests on the individual orders.   CBC with platelets differential    Narrative    The following orders were created for panel order CBC with platelets differential.  Procedure                               Abnormality         Status                     ---------                               -----------         ------                     CBC with platelets and d...[[id number]]  Abnormal            Final result                 Please view results for these tests on the individual orders.   Comprehensive metabolic panel   Result Value Ref Range    Sodium 140 135 - 145 mmol/L    Potassium 4.7 3.4 - 5.3 mmol/L    Carbon Dioxide (CO2) 25 22 - 29 mmol/L    Anion Gap 11 7 - 15 mmol/L    Urea Nitrogen 27.3 (H) 8.0 - 23.0 mg/dL    Creatinine 1.22 (H) 0.51 - 0.95 mg/dL    GFR Estimate 47 (L) >60 mL/min/1.73m2    Calcium 8.9 8.8 - 10.4 mg/dL    Chloride 104 98 - 107 mmol/L    Glucose 101 (H) 70 - 99 mg/dL    Alkaline Phosphatase 101 40 - 150 U/L    AST 39 0 - 45 U/L    ALT 18 0 - 50 U/L    Protein Total 6.7 6.4 - 8.3 g/dL    Albumin 4.1 3.5 - 5.2 g/dL    Bilirubin Total 0.3 <=1.2 mg/dL   Blood gas venous   Result Value Ref Range    pH Venous 7.27 (L) 7.32 - 7.43    pCO2 Venous 62 (H) 40 - 50 mm Hg    pO2 Venous 24 (L) 25 - 47 mm Hg    Bicarbonate Venous 28 21 - 28 mmol/L    Base Excess/Deficit Venous 0.4 -3.0 - 3.0 mmol/L    FIO2 0     Oxyhemoglobin Venous 28 (L) 70 - 75 %    O2 Sat, Venous 29.0 (L) 70.0 - 75.0 %    Narrative    In healthy individuals, oxyhemoglobin (O2Hb) and oxygen saturation (SO2) are approximately equal. In the presence of dyshemoglobins, oxyhemoglobin can be considerably lower than oxygen saturation.   Alcohol level blood   Result Value Ref Range    Alcohol ethyl <0.01 <=0.01 g/dL   CBC with platelets and differential   Result Value Ref Range     WBC Count 4.8 4.0 - 11.0 10e3/uL    RBC Count 3.41 (L) 3.80 - 5.20 10e6/uL    Hemoglobin 10.5 (L) 11.7 - 15.7 g/dL    Hematocrit 33.8 (L) 35.0 - 47.0 %    MCV 99 78 - 100 fL    MCH 30.8 26.5 - 33.0 pg    MCHC 31.1 (L) 31.5 - 36.5 g/dL    RDW 13.9 10.0 - 15.0 %    Platelet Count 158 150 - 450 10e3/uL    % Neutrophils 62 %    % Lymphocytes 19 %    % Monocytes 10 %    % Eosinophils 7 %    % Basophils 1 %    % Immature Granulocytes 1 %    NRBCs per 100 WBC 0 <1 /100    Absolute Neutrophils 3.0 1.6 - 8.3 10e3/uL    Absolute Lymphocytes 0.9 0.8 - 5.3 10e3/uL    Absolute Monocytes 0.5 0.0 - 1.3 10e3/uL    Absolute Eosinophils 0.3 0.0 - 0.7 10e3/uL    Absolute Basophils 0.1 0.0 - 0.2 10e3/uL    Absolute Immature Granulocytes 0.0 <=0.4 10e3/uL    Absolute NRBCs 0.0 10e3/uL   Head CT w/o contrast   Result Value Ref Range    Radiologist flags Acute cervical spine fracture (AA)     Narrative    EXAM: CT HEAD W/O CONTRAST, CT CERVICAL SPINE W/O CONTRAST  LOCATION: [facility]  DATE: 9/9/2024    INDICATION: headache in setting of fall three days ago  COMPARISON: MRI brain November 16, 2023. CTA head and neck October 26, 2023.  TECHNIQUE:   1) Routine CT Head without IV contrast. Multiplanar reformats. Dose reduction techniques were used.  2) Routine CT Cervical Spine without IV contrast. Multiplanar reformats. Dose reduction techniques were used.    FINDINGS:   HEAD CT:   INTRACRANIAL CONTENTS: Streak artifact from left paraclinoid ICA region coil pack limits evaluation. No intracranial hemorrhage, extraaxial collection, or mass effect.  No CT evidence of acute infarct. Moderate presumed chronic small vessel ischemic   changes. Unchanged small region of right occipital encephalomalacia. Moderate generalized volume loss. No hydrocephalus.     VISUALIZED ORBITS/SINUSES/MASTOIDS: No intraorbital abnormality. No significant paranasal sinus mucosal disease. No middle ear or mastoid effusion.    BONES/SOFT  TISSUES: No acute abnormality.    CERVICAL SPINE CT:   VERTEBRA: Oblique and potentially unstable type I dens fracture. No traumatic malalignment by CT. Unchanged grade 1 anterolisthesis of C3 on C4 and C7 on T1. Unchanged grade 1 anterolisthesis of C4 on C5.    CANAL/FORAMINA: Multilevel spondylosis without high grade canal stenosis. Uncovertebral ridging with facet arthrosis results in multilevel severe neural foraminal stenosis.    PARASPINAL: No prevertebral edema.      Impression    IMPRESSION:  HEAD CT:  1.  No acute intracranial process.    CERVICAL SPINE CT:  1.  Acute type I dens fracture, potentially unstable. MR cervical spine is recommended for further evaluation.      [Critical Result: Acute cervical spine fracture]    Finding was identified on 9/9/2024 6:10 PM CDT.     Dr. Castrejon was contacted by me on 9/9/2024 6:16 PM CDT and verbalized understanding of the critical result.   CT Cervical Spine w/o Contrast   Result Value Ref Range    Radiologist flags Acute cervical spine fracture (AA)     Narrative    EXAM: CT HEAD W/O CONTRAST, CT CERVICAL SPINE W/O CONTRAST  LOCATION: [facility]  DATE: 9/9/2024    INDICATION: headache in setting of fall three days ago  COMPARISON: MRI brain November 16, 2023. CTA head and neck October 26, 2023.  TECHNIQUE:   1) Routine CT Head without IV contrast. Multiplanar reformats. Dose reduction techniques were used.  2) Routine CT Cervical Spine without IV contrast. Multiplanar reformats. Dose reduction techniques were used.    FINDINGS:   HEAD CT:   INTRACRANIAL CONTENTS: Streak artifact from left paraclinoid ICA region coil pack limits evaluation. No intracranial hemorrhage, extraaxial collection, or mass effect.  No CT evidence of acute infarct. Moderate presumed chronic small vessel ischemic   changes. Unchanged small region of right occipital encephalomalacia. Moderate generalized volume loss. No hydrocephalus.     VISUALIZED  ORBITS/SINUSES/MASTOIDS: No intraorbital abnormality. No significant paranasal sinus mucosal disease. No middle ear or mastoid effusion.    BONES/SOFT TISSUES: No acute abnormality.    CERVICAL SPINE CT:   VERTEBRA: Oblique and potentially unstable type I dens fracture. No traumatic malalignment by CT. Unchanged grade 1 anterolisthesis of C3 on C4 and C7 on T1. Unchanged grade 1 anterolisthesis of C4 on C5.    CANAL/FORAMINA: Multilevel spondylosis without high grade canal stenosis. Uncovertebral ridging with facet arthrosis results in multilevel severe neural foraminal stenosis.    PARASPINAL: No prevertebral edema.      Impression    IMPRESSION:  HEAD CT:  1.  No acute intracranial process.    CERVICAL SPINE CT:  1.  Acute type I dens fracture, potentially unstable. MR cervical spine is recommended for further evaluation.      [Critical Result: Acute cervical spine fracture]    Finding was identified on 9/9/2024 6:10 PM CDT.     Dr. Castrejon was contacted by me on 9/9/2024 6:16 PM CDT and verbalized understanding of the critical result.   Chest XR,  PA & LAT    Narrative    EXAM: XR CHEST 2 VIEWS  LOCATION: [facility]  DATE: 9/9/2024    INDICATION: Hypoxia.    COMPARISON: 8/16/2023.      Impression    IMPRESSION: No significant interval change. Lungs clear. No pleural effusions. Posterior spinal rods are again noted in the lower thoracic and upper lumbar spine.     Cervical spine MRI w/o contrast    Narrative    EXAM: MR CERVICAL SPINE W/O CONTRAST  LOCATION: [facility]  DATE: 9/9/2024    INDICATION: acute type 1 C2 fracture  COMPARISON: Same day CT.  TECHNIQUE: MRI Cervical Spine without IV contrast.    FINDINGS: Examination significantly limited by motion.  Preserved vertebral body heights. Degenerative grade 1 C3-C4 anterolisthesis, C7-T1 anterolisthesis marrow signal. T2 hyperintense signal C1-C2, C2-C3 and C4-C5 interspinous ligamentous spaces. No  identifiable defect involving the anterior, posterior   longitudinal ligament. No identifiable abnormal cord signal. Trace presumably posttraumatic prevertebral edema.    Craniovertebral junction and C1-C2: Transversely oriented T1 normal signal through the odontoid, better appreciated on CT.    C2-C3: Preserved disc height and signal for age. No cord compression. Normal facets for age. No high-grade neural foraminal stenosis.     C3-C4: Preserved disc height and signal for age. Symmetric disc bulge, uncovering of the disc posteriorly causes partial CSF space effacement, cord flattening and associated mild-to-moderate canal stenosis.] Facet arthropathy contributes to moderate   right neural foraminal stenosis. Patent left neural foramen.     C4-C5: Moderate loss of disc height, intradiscal T2 signal. Posterior disc osteophyte complex causes CSF space effacement, cord flattening and associated moderate to severe canal stenosis. Bilateral uncovertebral, facet arthropathy contributes to   moderate to severe bilateral neural foraminal stenosis.     C5-C6: Moderate loss of disc height, intradiscal T2 signal. Posterior disc osteophyte complex contributes to CSF space effacement, cord flattening and associated moderate to severe canal stenosis. Bilateral uncovertebral, facet arthropathy contributes to   severe neural foraminal stenosis.     C6-C7: Moderate loss of disc height, intradiscal T2 signal. Posterior disc osteophyte complex causes CSF space effacement, cord flattening and associated canal stenosis. Bilateral uncovertebral, facet arthropathy contributes to severe bilateral neural   foraminal stenosis.     C7-T1: Mild loss of disc height. Uncovering of the disc posteriorly. Bilateral facet arthropathy. Aforementioned findings contribute to moderate canal and bilateral neural foraminal stenosis.      Impression    IMPRESSION: Examination significantly limited secondary to motion.  1.  Odontoid fracture better  appreciated on dedicated CT. Suggestion of interspinous ligamentous injury at C1-C2, C2-C3.  2.  No identifiable defect of the anterior or posterior longitudinal ligament. No identifiable abnormal cord signal or evidence of cord contusion.  3.  Multilevel lumbar spondylosis with potentially significant high-grade canal or neural foraminal stenosis at C4-C5, C5-C6 and C6-C7.       Medications   cefTRIAXone (ROCEPHIN) 1 g vial to attach to  mL bag for ADULTS or NS 50 mL bag for PEDS (has no administration in time range)   methylPREDNISolone Na Suc (solu-MEDROL) injection 125 mg (has no administration in time range)   ipratropium - albuterol 0.5 mg/2.5 mg/3 mL (DUONEB) neb solution 3 mL (has no administration in time range)   sodium chloride 0.9% BOLUS 250 mL (has no administration in time range)   acetaminophen (TYLENOL) tablet 1,000 mg (1,000 mg Oral $Given 9/9/24 1818)   azithromycin (ZITHROMAX) tablet 500 mg (500 mg Oral $Given 9/9/24 2157)       Assessments & Plan (with Medical Decision Making)   73 year old female with past medical history cerebral aneurysm repair, alcohol abuse in remission, chronic low back pain status post fusion, centrilobular emphysema, with hypoxemia, headache and neck pain in setting of fall 3 days ago.  Patient not anticoagulated.  Patient found to be hypoxic with SpO2 of 81% on room air and placed on 2 L of supplemental oxygen with SpO2 of 94%.  Patient with expiratory wheezing predominantly on right with rales.  No accessory muscle usage.  Able to speak in full sentences.  Seems to be breathing comfortably.  Chart review shows she does have emphysema.  Not a current smoker.  Chest x-ray with retrocardiac infiltrate on my interpretation.  Given new oxygen requirement and history of COPD will treat with ceftriaxone, azithromycin, methylprednisolone, DuoNeb.   CBC without leukocytosis.  Hemoglobin 10.5 which is up from 9.6.    10 months ago CMP with creatinine 1.22 which is up  slightly from 10 months ago at which time it was 1.05.  About 4 weeks ago at which time it was 1.05.  10 months ago it was 0.61. 500 cc normal saline given.     Regarding her head and neck pain, CT head and cervical spine obtained.  No other injuries identified on exam.  No midline back tenderness.  No tenderness of major joints.  CT head without acute pathology.  CT cervical spine with type I odontoid fracture.  I received a call from radiology notifying me of these results.  Patient immediately placed in [place] collar.  Case discussed with Dr. León with neurosurgery who recommends hard collar 24/7 and follow-up in neurosurgery clinic in 2 weeks.  Will obtain MRI to better characterize.  This was obtained and exam limited secondary to motion artifact.  The odontoid fracture is better appreciated on the CT scan.  Suggestion of intraspinous ligamentous injuries.  No posterior ligamentous injury.  No sign of cord damage.  No identified self facet of the anterior posterior longitudinal ligament.  Plan to admit patient for her new hypoxia.    Patient did desaturate to the low 80s after coming back from MRI and she was at least momentarily off of submental oxygen.  Oxygen did rebound by nasal cannula.  No signs of respiratory distress at this time.    Case discussed with Yeimy Watson with the hospitalist service who accepted admission.     I have reviewed the nursing notes.         New Prescriptions    No medications on file       Final diagnoses:   Other closed displaced odontoid fracture, initial encounter (H) - type I   Acute respiratory failure with hypoxia and hypercapnia (H)   Centrilobular emphysema (H)   Acute kidney injury (H24)     Jairon Castrejon MD        9/9/2024   [facility] EMERGENCY DEPT    Disclaimer: This note consists of words and symbols derived from keyboarding and dictation using voice recognition software.  As a result, there may be errors that have gone undetected.  Please  consider this when interpreting information found in this note.               Jairon Castrejon MD  09/09/24 [id number]

## 2024-09-09 NOTE — ED TRIAGE NOTES
Patient called EMS due to headache/ stiff neck/ back pain. Had a fall on Friday and headache has gotten progressively worse over the past 3 days. Also has lumbar back pain that is constant and chronic. O2 on arrival 81% room air. Placed on 2L and came up to 94%. Does not wear oxygen at home.      Triage Assessment (Adult)       Row Name 09/09/24 6253          Triage Assessment    Airway WDL WDL        Respiratory WDL    Respiratory WDL X        Skin Circulation/Temperature WDL    Skin Circulation/Temperature WDL WDL        Cardiac WDL    Cardiac WDL WDL        Peripheral/Neurovascular WDL    Peripheral Neurovascular WDL WDL        Cognitive/Neuro/Behavioral WDL    Cognitive/Neuro/Behavioral WDL WDL

## 2024-09-10 PROBLEM — E78.5 HYPERLIPIDEMIA LDL GOAL <70: Status: ACTIVE | Noted: 2020-12-22

## 2024-09-10 LAB
ANION GAP SERPL CALCULATED.3IONS-SCNC: 9 MMOL/L (ref 7–15)
BUN SERPL-MCNC: 32.3 MG/DL (ref 8–23)
CALCIUM SERPL-MCNC: 9.2 MG/DL (ref 8.8–10.4)
CHLORIDE SERPL-SCNC: 106 MMOL/L (ref 98–107)
CREAT SERPL-MCNC: 1.14 MG/DL (ref 0.51–0.95)
EGFRCR SERPLBLD CKD-EPI 2021: 51 ML/MIN/1.73M2
ERYTHROCYTE [DISTWIDTH] IN BLOOD BY AUTOMATED COUNT: 13.5 % (ref 10–15)
GLUCOSE SERPL-MCNC: 186 MG/DL (ref 70–99)
HCO3 SERPL-SCNC: 25 MMOL/L (ref 22–29)
HCT VFR BLD AUTO: 35.2 % (ref 35–47)
HGB BLD-MCNC: 10.5 G/DL (ref 11.7–15.7)
MCH RBC QN AUTO: 29.4 PG (ref 26.5–33)
MCHC RBC AUTO-ENTMCNC: 29.8 G/DL (ref 31.5–36.5)
MCV RBC AUTO: 99 FL (ref 78–100)
PLATELET # BLD AUTO: 159 10E3/UL (ref 150–450)
POTASSIUM SERPL-SCNC: 5 MMOL/L (ref 3.4–5.3)
PROCALCITONIN SERPL IA-MCNC: 0.08 NG/ML
RBC # BLD AUTO: 3.57 10E6/UL (ref 3.8–5.2)
SODIUM SERPL-SCNC: 140 MMOL/L (ref 135–145)
WBC # BLD AUTO: 3.7 10E3/UL (ref 4–11)

## 2024-09-10 PROCEDURE — 120N000001 HC R&B MED SURG/OB

## 2024-09-10 PROCEDURE — 250N000011 HC RX IP 250 OP 636: Performed by: STUDENT IN AN ORGANIZED HEALTH CARE EDUCATION/TRAINING PROGRAM

## 2024-09-10 PROCEDURE — 250N000013 HC RX MED GY IP 250 OP 250 PS 637: Performed by: STUDENT IN AN ORGANIZED HEALTH CARE EDUCATION/TRAINING PROGRAM

## 2024-09-10 PROCEDURE — 85027 COMPLETE CBC AUTOMATED: CPT

## 2024-09-10 PROCEDURE — 250N000013 HC RX MED GY IP 250 OP 250 PS 637

## 2024-09-10 PROCEDURE — 36415 COLL VENOUS BLD VENIPUNCTURE: CPT

## 2024-09-10 PROCEDURE — 80048 BASIC METABOLIC PNL TOTAL CA: CPT

## 2024-09-10 PROCEDURE — 99232 SBSQ HOSP IP/OBS MODERATE 35: CPT | Performed by: STUDENT IN AN ORGANIZED HEALTH CARE EDUCATION/TRAINING PROGRAM

## 2024-09-10 PROCEDURE — 250N000012 HC RX MED GY IP 250 OP 636 PS 637

## 2024-09-10 PROCEDURE — 999N000157 HC STATISTIC RCP TIME EA 10 MIN

## 2024-09-10 PROCEDURE — 94660 CPAP INITIATION&MGMT: CPT

## 2024-09-10 PROCEDURE — 84145 PROCALCITONIN (PCT): CPT | Performed by: STUDENT IN AN ORGANIZED HEALTH CARE EDUCATION/TRAINING PROGRAM

## 2024-09-10 RX ORDER — ACETAMINOPHEN 325 MG/1
325 TABLET ORAL EVERY 6 HOURS PRN
Status: DISCONTINUED | OUTPATIENT
Start: 2024-09-10 | End: 2024-09-11 | Stop reason: HOSPADM

## 2024-09-10 RX ORDER — QUETIAPINE FUMARATE 100 MG/1
100 TABLET, FILM COATED ORAL AT BEDTIME
Status: DISCONTINUED | OUTPATIENT
Start: 2024-09-10 | End: 2024-09-11 | Stop reason: HOSPADM

## 2024-09-10 RX ORDER — OXYCODONE HYDROCHLORIDE 5 MG/1
10 TABLET ORAL EVERY 6 HOURS PRN
Status: DISCONTINUED | OUTPATIENT
Start: 2024-09-10 | End: 2024-09-11 | Stop reason: HOSPADM

## 2024-09-10 RX ORDER — AZITHROMYCIN 250 MG/1
500 TABLET, FILM COATED ORAL DAILY
Status: DISCONTINUED | OUTPATIENT
Start: 2024-09-10 | End: 2024-09-11 | Stop reason: HOSPADM

## 2024-09-10 RX ORDER — PREDNISONE 20 MG/1
40 TABLET ORAL DAILY
Status: DISCONTINUED | OUTPATIENT
Start: 2024-09-10 | End: 2024-09-11 | Stop reason: HOSPADM

## 2024-09-10 RX ORDER — CEFTRIAXONE 1 G/1
1 INJECTION, POWDER, FOR SOLUTION INTRAMUSCULAR; INTRAVENOUS EVERY 24 HOURS
Status: DISCONTINUED | OUTPATIENT
Start: 2024-09-10 | End: 2024-09-11 | Stop reason: HOSPADM

## 2024-09-10 RX ADMIN — Medication 3 MG: at 00:59

## 2024-09-10 RX ADMIN — AMLODIPINE BESYLATE 5 MG: 5 TABLET ORAL at 00:37

## 2024-09-10 RX ADMIN — OXYCODONE HYDROCHLORIDE 10 MG: 5 TABLET ORAL at 15:01

## 2024-09-10 RX ADMIN — FOLIC ACID 1 MG: 1 TABLET ORAL at 07:57

## 2024-09-10 RX ADMIN — AZITHROMYCIN DIHYDRATE 500 MG: 250 TABLET ORAL at 20:26

## 2024-09-10 RX ADMIN — LEVETIRACETAM 500 MG: 500 TABLET, FILM COATED ORAL at 00:37

## 2024-09-10 RX ADMIN — LEVETIRACETAM 500 MG: 500 TABLET, FILM COATED ORAL at 07:57

## 2024-09-10 RX ADMIN — DULOXETINE HYDROCHLORIDE 30 MG: 30 CAPSULE, DELAYED RELEASE ORAL at 07:56

## 2024-09-10 RX ADMIN — CEFTRIAXONE SODIUM 1 G: 1 INJECTION, POWDER, FOR SOLUTION INTRAMUSCULAR; INTRAVENOUS at 20:26

## 2024-09-10 RX ADMIN — LEVOTHYROXINE SODIUM 50 MCG: 0.05 TABLET ORAL at 07:57

## 2024-09-10 RX ADMIN — DULOXETINE HYDROCHLORIDE 30 MG: 30 CAPSULE, DELAYED RELEASE ORAL at 00:37

## 2024-09-10 RX ADMIN — METOPROLOL SUCCINATE 50 MG: 50 TABLET, EXTENDED RELEASE ORAL at 07:57

## 2024-09-10 RX ADMIN — FLUTICASONE FUROATE AND VILANTEROL TRIFENATATE 1 PUFF: 100; 25 POWDER RESPIRATORY (INHALATION) at 07:57

## 2024-09-10 RX ADMIN — QUETIAPINE 100 MG: 100 TABLET ORAL at 21:34

## 2024-09-10 RX ADMIN — UMECLIDINIUM 1 PUFF: 62.5 AEROSOL, POWDER ORAL at 07:58

## 2024-09-10 RX ADMIN — OXYCODONE HYDROCHLORIDE 10 MG: 5 TABLET ORAL at 20:35

## 2024-09-10 RX ADMIN — AMLODIPINE BESYLATE 5 MG: 5 TABLET ORAL at 21:34

## 2024-09-10 RX ADMIN — OXYCODONE HYDROCHLORIDE 10 MG: 5 TABLET ORAL at 09:37

## 2024-09-10 RX ADMIN — ATORVASTATIN CALCIUM 40 MG: 20 TABLET, FILM COATED ORAL at 07:55

## 2024-09-10 RX ADMIN — LEVETIRACETAM 500 MG: 500 TABLET, FILM COATED ORAL at 20:26

## 2024-09-10 RX ADMIN — ACETAMINOPHEN 325 MG: 325 TABLET ORAL at 00:37

## 2024-09-10 RX ADMIN — OXYCODONE HYDROCHLORIDE 10 MG: 5 TABLET ORAL at 00:37

## 2024-09-10 RX ADMIN — PREDNISONE 40 MG: 20 TABLET ORAL at 00:59

## 2024-09-10 RX ADMIN — GABAPENTIN 600 MG: 600 TABLET, FILM COATED ORAL at 21:34

## 2024-09-10 RX ADMIN — METOPROLOL SUCCINATE 50 MG: 50 TABLET, EXTENDED RELEASE ORAL at 20:26

## 2024-09-10 RX ADMIN — DULOXETINE HYDROCHLORIDE 30 MG: 30 CAPSULE, DELAYED RELEASE ORAL at 20:26

## 2024-09-10 RX ADMIN — QUETIAPINE 100 MG: 100 TABLET ORAL at 00:59

## 2024-09-10 RX ADMIN — CELECOXIB 200 MG: 200 CAPSULE ORAL at 07:56

## 2024-09-10 ASSESSMENT — ACTIVITIES OF DAILY LIVING (ADL)
ADLS_ACUITY_SCORE: 26
ADLS_ACUITY_SCORE: 27
ADLS_ACUITY_SCORE: 26
ADLS_ACUITY_SCORE: 26
ADLS_ACUITY_SCORE: 25
ADLS_ACUITY_SCORE: 27
ADLS_ACUITY_SCORE: 27
ADLS_ACUITY_SCORE: 26
ADLS_ACUITY_SCORE: 27
ADLS_ACUITY_SCORE: 27
ADLS_ACUITY_SCORE: 26
ADLS_ACUITY_SCORE: 27
ADLS_ACUITY_SCORE: 26
ADLS_ACUITY_SCORE: 26
ADLS_ACUITY_SCORE: 27

## 2024-09-10 NOTE — PLAN OF CARE
During skin check, found bottle of oxycodone-acetaminophen in groin area inside underwear. Bottle was sent to pharmacy. During admission, patient stated she did not bring any medications with her to the hospital.

## 2024-09-10 NOTE — H&P
[facility]    History and Physical  Hospital Medicine       Date of Admission:  9/9/2024  Date of Service: 9/9/2024     Assessment & Plan   Jessica Ellison is a 73 year old female with past medical history of  chronic bilateral low back pain without sciatica status post lumbar fusion (2015), centrilobular emphysema,, chronic pain syndrome, who presents for evaluation of headache, neck pain and low oxygen in setting of fall 3 days ago.     Acute respiratory failure with hypoxia and hypercapnia (H)  Patient noted hypoxia on home oximeter, and was hypoxic to 81% on ED arrival.   VBG with ph 7.27 / pCO2 62 / bicarb 28 ? ph 7.28 / pCO2 59 / bicarb 28  CXR  with lungs clear on formal reading. However, on personal review notable for retrocardiac infiltrate noted on lateral view. No leukocytosis, no electrolyte abnormalities. Does have acute renal dysfunction, see below.   Currently on 3 L of oxygen. Does not use oxygen at home.   Patient is afebrile, hemodynamically stable on admission with 3L supplemental O2 via NC.   Received duoneb inhaler, solumedrol 125mg, and started on abx for possible pneumonia in the ED. Diffuse expiratory wheezing noted on admission exam.  Most likely due to COPD exacerbation but also concerning for community acquired pneumonia, see evaluation and management below.   continue O2 therapy, titrate to maintain sats > 92%  AM VBG    Other closed displaced odontoid fracture, initial encounter (H)  Fall, mechanical  Patient had mechanical fall from standing three days prior to admission and hit her head. She had immediate pain in her head and neck following the fall. Did not lose consciousness. Was at her baseline, asymptomatic prior to the fall. Typically uses a walker to get around her home.   CT head and C spine revealed acute type I dens fracture, potentially unstable.no acute intercranial process.   MR C spine revealed Odontoid fracture better appreciated on dedicated  CT. Suggestion of interspinous ligamentous injury at C1-C2, C2-C3. No identifiable defect of the anterior or posterior longitudinal ligament. No identifiable abnormal cord signal or evidence of cord contusion.  Multilevel lumbar spondylosis with potentially significant high-grade canal or neural foraminal stenosis at C4-C5, C5-C6 and C6-C7.  ED provider discussed these findings with neurosurgery Dr. León who recommended 24/7 hard collar and follow up with neurosurgery in 2 weeks.   No focal neurological deficits, no other injuries noted, and pain is well controlled at time of admission.   Fall precautions   Hard collar in place 24/7  Acetaminophen 650mg q4hrs prn, continue pta percocet, continue pta celecoxib    Possible community acquired pneumonia  Hypoxic on presentation to 81% on room air. Does have hx of emphysema, see below.   CXR on admission concerning with retrocardiac infiltrate.   Afebrile, no leukocytosis. Denies cough, no upper respiratory symptoms. Does endorse occasional dyspnea with exertion. Diffuse expiratory wheezing noted on admission exam. Started on rocephin and azithromycin in the ED.   Continue rocephin, azithromycin  AM CBC     Centrilobular emphysema (H) with possible exacerbation   Former smoker with 30 pack year hx, quit 2004. Most recent pfts (08/10/23) was normal. Patient denies home O2 use at time of admission however on chart review it appears she did use home O2 previously (see admission 11/2023).   She noted hypoxia on home oximeter on AM of admission. Endorses occasional dyspnea on exertion. No cough, no symptoms of upper respiratory infection.   No leukocytosis, patient is afebrile. Diffuse expiratory wheezing noted on admission exam.   Managed prior to admission with symbicort inhaler, spiriva inhaler, ventolin inhaler.   Received duoneb inhaler, solumedrol 125mg in the ED.   Continue pta inhalers   Prednisone 40mg daily     Elevated creatinine  Chronic kidney disease  Creatinine  "on admission 1.22, increased from recent baseline 1.05 on 08/12/24. GFR 47.   BUN 27.3. Received 500mL IV NS in the ED.    AM BMP    Chronic pain with continuous opioid dependence   Patient has chronic opioid use for several years due to longstanding back and knee pain. Patient had lumbar spinal fusion surgery and left total knee replacement.    Continue pta percocet q6hrs prn   Continue pta celecoxib    Chronic anemia   Hgb on admission 10.5, improved from previous 9.6. MCV 99. stable.     Hypertension   Managed prior to admission with amlodipine 5mg  and metoprolol 50mg BID     JOY   Uses CPAP at home; continue     Seizure disorder   Denies recent seizures. Endorses good compliance with her pta keppra 500mg BID; continue     Hypothyroidism   Managed prior to admission with levothyroxine 50mcg daily; continue     Hyperlipidemia   Managed prior to admission with atorvastatin 40mg daily; continue       Clinically Significant Risk Factors Present on Admission                # Drug Induced Platelet Defect: home medication list includes an antiplatelet medication   # Hypertension: Noted on problem list    # Anemia: based on hgb <11       # Overweight: Estimated body mass index is 27.46 kg/m  as calculated from the following:    Height as of this encounter: 1.626 m (5' 4\").    Weight as of this encounter: 72.6 kg (160 lb).       # COPD: noted on problem list                 Diet: Combination Diet Regular Diet Adult  DVT Prophylaxis: Pneumatic Compression Devices  Bruce Catheter: Not present  Code Status: Full Code      Disposition Plan     Medically Ready for Discharge: Anticipated in 2-4 Days           The patient's care was discussed with the patient and Attending Physician, Dr. Shawn Le .  I have discussed patient and formulated plan with attending hospitalist physician, Dr. Shawn Watson PA-C        Primary Care Physician   Sarah Barriga [phone]    History is obtained from the patient, and " review of old records via the EMR.    History of Present Illness   Jessica Ellison is a 73 year old female with past medical history of  chronic bilateral low back pain without sciatica status post lumbar fusion (2015), centrilobular emphysema,, chronic pain syndrome, who presents for evaluation of headache, neck pain and low oxygen in setting of fall 3 days ago.     Jessica states that three days prior to admission she tripped on a rug and fell to the floor from standing. She did hit her head. She denies loss of consciousness. Denies chest pain, palpitations, headache, vision changes, shortness of breath, abdominal pain, preceding or following the fall. She had immediate onset of pain in the head and neck following the fall which has persisted for the three days prior to admission.   She also notes low SpO2 at home this morning which is new and unusual for her. She endorses occasional dyspnea with exertion which has worsened over the past few months. Denies cough. No upper respiratory symptoms. She uses a walker to get around at home.     Denies alcohol use, tobacco use, illicit drug use. Manages her own medications and denies recent changes or missed doses.     Review of Systems   The 10 point Review of Systems is negative other than noted in the HPI or here.   Constitutional: denies weight loss, fever, chills  Eyes: denies changes in vision, discharge, or pain in eyes  HENT: denies changes in hearing, sore throat  Respiratory: denies new or changing cough  Cardiovascular: denies chest pain, heart palpitations, lightheadedness, syncope, limb swelling  Gastroenterology: denies constipation, diarrhea, GERD symptoms  Genitourinary: denies dysuria  Integumentary: no new rashes or skin changes  Neuro: denies numbness, tingling, headaches, tremor  Psychiatric: denies significant changes to mood      Past Medical History    Past Medical History:   Diagnosis Date    Anemia     Aneurysm (H24)     Antiplatelet or  antithrombotic long-term use     Arthritis     Chemical dependency (H)     Chem Dep RX    Depression     History of blood transfusion     Hypertension     Menopausal symptoms     Other chronic pain     Lower back and hips    Seizure (H) 02/20/2024    Sleep apnea     Sleep disorder     Thyroid disease     Tobacco abuse     Uncomplicated asthma        Past Surgical History   Past Surgical History:   Procedure Laterality Date    ABDOMEN SURGERY      APPENDECTOMY  1960    APPENDECTOMY      ARTHROPLASTY KNEE Left 08/16/2023    Procedure: LEFT TOTAL KNEE ARTHROPLASTY;  Surgeon: Bryan Roque MD;  Location: [facility] Main OR    BACK SURGERY      BIOPSY BREAST      cerebral aneurysm  2014    coiled    CEREBRAL ANEURYSM REPAIR      cholecystectomy      COLON SURGERY      COLONOSCOPY  04/19/2005    COLONOSCOPY N/A 5/13/2024    Procedure: COLONOSCOPY WITH BIOPSY;  Surgeon: Radha Glez MD;  Location: [facility] OR    FRACTURE SURGERY      HC EXCISION BREAST LESION, OPEN >=1      core biopsy 2006, lumpectomy 2006    HERNIA REPAIR      LAP VENTRAL HERNIA REPAIR  12/2017    [place]    LAPAROSCOPIC ASSISTED HYSTERECTOMY VAGINAL  12/2017    LUMPECTOMY BREAST      orif left femoral neck Left 06/03/2016    stress fracture.  done at [facility]    SURGICAL HISTORY OF -   2004    Skin Graft    ZZC PART REMOVAL COLON W ANASTOMOSIS  05/2005    diverticulitis    ZZC SPINE FUSION,ANTER,8+ SGMTS  2007    Anterior posterior fusion 10 levels, hardware removal and re-fusion 2009        Prior to Admission Medications   Prior to Admission Medications   Prescriptions Last Dose Informant Patient Reported? Taking?   DULoxetine (CYMBALTA) 30 MG capsule 9/9/2024 at am Self No Yes   Sig: Take 1 capsule (30 mg) by mouth 2 times daily   Melatonin 10 MG TABS tablet 9/9/2024 at hs Self Yes Yes   Sig: Take 20 mg by mouth At Bedtime   QUEtiapine (SEROQUEL) 50 MG tablet 9/9/2024 at hs Self No Yes   Sig: TAKE 2 TABLETS BY MOUTH AT BEDTIME    VENTOLIN  (90 Base) MCG/ACT inhaler 9/8/2024 at prn Self No Yes   Sig: INHALE 2 PUFFS INTO THE LUNGS EVERY 6 HOURS AS NEEDED FOR SHORTNESS OF BREATH OR WHEEZING   amLODIPine (NORVASC) 5 MG tablet 9/8/2024 at prn Self No Yes   Sig: Take 1 tablet (5 mg) by mouth daily   aspirin ( ASPIRIN EC) 325 MG EC tablet 9/9/2024 at am Self No Yes   Sig: TAKE ONE TABLET BY MOUTH ONCE DAILY WITH DINNER FOR 30 DAYS   atorvastatin (LIPITOR) 40 MG tablet 9/9/2024 at am Self No Yes   Sig: Take 1 tablet (40 mg) by mouth daily   budesonide-formoterol (SYMBICORT) 80-4.5 MCG/ACT Inhaler 9/8/2024 at am Self No Yes   Sig: Inhale 2 puffs into the lungs 2 times daily   calcium carbonate 600 mg-vitamin D 400 units (CALTRATE) 600-400 MG-UNIT per tablet 9/8/2024 at pm Self Yes Yes   Sig: Take 1 tablet by mouth every evening   celecoxib (CELEBREX) 200 MG capsule 9/9/2024 at am Self No Yes   Sig: Take 1 capsule (200 mg) by mouth daily   folic acid (FOLVITE) 1 MG tablet 9/9/2024 at am Self No Yes   Sig: Take 1 tablet (1 mg) by mouth daily   gabapentin (NEURONTIN) 600 MG tablet 9/8/2024 at hs Self No Yes   Sig: Take 1 tablet (600 mg) by mouth at bedtime   levETIRAcetam (KEPPRA) 500 MG tablet 9/9/2024 at am Self No Yes   Sig: Take 1 tablet (500 mg) by mouth 2 times daily   levothyroxine (SYNTHROID/LEVOTHROID) 50 MCG tablet 9/9/2024 at am Self No Yes   Sig: Take 1 tablet (50 mcg) by mouth daily   metoprolol succinate ER (TOPROL XL) 50 MG 24 hr tablet 9/9/2024 at am Self No Yes   Sig: Take 1 tablet (50 mg) by mouth 2 times daily   multivitamin, therapeutic (THERA-VIT) TABS tablet 9/8/2024 at mid morning Self Yes Yes   Sig: Take 1 tablet by mouth daily   naloxone (NARCAN) 4 MG/0.1ML nasal spray never used at on hand if needed Self No Yes   Sig: Spray 1 spray (4 mg) into one nostril alternating nostrils once as needed for opioid reversal every 2-3 minutes until assistance arrives   nystatin (MYCOSTATIN) 755343 UNIT/GM external powder More than  a month at on hand if needed Self Yes Yes   Sig: Apply topically 3 times daily as needed (rash)   oxyCODONE-acetaminophen (PERCOCET)  MG per tablet 2024 at am Self No Yes   Sig: Take 1 tablet by mouth every 6 hours as needed for severe pain   tiotropium (SPIRIVA HANDIHALER) 18 MCG inhaled capsule 2024 at afternoon Self No Yes   Sig: USING THE HANDIHALER, INHALE THE CONTENTS OF ONE CAPSULE BY MOUTH ONCE DAILY      Facility-Administered Medications: None     Allergies   Allergies   Allergen Reactions    Bee Venom     Lisinopril Swelling     Oral swelling       Family History    Family History   Problem Relation Age of Onset    Cancer Mother         breast/lung    Alcohol/Drug Mother     Depression Mother     Cancer - colorectal Father     Alcohol/Drug Father     Diabetes Maternal Grandmother     Diabetes Maternal Grandfather     Diabetes Paternal Grandmother     Diabetes Paternal Grandfather     Cancer Paternal Grandfather     Gastrointestinal Disease Paternal Grandfather     Congenital Anomalies Son     Hypertension Brother     Adrenal Disorder Brother         had adrenalectomies    Cerebral palsy Granddaughter        Social History   Social History     Socioeconomic History    Marital status:      Spouse name: Not on file    Number of children: Not on file    Years of education: Not on file    Highest education level: Not on file   Occupational History    Not on file   Tobacco Use    Smoking status: Former     Current packs/day: 0.00     Average packs/day: 1 pack/day for 30.0 years (30.0 ttl pk-yrs)     Types: Cigarettes     Start date: 1974     Quit date: 2004     Years since quittin.7    Smokeless tobacco: Never   Vaping Use    Vaping status: Never Used   Substance and Sexual Activity    Alcohol use: Yes     Comment: Glass of wine a few times a week. ETOH dependency, DUI 3/15/10; 1-2 ETOH per week    Drug use: Never     Comment: percocet for the past several years    Sexual  activity: Not Currently     Partners: Male     Birth control/protection: Surgical     Comment: VAS   Other Topics Concern    Parent/sibling w/ CABG, MI or angioplasty before 65F 55M? No   Social History Narrative    Not on file     Social Determinants of Health     Financial Resource Strain: Low Risk  (9/9/2024)    Financial Resource Strain     Within the past 12 months, have you or your family members you live with been unable to get utilities (heat, electricity) when it was really needed?: No   Food Insecurity: Low Risk  (9/9/2024)    Food Insecurity     Within the past 12 months, did you worry that your food would run out before you got money to buy more?: No     Within the past 12 months, did the food you bought just not last and you didn t have money to get more?: No   Transportation Needs: Low Risk  (9/9/2024)    Transportation Needs     Within the past 12 months, has lack of transportation kept you from medical appointments, getting your medicines, non-medical meetings or appointments, work, or from getting things that you need?: No   Physical Activity: Inactive (2/20/2024)    Exercise Vital Sign     Days of Exercise per Week: 0 days     Minutes of Exercise per Session: 0 min   Stress: No Stress Concern Present (2/20/2024)    [employer] - Occupational Stress Questionnaire     Feeling of Stress : Only a little   Social Connections: Unknown (2/20/2024)    Social Connection and Isolation Panel [NHANES]     Frequency of Communication with Friends and Family: Not on file     Frequency of Social Gatherings with Friends and Family: More than three times a week     Attends [employer] Services: Not on file     Active Member of Clubs or Organizations: Not on file     Attends Club or Organization Meetings: Not on file     Marital Status: Not on file   Interpersonal Safety: Low Risk  (9/9/2024)    Interpersonal Safety     Do you feel physically and emotionally safe where you currently live?:  "Yes     Within the past 12 months, have you been hit, slapped, kicked or otherwise physically hurt by someone?: No     Within the past 12 months, have you been humiliated or emotionally abused in other ways by your partner or ex-partner?: No   Housing Stability: Low Risk  (9/9/2024)    Housing Stability     Do you have housing? : Yes     Are you worried about losing your housing?: No       Physical Exam   /89 (BP Location: Right arm)   Pulse 74   Temp 97.4  F (36.3  C) (Oral)   Resp 24   Ht 1.626 m (5' 4\")   Wt 72.6 kg (160 lb)   SpO2 90%   BMI 27.46 kg/m       Weight: 160 lbs 0 oz Body mass index is 27.46 kg/m .     Constitutional: Well developed obese adult appears comfortable, nontoxic, and in no acute distress. Alert and oriented x4.   Eyes: Eyes are clear, no scleral icterus, pupils are reactive, EOM intact bilaterally.  HENT: Oropharynx is clear and moist. No evidence of cranial trauma.   Cardiovascular: Regular rate and rhythm, normal S1 and S2, and no murmur, rub, or gallops noted. Radial & dorsalis pedis pulses 2+ bilaterally. No lower extremity edema.  Respiratory: Respirations unlabored on 3 lpm, diffuse expiratory wheezing throughout.   GI: Active bowel sounds throughout. Soft, not distended, non-tender to palpation, without rebound or guarding. No palpable masses, no hepatomegaly.   Genitourinary: Deferred  Musculoskeletal: Normal muscle bulk and tone. Moves all extremities spontaneously. No gross deformity.  Skin: Warm and dry, no rashes.   Neurologic: Neck supple.  is symmetric. No notable tremor.       Data   Data reviewed today:     I have personally reviewed the following data over the past 24 hrs:    4.8  \   10.5 (L)   / 158     140 104 27.3 (H) /  101 (H)   4.7 25 1.22 (H) \     ALT: 18 AST: 39 AP: 101 TBILI: 0.3   ALB: 4.1 TOT PROTEIN: 6.7 LIPASE: N/A         Recent Results (from the past 24 hour(s))   Head CT w/o contrast   Result Value    Radiologist flags Acute cervical " spine fracture (AA)    Narrative    EXAM: CT HEAD W/O CONTRAST, CT CERVICAL SPINE W/O CONTRAST  LOCATION: [facility]  DATE: 9/9/2024    INDICATION: headache in setting of fall three days ago  COMPARISON: MRI brain November 16, 2023. CTA head and neck October 26, 2023.  TECHNIQUE:   1) Routine CT Head without IV contrast. Multiplanar reformats. Dose reduction techniques were used.  2) Routine CT Cervical Spine without IV contrast. Multiplanar reformats. Dose reduction techniques were used.    FINDINGS:   HEAD CT:   INTRACRANIAL CONTENTS: Streak artifact from left paraclinoid ICA region coil pack limits evaluation. No intracranial hemorrhage, extraaxial collection, or mass effect.  No CT evidence of acute infarct. Moderate presumed chronic small vessel ischemic   changes. Unchanged small region of right occipital encephalomalacia. Moderate generalized volume loss. No hydrocephalus.     VISUALIZED ORBITS/SINUSES/MASTOIDS: No intraorbital abnormality. No significant paranasal sinus mucosal disease. No middle ear or mastoid effusion.    BONES/SOFT TISSUES: No acute abnormality.    CERVICAL SPINE CT:   VERTEBRA: Oblique and potentially unstable type I dens fracture. No traumatic malalignment by CT. Unchanged grade 1 anterolisthesis of C3 on C4 and C7 on T1. Unchanged grade 1 anterolisthesis of C4 on C5.    CANAL/FORAMINA: Multilevel spondylosis without high grade canal stenosis. Uncovertebral ridging with facet arthrosis results in multilevel severe neural foraminal stenosis.    PARASPINAL: No prevertebral edema.      Impression    IMPRESSION:  HEAD CT:  1.  No acute intracranial process.    CERVICAL SPINE CT:  1.  Acute type I dens fracture, potentially unstable. MR cervical spine is recommended for further evaluation.      [Critical Result: Acute cervical spine fracture]    Finding was identified on 9/9/2024 6:10 PM CDT.     Dr. Castrejon was contacted by me on 9/9/2024 6:16 PM CDT and  verbalized understanding of the critical result.   CT Cervical Spine w/o Contrast   Result Value    Radiologist flags Acute cervical spine fracture (AA)    Narrative    EXAM: CT HEAD W/O CONTRAST, CT CERVICAL SPINE W/O CONTRAST  LOCATION: [facility]  DATE: 9/9/2024    INDICATION: headache in setting of fall three days ago  COMPARISON: MRI brain November 16, 2023. CTA head and neck October 26, 2023.  TECHNIQUE:   1) Routine CT Head without IV contrast. Multiplanar reformats. Dose reduction techniques were used.  2) Routine CT Cervical Spine without IV contrast. Multiplanar reformats. Dose reduction techniques were used.    FINDINGS:   HEAD CT:   INTRACRANIAL CONTENTS: Streak artifact from left paraclinoid ICA region coil pack limits evaluation. No intracranial hemorrhage, extraaxial collection, or mass effect.  No CT evidence of acute infarct. Moderate presumed chronic small vessel ischemic   changes. Unchanged small region of right occipital encephalomalacia. Moderate generalized volume loss. No hydrocephalus.     VISUALIZED ORBITS/SINUSES/MASTOIDS: No intraorbital abnormality. No significant paranasal sinus mucosal disease. No middle ear or mastoid effusion.    BONES/SOFT TISSUES: No acute abnormality.    CERVICAL SPINE CT:   VERTEBRA: Oblique and potentially unstable type I dens fracture. No traumatic malalignment by CT. Unchanged grade 1 anterolisthesis of C3 on C4 and C7 on T1. Unchanged grade 1 anterolisthesis of C4 on C5.    CANAL/FORAMINA: Multilevel spondylosis without high grade canal stenosis. Uncovertebral ridging with facet arthrosis results in multilevel severe neural foraminal stenosis.    PARASPINAL: No prevertebral edema.      Impression    IMPRESSION:  HEAD CT:  1.  No acute intracranial process.    CERVICAL SPINE CT:  1.  Acute type I dens fracture, potentially unstable. MR cervical spine is recommended for further evaluation.      [Critical Result: Acute cervical spine  fracture]    Finding was identified on 9/9/2024 6:10 PM CDT.     Dr. Castrejon was contacted by me on 9/9/2024 6:16 PM CDT and verbalized understanding of the critical result.   Chest XR,  PA & LAT    Narrative    EXAM: XR CHEST 2 VIEWS  LOCATION: [facility]  DATE: 9/9/2024    INDICATION: Hypoxia.    COMPARISON: 8/16/2023.      Impression    IMPRESSION: No significant interval change. Lungs clear. No pleural effusions. Posterior spinal rods are again noted in the lower thoracic and upper lumbar spine.     Cervical spine MRI w/o contrast    Narrative    EXAM: MR CERVICAL SPINE W/O CONTRAST  LOCATION: [facility]  DATE: 9/9/2024    INDICATION: acute type 1 C2 fracture  COMPARISON: Same day CT.  TECHNIQUE: MRI Cervical Spine without IV contrast.    FINDINGS: Examination significantly limited by motion.  Preserved vertebral body heights. Degenerative grade 1 C3-C4 anterolisthesis, C7-T1 anterolisthesis marrow signal. T2 hyperintense signal C1-C2, C2-C3 and C4-C5 interspinous ligamentous spaces. No identifiable defect involving the anterior, posterior   longitudinal ligament. No identifiable abnormal cord signal. Trace presumably posttraumatic prevertebral edema.    Craniovertebral junction and C1-C2: Transversely oriented T1 normal signal through the odontoid, better appreciated on CT.    C2-C3: Preserved disc height and signal for age. No cord compression. Normal facets for age. No high-grade neural foraminal stenosis.     C3-C4: Preserved disc height and signal for age. Symmetric disc bulge, uncovering of the disc posteriorly causes partial CSF space effacement, cord flattening and associated mild-to-moderate canal stenosis.] Facet arthropathy contributes to moderate   right neural foraminal stenosis. Patent left neural foramen.     C4-C5: Moderate loss of disc height, intradiscal T2 signal. Posterior disc osteophyte complex causes CSF space effacement, cord  flattening and associated moderate to severe canal stenosis. Bilateral uncovertebral, facet arthropathy contributes to   moderate to severe bilateral neural foraminal stenosis.     C5-C6: Moderate loss of disc height, intradiscal T2 signal. Posterior disc osteophyte complex contributes to CSF space effacement, cord flattening and associated moderate to severe canal stenosis. Bilateral uncovertebral, facet arthropathy contributes to   severe neural foraminal stenosis.     C6-C7: Moderate loss of disc height, intradiscal T2 signal. Posterior disc osteophyte complex causes CSF space effacement, cord flattening and associated canal stenosis. Bilateral uncovertebral, facet arthropathy contributes to severe bilateral neural   foraminal stenosis.     C7-T1: Mild loss of disc height. Uncovering of the disc posteriorly. Bilateral facet arthropathy. Aforementioned findings contribute to moderate canal and bilateral neural foraminal stenosis.      Impression    IMPRESSION: Examination significantly limited secondary to motion.  1.  Odontoid fracture better appreciated on dedicated CT. Suggestion of interspinous ligamentous injury at C1-C2, C2-C3.  2.  No identifiable defect of the anterior or posterior longitudinal ligament. No identifiable abnormal cord signal or evidence of cord contusion.  3.  Multilevel lumbar spondylosis with potentially significant high-grade canal or neural foraminal stenosis at C4-C5, C5-C6 and C6-C7.       I personally reviewed the chest x-ray image(s) showing retrocardiac infiltrate

## 2024-09-10 NOTE — PLAN OF CARE
Goal Outcome Evaluation:         Patients neck pain controlled with oxycodone lung sounds decreased patient on 3 liters to keep saturations greater than 92%

## 2024-09-10 NOTE — PROGRESS NOTES
Patient was set up on our auto CPAP machine. Good mask fit with a medium full face mask. Pt was very sleepy, but tolerates well.

## 2024-09-10 NOTE — PROGRESS NOTES
[facility]    Medicine Progress Note - Hospitalist Service    Date of Admission:  9/9/2024    Assessment & Plan   Jessica Ellison is a 73 year old female with past medical history of  chronic bilateral low back pain without sciatica status post lumbar fusion (2015), centrilobular emphysema,, chronic pain syndrome, who presents for evaluation of headache, neck pain and low oxygen in setting of fall 3 days ago.     Acute respiratory failure with hypoxia and hypercapnia (H)  Patient noted hypoxia on home oximeter, and was hypoxic to 81% on ED arrival.   VBG with ph 7.27 / pCO2 62 / bicarb 28 ? ph 7.28 / pCO2 59 / bicarb 28  CXR  with lungs clear on formal reading. However, on personal review notable for retrocardiac infiltrate noted on lateral view. No leukocytosis, no electrolyte abnormalities. Does have acute renal dysfunction, see below.   Currently on 3 L of oxygen. Does not use oxygen at home.   Patient is afebrile, hemodynamically stable on admission with 3L supplemental O2 via NC.   Received duoneb inhaler, solumedrol 125mg, and started on abx for possible pneumonia in the ED. Diffuse expiratory wheezing noted on admission exam.  Most likely due to COPD exacerbation but also concerning for community acquired pneumonia, see evaluation and management below.   9/10: Expect patient likely has chronic hypoxia related to her know COPD with emphysema and this was 'picked up' incidentally on admission. Patient endorses being asymptomatic when she found out she was hypoxic.  states he notices her getting more easily winded last few months. VBG with Acute hypercanic resp acidosis without chronic renal compensation (no significantly elevated Hco3- on lab review) which could be a COPD exacerbation as she had wheezing per admission     - Home O2 Eval in AM   - Management of possible CAP as per below   - Back on home inhalers, continue steroids    Other closed displaced odontoid fracture,  initial encounter (H)  Fall, mechanical  Patient had mechanical fall from standing three days prior to admission and hit her head. She had immediate pain in her head and neck following the fall. Did not lose consciousness. Was at her baseline, asymptomatic prior to the fall. Typically uses a walker to get around her home.   CT head and C spine revealed acute type I dens fracture, potentially unstable.no acute intercranial process.   MR C spine revealed Odontoid fracture better appreciated on dedicated CT. Suggestion of interspinous ligamentous injury at C1-C2, C2-C3. No identifiable defect of the anterior or posterior longitudinal ligament. No identifiable abnormal cord signal or evidence of cord contusion.  Multilevel lumbar spondylosis with potentially significant high-grade canal or neural foraminal stenosis at C4-C5, C5-C6 and C6-C7.  ED provider discussed these findings with neurosurgery Dr. León who recommended 24/7 hard collar and follow up with neurosurgery in 2 weeks.   No focal neurological deficits, no other injuries noted, and pain is well controlled at time of admission.   Fall precautions   Hard collar in place 24/7  Will need neruosurgery follow up in 2 weeks  Acetaminophen 650mg q4hrs prn, continue pta percocet, continue pta celecoxib  PT consulted to evaluate for gait abnormality causing repeated mechanical falls    Possible community acquired pneumonia  Hypoxic on presentation to 81% on room air. Does have hx of emphysema, see below.   CXR on admission concerning with retrocardiac infiltrate.   Afebrile, no leukocytosis. Denies cough, no upper respiratory symptoms. Does endorse occasional dyspnea with exertion. Diffuse expiratory wheezing noted on admission exam. Started on rocephin and azithromycin in the ED.   Continue rocephin, azithromycin  Procalcitonin to de-escalate  AM CBC     Centrilobular emphysema (H) with possible exacerbation   Former smoker with 30 pack year hx, quit 2004. Most recent  pfts (08/10/23) was normal. Patient denies home O2 use at time of admission however on chart review it appears she did use home O2 previously (see admission 11/2023).   She noted hypoxia on home oximeter on AM of admission. Endorses occasional dyspnea on exertion. No cough, no symptoms of upper respiratory infection.   No leukocytosis, patient is afebrile. Diffuse expiratory wheezing noted on admission exam.   Managed prior to admission with symbicort inhaler, spiriva inhaler, ventolin inhaler.   Received duoneb inhaler, solumedrol 125mg in the ED.   Continue pta inhalers   Prednisone 40mg daily     Elevated creatinine  Chronic kidney disease  Creatinine on admission 1.22, increased from recent baseline 1.05 on 08/12/24. GFR 47.   BUN 27.3. Received 500mL IV NS in the ED.    AM BMP    Chronic pain with continuous opioid dependence   Patient has chronic opioid use for several years due to longstanding back and knee pain. Patient had lumbar spinal fusion surgery and left total knee replacement.    Continue pta percocet q6hrs prn   Continue pta celecoxib    Chronic anemia   Hgb on admission 10.5, improved from previous 9.6. MCV 99. stable.     Hypertension   Managed prior to admission with amlodipine 5mg  and metoprolol 50mg BID     JOY   Uses CPAP at home; continue     Seizure disorder   Denies recent seizures. Endorses good compliance with her pta keppra 500mg BID; continue     Hypothyroidism   Managed prior to admission with levothyroxine 50mcg daily; continue     Hyperlipidemia   Managed prior to admission with atorvastatin 40mg daily; continue             Diet: Combination Diet Regular Diet Adult    DVT Prophylaxis: Pneumatic Compression Devices  Bruce Catheter: Not present  Lines: None     Cardiac Monitoring: None  Code Status: Full Code      Clinically Significant Risk Factors Present on Admission                # Drug Induced Platelet Defect: home medication list includes an antiplatelet medication   #  "Hypertension: Noted on problem list    # Anemia: based on hgb <11       # Overweight: Estimated body mass index is 27.46 kg/m  as calculated from the following:    Height as of this encounter: 1.626 m (5' 4\").    Weight as of this encounter: 72.6 kg (160 lb).       # COPD: noted on problem list              Disposition Plan     Medically Ready for Discharge: Anticipated Tomorrow             Waylon Myers DO  Hospitalist Service  [facility]  Securely message with [employer] (more info)  Text page via [employer] Paging/Directory   ______________________________________________________________________    Interval History   No acute events. Still hypoxic. Suspect chronic hypoxia related to chronic lung disease. PCT pending to de-escalate abx.     Physical Exam   Vital Signs: Temp: 97.5  F (36.4  C) Temp src: Oral BP: (!) 149/88 Pulse: 80   Resp: 20 SpO2: 93 % O2 Device: Nasal cannula Oxygen Delivery: 4 LPM  Weight: 160 lbs 0 oz    Physical Exam  Constitutional:       General: Pt is not in acute distress.  HENT:      Head: Normocephalic and atraumatic.      Nose: Nose normal.   Eyes:      Conjunctiva/sclera: Conjunctivae normal.   Pulmonary:      Effort: Pulmonary effort is normal.   Abdominal:      General: Abdomen is flat.   Skin:     Findings: No rash.   Neurological:      General: No focal deficit present.      Mental Status: Pt is alert.   Psychiatric:         Mood and Affect: Mood normal.       Medical Decision Making       40 MINUTES SPENT BY ME on the date of service doing chart review, history, exam, documentation & further activities per the note.      Data     I have personally reviewed the following data over the past 24 hrs:    3.7 (L)  \   10.5 (L)   / 159     140 106 32.3 (H) /  186 (H)   5.0 25 1.14 (H) \     ALT: 18 AST: 39 AP: 101 TBILI: 0.3   ALB: 4.1 TOT PROTEIN: 6.7 LIPASE: N/A       Imaging results reviewed over the past 24 hrs:   Recent Results (from the past 24 hour(s))   Head CT " w/o contrast   Result Value    Radiologist flags Acute cervical spine fracture (AA)    Narrative    EXAM: CT HEAD W/O CONTRAST, CT CERVICAL SPINE W/O CONTRAST  LOCATION: [facility]  DATE: 9/9/2024    INDICATION: headache in setting of fall three days ago  COMPARISON: MRI brain November 16, 2023. CTA head and neck October 26, 2023.  TECHNIQUE:   1) Routine CT Head without IV contrast. Multiplanar reformats. Dose reduction techniques were used.  2) Routine CT Cervical Spine without IV contrast. Multiplanar reformats. Dose reduction techniques were used.    FINDINGS:   HEAD CT:   INTRACRANIAL CONTENTS: Streak artifact from left paraclinoid ICA region coil pack limits evaluation. No intracranial hemorrhage, extraaxial collection, or mass effect.  No CT evidence of acute infarct. Moderate presumed chronic small vessel ischemic   changes. Unchanged small region of right occipital encephalomalacia. Moderate generalized volume loss. No hydrocephalus.     VISUALIZED ORBITS/SINUSES/MASTOIDS: No intraorbital abnormality. No significant paranasal sinus mucosal disease. No middle ear or mastoid effusion.    BONES/SOFT TISSUES: No acute abnormality.    CERVICAL SPINE CT:   VERTEBRA: Oblique and potentially unstable type I dens fracture. No traumatic malalignment by CT. Unchanged grade 1 anterolisthesis of C3 on C4 and C7 on T1. Unchanged grade 1 anterolisthesis of C4 on C5.    CANAL/FORAMINA: Multilevel spondylosis without high grade canal stenosis. Uncovertebral ridging with facet arthrosis results in multilevel severe neural foraminal stenosis.    PARASPINAL: No prevertebral edema.      Impression    IMPRESSION:  HEAD CT:  1.  No acute intracranial process.    CERVICAL SPINE CT:  1.  Acute type I dens fracture, potentially unstable. MR cervical spine is recommended for further evaluation.      [Critical Result: Acute cervical spine fracture]    Finding was identified on 9/9/2024 6:10 PM CDT.       Lucía was contacted by me on 9/9/2024 6:16 PM CDT and verbalized understanding of the critical result.   CT Cervical Spine w/o Contrast   Result Value    Radiologist flags Acute cervical spine fracture (AA)    Narrative    EXAM: CT HEAD W/O CONTRAST, CT CERVICAL SPINE W/O CONTRAST  LOCATION: [facility]  DATE: 9/9/2024    INDICATION: headache in setting of fall three days ago  COMPARISON: MRI brain November 16, 2023. CTA head and neck October 26, 2023.  TECHNIQUE:   1) Routine CT Head without IV contrast. Multiplanar reformats. Dose reduction techniques were used.  2) Routine CT Cervical Spine without IV contrast. Multiplanar reformats. Dose reduction techniques were used.    FINDINGS:   HEAD CT:   INTRACRANIAL CONTENTS: Streak artifact from left paraclinoid ICA region coil pack limits evaluation. No intracranial hemorrhage, extraaxial collection, or mass effect.  No CT evidence of acute infarct. Moderate presumed chronic small vessel ischemic   changes. Unchanged small region of right occipital encephalomalacia. Moderate generalized volume loss. No hydrocephalus.     VISUALIZED ORBITS/SINUSES/MASTOIDS: No intraorbital abnormality. No significant paranasal sinus mucosal disease. No middle ear or mastoid effusion.    BONES/SOFT TISSUES: No acute abnormality.    CERVICAL SPINE CT:   VERTEBRA: Oblique and potentially unstable type I dens fracture. No traumatic malalignment by CT. Unchanged grade 1 anterolisthesis of C3 on C4 and C7 on T1. Unchanged grade 1 anterolisthesis of C4 on C5.    CANAL/FORAMINA: Multilevel spondylosis without high grade canal stenosis. Uncovertebral ridging with facet arthrosis results in multilevel severe neural foraminal stenosis.    PARASPINAL: No prevertebral edema.      Impression    IMPRESSION:  HEAD CT:  1.  No acute intracranial process.    CERVICAL SPINE CT:  1.  Acute type I dens fracture, potentially unstable. MR cervical spine is recommended for further  evaluation.      [Critical Result: Acute cervical spine fracture]    Finding was identified on 9/9/2024 6:10 PM CDT.     Dr. Castrejon was contacted by me on 9/9/2024 6:16 PM CDT and verbalized understanding of the critical result.   Chest XR,  PA & LAT    Narrative    EXAM: XR CHEST 2 VIEWS  LOCATION: [facility]  DATE: 9/9/2024    INDICATION: Hypoxia.    COMPARISON: 8/16/2023.      Impression    IMPRESSION: No significant interval change. Lungs clear. No pleural effusions. Posterior spinal rods are again noted in the lower thoracic and upper lumbar spine.     Cervical spine MRI w/o contrast    Narrative    EXAM: MR CERVICAL SPINE W/O CONTRAST  LOCATION: [facility]  DATE: 9/9/2024    INDICATION: acute type 1 C2 fracture  COMPARISON: Same day CT.  TECHNIQUE: MRI Cervical Spine without IV contrast.    FINDINGS: Examination significantly limited by motion.  Preserved vertebral body heights. Degenerative grade 1 C3-C4 anterolisthesis, C7-T1 anterolisthesis marrow signal. T2 hyperintense signal C1-C2, C2-C3 and C4-C5 interspinous ligamentous spaces. No identifiable defect involving the anterior, posterior   longitudinal ligament. No identifiable abnormal cord signal. Trace presumably posttraumatic prevertebral edema.    Craniovertebral junction and C1-C2: Transversely oriented T1 normal signal through the odontoid, better appreciated on CT.    C2-C3: Preserved disc height and signal for age. No cord compression. Normal facets for age. No high-grade neural foraminal stenosis.     C3-C4: Preserved disc height and signal for age. Symmetric disc bulge, uncovering of the disc posteriorly causes partial CSF space effacement, cord flattening and associated mild-to-moderate canal stenosis.] Facet arthropathy contributes to moderate   right neural foraminal stenosis. Patent left neural foramen.     C4-C5: Moderate loss of disc height, intradiscal T2 signal. Posterior disc  osteophyte complex causes CSF space effacement, cord flattening and associated moderate to severe canal stenosis. Bilateral uncovertebral, facet arthropathy contributes to   moderate to severe bilateral neural foraminal stenosis.     C5-C6: Moderate loss of disc height, intradiscal T2 signal. Posterior disc osteophyte complex contributes to CSF space effacement, cord flattening and associated moderate to severe canal stenosis. Bilateral uncovertebral, facet arthropathy contributes to   severe neural foraminal stenosis.     C6-C7: Moderate loss of disc height, intradiscal T2 signal. Posterior disc osteophyte complex causes CSF space effacement, cord flattening and associated canal stenosis. Bilateral uncovertebral, facet arthropathy contributes to severe bilateral neural   foraminal stenosis.     C7-T1: Mild loss of disc height. Uncovering of the disc posteriorly. Bilateral facet arthropathy. Aforementioned findings contribute to moderate canal and bilateral neural foraminal stenosis.      Impression    IMPRESSION: Examination significantly limited secondary to motion.  1.  Odontoid fracture better appreciated on dedicated CT. Suggestion of interspinous ligamentous injury at C1-C2, C2-C3.  2.  No identifiable defect of the anterior or posterior longitudinal ligament. No identifiable abnormal cord signal or evidence of cord contusion.  3.  Multilevel lumbar spondylosis with potentially significant high-grade canal or neural foraminal stenosis at C4-C5, C5-C6 and C6-C7.

## 2024-09-10 NOTE — PLAN OF CARE
"[place] [facility] ADMISSION NOTE    Patient admitted to room 2302 at approximately 2250 via cart from emergency room. Patient was accompanied by transport tech.     Verbal SBAR report received from Joann MATTSON prior to patient arrival.     Patient ambulated to bed with one assist. Patient alert and oriented X 3. Pain is controlled with current analgesics.  Medication(s) being used: acetaminophen and prescription NSAID's including prednisone. Admission vital signs: Blood pressure 133/89, pulse 74, temperature 97.4  F (36.3  C), temperature source Oral, resp. rate 24, height 1.626 m (5' 4\"), weight 72.6 kg (160 lb), SpO2 90%, not currently breastfeeding. Patient was oriented to plan of care, call light, bed controls, tv, telephone, bathroom, and visiting hours.     Risk Assessment    The following safety risks were identified during admission: fall. Yellow risk band applied: YES.     Skin Initial Assessment    This writer admitted this patient and completed a full skin assessment and Bennett score in the Adult PCS flowsheet.   Photo documentation of skin problem and/or wound competed via [employer] application (located under Media):  N/A    Appropriate interventions initiated as needed.     Secondary skin check completed by RN.         Education    Patient has a [place] to Observation order: No  Observation education completed and documented: N/A      Leta Orellana RN                          "

## 2024-09-10 NOTE — PLAN OF CARE
Goal Outcome Evaluation:      Plan of Care Reviewed With: patient    Overall Patient Progress: no changeOverall Patient Progress: no change    Alert and oriented. Up to bathroom with assist of one. Sleeping soundly after bedtime meds. Awakens easily, but falls asleep in conversation. Patient was dozing off during admission and it was reported to nurse by Yeimy PANTOJA that patient was dozing off in ED also. O2 at 3 LPM with O2 sats dipping into 80s when sleeping. CPAP set up and started by RT. Increased O2 to 4 LPM per RT. O2 sats running 90-92%. Cervical collar in place.

## 2024-09-10 NOTE — ED NOTES
Pt returned from MRI off oxygen - sats 79% on RA - MD in to reposition Aspen collar and placed on 4L oxygen with good recovery. Sig other at bedside - given po sprite as requested.

## 2024-09-10 NOTE — ED NOTES
"[facility]   Admission Handoff    The patient is Jessica Ellison, 73 year old who arrived in the ED by AMBULANCE from home with a complaint of Fall and Headache  . The patient's current symptoms are new and during this time the symptoms have remained the same. In the ED, patient was diagnosed with   Final diagnoses:   None         Needed?: No    Allergies:    Allergies   Allergen Reactions    Bee Venom     Lisinopril Swelling     Oral swelling       Past Medical Hx:   Past Medical History:   Diagnosis Date    Anemia     Aneurysm (H24)     Antiplatelet or antithrombotic long-term use     Arthritis     Chemical dependency (H)     Chem Dep RX    Depression     History of blood transfusion     Hypertension     Menopausal symptoms     Other chronic pain     Lower back and hips    Seizure (H) 02/20/2024    Sleep apnea     Sleep disorder     Thyroid disease     Tobacco abuse     Uncomplicated asthma        Initial vitals were: BP: 133/84  Pulse: 78  Temp: 98.1  F (36.7  C)  Resp: 20  Height: 162.6 cm (5' 4\")  Weight: 72.6 kg (160 lb)  SpO2: (!) 81 %   Recent vital Signs: /84   Pulse 78   Temp 98.1  F (36.7  C) (Oral)   Resp 20   Ht 1.626 m (5' 4\")   Wt 72.6 kg (160 lb)   SpO2 94%   BMI 27.46 kg/m      Elimination Status: Continent: Yes     Activity Level: SBA    Fall Status: Reason for falls risk:  Mobility  patient and family education    Baseline Mental status: WDL  Current Mental Status changes: at basesline    Infection present or suspected this encounter: no  Sepsis suspected: No    Isolation type: none    Bariatric equipment needed?: No    In the ED these meds were given:   Medications   acetaminophen (TYLENOL) tablet 1,000 mg (1,000 mg Oral $Given 9/9/24 1818)       Drips running?  No    Home pump  No    Current LDAs: Peripheral IV: Site RAC; Gauge 20 GA  none     Results:   Labs/Imaging  Ordered and Resulted from Time of ED Arrival Up to the Time of Departure " from the ED  Results for orders placed or performed during the hospital encounter of 09/09/24 (from the past 24 hour(s))   [place] Draw *Canceled*    Narrative    The following orders were created for panel order [place] Draw.  Procedure                               Abnormality         Status                     ---------                               -----------         ------                       Please view results for these tests on the individual orders.   CBC with platelets differential    Narrative    The following orders were created for panel order CBC with platelets differential.  Procedure                               Abnormality         Status                     ---------                               -----------         ------                     CBC with platelets and d...[[id number]]  Abnormal            Final result                 Please view results for these tests on the individual orders.   Comprehensive metabolic panel   Result Value Ref Range    Sodium 140 135 - 145 mmol/L    Potassium 4.7 3.4 - 5.3 mmol/L    Carbon Dioxide (CO2) 25 22 - 29 mmol/L    Anion Gap 11 7 - 15 mmol/L    Urea Nitrogen 27.3 (H) 8.0 - 23.0 mg/dL    Creatinine 1.22 (H) 0.51 - 0.95 mg/dL    GFR Estimate 47 (L) >60 mL/min/1.73m2    Calcium 8.9 8.8 - 10.4 mg/dL    Chloride 104 98 - 107 mmol/L    Glucose 101 (H) 70 - 99 mg/dL    Alkaline Phosphatase 101 40 - 150 U/L    AST 39 0 - 45 U/L    ALT 18 0 - 50 U/L    Protein Total 6.7 6.4 - 8.3 g/dL    Albumin 4.1 3.5 - 5.2 g/dL    Bilirubin Total 0.3 <=1.2 mg/dL   Blood gas venous   Result Value Ref Range    pH Venous 7.27 (L) 7.32 - 7.43    pCO2 Venous 62 (H) 40 - 50 mm Hg    pO2 Venous 24 (L) 25 - 47 mm Hg    Bicarbonate Venous 28 21 - 28 mmol/L    Base Excess/Deficit Venous 0.4 -3.0 - 3.0 mmol/L    FIO2 0     Oxyhemoglobin Venous 28 (L) 70 - 75 %    O2 Sat, Venous 29.0 (L) 70.0 - 75.0 %    Narrative    In healthy individuals, oxyhemoglobin (O2Hb) and oxygen saturation (SO2)  are approximately equal. In the presence of dyshemoglobins, oxyhemoglobin can be considerably lower than oxygen saturation.   Alcohol level blood   Result Value Ref Range    Alcohol ethyl <0.01 <=0.01 g/dL   CBC with platelets and differential   Result Value Ref Range    WBC Count 4.8 4.0 - 11.0 10e3/uL    RBC Count 3.41 (L) 3.80 - 5.20 10e6/uL    Hemoglobin 10.5 (L) 11.7 - 15.7 g/dL    Hematocrit 33.8 (L) 35.0 - 47.0 %    MCV 99 78 - 100 fL    MCH 30.8 26.5 - 33.0 pg    MCHC 31.1 (L) 31.5 - 36.5 g/dL    RDW 13.9 10.0 - 15.0 %    Platelet Count 158 150 - 450 10e3/uL    % Neutrophils 62 %    % Lymphocytes 19 %    % Monocytes 10 %    % Eosinophils 7 %    % Basophils 1 %    % Immature Granulocytes 1 %    NRBCs per 100 WBC 0 <1 /100    Absolute Neutrophils 3.0 1.6 - 8.3 10e3/uL    Absolute Lymphocytes 0.9 0.8 - 5.3 10e3/uL    Absolute Monocytes 0.5 0.0 - 1.3 10e3/uL    Absolute Eosinophils 0.3 0.0 - 0.7 10e3/uL    Absolute Basophils 0.1 0.0 - 0.2 10e3/uL    Absolute Immature Granulocytes 0.0 <=0.4 10e3/uL    Absolute NRBCs 0.0 10e3/uL   Head CT w/o contrast   Result Value Ref Range    Radiologist flags Acute cervical spine fracture (AA)     Narrative    EXAM: CT HEAD W/O CONTRAST, CT CERVICAL SPINE W/O CONTRAST  LOCATION: [facility]  DATE: 9/9/2024    INDICATION: headache in setting of fall three days ago  COMPARISON: MRI brain November 16, 2023. CTA head and neck October 26, 2023.  TECHNIQUE:   1) Routine CT Head without IV contrast. Multiplanar reformats. Dose reduction techniques were used.  2) Routine CT Cervical Spine without IV contrast. Multiplanar reformats. Dose reduction techniques were used.    FINDINGS:   HEAD CT:   INTRACRANIAL CONTENTS: Streak artifact from left paraclinoid ICA region coil pack limits evaluation. No intracranial hemorrhage, extraaxial collection, or mass effect.  No CT evidence of acute infarct. Moderate presumed chronic small vessel ischemic   changes.  Unchanged small region of right occipital encephalomalacia. Moderate generalized volume loss. No hydrocephalus.     VISUALIZED ORBITS/SINUSES/MASTOIDS: No intraorbital abnormality. No significant paranasal sinus mucosal disease. No middle ear or mastoid effusion.    BONES/SOFT TISSUES: No acute abnormality.    CERVICAL SPINE CT:   VERTEBRA: Oblique and potentially unstable type I dens fracture. No traumatic malalignment by CT. Unchanged grade 1 anterolisthesis of C3 on C4 and C7 on T1. Unchanged grade 1 anterolisthesis of C4 on C5.    CANAL/FORAMINA: Multilevel spondylosis without high grade canal stenosis. Uncovertebral ridging with facet arthrosis results in multilevel severe neural foraminal stenosis.    PARASPINAL: No prevertebral edema.      Impression    IMPRESSION:  HEAD CT:  1.  No acute intracranial process.    CERVICAL SPINE CT:  1.  Acute type I dens fracture, potentially unstable. MR cervical spine is recommended for further evaluation.      [Critical Result: Acute cervical spine fracture]    Finding was identified on 9/9/2024 6:10 PM CDT.     Dr. Castrejon was contacted by me on 9/9/2024 6:16 PM CDT and verbalized understanding of the critical result.   CT Cervical Spine w/o Contrast   Result Value Ref Range    Radiologist flags Acute cervical spine fracture (AA)     Narrative    EXAM: CT HEAD W/O CONTRAST, CT CERVICAL SPINE W/O CONTRAST  LOCATION: [facility]  DATE: 9/9/2024    INDICATION: headache in setting of fall three days ago  COMPARISON: MRI brain November 16, 2023. CTA head and neck October 26, 2023.  TECHNIQUE:   1) Routine CT Head without IV contrast. Multiplanar reformats. Dose reduction techniques were used.  2) Routine CT Cervical Spine without IV contrast. Multiplanar reformats. Dose reduction techniques were used.    FINDINGS:   HEAD CT:   INTRACRANIAL CONTENTS: Streak artifact from left paraclinoid ICA region coil pack limits evaluation. No intracranial  hemorrhage, extraaxial collection, or mass effect.  No CT evidence of acute infarct. Moderate presumed chronic small vessel ischemic   changes. Unchanged small region of right occipital encephalomalacia. Moderate generalized volume loss. No hydrocephalus.     VISUALIZED ORBITS/SINUSES/MASTOIDS: No intraorbital abnormality. No significant paranasal sinus mucosal disease. No middle ear or mastoid effusion.    BONES/SOFT TISSUES: No acute abnormality.    CERVICAL SPINE CT:   VERTEBRA: Oblique and potentially unstable type I dens fracture. No traumatic malalignment by CT. Unchanged grade 1 anterolisthesis of C3 on C4 and C7 on T1. Unchanged grade 1 anterolisthesis of C4 on C5.    CANAL/FORAMINA: Multilevel spondylosis without high grade canal stenosis. Uncovertebral ridging with facet arthrosis results in multilevel severe neural foraminal stenosis.    PARASPINAL: No prevertebral edema.      Impression    IMPRESSION:  HEAD CT:  1.  No acute intracranial process.    CERVICAL SPINE CT:  1.  Acute type I dens fracture, potentially unstable. MR cervical spine is recommended for further evaluation.      [Critical Result: Acute cervical spine fracture]    Finding was identified on 9/9/2024 6:10 PM CDT.     Dr. Castrejon was contacted by me on 9/9/2024 6:16 PM CDT and verbalized understanding of the critical result.   Chest XR,  PA & LAT    Narrative    EXAM: XR CHEST 2 VIEWS  LOCATION: [facility]  DATE: 9/9/2024    INDICATION: Hypoxia.    COMPARISON: 8/16/2023.      Impression    IMPRESSION: No significant interval change. Lungs clear. No pleural effusions. Posterior spinal rods are again noted in the lower thoracic and upper lumbar spine.     Cervical spine MRI w/o contrast    Narrative    EXAM: MR CERVICAL SPINE W/O CONTRAST  LOCATION: [facility]  DATE: 9/9/2024    INDICATION: acute type 1 C2 fracture  COMPARISON: Same day CT.  TECHNIQUE: MRI Cervical Spine without IV  contrast.    FINDINGS: Examination significantly limited by motion.  Preserved vertebral body heights. Degenerative grade 1 C3-C4 anterolisthesis, C7-T1 anterolisthesis marrow signal. T2 hyperintense signal C1-C2, C2-C3 and C4-C5 interspinous ligamentous spaces. No identifiable defect involving the anterior, posterior   longitudinal ligament. No identifiable abnormal cord signal. Trace presumably posttraumatic prevertebral edema.    Craniovertebral junction and C1-C2: Transversely oriented T1 normal signal through the odontoid, better appreciated on CT.    C2-C3: Preserved disc height and signal for age. No cord compression. Normal facets for age. No high-grade neural foraminal stenosis.     C3-C4: Preserved disc height and signal for age. Symmetric disc bulge, uncovering of the disc posteriorly causes partial CSF space effacement, cord flattening and associated mild-to-moderate canal stenosis.] Facet arthropathy contributes to moderate   right neural foraminal stenosis. Patent left neural foramen.     C4-C5: Moderate loss of disc height, intradiscal T2 signal. Posterior disc osteophyte complex causes CSF space effacement, cord flattening and associated moderate to severe canal stenosis. Bilateral uncovertebral, facet arthropathy contributes to   moderate to severe bilateral neural foraminal stenosis.     C5-C6: Moderate loss of disc height, intradiscal T2 signal. Posterior disc osteophyte complex contributes to CSF space effacement, cord flattening and associated moderate to severe canal stenosis. Bilateral uncovertebral, facet arthropathy contributes to   severe neural foraminal stenosis.     C6-C7: Moderate loss of disc height, intradiscal T2 signal. Posterior disc osteophyte complex causes CSF space effacement, cord flattening and associated canal stenosis. Bilateral uncovertebral, facet arthropathy contributes to severe bilateral neural   foraminal stenosis.     C7-T1: Mild loss of disc height. Uncovering of  the disc posteriorly. Bilateral facet arthropathy. Aforementioned findings contribute to moderate canal and bilateral neural foraminal stenosis.      Impression    IMPRESSION: Examination significantly limited secondary to motion.  1.  Odontoid fracture better appreciated on dedicated CT. Suggestion of interspinous ligamentous injury at C1-C2, C2-C3.  2.  No identifiable defect of the anterior or posterior longitudinal ligament. No identifiable abnormal cord signal or evidence of cord contusion.  3.  Multilevel lumbar spondylosis with potentially significant high-grade canal or neural foraminal stenosis at C4-C5, C5-C6 and C6-C7.       For the majority of the shift this patient's behavior was Green     Cardiac Rhythm: N/A  Pt needs tele? No  Skin/wound Issues: None    Code Status: Full Code    Pain control: good    Nausea control: pt had none    Abnormal labs/tests/findings requiring intervention: see above, placed on 4L/NC  Patient tested for COVID 19 prior to admission: NO     OBS brochure/video discussed/provided to patient/family: N/A     Family present during ED course? Yes     Family Comments/Social Situation comments: none    Tasks needing completion: None    Becky Almaguer RN

## 2024-09-11 ENCOUNTER — APPOINTMENT (OUTPATIENT)
Dept: PHYSICAL THERAPY | Facility: CLINIC | Age: 74
DRG: 193 | End: 2024-09-11
Payer: COMMERCIAL

## 2024-09-11 VITALS
SYSTOLIC BLOOD PRESSURE: 162 MMHG | TEMPERATURE: 97.3 F | WEIGHT: 160 LBS | RESPIRATION RATE: 18 BRPM | BODY MASS INDEX: 27.31 KG/M2 | HEART RATE: 66 BPM | OXYGEN SATURATION: 93 % | DIASTOLIC BLOOD PRESSURE: 96 MMHG | HEIGHT: 64 IN

## 2024-09-11 PROCEDURE — 97161 PT EVAL LOW COMPLEX 20 MIN: CPT | Mod: GP

## 2024-09-11 PROCEDURE — 99239 HOSP IP/OBS DSCHRG MGMT >30: CPT | Performed by: STUDENT IN AN ORGANIZED HEALTH CARE EDUCATION/TRAINING PROGRAM

## 2024-09-11 PROCEDURE — 97530 THERAPEUTIC ACTIVITIES: CPT | Mod: GP

## 2024-09-11 PROCEDURE — 250N000012 HC RX MED GY IP 250 OP 636 PS 637

## 2024-09-11 PROCEDURE — 250N000013 HC RX MED GY IP 250 OP 250 PS 637

## 2024-09-11 RX ORDER — PREDNISONE 20 MG/1
40 TABLET ORAL DAILY
Qty: 3 TABLET | Refills: 0 | Status: ON HOLD | OUTPATIENT
Start: 2024-09-12 | End: 2024-09-26

## 2024-09-11 RX ORDER — CEFUROXIME AXETIL 500 MG/1
500 TABLET ORAL 2 TIMES DAILY
Qty: 10 TABLET | Refills: 0 | Status: ON HOLD | OUTPATIENT
Start: 2024-09-11 | End: 2024-09-26

## 2024-09-11 RX ADMIN — OXYCODONE HYDROCHLORIDE 10 MG: 5 TABLET ORAL at 08:06

## 2024-09-11 RX ADMIN — DULOXETINE HYDROCHLORIDE 30 MG: 30 CAPSULE, DELAYED RELEASE ORAL at 08:06

## 2024-09-11 RX ADMIN — FLUTICASONE FUROATE AND VILANTEROL TRIFENATATE 1 PUFF: 100; 25 POWDER RESPIRATORY (INHALATION) at 08:14

## 2024-09-11 RX ADMIN — UMECLIDINIUM 1 PUFF: 62.5 AEROSOL, POWDER ORAL at 08:15

## 2024-09-11 RX ADMIN — PREDNISONE 40 MG: 20 TABLET ORAL at 08:06

## 2024-09-11 RX ADMIN — ATORVASTATIN CALCIUM 40 MG: 20 TABLET, FILM COATED ORAL at 08:06

## 2024-09-11 RX ADMIN — LEVETIRACETAM 500 MG: 500 TABLET, FILM COATED ORAL at 08:06

## 2024-09-11 RX ADMIN — LEVOTHYROXINE SODIUM 50 MCG: 0.05 TABLET ORAL at 08:06

## 2024-09-11 RX ADMIN — OXYCODONE HYDROCHLORIDE 10 MG: 5 TABLET ORAL at 14:12

## 2024-09-11 RX ADMIN — CELECOXIB 200 MG: 200 CAPSULE ORAL at 08:09

## 2024-09-11 RX ADMIN — METOPROLOL SUCCINATE 50 MG: 50 TABLET, EXTENDED RELEASE ORAL at 08:07

## 2024-09-11 RX ADMIN — FOLIC ACID 1 MG: 1 TABLET ORAL at 08:06

## 2024-09-11 ASSESSMENT — ACTIVITIES OF DAILY LIVING (ADL)
ADLS_ACUITY_SCORE: 27

## 2024-09-11 NOTE — DISCHARGE INSTRUCTIONS
The [facility] will review the chart and call you back within 2 days if they have not contacted you by the end of day Friday, call them Monday morning talk with them [phone] press 1    Cervical collar is to stay on all the time until assessed by neurosurgery

## 2024-09-11 NOTE — PROGRESS NOTES
Home Oxygen Assessment Tools    Patient O2 on RA 90% at rest, 93% on 1L oxygen at rest.  Patient O2 on RA 83% standing on the side of the chair after 30 seconds. Standing with oxygen 3L  @ 92%; took 1.5  to rebound. Then returned to 93% on 1L of oxygen.

## 2024-09-11 NOTE — PROGRESS NOTES
09/11/24 1043   Appointment Info   Signing Clinician's Name / Credentials (PT) Sandra Robison, PT   Living Environment   People in Home spouse   Current Living Arrangements house   Home Accessibility no concerns   Self-Care   Equipment Currently Used at Home cane, straight;walker, standard;shower chair   Fall history within last six months yes   Number of times patient has fallen within last six months 2   Activity/Exercise/Self-Care Comment indep with mobility, has 4WW and cane if needed. indep with ADL's/IADL's, spouse able to assist as needed.   General Information   Onset of Illness/Injury or Date of Surgery 09/09/24   Referring Physician Waylon Myers,    Patient/Family Therapy Goals Statement (PT) return home   Pertinent History of Current Problem (include personal factors and/or comorbidities that impact the POC) Jessica Ellison is a 73 year old female with past medical history of  chronic bilateral low back pain without sciatica status post lumbar fusion (2015), centrilobular emphysema,, chronic pain syndrome, who presents for evaluation of headache, neck pain and low oxygen in setting of fall 3 days ago wtih odontoid fracture, now with C collar at all times.   Existing Precautions/Restrictions fall;oxygen therapy device and L/min  (brace at all times)   Cognition   Affect/Mental Status (Cognition) WFL   Orientation Status (Cognition) oriented x 4   Follows Commands (Cognition) WFL   Pain Assessment   Patient Currently in Pain Yes, see Vital Sign flowsheet  (chronic back pain)   Range of Motion (ROM)   Range of Motion ROM is WFL   Strength (Manual Muscle Testing)   Strength (Manual Muscle Testing) No deficits observed during functional mobility   Strength Comments reports general LE weakness form reduced mobility during hospitalization   Bed Mobility   Comment, (Bed Mobility) up in chair, bed mobility not addressed but anticipate no concerns   Transfers   Comment, (Transfers) sit>stand from recliner with  Cintia and no device, good standing balance   Gait/Stairs (Locomotion)   [place] Level (Gait) supervision   Assistive Device (Gait)   (no device)   Distance in Feet (Gait) 50   Pattern (Gait) step-through   Deviations/Abnormal Patterns (Gait) gait speed decreased;weight shifting decreased   Maintains Weight-bearing Status (Gait) able to maintain   Comment, (Gait/Stairs) trendelenberg and slightly off balance wiht ambulation but pt states no different from baseline   Balance   Balance no deficits were identified   Balance Comments hx of falls but no concerns during session   Clinical Impression   Criteria for Skilled Therapeutic Intervention Yes, treatment indicated   PT Diagnosis (PT) impaired functional mobility   Influenced by the following impairments reduced activity tolerance   Functional limitations due to impairments impaired transfers, gait   Clinical Presentation (PT Evaluation Complexity) stable   Clinical Presentation Rationale clinical reasoning   Clinical Decision Making (Complexity) low complexity   Planned Therapy Interventions (PT) gait training;home exercise program;patient/family education;strengthening;transfer training;progressive activity/exercise;risk factor education;home program guidelines   Risk & Benefits of therapy have been explained evaluation/treatment results reviewed;care plan/treatment goals reviewed;risks/benefits reviewed;current/potential barriers reviewed;participants voiced agreement with care plan;participants included;patient   PT Total Evaluation Time   PT Eval, Low Complexity Minutes (43515) 10   Physical Therapy Goals   PT Frequency One time eval and treatment only   PT Predicted Duration/Target Date for Goal Attainment 09/11/24   PT Goals Transfers;Gait   PT: Transfers Supervision/stand-by assist;Sit to/from stand;Goal Met   PT: Gait Supervision/stand-by assist;50 feet;Goal Met   Interventions   Interventions Quick Adds Therapeutic Activity   Therapeutic Activity    Therapeutic Activities: dynamic activities to improve functional performance Minutes (80809) 10   Symptoms Noted During/After Treatment None   Treatment Detail/Skilled Intervention edu on role of PT to assess mobility and assist in safe discharge plan. see above for mobility in room, pt reporting feel gait is at baseline due to chronic back issues. discussed rec to wear brace at all times and pt aware and in agreement. pt is able to safely manage O2 tubing without difficulty/concern. no concerns wiht return home today as pt at baseline and has assist from spouse. remains in chair, call light in reach, no further PT needs.   PT Discharge Planning   PT Plan d/c PT, no needs   PT Discharge Recommendation (DC Rec) home with assist   PT Rationale for DC Rec pt mobilizing near baseline and feels safe to return home today, no further PT needs   PT Brief overview of current status amb 50 feet without device and Cintia   PT Equipment Needed at Discharge   (no needs)   Total Session Time   Timed Code Treatment Minutes 10   Total Session Time (sum of timed and untimed services) 20

## 2024-09-11 NOTE — DISCHARGE SUMMARY
"[facility]  Hospitalist Discharge Summary      Date of Admission:  9/9/2024  Date of Discharge:  9/11/2024  Discharging Provider: Martín Vargas MD  Discharge Service: Hospitalist Service    Discharge Diagnoses   # Acute respiratory failure with hypoxia and hypercapnia (H), improved   # Other closed displaced odontoid fracture, initial encounter (H)  # Fall, mechanical  # Possible community acquired pneumonia  # Centrilobular emphysema (H) with suspected exacerbation   # COPD, with acute exacerbation   # Elevated creatinine, improved   # Chronic kidney disease  # Chronic pain with continuous opioid dependence   # Chronic anemia   # Hypertension   # JOY   # Seizure disorder   # Hypothyroidism   # Hyperlipidemia     Clinically Significant Risk Factors     # Overweight: Estimated body mass index is 27.46 kg/m  as calculated from the following:    Height as of this encounter: 1.626 m (5' 4\").    Weight as of this encounter: 72.6 kg (160 lb).       Follow-ups Needed After Discharge   Follow-up Appointments     Follow-up and recommended labs and tests       Follow up with primary care provider, Sarah Barriga, within 7 days to   evaluate medication change.    Plan to follow up with neurosurgery within the next 2 weeks            Unresulted Labs Ordered in the Past 30 Days of this Admission       No orders found from 8/10/2024 to 9/10/2024.        These results will be followed up by Martín Vargas MD.    Discharge Disposition   Discharged to home  Condition at discharge: Stable    Hospital Course   Jessica Ellison is a 73 year old female with past medical history of  chronic bilateral low back pain without sciatica status post lumbar fusion (2015), centrilobular emphysema,, chronic pain syndrome, who presents for evaluation of headache, neck pain and low oxygen in setting of fall 3 days ago. [facility] odontoid fracture and placed in cervical [place] with neurosurgery follow up planned. " Possible pneumonia but suspect progression of emphysema now requiring oxygen supplementations.      # Acute respiratory failure with hypoxia and hypercapnia (H), improved   Patient noted hypoxia on home oximeter, and was hypoxic to 81% on ED arrival.   VBG with ph 7.27 / pCO2 62 / bicarb 28 ? ph 7.28 / pCO2 59 / bicarb 28  CXR  with lungs clear on formal reading. However, on personal review notable for retrocardiac infiltrate noted on lateral view. No leukocytosis, no electrolyte abnormalities. Does have acute renal dysfunction, see below. Currently on 3 L of oxygen. Does not use oxygen at home. Patient is afebrile, hemodynamically stable on admission with 3L supplemental O2 via NC. Received duoneb inhaler, solumedrol 125mg, and started on abx for possible pneumonia in the ED. Diffuse expiratory wheezing noted on admission exam.  Most likely due to COPD exacerbation but also concerning for community acquired pneumonia, see evaluation and management below.   - Starting home oxygen at discharge   - Completing course of steroids and antibiotics       # Other closed displaced odontoid fracture, initial encounter (H)  # Fall, mechanical  Patient had mechanical fall from standing three days prior to admission and hit her head. She had immediate pain in her head and neck following the fall. Did not lose consciousness. Was at her baseline, asymptomatic prior to the fall. Typically uses a walker to get around her home. CT head and C spine revealed acute type I dens fracture, potentially unstable.no acute intercranial process.   MR C spine revealed Odontoid fracture better appreciated on dedicated CT. Suggestion of interspinous ligamentous injury at C1-C2, C2-C3. No identifiable defect of the anterior or posterior longitudinal ligament. No identifiable abnormal cord signal or evidence of cord contusion.  Multilevel lumbar spondylosis with potentially significant high-grade canal or neural foraminal stenosis at C4-C5, C5-C6  and C6-C7.  ED provider discussed these findings with neurosurgery Dr. León who recommended 24/7 hard collar and follow up with neurosurgery in 2 weeks.   No focal neurological deficits, no other injuries noted, and pain is well controlled at time of admission.   - Hard collar in place 24/7   - Neurosurgery placed, follow up in 2 weeks    - Continued acetaminophen 650mg q4hrs prn, pta percocet, pta celecoxib     # Possible community acquired pneumonia  Hypoxic on presentation to 81% on room air. Does have hx of emphysema. CXR on admission concerning with retrocardiac infiltrate. Afebrile, no leukocytosis. Denies cough, no upper respiratory symptoms. Does endorse occasional dyspnea with exertion. Diffuse expiratory wheezing noted on admission exam. Started on rocephin and azithromycin in the ED.   - Switching to cefuroxime to complete course 500 mg bid     # Centrilobular emphysema (H) with suspected exacerbation   # COPD, with acute exacerbation   Former smoker with 30 pack year hx, quit 2004. Most recent pfts (08/10/23) was normal. Patient denies home O2 use at time of admission however on chart review it appears she did use home O2 previously (see admission 11/2023). She noted hypoxia on home oximeter on AM of admission. Endorses occasional dyspnea on exertion. No cough, no symptoms of upper respiratory infection. No leukocytosis, patient is afebrile. Diffuse expiratory wheezing noted on admission exam. Managed prior to admission with symbicort inhaler, spiriva inhaler, ventolin inhaler. Received duoneb inhaler, solumedrol 125mg in the ED. With improvement.   - Continue pta inhalers   - Completing 5 day prednisone burst      # Elevated creatinine, improved   # Chronic kidney disease  Creatinine on admission 1.22, increased from recent baseline 1.05 on 08/12/24. GFR 47.   BUN 27.3. Received 500mL IV NS in the ED. Improved.      # Chronic pain with continuous opioid dependence   Patient has chronic opioid use for  several years due to longstanding back and knee pain. Patient had lumbar spinal fusion surgery and left total knee replacement.    - Continued pta percocet q6hrs and celecoxib     # Chronic anemia   Hgb on admission 10.5, improved from previous 9.6. MCV 99. stable.      # Hypertension   Managed prior to admission with amlodipine 5mg  and metoprolol 50mg BID continued both.      # JOY   Uses CPAP at home; continued      # Seizure disorder   Denies recent seizures. Endorses good compliance with her pta keppra 500mg BID; continued     # Hypothyroidism   Managed prior to admission with levothyroxine 50mcg daily; continued     # Hyperlipidemia   Managed prior to admission with atorvastatin 40mg daily; continued     Consultations This Hospital Stay   PHYSICAL THERAPY ADULT IP CONSULT    Code Status   Full Code    Time Spent on this Encounter   I, Martín Vargas MD, personally saw the patient today and spent greater than 30 minutes discharging this patient.       Martín Vargas MD  [facility] SURGICAL  [address] [address]  Phone: [phone]  Fax: [phone]  ______________________________________________________________________    Physical Exam   Vital Signs: Temp: 97.3  F (36.3  C) Temp src: Oral BP: (!) 162/96 Pulse: 66   Resp: 18 SpO2: 93 % O2 Device: Nasal cannula Oxygen Delivery: 1 LPM  Weight: 160 lbs 0 oz  General Appearance: Awake and alert, sitting up in a rigid cervical color in no acute distress  Respiratory: Breathing easily on nasal canula, breath sounds distant but clear   Cardiovascular: Regular rate and rhythm   GI: Abdomen soft, non-tender, and non-distended   Skin: No rashes or lesions   Other: Appropriate mood and affect, no focal deficits apprecitated         Primary Care Physician   Sarah Barriga    Discharge Orders      Adult Neurosurgery  Referral      Reason for your hospital stay    You were hospitalized with low oxygen levels  and pain after a fall. You were found to have a fracture of your neck and possible pneumonia with suspected progression of your lung diease.     Follow-up and recommended labs and tests     Follow up with primary care provider, Sarah Barriga, within 7 days to evaluate medication change.    Plan to follow up with neurosurgery within the next 2 weeks     Activity    Your activity upon discharge: activity as tolerated     Oxygen Adult/Peds    Oxygen Documentation  I certify that this patient, Jessica Ellison has been under my care (or a nurse practitioner or physican's assistant working with me). This is the face-to-face encounter for oxygen medical necessity.      At the time of this encounter, I have reviewed the qualifying testing and have determined that supplemental oxygen is reasonable and necessary and is expected to improve the patient's condition in a home setting.         Patient has continued oxygen desaturation due to Emphysema J43.9.    If portability is ordered, is the patient mobile within the home? yes    Was this visit performed as a telehealth visit: No     Diet    Follow this diet upon discharge: Current Diet:Orders Placed This Encounter      Combination Diet Regular Diet Adult       Significant Results and Procedures   Results for orders placed or performed during the hospital encounter of 09/09/24   Head CT w/o contrast     Value    Radiologist flags Acute cervical spine fracture (AA)    Narrative    EXAM: CT HEAD W/O CONTRAST, CT CERVICAL SPINE W/O CONTRAST  LOCATION: [facility]  DATE: 9/9/2024    INDICATION: headache in setting of fall three days ago  COMPARISON: MRI brain November 16, 2023. CTA head and neck October 26, 2023.  TECHNIQUE:   1) Routine CT Head without IV contrast. Multiplanar reformats. Dose reduction techniques were used.  2) Routine CT Cervical Spine without IV contrast. Multiplanar reformats. Dose reduction techniques were used.    FINDINGS:    HEAD CT:   INTRACRANIAL CONTENTS: Streak artifact from left paraclinoid ICA region coil pack limits evaluation. No intracranial hemorrhage, extraaxial collection, or mass effect.  No CT evidence of acute infarct. Moderate presumed chronic small vessel ischemic   changes. Unchanged small region of right occipital encephalomalacia. Moderate generalized volume loss. No hydrocephalus.     VISUALIZED ORBITS/SINUSES/MASTOIDS: No intraorbital abnormality. No significant paranasal sinus mucosal disease. No middle ear or mastoid effusion.    BONES/SOFT TISSUES: No acute abnormality.    CERVICAL SPINE CT:   VERTEBRA: Oblique and potentially unstable type I dens fracture. No traumatic malalignment by CT. Unchanged grade 1 anterolisthesis of C3 on C4 and C7 on T1. Unchanged grade 1 anterolisthesis of C4 on C5.    CANAL/FORAMINA: Multilevel spondylosis without high grade canal stenosis. Uncovertebral ridging with facet arthrosis results in multilevel severe neural foraminal stenosis.    PARASPINAL: No prevertebral edema.      Impression    IMPRESSION:  HEAD CT:  1.  No acute intracranial process.    CERVICAL SPINE CT:  1.  Acute type I dens fracture, potentially unstable. MR cervical spine is recommended for further evaluation.      [Critical Result: Acute cervical spine fracture]    Finding was identified on 9/9/2024 6:10 PM CDT.     Dr. Castrejon was contacted by me on 9/9/2024 6:16 PM CDT and verbalized understanding of the critical result.   CT Cervical Spine w/o Contrast     Value    Radiologist flags Acute cervical spine fracture (AA)    Narrative    EXAM: CT HEAD W/O CONTRAST, CT CERVICAL SPINE W/O CONTRAST  LOCATION: [facility]  DATE: 9/9/2024    INDICATION: headache in setting of fall three days ago  COMPARISON: MRI brain November 16, 2023. CTA head and neck October 26, 2023.  TECHNIQUE:   1) Routine CT Head without IV contrast. Multiplanar reformats. Dose reduction techniques were used.  2)  Routine CT Cervical Spine without IV contrast. Multiplanar reformats. Dose reduction techniques were used.    FINDINGS:   HEAD CT:   INTRACRANIAL CONTENTS: Streak artifact from left paraclinoid ICA region coil pack limits evaluation. No intracranial hemorrhage, extraaxial collection, or mass effect.  No CT evidence of acute infarct. Moderate presumed chronic small vessel ischemic   changes. Unchanged small region of right occipital encephalomalacia. Moderate generalized volume loss. No hydrocephalus.     VISUALIZED ORBITS/SINUSES/MASTOIDS: No intraorbital abnormality. No significant paranasal sinus mucosal disease. No middle ear or mastoid effusion.    BONES/SOFT TISSUES: No acute abnormality.    CERVICAL SPINE CT:   VERTEBRA: Oblique and potentially unstable type I dens fracture. No traumatic malalignment by CT. Unchanged grade 1 anterolisthesis of C3 on C4 and C7 on T1. Unchanged grade 1 anterolisthesis of C4 on C5.    CANAL/FORAMINA: Multilevel spondylosis without high grade canal stenosis. Uncovertebral ridging with facet arthrosis results in multilevel severe neural foraminal stenosis.    PARASPINAL: No prevertebral edema.      Impression    IMPRESSION:  HEAD CT:  1.  No acute intracranial process.    CERVICAL SPINE CT:  1.  Acute type I dens fracture, potentially unstable. MR cervical spine is recommended for further evaluation.      [Critical Result: Acute cervical spine fracture]    Finding was identified on 9/9/2024 6:10 PM CDT.     Dr. Castrejon was contacted by me on 9/9/2024 6:16 PM CDT and verbalized understanding of the critical result.   Chest XR,  PA & LAT    Narrative    EXAM: XR CHEST 2 VIEWS  LOCATION: [facility]  DATE: 9/9/2024    INDICATION: Hypoxia.    COMPARISON: 8/16/2023.      Impression    IMPRESSION: No significant interval change. Lungs clear. No pleural effusions. Posterior spinal rods are again noted in the lower thoracic and upper lumbar spine.     Cervical  spine MRI w/o contrast    Narrative    EXAM: MR CERVICAL SPINE W/O CONTRAST  LOCATION: [facility]  DATE: 9/9/2024    INDICATION: acute type 1 C2 fracture  COMPARISON: Same day CT.  TECHNIQUE: MRI Cervical Spine without IV contrast.    FINDINGS: Examination significantly limited by motion.  Preserved vertebral body heights. Degenerative grade 1 C3-C4 anterolisthesis, C7-T1 anterolisthesis marrow signal. T2 hyperintense signal C1-C2, C2-C3 and C4-C5 interspinous ligamentous spaces. No identifiable defect involving the anterior, posterior   longitudinal ligament. No identifiable abnormal cord signal. Trace presumably posttraumatic prevertebral edema.    Craniovertebral junction and C1-C2: Transversely oriented T1 normal signal through the odontoid, better appreciated on CT.    C2-C3: Preserved disc height and signal for age. No cord compression. Normal facets for age. No high-grade neural foraminal stenosis.     C3-C4: Preserved disc height and signal for age. Symmetric disc bulge, uncovering of the disc posteriorly causes partial CSF space effacement, cord flattening and associated mild-to-moderate canal stenosis.] Facet arthropathy contributes to moderate   right neural foraminal stenosis. Patent left neural foramen.     C4-C5: Moderate loss of disc height, intradiscal T2 signal. Posterior disc osteophyte complex causes CSF space effacement, cord flattening and associated moderate to severe canal stenosis. Bilateral uncovertebral, facet arthropathy contributes to   moderate to severe bilateral neural foraminal stenosis.     C5-C6: Moderate loss of disc height, intradiscal T2 signal. Posterior disc osteophyte complex contributes to CSF space effacement, cord flattening and associated moderate to severe canal stenosis. Bilateral uncovertebral, facet arthropathy contributes to   severe neural foraminal stenosis.     C6-C7: Moderate loss of disc height, intradiscal T2 signal. Posterior disc  osteophyte complex causes CSF space effacement, cord flattening and associated canal stenosis. Bilateral uncovertebral, facet arthropathy contributes to severe bilateral neural   foraminal stenosis.     C7-T1: Mild loss of disc height. Uncovering of the disc posteriorly. Bilateral facet arthropathy. Aforementioned findings contribute to moderate canal and bilateral neural foraminal stenosis.      Impression    IMPRESSION: Examination significantly limited secondary to motion.  1.  Odontoid fracture better appreciated on dedicated CT. Suggestion of interspinous ligamentous injury at C1-C2, C2-C3.  2.  No identifiable defect of the anterior or posterior longitudinal ligament. No identifiable abnormal cord signal or evidence of cord contusion.  3.  Multilevel lumbar spondylosis with potentially significant high-grade canal or neural foraminal stenosis at C4-C5, C5-C6 and C6-C7.       Discharge Medications   Current Discharge Medication List        START taking these medications    Details   cefuroxime (CEFTIN) 500 MG tablet Take 1 tablet (500 mg) by mouth 2 times daily.  Qty: 10 tablet, Refills: 0    Associated Diagnoses: Centrilobular emphysema (H)      predniSONE (DELTASONE) 20 MG tablet Take 2 tablets (40 mg) by mouth daily.  Qty: 3 tablet, Refills: 0    Associated Diagnoses: Centrilobular emphysema (H)           CONTINUE these medications which have NOT CHANGED    Details   amLODIPine (NORVASC) 5 MG tablet Take 1 tablet (5 mg) by mouth daily  Qty: 90 tablet, Refills: 1    Associated Diagnoses: Essential hypertension with goal blood pressure less than 140/90      aspirin (SM ASPIRIN EC) 325 MG EC tablet TAKE ONE TABLET BY MOUTH ONCE DAILY WITH DINNER FOR 30 DAYS  Qty: 90 tablet, Refills: 3    Associated Diagnoses: Primary osteoarthritis of left knee      atorvastatin (LIPITOR) 40 MG tablet Take 1 tablet (40 mg) by mouth daily  Qty: 90 tablet, Refills: 3    Associated Diagnoses: Hyperlipidemia LDL goal <70       budesonide-formoterol (SYMBICORT) 80-4.5 MCG/ACT Inhaler Inhale 2 puffs into the lungs 2 times daily  Qty: 6.9 g, Refills: 1    Associated Diagnoses: Chronic obstructive pulmonary disease with acute exacerbation (H)      calcium carbonate 600 mg-vitamin D 400 units (CALTRATE) 600-400 MG-UNIT per tablet Take 1 tablet by mouth every evening      celecoxib (CELEBREX) 200 MG capsule Take 1 capsule (200 mg) by mouth daily  Qty: 90 capsule, Refills: 1    Associated Diagnoses: Chronic bilateral low back pain without sciatica; Sacroiliac joint pain      DULoxetine (CYMBALTA) 30 MG capsule Take 1 capsule (30 mg) by mouth 2 times daily  Qty: 180 capsule, Refills: 1    Associated Diagnoses: Major depressive disorder, recurrent episode, moderate (H)      folic acid (FOLVITE) 1 MG tablet Take 1 tablet (1 mg) by mouth daily  Qty: 90 tablet, Refills: 3    Associated Diagnoses: Alcohol abuse      gabapentin (NEURONTIN) 600 MG tablet Take 1 tablet (600 mg) by mouth at bedtime  Qty: 90 tablet, Refills: 1    Associated Diagnoses: Major depressive disorder, recurrent episode, moderate (H); Chronic bilateral low back pain without sciatica      levETIRAcetam (KEPPRA) 500 MG tablet Take 1 tablet (500 mg) by mouth 2 times daily  Qty: 180 tablet, Refills: 3    Associated Diagnoses: Seizure (H)      levothyroxine (SYNTHROID/LEVOTHROID) 50 MCG tablet Take 1 tablet (50 mcg) by mouth daily  Qty: 90 tablet, Refills: 3    Associated Diagnoses: Hypothyroidism, unspecified type      Melatonin 10 MG TABS tablet Take 20 mg by mouth At Bedtime      metoprolol succinate ER (TOPROL XL) 50 MG 24 hr tablet Take 1 tablet (50 mg) by mouth 2 times daily  Qty: 180 tablet, Refills: 3    Associated Diagnoses: Essential hypertension with goal blood pressure less than 140/90      multivitamin, therapeutic (THERA-VIT) TABS tablet Take 1 tablet by mouth daily      naloxone (NARCAN) 4 MG/0.1ML nasal spray Spray 1 spray (4 mg) into one nostril alternating nostrils  once as needed for opioid reversal every 2-3 minutes until assistance arrives  Qty: 0.2 mL, Refills: 1    Associated Diagnoses: Chronic bilateral low back pain without sciatica      nystatin (MYCOSTATIN) 434620 UNIT/GM external powder Apply topically 3 times daily as needed (rash)      oxyCODONE-acetaminophen (PERCOCET)  MG per tablet Take 1 tablet by mouth every 6 hours as needed for severe pain  Qty: 50 tablet, Refills: 0    Associated Diagnoses: Chronic bilateral low back pain without sciatica; Sacroiliac joint pain; S/P lumbar spinal fusion; Closed nondisplaced fracture of greater tuberosity of left humerus, initial encounter      QUEtiapine (SEROQUEL) 50 MG tablet TAKE 2 TABLETS BY MOUTH AT BEDTIME  Qty: 90 tablet, Refills: 1    Associated Diagnoses: Anxiety      tiotropium (SPIRIVA HANDIHALER) 18 MCG inhaled capsule USING THE HANDIHALER, INHALE THE CONTENTS OF ONE CAPSULE BY MOUTH ONCE DAILY  Qty: 30 capsule, Refills: 11    Associated Diagnoses: Chronic obstructive pulmonary disease, unspecified COPD type (H)      VENTOLIN  (90 Base) MCG/ACT inhaler INHALE 2 PUFFS INTO THE LUNGS EVERY 6 HOURS AS NEEDED FOR SHORTNESS OF BREATH OR WHEEZING  Qty: 18 g, Refills: 1    Comments: Pharmacy may dispense brand covered by insurance (Proair, or proventil or ventolin or generic albuterol inhaler)  Associated Diagnoses: Chronic obstructive pulmonary disease, unspecified COPD type (H)           Allergies   Allergies   Allergen Reactions    Bee Venom     Lisinopril Swelling     Oral swelling

## 2024-09-11 NOTE — PLAN OF CARE
[facility] DISCHARGE NOTE    Patient discharged to home at 2:50 PM via wheel chair. Accompanied by spouse and staff. Discharge instructions reviewed with patient and spouse, opportunity offered to ask questions. Prescriptions sent to patients preferred pharmacy. All belongings sent with patient.  Patient was educated on the need to leave the cervical collar on all the time per doctors notes.    Janel Eaton RN

## 2024-09-11 NOTE — PLAN OF CARE
Problem: Adult Inpatient Plan of Care  Goal: Plan of Care Review  Description: The Plan of Care Review/Shift note should be completed every shift.  The Outcome Evaluation is a brief statement about your assessment that the patient is improving, declining, or no change.  This information will be displayed automatically on your shift  note.  Flowsheets (Taken 9/11/2024 0320)  Outcome Evaluation: Patient has been sleeping comfortably throughout the night. O2/4L via oxy mask to maintain sats anywhere from 93-95%. Home O2 eval needed before discharge. Plan is for discharge today. Cervical collar on and to be worn at all times. Patient will f/u with neurosurgery post discharge  Plan of Care Reviewed With: patient  Overall Patient Progress: improving  Goal: Absence of Hospital-Acquired Illness or Injury  Intervention: Identify and Manage Fall Risk  Recent Flowsheet Documentation  Taken 9/11/2024 0258 by Gina Kruse RN  Safety Promotion/Fall Prevention:   room near nurse's station   activity supervised   mobility aid in reach  Intervention: Prevent Skin Injury  Recent Flowsheet Documentation  Taken 9/11/2024 0258 by Gina Kruse RN  Body Position: position changed independently  Device Skin Pressure Protection:   adhesive use limited   tubing/devices free from skin contact  Intervention: Prevent Infection  Recent Flowsheet Documentation  Taken 9/11/2024 0258 by Gina Kruse RN  Infection Prevention: rest/sleep promoted     Problem: Risk for Delirium  Goal: Improved Behavioral Control  Intervention: Minimize Safety Risk  Recent Flowsheet Documentation  Taken 9/11/2024 0258 by Gina Kruse RN  Communication Enhancement Strategies: call light answered in person  Enhanced Safety Measures: assistive devices when indicated  Goal: Improved Attention and Thought Clarity  Intervention: Maximize Cognitive Function  Recent Flowsheet Documentation  Taken 9/11/2024 0258 by Gina Kruse RN  Sensory  Stimulation Regulation: care clustered  Reorientation Measures: clock in view     Problem: Pain Acute  Goal: Optimal Pain Control and Function  Intervention: Prevent or Manage Pain  Recent Flowsheet Documentation  Taken 9/11/2024 0258 by Gina Kruse RN  Sensory Stimulation Regulation: care clustered  Medication Review/Management:   medications reviewed   high-risk medications identified     Problem: Fall Injury Risk  Goal: Absence of Fall and Fall-Related Injury  Intervention: Identify and Manage Contributors  Recent Flowsheet Documentation  Taken 9/11/2024 0258 by Gina Kruse RN  Medication Review/Management:   medications reviewed   high-risk medications identified  Intervention: Promote Injury-Free Environment  Recent Flowsheet Documentation  Taken 9/11/2024 0258 by Gina Kruse RN  Safety Promotion/Fall Prevention:   room near nurse's station   activity supervised   mobility aid in reach     Problem: Gas Exchange Impaired  Goal: Optimal Gas Exchange  Intervention: Optimize Oxygenation and Ventilation  Recent Flowsheet Documentation  Taken 9/11/2024 0258 by Gina Kruse RN  Head of Bed (HOB) Positioning: HOB at 20-30 degrees   Goal Outcome Evaluation:      Plan of Care Reviewed With: patient    Overall Patient Progress: improvingOverall Patient Progress: improving    Outcome Evaluation: Patient has been sleeping comfortably throughout the night. O2/4L via oxy mask to maintain sats anywhere from 93-95%. Home O2 eval needed before discharge. Plan is for discharge today. Cervical collar on and to be worn at all times. Patient will f/u with neurosurgery post discharge

## 2024-09-12 ENCOUNTER — TELEPHONE (OUTPATIENT)
Dept: NEUROSURGERY | Facility: CLINIC | Age: 74
End: 2024-09-12
Payer: COMMERCIAL

## 2024-09-12 ENCOUNTER — PATIENT OUTREACH (OUTPATIENT)
Dept: CARE COORDINATION | Facility: CLINIC | Age: 74
End: 2024-09-12
Payer: COMMERCIAL

## 2024-09-12 NOTE — PROGRESS NOTES
"  General acute hospital: Transitions of Care Outreach  Chief Complaint   Patient presents with    Clinic Care Coordination - Post Hospital       Most Recent Admission Date: 9/9/2024   Most Recent Admission Diagnosis: Acute kidney injury (H24) - N17.9  Centrilobular emphysema (H) - J43.2  Acute respiratory failure with hypoxia and hypercapnia (H) - J96.01, J96.02  Other closed displaced odontoid fracture, initial encounter (H) - S12.120A     Most Recent Discharge Date: 9/11/2024   Most Recent Discharge Diagnosis: Other closed displaced odontoid fracture, initial encounter (H) - S12.120A  Acute respiratory failure with hypoxia and hypercapnia (H) - J96.01, J96.02  Centrilobular emphysema (H) - J43.2  Acute kidney injury (H24) - N17.9  Chronic pain syndrome - G89.4     Transitions of Care Assessment    Discharge Assessment  How are you doing now that you are home?: \" I am doing fine \"  How are your symptoms? (Red Flag symptoms escalate to triage hotline per guidelines): Improved  Do you know how to contact your clinic care team if you have future questions or changes to your health status? : Yes  Does the patient have their discharge instructions? : Yes  Does the patient have questions regarding their discharge instructions? : No  Were you started on any new medications or were there changes to any of your previous medications? : Yes  Does the patient have all of their medications?: Yes  Do you have questions regarding any of your medications? : No  Do you have all of your needed medical supplies or equipment (DME)?  (i.e. oxygen tank, CPAP, cane, etc.): Yes    Post-op ([facility] CT[facility] Only)  If the patient had a surgery or procedure, do they have any questions for a nurse?: No         [facility] Explained and offered Care Coordination support to eligible patients: Yes    Patient accepted? No    Follow up Plan     Discharge Follow-Up  Discharge follow up appointment scheduled in alignment with recommended follow up " timeframe or Transitions of Risk Category? (Low = within 30 days; Moderate= within 14 days; High= within 7 days): Yes  Discharge Follow Up Appointment Date: 09/16/24  Discharge Follow Up Appointment Scheduled with?: Primary Care Provider    Future Appointments   Date Time Provider Department Center   9/16/2024  2:00 PM Leann Castro APRN CNP [facility]   10/3/2024 12:00 PM Maria Del Carmen Iglesias PA-C [facility]   10/9/2024 11:30 AM Laila Crocker MD [facility]   12/9/2024  5:00 PM Jet Greco MD [facility]   4/18/2025  1:15 PM Jet Greco MD [facility]       Outpatient Plan as outlined on AVS reviewed with patient.    For any urgent concerns, please contact our 24 hour nurse triage line: [phone] ([phone])       VERONICA Galan

## 2024-09-12 NOTE — TELEPHONE ENCOUNTER
SPINE PATIENTS - NEW PROTOCOL PREVISIT    RECORDS RECEIVED FROM: Internal    REASON FOR VISIT: Other closed displaced odontoid fracture, initial encounter   PROVIDER: Maria Del Carmen Iglesias PA-C   DATE OF APPT: 10/3/24 @ 12:00 pm    NOTES (FOR ALL VISITS) STATUS DETAILS   OFFICE NOTE from referring provider Internal Hosp Referral    DISCHARGE SUMMARY from hospital Internal 9/9/24-9/11/24 Martín Vargas MD @[facility]     MEDICATION LIST Internal    IMAGING  (FOR ALL VISITS)     MRI (HEAD, NECK, SPINE) Internal [facility]  9/9/24 MR Cervical Spine  11/16/23 MRA Neck (Carotid)     CT (HEAD, NECK, SPINE) Internal [facility]  9/9/24 CT Cervical Spine  9/9/24 CT Head  10/26/23 CTA Head Neck

## 2024-09-12 NOTE — TELEPHONE ENCOUNTER
Called patient and scheduled appointment w/ Maria Del Carmen Iglesias per aRdha Matute via patient call list. -KB

## 2024-09-26 ENCOUNTER — APPOINTMENT (OUTPATIENT)
Dept: GENERAL RADIOLOGY | Facility: CLINIC | Age: 74
DRG: 917 | End: 2024-09-26
Attending: INTERNAL MEDICINE
Payer: COMMERCIAL

## 2024-09-26 ENCOUNTER — HOSPITAL ENCOUNTER (EMERGENCY)
Facility: CLINIC | Age: 74
Discharge: ANOTHER HOSPITAL | End: 2024-09-26
Attending: EMERGENCY MEDICINE | Admitting: EMERGENCY MEDICINE
Payer: COMMERCIAL

## 2024-09-26 ENCOUNTER — APPOINTMENT (OUTPATIENT)
Dept: CT IMAGING | Facility: CLINIC | Age: 74
End: 2024-09-26
Attending: EMERGENCY MEDICINE
Payer: COMMERCIAL

## 2024-09-26 ENCOUNTER — APPOINTMENT (OUTPATIENT)
Dept: GENERAL RADIOLOGY | Facility: CLINIC | Age: 74
End: 2024-09-26
Attending: EMERGENCY MEDICINE
Payer: COMMERCIAL

## 2024-09-26 ENCOUNTER — HOSPITAL ENCOUNTER (INPATIENT)
Facility: CLINIC | Age: 74
End: 2024-09-26
Attending: INTERNAL MEDICINE | Admitting: INTERNAL MEDICINE
Payer: COMMERCIAL

## 2024-09-26 VITALS
SYSTOLIC BLOOD PRESSURE: 150 MMHG | TEMPERATURE: 97.7 F | RESPIRATION RATE: 12 BRPM | OXYGEN SATURATION: 96 % | WEIGHT: 170 LBS | HEART RATE: 85 BPM | DIASTOLIC BLOOD PRESSURE: 86 MMHG | BODY MASS INDEX: 29.18 KG/M2

## 2024-09-26 DIAGNOSIS — J96.01 ACUTE RESPIRATORY FAILURE WITH HYPOXIA (H): ICD-10-CM

## 2024-09-26 DIAGNOSIS — S12.120A OTHER CLOSED DISPLACED ODONTOID FRACTURE, INITIAL ENCOUNTER (H): ICD-10-CM

## 2024-09-26 DIAGNOSIS — J43.2 CENTRILOBULAR EMPHYSEMA (H): ICD-10-CM

## 2024-09-26 DIAGNOSIS — Z98.1 S/P LUMBAR SPINAL FUSION: ICD-10-CM

## 2024-09-26 DIAGNOSIS — K59.00 CONSTIPATION, UNSPECIFIED CONSTIPATION TYPE: ICD-10-CM

## 2024-09-26 DIAGNOSIS — I10 ESSENTIAL HYPERTENSION: ICD-10-CM

## 2024-09-26 DIAGNOSIS — G47.00 INSOMNIA, UNSPECIFIED TYPE: ICD-10-CM

## 2024-09-26 DIAGNOSIS — M54.50 CHRONIC BILATERAL LOW BACK PAIN WITHOUT SCIATICA: ICD-10-CM

## 2024-09-26 DIAGNOSIS — G89.29 CHRONIC BILATERAL LOW BACK PAIN WITHOUT SCIATICA: ICD-10-CM

## 2024-09-26 DIAGNOSIS — M53.3 SACROILIAC JOINT PAIN: ICD-10-CM

## 2024-09-26 DIAGNOSIS — S42.255A CLOSED NONDISPLACED FRACTURE OF GREATER TUBEROSITY OF LEFT HUMERUS, INITIAL ENCOUNTER: ICD-10-CM

## 2024-09-26 DIAGNOSIS — R52 PAIN: ICD-10-CM

## 2024-09-26 DIAGNOSIS — R41.82 ALTERED MENTAL STATUS, UNSPECIFIED ALTERED MENTAL STATUS TYPE: ICD-10-CM

## 2024-09-26 DIAGNOSIS — E63.9 NUTRITIONAL DEFICIENCY: ICD-10-CM

## 2024-09-26 DIAGNOSIS — J96.90 RESPIRATORY FAILURE (H): Primary | ICD-10-CM

## 2024-09-26 LAB
ALBUMIN SERPL BCG-MCNC: 3.8 G/DL (ref 3.5–5.2)
ALBUMIN SERPL BCG-MCNC: 4 G/DL (ref 3.5–5.2)
ALBUMIN UR-MCNC: 100 MG/DL
ALLEN'S TEST: YES
ALP SERPL-CCNC: 85 U/L (ref 40–150)
ALT SERPL W P-5'-P-CCNC: 20 U/L (ref 0–50)
ANION GAP SERPL CALCULATED.3IONS-SCNC: 15 MMOL/L (ref 7–15)
ANION GAP SERPL CALCULATED.3IONS-SCNC: 22 MMOL/L (ref 7–15)
APAP SERPL-MCNC: 11.2 UG/ML (ref 10–30)
APAP SERPL-MCNC: 27 UG/ML (ref 10–30)
APPEARANCE UR: ABNORMAL
AST SERPL W P-5'-P-CCNC: 38 U/L (ref 0–45)
BACTERIA #/AREA URNS HPF: ABNORMAL /HPF
BASE EXCESS BLDA CALC-SCNC: -7.5 MMOL/L (ref -3–3)
BASE EXCESS BLDV CALC-SCNC: -6.1 MMOL/L (ref -3–3)
BASE EXCESS BLDV CALC-SCNC: -6.3 MMOL/L (ref -3–3)
BASE EXCESS BLDV CALC-SCNC: -6.6 MMOL/L (ref -3–3)
BASE EXCESS BLDV CALC-SCNC: -6.6 MMOL/L (ref -3–3)
BASOPHILS # BLD AUTO: 0 10E3/UL (ref 0–0.2)
BASOPHILS NFR BLD AUTO: 1 %
BILIRUB SERPL-MCNC: 0.3 MG/DL
BILIRUB UR QL STRIP: NEGATIVE
BUN SERPL-MCNC: 15.3 MG/DL (ref 8–23)
BUN SERPL-MCNC: 16.7 MG/DL (ref 8–23)
CALCIUM SERPL-MCNC: 7.9 MG/DL (ref 8.8–10.4)
CALCIUM SERPL-MCNC: 8.4 MG/DL (ref 8.8–10.4)
CHLORIDE SERPL-SCNC: 105 MMOL/L (ref 98–107)
CHLORIDE SERPL-SCNC: 109 MMOL/L (ref 98–107)
COHGB MFR BLD: 98.8 % (ref 95–96)
COLOR UR AUTO: YELLOW
CREAT SERPL-MCNC: 0.86 MG/DL (ref 0.51–0.95)
CREAT SERPL-MCNC: 0.93 MG/DL (ref 0.51–0.95)
EGFRCR SERPLBLD CKD-EPI 2021: 65 ML/MIN/1.73M2
EGFRCR SERPLBLD CKD-EPI 2021: 72 ML/MIN/1.73M2
EOSINOPHIL # BLD AUTO: 0.2 10E3/UL (ref 0–0.7)
EOSINOPHIL NFR BLD AUTO: 6 %
ERYTHROCYTE [DISTWIDTH] IN BLOOD BY AUTOMATED COUNT: 14.7 % (ref 10–15)
ERYTHROCYTE [DISTWIDTH] IN BLOOD BY AUTOMATED COUNT: 15 % (ref 10–15)
ETHANOL SERPL-MCNC: 0.19 G/DL
GLUCOSE BLDC GLUCOMTR-MCNC: 109 MG/DL (ref 70–99)
GLUCOSE BLDC GLUCOMTR-MCNC: 130 MG/DL (ref 70–99)
GLUCOSE BLDC GLUCOMTR-MCNC: 75 MG/DL (ref 70–99)
GLUCOSE BLDC GLUCOMTR-MCNC: 80 MG/DL (ref 70–99)
GLUCOSE SERPL-MCNC: 136 MG/DL (ref 70–99)
GLUCOSE SERPL-MCNC: 72 MG/DL (ref 70–99)
GLUCOSE UR STRIP-MCNC: NEGATIVE MG/DL
HCO3 BLD-SCNC: 19 MMOL/L (ref 21–28)
HCO3 BLDV-SCNC: 20 MMOL/L (ref 21–28)
HCO3 BLDV-SCNC: 20 MMOL/L (ref 21–28)
HCO3 BLDV-SCNC: 21 MMOL/L (ref 21–28)
HCO3 BLDV-SCNC: 22 MMOL/L (ref 21–28)
HCO3 SERPL-SCNC: 18 MMOL/L (ref 22–29)
HCO3 SERPL-SCNC: 21 MMOL/L (ref 22–29)
HCT VFR BLD AUTO: 30.7 % (ref 35–47)
HCT VFR BLD AUTO: 33.6 % (ref 35–47)
HGB BLD-MCNC: 10.7 G/DL (ref 11.7–15.7)
HGB BLD-MCNC: 9.7 G/DL (ref 11.7–15.7)
HGB UR QL STRIP: NEGATIVE
HOLD SPECIMEN: NORMAL
IMM GRANULOCYTES # BLD: 0 10E3/UL
IMM GRANULOCYTES NFR BLD: 1 %
INR PPP: 1.05 (ref 0.85–1.15)
KETONES UR STRIP-MCNC: 5 MG/DL
LACTATE SERPL-SCNC: 1.5 MMOL/L (ref 0.7–2)
LACTATE SERPL-SCNC: 2.4 MMOL/L (ref 0.7–2)
LACTATE SERPL-SCNC: 3.5 MMOL/L (ref 0.7–2)
LEUKOCYTE ESTERASE UR QL STRIP: NEGATIVE
LEVETIRACETAM SERPL-MCNC: 2.9 ΜG/ML (ref 10–40)
LYMPHOCYTES # BLD AUTO: 0.7 10E3/UL (ref 0.8–5.3)
LYMPHOCYTES NFR BLD AUTO: 20 %
MAGNESIUM SERPL-MCNC: 1.5 MG/DL (ref 1.7–2.3)
MCH RBC QN AUTO: 30.7 PG (ref 26.5–33)
MCH RBC QN AUTO: 30.8 PG (ref 26.5–33)
MCHC RBC AUTO-ENTMCNC: 31.6 G/DL (ref 31.5–36.5)
MCHC RBC AUTO-ENTMCNC: 31.8 G/DL (ref 31.5–36.5)
MCV RBC AUTO: 97 FL (ref 78–100)
MCV RBC AUTO: 98 FL (ref 78–100)
MONOCYTES # BLD AUTO: 0.3 10E3/UL (ref 0–1.3)
MONOCYTES NFR BLD AUTO: 9 %
MUCOUS THREADS #/AREA URNS LPF: PRESENT /LPF
NEUTROPHILS # BLD AUTO: 2.1 10E3/UL (ref 1.6–8.3)
NEUTROPHILS NFR BLD AUTO: 63 %
NITRATE UR QL: NEGATIVE
NRBC # BLD AUTO: 0 10E3/UL
NRBC BLD AUTO-RTO: 0 /100
O2/TOTAL GAS SETTING VFR VENT: 0 %
O2/TOTAL GAS SETTING VFR VENT: 50 %
OXYHGB MFR BLDV: 80 % (ref 70–75)
OXYHGB MFR BLDV: 90 % (ref 70–75)
OXYHGB MFR BLDV: 91 % (ref 70–75)
OXYHGB MFR BLDV: 94 % (ref 70–75)
PCO2 BLD: 43 MM HG (ref 35–45)
PCO2 BLDV: 43 MM HG (ref 40–50)
PCO2 BLDV: 44 MM HG (ref 40–50)
PCO2 BLDV: 53 MM HG (ref 40–50)
PCO2 BLDV: 54 MM HG (ref 40–50)
PEEP: 10 CM H2O
PH BLD: 7.26 [PH] (ref 7.35–7.45)
PH BLDV: 7.21 [PH] (ref 7.32–7.43)
PH BLDV: 7.21 [PH] (ref 7.32–7.43)
PH BLDV: 7.27 [PH] (ref 7.32–7.43)
PH BLDV: 7.28 [PH] (ref 7.32–7.43)
PH UR STRIP: 5 [PH] (ref 5–7)
PHOSPHATE SERPL-MCNC: 3.7 MG/DL (ref 2.5–4.5)
PLATELET # BLD AUTO: 142 10E3/UL (ref 150–450)
PLATELET # BLD AUTO: 160 10E3/UL (ref 150–450)
PO2 BLD: 139 MM HG (ref 80–105)
PO2 BLDV: 104 MM HG (ref 25–47)
PO2 BLDV: 58 MM HG (ref 25–47)
PO2 BLDV: 74 MM HG (ref 25–47)
PO2 BLDV: 82 MM HG (ref 25–47)
POTASSIUM SERPL-SCNC: 3.5 MMOL/L (ref 3.4–5.3)
POTASSIUM SERPL-SCNC: 4 MMOL/L (ref 3.4–5.3)
PROT SERPL-MCNC: 6 G/DL (ref 6.4–8.3)
RBC # BLD AUTO: 3.15 10E6/UL (ref 3.8–5.2)
RBC # BLD AUTO: 3.48 10E6/UL (ref 3.8–5.2)
RBC URINE: 0 /HPF
SALICYLATES SERPL-MCNC: <0.3 MG/DL
SAO2 % BLDA: 97 % (ref 92–100)
SAO2 % BLDV: 81.7 % (ref 70–75)
SAO2 % BLDV: 92.3 % (ref 70–75)
SAO2 % BLDV: 92.4 % (ref 70–75)
SAO2 % BLDV: 96.4 % (ref 70–75)
SARS-COV-2 RNA RESP QL NAA+PROBE: NEGATIVE
SODIUM SERPL-SCNC: 145 MMOL/L (ref 135–145)
SODIUM SERPL-SCNC: 145 MMOL/L (ref 135–145)
SP GR UR STRIP: 1.02 (ref 1–1.03)
UROBILINOGEN UR STRIP-MCNC: 2 MG/DL
WBC # BLD AUTO: 3.3 10E3/UL (ref 4–11)
WBC # BLD AUTO: 5.5 10E3/UL (ref 4–11)
WBC URINE: 8 /HPF

## 2024-09-26 PROCEDURE — 85610 PROTHROMBIN TIME: CPT | Performed by: EMERGENCY MEDICINE

## 2024-09-26 PROCEDURE — 96367 TX/PROPH/DG ADDL SEQ IV INF: CPT | Performed by: EMERGENCY MEDICINE

## 2024-09-26 PROCEDURE — 3E043XZ INTRODUCTION OF VASOPRESSOR INTO CENTRAL VEIN, PERCUTANEOUS APPROACH: ICD-10-PCS | Performed by: HOSPITALIST

## 2024-09-26 PROCEDURE — 93010 ELECTROCARDIOGRAM REPORT: CPT | Performed by: EMERGENCY MEDICINE

## 2024-09-26 PROCEDURE — 94002 VENT MGMT INPAT INIT DAY: CPT

## 2024-09-26 PROCEDURE — 80177 DRUG SCRN QUAN LEVETIRACETAM: CPT | Performed by: EMERGENCY MEDICINE

## 2024-09-26 PROCEDURE — 83735 ASSAY OF MAGNESIUM: CPT | Performed by: EMERGENCY MEDICINE

## 2024-09-26 PROCEDURE — 80053 COMPREHEN METABOLIC PANEL: CPT | Performed by: EMERGENCY MEDICINE

## 2024-09-26 PROCEDURE — 250N000011 HC RX IP 250 OP 636: Performed by: INTERNAL MEDICINE

## 2024-09-26 PROCEDURE — 96365 THER/PROPH/DIAG IV INF INIT: CPT | Performed by: EMERGENCY MEDICINE

## 2024-09-26 PROCEDURE — 36415 COLL VENOUS BLD VENIPUNCTURE: CPT | Performed by: EMERGENCY MEDICINE

## 2024-09-26 PROCEDURE — 999N000254 HC STATISTIC VENTILATOR TRANSFER

## 2024-09-26 PROCEDURE — 87635 SARS-COV-2 COVID-19 AMP PRB: CPT | Performed by: INTERNAL MEDICINE

## 2024-09-26 PROCEDURE — 0DH97UZ INSERTION OF FEEDING DEVICE INTO DUODENUM, VIA NATURAL OR ARTIFICIAL OPENING: ICD-10-PCS | Performed by: PHYSICIAN ASSISTANT

## 2024-09-26 PROCEDURE — 36415 COLL VENOUS BLD VENIPUNCTURE: CPT | Performed by: INTERNAL MEDICINE

## 2024-09-26 PROCEDURE — 82805 BLOOD GASES W/O2 SATURATION: CPT | Performed by: INTERNAL MEDICINE

## 2024-09-26 PROCEDURE — 74340 X-RAY GUIDE FOR GI TUBE: CPT

## 2024-09-26 PROCEDURE — 999N000157 HC STATISTIC RCP TIME EA 10 MIN

## 2024-09-26 PROCEDURE — 250N000011 HC RX IP 250 OP 636

## 2024-09-26 PROCEDURE — 83605 ASSAY OF LACTIC ACID: CPT | Performed by: EMERGENCY MEDICINE

## 2024-09-26 PROCEDURE — 200N000001 HC R&B ICU

## 2024-09-26 PROCEDURE — 258N000003 HC RX IP 258 OP 636: Performed by: INTERNAL MEDICINE

## 2024-09-26 PROCEDURE — 96368 THER/DIAG CONCURRENT INF: CPT | Performed by: EMERGENCY MEDICINE

## 2024-09-26 PROCEDURE — 76604 US EXAM CHEST: CPT | Mod: 26 | Performed by: EMERGENCY MEDICINE

## 2024-09-26 PROCEDURE — 99291 CRITICAL CARE FIRST HOUR: CPT | Performed by: INTERNAL MEDICINE

## 2024-09-26 PROCEDURE — 36600 WITHDRAWAL OF ARTERIAL BLOOD: CPT

## 2024-09-26 PROCEDURE — 85048 AUTOMATED LEUKOCYTE COUNT: CPT | Performed by: INTERNAL MEDICINE

## 2024-09-26 PROCEDURE — 82962 GLUCOSE BLOOD TEST: CPT

## 2024-09-26 PROCEDURE — 250N000013 HC RX MED GY IP 250 OP 250 PS 637: Performed by: INTERNAL MEDICINE

## 2024-09-26 PROCEDURE — 96376 TX/PRO/DX INJ SAME DRUG ADON: CPT | Performed by: EMERGENCY MEDICINE

## 2024-09-26 PROCEDURE — 5A1955Z RESPIRATORY VENTILATION, GREATER THAN 96 CONSECUTIVE HOURS: ICD-10-PCS | Performed by: HOSPITALIST

## 2024-09-26 PROCEDURE — 99291 CRITICAL CARE FIRST HOUR: CPT | Performed by: EMERGENCY MEDICINE

## 2024-09-26 PROCEDURE — 250N000009 HC RX 250: Performed by: INTERNAL MEDICINE

## 2024-09-26 PROCEDURE — 99291 CRITICAL CARE FIRST HOUR: CPT | Mod: 25 | Performed by: EMERGENCY MEDICINE

## 2024-09-26 PROCEDURE — 94640 AIRWAY INHALATION TREATMENT: CPT

## 2024-09-26 PROCEDURE — 82077 ASSAY SPEC XCP UR&BREATH IA: CPT | Performed by: EMERGENCY MEDICINE

## 2024-09-26 PROCEDURE — 87205 SMEAR GRAM STAIN: CPT | Performed by: INTERNAL MEDICINE

## 2024-09-26 PROCEDURE — 96375 TX/PRO/DX INJ NEW DRUG ADDON: CPT | Performed by: EMERGENCY MEDICINE

## 2024-09-26 PROCEDURE — 93005 ELECTROCARDIOGRAM TRACING: CPT | Performed by: EMERGENCY MEDICINE

## 2024-09-26 PROCEDURE — 258N000003 HC RX IP 258 OP 636: Performed by: EMERGENCY MEDICINE

## 2024-09-26 PROCEDURE — 999N000065 XR CHEST PORT 1 VIEW

## 2024-09-26 PROCEDURE — 76604 US EXAM CHEST: CPT | Performed by: EMERGENCY MEDICINE

## 2024-09-26 PROCEDURE — 87070 CULTURE OTHR SPECIMN AEROBIC: CPT | Performed by: INTERNAL MEDICINE

## 2024-09-26 PROCEDURE — 999N000158 HC STATISTIC RCP TIME ED VENT EA 10 MIN

## 2024-09-26 PROCEDURE — 85027 COMPLETE CBC AUTOMATED: CPT | Performed by: INTERNAL MEDICINE

## 2024-09-26 PROCEDURE — 87186 SC STD MICRODIL/AGAR DIL: CPT | Performed by: INTERNAL MEDICINE

## 2024-09-26 PROCEDURE — 70450 CT HEAD/BRAIN W/O DYE: CPT

## 2024-09-26 PROCEDURE — 96366 THER/PROPH/DIAG IV INF ADDON: CPT | Performed by: EMERGENCY MEDICINE

## 2024-09-26 PROCEDURE — 250N000011 HC RX IP 250 OP 636: Performed by: EMERGENCY MEDICINE

## 2024-09-26 PROCEDURE — 83605 ASSAY OF LACTIC ACID: CPT | Performed by: INTERNAL MEDICINE

## 2024-09-26 PROCEDURE — 94640 AIRWAY INHALATION TREATMENT: CPT | Mod: 76

## 2024-09-26 PROCEDURE — 250N000009 HC RX 250: Performed by: EMERGENCY MEDICINE

## 2024-09-26 PROCEDURE — 82310 ASSAY OF CALCIUM: CPT | Performed by: INTERNAL MEDICINE

## 2024-09-26 PROCEDURE — 82805 BLOOD GASES W/O2 SATURATION: CPT | Performed by: EMERGENCY MEDICINE

## 2024-09-26 PROCEDURE — 72125 CT NECK SPINE W/O DYE: CPT

## 2024-09-26 PROCEDURE — 80179 DRUG ASSAY SALICYLATE: CPT | Performed by: EMERGENCY MEDICINE

## 2024-09-26 PROCEDURE — 85025 COMPLETE CBC W/AUTO DIFF WBC: CPT | Performed by: EMERGENCY MEDICINE

## 2024-09-26 PROCEDURE — 81003 URINALYSIS AUTO W/O SCOPE: CPT | Performed by: EMERGENCY MEDICINE

## 2024-09-26 PROCEDURE — 82040 ASSAY OF SERUM ALBUMIN: CPT | Performed by: INTERNAL MEDICINE

## 2024-09-26 PROCEDURE — 80143 DRUG ASSAY ACETAMINOPHEN: CPT | Performed by: EMERGENCY MEDICINE

## 2024-09-26 PROCEDURE — 82947 ASSAY GLUCOSE BLOOD QUANT: CPT | Performed by: INTERNAL MEDICINE

## 2024-09-26 RX ORDER — ENOXAPARIN SODIUM 100 MG/ML
40 INJECTION SUBCUTANEOUS EVERY 24 HOURS
Status: DISPENSED | OUTPATIENT
Start: 2024-09-26

## 2024-09-26 RX ORDER — PROPOFOL 10 MG/ML
5-75 INJECTION, EMULSION INTRAVENOUS CONTINUOUS
Status: DISCONTINUED | OUTPATIENT
Start: 2024-09-26 | End: 2024-09-26 | Stop reason: HOSPADM

## 2024-09-26 RX ORDER — ACETAMINOPHEN 325 MG/10.15ML
650 LIQUID ORAL EVERY 4 HOURS PRN
Status: DISPENSED | OUTPATIENT
Start: 2024-09-26

## 2024-09-26 RX ORDER — SODIUM CHLORIDE, SODIUM LACTATE, POTASSIUM CHLORIDE, CALCIUM CHLORIDE 600; 310; 30; 20 MG/100ML; MG/100ML; MG/100ML; MG/100ML
INJECTION, SOLUTION INTRAVENOUS CONTINUOUS
Status: DISCONTINUED | OUTPATIENT
Start: 2024-09-26 | End: 2024-09-26 | Stop reason: HOSPADM

## 2024-09-26 RX ORDER — NALOXONE HYDROCHLORIDE 0.4 MG/ML
0.4 INJECTION, SOLUTION INTRAMUSCULAR; INTRAVENOUS; SUBCUTANEOUS
Status: ACTIVE | OUTPATIENT
Start: 2024-09-26

## 2024-09-26 RX ORDER — NOREPINEPHRINE BITARTRATE 0.02 MG/ML
.01-.6 INJECTION, SOLUTION INTRAVENOUS CONTINUOUS
Status: DISCONTINUED | OUTPATIENT
Start: 2024-09-26 | End: 2024-09-27

## 2024-09-26 RX ORDER — ONDANSETRON 4 MG/1
4 TABLET, ORALLY DISINTEGRATING ORAL EVERY 6 HOURS PRN
Status: ACTIVE | OUTPATIENT
Start: 2024-09-26

## 2024-09-26 RX ORDER — ACETAMINOPHEN 325 MG/1
650 TABLET ORAL EVERY 4 HOURS PRN
Status: DISPENSED | OUTPATIENT
Start: 2024-09-26

## 2024-09-26 RX ORDER — PROCHLORPERAZINE MALEATE 5 MG
5 TABLET ORAL EVERY 6 HOURS PRN
Status: ACTIVE | OUTPATIENT
Start: 2024-09-26

## 2024-09-26 RX ORDER — AMOXICILLIN 250 MG
2 CAPSULE ORAL 2 TIMES DAILY PRN
Status: DISCONTINUED | OUTPATIENT
Start: 2024-09-26 | End: 2024-09-28

## 2024-09-26 RX ORDER — NICOTINE POLACRILEX 4 MG
15-30 LOZENGE BUCCAL
Status: DISCONTINUED | OUTPATIENT
Start: 2024-09-26 | End: 2024-10-03

## 2024-09-26 RX ORDER — NALOXONE HYDROCHLORIDE 0.4 MG/ML
0.2 INJECTION, SOLUTION INTRAMUSCULAR; INTRAVENOUS; SUBCUTANEOUS
Status: ACTIVE | OUTPATIENT
Start: 2024-09-26

## 2024-09-26 RX ORDER — CHLORHEXIDINE GLUCONATE ORAL RINSE 1.2 MG/ML
15 SOLUTION DENTAL EVERY 12 HOURS
Status: DISCONTINUED | OUTPATIENT
Start: 2024-09-26 | End: 2024-10-02

## 2024-09-26 RX ORDER — ONDANSETRON 2 MG/ML
4 INJECTION INTRAMUSCULAR; INTRAVENOUS EVERY 6 HOURS PRN
Status: ACTIVE | OUTPATIENT
Start: 2024-09-26

## 2024-09-26 RX ORDER — IPRATROPIUM BROMIDE AND ALBUTEROL SULFATE 2.5; .5 MG/3ML; MG/3ML
3 SOLUTION RESPIRATORY (INHALATION) EVERY 4 HOURS
Status: DISCONTINUED | OUTPATIENT
Start: 2024-09-26 | End: 2024-10-05

## 2024-09-26 RX ORDER — HYDROMORPHONE HYDROCHLORIDE 1 MG/ML
.3-.5 INJECTION, SOLUTION INTRAMUSCULAR; INTRAVENOUS; SUBCUTANEOUS
Status: DISCONTINUED | OUTPATIENT
Start: 2024-09-26 | End: 2024-10-01

## 2024-09-26 RX ORDER — PIPERACILLIN SODIUM, TAZOBACTAM SODIUM 3; .375 G/15ML; G/15ML
3.38 INJECTION, POWDER, LYOPHILIZED, FOR SOLUTION INTRAVENOUS EVERY 6 HOURS
Status: COMPLETED | OUTPATIENT
Start: 2024-09-26 | End: 2024-10-01

## 2024-09-26 RX ORDER — LEVOTHYROXINE SODIUM 50 UG/1
50 TABLET ORAL
Status: DISPENSED | OUTPATIENT
Start: 2024-09-26

## 2024-09-26 RX ORDER — LEVETIRACETAM 5 MG/ML
500 INJECTION INTRAVASCULAR EVERY 12 HOURS
Status: DISCONTINUED | OUTPATIENT
Start: 2024-09-26 | End: 2024-10-03

## 2024-09-26 RX ORDER — SODIUM CHLORIDE 9 MG/ML
INJECTION, SOLUTION INTRAVENOUS CONTINUOUS
Status: DISCONTINUED | OUTPATIENT
Start: 2024-09-26 | End: 2024-10-02

## 2024-09-26 RX ORDER — AMOXICILLIN 250 MG
1 CAPSULE ORAL 2 TIMES DAILY PRN
Status: DISCONTINUED | OUTPATIENT
Start: 2024-09-26 | End: 2024-09-28

## 2024-09-26 RX ORDER — FAMOTIDINE 20 MG/1
20 TABLET, FILM COATED ORAL 2 TIMES DAILY
Status: DISCONTINUED | OUTPATIENT
Start: 2024-09-26 | End: 2024-09-26

## 2024-09-26 RX ORDER — DEXTROSE MONOHYDRATE 25 G/50ML
25-50 INJECTION, SOLUTION INTRAVENOUS
Status: DISCONTINUED | OUTPATIENT
Start: 2024-09-26 | End: 2024-10-03

## 2024-09-26 RX ORDER — LIDOCAINE HYDROCHLORIDE 20 MG/ML
JELLY TOPICAL ONCE
Status: COMPLETED | OUTPATIENT
Start: 2024-09-26 | End: 2024-09-26

## 2024-09-26 RX ORDER — MAGNESIUM SULFATE HEPTAHYDRATE 40 MG/ML
2 INJECTION, SOLUTION INTRAVENOUS ONCE
Status: COMPLETED | OUTPATIENT
Start: 2024-09-26 | End: 2024-09-26

## 2024-09-26 RX ORDER — ROPIVACAINE IN 0.9% SOD CHL/PF 0.1 %
.01-.125 PLASTIC BAG, INJECTION (ML) EPIDURAL CONTINUOUS
Status: DISCONTINUED | OUTPATIENT
Start: 2024-09-26 | End: 2024-09-26 | Stop reason: HOSPADM

## 2024-09-26 RX ORDER — PROCHLORPERAZINE 25 MG
12.5 SUPPOSITORY, RECTAL RECTAL EVERY 12 HOURS PRN
Status: ACTIVE | OUTPATIENT
Start: 2024-09-26

## 2024-09-26 RX ORDER — PROPOFOL 10 MG/ML
5-75 INJECTION, EMULSION INTRAVENOUS CONTINUOUS
Status: DISCONTINUED | OUTPATIENT
Start: 2024-09-26 | End: 2024-09-27

## 2024-09-26 RX ADMIN — NOREPINEPHRINE BITARTRATE 0.02 MCG/KG/MIN: 0.02 INJECTION, SOLUTION INTRAVENOUS at 10:34

## 2024-09-26 RX ADMIN — NOREPINEPHRINE BITARTRATE 0.1 MCG/KG/MIN: 0.02 INJECTION, SOLUTION INTRAVENOUS at 04:30

## 2024-09-26 RX ADMIN — IPRATROPIUM BROMIDE AND ALBUTEROL SULFATE 3 ML: .5; 3 SOLUTION RESPIRATORY (INHALATION) at 14:03

## 2024-09-26 RX ADMIN — HYDROMORPHONE HYDROCHLORIDE 0.5 MG: 1 INJECTION, SOLUTION INTRAMUSCULAR; INTRAVENOUS; SUBCUTANEOUS at 20:24

## 2024-09-26 RX ADMIN — IPRATROPIUM BROMIDE AND ALBUTEROL SULFATE 3 ML: .5; 3 SOLUTION RESPIRATORY (INHALATION) at 22:13

## 2024-09-26 RX ADMIN — HYDROMORPHONE HYDROCHLORIDE 1 MG: 1 INJECTION, SOLUTION INTRAMUSCULAR; INTRAVENOUS; SUBCUTANEOUS at 07:19

## 2024-09-26 RX ADMIN — LIDOCAINE HYDROCHLORIDE 5 ML: 20 JELLY TOPICAL at 12:08

## 2024-09-26 RX ADMIN — CHLORHEXIDINE GLUCONATE 0.12% ORAL RINSE 15 ML: 1.2 LIQUID ORAL at 20:27

## 2024-09-26 RX ADMIN — CHLORHEXIDINE GLUCONATE 0.12% ORAL RINSE 15 ML: 1.2 LIQUID ORAL at 10:57

## 2024-09-26 RX ADMIN — PROPOFOL 50 MCG/KG/MIN: 10 INJECTION, EMULSION INTRAVENOUS at 14:03

## 2024-09-26 RX ADMIN — ENOXAPARIN SODIUM 40 MG: 40 INJECTION SUBCUTANEOUS at 10:57

## 2024-09-26 RX ADMIN — HYDROCORTISONE SODIUM SUCCINATE 100 MG: 100 INJECTION, POWDER, FOR SOLUTION INTRAMUSCULAR; INTRAVENOUS at 10:57

## 2024-09-26 RX ADMIN — PROPOFOL 70 MCG/KG/MIN: 10 INJECTION, EMULSION INTRAVENOUS at 18:18

## 2024-09-26 RX ADMIN — PIPERACILLIN AND TAZOBACTAM 3.38 G: 3; .375 INJECTION, POWDER, FOR SOLUTION INTRAVENOUS at 23:51

## 2024-09-26 RX ADMIN — LEVETIRACETAM 500 MG: 5 INJECTION INTRAVENOUS at 18:01

## 2024-09-26 RX ADMIN — IPRATROPIUM BROMIDE AND ALBUTEROL SULFATE 3 ML: .5; 3 SOLUTION RESPIRATORY (INHALATION) at 19:17

## 2024-09-26 RX ADMIN — HYDROMORPHONE HYDROCHLORIDE 1 MG: 1 INJECTION, SOLUTION INTRAMUSCULAR; INTRAVENOUS; SUBCUTANEOUS at 08:29

## 2024-09-26 RX ADMIN — LEVOTHYROXINE SODIUM 50 MCG: 50 TABLET ORAL at 13:14

## 2024-09-26 RX ADMIN — PANTOPRAZOLE SODIUM 40 MG: 40 INJECTION, POWDER, FOR SOLUTION INTRAVENOUS at 11:09

## 2024-09-26 RX ADMIN — HYDROCORTISONE SODIUM SUCCINATE 100 MG: 100 INJECTION, POWDER, FOR SOLUTION INTRAMUSCULAR; INTRAVENOUS at 23:50

## 2024-09-26 RX ADMIN — Medication 10 MG: at 20:28

## 2024-09-26 RX ADMIN — PIPERACILLIN AND TAZOBACTAM 3.38 G: 3; .375 INJECTION, POWDER, FOR SOLUTION INTRAVENOUS at 13:15

## 2024-09-26 RX ADMIN — MAGNESIUM SULFATE IN WATER 2 G: 40 INJECTION, SOLUTION INTRAVENOUS at 06:22

## 2024-09-26 RX ADMIN — PROPOFOL 70 MCG/KG/MIN: 10 INJECTION, EMULSION INTRAVENOUS at 22:04

## 2024-09-26 RX ADMIN — HYDROCORTISONE SODIUM SUCCINATE 100 MG: 100 INJECTION, POWDER, FOR SOLUTION INTRAMUSCULAR; INTRAVENOUS at 15:45

## 2024-09-26 RX ADMIN — PROPOFOL 20 MCG/KG/MIN: 10 INJECTION, EMULSION INTRAVENOUS at 04:21

## 2024-09-26 RX ADMIN — ACETAMINOPHEN 650 MG: 325 SUSPENSION ORAL at 20:27

## 2024-09-26 RX ADMIN — PIPERACILLIN AND TAZOBACTAM 3.38 G: 3; .375 INJECTION, POWDER, FOR SOLUTION INTRAVENOUS at 18:23

## 2024-09-26 RX ADMIN — SODIUM CHLORIDE 1000 ML: 9 INJECTION, SOLUTION INTRAVENOUS at 16:56

## 2024-09-26 RX ADMIN — SODIUM CHLORIDE: 9 INJECTION, SOLUTION INTRAVENOUS at 16:54

## 2024-09-26 RX ADMIN — SODIUM CHLORIDE, POTASSIUM CHLORIDE, SODIUM LACTATE AND CALCIUM CHLORIDE: 600; 310; 30; 20 INJECTION, SOLUTION INTRAVENOUS at 04:33

## 2024-09-26 RX ADMIN — Medication 10 MG: at 20:48

## 2024-09-26 RX ADMIN — PROPOFOL 45 MCG/KG/MIN: 10 INJECTION, EMULSION INTRAVENOUS at 10:33

## 2024-09-26 RX ADMIN — PROPOFOL 40 MCG/KG/MIN: 10 INJECTION, EMULSION INTRAVENOUS at 08:32

## 2024-09-26 RX ADMIN — HYDROMORPHONE HYDROCHLORIDE 1 MG: 1 INJECTION, SOLUTION INTRAMUSCULAR; INTRAVENOUS; SUBCUTANEOUS at 05:10

## 2024-09-26 ASSESSMENT — ACTIVITIES OF DAILY LIVING (ADL)
ADLS_ACUITY_SCORE: 40
ADLS_ACUITY_SCORE: 40
ADLS_ACUITY_SCORE: 43
ADLS_ACUITY_SCORE: 47
ADLS_ACUITY_SCORE: 40
ADLS_ACUITY_SCORE: 43
ADLS_ACUITY_SCORE: 47
ADLS_ACUITY_SCORE: 43
ADLS_ACUITY_SCORE: 43
ADLS_ACUITY_SCORE: 38
ADLS_ACUITY_SCORE: 40
ADLS_ACUITY_SCORE: 43

## 2024-09-26 ASSESSMENT — COLUMBIA-SUICIDE SEVERITY RATING SCALE - C-SSRS
5. HAVE YOU STARTED TO WORK OUT OR WORKED OUT THE DETAILS OF HOW TO KILL YOURSELF? DO YOU INTEND TO CARRY OUT THIS PLAN?: YES
4. HAVE YOU HAD THESE THOUGHTS AND HAD SOME INTENTION OF ACTING ON THEM?: YES
2. HAVE YOU ACTUALLY HAD ANY THOUGHTS OF KILLING YOURSELF IN THE PAST MONTH?: YES
1. IN THE PAST MONTH, HAVE YOU WISHED YOU WERE DEAD OR WISHED YOU COULD GO TO SLEEP AND NOT WAKE UP?: YES
3. HAVE YOU BEEN THINKING ABOUT HOW YOU MIGHT KILL YOURSELF?: YES

## 2024-09-26 NOTE — ED NOTES
Patient starting to move spontaneously with purpose directed to her ET. Nursing order for wrist restraints at 0500, will notify MD to place order.

## 2024-09-26 NOTE — ED PROVIDER NOTES
History     Chief Complaint   Patient presents with    Ingestion     HPI  Jessica Ellison is a 73 year old female with a history of alcohol use disorder, seizures on levetiracetam, chronic pain on opiate therapy, and major depressive disorder who presents via EMS for respiratory failure.  The patient arrives intubated, EMS reports that they were called to the scene of an apparent overdose.  The patient had apparently had an argument with her significant other, he told police that he went away and then came back about 10 minutes later and found her unresponsive with shallow respirations.  Police and EMS found the patient with slow and shallow breathing with small pupils.  They gave her intranasal naloxone but she did not have a quick improvement in her symptoms and so they intubated the patient.  She was given rocuronium and fentanyl.  They brought her here for further evaluation.  No reported trauma or seizure activity.    Allergies:  Allergies   Allergen Reactions    Bee Venom     Lisinopril Swelling     Oral swelling       Problem List:    Patient Active Problem List    Diagnosis Date Noted    Acute respiratory failure with hypoxia and hypercapnia (H) 09/09/2024     Priority: Medium    Other closed displaced odontoid fracture, initial encounter (H) 09/09/2024     Priority: Medium    Seizure (H) 02/20/2024     Priority: Medium    Closed fracture of proximal end of left humerus with routine healing, unspecified fracture morphology, subsequent encounter 01/04/2024     Priority: Medium    Personal history of tobacco use, presenting hazards to health 11/08/2023     Priority: Medium    Sinus tachycardia 11/08/2023     Priority: Medium    Chronic pain syndrome 11/07/2023     Priority: Medium    SOB (shortness of breath) 11/07/2023     Priority: Medium    Alcohol abuse with intoxication (H24) 11/07/2023     Priority: Medium    Knee osteoarthritis 08/16/2023     Priority: Medium    F11.2 - Continuous opioid dependence  (H) 07/11/2023     Priority: Medium     Patient is followed by DOMINIQUE COPPOLA for ongoing prescription of narcotic pain medicine.  Med: oxycodone/acetaminophen 10/325 for chronic back pain.   Maximum use per month: 50  Expected duration: unknown  Narcotic agreement on file: YES 8/12/2024   Clinic visit recommended: Q 3 months    PDMP reviewed 8/12/2024 April 2013 - rhizotomy for SI (left) Jhon Mckay MD    Follows at [facility] - severe low back pain with work related injuries.  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  Spinal stenosis at most lumbar levels  Improvement with past epidural steroid injections  SI joint dysfunction with the pain        JOY (obstructive sleep apnea) 08/05/2021     Priority: Medium    CKD (chronic kidney disease) stage 2, GFR 60-89 ml/min 08/05/2021     Priority: Medium    Hyperlipidemia LDL goal <70 12/22/2020     Priority: Medium    Centrilobular emphysema (H) 11/04/2020     Priority: Medium    Hypoxia 01/05/2019     Priority: Medium    Spell of change in speech 04/17/2018     Priority: Medium    Hypothyroidism 09/07/2016     Priority: Medium    Chronic bilateral low back pain without sciatica 06/02/2016     Priority: Medium    Essential hypertension with goal blood pressure less than 140/90 06/02/2016     Priority: Medium    S/P lumbar spinal fusion 10/19/2015     Priority: Medium    Bee allergy status 06/30/2015     Priority: Medium    Cerebral aneurysm, nonruptured 07/24/2014     Priority: Medium     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 Marti    repeat an MRA in 5 years -5/2027      Chronic pain 03/07/2014     Priority: Medium    Major depressive disorder, recurrent episode, moderate (H) 07/10/2013     Priority: Medium    Anxiety 06/20/2012     Priority: Medium    Insomnia 12/21/2009     Priority: Medium    Back pain 12/21/2009     Priority: Medium      Patient is followed by DOMINIQUE COPPOLA for ongoing prescription of narcotic pain medicine.  Med: oxycodone/acetaminophen 10/325 for chronic back pain.   Maximum use per month: 70  Expected duration: unknown  Narcotic agreement on file: YES 7/11/2023   Clinic visit recommended: Q 3 months    PDMP reviewed 7/11/2023 April 2013 - rhizotomy for SI (left) Jhon Mckay MD    Follows at [facility] - severe low back pain with work related injuries.  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     Priority: Medium        Past Medical History:    Past Medical History:   Diagnosis Date    Anemia     Aneurysm (H24)     Antiplatelet or antithrombotic long-term use     Arthritis     Chemical dependency (H)     Depression     History of blood transfusion     Hypertension     Menopausal symptoms     Other chronic pain     Seizure (H) 02/20/2024    Sleep apnea     Sleep disorder     Thyroid disease     Tobacco abuse     Uncomplicated asthma        Past Surgical History:    Past Surgical History:   Procedure Laterality Date    ABDOMEN SURGERY      APPENDECTOMY  1960    APPENDECTOMY      ARTHROPLASTY KNEE Left 08/16/2023    Procedure: LEFT TOTAL KNEE ARTHROPLASTY;  Surgeon: Bryan Roque MD;  Location: [facility] OR    BACK SURGERY      BIOPSY BREAST      cerebral aneurysm  2014    coiled    CEREBRAL ANEURYSM REPAIR      cholecystectomy      COLON SURGERY      COLONOSCOPY  04/19/2005    COLONOSCOPY N/A 5/13/2024    Procedure: COLONOSCOPY WITH BIOPSY;  Surgeon: Radha Glez MD;  Location: [facility] OR    FRACTURE SURGERY      HC EXCISION BREAST LESION, OPEN >=1      core biopsy 2006, lumpectomy 2006    HERNIA REPAIR      LAP VENTRAL HERNIA REPAIR  12/2017    [place]    LAPAROSCOPIC ASSISTED  HYSTERECTOMY VAGINAL  2017    LUMPECTOMY BREAST      orif left femoral neck Left 2016    stress fracture.  done at [facility]    SURGICAL HISTORY OF -       Skin Graft    ZZC PART REMOVAL COLON W ANASTOMOSIS  2005    diverticulitis    ZZC SPINE FUSION,ANTER,8+ SGMTS      Anterior posterior fusion 10 levels, hardware removal and re-fusion        Family History:    Family History   Problem Relation Age of Onset    Cancer Mother         breast/lung    Alcohol/Drug Mother     Depression Mother     Cancer - colorectal Father     Alcohol/Drug Father     Diabetes Maternal Grandmother     Diabetes Maternal Grandfather     Diabetes Paternal Grandmother     Diabetes Paternal Grandfather     Cancer Paternal Grandfather     Gastrointestinal Disease Paternal Grandfather     Congenital Anomalies Son     Hypertension Brother     Adrenal Disorder Brother         had adrenalectomies    Cerebral palsy Granddaughter        Social History:  Marital Status:   [2]  Social History     Tobacco Use    Smoking status: Former     Current packs/day: 0.00     Average packs/day: 1 pack/day for 30.0 years (30.0 ttl pk-yrs)     Types: Cigarettes     Start date: 1974     Quit date: 2004     Years since quittin.7    Smokeless tobacco: Never   Vaping Use    Vaping status: Never Used   Substance Use Topics    Alcohol use: Yes     Comment: Glass of wine a few times a week. ETOH dependency, DUI 3/15/10; 1-2 ETOH per week    Drug use: Never     Comment: percocet for the past several years        Medications:    amLODIPine (NORVASC) 5 MG tablet  aspirin (SM ASPIRIN EC) 325 MG EC tablet  atorvastatin (LIPITOR) 40 MG tablet  budesonide-formoterol (SYMBICORT) 80-4.5 MCG/ACT Inhaler  calcium carbonate 600 mg-vitamin D 400 units (CALTRATE) 600-400 MG-UNIT per tablet  cefuroxime (CEFTIN) 500 MG tablet  celecoxib (CELEBREX) 200 MG capsule  DULoxetine (CYMBALTA) 30 MG capsule  folic acid (FOLVITE) 1 MG tablet  gabapentin  (NEURONTIN) 600 MG tablet  levETIRAcetam (KEPPRA) 500 MG tablet  levothyroxine (SYNTHROID/LEVOTHROID) 50 MCG tablet  Melatonin 10 MG TABS tablet  metoprolol succinate ER (TOPROL XL) 50 MG 24 hr tablet  multivitamin, therapeutic (THERA-VIT) TABS tablet  naloxone (NARCAN) 4 MG/0.1ML nasal spray  nystatin (MYCOSTATIN) 938878 UNIT/GM external powder  oxyCODONE-acetaminophen (PERCOCET)  MG per tablet  predniSONE (DELTASONE) 20 MG tablet  QUEtiapine (SEROQUEL) 50 MG tablet  tiotropium (SPIRIVA HANDIHALER) 18 MCG inhaled capsule  VENTOLIN  (90 Base) MCG/ACT inhaler          Review of Systems    Physical Exam   BP: (!) 65/42  Pulse: 90  Temp: 97.7  F (36.5  C)  Resp: 13  Weight: 77.1 kg (170 lb)  SpO2: 91 %      Physical Exam  Constitutional:       Appearance: She is well-developed.      Interventions: She is chemically paralyzed and intubated.   HENT:      Head: Normocephalic and atraumatic.   Eyes:      Comments: Small pupils bilateral   Cardiovascular:      Rate and Rhythm: Normal rate.   Pulmonary:      Effort: No respiratory distress. She is intubated.      Breath sounds: No stridor.      Comments: Bilateral breath sounds  Chest:      Chest wall: No crepitus.   Abdominal:      General: There is no distension.      Palpations: Abdomen is soft.   Genitourinary:     Comments: Normal external genitalia  Musculoskeletal:      Right lower leg: No edema.      Left lower leg: No edema.   Skin:     General: Skin is warm and dry.      Comments: The patient was undressed and rolled for full skin evaluation, no external signs of trauma   Neurological:      Comments: Unresponsive, medically paralyzed         ED Course        Procedures    Results for orders placed during the hospital encounter of 09/26/24    POC US ECHO LIMITED    [facility] Procedure Note    Limited Bedside ED Cardiac Ultrasound:    PROCEDURE: PERFORMED BY: Dr. Sam Phoenix MD  INDICATIONS/SYMPTOM:   Hypotension/shock  PROBE: Cardiac phased array probe  BODY LOCATION: Chest  FINDINGS:  The ultrasound was performed utilizing the parasternal long axis and parasternal short axis views.  Cardiac contractility:  Present  Gross estimation of cardiac kinesis: normal  Pericardial Effusion:  None  RV:LV ratio: LV > RV  INTERPRETATION:    Chamber size and motion were grossly normal with LV > RV, normal cardiac kinesis.  No pericardial effusion was found.  IMAGE DOCUMENTATION: Images were archived to PACs system.    [facility] Procedure Note    Limited Bedside ED Ultrasound of Thorax:    PROCEDURE: PERFORMED BY: Dr. Sam Phoenix MD  INDICATIONS/SYMPTOM:  dyspnea  PROBE: High frequency linear probe  BODY LOCATION: Chest  FINDINGS:  Images of both lung hemithoracies taken in 2D in multiple rib spaces    Right side:  Lung sliding artifact  Present  Comet tail artifacts  Present  Left side:  Lung sliding artifact  Present  Comet tail artifacts  Present  Hemothorax: Right side Absent  Left side Absent  Pleural effusion: Right side Absent  Left side Absent    INTERPRETATION: The exam was normal. There was no free fluid identified in the chest cavity. No evidence of pneumothorax, hemothorax or pleural effusion.  IMAGE DOCUMENTATION: Images were archived to PACs system.            EKG Interpretation:      Interpreted by Sam Phoenix MD  Time reviewed: 0546  Symptoms at time of EKG: Overdose   Rhythm: normal sinus   Rate: normal  Axis: normal  Ectopy: none  Conduction: normal  ST Segments/ T Waves: No ST-T wave changes  Q Waves: none  Comparison to prior: Unchanged    Clinical Impression: normal EKG      The Lactic acid level is elevated due to respiratory failure, at this time there is no sign of severe sepsis or septic shock.         Results for orders placed or performed during the hospital encounter of 09/26/24 (from the past 24 hour(s))   POC US ECHO LIMITED    Impression    [facility] Procedure  Note      Limited Bedside ED Cardiac Ultrasound:    PROCEDURE: PERFORMED BY: Dr. Sam Phoenix MD  INDICATIONS/SYMPTOM:  Hypotension/shock  PROBE: Cardiac phased array probe  BODY LOCATION: Chest  FINDINGS:   The ultrasound was performed utilizing the parasternal long axis and parasternal short axis views.  Cardiac contractility:  Present  Gross estimation of cardiac kinesis: normal  Pericardial Effusion:  None  RV:LV ratio: LV > RV  INTERPRETATION:    Chamber size and motion were grossly normal with LV > RV, normal cardiac kinesis.  No pericardial effusion was found.   IMAGE DOCUMENTATION: Images were archived to PACs system.      [facility] Procedure Note      Limited Bedside ED Ultrasound of Thorax:    PROCEDURE: PERFORMED BY: Dr. Sam Phoenix MD  INDICATIONS/SYMPTOM:  dyspnea  PROBE: High frequency linear probe  BODY LOCATION: Chest  FINDINGS:  Images of both lung hemithoracies taken in 2D in multiple rib spaces        Right side:  Lung sliding artifact  Present     Comet tail artifacts  Present   Left side:  Lung sliding artifact  Present     Comet tail artifacts  Present   Hemothorax: Right side Absent     Left side Absent   Pleural effusion: Right side Absent      Left side Absent    INTERPRETATION: The exam was normal. There was no free fluid identified in the chest cavity. No evidence of pneumothorax, hemothorax or pleural effusion.  IMAGE DOCUMENTATION: Images were archived to PACs system.        CBC with Platelets & Differential    Narrative    The following orders were created for panel order CBC with Platelets & Differential.  Procedure                               Abnormality         Status                     ---------                               -----------         ------                     CBC with platelets and d...[[id number]]  Abnormal            Final result                 Please view results for these tests on the individual orders.   Comprehensive metabolic panel    Result Value Ref Range    Sodium 145 135 - 145 mmol/L    Potassium 3.5 3.4 - 5.3 mmol/L    Carbon Dioxide (CO2) 18 (L) 22 - 29 mmol/L    Anion Gap 22 (H) 7 - 15 mmol/L    Urea Nitrogen 16.7 8.0 - 23.0 mg/dL    Creatinine 0.93 0.51 - 0.95 mg/dL    GFR Estimate 65 >60 mL/min/1.73m2    Calcium 8.4 (L) 8.8 - 10.4 mg/dL    Chloride 105 98 - 107 mmol/L    Glucose 136 (H) 70 - 99 mg/dL    Alkaline Phosphatase 85 40 - 150 U/L    AST 38 0 - 45 U/L    ALT 20 0 - 50 U/L    Protein Total 6.0 (L) 6.4 - 8.3 g/dL    Albumin 4.0 3.5 - 5.2 g/dL    Bilirubin Total 0.3 <=1.2 mg/dL   Blood gas venous   Result Value Ref Range    pH Venous 7.28 (L) 7.32 - 7.43    pCO2 Venous 43 40 - 50 mm Hg    pO2 Venous 58 (H) 25 - 47 mm Hg    Bicarbonate Venous 20 (L) 21 - 28 mmol/L    Base Excess/Deficit Venous -6.1 (L) -3.0 - 3.0 mmol/L    FIO2 0     Oxyhemoglobin Venous 80 (H) 70 - 75 %    O2 Sat, Venous 81.7 (H) 70.0 - 75.0 %    Narrative    In healthy individuals, oxyhemoglobin (O2Hb) and oxygen saturation (SO2) are approximately equal. In the presence of dyshemoglobins, oxyhemoglobin can be considerably lower than oxygen saturation.   Acetaminophen level   Result Value Ref Range    Acetaminophen 27.0 10.0 - 30.0 ug/mL   Salicylate level   Result Value Ref Range    Salicylate <0.3   mg/dL   [place] Draw    Narrative    The following orders were created for panel order [place] Draw.  Procedure                               Abnormality         Status                     ---------                               -----------         ------                     Extra Blue Top Tube[[id number]]                              Final result                 Please view results for these tests on the individual orders.   CBC with platelets and differential   Result Value Ref Range    WBC Count 3.3 (L) 4.0 - 11.0 10e3/uL    RBC Count 3.48 (L) 3.80 - 5.20 10e6/uL    Hemoglobin 10.7 (L) 11.7 - 15.7 g/dL    Hematocrit 33.6 (L) 35.0 - 47.0 %    MCV 97 78 - 100 fL     MCH 30.7 26.5 - 33.0 pg    MCHC 31.8 31.5 - 36.5 g/dL    RDW 14.7 10.0 - 15.0 %    Platelet Count 160 150 - 450 10e3/uL    % Neutrophils 63 %    % Lymphocytes 20 %    % Monocytes 9 %    % Eosinophils 6 %    % Basophils 1 %    % Immature Granulocytes 1 %    NRBCs per 100 WBC 0 <1 /100    Absolute Neutrophils 2.1 1.6 - 8.3 10e3/uL    Absolute Lymphocytes 0.7 (L) 0.8 - 5.3 10e3/uL    Absolute Monocytes 0.3 0.0 - 1.3 10e3/uL    Absolute Eosinophils 0.2 0.0 - 0.7 10e3/uL    Absolute Basophils 0.0 0.0 - 0.2 10e3/uL    Absolute Immature Granulocytes 0.0 <=0.4 10e3/uL    Absolute NRBCs 0.0 10e3/uL   Extra Blue Top Tube   Result Value Ref Range    Hold Specimen JIC    Alcohol ethyl   Result Value Ref Range    Alcohol ethyl 0.19 (H) <=0.01 g/dL   INR   Result Value Ref Range    INR 1.05 0.85 - 1.15   Magnesium   Result Value Ref Range    Magnesium 1.5 (L) 1.7 - 2.3 mg/dL   XR Chest Port 1 View    Narrative    EXAM: XR CHEST PORT 1 VIEW  LOCATION: [facility]  DATE: 9/26/2024    INDICATION: post intubation  COMPARISON: None.      Impression    IMPRESSION: There is an ET tube in place with the tip of the endotracheal tube 2.3 cm above the veena. There is mild central pulmonary venous congestion. Subsegmental atelectasis is seen in the left lung base.   CT Head w/o Contrast    Narrative    EXAM: CT HEAD W/O CONTRAST  LOCATION: [facility]  DATE: 9/26/2024    INDICATION: Altered mental status  COMPARISON: 9/9/2024  TECHNIQUE: Routine CT Head without IV contrast. Multiplanar reformats. Dose reduction techniques were used.    FINDINGS:  INTRACRANIAL CONTENTS: No intracranial hemorrhage, extraaxial collection, or mass effect.  No CT evidence of acute infarct. Mild volume loss and presumed chronic small vessel ischemia. Chronic cortical infarcts in the right occipital lobe again noted.   Coil embolization material stent in the distal left ICA again noted.    VISUALIZED  ORBITS/SINUSES/MASTOIDS: No intraorbital abnormality. No paranasal sinus mucosal disease. No middle ear or mastoid effusion.    BONES/SOFT TISSUES: No acute abnormality.      Impression    IMPRESSION:  1.  No acute intracranial abnormality or significant change compared to the prior study.                Lactic Acid Whole Blood with 1X Repeat in 2 HR when >2   Result Value Ref Range    Lactic Acid, Initial 3.5 (H) 0.7 - 2.0 mmol/L   Urine Macroscopic with reflex to Microscopic   Result Value Ref Range    Color Urine Yellow Colorless, Straw, Light Yellow, Yellow    Appearance Urine Slightly Cloudy (A) Clear    Glucose Urine Negative Negative mg/dL    Bilirubin Urine Negative Negative    Ketones Urine 5 (A) Negative mg/dL    Specific Gravity Urine 1.016 1.003 - 1.035    Blood Urine Negative Negative    pH Urine 5.0 5.0 - 7.0    Protein Albumin Urine 100 (A) Negative mg/dL    Urobilinogen Urine 2.0 Normal, 2.0 mg/dL    Nitrite Urine Negative Negative    Leukocyte Esterase Urine Negative Negative    Bacteria Urine Many (A) None Seen /HPF    RBC Urine 0 <=2 /HPF    WBC Urine 8 (H) <=5 /HPF    Mucus Urine Present (A) None Seen /LPF       Medications   propofol (DIPRIVAN) infusion (40 mcg/kg/min × 72.6 kg Intravenous Rate/Dose Change 9/26/24 0528)   norepinephrine (LEVOPHED) 4 mg in  mL PERIPHERAL infusion (0 mcg/kg/min × 77.1 kg Intravenous Stopped 9/26/24 0446)   lactated ringers infusion ( Intravenous $New Bag 9/26/24 0433)   HYDROmorphone (DILAUDID) injection 1 mg (1 mg Intravenous $Given 9/26/24 0510)       Assessments & Plan (with Medical Decision Making)   73-year-old female with a history of alcohol use disorder, seizures on levetiracetam, chronic pain on opiate therapy, and major depressive disorder who presents via EMS for respiratory failure.  Bedside ultrasound shows good cardiac function, bilateral chest movement with breathing.  She is transition to the ventilator.  She was started on propofol for  sedation and unfortunately did drop her blood pressure some and was started on IV norepinephrine for this.  She had quick improvement in her blood pressure with this and the norepinephrine was able to be transitioned off.  She was given IV fluids.  VBG was concerning for a pH of 7.28 and normal pCO2.  White blood cell count is 3.3 and her hemoglobin is 10.7.  Chest x-ray obtained, images interpreted independently, good  endotracheal tube placement.  CT of the head obtained, images interpreted independently as well as radiology read reviewed, notable for no hemorrhage or mass.  The patient serum ethanol level is elevated 0.19.  Her lactic acid level is elevated at 3.5, I believe this likely represents noninfectious cause and is likely related to her respiratory failure.    There is concern for a possible overdose as a cause of her symptoms.  She has multiple dangerous medications that could contribute to  complications if she would have overdosed on them including her metoprolol and amlodipine.     I have discussed the case with the on-call pharmacist with the [place] [facility].  We discussed ongoing management the patient.  They said that amlodipine and metoprolol are also the biggest concerns with her medication list and they advised that they oftentimes will reach peak in about 12 hours after ingestion.  They recommended repeat acetaminophen level in about 3 hours and this is ordered.    The  is now here and reports that he is not sure when she took these medications but it could have been anytime after 11 PM.  She is starting to have more apparent effects of the medication starting around 3 AM it sounds like.  However he is quick to point out that he is not sure as he was in bed and not sure what she was doing.    The patient is critically ill after a dangerous overdose and requires hospitalization.  Unfortunate there are no ICU beds available at this time at this hospital.  She will require transfer  to another hospital.  I have discussed the case with the on-call intensivist at [facility].  We discussed ongoing management patient and need for close monitoring and respiratory support and he has agreed to accept patient in transfer.    The patient's  did bring in the patient's Aspen collar.  Unbeknownst to us initially the patient has a recent type I odontoid fracture and neurosurgery's recommendations were for her to wear the collar at all times and follow-up in clinic.  The  reports that she has been noncompliant with this and oftentimes is not wearing the collar.  She was not wearing the collar when EMS intubated her and did not have it initially when she presented to the emergency department.  We have replaced the collar now.  Given this history CT of the cervical spine will be obtained.  The patient is signed out to the oncoming medical team at change of shift pending the CT scan and final transfer.    I have reviewed the nursing notes.    I have reviewed the findings, diagnosis, plan and need for follow up with the patient.      Critical care time (excluding teaching time and procedures): 60 minutes.         New Prescriptions    No medications on file       Final diagnoses:   Altered mental status, unspecified altered mental status type   Acute respiratory failure with hypoxia (H)       9/26/2024   [facility] EMERGENCY DEPT       Sam Phoenix MD  09/26/24 [id number]

## 2024-09-26 NOTE — H&P
Critical Care  Note      09/26/2024    Name: Jessica Ellison MRN#: [id number]   Age: 73 year old YOB: 1950     Hospitals in [place] Day# 0  ICU DAY #    MV DAY #             Problem List:   Active Problems:  Respiratory Failure  Aspiration Pneumonia            Summary/Hospital Course:   Jessica Ellison is a 73 year old female with a history of alcohol use disorder, seizures on levetiracetam, chronic pain on opiate therapy, and major depressive disorder who presented to [facility] via EMS for respiratory failure. EMS reports that they were called to the scene of an apparent overdose.  The patient had apparently had an argument with her significant other, he told police that he went away and then came back about 10 minutes later and found her unresponsive. Police and EMS found the patient with slow and shallow breathing with small pupils. Given narcan with minimal improvement. Intubated. Required low dose of norepi and transferred to [facility] for continued cares.     Initial labs notable for alcohol level of 0.19. Tylenol and and salicylate levels wnl. INR wnl w/ no transaminitis. Metabolic acidosis noted. Cr wnl. Poison control contacted, suspected Keppra as culprit medication. Is also prescribed BB/CCB. Hgb stable.     LLL infiltrated noted on CXR. Unable to visualize diaphragm. C/f possible aspiration pneumonia. Requiring 50% FiO2.     Of note, patient has a displaced odontoid fracture from a fall in early September as per chart review. Was evaluated by an outside neurosurgery team, who recommended C-collar and follow up visits. Currently wearing C-collar. Per admit cervical CT, fracture is newly anteriorly shifted by 4 mm.         Assessment and plan :     Jessica Ellison IS a 73 year old female admitted on (Not on file) for respiratory failure secondary to suspected overdose. She is intubated and on low-dose pressors. She will continued to be monitored as she metabolizes. Ddx includes kepra overdose  vs less likely beta blocker/CCB overdose. Being treated for potential CAP with 5 day course of Zosyn.    I have personally reviewed the daily labs, imaging studies, cultures and discussed the case with referring physician and consulting physicians.   My assessment and plan by system for this patient is as follows:    Neurology/Psychiatry:   #Sedation  #Overdose of unknown medication  #Seizure disorder  #Closed displaced odontoid fracture   #AUD  #MDD  #Chronic Pain Syndrome  #Hx of mechanical falls   #Acute Alcohol Intoxication  #S/p lumbar spinal fusion  #Insomnia  #Suicide attempt  - propofol prn  - ethyl alcohol level 0.2 on admission  - keppra level pending; thought to be culprit medication per poison control  - tylenol/sialcyte levels wnl   - Non-urgent MRI per outside neurosurgery  - dilaudid prn; has home percocet q6  - hx of spinal fusion   - continue PTA Kepra if at therapeutic level   - neurosurgery consult after MRI w/contrast.   - Psychiatry consult after extubation to assess for suicide risk.    Cardiovascular:   #Hypertension  #HLD   - PTA amlodipine currently held  - PTA ASA currently held  - PTA atorvastatin currently held       Pulmonary/Ventilator Management:   #Acute hypercarbic and hypoxic respiratory failure   #COPD, centrilobular emphysema   #Hx nicotine dependence  #JOY, on home CPAP  #R/o community acquired pneumonia  Former smoker with 30 pack year hx, quit 2004. Most recent pfts (08/10/23) was normal. Patient denies home O2 use at time of admission however on chart review it appears she did use home O2 previously  - ventilator prn  - duonebs q4   - Zosyn 3.375 q6 IV for 5 days. Beginning 9/26.  - VBGs q12    GI and Nutrition:  #Enteral Access  - Per outside ED, 5 attempts at gastric access. Currently without OG/NG  - fluoroscopic enteral tube consult placed to IR  - PPI BID    Renal/Fluids/Electrolytes:   #Hypomagnesemia  #CKDII  - received 1L LR on day of admission  - baseline Cr ~1.05l;  currently Cr is wnl  - monitor function and electrolytes as needed with replacement per ICU protocols. - generally avoid nephrotoxic agents such as NSAID, IV contrast unless specifically required  - adjust medications as needed for renal clearance  - follow I/O's as appropriate.    Infectious Disease:   #R/o community acquired pneumonia  #R/o UTI  - Zosyn 3.375 q6 IV for 5 days. Beginning 9/26.  - UA with many bacteria. Negative LE/nitrates  - Urine culture ordered      Endocrine:   #Stress induced hyperglycemia  #PTA steroids  - stress dose steroids 100 IV q6  - ICU insulin protocol, goal sugar <180    Hematology/Oncology:   #Anemia of Chronic Disease  - Hbg = 9.7  - transfuse if below 7     IV/Access:   1. Venous access - 2 PIVs  2. Arterial access - none        ICU Prophylaxis:   1. DVT: lovenox 40  2. VAP: HOB 30 degrees, chlorhexidine rinse  3. Stress Ulcer: PPI/H2 blocker  4. Restraints: Nonviolent soft two point restraints required and necessary for patient safety and continued cares and good effect as patient continues to pull at necessary lines, tubes despite education and distraction. Will readdress daily.   5. Wound care  - none needed  6. Feeding - NPO  7. Family Update: Will call tomorrow with update  8. Disposition - ICU           Medical History:     Past Medical History:   Diagnosis Date    Anemia     Aneurysm (H24)     Antiplatelet or antithrombotic long-term use     Arthritis     Chemical dependency (H)     Chem Dep RX    Depression     History of blood transfusion     Hypertension     Menopausal symptoms     Other chronic pain     Lower back and hips    Seizure (H) 02/20/2024    Sleep apnea     Sleep disorder     Thyroid disease     Tobacco abuse     Uncomplicated asthma      Past Surgical History:   Procedure Laterality Date    ABDOMEN SURGERY      APPENDECTOMY  1960    APPENDECTOMY      ARTHROPLASTY KNEE Left 08/16/2023    Procedure: LEFT TOTAL KNEE ARTHROPLASTY;  Surgeon: Bryan Roque  MD;  Location: [facility] Main OR    BACK SURGERY      BIOPSY BREAST      cerebral aneurysm      coiled    CEREBRAL ANEURYSM REPAIR      cholecystectomy      COLON SURGERY      COLONOSCOPY  2005    COLONOSCOPY N/A 2024    Procedure: COLONOSCOPY WITH BIOPSY;  Surgeon: Radha Glez MD;  Location: [facility] OR    FRACTURE SURGERY      HC EXCISION BREAST LESION, OPEN >=1      core biopsy , lumpectomy 2006    HERNIA REPAIR      LAP VENTRAL HERNIA REPAIR  2017    [place]    LAPAROSCOPIC ASSISTED HYSTERECTOMY VAGINAL  2017    LUMPECTOMY BREAST      orif left femoral neck Left 2016    stress fracture.  done at [facility]    SURGICAL HISTORY OF -       Skin Graft    ZZC PART REMOVAL COLON W ANASTOMOSIS  2005    diverticulitis    ZZC SPINE FUSION,ANTER,8+ SGMTS      Anterior posterior fusion 10 levels, hardware removal and re-fusion      Social History     Socioeconomic History    Marital status:      Spouse name: Not on file    Number of children: Not on file    Years of education: Not on file    Highest education level: Not on file   Occupational History    Not on file   Tobacco Use    Smoking status: Former     Current packs/day: 0.00     Average packs/day: 1 pack/day for 30.0 years (30.0 ttl pk-yrs)     Types: Cigarettes     Start date: 1974     Quit date: 2004     Years since quittin.7    Smokeless tobacco: Never   Vaping Use    Vaping status: Never Used   Substance and Sexual Activity    Alcohol use: Yes     Comment: Glass of wine a few times a week. ETOH dependency, DUI 3/15/10; 1-2 ETOH per week    Drug use: Never     Comment: percocet for the past several years    Sexual activity: Not Currently     Partners: Male     Birth control/protection: Surgical     Comment: VAS   Other Topics Concern    Parent/sibling w/ CABG, MI or angioplasty before 65F 55M? No   Social History Narrative    Not on file     Social Determinants of Health     Financial  Resource Strain: Low Risk  (9/9/2024)    Financial Resource Strain     Within the past 12 months, have you or your family members you live with been unable to get utilities (heat, electricity) when it was really needed?: No   Food Insecurity: Low Risk  (9/9/2024)    Food Insecurity     Within the past 12 months, did you worry that your food would run out before you got money to buy more?: No     Within the past 12 months, did the food you bought just not last and you didn t have money to get more?: No   Transportation Needs: Low Risk  (9/9/2024)    Transportation Needs     Within the past 12 months, has lack of transportation kept you from medical appointments, getting your medicines, non-medical meetings or appointments, work, or from getting things that you need?: No   Physical Activity: Inactive (2/20/2024)    Exercise Vital Sign     Days of Exercise per Week: 0 days     Minutes of Exercise per Session: 0 min   Stress: No Stress Concern Present (2/20/2024)    [employer] [place] [employer] - Occupational Stress Questionnaire     Feeling of Stress : Only a little   Social Connections: Unknown (2/20/2024)    Social Connection and Isolation Panel [NHANES]     Frequency of Communication with Friends and Family: Not on file     Frequency of Social Gatherings with Friends and Family: More than three times a week     Attends [employer] Services: Not on file     Active Member of Clubs or Organizations: Not on file     Attends Club or Organization Meetings: Not on file     Marital Status: Not on file   Interpersonal Safety: Low Risk  (9/9/2024)    Interpersonal Safety     Do you feel physically and emotionally safe where you currently live?: Yes     Within the past 12 months, have you been hit, slapped, kicked or otherwise physically hurt by someone?: No     Within the past 12 months, have you been humiliated or emotionally abused in other ways by your partner or ex-partner?: No   Housing Stability: Low Risk   (9/9/2024)    Housing Stability     Do you have housing? : Yes     Are you worried about losing your housing?: No        Allergies   Allergen Reactions    Bee Venom     Lisinopril Swelling     Oral swelling              Key Medications:     No current facility-administered medications for this encounter.     No current facility-administered medications for this encounter.        Home Meds  No current facility-administered medications for this encounter.     Current Outpatient Medications   Medication Sig Dispense Refill    amLODIPine (NORVASC) 5 MG tablet Take 1 tablet (5 mg) by mouth daily 90 tablet 1    aspirin (SM ASPIRIN EC) 325 MG EC tablet TAKE ONE TABLET BY MOUTH ONCE DAILY WITH DINNER FOR 30 DAYS 90 tablet 3    atorvastatin (LIPITOR) 40 MG tablet Take 1 tablet (40 mg) by mouth daily 90 tablet 3    budesonide-formoterol (SYMBICORT) 80-4.5 MCG/ACT Inhaler Inhale 2 puffs into the lungs 2 times daily 6.9 g 1    calcium carbonate 600 mg-vitamin D 400 units (CALTRATE) 600-400 MG-UNIT per tablet Take 1 tablet by mouth every evening      cefuroxime (CEFTIN) 500 MG tablet Take 1 tablet (500 mg) by mouth 2 times daily. 10 tablet 0    celecoxib (CELEBREX) 200 MG capsule Take 1 capsule (200 mg) by mouth daily 90 capsule 1    DULoxetine (CYMBALTA) 30 MG capsule Take 1 capsule (30 mg) by mouth 2 times daily 180 capsule 1    folic acid (FOLVITE) 1 MG tablet Take 1 tablet (1 mg) by mouth daily 90 tablet 3    gabapentin (NEURONTIN) 600 MG tablet Take 1 tablet (600 mg) by mouth at bedtime 90 tablet 1    levETIRAcetam (KEPPRA) 500 MG tablet Take 1 tablet (500 mg) by mouth 2 times daily 180 tablet 3    levothyroxine (SYNTHROID/LEVOTHROID) 50 MCG tablet Take 1 tablet (50 mcg) by mouth daily 90 tablet 3    Melatonin 10 MG TABS tablet Take 20 mg by mouth At Bedtime      metoprolol succinate ER (TOPROL XL) 50 MG 24 hr tablet Take 1 tablet (50 mg) by mouth 2 times daily 180 tablet 3    multivitamin, therapeutic (THERA-VIT) TABS  tablet Take 1 tablet by mouth daily      naloxone (NARCAN) 4 MG/0.1ML nasal spray Spray 1 spray (4 mg) into one nostril alternating nostrils once as needed for opioid reversal every 2-3 minutes until assistance arrives 0.2 mL 1    nystatin (MYCOSTATIN) 797718 UNIT/GM external powder Apply topically 3 times daily as needed (rash)      oxyCODONE-acetaminophen (PERCOCET)  MG per tablet Take 1 tablet by mouth every 6 hours as needed for severe pain 50 tablet 0    predniSONE (DELTASONE) 20 MG tablet Take 2 tablets (40 mg) by mouth daily. 3 tablet 0    QUEtiapine (SEROQUEL) 50 MG tablet TAKE 2 TABLETS BY MOUTH AT BEDTIME 90 tablet 1    tiotropium (SPIRIVA HANDIHALER) 18 MCG inhaled capsule USING THE HANDIHALER, INHALE THE CONTENTS OF ONE CAPSULE BY MOUTH ONCE DAILY 30 capsule 11    VENTOLIN  (90 Base) MCG/ACT inhaler INHALE 2 PUFFS INTO THE LUNGS EVERY 6 HOURS AS NEEDED FOR SHORTNESS OF BREATH OR WHEEZING 18 g 1     Facility-Administered Medications Ordered in Other Encounters   Medication Dose Route Frequency Provider Last Rate Last Admin    lactated ringers infusion   Intravenous Continuous Sam Phoenix MD   Stopped at 09/26/24 0725    norepinephrine (LEVOPHED) 4 mg in  mL PERIPHERAL infusion  0.01-0.125 mcg/kg/min Intravenous Continuous Sam Phoenix MD   ED/Periop/Clinic Infusing on Admission/transfer at 09/26/24 0852    propofol (DIPRIVAN) infusion  5-75 mcg/kg/min Intravenous Continuous Sam Phoenix MD   ED/Periop/Clinic Infusing on Admission/transfer at 09/26/24 0852              Physical Examination:   Temp:  [97.7  F (36.5  C)] 97.7  F (36.5  C)  Pulse:  [67-90] 85  Resp:  [11-29] 12  BP: ()/() 150/86  FiO2 (%):  [7 %] 7 %  SpO2:  [89 %-98 %] 96 %  No intake or output data in the 24 hours ending 09/26/24 0953  Wt Readings from Last 4 Encounters:   09/26/24 77.1 kg (170 lb)   09/09/24 72.6 kg (160 lb)   08/12/24 72.7 kg (160 lb 4 oz)   06/11/24 70.3 kg  "(155 lb)     BP - Mean:  [] 68  FiO2 (%): 7 %, Resp: 12, Vent Mode: CMV/AC, Resp Rate (Set): 12 breaths/min, Tidal Volume (Set, mL): 400 mL, PEEP (cm H2O): 10 cmH2O, Resp Rate (Set): 12 breaths/min, Tidal Volume (Set, mL): 400 mL, PEEP (cm H2O): 10 cmH2O  Recent Labs   Lab 09/26/24  0415   O2PER 0       GEN: no acute distress. intubated  HEENT: head ncat, sclera anicteric, OP patent, trachea midline   PULM: unlabored synchronous with vent, clear anteriorly    CV/COR: RRR S1S2 no gallop,  No rub, no murmur  ABD: soft nontender, hypoactive bowel sounds, no mass  EXT:  no edema  NEURO: intubated  SKIN: no obvious rash  LINES: clean, dry intact         Data:   All data and imaging reviewed     ROUTINE ICU LABS (Last four results)  CMP  Recent Labs   Lab 09/26/24  0415      POTASSIUM 3.5   CHLORIDE 105   CO2 18*   ANIONGAP 22*   *   BUN 16.7   CR 0.93   GFRESTIMATED 65   DELILAH 8.4*   MAG 1.5*   PROTTOTAL 6.0*   ALBUMIN 4.0   BILITOTAL 0.3   ALKPHOS 85   AST 38   ALT 20     CBC  Recent Labs   Lab 09/26/24 0415   WBC 3.3*   RBC 3.48*   HGB 10.7*   HCT 33.6*   MCV 97   MCH 30.7   MCHC 31.8   RDW 14.7        INR  Recent Labs   Lab 09/26/24  0415   INR 1.05     Arterial Blood Gas  Recent Labs   Lab 09/26/24  0415   O2PER 0       All cultures:  No results for input(s): \"CULT\" in the last 168 hours.  Recent Results (from the past 24 hour(s))   POC US ECHO LIMITED    [facility] Procedure Note      Limited Bedside ED Cardiac Ultrasound:    PROCEDURE: PERFORMED BY: Dr. Sam Phoenix MD  INDICATIONS/SYMPTOM:  Hypotension/shock  PROBE: Cardiac phased array probe  BODY LOCATION: Chest  FINDINGS:   The ultrasound was performed utilizing the parasternal long axis and parasternal short axis views.  Cardiac contractility:  Present  Gross estimation of cardiac kinesis: normal  Pericardial Effusion:  None  RV:LV ratio: LV > RV  INTERPRETATION:    Chamber size and motion were grossly " normal with LV > RV, normal cardiac kinesis.  No pericardial effusion was found.   IMAGE DOCUMENTATION: Images were archived to PACs system.      [facility] Procedure Note      Limited Bedside ED Ultrasound of Thorax:    PROCEDURE: PERFORMED BY: Dr. Sam Phoenix MD  INDICATIONS/SYMPTOM:  dyspnea  PROBE: High frequency linear probe  BODY LOCATION: Chest  FINDINGS:  Images of both lung hemithoracies taken in 2D in multiple rib spaces        Right side:  Lung sliding artifact  Present     Comet tail artifacts  Present   Left side:  Lung sliding artifact  Present     Comet tail artifacts  Present   Hemothorax: Right side Absent     Left side Absent   Pleural effusion: Right side Absent      Left side Absent    INTERPRETATION: The exam was normal. There was no free fluid identified in the chest cavity. No evidence of pneumothorax, hemothorax or pleural effusion.  IMAGE DOCUMENTATION: Images were archived to PACs system.        XR Chest Port 1 View    Narrative    EXAM: XR CHEST PORT 1 VIEW  LOCATION: [facility]  DATE: 9/26/2024    INDICATION: post intubation  COMPARISON: None.      Impression    IMPRESSION: There is an ET tube in place with the tip of the endotracheal tube 2.3 cm above the veena. There is mild central pulmonary venous congestion. Subsegmental atelectasis is seen in the left lung base.   CT Head w/o Contrast    Narrative    EXAM: CT HEAD W/O CONTRAST  LOCATION: [facility]  DATE: 9/26/2024    INDICATION: Altered mental status  COMPARISON: 9/9/2024  TECHNIQUE: Routine CT Head without IV contrast. Multiplanar reformats. Dose reduction techniques were used.    FINDINGS:  INTRACRANIAL CONTENTS: No intracranial hemorrhage, extraaxial collection, or mass effect.  No CT evidence of acute infarct. Mild volume loss and presumed chronic small vessel ischemia. Chronic cortical infarcts in the right occipital lobe again noted.   Coil embolization  material stent in the distal left ICA again noted.    VISUALIZED ORBITS/SINUSES/MASTOIDS: No intraorbital abnormality. No paranasal sinus mucosal disease. No middle ear or mastoid effusion.    BONES/SOFT TISSUES: No acute abnormality.      Impression    IMPRESSION:  1.  No acute intracranial abnormality or significant change compared to the prior study.                CT Cervical Spine w/o Contrast    Narrative    EXAM: CT CERVICAL SPINE W/O CONTRAST  LOCATION: [facility]  DATE: 9/26/2024    INDICATION: Overdose. Intubated. Not wearing her cervical collar.  COMPARISON: CT cervical spine dated 9/9/2024.  TECHNIQUE: Routine CT Cervical Spine without IV contrast. Multiplanar reformats. Dose reduction techniques were used.    FINDINGS:  VERTEBRA: Decreased osseous mineralization.    There is an again seen oblique type I fracture involving the odontoid process. Since prior examination, there has been interval anterior displacement of the proximal fracture fragment by approximately 0.4 cm when compared to prior examination. There is a   small amount of heterotopic ossification surrounding the fracture site.  No new fractures identified.     Stable anterolisthesis at C3 on C4. Stable stepwise retrolisthesis at C4 on C5, C5 on C6. Stable anterolisthesis at C7 on T1. Stable superior endplate compression deformity involving T3. Dextroconvex curvature of the upper cervical spine.    CANAL/FORAMINA: Advanced multilevel degenerative changes. Disc osteophyte complexes are present at C3-C4, C4-C5, C5-C6, C6-C7 and C7-T1. At C3-C4 there is up to mild canal stenosis. At C4-C5 there is up to mild canal stenosis. At C5-C6 there is up to   moderate canal stenosis. At C6-C7 and C7-T1 there is up to mild canal stenosis. At C3-C4 there is mild right and severe left neural foraminal stenosis. At C4-C5 there is moderate to severe left and right neural foraminal stenosis. At C5-C6 there is   severe right and  moderate to severe left neural foraminal stenosis. At C6-C7 there is mild right and moderate to severe left neural foraminal stenosis.    PARASPINAL: Pulmonary emphysema. The patient is intubated.      Impression    IMPRESSION:  1.  Oblique type I dens fracture with interval anterior displacement of the proximal fracture fragment by approximately 0.4 cm.  2.  Multilevel degenerative spondylolisthesis, as above.       Dr. Russel Brown discussed results with Dr. Ely on 9/26/2024 at 6:28 AM CDT.               MD Rosamaria    Billing: This patient is critically ill: Yes. Total critical care time today 50 min.                       Attending Intensivist note  I fully assessed patient myself including history, my own physical exam, and review of data. I also discussed case with Shawn Campos including bedside exam and appreciate his assistance and fine care.     Physical  Lungs with decreased breath sounds at left base; heart RRR with distant heart sounds; abdomen obese and non-tender; extremities are warm with minimal edema; skin shows no cyanosis or mottling; CNS does move extremities trace to stimulation.     Assessment and plan  Suicide attempt- Keppra overdose coupled with alcohol intoxication (0.19) - she is compensated on ventilator with level pending. We will continue to support and monitor closely. There appears to be no evidence of beta-blocker or calcium channel blocker overdose at this time; will continue to monitor.   Acute respiratory failure and possible aspiration pneumonitis - currently on 50% and +10 PEEP. We will titrate down support as able; cultures pending; and we will treat with a short course of antibiotics for 5 days for aspiration.   Alcoholism, acute alcohol intoxication   History of odontoid fracture  (September 2024) - per verbal report neurosurgery has requested MRI of neck which we have ordered. Patient has a history of non-compliance with collar; she is in collar at present.    COPD, with recent hospitalization for exacerbation (9//-11/2024) - she is on duonebs and full replacement dose of hydrocortisone; she is on chronic prednisone presumably for COPD  CKD (baseline creatinine 0.86)  Chronic pain and opioid dependence   HTN  JOY - BIPAP once extubated.   Seizure disorder - resume keppra when levels are appropriate   Hypothyroid - TSH normal in August 2024; will order tomorrow AM.   Dyslipidemia     Overall, she is critical. I spent 45 minutes of critical care time with her today     Rosamaria keith  September 26, 2024    Addendum:  Keppra level was low this AM so I doubt this was an overdose of Keppra. I have resumed her routine dose.    Her urine screen shows Narcotics and TCA's; seroquel may cross react as a TCA.     At this point her acute intoxication may be an overdose of either her percocet or an occult drug, coupled with alcohol. We will monitor her supportively and expectantly at this time.   juan

## 2024-09-26 NOTE — ED PROVIDER NOTES
"Emergency Department Sign-out Note    Time of sign-out: 0600    Patient summary:   73F, +SI with ingestion of unknown substances (prescribed numerous potentially concerning medications including opioids, BB/CCB), intubated in pre-hospital setting. Vitals ok here, on propofol with occasional low-dose NE needed. Awaiting imaging (CT C spine given hx odontoid fracture). Accepted to [facility], awaiting bed availability.    Additional ED course:   0600  Patient reassessed after signout, appears comfortable on propofol and ventilator.  Withdraws to painful stimulus though does not open eyes.  Blood pressures 90s to 100s systolic, on very low-dose norepinephrine (0.01mcg/kg/min).  Labs reviewed, notable for alcohol intoxication, mixed metabolic and respiratory (primarily respiratory) acidosis, mild hypomagnesemia (replacement ordered), lactic acidosis awaiting repeat.  4-hour acetaminophen awaiting draw.    0628  Spoke with [facility], dens fracture proximal fragment now has shifted anteriorly about 4mm from prior, previously in better alignment.  Did review CT C-spine from 9/9/2024, read at that time as \"no traumatic malalignment by CT\". Will touch base with NSGY.    0648  Spoke with neurosurgery, recommended continuing in collar.  Would likely consider MRI at [facility] but no need for this emergently.    0830  EMS here to transport patient.  RASS currently above target goal, bolusing with hydromorphone prior to transport, discussed with EMS increasing propofol/norepinephrine en route as needed.    Nicko Bowden MD  Staff Emergency Physician  [facility]       Shoaib Bowden MD  09/26/24 0830    "

## 2024-09-26 NOTE — PROGRESS NOTES
IR Note    Placed NJ tube. Able to advance tube past surgical area without difficulty.  Difficult to advance tube in stomach to the ligament of trietz.  Tip in second stage of duodenum.    RADIOLOGY PROCEDURE NOTE  Patient name: Jessica Ellison  MRN: [id number]  : 1950    Pre-procedure diagnosis: Nutritional needs  Post-procedure diagnosis: Same    Procedure Date/Time: 2024  12:21 PM  Procedure: Feeding tube placement.  Estimated blood loss: None  Specimen(s) collected with description: none  The patient tolerated the procedure well with no immediate complications.  Significant findings: Tip in second stage of duodenum.  Recommend doing feedings in somewhat elevated position.     See imaging dictation for procedural details.    Provider name: Garcia Go PA-C  Assistant(s):None

## 2024-09-26 NOTE — ED NOTES
Pt arrived in ED without aspen collar in place.  showed up after pt had already been to CT with pts aspen collar reporting that she needs the collar on due to some fractures. MD placed aspen collar appropriately with RN assistance. CT ordered.

## 2024-09-26 NOTE — PLAN OF CARE
"  Problem: Adult Inpatient Plan of Care  Goal: Plan of Care Review  Description: The Plan of Care Review/Shift note should be completed every shift.  The Outcome Evaluation is a brief statement about your assessment that the patient is improving, declining, or no change.  This information will be displayed automatically on your shift  note.  Outcome: Progressing  Flowsheets (Taken 9/26/2024 1649)  Plan of Care Reviewed With:   patient   spouse   child  Overall Patient Progress: improving  Goal: Patient-Specific Goal (Individualized)  Description: You can add care plan individualizations to a care plan. Examples of Individualization might be:  \"Parent requests to be called daily at 9am for status\", \"I have a hard time hearing out of my right ear\", or \"Do not touch me to wake me up as it startles  me\".  Outcome: Progressing  Goal: Absence of Hospital-Acquired Illness or Injury  Outcome: Progressing  Intervention: Identify and Manage Fall Risk  Recent Flowsheet Documentation  Taken 9/26/2024 1600 by Shoaib Cid  Safety Promotion/Fall Prevention:   activity supervised   clutter free environment maintained   increased rounding and observation   safety round/check completed   patient and family education   nonskid shoes/slippers when out of bed   supervised activity  Taken 9/26/2024 1200 by Shoaib Cid  Safety Promotion/Fall Prevention:   activity supervised   clutter free environment maintained   increased rounding and observation   safety round/check completed   patient and family education   nonskid shoes/slippers when out of bed   supervised activity  Intervention: Prevent Skin Injury  Recent Flowsheet Documentation  Taken 9/26/2024 1600 by Shoaib Cid  Body Position:   turned   side-lying 30 degrees  Skin Protection:   adhesive use limited   silicone foam dressing in place   pulse oximeter probe site changed   incontinence pads utilized  Device Skin Pressure Protection:   pressure points protected   " tubing/devices free from skin contact   adhesive use limited  Taken 9/26/2024 1400 by Shoaib Cid  Body Position:   turned   side-lying 30 degrees  Taken 9/26/2024 1200 by Shoaib Cid  Body Position:   turned   side-lying 30 degrees  Skin Protection:   adhesive use limited   silicone foam dressing in place   pulse oximeter probe site changed   incontinence pads utilized  Device Skin Pressure Protection:   pressure points protected   tubing/devices free from skin contact   adhesive use limited  Intervention: Prevent and Manage VTE (Venous Thromboembolism) Risk  Recent Flowsheet Documentation  Taken 9/26/2024 1600 by Shoaib Cid  VTE Prevention/Management: SCDs on (sequential compression devices)  Taken 9/26/2024 1200 by Shoaib Cid  VTE Prevention/Management: SCDs on (sequential compression devices)  Intervention: Prevent Infection  Recent Flowsheet Documentation  Taken 9/26/2024 1600 by Shoaib Cid  Infection Prevention:   cohorting utilized   environmental surveillance performed  Taken 9/26/2024 1200 by Shoaib Cid  Infection Prevention:   cohorting utilized   environmental surveillance performed  Goal: Optimal Comfort and Wellbeing  Outcome: Progressing  Intervention: Provide Person-Centered Care  Recent Flowsheet Documentation  Taken 9/26/2024 1200 by Shoaib Cid  Trust Relationship/Rapport: care explained  Goal: Readiness for Transition of Care  Outcome: Progressing     Problem: Risk for Delirium  Goal: Optimal Coping  Outcome: Progressing  Intervention: Optimize Psychosocial Adjustment to Delirium  Recent Flowsheet Documentation  Taken 9/26/2024 1600 by Shoaib Cid  Supportive Measures: positive reinforcement provided  Taken 9/26/2024 1200 by Shoaib Cid  Supportive Measures: positive reinforcement provided  Goal: Improved Behavioral Control  Outcome: Progressing  Intervention: Prevent and Manage Agitation  Recent Flowsheet Documentation  Taken 9/26/2024 1600 by  Shoaib Cid  Environment Familiarity/Consistency: daily routine followed  Taken 9/26/2024 1200 by Shoaib Cid  Environment Familiarity/Consistency: daily routine followed  Intervention: Minimize Safety Risk  Recent Flowsheet Documentation  Taken 9/26/2024 1600 by Shoaib Cid  Communication Enhancement Strategies:   call light answered in person   extra time allowed for response   one-step directions provided   repetition utilized  Enhanced Safety Measures:   room near unit station   pain management  Taken 9/26/2024 1200 by Shoaib Cid  Communication Enhancement Strategies:   call light answered in person   extra time allowed for response   one-step directions provided   repetition utilized  Enhanced Safety Measures:   room near unit station   pain management  Trust Relationship/Rapport: care explained  Goal: Improved Attention and Thought Clarity  Outcome: Progressing  Intervention: Maximize Cognitive Function  Recent Flowsheet Documentation  Taken 9/26/2024 1600 by Shoaib Cid  Sensory Stimulation Regulation: quiet environment promoted  Reorientation Measures: clock in view  Taken 9/26/2024 1200 by Shoaib Cid  Sensory Stimulation Regulation: quiet environment promoted  Reorientation Measures: clock in view  Goal: Improved Sleep  Outcome: Progressing     Problem: Restraint, Nonviolent  Goal: Absence of Harm or Injury  Outcome: Progressing  Intervention: Protect Dignity, Rights and Personal Wellbeing  Recent Flowsheet Documentation  Taken 9/26/2024 1200 by Shoaib Cid  Trust Relationship/Rapport: care explained  Intervention: Protect Skin and Joint Integrity  Recent Flowsheet Documentation  Taken 9/26/2024 1600 by Shoaib Cid  Body Position:   turned   side-lying 30 degrees  Skin Protection:   adhesive use limited   silicone foam dressing in place   pulse oximeter probe site changed   incontinence pads utilized  Taken 9/26/2024 1400 by Shoaib Cid  Body Position:    turned   side-lying 30 degrees  Taken 9/26/2024 1200 by Shoaib Cid  Body Position:   turned   side-lying 30 degrees  Skin Protection:   adhesive use limited   silicone foam dressing in place   pulse oximeter probe site changed   incontinence pads utilized   Goal Outcome Evaluation:      Plan of Care Reviewed With: patient, spouse, child    Overall Patient Progress: improvingOverall Patient Progress: improving     PERRL. Intermittently follows commands. Purposeful movement all extremities. Tele, SR/ST. Vented, 50% Fi02, PEEP 10. Bruce in place, low urine output, MD notified. 1L NS Bolus given and NS@ 50 started. PIV x2. Propofol @ 70. Levophed off since 1100. Spouse and Daughter updated at bedside.

## 2024-09-26 NOTE — ED NOTES
Patient placed on hospital LTV ventilator at arrival from EMS.  Patient last settings were AC-14, 400, 85% +10.  Will cont to Titr FIO2 as able.  Assisted to CT and now back in room. ETCO2 32-33 mmhg.

## 2024-09-26 NOTE — ED NOTES
Pt arrives via EMS intubated, EMS states pt had a argument with  10 min later  found pt down not breathing, EMS states pt ingested alcohol and some pills, pupils pin point

## 2024-09-26 NOTE — PROGRESS NOTES
EMS arrived for transport to [facility] , Pt on Vent A/C 12 400 65% +10 peep . Tolerating well Spo2=95%.

## 2024-09-26 NOTE — ED TRIAGE NOTES
Pt arrives here via EMS from home intubated after pt reportly told  that she ingested some pills.  told EMS that they had just been in an argument and  left the room for 10 minutes. EMS reports that pt was grey and having 2 breaths per minute. Narcan was given with no improvement of pts condition.

## 2024-09-26 NOTE — PROGRESS NOTES
Contacted regarding 74 yo female with known type I dens fracture diagnosed 9/9/24 but per family has not been wearing collar.  Presented to ER with drug overdose and intubated.  CT now shows worsening anterior displacement of fracture.    Cervical CT:    IMPRESSION:  1.  Oblique type I dens fracture with interval anterior displacement of the proximal fracture fragment by approximately 0.4 cm.  2.  Multilevel degenerative spondylolisthesis, as above.      RECOMMENDATIONS:  Transfer to [facility].  Cervical collar at all times.  Cervical MRI when gets to [facility].    Discussed with Dr. Young.    Veena Graham, ANNAMARIAS  [facility] Neurosurgery  [address]  [address] [place] [id number]    Tel [phone]  Pager [phone]

## 2024-09-26 NOTE — ED NOTES
EMS ventilator failed after switching pt over. Pt manually bagged until switched back to ED ventilator.

## 2024-09-26 NOTE — PHARMACY-ADMISSION MEDICATION HISTORY
Pharmacist Admission Medication History    Admission medication history is complete. The information provided in this note is only as accurate as the sources available at the time of the update.    Information Source(s): Hospital records and [facility]       Pertinent Information: Patient was just discharged from [facility]-, Reviewing her refill history in Surescripts confirms these meds. Metoprolol XL has not been filled since 3-13-24 90 day supply and Budesonide-Fomotoerol has not been refilled since 12-22-23, so not sure if compliant.     Changes made to PTA medication list:  Added: None  Deleted: Ceftin/Prednisone, completed  Changed: None    Allergies reviewed with patient and updates made in EHR: no    Medication History Completed By: Obdulio [facility] 9/26/2024 11:43 AM    PTA Med List   Medication Sig Last Dose    amLODIPine (NORVASC) 5 MG tablet Take 1 tablet (5 mg) by mouth daily Unknown    aspirin (SM ASPIRIN EC) 325 MG EC tablet TAKE ONE TABLET BY MOUTH ONCE DAILY WITH DINNER FOR 30 DAYS Unknown    atorvastatin (LIPITOR) 40 MG tablet Take 1 tablet (40 mg) by mouth daily Unknown    budesonide-formoterol (SYMBICORT) 80-4.5 MCG/ACT Inhaler Inhale 2 puffs into the lungs 2 times daily Unknown    calcium carbonate 600 mg-vitamin D 400 units (CALTRATE) 600-400 MG-UNIT per tablet Take 1 tablet by mouth every evening Unknown    celecoxib (CELEBREX) 200 MG capsule Take 1 capsule (200 mg) by mouth daily Unknown    DULoxetine (CYMBALTA) 30 MG capsule Take 1 capsule (30 mg) by mouth 2 times daily Unknown    folic acid (FOLVITE) 1 MG tablet Take 1 tablet (1 mg) by mouth daily Unknown    gabapentin (NEURONTIN) 600 MG tablet Take 1 tablet (600 mg) by mouth at bedtime Unknown    levETIRAcetam (KEPPRA) 500 MG tablet Take 1 tablet (500 mg) by mouth 2 times daily Unknown    levothyroxine (SYNTHROID/LEVOTHROID) 50 MCG tablet Take 1 tablet (50 mcg) by mouth daily Unknown    Melatonin 10 MG TABS tablet  Take 20 mg by mouth At Bedtime Unknown    metoprolol succinate ER (TOPROL XL) 50 MG 24 hr tablet Take 1 tablet (50 mg) by mouth 2 times daily Unknown    multivitamin, therapeutic (THERA-VIT) TABS tablet Take 1 tablet by mouth daily Unknown    naloxone (NARCAN) 4 MG/0.1ML nasal spray Spray 1 spray (4 mg) into one nostril alternating nostrils once as needed for opioid reversal every 2-3 minutes until assistance arrives Unknown    nystatin (MYCOSTATIN) 088996 UNIT/GM external powder Apply topically 3 times daily as needed (rash) Unknown    oxyCODONE-acetaminophen (PERCOCET)  MG per tablet Take 1 tablet by mouth every 6 hours as needed for severe pain Unknown    QUEtiapine (SEROQUEL) 50 MG tablet TAKE 2 TABLETS BY MOUTH AT BEDTIME Unknown    tiotropium (SPIRIVA HANDIHALER) 18 MCG inhaled capsule USING THE HANDIHALER, INHALE THE CONTENTS OF ONE CAPSULE BY MOUTH ONCE DAILY Unknown    VENTOLIN  (90 Base) MCG/ACT inhaler INHALE 2 PUFFS INTO THE LUNGS EVERY 6 HOURS AS NEEDED FOR SHORTNESS OF BREATH OR WHEEZING Unknown

## 2024-09-26 NOTE — ED NOTES
MD ordered pause in sedation to see if pt had any deficits from c-spine changes. Pt woke after approx. 40 minutes, was alert, moving extremities purposefully and attempting to sit up. MD called to bedside, gave verbal orders for a 40 mg bolus of propofol and 1 mg Dilaudid.

## 2024-09-27 ENCOUNTER — APPOINTMENT (OUTPATIENT)
Dept: GENERAL RADIOLOGY | Facility: CLINIC | Age: 74
DRG: 917 | End: 2024-09-27
Attending: INTERNAL MEDICINE
Payer: COMMERCIAL

## 2024-09-27 LAB
ALBUMIN SERPL BCG-MCNC: 3.9 G/DL (ref 3.5–5.2)
ANION GAP SERPL CALCULATED.3IONS-SCNC: 14 MMOL/L (ref 7–15)
BASE EXCESS BLDV CALC-SCNC: -3.4 MMOL/L (ref -3–3)
BUN SERPL-MCNC: 20.8 MG/DL (ref 8–23)
CALCIUM SERPL-MCNC: 7.6 MG/DL (ref 8.8–10.4)
CHLORIDE SERPL-SCNC: 109 MMOL/L (ref 98–107)
CREAT SERPL-MCNC: 1.06 MG/DL (ref 0.51–0.95)
EGFRCR SERPLBLD CKD-EPI 2021: 56 ML/MIN/1.73M2
ERYTHROCYTE [DISTWIDTH] IN BLOOD BY AUTOMATED COUNT: 15.1 % (ref 10–15)
GLUCOSE BLDC GLUCOMTR-MCNC: 128 MG/DL (ref 70–99)
GLUCOSE BLDC GLUCOMTR-MCNC: 136 MG/DL (ref 70–99)
GLUCOSE BLDC GLUCOMTR-MCNC: 137 MG/DL (ref 70–99)
GLUCOSE BLDC GLUCOMTR-MCNC: 137 MG/DL (ref 70–99)
GLUCOSE BLDC GLUCOMTR-MCNC: 140 MG/DL (ref 70–99)
GLUCOSE BLDC GLUCOMTR-MCNC: 140 MG/DL (ref 70–99)
GLUCOSE BLDC GLUCOMTR-MCNC: 174 MG/DL (ref 70–99)
GLUCOSE BLDC GLUCOMTR-MCNC: 189 MG/DL (ref 70–99)
GLUCOSE BLDC GLUCOMTR-MCNC: 246 MG/DL (ref 70–99)
GLUCOSE BLDC GLUCOMTR-MCNC: 283 MG/DL (ref 70–99)
GLUCOSE BLDC GLUCOMTR-MCNC: 303 MG/DL (ref 70–99)
GLUCOSE SERPL-MCNC: 279 MG/DL (ref 70–99)
HCO3 BLDV-SCNC: 23 MMOL/L (ref 21–28)
HCO3 SERPL-SCNC: 22 MMOL/L (ref 22–29)
HCT VFR BLD AUTO: 29 % (ref 35–47)
HGB BLD-MCNC: 9.2 G/DL (ref 11.7–15.7)
LEVETIRACETAM SERPL-MCNC: 9.6 ΜG/ML (ref 10–40)
MCH RBC QN AUTO: 31.4 PG (ref 26.5–33)
MCHC RBC AUTO-ENTMCNC: 31.7 G/DL (ref 31.5–36.5)
MCV RBC AUTO: 99 FL (ref 78–100)
MRSA DNA SPEC QL NAA+PROBE: NEGATIVE
O2/TOTAL GAS SETTING VFR VENT: 50 %
OXYHGB MFR BLDV: 94 % (ref 70–75)
PCO2 BLDV: 45 MM HG (ref 40–50)
PH BLDV: 7.31 [PH] (ref 7.32–7.43)
PHOSPHATE SERPL-MCNC: 3.3 MG/DL (ref 2.5–4.5)
PLATELET # BLD AUTO: 117 10E3/UL (ref 150–450)
PO2 BLDV: 80 MM HG (ref 25–47)
POTASSIUM SERPL-SCNC: 3.6 MMOL/L (ref 3.4–5.3)
RBC # BLD AUTO: 2.93 10E6/UL (ref 3.8–5.2)
SA TARGET DNA: NEGATIVE
SAO2 % BLDV: 95.7 % (ref 70–75)
SODIUM SERPL-SCNC: 145 MMOL/L (ref 135–145)
TSH SERPL DL<=0.005 MIU/L-ACNC: 0.45 UIU/ML (ref 0.3–4.2)
WBC # BLD AUTO: 6.2 10E3/UL (ref 4–11)

## 2024-09-27 PROCEDURE — 85014 HEMATOCRIT: CPT | Performed by: INTERNAL MEDICINE

## 2024-09-27 PROCEDURE — 250N000011 HC RX IP 250 OP 636: Performed by: INTERNAL MEDICINE

## 2024-09-27 PROCEDURE — 87640 STAPH A DNA AMP PROBE: CPT | Performed by: INTERNAL MEDICINE

## 2024-09-27 PROCEDURE — 999N000157 HC STATISTIC RCP TIME EA 10 MIN

## 2024-09-27 PROCEDURE — 250N000009 HC RX 250: Performed by: INTERNAL MEDICINE

## 2024-09-27 PROCEDURE — 94640 AIRWAY INHALATION TREATMENT: CPT | Mod: 76

## 2024-09-27 PROCEDURE — 84443 ASSAY THYROID STIM HORMONE: CPT | Performed by: INTERNAL MEDICINE

## 2024-09-27 PROCEDURE — 36415 COLL VENOUS BLD VENIPUNCTURE: CPT | Performed by: INTERNAL MEDICINE

## 2024-09-27 PROCEDURE — 258N000003 HC RX IP 258 OP 636: Performed by: INTERNAL MEDICINE

## 2024-09-27 PROCEDURE — 250N000013 HC RX MED GY IP 250 OP 250 PS 637: Performed by: INTERNAL MEDICINE

## 2024-09-27 PROCEDURE — 999N000009 HC STATISTIC AIRWAY CARE

## 2024-09-27 PROCEDURE — 85049 AUTOMATED PLATELET COUNT: CPT | Performed by: INTERNAL MEDICINE

## 2024-09-27 PROCEDURE — 87641 MR-STAPH DNA AMP PROBE: CPT | Performed by: INTERNAL MEDICINE

## 2024-09-27 PROCEDURE — 99291 CRITICAL CARE FIRST HOUR: CPT | Performed by: INTERNAL MEDICINE

## 2024-09-27 PROCEDURE — 80177 DRUG SCRN QUAN LEVETIRACETAM: CPT | Performed by: INTERNAL MEDICINE

## 2024-09-27 PROCEDURE — 71045 X-RAY EXAM CHEST 1 VIEW: CPT

## 2024-09-27 PROCEDURE — 94003 VENT MGMT INPAT SUBQ DAY: CPT

## 2024-09-27 PROCEDURE — 82805 BLOOD GASES W/O2 SATURATION: CPT | Performed by: INTERNAL MEDICINE

## 2024-09-27 PROCEDURE — 200N000001 HC R&B ICU

## 2024-09-27 PROCEDURE — 80069 RENAL FUNCTION PANEL: CPT | Performed by: INTERNAL MEDICINE

## 2024-09-27 PROCEDURE — 94640 AIRWAY INHALATION TREATMENT: CPT

## 2024-09-27 PROCEDURE — 250N000012 HC RX MED GY IP 250 OP 636 PS 637: Performed by: INTERNAL MEDICINE

## 2024-09-27 RX ORDER — PROPOFOL 10 MG/ML
INJECTION, EMULSION INTRAVENOUS
Status: COMPLETED
Start: 2024-09-27 | End: 2024-09-27

## 2024-09-27 RX ORDER — DEXTROSE MONOHYDRATE 100 MG/ML
INJECTION, SOLUTION INTRAVENOUS CONTINUOUS PRN
Status: DISCONTINUED | OUTPATIENT
Start: 2024-09-27 | End: 2024-10-03

## 2024-09-27 RX ORDER — PROPOFOL 10 MG/ML
5-100 INJECTION, EMULSION INTRAVENOUS CONTINUOUS
Status: DISCONTINUED | OUTPATIENT
Start: 2024-09-27 | End: 2024-09-27

## 2024-09-27 RX ORDER — VANCOMYCIN HYDROCHLORIDE
1250 EVERY 24 HOURS
Status: DISCONTINUED | OUTPATIENT
Start: 2024-09-27 | End: 2024-09-29

## 2024-09-27 RX ORDER — METOPROLOL TARTRATE 25 MG/1
25 TABLET, FILM COATED ORAL 3 TIMES DAILY
Status: DISCONTINUED | OUTPATIENT
Start: 2024-09-27 | End: 2024-09-30

## 2024-09-27 RX ORDER — DEXTROSE MONOHYDRATE 25 G/50ML
25-50 INJECTION, SOLUTION INTRAVENOUS
Status: DISCONTINUED | OUTPATIENT
Start: 2024-09-27 | End: 2024-09-27

## 2024-09-27 RX ORDER — MIDAZOLAM HCL IN 0.9 % NACL/PF 1 MG/ML
1-8 PLASTIC BAG, INJECTION (ML) INTRAVENOUS CONTINUOUS
Status: DISCONTINUED | OUTPATIENT
Start: 2024-09-27 | End: 2024-10-01

## 2024-09-27 RX ORDER — NICOTINE POLACRILEX 4 MG
15-30 LOZENGE BUCCAL
Status: DISCONTINUED | OUTPATIENT
Start: 2024-09-27 | End: 2024-09-27

## 2024-09-27 RX ORDER — AMLODIPINE BESYLATE 5 MG/1
5 TABLET ORAL DAILY
Status: DISCONTINUED | OUTPATIENT
Start: 2024-09-27 | End: 2024-10-03

## 2024-09-27 RX ADMIN — METOPROLOL TARTRATE 25 MG: 25 TABLET, FILM COATED ORAL at 15:37

## 2024-09-27 RX ADMIN — SODIUM CHLORIDE: 9 INJECTION, SOLUTION INTRAVENOUS at 11:10

## 2024-09-27 RX ADMIN — MIDAZOLAM HYDROCHLORIDE 1 MG/HR: 1 INJECTION, SOLUTION INTRAVENOUS at 15:17

## 2024-09-27 RX ADMIN — LEVETIRACETAM 500 MG: 5 INJECTION INTRAVENOUS at 17:07

## 2024-09-27 RX ADMIN — Medication 40 MG: at 09:17

## 2024-09-27 RX ADMIN — CHLORHEXIDINE GLUCONATE 0.12% ORAL RINSE 15 ML: 1.2 LIQUID ORAL at 09:28

## 2024-09-27 RX ADMIN — METOPROLOL TARTRATE 25 MG: 25 TABLET, FILM COATED ORAL at 22:01

## 2024-09-27 RX ADMIN — PIPERACILLIN AND TAZOBACTAM 3.38 G: 3; .375 INJECTION, POWDER, FOR SOLUTION INTRAVENOUS at 13:43

## 2024-09-27 RX ADMIN — PROPOFOL 75 MCG/KG/MIN: 10 INJECTION, EMULSION INTRAVENOUS at 11:08

## 2024-09-27 RX ADMIN — IPRATROPIUM BROMIDE AND ALBUTEROL SULFATE 3 ML: .5; 3 SOLUTION RESPIRATORY (INHALATION) at 22:50

## 2024-09-27 RX ADMIN — IPRATROPIUM BROMIDE AND ALBUTEROL SULFATE 3 ML: .5; 3 SOLUTION RESPIRATORY (INHALATION) at 11:15

## 2024-09-27 RX ADMIN — PROPOFOL 75 MCG/KG/MIN: 10 INJECTION, EMULSION INTRAVENOUS at 14:28

## 2024-09-27 RX ADMIN — Medication 25 MCG/HR: at 00:56

## 2024-09-27 RX ADMIN — ENOXAPARIN SODIUM 40 MG: 40 INJECTION SUBCUTANEOUS at 10:01

## 2024-09-27 RX ADMIN — AMLODIPINE BESYLATE 5 MG: 5 TABLET ORAL at 11:19

## 2024-09-27 RX ADMIN — PROPOFOL 75 MCG/KG/MIN: 10 INJECTION, EMULSION INTRAVENOUS at 08:04

## 2024-09-27 RX ADMIN — PROPOFOL 75 MCG/KG/MIN: 10 INJECTION, EMULSION INTRAVENOUS at 01:39

## 2024-09-27 RX ADMIN — INSULIN ASPART 5 UNITS: 100 INJECTION, SOLUTION INTRAVENOUS; SUBCUTANEOUS at 06:12

## 2024-09-27 RX ADMIN — HYDROCORTISONE SODIUM SUCCINATE 100 MG: 100 INJECTION, POWDER, FOR SOLUTION INTRAMUSCULAR; INTRAVENOUS at 10:01

## 2024-09-27 RX ADMIN — IPRATROPIUM BROMIDE AND ALBUTEROL SULFATE 3 ML: .5; 3 SOLUTION RESPIRATORY (INHALATION) at 07:00

## 2024-09-27 RX ADMIN — LEVETIRACETAM 500 MG: 5 INJECTION INTRAVENOUS at 06:07

## 2024-09-27 RX ADMIN — VANCOMYCIN HYDROCHLORIDE 1250 MG: 10 INJECTION, POWDER, LYOPHILIZED, FOR SOLUTION INTRAVENOUS at 11:10

## 2024-09-27 RX ADMIN — PIPERACILLIN AND TAZOBACTAM 3.38 G: 3; .375 INJECTION, POWDER, FOR SOLUTION INTRAVENOUS at 18:45

## 2024-09-27 RX ADMIN — Medication 10 MG: at 00:15

## 2024-09-27 RX ADMIN — IPRATROPIUM BROMIDE AND ALBUTEROL SULFATE 3 ML: .5; 3 SOLUTION RESPIRATORY (INHALATION) at 15:19

## 2024-09-27 RX ADMIN — IPRATROPIUM BROMIDE AND ALBUTEROL SULFATE 3 ML: .5; 3 SOLUTION RESPIRATORY (INHALATION) at 18:54

## 2024-09-27 RX ADMIN — HYDROCORTISONE SODIUM SUCCINATE 100 MG: 100 INJECTION, POWDER, FOR SOLUTION INTRAMUSCULAR; INTRAVENOUS at 22:01

## 2024-09-27 RX ADMIN — PROPOFOL 75 MCG/KG/MIN: 10 INJECTION, EMULSION INTRAVENOUS at 05:11

## 2024-09-27 RX ADMIN — IPRATROPIUM BROMIDE AND ALBUTEROL SULFATE 3 ML: .5; 3 SOLUTION RESPIRATORY (INHALATION) at 04:01

## 2024-09-27 RX ADMIN — HYDROCORTISONE SODIUM SUCCINATE 100 MG: 100 INJECTION, POWDER, FOR SOLUTION INTRAMUSCULAR; INTRAVENOUS at 15:37

## 2024-09-27 RX ADMIN — CHLORHEXIDINE GLUCONATE 0.12% ORAL RINSE 15 ML: 1.2 LIQUID ORAL at 19:56

## 2024-09-27 RX ADMIN — SODIUM CHLORIDE, POTASSIUM CHLORIDE, SODIUM LACTATE AND CALCIUM CHLORIDE 500 ML: 600; 310; 30; 20 INJECTION, SOLUTION INTRAVENOUS at 06:25

## 2024-09-27 RX ADMIN — INSULIN ASPART 7 UNITS: 100 INJECTION, SOLUTION INTRAVENOUS; SUBCUTANEOUS at 02:21

## 2024-09-27 RX ADMIN — HYDROMORPHONE HYDROCHLORIDE 0.5 MG: 1 INJECTION, SOLUTION INTRAMUSCULAR; INTRAVENOUS; SUBCUTANEOUS at 00:05

## 2024-09-27 RX ADMIN — PIPERACILLIN AND TAZOBACTAM 3.38 G: 3; .375 INJECTION, POWDER, FOR SOLUTION INTRAVENOUS at 06:31

## 2024-09-27 RX ADMIN — LEVOTHYROXINE SODIUM 50 MCG: 50 TABLET ORAL at 09:17

## 2024-09-27 RX ADMIN — HYDROCORTISONE SODIUM SUCCINATE 100 MG: 100 INJECTION, POWDER, FOR SOLUTION INTRAMUSCULAR; INTRAVENOUS at 05:11

## 2024-09-27 RX ADMIN — Medication 200 MCG/HR: at 17:00

## 2024-09-27 RX ADMIN — INSULIN HUMAN 1.5 UNITS/HR: 1 INJECTION, SOLUTION INTRAVENOUS at 09:28

## 2024-09-27 ASSESSMENT — ACTIVITIES OF DAILY LIVING (ADL)
ADLS_ACUITY_SCORE: 51
ADLS_ACUITY_SCORE: 47
ADLS_ACUITY_SCORE: 51
ADLS_ACUITY_SCORE: 47
ADLS_ACUITY_SCORE: 51
ADLS_ACUITY_SCORE: 47
ADLS_ACUITY_SCORE: 51
ADLS_ACUITY_SCORE: 47
ADLS_ACUITY_SCORE: 51
ADLS_ACUITY_SCORE: 47
ADLS_ACUITY_SCORE: 51

## 2024-09-27 NOTE — CONSULTS
Care Management Initial Consult    General Information  Assessment completed with: Spouse or significant other, [place] at the bedside  Type of CM/SW Visit: CM Role Introduction    Primary Care Provider verified and updated as needed: Yes   Readmission within the last 30 days: current reason for admission unrelated to previous admission   Return Category: New Diagnosis  Reason for Consult: discharge planning  Advance Care Planning: Advance Care Planning Reviewed:  (spouse states no HCD)          Communication Assessment  Patient's communication style: spoken language (English or Bilingual) (did not meet with the patient currently intubated)             Cognitive  Cognitive/Neuro/Behavioral: .WDL except  Level of Consciousness: sedated  Arousal Level: opens eyes spontaneously  Orientation: other (see comments) (erlinda - ett)  Mood/Behavior: restless, anxious  Best Language:  (erlinda - ett)  Speech: endotracheal tube    Living Environment:   People in home: spouse  Vj  Current living Arrangements: house      Able to return to prior arrangements: other (see comments) (pending continued recommendations)       Family/Social Support:  Care provided by: spouse/significant other  Provides care for: no one  Marital Status:   Support system: , Children  Vj       Description of Support System: Supportive, Involved    Support Assessment: Adequate family and caregiver support, Adequate social supports    Current Resources:   Patient receiving home care services: No        Community Resources: None  Equipment currently used at home: cane, straight, walker, standard, shower chair, orthosis (C collar)  Supplies currently used at home: Oxygen Tubing/Supplies, Other (CPAP)    Employment/Financial:  Employment Status: retired        Financial Concerns: none   Referral to Financial Worker: No       Does the patient's insurance plan have a 3 day qualifying hospital stay waiver?  Yes     Which insurance plan 3 day waiver  is available? Alternative insurance waiver    Will the waiver be used for post-acute placement? Undetermined at this time    Lifestyle & Psychosocial Needs:  Social Determinants of Health     Food Insecurity: Low Risk  (9/9/2024)    Food Insecurity     Within the past 12 months, did you worry that your food would run out before you got money to buy more?: No     Within the past 12 months, did the food you bought just not last and you didn t have money to get more?: No   Depression: At risk (8/12/2024)    PHQ-2     PHQ-2 Score: 3   Housing Stability: Low Risk  (9/9/2024)    Housing Stability     Do you have housing? : Yes     Are you worried about losing your housing?: No   Tobacco Use: Medium Risk (8/12/2024)    Patient History     Smoking Tobacco Use: Former     Smokeless Tobacco Use: Never     Passive Exposure: Not on file   Financial Resource Strain: Low Risk  (9/9/2024)    Financial Resource Strain     Within the past 12 months, have you or your family members you live with been unable to get utilities (heat, electricity) when it was really needed?: No   Alcohol Use: Not on file   Transportation Needs: Low Risk  (9/9/2024)    Transportation Needs     Within the past 12 months, has lack of transportation kept you from medical appointments, getting your medicines, non-medical meetings or appointments, work, or from getting things that you need?: No   Physical Activity: Inactive (2/20/2024)    Exercise Vital Sign     Days of Exercise per Week: 0 days     Minutes of Exercise per Session: 0 min   Interpersonal Safety: Low Risk  (9/9/2024)    Interpersonal Safety     Do you feel physically and emotionally safe where you currently live?: Yes     Within the past 12 months, have you been hit, slapped, kicked or otherwise physically hurt by someone?: No     Within the past 12 months, have you been humiliated or emotionally abused in other ways by your partner or ex-partner?: No   Stress: No Stress Concern Present  (2/20/2024)    [place] [employer] - Occupational Stress Questionnaire     Feeling of Stress : Only a little   Social Connections: Unknown (2/20/2024)    Social Connection and Isolation Panel [NHANES]     Frequency of Communication with Friends and Family: Not on file     Frequency of Social Gatherings with Friends and Family: More than three times a week     Attends Jainism Services: Not on file     Active Member of Clubs or Organizations: Not on file     Attends Club or Organization Meetings: Not on file     Marital Status: Not on file   Health Literacy: Not on file       Functional Status:  Prior to admission patient needed assistance:   Dependent ADLs:: Independent, Ambulation-walker          Mental Health Status:  Mental Health Status: Current Concern  Mental Health Management: Other (see comment) (spouse noted patient talked to a therapist 3-4 years ago currently not seeing one)    Chemical Dependency Status:  Chemical Dependency Status: Current Concern  Chemical Dependency Management: Previous treatment, AA, Other (see comment) (Spouse notes [place] ~10 years ago AA meeting patient not currently attending)          Values/Beliefs:  Spiritual, Cultural Beliefs, Jainism Practices, Values that affect care: other (see comments) ([facility] Spouse noted that they have a )               Discussed  Partnership in Safe Discharge Planning  document with patient/family: No    Additional Information:  Writer met with patients Spouse Vj at the bedside.  Patient is currently intubated.  Writer went over role and scope of safe discharge planning.  Per Vj prior to this admission patient had fallen and fractured her Neck (she was at [facility] (09/09[facility]09[facility]) and was due to follow up with Neurosurgery (appointment in [place] and will most likely need to be rescheduled).   Vj informs this writer that the patient was feeling more down the past couple of weeks. She usually uses a walker  but not always.  Patient was on supplemental oxygen through [facility] per spouse portable on wheels 1-2 LPM and independent with all ADL's. Vj notes he drives.  They reside in a single level home on a [address].  Vj is hopeful that once the patient comes around that Psych/CD can see her to possibly help with establishing a treatment plan.  Writer briefly went over possible discharge plans and explained possible TCU/ARU  vs homecare pending continued recommendations. Vj notes that patient will most likely be resistant to a post acute placement and will prefer home.   Vj notes that they have a Son Vikram and Daughter Ina who reside in [place] and visit the home often.  Vj had no further questions at this time.  He is aware that we will continue to follow for further discharge planning needs as identified.      Isabelle Jo RN, BSN, [facility]   Care Transitions Specialist  [facility]  Care Transitions Specialist  [address] [place] [place]. [address]  [email or web address]  Office: [phone]   Fax: [phone]  [facility]

## 2024-09-27 NOTE — PHARMACY-VANCOMYCIN DOSING SERVICE
"Pharmacy Vancomycin Initial Note  Date of Service 2024  Patient's  1950  73 year old, female    Indication: Aspiration Pneumonia    Current estimated CrCl = Estimated Creatinine Clearance: 46.8 mL/min (A) (based on SCr of 1.06 mg/dL (H)).    Creatinine for last 3 days  2024:  4:15 AM Creatinine 0.93 mg/dL; 11:24 AM Creatinine 0.86 mg/dL  2024:  4:46 AM Creatinine 1.06 mg/dL    Recent Vancomycin Level(s) for last 3 days  No results found for requested labs within last 3 days.      Vancomycin IV Administrations (past 72 hours)        No vancomycin orders with administrations in past 72 hours.                    Nephrotoxins and other renal medications (From now, onward)      Start     Dose/Rate Route Frequency Ordered Stop    24 0900  vancomycin (VANCOCIN) 1,250 mg in 0.9% NaCl 250 mL intermittent infusion         1,250 mg  166.7 mL/hr over 90 Minutes Intravenous EVERY 24 HOURS 24 0752      24 1230  piperacillin-tazobactam (ZOSYN) 3.375 g vial to attach to  mL bag        Note to Pharmacy: For [facility], [facility] and [facility]: For Zosyn-naive patients, use the \"Zosyn initial dose + extended infusion\" order panel.    3.375 g  over 30 Minutes Intravenous EVERY 6 HOURS 24 1204 10/01/24 1229    24 1030  norepinephrine (LEVOPHED) 4 mg in  mL infusion PREMIX         0.01-0.6 mcg/kg/min × 64.6 kg  2.4-145.4 mL/hr  Intravenous CONTINUOUS 24 1025              Contrast Orders - past 72 hours (72h ago, onward)      Start     Dose/Rate Route Frequency Stop    24 1230  iohexol (OMNIPAQUE) 240 mg/mL solution 50 mL         50 mL Per NG tube ONCE 24 1208            Insight[employer] Prediction of Planned Initial Vancomycin Regimen  Loading dose: N/A  Regimen: 1250 mg IV every 24 hours.  Start time: 07:51 on 2024  Exposure target: AUC24 (range)400-600 mg/L.hr   AUC24,ss: 500 mg/L.hr  Probability of AUC24 > 400: 75 %  Ctrough,ss: 14.9 mg/L  Probability of " Ctrough,ss > 20: 23 %  Probability of nephrotoxicity (Lodise GREGOR 2009): 10 %          Plan:  Start vancomycin 1250 mg IV q24h.   Vancomycin monitoring method: AUC  Vancomycin therapeutic monitoring goal: 400-600 mg*h/L  Pharmacy will check vancomycin levels as appropriate in 1-3 Days.    Serum creatinine levels will be ordered daily for the first week of therapy and at least twice weekly for subsequent weeks.      Britt Costa, PharmD

## 2024-09-27 NOTE — PROGRESS NOTES
[facility] ICU RESPIRATORY NOTE        Date of Admission: 9/26/2024    Date of Intubation (most recent): 9/26/2024    Reason for Mechanical Ventilation: Respiratory failure    Number of Days on Mechanical Ventilation: 1    Met Criteria for Spontaneous Breathing Trial: No    Reason for No Spontaneous Breathing Trial: Per MD    Bite Block: Added overnight per RN request for patient biting on tube.     Significant Events Today: None    ABG Results:   Recent Labs   Lab 09/27/24  0447 09/26/24  1917 09/26/24  1556 09/26/24  1047   PH  --   --  7.26*  --    PCO2  --   --  43  --    PO2  --   --  139*  --    HCO3  --   --  19*  --    O2PER 50 50 50 50  50         Current Vent Settings: FiO2 (%): 50 %, Resp: 10, Vent Mode: CMV/AC, Resp Rate (Set): 18 breaths/min, Tidal Volume (Set, mL): 400 mL, PEEP (cm H2O): 10 cmH2O, Resp Rate (Set): 18 breaths/min, Tidal Volume (Set, mL): 400 mL, PEEP (cm H2O): 10 cmH2O    Skin Assessment: Intact      Manisha La, RT on 9/27/2024 at 6:20 AM

## 2024-09-27 NOTE — PLAN OF CARE
"Goal Outcome Evaluation:      Plan of Care Reviewed With: spouse, patient    Overall Patient Progress: improvingOverall Patient Progress: improving    Neuro: PERRL. Agitated most of night - especially with turns and cares. Follows basic commands.  Cardiac: Tachy, BP stable.  Resp: LS clear/dim. Vented 50%, 10 PEEP. Moderate amount of thick secretions from ETT.  GI/: NJ clamped. No BM. Bruce with marginal uop - MD notified, 500mL bolus running.  Endocrine: Elevated BG. Started sliding scale insulin. Temp of 99.6 - given acetaminophen for pain and fever.  Pain: PRN dilaudid x2. Fent gtt added.  Skin: No new concerns  Lines: PIV x2  Gtts: Prop 75, Fent 125  Labs/imaging: MRI not completed d/t agitation      Spouse was at bedside and updated on pt progress.    Plan - wean vent/sedation as able      *see Epic flowsheets for full nursing assessment findings     Problem: Adult Inpatient Plan of Care  Goal: Plan of Care Review  Description: The Plan of Care Review/Shift note should be completed every shift.  The Outcome Evaluation is a brief statement about your assessment that the patient is improving, declining, or no change.  This information will be displayed automatically on your shift  note.  Outcome: Progressing  Flowsheets (Taken 9/27/2024 0642)  Plan of Care Reviewed With:   spouse   patient  Overall Patient Progress: improving  Goal: Patient-Specific Goal (Individualized)  Description: You can add care plan individualizations to a care plan. Examples of Individualization might be:  \"Parent requests to be called daily at 9am for status\", \"I have a hard time hearing out of my right ear\", or \"Do not touch me to wake me up as it startles  me\".  Outcome: Progressing  Goal: Absence of Hospital-Acquired Illness or Injury  Outcome: Progressing  Intervention: Identify and Manage Fall Risk  Recent Flowsheet Documentation  Taken 9/27/2024 0400 by Reyna Levin RN  Safety Promotion/Fall Prevention:   activity supervised   " clutter free environment maintained   lighting adjusted   room door open   room near nurse's station   room organization consistent   safety round/check completed   supervised activity   treat underlying cause  Taken 9/27/2024 0000 by Reyna Levin RN  Safety Promotion/Fall Prevention:   activity supervised   clutter free environment maintained   lighting adjusted   room door open   room near nurse's station   room organization consistent   safety round/check completed   supervised activity   treat underlying cause  Taken 9/26/2024 2000 by Reyna Levin RN  Safety Promotion/Fall Prevention:   activity supervised   clutter free environment maintained   lighting adjusted   room door open   room near nurse's station   room organization consistent   safety round/check completed   supervised activity   treat underlying cause  Intervention: Prevent Skin Injury  Recent Flowsheet Documentation  Taken 9/27/2024 0400 by Reyna Levin RN  Body Position:   turned   right   heels elevated  Skin Protection:   adhesive use limited   incontinence pads utilized   pulse oximeter probe site changed   silicone foam dressing in place   skin to device areas padded   skin to skin areas padded   transparent dressing maintained  Device Skin Pressure Protection:   absorbent pad utilized/changed   adhesive use limited   pressure points protected   skin-to-device areas padded   skin-to-skin areas padded   tubing/devices free from skin contact  Taken 9/27/2024 0000 by Reyna Levin RN  Skin Protection:   adhesive use limited   incontinence pads utilized   pulse oximeter probe site changed   silicone foam dressing in place   skin to device areas padded   skin to skin areas padded   transparent dressing maintained  Device Skin Pressure Protection:   absorbent pad utilized/changed   adhesive use limited   pressure points protected   skin-to-device areas padded   skin-to-skin areas padded   tubing/devices free from skin contact  Taken 9/26/2024  2000 by Reyna Levin RN  Body Position:   turned   left   heels elevated  Skin Protection:   adhesive use limited   incontinence pads utilized   pulse oximeter probe site changed   silicone foam dressing in place   skin to device areas padded   skin to skin areas padded   transparent dressing maintained  Device Skin Pressure Protection:   absorbent pad utilized/changed   adhesive use limited   pressure points protected   skin-to-device areas padded   skin-to-skin areas padded   tubing/devices free from skin contact  Intervention: Prevent and Manage VTE (Venous Thromboembolism) Risk  Recent Flowsheet Documentation  Taken 9/27/2024 0400 by Reyna Levin RN  VTE Prevention/Management: SCDs on (sequential compression devices)  Taken 9/27/2024 0000 by Reyna Levin RN  VTE Prevention/Management: SCDs on (sequential compression devices)  Taken 9/26/2024 2000 by Reyna Levin RN  VTE Prevention/Management: SCDs on (sequential compression devices)  Intervention: Prevent Infection  Recent Flowsheet Documentation  Taken 9/27/2024 0400 by Reyna Levin RN  Infection Prevention:   cohorting utilized   environmental surveillance performed   equipment surfaces disinfected   hand hygiene promoted   personal protective equipment utilized   single patient room provided  Taken 9/27/2024 0000 by Reyna Levin RN  Infection Prevention:   cohorting utilized   environmental surveillance performed   equipment surfaces disinfected   hand hygiene promoted   personal protective equipment utilized   single patient room provided  Taken 9/26/2024 2000 by Reyna Levin RN  Infection Prevention:   cohorting utilized   environmental surveillance performed   equipment surfaces disinfected   hand hygiene promoted   personal protective equipment utilized   single patient room provided  Goal: Optimal Comfort and Wellbeing  Outcome: Progressing  Intervention: Monitor Pain and Promote Comfort  Recent Flowsheet Documentation  Taken 9/27/2024 0400 by  Reyna Levin RN  Pain Management Interventions: pain pump in use  Taken 9/26/2024 2000 by Reyna Levin RN  Pain Management Interventions:   medication (see MAR)   music therapy   repositioned  Intervention: Provide Person-Centered Care  Recent Flowsheet Documentation  Taken 9/27/2024 0400 by Reyna Levin RN  Trust Relationship/Rapport:   care explained   reassurance provided  Taken 9/27/2024 0000 by Reyna Levin RN  Trust Relationship/Rapport:   care explained   reassurance provided  Taken 9/26/2024 2000 by Reyna Levin RN  Trust Relationship/Rapport:   care explained   reassurance provided   thoughts/feelings acknowledged   emotional support provided   empathic listening provided   questions answered   questions encouraged  Goal: Readiness for Transition of Care  Outcome: Progressing     Problem: Risk for Delirium  Goal: Optimal Coping  Outcome: Progressing  Intervention: Optimize Psychosocial Adjustment to Delirium  Recent Flowsheet Documentation  Taken 9/27/2024 0400 by Reyna Levin RN  Supportive Measures: relaxation techniques promoted  Taken 9/27/2024 0000 by Reyna Levin RN  Supportive Measures: relaxation techniques promoted  Taken 9/26/2024 2000 by Reyna Levin RN  Supportive Measures: relaxation techniques promoted  Family/Support System Care:   self-care encouraged   support provided  Goal: Improved Behavioral Control  Outcome: Progressing  Intervention: Prevent and Manage Agitation  Recent Flowsheet Documentation  Taken 9/27/2024 0400 by Reyna Levin RN  Complementary Therapy: music therapy provided  Environment Familiarity/Consistency: daily routine followed  Taken 9/27/2024 0000 by Reyna Levin RN  Complementary Therapy: music therapy provided  Environment Familiarity/Consistency: daily routine followed  Taken 9/26/2024 2000 by Reyna Levin RN  Complementary Therapy: music therapy provided  Environment Familiarity/Consistency: daily routine followed  Intervention: Minimize Safety  Risk  Recent Flowsheet Documentation  Taken 9/27/2024 0400 by Reyna Levin RN  Communication Enhancement Strategies:   repetition utilized   one-step directions provided   nonverbal strategies used  Enhanced Safety Measures:   monitor patients coagulation values   pain management   review medications for side effects with activity   room near unit station  Trust Relationship/Rapport:   care explained   reassurance provided  Taken 9/27/2024 0000 by Reyna Levin RN  Communication Enhancement Strategies:   repetition utilized   one-step directions provided   nonverbal strategies used  Enhanced Safety Measures:   monitor patients coagulation values   pain management   review medications for side effects with activity   room near unit station  Trust Relationship/Rapport:   care explained   reassurance provided  Taken 9/26/2024 2000 by Reyna Levin RN  Communication Enhancement Strategies:   repetition utilized   one-step directions provided   nonverbal strategies used  Enhanced Safety Measures:   monitor patients coagulation values   pain management   review medications for side effects with activity   room near unit station  Trust Relationship/Rapport:   care explained   reassurance provided   thoughts/feelings acknowledged   emotional support provided   empathic listening provided   questions answered   questions encouraged  Goal: Improved Attention and Thought Clarity  Outcome: Progressing  Intervention: Maximize Cognitive Function  Recent Flowsheet Documentation  Taken 9/27/2024 0400 by Reyna Levin RN  Sensory Stimulation Regulation:   auditory stimulation minimized   care clustered   lighting decreased   quiet environment promoted   visual stimulation minimized  Reorientation Measures: reorientation provided  Taken 9/27/2024 0000 by Reyna Levin RN  Sensory Stimulation Regulation:   auditory stimulation minimized   care clustered   lighting decreased   quiet environment promoted   visual stimulation  minimized  Reorientation Measures: reorientation provided  Taken 9/26/2024 2000 by Reyna Levin RN  Sensory Stimulation Regulation:   auditory stimulation minimized   care clustered   lighting decreased   quiet environment promoted   visual stimulation minimized  Reorientation Measures: reorientation provided  Goal: Improved Sleep  Outcome: Progressing  Intervention: Promote Sleep  Recent Flowsheet Documentation  Taken 9/27/2024 0400 by Reyna Levin RN  Sleep/Rest Enhancement:   awakenings minimized   comfort measures   medication   music provided   noise level reduced   regular sleep/rest pattern promoted   relaxation techniques promoted   room darkened  Taken 9/27/2024 0000 by Reyna Levin RN  Sleep/Rest Enhancement:   awakenings minimized   comfort measures   medication   music provided   noise level reduced   regular sleep/rest pattern promoted   relaxation techniques promoted   room darkened  Taken 9/26/2024 2000 by Reyna Levin RN  Sleep/Rest Enhancement:   awakenings minimized   comfort measures   medication   noise level reduced   regular sleep/rest pattern promoted   relaxation techniques promoted   room darkened     Problem: Restraint, Nonviolent  Goal: Absence of Harm or Injury  Outcome: Progressing  Intervention: Implement Least Restrictive Safety Strategies  Recent Flowsheet Documentation  Taken 9/27/2024 0400 by Reyna Levin RN  Medical Device Protection:   torso covered   tubing secured  Taken 9/27/2024 0000 by Reyna Levin RN  Medical Device Protection:   torso covered   tubing secured  Taken 9/26/2024 2000 by Reyna Levin RN  Medical Device Protection:   torso covered   tubing secured  Intervention: Protect Dignity, Rights and Personal Wellbeing  Recent Flowsheet Documentation  Taken 9/27/2024 0400 by Reyna Levin RN  Trust Relationship/Rapport:   care explained   reassurance provided  Taken 9/27/2024 0000 by Reyna Levin RN  Trust Relationship/Rapport:   care explained   reassurance  provided  Taken 9/26/2024 2000 by Reyna Levin RN  Trust Relationship/Rapport:   care explained   reassurance provided   thoughts/feelings acknowledged   emotional support provided   empathic listening provided   questions answered   questions encouraged  Intervention: Protect Skin and Joint Integrity  Recent Flowsheet Documentation  Taken 9/27/2024 0400 by Reyna Levin RN  Body Position:   turned   right   heels elevated  Skin Protection:   adhesive use limited   incontinence pads utilized   pulse oximeter probe site changed   silicone foam dressing in place   skin to device areas padded   skin to skin areas padded   transparent dressing maintained  Range of Motion: ROM (range of motion) performed  Taken 9/27/2024 0000 by Reyna Levin RN  Skin Protection:   adhesive use limited   incontinence pads utilized   pulse oximeter probe site changed   silicone foam dressing in place   skin to device areas padded   skin to skin areas padded   transparent dressing maintained  Range of Motion: ROM (range of motion) performed  Taken 9/26/2024 2000 by Reyna Levin RN  Body Position:   turned   left   heels elevated  Skin Protection:   adhesive use limited   incontinence pads utilized   pulse oximeter probe site changed   silicone foam dressing in place   skin to device areas padded   skin to skin areas padded   transparent dressing maintained  Range of Motion: ROM (range of motion) performed

## 2024-09-27 NOTE — PLAN OF CARE
Goal Outcome Evaluation:      Plan of Care Reviewed With: spouse          Outcome Evaluation: pending ongoing recommendations of treatment team

## 2024-09-27 NOTE — PROGRESS NOTES
[facility] RESPIRATORY NOTE        Date of Admission: 9/26/2024    Date of Intubation (most recent): 9/26/2024    Reason for Mechanical Ventilation: Respiratory failure    Number of Days on Mechanical Ventilation: 2    Met Criteria for Spontaneous Breathing Trial: No    Reason for No Spontaneous Breathing Trial: Per MD     Bite Block: Yes; please explain: Biting on tube     Significant Events Today: None     ABG Results:   Recent Labs   Lab 09/27/24  0447 09/26/24  1917 09/26/24  1556 09/26/24  1047   PH  --   --  7.26*  --    PCO2  --   --  43  --    PO2  --   --  139*  --    HCO3  --   --  19*  --    O2PER 50 50 50 50  50         Current Vent Settings: FiO2 (%): 50 %, Resp: (!) 8, Vent Mode: CMV/AC, Resp Rate (Set): 18 breaths/min, Tidal Volume (Set, mL): 400 mL, PEEP (cm H2O): 10 cmH2O, Resp Rate (Set): 18 breaths/min, Tidal Volume (Set, mL): 400 mL, PEEP (cm H2O): 10 cmH2O    Skin Assessment: Skin intact     Plan: Continue full vent support and wean as tolerated    RT John on 9/27/2024 at 5:36 PM

## 2024-09-27 NOTE — PLAN OF CARE
Neuro: follows commands, nods head appropriately when sedation held, very agitated off sedation requiring increasing doses and eventually versed drip was started and propofol discontinue, afebrile, PERRL 1-2, moves all extrem, C-collar in place   Pain: utilizing CPOTs - treating with Fent gtt, rest, repositions   CV: SR 2/2 pulses, BP 140s/150s - amlodipine and metoprolol restarted this afternoon/evening  Resp: course - cleared up through the day, VCAC through ETT 50% weaned to 40, and PEEP continued at 10 - positive sputum cultures treated with abx  GI: BS active, large, no  BM  : segovia - diminished concentrated output, LR bolus given last night with little increase in output  Endo: insulin gtt initiated today - blood sugars have since been stable with only 0.5 of insulin   Skin: scattered red bruising on arms   Lines: 3x PIV  Gtts:   Insulin @ 0.5 alg #1  NS @ 50  Fentanyl @ 200  Midazolam @ 5     Additional: Routine MRI needed tonight    Plan: Continue pulm hygiene and abx and hopefully can wean to extubate tomorrow. Patient will need attendant and to be seen by Psych for Intentional right eye when awake and extubated.  Continue c-collar and seizure precautions     Wil RUIZ, RN on 9/27/2024 at 5:52 PM            Goal: Absence of Hospital-Acquired Illness or Injury  Outcome: Progressing  Intervention: Identify and Manage Fall Risk  Recent Flowsheet Documentation  Taken 9/27/2024 1600 by Wil Ruiz, RN  Safety Promotion/Fall Prevention:   activity supervised   clutter free environment maintained   lighting adjusted   room door open   room near nurse's station   room organization consistent   safety round/check completed   supervised activity   treat underlying cause  Taken 9/27/2024 1200 by Wil Ruiz, RN  Safety Promotion/Fall Prevention:   activity supervised   clutter free environment maintained   lighting adjusted   room door open   room near nurse's station   room  organization consistent   safety round/check completed   supervised activity   treat underlying cause  Taken 9/27/2024 0800 by Wil Ruiz, RN  Safety Promotion/Fall Prevention:   activity supervised   clutter free environment maintained   lighting adjusted   room door open   room near nurse's station   room organization consistent   safety round/check completed   supervised activity   treat underlying cause  Intervention: Prevent Skin Injury  Recent Flowsheet Documentation  Taken 9/27/2024 1600 by Wil Ruiz, RN  Body Position:   turned   foot of bed elevated   heels elevated   legs elevated   lower extremity elevated   upper extremity elevated  Skin Protection:   adhesive use limited   incontinence pads utilized   pulse oximeter probe site changed   silicone foam dressing in place   skin to device areas padded   skin to skin areas padded   transparent dressing maintained  Device Skin Pressure Protection:   absorbent pad utilized/changed   adhesive use limited   pressure points protected   skin-to-device areas padded   skin-to-skin areas padded   tubing/devices free from skin contact  Taken 9/27/2024 1400 by Wil Ruiz, RN  Body Position:   turned   foot of bed elevated   heels elevated   legs elevated   lower extremity elevated   upper extremity elevated  Taken 9/27/2024 1200 by Wil Ruiz RN  Body Position:   turned   foot of bed elevated   heels elevated   legs elevated   lower extremity elevated   upper extremity elevated  Skin Protection:   adhesive use limited   incontinence pads utilized   pulse oximeter probe site changed   silicone foam dressing in place   skin to device areas padded   skin to skin areas padded   transparent dressing maintained  Device Skin Pressure Protection:   absorbent pad utilized/changed   adhesive use limited   pressure points protected   skin-to-device areas padded   skin-to-skin areas padded   tubing/devices free from skin  contact  Taken 9/27/2024 1000 by Wil Ruiz RN  Body Position:   turned   foot of bed elevated   heels elevated   legs elevated   lower extremity elevated   upper extremity elevated  Taken 9/27/2024 0800 by Wil Ruiz RN  Body Position:   turned   foot of bed elevated   heels elevated   legs elevated   lower extremity elevated   upper extremity elevated  Skin Protection:   adhesive use limited   incontinence pads utilized   pulse oximeter probe site changed   silicone foam dressing in place   skin to device areas padded   skin to skin areas padded   transparent dressing maintained  Device Skin Pressure Protection:   absorbent pad utilized/changed   adhesive use limited   pressure points protected   skin-to-device areas padded   skin-to-skin areas padded   tubing/devices free from skin contact  Intervention: Prevent and Manage VTE (Venous Thromboembolism) Risk  Recent Flowsheet Documentation  Taken 9/27/2024 1600 by Wil Ruiz RN  VTE Prevention/Management: SCDs on (sequential compression devices)  Taken 9/27/2024 1200 by Wil Ruiz RN  VTE Prevention/Management: SCDs on (sequential compression devices)  Taken 9/27/2024 0800 by Wil Ruiz RN  VTE Prevention/Management: SCDs on (sequential compression devices)  Intervention: Prevent Infection  Recent Flowsheet Documentation  Taken 9/27/2024 1600 by Wil Ruiz RN  Infection Prevention:   cohorting utilized   environmental surveillance performed   equipment surfaces disinfected   hand hygiene promoted   personal protective equipment utilized   single patient room provided  Taken 9/27/2024 1200 by Wil Ruiz RN  Infection Prevention:   cohorting utilized   environmental surveillance performed   equipment surfaces disinfected   hand hygiene promoted   personal protective equipment utilized   single patient room provided  Taken 9/27/2024 0800 by Wil Ruiz RN  Infection  Prevention:   cohorting utilized   environmental surveillance performed   equipment surfaces disinfected   hand hygiene promoted   personal protective equipment utilized   single patient room provided  Goal: Optimal Comfort and Wellbeing  Outcome: Progressing  Intervention: Monitor Pain and Promote Comfort  Recent Flowsheet Documentation  Taken 9/27/2024 1600 by Wil Ruiz, RN  Pain Management Interventions:   emotional support   medication (see MAR)   repositioned   rest  Taken 9/27/2024 1200 by Wil Ruiz, RN  Pain Management Interventions:   emotional support   medication (see MAR)   repositioned   rest  Taken 9/27/2024 0800 by Wil Ruiz RN  Pain Management Interventions:   emotional support   medication (see MAR)   repositioned   rest  Intervention: Provide Person-Centered Care  Recent Flowsheet Documentation  Taken 9/27/2024 1600 by Wil Ruiz RN  Trust Relationship/Rapport:   care explained   reassurance provided  Taken 9/27/2024 1200 by Wil Ruiz, RN  Trust Relationship/Rapport:   care explained   reassurance provided  Taken 9/27/2024 0800 by Wil Ruiz RN  Trust Relationship/Rapport:   care explained   reassurance provided  Goal: Readiness for Transition of Care  Outcome: Progressing

## 2024-09-27 NOTE — PROGRESS NOTES
Critical Care Progress Note      09/27/2024    Name: Jessica Ellison MRN#: [id number]   Age: 73 year old YOB: 1950     [facility] Day# 1  ICU DAY #    MV DAY #             Problem List:   Active Problems:    Respiratory failure (H)           Summary/Hospital Course:   Jessica Ellison is a 73 year old female with a history of alcohol use disorder, seizures on levetiracetam, chronic pain on opiate therapy, and major depressive disorder who presented to [facility] via EMS for respiratory failure. EMS reports that they were called to the scene of an apparent overdose.  The patient had apparently had an argument with her significant other, he told police that he went away and then came back about 10 minutes later and found her unresponsive. Police and EMS found the patient with slow and shallow breathing with small pupils. Given narcan with minimal improvement. Intubated. Required low dose of norepi and transferred to [facility] for continued cares.      Initial labs notable for alcohol level of 0.19. Tylenol and and salicylate levels wnl. INR wnl w/ no transaminitis. Metabolic acidosis noted. Cr wnl. Poison control contacted, suspected Keppra as culprit medication. Is also prescribed BB/CCB. Hgb stable. Keppra level wnl. LLL infiltrated noted on CXR. Unable to visualize diaphragm. C/f possible aspiration pneumonia. Requiring 50% FiO2. Growing 4+ GPCs on sputum. Started vancomycin 9/27. MRSA swab pending.      Of note, patient has a displaced odontoid fracture from a fall in early September as per chart review. Was evaluated by an outside neurosurgery team, who recommended C-collar and follow up visits. Currently wearing C-collar. Per admit cervical CT, fracture is newly anteriorly shifted by 4 mm. Pt will obtain MRI during her stay.           Assessment and plan :      Jessica Ellison IS a 73 year old female admitted on 9/26 for respiratory failure secondary to suspected overdose. She is currently  intubated. Other than an elevated alcohol level on admission, there is no clear offending medication responsible for her presentation. Sputum cultures growing 4+ GPCs; now on vancomycin and a 5 day course of Zosyn for suspected pneumonia.     I have personally reviewed the daily labs, imaging studies, cultures and discussed the case with referring physician and consulting physicians.   My assessment and plan by system for this patient is as follows:    CHANGES  - Obtain MRI; keep collar on at all times   - Start Norvasc 5   - Start metoprolol 25 TID  - Start insulin drip  - Sputum growing 4+ GPC  - Start vancomycin per pharmacy; will reassess after MRSA nare swab    Neurology/Psychiatry:   #Sedation  #Overdose of unknown medication  #Seizure disorder  #Closed displaced odontoid fracture   #AUD  #MDD  #Chronic Pain Syndrome  #Hx of mechanical falls   #Acute Alcohol Intoxication  #S/p lumbar spinal fusion  #Insomnia  #Suicide attempt  - propofol prn  - ethyl alcohol level 0.2 on admission  - keppra level + tylenol/sialcyte levels wnl   - Pt should receive MRI per outside neurosurgery  - dilaudid prn; has home percocet q6  - neurosurgery consult after MRI w/contrast.   - Psychiatry consult after extubation to assess for suicide risk.  - resume PTA keppra; level is subtherapeutic      Cardiovascular:   #Hypertension  #HLD   - PTA amlodipine resumed  - PTA norvasc resumed   - PTA ASA currently held  - PTA atorvastatin currently held   - Metoprolol 25 TID        Pulmonary/Ventilator Management:   #Acute hypercarbic and hypoxic respiratory failure   #COPD, centrilobular emphysema   #Hx nicotine dependence  #JOY, on home CPAP  #Community acquired pneumonia vs Aspiration Pneumonia   Former smoker with 30 pack year hx, quit 2004. Most recent pfts (08/10/23) was normal. Patient denies home O2 use at time of admission however on chart review it appears she did use home O2 previously  - ventilator   - duonebs q4   - Zosyn 3.375  q6 IV for 5 days. Beginning 9/26.  - Vancomycin per pharamacy  - VBGs q12     GI and Nutrition:  #Enteral Access  - Enteral tube placed by IR 9/26  - PPI BID     Renal/Fluids/Electrolytes:   #Hypomagnesemia  #CKDII  - received 1L LR on day of admission  - baseline Cr ~1.05l; currently Cr is 1.06  - monitor function and electrolytes as needed with replacement per ICU protocols. - generally avoid nephrotoxic agents such as NSAID, IV contrast unless specifically required  - adjust medications as needed for renal clearance  - follow I/O's as appropriate.     Infectious Disease:   #Community Acquired Pneumonia vs Aspiration Pneumonia   #R/o UTI  - Zosyn 3.375 q6 IV for 5 days. Beginning 9/26.  - MRSA swab pending  - Vancomycin per pharmacy  - UA with many bacteria. Negative LE/nitrates  - Urine culture ordered        Endocrine:   #Stress induced hyperglycemia  #PTA steroids  - stress dose steroids 100 IV q6  - ICU insulin protocol, goal sugar <180     Hematology/Oncology:   #Anemia of Chronic Disease  - Hbg = 9.7  - transfuse if below 7      IV/Access:   1. Venous access - 2 PIVs  2. Arterial access - none      ICU Prophylaxis:   1. DVT: lovenox 40  2. Stress Ulcer: PPI/H2 blocker  3. Restraints: Nonviolent soft two point restraints required and necessary for patient safety and continued cares and good effect as patient continues to pull at necessary lines, tubes despite education and distraction. Will readdress daily.   4. Feeding - NPO  5. Family Update: Family updated yesterday per RN  6. Disposition - ICU             Key Medications:     Current Facility-Administered Medications   Medication Dose Route Frequency Provider Last Rate Last Admin    chlorhexidine (PERIDEX) 0.12 % solution 15 mL  15 mL Mouth/Throat Q12H Azar Howard MD   15 mL at 09/26/24 2027    enoxaparin ANTICOAGULANT (LOVENOX) injection 40 mg  40 mg Subcutaneous Q24H Azar Howard MD   40 mg at 09/26/24 1057    hydrocortisone  sodium succinate PF (solu-CORTEF) injection 100 mg  100 mg Intravenous Q6H Azar Howard MD   100 mg at 09/27/24 0511    ipratropium - albuterol 0.5 mg/2.5 mg/3 mL (DUONEB) neb solution 3 mL  3 mL Nebulization Q4H Azar Howard MD   3 mL at 09/27/24 0700    levETIRAcetam (KEPPRA) intermittent infusion 500 mg  500 mg Intravenous Q12H Azar Howard MD   500 mg at 09/27/24 0607    levothyroxine (SYNTHROID/LEVOTHROID) tablet 50 mcg  50 mcg Oral or Feeding Tube QAM AC Azar Howard MD   50 mcg at 09/26/24 1314    pantoprazole (PROTONIX) 2 mg/mL suspension 40 mg  40 mg Per Feeding Tube QAM Azar Mar MD        Or    pantoprazole (PROTONIX) IV push injection 40 mg  40 mg Intravenous QAM Azar Mar MD   40 mg at 09/26/24 1109    piperacillin-tazobactam (ZOSYN) 3.375 g vial to attach to  mL bag  3.375 g Intravenous Q6H Azar Howard MD   3.375 g at 09/27/24 0631    vancomycin (VANCOCIN) 1,250 mg in 0.9% NaCl 250 mL intermittent infusion  1,250 mg Intravenous Q24H Bernice An MD         Current Facility-Administered Medications   Medication Dose Route Frequency Provider Last Rate Last Admin    dextrose 10% infusion   Intravenous Continuous PRN Azar Howard MD        fentaNYL (SUBLIMAZE) infusion   mcg/hr Intravenous Continuous Bernice An MD 2.5 mL/hr at 09/27/24 0424 125 mcg/hr at 09/27/24 0424    insulin regular (MYXREDLIN) 1 unit/mL infusion  0-24 Units/hr Intravenous Continuous Azar Howard MD        propofol (DIPRIVAN) infusion  5-75 mcg/kg/min Intravenous Continuous Azar Howard MD 29.1 mL/hr at 09/27/24 0511 75 mcg/kg/min at 09/27/24 0511    And    Medication Instruction   Does not apply Continuous PRN Azar Howard MD        norepinephrine (LEVOPHED) 4 mg in  mL infusion PREMIX  0.01-0.6 mcg/kg/min Intravenous Continuous Azar Howard  MD Carlos   Paused at 09/26/24 1100    sodium chloride 0.9 % infusion   Intravenous Continuous Dichter, Azar Gonzalez MD 50 mL/hr at 09/26/24 1654 New Bag at 09/26/24 1654               Physical Examination:   Temp:  [97.5  F (36.4  C)-99.6  F (37.6  C)] 98.7  F (37.1  C)  Pulse:  [] 82  Resp:  [8-27] 11  BP: ()/() 163/93  FiO2 (%):  [50 %] 50 %  SpO2:  [88 %-98 %] 96 %    Intake/Output Summary (Last 24 hours) at 9/27/2024 0755  Last data filed at 9/27/2024 0600  Gross per 24 hour   Intake 3348.88 ml   Output 390 ml   Net 2958.88 ml     Wt Readings from Last 4 Encounters:   09/27/24 70.1 kg (154 lb 8.7 oz)   09/26/24 77.1 kg (170 lb)   09/09/24 72.6 kg (160 lb)   08/12/24 72.7 kg (160 lb 4 oz)     BP - Mean:  [] 119  FiO2 (%): 50 %, Resp: 11, Vent Mode: CMV/AC, Resp Rate (Set): 18 breaths/min, Tidal Volume (Set, mL): 400 mL, PEEP (cm H2O): 10 cmH2O, Resp Rate (Set): 18 breaths/min, Tidal Volume (Set, mL): 400 mL, PEEP (cm H2O): 10 cmH2O  Recent Labs   Lab 09/27/24  0447 09/26/24  1917 09/26/24  1556 09/26/24  1047   PH  --   --  7.26*  --    PCO2  --   --  43  --    PO2  --   --  139*  --    HCO3  --   --  19*  --    O2PER 50 50 50 50  50       GEN: no acute distress, ventilated   HEENT: head ncat, sclera anicteric, OP patent, trachea midline, C collar on   PULM: unlabored synchronous with vent, clear anteriorly    CV/COR: RRR S1S2 no gallop,  No rub, no murmur  ABD: soft nontender, hypoactive bowel sounds, no mass  EXT: trace edema bilaterally   NEURO: intubated   SKIN: no obvious rash  LINES: clean, dry intact         Data:   All data and imaging reviewed     ROUTINE ICU LABS (Last four results)  CMP  Recent Labs   Lab 09/27/24  0612 09/27/24  0446 09/27/24  0220 09/27/24  0011 09/26/24  1244 09/26/24  1124 09/26/24  1003 09/26/24  0415   NA  --  145  --   --   --  145  --  145   POTASSIUM  --  3.6  --   --   --  4.0  --  3.5   CHLORIDE  --  109*  --   --   --  109*  --  105   CO2   "--  22  --   --   --  21*  --  18*   ANIONGAP  --  14  --   --   --  15  --  22*   * 279* 303* 283*   < > 72   < > 136*   BUN  --  20.8  --   --   --  15.3  --  16.7   CR  --  1.06*  --   --   --  0.86  --  0.93   GFRESTIMATED  --  56*  --   --   --  72  --  65   DELILAH  --  7.6*  --   --   --  7.9*  --  8.4*   MAG  --   --   --   --   --   --   --  1.5*   PHOS  --  3.3  --   --   --  3.7  --   --    PROTTOTAL  --   --   --   --   --   --   --  6.0*   ALBUMIN  --  3.9  --   --   --  3.8  --  4.0   BILITOTAL  --   --   --   --   --   --   --  0.3   ALKPHOS  --   --   --   --   --   --   --  85   AST  --   --   --   --   --   --   --  38   ALT  --   --   --   --   --   --   --  20    < > = values in this interval not displayed.     CBC  Recent Labs   Lab 09/27/24 0446 09/26/24  1124 09/26/24  0415   WBC 6.2 5.5 3.3*   RBC 2.93* 3.15* 3.48*   HGB 9.2* 9.7* 10.7*   HCT 29.0* 30.7* 33.6*   MCV 99 98 97   MCH 31.4 30.8 30.7   MCHC 31.7 31.6 31.8   RDW 15.1* 15.0 14.7   * 142* 160     INR  Recent Labs   Lab 09/26/24  0415   INR 1.05     Arterial Blood Gas  Recent Labs   Lab 09/27/24 0447 09/26/24  1917 09/26/24  1556 09/26/24  1047   PH  --   --  7.26*  --    PCO2  --   --  43  --    PO2  --   --  139*  --    HCO3  --   --  19*  --    O2PER 50 50 50 50  50       All cultures:  No results for input(s): \"CULT\" in the last 168 hours.  Recent Results (from the past 24 hour(s))   XR Chest Port 1 View    Narrative    CHEST ONE VIEW  9/26/2024 11:34 AM     HISTORY: Endotracheal tube positioning    COMPARISON: 9/26/2024.      Impression    IMPRESSION: Endotracheal tube with distal tip projecting at the lower  thoracic trachea approximately 2.5 cm proximal to the veena. Low lung  volumes. Stable cardiac silhouette. Patchy bibasilar opacities are  favored atelectasis. No significant pleural effusion or discernible  pneumothorax. Thoracolumbar spine fusion hardware. Cholecystectomy  clips.    ROSS CHAVIRA MD       "   SYSTEM ID:  [id number]   XR Feeding Tube Placement    Narrative    FEEDING TUBE PLACEMENT   9/26/2024 12:23 PM    HISTORY: Nutritional needs. Recent spine surgery.    COMPARISON: None    FINDINGS: 8.9 minutes of fluoroscopy were utilized for placement of a  feeding tube.  At the final position, the feeding tube tip was the  second stage of the duodenum.  60 mL of contrast was injected to  confirm placement.  The tube was marked at the level of the nose at  the 78 cm length.  Estimated blood loss during the procedure was less  than 5 mL. No specimens collected. There were no complications of the  procedure.    Medications used: 4 mL of 2% lidocaine gel, 60 mL of contrast.    Images Obtained: 1      Impression    IMPRESSION: Successful feeding tube placement. Feeding tube in second  stage of duodenum. Unable to advanced to the ligament of Treitz.  Consider doing feedings in a somewhat upright position. Follow-up  abdominal film could be also considered to see if the tube advances on  its own.    SUELLEN HICKS PA-C         SYSTEM ID:  [id number]         [facility]  CRITICAL CARE STAFF NOTE    I am managing these acute and ongoing critical issues resulting in critical condition that impairs one or more vital organ systems, incur a high probability of imminent or life-threatening deterioration in the patient's condition and providing frequent personal assessment and manipulation of medications and life support equipment.     1. Mechanical Ventilation  2. Pneumonia  3. Hyperglycemia       ICU Interventions: ventilator management, parenteral medications necessitating continuous monitoring including vasoactive medications, medication for heart rhythm control, insulin infusion, and/or sedative/opiates , and interpretation of lab values, cardiac output, cxr, pulse oximetry, blood gases, and/or information/data stored in computers     We have discussed the patient in detail and I agree with the findings,  "assessment, and plan as documented when this note was cosigned on this day. The plan was formulated in conjunction with pharmacy, ICU nurses, and respiratory therapist. I have evaluated all laboratory values and imaging studies for the past 24 hours. I have reviewed all the consults that have been ordered and are active for this patient.      Critical Care Time: 35 min.  I spent this time (excluding procedures) personally providing and directing critical care services at the bedside and on the critical care unit.      Shawn Campos, MS4  [facility]       Clinically Significant Risk Factors Present on Admission          # Hypocalcemia: Lowest Ca = 7.6 mg/dL in last 2 days, will monitor and replace as appropriate   # Hypomagnesemia: Lowest Mg = 1.5 mg/dL in last 2 days, will replace as needed  # Anion Gap Metabolic Acidosis: Highest Anion Gap = 22 mmol/L in last 2 days, will monitor and treat as appropriate    # Drug Induced Platelet Defect: home medication list includes an antiplatelet medication   # Hypertension: Noted on problem list   # Circulatory Shock: required vasopressors within past 24 hours     # Anemia: based on hgb <11       # Overweight: Estimated body mass index is 26.53 kg/m  as calculated from the following:    Height as of 9/9/24: 1.626 m (5' 4\").    Weight as of this encounter: 70.1 kg (154 lb 8.7 oz).       # COPD: noted on problem list     # Anemia: based on hgb <11         Attending Intensivist note  I fully assessed patient myself including history, my own physical exam, and review of data. I discussed case with Shawn Campos including bedside exam and appreciate his assistance.      Lung sounds improved today with equal; heart RRR with distant heart sounds; abdomen obese and non-tender; extremities are warm with minimal edema; skin shows no cyanosis or mottling; CNS does move extremities trace to stimulation.      Assessment and plan  Suicide attempt- likely narcotic overdose coupled " with alcohol intoxication (0.19) - she is compensated on ventilator.  We will continue to support and monitor. There appears to be no evidence of beta-blocker or calcium channel blocker overdose   Acute respiratory failure and aspiration pneumonitis - currently on 50% and +10 PEEP and oxygenation slowly improving. We will titrate down support as able; cultures pending; and we will treat with a short course of antibiotics for 5 days for aspiration.   Alcoholism, and acute alcohol intoxication   History of odontoid fracture  (September 2024) - per verbal report neurosurgery has requested MRI of neck, pending. Patient has a history of non-compliance with collar; she is in collar at present.   COPD, with recent hospitalization for exacerbation (9//-11/2024) - she is on duonebs and full replacement dose of hydrocortisone; she is on chronic prednisone presumably for COPD  CKD - creatinine 1.06  Chronic pain and opioid dependence   HTN  JOY - BIPAP once extubated.   Seizure disorder - resume keppra   Hypothyroid - TSH normal in August 2024; 0.45 today  Dyslipidemia      Overall, she is critical. I spent 35 minutes of critical care time with her today      Rosamaria keith  September 27, 2024

## 2024-09-28 ENCOUNTER — APPOINTMENT (OUTPATIENT)
Dept: GENERAL RADIOLOGY | Facility: CLINIC | Age: 74
DRG: 917 | End: 2024-09-28
Attending: INTERNAL MEDICINE
Payer: COMMERCIAL

## 2024-09-28 LAB
ALBUMIN SERPL BCG-MCNC: 3.9 G/DL (ref 3.5–5.2)
ANION GAP SERPL CALCULATED.3IONS-SCNC: 17 MMOL/L (ref 7–15)
BACTERIA SPT CULT: ABNORMAL
BACTERIA UR CULT: ABNORMAL
BACTERIA UR CULT: ABNORMAL
BUN SERPL-MCNC: 18.2 MG/DL (ref 8–23)
CALCIUM SERPL-MCNC: 8.1 MG/DL (ref 8.8–10.4)
CHLORIDE SERPL-SCNC: 109 MMOL/L (ref 98–107)
CREAT SERPL-MCNC: 0.79 MG/DL (ref 0.51–0.95)
EGFRCR SERPLBLD CKD-EPI 2021: 80 ML/MIN/1.73M2
ERYTHROCYTE [DISTWIDTH] IN BLOOD BY AUTOMATED COUNT: 15.3 % (ref 10–15)
GLUCOSE BLDC GLUCOMTR-MCNC: 113 MG/DL (ref 70–99)
GLUCOSE BLDC GLUCOMTR-MCNC: 115 MG/DL (ref 70–99)
GLUCOSE BLDC GLUCOMTR-MCNC: 115 MG/DL (ref 70–99)
GLUCOSE BLDC GLUCOMTR-MCNC: 117 MG/DL (ref 70–99)
GLUCOSE BLDC GLUCOMTR-MCNC: 119 MG/DL (ref 70–99)
GLUCOSE BLDC GLUCOMTR-MCNC: 126 MG/DL (ref 70–99)
GLUCOSE BLDC GLUCOMTR-MCNC: 127 MG/DL (ref 70–99)
GLUCOSE BLDC GLUCOMTR-MCNC: 131 MG/DL (ref 70–99)
GLUCOSE SERPL-MCNC: 147 MG/DL (ref 70–99)
GRAM STAIN RESULT: ABNORMAL
HCO3 SERPL-SCNC: 19 MMOL/L (ref 22–29)
HCT VFR BLD AUTO: 29.5 % (ref 35–47)
HGB BLD-MCNC: 9.2 G/DL (ref 11.7–15.7)
MAGNESIUM SERPL-MCNC: 1.9 MG/DL (ref 1.7–2.3)
MCH RBC QN AUTO: 31.1 PG (ref 26.5–33)
MCHC RBC AUTO-ENTMCNC: 31.2 G/DL (ref 31.5–36.5)
MCV RBC AUTO: 100 FL (ref 78–100)
PHOSPHATE SERPL-MCNC: 2.4 MG/DL (ref 2.5–4.5)
PLATELET # BLD AUTO: 101 10E3/UL (ref 150–450)
POTASSIUM SERPL-SCNC: 3.1 MMOL/L (ref 3.4–5.3)
POTASSIUM SERPL-SCNC: 3.1 MMOL/L (ref 3.4–5.3)
RBC # BLD AUTO: 2.96 10E6/UL (ref 3.8–5.2)
SODIUM SERPL-SCNC: 145 MMOL/L (ref 135–145)
WBC # BLD AUTO: 9.6 10E3/UL (ref 4–11)

## 2024-09-28 PROCEDURE — 83735 ASSAY OF MAGNESIUM: CPT | Performed by: STUDENT IN AN ORGANIZED HEALTH CARE EDUCATION/TRAINING PROGRAM

## 2024-09-28 PROCEDURE — 250N000013 HC RX MED GY IP 250 OP 250 PS 637: Performed by: STUDENT IN AN ORGANIZED HEALTH CARE EDUCATION/TRAINING PROGRAM

## 2024-09-28 PROCEDURE — 94640 AIRWAY INHALATION TREATMENT: CPT

## 2024-09-28 PROCEDURE — 250N000011 HC RX IP 250 OP 636: Performed by: STUDENT IN AN ORGANIZED HEALTH CARE EDUCATION/TRAINING PROGRAM

## 2024-09-28 PROCEDURE — 250N000013 HC RX MED GY IP 250 OP 250 PS 637: Performed by: INTERNAL MEDICINE

## 2024-09-28 PROCEDURE — 94640 AIRWAY INHALATION TREATMENT: CPT | Mod: 76

## 2024-09-28 PROCEDURE — 36415 COLL VENOUS BLD VENIPUNCTURE: CPT | Performed by: STUDENT IN AN ORGANIZED HEALTH CARE EDUCATION/TRAINING PROGRAM

## 2024-09-28 PROCEDURE — 94003 VENT MGMT INPAT SUBQ DAY: CPT

## 2024-09-28 PROCEDURE — 999N000009 HC STATISTIC AIRWAY CARE

## 2024-09-28 PROCEDURE — 80069 RENAL FUNCTION PANEL: CPT | Performed by: INTERNAL MEDICINE

## 2024-09-28 PROCEDURE — 250N000011 HC RX IP 250 OP 636: Performed by: INTERNAL MEDICINE

## 2024-09-28 PROCEDURE — 258N000003 HC RX IP 258 OP 636: Performed by: INTERNAL MEDICINE

## 2024-09-28 PROCEDURE — 71045 X-RAY EXAM CHEST 1 VIEW: CPT

## 2024-09-28 PROCEDURE — 85027 COMPLETE CBC AUTOMATED: CPT | Performed by: INTERNAL MEDICINE

## 2024-09-28 PROCEDURE — 84132 ASSAY OF SERUM POTASSIUM: CPT | Performed by: STUDENT IN AN ORGANIZED HEALTH CARE EDUCATION/TRAINING PROGRAM

## 2024-09-28 PROCEDURE — 999N000157 HC STATISTIC RCP TIME EA 10 MIN

## 2024-09-28 PROCEDURE — 99291 CRITICAL CARE FIRST HOUR: CPT | Performed by: INTERNAL MEDICINE

## 2024-09-28 PROCEDURE — 200N000001 HC R&B ICU

## 2024-09-28 PROCEDURE — 36415 COLL VENOUS BLD VENIPUNCTURE: CPT | Performed by: INTERNAL MEDICINE

## 2024-09-28 PROCEDURE — 250N000009 HC RX 250: Performed by: INTERNAL MEDICINE

## 2024-09-28 RX ORDER — PROPOFOL 10 MG/ML
5-75 INJECTION, EMULSION INTRAVENOUS CONTINUOUS
Status: DISCONTINUED | OUTPATIENT
Start: 2024-09-28 | End: 2024-10-02

## 2024-09-28 RX ORDER — SENNOSIDES 8.6 MG
8.6 TABLET ORAL 2 TIMES DAILY PRN
Status: DISCONTINUED | OUTPATIENT
Start: 2024-09-28 | End: 2024-10-03

## 2024-09-28 RX ORDER — POTASSIUM CHLORIDE 20MEQ/15ML
40 LIQUID (ML) ORAL ONCE
Status: COMPLETED | OUTPATIENT
Start: 2024-09-29 | End: 2024-09-28

## 2024-09-28 RX ORDER — POLYETHYLENE GLYCOL 3350 17 G/17G
17 POWDER, FOR SOLUTION ORAL DAILY
Status: DISCONTINUED | OUTPATIENT
Start: 2024-09-28 | End: 2024-10-01

## 2024-09-28 RX ORDER — POTASSIUM CHLORIDE 1.5 G/1.58G
20 POWDER, FOR SOLUTION ORAL ONCE
Status: COMPLETED | OUTPATIENT
Start: 2024-09-28 | End: 2024-09-28

## 2024-09-28 RX ADMIN — POTASSIUM & SODIUM PHOSPHATES POWDER PACK 280-160-250 MG 1 PACKET: 280-160-250 PACK at 06:06

## 2024-09-28 RX ADMIN — MIDAZOLAM HYDROCHLORIDE 6 MG/HR: 1 INJECTION, SOLUTION INTRAVENOUS at 06:41

## 2024-09-28 RX ADMIN — POLYETHYLENE GLYCOL 3350 17 G: 17 POWDER, FOR SOLUTION ORAL at 17:47

## 2024-09-28 RX ADMIN — METOPROLOL TARTRATE 25 MG: 25 TABLET, FILM COATED ORAL at 08:53

## 2024-09-28 RX ADMIN — HYDROCORTISONE SODIUM SUCCINATE 100 MG: 100 INJECTION, POWDER, FOR SOLUTION INTRAMUSCULAR; INTRAVENOUS at 21:51

## 2024-09-28 RX ADMIN — SODIUM CHLORIDE: 9 INJECTION, SOLUTION INTRAVENOUS at 06:30

## 2024-09-28 RX ADMIN — IPRATROPIUM BROMIDE AND ALBUTEROL SULFATE 3 ML: .5; 3 SOLUTION RESPIRATORY (INHALATION) at 03:10

## 2024-09-28 RX ADMIN — PIPERACILLIN AND TAZOBACTAM 3.38 G: 3; .375 INJECTION, POWDER, FOR SOLUTION INTRAVENOUS at 06:07

## 2024-09-28 RX ADMIN — PROPOFOL 10 MCG/KG/MIN: 10 INJECTION, EMULSION INTRAVENOUS at 02:14

## 2024-09-28 RX ADMIN — METOPROLOL TARTRATE 25 MG: 25 TABLET, FILM COATED ORAL at 17:47

## 2024-09-28 RX ADMIN — ENOXAPARIN SODIUM 40 MG: 40 INJECTION SUBCUTANEOUS at 10:41

## 2024-09-28 RX ADMIN — PIPERACILLIN AND TAZOBACTAM 3.38 G: 3; .375 INJECTION, POWDER, FOR SOLUTION INTRAVENOUS at 12:10

## 2024-09-28 RX ADMIN — LEVETIRACETAM 500 MG: 5 INJECTION INTRAVENOUS at 06:06

## 2024-09-28 RX ADMIN — LEVOTHYROXINE SODIUM 50 MCG: 50 TABLET ORAL at 08:53

## 2024-09-28 RX ADMIN — CHLORHEXIDINE GLUCONATE 0.12% ORAL RINSE 15 ML: 1.2 LIQUID ORAL at 08:52

## 2024-09-28 RX ADMIN — IPRATROPIUM BROMIDE AND ALBUTEROL SULFATE 3 ML: .5; 3 SOLUTION RESPIRATORY (INHALATION) at 11:02

## 2024-09-28 RX ADMIN — IPRATROPIUM BROMIDE AND ALBUTEROL SULFATE 3 ML: .5; 3 SOLUTION RESPIRATORY (INHALATION) at 15:39

## 2024-09-28 RX ADMIN — HYDROCORTISONE SODIUM SUCCINATE 100 MG: 100 INJECTION, POWDER, FOR SOLUTION INTRAMUSCULAR; INTRAVENOUS at 10:41

## 2024-09-28 RX ADMIN — METOPROLOL TARTRATE 25 MG: 25 TABLET, FILM COATED ORAL at 21:47

## 2024-09-28 RX ADMIN — PROPOFOL 50 MCG/KG/MIN: 10 INJECTION, EMULSION INTRAVENOUS at 21:41

## 2024-09-28 RX ADMIN — LEVETIRACETAM 500 MG: 5 INJECTION INTRAVENOUS at 17:53

## 2024-09-28 RX ADMIN — IPRATROPIUM BROMIDE AND ALBUTEROL SULFATE 3 ML: .5; 3 SOLUTION RESPIRATORY (INHALATION) at 08:12

## 2024-09-28 RX ADMIN — POTASSIUM CHLORIDE 40 MEQ: 20 SOLUTION ORAL at 23:47

## 2024-09-28 RX ADMIN — PIPERACILLIN AND TAZOBACTAM 3.38 G: 3; .375 INJECTION, POWDER, FOR SOLUTION INTRAVENOUS at 17:53

## 2024-09-28 RX ADMIN — HYDROCORTISONE SODIUM SUCCINATE 100 MG: 100 INJECTION, POWDER, FOR SOLUTION INTRAMUSCULAR; INTRAVENOUS at 04:25

## 2024-09-28 RX ADMIN — Medication 200 MCG/HR: at 05:41

## 2024-09-28 RX ADMIN — HYDROCORTISONE SODIUM SUCCINATE 100 MG: 100 INJECTION, POWDER, FOR SOLUTION INTRAMUSCULAR; INTRAVENOUS at 17:53

## 2024-09-28 RX ADMIN — PROPOFOL 40 MCG/KG/MIN: 10 INJECTION, EMULSION INTRAVENOUS at 17:48

## 2024-09-28 RX ADMIN — PIPERACILLIN AND TAZOBACTAM 3.38 G: 3; .375 INJECTION, POWDER, FOR SOLUTION INTRAVENOUS at 23:47

## 2024-09-28 RX ADMIN — POTASSIUM & SODIUM PHOSPHATES POWDER PACK 280-160-250 MG 1 PACKET: 280-160-250 PACK at 08:53

## 2024-09-28 RX ADMIN — PIPERACILLIN AND TAZOBACTAM 3.38 G: 3; .375 INJECTION, POWDER, FOR SOLUTION INTRAVENOUS at 00:26

## 2024-09-28 RX ADMIN — Medication 40 MG: at 08:53

## 2024-09-28 RX ADMIN — POTASSIUM & SODIUM PHOSPHATES POWDER PACK 280-160-250 MG 1 PACKET: 280-160-250 PACK at 12:45

## 2024-09-28 RX ADMIN — CHLORHEXIDINE GLUCONATE 0.12% ORAL RINSE 15 ML: 1.2 LIQUID ORAL at 21:41

## 2024-09-28 RX ADMIN — VANCOMYCIN HYDROCHLORIDE 1250 MG: 10 INJECTION, POWDER, LYOPHILIZED, FOR SOLUTION INTRAVENOUS at 10:42

## 2024-09-28 RX ADMIN — IPRATROPIUM BROMIDE AND ALBUTEROL SULFATE 3 ML: .5; 3 SOLUTION RESPIRATORY (INHALATION) at 18:54

## 2024-09-28 RX ADMIN — AMLODIPINE BESYLATE 5 MG: 5 TABLET ORAL at 08:53

## 2024-09-28 RX ADMIN — POTASSIUM CHLORIDE 20 MEQ: 1.5 POWDER, FOR SOLUTION ORAL at 06:06

## 2024-09-28 RX ADMIN — IPRATROPIUM BROMIDE AND ALBUTEROL SULFATE 3 ML: .5; 3 SOLUTION RESPIRATORY (INHALATION) at 22:09

## 2024-09-28 RX ADMIN — Medication 200 MCG/HR: at 18:26

## 2024-09-28 ASSESSMENT — ACTIVITIES OF DAILY LIVING (ADL)
ADLS_ACUITY_SCORE: 56
ADLS_ACUITY_SCORE: 51
ADLS_ACUITY_SCORE: 52
ADLS_ACUITY_SCORE: 51

## 2024-09-28 NOTE — PROGRESS NOTES
[facility] ICU RESPIRATORY NOTE           Date of Admission: 9/26/2024     Date of Intubation (most recent): 9/26/2024     Reason for Mechanical Ventilation: Respiratory failure     Number of Days on Mechanical Ventilation: 3     Met Criteria for Spontaneous Breathing Trial: No     Reason for No Spontaneous Breathing Trial: Per MD      Significant Events Today: None      ABG Results:   Recent Labs   Lab 09/27/24  0447 09/26/24  1917 09/26/24  1556 09/26/24  1047   PH  --   --  7.26*  --    PCO2  --   --  43  --    PO2  --   --  139*  --    HCO3  --   --  19*  --    O2PER 50 50 50 50  50     FiO2 (%): 40 %, Resp: 14, Vent Mode: CMV/AC, Resp Rate (Set): 18 breaths/min, Tidal Volume (Set, mL): 400 mL, PEEP (cm H2O): 10 cmH2O, Resp Rate (Set): 18 breaths/min, Tidal Volume (Set, mL): 400 mL, PEEP (cm H2O): 10 cmH2O    UGO Azul, RRT

## 2024-09-28 NOTE — PROGRESS NOTES
Critical Care Progress Note      09/28/2024    Name: Jessica Ellison MRN#: [id number]   Age: 73 year old YOB: 1950     [facility]  [facility] #    MV DAY #             Problem List:   Active Problems:    Respiratory failure (H)    Suicide attempt- narcotic overdose coupled with alcohol intoxication (0.19) - she remains compensated on ventilator. No evidence of beta-blocker or calcium channel blocker overdose   Acute respiratory failure and aspiration pneumonitis - improving slowly and currently on 30% and +7 PEEP and oxygenation slowly improving. We will titrate down support as able; cultures pending; and we will treat with a short course of antibiotics for 5 days for aspiration.   Alcoholism, and acute alcohol intoxication   History of odontoid fracture  (September 2024) - per verbal report neurosurgery has requested MRI of neck, pending. Patient has a history of non-compliance with collar; she is in collar at present. She has had difficulty cooperating for MRI and will defer until she is off of vent  COPD, with recent hospitalization for exacerbation (9//-11/2024) - she is on duonebs and full replacement dose of hydrocortisone; she is on chronic prednisone presumably for COPD  CKD - creatinine 0.79  Chronic pain and opioid dependence   HTN  JOY - BIPAP once extubated.   Seizure disorder - keppra   Hypothyroid - TSH normal in August 2024; 0.45 today; no change in synthroid dose   Dyslipidemia            Summary/Hospital Course:           Assessment and plan :     Jessica Ellison IS a 73 year old female admitted on 9/26/2024 for acute respiratory failure.   I have personally reviewed the daily labs, imaging studies, cultures and discussed the case with referring physician and consulting physicians.     My assessment and plan by system for this patient is as follows:    Neurology/Psychiatry:   1. Sedated on vent today; moves extremities spontaneously     Cardiovascular:   1.Hemodynamics -  compensated off support     Pulmonary/Ventilator Management:   1. Comfortable on vent; 30%, +7 PEEP    GI and Nutrition :   1.  Enteral     Renal/Fluids/Electrolytes:   1. Improving towards baseline     Infectious Disease:   1. Complete 5 days of anbx for possible aspiration    Endocrine:   1. Glucoses ok    Hematology/Oncology:   1. Hb 9.2     IV/Access:   1. Venous access -   2. Arterial access -   3.  Plan  - central access required and necessary      ICU Prophylaxis:   1. DVT: Hep Subq/ LMWH/mechanical  2. VAP: HOB 30 degrees, chlorhexidine rinse  3. Stress Ulcer: PPI/H2 blocker  4. Restraints: Nonviolent soft two point restraints required and necessary for patient safety and continued cares and good effect as patient continues to pull at necessary lines, tubes despite education and distraction. Will readdress daily.   5. IV Access - central access required and necessary for continued patient cares  6. Feeding - enteral   7. Family Update: discussed with  at bedside   8. Disposition - continue ICU care today; hopefully extubate tomorrow         Key goals for next 24 hours:   1.  2.  3.               Interim History:              Key Medications:     Current Facility-Administered Medications   Medication Dose Route Frequency Provider Last Rate Last Admin    amLODIPine (NORVASC) tablet 5 mg  5 mg Oral or Feeding Tube Daily Azar Howard MD   5 mg at 09/28/24 0853    chlorhexidine (PERIDEX) 0.12 % solution 15 mL  15 mL Mouth/Throat Q12H Azar Howard MD   15 mL at 09/28/24 0852    enoxaparin ANTICOAGULANT (LOVENOX) injection 40 mg  40 mg Subcutaneous Q24H Azar Howard MD   40 mg at 09/28/24 1041    hydrocortisone sodium succinate PF (solu-CORTEF) injection 100 mg  100 mg Intravenous Q6H Azar Howard MD   100 mg at 09/28/24 1041    ipratropium - albuterol 0.5 mg/2.5 mg/3 mL (DUONEB) neb solution 3 mL  3 mL Nebulization Q4H Azar Howard MD   3  mL at 09/28/24 1102    levETIRAcetam (KEPPRA) intermittent infusion 500 mg  500 mg Intravenous Q12H Azar Howard MD   500 mg at 09/28/24 0606    levothyroxine (SYNTHROID/LEVOTHROID) tablet 50 mcg  50 mcg Oral or Feeding Tube QAM AC Azar Howard MD   50 mcg at 09/28/24 0853    metoprolol tartrate (LOPRESSOR) tablet 25 mg  25 mg Oral or Feeding Tube TID Azar Howard MD   25 mg at 09/28/24 0853    pantoprazole (PROTONIX) 2 mg/mL suspension 40 mg  40 mg Per Feeding Tube [facility] Azar Howard MD   40 mg at 09/28/24 0853    Or    pantoprazole (PROTONIX) IV push injection 40 mg  [facility] MD   40 mg at 09/26/24 1109    piperacillin-tazobactam (ZOSYN) 3.375 g vial to attach to  mL bag  3.375 g Intravenous Q6H Azar Howard MD   3.375 g at 09/28/24 0607    potassium & sodium phosphates (NEUTRA-PHOS) Packet 1 packet  1 packet Oral or Feeding Tube Q4H Bernice An MD   1 packet at 09/28/24 0853    vancomycin (VANCOCIN) 1,250 mg in 0.9% NaCl 250 mL intermittent infusion  1,250 mg Intravenous Q24H Bernice An .7 mL/hr at 09/28/24 1042 1,250 mg at 09/28/24 1042     Current Facility-Administered Medications   Medication Dose Route Frequency Provider Last Rate Last Admin    dextrose 10% infusion   Intravenous Continuous PRN Azar Howard MD        fentaNYL (SUBLIMAZE) infusion   mcg/hr Intravenous Continuous Bernice An MD 4 mL/hr at 09/28/24 0800 200 mcg/hr at 09/28/24 0800    insulin regular (MYXREDLIN) 1 unit/mL infusion  0-24 Units/hr Intravenous Continuous Azar Howard MD 0.5 mL/hr at 09/28/24 0800 0.5 Units/hr at 09/28/24 0800    propofol (DIPRIVAN) infusion  5-75 mcg/kg/min Intravenous Continuous Alina Miles MD 2.1 mL/hr at 09/28/24 1104 5 mcg/kg/min at 09/28/24 1104    And    Medication Instruction   Does not apply Continuous PRN Alina Miles MD         midazolam (VERSED) 100 mg/100 mL NS infusion - ADULT  1-8 mg/hr Intravenous Continuous Azar Howard MD 3 mL/hr at 09/28/24 1142 3 mg/hr at 09/28/24 1142    sodium chloride 0.9 % infusion   Intravenous Continuous Azar Howard MD 50 mL/hr at 09/28/24 0800 Rate Verify at 09/28/24 0800               Physical Examination:   Temp:  [97.6  F (36.4  C)-98.8  F (37.1  C)] 97.7  F (36.5  C)  Pulse:  [] 95  Resp:  [0-29] 10  BP: (132-176)/() 157/98  FiO2 (%):  [30 %-50 %] 40 %  SpO2:  [93 %-100 %] 100 %    Intake/Output Summary (Last 24 hours) at 9/28/2024 1208  Last data filed at 9/28/2024 1000  Gross per 24 hour   Intake 1748.36 ml   Output 410 ml   Net 1338.36 ml     Wt Readings from Last 4 Encounters:   09/28/24 57.6 kg (126 lb 15.8 oz)   09/26/24 77.1 kg (170 lb)   09/09/24 72.6 kg (160 lb)   08/12/24 72.7 kg (160 lb 4 oz)     BP - Mean:  [] 118  FiO2 (%): 40 %, Resp: 10, Vent Mode: CMV/AC, Resp Rate (Set): 18 breaths/min, Tidal Volume (Set, mL): 400 mL, PEEP (cm H2O): 7 cmH2O, Resp Rate (Set): 18 breaths/min, Tidal Volume (Set, mL): 400 mL, PEEP (cm H2O): 7 cmH2O  Recent Labs   Lab 09/27/24  0447 09/26/24  1917 09/26/24  1556 09/26/24  1047   PH  --   --  7.26*  --    PCO2  --   --  43  --    PO2  --   --  139*  --    HCO3  --   --  19*  --    O2PER 50 50 50 50  50       GEN: no acute distress; comfortable on vent   HEENT: head ncat, sclera anicteric, OP patent, trachea midline   PULM: unlabored synchronous with vent, clear anteriorly    CV/COR: RRR S1S2 no gallop,  No rub, no murmur  ABD: soft nontender,   EXT:  trace edema   warm  NEURO: moves extremities to stimulation   SKIN: no obvious rash  LINES: clean, dry intact         Data:   All data and imaging reviewed     ROUTINE ICU LABS (Last four results)  CMP  Recent Labs   Lab 09/28/24  0857 09/28/24  0438 09/28/24  0424 09/28/24  0228 09/27/24  0612 09/27/24  0446 09/26/24  1244 09/26/24  1124 09/26/24  1003  "09/26/24 0415   NA  --   --  145  --   --  145  --  145  --  145   POTASSIUM  --   --  3.1*  --   --  3.6  --  4.0  --  3.5   CHLORIDE  --   --  109*  --   --  109*  --  109*  --  105   CO2  --   --  19*  --   --  22  --  21*  --  18*   ANIONGAP  --   --  17*  --   --  14  --  15  --  22*   * 131* 147* 113*   < > 279*   < > 72   < > 136*   BUN  --   --  18.2  --   --  20.8  --  15.3  --  16.7   CR  --   --  0.79  --   --  1.06*  --  0.86  --  0.93   GFRESTIMATED  --   --  80  --   --  56*  --  72  --  65   DELILAH  --   --  8.1*  --   --  7.6*  --  7.9*  --  8.4*   MAG  --   --  1.9  --   --   --   --   --   --  1.5*   PHOS  --   --  2.4*  --   --  3.3  --  3.7  --   --    PROTTOTAL  --   --   --   --   --   --   --   --   --  6.0*   ALBUMIN  --   --  3.9  --   --  3.9  --  3.8  --  4.0   BILITOTAL  --   --   --   --   --   --   --   --   --  0.3   ALKPHOS  --   --   --   --   --   --   --   --   --  85   AST  --   --   --   --   --   --   --   --   --  38   ALT  --   --   --   --   --   --   --   --   --  20    < > = values in this interval not displayed.     CBC  Recent Labs   Lab 09/28/24 0424 09/27/24 0446 09/26/24 1124 09/26/24 0415   WBC 9.6 6.2 5.5 3.3*   RBC 2.96* 2.93* 3.15* 3.48*   HGB 9.2* 9.2* 9.7* 10.7*   HCT 29.5* 29.0* 30.7* 33.6*    99 98 97   MCH 31.1 31.4 30.8 30.7   MCHC 31.2* 31.7 31.6 31.8   RDW 15.3* 15.1* 15.0 14.7   * 117* 142* 160     INR  Recent Labs   Lab 09/26/24  0415   INR 1.05     Arterial Blood Gas  Recent Labs   Lab 09/27/24  0447 09/26/24  1917 09/26/24  1556 09/26/24  1047   PH  --   --  7.26*  --    PCO2  --   --  43  --    PO2  --   --  139*  --    HCO3  --   --  19*  --    O2PER 50 50 50 50  50       All cultures:  No results for input(s): \"CULT\" in the last 168 hours.  No results found for this or any previous visit (from the past 24 hour(s)).    MD Rosamaria    Billing: This patient is critically ill: Yes. Total critical care time today 45 " min.

## 2024-09-28 NOTE — PROGRESS NOTES
[facility] ICU RESPIRATORY NOTE        Date of Admission: 9/26/2024    Date of Intubation (most recent): 9/26/2024    Reason for Mechanical Ventilation: Respiratory Failure     Number of Days on Mechanical Ventilation: 3    Met Criteria for Spontaneous Breathing Trial: No    Bite Block: Yes; please explain: RN called for bite block after RT removed it. Patient was clamped down on the tube so a new bite block was inserted     Significant Events Today: None    ABG Results:   Recent Labs   Lab 09/27/24  0447 09/26/24  1917 09/26/24  1556 09/26/24  1047   PH  --   --  7.26*  --    PCO2  --   --  43  --    PO2  --   --  139*  --    HCO3  --   --  19*  --    O2PER 50 50 50 50  50         Current Vent Settings: FiO2 (%): 30 %, Resp: (!) 6, Vent Mode: CMV/AC, Resp Rate (Set): 18 breaths/min, Tidal Volume (Set, mL): 400 mL, PEEP (cm H2O): 5 cmH2O, Resp Rate (Set): 18 breaths/min, Tidal Volume (Set, mL): 400 mL, PEEP (cm H2O): 5 cmH2O    Skin Assessment: Skin is intact    Plan: Wean & extubate tomorrow     Florencio Navarro, RT on 9/28/2024 at 5:15 PM

## 2024-09-29 LAB
ALBUMIN SERPL BCG-MCNC: 3.8 G/DL (ref 3.5–5.2)
ANION GAP SERPL CALCULATED.3IONS-SCNC: 12 MMOL/L (ref 7–15)
BUN SERPL-MCNC: 21 MG/DL (ref 8–23)
CALCIUM SERPL-MCNC: 8 MG/DL (ref 8.8–10.4)
CHLORIDE SERPL-SCNC: 115 MMOL/L (ref 98–107)
CREAT SERPL-MCNC: 0.81 MG/DL (ref 0.51–0.95)
EGFRCR SERPLBLD CKD-EPI 2021: 77 ML/MIN/1.73M2
ERYTHROCYTE [DISTWIDTH] IN BLOOD BY AUTOMATED COUNT: 15.6 % (ref 10–15)
GLUCOSE BLDC GLUCOMTR-MCNC: 102 MG/DL (ref 70–99)
GLUCOSE BLDC GLUCOMTR-MCNC: 107 MG/DL (ref 70–99)
GLUCOSE BLDC GLUCOMTR-MCNC: 111 MG/DL (ref 70–99)
GLUCOSE BLDC GLUCOMTR-MCNC: 113 MG/DL (ref 70–99)
GLUCOSE BLDC GLUCOMTR-MCNC: 124 MG/DL (ref 70–99)
GLUCOSE BLDC GLUCOMTR-MCNC: 127 MG/DL (ref 70–99)
GLUCOSE BLDC GLUCOMTR-MCNC: 132 MG/DL (ref 70–99)
GLUCOSE BLDC GLUCOMTR-MCNC: 132 MG/DL (ref 70–99)
GLUCOSE BLDC GLUCOMTR-MCNC: 137 MG/DL (ref 70–99)
GLUCOSE BLDC GLUCOMTR-MCNC: 141 MG/DL (ref 70–99)
GLUCOSE BLDC GLUCOMTR-MCNC: 98 MG/DL (ref 70–99)
GLUCOSE SERPL-MCNC: 148 MG/DL (ref 70–99)
HCO3 SERPL-SCNC: 22 MMOL/L (ref 22–29)
HCT VFR BLD AUTO: 28.7 % (ref 35–47)
HGB BLD-MCNC: 8.7 G/DL (ref 11.7–15.7)
MCH RBC QN AUTO: 30.6 PG (ref 26.5–33)
MCHC RBC AUTO-ENTMCNC: 30.3 G/DL (ref 31.5–36.5)
MCV RBC AUTO: 101 FL (ref 78–100)
PHOSPHATE SERPL-MCNC: 2.7 MG/DL (ref 2.5–4.5)
PLATELET # BLD AUTO: 103 10E3/UL (ref 150–450)
POTASSIUM SERPL-SCNC: 3.2 MMOL/L (ref 3.4–5.3)
POTASSIUM SERPL-SCNC: 3.2 MMOL/L (ref 3.4–5.3)
POTASSIUM SERPL-SCNC: 3.9 MMOL/L (ref 3.4–5.3)
RBC # BLD AUTO: 2.84 10E6/UL (ref 3.8–5.2)
SODIUM SERPL-SCNC: 149 MMOL/L (ref 135–145)
WBC # BLD AUTO: 9.6 10E3/UL (ref 4–11)

## 2024-09-29 PROCEDURE — 250N000009 HC RX 250: Performed by: INTERNAL MEDICINE

## 2024-09-29 PROCEDURE — 85027 COMPLETE CBC AUTOMATED: CPT | Performed by: INTERNAL MEDICINE

## 2024-09-29 PROCEDURE — 84132 ASSAY OF SERUM POTASSIUM: CPT | Performed by: INTERNAL MEDICINE

## 2024-09-29 PROCEDURE — 250N000013 HC RX MED GY IP 250 OP 250 PS 637: Performed by: INTERNAL MEDICINE

## 2024-09-29 PROCEDURE — 250N000011 HC RX IP 250 OP 636: Performed by: STUDENT IN AN ORGANIZED HEALTH CARE EDUCATION/TRAINING PROGRAM

## 2024-09-29 PROCEDURE — 94003 VENT MGMT INPAT SUBQ DAY: CPT

## 2024-09-29 PROCEDURE — 999N000157 HC STATISTIC RCP TIME EA 10 MIN

## 2024-09-29 PROCEDURE — 250N000011 HC RX IP 250 OP 636: Performed by: INTERNAL MEDICINE

## 2024-09-29 PROCEDURE — 84132 ASSAY OF SERUM POTASSIUM: CPT | Performed by: STUDENT IN AN ORGANIZED HEALTH CARE EDUCATION/TRAINING PROGRAM

## 2024-09-29 PROCEDURE — 200N000001 HC R&B ICU

## 2024-09-29 PROCEDURE — 258N000003 HC RX IP 258 OP 636: Performed by: INTERNAL MEDICINE

## 2024-09-29 PROCEDURE — 999N000009 HC STATISTIC AIRWAY CARE

## 2024-09-29 PROCEDURE — 94640 AIRWAY INHALATION TREATMENT: CPT

## 2024-09-29 PROCEDURE — 36415 COLL VENOUS BLD VENIPUNCTURE: CPT | Performed by: INTERNAL MEDICINE

## 2024-09-29 PROCEDURE — 94640 AIRWAY INHALATION TREATMENT: CPT | Mod: 76

## 2024-09-29 PROCEDURE — 99291 CRITICAL CARE FIRST HOUR: CPT | Performed by: INTERNAL MEDICINE

## 2024-09-29 PROCEDURE — 80069 RENAL FUNCTION PANEL: CPT | Performed by: INTERNAL MEDICINE

## 2024-09-29 RX ORDER — GUAIFENESIN 600 MG/1
15 TABLET, EXTENDED RELEASE ORAL DAILY
Status: DISCONTINUED | OUTPATIENT
Start: 2024-09-29 | End: 2024-10-03

## 2024-09-29 RX ORDER — FUROSEMIDE 10 MG/ML
20 INJECTION INTRAMUSCULAR; INTRAVENOUS EVERY 12 HOURS
Status: COMPLETED | OUTPATIENT
Start: 2024-09-29 | End: 2024-09-30

## 2024-09-29 RX ORDER — DEXTROSE MONOHYDRATE 100 MG/ML
INJECTION, SOLUTION INTRAVENOUS CONTINUOUS PRN
Status: DISCONTINUED | OUTPATIENT
Start: 2024-09-29 | End: 2024-10-03

## 2024-09-29 RX ORDER — POTASSIUM CHLORIDE 20MEQ/15ML
40 LIQUID (ML) ORAL ONCE
Status: COMPLETED | OUTPATIENT
Start: 2024-09-29 | End: 2024-09-29

## 2024-09-29 RX ADMIN — LEVETIRACETAM 500 MG: 5 INJECTION INTRAVENOUS at 06:18

## 2024-09-29 RX ADMIN — HYDROCORTISONE SODIUM SUCCINATE 100 MG: 100 INJECTION, POWDER, FOR SOLUTION INTRAMUSCULAR; INTRAVENOUS at 22:01

## 2024-09-29 RX ADMIN — Medication 15 ML: at 11:50

## 2024-09-29 RX ADMIN — METOPROLOL TARTRATE 25 MG: 25 TABLET, FILM COATED ORAL at 08:02

## 2024-09-29 RX ADMIN — CHLORHEXIDINE GLUCONATE 0.12% ORAL RINSE 15 ML: 1.2 LIQUID ORAL at 19:41

## 2024-09-29 RX ADMIN — LEVETIRACETAM 500 MG: 5 INJECTION INTRAVENOUS at 17:15

## 2024-09-29 RX ADMIN — PROPOFOL 60 MCG/KG/MIN: 10 INJECTION, EMULSION INTRAVENOUS at 20:22

## 2024-09-29 RX ADMIN — PROPOFOL 50 MCG/KG/MIN: 10 INJECTION, EMULSION INTRAVENOUS at 10:20

## 2024-09-29 RX ADMIN — HYDROCORTISONE SODIUM SUCCINATE 100 MG: 100 INJECTION, POWDER, FOR SOLUTION INTRAMUSCULAR; INTRAVENOUS at 10:52

## 2024-09-29 RX ADMIN — HYDROCORTISONE SODIUM SUCCINATE 100 MG: 100 INJECTION, POWDER, FOR SOLUTION INTRAMUSCULAR; INTRAVENOUS at 04:40

## 2024-09-29 RX ADMIN — PROPOFOL 40 MCG/KG/MIN: 10 INJECTION, EMULSION INTRAVENOUS at 02:53

## 2024-09-29 RX ADMIN — POTASSIUM CHLORIDE 40 MEQ: 20 SOLUTION ORAL at 06:06

## 2024-09-29 RX ADMIN — PIPERACILLIN AND TAZOBACTAM 3.38 G: 3; .375 INJECTION, POWDER, FOR SOLUTION INTRAVENOUS at 06:06

## 2024-09-29 RX ADMIN — LEVOTHYROXINE SODIUM 50 MCG: 50 TABLET ORAL at 08:02

## 2024-09-29 RX ADMIN — CHLORHEXIDINE GLUCONATE 0.12% ORAL RINSE 15 ML: 1.2 LIQUID ORAL at 08:02

## 2024-09-29 RX ADMIN — METOPROLOL TARTRATE 25 MG: 25 TABLET, FILM COATED ORAL at 22:01

## 2024-09-29 RX ADMIN — IPRATROPIUM BROMIDE AND ALBUTEROL SULFATE 3 ML: .5; 3 SOLUTION RESPIRATORY (INHALATION) at 23:07

## 2024-09-29 RX ADMIN — Medication 40 MG: at 08:02

## 2024-09-29 RX ADMIN — IPRATROPIUM BROMIDE AND ALBUTEROL SULFATE 3 ML: .5; 3 SOLUTION RESPIRATORY (INHALATION) at 06:47

## 2024-09-29 RX ADMIN — IPRATROPIUM BROMIDE AND ALBUTEROL SULFATE 3 ML: .5; 3 SOLUTION RESPIRATORY (INHALATION) at 11:12

## 2024-09-29 RX ADMIN — PIPERACILLIN AND TAZOBACTAM 3.38 G: 3; .375 INJECTION, POWDER, FOR SOLUTION INTRAVENOUS at 17:30

## 2024-09-29 RX ADMIN — METOPROLOL TARTRATE 25 MG: 25 TABLET, FILM COATED ORAL at 16:19

## 2024-09-29 RX ADMIN — PIPERACILLIN AND TAZOBACTAM 3.38 G: 3; .375 INJECTION, POWDER, FOR SOLUTION INTRAVENOUS at 11:50

## 2024-09-29 RX ADMIN — ENOXAPARIN SODIUM 40 MG: 40 INJECTION SUBCUTANEOUS at 10:52

## 2024-09-29 RX ADMIN — IPRATROPIUM BROMIDE AND ALBUTEROL SULFATE 3 ML: .5; 3 SOLUTION RESPIRATORY (INHALATION) at 03:02

## 2024-09-29 RX ADMIN — IPRATROPIUM BROMIDE AND ALBUTEROL SULFATE 3 ML: .5; 3 SOLUTION RESPIRATORY (INHALATION) at 18:51

## 2024-09-29 RX ADMIN — Medication 200 MCG/HR: at 06:46

## 2024-09-29 RX ADMIN — FUROSEMIDE 20 MG: 10 INJECTION, SOLUTION INTRAMUSCULAR; INTRAVENOUS at 17:14

## 2024-09-29 RX ADMIN — HYDROCORTISONE SODIUM SUCCINATE 100 MG: 100 INJECTION, POWDER, FOR SOLUTION INTRAMUSCULAR; INTRAVENOUS at 16:19

## 2024-09-29 RX ADMIN — PROPOFOL 45 MCG/KG/MIN: 10 INJECTION, EMULSION INTRAVENOUS at 06:39

## 2024-09-29 RX ADMIN — IPRATROPIUM BROMIDE AND ALBUTEROL SULFATE 3 ML: .5; 3 SOLUTION RESPIRATORY (INHALATION) at 16:04

## 2024-09-29 RX ADMIN — PROPOFOL 40 MCG/KG/MIN: 10 INJECTION, EMULSION INTRAVENOUS at 16:19

## 2024-09-29 RX ADMIN — VANCOMYCIN HYDROCHLORIDE 1250 MG: 10 INJECTION, POWDER, LYOPHILIZED, FOR SOLUTION INTRAVENOUS at 11:01

## 2024-09-29 RX ADMIN — AMLODIPINE BESYLATE 5 MG: 5 TABLET ORAL at 08:02

## 2024-09-29 ASSESSMENT — ACTIVITIES OF DAILY LIVING (ADL)
ADLS_ACUITY_SCORE: 57

## 2024-09-29 NOTE — PROGRESS NOTES
[facility] ICU RESPIRATORY NOTE        Date of Admission: 9/26/2024    Date of Intubation (most recent): 09/26/2024    Reason for Mechanical Ventilation: Resp failure     Number of Days on Mechanical Ventilation: 4    Met Criteria for Spontaneous Breathing Trial: Yes    Bite Block: Yes, pt biting on ETT     Significant Events Today: SBT today for 25mins and back on full support due to restless.     ABG Results:   Recent Labs   Lab 09/27/24  0447 09/26/24  1917 09/26/24  1556 09/26/24  1047   PH  --   --  7.26*  --    PCO2  --   --  43  --    PO2  --   --  139*  --    HCO3  --   --  19*  --    O2PER 50 50 50 50  50         Current Vent Settings: FiO2 (%): 35 %, Resp: 25, Vent Mode: CMV/AC, Resp Rate (Set): 18 breaths/min, Tidal Volume (Set, mL): 400 mL, PEEP (cm H2O): 5 cmH2O, Pressure Support (cm H2O): 10 cmH2O, Resp Rate (Set): 18 breaths/min, Tidal Volume (Set, mL): 400 mL, PEEP (cm H2O): 5 cmH2O    Skin Assessment:  Clean and dry.    Plan: Continue to wean from mechanical ventilation and oxygen as tolerated per protocol.

## 2024-09-29 NOTE — PLAN OF CARE
Patient sedated and intubated. Successfully weaned ventilator settings to 30% PEEP 5 with saturations>94%, however patient was experiencing ventilator asynchrony. Multiple setting changes were attempted on the ventilator with no change. MD suggested to increase sedation levels. Even after increasing sedation, patient is still experiencing asynchrony.     Plan is to attempt SAT and SBT tomorrow, MRI when patient will tolerate it.   Will need suicide precautions and psych consult when awake and extubated.    Wil RUIZ RN on 9/28/2024 at 7:34 PM          Goal: Absence of Hospital-Acquired Illness or Injury  Outcome: Progressing  Intervention: Identify and Manage Fall Risk  Recent Flowsheet Documentation  Taken 9/28/2024 1200 by Wil Ruiz, RN  Safety Promotion/Fall Prevention:   activity supervised   clutter free environment maintained   increased rounding and observation   increase visualization of patient   lighting adjusted   patient and family education   room door open   room near nurse's station   room organization consistent   safety round/check completed   supervised activity  Taken 9/28/2024 0800 by Wil Ruiz RN  Safety Promotion/Fall Prevention:   activity supervised   clutter free environment maintained   increased rounding and observation   increase visualization of patient   lighting adjusted   patient and family education   room door open   room near nurse's station   room organization consistent   safety round/check completed   supervised activity  Intervention: Prevent Skin Injury  Recent Flowsheet Documentation  Taken 9/28/2024 1800 by Wil Ruiz, RN  Body Position:   turned   side-lying   heels elevated   neutral head position   upper extremity elevated  Taken 9/28/2024 1600 by Wil Ruiz RN  Body Position:   turned   side-lying   heels elevated   neutral head position   upper extremity elevated  Taken 9/28/2024 1400 by Joseph  Wil ELLIS RN  Body Position:   turned   side-lying   heels elevated   neutral head position   upper extremity elevated  Taken 9/28/2024 1200 by Wil Ruiz RN  Body Position:   turned   side-lying   heels elevated   neutral head position   upper extremity elevated  Skin Protection:   adhesive use limited   incontinence pads utilized   pulse oximeter probe site changed   silicone foam dressing in place   transparent dressing maintained  Device Skin Pressure Protection:   absorbent pad utilized/changed   positioning supports utilized   tubing/devices free from skin contact  Taken 9/28/2024 1000 by Wil Ruiz RN  Body Position:   turned   side-lying   heels elevated   neutral head position   upper extremity elevated  Taken 9/28/2024 0800 by Wil Ruiz RN  Body Position:   turned   side-lying   heels elevated   neutral head position   upper extremity elevated  Skin Protection:   adhesive use limited   incontinence pads utilized   pulse oximeter probe site changed   silicone foam dressing in place   transparent dressing maintained  Device Skin Pressure Protection:   absorbent pad utilized/changed   positioning supports utilized   tubing/devices free from skin contact  Intervention: Prevent and Manage VTE (Venous Thromboembolism) Risk  Recent Flowsheet Documentation  Taken 9/28/2024 1200 by Wil Ruiz RN  VTE Prevention/Management: SCDs on (sequential compression devices)  Taken 9/28/2024 0800 by Wil Ruiz RN  VTE Prevention/Management: SCDs on (sequential compression devices)  Intervention: Prevent Infection  Recent Flowsheet Documentation  Taken 9/28/2024 1200 by Wil Ruiz RN  Infection Prevention:   environmental surveillance performed   equipment surfaces disinfected   hand hygiene promoted   personal protective equipment utilized   rest/sleep promoted   single patient room provided  Taken 9/28/2024 0800 by Wil Ruiz  RN  Infection Prevention:   environmental surveillance performed   equipment surfaces disinfected   hand hygiene promoted   personal protective equipment utilized   rest/sleep promoted   single patient room provided  Goal: Optimal Comfort and Wellbeing  Outcome: Progressing  Intervention: Monitor Pain and Promote Comfort  Recent Flowsheet Documentation  Taken 9/28/2024 1600 by Wil Ruiz, RN  Pain Management Interventions:   care clustered   pillow support provided   quiet environment facilitated   repositioned   rest  Taken 9/28/2024 1200 by Wil Ruiz, RN  Pain Management Interventions:   care clustered   pillow support provided   quiet environment facilitated   repositioned   rest  Taken 9/28/2024 0800 by Wil Ruiz, RN  Pain Management Interventions:   care clustered   pillow support provided   quiet environment facilitated   repositioned   rest  Intervention: Provide Person-Centered Care  Recent Flowsheet Documentation  Taken 9/28/2024 1200 by Wil Ruiz, RN  Trust Relationship/Rapport:   care explained   emotional support provided   reassurance provided   thoughts/feelings acknowledged  Taken 9/28/2024 0800 by Wil Ruiz, RN  Trust Relationship/Rapport:   care explained   emotional support provided   reassurance provided   thoughts/feelings acknowledged  Goal: Readiness for Transition of Care  Outcome: Progressing

## 2024-09-29 NOTE — PLAN OF CARE
Goal Outcome Evaluation:      Plan of Care Reviewed With: patient    Overall Patient Progress: no changeOverall Patient Progress: no change    Outcome Evaluation: Pt remains intubated and sedated, weaned from versed overnight. Tolerating vent, FiO2 increased to 50% due to sats in the mid 80s. PERRL. Awakens to painful stimulation, GREY. Tele SR, occassionally tachy in the low 100s. LS diminished with small tan thick secretions. Bruce in use, ~30ml/hr MD SARA notified no new orders. 2 large watery stools overnight. Plan to possibly extubate today and start tube feeds, restraints in use for safety. MRI to be completed upon tolerance after extubation.    Problem: Restraint, Nonviolent  Goal: Absence of Harm or Injury  Intervention: Implement Least Restrictive Safety Strategies  Recent Flowsheet Documentation  Taken 9/29/2024 0400 by Sabrina Gonzalez RN  Medical Device Protection: tubing secured  Taken 9/29/2024 0000 by Sabrina Gonzalez RN  Medical Device Protection: tubing secured

## 2024-09-29 NOTE — PROGRESS NOTES
[facility] ICU RESPIRATORY NOTE        Date of Admission: 9/26/2024    Date of Intubation (most recent): 9/26/24    Reason for Mechanical Ventilation: Resp failure    Number of Days on Mechanical Ventilation: 4    Bite Block: Yes, pt biting on ETT    Significant Events Today: None    ABG Results:   Recent Labs   Lab 09/27/24  0447 09/26/24  1917 09/26/24  1556 09/26/24  1047   PH  --   --  7.26*  --    PCO2  --   --  43  --    PO2  --   --  139*  --    HCO3  --   --  19*  --    O2PER 50 50 50 50  50         Current Vent Settings: FiO2 (%): 50 %, Resp: 16, Vent Mode: CMV/AC, Resp Rate (Set): 18 breaths/min, Tidal Volume (Set, mL): 400 mL, PEEP (cm H2O): 5 cmH2O, Resp Rate (Set): 18 breaths/min, Tidal Volume (Set, mL): 400 mL, PEEP (cm H2O): 5 cmH2O    Skin Assessment: Intact    Plan: Wean to extubate    RT Vanessa on 9/29/2024 at 5:05 AM

## 2024-09-29 NOTE — CONSULTS
"CLINICAL NUTRITION SERVICES  -  ASSESSMENT NOTE      Recommendations Ordered by Registered Dietitian (RD):   - Initiate Osmolite 1.5 @ 10 ml/hr via NDT   - Pending electrolytes, advance by 10 ml q8h to eventual goal @ 50 ml/hr. Pt is at risk of refeeding syndrome.   - FWF of 30 ml q4h for tube patency   - Certavite daily to meet micronutrient needs     Eventual Goal Provisions: Osmolite 1.5 Carols @ 50ml/hr (1200ml/day) provides: 1800 kcals (28 kcal/kg), 75 g PRO (1.2 g/kg), 914 ml free H20, 244 g CHO, and 0 g fiber daily.     Future/Additional Recommendations:   - Follow kcal contributions from propofol provisions. Adjust TF accordingly.     Malnutrition:   Does not meet malnutrition criteria          REASON FOR ASSESSMENT  Jessica Ellison is a 73 year old female seen by Registered Dietitian for Provider Order - Registered Dietitian to Assess and Order TF per Medical Nutrition Therapy Protocol      NUTRITION HISTORY  Information obtained from family at bedside. Pt follows a regular diet at home. PTA, pt was eating less for the past few weeks, presumably due to worsening mental health status. Denies weight loss or changes in physical habitus.       CURRENT NUTRITION ORDERS  Diet Order:     NPO x3 days     NUTRITION FOCUSED PHYSICAL ASSESSMENT FOR DIAGNOSING MALNUTRITION)    Observed:    NDT in place (XR verified)     ANTHROPOMETRICS  Height: 5' 4\"  Weight: 142 lbs on admission (64.6 kg)   Body mass index is 27.81 kg/m .  Weight Status:  Overweight BMI 25-29.9  IBW: 54.5 kg  % IBW: 119%  Weight History   Wt Readings from Last 10 Encounters:   09/29/24 73.5 kg (162 lb 0.6 oz)   09/26/24 77.1 kg (170 lb)   09/09/24 72.6 kg (160 lb)   08/12/24 72.7 kg (160 lb 4 oz)   06/11/24 70.3 kg (155 lb)   05/13/24 68 kg (150 lb)   04/01/24 70.8 kg (156 lb 2 oz)   03/05/24 70.3 kg (155 lb)   03/04/24 70.3 kg (155 lb)   02/20/24 74 kg (163 lb 4 oz)         LABS  Labs reviewed  Na 149, K 3.2    MEDICATIONS  Medications " reviewed  Propofol gtt, NS @ 50 ml/hr       ASSESSED NUTRITION NEEDS PER APPROVED PRACTICE GUIDELINES:    Dosing Weight 65 kg (actual, admit weight on 9/26)  Estimated Energy Needs: 7699-6745 kcals (25-30 Kcal/Kg)  Justification: maintenance  Estimated Protein Needs: 75-95 grams protein (1.2-1.5 g pro/Kg)  Justification: preservation of lean body mass  Estimated Fluid Needs: 0524-8571  mL (25-30 mL/kg)  Justification: maintenance    MALNUTRITION:  % Weight Loss:  None noted  % Intake:  </= 50% for >/= 5 days (severe malnutrition)  Subcutaneous Fat Loss:  None observed  Muscle Loss:  None observed  Fluid Retention:  None noted    Malnutrition Diagnosis: Patient does not meet two of the above criteria necessary for diagnosing malnutrition    NUTRITION DIAGNOSIS:  Inadequate oral intake related to reduced appetite as evidenced by estimate meeting <50% nutrition needs for past few weeks based on family report, NPO x3 days since admission.       INTERVENTIONS  Recommendations / Nutrition Prescription  See above    Goals  TF to advance to goal rate within 24 to 48 hrs.       MONITORING AND EVALUATION:  Progress towards goals will be monitored and evaluated per protocol and Practice Guidelines    Deirdre Landaverde RD, LD, CNSC

## 2024-09-29 NOTE — PROGRESS NOTES
Critical Care Progress Note      09/29/2024    Name: Jessica Ellison MRN#: [id number]   Age: 73 year old YOB: 1950     [facility]  [facility]    [facility] DAY #             Problem List:   Active Problems:    Respiratory failure (H)    Suicide attempt- narcotic overdose coupled with alcohol intoxication (0.19) - she remains compensated on ventilator. She has daily sedation interruptions with agitation- I am concerned that she may yet have a significant alcohol withdrawal syndrome.   Acute respiratory failure and aspiration pneumonitis - improving slowly and currently on 35% and +5 PEEP and oxygenation slowly improving. We will titrate down support as able; cultures show Klebsiella and E. Coli in Urine, sensitive to Zosyn. We will treat with a short course of antibiotics for 5 days for aspiration and/or UTI. With negative MRSA nares we will stop vanco. We will decrease IV fluid to TKO and give 2 doses of IV lasix 20 q12h to see if this helps oxygenation given positive fluid balance.   Alcoholism, and acute alcohol intoxication - concern of withdrawal syndrome.   History of odontoid fracture  (September 2024) - per verbal report neurosurgery has requested MRI of neck, pending. Patient has a history of non-compliance with collar; she is in collar at present. She has had difficulty cooperating for MRI and will defer until she is off of vent.   COPD, with recent hospitalization for exacerbation (9//-11/2024) - she is on duonebs and full replacement dose of hydrocortisone; she is on chronic prednisone presumably for COPD  CKD - creatinine 0.81  Chronic pain and opioid dependence   HTN  JOY - BIPAP once extubated.   Seizure disorder - keppra   Hypothyroid - TSH normal in August 2024; 0.45 this admission; no change in synthroid dose   Dyslipidemia            Summary/Hospital Course:           Assessment and plan :     Jessica Ellison IS a 73 year old female admitted on 9/26/2024 for acute respiratory failure.    I have personally reviewed the daily labs, imaging studies, cultures and discussed the case with referring physician and consulting physicians.     My assessment and plan by system for this patient is as follows:    Neurology/Psychiatry:   1. Does awake and moves 4 extremities to stimulation     Cardiovascular:   1.Hemodynamics - compensated off support     Pulmonary/Ventilator Management:   1. 35% and +5 PEEP on vent with some slow improvement. She still has periods of desaturations and will wait another 24 hours before serious weaning trial.     GI and Nutrition :   1. Enteral nutrition     Renal/Fluids/Electrolytes:   1. Renal function normal (0.81); continue free water with Na 149 especially with IV Lasix onboard    Infectious Disease:   1. Complete 5 day course of antibiotics     Endocrine:   1. Glucoses ok; on synthroid    Hematology/Oncology:   1. Hg 8.7      IV/Access:   1. Venous access -   2. Arterial access -   3.  Plan  - central access required and necessary      ICU Prophylaxis:   1. DVT: Hep Subq/ LMWH/mechanical  2. VAP: HOB 30 degrees, chlorhexidine rinse  3. Stress Ulcer: PPI/H2 blocker  4. Restraints: Nonviolent soft two point restraints required and necessary for patient safety and continued cares and good effect as patient continues to pull at necessary lines, tubes despite education and distraction. Will readdress daily.   5. IV Access - central access required and necessary for continued patient cares  6. Feeding - enteral   7. Family Update: discussed with  at bedside today  8. Disposition - ICU care         Key goals for next 24 hours:   1.  2.  3.               Interim History:              Key Medications:     Current Facility-Administered Medications   Medication Dose Route Frequency Provider Last Rate Last Admin    amLODIPine (NORVASC) tablet 5 mg  5 mg Oral or Feeding Tube Daily Azar Howard MD   5 mg at 09/29/24 0802    chlorhexidine (PERIDEX) 0.12 % solution 15  mL  15 mL Mouth/Throat Q12H Azar Howard MD   15 mL at 09/29/24 0802    enoxaparin ANTICOAGULANT (LOVENOX) injection 40 mg  40 mg Subcutaneous Q24H Azar Howard MD   40 mg at 09/29/24 1052    hydrocortisone sodium succinate PF (solu-CORTEF) injection 100 mg  100 mg Intravenous Q6H Azar Howard MD   100 mg at 09/29/24 1619    ipratropium - albuterol 0.5 mg/2.5 mg/3 mL (DUONEB) neb solution 3 mL  3 mL Nebulization Q4H Azar Howard MD   3 mL at 09/29/24 1604    levETIRAcetam (KEPPRA) intermittent infusion 500 mg  500 mg Intravenous Q12H Azar Howard MD   500 mg at 09/29/24 0618    levothyroxine (SYNTHROID/LEVOTHROID) tablet 50 mcg  50 mcg Oral or Feeding Tube QAM  Azar Howard MD   50 mcg at 09/29/24 0802    metoprolol tartrate (LOPRESSOR) tablet 25 mg  25 mg Oral or Feeding Tube TID Azar Howard MD   25 mg at 09/29/24 1619    multivitamins w/minerals liquid 15 mL  15 mL Per Feeding Tube Daily Azar Howard MD   15 mL at 09/29/24 1150    pantoprazole (PROTONIX) 2 mg/mL suspension 40 mg  40 mg Per Feeding Tube QAM  Azar Howard MD   40 mg at 09/29/24 0802    Or    pantoprazole (PROTONIX) IV push injection 40 mg  40 mg Intravenous QAM  Azar Howard MD   40 mg at 09/26/24 1109    piperacillin-tazobactam (ZOSYN) 3.375 g vial to attach to  mL bag  3.375 g Intravenous Q6H Azar Howard MD   3.375 g at 09/29/24 1150    polyethylene glycol (MIRALAX) Packet 17 g  17 g Oral or Feeding Tube Daily Josefina Watson MD   17 g at 09/28/24 1747    vancomycin (VANCOCIN) 1,250 mg in 0.9% NaCl 250 mL intermittent infusion  1,250 mg Intravenous Q24H Bernice An .7 mL/hr at 09/29/24 1101 1,250 mg at 09/29/24 1101     Current Facility-Administered Medications   Medication Dose Route Frequency Provider Last Rate Last Admin    dextrose 10% infusion   Intravenous Continuous PRN  Azar Howard MD        dextrose 10% infusion   Intravenous Continuous PRN Azar Howard MD        fentaNYL (SUBLIMAZE) infusion   mcg/hr Intravenous Continuous Bernice An MD 2 mL/hr at 09/29/24 1131 100 mcg/hr at 09/29/24 1131    insulin regular (MYXREDLIN) 1 unit/mL infusion  0-24 Units/hr Intravenous Continuous Azar Howard MD   Stopped at 09/29/24 1616    propofol (DIPRIVAN) infusion  5-75 mcg/kg/min Intravenous Continuous Alina Miles MD 16.8 mL/hr at 09/29/24 1619 40 mcg/kg/min at 09/29/24 1619    And    Medication Instruction   Does not apply Continuous PRN Alina Miles MD        midazolam (VERSED) 100 mg/100 mL NS infusion - ADULT  1-8 mg/hr Intravenous Continuous Azar Howard MD   Stopped at 09/29/24 0401    sodium chloride 0.9 % infusion   Intravenous Continuous Azar Howard MD 50 mL/hr at 09/29/24 0800 Rate Verify at 09/29/24 0800               Physical Examination:   Temp:  [96.5  F (35.8  C)-98.4  F (36.9  C)] 98.4  F (36.9  C)  Pulse:  [] 112  Resp:  [0-45] 25  BP: ()/(73-98) 151/98  FiO2 (%):  [30 %-60 %] 35 %  SpO2:  [87 %-100 %] 94 %    Intake/Output Summary (Last 24 hours) at 9/29/2024 1624  Last data filed at 9/29/2024 1237  Gross per 24 hour   Intake 2165.95 ml   Output 330 ml   Net 1835.95 ml     Wt Readings from Last 4 Encounters:   09/29/24 73.5 kg (162 lb 0.6 oz)   09/26/24 77.1 kg (170 lb)   09/09/24 72.6 kg (160 lb)   08/12/24 72.7 kg (160 lb 4 oz)     BP - Mean:  [] 116  FiO2 (%): 35 %, Resp: 25, Vent Mode: CMV/AC, Resp Rate (Set): 18 breaths/min, Tidal Volume (Set, mL): 400 mL, PEEP (cm H2O): 5 cmH2O, Pressure Support (cm H2O): 10 cmH2O, Resp Rate (Set): 18 breaths/min, Tidal Volume (Set, mL): 400 mL, PEEP (cm H2O): 5 cmH2O  Recent Labs   Lab 09/27/24  0447 09/26/24  1917 09/26/24  1556 09/26/24  1047   PH  --   --  7.26*  --    PCO2  --   --  43  --    PO2  --   --  139*  --    HCO3  --    --  19*  --    O2PER 50 50 50 50  50       GEN: no acute distress; comfortable on vent at time of exam    HEENT: head ncat, sclera anicteric, OP patent, trachea midline   PULM: unlabored synchronous with vent, clear anteriorly    CV/COR: RRR S1S2 no gallop,  No rub, no murmur  ABD: soft nontender, obese   EXT:  minimal edema   warm  NEURO: moves extremities to stimulation   SKIN: no obvious rash  LINES: clean, dry intact         Data:   All data and imaging reviewed     ROUTINE ICU LABS (Last four results)  CMP  Recent Labs   Lab 09/29/24  1614 09/29/24  1400 09/29/24  1244 09/29/24  1041 09/29/24  0819 09/29/24  0622 09/29/24  0453 09/28/24  2146 09/28/24  1759 09/28/24  0438 09/28/24  0424 09/27/24  0612 09/27/24  0446 09/26/24  1244 09/26/24  1124 09/26/24  1003 09/26/24  0415   NA  --   --   --   --   --   --  149*  --   --   --  145  --  145  --  145  --  145   POTASSIUM  --   --  3.9  --   --   --  3.2*  3.2*  --  3.1*  --  3.1*  --  3.6  --  4.0  --  3.5   CHLORIDE  --   --   --   --   --   --  115*  --   --   --  109*  --  109*  --  109*  --  105   CO2  --   --   --   --   --   --  22  --   --   --  19*  --  22  --  21*  --  18*   ANIONGAP  --   --   --   --   --   --  12  --   --   --  17*  --  14  --  15  --  22*   GLC 98 102*  --  107* 132*   < > 148*   < >  --    < > 147*   < > 279*   < > 72   < > 136*   BUN  --   --   --   --   --   --  21.0  --   --   --  18.2  --  20.8  --  15.3  --  16.7   CR  --   --   --   --   --   --  0.81  --   --   --  0.79  --  1.06*  --  0.86  --  0.93   GFRESTIMATED  --   --   --   --   --   --  77  --   --   --  80  --  56*  --  72  --  65   DELILAH  --   --   --   --   --   --  8.0*  --   --   --  8.1*  --  7.6*  --  7.9*  --  8.4*   MAG  --   --   --   --   --   --   --   --   --   --  1.9  --   --   --   --   --  1.5*   PHOS  --   --   --   --   --   --  2.7  --   --   --  2.4*  --  3.3  --  3.7  --   --    PROTTOTAL  --   --   --   --   --   --   --   --   --   --   --    "--   --   --   --   --  6.0*   ALBUMIN  --   --   --   --   --   --  3.8  --   --   --  3.9  --  3.9  --  3.8  --  4.0   BILITOTAL  --   --   --   --   --   --   --   --   --   --   --   --   --   --   --   --  0.3   ALKPHOS  --   --   --   --   --   --   --   --   --   --   --   --   --   --   --   --  85   AST  --   --   --   --   --   --   --   --   --   --   --   --   --   --   --   --  38   ALT  --   --   --   --   --   --   --   --   --   --   --   --   --   --   --   --  20    < > = values in this interval not displayed.     CBC  Recent Labs   Lab 09/29/24  0453 09/28/24  0424 09/27/24 0446 09/26/24  1124   WBC 9.6 9.6 6.2 5.5   RBC 2.84* 2.96* 2.93* 3.15*   HGB 8.7* 9.2* 9.2* 9.7*   HCT 28.7* 29.5* 29.0* 30.7*   * 100 99 98   MCH 30.6 31.1 31.4 30.8   MCHC 30.3* 31.2* 31.7 31.6   RDW 15.6* 15.3* 15.1* 15.0   * 101* 117* 142*     INR  Recent Labs   Lab 09/26/24  0415   INR 1.05     Arterial Blood Gas  Recent Labs   Lab 09/27/24  0447 09/26/24  1917 09/26/24  1556 09/26/24  1047   PH  --   --  7.26*  --    PCO2  --   --  43  --    PO2  --   --  139*  --    HCO3  --   --  19*  --    O2PER 50 50 50 50  50       All cultures:  No results for input(s): \"CULT\" in the last 168 hours.  No results found for this or any previous visit (from the past 24 hour(s)).    MD Rosamaria    Billing: This patient is critically ill: Yes. Total critical care time today 40 min.            "

## 2024-09-30 ENCOUNTER — APPOINTMENT (OUTPATIENT)
Dept: MRI IMAGING | Facility: CLINIC | Age: 74
DRG: 917 | End: 2024-09-30
Attending: INTERNAL MEDICINE
Payer: COMMERCIAL

## 2024-09-30 PROBLEM — T14.91XA SUICIDE ATTEMPT (H): Status: ACTIVE | Noted: 2024-09-30

## 2024-09-30 PROBLEM — S12.110A ODONTOID FRACTURE (H): Status: ACTIVE | Noted: 2024-09-30

## 2024-09-30 PROBLEM — J96.01 ACUTE RESPIRATORY FAILURE WITH HYPOXIA (H): Status: ACTIVE | Noted: 2020-12-22

## 2024-09-30 LAB
ALBUMIN SERPL BCG-MCNC: 3.8 G/DL (ref 3.5–5.2)
ANION GAP SERPL CALCULATED.3IONS-SCNC: 14 MMOL/L (ref 7–15)
APTT PPP: 31 SECONDS (ref 22–38)
BUN SERPL-MCNC: 17.8 MG/DL (ref 8–23)
CALCIUM SERPL-MCNC: 8 MG/DL (ref 8.8–10.4)
CHLORIDE SERPL-SCNC: 110 MMOL/L (ref 98–107)
CREAT SERPL-MCNC: 0.83 MG/DL (ref 0.51–0.95)
EGFRCR SERPLBLD CKD-EPI 2021: 75 ML/MIN/1.73M2
ERYTHROCYTE [DISTWIDTH] IN BLOOD BY AUTOMATED COUNT: 15.5 % (ref 10–15)
FIBRINOGEN PPP-MCNC: 284 MG/DL (ref 170–510)
GLUCOSE BLDC GLUCOMTR-MCNC: 106 MG/DL (ref 70–99)
GLUCOSE BLDC GLUCOMTR-MCNC: 108 MG/DL (ref 70–99)
GLUCOSE BLDC GLUCOMTR-MCNC: 112 MG/DL (ref 70–99)
GLUCOSE BLDC GLUCOMTR-MCNC: 112 MG/DL (ref 70–99)
GLUCOSE BLDC GLUCOMTR-MCNC: 134 MG/DL (ref 70–99)
GLUCOSE BLDC GLUCOMTR-MCNC: 139 MG/DL (ref 70–99)
GLUCOSE BLDC GLUCOMTR-MCNC: 161 MG/DL (ref 70–99)
GLUCOSE BLDC GLUCOMTR-MCNC: 161 MG/DL (ref 70–99)
GLUCOSE BLDC GLUCOMTR-MCNC: 174 MG/DL (ref 70–99)
GLUCOSE BLDC GLUCOMTR-MCNC: 179 MG/DL (ref 70–99)
GLUCOSE BLDC GLUCOMTR-MCNC: 218 MG/DL (ref 70–99)
GLUCOSE BLDC GLUCOMTR-MCNC: 218 MG/DL (ref 70–99)
GLUCOSE SERPL-MCNC: 160 MG/DL (ref 70–99)
HCO3 SERPL-SCNC: 25 MMOL/L (ref 22–29)
HCT VFR BLD AUTO: 28.9 % (ref 35–47)
HGB BLD-MCNC: 9 G/DL (ref 11.7–15.7)
INR PPP: 1 (ref 0.85–1.15)
MAGNESIUM SERPL-MCNC: 1.4 MG/DL (ref 1.7–2.3)
MCH RBC QN AUTO: 31.3 PG (ref 26.5–33)
MCHC RBC AUTO-ENTMCNC: 31.1 G/DL (ref 31.5–36.5)
MCV RBC AUTO: 100 FL (ref 78–100)
PHOSPHATE SERPL-MCNC: 1.9 MG/DL (ref 2.5–4.5)
PHOSPHATE SERPL-MCNC: 2.1 MG/DL (ref 2.5–4.5)
PLATELET # BLD AUTO: 115 10E3/UL (ref 150–450)
POTASSIUM SERPL-SCNC: 3 MMOL/L (ref 3.4–5.3)
POTASSIUM SERPL-SCNC: 3.1 MMOL/L (ref 3.4–5.3)
RBC # BLD AUTO: 2.88 10E6/UL (ref 3.8–5.2)
SODIUM SERPL-SCNC: 149 MMOL/L (ref 135–145)
WBC # BLD AUTO: 10.2 10E3/UL (ref 4–11)

## 2024-09-30 PROCEDURE — 250N000009 HC RX 250

## 2024-09-30 PROCEDURE — 94640 AIRWAY INHALATION TREATMENT: CPT

## 2024-09-30 PROCEDURE — 80069 RENAL FUNCTION PANEL: CPT | Performed by: INTERNAL MEDICINE

## 2024-09-30 PROCEDURE — 200N000001 HC R&B ICU

## 2024-09-30 PROCEDURE — 83735 ASSAY OF MAGNESIUM: CPT | Performed by: INTERNAL MEDICINE

## 2024-09-30 PROCEDURE — 250N000013 HC RX MED GY IP 250 OP 250 PS 637: Performed by: INTERNAL MEDICINE

## 2024-09-30 PROCEDURE — 250N000011 HC RX IP 250 OP 636: Performed by: INTERNAL MEDICINE

## 2024-09-30 PROCEDURE — 85027 COMPLETE CBC AUTOMATED: CPT | Performed by: INTERNAL MEDICINE

## 2024-09-30 PROCEDURE — 999N000157 HC STATISTIC RCP TIME EA 10 MIN

## 2024-09-30 PROCEDURE — 85730 THROMBOPLASTIN TIME PARTIAL: CPT

## 2024-09-30 PROCEDURE — 250N000011 HC RX IP 250 OP 636: Performed by: STUDENT IN AN ORGANIZED HEALTH CARE EDUCATION/TRAINING PROGRAM

## 2024-09-30 PROCEDURE — 94640 AIRWAY INHALATION TREATMENT: CPT | Mod: 76

## 2024-09-30 PROCEDURE — 94003 VENT MGMT INPAT SUBQ DAY: CPT

## 2024-09-30 PROCEDURE — 999N000253 HC STATISTIC WEANING TRIALS

## 2024-09-30 PROCEDURE — 250N000009 HC RX 250: Performed by: INTERNAL MEDICINE

## 2024-09-30 PROCEDURE — 255N000002 HC RX 255 OP 636: Performed by: INTERNAL MEDICINE

## 2024-09-30 PROCEDURE — 36415 COLL VENOUS BLD VENIPUNCTURE: CPT | Performed by: INTERNAL MEDICINE

## 2024-09-30 PROCEDURE — 85384 FIBRINOGEN ACTIVITY: CPT

## 2024-09-30 PROCEDURE — 99291 CRITICAL CARE FIRST HOUR: CPT | Performed by: INTERNAL MEDICINE

## 2024-09-30 PROCEDURE — 36415 COLL VENOUS BLD VENIPUNCTURE: CPT

## 2024-09-30 PROCEDURE — 999N000254 HC STATISTIC VENTILATOR TRANSFER

## 2024-09-30 PROCEDURE — 250N000011 HC RX IP 250 OP 636

## 2024-09-30 PROCEDURE — 84132 ASSAY OF SERUM POTASSIUM: CPT | Performed by: INTERNAL MEDICINE

## 2024-09-30 PROCEDURE — 999N000009 HC STATISTIC AIRWAY CARE

## 2024-09-30 PROCEDURE — 84100 ASSAY OF PHOSPHORUS: CPT | Performed by: INTERNAL MEDICINE

## 2024-09-30 PROCEDURE — 72156 MRI NECK SPINE W/O & W/DYE: CPT

## 2024-09-30 PROCEDURE — A9585 GADOBUTROL INJECTION: HCPCS | Performed by: INTERNAL MEDICINE

## 2024-09-30 PROCEDURE — 85610 PROTHROMBIN TIME: CPT

## 2024-09-30 RX ORDER — FOLIC ACID 1 MG/1
1 TABLET ORAL DAILY
Status: DISCONTINUED | OUTPATIENT
Start: 2024-10-03 | End: 2024-10-03

## 2024-09-30 RX ORDER — OXYCODONE AND ACETAMINOPHEN 10; 325 MG/1; MG/1
1 TABLET ORAL EVERY 6 HOURS
Status: DISPENSED | OUTPATIENT
Start: 2024-09-30

## 2024-09-30 RX ORDER — FOLIC ACID 5 MG/ML
1 INJECTION, SOLUTION INTRAMUSCULAR; INTRAVENOUS; SUBCUTANEOUS DAILY
Status: COMPLETED | OUTPATIENT
Start: 2024-10-01 | End: 2024-10-02

## 2024-09-30 RX ORDER — CLONIDINE HYDROCHLORIDE 0.1 MG/1
0.1 TABLET ORAL EVERY 8 HOURS
Status: DISCONTINUED | OUTPATIENT
Start: 2024-09-30 | End: 2024-10-02

## 2024-09-30 RX ORDER — DEXTROSE MONOHYDRATE 25 G/50ML
25-50 INJECTION, SOLUTION INTRAVENOUS
Status: DISCONTINUED | OUTPATIENT
Start: 2024-09-30 | End: 2024-09-30

## 2024-09-30 RX ORDER — METOPROLOL TARTRATE 50 MG
50 TABLET ORAL 2 TIMES DAILY
Status: DISCONTINUED | OUTPATIENT
Start: 2024-09-30 | End: 2024-10-01

## 2024-09-30 RX ORDER — POTASSIUM CHLORIDE 20MEQ/15ML
40 LIQUID (ML) ORAL ONCE
Status: COMPLETED | OUTPATIENT
Start: 2024-09-30 | End: 2024-09-30

## 2024-09-30 RX ORDER — MAGNESIUM SULFATE HEPTAHYDRATE 40 MG/ML
4 INJECTION, SOLUTION INTRAVENOUS ONCE
Status: COMPLETED | OUTPATIENT
Start: 2024-09-30 | End: 2024-09-30

## 2024-09-30 RX ORDER — MULTIPLE VITAMINS W/ MINERALS TAB 9MG-400MCG
1 TAB ORAL DAILY
Status: DISCONTINUED | OUTPATIENT
Start: 2024-09-30 | End: 2024-09-30

## 2024-09-30 RX ORDER — GABAPENTIN 250 MG/5ML
300 SOLUTION ORAL EVERY 8 HOURS
Status: DISCONTINUED | OUTPATIENT
Start: 2024-10-02 | End: 2024-10-03

## 2024-09-30 RX ORDER — POTASSIUM CHLORIDE 20MEQ/15ML
20 LIQUID (ML) ORAL ONCE
Status: COMPLETED | OUTPATIENT
Start: 2024-09-30 | End: 2024-09-30

## 2024-09-30 RX ORDER — GABAPENTIN 250 MG/5ML
100 SOLUTION ORAL EVERY 8 HOURS
Status: DISCONTINUED | OUTPATIENT
Start: 2024-10-04 | End: 2024-10-03

## 2024-09-30 RX ORDER — DEXMEDETOMIDINE HYDROCHLORIDE 4 UG/ML
.1-1.2 INJECTION, SOLUTION INTRAVENOUS CONTINUOUS
Status: DISCONTINUED | OUTPATIENT
Start: 2024-09-30 | End: 2024-10-03

## 2024-09-30 RX ORDER — FOLIC ACID 5 MG/ML
1 INJECTION, SOLUTION INTRAMUSCULAR; INTRAVENOUS; SUBCUTANEOUS ONCE
Status: COMPLETED | OUTPATIENT
Start: 2024-09-30 | End: 2024-09-30

## 2024-09-30 RX ORDER — GABAPENTIN 250 MG/5ML
600 SOLUTION ORAL EVERY 8 HOURS
Status: COMPLETED | OUTPATIENT
Start: 2024-09-30 | End: 2024-10-02

## 2024-09-30 RX ORDER — NICOTINE POLACRILEX 4 MG
15-30 LOZENGE BUCCAL
Status: DISCONTINUED | OUTPATIENT
Start: 2024-09-30 | End: 2024-09-30

## 2024-09-30 RX ORDER — HYDRALAZINE HYDROCHLORIDE 20 MG/ML
10 INJECTION INTRAMUSCULAR; INTRAVENOUS EVERY 6 HOURS PRN
Status: DISPENSED | OUTPATIENT
Start: 2024-09-30

## 2024-09-30 RX ORDER — THIAMINE HYDROCHLORIDE 100 MG/ML
200 INJECTION, SOLUTION INTRAMUSCULAR; INTRAVENOUS 3 TIMES DAILY
Status: COMPLETED | OUTPATIENT
Start: 2024-09-30 | End: 2024-10-02

## 2024-09-30 RX ORDER — GADOBUTROL 604.72 MG/ML
7 INJECTION INTRAVENOUS ONCE
Status: COMPLETED | OUTPATIENT
Start: 2024-09-30 | End: 2024-09-30

## 2024-09-30 RX ADMIN — PROPOFOL 55 MCG/KG/MIN: 10 INJECTION, EMULSION INTRAVENOUS at 21:42

## 2024-09-30 RX ADMIN — POTASSIUM & SODIUM PHOSPHATES POWDER PACK 280-160-250 MG 1 PACKET: 280-160-250 PACK at 18:57

## 2024-09-30 RX ADMIN — HYDROCORTISONE SODIUM SUCCINATE 50 MG: 100 INJECTION, POWDER, FOR SOLUTION INTRAMUSCULAR; INTRAVENOUS at 15:53

## 2024-09-30 RX ADMIN — CLONIDINE HYDROCHLORIDE 0.1 MG: 0.1 TABLET ORAL at 21:44

## 2024-09-30 RX ADMIN — METOPROLOL TARTRATE 50 MG: 50 TABLET, FILM COATED ORAL at 19:49

## 2024-09-30 RX ADMIN — IPRATROPIUM BROMIDE AND ALBUTEROL SULFATE 3 ML: .5; 3 SOLUTION RESPIRATORY (INHALATION) at 10:34

## 2024-09-30 RX ADMIN — GABAPENTIN 600 MG: 250 SUSPENSION ORAL at 21:45

## 2024-09-30 RX ADMIN — LEVETIRACETAM 500 MG: 5 INJECTION INTRAVENOUS at 17:47

## 2024-09-30 RX ADMIN — Medication 40 MG: at 07:49

## 2024-09-30 RX ADMIN — OXYCODONE AND ACETAMINOPHEN 1 TABLET: 10; 325 TABLET ORAL at 14:32

## 2024-09-30 RX ADMIN — PROPOFOL 55 MCG/KG/MIN: 10 INJECTION, EMULSION INTRAVENOUS at 17:33

## 2024-09-30 RX ADMIN — THIAMINE HYDROCHLORIDE 200 MG: 100 INJECTION, SOLUTION INTRAMUSCULAR; INTRAVENOUS at 15:54

## 2024-09-30 RX ADMIN — HYDROCORTISONE SODIUM SUCCINATE 50 MG: 100 INJECTION, POWDER, FOR SOLUTION INTRAMUSCULAR; INTRAVENOUS at 21:45

## 2024-09-30 RX ADMIN — THIAMINE HYDROCHLORIDE 200 MG: 100 INJECTION, SOLUTION INTRAMUSCULAR; INTRAVENOUS at 21:45

## 2024-09-30 RX ADMIN — FUROSEMIDE 20 MG: 10 INJECTION, SOLUTION INTRAMUSCULAR; INTRAVENOUS at 04:35

## 2024-09-30 RX ADMIN — DEXMEDETOMIDINE HYDROCHLORIDE 0.6 MCG/KG/HR: 400 INJECTION INTRAVENOUS at 19:49

## 2024-09-30 RX ADMIN — METOPROLOL TARTRATE 25 MG: 25 TABLET, FILM COATED ORAL at 08:01

## 2024-09-30 RX ADMIN — HYDRALAZINE HYDROCHLORIDE 10 MG: 20 INJECTION INTRAMUSCULAR; INTRAVENOUS at 18:29

## 2024-09-30 RX ADMIN — POTASSIUM CHLORIDE 20 MEQ: 20 SOLUTION ORAL at 08:03

## 2024-09-30 RX ADMIN — IPRATROPIUM BROMIDE AND ALBUTEROL SULFATE 3 ML: .5; 3 SOLUTION RESPIRATORY (INHALATION) at 22:38

## 2024-09-30 RX ADMIN — HYDROCORTISONE SODIUM SUCCINATE 100 MG: 100 INJECTION, POWDER, FOR SOLUTION INTRAMUSCULAR; INTRAVENOUS at 10:05

## 2024-09-30 RX ADMIN — FOLIC ACID 1 MG: 5 INJECTION, SOLUTION INTRAMUSCULAR; INTRAVENOUS; SUBCUTANEOUS at 15:52

## 2024-09-30 RX ADMIN — IPRATROPIUM BROMIDE AND ALBUTEROL SULFATE 3 ML: .5; 3 SOLUTION RESPIRATORY (INHALATION) at 07:01

## 2024-09-30 RX ADMIN — ENOXAPARIN SODIUM 40 MG: 40 INJECTION SUBCUTANEOUS at 10:04

## 2024-09-30 RX ADMIN — POTASSIUM CHLORIDE 40 MEQ: 20 SOLUTION ORAL at 06:07

## 2024-09-30 RX ADMIN — PIPERACILLIN AND TAZOBACTAM 3.38 G: 3; .375 INJECTION, POWDER, FOR SOLUTION INTRAVENOUS at 00:52

## 2024-09-30 RX ADMIN — IPRATROPIUM BROMIDE AND ALBUTEROL SULFATE 3 ML: .5; 3 SOLUTION RESPIRATORY (INHALATION) at 15:06

## 2024-09-30 RX ADMIN — PIPERACILLIN AND TAZOBACTAM 3.38 G: 3; .375 INJECTION, POWDER, FOR SOLUTION INTRAVENOUS at 06:05

## 2024-09-30 RX ADMIN — GADOBUTROL 7 ML: 604.72 INJECTION INTRAVENOUS at 13:36

## 2024-09-30 RX ADMIN — HYDROMORPHONE HYDROCHLORIDE 0.5 MG: 1 INJECTION, SOLUTION INTRAMUSCULAR; INTRAVENOUS; SUBCUTANEOUS at 12:33

## 2024-09-30 RX ADMIN — AMLODIPINE BESYLATE 5 MG: 5 TABLET ORAL at 08:00

## 2024-09-30 RX ADMIN — POTASSIUM CHLORIDE 40 MEQ: 20 SOLUTION ORAL at 18:57

## 2024-09-30 RX ADMIN — Medication 200 MCG/HR: at 16:58

## 2024-09-30 RX ADMIN — Medication 15 ML: at 08:01

## 2024-09-30 RX ADMIN — OXYCODONE AND ACETAMINOPHEN 1 TABLET: 10; 325 TABLET ORAL at 20:22

## 2024-09-30 RX ADMIN — CLONIDINE HYDROCHLORIDE 0.1 MG: 0.1 TABLET ORAL at 14:34

## 2024-09-30 RX ADMIN — CHLORHEXIDINE GLUCONATE 0.12% ORAL RINSE 15 ML: 1.2 LIQUID ORAL at 07:49

## 2024-09-30 RX ADMIN — DEXMEDETOMIDINE HYDROCHLORIDE 0.6 MCG/KG/HR: 400 INJECTION INTRAVENOUS at 12:17

## 2024-09-30 RX ADMIN — MIDAZOLAM 2 MG: 1 INJECTION INTRAMUSCULAR; INTRAVENOUS at 01:59

## 2024-09-30 RX ADMIN — PIPERACILLIN AND TAZOBACTAM 3.38 G: 3; .375 INJECTION, POWDER, FOR SOLUTION INTRAVENOUS at 18:03

## 2024-09-30 RX ADMIN — POTASSIUM & SODIUM PHOSPHATES POWDER PACK 280-160-250 MG 1 PACKET: 280-160-250 PACK at 22:04

## 2024-09-30 RX ADMIN — PROPOFOL 60 MCG/KG/MIN: 10 INJECTION, EMULSION INTRAVENOUS at 00:52

## 2024-09-30 RX ADMIN — PROPOFOL 60 MCG/KG/MIN: 10 INJECTION, EMULSION INTRAVENOUS at 04:42

## 2024-09-30 RX ADMIN — GABAPENTIN 600 MG: 250 SUSPENSION ORAL at 15:52

## 2024-09-30 RX ADMIN — HYDROCORTISONE SODIUM SUCCINATE 100 MG: 100 INJECTION, POWDER, FOR SOLUTION INTRAMUSCULAR; INTRAVENOUS at 04:18

## 2024-09-30 RX ADMIN — CHLORHEXIDINE GLUCONATE 0.12% ORAL RINSE 15 ML: 1.2 LIQUID ORAL at 19:49

## 2024-09-30 RX ADMIN — PIPERACILLIN AND TAZOBACTAM 3.38 G: 3; .375 INJECTION, POWDER, FOR SOLUTION INTRAVENOUS at 12:14

## 2024-09-30 RX ADMIN — Medication 150 MCG/HR: at 01:57

## 2024-09-30 RX ADMIN — DEXMEDETOMIDINE HYDROCHLORIDE 0.2 MCG/KG/HR: 400 INJECTION INTRAVENOUS at 04:13

## 2024-09-30 RX ADMIN — POTASSIUM & SODIUM PHOSPHATES POWDER PACK 280-160-250 MG 2 PACKET: 280-160-250 PACK at 06:12

## 2024-09-30 RX ADMIN — POTASSIUM & SODIUM PHOSPHATES POWDER PACK 280-160-250 MG 2 PACKET: 280-160-250 PACK at 10:04

## 2024-09-30 RX ADMIN — MAGNESIUM SULFATE HEPTAHYDRATE 4 G: 40 INJECTION, SOLUTION INTRAVENOUS at 17:34

## 2024-09-30 RX ADMIN — PROPOFOL 45 MCG/KG/MIN: 10 INJECTION, EMULSION INTRAVENOUS at 08:15

## 2024-09-30 RX ADMIN — POTASSIUM & SODIUM PHOSPHATES POWDER PACK 280-160-250 MG 2 PACKET: 280-160-250 PACK at 14:34

## 2024-09-30 RX ADMIN — LEVETIRACETAM 500 MG: 5 INJECTION INTRAVENOUS at 05:48

## 2024-09-30 RX ADMIN — PROPOFOL 55 MCG/KG/MIN: 10 INJECTION, EMULSION INTRAVENOUS at 13:59

## 2024-09-30 RX ADMIN — LEVOTHYROXINE SODIUM 50 MCG: 50 TABLET ORAL at 07:49

## 2024-09-30 RX ADMIN — IPRATROPIUM BROMIDE AND ALBUTEROL SULFATE 3 ML: .5; 3 SOLUTION RESPIRATORY (INHALATION) at 19:03

## 2024-09-30 RX ADMIN — PIPERACILLIN AND TAZOBACTAM 3.38 G: 3; .375 INJECTION, POWDER, FOR SOLUTION INTRAVENOUS at 23:30

## 2024-09-30 ASSESSMENT — ACTIVITIES OF DAILY LIVING (ADL)
ADLS_ACUITY_SCORE: 57
ADLS_ACUITY_SCORE: 55
ADLS_ACUITY_SCORE: 57
ADLS_ACUITY_SCORE: 57
ADLS_ACUITY_SCORE: 59
ADLS_ACUITY_SCORE: 55
ADLS_ACUITY_SCORE: 55
ADLS_ACUITY_SCORE: 57
ADLS_ACUITY_SCORE: 57
ADLS_ACUITY_SCORE: 55
ADLS_ACUITY_SCORE: 57
ADLS_ACUITY_SCORE: 57
ADLS_ACUITY_SCORE: 59
ADLS_ACUITY_SCORE: 55
ADLS_ACUITY_SCORE: 57
ADLS_ACUITY_SCORE: 55
ADLS_ACUITY_SCORE: 59
ADLS_ACUITY_SCORE: 55

## 2024-09-30 NOTE — PROGRESS NOTES
[facility] ICU RESPIRATORY NOTE           Date of Admission: 9/26/2024     Date of Intubation (most recent): 09/26/2024     Reason for Mechanical Ventilation: Resp failure      Number of Days on Mechanical Ventilation: 5     Met Criteria for Spontaneous Breathing Trial: Yes     Bite Block: Yes, pt biting on ETT      Significant Events Today: None overnight     ABG Results:   Recent Labs   Lab 09/27/24  0447 09/26/24  1917 09/26/24  1556 09/26/24  1047   PH  --   --  7.26*  --    PCO2  --   --  43  --    PO2  --   --  139*  --    HCO3  --   --  19*  --    O2PER 50 50 50 50  50     FiO2 (%): 35 %, Resp: (!) 7, Vent Mode: CMV/AC, Resp Rate (Set): 18 breaths/min, Tidal Volume (Set, mL): 400 mL, PEEP (cm H2O): 5 cmH2O, Pressure Support (cm H2O): 10 cmH2O, Resp Rate (Set): 18 breaths/min, Tidal Volume (Set, mL): 400 mL, PEEP (cm H2O): 5 cmH2O    UGO Azul, RRT

## 2024-09-30 NOTE — PROGRESS NOTES
[facility] ICU RESPIRATORY NOTE           Date of Admission: 9/26/2024     Date of Intubation (most recent): 09/26/2024     Reason for Mechanical Ventilation: Resp failure      Number of Days on Mechanical Ventilation: 4     Met Criteria for Spontaneous Breathing Trial: Yes     Bite Block: Yes, pt biting on ETT      Significant Events Today: None overnight    ABG Results:   Recent Labs   Lab 09/27/24  0447 09/26/24  1917 09/26/24  1556 09/26/24  1047   PH  --   --  7.26*  --    PCO2  --   --  43  --    PO2  --   --  139*  --    HCO3  --   --  19*  --    O2PER 50 50 50 50  50     FiO2 (%): 35 %, Resp: 17, Vent Mode: CMV/AC, Resp Rate (Set): 18 breaths/min, Tidal Volume (Set, mL): 400 mL, PEEP (cm H2O): 5 cmH2O, Pressure Support (cm H2O): 10 cmH2O, Resp Rate (Set): 18 breaths/min, Tidal Volume (Set, mL): 400 mL, PEEP (cm H2O): 5 cmH2O    UGO Azul, RRT

## 2024-09-30 NOTE — PLAN OF CARE
Problem: Adult Inpatient Plan of Care  Goal: Plan of Care Review  Description: The Plan of Care Review/Shift note should be completed every shift.  The Outcome Evaluation is a brief statement about your assessment that the patient is improving, declining, or no change.  This information will be displayed automatically on your shift  note.  Outcome: Met  Flowsheets (Taken 9/30/2024 1824)  Outcome Evaluation: pt remains intubated and sedated. sedation vacation done today. pt did not track. moved all extremities. not able to follow commands. PS trial done x 15 min- irregular breathing pattern, varying volumes. vent changed to SIMV per Dr. Rajput- pt tolerating well. SBP <170.occasional sys >170- PRN given. CIWA started today- unable to assess most d/t sedation. tolerating tube feeding. BM x 3. adequate UOP. electrolytes replaced.  at bedside briefly and updated with plan of care. daughter Sharonda updated with plan of care over phone. diligent turning and repo. cont to monitor.     Problem: Overdose  Goal: Stable Heart Rate and Rhythm  Outcome: Met     Problem: Pneumonia  Goal: Resolution of Infection Signs and Symptoms  Outcome: Progressing     Problem: Pneumonia  Goal: Effective Oxygenation and Ventilation  Outcome: Progressing  Intervention: Optimize Oxygenation and Ventilation  Recent Flowsheet Documentation  Taken 9/30/2024 1800 by Kamala Ko RN  Head of Bed (HOB) Positioning: HOB at 30 degrees  Taken 9/30/2024 1600 by Kamala Ko RN  Head of Bed (HOB) Positioning: HOB at 30 degrees  Taken 9/30/2024 1415 by Kamala Ko RN  Head of Bed (HOB) Positioning: HOB at 30 degrees  Taken 9/30/2024 1200 by Kamala Ko RN  Head of Bed (HOB) Positioning: HOB at 30 degrees  Taken 9/30/2024 1000 by Kamala Ko RN  Head of Bed (HOB) Positioning: HOB at 30 degrees  Taken 9/30/2024 0800 by Kamala Ko RN  Head of Bed (HOB) Positioning: HOB at 30 degrees   Goal Outcome  Evaluation:                 Outcome Evaluation: pt remains intubated and sedated. sedation vacation done today. pt did not track. moved all extremities. not able to follow commands. PS trial done x 15 min- irregular breathing pattern, varying volumes. vent changed to SIMV per Dr. Rajput- pt tolerating well. SBP <170.occasional sys >170- PRN given. CIWA started today- unable to assess most d/t sedation. tolerating tube feeding. BM x 3. adequate UOP. electrolytes replaced.  at bedside briefly and updated with plan of care. daughter Sharonda updated with plan of care over phone. diligent turning and repo. cont to monitor.

## 2024-09-30 NOTE — PLAN OF CARE
Overall the patient is not progressing. Sedation vacation today caused agitation and restlessness, patient did not follow any commands or track. She moves all extremities purposefully. Fentanyl was decreased by half, propofol titrated up.  During SBT, patient RR decreased to 5bpm while at the same time was very agitated in bed.   Trial again tomorrow. Spouse present and updated with plans today.     Wil RUIZ RN on 9/29/2024 at 7:47 PM            Goal: Absence of Hospital-Acquired Illness or Injury  Outcome: Not Progressing  Intervention: Identify and Manage Fall Risk  Recent Flowsheet Documentation  Taken 9/29/2024 1600 by Wil Ruiz, RN  Safety Promotion/Fall Prevention:   activity supervised   clutter free environment maintained   increased rounding and observation   increase visualization of patient   lighting adjusted   room door open   room near nurse's station   room organization consistent   safety round/check completed   supervised activity   treat reversible contributory factors  Taken 9/29/2024 1200 by Wil Ruiz, RN  Safety Promotion/Fall Prevention:   activity supervised   clutter free environment maintained   increased rounding and observation   increase visualization of patient   lighting adjusted   room door open   room near nurse's station   room organization consistent   safety round/check completed   supervised activity   treat reversible contributory factors  Taken 9/29/2024 0800 by Wil Ruiz, RN  Safety Promotion/Fall Prevention:   activity supervised   clutter free environment maintained   increased rounding and observation   increase visualization of patient   lighting adjusted   room door open   room near nurse's station   room organization consistent   safety round/check completed   supervised activity   treat reversible contributory factors  Intervention: Prevent Skin Injury  Recent Flowsheet Documentation  Taken 9/29/2024 1800 by  Wil Ruiz RN  Body Position:   turned   legs elevated   heels elevated   upper extremity elevated  Taken 9/29/2024 1600 by Wil Ruiz RN  Body Position:   turned   legs elevated   heels elevated   upper extremity elevated  Skin Protection:   adhesive use limited   incontinence pads utilized   pulse oximeter probe site changed   silicone foam dressing in place   skin to device areas padded   transparent dressing maintained  Device Skin Pressure Protection:   absorbent pad utilized/changed   adhesive use limited   positioning supports utilized   pressure points protected   skin-to-device areas padded   tubing/devices free from skin contact  Taken 9/29/2024 1400 by Wil Ruiz RN  Body Position:   turned   legs elevated   heels elevated   upper extremity elevated  Taken 9/29/2024 1200 by Wil Ruiz RN  Body Position:   turned   legs elevated   heels elevated   upper extremity elevated  Skin Protection:   adhesive use limited   incontinence pads utilized   pulse oximeter probe site changed   silicone foam dressing in place   skin to device areas padded   transparent dressing maintained  Device Skin Pressure Protection:   absorbent pad utilized/changed   adhesive use limited   positioning supports utilized   pressure points protected   skin-to-device areas padded   tubing/devices free from skin contact  Taken 9/29/2024 1000 by Wil Ruiz RN  Body Position:   turned   legs elevated   heels elevated   upper extremity elevated  Taken 9/29/2024 0800 by Wil Ruiz RN  Body Position:   turned   legs elevated   heels elevated   upper extremity elevated  Skin Protection:   adhesive use limited   incontinence pads utilized   pulse oximeter probe site changed   silicone foam dressing in place   skin to device areas padded   transparent dressing maintained  Device Skin Pressure Protection:   absorbent pad utilized/changed   adhesive use limited    positioning supports utilized   pressure points protected   skin-to-device areas padded   tubing/devices free from skin contact  Intervention: Prevent and Manage VTE (Venous Thromboembolism) Risk  Recent Flowsheet Documentation  Taken 9/29/2024 1600 by Wil Ruiz RN  VTE Prevention/Management: SCDs on (sequential compression devices)  Taken 9/29/2024 1200 by Wil Ruiz RN  VTE Prevention/Management: SCDs on (sequential compression devices)  Taken 9/29/2024 0800 by Wil Ruiz RN  VTE Prevention/Management: SCDs on (sequential compression devices)  Intervention: Prevent Infection  Recent Flowsheet Documentation  Taken 9/29/2024 1600 by Wil Ruiz RN  Infection Prevention:   environmental surveillance performed   equipment surfaces disinfected   hand hygiene promoted   rest/sleep promoted   single patient room provided  Taken 9/29/2024 1200 by Wil Ruiz RN  Infection Prevention:   environmental surveillance performed   equipment surfaces disinfected   hand hygiene promoted   rest/sleep promoted   single patient room provided  Taken 9/29/2024 0800 by Wil Ruiz RN  Infection Prevention:   environmental surveillance performed   equipment surfaces disinfected   hand hygiene promoted   rest/sleep promoted   single patient room provided  Goal: Optimal Comfort and Wellbeing  Outcome: Not Progressing  Intervention: Monitor Pain and Promote Comfort  Recent Flowsheet Documentation  Taken 9/29/2024 1600 by Wil Ruiz RN  Pain Management Interventions:   pain pump in use   repositioned   rest   quiet environment facilitated   relaxation techniques promoted  Taken 9/29/2024 1200 by Wil Ruiz RN  Pain Management Interventions:   pain pump in use   repositioned   rest   quiet environment facilitated   relaxation techniques promoted  Taken 9/29/2024 0800 by Wil Ruiz RN  Pain Management Interventions:   pain pump in  use   repositioned   rest   quiet environment facilitated   relaxation techniques promoted  Intervention: Provide Person-Centered Care  Recent Flowsheet Documentation  Taken 9/29/2024 1600 by Wil Ruiz, RN  Trust Relationship/Rapport:   care explained   reassurance provided  Taken 9/29/2024 1200 by Wil Ruiz, RN  Trust Relationship/Rapport:   care explained   reassurance provided  Taken 9/29/2024 0800 by Wil Ruiz, RN  Trust Relationship/Rapport:   care explained   reassurance provided  Goal: Readiness for Transition of Care  Outcome: Not Progressing   Goal Outcome Evaluation:

## 2024-09-30 NOTE — TELEPHONE ENCOUNTER
SPINE PATIENTS - NEW PROTOCOL PREVISIT    RECORDS RECEIVED FROM: Internal    REASON FOR VISIT: Other closed displaced odontoid fracture   PROVIDER: Maria Del Carmen Iglesias PA-C   DATE OF APPT: 10/15/24 @ 10:30 am    NOTES (FOR ALL VISITS) STATUS DETAILS   OFFICE NOTE from referring provider Internal Hosp Referral    DISCHARGE SUMMARY from hospital Internal 9/9/24-9/11/24 Martín Vargas MD @[facility]      MEDICATION LIST Internal    IMAGING  (FOR ALL VISITS)     MRI (HEAD, NECK, SPINE) Internal [facility]  9/9/24 MR Cervical Spine  11/16/23 MRA Neck (Carotid)     CT (HEAD, NECK, SPINE) Internal [facility]  9/26/24 CT Cervical Spine  9/26/24 CT Head  9/9/24 CT Cervical Spine  9/9/24 CT Head  10/26/23 CTA Head Neck

## 2024-09-30 NOTE — PLAN OF CARE
Goal Outcome Evaluation:      Plan of Care Reviewed With: patient    Overall Patient Progress: no changeOverall Patient Progress: no change    Outcome Evaluation: Pt remains intubated and sedated on prop and fent, precedex started overnight due to restlessness/agitation. Maintained sats on vent, occasional dyssynchrony. PERRL. GREY. Tele SR/ST in 100-110s. Bruce in use, lasix effective. TF running at 20ml/hr, goal rate 50ml/hr. Multiple BM overnight. Pt bleeding/oozing from IV sites, coag labs checked and WNL. Restraints utilized for safety. Goal to extubate, plan to 1:1 for suicide watch and psych consult if able.      Problem: Adult Inpatient Plan of Care  Goal: Optimal Comfort and Wellbeing  Intervention: Provide Person-Centered Care  Recent Flowsheet Documentation  Taken 9/30/2024 0400 by Sabrina Gonzalez RN  Trust Relationship/Rapport:   care explained   reassurance provided  Taken 9/30/2024 0000 by Sabrina Gonzalez RN  Trust Relationship/Rapport:   care explained   reassurance provided  Taken 9/29/2024 2000 by Sabrina Gonzalez RN  Trust Relationship/Rapport:   care explained   reassurance provided

## 2024-09-30 NOTE — PROGRESS NOTES
Care Management Follow Up    Length of Stay (days): 4    Expected Discharge Date: 10/05/2024     Concerns to be Addressed: discharge planning     Patient plan of care discussed at interdisciplinary rounds: Yes    Anticipated Discharge Disposition: Home, Home Care, Outpatient Rehab (PT, OT, SLP, Cardiac or Pulmonary), Transitional Care      Anticipated Discharge Services: Home Care, Transportation Services, Other (see comment) (pending final recommenations)  Anticipated Discharge DME: Other (see comment) (pending recommendations)    Patient/family educated on Medicare website which has current facility and service quality ratings:    Education Provided on the Discharge Plan: Yes  Patient/Family in Agreement with the Plan: yes    Referrals Placed by CM/SW:    Private pay costs discussed: Not applicable    Discussed  Partnership in Safe Discharge Planning  document with patient/family: No     Handoff Completed: No, handoff not indicated or clinically appropriate    Additional Information:  Per rounds, patient still requiring ICU level of care     Next Steps: SW/CC continues to follow     GALLITO Poole

## 2024-09-30 NOTE — PROVIDER NOTIFICATION
Dr. Arana made aware that lab was unable to run procalcitonin d/t high levels of bilirubin. No new orders at this time.

## 2024-09-30 NOTE — PROGRESS NOTES
[facility] ICU RESPIRATORY NOTE        Date of Admission: 9/26/2024    Date of Intubation (most recent):9/26/24    Reason for Mechanical Ventilation: Respiratory failure    Number of Days on Mechanical Ventilation: 5    Met Criteria for Spontaneous Breathing Trial: Yes       Bite Block: Yes; please explain: Pt is biting on ETT    Significant Events Today: Pt was on PS 5/5 for about 15 minutes this morning. Pt was placed back on CMV settings due to SpO2 dropping below 90%. Pt was transported to [facility] on the transport vent. Pt tolerated the transport well. Vent mode changed to SIMV/PS per MD orders.    ABG Results:   Recent Labs   Lab 09/27/24  0447 09/26/24  1917 09/26/24  1556 09/26/24  1047   PH  --   --  7.26*  --    PCO2  --   --  43  --    PO2  --   --  139*  --    HCO3  --   --  19*  --    O2PER 50 50 50 50  50         Current Vent Settings: FiO2 (%): 35 %, Resp: (!) 9, Vent Mode: SIMV/PS (vent settings changed by MD), Resp Rate (Set): 8 breaths/min, Tidal Volume (Set, mL): 400 mL, PEEP (cm H2O): 5 cmH2O, Pressure Support (cm H2O): 5 cmH2O, Resp Rate (Set): 8 breaths/min, Tidal Volume (Set, mL): 400 mL, PEEP (cm H2O): 5 cmH2O    Skin Assessment: Intact    Plan: Plan to cont SIMV settings as tolerated. Will assess for another PS trial in the morning.    Clary Donald, RT on 9/30/2024 at 5:23 PM

## 2024-09-30 NOTE — PROGRESS NOTES
Critical Care Progress Note      09/30/2024    Name: Jessica Ellison MRN#: [id number]   Age: 73 year old YOB: 1950     [facility]  [facility] #    MV DAY #             Problem List:   Active Problems:    Respiratory failure (H)             Summary/Hospital Course:     Jessica Ellison is a 73 year old female with a history of alcohol use disorder, seizures on levetiracetam, chronic pain on opiate therapy, and major depressive disorder who presented to [facility] via EMS for respiratory failure. EMS reports that they were called to the scene of an apparent overdose.  The patient had apparently had an argument with her significant other, he told police that he went away and then came back about 10 minutes later and found her unresponsive. Police and EMS found the patient with slow and shallow breathing with small pupils. Given narcan with minimal improvement. Intubated. Required low dose of norepi and transferred to [facility] for continued cares.      Initial labs notable for alcohol level of 0.19. Tylenol and and salicylate levels wnl. INR wnl w/ no transaminitis. Metabolic acidosis noted. Cr wnl. Poison control contacted, suspected Keppra as culprit medication. Is also prescribed BB/CCB. Hgb stable.      LLL infiltrated noted on CXR.  Sputum culture normal jesús.  Urine culture Klebsiella and E. coli both Radha Simmons MD    Of note, patient has a displaced odontoid fracture from a fall in early September as per chart review. Was evaluated by an outside neurosurgery team, who recommended C-collar and follow up visits. Currently wearing C-collar. Per admit cervical CT, fracture is newly anteriorly shifted by 4 mm.     Since admission has been relatively stable but not awake enough to consider extubation.  Remains on very high dose of fentanyl.  Sputum culture subsequently grew Klebsiella      Assessment and plan :     Jessica Ellison IS a 73 year old female admitted on 9/26/2024 for acute  respiratory failure.   I have personally reviewed the daily labs, imaging studies, cultures and discussed the case with referring physician and consulting physicians.     My assessment and plan by system for this patient is as follows:    Neurology/Psychiatry:   1.  Suicide attempt in the setting of alcohol use disorder and opioid dependence and seizure disorder.  cause thought most likely to be Keppra but not confirmed  2.  Alcohol use disorder.  Blood alcohol level elevated on admission.  Has history of alcohol with drawl and seizure in 2013.  Has been prescribed for leg acid  3.  Seizure disorder.  She is on her scheduled outpatient dose of Levetiracetam  4.  opioid dependence.  Reviewed pharmacy records and is essentially taking her as needed Percocet   Scheduled.  On fentanyl infusion here  5.  Encephalopathy.  At high risk of alcohol withdrawal  6.  Odontoid fracture that needs follow-up MRI  -Order home dose of Oxy and continue to attempt weaning fentanyl infusion.  -MRI today  -Goal RASS 0 to -1 if possible.  Folic acid thiamine.  Continue multivitamin.-   -Will start gabapentin 600 3 times daily for possible alcohol withdrawal and continue taper per protocol    Cardiovascular:   Shock which is resolved   Hypertension.  Blood pressures above normal.  -Increase metoprolol to outpatient dose.  As needed clonidine available.    Pulmonary/Ventilator Management:   1.  Acute hypoxic respiratory failure and aspiration pneumonitis secondary to overdose.  On minimal ventilator settings but not awake enough to consider extubation.  Comfortable on pressure support.  Tidal volumes 700 mL  -SIMV with backup rate of 8    GI and Nutrition :   1. Enteral nutrition and monitor for refeeding    Renal/Fluids/Electrolytes:   1.  Hypokalemia, hypomagnesemia and hypophosphatemia.  Expect related to poor p.o. intake  2. Hypernatremia.Sodium is 149 again today   - continue aggressive electrolyte replacement  -Additional bolus of  free water continue schedule    Infectious Disease:   1.  Klebsiella E. coli in urine in setting of shock  2.  Possible pneumonia but less likely given normal jesús  -Complete short course of antibiotics hypokalemia, hypomagnesia and hypophosphatemia.    Endocrine:   1.  stress hyperglycemia.  Insulin infusion at variable rates   2. hypothyroidism on her Home Synthroid  3.  On stress dose steroids  Reduce steroids today.-  -Trial of transitioning to sliding scale insulin    Hematology/Oncology:   1.  Mild thrombocytopenia related to critical illness     IV/Access:   1. Venous access -   2. Arterial access -   3.  Plan  - central access required and necessary      ICU Prophylaxis:   1. DVT: Hep Subq/ LMWH/mechanical  2. VAP: HOB 30 degrees, chlorhexidine rinse  3. Stress Ulcer: PPI/H2 blocker  4. Restraints: Nonviolent soft two point restraints required and necessary for patient safety and continued cares and good effect as patient continues to pull at necessary lines, tubes despite education and distraction. Will readdress daily.   5. IV Access - central access required and necessary for continued patient cares  6. Feeding - enteral   7. Family Update: Pending  8. Disposition - ICU care            Key Medications:     Current Facility-Administered Medications   Medication Dose Route Frequency Provider Last Rate Last Admin    amLODIPine (NORVASC) tablet 5 mg  5 mg Oral or Feeding Tube Daily Azar Howard MD   5 mg at 09/30/24 0800    chlorhexidine (PERIDEX) 0.12 % solution 15 mL  15 mL Mouth/Throat Q12H Azar Howard MD   15 mL at 09/30/24 0749    enoxaparin ANTICOAGULANT (LOVENOX) injection 40 mg  40 mg Subcutaneous Q24H Azar Howard MD   40 mg at 09/30/24 1004    gadobutrol (GADAVIST) injection 7 mL  7 mL Intravenous Once Azar Howard MD        hydrocortisone sodium succinate PF (solu-CORTEF) injection 100 mg  100 mg Intravenous Q6H Azar Howard MD    100 mg at 09/30/24 1005    ipratropium - albuterol 0.5 mg/2.5 mg/3 mL (DUONEB) neb solution 3 mL  3 mL Nebulization Q4H Azar Howard MD   3 mL at 09/30/24 1034    levETIRAcetam (KEPPRA) intermittent infusion 500 mg  500 mg Intravenous Q12H Azar Howard MD   500 mg at 09/30/24 0548    levothyroxine (SYNTHROID/LEVOTHROID) tablet 50 mcg  50 mcg Oral or Feeding Tube M  Azar Howard MD   50 mcg at 09/30/24 0749    metoprolol tartrate (LOPRESSOR) tablet 25 mg  25 mg Oral or Feeding Tube TID Azar Howard MD   25 mg at 09/30/24 0801    multivitamins w/minerals liquid 15 mL  15 mL Per Feeding Tube Daily Azar Howard MD   15 mL at 09/30/24 0801    pantoprazole (PROTONIX) 2 mg/mL suspension 40 mg  40 mg Per Feeding Tube [facility] Azar Howard MD   40 mg at 09/30/24 0749    Or    pantoprazole (PROTONIX) IV push injection 40 mg  40 mg Intravenous [facility] Azar Howard MD   40 mg at 09/26/24 1109    piperacillin-tazobactam (ZOSYN) 3.375 g vial to attach to  mL bag  3.375 g Intravenous Q6H Azar oHward MD   3.375 g at 09/30/24 1214    polyethylene glycol (MIRALAX) Packet 17 g  17 g Oral or Feeding Tube Daily Josefina Watson MD   17 g at 09/28/24 1747    potassium & sodium phosphates (NEUTRA-PHOS) Packet 2 packet  2 packet Oral or Feeding Tube Q4H Bernice An MD   2 packet at 09/30/24 1004    sodium chloride (PF) 0.9% PF flush 10 mL  10 mL Intravenous Once Azar Howard MD         Current Facility-Administered Medications   Medication Dose Route Frequency Provider Last Rate Last Admin    dexmedeTOMIDine (PRECEDEX) 4 mcg/mL in sodium chloride 0.9 % 100 mL infusion  0.1-1.2 mcg/kg/hr Intravenous Continuous Clay Anderson MD 11 mL/hr at 09/30/24 1217 0.6 mcg/kg/hr at 09/30/24 1217    dextrose 10% infusion   Intravenous Continuous PRN Azar Howard MD        dextrose 10% infusion    Intravenous Continuous PRN Azar Howard MD        fentaNYL (SUBLIMAZE) infusion   mcg/hr Intravenous Continuous Bernice An MD   Stopped at 09/30/24 1245    insulin regular (MYXREDLIN) 1 unit/mL infusion  0-24 Units/hr Intravenous Continuous Azar Howard MD   Stopped at 09/30/24 1245    propofol (DIPRIVAN) infusion  5-75 mcg/kg/min Intravenous Continuous Alina Miles MD 21 mL/hr at 09/30/24 1245 50 mcg/kg/min at 09/30/24 1245    And    Medication Instruction   Does not apply Continuous PRN Alina Miles MD        midazolam (VERSED) 100 mg/100 mL NS infusion - ADULT  1-8 mg/hr Intravenous Continuous Azar Howard MD   Stopped at 09/29/24 0401    sodium chloride 0.9 % infusion   Intravenous Continuous Azar Howard MD 10 mL/hr at 09/29/24 1915 Rate Verify at 09/29/24 1915               Physical Examination:   Temp:  [98  F (36.7  C)-99.9  F (37.7  C)] 99.2  F (37.3  C)  Pulse:  [] 116  Resp:  [5-30] 12  BP: (131-197)/() 167/83  FiO2 (%):  [35 %] 35 %  SpO2:  [88 %-98 %] 98 %    Intake/Output Summary (Last 24 hours) at 9/30/2024 1356  Last data filed at 9/30/2024 12 38721  Gross per 24 hour   Intake 3973.96 ml   Output 6325 ml   Net -2351.04 ml   Net: +5.7    Wt Readings from Last 4 Encounters:   09/29/24 73.5 kg (162 lb 0.6 oz)   09/26/24 77.1 kg (170 lb)   09/09/24 72.6 kg (160 lb)   08/12/24 72.7 kg (160 lb 4 oz)     BP - Mean:  [] 118  FiO2 (%): 35 %, Resp: 12, Vent Mode: CMV/AC, Resp Rate (Set): 18 breaths/min, Tidal Volume (Set, mL): 400 mL, PEEP (cm H2O): 5 cmH2O, Pressure Support (cm H2O): 5 cmH2O, Resp Rate (Set): 18 breaths/min, Tidal Volume (Set, mL): 400 mL, PEEP (cm H2O): 5 cmH2O  Recent Labs   Lab 09/27/24  0447 09/26/24  1917 09/26/24  1556 09/26/24  1047   PH  --   --  7.26*  --    PCO2  --   --  43  --    PO2  --   --  139*  --    HCO3  --   --  19*  --    O2PER 50 50 50 50  50       GEN: no acute distress;  comfortable on vent at time of exam    HEENT: head ncat, sclera anicteric, OP patent, trachea midline   PULM: Comfortable respiratory pattern  CV/COR: RRR S1S2 no gallop,  No rub, no murmur  ABD: soft nontender, obese   EXT:  minimal edema   warm  NEURO: Opens eyes to voice but does not follow commands   SKIN: no obvious rash    LINES: clean, dry intact          Data:   All data and imaging reviewed     ROUTINE ICU LABS (Last four results)  CMP  Recent Labs   Lab 09/30/24  1157 09/30/24  1057 09/30/24  0956 09/30/24  0851 09/30/24  0622 09/30/24  0504 09/29/24  1400 09/29/24  1244 09/29/24  0622 09/29/24  0453 09/28/24  2146 09/28/24  1759 09/28/24  0438 09/28/24  0424 09/27/24  0612 09/27/24  0446 09/26/24  1003 09/26/24  0415   NA  --   --   --   --   --  149*  --   --   --  149*  --   --   --  145  --  145   < > 145   POTASSIUM  --   --   --   --   --  3.0*  --  3.9  --  3.2*  3.2*  --  3.1*  --  3.1*  --  3.6   < > 3.5   CHLORIDE  --   --   --   --   --  110*  --   --   --  115*  --   --   --  109*  --  109*   < > 105   CO2  --   --   --   --   --  25  --   --   --  22  --   --   --  19*  --  22   < > 18*   ANIONGAP  --   --   --   --   --  14  --   --   --  12  --   --   --  17*  --  14   < > 22*   * 112* 106* 161*   < > 160*   < >  --    < > 148*   < >  --    < > 147*   < > 279*   < > 136*   BUN  --   --   --   --   --  17.8  --   --   --  21.0  --   --   --  18.2  --  20.8   < > 16.7   CR  --   --   --   --   --  0.83  --   --   --  0.81  --   --   --  0.79  --  1.06*   < > 0.93   GFRESTIMATED  --   --   --   --   --  75  --   --   --  77  --   --   --  80  --  56*   < > 65   DELILAH  --   --   --   --   --  8.0*  --   --   --  8.0*  --   --   --  8.1*  --  7.6*   < > 8.4*   MAG  --   --   --   --   --  1.4*  --   --   --   --   --   --   --  1.9  --   --   --  1.5*   PHOS  --   --   --   --   --  1.9*  --   --   --  2.7  --   --   --  2.4*  --  3.3   < >  --    PROTTOTAL  --   --   --   --   --   --   --  "  --   --   --   --   --   --   --   --   --   --  6.0*   ALBUMIN  --   --   --   --   --  3.8  --   --   --  3.8  --   --   --  3.9  --  3.9   < > 4.0   BILITOTAL  --   --   --   --   --   --   --   --   --   --   --   --   --   --   --   --   --  0.3   ALKPHOS  --   --   --   --   --   --   --   --   --   --   --   --   --   --   --   --   --  85   AST  --   --   --   --   --   --   --   --   --   --   --   --   --   --   --   --   --  38   ALT  --   --   --   --   --   --   --   --   --   --   --   --   --   --   --   --   --  20    < > = values in this interval not displayed.     CBC  Recent Labs   Lab 09/30/24 0504 09/29/24 0453 09/28/24 0424 09/27/24 0446   WBC 10.2 9.6 9.6 6.2   RBC 2.88* 2.84* 2.96* 2.93*   HGB 9.0* 8.7* 9.2* 9.2*   HCT 28.9* 28.7* 29.5* 29.0*    101* 100 99   MCH 31.3 30.6 31.1 31.4   MCHC 31.1* 30.3* 31.2* 31.7   RDW 15.5* 15.6* 15.3* 15.1*   * 103* 101* 117*     INR  Recent Labs   Lab 09/30/24  0504 09/26/24  0415   INR 1.00 1.05     Arterial Blood Gas  Recent Labs   Lab 09/27/24  0447 09/26/24  1917 09/26/24  1556 09/26/24  1047   PH  --   --  7.26*  --    PCO2  --   --  43  --    PO2  --   --  139*  --    HCO3  --   --  19*  --    O2PER 50 50 50 50  50       All cultures:  No results for input(s): \"CULT\" in the last 168 hours.  No results found for this or any previous visit (from the past 24 hour(s)).    Radha Rajput MD    Billing: This patient is critically ill: Yes. Total critical care time today 40 min.            "

## 2024-10-01 ENCOUNTER — TELEPHONE (OUTPATIENT)
Dept: FAMILY MEDICINE | Facility: CLINIC | Age: 74
End: 2024-10-01
Payer: COMMERCIAL

## 2024-10-01 LAB
ANION GAP SERPL CALCULATED.3IONS-SCNC: 15 MMOL/L (ref 7–15)
BASE EXCESS BLDV CALC-SCNC: 9.1 MMOL/L (ref -3–3)
BUN SERPL-MCNC: 15.3 MG/DL (ref 8–23)
CALCIUM SERPL-MCNC: 7.8 MG/DL (ref 8.8–10.4)
CHLORIDE SERPL-SCNC: 102 MMOL/L (ref 98–107)
CREAT SERPL-MCNC: 0.73 MG/DL (ref 0.51–0.95)
EGFRCR SERPLBLD CKD-EPI 2021: 87 ML/MIN/1.73M2
GLUCOSE BLDC GLUCOMTR-MCNC: 143 MG/DL (ref 70–99)
GLUCOSE BLDC GLUCOMTR-MCNC: 150 MG/DL (ref 70–99)
GLUCOSE BLDC GLUCOMTR-MCNC: 151 MG/DL (ref 70–99)
GLUCOSE BLDC GLUCOMTR-MCNC: 155 MG/DL (ref 70–99)
GLUCOSE BLDC GLUCOMTR-MCNC: 166 MG/DL (ref 70–99)
GLUCOSE BLDC GLUCOMTR-MCNC: 186 MG/DL (ref 70–99)
GLUCOSE SERPL-MCNC: 205 MG/DL (ref 70–99)
HCO3 BLDV-SCNC: 34 MMOL/L (ref 21–28)
HCO3 SERPL-SCNC: 29 MMOL/L (ref 22–29)
MAGNESIUM SERPL-MCNC: 2.2 MG/DL (ref 1.7–2.3)
MAGNESIUM SERPL-MCNC: 2.2 MG/DL (ref 1.7–2.3)
MAGNESIUM SERPL-MCNC: 2.5 MG/DL (ref 1.7–2.3)
O2/TOTAL GAS SETTING VFR VENT: 35 %
OXYHGB MFR BLDV: 96 % (ref 70–75)
PCO2 BLDV: 46 MM HG (ref 40–50)
PH BLDV: 7.48 [PH] (ref 7.32–7.43)
PHOSPHATE SERPL-MCNC: 2.2 MG/DL (ref 2.5–4.5)
PHOSPHATE SERPL-MCNC: 2.6 MG/DL (ref 2.5–4.5)
PO2 BLDV: 91 MM HG (ref 25–47)
POTASSIUM SERPL-SCNC: 2.6 MMOL/L (ref 3.4–5.3)
POTASSIUM SERPL-SCNC: 3.1 MMOL/L (ref 3.4–5.3)
POTASSIUM SERPL-SCNC: 3.1 MMOL/L (ref 3.4–5.3)
POTASSIUM SERPL-SCNC: 3.7 MMOL/L (ref 3.4–5.3)
SAO2 % BLDV: 97.8 % (ref 70–75)
SODIUM SERPL-SCNC: 146 MMOL/L (ref 135–145)
TRIGL SERPL-MCNC: 92 MG/DL

## 2024-10-01 PROCEDURE — 94003 VENT MGMT INPAT SUBQ DAY: CPT

## 2024-10-01 PROCEDURE — 200N000001 HC R&B ICU

## 2024-10-01 PROCEDURE — 250N000009 HC RX 250: Performed by: INTERNAL MEDICINE

## 2024-10-01 PROCEDURE — 250N000011 HC RX IP 250 OP 636: Performed by: INTERNAL MEDICINE

## 2024-10-01 PROCEDURE — 999N000009 HC STATISTIC AIRWAY CARE

## 2024-10-01 PROCEDURE — 83735 ASSAY OF MAGNESIUM: CPT | Performed by: INTERNAL MEDICINE

## 2024-10-01 PROCEDURE — 250N000009 HC RX 250

## 2024-10-01 PROCEDURE — 82805 BLOOD GASES W/O2 SATURATION: CPT | Performed by: INTERNAL MEDICINE

## 2024-10-01 PROCEDURE — 84100 ASSAY OF PHOSPHORUS: CPT | Performed by: INTERNAL MEDICINE

## 2024-10-01 PROCEDURE — 36415 COLL VENOUS BLD VENIPUNCTURE: CPT | Performed by: INTERNAL MEDICINE

## 2024-10-01 PROCEDURE — 250N000013 HC RX MED GY IP 250 OP 250 PS 637: Performed by: INTERNAL MEDICINE

## 2024-10-01 PROCEDURE — 999N000157 HC STATISTIC RCP TIME EA 10 MIN

## 2024-10-01 PROCEDURE — 84132 ASSAY OF SERUM POTASSIUM: CPT | Performed by: INTERNAL MEDICINE

## 2024-10-01 PROCEDURE — 80048 BASIC METABOLIC PNL TOTAL CA: CPT | Performed by: INTERNAL MEDICINE

## 2024-10-01 PROCEDURE — 94640 AIRWAY INHALATION TREATMENT: CPT | Mod: 76

## 2024-10-01 PROCEDURE — 999N000253 HC STATISTIC WEANING TRIALS

## 2024-10-01 PROCEDURE — 84478 ASSAY OF TRIGLYCERIDES: CPT | Performed by: INTERNAL MEDICINE

## 2024-10-01 PROCEDURE — 250N000011 HC RX IP 250 OP 636: Performed by: STUDENT IN AN ORGANIZED HEALTH CARE EDUCATION/TRAINING PROGRAM

## 2024-10-01 PROCEDURE — 94640 AIRWAY INHALATION TREATMENT: CPT

## 2024-10-01 PROCEDURE — 99291 CRITICAL CARE FIRST HOUR: CPT | Performed by: INTERNAL MEDICINE

## 2024-10-01 PROCEDURE — 258N000003 HC RX IP 258 OP 636: Performed by: INTERNAL MEDICINE

## 2024-10-01 RX ORDER — POTASSIUM CHLORIDE 1.5 G/1.58G
40 POWDER, FOR SOLUTION ORAL ONCE
Status: DISCONTINUED | OUTPATIENT
Start: 2024-10-01 | End: 2024-10-01 | Stop reason: DRUGHIGH

## 2024-10-01 RX ORDER — POTASSIUM CHLORIDE 1.5 G/1.58G
20 POWDER, FOR SOLUTION ORAL ONCE
Status: COMPLETED | OUTPATIENT
Start: 2024-10-01 | End: 2024-10-01

## 2024-10-01 RX ORDER — METOPROLOL TARTRATE 100 MG
100 TABLET ORAL 2 TIMES DAILY
Status: DISPENSED | OUTPATIENT
Start: 2024-10-01

## 2024-10-01 RX ORDER — POTASSIUM CHLORIDE 1.5 G/1.58G
40 POWDER, FOR SOLUTION ORAL ONCE
Status: COMPLETED | OUTPATIENT
Start: 2024-10-01 | End: 2024-10-01

## 2024-10-01 RX ORDER — POTASSIUM CHLORIDE 1.5 G/1.58G
20 POWDER, FOR SOLUTION ORAL ONCE
Status: DISCONTINUED | OUTPATIENT
Start: 2024-10-01 | End: 2024-10-01 | Stop reason: DRUGHIGH

## 2024-10-01 RX ADMIN — ENOXAPARIN SODIUM 40 MG: 40 INJECTION SUBCUTANEOUS at 10:01

## 2024-10-01 RX ADMIN — METOPROLOL TARTRATE 100 MG: 100 TABLET, FILM COATED ORAL at 20:10

## 2024-10-01 RX ADMIN — POTASSIUM & SODIUM PHOSPHATES POWDER PACK 280-160-250 MG 1 PACKET: 280-160-250 PACK at 08:21

## 2024-10-01 RX ADMIN — IPRATROPIUM BROMIDE AND ALBUTEROL SULFATE 3 ML: .5; 3 SOLUTION RESPIRATORY (INHALATION) at 22:51

## 2024-10-01 RX ADMIN — HYDROCORTISONE SODIUM SUCCINATE 50 MG: 100 INJECTION, POWDER, FOR SOLUTION INTRAMUSCULAR; INTRAVENOUS at 22:05

## 2024-10-01 RX ADMIN — PROPOFOL 55 MCG/KG/MIN: 10 INJECTION, EMULSION INTRAVENOUS at 02:06

## 2024-10-01 RX ADMIN — AMLODIPINE BESYLATE 5 MG: 5 TABLET ORAL at 08:21

## 2024-10-01 RX ADMIN — DEXMEDETOMIDINE HYDROCHLORIDE 0.6 MCG/KG/HR: 400 INJECTION INTRAVENOUS at 13:45

## 2024-10-01 RX ADMIN — LEVETIRACETAM 500 MG: 5 INJECTION INTRAVENOUS at 18:30

## 2024-10-01 RX ADMIN — DEXMEDETOMIDINE HYDROCHLORIDE 0.6 MCG/KG/HR: 400 INJECTION INTRAVENOUS at 04:30

## 2024-10-01 RX ADMIN — CLONIDINE HYDROCHLORIDE 0.1 MG: 0.1 TABLET ORAL at 22:05

## 2024-10-01 RX ADMIN — HYDRALAZINE HYDROCHLORIDE 10 MG: 20 INJECTION INTRAMUSCULAR; INTRAVENOUS at 05:18

## 2024-10-01 RX ADMIN — IPRATROPIUM BROMIDE AND ALBUTEROL SULFATE 3 ML: .5; 3 SOLUTION RESPIRATORY (INHALATION) at 11:00

## 2024-10-01 RX ADMIN — GABAPENTIN 600 MG: 250 SUSPENSION ORAL at 14:02

## 2024-10-01 RX ADMIN — PROPOFOL 40 MCG/KG/MIN: 10 INJECTION, EMULSION INTRAVENOUS at 22:04

## 2024-10-01 RX ADMIN — Medication 40 MG: at 08:21

## 2024-10-01 RX ADMIN — CLONIDINE HYDROCHLORIDE 0.1 MG: 0.1 TABLET ORAL at 05:34

## 2024-10-01 RX ADMIN — Medication 200 MCG/HR: at 05:35

## 2024-10-01 RX ADMIN — PROPOFOL 40 MCG/KG/MIN: 10 INJECTION, EMULSION INTRAVENOUS at 10:43

## 2024-10-01 RX ADMIN — PROPOFOL 55 MCG/KG/MIN: 10 INJECTION, EMULSION INTRAVENOUS at 05:18

## 2024-10-01 RX ADMIN — POTASSIUM CHLORIDE 20 MEQ: 1.5 POWDER, FOR SOLUTION ORAL at 02:49

## 2024-10-01 RX ADMIN — GABAPENTIN 600 MG: 250 SUSPENSION ORAL at 05:34

## 2024-10-01 RX ADMIN — POTASSIUM & SODIUM PHOSPHATES POWDER PACK 280-160-250 MG 1 PACKET: 280-160-250 PACK at 16:31

## 2024-10-01 RX ADMIN — POTASSIUM CHLORIDE 40 MEQ: 1.5 POWDER, FOR SOLUTION ORAL at 00:23

## 2024-10-01 RX ADMIN — POTASSIUM CHLORIDE 40 MEQ: 1.5 POWDER, FOR SOLUTION ORAL at 08:21

## 2024-10-01 RX ADMIN — THIAMINE HYDROCHLORIDE 200 MG: 100 INJECTION, SOLUTION INTRAMUSCULAR; INTRAVENOUS at 22:11

## 2024-10-01 RX ADMIN — CLONIDINE HYDROCHLORIDE 0.1 MG: 0.1 TABLET ORAL at 14:02

## 2024-10-01 RX ADMIN — IPRATROPIUM BROMIDE AND ALBUTEROL SULFATE 3 ML: .5; 3 SOLUTION RESPIRATORY (INHALATION) at 06:46

## 2024-10-01 RX ADMIN — FOLIC ACID 1 MG: 5 INJECTION, SOLUTION INTRAMUSCULAR; INTRAVENOUS; SUBCUTANEOUS at 09:10

## 2024-10-01 RX ADMIN — IPRATROPIUM BROMIDE AND ALBUTEROL SULFATE 3 ML: .5; 3 SOLUTION RESPIRATORY (INHALATION) at 18:44

## 2024-10-01 RX ADMIN — GABAPENTIN 600 MG: 250 SUSPENSION ORAL at 22:12

## 2024-10-01 RX ADMIN — POTASSIUM & SODIUM PHOSPHATES POWDER PACK 280-160-250 MG 1 PACKET: 280-160-250 PACK at 02:49

## 2024-10-01 RX ADMIN — OXYCODONE AND ACETAMINOPHEN 1 TABLET: 10; 325 TABLET ORAL at 14:02

## 2024-10-01 RX ADMIN — THIAMINE HYDROCHLORIDE 200 MG: 100 INJECTION, SOLUTION INTRAMUSCULAR; INTRAVENOUS at 16:31

## 2024-10-01 RX ADMIN — CHLORHEXIDINE GLUCONATE 0.12% ORAL RINSE 15 ML: 1.2 LIQUID ORAL at 20:10

## 2024-10-01 RX ADMIN — HYDROCORTISONE SODIUM SUCCINATE 50 MG: 100 INJECTION, POWDER, FOR SOLUTION INTRAMUSCULAR; INTRAVENOUS at 04:11

## 2024-10-01 RX ADMIN — OXYCODONE AND ACETAMINOPHEN 1 TABLET: 10; 325 TABLET ORAL at 02:49

## 2024-10-01 RX ADMIN — CHLORHEXIDINE GLUCONATE 0.12% ORAL RINSE 15 ML: 1.2 LIQUID ORAL at 08:21

## 2024-10-01 RX ADMIN — IPRATROPIUM BROMIDE AND ALBUTEROL SULFATE 3 ML: .5; 3 SOLUTION RESPIRATORY (INHALATION) at 14:39

## 2024-10-01 RX ADMIN — SODIUM CHLORIDE: 9 INJECTION, SOLUTION INTRAVENOUS at 05:27

## 2024-10-01 RX ADMIN — PROPOFOL 40 MCG/KG/MIN: 10 INJECTION, EMULSION INTRAVENOUS at 15:44

## 2024-10-01 RX ADMIN — POTASSIUM CHLORIDE 20 MEQ: 1.5 POWDER, FOR SOLUTION ORAL at 14:02

## 2024-10-01 RX ADMIN — Medication 15 ML: at 08:21

## 2024-10-01 RX ADMIN — POTASSIUM & SODIUM PHOSPHATES POWDER PACK 280-160-250 MG 1 PACKET: 280-160-250 PACK at 12:12

## 2024-10-01 RX ADMIN — LEVETIRACETAM 500 MG: 5 INJECTION INTRAVENOUS at 05:23

## 2024-10-01 RX ADMIN — PIPERACILLIN AND TAZOBACTAM 3.38 G: 3; .375 INJECTION, POWDER, FOR SOLUTION INTRAVENOUS at 05:34

## 2024-10-01 RX ADMIN — IPRATROPIUM BROMIDE AND ALBUTEROL SULFATE 3 ML: .5; 3 SOLUTION RESPIRATORY (INHALATION) at 03:05

## 2024-10-01 RX ADMIN — OXYCODONE AND ACETAMINOPHEN 1 TABLET: 10; 325 TABLET ORAL at 08:21

## 2024-10-01 RX ADMIN — THIAMINE HYDROCHLORIDE 200 MG: 100 INJECTION, SOLUTION INTRAMUSCULAR; INTRAVENOUS at 08:21

## 2024-10-01 RX ADMIN — DEXMEDETOMIDINE HYDROCHLORIDE 0.6 MCG/KG/HR: 400 INJECTION INTRAVENOUS at 22:04

## 2024-10-01 RX ADMIN — HYDROCORTISONE SODIUM SUCCINATE 50 MG: 100 INJECTION, POWDER, FOR SOLUTION INTRAMUSCULAR; INTRAVENOUS at 16:31

## 2024-10-01 RX ADMIN — HYDROCORTISONE SODIUM SUCCINATE 50 MG: 100 INJECTION, POWDER, FOR SOLUTION INTRAMUSCULAR; INTRAVENOUS at 10:01

## 2024-10-01 RX ADMIN — OXYCODONE AND ACETAMINOPHEN 1 TABLET: 10; 325 TABLET ORAL at 20:10

## 2024-10-01 RX ADMIN — METOPROLOL TARTRATE 100 MG: 100 TABLET, FILM COATED ORAL at 08:21

## 2024-10-01 RX ADMIN — LEVOTHYROXINE SODIUM 50 MCG: 50 TABLET ORAL at 08:21

## 2024-10-01 ASSESSMENT — ACTIVITIES OF DAILY LIVING (ADL)
ADLS_ACUITY_SCORE: 59

## 2024-10-01 NOTE — TELEPHONE ENCOUNTER
Daughter Ina called asking to speak with Dr. Sarah Barriga.     No C2C on file to speak with Ina, I did apologize and inform her of this. Ina states her mom is currently intubated and unresponsive at [facility] following a suicide attempt.     Jessica is asking Dr. Anderson  call her so she can speak with her to update. Informed her we are unable to share information with Ina at this time but will route this message to Dr. Barriga.    Julie Behrendt RN

## 2024-10-01 NOTE — PROGRESS NOTES
[facility] ICU RESPIRATORY NOTE        Date of Admission: 9/26/2024    Date of Intubation (most recent): 9/26/24    Reason for Mechanical Ventilation: Resp failure    Number of Days on Mechanical Ventilation: 6    Met Criteria for Spontaneous Breathing Trial: Yes     Bite Block: Yes; please explain: Patient is biting ETT    Significant Events Today: None    ABG Results:   Recent Labs   Lab 09/27/24  0447 09/26/24  1917 09/26/24  1556 09/26/24  1047   PH  --   --  7.26*  --    PCO2  --   --  43  --    PO2  --   --  139*  --    HCO3  --   --  19*  --    O2PER 50 50 50 50  50         Current Vent Settings: FiO2 (%): 35 %, Resp: (!) 8, Vent Mode: SIMV/PS, Resp Rate (Set): 11 breaths/min, Tidal Volume (Set, mL): 400 mL, PEEP (cm H2O): 5 cmH2O, Pressure Support (cm H2O): 5 cmH2O, Resp Rate (Set): 11 breaths/min, Tidal Volume (Set, mL): 400 mL, PEEP (cm H2O): 5 cmH2O    Skin Assessment: Intact    Plan: Assess for SBT daily    RT Vanessa on 10/1/2024 at 5:23 AM

## 2024-10-01 NOTE — PROGRESS NOTES
[facility] ICU RESPIRATORY NOTE        Date of Admission: 9/26/2024    Date of Intubation (most recent): 9/26/2024    Reason for Mechanical Ventilation: Airway Protection    Number of Days on Mechanical Ventilation: Day 6    Met Criteria for Spontaneous Breathing Trial: Yes    Bite Block: Yes; please explain: D/T Patient biting on ETT    Significant Events Today: Placed on CPAP/PS around 0830 this morning by MD. RR <10/min often 5-6/min but tidal volumes range from 800-1120 mL    ABG Results:   Recent Labs   Lab 10/01/24  0715 09/27/24  0447 09/26/24  1917 09/26/24  1556   PH  --   --   --  7.26*   PCO2  --   --   --  43   PO2  --   --   --  139*   HCO3  --   --   --  19*   O2PER 35 50 50 50         Current Vent Settings: FiO2 (%): 35 %, Resp: (!) 8, Vent Mode: CPAP/PS, Resp Rate (Set): 8 breaths/min, Tidal Volume (Set, mL): 400 mL, PEEP (cm H2O): 5 cmH2O, Pressure Support (cm H2O): 5 cmH2O, Resp Rate (Set): 8 breaths/min, Tidal Volume (Set, mL): 400 mL, PEEP (cm H2O): 5 cmH2O      Roland Mcclain, RT on 10/1/2024 at 5:43 PM

## 2024-10-01 NOTE — PROGRESS NOTES
CLINICAL NUTRITION SERVICES - REASSESSMENT NOTE      Recommendations Ordered by Registered Dietitian (RD): Change TF goal to Promote at 50 mL/hr x 22 hours per day (hold TF 1 hour before and 1 hour after Synthroid administration) = 1100 kcal, 68 g protein (1.2 g/kg), 143 g CHO, 0 g fiber, 923 mL H2O  Total with Propofol = 1710 kcal (30 kcal/kg)   Malnutrition: % Weight Loss:  Weight loss does not meet criteria for malnutrition (diuresing)  % Intake:  No decreased intake noted (on goal TF)  Subcutaneous Fat Loss:  None observed (9/29)  Muscle Loss:  None observed (9/29)  Fluid Retention:  None noted    Malnutrition Diagnosis: Patient does not meet two of the above criteria necessary for diagnosing malnutrition       EVALUATION OF PROGRESS TOWARD GOALS   Diet:  NPO on vent     Nutrition Support:  Patient started on TF 9/29, advanced to goal this am, and continues as follows ~    Nutrition Support Enteral:  Type of Feeding Tube: Nasoduodenal   Enteral Frequency:  Continuous  Enteral Regimen: Osmolite 1.5 at 50 mL/hr  Total Enteral Provisions: 1800 kcal (31 kcal/kg), 75 g protein (1.3 g/kg), 914 mL H2O, 244 g CHO, 0 g fiber   Free Water Flush: 200 mL every 4 hours   Propofol at 23.1 mL/hr= 610 kcal   Total = 2410 kcal (42 kcal/kg and 140% needs)    Intake/Tolerance:    K 3.1 (L) - replacing, Mg NL, Phos 2.2 (L) - replacing   -218 with Medium sliding scale insulin, also on Solumedrol   I/O 5099/4925, wt 57.4 kg (down) - Noted IV Lasix 9/29-9/30  BM x 2 today and x 4 yesterday       ASSESSED NUTRITION NEEDS:  Dosing Weight::  57.4 kg (10/1 weight (diuresed)  Estimated Energy Needs: 9841-5058 kcals (25-30 Kcal/Kg)  Justification: maintenance  Estimated Protein Needs: 70-85 grams protein (1.2-1.5 g pro/Kg)  Justification: preservation of lean body mass  Estimated Fluid Needs: 1973-3307 mL (25-30 mL/kg)  Justification: maintenance        NEW FINDINGS:   Patient receiving Synthroid down FT once daily -- will need to  hold TF 1 hour before and 1 hour after administration     Receiving MVI, Folic Acid, and Thiamine for supplementation     Previous Goals (9/29):   TF to advance to goal rate within 24 to 48 hrs   Evaluation: Met    Previous Nutrition Diagnosis (9/29):   Inadequate oral intake related to reduced appetite as evidenced by estimate meeting <50% nutrition needs for past few weeks based on family report, NPO x3 days since admission   Evaluation: No change      MALNUTRITION  % Weight Loss:  Weight loss does not meet criteria for malnutrition (diuresing)  % Intake:  No decreased intake noted (on goal TF)  Subcutaneous Fat Loss:  None observed (9/29)  Muscle Loss:  None observed (9/29)  Fluid Retention:  None noted    Malnutrition Diagnosis: Patient does not meet two of the above criteria necessary for diagnosing malnutrition    CURRENT NUTRITION DIAGNOSIS  Excessive energy intake related to TF at goal, also on Propofol, estimated needs adjusted based on diuresed weight as evidenced by meeting 140% goal energy needs     INTERVENTIONS  Recommendations / Nutrition Prescription  Change TF goal to Promote at 50 mL/hr x 22 hours per day (hold TF 1 hour before and 1 hour after Synthroid administration) = 1100 kcal, 68 g protein (1.2 g/kg), 143 g CHO, 0 g fiber, 923 mL H2O  Total with Propofol = 1710 kcal (30 kcal/kg)    Implementation  EN Composition and EN Schedule: Goal TF changed as above     Goals  Patient will meet % needs from goal EN regimen     MONITORING AND EVALUATION:  Progress towards goals will be monitored and evaluated per protocol and Practice Guidelines    Marline Franco RD, LD, CNSC   Clinical Dietitian - [facility]

## 2024-10-01 NOTE — PROGRESS NOTES
Critical Care Progress Note      10/01/2024    Name: Jessica Ellison MRN#: [id number]   Age: 73 year old YOB: 1950     [facility]  [facility] #    MV DAY #             Problem List:   Active Problems:    Acute respiratory failure with hypoxia (H)    Respiratory failure (H)    Suicide attempt (H)    Odontoid fracture (H)             Summary/Hospital Course:     Jessica Ellison is a 73 year old female with a history of alcohol use disorder, seizures on levetiracetam, chronic pain on opiate therapy, and major depressive disorder who presented to [facility] via EMS for respiratory failure. EMS reports that they were called to the scene of an apparent overdose.  The patient had apparently had an argument with her significant other, he told police that he went away and then came back about 10 minutes later and found her unresponsive. Police and EMS found the patient with slow and shallow breathing with small pupils. Given narcan with minimal improvement. Intubated. Required low dose of norepi and transferred to [facility] for continued cares.      Initial labs notable for alcohol level of 0.19. Tylenol and and salicylate levels wnl. INR wnl w/ no transaminitis. Metabolic acidosis noted. Cr wnl. Poison control contacted, suspected Keppra as culprit medication. Is also prescribed BB/CCB. Hgb stable.      LLL infiltrated noted on CXR.  Sputum culture normal jesús.  Urine culture Klebsiella and E. coli both Radha Simmons MD    Of note, patient has a displaced odontoid fracture from a fall in early September as per chart review. Was evaluated by an outside neurosurgery team, who recommended C-collar and follow up visits. Currently wearing C-collar. Per admit cervical CT, fracture is newly anteriorly shifted by 4 mm.     Since admission has been relatively stable but not awake enough to consider extubation.   Sputum culture subsequently grew Klebsiella.    Today, October 1 events of the last 24 hours.   Placed on SIMV yesterday with adequate ventilation and good RSBI.  Restarted home oxycodone dose.  Started gabapentin.  Hypertensive and being given additional fentanyl    Assessment and plan :     Jessica Ellison IS a 73 year old female admitted on 9/26/2024 for acute respiratory failure.   I have personally reviewed the daily labs, imaging studies, cultures and discussed the case with referring physician and consulting physicians.     My assessment and plan by system for this patient is as follows:    Neurology/Psychiatry:   1.  Suicide attempt in the setting of alcohol use disorder and opioid dependence and seizure disorder.  cause thought most likely to be Keppra but not confirmed  2.  Alcohol use disorder.  Blood alcohol level elevated on admission.  Has history of alcohol with drawl and seizure in 2013.  Has been prescribed for leg acid  3.  Seizure disorder.  She is on her scheduled outpatient dose of Levetiracetam  4.  opioid dependence.  Reviewed pharmacy records and is essentially taking her as needed Percocet   Scheduled.  On fentanyl infusion here and restarted her outpatient oxycodone.  5.  Encephalopathy.  At high risk of alcohol withdrawal  6.  Odontoid fracture with no significant change on imaging on 9/30.  - Goal RASS 0 to -1 if possible.  - Folic acid thiamine.  Continue multivitamin.   - Continue gabapentin 600 3 times daily for possible alcohol withdrawal and continue taper per protocol  - Continuing attempts at weaning fentanyl.  Continuing oxycodone.    Cardiovascular:   Shock which is resolved   Hypertension.  Blood pressures above normal.  Metoprolol increased yesterday .  Still getting some as needed hydralazine as well as opioid for hypertension.  -Increase metoprolol continue as needed clonidine as needed clonidine available.    Pulmonary/Ventilator Management:   1.  Acute hypoxic respiratory failure and aspiration pneumonitis secondary to overdose.  On minimal ventilator settings but  not awake enough to consider extubation.  Placed on SIMV with good result.  Low minute ventilation appropriate to her metabolic alkalosis  -Continue SIMV with backup rate of 8    GI and Nutrition :   1. Enteral nutrition and monitor for refeeding    Renal/Fluids/Electrolytes:   1.  Hypokalemia, hypomagnesemia and hypophosphatemia.  Expect related to poor p.o. intake  2. Hypernatremia. Na down to 146 today  3. Metabolic alkalosis. Most likely cause is hypokalemia.   - continue aggressive electrolyte replacement  -continue current free water dose    Infectious Disease:   1.  Klebsiella E. coli in urine in setting of shock  2.  Possible pneumonia but less likely given normal jesús  -Complete short course of antibiotics hypokalemia, hypomagnesia and hypophosphatemia.    Endocrine:   1.  stress hyperglycemia.  Transitioned to sliding scale insulin  2. hypothyroidism on her Home Synthroid  3.  On stress dose steroids tolerating reduction  Reduce steroids again today.-  -Trial of transitioning to sliding scale insulin    Hematology/Oncology:   1.  Mild thrombocytopenia related to critical illness  -Check CBC Monday Wednesday Friday     IV/Access:   1. Venous access -   2. Arterial access -   3.  Plan  - central access required and necessary      ICU Prophylaxis:   1. DVT: Hep Subq/ LMWH/mechanical  2. VAP: HOB 30 degrees, chlorhexidine rinse  3. Stress Ulcer: PPI/H2 blocker  4. Restraints: Nonviolent soft two point restraints required and necessary for patient safety and continued cares and good effect as patient continues to pull at necessary lines, tubes despite education and distraction. Will readdress daily.   5. IV Access - central access required and necessary for continued patient cares  6. Feeding - enteral   7. Family Update:   8. Disposition - ICU care            Key Medications:     Current Facility-Administered Medications   Medication Dose Route Frequency Provider Last Rate Last Admin    amLODIPine  (NORVASC) tablet 5 mg  5 mg Oral or Feeding Tube Daily Azar Howard MD   5 mg at 09/30/24 0800    chlorhexidine (PERIDEX) 0.12 % solution 15 mL  15 mL Mouth/Throat Q12H Azar Howard MD   15 mL at 09/30/24 1949    cloNIDine (CATAPRES) tablet 0.1 mg  0.1 mg NG or Feeding tube Q8H Radha Rajput MD   0.1 mg at 10/01/24 0534    enoxaparin ANTICOAGULANT (LOVENOX) injection 40 mg  40 mg Subcutaneous Q24H Azar Howard MD   40 mg at 09/30/24 1004    [START ON 10/3/2024] folic acid (FOLVITE) tablet 1 mg  1 mg Oral or Feeding Tube Daily Radha Rajput MD        folic acid injection 1 mg  1 mg Intravenous Daily Radha Rajput MD        [START ON 10/4/2024] gabapentin (NEURONTIN) solution 100 mg  100 mg NG or Feeding tube Q8H Radha Rajput MD        [START ON 10/2/2024] gabapentin (NEURONTIN) solution 300 mg  300 mg NG or Feeding tube Q8H Radha Rajput MD        gabapentin (NEURONTIN) solution 600 mg  600 mg NG or Feeding tube Q8H Radha Rajput MD   600 mg at 10/01/24 0534    hydrocortisone sodium succinate PF (solu-CORTEF) injection 50 mg  50 mg Intravenous Q6H Radha Rajput MD   50 mg at 10/01/24 0411    insulin aspart (NovoLOG) injection (RAPID ACTING)  1-7 Units Subcutaneous Q4H Radha Rajput MD   1 Units at 10/01/24 0412    ipratropium - albuterol 0.5 mg/2.5 mg/3 mL (DUONEB) neb solution 3 mL  3 mL Nebulization Q4H Azar Howard MD   3 mL at 10/01/24 0646    levETIRAcetam (KEPPRA) intermittent infusion 500 mg  500 mg Intravenous Q12H Azar Howard MD   500 mg at 10/01/24 0523    levothyroxine (SYNTHROID/LEVOTHROID) tablet 50 mcg  50 mcg Oral or Feeding Tube QAM AC Azar Howard MD   50 mcg at 09/30/24 0749    metoprolol tartrate (LOPRESSOR) tablet 50 mg  50 mg Oral or Feeding Tube BID Radha Rajput MD   50 mg at 09/30/24 1949    multivitamins w/minerals  liquid 15 mL  15 mL Per Feeding Tube Daily Azar Howard MD   15 mL at 09/30/24 0801    oxyCODONE-acetaminophen (PERCOCET)  MG per tablet 1 tablet  1 tablet Oral or Feeding Tube Q6H Radha Rajput MD   1 tablet at 10/01/24 0249    pantoprazole (PROTONIX) 2 mg/mL suspension 40 mg  40 mg Per Feeding Tube QAM  Azar Howard MD   40 mg at 09/30/24 0749    Or    pantoprazole (PROTONIX) IV push injection 40 mg  40 mg Intravenous QAM  Azar Howard MD   40 mg at 09/26/24 1109    polyethylene glycol (MIRALAX) Packet 17 g  17 g Oral or Feeding Tube Daily Josefina Watson MD   17 g at 09/28/24 1747    thiamine (B-1) injection 200 mg  200 mg Intravenous TID Radha Rajput MD   200 mg at 09/30/24 2145    [START ON 10/2/2024] thiamine (B-1) tablet 100 mg  100 mg Oral or Feeding Tube TID Radha Rajput MD        [START ON 10/8/2024] thiamine (B-1) tablet 100 mg  100 mg Oral or Feeding Tube Daily Radha Rajput MD         Current Facility-Administered Medications   Medication Dose Route Frequency Provider Last Rate Last Admin    dexmedeTOMIDine (PRECEDEX) 4 mcg/mL in sodium chloride 0.9 % 100 mL infusion  0.1-1.2 mcg/kg/hr Intravenous Continuous Clay Anderson MD 11 mL/hr at 10/01/24 0600 0.6 mcg/kg/hr at 10/01/24 0600    dextrose 10% infusion   Intravenous Continuous PRN Azar Howard MD        dextrose 10% infusion   Intravenous Continuous PRN Azar Howard MD        fentaNYL (SUBLIMAZE) infusion   mcg/hr Intravenous Continuous Bernice An MD 4 mL/hr at 10/01/24 0600 200 mcg/hr at 10/01/24 0600    propofol (DIPRIVAN) infusion  5-75 mcg/kg/min Intravenous Continuous Alina Miles MD 23.1 mL/hr at 10/01/24 0600 55 mcg/kg/min at 10/01/24 0600    And    Medication Instruction   Does not apply Continuous PRN Alina Miles MD        midazolam (VERSED) 100 mg/100 mL NS infusion - ADULT  1-8 mg/hr Intravenous  Continuous Azar Howard MD   Stopped at 09/29/24 0401    sodium chloride 0.9 % infusion   Intravenous Continuous Azar Howard MD 10 mL/hr at 10/01/24 0527 New Bag at 10/01/24 0527               Physical Examination:   Temp:  [98.2  F (36.8  C)-99.4  F (37.4  C)] 99.4  F (37.4  C)  Pulse:  [] 88  Resp:  [7-30] 8  BP: (129-197)/() 151/84  FiO2 (%):  [35 %] 35 %  SpO2:  [88 %-100 %] 95 %    Intake/Output Summary (Last 24 hours) at 9/30/2024 1356  Last data filed at 9/30/2024 12 [id number]  Gross per 24 hour   Intake 3973.96 ml   Output 6325 ml   Net -2351.04 ml   Net: +5.7    Wt Readings from Last 4 Encounters:   10/01/24 57.4 kg (126 lb 8.7 oz)   09/26/24 77.1 kg (170 lb)   09/09/24 72.6 kg (160 lb)   08/12/24 72.7 kg (160 lb 4 oz)     BP - Mean:  [] 110  FiO2 (%): 35 %, Resp: (!) 8, Vent Mode: SIMV/PS, Resp Rate (Set): 8 breaths/min, Tidal Volume (Set, mL): 400 mL, PEEP (cm H2O): 5 cmH2O, Pressure Support (cm H2O): 5 cmH2O, Resp Rate (Set): 8 breaths/min, Tidal Volume (Set, mL): 400 mL, PEEP (cm H2O): 5 cmH2O  Recent Labs   Lab 10/01/24  0715 09/27/24  0447 09/26/24  1917 09/26/24  1556   PH  --   --   --  7.26*   PCO2  --   --   --  43   PO2  --   --   --  139*   HCO3  --   --   --  19*   O2PER 35 50 50 50       GEN: no acute distress; sedated  HEENT: head ncat, sclera anicteric, OP patent, trachea midline   PULM: Comfortable respiratory pattern, very low RSBI  CV/COR: RRR S1S2 no gallop,  No rub, no murmur  ABD: soft nontender, obese   EXT:  minimal edema   warm  NEURO: does not respond to voice  SKIN: no obvious rash    LINES: clean, dry intact          Data:   All data and imaging reviewed     ROUTINE ICU LABS (Last four results)  CMP  Recent Labs   Lab 10/01/24  0909 10/01/24  0857 10/01/24  0715 10/01/24  0410 09/30/24  2328 09/30/24  1954 09/30/24  1631 09/30/24  0622 09/30/24  0504 09/29/24  0622 09/29/24  0453 09/28/24  0438 09/28/24  0424 09/27/24  0612  09/27/24  0446 09/26/24  1003 09/26/24  0415   NA  --   --  146*  --   --   --   --   --  149*  --  149*  --  145  --  145   < > 145   POTASSIUM  --   --  3.1*  3.1*  --  2.6*  --  3.1*  --  3.0*   < > 3.2*  3.2*   < > 3.1*  --  3.6   < > 3.5   CHLORIDE  --   --  102  --   --   --   --   --  110*  --  115*  --  109*  --  109*   < > 105   CO2  --   --  29  --   --   --   --   --  25  --  22  --  19*  --  22   < > 18*   ANIONGAP  --   --  15  --   --   --   --   --  14  --  12  --  17*  --  14   < > 22*   * 150* 205* 186* 174*   < >  --    < > 160*   < > 148*   < > 147*   < > 279*   < > 136*   BUN  --   --  15.3  --   --   --   --   --  17.8  --  21.0  --  18.2  --  20.8   < > 16.7   CR  --   --  0.73  --   --   --   --   --  0.83  --  0.81  --  0.79  --  1.06*   < > 0.93   GFRESTIMATED  --   --  87  --   --   --   --   --  75  --  77  --  80  --  56*   < > 65   DELILAH  --   --  7.8*  --   --   --   --   --  8.0*  --  8.0*  --  8.1*  --  7.6*   < > 8.4*   MAG  --   --  2.2  --  2.5*  --   --   --  1.4*  --   --   --  1.9  --   --   --  1.5*   PHOS  --   --  2.2*  --   --   --  2.1*  --  1.9*  --  2.7  --  2.4*  --  3.3   < >  --    PROTTOTAL  --   --   --   --   --   --   --   --   --   --   --   --   --   --   --   --  6.0*   ALBUMIN  --   --   --   --   --   --   --   --  3.8  --  3.8  --  3.9  --  3.9   < > 4.0   BILITOTAL  --   --   --   --   --   --   --   --   --   --   --   --   --   --   --   --  0.3   ALKPHOS  --   --   --   --   --   --   --   --   --   --   --   --   --   --   --   --  85   AST  --   --   --   --   --   --   --   --   --   --   --   --   --   --   --   --  38   ALT  --   --   --   --   --   --   --   --   --   --   --   --   --   --   --   --  20    < > = values in this interval not displayed.     CBC  Recent Labs   Lab 09/30/24  0504 09/29/24  0453 09/28/24  0424 09/27/24  0446   WBC 10.2 9.6 9.6 6.2   RBC 2.88* 2.84* 2.96* 2.93*   HGB 9.0* 8.7* 9.2* 9.2*   HCT 28.9* 28.7* 29.5*  "29.0*    101* 100 99   MCH 31.3 30.6 31.1 31.4   MCHC 31.1* 30.3* 31.2* 31.7   RDW 15.5* 15.6* 15.3* 15.1*   * 103* 101* 117*     INR  Recent Labs   Lab 24  0504 24  0415   INR 1.00 1.05     Arterial Blood Gas  Recent Labs   Lab 10/01/24  0715 24  0447 24  1917 24  1556   PH  --   --   --  7.26*   PCO2  --   --   --  43   PO2  --   --   --  139*   HCO3  --   --   --  19*   O2PER 35 50 50 50   VB.48/46/90/34    All cultures:  No results for input(s): \"CULT\" in the last 168 hours.  No results found for this or any previous visit (from the past 24 hour(s)).    Radha Rajput MD    Billing: This patient is critically ill: Yes. Total critical care time today 40 min.            "

## 2024-10-01 NOTE — PLAN OF CARE
Goal Outcome Evaluation:      Plan of Care Reviewed With: patient    Overall Patient Progress: improvingOverall Patient Progress: improving    Outcome Evaluation: Decreased fentanyl to 25 and propofol to 40. Precedex at 0.6. Neuro- squeezes hands and wiggle toes on command and can shake head yes or no when asking if pt is in pain but does not follow any other commands. Resp- pressure supported since this morning. Tube feeds changed to promote, running @ goal of 50. Having continuous loose stools q2hrs. Bowel meds held/ discontinued. Bruce removed this afternoon, due to void. New skin tear on anus due to continuous loose stools, barrier cream applied. Replaced K+ and phos this shift. Recheck AM.      Problem: Adult Inpatient Plan of Care  Goal: Optimal Comfort and Wellbeing  Intervention: Monitor Pain and Promote Comfort  Recent Flowsheet Documentation  Taken 10/1/2024 1600 by Jennifer Cunningham RN  Pain Management Interventions:   pain pump in use   quiet environment facilitated   rest   repositioned  Taken 10/1/2024 1200 by Jennifer Cunningham RN  Pain Management Interventions:   pain pump in use   quiet environment facilitated   rest   repositioned  Taken 10/1/2024 0800 by Jennifer Cunningham RN  Pain Management Interventions:   pain pump in use   quiet environment facilitated   rest   repositioned     Problem: Adult Inpatient Plan of Care  Goal: Readiness for Transition of Care  Outcome: Progressing     Problem: Pneumonia  Goal: Effective Oxygenation and Ventilation  Outcome: Progressing  Intervention: Promote Airway Secretion Clearance  Recent Flowsheet Documentation  Taken 10/1/2024 1600 by Jennifer Cunningham RN  Cough And Deep Breathing: unable to perform  Taken 10/1/2024 1200 by Jennifer Cunningham RN  Cough And Deep Breathing: unable to perform  Taken 10/1/2024 0800 by Jennifer Cunningham RN  Cough And Deep Breathing: unable to perform  Intervention: Optimize Oxygenation and Ventilation  Recent Flowsheet  Documentation  Taken 10/1/2024 1600 by Jennifer Cunningham RN  Airway/Ventilation Management: pulmonary hygiene promoted  Head of Bed (HOB) Positioning: HOB at 30 degrees  Taken 10/1/2024 1400 by Jennifer Cunningham RN  Head of Bed (HOB) Positioning: HOB at 30 degrees  Taken 10/1/2024 1200 by Jennifer Cunningham RN  Airway/Ventilation Management: pulmonary hygiene promoted  Head of Bed (HOB) Positioning: HOB at 30 degrees  Taken 10/1/2024 1000 by Jennifer Cunningham RN  Head of Bed (HOB) Positioning: HOB at 30 degrees  Taken 10/1/2024 0800 by Jennifer Cunningham RN  Airway/Ventilation Management: pulmonary hygiene promoted  Head of Bed (HOB) Positioning: HOB at 30 degrees

## 2024-10-01 NOTE — PLAN OF CARE
Problem: Adult Inpatient Plan of Care  Goal: Plan of Care Review  Description: The Plan of Care Review/Shift note should be completed every shift.  The Outcome Evaluation is a brief statement about your assessment that the patient is improving, declining, or no change.  This information will be displayed automatically on your shift  note.  Outcome: Progressing  Flowsheets (Taken 10/1/2024 0639)  Outcome Evaluation: Pt VSS on ventilator overnight. Pt occasionally hypertensive relieved by PRN hydralazine. Pt sedated on propofol, precedex, and fentanyl. Pt does wake with frequent stimulation and moves extremeties purposefully, but does not follow commands. Bruce in place with adequate output (see I&O). Two large and loose BM during shift.  Plan of Care Reviewed With: patient  Overall Patient Progress: no change     Problem: Adult Inpatient Plan of Care  Goal: Plan of Care Review  Description: The Plan of Care Review/Shift note should be completed every shift.  The Outcome Evaluation is a brief statement about your assessment that the patient is improving, declining, or no change.  This information will be displayed automatically on your shift  note.  Outcome: Progressing  Flowsheets (Taken 10/1/2024 0639)  Outcome Evaluation: Pt VSS on ventilator overnight. Pt occasionally hypertensive relieved by PRN hydralazine. Pt sedated on propofol, precedex, and fentanyl. Pt does wake with frequent stimulation and moves extremeties purposefully, but does not follow commands. Bruce in place with adequate output (see I&O). Two large and loose BM during shift.  Plan of Care Reviewed With: patient  Overall Patient Progress: no change     Problem: Risk for Delirium  Goal: Optimal Coping  Outcome: Progressing  Intervention: Optimize Psychosocial Adjustment to Delirium  Recent Flowsheet Documentation  Taken 10/1/2024 0400 by Brian Martines RN  Supportive Measures: relaxation techniques promoted  Taken 10/1/2024 0000 by Bobbi  Brian DELGADO RN  Supportive Measures: relaxation techniques promoted  Taken 9/30/2024 2000 by Brian Martines RN  Supportive Measures: relaxation techniques promoted  Goal: Improved Behavioral Control  Outcome: Progressing  Intervention: Prevent and Manage Agitation  Recent Flowsheet Documentation  Taken 10/1/2024 0400 by Brian Martines RN  Environment Familiarity/Consistency: daily routine followed  Taken 10/1/2024 0000 by Brian Martines RN  Environment Familiarity/Consistency: daily routine followed  Taken 9/30/2024 2000 by Brian Martines RN  Environment Familiarity/Consistency: daily routine followed  Intervention: Minimize Safety Risk  Recent Flowsheet Documentation  Taken 10/1/2024 0400 by Brian Martines RN  Communication Enhancement Strategies: repetition utilized  Enhanced Safety Measures: pain management  Trust Relationship/Rapport: care explained  Taken 10/1/2024 0000 by Brian Martines RN  Communication Enhancement Strategies: repetition utilized  Enhanced Safety Measures: pain management  Trust Relationship/Rapport: care explained  Taken 9/30/2024 2000 by Brian Martines RN  Communication Enhancement Strategies: repetition utilized  Enhanced Safety Measures: pain management  Trust Relationship/Rapport: care explained  Goal: Improved Attention and Thought Clarity  Outcome: Progressing  Intervention: Maximize Cognitive Function  Recent Flowsheet Documentation  Taken 10/1/2024 0400 by Brian Martines RN  Sensory Stimulation Regulation:   care clustered   lighting decreased   quiet environment promoted  Reorientation Measures:   calendar in view   clock in view   reorientation provided  Taken 10/1/2024 0000 by Brian Martines RN  Sensory Stimulation Regulation:   care clustered   lighting decreased   quiet environment promoted  Reorientation Measures:   calendar in view   clock in view   reorientation provided  Taken 9/30/2024 2000 by Brian Martines RN  Sensory Stimulation  Regulation:   care clustered   lighting decreased   quiet environment promoted  Reorientation Measures:   calendar in view   clock in view   reorientation provided   Goal Outcome Evaluation:      Plan of Care Reviewed With: patient    Overall Patient Progress: no changeOverall Patient Progress: no change    Outcome Evaluation: Pt VSS on ventilator overnight. Pt occasionally hypertensive relieved by PRN hydralazine. Pt sedated on propofol, precedex, and fentanyl. Pt does wake with frequent stimulation and moves extremeties purposefully, but does not follow commands. Bruce in place with adequate output (see I&O). Two large and loose BM during shift.

## 2024-10-02 ENCOUNTER — APPOINTMENT (OUTPATIENT)
Dept: GENERAL RADIOLOGY | Facility: CLINIC | Age: 74
DRG: 917 | End: 2024-10-02
Attending: INTERNAL MEDICINE
Payer: COMMERCIAL

## 2024-10-02 ENCOUNTER — DOCUMENTATION ONLY (OUTPATIENT)
Dept: ORTHOPEDICS | Facility: CLINIC | Age: 74
End: 2024-10-02
Payer: COMMERCIAL

## 2024-10-02 LAB
ANION GAP SERPL CALCULATED.3IONS-SCNC: 14 MMOL/L (ref 7–15)
BUN SERPL-MCNC: 14.6 MG/DL (ref 8–23)
CALCIUM SERPL-MCNC: 9 MG/DL (ref 8.8–10.4)
CHLORIDE SERPL-SCNC: 101 MMOL/L (ref 98–107)
CREAT SERPL-MCNC: 0.58 MG/DL (ref 0.51–0.95)
CREAT SERPL-MCNC: 0.59 MG/DL (ref 0.51–0.95)
EGFRCR SERPLBLD CKD-EPI 2021: >90 ML/MIN/1.73M2
EGFRCR SERPLBLD CKD-EPI 2021: >90 ML/MIN/1.73M2
ERYTHROCYTE [DISTWIDTH] IN BLOOD BY AUTOMATED COUNT: 15.7 % (ref 10–15)
GLUCOSE BLDC GLUCOMTR-MCNC: 123 MG/DL (ref 70–99)
GLUCOSE BLDC GLUCOMTR-MCNC: 125 MG/DL (ref 70–99)
GLUCOSE BLDC GLUCOMTR-MCNC: 146 MG/DL (ref 70–99)
GLUCOSE BLDC GLUCOMTR-MCNC: 150 MG/DL (ref 70–99)
GLUCOSE BLDC GLUCOMTR-MCNC: 150 MG/DL (ref 70–99)
GLUCOSE BLDC GLUCOMTR-MCNC: 160 MG/DL (ref 70–99)
GLUCOSE SERPL-MCNC: 167 MG/DL (ref 70–99)
HCO3 SERPL-SCNC: 28 MMOL/L (ref 22–29)
HCT VFR BLD AUTO: 27.8 % (ref 35–47)
HGB BLD-MCNC: 8.8 G/DL (ref 11.7–15.7)
MAGNESIUM SERPL-MCNC: 1.9 MG/DL (ref 1.7–2.3)
MCH RBC QN AUTO: 30.2 PG (ref 26.5–33)
MCHC RBC AUTO-ENTMCNC: 31.7 G/DL (ref 31.5–36.5)
MCV RBC AUTO: 96 FL (ref 78–100)
PHOSPHATE SERPL-MCNC: 3.2 MG/DL (ref 2.5–4.5)
PLATELET # BLD AUTO: 111 10E3/UL (ref 150–450)
POTASSIUM SERPL-SCNC: 3 MMOL/L (ref 3.4–5.3)
POTASSIUM SERPL-SCNC: 3.5 MMOL/L (ref 3.4–5.3)
POTASSIUM SERPL-SCNC: 3.5 MMOL/L (ref 3.4–5.3)
RBC # BLD AUTO: 2.91 10E6/UL (ref 3.8–5.2)
SODIUM SERPL-SCNC: 143 MMOL/L (ref 135–145)
WBC # BLD AUTO: 4.8 10E3/UL (ref 4–11)

## 2024-10-02 PROCEDURE — 250N000009 HC RX 250: Performed by: INTERNAL MEDICINE

## 2024-10-02 PROCEDURE — 250N000013 HC RX MED GY IP 250 OP 250 PS 637: Performed by: INTERNAL MEDICINE

## 2024-10-02 PROCEDURE — 250N000011 HC RX IP 250 OP 636: Performed by: STUDENT IN AN ORGANIZED HEALTH CARE EDUCATION/TRAINING PROGRAM

## 2024-10-02 PROCEDURE — 83735 ASSAY OF MAGNESIUM: CPT | Performed by: INTERNAL MEDICINE

## 2024-10-02 PROCEDURE — 200N000001 HC R&B ICU

## 2024-10-02 PROCEDURE — 84132 ASSAY OF SERUM POTASSIUM: CPT | Performed by: INTERNAL MEDICINE

## 2024-10-02 PROCEDURE — 71045 X-RAY EXAM CHEST 1 VIEW: CPT

## 2024-10-02 PROCEDURE — 84100 ASSAY OF PHOSPHORUS: CPT | Performed by: INTERNAL MEDICINE

## 2024-10-02 PROCEDURE — 82565 ASSAY OF CREATININE: CPT | Performed by: INTERNAL MEDICINE

## 2024-10-02 PROCEDURE — 36415 COLL VENOUS BLD VENIPUNCTURE: CPT | Performed by: INTERNAL MEDICINE

## 2024-10-02 PROCEDURE — 250N000011 HC RX IP 250 OP 636: Performed by: INTERNAL MEDICINE

## 2024-10-02 PROCEDURE — 999N000259 HC STATISTIC EXTUBATION

## 2024-10-02 PROCEDURE — 94003 VENT MGMT INPAT SUBQ DAY: CPT

## 2024-10-02 PROCEDURE — 94640 AIRWAY INHALATION TREATMENT: CPT

## 2024-10-02 PROCEDURE — 250N000013 HC RX MED GY IP 250 OP 250 PS 637

## 2024-10-02 PROCEDURE — 258N000003 HC RX IP 258 OP 636: Performed by: INTERNAL MEDICINE

## 2024-10-02 PROCEDURE — 99291 CRITICAL CARE FIRST HOUR: CPT | Performed by: INTERNAL MEDICINE

## 2024-10-02 PROCEDURE — 94640 AIRWAY INHALATION TREATMENT: CPT | Mod: 76

## 2024-10-02 PROCEDURE — 999N000253 HC STATISTIC WEANING TRIALS

## 2024-10-02 PROCEDURE — 85014 HEMATOCRIT: CPT | Performed by: INTERNAL MEDICINE

## 2024-10-02 PROCEDURE — 80048 BASIC METABOLIC PNL TOTAL CA: CPT | Performed by: INTERNAL MEDICINE

## 2024-10-02 PROCEDURE — 999N000157 HC STATISTIC RCP TIME EA 10 MIN

## 2024-10-02 PROCEDURE — L0172 CERV COL SR FOAM 2PC PRE OTS: HCPCS

## 2024-10-02 PROCEDURE — 250N000009 HC RX 250

## 2024-10-02 RX ORDER — POTASSIUM CHLORIDE 20MEQ/15ML
40 LIQUID (ML) ORAL ONCE
Status: COMPLETED | OUTPATIENT
Start: 2024-10-02 | End: 2024-10-02

## 2024-10-02 RX ORDER — POTASSIUM CHLORIDE 20MEQ/15ML
20 LIQUID (ML) ORAL ONCE
Status: COMPLETED | OUTPATIENT
Start: 2024-10-02 | End: 2024-10-02

## 2024-10-02 RX ORDER — CLONIDINE HYDROCHLORIDE 0.1 MG/1
0.2 TABLET ORAL EVERY 8 HOURS
Status: DISCONTINUED | OUTPATIENT
Start: 2024-10-02 | End: 2024-10-03

## 2024-10-02 RX ADMIN — DEXMEDETOMIDINE HYDROCHLORIDE 1.1 MCG/KG/HR: 400 INJECTION INTRAVENOUS at 20:55

## 2024-10-02 RX ADMIN — METOPROLOL TARTRATE 100 MG: 100 TABLET, FILM COATED ORAL at 20:10

## 2024-10-02 RX ADMIN — Medication 5 MG: at 20:11

## 2024-10-02 RX ADMIN — HYDROCORTISONE SODIUM SUCCINATE 50 MG: 100 INJECTION, POWDER, FOR SOLUTION INTRAMUSCULAR; INTRAVENOUS at 10:38

## 2024-10-02 RX ADMIN — POTASSIUM CHLORIDE 40 MEQ: 20 SOLUTION ORAL at 06:49

## 2024-10-02 RX ADMIN — SODIUM CHLORIDE: 9 INJECTION, SOLUTION INTRAVENOUS at 03:26

## 2024-10-02 RX ADMIN — CLONIDINE HYDROCHLORIDE 0.2 MG: 0.1 TABLET ORAL at 22:05

## 2024-10-02 RX ADMIN — POTASSIUM CHLORIDE 20 MEQ: 20 SOLUTION ORAL at 18:47

## 2024-10-02 RX ADMIN — DEXMEDETOMIDINE HYDROCHLORIDE 1.2 MCG/KG/HR: 400 INJECTION INTRAVENOUS at 11:57

## 2024-10-02 RX ADMIN — HYDRALAZINE HYDROCHLORIDE 10 MG: 20 INJECTION INTRAMUSCULAR; INTRAVENOUS at 22:05

## 2024-10-02 RX ADMIN — THIAMINE HCL TAB 100 MG 100 MG: 100 TAB at 22:11

## 2024-10-02 RX ADMIN — ACETAMINOPHEN 650 MG: 325 TABLET, FILM COATED ORAL at 17:21

## 2024-10-02 RX ADMIN — LEVETIRACETAM 500 MG: 5 INJECTION INTRAVENOUS at 05:37

## 2024-10-02 RX ADMIN — POTASSIUM CHLORIDE 20 MEQ: 20 SOLUTION ORAL at 08:16

## 2024-10-02 RX ADMIN — GABAPENTIN 300 MG: 250 SOLUTION ORAL at 15:38

## 2024-10-02 RX ADMIN — OXYCODONE AND ACETAMINOPHEN 1 TABLET: 10; 325 TABLET ORAL at 13:38

## 2024-10-02 RX ADMIN — CLONIDINE HYDROCHLORIDE 0.1 MG: 0.1 TABLET ORAL at 05:41

## 2024-10-02 RX ADMIN — PROPOFOL 45 MCG/KG/MIN: 10 INJECTION, EMULSION INTRAVENOUS at 03:26

## 2024-10-02 RX ADMIN — Medication 15 ML: at 12:06

## 2024-10-02 RX ADMIN — ENOXAPARIN SODIUM 40 MG: 40 INJECTION SUBCUTANEOUS at 10:39

## 2024-10-02 RX ADMIN — OXYCODONE AND ACETAMINOPHEN 1 TABLET: 10; 325 TABLET ORAL at 19:00

## 2024-10-02 RX ADMIN — AMLODIPINE BESYLATE 5 MG: 5 TABLET ORAL at 08:15

## 2024-10-02 RX ADMIN — DEXMEDETOMIDINE HYDROCHLORIDE 1.2 MCG/KG/HR: 400 INJECTION INTRAVENOUS at 16:15

## 2024-10-02 RX ADMIN — IPRATROPIUM BROMIDE AND ALBUTEROL SULFATE 3 ML: .5; 3 SOLUTION RESPIRATORY (INHALATION) at 03:35

## 2024-10-02 RX ADMIN — OXYCODONE AND ACETAMINOPHEN 1 TABLET: 10; 325 TABLET ORAL at 01:35

## 2024-10-02 RX ADMIN — PROPOFOL 45 MCG/KG/MIN: 10 INJECTION, EMULSION INTRAVENOUS at 07:21

## 2024-10-02 RX ADMIN — GABAPENTIN 300 MG: 250 SOLUTION ORAL at 22:05

## 2024-10-02 RX ADMIN — FOLIC ACID 1 MG: 5 INJECTION, SOLUTION INTRAMUSCULAR; INTRAVENOUS; SUBCUTANEOUS at 08:30

## 2024-10-02 RX ADMIN — OXYCODONE AND ACETAMINOPHEN 1 TABLET: 10; 325 TABLET ORAL at 08:21

## 2024-10-02 RX ADMIN — PROPOFOL 30 MCG/KG/MIN: 10 INJECTION, EMULSION INTRAVENOUS at 13:38

## 2024-10-02 RX ADMIN — CHLORHEXIDINE GLUCONATE 0.12% ORAL RINSE 15 ML: 1.2 LIQUID ORAL at 08:19

## 2024-10-02 RX ADMIN — THIAMINE HCL TAB 100 MG 100 MG: 100 TAB at 15:38

## 2024-10-02 RX ADMIN — LEVETIRACETAM 500 MG: 5 INJECTION INTRAVENOUS at 18:02

## 2024-10-02 RX ADMIN — DEXMEDETOMIDINE HYDROCHLORIDE 0.8 MCG/KG/HR: 400 INJECTION INTRAVENOUS at 05:57

## 2024-10-02 RX ADMIN — IPRATROPIUM BROMIDE AND ALBUTEROL SULFATE 3 ML: .5; 3 SOLUTION RESPIRATORY (INHALATION) at 11:03

## 2024-10-02 RX ADMIN — GABAPENTIN 600 MG: 250 SUSPENSION ORAL at 05:41

## 2024-10-02 RX ADMIN — CLONIDINE HYDROCHLORIDE 0.1 MG: 0.1 TABLET ORAL at 13:41

## 2024-10-02 RX ADMIN — IPRATROPIUM BROMIDE AND ALBUTEROL SULFATE 3 ML: .5; 3 SOLUTION RESPIRATORY (INHALATION) at 06:55

## 2024-10-02 RX ADMIN — Medication 40 MG: at 08:16

## 2024-10-02 RX ADMIN — LEVOTHYROXINE SODIUM 50 MCG: 50 TABLET ORAL at 09:17

## 2024-10-02 RX ADMIN — HYDROCORTISONE SODIUM SUCCINATE 50 MG: 100 INJECTION, POWDER, FOR SOLUTION INTRAMUSCULAR; INTRAVENOUS at 05:55

## 2024-10-02 RX ADMIN — IPRATROPIUM BROMIDE AND ALBUTEROL SULFATE 3 ML: .5; 3 SOLUTION RESPIRATORY (INHALATION) at 15:07

## 2024-10-02 RX ADMIN — THIAMINE HYDROCHLORIDE 200 MG: 100 INJECTION, SOLUTION INTRAMUSCULAR; INTRAVENOUS at 08:42

## 2024-10-02 RX ADMIN — METOPROLOL TARTRATE 100 MG: 100 TABLET, FILM COATED ORAL at 08:15

## 2024-10-02 RX ADMIN — HYDRALAZINE HYDROCHLORIDE 10 MG: 20 INJECTION INTRAMUSCULAR; INTRAVENOUS at 14:24

## 2024-10-02 RX ADMIN — HYDRALAZINE HYDROCHLORIDE 10 MG: 20 INJECTION INTRAMUSCULAR; INTRAVENOUS at 00:37

## 2024-10-02 ASSESSMENT — ACTIVITIES OF DAILY LIVING (ADL)
ADLS_ACUITY_SCORE: 59
ADLS_ACUITY_SCORE: 57
ADLS_ACUITY_SCORE: 59
ADLS_ACUITY_SCORE: 57
ADLS_ACUITY_SCORE: 38
ADLS_ACUITY_SCORE: 38
ADLS_ACUITY_SCORE: 57
ADLS_ACUITY_SCORE: 38
ADLS_ACUITY_SCORE: 57
ADLS_ACUITY_SCORE: 57
ADLS_ACUITY_SCORE: 59
ADLS_ACUITY_SCORE: 57
ADLS_ACUITY_SCORE: 59
ADLS_ACUITY_SCORE: 59
ADLS_ACUITY_SCORE: 38
ADLS_ACUITY_SCORE: 57
ADLS_ACUITY_SCORE: 59
ADLS_ACUITY_SCORE: 38
ADLS_ACUITY_SCORE: 59
ADLS_ACUITY_SCORE: 59

## 2024-10-02 NOTE — PLAN OF CARE
Problem: Risk for Delirium  Goal: Improved Attention and Thought Clarity  Intervention: Maximize Cognitive Function  Recent Flowsheet Documentation  Taken 10/2/2024 0400 by Sabrina Gonzalez RN  Sensory Stimulation Regulation:   care clustered   lighting decreased   quiet environment promoted  Reorientation Measures:   calendar in view   clock in view   reorientation provided  Taken 10/2/2024 0000 by Sabrina Gonzalez RN  Sensory Stimulation Regulation:   care clustered   lighting decreased   quiet environment promoted  Reorientation Measures:   calendar in view   clock in view   reorientation provided  Taken 10/1/2024 2000 by Sabrina Gonzalez RN  Sensory Stimulation Regulation:   care clustered   lighting decreased   quiet environment promoted  Reorientation Measures:   calendar in view   clock in view   reorientation provided         Goal Outcome Evaluation:      Plan of Care Reviewed With: patient    Overall Patient Progress: improvingOverall Patient Progress: improving    Outcome Evaluation: Pt remains intubated and sedated on prop and precedex, fentanyl gtt d/c'd. Oral narcotics ordered for pain, pt denies pain. Awakens to voice, stimulation. Appears oriented, follows most commands, tracks, able to nod yes or no. GREY. One episode of desat's in the mid 80s, CXR completed, no acute findings. Secretions thick, tan. Hypertensive, given scheduled meds and PRN hydralazine x1 with improvement. TF at goal rate, continued loose stools overnight. Bruce d/c'd 10/1 bladder scanned and straight cath x2 overnight. Plan to hopefully extubate today, will need 1:1 for SI and psycho consult.

## 2024-10-02 NOTE — PLAN OF CARE
Problem: Adult Inpatient Plan of Care  Goal: Optimal Comfort and Wellbeing  Outcome: Progressing  Intervention: Provide Person-Centered Care  Recent Flowsheet Documentation  Taken 10/2/2024 1200 by Shoaib Cid  Trust Relationship/Rapport:   care explained   emotional support provided  Taken 10/2/2024 0800 by Shoaib Cid  Trust Relationship/Rapport:   care explained   emotional support provided  Goal: Readiness for Transition of Care  Outcome: Progressing  Goal: Plan of Care Review  Description: The Plan of Care Review/Shift note should be completed every shift.  The Outcome Evaluation is a brief statement about your assessment that the patient is improving, declining, or no change.  This information will be displayed automatically on your shift  note.  Outcome: Progressing     Problem: Risk for Delirium  Goal: Optimal Coping  Outcome: Progressing  Intervention: Optimize Psychosocial Adjustment to Delirium  Recent Flowsheet Documentation  Taken 10/2/2024 1200 by Shoiab Cid  Supportive Measures:   active listening utilized   decision-making supported  Taken 10/2/2024 0800 by Shoaib Cid  Supportive Measures:   active listening utilized   decision-making supported  Goal: Improved Behavioral Control  Outcome: Progressing  Intervention: Prevent and Manage Agitation  Recent Flowsheet Documentation  Taken 10/2/2024 1200 by Shoaib Cid  Environment Familiarity/Consistency: daily routine followed  Intervention: Minimize Safety Risk  Recent Flowsheet Documentation  Taken 10/2/2024 1200 by Shoaib Cid  Communication Enhancement Strategies: (ETT) other (see comments)  Enhanced Safety Measures: room near unit station  Trust Relationship/Rapport:   care explained   emotional support provided  Taken 10/2/2024 0800 by Shoaib Cid  Communication Enhancement Strategies: (ETT) other (see comments)  Enhanced Safety Measures: room near unit station  Trust Relationship/Rapport:   care explained    emotional support provided  Goal: Improved Attention and Thought Clarity  Outcome: Progressing  Intervention: Maximize Cognitive Function  Recent Flowsheet Documentation  Taken 10/2/2024 1200 by Shoaib Cid  Sensory Stimulation Regulation:   care clustered   auditory stimulation minimized  Reorientation Measures:   clock in view   reorientation provided  Goal: Improved Sleep  Outcome: Progressing  Intervention: Promote Sleep  Recent Flowsheet Documentation  Taken 10/2/2024 1200 by Shoaib Cid  Sleep/Rest Enhancement:   comfort measures   family presence promoted   relaxation techniques promoted  Taken 10/2/2024 0800 by Shoaib Cid  Sleep/Rest Enhancement:   comfort measures   family presence promoted   relaxation techniques promoted     Problem: Pneumonia  Goal: Fluid Balance  Outcome: Progressing  Goal: Resolution of Infection Signs and Symptoms  Outcome: Progressing  Intervention: Prevent Infection Progression  Recent Flowsheet Documentation  Taken 10/2/2024 1200 by Shoaib Cid  Infection Management: aseptic technique maintained  Goal: Effective Oxygenation and Ventilation  Outcome: Progressing  Intervention: Promote Airway Secretion Clearance  Recent Flowsheet Documentation  Taken 10/2/2024 1200 by Shoaib Cid  Cough And Deep Breathing: unable to perform  Intervention: Optimize Oxygenation and Ventilation  Recent Flowsheet Documentation  Taken 10/2/2024 1200 by Shoaib Cid  Airway/Ventilation Management:   airway patency maintained   calming measures promoted  Head of Bed (HOB) Positioning: HOB at 20-30 degrees  Taken 10/2/2024 0800 by Shoaib Cid  Head of Bed (HOB) Positioning: HOB at 20-30 degrees     Problem: Overdose  Goal: Optimal Coping  Outcome: Progressing  Intervention: Support Psychosocial Response to Acute Illness  Recent Flowsheet Documentation  Taken 10/2/2024 1200 by Shoaib Cid  Supportive Measures:   active listening utilized   decision-making  supported  Taken 10/2/2024 0800 by Shoaib Cid  Supportive Measures:   active listening utilized   decision-making supported  Goal: Optimal Neurologic Function  Outcome: Progressing  Intervention: Protect and Optimize Cerebral Perfusion  Recent Flowsheet Documentation  Taken 10/2/2024 1200 by Shoaib Cid  Sensory Stimulation Regulation:   care clustered   auditory stimulation minimized  Cerebral Perfusion Promotion: blood pressure monitored  Taken 10/2/2024 0800 by Shoaib Cid  Cerebral Perfusion Promotion: blood pressure monitored  Intervention: Prevent Seizure-Related Injury  Recent Flowsheet Documentation  Taken 10/2/2024 1200 by Shoaib Cid  Seizure Precautions:   activity supervised   side rails padded   clutter-free environment maintained   emergency equipment at bedside  Goal Outcome Evaluation:  Neuro: Follow commands. PERRL. Nods head yes/no  CV: NSR. Hydralazine given x1.   Pain: managed with scheduled percocet and prn tylenol  Resp: extubated at 1647, 3L NC  GI: Watery loose stool. Stool softeners previously discontinued.  : Straight cathed x2. Bladder scan volumes monitored.  Skin: Buttocks excoriated. Barrier cream applied.  Lines/Access: PIV x2  Gtts: Propofol at 30, Precedex at 1.2  Social: Daughter updated via phone, spouse pdated at bedside.  Other: Aspen Collar to remain on at all times, skin intact. Pretty Collar at bedside for shower/hygiene. Neuro Surgery notified that patient is extubated, will plan to see patient tomorrow per Kvng.

## 2024-10-02 NOTE — PROGRESS NOTES
Chart reviewed, patient was seen for a complete reassessment yesterday -- see note dated 10/1/24 for details    Current TF regimen -->      Nutrition Support Enteral:  Type of Feeding Tube: Nasoduodenal   Enteral Frequency:  Continuous  Enteral Regimen: Promote at 50 mL/hr x 22 hours per day (hold TF 1 hour before and 1 hour after Synthroid administration)  Total Enteral Provisions: 1100 kcal, 68 g protein, 143 g CHO, 0 g fiber, 923 mL H2O  Free Water Flush: 200 mL every 4 hours   Propofol at 14.7 mL/hr= 388 kcal   Total = 1488 kcal (26 kcal/kg)    K 3.0 (L), Mg/Phos NL  -166 with Medium sliding scale, also on Solucortef  BM x 6 today and x 7 yesterday - last got Miralax on 9/28 -- may benefit from Banatrol down FT     Receiving MVI/Thiamine/Folic Acid for supplementation     ASSESSED NUTRITION NEEDS:  Dosing Weight::  57.4 kg (10/1 weight (diuresed)  Estimated Energy Needs: 2068-0486 kcals (25-30 Kcal/Kg)  Justification: maintenance  Estimated Protein Needs: 70-85 grams protein (1.2-1.5 g pro/Kg)  Justification: preservation of lean body mass  Estimated Fluid Needs: 4483-0023 mL (25-30 mL/kg)  Justification: maintenance    Will add Banatrol TF 1 pkt TID = 135 kcal, 15 g fiber, 6 g protein   Total (TF + Banatrol TF + Propofol)= 1623 kcal (28 kcal/kg), 74 g protein (1.3 g/kg), 15 g fiber    Marline Franco RD, LD, CNSC   Clinical Dietitian - [facility]

## 2024-10-02 NOTE — TELEPHONE ENCOUNTER
Received update on patient from daughter.     Daughter verbalizes desire to see her mom go to treatment and to not be prescribed anything she can overdose on in the future.       Sarah Barriga M.D.

## 2024-10-02 NOTE — PROGRESS NOTES
S: Jessica is a 73 yom that presents at the [facility] room #I375 for a cervical collar.    O: A Vandervoort Collar was dropped off at the patients room The collar was then adjusted to the patient s size.    A: The patient was sedated and layind down straight. I was unable to fit the orthosis. I dropped off the collar at the patients room. I then put the  s written documentation and placed it with collar    P: The orthosis should be worn according to the physician s directions, while showering. Please monitor for indications of too much skin pressure. Unless otherwise directed by a physician, care should be taken to evenly divide pillows/head supports between the shoulder blades and the patient s head with the goal of maintaining the patient s head/cervical spine neutral in terms of flexion/extension. This will reduce potential chin pressures.    G: The objective/goal of the cervical collar is to reduce cervical spine motion and provide cervical stability.    Please contact the [facility] at [phone] if you have any questions. Thank you, Albert    Note electronically signed by Albert Gomez, Board Eligible

## 2024-10-02 NOTE — PROGRESS NOTES
Extubation Note    Successful completion of SBT (Yes or No):Yes  Extubation time:1647    Patient assessment:  Lung sounds:Diminished  Stridor Present (Yes or No): No  Patient tolerance: Good    Oxygen device:  4L NC  SpO2:94%    Plan:Will cont to monitor.  10/2/2024  Clary Donald, RT

## 2024-10-02 NOTE — CONSULTS
[facility]    Neurosurgery Consultation     Date of Admission:  9/26/2024  Date of Consult: 10/02/24    Assessment   Jessica Ellison is a 73 year old female with history of alcohol use disorder, seizures, chronic pain, and depression who was transferred from [facility] ED to [facility] on 9/26/2024 with respiratory failure, possible overdose, and suicide attempt. Neurosurgery was consulted for type II dens fracture noted on 9/9/24 s/p fall. Dr. León at [facility] was contacted on 9/9/24 and recommended to wear Aspen collar at all times and follow-up in clinic. Patient is scheduled to follow-up with [facility] clinic on 10/15/24. Our neurosurgery team was consulted to provide instructions for Arcadia collar and activity.     Plan   -Per chart review and discussion with ICU fellow, patient is currently intubated. Please contact our neurosurgery team after patient is extubated and we can complete a full consult and neuro exam.   -Wear Arcadia cervical collar at all times   -Pretty collar ordered to wear for hygiene/showering   -Avoid heavy lifting, bending, twisting  -Follow up with [facility] clinic as scheduled on 10/15/24  -Appreciate assistance from specialties     I have discussed the following assessment and plan with Dr. Gibson and ICU fellow.     Ally Carney CNP  [facility]  Tel [phone]  Pager [phone]    Reason for Consult   I was asked by Dr. Rajput to evaluate this patient for:  Cervical spine fracture     EXAM: CT CERVICAL SPINE W/O CONTRAST  LOCATION: [facility]  DATE: 9/26/2024  IMPRESSION:  1.  Oblique type I dens fracture with interval anterior displacement of the proximal fracture fragment by approximately 0.4 cm.  2.  Multilevel degenerative spondylolisthesis, as above.       MRI CERVICAL SPINE WITHOUT AND WITH CONTRAST 9/30/2024 1:51 PM   IMPRESSION:    1.  Images are degraded by motion artifact which limits evaluation.  2. Fracture through the dens which is better seen on CT. No definite  abnormal fluid collection within the spinal canal.   3. Marked multilevel degenerative changes throughout the cervical  spine, particularly from C3-C4 through C7-T1. Evaluation by MR is  limited given motion artifact.

## 2024-10-02 NOTE — PROGRESS NOTES
[facility] ICU RESPIRATORY NOTE        Date of Admission: 9/26/2024    Date of Intubation (most recent): 9/26/2024    Reason for Mechanical Ventilation: Respiratory failure    Number of Days on Mechanical Ventilation: 7    Met Criteria for Spontaneous Breathing Trial: Yes     Bite Block: Yes, biting ETT    Significant Events Today: Episode of desaturation, pt saturations have since returned to normal.    ABG Results:   Recent Labs   Lab 10/01/24  0715 09/27/24  0447 09/26/24  1917 09/26/24  1556   PH  --   --   --  7.26*   PCO2  --   --   --  43   PO2  --   --   --  139*   HCO3  --   --   --  19*   O2PER 35 50 50 50         Current Vent Settings: FiO2 (%): 35 %, Resp: (!) 7, Vent Mode: CMV/AC, Resp Rate (Set): 14 breaths/min, Tidal Volume (Set, mL): 400 mL, PEEP (cm H2O): 5 cmH2O, Pressure Support (cm H2O): 5 cmH2O, Resp Rate (Set): 14 breaths/min, Tidal Volume (Set, mL): 400 mL, PEEP (cm H2O): 5 cmH2O    Skin Assessment: Intact      Manisha La, RT on 10/2/2024 at 6:12 AM

## 2024-10-02 NOTE — PROGRESS NOTES
+Critical Care Progress Note      10/02/2024    Name: Jessica Ellison MRN#: [id number]   Age: 73 year old YOB: 1950     [facility]    [facility] DAY #             Problem List:   Active Problems:    Acute respiratory failure with hypoxia (H)    Respiratory failure (H)    Suicide attempt (H)    Odontoid fracture (H)             Summary/Hospital Course:     Jessica Ellison is a 73 year old female with a history of alcohol use disorder, seizures on levetiracetam, chronic pain on opiate therapy, and major depressive disorder who presented to [facility] via EMS for respiratory failure. EMS reports that they were called to the scene of an apparent overdose.  The patient had apparently had an argument with her significant other, he told police that he went away and then came back about 10 minutes later and found her unresponsive. Police and EMS found the patient with slow and shallow breathing with small pupils. Given narcan with minimal improvement. Intubated. Required low dose of norepi and transferred to [facility] for continued cares.      Initial labs notable for alcohol level of 0.19. Tylenol and and salicylate levels wnl. INR wnl w/ no transaminitis. Metabolic acidosis noted. Cr wnl. Poison control contacted, suspected Keppra as culprit medication. Is also prescribed BB/CCB. Hgb stable.      LLL infiltrated noted on CXR.  Sputum culture normal jesús.  Urine culture Klebsiella and E. coli both Radha Simmons MD    Of note, patient has a displaced odontoid fracture from a fall in early September as per chart review. Was evaluated by an outside neurosurgery team, who recommended C-collar and follow up visits. Currently wearing C-collar. Per admit cervical CT, fracture is newly anteriorly shifted by 4 mm.     Since admission has been relatively stable but not awake enough to consider extubation.   Sputum culture subsequently grew Klebsiella.    October 1 events of the last 24 hours.  Placed  on SIMV yesterday with adequate ventilation and good RSBI.  Restarted home oxycodone dose.  Started gabapentin.  Hypertensive and being given additional fentanyl.    October 2 is off all opioids.  Is interactive.  Still on both propofol and Precedex.  Appreciate neurosurgery recommendations for    Assessment and plan :     Jessica Ellison IS a 73 year old female admitted on 9/26/2024 for acute respiratory failure.   I have personally reviewed the daily labs, imaging studies, cultures and discussed the case with referring physician and consulting physicians.     My assessment and plan by system for this patient is as follows:    Neurology/Psychiatry:   1.  Suicide attempt in the setting of alcohol use disorder and opioid dependence and seizure disorder.  cause thought most likely to be Keppra but not confirmed  2.  Alcohol use disorder.  Blood alcohol level elevated on admission.  Has history of alcohol with drawl and seizure in 2013.  Has been prescribed folic acid  3.  Seizure disorder.  She is on her scheduled outpatient dose of Levetiracetam  4.  opioid dependence.  Taking her normal outpatient dose of oxy alone.  Have been able to stop infusions.  Folic acid  5.  Encephalopathy.  Remains calm as we are weaning sedation making severe alcohol withdrawal less likely.  6.  Odontoid fracture with no significant change on imaging on 9/30.  Moving all extremities not complaining of pain c-collar in activity.  Will reconsult when she is extubated.  - Goal RASS 0 to -1.  Will increase Precedex to max and decrease propofol  - Folic acid thiamine.  Continue multivitamin.   - Continue gabapentin taper per alcohol withdrawal protocol  -  Continuing oxycodone at outpatient dose.  -C-collar with additional brace with activity.    Cardiovascular:   Shock which is resolved   Hypertension.  Blood pressures above normal Now that we are coming off of her sedatives.  -Continue metoprolol current dose.  Increase  clonidine    Pulmonary/Ventilator Management:   1.  Acute hypoxic respiratory failure and aspiration pneumonitis secondary to overdose.  On minimal ventilator settings but not awake enough to consider extubation.  Tolerating pressure support.  Brief episode of desaturation overnight 10/1 chest x-ray no change and quickly resolved.  No significant secretions  -Continue alternating SIMV with pressure support and extubate when mental status allows.    GI and Nutrition :   1. Enteral nutrition and monitor for refeeding  2.  Loose stools.  Nutritionist have adjusted her tube feeds    Renal/Fluids/Electrolytes:   1.  Hypokalemia, hypomagnesemia and hypophosphatemia.  Expect related to poor p.o. intake  2. Hypernatremia. Na in normal [place]  3. Metabolic alkalosis. Most likely cause is hypokalemia.   - continue aggressive electrolyte replacement  -Decrease free water dose today    Infectious Disease:   1.  Klebsiella E. coli in urine in setting of shock  2.  Possible pneumonia but less likely given normal jesús  -Complete short course of antibiotics    Endocrine:   1.  stress hyperglycemia.  Transitioned to sliding scale insulin  2. hypothyroidism on her Home Synthroid  3.  On stress dose steroids tolerating reduction  -Taper and stop steroids if tolerates  -Sliding scale insulin    Hematology/Oncology:   1.  Mild thrombocytopenia related to critical illness  -Check CBC Monday Wednesday Friday     IV/Access:   1. Venous access -   2. Arterial access -   3.  Plan  - central access required and necessary      ICU Prophylaxis:   1. DVT: Hep Subq/ LMWH  2. VAP: HOB 30 degrees, chlorhexidine rinse  3. Stress Ulcer: PPI/H2 blocker  4. Restraints: Nonviolent soft two point restraints required and necessary for patient safety and continued cares and good effect as patient continues to pull at necessary lines, tubes despite education and distraction. Will readdress daily.   5. IV Access - central access required and necessary for  continued patient cares  6. Feeding - enteral   7. Family Update: Pending  8. Disposition - ICU care            Key Medications:     Current Facility-Administered Medications   Medication Dose Route Frequency Provider Last Rate Last Admin    amLODIPine (NORVASC) tablet 5 mg  5 mg Oral or Feeding Tube Daily Azar Howard MD   5 mg at 10/02/24 0815    chlorhexidine (PERIDEX) 0.12 % solution 15 mL  15 mL Mouth/Throat Q12H Azar Howard MD   15 mL at 10/02/24 0819    cloNIDine (CATAPRES) tablet 0.1 mg  0.1 mg NG or Feeding tube Q8H Radha Rajput MD   0.1 mg at 10/02/24 0541    enoxaparin ANTICOAGULANT (LOVENOX) injection 40 mg  40 mg Subcutaneous Q24H Azar Howard MD   40 mg at 10/02/24 1039    [START ON 10/3/2024] folic acid (FOLVITE) tablet 1 mg  1 mg Oral or Feeding Tube Daily Radha Rajput MD        [START ON 10/4/2024] gabapentin (NEURONTIN) solution 100 mg  100 mg NG or Feeding tube Q8H Radha Rajput MD        gabapentin (NEURONTIN) solution 300 mg  300 mg NG or Feeding tube Q8H Radha Rajput MD        hydrocortisone sodium succinate PF (solu-CORTEF) injection 50 mg  50 mg Intravenous Q6H Radha Rajput MD   50 mg at 10/02/24 1038    insulin aspart (NovoLOG) injection (RAPID ACTING)  1-7 Units Subcutaneous Q4H Radha Rajput MD   1 Units at 10/02/24 0823    ipratropium - albuterol 0.5 mg/2.5 mg/3 mL (DUONEB) neb solution 3 mL  3 mL Nebulization Q4H Azar Howard MD   3 mL at 10/02/24 1103    levETIRAcetam (KEPPRA) intermittent infusion 500 mg  500 mg Intravenous Q12H Azar Howard MD   500 mg at 10/02/24 0537    levothyroxine (SYNTHROID/LEVOTHROID) tablet 50 mcg  50 mcg Oral or Feeding Tube QAM AC Azar Howard MD   50 mcg at 10/02/24 0917    metoprolol tartrate (LOPRESSOR) tablet 100 mg  100 mg Oral or Feeding Tube BID Radha Rajput MD   100 mg at 10/02/24 0815     multivitamins w/minerals liquid 15 mL  15 mL Per Feeding Tube Daily Azar Howard MD   15 mL at 10/02/24 1206    oxyCODONE-acetaminophen (PERCOCET)  MG per tablet 1 tablet  1 tablet Oral or Feeding Tube Q6H Radha Rajput MD   1 tablet at 10/02/24 0821    pantoprazole (PROTONIX) 2 mg/mL suspension 40 mg  40 mg Per Feeding Tube QAM  Azar Howard MD   40 mg at 10/02/24 0816    Or    pantoprazole (PROTONIX) IV push injection 40 mg  40 mg Intravenous QAM  Azar Howard MD   40 mg at 09/26/24 1109    thiamine (B-1) tablet 100 mg  100 mg Oral or Feeding Tube TID Radha Rajput MD        [START ON 10/8/2024] thiamine (B-1) tablet 100 mg  100 mg Oral or Feeding Tube Daily Radha Rajput MD         Current Facility-Administered Medications   Medication Dose Route Frequency Provider Last Rate Last Admin    dexmedeTOMIDine (PRECEDEX) 4 mcg/mL in sodium chloride 0.9 % 100 mL infusion  0.1-1.2 mcg/kg/hr Intravenous Continuous Clay Anderson MD 22.1 mL/hr at 10/02/24 1157 1.2 mcg/kg/hr at 10/02/24 1157    dextrose 10% infusion   Intravenous Continuous PRN Azar Howard MD        dextrose 10% infusion   Intravenous Continuous PRN Azar Howard MD        propofol (DIPRIVAN) infusion  5-75 mcg/kg/min Intravenous Continuous Alina Miles MD 14.7 mL/hr at 10/02/24 1127 35 mcg/kg/min at 10/02/24 1127    And    Medication Instruction   Does not apply Continuous PRN Alina Miles MD        sodium chloride 0.9 % infusion   Intravenous Continuous Azar Howard MD 10 mL/hr at 10/02/24 0326 New Bag at 10/02/24 0326               Physical Examination:   Temp:  [97.6  F (36.4  C)-98.6  F (37  C)] 98.6  F (37  C)  Pulse:  [] 92  Resp:  [6-35] 8  BP: (136-187)/() 170/92  FiO2 (%):  [35 %] 35 %  SpO2:  [87 %-99 %] 91 %    Intake/Output Summary (Last 24 hours) at 9/30/2024 1356  Last data filed at 9/30/2024 12  [id number]  Gross per 24 hour   Intake 3973.96 ml   Output 6325 ml   Net -2351.04 ml   Net: +5.7    Wt Readings from Last 4 Encounters:   10/01/24 57.4 kg (126 lb 8.7 oz)   09/26/24 77.1 kg (170 lb)   09/09/24 72.6 kg (160 lb)   08/12/24 72.7 kg (160 lb 4 oz)     BP - Mean:  [] 126  FiO2 (%): 35 %, Resp: (!) 8, Vent Mode: CPAP/PS, Resp Rate (Set): 8 breaths/min, Tidal Volume (Set, mL): 400 mL, PEEP (cm H2O): 5 cmH2O, Pressure Support (cm H2O): 5 cmH2O, Resp Rate (Set): 8 breaths/min, Tidal Volume (Set, mL): 400 mL, PEEP (cm H2O): 5 cmH2O  Recent Labs   Lab 10/01/24  0715 09/27/24  0447 09/26/24  1917 09/26/24  1556   PH  --   --   --  7.26*   PCO2  --   --   --  43   PO2  --   --   --  139*   HCO3  --   --   --  19*   O2PER 35 50 50 50       GEN: no acute distress;   HEENT: head ncat, sclera anicteric, OP patent, trachea midline, c-collar in place  PULM: Comfortable respiratory pattern on pressure support   CV/COR: RRR S1S2 no gallop,  No rub, no murmur  ABD: soft nontender, obese   EXT:  minimal edema   warm  NEURO: Responds to my commands moves all extremities equally  SKIN: no obvious rash    LINES: clean, dry intact          Data:   All data and imaging reviewed     ROUTINE ICU LABS (Last four results)  CMP  Recent Labs   Lab 10/02/24  0816 10/02/24  0553 10/02/24  0502 10/02/24  0042 10/01/24  2005 10/01/24  1641 10/01/24  1248 10/01/24  0857 10/01/24  0715 10/01/24  0410 09/30/24  2328 09/30/24  1954 09/30/24  1631 09/30/24  0622 09/30/24  0504 09/29/24  0622 09/29/24  0453 09/28/24  0438 09/28/24  0424 09/27/24  0612 09/27/24  0446 09/26/24  1003 09/26/24  0415   NA  --   --   --   --   --   --   --   --  146*  --   --   --   --   --  149*  --  149*  --  145  --  145   < > 145   POTASSIUM  --  3.0*  --   --   --   --  3.7  --  3.1*  3.1*  --  2.6*  --  3.1*  --  3.0*   < > 3.2*  3.2*   < > 3.1*  --  3.6   < > 3.5   CHLORIDE  --   --   --   --   --   --   --   --  102  --   --   --   --   --  110*  --  115*   --  109*  --  109*   < > 105   CO2  --   --   --   --   --   --   --   --  29  --   --   --   --   --  25  --  22  --  19*  --  22   < > 18*   ANIONGAP  --   --   --   --   --   --   --   --  15  --   --   --   --   --  14  --  12  --  17*  --  14   < > 22*   *  --  123* 150* 151*   < >  --    < > 205*   < > 174*   < >  --    < > 160*   < > 148*   < > 147*   < > 279*   < > 136*   BUN  --   --   --   --   --   --   --   --  15.3  --   --   --   --   --  17.8  --  21.0  --  18.2  --  20.8   < > 16.7   CR  --  0.58  --   --   --   --   --   --  0.73  --   --   --   --   --  0.83  --  0.81  --  0.79  --  1.06*   < > 0.93   GFRESTIMATED  --  >90  --   --   --   --   --   --  87  --   --   --   --   --  75  --  77  --  80  --  56*   < > 65   DELILAH  --   --   --   --   --   --   --   --  7.8*  --   --   --   --   --  8.0*  --  8.0*  --  8.1*  --  7.6*   < > 8.4*   MAG  --  1.9  --   --   --   --  2.2  --  2.2  --  2.5*  --   --   --  1.4*  --   --   --  1.9  --   --   --  1.5*   PHOS  --  3.2  --   --   --   --  2.6  --  2.2*  --   --   --  2.1*  --  1.9*  --  2.7  --  2.4*  --  3.3   < >  --    PROTTOTAL  --   --   --   --   --   --   --   --   --   --   --   --   --   --   --   --   --   --   --   --   --   --  6.0*   ALBUMIN  --   --   --   --   --   --   --   --   --   --   --   --   --   --  3.8  --  3.8  --  3.9  --  3.9   < > 4.0   BILITOTAL  --   --   --   --   --   --   --   --   --   --   --   --   --   --   --   --   --   --   --   --   --   --  0.3   ALKPHOS  --   --   --   --   --   --   --   --   --   --   --   --   --   --   --   --   --   --   --   --   --   --  85   AST  --   --   --   --   --   --   --   --   --   --   --   --   --   --   --   --   --   --   --   --   --   --  38   ALT  --   --   --   --   --   --   --   --   --   --   --   --   --   --   --   --   --   --   --   --   --   --  20    < > = values in this interval not displayed.     CBC  Recent Labs   Lab 10/02/24  0553 09/30/24  0500  "24  0453 24  0424   WBC 4.8 10.2 9.6 9.6   RBC 2.91* 2.88* 2.84* 2.96*   HGB 8.8* 9.0* 8.7* 9.2*   HCT 27.8* 28.9* 28.7* 29.5*   MCV 96 100 101* 100   MCH 30.2 31.3 30.6 31.1   MCHC 31.7 31.1* 30.3* 31.2*   RDW 15.7* 15.5* 15.6* 15.3*   * 115* 103* 101*     INR  Recent Labs   Lab 24  0504 24  0415   INR 1.00 1.05     Arterial Blood Gas  Recent Labs   Lab 10/01/24  0715 24  0447 24  1917 24  1556   PH  --   --   --  7.26*   PCO2  --   --   --  43   PO2  --   --   --  139*   HCO3  --   --   --  19*   O2PER 35 50 50 50   VB.48/46/90/34    All cultures:  No results for input(s): \"CULT\" in the last 168 hours.  No results found for this or any previous visit (from the past 24 hour(s)).    Radha Rajput MD    Billing: This patient is critically ill: Yes. Total critical care time today 45 min.            "

## 2024-10-03 ENCOUNTER — PRE VISIT (OUTPATIENT)
Dept: NEUROSURGERY | Facility: CLINIC | Age: 74
End: 2024-10-03

## 2024-10-03 ENCOUNTER — APPOINTMENT (OUTPATIENT)
Dept: SPEECH THERAPY | Facility: CLINIC | Age: 74
DRG: 917 | End: 2024-10-03
Payer: COMMERCIAL

## 2024-10-03 LAB
ANION GAP SERPL CALCULATED.3IONS-SCNC: 7 MMOL/L (ref 7–15)
BUN SERPL-MCNC: 16.1 MG/DL (ref 8–23)
CALCIUM SERPL-MCNC: 9.2 MG/DL (ref 8.8–10.4)
CHLORIDE SERPL-SCNC: 101 MMOL/L (ref 98–107)
CREAT SERPL-MCNC: 0.59 MG/DL (ref 0.51–0.95)
EGFRCR SERPLBLD CKD-EPI 2021: >90 ML/MIN/1.73M2
GLUCOSE BLDC GLUCOMTR-MCNC: 113 MG/DL (ref 70–99)
GLUCOSE BLDC GLUCOMTR-MCNC: 116 MG/DL (ref 70–99)
GLUCOSE BLDC GLUCOMTR-MCNC: 119 MG/DL (ref 70–99)
GLUCOSE BLDC GLUCOMTR-MCNC: 132 MG/DL (ref 70–99)
GLUCOSE BLDC GLUCOMTR-MCNC: 90 MG/DL (ref 70–99)
GLUCOSE BLDC GLUCOMTR-MCNC: 92 MG/DL (ref 70–99)
GLUCOSE SERPL-MCNC: 98 MG/DL (ref 70–99)
HCO3 SERPL-SCNC: 32 MMOL/L (ref 22–29)
MAGNESIUM SERPL-MCNC: 1.6 MG/DL (ref 1.7–2.3)
PHOSPHATE SERPL-MCNC: 3.3 MG/DL (ref 2.5–4.5)
POTASSIUM SERPL-SCNC: 3.4 MMOL/L (ref 3.4–5.3)
POTASSIUM SERPL-SCNC: 3.9 MMOL/L (ref 3.4–5.3)
SODIUM SERPL-SCNC: 140 MMOL/L (ref 135–145)

## 2024-10-03 PROCEDURE — 250N000013 HC RX MED GY IP 250 OP 250 PS 637

## 2024-10-03 PROCEDURE — 250N000009 HC RX 250

## 2024-10-03 PROCEDURE — 250N000011 HC RX IP 250 OP 636: Performed by: INTERNAL MEDICINE

## 2024-10-03 PROCEDURE — 36415 COLL VENOUS BLD VENIPUNCTURE: CPT

## 2024-10-03 PROCEDURE — 250N000013 HC RX MED GY IP 250 OP 250 PS 637: Performed by: INTERNAL MEDICINE

## 2024-10-03 PROCEDURE — 80048 BASIC METABOLIC PNL TOTAL CA: CPT | Performed by: INTERNAL MEDICINE

## 2024-10-03 PROCEDURE — 92610 EVALUATE SWALLOWING FUNCTION: CPT | Mod: GN | Performed by: SPEECH-LANGUAGE PATHOLOGIST

## 2024-10-03 PROCEDURE — 99222 1ST HOSP IP/OBS MODERATE 55: CPT | Performed by: NURSE PRACTITIONER

## 2024-10-03 PROCEDURE — 83735 ASSAY OF MAGNESIUM: CPT | Performed by: INTERNAL MEDICINE

## 2024-10-03 PROCEDURE — 99233 SBSQ HOSP IP/OBS HIGH 50: CPT | Performed by: INTERNAL MEDICINE

## 2024-10-03 PROCEDURE — 94640 AIRWAY INHALATION TREATMENT: CPT | Mod: 76

## 2024-10-03 PROCEDURE — 250N000009 HC RX 250: Performed by: INTERNAL MEDICINE

## 2024-10-03 PROCEDURE — 84132 ASSAY OF SERUM POTASSIUM: CPT

## 2024-10-03 PROCEDURE — 84100 ASSAY OF PHOSPHORUS: CPT | Performed by: INTERNAL MEDICINE

## 2024-10-03 PROCEDURE — 94640 AIRWAY INHALATION TREATMENT: CPT

## 2024-10-03 PROCEDURE — 999N000157 HC STATISTIC RCP TIME EA 10 MIN

## 2024-10-03 PROCEDURE — 99222 1ST HOSP IP/OBS MODERATE 55: CPT

## 2024-10-03 PROCEDURE — 99221 1ST HOSP IP/OBS SF/LOW 40: CPT

## 2024-10-03 PROCEDURE — 36415 COLL VENOUS BLD VENIPUNCTURE: CPT | Performed by: INTERNAL MEDICINE

## 2024-10-03 PROCEDURE — 120N000001 HC R&B MED SURG/OB

## 2024-10-03 RX ORDER — MULTIVITAMIN,THERAPEUTIC
1 TABLET ORAL DAILY
Status: DISPENSED | OUTPATIENT
Start: 2024-10-03

## 2024-10-03 RX ORDER — DULOXETIN HYDROCHLORIDE 30 MG/1
30 CAPSULE, DELAYED RELEASE ORAL 2 TIMES DAILY
Status: DISCONTINUED | OUTPATIENT
Start: 2024-10-03 | End: 2024-10-03

## 2024-10-03 RX ORDER — NICOTINE POLACRILEX 4 MG
15-30 LOZENGE BUCCAL
Status: ACTIVE | OUTPATIENT
Start: 2024-10-03

## 2024-10-03 RX ORDER — FOLIC ACID 1 MG/1
1 TABLET ORAL DAILY
Status: DISPENSED | OUTPATIENT
Start: 2024-10-04

## 2024-10-03 RX ORDER — CLONIDINE HYDROCHLORIDE 0.1 MG/1
0.1 TABLET ORAL EVERY 8 HOURS
Status: DISCONTINUED | OUTPATIENT
Start: 2024-10-03 | End: 2024-10-04

## 2024-10-03 RX ORDER — ALBUTEROL SULFATE 90 UG/1
2 AEROSOL, METERED RESPIRATORY (INHALATION) EVERY 6 HOURS PRN
Status: ACTIVE | OUTPATIENT
Start: 2024-10-03

## 2024-10-03 RX ORDER — LEVETIRACETAM 500 MG/1
500 TABLET ORAL 2 TIMES DAILY
Status: DISPENSED | OUTPATIENT
Start: 2024-10-03

## 2024-10-03 RX ORDER — AMLODIPINE BESYLATE 5 MG/1
5 TABLET ORAL DAILY
Status: DISPENSED | OUTPATIENT
Start: 2024-10-04

## 2024-10-03 RX ORDER — GABAPENTIN 300 MG/1
300 CAPSULE ORAL EVERY 8 HOURS
Status: COMPLETED | OUTPATIENT
Start: 2024-10-03 | End: 2024-10-04

## 2024-10-03 RX ORDER — POTASSIUM CHLORIDE 20MEQ/15ML
40 LIQUID (ML) ORAL ONCE
Status: COMPLETED | OUTPATIENT
Start: 2024-10-03 | End: 2024-10-03

## 2024-10-03 RX ORDER — DEXTROSE MONOHYDRATE 25 G/50ML
25-50 INJECTION, SOLUTION INTRAVENOUS
Status: ACTIVE | OUTPATIENT
Start: 2024-10-03

## 2024-10-03 RX ORDER — POTASSIUM CHLORIDE 7.45 MG/ML
10 INJECTION INTRAVENOUS
Status: DISCONTINUED | OUTPATIENT
Start: 2024-10-03 | End: 2024-10-03

## 2024-10-03 RX ORDER — GABAPENTIN 100 MG/1
100 CAPSULE ORAL EVERY 8 HOURS
Status: COMPLETED | OUTPATIENT
Start: 2024-10-04 | End: 2024-10-07

## 2024-10-03 RX ORDER — FLUTICASONE FUROATE AND VILANTEROL 100; 25 UG/1; UG/1
1 POWDER RESPIRATORY (INHALATION) DAILY
Status: DISPENSED | OUTPATIENT
Start: 2024-10-03

## 2024-10-03 RX ADMIN — AMLODIPINE BESYLATE 5 MG: 5 TABLET ORAL at 08:41

## 2024-10-03 RX ADMIN — GABAPENTIN 300 MG: 250 SOLUTION ORAL at 06:26

## 2024-10-03 RX ADMIN — OXYCODONE AND ACETAMINOPHEN 1 TABLET: 10; 325 TABLET ORAL at 20:14

## 2024-10-03 RX ADMIN — CLONIDINE HYDROCHLORIDE 0.1 MG: 0.1 TABLET ORAL at 13:45

## 2024-10-03 RX ADMIN — CLONIDINE HYDROCHLORIDE 0.1 MG: 0.1 TABLET ORAL at 20:42

## 2024-10-03 RX ADMIN — ACETAMINOPHEN 650 MG: 325 TABLET, FILM COATED ORAL at 18:14

## 2024-10-03 RX ADMIN — CLONIDINE HYDROCHLORIDE 0.2 MG: 0.1 TABLET ORAL at 06:26

## 2024-10-03 RX ADMIN — UMECLIDINIUM 1 PUFF: 62.5 AEROSOL, POWDER ORAL at 14:07

## 2024-10-03 RX ADMIN — LEVOTHYROXINE SODIUM 50 MCG: 50 TABLET ORAL at 08:40

## 2024-10-03 RX ADMIN — THIAMINE HCL TAB 100 MG 100 MG: 100 TAB at 08:40

## 2024-10-03 RX ADMIN — LEVETIRACETAM 500 MG: 5 INJECTION INTRAVENOUS at 06:26

## 2024-10-03 RX ADMIN — HYDROCORTISONE SODIUM SUCCINATE 50 MG: 100 INJECTION, POWDER, FOR SOLUTION INTRAMUSCULAR; INTRAVENOUS at 08:32

## 2024-10-03 RX ADMIN — OXYCODONE AND ACETAMINOPHEN 1 TABLET: 10; 325 TABLET ORAL at 08:34

## 2024-10-03 RX ADMIN — THIAMINE HCL TAB 100 MG 100 MG: 100 TAB at 16:43

## 2024-10-03 RX ADMIN — METOPROLOL TARTRATE 100 MG: 100 TABLET, FILM COATED ORAL at 20:01

## 2024-10-03 RX ADMIN — GABAPENTIN 300 MG: 300 CAPSULE ORAL at 22:38

## 2024-10-03 RX ADMIN — MULTIVITAMIN TABLET 1 TABLET: TABLET at 13:46

## 2024-10-03 RX ADMIN — ACETAMINOPHEN 650 MG: 325 TABLET, FILM COATED ORAL at 08:40

## 2024-10-03 RX ADMIN — Medication 5 MG: at 20:43

## 2024-10-03 RX ADMIN — FOLIC ACID 1 MG: 1 TABLET ORAL at 08:40

## 2024-10-03 RX ADMIN — OXYCODONE AND ACETAMINOPHEN 1 TABLET: 10; 325 TABLET ORAL at 13:41

## 2024-10-03 RX ADMIN — METOPROLOL TARTRATE 100 MG: 100 TABLET, FILM COATED ORAL at 08:40

## 2024-10-03 RX ADMIN — LEVETIRACETAM 500 MG: 500 TABLET, FILM COATED ORAL at 20:01

## 2024-10-03 RX ADMIN — IPRATROPIUM BROMIDE AND ALBUTEROL SULFATE 3 ML: .5; 3 SOLUTION RESPIRATORY (INHALATION) at 07:40

## 2024-10-03 RX ADMIN — ENOXAPARIN SODIUM 40 MG: 40 INJECTION SUBCUTANEOUS at 11:01

## 2024-10-03 RX ADMIN — IPRATROPIUM BROMIDE AND ALBUTEROL SULFATE 3 ML: .5; 3 SOLUTION RESPIRATORY (INHALATION) at 15:37

## 2024-10-03 RX ADMIN — IPRATROPIUM BROMIDE AND ALBUTEROL SULFATE 3 ML: .5; 3 SOLUTION RESPIRATORY (INHALATION) at 04:01

## 2024-10-03 RX ADMIN — FLUTICASONE FUROATE AND VILANTEROL TRIFENATATE 1 PUFF: 100; 25 POWDER RESPIRATORY (INHALATION) at 14:07

## 2024-10-03 RX ADMIN — THIAMINE HCL TAB 100 MG 100 MG: 100 TAB at 20:42

## 2024-10-03 RX ADMIN — IPRATROPIUM BROMIDE AND ALBUTEROL SULFATE 3 ML: .5; 3 SOLUTION RESPIRATORY (INHALATION) at 19:40

## 2024-10-03 RX ADMIN — OXYCODONE AND ACETAMINOPHEN 1 TABLET: 10; 325 TABLET ORAL at 01:13

## 2024-10-03 RX ADMIN — GABAPENTIN 300 MG: 300 CAPSULE ORAL at 13:46

## 2024-10-03 RX ADMIN — POTASSIUM CHLORIDE 40 MEQ: 20 SOLUTION ORAL at 08:41

## 2024-10-03 ASSESSMENT — ACTIVITIES OF DAILY LIVING (ADL)
ADLS_ACUITY_SCORE: 42
ADLS_ACUITY_SCORE: 40
ADLS_ACUITY_SCORE: 42
ADLS_ACUITY_SCORE: 40
ADLS_ACUITY_SCORE: 43
ADLS_ACUITY_SCORE: 40
ADLS_ACUITY_SCORE: 43
ADLS_ACUITY_SCORE: 42
ADLS_ACUITY_SCORE: 43
ADLS_ACUITY_SCORE: 42
ADLS_ACUITY_SCORE: 40
ADLS_ACUITY_SCORE: 43
ADLS_ACUITY_SCORE: 40
ADLS_ACUITY_SCORE: 40
ADLS_ACUITY_SCORE: 42
ADLS_ACUITY_SCORE: 42
ADLS_ACUITY_SCORE: 40
ADLS_ACUITY_SCORE: 42
ADLS_ACUITY_SCORE: 42
ADLS_ACUITY_SCORE: 40
ADLS_ACUITY_SCORE: 43
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
## 2024-10-03 NOTE — CONSULTS
[facility]  Consult Note - Hospitalist Service  Date of Admission:  9/26/2024  Consult Requested by: Dr. Rajput   Reason for Consult: transfer out of ICU    Assessment & Plan   Jessica Ellison is a 73 year old female admitted on 9/26/2024. She has a PMH significant for chronic pain on opiate therapy, s/p lumbar fusion 2015, central lobar emphysema,  major depressive disorder, hypertension, anemia, seizure, sleep apnea, thyroid disease, tobacco abuse and uncomplicated asthma who presented to [facility] via EMS for respiratory failure. She was intubated in the field after being found down with an apparent overdose. She was transferred from [facility] for ongoing ICU cares.  ICU course included intubation, low dose pressors and neurosurgery consultation for type II dens fracture.     Hospitalist service was consulted on 10/03/24 for assumption of care upon transfer out ICU.     Acute respiratory failure with hypoxia secondary to loss of protective airway reflexes  Uncomplicated asthma   Centrilobular emphysema   Sleep apnea   Found down at home and required intubation in the field. Extubated 10/2/24. Former smoker with 30 pack year hx, quit 2004.   -MRSA nares negative  -Respiratory aerobic culture 4+ gram postic cocci and 2+ mixed jesús   -Received 6 days zosyn IV and 3 days vanco   -Continue pulmonary hygiene measures  -Duonebs prn   -Resume PTA Symbicort, Spiriva, and prn albuterol   -CPAP with home settings     Alcohol and opioid overdose with history of dependence   Chronic pain with continuous opioid dependence s/p lumbar spinal fusion  Major depressive disorder   Seizure disorder  Concern for suicide attempt    Initial labs notable for alcohol level of 0.19. Tylenol and salicylate levels wnl. INR wnl w/ no transaminitis. Metabolic acidosis noted. Cr wnl. Poison control contacted, initially suspected Keppra over dose however levels were subtherapeutic.   *Prescribed percocet every  6 hours as needed PTA for chronic back pain.   *Hx several DWI's and former treatment at [place] [id number], last 10-15 yrs ago   *Family very concerned she is hiding bottles of alcohol and stockpiles opioids   *Patient reports that she drinks half a pint of vodka 3 to 4 days a week to manage her mood and pain  -Psychiatric consultation, 1:1 and suicide precautions discontinued    -Denies suicidality and reports she was simply drinking more than usual and took a Percocet  -Pt not agreeable to chem dep referrals, family would like her to see her get help   -continue gabapentin taper   -continue Levetiacetam 500 mg BID; reports poor compliance with this  -hold PTA duloxetine as she reports not taking this  -continue multivitamin and folic acid   -CIWA scale and VS without medications, nursing to contact provider if patient begins scoring     Urinary retention  Urinary tract infection   Urine culture growing Klebsiella 50-100K and E. Coli 50-100K.  Bruce catheter was replaced due to 1500 mL retained urine overnight 10/30/2024  -Completed a course of antibiotics as noted above  -Continue Bruce and reassess 10/4/2024 with increased mobilization to potentially reattempt a trial void    Type II dens fracture following mechanical fall (9/9-9/11/24)   Patient suffered a mechanical fall in early September and hit her head suffering a type II dens fracture.  She was hospitalized and required no surgical interventions.  A repeat MRI cervical spine 9/30 completed in the setting of possible drug overdose showing fracture through the dens which is better seen on CT.  No definite abnormal fluid collection within the spinal canal and marked multilevel level degenerative changes throughout the cervical spine particularly from C3-C4 through C7-T1.  -to wear Aspen cervical collar at all times   -Pretty collar ordered to wear for hygiene/showering   -follow up with [facility] neurosurgery clinic as scheduled on 10/15/2024  -NSGY signed off 10/3/24      Risk of refeeding secondary to alcohol intake  Diarrhea   Evaluated by SLP post extubation and okay for regular diet and thin liquids with standard aspiration precautions.   -Tube feedings discontinued 10/3   -SLP consultation; signed off 10/3   -rectal tube in place overnight 10/3 and removed later that morning as there is less than 200 mL output  -Banatrol packets TID   -RN managed electrolyte protocols in place     Stress hyperglycemia   Off stress dose steroids   -on medium resistance SSI 3 times daily with meals and at bedtime  -Hypoglycemia protocol in place     Hypertension   -Resumed PTA amlodipine daily and metoprolol tartrated BID   -started on clonidine 0.1 mg q 8hrs     Mild thrombocytopenia   Anemia   Normal prior to admission.      Hypothyroidism   Managed with levothyroxine daily    History of tobacco dependence   Quit in 2004       The patient's care was discussed with the Attending Physician, Dr. Reyes .    Clinically Significant Risk Factors        # Hypokalemia: Lowest K = 3 mmol/L in last 2 days, will replace as needed     # Hypomagnesemia: Lowest Mg = 1.6 mg/dL in last 2 days, will replace as needed     # Thrombocytopenia: Lowest platelets = 111 in last 2 days, will monitor for bleeding   # Hypertension: Noted on problem list               # Financial/Environmental Concerns: none  # COPD: noted on problem list        Taylor Ashby NP  Hospitalist Service  Securely message with [employer] (more info)  Text page via [employer] Paging/Directory   ______________________________________________________________________    Chief Complaint   Overdose     History is obtained from the patient, electronic health record, and Intensivist Dr. Rajput.     History of Present Illness   Jessica Ellison is a 73 year old female admitted on 9/26/2024. She has a PMH significant for chronic pain on opiate therapy, s/p lumbar fusion 2015, central lobar emphysema,  major depressive disorder, hypertension, anemia,  seizure, sleep apnea, thyroid disease, tobacco abuse and uncomplicated asthma who presented to [facility] via EMS for respiratory failure. She was intubated in the field after being found down with an apparent overdose. She was transferred from [facility] for ongoing ICU cares.  ICU course included intubation, low dose pressors and neurosurgery consultation for type II dens fracture.     Hospitalist service was consulted on 10/03/24 for assumption of care upon transfer out ICU.     Patient seen and examined midmorning in her ICU room.  Flat affect.  Reports poor sleep since midnight given frequent checks and pain.  Sitting upright in bedside recliner chair.  Denies chest pain or shortness of breath.  Denies fevers or chills.  She reports PTA drinking more than usual and taking a Percocet.  Declines further conversation surrounding her chemical dependency issues.       Past Medical History    Past Medical History:   Diagnosis Date    Anemia     Aneurysm (H)     Antiplatelet or antithrombotic long-term use     Arthritis     Chemical dependency (H)     Chem Dep RX    Depression     History of blood transfusion     Hypertension     Menopausal symptoms     Other chronic pain     Lower back and hips    Seizure (H) 02/20/2024    Sleep apnea     Sleep disorder     Thyroid disease     Tobacco abuse     Uncomplicated asthma        Past Surgical History   Past Surgical History:   Procedure Laterality Date    ABDOMEN SURGERY      APPENDECTOMY  1960    APPENDECTOMY      ARTHROPLASTY KNEE Left 08/16/2023    Procedure: LEFT TOTAL KNEE ARTHROPLASTY;  Surgeon: Bryan Roque MD;  Location: [facility] Main OR    BACK SURGERY      BIOPSY BREAST      cerebral aneurysm  2014    coiled    CEREBRAL ANEURYSM REPAIR      cholecystectomy      COLON SURGERY      COLONOSCOPY  04/19/2005    COLONOSCOPY N/A 5/13/2024    Procedure: COLONOSCOPY WITH BIOPSY;  Surgeon: Radha Glez MD;  Location: [facility] Main OR    [facility]  SURGERY      HC EXCISION BREAST LESION, OPEN >=1      core biopsy 2006, lumpectomy 2006    HERNIA REPAIR      LAP VENTRAL HERNIA REPAIR  2017    [place]    LAPAROSCOPIC ASSISTED HYSTERECTOMY VAGINAL  2017    LUMPECTOMY BREAST      orif left femoral neck Left 2016    stress fracture.  done at [facility]    SURGICAL HISTORY OF -       Skin Graft    ZZC PART REMOVAL COLON W ANASTOMOSIS  2005    diverticulitis    ZZC SPINE FUSION,ANTER,8+ SGMTS      Anterior posterior fusion 10 levels, hardware removal and re-fusion        Medications   I have reviewed this patient's current medications       Review of Systems    The 10 point Review of Systems is negative other than noted in the HPI or here.     Social History   I have reviewed this patient's social history and updated it with pertinent information if needed.  Social History     Tobacco Use    Smoking status: Former     Current packs/day: 0.00     Average packs/day: 1 pack/day for 30.0 years (30.0 ttl pk-yrs)     Types: Cigarettes     Start date: 1974     Quit date: 2004     Years since quittin.7    Smokeless tobacco: Never   Vaping Use    Vaping status: Never Used   Substance Use Topics    Alcohol use: Yes     Comment: Glass of wine a few times a week. ETOH dependency, DUI 3/15/10; 1-2 ETOH per week    Drug use: Never     Comment: percocet for the past several years         Allergies   Allergies   Allergen Reactions    Bee Venom     Lisinopril Swelling     Oral swelling        Physical Exam   Vital Signs: Temp: 99.3  F (37.4  C) Temp src: Bladder BP: (!) 133/93 Pulse: 90   Resp: 18 SpO2: 96 % O2 Device: Nasal cannula Oxygen Delivery: 3 LPM  Weight: 126 lbs 8.7 oz    Physical Exam  Constitutional:       General: She is not in acute distress.     Appearance: She is not diaphoretic.   HENT:      Mouth/Throat:      Mouth: Mucous membranes are moist.   Eyes:      Extraocular Movements: Extraocular movements intact.      Pupils: Pupils  are equal, round, and reactive to light.   Neck:      Comments: Aspen collar in place  Cardiovascular:      Rate and Rhythm: Normal rate and regular rhythm.      Pulses: Normal pulses.      Heart sounds: Normal heart sounds.   Pulmonary:      Effort: Pulmonary effort is normal.      Breath sounds: Normal breath sounds.   Abdominal:      General: Bowel sounds are normal. There is no distension.      Palpations: Abdomen is soft.      Tenderness: There is no abdominal tenderness.   Musculoskeletal:         General: Normal range of motion.   Skin:     General: Skin is warm and dry.      Capillary Refill: Capillary refill takes less than 2 seconds.   Neurological:      Mental Status: She is alert and oriented to person, place, and time.   Psychiatric:         Mood and Affect: Mood normal.         Behavior: Behavior normal.         Thought Content: Thought content normal.       Medical Decision Making       60 MINUTES SPENT BY ME on the date of service doing chart review, history, exam, documentation & further activities per the note.      Data     I have personally reviewed the following data over the past 24 hrs:    N/A  \   N/A   / N/A     140 101 16.1 /  132 (H)   3.4 32 (H) 0.59 \       Imaging results reviewed over the past 24 hrs:   No results found for this or any previous visit (from the past 24 hour(s)).

## 2024-10-03 NOTE — PROGRESS NOTES
Patient has been struggling to void-has retention. Very uncomfortable and requested segovia as patient has been straight cathed twice today and twice last night with bladder volumes >1000. Was able to void 400 ml and bladder scan revealed>1000 after void. MD consulted and ordered segovia catheter. Placed segovia with 1575 ml clear urine returned.

## 2024-10-03 NOTE — PROGRESS NOTES
"   10/03/24 0926   Appointment Info   Signing Clinician's Name / Credentials (SLP) Inés Becerra MS CCC-SLP   General Information   Onset of Illness/Injury or Date of Surgery 09/26/24   Referring Physician Noel Johnson APRN CNP   Patient/Family Therapy Goal Statement (SLP) Breakfast   Pertinent History of Current Problem \"Jessica Ellison is a 73 year old female with history of alcohol use disorder, seizures, chronic pain, and depression who was transferred from [facility] ED to [facility] on 9/26/2024 with respiratory failure, possible overdose, and suicide attempt. Neurosurgery was consulted for type II dens fracture noted on 9/9/24 s/p fall. Dr. León at [facility] Neurosurgery was contacted on 9/9/24 and recommended to wear Aspen collar at all times and follow-up in clinic. Patient is scheduled to follow-up with [facility] neurosurgery clinic on 10/15/24.\" Pt intubated on admission 9/26 and extubated 10/2.   General Observations Pt alert, very pleasant and motivated. No hx of dysphagia.   Type of Evaluation   Type of Evaluation Swallow Evaluation   Oral Motor   Oral Musculature generally intact   Dentition (Oral Motor)   Dentition (Oral Motor) natural dentition   Facial Symmetry (Oral Motor)   Facial Symmetry (Oral Motor) WNL   Lip Function (Oral Motor)   Lip Range of Motion (Oral Motor) WNL   Tongue Function (Oral Motor)   Tongue ROM (Oral Motor) WNL   Vocal Quality/Secretion Management (Oral Motor)   Vocal Quality (Oral Motor) hoarse  (slightly hoarse; pt declined any throat pain/soreness)   Secretion Management (Oral Motor) WNL   General Swallowing Observations   Past History of Dysphagia None per EMR or pt report   Respiratory Support nasal cannula   Current Diet/Method of Nutritional Intake (General Swallowing Observations, NIS) NPO;nasogastric tube (NG)   Swallowing Evaluation Clinical swallow evaluation   Clinical Swallow Evaluation   Feeding Assistance frequent cues/help " required   Esophageal Phase of Swallow   Patient reports or presents with symptoms of esophageal dysphagia No   Swallowing Recommendations   Diet Consistency Recommendations regular diet;thin liquids (level 0)   Supervision Level for Intake close supervision needed   Mode of Delivery Recommendations bolus size, small;slow rate of intake;food moistened   Swallowing Maneuver Recommendations alternate food and liquid intake;double dry swallow   Medication Administration Recommendations, Swallowing (SLP) per pt preference   Clinical Impression   Criteria for Skilled Therapeutic Interventions Met (SLP Eval) Evaluation only   Risks & Benefits of therapy have been explained evaluation/treatment results reviewed;care plan/treatment goals reviewed;risks/benefits reviewed;participants voiced agreement with care plan;current/potential barriers reviewed;participants included;patient   Clinical Impression Comments Pt presents with no appreciable dysphagia on clinical swallow evaluation. Pt alert, upright and very pleasant. Oral motor exam WNL. Pt accepted trials of thin water via straw, puree solids and regular solids. Difficulty self feeding d/t collar and UE weakness. With assist, no oral deficits with good oral control and timing. No overt s/sx of aspiration and pt declined any globus sensation. Okay for regular diet and thin liquids with standard aspiration precautions. No further SLP services indicated at this time.   SLP Total Evaluation Time   Eval: oral/pharyngeal swallow function, clinical swallow Minutes (04791) 15   SLP Discharge Planning   SLP Plan DC   SLP Discharge Recommendation home   SLP Rationale for DC Rec Evaluation only   SLP Brief overview of current status  Okay for regular diet and thin liquids with standard aspiration precautions. No further SLP services indicated at this time.   Total Session Time   Total Session Time (sum of timed and untimed services) 15

## 2024-10-03 NOTE — PROVIDER NOTIFICATION
Spoke with ICU WANDY about Continued urinary retention >1000 distress and HTN; Received orders to place indwelling segovia.

## 2024-10-03 NOTE — CONSULTS
"[facility]  Initial Psychiatric Consult   Consult date: October 3, 2024         Reason for Consult, requesting source:    Evaluate suicidal ideation following overdose  Requesting source: Bernice An        HPI:   Jessica Ellison is a 73 year old female who was admitted to [facility] on 9/26/24 as a transfer from [facility], [place] where she presented via EMS with respiratory failure following an overdose on opioids in the setting of alcohol intoxication with an initial EtOH level of 0.19. PMH significant for alcohol use disorder, opioid dependence in the setting of chronic pain, depression, anxiety, insomnia. Psychiatry consulted for evaluation of suicidal ideation.     Patient seen in her hospital room today. Spoke with  and daughter after meeting with patient. When asking patient to describe the events leading to her hospitalization, she indicates taking \"too many pills.\" She says she was in pain at the time and so took extra pain medication as a way to manage her pain. She also reports she was drinking alcohol at the time, and may have had more alcohol than usual. She endorses daily alcohol use, stating she uses alcohol to help with her pain, indicating she experiences both physical and emotional pain. She is somewhat guarded and declines to discuss how she has been feeling recently. She says she stopped her antidepressant \"years\" ago, despite her PCP filling her duloxetine on 8/12. She says she did not feel she needed any medications for her mood. She continues to decline any interventions aimed at addressing depressive symptoms. She denies that this was a suicide attempt. Denies any suicidal thoughts, plans, or intent. Denies any history of suicidal thinking. No evidence for psychosis, mickey, or homicidal thinking.     In discussion with  and daughter in another location,  reports that patient was fiddling around downstairs, and so he decided to " "walk upstairs to go to bed. He overheard patient mention that she had taken a bunch of pills, and told him she loved him. He initially thought nothing of it, but ended up going to check on her after about 10 minutes, and found that she was altered. He then called 911 and patient was transported to the nearby hospital. They indicate that patient has struggled with alcohol use for many years. She has had several DWIs and has had her license revoked. She is not even supposed to be able to buy alcohol. She hides bottles of alcohol around the house. She also hides leftover pain medications around the house, and  had found multiple bottles recently. She was last at [facility] 10-15 years ago. Daughter has reached out to patient's primary care office to express her concern regarding patient being prescribed large sums of opioids, given her substance use history, but these concerns have been minimized. They would like patient to get help for her substance use, and plan to have a conversation with her. If she is not open to getting help, family may be interested in pursuing a civil commitment.         Past Psychiatric History:   Pt with hx of depression, anxiety, and insomnia. Most recently prescribed duloxetine 30 mg BID and gabapentin 600 mg nightly for insomnia. Hx of quetiapine as well. Pt says she has not taken these medicines in \"years,\" although a PCP appointment on 8/12 indicates that she was still taking duloxetine, which patient denies currently. 90 day supply of duloxetine was sent to the pharmacy on 8/12/24. No known prior suicide attempts or psychiatric hospitalizations. Pt is not working with a psychiatrist or psychotherapist.         Substance Use and History:   Pt with hx of alcohol use disorder. Endorses daily alcohol use, unclear amounts. EtOH level on arrival was 0.19. Pt with long hx of alcohol dependence with hx of 4 DWIs. Has been to treatment at [facility] x 2, last about 10-15 years ago. Pt also " with opioid dependence, is prescribed chronic opioid therapy for pain. Last filled oxycodone-acetaminophen  mg #50 on 9/5/24. Per family, pt has stockpiles of opioid prescriptions at home.         Past Medical History:   PAST MEDICAL HISTORY:   Past Medical History:   Diagnosis Date    Anemia     Aneurysm (H)     Antiplatelet or antithrombotic long-term use     Arthritis     Chemical dependency (H)     Chem Dep RX    Depression     History of blood transfusion     Hypertension     Menopausal symptoms     Other chronic pain     Lower back and hips    Seizure (H) 02/20/2024    Sleep apnea     Sleep disorder     Thyroid disease     Tobacco abuse     Uncomplicated asthma        PAST SURGICAL HISTORY:   Past Surgical History:   Procedure Laterality Date    ABDOMEN SURGERY      APPENDECTOMY  1960    APPENDECTOMY      ARTHROPLASTY KNEE Left 08/16/2023    Procedure: LEFT TOTAL KNEE ARTHROPLASTY;  Surgeon: Bryan Roque MD;  Location: [facility] Main OR    BACK SURGERY      BIOPSY BREAST      cerebral aneurysm  2014    coiled    CEREBRAL ANEURYSM REPAIR      cholecystectomy      COLON SURGERY      COLONOSCOPY  04/19/2005    COLONOSCOPY N/A 5/13/2024    Procedure: COLONOSCOPY WITH BIOPSY;  Surgeon: Radha Glez MD;  Location: [place] Main OR    FRACTURE SURGERY      HC EXCISION BREAST LESION, OPEN >=1      core biopsy 2006, lumpectomy 2006    HERNIA REPAIR      LAP VENTRAL HERNIA REPAIR  12/2017    [place]    LAPAROSCOPIC ASSISTED HYSTERECTOMY VAGINAL  12/2017    LUMPECTOMY BREAST      orif left femoral neck Left 06/03/2016    stress fracture.  done at [facility]    SURGICAL HISTORY OF -   2004    Skin Graft    ZZC PART REMOVAL COLON W ANASTOMOSIS  05/2005    diverticulitis    ZZC SPINE FUSION,ANTER,8+ SGMTS  2007    Anterior posterior fusion 10 levels, hardware removal and re-fusion 2009             Family History:   FAMILY HISTORY:   Family History   Problem Relation Age of Onset    Cancer Mother          breast/lung    Alcohol/Drug Mother     Depression Mother     Cancer - colorectal Father     Alcohol/Drug Father     Diabetes Maternal Grandmother     Diabetes Maternal Grandfather     Diabetes Paternal Grandmother     Diabetes Paternal Grandfather     Cancer Paternal Grandfather     Gastrointestinal Disease Paternal Grandfather     Congenital Anomalies Son     Hypertension Brother     Adrenal Disorder Brother         had adrenalectomies    Cerebral palsy Granddaughter            Social History:   SOCIAL HISTORY:   Social History     Tobacco Use    Smoking status: Former     Current packs/day: 0.00     Average packs/day: 1 pack/day for 30.0 years (30.0 ttl pk-yrs)     Types: Cigarettes     Start date: 1974     Quit date: 2004     Years since quittin.7    Smokeless tobacco: Never   Substance Use Topics    Alcohol use: Yes     Comment: Glass of wine a few times a week. ETOH dependency, DUI 3/15/10; 1-2 ETOH per week     Pt resides at home with . She is a retired RN. Hx of DWI in , , , .          Physical ROS:   The patient endorsed physical and emotional pain. The remainder of 10-point review of systems was negative except as noted in HPI.         PTA Medications:     Medications Prior to Admission   Medication Sig Dispense Refill Last Dose    amLODIPine (NORVASC) 5 MG tablet Take 1 tablet (5 mg) by mouth daily 90 tablet 1 Unknown    aspirin (SM ASPIRIN EC) 325 MG EC tablet TAKE ONE TABLET BY MOUTH ONCE DAILY WITH DINNER FOR 30 DAYS 90 tablet 3 Unknown    atorvastatin (LIPITOR) 40 MG tablet Take 1 tablet (40 mg) by mouth daily 90 tablet 3 Unknown    budesonide-formoterol (SYMBICORT) 80-4.5 MCG/ACT Inhaler Inhale 2 puffs into the lungs 2 times daily 6.9 g 1 Unknown    calcium carbonate 600 mg-vitamin D 400 units (CALTRATE) 600-400 MG-UNIT per tablet Take 1 tablet by mouth every evening   Unknown    celecoxib (CELEBREX) 200 MG capsule Take 1 capsule (200 mg) by mouth daily 90 capsule 1  Unknown    DULoxetine (CYMBALTA) 30 MG capsule Take 1 capsule (30 mg) by mouth 2 times daily 180 capsule 1 Unknown    folic acid (FOLVITE) 1 MG tablet Take 1 tablet (1 mg) by mouth daily 90 tablet 3 Unknown    gabapentin (NEURONTIN) 600 MG tablet Take 1 tablet (600 mg) by mouth at bedtime 90 tablet 1 Unknown    levETIRAcetam (KEPPRA) 500 MG tablet Take 1 tablet (500 mg) by mouth 2 times daily 180 tablet 3 Unknown    levothyroxine (SYNTHROID/LEVOTHROID) 50 MCG tablet Take 1 tablet (50 mcg) by mouth daily 90 tablet 3 Unknown    Melatonin 10 MG TABS tablet Take 20 mg by mouth At Bedtime   Unknown    metoprolol succinate ER (TOPROL XL) 50 MG 24 hr tablet Take 1 tablet (50 mg) by mouth 2 times daily 180 tablet 3 Unknown    multivitamin, therapeutic (THERA-VIT) TABS tablet Take 1 tablet by mouth daily   Unknown    naloxone (NARCAN) 4 MG/0.1ML nasal spray Spray 1 spray (4 mg) into one nostril alternating nostrils once as needed for opioid reversal every 2-3 minutes until assistance arrives 0.2 mL 1 Unknown    nystatin (MYCOSTATIN) 955166 UNIT/GM external powder Apply topically 3 times daily as needed (rash)   Unknown    oxyCODONE-acetaminophen (PERCOCET)  MG per tablet Take 1 tablet by mouth every 6 hours as needed for severe pain 50 tablet 0 Unknown    QUEtiapine (SEROQUEL) 50 MG tablet TAKE 2 TABLETS BY MOUTH AT BEDTIME 90 tablet 1 Unknown    tiotropium (SPIRIVA HANDIHALER) 18 MCG inhaled capsule USING THE HANDIHALER, INHALE THE CONTENTS OF ONE CAPSULE BY MOUTH ONCE DAILY 30 capsule 11 Unknown    VENTOLIN  (90 Base) MCG/ACT inhaler INHALE 2 PUFFS INTO THE LUNGS EVERY 6 HOURS AS NEEDED FOR SHORTNESS OF BREATH OR WHEEZING 18 g 1 Unknown          Allergies:     Allergies   Allergen Reactions    Bee Venom     Lisinopril Swelling     Oral swelling          Labs:     Recent Results (from the past 48 hour(s))   Magnesium    Collection Time: 10/01/24 12:48 PM   Result Value Ref Range    Magnesium 2.2 1.7 - 2.3 mg/dL    Phosphorus    Collection Time: 10/01/24 12:48 PM   Result Value Ref Range    Phosphorus 2.6 2.5 - 4.5 mg/dL   Potassium    Collection Time: 10/01/24 12:48 PM   Result Value Ref Range    Potassium 3.7 3.4 - 5.3 mmol/L   Glucose by meter    Collection Time: 10/01/24  4:41 PM   Result Value Ref Range    GLUCOSE BY METER POCT 166 (H) 70 - 99 mg/dL   Glucose by meter    Collection Time: 10/01/24  8:05 PM   Result Value Ref Range    GLUCOSE BY METER POCT 151 (H) 70 - 99 mg/dL   Glucose by meter    Collection Time: 10/02/24 12:42 AM   Result Value Ref Range    GLUCOSE BY METER POCT 150 (H) 70 - 99 mg/dL   Glucose by meter    Collection Time: 10/02/24  5:02 AM   Result Value Ref Range    GLUCOSE BY METER POCT 123 (H) 70 - 99 mg/dL   Creatinine    Collection Time: 10/02/24  5:53 AM   Result Value Ref Range    Creatinine 0.58 0.51 - 0.95 mg/dL    GFR Estimate >90 >60 mL/min/1.73m2   Magnesium    Collection Time: 10/02/24  5:53 AM   Result Value Ref Range    Magnesium 1.9 1.7 - 2.3 mg/dL   CBC with platelets    Collection Time: 10/02/24  5:53 AM   Result Value Ref Range    WBC Count 4.8 4.0 - 11.0 10e3/uL    RBC Count 2.91 (L) 3.80 - 5.20 10e6/uL    Hemoglobin 8.8 (L) 11.7 - 15.7 g/dL    Hematocrit 27.8 (L) 35.0 - 47.0 %    MCV 96 78 - 100 fL    MCH 30.2 26.5 - 33.0 pg    MCHC 31.7 31.5 - 36.5 g/dL    RDW 15.7 (H) 10.0 - 15.0 %    Platelet Count 111 (L) 150 - 450 10e3/uL   Phosphorus    Collection Time: 10/02/24  5:53 AM   Result Value Ref Range    Phosphorus 3.2 2.5 - 4.5 mg/dL   Potassium    Collection Time: 10/02/24  5:53 AM   Result Value Ref Range    Potassium 3.0 (L) 3.4 - 5.3 mmol/L   Glucose by meter    Collection Time: 10/02/24  8:16 AM   Result Value Ref Range    GLUCOSE BY METER POCT 160 (H) 70 - 99 mg/dL   Glucose by meter    Collection Time: 10/02/24 12:09 PM   Result Value Ref Range    GLUCOSE BY METER POCT 150 (H) 70 - 99 mg/dL   Potassium    Collection Time: 10/02/24  2:38 PM   Result Value Ref Range     Potassium 3.5 3.4 - 5.3 mmol/L   Basic metabolic panel    Collection Time: 10/02/24  2:38 PM   Result Value Ref Range    Sodium 143 135 - 145 mmol/L    Potassium 3.5 3.4 - 5.3 mmol/L    Chloride 101 98 - 107 mmol/L    Carbon Dioxide (CO2) 28 22 - 29 mmol/L    Anion Gap 14 7 - 15 mmol/L    Urea Nitrogen 14.6 8.0 - 23.0 mg/dL    Creatinine 0.59 0.51 - 0.95 mg/dL    GFR Estimate >90 >60 mL/min/1.73m2    Calcium 9.0 8.8 - 10.4 mg/dL    Glucose 167 (H) 70 - 99 mg/dL   Glucose by meter    Collection Time: 10/02/24  4:13 PM   Result Value Ref Range    GLUCOSE BY METER POCT 146 (H) 70 - 99 mg/dL   Glucose by meter    Collection Time: 10/02/24  8:18 PM   Result Value Ref Range    GLUCOSE BY METER POCT 125 (H) 70 - 99 mg/dL   Glucose by meter    Collection Time: 10/03/24 12:38 AM   Result Value Ref Range    GLUCOSE BY METER POCT 119 (H) 70 - 99 mg/dL   Glucose by meter    Collection Time: 10/03/24  4:30 AM   Result Value Ref Range    GLUCOSE BY METER POCT 113 (H) 70 - 99 mg/dL   Magnesium    Collection Time: 10/03/24  5:54 AM   Result Value Ref Range    Magnesium 1.6 (L) 1.7 - 2.3 mg/dL   Phosphorus    Collection Time: 10/03/24  5:54 AM   Result Value Ref Range    Phosphorus 3.3 2.5 - 4.5 mg/dL   Basic metabolic panel    Collection Time: 10/03/24  5:54 AM   Result Value Ref Range    Sodium 140 135 - 145 mmol/L    Potassium 3.4 3.4 - 5.3 mmol/L    Chloride 101 98 - 107 mmol/L    Carbon Dioxide (CO2) 32 (H) 22 - 29 mmol/L    Anion Gap 7 7 - 15 mmol/L    Urea Nitrogen 16.1 8.0 - 23.0 mg/dL    Creatinine 0.59 0.51 - 0.95 mg/dL    GFR Estimate >90 >60 mL/min/1.73m2    Calcium 9.2 8.8 - 10.4 mg/dL    Glucose 98 70 - 99 mg/dL   Glucose by meter    Collection Time: 10/03/24  8:16 AM   Result Value Ref Range    GLUCOSE BY METER POCT 92 70 - 99 mg/dL   Glucose by meter    Collection Time: 10/03/24 12:13 PM   Result Value Ref Range    GLUCOSE BY METER POCT 132 (H) 70 - 99 mg/dL          Physical and Psychiatric Examination:     BP (!)  131/99   Pulse 98   Temp 98.6  F (37  C) (Oral)   Resp 12   Wt 57.4 kg (126 lb 8.7 oz)   SpO2 96%   BMI 21.72 kg/m    Weight is 126 lbs 8.7 oz  Body mass index is 21.72 kg/m .    Physical Exam:  I have reviewed the physical exam as documented by by the medical team and agree with findings and assessment and have no additional findings to add at this time.    Mental Status Exam:  Appearance: awake, alert, adequately groomed, appeared as age stated, and dressed in hospital gown, wearing an Aspen collar  Attitude:  guarded  Eye Contact:  fair  Mood:   fine  Affect:  mood congruent and intensity is blunted  Speech:  clear, coherent and normal prosody  Psychomotor Behavior:  no evidence of tardive dyskinesia, dystonia, or tics  Thought Process:  linear  Associations:  no loose associations  Thought Content:  no evidence of psychotic thought and denies suicidal or homicidal thinking  Insight:  partial  Judgement:  fair  Oriented to:  time, person, and place  Attention Span and Concentration:  fair  Recent and Remote Memory:  fair  Language: able to name/identify objects without impairment  Fund of Knowledge: intact with awareness of current and past events           DSM-5 Diagnosis:   Suicide attempt via overdose  Alcohol use disorder, severe, dependence  Opioid use disorder, moderate, dependence  Depression, unspecified          Assessment:   Jessica Ellison is a 73 year old female who was admitted to [facility] on 9/26/24 as a transfer from [facility] where she presented via EMS with respiratory failure following an overdose on opioids in the setting of alcohol intoxication with an initial EtOH level of 0.19. PMH significant for alcohol use disorder, opioid dependence in the setting of chronic pain, depression, anxiety, insomnia. Psychiatry consulted for evaluation of suicidal ideation.     Patient seen, chart reviewed. Discussed situation with patient's  and daughter. Patient is  quite guarded and not real forthcoming regarding the events leading up to her hospitalization. She simply reports that she was in pain and may have taken additional pain medications, along with drinking more alcohol than usual. She denies that this was a suicide attempt. Denies any active or historical suicidal thinking. She is somewhat help-rejecting, does not want any additional support for her mood or her substance use. She unfortunately does not have a great plan regarding how she would reduce her alcohol use, or abstain altogether. She has been to [facility] and participated in AA previously, but is not interested in these supports currently.     Upon speaking with , he indicates that patient told him that she took a number of pills, told her that she loved him. He went upstairs to go to bed, but decided to check on her and found she was altered, leading him to dial 911. Long hx of alcohol dependence with multiple prior DWIs. Family is quite concerned about her. They plan to have a conversation with her regarding her alcohol use and attempt to get her to agree to treatment. They may be interested in pursuing a civil commitment if she is resistive to getting help.           Summary of Recommendations:   1) Patient declines any interventions for her mood. States she is no longer taking duloxetine, which was prescribed as recently as 8/12. Likely has been telling her doctor that she is taking this, when she has not been.   2) Patient is not interested in any chemical dependency resources or referrals.   3) Not interested in followup with any mental health providers  4) Okay to discontinue 1:1 and suicide precautions  5) Family plans to have a conversation with patient about their concerns regarding her alcohol use and this overdose attempt. They would like to see her get some sort of help. If she remains resistive, they may want us to consider a petition for commitment.   6) Psychiatry will follow  peripherally and get involved as needed. Please reach out with any questions or concerns.         Melvin Rosales, NATALY-BC, APRN, CNP  Consult/Liaison Psychiatry  [facility]  Provider can be paged via [employer]  If I am unavailable, please contact [facility] at [phone] to reach the on-call provider.

## 2024-10-03 NOTE — PLAN OF CARE
Problem: Adult Inpatient Plan of Care  Goal: Plan of Care Review  Description: The Plan of Care Review/Shift note should be completed every shift.  The Outcome Evaluation is a brief statement about your assessment that the patient is improving, declining, or no change.  This information will be displayed automatically on your shift  note.  Outcome: Progressing  Flowsheets (Taken 10/3/2024 0504)  Outcome Evaluation: patient on 3 lpm nc, aspen collar remains on-skin intact. A/Ox4. endorses embarrassment and in emotianal distress at start of shift. pt has been unable to void and retaining large amounts of urine in bladder. constant liquid stools despite banatrol via NG. buttocks/perineum excoriated. placed segovia and rectal tube. pt more comfortable and able to rest. precedex stopped at 0000. pt calm and cooperative. scoring 0-3 for CIWA. remains NPO as ordered until speech eval. 1:1 in room. Room secured by RN. hypothermic-paula foss started. D/c'd paula hugger at 0330.  Plan of Care Reviewed With: patient  Overall Patient Progress: improving     Problem: Risk for Delirium  Goal: Improved Attention and Thought Clarity  Outcome: Progressing  Intervention: Maximize Cognitive Function  Recent Flowsheet Documentation  Taken 10/3/2024 0400 by Ratna Panchal RN  Sensory Stimulation Regulation:   care clustered   lighting decreased   quiet environment promoted   visual stimulation minimized   auditory stimulation minimized  Reorientation Measures:   calendar in view   clock in view   reorientation provided  Taken 10/3/2024 0000 by Ratna Panchal, RN  Sensory Stimulation Regulation:   care clustered   lighting decreased   quiet environment promoted   visual stimulation minimized   auditory stimulation minimized  Reorientation Measures:   calendar in view   clock in view   reorientation provided  Taken 10/2/2024 2000 by Ratna Panchal, RN  Sensory Stimulation Regulation:   care clustered   lighting  decreased   quiet environment promoted   visual stimulation minimized   auditory stimulation minimized  Reorientation Measures:   calendar in view   clock in view   reorientation provided     Problem: Pneumonia  Goal: Effective Oxygenation and Ventilation  Outcome: Progressing  Intervention: Promote Airway Secretion Clearance  Recent Flowsheet Documentation  Taken 10/3/2024 0400 by Ratna Panchal RN  Cough And Deep Breathing: done independently per patient  Taken 10/3/2024 0000 by Ratna Panchal RN  Cough And Deep Breathing: done independently per patient  Taken 10/2/2024 2000 by Ratna Panchal RN  Cough And Deep Breathing: done independently per patient  Intervention: Optimize Oxygenation and Ventilation  Recent Flowsheet Documentation  Taken 10/3/2024 0400 by Ratna Panchal RN  Head of Bed (HOB) Positioning: HOB at 30 degrees  Taken 10/3/2024 0000 by Ratna Panchal RN  Head of Bed (HOB) Positioning: HOB at 30 degrees  Taken 10/2/2024 2200 by Ratna Panchal RN  Head of Bed (HOB) Positioning: HOB at 30 degrees  Taken 10/2/2024 2000 by Ratna Panchal RN  Head of Bed (HOB) Positioning: HOB at 30 degrees     Problem: Risk for Delirium  Goal: Improved Sleep  Outcome: Not Progressing

## 2024-10-03 NOTE — PLAN OF CARE
Problem: Adult Inpatient Plan of Care  Goal: Absence of Hospital-Acquired Illness or Injury  Intervention: Identify and Manage Fall Risk  Recent Flowsheet Documentation  Taken 10/3/2024 1600 by Shoaib Cid  Safety Promotion/Fall Prevention:   room organization consistent   room near nurse's station  Taken 10/3/2024 1200 by Shoaib Cid  Safety Promotion/Fall Prevention:   room organization consistent   room near nurse's station  Taken 10/3/2024 0800 by Shoaib Cid  Safety Promotion/Fall Prevention:   room organization consistent   room near nurse's station  Intervention: Prevent Skin Injury  Recent Flowsheet Documentation  Taken 10/3/2024 1600 by Shoaib Cid  Body Position:   turned   position changed independently  Skin Protection:   transparent dressing maintained   skin to device areas padded  Device Skin Pressure Protection: tubing/devices free from skin contact  Taken 10/3/2024 1200 by Shoaib Cid  Skin Protection:   transparent dressing maintained   skin to device areas padded  Device Skin Pressure Protection: tubing/devices free from skin contact  Taken 10/3/2024 0800 by Shoaib Cid  Body Position: turned  Skin Protection:   transparent dressing maintained   skin to device areas padded  Device Skin Pressure Protection: tubing/devices free from skin contact  Intervention: Prevent and Manage VTE (Venous Thromboembolism) Risk  Recent Flowsheet Documentation  Taken 10/3/2024 1600 by Shoaib Cid  VTE Prevention/Management: SCDs on (sequential compression devices)  Intervention: Prevent Infection  Recent Flowsheet Documentation  Taken 10/3/2024 1600 by Shoaib Cid  Infection Prevention: equipment surfaces disinfected  Taken 10/3/2024 1200 by Shoaib Cid  Infection Prevention: equipment surfaces disinfected  Taken 10/3/2024 0800 by Shoaib Cid  Infection Prevention: equipment surfaces disinfected  Goal: Optimal Comfort and Wellbeing  10/3/2024 1806 by Jose Roberto  Shoaib  Outcome: Progressing  10/3/2024 1805 by Shoaib Cid  Outcome: Progressing  10/3/2024 1647 by Shoaib Cid  Outcome: Progressing  Intervention: Provide Person-Centered Care  Recent Flowsheet Documentation  Taken 10/3/2024 1600 by Shoaib Cid  Trust Relationship/Rapport:   care explained   questions answered   reassurance provided  Taken 10/3/2024 1200 by Shoaib Cid  Trust Relationship/Rapport:   care explained   questions answered   reassurance provided  Goal: Readiness for Transition of Care  10/3/2024 1806 by Shoaib Cid  Outcome: Progressing  10/3/2024 1805 by Shoaib Cid  Outcome: Progressing  10/3/2024 1647 by Shoaib Cid  Outcome: Progressing  Goal: Plan of Care Review  Description: The Plan of Care Review/Shift note should be completed every shift.  The Outcome Evaluation is a brief statement about your assessment that the patient is improving, declining, or no change.  This information will be displayed automatically on your shift  note.  10/3/2024 1806 by Shoaib Cid  Outcome: Progressing  10/3/2024 1805 by Shoaib Cid  Outcome: Progressing  10/3/2024 1647 by Shoaib Cid  Outcome: Progressing    Goal Outcome Evaluation:         Neuro: PERRL. Follows commands.   CV: NSR  Activity: up w/ GB, vicenta steady.  Pain: Managed with scheduled Percocet & PRN tylenol. Pain located in posterior neck.  Resp: 2 L NC. Extubated 10/3/24  GI: Watery loose stool. LBM 10/3/24. Rectal tube removed 1730. NJ removed, tolerating regular diet.  : Bruce Catheter in place, adequate output.  Skin: Buttocks excoriated. Bruising on upper and lower arms, left thigh.  Lines/Access: PIV x2 RUE.  Gtts: Saline locked.  Social: Daughter and spouse updated at bedside.  Other: Aspen collar to remain on at all times, skin intact. Pretty collar as bedside for shower/hygiene. RN handoff given, pt transferred to station 66.

## 2024-10-03 NOTE — CONSULTS
Neurosurgery Consultation     Date of Admission:  9/26/2024  Date of Consult (When I saw the patient): 10/03/24    Assessment   Jessica Ellison is a 73 year old female with history of alcohol use disorder, seizures, chronic pain, and depression who was transferred from [facility] ED to  on 9/26/2024 with respiratory failure, possible overdose, and suicide attempt. Neurosurgery was consulted for type II dens fracture noted on 9/9/24 s/p fall. Dr. León at [facility] was contacted on 9/9/24 and recommended to wear Aspen collar at all times and follow-up in clinic. Patient is scheduled to follow-up with [facility] neurosurgery clinic on 10/15/24. Our neurosurgery team was consulted to provide instructions for [employer] collar and activity.    Patient is seen this morning in bed with Aspen collar on. She states she is having neck pain, but denies radiation to her arms. Denies numbness, tingling, or weakness in her extremities. Neuro exam intact.     MRI CERVICAL SPINE WITHOUT AND WITH CONTRAST  9/30/2024 1:51 PM                                                              IMPRESSION:    1. Images are degraded by motion artifact which limits evaluation.  2. Fracture through the dens which is better seen on CT. No definite  abnormal fluid collection within the spinal canal.   3. Marked multilevel degenerative changes throughout the cervical  spine, particularly from C3-C4 through C7-T1. Evaluation by MR is  limited given motion artifact.     Plan   -No neurosurgical intervention indicated at this time  -Wear Aspen cervical collar at all times   -Pretty collar ordered to wear for hygiene/showering   -Avoid heavy lifting, bending, twisting  -Follow up with [facility] neurosurgery clinic as scheduled on 10/15/24  -Appreciate assistance from specialties  -NSGY will sign off at this time     I have discussed the following assessment and plan  with Dr. Gibson.     Vicenta Olmedo, [facility]  [address]  [address]  [place] [address]  Tel [phone]  Fax [phone]     Code Status    Full Code    Reason for Consult   I was asked by Radha Rajput MD to evaluate this patient for:  C spine fracture    Primary Care Physician   Sarah Barriga    Chief Complaint   Respiratory failure    History is obtained from the patient and electronic health record    History of Present Illness   Jessica Ellison is a 73 year old female who presented to [facility] via EMS for respiratory failure. In the ED she required low dose of norepi and transferred to [facility] for continued cares. Patient has a displace otontoid fracture from a fall in early September and was evaluated by [facility], who recommended C-collar and follow up visit. She has a follow up appointment on 10/15    Past Medical History   I have reviewed this patient's medical history and updated it with pertinent information if needed.   Past Medical History:   Diagnosis Date    Anemia     Aneurysm (H)     Antiplatelet or antithrombotic long-term use     Arthritis     Chemical dependency (H)     Chem Dep RX    Depression     History of blood transfusion     Hypertension     Menopausal symptoms     Other chronic pain     Lower back and hips    Seizure (H) 02/20/2024    Sleep apnea     Sleep disorder     Thyroid disease     Tobacco abuse     Uncomplicated asthma        Past Surgical History   I have reviewed this patient's surgical history and updated it with pertinent information if needed.  Past Surgical History:   Procedure Laterality Date    ABDOMEN SURGERY      APPENDECTOMY  1960    APPENDECTOMY      ARTHROPLASTY KNEE Left 08/16/2023    Procedure: LEFT TOTAL KNEE ARTHROPLASTY;  Surgeon: Bryan Roque MD;  Location: [facility] Main OR    BACK SURGERY      BIOPSY BREAST      cerebral aneurysm  2014    coiled    CEREBRAL ANEURYSM REPAIR      cholecystectomy       COLON SURGERY      COLONOSCOPY  04/19/2005    COLONOSCOPY N/A 5/13/2024    Procedure: COLONOSCOPY WITH BIOPSY;  Surgeon: Radha Glez MD;  Location: [place] Main OR    FRACTURE SURGERY      HC EXCISION BREAST LESION, OPEN >=1      core biopsy 2006, lumpectomy 2006    HERNIA REPAIR      LAP VENTRAL HERNIA REPAIR  12/2017    [place]    LAPAROSCOPIC ASSISTED HYSTERECTOMY VAGINAL  12/2017    LUMPECTOMY BREAST      orif left femoral neck Left 06/03/2016    stress fracture.  done at [facility]    SURGICAL HISTORY OF -   2004    Skin Graft    ZZC PART REMOVAL COLON W ANASTOMOSIS  05/2005    diverticulitis    ZZC SPINE FUSION,ANTER,8+ SGMTS  2007    Anterior posterior fusion 10 levels, hardware removal and re-fusion 2009       Prior to Admission Medications   Prior to Admission Medications   Prescriptions Last Dose Informant Patient Reported? Taking?   DULoxetine (CYMBALTA) 30 MG capsule Unknown Other No Yes   Sig: Take 1 capsule (30 mg) by mouth 2 times daily   Melatonin 10 MG TABS tablet Unknown Other Yes Yes   Sig: Take 20 mg by mouth At Bedtime   QUEtiapine (SEROQUEL) 50 MG tablet Unknown Other No Yes   Sig: TAKE 2 TABLETS BY MOUTH AT BEDTIME   VENTOLIN  (90 Base) MCG/ACT inhaler Unknown Other No Yes   Sig: INHALE 2 PUFFS INTO THE LUNGS EVERY 6 HOURS AS NEEDED FOR SHORTNESS OF BREATH OR WHEEZING   amLODIPine (NORVASC) 5 MG tablet Unknown Other No Yes   Sig: Take 1 tablet (5 mg) by mouth daily   aspirin (SM ASPIRIN EC) 325 MG EC tablet Unknown Other No Yes   Sig: TAKE ONE TABLET BY MOUTH ONCE DAILY WITH DINNER FOR 30 DAYS   atorvastatin (LIPITOR) 40 MG tablet Unknown Other No Yes   Sig: Take 1 tablet (40 mg) by mouth daily   budesonide-formoterol (SYMBICORT) 80-4.5 MCG/ACT Inhaler Unknown Other No Yes   Sig: Inhale 2 puffs into the lungs 2 times daily   calcium carbonate 600 mg-vitamin D 400 units (CALTRATE) 600-400 MG-UNIT per tablet Unknown Other Yes Yes   Sig: Take 1 tablet by mouth every evening    celecoxib (CELEBREX) 200 MG capsule Unknown Other No Yes   Sig: Take 1 capsule (200 mg) by mouth daily   folic acid (FOLVITE) 1 MG tablet Unknown Other No Yes   Sig: Take 1 tablet (1 mg) by mouth daily   gabapentin (NEURONTIN) 600 MG tablet Unknown Other No Yes   Sig: Take 1 tablet (600 mg) by mouth at bedtime   levETIRAcetam (KEPPRA) 500 MG tablet Unknown Other No Yes   Sig: Take 1 tablet (500 mg) by mouth 2 times daily   levothyroxine (SYNTHROID/LEVOTHROID) 50 MCG tablet Unknown Other No Yes   Sig: Take 1 tablet (50 mcg) by mouth daily   metoprolol succinate ER (TOPROL XL) 50 MG 24 hr tablet Unknown Other No Yes   Sig: Take 1 tablet (50 mg) by mouth 2 times daily   multivitamin, therapeutic (THERA-VIT) TABS tablet Unknown Other Yes Yes   Sig: Take 1 tablet by mouth daily   naloxone (NARCAN) 4 MG/0.1ML nasal spray Unknown Other No Yes   Sig: Spray 1 spray (4 mg) into one nostril alternating nostrils once as needed for opioid reversal every 2-3 minutes until assistance arrives   nystatin (MYCOSTATIN) 494891 UNIT/GM external powder Unknown Other Yes Yes   Sig: Apply topically 3 times daily as needed (rash)   oxyCODONE-acetaminophen (PERCOCET)  MG per tablet Unknown Other No Yes   Sig: Take 1 tablet by mouth every 6 hours as needed for severe pain   tiotropium (SPIRIVA HANDIHALER) 18 MCG inhaled capsule Unknown Other No Yes   Sig: USING THE HANDIHALER, INHALE THE CONTENTS OF ONE CAPSULE BY MOUTH ONCE DAILY      Facility-Administered Medications: None     Allergies   Allergies   Allergen Reactions    Bee Venom     Lisinopril Swelling     Oral swelling       Social History   I have reviewed this patient's social history and updated it with pertinent information if needed. Jessica Ellison  reports that she quit smoking about 20 years ago. Her smoking use included cigarettes. She started smoking about 50 years ago. She has a 30 pack-year smoking history. She has never used smokeless tobacco. She reports current  alcohol use. She reports that she does not use drugs.    Family History   I have reviewed this patient's family history and updated it with pertinent information if needed.   Family History   Problem Relation Age of Onset    Cancer Mother         breast/lung    Alcohol/Drug Mother     Depression Mother     Cancer - colorectal Father     Alcohol/Drug Father     Diabetes Maternal Grandmother     Diabetes Maternal Grandfather     Diabetes Paternal Grandmother     Diabetes Paternal Grandfather     Cancer Paternal Grandfather     Gastrointestinal Disease Paternal Grandfather     Congenital Anomalies Son     Hypertension Brother     Adrenal Disorder Brother         had adrenalectomies    Cerebral palsy Granddaughter        Review of Systems   10 point ROS negative other than symptoms noted in HPI.    Physical Exam   Vital signs with ranges:   Temp:  [96.3  F (35.7  C)-99.7  F (37.6  C)] 98.6  F (37  C)  Pulse:  [] 100  Resp:  [8-25] 10  BP: (132-187)/() 132/79  FiO2 (%):  [35 %] 35 %  SpO2:  [88 %-97 %] 91 %  126 lbs 8.7 oz    HEENT:  Normocephalic, atraumatic  Heart:  No peripheral edema  Lungs:  No SOB  Skin:  Warm and dry, good capillary refill.    NEUROLOGICAL EXAMINATION:   Mental status:  Alert and Oriented x 3, speech is fluent.  Motor:    Shoulder Abduction  Right:  5/5   Left:  5/5  Biceps                      Right:  5/5   Left:  5/5  Triceps                     Right:  5/5   Left:  5/5  Wrist Extensors        Right:  5/5   Left:  5/5  Wrist Flexors            Right:  5/5   Left:  5/5  Intrinsics                   Right:  5/5   Left:  5/5    Iliopsoas  (hip flexion)               Right: 5/5  Left:  5/5  Quadriceps  (knee extension)       Right:  5/5  Left:  5/5  Hamstrings  (knee flexion)            Right:  5/5  Left:  5/5  Gastroc Soleus  (PF)                          Right:  5/5  Left:  5/5  Tibialis Ant  (DF)                          Right:  5/5  Left:  5/5  EHL                          Right:   5/5  Left:  5/5    Sensation:  Intact to light touch   Reflexes:   Negative Clonus.  Negative Holt's sign.     Data   CBC RESULTS:   Recent Labs   Lab Test 10/02/24  0553   WBC 4.8   RBC 2.91*   HGB 8.8*   HCT 27.8*   MCV 96   MCH 30.2   MCHC 31.7   RDW 15.7*   *     Basic Metabolic Panel:  Lab Results   Component Value Date     10/03/2024     05/24/2021      Lab Results   Component Value Date    POTASSIUM 3.4 10/03/2024    POTASSIUM 4.3 06/24/2022    POTASSIUM 3.3 05/24/2021     Lab Results   Component Value Date    CHLORIDE 101 10/03/2024    CHLORIDE 108 06/24/2022    CHLORIDE 101 05/24/2021     Lab Results   Component Value Date    DELILAH 9.2 10/03/2024    DELILAH 8.9 05/24/2021     Lab Results   Component Value Date    CO2 32 10/03/2024    CO2 28 06/24/2022    CO2 29 05/24/2021     Lab Results   Component Value Date    BUN 16.1 10/03/2024    BUN 24 06/24/2022    BUN 22 05/24/2021     Lab Results   Component Value Date    CR 0.59 10/03/2024    CR 1.23 05/24/2021     Lab Results   Component Value Date    GLC 92 10/03/2024    GLC 98 10/03/2024    GLC 95 08/18/2023    GLC 93 05/24/2021

## 2024-10-03 NOTE — PROGRESS NOTES
.Admission    Patient arrives to room 629 via cart from ICU.  Care plan note: yes    Inpatient nursing criteria listed below were met:    Did you put disposition on whiteboard and in sticky note: Yes  Full skin assessment done (add LDA if skin issue present). Initials of 2nd RN :Yes TIERNEY  Isolation education started/completed No  Patient allergies verified with patient: Yes  Fall Risk? (Care plan updated, Education given and documented) Yes  Primary Care Plan initiated: Yes  Home medications documented in belongings flowsheet: No  Patient belongings documented in belongings flowsheet: Yes  Reminder note (belongings/ medications) placed in discharge instructions:NA  Admission profile/ required documentation complete: NA  If patient is a 72 hour hold/Commitment are belongings removed from room and locked up? NA

## 2024-10-04 ENCOUNTER — APPOINTMENT (OUTPATIENT)
Dept: PHYSICAL THERAPY | Facility: CLINIC | Age: 74
DRG: 917 | End: 2024-10-04
Attending: HOSPITALIST
Payer: COMMERCIAL

## 2024-10-04 LAB
ANION GAP SERPL CALCULATED.3IONS-SCNC: 14 MMOL/L (ref 7–15)
BUN SERPL-MCNC: 18 MG/DL (ref 8–23)
CALCIUM SERPL-MCNC: 9.5 MG/DL (ref 8.8–10.4)
CHLORIDE SERPL-SCNC: 101 MMOL/L (ref 98–107)
CREAT SERPL-MCNC: 0.78 MG/DL (ref 0.51–0.95)
EGFRCR SERPLBLD CKD-EPI 2021: 81 ML/MIN/1.73M2
ERYTHROCYTE [DISTWIDTH] IN BLOOD BY AUTOMATED COUNT: 15.3 % (ref 10–15)
GLUCOSE BLDC GLUCOMTR-MCNC: 111 MG/DL (ref 70–99)
GLUCOSE BLDC GLUCOMTR-MCNC: 113 MG/DL (ref 70–99)
GLUCOSE BLDC GLUCOMTR-MCNC: 88 MG/DL (ref 70–99)
GLUCOSE BLDC GLUCOMTR-MCNC: 92 MG/DL (ref 70–99)
GLUCOSE BLDC GLUCOMTR-MCNC: 94 MG/DL (ref 70–99)
GLUCOSE SERPL-MCNC: 104 MG/DL (ref 70–99)
HCO3 SERPL-SCNC: 26 MMOL/L (ref 22–29)
HCT VFR BLD AUTO: 30.8 % (ref 35–47)
HGB BLD-MCNC: 9.8 G/DL (ref 11.7–15.7)
MAGNESIUM SERPL-MCNC: 1.6 MG/DL (ref 1.7–2.3)
MCH RBC QN AUTO: 30.1 PG (ref 26.5–33)
MCHC RBC AUTO-ENTMCNC: 31.8 G/DL (ref 31.5–36.5)
MCV RBC AUTO: 95 FL (ref 78–100)
PHOSPHATE SERPL-MCNC: 4.1 MG/DL (ref 2.5–4.5)
PLATELET # BLD AUTO: 142 10E3/UL (ref 150–450)
POTASSIUM SERPL-SCNC: 3.4 MMOL/L (ref 3.4–5.3)
RBC # BLD AUTO: 3.26 10E6/UL (ref 3.8–5.2)
SODIUM SERPL-SCNC: 141 MMOL/L (ref 135–145)
WBC # BLD AUTO: 5.3 10E3/UL (ref 4–11)

## 2024-10-04 PROCEDURE — 250N000013 HC RX MED GY IP 250 OP 250 PS 637: Performed by: HOSPITALIST

## 2024-10-04 PROCEDURE — 250N000013 HC RX MED GY IP 250 OP 250 PS 637

## 2024-10-04 PROCEDURE — 84100 ASSAY OF PHOSPHORUS: CPT | Performed by: HOSPITALIST

## 2024-10-04 PROCEDURE — 94640 AIRWAY INHALATION TREATMENT: CPT

## 2024-10-04 PROCEDURE — 120N000001 HC R&B MED SURG/OB

## 2024-10-04 PROCEDURE — 97530 THERAPEUTIC ACTIVITIES: CPT | Mod: GP | Performed by: PHYSICAL THERAPIST

## 2024-10-04 PROCEDURE — 85027 COMPLETE CBC AUTOMATED: CPT

## 2024-10-04 PROCEDURE — 99233 SBSQ HOSP IP/OBS HIGH 50: CPT | Performed by: HOSPITALIST

## 2024-10-04 PROCEDURE — 80048 BASIC METABOLIC PNL TOTAL CA: CPT

## 2024-10-04 PROCEDURE — 36415 COLL VENOUS BLD VENIPUNCTURE: CPT

## 2024-10-04 PROCEDURE — 250N000011 HC RX IP 250 OP 636

## 2024-10-04 PROCEDURE — 97116 GAIT TRAINING THERAPY: CPT | Mod: GP | Performed by: PHYSICAL THERAPIST

## 2024-10-04 PROCEDURE — 250N000013 HC RX MED GY IP 250 OP 250 PS 637: Performed by: INTERNAL MEDICINE

## 2024-10-04 PROCEDURE — 250N000009 HC RX 250

## 2024-10-04 PROCEDURE — 94640 AIRWAY INHALATION TREATMENT: CPT | Mod: 76

## 2024-10-04 PROCEDURE — 97161 PT EVAL LOW COMPLEX 20 MIN: CPT | Mod: GP | Performed by: PHYSICAL THERAPIST

## 2024-10-04 PROCEDURE — 83735 ASSAY OF MAGNESIUM: CPT | Performed by: HOSPITALIST

## 2024-10-04 PROCEDURE — 999N000157 HC STATISTIC RCP TIME EA 10 MIN

## 2024-10-04 RX ORDER — POTASSIUM CHLORIDE 20MEQ/15ML
40 LIQUID (ML) ORAL ONCE
Status: COMPLETED | OUTPATIENT
Start: 2024-10-04 | End: 2024-10-05

## 2024-10-04 RX ORDER — CLONIDINE HYDROCHLORIDE 0.1 MG/1
0.1 TABLET ORAL 2 TIMES DAILY
Status: DISCONTINUED | OUTPATIENT
Start: 2024-10-04 | End: 2024-10-04

## 2024-10-04 RX ORDER — CLONIDINE HYDROCHLORIDE 0.1 MG/1
0.1 TABLET ORAL 3 TIMES DAILY
Status: DISPENSED | OUTPATIENT
Start: 2024-10-04

## 2024-10-04 RX ADMIN — CLONIDINE HYDROCHLORIDE 0.1 MG: 0.1 TABLET ORAL at 21:12

## 2024-10-04 RX ADMIN — LEVETIRACETAM 500 MG: 500 TABLET, FILM COATED ORAL at 10:26

## 2024-10-04 RX ADMIN — METOPROLOL TARTRATE 100 MG: 100 TABLET, FILM COATED ORAL at 21:12

## 2024-10-04 RX ADMIN — FOLIC ACID 1 MG: 1 TABLET ORAL at 10:26

## 2024-10-04 RX ADMIN — THIAMINE HCL TAB 100 MG 100 MG: 100 TAB at 10:26

## 2024-10-04 RX ADMIN — LEVOTHYROXINE SODIUM 50 MCG: 50 TABLET ORAL at 06:30

## 2024-10-04 RX ADMIN — IPRATROPIUM BROMIDE AND ALBUTEROL SULFATE 3 ML: .5; 3 SOLUTION RESPIRATORY (INHALATION) at 19:36

## 2024-10-04 RX ADMIN — HYDRALAZINE HYDROCHLORIDE 10 MG: 20 INJECTION INTRAMUSCULAR; INTRAVENOUS at 18:06

## 2024-10-04 RX ADMIN — OXYCODONE AND ACETAMINOPHEN 1 TABLET: 10; 325 TABLET ORAL at 18:44

## 2024-10-04 RX ADMIN — CLONIDINE HYDROCHLORIDE 0.1 MG: 0.1 TABLET ORAL at 16:18

## 2024-10-04 RX ADMIN — ACETAMINOPHEN 650 MG: 325 TABLET, FILM COATED ORAL at 01:13

## 2024-10-04 RX ADMIN — ENOXAPARIN SODIUM 40 MG: 40 INJECTION SUBCUTANEOUS at 11:15

## 2024-10-04 RX ADMIN — IPRATROPIUM BROMIDE AND ALBUTEROL SULFATE 3 ML: .5; 3 SOLUTION RESPIRATORY (INHALATION) at 07:36

## 2024-10-04 RX ADMIN — UMECLIDINIUM 1 PUFF: 62.5 AEROSOL, POWDER ORAL at 11:14

## 2024-10-04 RX ADMIN — THIAMINE HCL TAB 100 MG 100 MG: 100 TAB at 16:18

## 2024-10-04 RX ADMIN — OXYCODONE AND ACETAMINOPHEN 1 TABLET: 10; 325 TABLET ORAL at 02:40

## 2024-10-04 RX ADMIN — IPRATROPIUM BROMIDE AND ALBUTEROL SULFATE 3 ML: .5; 3 SOLUTION RESPIRATORY (INHALATION) at 12:24

## 2024-10-04 RX ADMIN — GABAPENTIN 100 MG: 100 CAPSULE ORAL at 21:12

## 2024-10-04 RX ADMIN — THIAMINE HCL TAB 100 MG 100 MG: 100 TAB at 21:12

## 2024-10-04 RX ADMIN — FLUTICASONE FUROATE AND VILANTEROL TRIFENATATE 1 PUFF: 100; 25 POWDER RESPIRATORY (INHALATION) at 11:14

## 2024-10-04 RX ADMIN — MULTIVITAMIN TABLET 1 TABLET: TABLET at 13:04

## 2024-10-04 RX ADMIN — CLONIDINE HYDROCHLORIDE 0.1 MG: 0.1 TABLET ORAL at 06:30

## 2024-10-04 RX ADMIN — Medication 5 MG: at 21:12

## 2024-10-04 RX ADMIN — LEVETIRACETAM 500 MG: 500 TABLET, FILM COATED ORAL at 21:12

## 2024-10-04 RX ADMIN — GABAPENTIN 100 MG: 100 CAPSULE ORAL at 13:04

## 2024-10-04 RX ADMIN — GABAPENTIN 300 MG: 300 CAPSULE ORAL at 06:30

## 2024-10-04 RX ADMIN — OXYCODONE AND ACETAMINOPHEN 1 TABLET: 10; 325 TABLET ORAL at 13:06

## 2024-10-04 ASSESSMENT — ACTIVITIES OF DAILY LIVING (ADL)
ADLS_ACUITY_SCORE: 43
ADLS_ACUITY_SCORE: 42
ADLS_ACUITY_SCORE: 43
ADLS_ACUITY_SCORE: 42
ADLS_ACUITY_SCORE: 43
ADLS_ACUITY_SCORE: 42
ADLS_ACUITY_SCORE: 42
ADLS_ACUITY_SCORE: 43
ADLS_ACUITY_SCORE: 43
ADLS_ACUITY_SCORE: 42
ADLS_ACUITY_SCORE: 43
ADLS_ACUITY_SCORE: 42
ADLS_ACUITY_SCORE: 43
ADLS_ACUITY_SCORE: 43

## 2024-10-04 NOTE — PROGRESS NOTES
10/04/24 1102   Appointment Info   Signing Clinician's Name / Credentials (PT) Honey Sullivan, PT   Living Environment   People in Home spouse   Current Living Arrangements house   Home Accessibility no concerns   Living Environment Comments Pt. reports no stairs to enter and doesn't have to climb stairs to enter. Has second floor in home but doesn't have to climb.   Self-Care   Usual Activity Tolerance moderate   Current Activity Tolerance fair   Regular Exercise No   Equipment Currently Used at Home walker, rolling;cane, straight   Fall history within last six months yes   Activity/Exercise/Self-Care Comment indep with mobility, has 4WW and cane if needed. indep with ADL's/IADL's, spouse able to assist as needed.   General Information   Onset of Illness/Injury or Date of Surgery 10/26/24   Referring Physician Rod Kellogg MD   Patient/Family Therapy Goals Statement (PT) return home   Pertinent History of Current Problem (include personal factors and/or comorbidities that impact the POC) Jessica Ellison is a 73 year old female with a history of alcohol use disorder, seizures on levetiracetam, chronic pain on opiate therapy, and major depressive disorder who presented to [facility] via EMS for respiratory failure. EMS reports that they were called to the scene of an apparent overdose.  The patient had apparently had an argument with her significant other, he told police that he went away and then came back about 10 minutes later and found her unresponsive. Police and EMS found the patient with slow and shallow breathing with small pupils. Given narcan with minimal improvement. Intubated. Required low dose of norepi and transferred to [facility] for continued cares.      Initial labs notable for alcohol level of 0.19. Tylenol and and salicylate levels wnl. INR wnl w/ no transaminitis. Metabolic acidosis noted. Cr wnl. Poison control contacted, suspected Keppra as culprit medication. Is also prescribed  BB/CCB. Hgb stable.      LLL infiltrated noted on CXR. Unable to visualize diaphragm. C/f possible aspiration pneumonia. Requiring 50% FiO2.      Of note, patient has a displaced odontoid fracture from a fall in early September as per chart review. Was evaluated by an outside neurosurgery team, who recommended C-collar and follow up visits. Currently wearing C-collar. Per admit cervical CT, fracture is newly anteriorly shifted by 4 mm.   Existing Precautions/Restrictions fall   Cognition   Affect/Mental Status (Cognition) WFL   Orientation Status (Cognition) oriented x 4   Follows Commands (Cognition) WFL   Safety Deficit (Cognition) awareness of need for assistance;insight into deficits/self-awareness;judgment;problem-solving;safety precautions awareness;safety precautions follow-through/compliance   Pain Assessment   Patient Currently in Pain No   Integumentary/Edema   Integumentary/Edema no deficits were identifed   Posture    Posture Forward head position;Protracted shoulders   Posture Comments In Aspen cervical collar   Range of Motion (ROM)   ROM Comment B shoulder flexion approx. to 60 degrees actively   Strength (Manual Muscle Testing)   Strength (Manual Muscle Testing) Deficits observed during functional mobility   Bed Mobility   Comment, (Bed Mobility) Sit to supine with Mod A for L/E's   Transfers   Comment, (Transfers) STS with Min   Gait/Stairs (Locomotion)   Comment, (Gait/Stairs) Amb. 15' for eval with WW and Min plus wc follow   Balance   Balance Comments Min A with WW; imbalance and fall risk noted   Sensory Examination   Sensory Perception patient reports no sensory changes   Coordination   Coordination no deficits were identified   Muscle Tone   Muscle Tone no deficits were identified   Clinical Impression   Criteria for Skilled Therapeutic Intervention Yes, treatment indicated   PT Diagnosis (PT) Impaired functional mobility   Influenced by the following impairments reduced activity tolerance,  balance deficits, weakness, fatigue   Functional limitations due to impairments decreased I and endurance in functional mobility   Clinical Presentation (PT Evaluation Complexity) stable   Clinical Presentation Rationale clinical judgement   Clinical Decision Making (Complexity) low complexity   Planned Therapy Interventions (PT) gait training;home exercise program;patient/family education;strengthening;transfer training;progressive activity/exercise;risk factor education;home program guidelines   Risk & Benefits of therapy have been explained evaluation/treatment results reviewed;care plan/treatment goals reviewed;risks/benefits reviewed;current/potential barriers reviewed;participants voiced agreement with care plan;participants included;patient   PT Total Evaluation Time   PT Eval, Low Complexity Minutes (40213) 10   Physical Therapy Goals   PT Frequency 6x/week  (adamant she is going home at d/c; does acknowledge  is hesitant about this)   PT Predicted Duration/Target Date for Goal Attainment 09/26/24   PT: Bed Mobility Supervision/stand-by assist;Supine to/from sit   PT: Transfers Supervision/stand-by assist;Sit to/from stand;Bed to/from chair;Assistive device   PT: Gait Supervision/stand-by assist;Rolling walker;150 feet   Therapeutic Activity   Therapeutic Activities: dynamic activities to improve functional performance Minutes (38412) 15   Symptoms Noted During/After Treatment Fatigue   Treatment Detail/Skilled Intervention Patient sitting in recliner. Nsg. arrived and took vitals. O2 was around pt. but not in nose. O2 sats 93% on RA; nsg. kept off. Hr 103 and /80. Second person for wc follow. STS with great effort and Min A into WW; unsteadiness and weakness noted. Further STS throughout session with Min A; L/E weakness again noted each time; fall risk. Returned to supine with Mod A; especially for L/E's. HR in low 100's and O2 between 90-95%; varialble, on RA. Nursing aware and oximeter kept  on. Bed alarm on. Discussion of recommendations. Pt. adamantly wants home but TCU is recommended unless family feels they can provide A. [place] collar in place throughoutt.   Gait Training   Gait Training Minutes (36686) 15   Symptoms Noted During/After Treatment (Gait Training) fatigue   Treatment Detail/Skilled Intervention Patient ambulated 60' and 75' plus turn with WW and Min A plus wc follow. Sign. weakness noted; places walker too far forward. Verbal and tactile cueing for upright posture. Mild LOB x 2 with Min to slight Mod to regain. Fall risk noted. Rest of 5 min. between walks. Pt. reports feeling tired.   PT Discharge Planning   PT Plan transfers, gait with recliner or wc follow, STSs, strengthening   PT Discharge Recommendation (DC Rec) Transitional Care Facility   PT Rationale for DC Rec Patient is requiring Min A plus wc follow. Fatigues and is imbalanced; sig. L/E and overall weakness noted. Given deconditioning and fall risk recommend TCU to increase strength and functional mobility. Pt. did state her  and daughter would not want her to come straight home.   PT Brief overview of current status Min to Mod plus wc follow. Recommend second person for smaller places such as bathroom and for recliner follow when amb. with WW. Goals of therapy will be to address safe mobility and make recs for d/c to next level of care. Pt and RN will continue to follow all falls risk precautions as documented by RN staff while hospitalized   PT Equipment Needed at Discharge   (has 4WW and SEC)   Total Session Time   Timed Code Treatment Minutes 30   Total Session Time (sum of timed and untimed services) 40

## 2024-10-04 NOTE — PLAN OF CARE
Summary: intentional overdose, respiratory failure, acquired UTI/pneumonia. Prior cervical fracture. Hx depression, anxiety, sleep apnea, chronic pain.  DATE & TIME: 10/3 [id number]   Cognitive Concerns/ Orientation : Aox4, calm, cooperative, tearful with transition to floor   BEHAVIOR & AGGRESSION TOOL COLOR: green  CIWA SCORE: 0   ABNL VS/O2: VSS RA  MOBILITY: A2 sarasteady/lift; turn and repo Q4  PAIN MANAGMENT: pain in neck radiating to head; tylenol given x1 PRN, scheduled percocet  DIET: regular, tolerating  BOWEL/BLADDER: segovia in for retention, void trial 10/4; no BM, small amount of blood post rectal tube removal  ABNL LAB/BG: mag 1.6 (recheck in)   DRAIN/DEVICES: 2 PIV SL  SKIN: scattered bruising, large areas on thighs/arms; very red and raw excoriated areas in edison/buttocks; some scattered scabbing, areas under Aspen collar padded with mepilex, inspected and intact  TESTS/PROCEDURES:   D/C DAY/GOALS/PLACE: pending improvement  OTHER IMPORTANT INFO: pt, psych consulted; neurosurg signed off  Patient has Aspen collar to wear constantly, remove Q4 for skin check (see order); Pretty collar in room to wear with hygiene cares or shower. Seizure precautions maintained. Extubated 10/2. Suicide sit/precautions ended 10/3 per psych.

## 2024-10-04 NOTE — PLAN OF CARE
Summary: intentional overdose, respiratory failure, acquired UTI/pneumonia. Prior cervical fracture. Hx depression, anxiety, sleep apnea, chronic pain.  DATE & TIME: 10/3/24-10/4/24 0822-6017   Cognitive Concerns/ Orientation : Aox4, calm, cooperative, tearful with transition to floor   BEHAVIOR & AGGRESSION TOOL COLOR: green  CIWA SCORE: 0   ABNL VS/O2: VSS RA  MOBILITY: A2 sarasteady/lift; turn and repo Q2  PAIN MANAGMENT: pain in neck radiating to head; tylenol given x1 PRN, scheduled percocet  DIET: regular, tolerating  BOWEL/BLADDER: segovia in for retention, void trial 10/4; no BM,  ABNL LAB/BG: mag 1.6 (replaced on days, recheck in AM)   DRAIN/DEVICES: 2 PIV SL  SKIN: scattered bruising, large areas on thighs/arms; very red and raw excoriated areas in edison/buttocks; some scattered scabbing, areas under Aspen collar padded with mepilex, inspected and intact  TESTS/PROCEDURES:   D/C DAY/GOALS/PLACE: pending improvement  OTHER IMPORTANT INFO: pt, psych consulted; neurosurg signed off  Patient has Aspen collar to wear constantly, remove Q4 for skin check (see order); Pretty collar in room to wear with hygiene cares or shower. Seizure precautions maintained. Extubated 10/2. Suicide sit/precautions ended 10/3 per psych.

## 2024-10-04 NOTE — PROGRESS NOTES
Care Management Follow Up    Length of Stay (days): 8    Expected Discharge Date: 10/08/2024     Concerns to be Addressed: discharge planning     Patient plan of care discussed at interdisciplinary rounds: Yes    Anticipated Discharge Disposition: Home, Home Care, Outpatient Rehab (PT, OT, SLP, Cardiac or Pulmonary), Transitional Care              Anticipated Discharge Services: Home Care, Transportation Services, Other (see comment) (pending final recommenations)  Anticipated Discharge DME: Other (see comment) (pending recommendations)    Patient/family educated on Medicare website which has current facility and service quality ratings:    Education Provided on the Discharge Plan: Yes  Patient/Family in Agreement with the Plan: yes    Referrals Placed by CM/SW:    Private pay costs discussed: Not applicable    Discussed  Partnership in Safe Discharge Planning  document with patient/family: No     Handoff Completed: No, handoff not indicated or clinically appropriate    Additional Information:  Writer is informed by charge Rn that PT is recommending TCU. Charge RN states that pt's  provided her a list of TCU options. Writer asked charge Rn if pt is alert and oriented so that writer can discuss TCU. Charge RN states pt is not and it is best for writer to talk to pt's  about discharge planning. Writer agreeable to call pt's  Vj.  Writer called pt's  Vj. Introduced self and role. Vj states that pt and he are agreeable to TCU referrals. He states that they would like referrals sent out to [place], [facility], and [place]. Writer informed Vj that writer will send referrals. Writer informed Vj that pt's insurance will require a prior authorization. Vj states understanding.  Vj states no questions or concerns at this time. He is aware writer will send referrals today.  Writer sent referrals to TCU.     Next Steps: awaiting pt to be medically stable; awaiting  accepting TCU.     Maty Crump, BSW  Social Work  [facility]

## 2024-10-04 NOTE — PROGRESS NOTES
[facility]    Medicine Progress Note - Hospitalist Service    Date of Admission:  9/26/2024    Assessment & Plan   Jessica Ellison is a 73 year old female admitted on 9/26/2024.  Past medical history of chronic pain on opiate therapy, s/p lumbar fusion 2015, central lobar emphysema,  major depressive disorder, hypertension, anemia, seizure, sleep apnea, thyroid disease, tobacco abuse and uncomplicated asthma, who presented to [facility] via EMS for respiratory failure. She was intubated in the field after being found down with an apparent overdose. She was transferred from [facility] for ongoing ICU cares.  ICU course included intubation, low dose pressors and neurosurgery consultation for type II dens fracture.  For details, see admission note.  Hospitalist service was consulted on 10/03/24 for assumption of care on transfer out of ICU.      Acute respiratory failure with hypoxia secondary to loss of protective airway reflexes  Uncomplicated asthma   Centrilobular emphysema   Sleep apnea   Found down at home and required intubation in the field. Extubated 10/2/24. Former smoker with 30 pack year hx, quit 2004.   -Chest x-ray 10/2/24 low lung volumes, mild bibasilar atelectasis, mild interstitial prominence  -MRSA nares negative  -Respiratory aerobic culture 4+ gram postic cocci and 2+ mixed jesús   -Received 6 days Zosyn IV and 3 days IV vancomycin   -Continue pulmonary hygiene measures, encourage incentive spirometry  -Duonebs prn   -Resume PTA Symbicort, Spiriva, and prn albuterol   -CPAP with home settings   -Physical therapy consulted for weakness and deconditioning  -AM labs     Alcohol and opioid overdose with history of dependence   Chronic pain with continuous opioid dependence s/p lumbar spinal fusion  Major depressive disorder   Seizure disorder  Concern for suicide attempt    Initial labs notable for alcohol level of 0.19. Tylenol and salicylate levels wnl. INR wnl w/ no  transaminitis. Metabolic acidosis noted. Cr wnl. Poison control contacted, initially suspected Keppra over dose however levels were subtherapeutic.   *Prescribed Percocet every 6 hours as needed PTA for chronic back pain.   *Hx several DWI's and former treatment at [place] [id number], last 10-15 yrs ago   *Family very concerned she is hiding bottles of alcohol and stockpiles opioids by report  *Patient reported that she drinks half a pint of vodka 3 to 4 days a week to manage her mood and pain  -Psychiatry consulted, subsequent 1:1 and suicide precautions discontinued; see consult note for details    -Previously denied suicidality and reported she was simply drinking more than usual and took a Percocet  -Patient was not agreeable to chemical dependency referrals; family would like her to see her get help; outpatient follow-up with primary clinic provider is appropriate  -Continue gabapentin taper**   -Continue Levetiacetam 500 mg BID; reported poor compliance with this per past provider  -HOLD PTA duloxetine as she reported not taking this**  -Continue multivitamin and folic acid, thiamine  -CIWA scale and VS without medications, nursing to contact provider if patient begins scoring  -AM labs      History of urinary retention  Urinary tract infection (UTI)   Urine culture grew Klebsiella 50-100K and E. Coli 50-100K.  Bruce catheter was replaced due to 1500 mL retained urine overnight 10/30/2024  -Completed a course of antibiotics as noted above  -Orders for Bruce removal 10/4/2024 with voiding trial, monitor postvoid residuals for urinary retention     Type II dens fracture following mechanical fall (9/9-9/11/24)   Patient suffered a mechanical fall in early September and hit her head suffering a type II dens fracture.  She was hospitalized and required no surgical interventions.  A repeat MRI cervical spine 9/30 completed in the setting of possible drug overdose showing fracture through the dens which is better seen on  CT.  No definite abnormal fluid collection within the spinal canal and marked multilevel level degenerative changes throughout the cervical spine particularly from C3-C4 through C7-T1.  -To wear Aspen cervical collar at all times   -Pretty collar ordered to wear for hygiene/showering   -Follow up with [facility]surgery clinic as previously scheduled on 10/15/2024  -NSGY signed off inpatient on 10/3/24      Risk of refeeding secondary to alcohol intake  Diarrhea   Evaluated by speech and language therapy (SLP) post extubation and okay for regular diet and thin liquids with standard aspiration precautions.   -Tube feedings discontinued 10/3  -SLP consultation; signed off 10/3   -Rectal tube was in place overnight 10/3 and removed later that morning as there was less than 200 mL output  -Monitor for diarrhea  -RN managed electrolyte protocols in place      Stress-induced hyperglycemia   Off stress dose steroids of recent  -Sliding scale insulin while inpatient  -Hypoglycemia protocol  Recent Labs   Lab 10/04/24  1227 10/04/24  1123 10/04/24  0213 10/03/24  2110 10/03/24  1558 10/03/24  1213   GLC 88 113* 92 90 116* 132*      Hypertension   -Continue amlodipine, metoprolol  -Started on clonidine 0.1 mg q 8h, monitor blood pressure; dose reduced to BID on 10/4**     Mild thrombocytopenia   Anemia, normocytic  -Monitor hemoglobin and platelet counts; suspect alcohol is playing a contributing role  -Monitor platelets in the setting of enoxaparin for DVT prophylaxis**  -Follow-up labs with primary clinic provider as outpatient  Recent Labs   Lab 10/02/24  0553 09/30/24  0504 09/29/24  0453 09/28/24  0424   * 115* 103* 101*     Recent Labs   Lab 10/02/24  0553 09/30/24  0504 09/29/24  0453 09/28/24  0424   HGB 8.8* 9.0* 8.7* 9.2*       Hypokalemia  Hypomagnesemia  -Potassium and magnesium replacement in hospital per electrolyte protocols  Recent Labs   Lab 10/03/24  1338 10/03/24  0554 10/02/24  1438 10/02/24  0553  "10/01/24  1248 10/01/24  0715   POTASSIUM 3.9 3.4 3.5  3.5 3.0* 3.7 3.1*  3.1*     Recent Labs   Lab 10/03/24  0554 10/02/24  0553 10/01/24  1248 10/01/24  0715 09/30/24  2328 09/30/24  0504   MAG 1.6* 1.9 2.2 2.2 2.5* 1.4*     Hypothyroidism   -Continue levothyroxine     History of tobacco dependence   -Quit in 2004    Weakness and deconditioning  -Physical therapy, occupational therapy consulted  -Increase activity as tolerated    Disposition  -Estimated length of stay 24 to 48 hours  -Anticipated discharge to home vs transitional care unit  -Social work consult for discharge planning          Diet: Regular Diet Adult    DVT Prophylaxis: Enoxaparin (Lovenox) SQ  Bruce Catheter: PRESENT, indication: Non-ICU: q2 hour intake/output with documentation, Acute retention or obstruction  Lines: None     Cardiac Monitoring: None  Code Status: Full Code      Clinically Significant Risk Factors            # Hypomagnesemia: Lowest Mg = 1.6 mg/dL in last 2 days, will replace as needed       # Hypertension: Noted on problem list               # Financial/Environmental Concerns: none  # COPD: noted on problem list        Disposition Plan     Medically Ready for Discharge: Anticipated Tomorrow             Rod Kellogg MD  Hospitalist Service  [facility]  Securely message with [employer] (more info)  Text page via [employer] Paging/Directory   ______________________________________________________________________    Interval History   First visit with patient today.  Recent transfer out of ICU and hospitalist service consulted to assist with medical management and assuming care.  Sitting in her chair, awake and in good spirits, feels \"pretty okay\".  No increase shortness of breath or increased pain reported.  Bruce catheter remains in place with plans for removal.  Care plan discussed with nursing staff.  Patient reports past history of urinary retention.  Weak and deconditioned.  Physical therapy " consulted.  Social work consulted.    Physical Exam   Vital Signs: Temp: 98.4  F (36.9  C) Temp src: Oral BP: 129/80 Pulse: 95   Resp: 18 SpO2: 94 % O2 Device: Nasal cannula Oxygen Delivery: 2 LPM  Weight: 134 lbs 4.16 oz    GENERAL awake and alert, no acute distress  LUNGS no wheezes or crackles  HEART S1, S2 with RRR  ABDOMEN soft, nontender  EXTREMITIES without significant edema  SKIN warm and dry  NEURO moves upper and lower extremities spontaneously and to command  MENTAL STATUS answering questions and following simple commands    Medical Decision Making             Data         Imaging results reviewed over the past 24 hrs:   No results found for this or any previous visit (from the past 24 hour(s)).

## 2024-10-05 LAB
GLUCOSE BLDC GLUCOMTR-MCNC: 106 MG/DL (ref 70–99)
GLUCOSE BLDC GLUCOMTR-MCNC: 114 MG/DL (ref 70–99)
GLUCOSE BLDC GLUCOMTR-MCNC: 85 MG/DL (ref 70–99)
GLUCOSE BLDC GLUCOMTR-MCNC: 95 MG/DL (ref 70–99)
GLUCOSE BLDC GLUCOMTR-MCNC: 96 MG/DL (ref 70–99)
MAGNESIUM SERPL-MCNC: 1.7 MG/DL (ref 1.7–2.3)
PHOSPHATE SERPL-MCNC: 5.3 MG/DL (ref 2.5–4.5)
POTASSIUM SERPL-SCNC: 3.5 MMOL/L (ref 3.4–5.3)

## 2024-10-05 PROCEDURE — 99233 SBSQ HOSP IP/OBS HIGH 50: CPT | Performed by: NURSE PRACTITIONER

## 2024-10-05 PROCEDURE — 94640 AIRWAY INHALATION TREATMENT: CPT | Mod: 76

## 2024-10-05 PROCEDURE — 250N000013 HC RX MED GY IP 250 OP 250 PS 637: Performed by: INTERNAL MEDICINE

## 2024-10-05 PROCEDURE — 94640 AIRWAY INHALATION TREATMENT: CPT

## 2024-10-05 PROCEDURE — 250N000009 HC RX 250

## 2024-10-05 PROCEDURE — 250N000013 HC RX MED GY IP 250 OP 250 PS 637

## 2024-10-05 PROCEDURE — 36415 COLL VENOUS BLD VENIPUNCTURE: CPT | Performed by: HOSPITALIST

## 2024-10-05 PROCEDURE — 83735 ASSAY OF MAGNESIUM: CPT | Performed by: HOSPITALIST

## 2024-10-05 PROCEDURE — 250N000011 HC RX IP 250 OP 636

## 2024-10-05 PROCEDURE — 120N000001 HC R&B MED SURG/OB

## 2024-10-05 PROCEDURE — 84100 ASSAY OF PHOSPHORUS: CPT | Performed by: HOSPITALIST

## 2024-10-05 PROCEDURE — 250N000013 HC RX MED GY IP 250 OP 250 PS 637: Performed by: HOSPITALIST

## 2024-10-05 PROCEDURE — 999N000157 HC STATISTIC RCP TIME EA 10 MIN

## 2024-10-05 PROCEDURE — 84132 ASSAY OF SERUM POTASSIUM: CPT | Performed by: PHYSICIAN ASSISTANT

## 2024-10-05 PROCEDURE — 99232 SBSQ HOSP IP/OBS MODERATE 35: CPT | Performed by: HOSPITALIST

## 2024-10-05 RX ORDER — POTASSIUM CHLORIDE 1500 MG/1
20 TABLET, EXTENDED RELEASE ORAL ONCE
Status: COMPLETED | OUTPATIENT
Start: 2024-10-05 | End: 2024-10-05

## 2024-10-05 RX ORDER — IPRATROPIUM BROMIDE AND ALBUTEROL SULFATE 2.5; .5 MG/3ML; MG/3ML
3 SOLUTION RESPIRATORY (INHALATION) EVERY 4 HOURS PRN
Status: ACTIVE | OUTPATIENT
Start: 2024-10-05

## 2024-10-05 RX ADMIN — ENOXAPARIN SODIUM 40 MG: 40 INJECTION SUBCUTANEOUS at 08:41

## 2024-10-05 RX ADMIN — OXYCODONE AND ACETAMINOPHEN 1 TABLET: 10; 325 TABLET ORAL at 19:35

## 2024-10-05 RX ADMIN — AMLODIPINE BESYLATE 5 MG: 5 TABLET ORAL at 08:38

## 2024-10-05 RX ADMIN — LEVETIRACETAM 500 MG: 500 TABLET, FILM COATED ORAL at 08:38

## 2024-10-05 RX ADMIN — OXYCODONE AND ACETAMINOPHEN 1 TABLET: 10; 325 TABLET ORAL at 14:13

## 2024-10-05 RX ADMIN — FLUTICASONE FUROATE AND VILANTEROL TRIFENATATE 1 PUFF: 100; 25 POWDER RESPIRATORY (INHALATION) at 08:41

## 2024-10-05 RX ADMIN — ACETAMINOPHEN 650 MG: 325 TABLET, FILM COATED ORAL at 17:07

## 2024-10-05 RX ADMIN — IPRATROPIUM BROMIDE AND ALBUTEROL SULFATE 3 ML: .5; 3 SOLUTION RESPIRATORY (INHALATION) at 08:09

## 2024-10-05 RX ADMIN — GABAPENTIN 100 MG: 100 CAPSULE ORAL at 06:43

## 2024-10-05 RX ADMIN — CLONIDINE HYDROCHLORIDE 0.1 MG: 0.1 TABLET ORAL at 17:09

## 2024-10-05 RX ADMIN — LEVETIRACETAM 500 MG: 500 TABLET, FILM COATED ORAL at 22:15

## 2024-10-05 RX ADMIN — THIAMINE HCL TAB 100 MG 100 MG: 100 TAB at 22:15

## 2024-10-05 RX ADMIN — CLONIDINE HYDROCHLORIDE 0.1 MG: 0.1 TABLET ORAL at 22:15

## 2024-10-05 RX ADMIN — CLONIDINE HYDROCHLORIDE 0.1 MG: 0.1 TABLET ORAL at 08:38

## 2024-10-05 RX ADMIN — IPRATROPIUM BROMIDE AND ALBUTEROL SULFATE 3 ML: .5; 3 SOLUTION RESPIRATORY (INHALATION) at 11:20

## 2024-10-05 RX ADMIN — LEVOTHYROXINE SODIUM 50 MCG: 50 TABLET ORAL at 06:44

## 2024-10-05 RX ADMIN — OXYCODONE AND ACETAMINOPHEN 1 TABLET: 10; 325 TABLET ORAL at 06:44

## 2024-10-05 RX ADMIN — POTASSIUM CHLORIDE 20 MEQ: 1500 TABLET, EXTENDED RELEASE ORAL at 17:10

## 2024-10-05 RX ADMIN — Medication 5 MG: at 22:15

## 2024-10-05 RX ADMIN — FOLIC ACID 1 MG: 1 TABLET ORAL at 08:39

## 2024-10-05 RX ADMIN — UMECLIDINIUM 1 PUFF: 62.5 AEROSOL, POWDER ORAL at 08:41

## 2024-10-05 RX ADMIN — THIAMINE HCL TAB 100 MG 100 MG: 100 TAB at 08:39

## 2024-10-05 RX ADMIN — OXYCODONE AND ACETAMINOPHEN 1 TABLET: 10; 325 TABLET ORAL at 01:06

## 2024-10-05 RX ADMIN — MULTIVITAMIN TABLET 1 TABLET: TABLET at 14:13

## 2024-10-05 RX ADMIN — POTASSIUM CHLORIDE 40 MEQ: 20 SOLUTION ORAL at 01:26

## 2024-10-05 RX ADMIN — GABAPENTIN 100 MG: 100 CAPSULE ORAL at 14:13

## 2024-10-05 RX ADMIN — GABAPENTIN 100 MG: 100 CAPSULE ORAL at 22:14

## 2024-10-05 RX ADMIN — THIAMINE HCL TAB 100 MG 100 MG: 100 TAB at 17:10

## 2024-10-05 RX ADMIN — METOPROLOL TARTRATE 100 MG: 100 TABLET, FILM COATED ORAL at 08:38

## 2024-10-05 RX ADMIN — METOPROLOL TARTRATE 100 MG: 100 TABLET, FILM COATED ORAL at 22:14

## 2024-10-05 ASSESSMENT — ACTIVITIES OF DAILY LIVING (ADL)
ADLS_ACUITY_SCORE: 42
ADLS_ACUITY_SCORE: 37
ADLS_ACUITY_SCORE: 42
ADLS_ACUITY_SCORE: 42
ADLS_ACUITY_SCORE: 41
ADLS_ACUITY_SCORE: 42
ADLS_ACUITY_SCORE: 41
ADLS_ACUITY_SCORE: 42
ADLS_ACUITY_SCORE: 37
ADLS_ACUITY_SCORE: 41
ADLS_ACUITY_SCORE: 41
ADLS_ACUITY_SCORE: 37
ADLS_ACUITY_SCORE: 42
ADLS_ACUITY_SCORE: 41
ADLS_ACUITY_SCORE: 42
ADLS_ACUITY_SCORE: 37
ADLS_ACUITY_SCORE: 42
ADLS_ACUITY_SCORE: 41
ADLS_ACUITY_SCORE: 42

## 2024-10-05 NOTE — PLAN OF CARE
Goal Outcome Evaluation:    Summary: intentional overdose, respiratory failure, acquired UTI/pneumonia. Prior cervical fracture. Hx depression, anxiety, sleep apnea, chronic pain.  Cognitive Concerns/ Orientation: A/Ox4   BEHAVIOR & AGGRESSION TOOL COLOR: green  CIWA SCORE: 0  ABNL VS/O2: VSS on RA- [place]   MOBILITY: A1 GBW  PAIN MANAGMENT: Had headache on and off during the shift; scheduled Percocet given  DIET: Regular, tolerating  BOWEL/BLADDER: Voiding WDL. No BM.  ABNL LAB/BG:DRAIN/DEVICES: K 3.5, mg 1.7, Phos 5.3 BG 96, 85. 2 PIV SL  SKIN: Scattered bruising, large areas on thighs/arms; some scattered scabbing, areas under Aspen collar padded with mepilex, inspected and intact  TESTS/PROCEDURES:   D/C DAY/GOALS/PLACE: Possibly Monday, TCU placement; CD to see pt on Monday  OTHER IMPORTANT INFO: pt, psych consulted; neurosurg signed off  Patient has Aspen collar to wear constantly, remove Q4 for skin check (see order); Pretty collar in room to wear with hygiene cares or shower. Seizure precautions maintained.

## 2024-10-05 NOTE — PLAN OF CARE
Summary: intentional overdose, respiratory failure, acquired UTI/pneumonia. Prior cervical fracture. Hx depression, anxiety, sleep apnea, chronic pain.  Cognitive Concerns/ Orientation: A/Ox4   BEHAVIOR & AGGRESSION TOOL COLOR: green  CIWA SCORE: 0  ABNL VS/O2: VSS on RA. 2L on NC. MOBILITY: A1 pivot. A2 GBW walking.  PAIN MANAGMENT: C\O HA. Scheduled Percocet given  DIET: Regular, tolerating  BOWEL/BLADDER: Voiding WDL. No BM.  ABNL LAB/BG:DRAIN/DEVICES: K replaced. . 2 PIV SL  SKIN: Scattered bruising, large areas on thighs/arms; very red and raw excoriated areas in edison/buttocks; some scattered scabbing, areas under Aspen collar padded with mepilex, inspected and intact  D/C DAY/GOALS/PLACE: pending improvement  OTHER IMPORTANT INFO: pt, psych consulted; neurosurg signed off  Patient has Aspen collar to wear constantly, remove Q4 for skin check (see order); Pretty collar in room to wear with hygiene cares or shower. Seizure precautions maintained. Extubated 10/2. Suicide sit/precautions ended 10/3 per psych.

## 2024-10-05 NOTE — PROGRESS NOTES
Care Management Follow Up    Length of Stay (days): 9    Expected Discharge Date: 10/08/2024     Concerns to be Addressed: discharge planning     Patient plan of care discussed at interdisciplinary rounds: Yes    Anticipated Discharge Disposition: Home, Home Care, Outpatient Rehab (PT, OT, SLP, Cardiac or Pulmonary), Transitional Care              Anticipated Discharge Services: Home Care, Transportation Services, Other (see comment) (pending final recommenations)  Anticipated Discharge DME: Other (see comment) (pending recommendations)    Patient/family educated on Medicare website which has current facility and service quality ratings:    Education Provided on the Discharge Plan: Yes  Patient/Family in Agreement with the Plan: yes    Referrals Placed by CM/SW:    Private pay costs discussed:     Discussed  Partnership in Safe Discharge Planning  document with patient/family:      Handoff Completed:     Additional Information:  Working with [facility] to determine if [facility] can accept patient. The [place] liaison for [place] is reviewing the assessment with [facility] this weekend.    Due to assessment completed for suicide risk by Mental Health Provider on 10/3, patient will trigger a [place] Level 2 assessment which will need to be completed by [facility] staff prior to patient's discharge.    Note patient is now in agreement with a SUDS assessment which will occur either on Ipad or phone on Monday.      Next Steps:   Speak with [place] liaison tomorrow.  Update patient/spouse.    STACEY Wadsworth

## 2024-10-05 NOTE — PROGRESS NOTES
Medicine Progress Note - Hospitalist Service    Date of Admission:  9/26/2024    Assessment & Plan   Jessica Ellison is a 73 year old female admitted on 9/26/2024.  Past medical history of chronic pain on opiate therapy, s/p lumbar fusion 2015, central lobar emphysema,  major depressive disorder, hypertension, anemia, seizure, sleep apnea, thyroid disease, tobacco abuse and uncomplicated asthma, who presented to [facility] via EMS for respiratory failure. She was intubated in the field after being found down with an apparent overdose. She was transferred from [facility] for ongoing ICU cares.  ICU course included intubation, low dose pressors and neurosurgery consultation for type II dens fracture.  For details, see admission note.  Hospitalist service was consulted on 10/03/24 for assumption of care on transfer out of ICU.      Acute respiratory failure with hypoxia secondary to loss of protective airway reflexes  Uncomplicated asthma   Centrilobular emphysema   Sleep apnea   Found down at home and required intubation in the field. Extubated 10/2/24. Former smoker with 30 pack year hx, quit 2004.  ICU admission, subsequent transfer out.  -Chest x-ray 10/2/24 low lung volumes, mild bibasilar atelectasis, mild interstitial prominence  -MRSA nares negative  -Respiratory aerobic culture 4+ gram postic cocci and 2+ mixed jesús   -Received 6 days Zosyn IV and 3 days IV vancomycin   -Continue pulmonary hygiene measures, encourage incentive spirometry  -Duonebs prn   -Continue current inhaler regimen  -CPAP with home settings   -Physical therapy consulted for weakness and deconditioning, recommending TCU on discharge  -AM labs     Alcohol and opioid overdose with history of dependence   Chronic pain with continuous opioid dependence s/p lumbar spinal fusion  Major depressive disorder   Seizure disorder  Concern for suicide attempt    Initial labs notable for alcohol level of  0.19. Tylenol and salicylate levels wnl. INR wnl w/ no transaminitis. Metabolic acidosis noted. Cr wnl. Poison control contacted, initially suspected Keppra over dose however levels were subtherapeutic.   *Prescribed Percocet every 6 hours as needed PTA for chronic back pain.   *Hx several DWI's and former treatment at [place] [id number], last 10-15 yrs ago   *Family very concerned she is hiding bottles of alcohol and stockpiles opioids by report  *Patient reported that she drinks half a pint of vodka 3 to 4 days a week to manage her mood and pain  -Psychiatry consulted, subsequent 1:1 and suicide precautions discontinued; see consult note for details    -Previously denied suicidality and reported she was simply drinking more than usual and took a Percocet  -Patient was not agreeable to chemical dependency referrals; family would like her to see her get help; outpatient follow-up with primary clinic provider is appropriate  -Continue gabapentin**  -Continue Levetiacetam 500 mg BID; reported poor compliance with this per past provider  -HOLD PTA duloxetine as she reported not taking this**  -Continue multivitamin and folic acid, thiamine  -CIWA scale and VS without medications, nursing to contact provider if patient begins scoring  -AM labs      History of urinary retention  Urinary tract infection (UTI)   Urine culture grew Klebsiella 50-100K and E. Coli 50-100K.  Bruce catheter was replaced due to 1500 mL retained urine overnight 10/30/2024  -Completed a course of antibiotics as noted above  -Orders for Bruce removal 10/4/2024 with voiding trial; Bruce removed without difficulty with no report of recurrent urinary retention     Type II dens fracture following mechanical fall (9/9-9/11/24)   Patient suffered a mechanical fall in early September and hit her head suffering a type II dens fracture.  She was hospitalized and required no surgical interventions.  A repeat MRI cervical spine 9/30 completed in the setting of  possible drug overdose showing fracture through the dens which is better seen on CT.  No definite abnormal fluid collection within the spinal canal and marked multilevel level degenerative changes throughout the cervical spine particularly from C3-C4 through C7-T1.  -To wear Aspen cervical collar at all times   -Pretty collar ordered to wear for hygiene/showering   -Follow up with [facility] neurosurgery clinic as previously scheduled on 10/15/2024  -NSGY signed off inpatient on 10/3/24      Risk of refeeding secondary to alcohol intake  Diarrhea   Evaluated by speech and language therapy (SLP) post extubation and okay for regular diet and thin liquids with standard aspiration precautions.   -Tube feedings discontinued 10/3  -SLP consultation; signed off 10/3   -Rectal tube was in place overnight 10/3 and removed later that morning as there was less than 200 mL output  -Monitor for diarrhea  -RN managed electrolyte protocols in place      Stress-induced hyperglycemia, resolved  Off stress dose steroids of recent  -Sliding scale insulin while inpatient  -Hypoglycemia protocol  Recent Labs   Lab 10/05/24  1231 10/05/24  0803 10/05/24  0144 10/04/24  2145 10/04/24  2008 10/04/24  1724   GLC 85 96 106* 111* 104* 94      Hypertension   -Continue amlodipine, metoprolol  -Started on clonidine 0.1 mg q 8h, monitor blood pressure, follow-up with outpatient/TCU provider  -Recent blood pressure late morning 10/5/2024 was 130/81     Mild thrombocytopenia, resolving  Anemia, normocytic  -Monitor hemoglobin and platelet counts; suspect alcohol is playing a contributing role  -Monitor platelets in the setting of enoxaparin for DVT prophylaxis**  -Follow-up labs with primary clinic provider as outpatient  Recent Labs   Lab 10/04/24  2008 10/02/24  0553 09/30/24  0504 09/29/24  0453   * 111* 115* 103*     Recent Labs   Lab 10/04/24  2008 10/02/24  0553 09/30/24  0504 09/29/24  0453   HGB 9.8* 8.8* 9.0* 8.7*        Hypokalemia  Hypomagnesemia  -Potassium and magnesium replacement in hospital per electrolyte protocols  Recent Labs   Lab 10/05/24  1136 10/04/24  2008 10/03/24  1338 10/03/24  0554 10/02/24  1438 10/02/24  0553   POTASSIUM 3.5 3.4 3.9 3.4 3.5  3.5 3.0*     Recent Labs   Lab 10/05/24  1136 10/04/24  2008 10/03/24  0554 10/02/24  0553 10/01/24  1248 10/01/24  0715   MAG 1.7 1.6* 1.6* 1.9 2.2 2.2     Hypothyroidism   -Continue levothyroxine     History of tobacco dependence   -Quit in 2004    Weakness and deconditioning  -Physical therapy, occupational therapy consulted  -Increase activity as tolerated    Disposition  -Estimated length of stay 24 to 48 hours pending TCU acceptance  -Anticipated discharge to transitional care unit  -Social work consult for discharge planning; referral sent, note 10/4/2024 reviewed          Diet: Regular Diet Adult    DVT Prophylaxis: Enoxaparin (Lovenox) SQ  Bruce Catheter: Not present  Lines: None     Cardiac Monitoring: None  Code Status: Full Code      Clinically Significant Risk Factors            # Hypomagnesemia: Lowest Mg = 1.6 mg/dL in last 2 days, will replace as needed       # Hypertension: Noted on problem list               # Financial/Environmental Concerns: none  # COPD: noted on problem list        Disposition Plan     Medically Ready for Discharge: Anticipated Tomorrow             Rod Kellogg MD  Hospitalist Service  [facility]  Securely message with [employer] (more info)  Text page via [employer] Paging/Directory   ______________________________________________________________________    Interval History   First visit with patient yesterday.  Lying in bed, taking a nap initially, in good spirits.  Remains weak and deconditioned with physical therapy team recommending transitional care unit on discharge.  Social work consulted and referrals pending.  No new symptoms reported.  Bruce catheter removed yesterday without difficulty.    Physical  Exam   Vital Signs: Temp: 98.3  F (36.8  C) Temp src: Oral BP: 130/81 Pulse: 97   Resp: 18 SpO2: 91 % O2 Device: None (Room air) Oxygen Delivery: 2 LPM  Weight: 134 lbs 4.16 oz    GENERAL awake and alert, no acute distress, pleasant and interactive  HEENT cervical collar remains in place  LUNGS no wheezes or crackles, good inspiratory effort  HEART S1, S2 with RRR  ABDOMEN soft, nontender  EXTREMITIES without significant edema  SKIN warm and dry  NEURO moves upper and lower extremities spontaneously and to command  MENTAL STATUS answering questions and following simple commands    Medical Decision Making             Data     I have personally reviewed the following data over the past 24 hrs:    5.3  \   9.8 (L)   / 142 (L)     141 101 18.0 /  85   3.5 26 0.78 \       Imaging results reviewed over the past 24 hrs:   No results found for this or any previous visit (from the past 24 hour(s)).

## 2024-10-05 NOTE — CONSULTS
"    Psychiatry Consultation; Follow up          Reason for Consult, requesting source:    Followup  Requesting source: Bernice An            Interim history:    Jessica Ellison is a 73 year old female who was admitted to [facility] on 9/26/24 as a transfer from [facility] where she presented via EMS with respiratory failure following an overdose on opioids in the setting of alcohol intoxication with an initial EtOH level of 0.19. PMH significant for alcohol use disorder, opioid dependence in the setting of chronic pain, depression, anxiety, insomnia.     Patient seen with  at bedside.  requested an additional meeting to discuss patient's substance use and treatment options. Patient endorses a desire to change her behavior and \"stop binge drinking, keep busy.\" She was pressed on this several times as she does not describe any actual changes she is willing to make to help her abstain from alcohol use. Per , she has been hiding bottles of alcohol around the house. We discuss the likely feelings of shame related with hiding her alcohol use. She expresses sadness and grief related to the recent loss of her brother. Vj wishes that Jessica would open up to him more and talk about how she is feeling. Jessica wants to pursue physical rehabilitation at a TCU so she can get stronger and return home. We continue to encourage her to also work on her emotional health, as this seems to be the missing component. She tries to deal with issues on her own, which we discuss is not always possible. Eventually, patient did agree to speak with an LADC and was open to a CD consult here in the hospital in order to get referrals.          Medications:     Current Facility-Administered Medications   Medication Dose Route Frequency Provider Last Rate Last Admin    acetaminophen (TYLENOL) tablet 650 mg  650 mg Oral or Feeding Tube Q4H PRN Taylor Ashby NP   650 mg at 10/04/24 0113    Or    " acetaminophen (TYLENOL) oral liquid 650 mg  650 mg Per NG tube Q4H PRN Taylor Ashby NP   650 mg at 09/26/24 2027    albuterol (PROVENTIL HFA/VENTOLIN HFA) inhaler  2 puff Inhalation Q6H PRN Taylor Ashby NP        amLODIPine (NORVASC) tablet 5 mg  5 mg Oral Daily Taylor Ashby NP   5 mg at 10/05/24 0838    cloNIDine (CATAPRES) tablet 0.1 mg  0.1 mg Oral TID Rod Kellogg MD   0.1 mg at 10/05/24 0838    glucose gel 15-30 g  15-30 g Oral Q15 Min PRN Taylor Ashby NP        Or    dextrose 50 % injection 25-50 mL  25-50 mL Intravenous Q15 Min PRN Taylor Ashby NP        Or    glucagon injection 1 mg  1 mg Subcutaneous Q15 Min PRN Taylor Ashby NP        enoxaparin ANTICOAGULANT (LOVENOX) injection 40 mg  40 mg Subcutaneous Q24H Taylor Ashby NP   40 mg at 10/05/24 0841    fluticasone-vilanterol (BREO ELLIPTA) 100-25 MCG/ACT inhaler 1 puff  1 puff Inhalation Daily Taylor Ashby NP   1 puff at 10/05/24 0841    folic acid (FOLVITE) tablet 1 mg  1 mg Oral Daily Taylor Ashby NP   1 mg at 10/05/24 0839    gabapentin (NEURONTIN) capsule 100 mg  100 mg Oral Q8H Bernice An MD   100 mg at 10/05/24 1413    hydrALAZINE (APRESOLINE) injection 10 mg  10 mg Intravenous Q6H PRN Taylor Ashby NP   10 mg at 10/04/24 1806    insulin aspart (NovoLOG) injection (RAPID ACTING)  1-7 Units Subcutaneous TID AC Taylor Ashby NP        insulin aspart (NovoLOG) injection (RAPID ACTING)  1-5 Units Subcutaneous At Bedtime Taylor Ashby NP        ipratropium - albuterol 0.5 mg/2.5 mg/3 mL (DUONEB) neb solution 3 mL  3 mL Nebulization Q4H PRN Rod Kellogg MD        levETIRAcetam (KEPPRA) tablet 500 mg  500 mg Oral BID Taylor Ashby, NP   500 mg at 10/05/24 0838    levothyroxine (SYNTHROID/LEVOTHROID) tablet 50 mcg  50 mcg Oral or Feeding Tube QAM AC Taylor Ashby, NP   50 mcg at 10/05/24 0644    melatonin tablet 5 mg  5 mg Oral QPM Taylor Ashby,  NP   5 mg at 10/04/24 2112    metoprolol tartrate (LOPRESSOR) tablet 100 mg  100 mg Oral or Feeding Tube BID Taylor Ashby NP   100 mg at 10/05/24 0838    multivitamin, therapeutic (THERA-VIT) tablet 1 tablet  1 tablet Oral Daily Taylor Ashby NP   1 tablet at 10/05/24 1413    naloxone (NARCAN) injection 0.2 mg  0.2 mg Intravenous Q2 Min PRN Taylor Ashby NP        Or    naloxone (NARCAN) injection 0.4 mg  0.4 mg Intravenous Q2 Min PRN Taylor Ashby NP        Or    naloxone (NARCAN) injection 0.2 mg  0.2 mg Intramuscular Q2 Min PRN Taylor Ashby NP        Or    naloxone (NARCAN) injection 0.4 mg  0.4 mg Intramuscular Q2 Min PRN Taylor Ashby NP        ondansetron (ZOFRAN ODT) ODT tab 4 mg  4 mg Oral Q6H PRN Taylor Ashby NP        Or    ondansetron (ZOFRAN) injection 4 mg  4 mg Intravenous Q6H PRN Taylor Ashby NP        oxyCODONE-acetaminophen (PERCOCET)  MG per tablet 1 tablet  1 tablet Oral or Feeding Tube Q6H Taylor Ashby NP   1 tablet at 10/05/24 1413    prochlorperazine (COMPAZINE) injection 5 mg  5 mg Intravenous Q6H PRN Taylor Ashby NP        Or    prochlorperazine (COMPAZINE) tablet 5 mg  5 mg Oral Q6H PRN Taylor Ashby NP        Or    prochlorperazine (COMPAZINE) suppository 12.5 mg  12.5 mg Rectal Q12H PRN Taylor Ashby NP        thiamine (B-1) tablet 100 mg  100 mg Oral or Feeding Tube TID Taylor Ashby NP   100 mg at 10/05/24 0839    [START ON 10/8/2024] thiamine (B-1) tablet 100 mg  100 mg Oral or Feeding Tube Daily Taylor Ashby NP        umeclidinium (INCRUSE ELLIPTA) 62.5 MCG/ACT inhaler 1 puff  1 puff Inhalation Daily Taylor Ashby NP   1 puff at 10/05/24 0841              MSE:     Appearance: awake, alert, adequately groomed, appeared as age stated, and dressed in hospital gown  Attitude:  guarded  Eye Contact:  fair  Mood:  anxious  Affect:  mood congruent and intensity is blunted  Speech:  clear,  "coherent  Psychomotor Behavior:  no evidence of tardive dyskinesia, dystonia, or tics  Thought Process:  linear  Associations:  no loose associations  Thought Content:  no evidence of suicidal ideation or homicidal ideation and no evidence of psychotic thought  Insight:  fair  Judgement:  fair  Oriented to:  time, person, and place  Attention Span and Concentration:  fair  Recent and Remote Memory:  fair  Language: able to name/identify objects without impairment  Fund of Knowledge: intact with awareness of current and past events    Vital signs:  Temp: 98.3  F (36.8  C) Temp src: Oral BP: 130/81 Pulse: 97   Resp: 18 SpO2: 91 % O2 Device: None (Room air) Oxygen Delivery: 2 LPM   Weight: 60.9 kg (134 lb 4.2 oz)  Estimated body mass index is 23.05 kg/m  as calculated from the following:    Height as of 9/9/24: 1.626 m (5' 4\").    Weight as of this encounter: 60.9 kg (134 lb 4.2 oz).              DSM-5 Diagnosis:   Suicide attempt via overdose  Alcohol use disorder, severe, dependence  Opioid use disorder, moderate, dependence  Depression, unspecified          Assessment:   Pt seen for followup today at 's request. Had a lengthy discussion with patient and Vj regarding her substance use. She has been quite resistive to any form of help for her mental health or substance use. She wants to be able to deal with issues on her own, which has not been working. Wants family to give her a chance to do it on her own, which has not been working. After a period of discussion, patient eventually agreed to a CD assessment here in the hospital.           Summary of Recommendations:   1) CD consult for assessment and referrals - pt verbalizes agreement to this  2) Psychiatry will sign off. Reconsult with any additional needs.       Melvin Rosales, FERP-BC, APRN, CNP  Consult/Liaison Psychiatry  [facility]  Provider can be paged via appssavvy  If I am unavailable, please contact [facility] at [phone] to reach the on-call " provider.

## 2024-10-05 NOTE — PLAN OF CARE
Goal Outcome Evaluation:      Plan of Care Reviewed With: patient, spouse          Outcome Evaluation: discharge to TCU

## 2024-10-06 LAB
GLUCOSE BLDC GLUCOMTR-MCNC: 84 MG/DL (ref 70–99)
GLUCOSE BLDC GLUCOMTR-MCNC: 84 MG/DL (ref 70–99)
GLUCOSE BLDC GLUCOMTR-MCNC: 91 MG/DL (ref 70–99)
GLUCOSE BLDC GLUCOMTR-MCNC: 91 MG/DL (ref 70–99)
HOLD SPECIMEN: 0
MAGNESIUM SERPL-MCNC: 1.8 MG/DL (ref 1.7–2.3)
PHOSPHATE SERPL-MCNC: 4.5 MG/DL (ref 2.5–4.5)
POTASSIUM SERPL-SCNC: 3.8 MMOL/L (ref 3.4–5.3)

## 2024-10-06 PROCEDURE — 84100 ASSAY OF PHOSPHORUS: CPT | Performed by: HOSPITALIST

## 2024-10-06 PROCEDURE — 36415 COLL VENOUS BLD VENIPUNCTURE: CPT | Performed by: HOSPITALIST

## 2024-10-06 PROCEDURE — 250N000013 HC RX MED GY IP 250 OP 250 PS 637: Performed by: INTERNAL MEDICINE

## 2024-10-06 PROCEDURE — 83735 ASSAY OF MAGNESIUM: CPT | Performed by: HOSPITALIST

## 2024-10-06 PROCEDURE — 250N000013 HC RX MED GY IP 250 OP 250 PS 637: Performed by: HOSPITALIST

## 2024-10-06 PROCEDURE — 250N000013 HC RX MED GY IP 250 OP 250 PS 637

## 2024-10-06 PROCEDURE — 99232 SBSQ HOSP IP/OBS MODERATE 35: CPT | Performed by: HOSPITALIST

## 2024-10-06 PROCEDURE — 120N000001 HC R&B MED SURG/OB

## 2024-10-06 PROCEDURE — 84132 ASSAY OF SERUM POTASSIUM: CPT | Performed by: HOSPITALIST

## 2024-10-06 PROCEDURE — 250N000011 HC RX IP 250 OP 636

## 2024-10-06 RX ORDER — POTASSIUM CHLORIDE 1500 MG/1
20 TABLET, EXTENDED RELEASE ORAL ONCE
Status: COMPLETED | OUTPATIENT
Start: 2024-10-06 | End: 2024-10-06

## 2024-10-06 RX ADMIN — UMECLIDINIUM 1 PUFF: 62.5 AEROSOL, POWDER ORAL at 09:59

## 2024-10-06 RX ADMIN — OXYCODONE AND ACETAMINOPHEN 1 TABLET: 10; 325 TABLET ORAL at 06:28

## 2024-10-06 RX ADMIN — METOPROLOL TARTRATE 100 MG: 100 TABLET, FILM COATED ORAL at 20:02

## 2024-10-06 RX ADMIN — AMLODIPINE BESYLATE 5 MG: 5 TABLET ORAL at 09:48

## 2024-10-06 RX ADMIN — GABAPENTIN 100 MG: 100 CAPSULE ORAL at 22:08

## 2024-10-06 RX ADMIN — OXYCODONE AND ACETAMINOPHEN 1 TABLET: 10; 325 TABLET ORAL at 13:04

## 2024-10-06 RX ADMIN — GABAPENTIN 100 MG: 100 CAPSULE ORAL at 13:04

## 2024-10-06 RX ADMIN — THIAMINE HCL TAB 100 MG 100 MG: 100 TAB at 09:48

## 2024-10-06 RX ADMIN — THIAMINE HCL TAB 100 MG 100 MG: 100 TAB at 22:08

## 2024-10-06 RX ADMIN — FLUTICASONE FUROATE AND VILANTEROL TRIFENATATE 1 PUFF: 100; 25 POWDER RESPIRATORY (INHALATION) at 09:59

## 2024-10-06 RX ADMIN — GABAPENTIN 100 MG: 100 CAPSULE ORAL at 06:28

## 2024-10-06 RX ADMIN — METOPROLOL TARTRATE 100 MG: 100 TABLET, FILM COATED ORAL at 09:48

## 2024-10-06 RX ADMIN — CLONIDINE HYDROCHLORIDE 0.1 MG: 0.1 TABLET ORAL at 16:30

## 2024-10-06 RX ADMIN — THIAMINE HCL TAB 100 MG 100 MG: 100 TAB at 16:30

## 2024-10-06 RX ADMIN — LEVOTHYROXINE SODIUM 50 MCG: 50 TABLET ORAL at 06:28

## 2024-10-06 RX ADMIN — ACETAMINOPHEN 650 MG: 325 TABLET, FILM COATED ORAL at 16:30

## 2024-10-06 RX ADMIN — ENOXAPARIN SODIUM 40 MG: 40 INJECTION SUBCUTANEOUS at 09:49

## 2024-10-06 RX ADMIN — Medication 5 MG: at 22:07

## 2024-10-06 RX ADMIN — LEVETIRACETAM 500 MG: 500 TABLET, FILM COATED ORAL at 20:02

## 2024-10-06 RX ADMIN — OXYCODONE AND ACETAMINOPHEN 1 TABLET: 10; 325 TABLET ORAL at 01:25

## 2024-10-06 RX ADMIN — OXYCODONE AND ACETAMINOPHEN 1 TABLET: 10; 325 TABLET ORAL at 20:02

## 2024-10-06 RX ADMIN — LEVETIRACETAM 500 MG: 500 TABLET, FILM COATED ORAL at 09:48

## 2024-10-06 RX ADMIN — POTASSIUM CHLORIDE 20 MEQ: 1500 TABLET, EXTENDED RELEASE ORAL at 16:30

## 2024-10-06 RX ADMIN — ACETAMINOPHEN 650 MG: 325 TABLET, FILM COATED ORAL at 04:26

## 2024-10-06 RX ADMIN — MULTIVITAMIN TABLET 1 TABLET: TABLET at 13:04

## 2024-10-06 RX ADMIN — FOLIC ACID 1 MG: 1 TABLET ORAL at 09:48

## 2024-10-06 RX ADMIN — CLONIDINE HYDROCHLORIDE 0.1 MG: 0.1 TABLET ORAL at 22:08

## 2024-10-06 RX ADMIN — CLONIDINE HYDROCHLORIDE 0.1 MG: 0.1 TABLET ORAL at 09:53

## 2024-10-06 ASSESSMENT — ACTIVITIES OF DAILY LIVING (ADL)
ADLS_ACUITY_SCORE: 37
ADLS_ACUITY_SCORE: 38
ADLS_ACUITY_SCORE: 38
ADLS_ACUITY_SCORE: 35
ADLS_ACUITY_SCORE: 31
ADLS_ACUITY_SCORE: 35
ADLS_ACUITY_SCORE: 37
ADLS_ACUITY_SCORE: 35
ADLS_ACUITY_SCORE: 38
ADLS_ACUITY_SCORE: 35
ADLS_ACUITY_SCORE: 37
ADLS_ACUITY_SCORE: 35
ADLS_ACUITY_SCORE: 31
ADLS_ACUITY_SCORE: 35
ADLS_ACUITY_SCORE: 37
ADLS_ACUITY_SCORE: 35
ADLS_ACUITY_SCORE: 31
ADLS_ACUITY_SCORE: 37
ADLS_ACUITY_SCORE: 31

## 2024-10-06 NOTE — PLAN OF CARE
Summary: intentional overdose, respiratory failure, acquired UTI/pneumonia. Prior cervical fracture. Hx depression, anxiety, sleep apnea, chronic pain.  Cognitive Concerns/ Orientation: A/Ox4   BEHAVIOR & AGGRESSION TOOL COLOR: green  CIWA SCORE: 0, 0  ABNL VS/O2: VSS on RA- JACKSON   MOBILITY: A1 GB/W. Declined to sit up in chair for dinner  PAIN MANAGMENT: c/o chronic neck/back pain, on scheduled percocet, given PRN tylenol x1  DIET: Regular, tolerating  BOWEL/BLADDER: Voiding WDL. Up to BSC, No BM.this shift  ABNL LAB/BG:DRAIN/DEVICES: K 3.5, mg 1.7, Phos 5.3 /95  LINES:. 2 PIV SL  SKIN: Scattered bruising, large areas on thighs/arms; some scattered scabbing, areas under Aspen collar padded with mepilex, inspected and intact  TESTS/PROCEDURES:   D/C DAY/GOALS/PLACE: Possibly Monday, TCU placement; CD to see pt on Monday  OTHER IMPORTANT INFO: pt, psych consulted; neurosurg signed off  Patient has Aspen collar to wear constantly, remove Q4 for skin check (see order); Pretty collar in room to wear with hygiene cares or shower. Seizure precautions maintained.

## 2024-10-06 NOTE — PROGRESS NOTES
"Care Management Follow Up    Length of Stay (days): 10    Expected Discharge Date: 10/07/2024     Concerns to be Addressed: discharge planning     Patient plan of care discussed at interdisciplinary rounds: Yes    Anticipated Discharge Disposition: Transitional Care              Anticipated Discharge Services: Home Care, Transportation Services, Other (see comment) (pending final recommenations)  Anticipated Discharge DME: Other (see comment) (pending recommendations)    Patient/family educated on Medicare website which has current facility and service quality ratings:    Education Provided on the Discharge Plan: Yes  Patient/Family in Agreement with the Plan: yes    Referrals Placed by CM/SW:    Private pay costs discussed:     Discussed  Partnership in Safe Discharge Planning  document with patient/family:      Handoff Completed:     Additional Information:  PAS completed and patient did trigger the Obra Level 2 due to her diagnosis by psychiatry of suicide attempt via overdose.  The Obra Level 2 will be completed by [facility] worker, likely not till Tuesday.  The PAS # is [id number].  Destination    Service Provider Request Status Selected Services Address Phone Fax Patient Preferred   [facility] ()  Pending - [facility] Sent N[facility]A [id number] [id number] [phone] --   [facility] - REFERRAL ONLY (SNF)  Pending - Request Sent N/A [id number] [facility] [id number] [phone] --   [facility] ()  Declined  Cannot meet patient's psychosocial needs N/A [address] [id number] [phone] --   Current Capacity last updated by Florencio Sandoval MA on 4/16/2024  1:38 PM    Admissions is Grisel from [facility] [phone], Per Ronda in admissions \"No contract with [facility]\".              Next Steps:   On Monday, writer will speak with the [facility] liaison " to determine if the facility can offer patient admission and follow up with [facility].  If neither are unable to offer admission writer will need to gather more choices from patient.    Alyssa Cote, SELENASW

## 2024-10-06 NOTE — PLAN OF CARE
Goal Outcome Evaluation:       Shift Summary [id number]    Admitting Diagnosis: Altered mental status, unspecified altered mental status type [8498304], Acute respiratory failure with hypoxia [122747]  Respiratory failure   Vitals : WNL  Pain : MODERATE PAIN. On scheduled Percocet.   A&Ox4  Voiding :WNL  Mobility :Ax1, walker GB.   Tele : NA   CMS : WNL  Lung Sounds CLEAR on room air.    GI : constipation. Due to bowel meds.   Dressing : NA.     Orders Placed This Encounter      Regular Diet Adult       Plan:   Chem dependency consult this week.  Discharge to TCU pending.

## 2024-10-06 NOTE — PLAN OF CARE
Goal Outcome Evaluation:  Summary: intentional overdose, respiratory failure, acquired UTI/pneumonia. Prior cervical fracture. Hx depression, anxiety, sleep apnea, chronic pain.  Cognitive Concerns/ Orientation: A&Ox4, tearful  BEHAVIOR & AGGRESSION TOOL COLOR: green  CIWA SCORE: 0, 0  ABNL VS/O2: VSS on RA- JACKSON   MOBILITY: Ax1 GB/W  PAIN MANAGMENT: c/o chronic neck/back pain, on scheduled percocet, given PRN tylenol x1  DIET: Regular  BOWEL/BLADDER: Continent, Last BM 10/3  ABNL LAB/BG:DRAIN/DEVICES: K 3.5, mg 1.7, Phos 5.3 recheck in the AM  LINES:. 2 PIV SL  SKIN: Scattered bruising, large areas on thighs/arms; some scattered scabbing, areas under Aspen collar padded with mepilex, inspected and intact  TESTS/PROCEDURES: n/a   D/C DAY/GOALS/PLACE: Possibly Monday, TCU placement; CD to see pt on Monday  OTHER IMPORTANT INFO: PT, psych signed off; neurosurg signed off  Patient has Aspen collar to wear constantly, remove Q4 for skin check (see order); Pretty collar in room to wear with hygiene cares or shower. Seizure precautions maintained.

## 2024-10-06 NOTE — PROGRESS NOTES
[facility]    Medicine Progress Note - Hospitalist Service    Date of Admission:  9/26/2024    Assessment & Plan   Jessica Ellison is a 73 year old female admitted on 9/26/2024.  Past medical history of chronic pain on opiate therapy, s/p lumbar fusion 2015, central lobar emphysema,  major depressive disorder, hypertension, anemia, seizure, sleep apnea, thyroid disease, tobacco abuse and uncomplicated asthma, who presented to [facility] via EMS for respiratory failure. She was intubated in the field after being found down with an apparent overdose. She was transferred from [facility] for ongoing ICU cares.  ICU course included intubation, low dose pressors and neurosurgery consultation for type II dens fracture.  For details, see admission note.  Hospitalist service was consulted on 10/03/24 for assumption of care on transfer out of ICU.      Acute respiratory failure with hypoxia secondary to loss of protective airway reflexes  Uncomplicated asthma   Centrilobular emphysema   Sleep apnea   Found down at home and required intubation in the field. Extubated 10/2/24. Former smoker with 30 pack year hx, quit 2004.  ICU admission, subsequent transfer out.  -Chest x-ray 10/2/24 low lung volumes, mild bibasilar atelectasis, mild interstitial prominence  -MRSA nares negative  -Respiratory aerobic culture 4+ gram postic cocci and 2+ mixed jesús   -Received 6 days Zosyn IV and 3 days IV vancomycin   -Continue pulmonary hygiene measures  -Duonebs prn   -Encourage incentive spirometry  -Continue current inhaler regimen  -CPAP with home settings   -Physical therapy consulted for weakness and deconditioning, recommending TCU on discharge  -AM labs     Alcohol and opioid overdose with history of dependence   Chronic pain with continuous opioid dependence s/p lumbar spinal fusion  Major depressive disorder   Seizure disorder  Concern for suicide attempt    Initial labs notable for alcohol level of  0.19. Tylenol and salicylate levels wnl. INR wnl w/ no transaminitis. Metabolic acidosis noted. Cr wnl. Poison control contacted, initially suspected Keppra over dose however levels were subtherapeutic.   *Prescribed Percocet every 6 hours as needed PTA for chronic back pain.   *Hx several DWI's and former treatment at [place] [address], last 10-15 yrs ago   *Family very concerned she is hiding bottles of alcohol and stockpiles opioids by report  *Patient reported that she drinks half a pint of vodka 3 to 4 days a week to manage her mood and pain  -Psychiatry consulted, subsequent 1:1 and suicide precautions discontinued; see consult note for details    -Previously denied suicidality and reported she was simply drinking more than usual and took a Percocet  -Patient was not agreeable to chemical dependency referrals; family would like her to see her get help; outpatient follow-up with primary clinic provider is appropriate  -Continue gabapentin**  -Continue Levetiacetam 500 mg BID; reported poor compliance with this per past provider  -HOLD PTA duloxetine as she reported not taking this**  -Continue multivitamin and folic acid, thiamine  -CIWA scale and VS without medications, nursing to contact provider if patient begins scoring  -Psychiatry consult follow-up visit 10/5 and patient agreeable to chemical dependency consultation evaluation, scheduled for 10/7     History of urinary retention  Urinary tract infection (UTI)   Urine culture grew Klebsiella 50-100K and E. Coli 50-100K.  Bruce catheter was replaced due to 1500 mL retained urine overnight 10/30/2024  -Completed a course of antibiotics as noted above  -Orders for Bruce removal 10/4/2024 with voiding trial; Bruce removed without difficulty with no report of recurrent urinary retention  -No current urinary complaints, monitor     Type II dens fracture following mechanical fall (9/9-9/11/24)   Patient suffered a mechanical fall in early September and hit her head  suffering a type II dens fracture.  She was hospitalized and required no surgical interventions.  A repeat MRI cervical spine 9/30 completed in the setting of possible drug overdose showing fracture through the dens which is better seen on CT.  No definite abnormal fluid collection within the spinal canal and marked multilevel level degenerative changes throughout the cervical spine particularly from C3-C4 through C7-T1.  Neurosurgery consulted, Dr. Gibson.  -Follow-up neurosurgery visit 10/3/2024, see note; no acute surgical intervention at this time  -To wear Aspen cervical collar at all times, per neurosurgery recommendations  -Pretty collar ordered to wear for hygiene/showering, per neurosurgery recommendations  -Avoid heavy lifting, bending, or twisting  -Follow up with [facility] neurosurgery clinic as previously scheduled on 10/15/2024  -NSGY signed off inpatient on 10/3/24      Risk of refeeding secondary to alcohol intake  Diarrhea   Evaluated by speech and language therapy (SLP) post extubation and okay for regular diet and thin liquids with standard aspiration precautions.   -Tube feedings discontinued 10/3  -SLP consultation; signed off 10/3   -Rectal tube was in place overnight 10/3 and removed later that morning as there was less than 200 mL output  -Monitor for diarrhea  -RN managed electrolyte protocols in place      Stress-induced hyperglycemia, resolved  Off stress dose steroids of recent  -Sliding scale insulin while inpatient  -Hypoglycemia protocol  Recent Labs   Lab 10/06/24  1240 10/06/24  0853 10/05/24  2119 10/05/24  1732 10/05/24  1231 10/05/24  0803   GLC 84 91 95 114* 85 96      Hypertension   -Continue amlodipine, metoprolol  -Started on clonidine 0.1 mg q 8h, monitor blood pressure, follow-up with outpatient/TCU provider  -Monitor blood pressure, follow-up as outpatient     Mild thrombocytopenia, resolving  Anemia, normocytic  -Monitor hemoglobin and platelet counts; suspect alcohol is playing  a contributing role  -Monitor platelets in the setting of enoxaparin for DVT prophylaxis**  -Follow-up labs with primary clinic provider as outpatient  Recent Labs   Lab 10/04/24  2008 10/02/24  0553 09/30/24  0504   * 111* 115*     Recent Labs   Lab 10/04/24  2008 10/02/24  0553 09/30/24  0504   HGB 9.8* 8.8* 9.0*       Hypokalemia  Hypomagnesemia  -Potassium and magnesium replacement in hospital per electrolyte protocols  Recent Labs   Lab 10/06/24  1241 10/05/24  1136 10/04/24  2008 10/03/24  1338 10/03/24  0554 10/02/24  1438   POTASSIUM 3.8 3.5 3.4 3.9 3.4 3.5  3.5     Recent Labs   Lab 10/06/24  1241 10/05/24  1136 10/04/24  2008 10/03/24  0554 10/02/24  0553 10/01/24  1248   MAG 1.8 1.7 1.6* 1.6* 1.9 2.2     Hypothyroidism   -Continue levothyroxine     History of tobacco dependence   -Quit in 2004    Alcohol use disorder, severe, dependence  -Chemical dependency consultation requested  -Psychiatry consulted with recent follow-up 10/5/2024, see note    Weakness and deconditioning  -Physical therapy, occupational therapy consulted  -Increase activity as tolerated    Disposition  -Estimated length of stay 24 to 48 hours pending TCU acceptance, CD consult, and county evaluation  -Anticipated discharge to transitional care unit  -Social work consult for discharge planning; referral sent, note 10/6/2024 reviewed          Diet: Regular Diet Adult    DVT Prophylaxis: Enoxaparin (Lovenox) SQ  Bruce Catheter: Not present  Lines: None     Cardiac Monitoring: None  Code Status: Full Code      Clinically Significant Risk Factors            # Hypomagnesemia: Lowest Mg = 1.6 mg/dL in last 2 days, will replace as needed       # Hypertension: Noted on problem list               # Financial/Environmental Concerns: none  # COPD: noted on problem list        Disposition Plan     Medically Ready for Discharge: Anticipated Tomorrow             Rod Kellogg MD  Hospitalist Service  [facility]  Hospital  Securely message with Robert (more info)  Text page via [facility] Paging/Directory   ______________________________________________________________________    Interval History   Sitting on the edge of the bed, nursing staff present, wearing her cervical collar.  No increased pain reported.  No increase shortness of breath.  Patient is agreed to chemical dependency evaluation, tentatively scheduled for tomorrow.  Awaiting TCU acceptance and [facility] evaluation.  Social work consulted and following.    Physical Exam   Vital Signs: Temp: 97.8  F (36.6  C) Temp src: Oral BP: (!) 140/84 Pulse: 80   Resp: 18 SpO2: 93 % O2 Device: None (Room air)    Weight: 134 lbs 4.16 oz    GENERAL awake and alert, no acute distress  HEENT cervical collar remains in place  LUNGS no wheezes or crackles, good inspiratory effort  HEART S1, S2 with RRR  ABDOMEN soft, nontender  EXTREMITIES without significant edema  SKIN warm and dry  NEURO moves upper and lower extremities spontaneously and to command  MENTAL STATUS answering questions and following simple commands    Medical Decision Making             Data     I have personally reviewed the following data over the past 24 hrs:    N/A  \   N/A   / N/A     N/A N/A N/A /  84   3.8 N/A N/A \       Imaging results reviewed over the past 24 hrs:   No results found for this or any previous visit (from the past 24 hour(s)).

## 2024-10-07 ENCOUNTER — APPOINTMENT (OUTPATIENT)
Dept: PHYSICAL THERAPY | Facility: CLINIC | Age: 74
DRG: 917 | End: 2024-10-07
Attending: INTERNAL MEDICINE
Payer: COMMERCIAL

## 2024-10-07 ENCOUNTER — APPOINTMENT (OUTPATIENT)
Dept: OCCUPATIONAL THERAPY | Facility: CLINIC | Age: 74
DRG: 917 | End: 2024-10-07
Attending: HOSPITALIST
Payer: COMMERCIAL

## 2024-10-07 VITALS
WEIGHT: 141.31 LBS | BODY MASS INDEX: 24.26 KG/M2 | OXYGEN SATURATION: 94 % | TEMPERATURE: 98.4 F | DIASTOLIC BLOOD PRESSURE: 62 MMHG | SYSTOLIC BLOOD PRESSURE: 115 MMHG | HEART RATE: 73 BPM | RESPIRATION RATE: 18 BRPM

## 2024-10-07 LAB
ERYTHROCYTE [DISTWIDTH] IN BLOOD BY AUTOMATED COUNT: 14.6 % (ref 10–15)
GLUCOSE BLDC GLUCOMTR-MCNC: 105 MG/DL (ref 70–99)
GLUCOSE BLDC GLUCOMTR-MCNC: 116 MG/DL (ref 70–99)
GLUCOSE BLDC GLUCOMTR-MCNC: 82 MG/DL (ref 70–99)
GLUCOSE BLDC GLUCOMTR-MCNC: 85 MG/DL (ref 70–99)
GLUCOSE BLDC GLUCOMTR-MCNC: 97 MG/DL (ref 70–99)
HCT VFR BLD AUTO: 29 % (ref 35–47)
HGB BLD-MCNC: 9.2 G/DL (ref 11.7–15.7)
MAGNESIUM SERPL-MCNC: 1.8 MG/DL (ref 1.7–2.3)
MCH RBC QN AUTO: 30.1 PG (ref 26.5–33)
MCHC RBC AUTO-ENTMCNC: 31.7 G/DL (ref 31.5–36.5)
MCV RBC AUTO: 95 FL (ref 78–100)
PHOSPHATE SERPL-MCNC: 4.9 MG/DL (ref 2.5–4.5)
PLATELET # BLD AUTO: 195 10E3/UL (ref 150–450)
POTASSIUM SERPL-SCNC: 3.7 MMOL/L (ref 3.4–5.3)
RBC # BLD AUTO: 3.06 10E6/UL (ref 3.8–5.2)
WBC # BLD AUTO: 4.1 10E3/UL (ref 4–11)

## 2024-10-07 PROCEDURE — 84100 ASSAY OF PHOSPHORUS: CPT | Performed by: HOSPITALIST

## 2024-10-07 PROCEDURE — 250N000011 HC RX IP 250 OP 636: Performed by: HOSPITALIST

## 2024-10-07 PROCEDURE — 97165 OT EVAL LOW COMPLEX 30 MIN: CPT | Mod: GO | Performed by: OCCUPATIONAL THERAPIST

## 2024-10-07 PROCEDURE — 99232 SBSQ HOSP IP/OBS MODERATE 35: CPT | Performed by: HOSPITALIST

## 2024-10-07 PROCEDURE — 250N000013 HC RX MED GY IP 250 OP 250 PS 637

## 2024-10-07 PROCEDURE — 85027 COMPLETE CBC AUTOMATED: CPT

## 2024-10-07 PROCEDURE — G0008 ADMIN INFLUENZA VIRUS VAC: HCPCS | Performed by: HOSPITALIST

## 2024-10-07 PROCEDURE — 90662 IIV NO PRSV INCREASED AG IM: CPT | Performed by: HOSPITALIST

## 2024-10-07 PROCEDURE — 120N000001 HC R&B MED SURG/OB

## 2024-10-07 PROCEDURE — 97116 GAIT TRAINING THERAPY: CPT | Mod: GP

## 2024-10-07 PROCEDURE — 250N000011 HC RX IP 250 OP 636

## 2024-10-07 PROCEDURE — 97535 SELF CARE MNGMENT TRAINING: CPT | Mod: GO | Performed by: OCCUPATIONAL THERAPIST

## 2024-10-07 PROCEDURE — 36415 COLL VENOUS BLD VENIPUNCTURE: CPT

## 2024-10-07 PROCEDURE — 83735 ASSAY OF MAGNESIUM: CPT | Performed by: HOSPITALIST

## 2024-10-07 PROCEDURE — 250N000013 HC RX MED GY IP 250 OP 250 PS 637: Performed by: INTERNAL MEDICINE

## 2024-10-07 PROCEDURE — 84132 ASSAY OF SERUM POTASSIUM: CPT | Performed by: HOSPITALIST

## 2024-10-07 PROCEDURE — 97530 THERAPEUTIC ACTIVITIES: CPT | Mod: GP

## 2024-10-07 PROCEDURE — 250N000013 HC RX MED GY IP 250 OP 250 PS 637: Performed by: HOSPITALIST

## 2024-10-07 RX ORDER — POLYETHYLENE GLYCOL 3350 17 G/17G
17 POWDER, FOR SOLUTION ORAL DAILY
Status: DISPENSED | OUTPATIENT
Start: 2024-10-07

## 2024-10-07 RX ORDER — BISACODYL 10 MG
10 SUPPOSITORY, RECTAL RECTAL DAILY PRN
Status: ACTIVE | OUTPATIENT
Start: 2024-10-07

## 2024-10-07 RX ORDER — POTASSIUM CHLORIDE 1500 MG/1
20 TABLET, EXTENDED RELEASE ORAL ONCE
Status: COMPLETED | OUTPATIENT
Start: 2024-10-07 | End: 2024-10-07

## 2024-10-07 RX ORDER — AMOXICILLIN 250 MG
1 CAPSULE ORAL 2 TIMES DAILY
Status: DISPENSED | OUTPATIENT
Start: 2024-10-07

## 2024-10-07 RX ADMIN — METOPROLOL TARTRATE 100 MG: 100 TABLET, FILM COATED ORAL at 20:33

## 2024-10-07 RX ADMIN — OXYCODONE AND ACETAMINOPHEN 1 TABLET: 10; 325 TABLET ORAL at 12:57

## 2024-10-07 RX ADMIN — AMLODIPINE BESYLATE 5 MG: 5 TABLET ORAL at 08:47

## 2024-10-07 RX ADMIN — LEVETIRACETAM 500 MG: 500 TABLET, FILM COATED ORAL at 08:46

## 2024-10-07 RX ADMIN — ENOXAPARIN SODIUM 40 MG: 40 INJECTION SUBCUTANEOUS at 08:47

## 2024-10-07 RX ADMIN — POTASSIUM CHLORIDE 20 MEQ: 1500 TABLET, EXTENDED RELEASE ORAL at 12:58

## 2024-10-07 RX ADMIN — CLONIDINE HYDROCHLORIDE 0.1 MG: 0.1 TABLET ORAL at 08:47

## 2024-10-07 RX ADMIN — CLONIDINE HYDROCHLORIDE 0.1 MG: 0.1 TABLET ORAL at 16:54

## 2024-10-07 RX ADMIN — METOPROLOL TARTRATE 100 MG: 100 TABLET, FILM COATED ORAL at 08:47

## 2024-10-07 RX ADMIN — FOLIC ACID 1 MG: 1 TABLET ORAL at 08:46

## 2024-10-07 RX ADMIN — CLONIDINE HYDROCHLORIDE 0.1 MG: 0.1 TABLET ORAL at 20:33

## 2024-10-07 RX ADMIN — LEVOTHYROXINE SODIUM 50 MCG: 50 TABLET ORAL at 06:15

## 2024-10-07 RX ADMIN — ACETAMINOPHEN 650 MG: 325 TABLET, FILM COATED ORAL at 16:54

## 2024-10-07 RX ADMIN — UMECLIDINIUM 1 PUFF: 62.5 AEROSOL, POWDER ORAL at 09:21

## 2024-10-07 RX ADMIN — GABAPENTIN 100 MG: 100 CAPSULE ORAL at 06:15

## 2024-10-07 RX ADMIN — OXYCODONE AND ACETAMINOPHEN 1 TABLET: 10; 325 TABLET ORAL at 06:15

## 2024-10-07 RX ADMIN — OXYCODONE AND ACETAMINOPHEN 1 TABLET: 10; 325 TABLET ORAL at 20:33

## 2024-10-07 RX ADMIN — SENNOSIDES AND DOCUSATE SODIUM 1 TABLET: 50; 8.6 TABLET ORAL at 09:21

## 2024-10-07 RX ADMIN — OXYCODONE AND ACETAMINOPHEN 1 TABLET: 10; 325 TABLET ORAL at 00:59

## 2024-10-07 RX ADMIN — Medication 5 MG: at 20:33

## 2024-10-07 RX ADMIN — FLUTICASONE FUROATE AND VILANTEROL TRIFENATATE 1 PUFF: 100; 25 POWDER RESPIRATORY (INHALATION) at 08:48

## 2024-10-07 RX ADMIN — MULTIVITAMIN TABLET 1 TABLET: TABLET at 12:58

## 2024-10-07 RX ADMIN — INFLUENZA A VIRUS A/VICTORIA/4897/2022 IVR-238 (H1N1) ANTIGEN (FORMALDEHYDE INACTIVATED), INFLUENZA A VIRUS A/CALIFORNIA/122/2022 SAN-022 (H3N2) ANTIGEN (FORMALDEHYDE INACTIVATED), AND INFLUENZA B VIRUS B/MICHIGAN/01/2021 ANTIGEN (FORMALDEHYDE INACTIVATED) 0.5 ML: 60; 60; 60 INJECTION, SUSPENSION INTRAMUSCULAR at 15:00

## 2024-10-07 RX ADMIN — LEVETIRACETAM 500 MG: 500 TABLET, FILM COATED ORAL at 20:33

## 2024-10-07 RX ADMIN — SENNOSIDES AND DOCUSATE SODIUM 1 TABLET: 50; 8.6 TABLET ORAL at 20:33

## 2024-10-07 RX ADMIN — POLYETHYLENE GLYCOL 3350 17 G: 17 POWDER, FOR SOLUTION ORAL at 09:21

## 2024-10-07 ASSESSMENT — ACTIVITIES OF DAILY LIVING (ADL)
ADLS_ACUITY_SCORE: 32
ADLS_ACUITY_SCORE: 32
ADLS_ACUITY_SCORE: 31
ADLS_ACUITY_SCORE: 32
ADLS_ACUITY_SCORE: 30
ADLS_ACUITY_SCORE: 32
ADLS_ACUITY_SCORE: 32
ADLS_ACUITY_SCORE: 30
ADLS_ACUITY_SCORE: 32
PREVIOUS_RESPONSIBILITIES: HOUSEKEEPING;LAUNDRY;SHOPPING;MEDICATION MANAGEMENT;DRIVING
ADLS_ACUITY_SCORE: 32
ADLS_ACUITY_SCORE: 30
ADLS_ACUITY_SCORE: 32
ADLS_ACUITY_SCORE: 31

## 2024-10-07 NOTE — CONSULTS
Met with pt for CD Consult and introduced self and role in pt's care.  Inquired about pt's interest in CELINA Assessment for referrals to Tx or any additional community resources.  Pt declined interest in CELINA Assessment for referrals to Tx at the this time.  Pt also declined to receive community resources at this time and stated she already has some.    Relayed to pt that if they change their mind while admitted and would like to complete a CELINA Assessment for referrals to treatment or need any additional CD Resources they may alert unit staff who will assist in having CD Consult re-ordered.  If pt has active insurance they may also schedule an assessment on an outpatient basis by calling [facility] at [phone].    JAMES Cagle, [facility]  Substance Use Disorder Evaluation Counselor  Email: [email or web address]      Due to pt having Medicare with no secondary insurance, there is only one option for IP CELINA tx, and that is at [facility] for 3-7 days. Pt can self-pay for 30 days of IP CELINA tx at [facility]. [facility] offers 3 groups for pts with Medicare insurance. Pt would need to complete a CELINA CA to begin that programming.    [facility]: (self pay)  Phone: [phone]  Fax: [phone]  Email: [email or web address]  Address:  [address] [facility] [address]  [email or web address]    [facility] Inpatient CD, [address]  [address] [place] [address]  Phone: [phone]  Fax: [phone]  email:  [email or web address]  Fax: [phone] for [place] (inpatient)  IOP Fax: [phone]    [facility] IOP:  Coordinator: Polina Jeronimo  Phone: [phone]  email: [email or web address]  [email or web address]   [facility] OP Program/Harm Reduction: T,TH 12:30pm-3:30pm (Accepts Medicare)   [facility] Co-occurring IOP: M,W, TH 9am-12pm (Accepts Medicare)   [facility] OP: ?M,T,W 12:00pm-2:00pm  (Accepts Medicare)

## 2024-10-07 NOTE — PROGRESS NOTES
Care Management Follow Up    Length of Stay (days): 11    Expected Discharge Date: 10/07/2024     Concerns to be Addressed: discharge planning     Patient plan of care discussed at interdisciplinary rounds: Yes    Anticipated Discharge Disposition: Transitional Care              Anticipated Discharge Services: Home Care, Transportation Services, Other (see comment) (pending final recommenations)  Anticipated Discharge DME: Other (see comment) (pending recommendations)    Patient/family educated on Medicare website which has current facility and service quality ratings:    Education Provided on the Discharge Plan: Yes  Patient/Family in Agreement with the Plan: yes    Referrals Placed by CM/SW:    Private pay costs discussed: Not applicable    Discussed  Partnership in Safe Discharge Planning  document with patient/family: No     Handoff Completed: No, handoff not indicated or clinically appropriate    Additional Information:  WILI [facility] Liaison to follow  up on referral for Jose.    Next Steps: Secure a TCU bed/Level II    GALLITO Padilla    [facility]

## 2024-10-07 NOTE — PLAN OF CARE
Goal Outcome Evaluation:    Denies CP, SOB. All pt needs met at this time. Awaiting county meeting per note and TCU. Brace on at all times per order. Pt received flu vaccine packet that was linked.

## 2024-10-07 NOTE — PLAN OF CARE
Summary: intentional overdose, respiratory failure, acquired UTI/pneumonia. Prior cervical fracture. Hx depression, anxiety, sleep apnea, chronic pain.  DATE & TIME: 10/06/24 9916-0752  Cognitive Concerns/ Orientation: A&Ox4, calm and cooperative, hopeful for discharge soon  BEHAVIOR & AGGRESSION TOOL COLOR: green  CIWA SCORE: 1,1 (for headache)  ABNL VS/O2: VSS on RA- [place]   MOBILITY: Ax1 GB/W  PAIN MANAGMENT: c/o chronic neck/back pain and headache, on scheduled percocet, given PRN tylenol x1  DIET: Regular, good appetite  BOWEL/BLADDER: Continent, Last BM 10/3  ABNL LAB/BG:DRAIN/DEVICES: K 3.8, replaced - recheck AM draw, mg 1.8, Phos 4.5 recheck in the AM, BG 84/91  LINES:. 2 R PIV SL  SKIN: Scattered bruising, large areas on thighs/arms; some scattered scabbing, areas under Aspen collar padded with mepilex - changed padding this evening, inspected and intact  TESTS/PROCEDURES: n/a   D/C DAY/GOALS/PLACE: plan for TCU placement; CD to see pt on Monday, will need [facility] worker - to see pt on Tues (10/09)  OTHER IMPORTANT INFO: PT, psych signed off; neurosurg signed off  Patient has Aspen collar to wear constantly, remove Q4 for skin check (see order); Pretty collar in room to wear with hygiene cares or shower. Seizure precautions maintained.

## 2024-10-07 NOTE — PLAN OF CARE
Goal Outcome Evaluation:                      Summary: overdose, respiratory failure, acquired UTI/pneumonia.      DATE & TIME: 10/06/24 0618-8844  Cognitive Concerns/ Orientation: A&Ox4, calm and cooperative  BEHAVIOR & AGGRESSION TOOL COLOR: green  CIWA SCORE: 1 (for headache)  ABNL VS/O2: VSS on RA  MOBILITY: Ax1 GB/W to [facility]  PAIN MANAGMENT: rate pain 7/10 in neck/back and headache,managed with  scheduled percocet and scheduled gabapentin  DIET: Regular  BOWEL/BLADDER: Continent,no B/M this shift , Last BM 10/3  ABNL LAB/BG:DRAIN/DEVICES: K 3.8, recheck this AM,mg 1.8, Phos 4.5 recheck in the AM, BG 97  LINES:. 2 R PIV SL  SKIN: Scattered bruising, large areas on thighs/arms; some scattered scabbing, areas under Aspen collar padded with mepilex - inspected and intact  TESTS/PROCEDURES: N/A  D/C DAY/GOALS/PLACE: plan for TCU placement; CD to see pt this AM, will need [facility] worker - to see pt on Tues (10/09)  OTHER IMPORTANT INFO: PT, psych signed off; neurosurg signed off  Patient has Aspen collar to wear constantly, remove Q4 for skin check (see order); Pretty collar in room to wear with hygiene cares or shower. Seizure precautions maintained.

## 2024-10-07 NOTE — PROGRESS NOTES
Care Management Follow Up    Length of Stay (days): 11    Expected Discharge Date: 10/08/2024     Concerns to be Addressed: discharge planning     Patient plan of care discussed at interdisciplinary rounds: Yes    Anticipated Discharge Disposition: Transitional Care              Anticipated Discharge Services: Home Care, Transportation Services, Other (see comment) (pending final recommenations)  Anticipated Discharge DME: Other (see comment) (pending recommendations)    Patient/family educated on Medicare website which has current facility and service quality ratings:    Education Provided on the Discharge Plan: Yes  Patient/Family in Agreement with the Plan: yes    Referrals Placed by CM/WILI:    Private pay costs discussed: Not applicable    Discussed  Partnership in Safe Discharge Planning  document with patient/family: No     Handoff Completed: No, handoff not indicated or clinically appropriate    Additional Information:  WILI informed [facility] can accept patient pending level II and auth. WILI spoke with PT lead to request PT assessment be moved up as new notes are needed for prior auth. WILI called the [facility] linkage line and was given phone number for Ashlee ([phone]) with [facility] for the level II. WILI called and left a vm for Ashlee requesting a call back to discuss the level II    SW initiated Christal Auth via the Portal ID [email or web address].    WILI called and left a second voicemail for Ashlee with [facility].     WILI received call from Radha lema/ [facility] ([phone]). WILI asked to faxed H&P and psych notes to [phone]. WILI faxed requested documents. Radha indicated she is anticipating she can come by to completed level II tomorrow morning.     Next Steps: Prior auth, Level II     GALLITO Padilla    [facility]

## 2024-10-07 NOTE — PROGRESS NOTES
[facility]    Medicine Progress Note - Hospitalist Service    Date of Admission:  9/26/2024    Assessment & Plan   Jessica Ellison is a 73 year old female admitted on 9/26/2024.  Past medical history of chronic pain on opiate therapy, s/p lumbar fusion 2015, central lobar emphysema,  major depressive disorder, hypertension, anemia, seizure, sleep apnea, thyroid disease, tobacco abuse and uncomplicated asthma, who presented to [facility] via EMS for respiratory failure. She was intubated in the field after being found down with an apparent overdose. She was transferred from [facility] for ongoing ICU cares.  ICU course included intubation, low dose pressors and neurosurgery consultation for type II dens fracture.  For details, see admission note.  Hospitalist service was consulted on 10/03/24 for assumption of care on transfer out of ICU.  For details, see admission note.      Acute respiratory failure with hypoxia secondary to loss of protective airway reflexes  Uncomplicated asthma   Centrilobular emphysema   Sleep apnea   Found down at home and required intubation in the field. Extubated 10/2/24. Former smoker with 30 pack year hx, quit 2004.  ICU admission, subsequent transfer out.  -Chest x-ray 10/2/24 low lung volumes, mild bibasilar atelectasis, mild interstitial prominence  -MRSA nares negative  -Respiratory aerobic culture 4+ gram postive cocci and 2+ mixed jesús   -Received 6 days Zosyn IV and 3 days IV vancomycin   -Continue pulmonary hygiene measures  -Duonebs prn   -Encourage incentive spirometry  -Continue current inhaler regimen  -CPAP with home settings   -Physical therapy consulted for weakness and deconditioning, recommending TCU on discharge  -Recent labs reviewed     Alcohol and opioid overdose with history of dependence   Chronic pain with continuous opioid dependence s/p lumbar spinal fusion  Major depressive disorder   Seizure disorder  Concern for suicide  "attempt    Initial labs notable for alcohol level of 0.19. Tylenol and salicylate levels wnl. INR wnl w/ no transaminitis. Metabolic acidosis noted. Cr wnl. Poison control contacted, initially suspected Keppra over dose however levels were subtherapeutic.   *Prescribed Percocet every 6 hours as needed PTA for chronic back pain.   *Hx several DWI's and former treatment at [place] [id number], last 10-15 yrs ago   *Family very concerned she is hiding bottles of alcohol and stockpiles opioids by report  *Patient reported that she drinks half a pint of vodka 3 to 4 days a week to manage her mood and pain  -Psychiatry consulted, subsequent 1:1 and suicide precautions discontinued; see consult note for details    -Previously denied suicidality and reported she was simply drinking more than usual and took a Percocet  -Patient was not agreeable to chemical dependency referrals; family would like her to see her get help; outpatient follow-up with primary clinic provider is appropriate  -Continue gabapentin**  -Continue Levetiacetam 500 mg BID; reported poor compliance with this per past provider  -HOLD PTA duloxetine as she reported not taking this**  -Continue multivitamin and folic acid, thiamine  -CIWA scale and VS without medications, nursing to contact provider if patient begins scoring  -Psychiatry consult follow-up visit 10/5 and patient agreeable to chemical dependency consultation evaluation, scheduled for 10/7  -CD consulted 10/7, per note, \"Pt declined interest in [facility] Assessment for referrals to Tx at the this time. Pt also declined to receive community resources at this time and stated she already has some.\"     History of urinary retention  Urinary tract infection (UTI)   Urine culture grew Klebsiella 50-100K and E. Coli 50-100K.  Bruce catheter was replaced due to 1500 mL retained urine overnight 10/30/2024  -Completed a course of antibiotics as noted above  -Orders for Bruce removal 10/4/2024 with voiding trial; Bruce " removed without difficulty with no report of recurrent urinary retention  -No current urinary complaints, monitor     Type II dens fracture following mechanical fall (9/9-9/11/24)   Patient suffered a mechanical fall in early September and hit her head suffering a type II dens fracture.  She was hospitalized and required no surgical interventions.  A repeat MRI cervical spine 9/30 completed in the setting of possible drug overdose showing fracture through the dens which is better seen on CT.  No definite abnormal fluid collection within the spinal canal and marked multilevel level degenerative changes throughout the cervical spine particularly from C3-C4 through C7-T1.  Neurosurgery consulted, Dr. Gibson.  -Follow-up neurosurgery visit 10/3/2024, see note; no acute surgical intervention at this time  -To wear Aspen cervical collar at all times, per neurosurgery recommendations  -Pretty collar ordered to wear for hygiene/showering, per neurosurgery recommendations  -Avoid heavy lifting, bending, or twisting  -Follow up with [facility] neurosurgery clinic as previously scheduled on 10/15/2024  -NSGY signed off inpatient on 10/3/24      Nutrition  Diarrhea   Evaluated by speech and language therapy (SLP) post extubation and okay for regular diet and thin liquids with standard aspiration precautions.   -Tube feedings discontinued 10/3  -SLP consultation; signed off 10/3   -Rectal tube was in place overnight 10/3 and removed later that morning as there was less than 200 mL output  -Monitor for diarrhea, resolving  -RN managed electrolyte protocols in place      Stress-induced hyperglycemia, resolved  Off stress dose steroids of recent  -Sliding scale insulin while inpatient  -Hypoglycemia protocol  Recent Labs   Lab 10/07/24  1213 10/07/24  0745 10/07/24  0142 10/06/24  2129 10/06/24  1728 10/06/24  1240   GLC 85 82 97 91 84 84      Hypertension   -Continue amlodipine, metoprolol  -Started on clonidine 0.1 mg q 8h, monitor blood  pressure, follow-up with outpatient/TCU provider  -Monitor blood pressure, follow-up as outpatient     Mild thrombocytopenia, resolving  Anemia, normocytic  -Monitor hemoglobin and platelet counts; suspect alcohol is playing a contributing role  -Monitor platelets in the setting of enoxaparin for DVT prophylaxis**  -Follow-up labs with primary clinic provider as outpatient  Recent Labs   Lab 10/07/24  0717 10/04/24  2008 10/02/24  0553    142* 111*     Recent Labs   Lab 10/07/24  0717 10/04/24  2008 10/02/24  0553   HGB 9.2* 9.8* 8.8*       Hypokalemia  Hypomagnesemia  -Potassium and magnesium replacement in hospital per electrolyte protocols  Recent Labs   Lab 10/07/24  0717 10/06/24  1241 10/05/24  1136 10/04/24  2008 10/03/24  1338 10/03/24  0554   POTASSIUM 3.7 3.8 3.5 3.4 3.9 3.4     Recent Labs   Lab 10/07/24  0717 10/06/24  1241 10/05/24  1136 10/04/24  2008 10/03/24  0554 10/02/24  0553   MAG 1.8 1.8 1.7 1.6* 1.6* 1.9     Hypothyroidism   -Continue levothyroxine     History of tobacco dependence   -Quit in 2004    Alcohol use disorder, severe, dependence  -Chemical dependency consultation requested  -Psychiatry consulted with recent follow-up 10/5/2024, see note    Weakness and deconditioning  -Physical therapy, occupational therapy consulted, continue at transitional care unit   -Increase activity as tolerated    Disposition  -Estimated length of stay 24 hours pending TCU acceptance and county evaluation  -Anticipated discharge to transitional care unit  -Social work consult for discharge planning; referrals sent; patient discussed with social work 10/7, help appreciated          Diet: Regular Diet Adult    DVT Prophylaxis: Enoxaparin (Lovenox) SQ  Bruce Catheter: Not present  Lines: None     Cardiac Monitoring: None  Code Status: Full Code      Clinically Significant Risk Factors                  # Hypertension: Noted on problem list               # Financial/Environmental Concerns: none  # COPD:  noted on problem list        Disposition Plan     Medically Ready for Discharge: Anticipated Tomorrow             Rod Kellogg MD  Hospitalist Service  [facility]  Securely message with [employer] (more info)  Text page via [employer] Paging/Directory   ______________________________________________________________________    Interval History   Lying in bed, pleasant and interactive, looking forward to hospital discharge soon.  No new symptoms reported.  Cervical collar in place.  No increase shortness of breath or respiratory symptoms.  Chemical dependency consultation requested for today.  Awaiting TCU acceptance and county evaluation.  Social work consulted and following.    Physical Exam   Vital Signs: Temp: 97.7  F (36.5  C) Temp src: Oral BP: 117/80 Pulse: 75   Resp: 18 SpO2: 94 % O2 Device: None (Room air)    Weight: 141 lbs 5.04 oz    GENERAL awake and alert, no acute distress  HEENT cervical collar remains in place  LUNGS no wheezes or crackles, good inspiratory effort, stable  HEART S1, S2 with RRR  ABDOMEN soft, nontender  EXTREMITIES without significant edema  SKIN warm and dry  NEURO moves upper and lower extremities spontaneously and to command  MENTAL STATUS answering questions and following simple commands    Medical Decision Making             Data     I have personally reviewed the following data over the past 24 hrs:    4.1  \   9.2 (L)   / 195     N/A N/A N/A /  85   3.7 N/A N/A \       Imaging results reviewed over the past 24 hrs:   No results found for this or any previous visit (from the past 24 hour(s)).

## 2024-10-07 NOTE — PROVIDER NOTIFICATION
MD Notification    Notified Person: MD    Notified Person Name:SALEEM POOL    Notification Date/Time: 10/7/24    Notification Interaction: VOCERA MASSAGING    Purpose of Notification:  Pt B/P has been on the high side.@ 0000 151/94 and at 0600 145/105       Orders Received: No acute intervention     Comments:

## 2024-10-07 NOTE — PROGRESS NOTES
10/07/24 1512   Appointment Info   Signing Clinician's Name / Credentials (OT) Patricia Valle OTR/L   Rehab Comments (OT) Initial eval   Living Environment   People in Home spouse   Current Living Arrangements house   Home Accessibility no concerns   Transportation Anticipated health plan transportation;family or friend will provide;car, drives self   Living Environment Comments Pt. reports no stairs to enter and doesn't have to climb stairs to enter. Has second floor in home but doesn't have to climb.   Self-Care   Usual Activity Tolerance moderate   Current Activity Tolerance fair   Regular Exercise No   Equipment Currently Used at Home walker, rolling;cane, straight   Fall history within last six months yes   Number of times patient has fallen within last six months 3   Activity/Exercise/Self-Care Comment was I with ADLs and functional transfers, pt had biopsy procedure and doesn't want to burden her    Instrumental Activities of Daily Living (IADL)   Previous Responsibilities housekeeping;laundry;shopping;medication management;driving   General Information   Onset of Illness/Injury or Date of Surgery 09/26/24   Referring Physician Rod Kellogg MD   Additional Occupational Profile Info/Pertinent History of Current Problem Jessica Ellison is a 73 year old female with a history of alcohol use disorder, seizures on levetiracetam, chronic pain on opiate therapy, and major depressive disorder who presented to [facility] via EMS for respiratory failure. EMS reports that they were called to the scene of an apparent overdose.  The patient had apparently had an argument with her significant other, he told police that he went away and then came back about 10 minutes later and found her unresponsive. Police and EMS found the patient with slow and shallow breathing with small pupils. Given narcan with minimal improvement. Intubated. Required low dose of norepi and transferred to [facility] for  continued cares.      Initial labs notable for alcohol level of 0.19. Tylenol and and salicylate levels wnl. INR wnl w/ no transaminitis. Metabolic acidosis noted. Cr wnl. Poison control contacted, suspected Keppra as culprit medication. Is also prescribed BB/CCB. Hgb stable.      LLL infiltrated noted on CXR. Unable to visualize diaphragm. C/f possible aspiration pneumonia. Requiring 50% FiO2.      Of note, patient has a displaced odontoid fracture from a fall in early September as per chart review. Was evaluated by an outside neurosurgery team, who recommended C-collar and follow up visits. Currently wearing C-collar. Per admit cervical CT, fracture is newly anteriorly shifted by 4 mm.   Existing Precautions/Restrictions fall   General Observations and Info pt sitting EOB agreeable   Cognitive Status Examination   Orientation Status orientation to person, place and time   Visual Perception   Visual Impairment/Limitations WNL   Posture   Posture forward head position   Posture Comments has c spine collar   Range of Motion Comprehensive   General Range of Motion bilateral upper extremity ROM WNL   Strength Comprehensive (MMT)   Comment, General Manual Muscle Testing (MMT) Assessment NT due to cervical precautions   Muscle Tone Assessment   Muscle Tone Quick Adds No deficits were identified   Coordination   Upper Extremity Coordination No deficits were identified   Bed Mobility   Comment (Bed Mobility) SBA with sit to stand, did not lay down   Transfers   Transfer Comments CGA with transfer   Balance   Balance Comments used 4WW   Activities of Daily Living   BADL Assessment/Intervention lower body dressing;toileting;grooming   Lower Body Dressing Assessment/Training   [place] Level (Lower Body Dressing) minimum assist (75% patient effort)   Grooming Assessment/Training   [place] Level (Grooming) grooming skills;supervision   Toileting   [place] Level (Toileting) toileting skills;supervision   Clinical  Impression   Criteria for Skilled Therapeutic Interventions Met (OT) Yes, treatment indicated   OT Diagnosis decreased I with ADLs   OT Problem List-Impairments impacting ADL problems related to;activity tolerance impaired;balance;flexibility;post-surgical precautions;pain   Assessment of Occupational Performance 1-3 Performance Deficits   Identified Performance Deficits decreased ADLs and home mgmt   Planned Therapy Interventions (OT) ADL retraining;strengthening;transfer training   Clinical Decision Making Complexity (OT) problem focused assessment/low complexity   Risk & Benefits of therapy have been explained evaluation/treatment results reviewed;care plan/treatment goals reviewed;risks/benefits reviewed;current/potential barriers reviewed;participants voiced agreement with care plan;participants included;patient   OT Total Evaluation Time   OT Eval, Low Complexity Minutes (73199) 10   OT Goals   Therapy Frequency (OT) 5 times/week   OT Predicted Duration/Target Date for Goal Attainment 10/14/24   OT Goals Hygiene/Grooming;Upper Body Dressing;Lower Body Dressing;Toilet Transfer/Toileting   OT: Hygiene/Grooming modified independent;while standing   OT: Upper Body Dressing Modified independent;including set-up/clothing retrieval   OT: Lower Body Dressing Modified independent;including set-up/clothing retrieval   OT: Toilet Transfer/Toileting Modified independent;toilet transfer;cleaning and garment management   Interventions   Interventions Quick Adds Self-Care/Home Management   Self-Care/Home Management   Self-Care/Home Mgmt/ADL, Compensatory, Meal Prep Minutes (61407) 12   Symptoms Noted During/After Treatment (Meal Preparation/Planning Training) increased pain   Treatment Detail/Skilled Intervention pt seen for OT eval and treatment. pt demo's decreased activity tolerance and needs SBA with BR transfers.  Pt a little unsteady at times, but no LOB.  needs help with set up of clothes. increased time for LB  dressing. has 7/10 pain with activity.  pt states her  can't help her a whole lot due to just having prostate biopsy.  Pt deconditioned and increased pain limiting I and safety with ADLs.   OT Discharge Planning   OT Plan standing grooming, LB dressing,   OT Discharge Recommendation (DC Rec) Transitional Care Facility   OT Rationale for DC Rec pt is slightly below baseline, demo's decraesed activtiy tolerance and increased time and energy with LB dressing. pt with limited support at home as  just had biopsy. would benefit from TCU at discharge for short stay to increase safety and I with ADLs.   OT Brief overview of current status Goals of therapy will be to address safe mobility and make recs for d/c to next level of care. Pt and RN will continue to follow all falls risk precautions as documented by RN staff while hospitalized.   Total Session Time   Timed Code Treatment Minutes 12   Total Session Time (sum of timed and untimed services) 22

## 2024-10-07 NOTE — PROGRESS NOTES
"CLINICAL NUTRITION SERVICES - REASSESSMENT NOTE    Recommendations Ordered by Registered Dietitian (RD):   Regular diet per MD. No further nutrition intervention necessary at this time.   Malnutrition:   % Weight Loss:  Weight loss does not meet criteria for malnutrition (diuresing)  % Intake:  No decreased intake noted (on goal TF)  Subcutaneous Fat Loss:  None observed (9/29)  Muscle Loss:  None observed (9/29)  Fluid Retention:  None noted     Malnutrition Diagnosis: Patient does not meet two of the above criteria necessary for diagnosing malnutrition      EVALUATION OF PROGRESS TOWARD GOALS   Diet:  Regular Diet, as of 10/3    Nutrition Support:  TF stopped, as of 10/2    Previous EN orders:  Type of Feeding Tube: Nasoduodenal   Enteral Frequency:  Continuous  Enteral Regimen: Promote at 50 mL/hr x 22 hours per day (hold TF 1 hour before and 1 hour after Synthroid administration)  Total Enteral Provisions: 1100 kcal, 68 g protein, 143 g CHO, 0 g fiber, 923 mL H2O  Free Water Flush: 200 mL every 4 hours   Propofol at 14.7 mL/hr= 388 kcal   Total = 1488 kcal (26 kcal/kg)     K 3.0 (L), Mg/Phos NL  -166 with Medium sliding scale, also on Solucortef  BM x 6 today and x 7 yesterday - last got Miralax on 9/28 -- may benefit from Banatrol down FT      Receiving MVI/Thiamine/Folic Acid for supplementation     Intake/Tolerance:    - Intakes documented --> 10/4: 100%, 10/6: 100/100  - Ordering three adequately nourishing meals/day per healthtouch  - RD met with patient today. Patient reports an \"okay\" appetite, not quite at baseline but no concerns. Patient feels she is eating well and denies any current need for a nutritional supplement.     ASSESSED NUTRITION NEEDS:  Dosing Weight: 57.4 kg (10/1 weight (diuresed)  Estimated Energy Needs: 8415-1849 kcals (25-30 Kcal/Kg)  Justification: maintenance  Estimated Protein Needs: 70-85 grams protein (1.2-1.5 g pro/Kg)  Justification: preservation of lean body " mass  Estimated Fluid Needs: 2133-5926 mL (25-30 mL/kg)  Justification: maintenance    NEW FINDINGS:   - Wt: 64.1 kg  - Labs: reviewed, Phos 4.9 (H)  - Meds: continues to receive MVI/Thiamine/Folic Acid supplementation  - Last BM 10/3    Previous Goals:   Patient will meet % needs from goal EN regimen   Evaluation: Met    Previous Nutrition Diagnosis:   Excessive energy intake related to TF at goal, also on Propofol, estimated needs adjusted based on diuresed weight as evidenced by meeting 140% goal energy needs   Evaluation: Improving    CURRENT NUTRITION DIAGNOSIS  No nutrition diagnosis identified at this time     INTERVENTIONS  Recommendations / Nutrition Prescription  - Continue with diet and supplement orders per MD   - No further nutrition intervention necessary at this time as patient is eating well, patient does not meet malnutrition criteria, and patient denies need for nutritional supplement    Implementation  General/healthful diet  Multivitamin/Mineral    Goals  PO intakes >/= 75% meals TID    MONITORING AND EVALUATION:  Progress towards goals will be monitored and evaluated per protocol and Practice Guidelines    Janel Adams RD, LD

## 2024-10-08 ENCOUNTER — APPOINTMENT (OUTPATIENT)
Dept: PHYSICAL THERAPY | Facility: CLINIC | Age: 74
DRG: 917 | End: 2024-10-08
Attending: INTERNAL MEDICINE
Payer: COMMERCIAL

## 2024-10-08 LAB
CREAT SERPL-MCNC: 0.97 MG/DL (ref 0.51–0.95)
EGFRCR SERPLBLD CKD-EPI 2021: 62 ML/MIN/1.73M2
GLUCOSE BLDC GLUCOMTR-MCNC: 113 MG/DL (ref 70–99)
GLUCOSE BLDC GLUCOMTR-MCNC: 91 MG/DL (ref 70–99)
GLUCOSE BLDC GLUCOMTR-MCNC: 96 MG/DL (ref 70–99)
GLUCOSE BLDC GLUCOMTR-MCNC: 97 MG/DL (ref 70–99)
MAGNESIUM SERPL-MCNC: 1.8 MG/DL (ref 1.7–2.3)
PHOSPHATE SERPL-MCNC: 5 MG/DL (ref 2.5–4.5)
POTASSIUM SERPL-SCNC: 3.9 MMOL/L (ref 3.4–5.3)

## 2024-10-08 PROCEDURE — 84132 ASSAY OF SERUM POTASSIUM: CPT | Performed by: HOSPITALIST

## 2024-10-08 PROCEDURE — 250N000013 HC RX MED GY IP 250 OP 250 PS 637

## 2024-10-08 PROCEDURE — 97530 THERAPEUTIC ACTIVITIES: CPT | Mod: GP | Performed by: PHYSICAL THERAPIST

## 2024-10-08 PROCEDURE — 120N000001 HC R&B MED SURG/OB

## 2024-10-08 PROCEDURE — 99232 SBSQ HOSP IP/OBS MODERATE 35: CPT | Performed by: HOSPITALIST

## 2024-10-08 PROCEDURE — 84100 ASSAY OF PHOSPHORUS: CPT | Performed by: HOSPITALIST

## 2024-10-08 PROCEDURE — 36415 COLL VENOUS BLD VENIPUNCTURE: CPT

## 2024-10-08 PROCEDURE — 82565 ASSAY OF CREATININE: CPT

## 2024-10-08 PROCEDURE — 250N000013 HC RX MED GY IP 250 OP 250 PS 637: Performed by: HOSPITALIST

## 2024-10-08 PROCEDURE — 250N000011 HC RX IP 250 OP 636

## 2024-10-08 PROCEDURE — 97116 GAIT TRAINING THERAPY: CPT | Mod: GP | Performed by: PHYSICAL THERAPIST

## 2024-10-08 PROCEDURE — 83735 ASSAY OF MAGNESIUM: CPT | Performed by: HOSPITALIST

## 2024-10-08 RX ORDER — METOPROLOL TARTRATE 100 MG/1
100 TABLET ORAL 2 TIMES DAILY
DISCHARGE
Start: 2024-10-08

## 2024-10-08 RX ORDER — POLYETHYLENE GLYCOL 3350 17 G/17G
17 POWDER, FOR SOLUTION ORAL DAILY
DISCHARGE
Start: 2024-10-08 | End: 2024-11-11

## 2024-10-08 RX ORDER — ACETAMINOPHEN 325 MG/1
650 TABLET ORAL EVERY 6 HOURS PRN
COMMUNITY
Start: 2024-10-08

## 2024-10-08 RX ORDER — IPRATROPIUM BROMIDE AND ALBUTEROL SULFATE 2.5; .5 MG/3ML; MG/3ML
3 SOLUTION RESPIRATORY (INHALATION) EVERY 6 HOURS PRN
DISCHARGE
Start: 2024-10-08

## 2024-10-08 RX ORDER — AMOXICILLIN 250 MG
1 CAPSULE ORAL 2 TIMES DAILY
DISCHARGE
Start: 2024-10-08 | End: 2024-10-15

## 2024-10-08 RX ORDER — CLONIDINE HYDROCHLORIDE 0.1 MG/1
0.1 TABLET ORAL 3 TIMES DAILY
DISCHARGE
Start: 2024-10-08 | End: 2024-11-11

## 2024-10-08 RX ORDER — LANOLIN ALCOHOL/MO/W.PET/CERES
100 CREAM (GRAM) TOPICAL DAILY
DISCHARGE
Start: 2024-10-09

## 2024-10-08 RX ORDER — OXYCODONE AND ACETAMINOPHEN 10; 325 MG/1; MG/1
1 TABLET ORAL EVERY 6 HOURS PRN
Status: DISCONTINUED | OUTPATIENT
Start: 2024-10-08 | End: 2024-10-09 | Stop reason: HOSPADM

## 2024-10-08 RX ADMIN — LEVOTHYROXINE SODIUM 50 MCG: 50 TABLET ORAL at 06:54

## 2024-10-08 RX ADMIN — AMLODIPINE BESYLATE 5 MG: 5 TABLET ORAL at 09:06

## 2024-10-08 RX ADMIN — ACETAMINOPHEN 650 MG: 325 TABLET, FILM COATED ORAL at 17:16

## 2024-10-08 RX ADMIN — POLYETHYLENE GLYCOL 3350 17 G: 17 POWDER, FOR SOLUTION ORAL at 09:06

## 2024-10-08 RX ADMIN — THIAMINE HCL TAB 100 MG 100 MG: 100 TAB at 09:06

## 2024-10-08 RX ADMIN — OXYCODONE AND ACETAMINOPHEN 1 TABLET: 10; 325 TABLET ORAL at 01:28

## 2024-10-08 RX ADMIN — CLONIDINE HYDROCHLORIDE 0.1 MG: 0.1 TABLET ORAL at 09:06

## 2024-10-08 RX ADMIN — Medication 5 MG: at 20:29

## 2024-10-08 RX ADMIN — LEVETIRACETAM 500 MG: 500 TABLET, FILM COATED ORAL at 09:06

## 2024-10-08 RX ADMIN — UMECLIDINIUM 1 PUFF: 62.5 AEROSOL, POWDER ORAL at 09:10

## 2024-10-08 RX ADMIN — CLONIDINE HYDROCHLORIDE 0.1 MG: 0.1 TABLET ORAL at 17:11

## 2024-10-08 RX ADMIN — SENNOSIDES AND DOCUSATE SODIUM 1 TABLET: 50; 8.6 TABLET ORAL at 09:06

## 2024-10-08 RX ADMIN — ACETAMINOPHEN 650 MG: 325 TABLET, FILM COATED ORAL at 10:25

## 2024-10-08 RX ADMIN — SENNOSIDES AND DOCUSATE SODIUM 1 TABLET: 50; 8.6 TABLET ORAL at 20:29

## 2024-10-08 RX ADMIN — FLUTICASONE FUROATE AND VILANTEROL TRIFENATATE 1 PUFF: 100; 25 POWDER RESPIRATORY (INHALATION) at 09:10

## 2024-10-08 RX ADMIN — METOPROLOL TARTRATE 100 MG: 100 TABLET, FILM COATED ORAL at 20:29

## 2024-10-08 RX ADMIN — FOLIC ACID 1 MG: 1 TABLET ORAL at 09:06

## 2024-10-08 RX ADMIN — CLONIDINE HYDROCHLORIDE 0.1 MG: 0.1 TABLET ORAL at 20:30

## 2024-10-08 RX ADMIN — METOPROLOL TARTRATE 100 MG: 100 TABLET, FILM COATED ORAL at 09:06

## 2024-10-08 RX ADMIN — MULTIVITAMIN TABLET 1 TABLET: TABLET at 13:26

## 2024-10-08 RX ADMIN — LEVETIRACETAM 500 MG: 500 TABLET, FILM COATED ORAL at 20:30

## 2024-10-08 RX ADMIN — OXYCODONE AND ACETAMINOPHEN 1 TABLET: 10; 325 TABLET ORAL at 13:26

## 2024-10-08 RX ADMIN — OXYCODONE AND ACETAMINOPHEN 1 TABLET: 10; 325 TABLET ORAL at 20:30

## 2024-10-08 RX ADMIN — OXYCODONE AND ACETAMINOPHEN 1 TABLET: 10; 325 TABLET ORAL at 06:55

## 2024-10-08 RX ADMIN — ENOXAPARIN SODIUM 40 MG: 40 INJECTION SUBCUTANEOUS at 09:07

## 2024-10-08 ASSESSMENT — ACTIVITIES OF DAILY LIVING (ADL)
ADLS_ACUITY_SCORE: 32

## 2024-10-08 NOTE — PROGRESS NOTES
Pt A&O X 4. Neuros intact. VSS on RA. Regular diet, thin liquids. Takes pills whole. Up with assist of 1 GB/W. Denies pain. Continent of bowel and bladder. Pt scoring green on the Aggression Stop Light Tool. Plan for TCU. Discharge pending.

## 2024-10-08 NOTE — CONSULTS
"SPIRITUAL HEALTH SERVICES Consult Note  [facility]. [address] medical specialties    Referral Source/Reason for Visit: Provider consult for emotional support    Summary and Recommendations -  I provided emotional and spiritual support as Jessica reflected on relationships and her desire to reconcile.At her request I was present as she phoned her , and shared the Lord's Prayer together.  Plan: I will follow up in the next 1-2 days as patient welcomes follow up support. Spiritual Health remains available at patient's request for the duration of admission.    Zoey Crook MDiv [facility]  Staff   Please place consult order for routine Spiritual Health Services referrals.  [facility] available 24/7 for emergent requests either by having the on-call  paged or by entering an ASAP/STAT consult in Epic (this will also page the on-call ).    Assessment    Saw pt Jessica ARLETH Ellison at bedside for 20 minutes.     Patient / Family Understanding of Illness and Goals of Care - Jessica has the covers pulled over her head as I enter, tearful during our conversation. Jessica affirmed she is in some physical pain, did not elaborate and turned conversation to relationships.   Jessica is hoping to discharge to TCU soon and spoke of some delay in the process due to her walking, and also her preference for which facility.    Distress and Loss - Jessica voiced some regrets and desires to reconcile with family members, asked me to be present as she phoned her . She stated he was diagnosed with cancer yesterday and she wants to \"make things right.\"    Strengths, Coping, and Resources - I affirmed Jessica's taking of deep cleansing breaths, she spoke of her children and grandchildren. Her youngest grandson's first birthday is October 24 and she is motivated to recover so she can be there to celebrate.    Meaning, Beliefs, and Spirituality - Zoroastrianism. Jessica states her home parish is in [place], recalled attending " Denominational schools growing up and named Denominational schools her children have attended. Jessica spoke of the saints and feast days as being important, requested prayer and initiated recitation of the Lord's prayer together.

## 2024-10-08 NOTE — PROGRESS NOTES
Care Management Follow Up    Length of Stay (days): 12    Expected Discharge Date: 10/08/2024     Concerns to be Addressed: discharge planning     Patient plan of care discussed at interdisciplinary rounds: Yes    Anticipated Discharge Disposition: Transitional Care              Anticipated Discharge Services: Home Care, Transportation Services, Other (see comment) (pending final recommenations)  Anticipated Discharge DME: Other (see comment) (pending recommendations)    Patient/family educated on Medicare website which has current facility and service quality ratings:    Education Provided on the Discharge Plan: Yes  Patient/Family in Agreement with the Plan: yes    Referrals Placed by CM/SW:    Private pay costs discussed: Not applicable    Discussed  Partnership in Safe Discharge Planning  document with patient/family: No     Handoff Completed: No, handoff not indicated or clinically appropriate    Additional Information:  WILI met with patient and updated on acceptance at [facility] and need for prior authorization from insurance and level II from the [facility]. Patient expressed frustration with acceptance at [facility] and indicated she doesn't want to go there. SW explained other facilities declined and so it is our only option other than home. Patient indicated she can not go home as her  can not take care of her right now. WILI explained again need for insurance auth and level II. WILI called Radha with [facility] and left a vm requesting a call back to inquire when they will be completing the level II    SW received call from Radha Indicating she completed the assessment but paperwork wont be done until 10am on 10/09 and patient can discharge after this    WILI received [employer] Prior Auth 10/08-10/14 [id number]    WILI scheduled  w/c transport tomorrow between 1734-3200. WILI spoke with patient and updated on insurance approval and ability to discharge tomorrow after 10 once level II is completed. Patient indicated  spouse will transport tomorrow around 11. SW asked if SW needed to call spouse and patient declined. SW called and cancelled MH transport. SW confirmed with patient that she can bring her home C-pap    Next Steps: Level II/Auth    GALLITO Padilla    [facility]

## 2024-10-08 NOTE — PLAN OF CARE
Summary: intentional overdose, respiratory failure, acquired UTI/pneumonia. Prior cervical fracture. Hx depression, anxiety, sleep apnea, chronic pain.    DATE & TIME: 10/07/24, 2300 - 0730  Cognitive Concerns/ Orientation: A&Ox4, calm and cooperative, weepy at times d/t sibling death last week and new cancer diagnosis for  per patient.   BEHAVIOR & AGGRESSION TOOL COLOR: Green  CIWA SCORE: NA  ABNL VS/O2: VSS on room air   MOBILITY: Assist x 1, gait belt and walker  PAIN MANAGMENT: C/o chronic neck/back pain, on scheduled percocet, declined prn Tylenol  DIET: Regular  BOWEL/BLADDER: Continent B/B  ABNL LAB/BG:DRAIN/DEVICES: BG 96. AM labs pending  LINES: PIV SL  SKIN: Scattered bruising, large areas on thighs/arms; some scattered scabbing, areas under Aspen collar padded with mepilex , inspected and intact. Protective mepilex to coccyx  TESTS/PROCEDURES: NA   D/C DAY/GOALS/PLACE: Plan for TCU placement, will need [facility] worker,  to see pt on Tues (10/09), accepted at [facility]  OTHER IMPORTANT INFO: Spiritual consult placed. PT, Psych signed off; neurosurg signed off  Patient has Aspen collar to wear constantly, remove Q4 for skin check. Pretty collar in room to wear with hygiene cares or shower. Seizure precautions maintained.

## 2024-10-08 NOTE — PROGRESS NOTES
[facility]    Medicine Progress Note - Hospitalist Service    Date of Admission:  9/26/2024    Assessment & Plan   Jessica Ellison is a 73 year old female admitted on 9/26/2024.  Past medical history of chronic pain on opiate therapy, s/p lumbar fusion 2015, central lobar emphysema,  major depressive disorder, hypertension, anemia, seizure, sleep apnea, thyroid disease, tobacco abuse and uncomplicated asthma, who presented to [facility] via EMS for respiratory failure. She was intubated in the field after being found down with an apparent overdose. She was transferred from [facility] for ongoing ICU cares.  ICU course included intubation, low dose pressors and neurosurgery consultation for type II dens fracture.  For details, see admission note.  Hospitalist service was consulted on 10/03/24 for assumption of care on transfer out of ICU.  For details, see admission note.      Acute respiratory failure with hypoxia secondary to loss of protective airway reflexes  Uncomplicated asthma   Centrilobular emphysema   Sleep apnea   Found down at home and required intubation in the field. Extubated 10/2/24. Former smoker with 30 pack year hx, quit 2004.  ICU admission, subsequent transfer out.  -Chest x-ray 10/2/24 low lung volumes, mild bibasilar atelectasis, mild interstitial prominence  -MRSA nares negative  -Respiratory aerobic culture 4+ gram postive cocci and 2+ mixed jesús   -Received 6 days Zosyn IV and 3 days IV vancomycin   -Continue pulmonary hygiene measures  -Duonebs prn   -Encourage incentive spirometry, monitor oxygen saturations  -Continue current inhaler regimen  -CPAP with home settings   -Physical therapy consulted for weakness and deconditioning, recommending TCU on discharge  -Recent labs reviewed 10/8     Alcohol and opioid overdose with history of dependence   Chronic pain with continuous opioid dependence s/p lumbar spinal fusion  Major depressive disorder   Seizure  "disorder  Concern for suicide attempt    Initial labs notable for alcohol level of 0.19. Tylenol and salicylate levels wnl. INR wnl w/ no transaminitis. Metabolic acidosis noted. Cr wnl. Poison control contacted, initially suspected Keppra over dose however levels were subtherapeutic.   *Prescribed Percocet every 6 hours as needed PTA for chronic back pain.   *Hx several DWI's and former treatment at [place] [address], last 10-15 yrs ago   *Family very concerned she is hiding bottles of alcohol and stockpiles opioids by report  *Patient reported that she drinks half a pint of vodka 3 to 4 days a week to manage her mood and pain  -Psychiatry consulted, subsequent 1:1 and suicide precautions discontinued; see consult note for details    -Previously denied suicidality and reported she was simply drinking more than usual and took a Percocet  -Patient was not agreeable to chemical dependency referrals; family would like her to see her get help; outpatient follow-up with primary clinic provider is appropriate  -Continue gabapentin  -Continue Levetiacetam 500 mg BID; reported poor compliance with this per past provider  -HOLD PTA duloxetine as she reported not taking this**  -Continue multivitamin and folic acid, thiamine  -CIWA scale and VS without medications, nursing to contact provider if patient begins scoring  -Psychiatry consulted with follow-up visit 10/5 and patient then agreeable to chemical dependency consultation evaluation, scheduled for 10/7  -CD consulted on 10/7, per note, \"Pt declined interest in [facility] Assessment for referrals to Tx at the this time. Pt also declined to receive community resources at this time and stated she already has some.\"  -Recommend follow-up chemical dependency as patient allows evaluation at transitional care unit and with primary clinic provider     History of urinary retention  Urinary tract infection (UTI), resolved  Urine culture grew Klebsiella 50-100K and E. Coli 50-100K.  Bruce " catheter was replaced due to 1500 mL retained urine overnight 10/30/2024  -Completed a course of antibiotics as noted above  -Orders for Bruce removal 10/4/2024 with voiding trial; Bruce removed without difficulty with no report of recurrent urinary retention  -No current urinary complaints, monitor     Type II dens fracture following mechanical fall (9/9-9/11/24)   Patient suffered a mechanical fall in early September and hit her head suffering a type II dens fracture.  She was hospitalized and required no surgical interventions.  A repeat MRI cervical spine 9/30 completed in the setting of possible drug overdose showing fracture through the dens which is better seen on CT.  No definite abnormal fluid collection within the spinal canal and marked multilevel level degenerative changes throughout the cervical spine particularly from C3-C4 through C7-T1.  Neurosurgery consulted, Dr. Gibson.  -Follow-up neurosurgery visit 10/3/2024, see note; no acute surgical intervention at this time  -To wear Aspen cervical collar at all times, per neurosurgery recommendations  -Pretty collar ordered to wear for hygiene/showering, per neurosurgery recommendations  -Avoid heavy lifting, bending, or twisting  -Therapy consulted as above  -Follow up with [facility] neurosurgery clinic as previously scheduled on 10/15/2024  -NSGY signed off inpatient on 10/3/24      Nutrition  Diarrhea, resolved  Evaluated by speech and language therapy (SLP) post extubation and okay for regular diet and thin liquids with standard aspiration precautions.   -Tube feedings discontinued 10/3  -SLP consultation; signed off 10/3   -Rectal tube was in place overnight 10/3 and removed later that morning as there was less than 200 mL output  -Monitor for diarrhea  -RN managed electrolyte protocols in place      Stress-induced hyperglycemia, resolved  Off stress dose steroids of recent  -Sliding scale insulin while inpatient  -Hypoglycemia protocol  Recent Labs   Lab  10/08/24  1310 10/08/24  0156 10/07/24  2102 10/07/24  1703 10/07/24  1213 10/07/24  0745   * 96 116* 105* 85 82      Hypertension   -Continue amlodipine, metoprolol  -Started on clonidine 0.1 mg q 8h, monitor blood pressure, follow-up with outpatient/TCU provider  -Monitor blood pressure, follow-up as outpatient     Mild thrombocytopenia, resolving  Anemia, normocytic  -Monitor hemoglobin and platelet counts; suspect alcohol is playing a contributing role  -Monitor platelets in the setting of enoxaparin for DVT prophylaxis**  -Follow-up labs with primary clinic provider as outpatient  Recent Labs   Lab 10/07/24  0717 10/04/24  2008 10/02/24  0553    142* 111*     Recent Labs   Lab 10/07/24  0717 10/04/24  2008 10/02/24  0553   HGB 9.2* 9.8* 8.8*       Hypokalemia  Hypomagnesemia  -Potassium and magnesium replacement in hospital per electrolyte protocols  Recent Labs   Lab 10/08/24  0815 10/07/24  0717 10/06/24  1241 10/05/24  1136 10/04/24  2008 10/03/24  1338   POTASSIUM 3.9 3.7 3.8 3.5 3.4 3.9     Recent Labs   Lab 10/08/24  0815 10/07/24  0717 10/06/24  1241 10/05/24  1136 10/04/24  2008 10/03/24  0554   MAG 1.8 1.8 1.8 1.7 1.6* 1.6*     Hypothyroidism   -Continue levothyroxine     History of tobacco dependence   -Quit in 2004    Alcohol use disorder, severe, dependence  -Chemical dependency consultation requested  -Psychiatry consulted with recent follow-up 10/5/2024, see note    Weakness and deconditioning  -Physical therapy, occupational therapy consulted, continue at transitional care unit   -Increase activity as tolerated    Disposition  -Estimated length of stay 24 hours pending TCU acceptance and completion of county evaluation (level II), see social work note 10/8  -Anticipated discharge to transitional care unit  -Per social work note 10/8, patient indicated she cannot go home as  cannot take care of her right now; patient remains weak and deconditioned  -Medically stable for  "discharge  -Social work consult for discharge planning    Change in hospitalist provider tomorrow with one of my [facility] hospitalist colleagues assuming care.          Diet: Regular Diet Adult    DVT Prophylaxis: Enoxaparin (Lovenox) SQ  Bruce Catheter: Not present  Lines: None     Cardiac Monitoring: None  Code Status: Full Code      Clinically Significant Risk Factors                  # Hypertension: Noted on problem list           # Overweight: Estimated body mass index is 28.17 kg/m  as calculated from the following:    Height as of 9/9/24: 1.626 m (5' 4\").    Weight as of this encounter: 74.4 kg (164 lb 1.6 oz).      # Financial/Environmental Concerns: none  # COPD: noted on problem list        Disposition Plan     Medically Ready for Discharge: as above             Rod Kellogg MD  Hospitalist Service  [facility]  Securely message with enymotion (more info)  Text page via [employer] Paging/Directory   ______________________________________________________________________    Interval History   Lying in bed, quiet and reserved, frustrated by prolonged hospitalization and delays in discharge to preferred transitional care unit.  Social work consulted and following.  Count evaluation requested and pending.  No new symptoms reported.  Medically stable for discharge.    Physical Exam   Vital Signs: Temp: 98.4  F (36.9  C) Temp src: Oral BP: 118/75 Pulse: 66   Resp: 16 SpO2: 94 % O2 Device: None (Room air)    Weight: 164 lbs 1.6 oz    GENERAL awake and alert, no acute distress, lying in bed  HEENT cervical collar remains in place  LUNGS no wheezes or crackles, good inspiratory effort, stable  NEURO moves upper and lower extremities spontaneously and to command  MENTAL STATUS quiet, reserved    Medical Decision Making             Data     I have personally reviewed the following data over the past 24 hrs:    N/A  \   N/A   / N/A     N/A N/A N/A /  113 (H)   3.9 N/A 0.97 (H) \     "   Imaging results reviewed over the past 24 hrs:   No results found for this or any previous visit (from the past 24 hour(s)).

## 2024-10-08 NOTE — PLAN OF CARE
Goal Outcome Evaluation:     Summary: intentional overdose, respiratory failure, acquired UTI/pneumonia. Prior cervical fracture. Hx depression, anxiety, sleep apnea, chronic pain.     DATE & TIME: 10/8/2024, 5383-2597  Cognitive Concerns/ Orientation: A&Ox4, calm and cooperative   BEHAVIOR & AGGRESSION TOOL COLOR: Green  CIWA SCORE: NA  ABNL VS/O2: VSS on RA  MOBILITY: Assist x 1, gait belt and walker  PAIN MANAGMENT: C/o chronic neck/back pain, on scheduled percocet, PRN Tylenol  DIET: Regular diet  BOWEL/BLADDER: Continent B/B in BR  ABNL LAB/BG:DRAIN/DEVICES: BG 91, 113.  Creat 0.97, Phos 5.0  LINES: PIV SL  SKIN: Scattered bruising, large bruised areas on thighs/arms; some scattered scabbing, areas under Aspen collar padded with mepilex , inspected and intact  TESTS/PROCEDURES: NA   D/C DAY/GOALS/PLACE: Plan for TCU placement, will need [facility] worker to see pt on Tues (10/09), accepted at [facility] on [facility]  OTHER IMPORTANT INFO: Spiritual Health. PT, Psych signed off; neurosurg signed off  Patient has Aspen collar to wear constantly, remove Q4 for skin check. Pretty collar in room to wear with hygiene cares or shower. Seizure precautions maintained. K, Mg, & Phos protocols.

## 2024-10-08 NOTE — PLAN OF CARE
Summary: intentional overdose, respiratory failure, acquired UTI/pneumonia. Prior cervical fracture. Hx depression, anxiety, sleep apnea, chronic pain.  DATE & TIME: 10/07/24 1086-8513  Cognitive Concerns/ Orientation: A&Ox4, calm and cooperative, frustrated d/t discharge pushed back,   BEHAVIOR & AGGRESSION TOOL COLOR: green  CIWA SCORE: discontinued  ABNL VS/O2: VSS on RA- [place]   MOBILITY: Ax1 GB/W  PAIN MANAGMENT: c/o chronic neck/back pain and headache, on scheduled percocet, given PRN tylenol x1  DIET: Regular, good appetite  BOWEL/BLADDER: Continent, Last BM 10/3 - started on BM meds  ABNL LAB/BG:DRAIN/DEVICES: K 3.7, replaced - recheck AM draw, mg 1.8, Phos 4.9 recheck in the AM, /116  LINES:. 2 R PIV SL  SKIN: Scattered bruising, large areas on thighs/arms; some scattered scabbing, areas under Aspen collar padded with mepilex , inspected and intact  TESTS/PROCEDURES: n/a   D/C DAY/GOALS/PLACE: plan for TCU placement, will need [facility] worker - to see pt on Tues (10/09), accepted at [facility] on [facility]  OTHER IMPORTANT INFO: PT, psych signed off; neurosurg signed off  Patient has Aspen collar to wear constantly, remove Q4 for skin check (see order); Pretty collar in room to wear with hygiene cares or shower. Seizure precautions maintained. Chem dep seen today,

## 2024-10-09 ENCOUNTER — TELEPHONE (OUTPATIENT)
Dept: SLEEP MEDICINE | Facility: CLINIC | Age: 74
End: 2024-10-09

## 2024-10-09 VITALS
DIASTOLIC BLOOD PRESSURE: 88 MMHG | HEART RATE: 82 BPM | WEIGHT: 164.1 LBS | RESPIRATION RATE: 16 BRPM | SYSTOLIC BLOOD PRESSURE: 143 MMHG | BODY MASS INDEX: 28.17 KG/M2 | OXYGEN SATURATION: 96 % | TEMPERATURE: 98.3 F

## 2024-10-09 LAB
ERYTHROCYTE [DISTWIDTH] IN BLOOD BY AUTOMATED COUNT: 14.4 % (ref 10–15)
GLUCOSE BLDC GLUCOMTR-MCNC: 93 MG/DL (ref 70–99)
HCT VFR BLD AUTO: 30.7 % (ref 35–47)
HGB BLD-MCNC: 9.8 G/DL (ref 11.7–15.7)
MAGNESIUM SERPL-MCNC: 1.8 MG/DL (ref 1.7–2.3)
MCH RBC QN AUTO: 30.1 PG (ref 26.5–33)
MCHC RBC AUTO-ENTMCNC: 31.9 G/DL (ref 31.5–36.5)
MCV RBC AUTO: 94 FL (ref 78–100)
PHOSPHATE SERPL-MCNC: 4.6 MG/DL (ref 2.5–4.5)
PLATELET # BLD AUTO: 265 10E3/UL (ref 150–450)
POTASSIUM SERPL-SCNC: 3.6 MMOL/L (ref 3.4–5.3)
RBC # BLD AUTO: 3.26 10E6/UL (ref 3.8–5.2)
WBC # BLD AUTO: 4.4 10E3/UL (ref 4–11)

## 2024-10-09 PROCEDURE — 36415 COLL VENOUS BLD VENIPUNCTURE: CPT | Performed by: HOSPITALIST

## 2024-10-09 PROCEDURE — 83735 ASSAY OF MAGNESIUM: CPT | Performed by: HOSPITALIST

## 2024-10-09 PROCEDURE — 250N000013 HC RX MED GY IP 250 OP 250 PS 637

## 2024-10-09 PROCEDURE — 250N000013 HC RX MED GY IP 250 OP 250 PS 637: Performed by: HOSPITALIST

## 2024-10-09 PROCEDURE — 85014 HEMATOCRIT: CPT

## 2024-10-09 PROCEDURE — 99239 HOSP IP/OBS DSCHRG MGMT >30: CPT | Performed by: HOSPITALIST

## 2024-10-09 PROCEDURE — 250N000011 HC RX IP 250 OP 636

## 2024-10-09 PROCEDURE — 84100 ASSAY OF PHOSPHORUS: CPT | Performed by: HOSPITALIST

## 2024-10-09 PROCEDURE — 85041 AUTOMATED RBC COUNT: CPT

## 2024-10-09 PROCEDURE — 84132 ASSAY OF SERUM POTASSIUM: CPT | Performed by: HOSPITALIST

## 2024-10-09 RX ORDER — OXYCODONE AND ACETAMINOPHEN 10; 325 MG/1; MG/1
1 TABLET ORAL EVERY 6 HOURS PRN
Qty: 12 TABLET | Refills: 0 | Status: SHIPPED | OUTPATIENT
Start: 2024-10-09 | End: 2024-10-15

## 2024-10-09 RX ADMIN — THIAMINE HCL TAB 100 MG 100 MG: 100 TAB at 08:53

## 2024-10-09 RX ADMIN — LEVOTHYROXINE SODIUM 50 MCG: 50 TABLET ORAL at 06:51

## 2024-10-09 RX ADMIN — METOPROLOL TARTRATE 100 MG: 100 TABLET, FILM COATED ORAL at 08:53

## 2024-10-09 RX ADMIN — FLUTICASONE FUROATE AND VILANTEROL TRIFENATATE 1 PUFF: 100; 25 POWDER RESPIRATORY (INHALATION) at 08:59

## 2024-10-09 RX ADMIN — LEVETIRACETAM 500 MG: 500 TABLET, FILM COATED ORAL at 08:53

## 2024-10-09 RX ADMIN — UMECLIDINIUM 1 PUFF: 62.5 AEROSOL, POWDER ORAL at 08:59

## 2024-10-09 RX ADMIN — FOLIC ACID 1 MG: 1 TABLET ORAL at 08:53

## 2024-10-09 RX ADMIN — AMLODIPINE BESYLATE 5 MG: 5 TABLET ORAL at 08:53

## 2024-10-09 RX ADMIN — CLONIDINE HYDROCHLORIDE 0.1 MG: 0.1 TABLET ORAL at 08:53

## 2024-10-09 RX ADMIN — SENNOSIDES AND DOCUSATE SODIUM 1 TABLET: 50; 8.6 TABLET ORAL at 08:54

## 2024-10-09 ASSESSMENT — ACTIVITIES OF DAILY LIVING (ADL)
ADLS_ACUITY_SCORE: 32

## 2024-10-09 NOTE — PLAN OF CARE
Goal Outcome Evaluation:                         Summary: intentional overdose, respiratory failure, acquired UTI/pneumonia. Prior cervical fracture. Hx depression, anxiety, sleep apnea, chronic pain.     DATE & TIME: 10/09/24, 1000  Cognitive Concerns/ Orientation: A&Ox4, Pt upset and wanting to discharge this morning.  BEHAVIOR & AGGRESSION TOOL COLOR: Green  CIWA SCORE: NA  ABNL VS/O2: VSS on room air   MOBILITY: Assist x 1, gait belt and walker  PAIN MANAGMENT: C/o chronic neck/back pain, prn scheduled percocet given x1  DIET: Regular  BOWEL/BLADDER: Continent B/B  ABNL LAB/BG:DRAIN/DEVICES: BG: Refused BS check.   LINES: PIV SL  SKIN: Scattered bruising, large areas on thighs/arms; some scattered scabbing, areas under Aspen collar padded with mepilex , inspected and intact  TESTS/PROCEDURES: NA   D/C DAY/GOALS/PLACE[facility] around 1100,  transporting  OTHER IMPORTANT INFO: Patient has Aspen collar to wear constantly, remove Q4 for skin check. Pretty collar in room to wear with hygiene cares or shower. Seizure precautions maintained.

## 2024-10-09 NOTE — PLAN OF CARE
Goal Outcome Evaluation:    Summary: intentional overdose, respiratory failure, acquired UTI/pneumonia. Prior cervical fracture. Hx depression, anxiety, sleep apnea, chronic pain.    DATE & TIME: 10/08-09/24, 1930- 0730  Cognitive Concerns/ Orientation: A&Ox4, calm and cooperative   BEHAVIOR & AGGRESSION TOOL COLOR: Green  CIWA SCORE: NA  ABNL VS/O2: VSS on room air   MOBILITY: Assist x 1, gait belt and walker  PAIN MANAGMENT: C/o chronic neck/back pain, prn scheduled percocet given x1  DIET: Regular  BOWEL/BLADDER: Continent B/B  ABNL LAB/BG:DRAIN/DEVICES: BG 97, 93   LINES: PIV SL  SKIN: Scattered bruising, large areas on thighs/arms; some scattered scabbing, areas under Aspen collar padded with mepilex , inspected and intact  TESTS/PROCEDURES: NA   D/C DAY/GOALS/PLACE: Plan to [facility]  OTHER IMPORTANT INFO: Patient has Aspen collar to wear constantly, remove Q4 for skin check. Pretty collar in room to wear with hygiene cares or shower. Seizure precautions maintained.

## 2024-10-09 NOTE — PLAN OF CARE
Physical Therapy Discharge Summary    Reason for therapy discharge:    Discharged to transitional care facility.    Progress towards therapy goal(s). See goals on Care Plan in Epic electronic health record for goal details.  Goals not met.  Barriers to achieving goals:   discharge from facility.    Therapy recommendation(s):    Continued therapy is recommended.  Rationale/Recommendations:  continued PT to progress safety and IND with mobility.    Writing therapist did not treat pt, note written based on previous treating therapist notes and recommendations. Please see chart and flow sheet for additional details.

## 2024-10-09 NOTE — PROGRESS NOTES
Care Management Discharge Note    Discharge Date: 10/09/2024       Discharge Disposition: Transitional Care    Discharge Services: Home Care, Transportation Services, Other (see comment) (pending final recommenations)    Discharge DME: Other (see comment) (pending recommendations)    Discharge Transportation: family or friend will provide    Private pay costs discussed: Not applicable    Does the patient's insurance plan have a 3 day qualifying hospital stay waiver?  Yes     Which insurance plan 3 day waiver is available? Alternative insurance waiver    Will the waiver be used for post-acute placement? No    PAS Confirmation Code:    Patient/family educated on Medicare website which has current facility and service quality ratings:      Education Provided on the Discharge Plan: Yes  Persons Notified of Discharge Plans:   Patient/Family in Agreement with the Plan: yes    Handoff Referral Completed: No, handoff not indicated or clinically appropriate    Additional Information:  Patient discharging today at 1100 to [facility] in [place].   is transporting and knows the location of the TCU.  The orders were sent via In Basket and one script faxed.  The TCU liaison confirms [facility] received the [place] Level 2 report this morning.  No further needs identified.    SELENA WadsworthSW

## 2024-10-09 NOTE — PROGRESS NOTES
Discharge    Patient discharged to [facility] via Wheelchair with   Care plan note: See POC note    Listed belongings gathered and given to patient (including from security/pharmacy). Yes  Care Plan and Patient education resolved: Yes  Prescriptions if needed, hard copies sent with patient  Yes  Medication Bin checked and emptied on discharge Yes  SW/care coordinator/charge RN aware of discharge: Yes

## 2024-10-09 NOTE — PROGRESS NOTES
Occupational Therapy Discharge Summary    Reason for therapy discharge:    Discharged to transitional care facility.    Progress towards therapy goal(s). See goals on Care Plan in [facility] electronic health record for goal details.  Goals partially met.  Barriers to achieving goals:   discharge from facility.    Therapy recommendation(s):    Continued therapy is recommended.  Rationale/Recommendations:  to progress indp ADL.

## 2024-10-09 NOTE — DISCHARGE SUMMARY
"[facility]  Hospitalist Discharge Summary      Date of Admission:  9/26/2024  Date of Discharge:  10/9/2024 10:46 AM  Discharging Provider: Rip Garcia MD  Discharge Service: Hospitalist Service    Discharge Diagnoses     Acute respiratory failure with hypoxia secondary to loss of protective airway reflexes-resolved  Uncomplicated asthma   Centrilobular emphysema   Sleep apnea   Alcohol and opioid overdose with history of dependence   Chronic pain with continuous opioid dependence s/p lumbar spinal fusion  Major depressive disorder   Seizure disorder  Concern for suicide attempt    Urinary tract infection (UTI), resolved  Type II dens fracture following mechanical fall (9/9-9/11/24)   Diarrhea, resolved  Stress-induced hyperglycemia, resolved  Hypertension   Mild thrombocytopenia, resolved  Anemia, normocytic  Hypokalemia-resolved  Hypomagnesemia-resolved  Hypothyroidism     Clinically Significant Risk Factors     # Overweight: Estimated body mass index is 28.17 kg/m  as calculated from the following:    Height as of 9/9/24: 1.626 m (5' 4\").    Weight as of this encounter: 74.4 kg (164 lb 1.6 oz).       Follow-ups Needed After Discharge   Follow-up Appointments     Follow Up and recommended labs and tests      Follow up with transitional care unit provider this week for hospital   follow-up of respiratory failure, C-spine fracture, alcohol use history   and treatment recommendations, hypertension, medication review, labs.  The   following labs/tests are recommended: CBC with platelets, basic profile,   serum magnesium levels (ordered and reviewed by transitional care unit   provider).    Follow-up with [facility] neurosurgery clinic as previously scheduled on   10/15/2024 for cervical spine fracture, ongoing care.        {Additional follow-up instructions/to-do's for PCP        Unresulted Labs Ordered in the Past 30 Days of this Admission       No orders found from 8/27/2024 to 9/27/2024.      "   These results will be followed up by     Discharge Disposition   Discharged to facility  Condition at discharge: Stable    Hospital Course     Jessica Ellison is a 73 year old female admitted on 9/26/2024.  Past medical history of chronic pain on opiate therapy, s/p lumbar fusion 2015, central lobar emphysema,  major depressive disorder, hypertension, anemia, seizure, sleep apnea, thyroid disease, tobacco abuse and uncomplicated asthma, who presented to [facility] via EMS for respiratory failure. She was intubated in the field after being found down with an apparent overdose. She was transferred from [facility] for ongoing ICU cares.  ICU course included intubation, low dose pressors and neurosurgery consultation for type II dens fracture.  For details, see admission note.  Hospitalist service was consulted on 10/03/24 for assumption of care on transfer out of ICU.  For details, see admission note.      Acute respiratory failure with hypoxia secondary to loss of protective airway reflexes-resolved  Uncomplicated asthma   Centrilobular emphysema   Sleep apnea   -Patient was initially found down at home and has significant smoking history and was intubated in the field and extubated on 10/2  -Chest x-ray 10/2/24 low lung volumes, mild bibasilar atelectasis, mild interstitial prominence  -MRSA nares negative  -Respiratory aerobic culture 4+ gram postive cocci and 2+ mixed jesús   -Received 6 days Zosyn IV and 3 days IV vancomycin   -Continue pulmonary hygiene measures  -On day of discharge her lungs were clear to auscultation  -Continue CPAP with home setting     Alcohol and opioid overdose with history of dependence   Chronic pain with continuous opioid dependence s/p lumbar spinal fusion  Major depressive disorder   Seizure disorder  Concern for suicide attempt    -Initial labs notable for alcohol level of 0.19. Tylenol and salicylate levels wnl. INR wnl w/ no transaminitis. Metabolic acidosis noted. Cr wnl.  "Poison control contacted, initially suspected Keppra over dose however levels were subtherapeutic.   -Prescribed Percocet every 6 hours as needed PTA for chronic back pain.   -Hx several DWI's and former treatment at [place] [id number], last 10-15 yrs ago   -Family very concerned she is hiding bottles of alcohol and stockpiles opioids by report  -Patient did admit to drinking alcohol and psychiatry was consulted and suicide precautions were discontinued and patient admitted that she was taking Percocet and simply drinking more than before  -Psychiatry consulted with follow-up visit 10/5 and patient then agreeable to chemical dependency consultation evaluation  -CD consulted on 10/7, per note, \"Pt declined interest in [facility] for referrals to Tx at the this time. Pt also declined to receive community resources at this time and stated she already has some.\"  -Continue Levetiacetam 500 mg BID; reported poor compliance with this per past provider  -HOLD PTA duloxetine as she reported not taking this**  -Continue multivitamin and folic acid, thiamine  -Recommend follow-up chemical dependency as patient allows evaluation at transitional care unit and with primary clinic provider     History of urinary retention  Urinary tract infection (UTI), resolved  Urine culture grew Klebsiella 50-100K and E. Coli 50-100K.  Bruce catheter was replaced due to 1500 mL retained urine overnight 10/30/2024  -Completed a course of antibiotics as noted above     Type II dens fracture following mechanical fall (9/9-9/11/24)   -Patient suffered a mechanical fall in early September and hit her head suffering a type II dens fracture.  She was hospitalized and required no surgical interventions.  -A repeat MRI cervical spine 9/30 completed in the setting of possible drug overdose showing fracture through the dens which is better seen on CT.  No definite abnormal fluid collection within the spinal canal and marked multilevel level degenerative " changes throughout the cervical spine particularly from C3-C4 through C7-T1.  -Follow-up neurosurgery visit 10/3/2024, see note; no acute surgical intervention at this time  -To wear Aspen cervical collar at all times, per neurosurgery recommendations  -Pretty collar ordered to wear for hygiene/showering, per neurosurgery recommendations  -Avoid heavy lifting, bending, or twisting  -Therapy consulted as above  -Follow up with [facility] neurosurgery clinic as previously scheduled on 10/15/2024     Diarrhea, resolved  Evaluated by speech and language therapy (SLP) post extubation and okay for regular diet and thin liquids with standard aspiration precautions.   -Tube feedings discontinued 10/3  -SLP consultation; signed off 10/3   -She had rectal tube which was removed and diarrhea resolved     Stress-induced hyperglycemia, resolved    Hypertension   -Continue amlodipine  -Started on clonidine 0.1 mg q 8h, monitor blood pressure, follow-up with outpatient/TCU provider  -Monitor blood pressure, follow-up as outpatient     Mild thrombocytopenia, resolved  Anemia, normocytic    Hypokalemia-resolved  Hypomagnesemia-resolved    Hypothyroidism   -Continue levothyroxine     History of tobacco dependence   -Quit in 2004     Alcohol use disorder, severe, dependence  -Chemical dependency consultation requested  -Psychiatry consulted with recent follow-up      Weakness and deconditioning  -Physical therapy, occupational therapy consulted, continue at transitional care unit   -Increase activity as tolerated          Consultations This Hospital Stay   CARE MANAGEMENT / SOCIAL WORK IP CONSULT  PHARMACY IP CONSULT  PHARMACY TO DOSE VANCO  NUTRITION SERVICES ADULT IP CONSULT  PHARMACY IP CONSULT  NEUROSURGERY IP CONSULT  SPEECH LANGUAGE PATH ADULT IP CONSULT  PSYCHIATRY IP CONSULT  NEUROSURGERY IP CONSULT  HOSPITALIST IP CONSULT  SOCIAL WORK IP CONSULT  PHYSICAL THERAPY ADULT IP CONSULT  PHYSICAL THERAPY ADULT IP CONSULT  CARE MANAGEMENT  / SOCIAL WORK IP CONSULT  OCCUPATIONAL THERAPY ADULT IP CONSULT  CHEMICAL DEPENDENCY IP CONSULT  SPIRITUAL HEALTH SERVICES IP CONSULT  PHYSICAL THERAPY ADULT IP CONSULT  OCCUPATIONAL THERAPY ADULT IP CONSULT    Code Status   Prior    Time Spent on this Encounter   IRip MD, personally saw the patient today and spent greater than 30 minutes discharging this patient.       Rip Garcia MD  [place] [address] [facility]  [address] [address]  Phone: [phone]  ______________________________________________________________________    Physical Exam   Vital Signs: Temp: 98.3  F (36.8  C) Temp src: Oral BP: (!) 143/88 Pulse: 82   Resp: 16 SpO2: 96 % O2 Device: None (Room air)    Weight: 164 lbs 1.6 oz        General: aox3  HEENT: Head is atraumatic, normocephalic.  Pupils are equal, round and reactive to light.  No scleral icterus. Oral mucosa is moist   Neck: Neck is supple and wearing collar  Respiratory: Lungs are clear to auscultation bilaterally with no wheeze or crackles   Cardiovascular: Regular rate , S1 and S2 normal with no murmer or rubs or gallops  Abdomen:   soft , non tender , non distended and bowel sound present   Skin: No skin rashes or lesions to inspection or palpation.  Neurologic: No facial droop  Musculoskeletal: Normal Range of motion over upper and lower extremities bilaterally   Psychiatric: cooperative         Primary Care Physician   Sarah Barriga    Discharge Orders      Medication Therapy Management Referral      Primary Care - Care Coordination Referral      General info for SNF    Length of Stay Estimate: Short Term Care: Estimated # of Days <30  Condition at Discharge: Improving  Level of care:skilled   Rehabilitation Potential: Excellent  Admission H&P remains valid and up-to-date: Yes  Recent Chemotherapy: N/A  Use Nursing Home Standing Orders: Yes     Mantoux instructions    Give two-step Mantoux (PPD) Per Facility Policy Yes      Follow Up and recommended labs and tests    Follow up with transitional care unit provider this week for hospital follow-up of respiratory failure, C-spine fracture, alcohol use history and treatment recommendations, hypertension, medication review, labs.  The following labs/tests are recommended: CBC with platelets, basic profile, serum magnesium levels (ordered and reviewed by transitional care unit provider).    Follow-up with [facility] neurosurgery clinic as previously scheduled on 10/15/2024 for cervical spine fracture, ongoing care.     Reason for your hospital stay    Intensive care unit admission for respiratory failure and concerns of alcohol intoxication and opiate use.  Additional medical concerns of urinary tract infection, recent cervical spine fracture with C-collar use, high blood pressure, low blood platelets, electrolyte abnormalities, and generalized weakness.  Neurosurgery, ICU, psychiatry, chemical dependency, social work, nutrition, and therapy teams consulted.  Transition to short-term rehabilitation transitional care unit on discharge for ongoing care and follow-up.     Activity - Up with nursing assistance    -To wear Aspen cervical collar at all times, per neurosurgery recommendations  -Pretty collar ordered to wear for hygiene/showering, per neurosurgery recommendations     CPAP - Continue Home CPAP    Continue Home CPAP per home equipment settings.     Physical Therapy Adult Consult    Evaluate and treat as clinically indicated.    Reason:  weakness, recent C-spine fracture     Occupational Therapy Adult Consult    Evaluate and treat as clinically indicated.    Reason:  weakness, activities of daily living, recent C-spine fracture     Fall precautions     Diet    Follow this diet upon discharge: Current Diet:Orders Placed This Encounter      Regular Diet Adult       Significant Results and Procedures   Most Recent 3 CBC's:  Recent Labs   Lab Test 10/09/24  0757 10/07/24  0717 10/04/24  2008    WBC 4.4 4.1 5.3   HGB 9.8* 9.2* 9.8*   MCV 94 95 95    195 142*     Most Recent 3 BMP's:  Recent Labs   Lab Test 10/09/24  0757 10/09/24  0200 10/08/24  2143 10/08/24  1729 10/08/24  1310 10/08/24  0815 10/07/24  0745 10/07/24  0717 10/04/24  2145 10/04/24  2008 10/03/24  0816 10/03/24  0554 10/02/24  1613 10/02/24  1438   NA  --   --   --   --   --   --   --   --   --  141  --  140  --  143   POTASSIUM 3.6  --   --   --   --  3.9  --  3.7   < > 3.4   < > 3.4  --  3.5  3.5   CHLORIDE  --   --   --   --   --   --   --   --   --  101  --  101  --  101   CO2  --   --   --   --   --   --   --   --   --  26  --  32*  --  28   BUN  --   --   --   --   --   --   --   --   --  18.0  --  16.1  --  14.6   CR  --   --   --   --   --  0.97*  --   --   --  0.78  --  0.59  --  0.59   ANIONGAP  --   --   --   --   --   --   --   --   --  14  --  7  --  14   DELILAH  --   --   --   --   --   --   --   --   --  9.5  --  9.2  --  9.0   GLC  --  93 97 91   < >  --    < >  --    < > 104*   < > 98   < > 167*    < > = values in this interval not displayed.     Most Recent 2 LFT's:  Recent Labs   Lab Test 09/26/24 0415 09/09/24  1730   AST 38 39   ALT 20 18   ALKPHOS 85 101   BILITOTAL 0.3 0.3     Most Recent 3 INR's:  Recent Labs   Lab Test 09/30/24  0504 09/26/24  0415 10/17/23  1324   INR 1.00 1.05 0.94     Most Recent INR's and Anticoagulation Dosing History:  Anticoagulation Dose History  More data exists         Latest Ref Rng & Units 8/5/2015 7/27/2017 3/18/2019 12/13/2019 10/17/2023 9/26/2024 9/30/2024   Recent Dosing and Labs   INR 0.85 - 1.15 0.96  0.94  1.00  0.97  0.94  1.05  1.00       Details                 Most Recent 3 Creatinines:  Recent Labs   Lab Test 10/08/24  0815 10/04/24  2008 10/03/24  0554   CR 0.97* 0.78 0.59     Most Recent 3 Hemoglobins:  Recent Labs   Lab Test 10/09/24  0757 10/07/24  0717 10/04/24  2008   HGB 9.8* 9.2* 9.8*     Most Recent 3 Troponin's:  Recent Labs   Lab Test 12/22/20  1015  12/03/20  1515   TROPI <0.015 <0.015     Most Recent 3 BNP's:  Recent Labs   Lab Test 11/07/23  0848 11/07/23  0550   NTBNPI 152 143   ,   Results for orders placed or performed during the hospital encounter of 09/26/24   XR Chest Port 1 View    Narrative    CHEST ONE VIEW  9/26/2024 11:34 AM     HISTORY: Endotracheal tube positioning    COMPARISON: 9/26/2024.      Impression    IMPRESSION: Endotracheal tube with distal tip projecting at the lower  thoracic trachea approximately 2.5 cm proximal to the veena. Low lung  volumes. Stable cardiac silhouette. Patchy bibasilar opacities are  favored atelectasis. No significant pleural effusion or discernible  pneumothorax. Thoracolumbar spine fusion hardware. Cholecystectomy  clips.    ROSS CHAVIRA MD         SYSTEM ID:  [id number]   XR Feeding Tube Placement    Narrative    FEEDING TUBE PLACEMENT   9/26/2024 12:23 PM    HISTORY: Nutritional needs. Recent spine surgery.    COMPARISON: None    FINDINGS: 8.9 minutes of fluoroscopy were utilized for placement of a  feeding tube.  At the final position, the feeding tube tip was the  second stage of the duodenum.  60 mL of contrast was injected to  confirm placement.  The tube was marked at the level of the nose at  the 78 cm length.  Estimated blood loss during the procedure was less  than 5 mL. No specimens collected. There were no complications of the  procedure.    Medications used: 4 mL of 2% lidocaine gel, 60 mL of contrast.    Images Obtained: 1      Impression    IMPRESSION: Successful feeding tube placement. Feeding tube in second  stage of duodenum. Unable to advanced to the ligament of Treitz.  Consider doing feedings in a somewhat upright position. Follow-up  abdominal film could be also considered to see if the tube advances on  its own.    SUELLEN HICKS PA-C         SYSTEM ID:  [id number]   MR Cervical Spine w/o & w Contrast    Narrative    MRI CERVICAL SPINE WITHOUT AND WITH CONTRAST  9/30/2024 1:51 PM      HISTORY: Status post fall. History of odontoid fracture. Currently  intubated and sedated. Neck pain.    TECHNIQUE: Multiplanar, multisequence MRI of the cervical spine  without and with 7 mL Gadavist.    COMPARISON: Cervical spine CT 9/26/2024. Cervical spine MR 9/9/2024.     FINDINGS: Images are degraded by motion artifact which limits  evaluation.    Displaced fracture through the dens is again noted but was better seen  by CT. There is edema and enhancement across the dens fracture.  Persistent anterolisthesis of C3 on C4 due to degenerative left-sided  facet arthropathy. Persistent anterolisthesis of C7 on T1 due to  bilateral facet arthropathy. Marked degenerative endplate changes with  presumed Schmorl's node deformities in the mid cervical spine. Marked  degenerative endplate changes and loss of disc height throughout the  cervical spine, particularly from C3-C4 through C7-T1.    No definite T2 signal abnormality in the spinal cord, although  evaluation is limited due to motion artifact.    Level by level as follows:     C2-C3: Mild loss of disc height. Bilateral facet hypertrophy. No  spinal canal narrowing. Mild bilateral neural foraminal narrowing.     C3-C4: Anterolisthesis with uncovering of the disc. Marked loss of  disc height. Posterior disc bulge with endplate osteophytic spurring.  Asymmetric left-sided facet hypertrophy. Moderate spinal canal  narrowing. Moderate to severe right neural foraminal narrowing. Severe  left neural foraminal narrowing.    C4-C5: Marked loss of disc height with degenerative endplate changes.  Circumferential disc bulge with endplate osteophytic spurring.  Bilateral facet hypertrophy. Moderate spinal canal narrowing. Moderate  to severe bilateral neural foraminal narrowing.     C5-C6: Marked loss of disc height with degenerative endplate changes.  Circumferential disc bulge with endplate osteophytic spurring.  Bilateral facet hypertrophy. Moderate spinal canal  narrowing. Severe  bilateral neural foraminal narrowing.     C6-C7: Marked loss of disc height. Circumferential disc bulge with  endplate osteophytic spurring. Bilateral facet hypertrophy. Moderate  spinal canal narrowing. Moderate to severe bilateral neural foraminal  narrowing.     C7-T1: Anterolisthesis with uncovering of the disc. Marked loss of  disc height with degenerative endplate changes. Posterior disc bulge  with endplate osteophytic spurring. Marked bilateral facet  hypertrophy. Moderate spinal canal narrowing. Severe bilateral neural  foraminal narrowing.     Small bilateral mastoid effusions.      Impression    IMPRESSION:    1. Images are degraded by motion artifact which limits evaluation.  2. Fracture through the dens which is better seen on CT. No definite  abnormal fluid collection within the spinal canal.   3. Marked multilevel degenerative changes throughout the cervical  spine, particularly from C3-C4 through C7-T1. Evaluation by MR is  limited given motion artifact.    ELIANA BEJARANO MD         SYSTEM ID:  [id number]   XR Chest Port 1 View    Narrative    XR CHEST PORT 1 VIEW  9/27/2024 8:31 AM       INDICATION: Complains of pneumonia.  COMPARISON: 9/26/2024       Impression    IMPRESSION: Endotracheal tube in good position above the veena. New  feeding tube courses below the diaphragm. Cardiomegaly with pulmonary  vascular congestion. Low lung volumes. Lungs are otherwise grossly  clear.    DEVON SALAMANCA MD         SYSTEM ID:  [id number]   XR Chest Port 1 View    Narrative    EXAM: XR CHEST PORT 1 VIEW  LOCATION: [facility]  DATE: 9/28/2024    INDICATION: Ventilation changes.  COMPARISON: Chest x-ray 9/27/2024.      Impression    IMPRESSION: ET tube is 3.1 cm proximal to the veena. Enteric feeding tube is at least within the stomach. No new airspace disease. Hypoinflated lungs. Mild left base atelectasis. Normal cardiac silhouette. Mild bilateral vascular congestion.    XR Chest Port 1 View    Narrative    EXAM: XR CHEST PORT 1 VIEW  LOCATION: [facility]  DATE: 10/2/2024    INDICATION: sudden increase in O2 demands  COMPARISON: 9/20/2024.      Impression    IMPRESSION: Endotracheal tube in satisfactory position terminating 3.5 cm above the veena. Enteric tube passes below the diaphragm. Low lung volumes. Mild bibasilar atelectasis. Mild interstitial prominence. Stable cardiac silhouette.       Discharge Medications   Discharge Medication List as of 10/9/2024 10:35 AM        START taking these medications    Details   acetaminophen (TYLENOL) 325 MG tablet Take 2 tablets (650 mg) by mouth or Feeding Tube every 6 hours as needed for mild pain., OTC      cloNIDine (CATAPRES) 0.1 MG tablet Take 1 tablet (0.1 mg) by mouth 3 times daily., Transitional      ipratropium - albuterol 0.5 mg/2.5 mg/3 mL (DUONEB) 0.5-2.5 (3) MG/3ML neb solution Take 1 vial (3 mLs) by nebulization every 6 hours as needed for wheezing., Transitional      metoprolol tartrate (LOPRESSOR) 100 MG tablet Take 1 tablet (100 mg) by mouth 2 times daily. Hold if SBP<100 or HR<55, Transitional      polyethylene glycol (MIRALAX) 17 GM/Dose powder Take 17 g by mouth daily. Hold if loose stools, Transitional      senna-docusate (SENOKOT-S/PERICOLACE) 8.6-50 MG tablet Take 1 tablet by mouth 2 times daily., Transitional      thiamine (B-1) 100 MG tablet Take 1 tablet (100 mg) by mouth daily., Transitional           CONTINUE these medications which have CHANGED    Details   melatonin 5 MG tablet Take 1 tablet (5 mg) by mouth every evening., Transitional      oxyCODONE-acetaminophen (PERCOCET)  MG per tablet Take 1 tablet by mouth every 6 hours as needed for severe pain., Disp-12 tablet, R-0, Local Print           CONTINUE these medications which have NOT CHANGED    Details   amLODIPine (NORVASC) 5 MG tablet Take 1 tablet (5 mg) by mouth daily, Disp-90 tablet, R-1, E-Prescribe      aspirin (SM  ASPIRIN EC) 325 MG EC tablet TAKE ONE TABLET BY MOUTH ONCE DAILY WITH DINNER FOR 30 DAYS, Disp-90 tablet, R-3, E-Prescribe      atorvastatin (LIPITOR) 40 MG tablet Take 1 tablet (40 mg) by mouth daily, Disp-90 tablet, R-3, E-Prescribe      budesonide-formoterol (SYMBICORT) 80-4.5 MCG/ACT Inhaler Inhale 2 puffs into the lungs 2 times daily, Disp-6.9 g, R-1, E-Prescribe      calcium carbonate 600 mg-vitamin D 400 units (CALTRATE) 600-400 MG-UNIT per tablet Take 1 tablet by mouth every evening, Historical      folic acid (FOLVITE) 1 MG tablet Take 1 tablet (1 mg) by mouth daily, Disp-90 tablet, R-3, E-Prescribe      levETIRAcetam (KEPPRA) 500 MG tablet Take 1 tablet (500 mg) by mouth 2 times daily, Disp-180 tablet, R-3, E-Prescribe      levothyroxine (SYNTHROID/LEVOTHROID) 50 MCG tablet Take 1 tablet (50 mcg) by mouth daily, Disp-90 tablet, R-3, E-Prescribe      multivitamin, therapeutic (THERA-VIT) TABS tablet Take 1 tablet by mouth daily, Historical      naloxone (NARCAN) 4 MG/0.1ML nasal spray Spray 1 spray (4 mg) into one nostril alternating nostrils once as needed for opioid reversal every 2-3 minutes until assistance arrives, Disp-0.2 mL, R-1, E-Prescribe      nystatin (MYCOSTATIN) 299074 UNIT/GM external powder Apply topically 3 times daily as needed (rash)Historical      tiotropium (SPIRIVA HANDIHALER) 18 MCG inhaled capsule USING THE HANDIHALER, INHALE THE CONTENTS OF ONE CAPSULE BY MOUTH ONCE DAILY, Disp-30 capsule, R-11, E-Prescribe      VENTOLIN  (90 Base) MCG/ACT inhaler INHALE 2 PUFFS INTO THE LUNGS EVERY 6 HOURS AS NEEDED FOR SHORTNESS OF BREATH OR WHEEZING, Disp-18 g, R-1, E-PrescribePharmacy may dispense brand covered by insurance (Proair, or proventil or ventolin or generic albuterol inhaler)           STOP taking these medications       celecoxib (CELEBREX) 200 MG capsule Comments:   Reason for Stopping:         DULoxetine (CYMBALTA) 30 MG capsule Comments:   Reason for Stopping:          gabapentin (NEURONTIN) 600 MG tablet Comments:   Reason for Stopping:         metoprolol succinate ER (TOPROL XL) 50 MG 24 hr tablet Comments:   Reason for Stopping:         QUEtiapine (SEROQUEL) 50 MG tablet Comments:   Reason for Stopping:             Allergies   Allergies   Allergen Reactions    Bee Venom     Lisinopril Swelling     Oral swelling

## 2024-10-09 NOTE — TELEPHONE ENCOUNTER
Pt was called to r/s appt for 10/9 due to them currently in the hospital. LVM for them to call back once they are released    Erin Edmonds    [facility] [place]

## 2024-10-10 ENCOUNTER — LAB REQUISITION (OUTPATIENT)
Dept: LAB | Facility: CLINIC | Age: 74
End: 2024-10-10

## 2024-10-10 ENCOUNTER — PATIENT OUTREACH (OUTPATIENT)
Dept: CARE COORDINATION | Facility: CLINIC | Age: 74
End: 2024-10-10

## 2024-10-10 DIAGNOSIS — J96.90 RESPIRATORY FAILURE, UNSPECIFIED, UNSPECIFIED WHETHER WITH HYPOXIA OR HYPERCAPNIA (H): ICD-10-CM

## 2024-10-10 NOTE — PROGRESS NOTES
Clinic Care Coordination Contact  Care Coordination Transition Communication    Clinical Data:     [facility]  Hospitalist Discharge Summary       Date of Admission:  9/26/2024  Date of Discharge:  10/9/2024 10:46 AM  Discharging Provider: Rip Garcia MD  Discharge Service: Hospitalist Service        Discharge Diagnoses  Acute respiratory failure with hypoxia secondary to loss of protective airway reflexes-resolved  Uncomplicated asthma   Centrilobular emphysema   Sleep apnea   Alcohol and opioid overdose with history of dependence   Chronic pain with continuous opioid dependence s/p lumbar spinal fusion  Major depressive disorder   Seizure disorder  Concern for suicide attempt    Urinary tract infection (UTI), resolved  Type II dens fracture following mechanical fall (9/9-9/11/24)   Diarrhea, resolved  Stress-induced hyperglycemia, resolved  Hypertension   Mild thrombocytopenia, resolved  Anemia, normocytic  Hypokalemia-resolved  Hypomagnesemia-resolved  Hypothyroidism                 Assessment: Patient has transitioned to TCU/ARU for short term rehabilitation:    Facility Name: [facility]   Transition Communication:  Notified facility of Primary Care- Care Coordination support via Epic fax.    Plan: Care Coordinator will await notification from facility staff informing of patient's discharge plans/needs. Care Coordinator will review chart and outreach to facility staff every 4 weeks and as needed.     [facility]   Akua Hinojosa RN, Care Coordinator   [facility]'s   E-mail [email or web address]   [phone]

## 2024-10-10 NOTE — LETTER
To:             Please give to facility    From:   Akua Hinojosa RN, Care Coordinator   [facility]   Akua Hinojosa RN, Care Coordinator   [facility]'s   E-mail [email or web address]   [phone]   Patient Name:  Jessica Ellison YOB: 1950   Admit date: 10/9/2024      *Information Needed:  Please contact me when the patient will discharge (or if they will move to long term care)- include the discharge date, disposition, and main diagnosis   If the patient is discharged with home care services, please provide the name of the agency    Also- Please inform me if a care conference is being held.   [facility]   Akua Hinojosa RN, Care Coordinator   [facility]'s   E-mail [email or web address]   [phone]                             Thank you

## 2024-10-11 ENCOUNTER — DOCUMENTATION ONLY (OUTPATIENT)
Dept: GERIATRICS | Facility: CLINIC | Age: 74
End: 2024-10-11
Payer: COMMERCIAL

## 2024-10-11 LAB
ANION GAP SERPL CALCULATED.3IONS-SCNC: 14 MMOL/L (ref 7–15)
BUN SERPL-MCNC: 14.8 MG/DL (ref 8–23)
CALCIUM SERPL-MCNC: 9.7 MG/DL (ref 8.8–10.4)
CHLORIDE SERPL-SCNC: 104 MMOL/L (ref 98–107)
CREAT SERPL-MCNC: 0.78 MG/DL (ref 0.51–0.95)
EGFRCR SERPLBLD CKD-EPI 2021: 81 ML/MIN/1.73M2
ERYTHROCYTE [DISTWIDTH] IN BLOOD BY AUTOMATED COUNT: 14.5 % (ref 10–15)
GLUCOSE SERPL-MCNC: 151 MG/DL (ref 70–99)
HCO3 SERPL-SCNC: 22 MMOL/L (ref 22–29)
HCT VFR BLD AUTO: 33.3 % (ref 35–47)
HGB BLD-MCNC: 10.8 G/DL (ref 11.7–15.7)
MAGNESIUM SERPL-MCNC: 1.6 MG/DL (ref 1.7–2.3)
MCH RBC QN AUTO: 30.7 PG (ref 26.5–33)
MCHC RBC AUTO-ENTMCNC: 32.4 G/DL (ref 31.5–36.5)
MCV RBC AUTO: 95 FL (ref 78–100)
PLATELET # BLD AUTO: 325 10E3/UL (ref 150–450)
POTASSIUM SERPL-SCNC: 3.2 MMOL/L (ref 3.4–5.3)
RBC # BLD AUTO: 3.52 10E6/UL (ref 3.8–5.2)
SODIUM SERPL-SCNC: 140 MMOL/L (ref 135–145)
WBC # BLD AUTO: 4.3 10E3/UL (ref 4–11)

## 2024-10-11 PROCEDURE — P9604 ONE-WAY ALLOW PRORATED TRIP: HCPCS | Performed by: NURSE PRACTITIONER

## 2024-10-11 PROCEDURE — 80048 BASIC METABOLIC PNL TOTAL CA: CPT | Performed by: NURSE PRACTITIONER

## 2024-10-11 PROCEDURE — 36415 COLL VENOUS BLD VENIPUNCTURE: CPT | Performed by: NURSE PRACTITIONER

## 2024-10-11 PROCEDURE — 85014 HEMATOCRIT: CPT | Performed by: NURSE PRACTITIONER

## 2024-10-11 PROCEDURE — 83735 ASSAY OF MAGNESIUM: CPT | Performed by: NURSE PRACTITIONER

## 2024-10-13 ENCOUNTER — LAB REQUISITION (OUTPATIENT)
Dept: LAB | Facility: CLINIC | Age: 74
End: 2024-10-13

## 2024-10-13 DIAGNOSIS — E87.6 HYPOKALEMIA: ICD-10-CM

## 2024-10-14 ENCOUNTER — TRANSITIONAL CARE UNIT VISIT (OUTPATIENT)
Dept: GERIATRICS | Facility: CLINIC | Age: 74
End: 2024-10-14
Payer: COMMERCIAL

## 2024-10-14 VITALS
DIASTOLIC BLOOD PRESSURE: 89 MMHG | HEART RATE: 101 BPM | HEIGHT: 64 IN | WEIGHT: 155 LBS | SYSTOLIC BLOOD PRESSURE: 160 MMHG | OXYGEN SATURATION: 97 % | BODY MASS INDEX: 26.46 KG/M2 | RESPIRATION RATE: 18 BRPM | TEMPERATURE: 98.2 F

## 2024-10-14 DIAGNOSIS — G89.4 CHRONIC PAIN SYNDROME: Primary | ICD-10-CM

## 2024-10-14 DIAGNOSIS — F11.10 OPIOID ABUSE (H): ICD-10-CM

## 2024-10-14 DIAGNOSIS — G47.33 OSA (OBSTRUCTIVE SLEEP APNEA): ICD-10-CM
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
## 2024-10-14 DIAGNOSIS — Z87.440 H/O URINARY TRACT INFECTION: ICD-10-CM

## 2024-10-14 DIAGNOSIS — G89.4 CHRONIC PAIN SYNDROME: ICD-10-CM

## 2024-10-14 DIAGNOSIS — I10 ESSENTIAL HYPERTENSION WITH GOAL BLOOD PRESSURE LESS THAN 140/90: ICD-10-CM

## 2024-10-14 DIAGNOSIS — K59.00 CONSTIPATION, UNSPECIFIED CONSTIPATION TYPE: ICD-10-CM

## 2024-10-14 DIAGNOSIS — J96.01 ACUTE RESPIRATORY FAILURE WITH HYPOXIA (H): Primary | ICD-10-CM

## 2024-10-14 DIAGNOSIS — S12.112D CLOSED NONDISPLACED ODONTOID FRACTURE WITH TYPE II MORPHOLOGY AND ROUTINE HEALING, SUBSEQUENT ENCOUNTER: ICD-10-CM

## 2024-10-14 DIAGNOSIS — R09.02 HYPOXIA: ICD-10-CM

## 2024-10-14 DIAGNOSIS — F10.10 ALCOHOL ABUSE: ICD-10-CM

## 2024-10-14 DIAGNOSIS — T50.904D OVERDOSE OF UNDETERMINED INTENT, SUBSEQUENT ENCOUNTER: ICD-10-CM

## 2024-10-14 DIAGNOSIS — J43.2 CENTRILOBULAR EMPHYSEMA (H): ICD-10-CM

## 2024-10-14 LAB
ANION GAP SERPL CALCULATED.3IONS-SCNC: 16 MMOL/L (ref 7–15)
BUN SERPL-MCNC: 13.8 MG/DL (ref 8–23)
CALCIUM SERPL-MCNC: 9.8 MG/DL (ref 8.8–10.4)
CHLORIDE SERPL-SCNC: 106 MMOL/L (ref 98–107)
CREAT SERPL-MCNC: 0.83 MG/DL (ref 0.51–0.95)
EGFRCR SERPLBLD CKD-EPI 2021: 75 ML/MIN/1.73M2
GLUCOSE SERPL-MCNC: 110 MG/DL (ref 70–99)
HCO3 SERPL-SCNC: 22 MMOL/L (ref 22–29)
POTASSIUM SERPL-SCNC: 4.3 MMOL/L (ref 3.4–5.3)
SODIUM SERPL-SCNC: 144 MMOL/L (ref 135–145)

## 2024-10-14 PROCEDURE — 99309 SBSQ NF CARE MODERATE MDM 30: CPT | Performed by: NURSE PRACTITIONER

## 2024-10-14 PROCEDURE — P9604 ONE-WAY ALLOW PRORATED TRIP: HCPCS | Performed by: NURSE PRACTITIONER

## 2024-10-14 PROCEDURE — 82947 ASSAY GLUCOSE BLOOD QUANT: CPT | Performed by: NURSE PRACTITIONER

## 2024-10-14 PROCEDURE — 82310 ASSAY OF CALCIUM: CPT | Performed by: NURSE PRACTITIONER

## 2024-10-14 PROCEDURE — 36415 COLL VENOUS BLD VENIPUNCTURE: CPT | Performed by: NURSE PRACTITIONER

## 2024-10-14 PROCEDURE — 80048 BASIC METABOLIC PNL TOTAL CA: CPT | Performed by: NURSE PRACTITIONER

## 2024-10-14 RX ORDER — OXYCODONE AND ACETAMINOPHEN 10; 325 MG/1; MG/1
1 TABLET ORAL EVERY 6 HOURS PRN
COMMUNITY
End: 2024-10-14

## 2024-10-14 RX ORDER — OXYCODONE AND ACETAMINOPHEN 10; 325 MG/1; MG/1
1 TABLET ORAL EVERY 6 HOURS PRN
Qty: 30 TABLET | Refills: 0 | Status: SHIPPED | OUTPATIENT
Start: 2024-10-14 | End: 2024-11-01
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
## 2024-10-15 ENCOUNTER — ANCILLARY PROCEDURE (OUTPATIENT)
Dept: GENERAL RADIOLOGY | Facility: CLINIC | Age: 74
End: 2024-10-15
Attending: PHYSICIAN ASSISTANT
Payer: COMMERCIAL

## 2024-10-15 ENCOUNTER — LAB REQUISITION (OUTPATIENT)
Dept: LAB | Facility: CLINIC | Age: 74
End: 2024-10-15

## 2024-10-15 ENCOUNTER — OFFICE VISIT (OUTPATIENT)
Dept: NEUROSURGERY | Facility: CLINIC | Age: 74
End: 2024-10-15
Attending: STUDENT IN AN ORGANIZED HEALTH CARE EDUCATION/TRAINING PROGRAM
Payer: COMMERCIAL

## 2024-10-15 ENCOUNTER — PRE VISIT (OUTPATIENT)
Dept: NEUROSURGERY | Facility: CLINIC | Age: 74
End: 2024-10-15

## 2024-10-15 VITALS
RESPIRATION RATE: 16 BRPM | BODY MASS INDEX: 28.34 KG/M2 | SYSTOLIC BLOOD PRESSURE: 108 MMHG | OXYGEN SATURATION: 100 % | HEART RATE: 68 BPM | WEIGHT: 154 LBS | DIASTOLIC BLOOD PRESSURE: 73 MMHG | HEIGHT: 62 IN

## 2024-10-15 DIAGNOSIS — S12.120A OTHER CLOSED DISPLACED ODONTOID FRACTURE, INITIAL ENCOUNTER (H): ICD-10-CM

## 2024-10-15 DIAGNOSIS — D52.0 DIETARY FOLATE DEFICIENCY ANEMIA: ICD-10-CM

## 2024-10-15 DIAGNOSIS — S12.110D ODONTOID FRACTURE WITH TYPE II MORPHOLOGY AND ROUTINE HEALING: Primary | ICD-10-CM

## 2024-10-15 DIAGNOSIS — S12.110D ODONTOID FRACTURE WITH TYPE II MORPHOLOGY AND ROUTINE HEALING: ICD-10-CM

## 2024-10-15 PROCEDURE — 72040 X-RAY EXAM NECK SPINE 2-3 VW: CPT | Performed by: RADIOLOGY

## 2024-10-15 PROCEDURE — 99214 OFFICE O/P EST MOD 30 MIN: CPT | Performed by: PHYSICIAN ASSISTANT

## 2024-10-15 RX ORDER — AMOXICILLIN 250 MG
1 CAPSULE ORAL 2 TIMES DAILY PRN
Status: SHIPPED
Start: 2024-10-15

## 2024-10-15 RX ORDER — AMLODIPINE BESYLATE 5 MG/1
5 TABLET ORAL AT BEDTIME
Status: SHIPPED
Start: 2024-10-15 | End: 2024-11-11

## 2024-10-15 ASSESSMENT — PAIN SCALES - GENERAL: PAINLEVEL: SEVERE PAIN (7)

## 2024-10-15 NOTE — LETTER
10/15/2024       RE: Jessica Ellison  [id number]  [facility] [address]     Dear Colleague,    Thank you for referring your patient, Jessica Ellison, to the [facility] [place] at [facility]. Please see a copy of my visit note below.      [facility] [place]  [address]  3RD FLOOR  [facility] [address]  Phone: [phone]  Fax: [phone]      Patient:  Jessica Ellison, Date of birth 1950, 73 year old, female  Referring Provider Martín Vargas MD  [address] [place] [address]    ASSESSMENT & PLAN:  Patient is following up today for a type II odontoid fracture sustained during a fall on 9/9/2024.  She is wearing an Aspen collar however it was on very loosely today.  Discussed the importance of her wearing it snugly to avoid excess motion.  The fracture have progressed from her initial evaluation with displacement of the odontoid  fragment as noted on the 9/30/2024 CT.  She continues to endorse neck pain today that is a steady 6-7/10.  She is otherwise neuro intact (baseline chronic right antalgic gait secondary to her knee surgery).    XR today does not show any new concerns, fracture and alignment appear grossly stable.      Will plan to follow-up with her in 6 weeks at which time we will get a CT cervical, dynamic cervical x-rays with odontoid view without the collar.  If she continues to be unstable at that time, I will refer her to Dr. León for surgical consideration.      I spent 39 minutes spent in patient care, independent review and interpretation of medical records/imaging, reviewing old records.    History of Present Illness:  Patient is following up after an ED visit 9/9/24 s/p fall resulting in type II dens fracture.  Neurosurgery consulted (Dr. León) and she was recommended to wear Aspen Collar 24/7 and f/u in clinic.  She was neuro intact.   She had interval ED admission for drug overdose 9/26/24 and patient was not wearing cervical collar.  Her fracture was noted to have progressed on imaging at that time with the anterior displacement of the Dens by 0.4 cm.  She is a past patient of Dr. 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  She was last seen with him on 5/1/22 and advised to f/u in 5 years with an MRA and continue ASA 81 mg indef.        10/15/2024 Visit:  Patient is following up for type II odontoid fracture.  Sustained during a fall 9/9/2024.  She reportedly had not been wearing the Elba collar and had a suicide attempt for which she was hospitalized 9/26/2024.  CT at that time showed displacement of the fracture.  She has not been compliant with the Elba collar and is at a care facility.  She endorses neck pain that is a constant 6-7 out of 10 in intensity.  She denies any radicular pain up or down her arms or the back of her head.  She does endorse headaches seem to originate from her neck.  She reports that the intensity of her pain is slightly reduced with taking Percocet.  She does feel that her pain is higher than what she would like it to be.  She denies any issues with her balance or change in her gait.  She has a chronic right leg issue that affects her gait.  She denies bowel or bladder difficulties or problems with using her hands.      IMAGING   Imaging independently reviewed.    Cervical XR 10/15/24 - Impression:  1. Mildly displaced oblique odontoid fracture similar in orientation  to the prior examination on 9/30/2024.  2. Multilevel cervical degenerative changes, most pronounced from  C4-C7.    MR Cervical Spine w/o & w Contrast  Result Date: 9/30/2024  MRI CERVICAL SPINE WITHOUT AND WITH CONTRAST  9/30/2024 1:51 PM HISTORY: Status post fall. History of odontoid fracture. Currently intubated and sedated. Neck pain. TECHNIQUE: Multiplanar, multisequence MRI of the  cervical spine without and with 7 mL Gadavist. COMPARISON: Cervical spine CT 9/26/2024. Cervical spine MR 9/9/2024. FINDINGS: Images are degraded by motion artifact which limits evaluation. Displaced fracture through the dens is again noted but was better seen by CT. There is edema and enhancement across the dens fracture. Persistent anterolisthesis of C3 on C4 due to degenerative left-sided facet arthropathy. Persistent anterolisthesis of C7 on T1 due to bilateral facet arthropathy. Marked degenerative endplate changes with presumed Schmorl's node deformities in the mid cervical spine. Marked degenerative endplate changes and loss of disc height throughout the cervical spine, particularly from C3-C4 through C7-T1. No definite T2 signal abnormality in the spinal cord, although evaluation is limited due to motion artifact. Level by level as follows: C2-C3: Mild loss of disc height. Bilateral facet hypertrophy. No spinal canal narrowing. Mild bilateral neural foraminal narrowing. C3-C4: Anterolisthesis with uncovering of the disc. Marked loss of disc height. Posterior disc bulge with endplate osteophytic spurring. Asymmetric left-sided facet hypertrophy. Moderate spinal canal narrowing. Moderate to severe right neural foraminal narrowing. Severe left neural foraminal narrowing. C4-C5: Marked loss of disc height with degenerative endplate changes. Circumferential disc bulge with endplate osteophytic spurring. Bilateral facet hypertrophy. Moderate spinal canal narrowing. Moderate to severe bilateral neural foraminal narrowing. C5-C6: Marked loss of disc height with degenerative endplate changes. Circumferential disc bulge with endplate osteophytic spurring. Bilateral facet hypertrophy. Moderate spinal canal narrowing. Severe bilateral neural foraminal narrowing. C6-C7: Marked loss of disc height. Circumferential disc bulge with endplate osteophytic spurring. Bilateral facet hypertrophy. Moderate spinal canal narrowing.  Moderate to severe bilateral neural foraminal narrowing. C7-T1: Anterolisthesis with uncovering of the disc. Marked loss of disc height with degenerative endplate changes. Posterior disc bulge with endplate osteophytic spurring. Marked bilateral facet hypertrophy. Moderate spinal canal narrowing. Severe bilateral neural foraminal narrowing. Small bilateral mastoid effusions.   IMPRESSION:  1. Images are degraded by motion artifact which limits evaluation. 2. Fracture through the dens which is better seen on CT. No definite abnormal fluid collection within the spinal canal. 3. Marked multilevel degenerative changes throughout the cervical spine, particularly from C3-C4 through C7-T1. Evaluation by MR is limited given motion artifact. ELIANA BEJARANO MD   SYSTEM ID:  [id number]    CT Cervical Spine w/o Contrast  Result Date: 9/26/2024  EXAM: CT CERVICAL SPINE W/O CONTRAST LOCATION: [facility] DATE: 9/26/2024 INDICATION: Overdose. Intubated. Not wearing her cervical collar. COMPARISON: CT cervical spine dated 9/9/2024. TECHNIQUE: Routine CT Cervical Spine without IV contrast. Multiplanar reformats. Dose reduction techniques were used. FINDINGS: VERTEBRA: Decreased osseous mineralization. There is an again seen oblique type I fracture involving the odontoid process. Since prior examination, there has been interval anterior displacement of the proximal fracture fragment by approximately 0.4 cm when compared to prior examination. There is a  small amount of heterotopic ossification surrounding the fracture site. No new fractures identified.  Stable anterolisthesis at C3 on C4. Stable stepwise retrolisthesis at C4 on C5, C5 on C6. Stable anterolisthesis at C7 on T1. Stable superior endplate compression deformity involving T3. Dextroconvex curvature of the upper cervical spine. CANAL/FORAMINA: Advanced multilevel degenerative changes. Disc osteophyte complexes are present at C3-C4, C4-C5, C5-C6,  C6-C7 and C7-T1. At C3-C4 there is up to mild canal stenosis. At C4-C5 there is up to mild canal stenosis. At C5-C6 there is up to moderate canal stenosis. At C6-C7 and C7-T1 there is up to mild canal stenosis. At C3-C4 there is mild right and severe left neural foraminal stenosis. At C4-C5 there is moderate to severe left and right neural foraminal stenosis. At C5-C6 there is severe right and moderate to severe left neural foraminal stenosis. At C6-C7 there is mild right and moderate to severe left neural foraminal stenosis. PARASPINAL: Pulmonary emphysema. The patient is intubated.   IMPRESSION: 1.  Oblique type I dens fracture with interval anterior displacement of the proximal fracture fragment by approximately 0.4 cm. 2.  Multilevel degenerative spondylolisthesis, as above.   Dr. Russel Brown discussed results with Dr. Ely on 9/26/2024 at 6:28 AM CDT.     CT Head w/o Contrast  Result Date: 9/26/2024  EXAM: CT HEAD W/O CONTRAST LOCATION: [facility] DATE: 9/26/2024 INDICATION: Altered mental status COMPARISON: 9/9/2024 TECHNIQUE: Routine CT Head without IV contrast. Multiplanar reformats. Dose reduction techniques were used. FINDINGS: INTRACRANIAL CONTENTS: No intracranial hemorrhage, extraaxial collection, or mass effect.  No CT evidence of acute infarct. Mild volume loss and presumed chronic small vessel ischemia. Chronic cortical infarcts in the right occipital lobe again noted. Coil embolization material stent in the distal left ICA again noted. VISUALIZED ORBITS/SINUSES/MASTOIDS: No intraorbital abnormality. No paranasal sinus mucosal disease. No middle ear or mastoid effusion. BONES/SOFT TISSUES: No acute abnormality.   IMPRESSION: 1.  No acute intracranial abnormality or significant change compared to the prior study.         Cervical spine MRI w/o contrast  Result Date: 9/9/2024  EXAM: MR CERVICAL SPINE W/O CONTRAST LOCATION: [facility]  DATE: 9/9/2024 INDICATION: acute type 1 C2 fracture COMPARISON: Same day CT. TECHNIQUE: MRI Cervical Spine without IV contrast. FINDINGS: Examination significantly limited by motion. Preserved vertebral body heights. Degenerative grade 1 C3-C4 anterolisthesis, C7-T1 anterolisthesis marrow signal. T2 hyperintense signal C1-C2, C2-C3 and C4-C5 interspinous ligamentous spaces. No identifiable defect involving the anterior, posterior longitudinal ligament. No identifiable abnormal cord signal. Trace presumably posttraumatic prevertebral edema. Craniovertebral junction and C1-C2: Transversely oriented T1 normal signal through the odontoid, better appreciated on CT. C2-C3: Preserved disc height and signal for age. No cord compression. Normal facets for age. No high-grade neural foraminal stenosis. C3-C4: Preserved disc height and signal for age. Symmetric disc bulge, uncovering of the disc posteriorly causes partial CSF space effacement, cord flattening and associated mild-to-moderate canal stenosis.] Facet arthropathy contributes to moderate right neural foraminal stenosis. Patent left neural foramen. C4-C5: Moderate loss of disc height, intradiscal T2 signal. Posterior disc osteophyte complex causes CSF space effacement, cord flattening and associated moderate to severe canal stenosis. Bilateral uncovertebral, facet arthropathy contributes to moderate to severe bilateral neural foraminal stenosis. C5-C6: Moderate loss of disc height, intradiscal T2 signal. Posterior disc osteophyte complex contributes to CSF space effacement, cord flattening and associated moderate to severe canal stenosis. Bilateral uncovertebral, facet arthropathy contributes to  severe neural foraminal stenosis. C6-C7: Moderate loss of disc height, intradiscal T2 signal. Posterior disc osteophyte complex causes CSF space effacement, cord flattening and associated canal stenosis. Bilateral uncovertebral, facet arthropathy contributes to severe  bilateral neural foraminal stenosis. C7-T1: Mild loss of disc height. Uncovering of the disc posteriorly. Bilateral facet arthropathy. Aforementioned findings contribute to moderate canal and bilateral neural foraminal stenosis.   IMPRESSION: Examination significantly limited secondary to motion. 1.  Odontoid fracture better appreciated on dedicated CT. Suggestion of interspinous ligamentous injury at C1-C2, C2-C3. 2.  No identifiable defect of the anterior or posterior longitudinal ligament. No identifiable abnormal cord signal or evidence of cord contusion. 3.  Multilevel lumbar spondylosis with potentially significant high-grade canal or neural foraminal stenosis at C4-C5, C5-C6 and C6-C7.    Head CT w/o contrast  Result Date: 9/9/2024  EXAM: CT HEAD W/O CONTRAST, CT CERVICAL SPINE W/O CONTRAST LOCATION: [facility] DATE: 9/9/2024 INDICATION: headache in setting of fall three days ago COMPARISON: MRI brain November 16, 2023. CTA head and neck October 26, 2023. TECHNIQUE: 1) Routine CT Head without IV contrast. Multiplanar reformats. Dose reduction techniques were used. 2) Routine CT Cervical Spine without IV contrast. Multiplanar reformats. Dose reduction techniques were used. FINDINGS: HEAD CT: INTRACRANIAL CONTENTS: Streak artifact from left paraclinoid ICA region coil pack limits evaluation. No intracranial hemorrhage, extraaxial collection, or mass effect.  No CT evidence of acute infarct. Moderate presumed chronic small vessel ischemic changes. Unchanged small region of right occipital encephalomalacia. Moderate generalized volume loss. No hydrocephalus. VISUALIZED ORBITS/SINUSES/MASTOIDS: No intraorbital abnormality. No significant paranasal sinus mucosal disease. No middle ear or mastoid effusion. BONES/SOFT TISSUES: No acute abnormality. CERVICAL SPINE CT: VERTEBRA: Oblique and potentially unstable type I dens fracture. No traumatic malalignment by CT. Unchanged grade 1  anterolisthesis of C3 on C4 and C7 on T1. Unchanged grade 1 anterolisthesis of C4 on C5. CANAL/FORAMINA: Multilevel spondylosis without high grade canal stenosis. Uncovertebral ridging with facet arthrosis results in multilevel severe neural foraminal stenosis. PARASPINAL: No prevertebral edema.   IMPRESSION: HEAD CT: 1.  No acute intracranial process. CERVICAL SPINE CT: 1.  Acute type I dens fracture, potentially unstable. MR cervical spine is recommended for further evaluation. [Critical Result: Acute cervical spine fracture] Finding was identified on 9/9/2024 6:10 PM CDT. Dr. Castrejon was contacted by me on 9/9/2024 6:16 PM CDT and verbalized understanding of the critical result.    CT Cervical Spine w/o Contrast  Result Date: 9/9/2024  EXAM: CT HEAD W/O CONTRAST, CT CERVICAL SPINE W/O CONTRAST LOCATION: [facility] DATE: 9/9/2024 INDICATION: headache in setting of fall three days ago COMPARISON: MRI brain November 16, 2023. CTA head and neck October 26, 2023. TECHNIQUE: 1) Routine CT Head without IV contrast. Multiplanar reformats. Dose reduction techniques were used. 2) Routine CT Cervical Spine without IV contrast. Multiplanar reformats. Dose reduction techniques were used. FINDINGS: HEAD CT: INTRACRANIAL CONTENTS: Streak artifact from left paraclinoid ICA region coil pack limits evaluation. No intracranial hemorrhage, extraaxial collection, or mass effect.  No CT evidence of acute infarct. Moderate presumed chronic small vessel ischemic changes. Unchanged small region of right occipital encephalomalacia. Moderate generalized volume loss. No hydrocephalus. VISUALIZED ORBITS/SINUSES/MASTOIDS: No intraorbital abnormality. No significant paranasal sinus mucosal disease. No middle ear or mastoid effusion. BONES/SOFT TISSUES: No acute abnormality. CERVICAL SPINE CT: VERTEBRA: Oblique and potentially unstable type I dens fracture. No traumatic malalignment by CT. Unchanged grade 1  "anterolisthesis of C3 on C4 and C7 on T1. Unchanged grade 1 anterolisthesis of C4 on C5. CANAL/FORAMINA: Multilevel spondylosis without high grade canal stenosis. Uncovertebral ridging with facet arthrosis results in multilevel severe neural foraminal stenosis. PARASPINAL: No prevertebral edema.   IMPRESSION: HEAD CT: 1.  No acute intracranial process. CERVICAL SPINE CT: 1.  Acute type I dens fracture, potentially unstable. MR cervical spine is recommended for further evaluation. [Critical Result: Acute cervical spine fracture] Finding was identified on 9/9/2024 6:10 PM CDT. Dr. Castrejon was contacted by me on 9/9/2024 6:16 PM CDT and verbalized understanding of the critical result.    Physical Exam   /73 (BP Location: Left arm, Patient Position: Sitting)   Pulse 68   Resp 16   Ht 1.575 m (5' 2\")   Wt 69.9 kg (154 lb)   SpO2 100%   BMI 28.17 kg/m      Constitutional: Appears well-developed and well-nourished. Cooperative. No acute distress.   HENT:   Head: Normocephalic and atraumatic.   Eyes: Conjunctivae are normal.  Neck: Neck supple. No tracheal deviation present.  Cardiovascular: Normal rate and regular rhythm.    Pulmonary/Chest: Effort normal and breath sounds normal.  Abdominal: Exhibits no obvious distension.   Musculoskeletal: Able to move all extremities.  No involuntary motor movements.   Skin: Skin is warm, dry and intact.   Psychiatric: Normal mood and affect. Speech is normal and behavior is normal.    Neurological:  Alert, NAD, and oriented to person, place, and time.   No cranial nerve deficit   Gait: antalgic favoring right leg (prior knee surgery, chronic)  Collar applied loosely.      Strength (L/R)  5/5 Deltoid  5/5 Bicep  5/5 Tricep  5/5 Handgrip (right handed)    5/5 Hip Flexion  5/5 Knee Extension  5/5 Ankle Dorsiflexion  5/5 Plantar Flexion    Reflexes (L/R)  2+/2+ Bicep  2+/2+ Brachioradialis  2+/2+ Patellar  2+/2+ Ankle    No Zenaida's   No ankle clonus    Sensation: SILT " BUE/BLE      Review of Systems   See HPI     Past Medical History:   Diagnosis Date     Anemia      Aneurysm (H)      Antiplatelet or antithrombotic long-term use      Arthritis      Chemical dependency (H)     Chem Dep RX     Depression      History of blood transfusion      Hypertension      Menopausal symptoms      Other chronic pain     Lower back and hips     Seizure (H) 02/20/2024     Sleep apnea      Sleep disorder      Thyroid disease      Tobacco abuse      Uncomplicated asthma        Past Surgical History:   Procedure Laterality Date     ABDOMEN SURGERY       APPENDECTOMY  1960     APPENDECTOMY       ARTHROPLASTY KNEE Left 08/16/2023    Procedure: LEFT TOTAL KNEE ARTHROPLASTY;  Surgeon: Bryan Roque MD;  Location: [facility] Main OR     BACK SURGERY       BIOPSY BREAST       cerebral aneurysm  2014    coiled     CEREBRAL ANEURYSM REPAIR       cholecystectomy       COLON SURGERY       COLONOSCOPY  04/19/2005     COLONOSCOPY N/A 5/13/2024    Procedure: COLONOSCOPY WITH BIOPSY;  Surgeon: Radha Glez MD;  Location: [facility] Main OR     FRACTURE SURGERY       HC EXCISION BREAST LESION, OPEN >=1      core biopsy 2006, lumpectomy 2006     HERNIA REPAIR       LAP VENTRAL HERNIA REPAIR  12/2017    [place]     LAPAROSCOPIC ASSISTED HYSTERECTOMY VAGINAL  12/2017     LUMPECTOMY BREAST       orif left femoral neck Left 06/03/2016    stress fracture.  done at [facility]     SURGICAL HISTORY OF -   2004    Skin Graft     ZZC PART REMOVAL COLON W ANASTOMOSIS  05/2005    diverticulitis     ZZC SPINE FUSION,ANTER,8+ SGMTS  2007    Anterior posterior fusion 10 levels, hardware removal and re-fusion 2009       Social History     Socioeconomic History     Marital status:      Spouse name: None     Number of children: None     Years of education: None     Highest education level: None   Tobacco Use     Smoking status: Former     Current packs/day: 0.00     Average packs/day: 1 pack/day for 30.0 years (30.0  ttl pk-yrs)     Types: Cigarettes     Start date: 1974     Quit date: 2004     Years since quittin.8     Smokeless tobacco: Never   Vaping Use     Vaping status: Never Used   Substance and Sexual Activity     Alcohol use: Yes     Comment: Glass of wine a few times a week. ETOH dependency, DUI 3/15/10; 1-2 ETOH per week     Drug use: Never     Comment: percocet for the past several years     Sexual activity: Not Currently     Partners: Male     Birth control/protection: Surgical     Comment: VAS   Other Topics Concern     Parent/sibling w/ CABG, MI or angioplasty before 65F 55M? No     Social Determinants of Health     Financial Resource Strain: Low Risk  (10/2/2024)    Financial Resource Strain      Within the past 12 months, have you or your family members you live with been unable to get utilities (heat, electricity) when it was really needed?: No   Food Insecurity: Low Risk  (10/2/2024)    Food Insecurity      Within the past 12 months, did you worry that your food would run out before you got money to buy more?: No      Within the past 12 months, did the food you bought just not last and you didn t have money to get more?: No   Transportation Needs: Low Risk  (10/2/2024)    Transportation Needs      Within the past 12 months, has lack of transportation kept you from medical appointments, getting your medicines, non-medical meetings or appointments, work, or from getting things that you need?: No   Physical Activity: Inactive (2024)    Exercise Vital Sign      Days of Exercise per Week: 0 days      Minutes of Exercise per Session: 0 min   Stress: No Stress Concern Present (2024)    [employer] - Occupational Stress Questionnaire      Feeling of Stress : Only a little   Social Connections: Unknown (2024)    Social Connection and Isolation Panel [NHANES]      Frequency of Social Gatherings with Friends and Family: More than three times a week   Interpersonal  Safety: Unknown (10/1/2024)    Interpersonal Safety      Do you feel physically and emotionally safe where you currently live?: Patient unable to answer      Within the past 12 months, have you been hit, slapped, kicked or otherwise physically hurt by someone?: Patient unable to answer      Within the past 12 months, have you been humiliated or emotionally abused in other ways by your partner or ex-partner?: Patient unable to answer   Housing Stability: Low Risk  (10/2/2024)    Housing Stability      Do you have housing? : Yes      Are you worried about losing your housing?: No       family history includes Adrenal Disorder in her brother; Alcohol/Drug in her father and mother; Cancer in her mother and paternal grandfather; Cancer - colorectal in her father; Cerebral palsy in her granddaughter; Congenital Anomalies in her son; Depression in her mother; Diabetes in her maternal grandfather, maternal grandmother, paternal grandfather, and paternal grandmother; Gastrointestinal Disease in her paternal grandfather; Hypertension in her brother.       Maria Del Carmen Iglesias PA-C  Department of Neurosurgery  Office: [phone]        Again, thank you for allowing me to participate in the care of your patient.      Sincerely,    Maria Del Carmen Iglesias PA-C

## 2024-10-15 NOTE — PROGRESS NOTES
[facility] NEUROSURGERY CLINIC [address]  3RD FLOOR  [facility] [address]  Phone: [phone]  Fax: [phone]      Patient:  Jessica Ellison, Date of birth 1950, 73 year old, female  Referring Provider Martín Vargas MD  [address] [place] [address]    ASSESSMENT & PLAN:  Patient is following up today for a type II odontoid fracture sustained during a fall on 9/9/2024.  She is wearing an [place] collar however it was on very loosely today.  Discussed the importance of her wearing it snugly to avoid excess motion.  The fracture have progressed from her initial evaluation with displacement of the odontoid  fragment as noted on the 9/30/2024 CT.  She continues to endorse neck pain today that is a steady 6-7/10.  She is otherwise neuro intact (baseline chronic right antalgic gait secondary to her knee surgery).    XR today does not show any new concerns, fracture and alignment appear grossly stable.      Will plan to follow-up with her in 6 weeks at which time we will get a CT cervical, dynamic cervical x-rays with odontoid view without the collar.  If she continues to be unstable at that time, I will refer her to Dr. León for surgical consideration.      I spent 39 minutes spent in patient care, independent review and interpretation of medical records/imaging, reviewing old records.    History of Present Illness:  Patient is following up after an ED visit 9/9/24 s/p fall resulting in type II dens fracture.  Neurosurgery consulted (Dr. León) and she was recommended to wear [place] Collar 24/7 and f/u in clinic.  She was neuro intact.  She had interval ED admission for drug overdose 9/26/24 and patient was not wearing cervical collar.  Her fracture was noted to have progressed on imaging at that time with the anterior displacement of the Dens by 0.4 cm.  She is a past patient of Dr. 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  She was last seen with him on 5/1/22 and advised to f/u in 5 years with an MRA and continue ASA 81 mg indef.        10/15/2024 Visit:  Patient is following up for type II odontoid fracture.  Sustained during a fall 9/9/2024.  She reportedly had not been wearing the [place] collar and had a suicide attempt for which she was hospitalized 9/26/2024.  CT at that time showed displacement of the fracture.  She has not been compliant with the [place] collar and is at a care facility.  She endorses neck pain that is a constant 6-7 out of 10 in intensity.  She denies any radicular pain up or down her arms or the back of her head.  She does endorse headaches seem to originate from her neck.  She reports that the intensity of her pain is slightly reduced with taking Percocet.  She does feel that her pain is higher than what she would like it to be.  She denies any issues with her balance or change in her gait.  She has a chronic right leg issue that affects her gait.  She denies bowel or bladder difficulties or problems with using her hands.      IMAGING   Imaging independently reviewed.    Cervical XR 10/15/24 - Impression:  1. Mildly displaced oblique odontoid fracture similar in orientation  to the prior examination on 9/30/2024.  2. Multilevel cervical degenerative changes, most pronounced from  C4-C7.    MR Cervical Spine w/o & w Contrast  Result Date: 9/30/2024  MRI CERVICAL SPINE WITHOUT AND WITH CONTRAST  9/30/2024 1:51 PM HISTORY: Status post fall. History of odontoid fracture. Currently intubated and sedated. Neck pain. TECHNIQUE: Multiplanar, multisequence MRI of the cervical spine without and with 7 mL Gadavist. COMPARISON: Cervical spine CT 9/26/2024. Cervical spine MR 9/9/2024. FINDINGS: Images are degraded by motion artifact which limits evaluation. Displaced fracture through the dens is again noted but was better seen by CT. There is edema and enhancement across the dens fracture.  Persistent anterolisthesis of C3 on C4 due to degenerative left-sided facet arthropathy. Persistent anterolisthesis of C7 on T1 due to bilateral facet arthropathy. Marked degenerative endplate changes with presumed Schmorl's node deformities in the mid cervical spine. Marked degenerative endplate changes and loss of disc height throughout the cervical spine, particularly from C3-C4 through C7-T1. No definite T2 signal abnormality in the spinal cord, although evaluation is limited due to motion artifact. Level by level as follows: C2-C3: Mild loss of disc height. Bilateral facet hypertrophy. No spinal canal narrowing. Mild bilateral neural foraminal narrowing. C3-C4: Anterolisthesis with uncovering of the disc. Marked loss of disc height. Posterior disc bulge with endplate osteophytic spurring. Asymmetric left-sided facet hypertrophy. Moderate spinal canal narrowing. Moderate to severe right neural foraminal narrowing. Severe left neural foraminal narrowing. C4-C5: Marked loss of disc height with degenerative endplate changes. Circumferential disc bulge with endplate osteophytic spurring. Bilateral facet hypertrophy. Moderate spinal canal narrowing. Moderate to severe bilateral neural foraminal narrowing. C5-C6: Marked loss of disc height with degenerative endplate changes. Circumferential disc bulge with endplate osteophytic spurring. Bilateral facet hypertrophy. Moderate spinal canal narrowing. Severe bilateral neural foraminal narrowing. C6-C7: Marked loss of disc height. Circumferential disc bulge with endplate osteophytic spurring. Bilateral facet hypertrophy. Moderate spinal canal narrowing. Moderate to severe bilateral neural foraminal narrowing. C7-T1: Anterolisthesis with uncovering of the disc. Marked loss of disc height with degenerative endplate changes. Posterior disc bulge with endplate osteophytic spurring. Marked bilateral facet hypertrophy. Moderate spinal canal narrowing. Severe bilateral neural  foraminal narrowing. Small bilateral mastoid effusions.   IMPRESSION:  1. Images are degraded by motion artifact which limits evaluation. 2. Fracture through the dens which is better seen on CT. No definite abnormal fluid collection within the spinal canal. 3. Marked multilevel degenerative changes throughout the cervical spine, particularly from C3-C4 through C7-T1. Evaluation by MR is limited given motion artifact. ELIANA BEJARANO MD   SYSTEM ID:  [id number]    CT Cervical Spine w/o Contrast  Result Date: 9/26/2024  EXAM: CT CERVICAL SPINE W/O CONTRAST LOCATION: [facility] DATE: 9/26/2024 INDICATION: Overdose. Intubated. Not wearing her cervical collar. COMPARISON: CT cervical spine dated 9/9/2024. TECHNIQUE: Routine CT Cervical Spine without IV contrast. Multiplanar reformats. Dose reduction techniques were used. FINDINGS: VERTEBRA: Decreased osseous mineralization. There is an again seen oblique type I fracture involving the odontoid process. Since prior examination, there has been interval anterior displacement of the proximal fracture fragment by approximately 0.4 cm when compared to prior examination. There is a  small amount of heterotopic ossification surrounding the fracture site. No new fractures identified.  Stable anterolisthesis at C3 on C4. Stable stepwise retrolisthesis at C4 on C5, C5 on C6. Stable anterolisthesis at C7 on T1. Stable superior endplate compression deformity involving T3. Dextroconvex curvature of the upper cervical spine. CANAL/FORAMINA: Advanced multilevel degenerative changes. Disc osteophyte complexes are present at C3-C4, C4-C5, C5-C6, C6-C7 and C7-T1. At C3-C4 there is up to mild canal stenosis. At C4-C5 there is up to mild canal stenosis. At C5-C6 there is up to moderate canal stenosis. At C6-C7 and C7-T1 there is up to mild canal stenosis. At C3-C4 there is mild right and severe left neural foraminal stenosis. At C4-C5 there is moderate to severe left  and right neural foraminal stenosis. At C5-C6 there is severe right and moderate to severe left neural foraminal stenosis. At C6-C7 there is mild right and moderate to severe left neural foraminal stenosis. PARASPINAL: Pulmonary emphysema. The patient is intubated.   IMPRESSION: 1.  Oblique type I dens fracture with interval anterior displacement of the proximal fracture fragment by approximately 0.4 cm. 2.  Multilevel degenerative spondylolisthesis, as above.   Dr. Russel Brown discussed results with Dr. Ely on 9/26/2024 at 6:28 AM CDT.     CT Head w/o Contrast  Result Date: 9/26/2024  EXAM: CT HEAD W/O CONTRAST LOCATION: [facility] DATE: 9/26/2024 INDICATION: Altered mental status COMPARISON: 9/9/2024 TECHNIQUE: Routine CT Head without IV contrast. Multiplanar reformats. Dose reduction techniques were used. FINDINGS: INTRACRANIAL CONTENTS: No intracranial hemorrhage, extraaxial collection, or mass effect.  No CT evidence of acute infarct. Mild volume loss and presumed chronic small vessel ischemia. Chronic cortical infarcts in the right occipital lobe again noted. Coil embolization material stent in the distal left ICA again noted. VISUALIZED ORBITS/SINUSES/MASTOIDS: No intraorbital abnormality. No paranasal sinus mucosal disease. No middle ear or mastoid effusion. BONES/SOFT TISSUES: No acute abnormality.   IMPRESSION: 1.  No acute intracranial abnormality or significant change compared to the prior study.         Cervical spine MRI w/o contrast  Result Date: 9/9/2024  EXAM: MR CERVICAL SPINE W/O CONTRAST LOCATION: [facility] DATE: 9/9/2024 INDICATION: acute type 1 C2 fracture COMPARISON: Same day CT. TECHNIQUE: MRI Cervical Spine without IV contrast. FINDINGS: Examination significantly limited by motion. Preserved vertebral body heights. Degenerative grade 1 C3-C4 anterolisthesis, C7-T1 anterolisthesis marrow signal. T2 hyperintense signal  C1-C2, C2-C3 and C4-C5 interspinous ligamentous spaces. No identifiable defect involving the anterior, posterior longitudinal ligament. No identifiable abnormal cord signal. Trace presumably posttraumatic prevertebral edema. Craniovertebral junction and C1-C2: Transversely oriented T1 normal signal through the odontoid, better appreciated on CT. C2-C3: Preserved disc height and signal for age. No cord compression. Normal facets for age. No high-grade neural foraminal stenosis. C3-C4: Preserved disc height and signal for age. Symmetric disc bulge, uncovering of the disc posteriorly causes partial CSF space effacement, cord flattening and associated mild-to-moderate canal stenosis.] Facet arthropathy contributes to moderate right neural foraminal stenosis. Patent left neural foramen. C4-C5: Moderate loss of disc height, intradiscal T2 signal. Posterior disc osteophyte complex causes CSF space effacement, cord flattening and associated moderate to severe canal stenosis. Bilateral uncovertebral, facet arthropathy contributes to moderate to severe bilateral neural foraminal stenosis. C5-C6: Moderate loss of disc height, intradiscal T2 signal. Posterior disc osteophyte complex contributes to CSF space effacement, cord flattening and associated moderate to severe canal stenosis. Bilateral uncovertebral, facet arthropathy contributes to  severe neural foraminal stenosis. C6-C7: Moderate loss of disc height, intradiscal T2 signal. Posterior disc osteophyte complex causes CSF space effacement, cord flattening and associated canal stenosis. Bilateral uncovertebral, facet arthropathy contributes to severe bilateral neural foraminal stenosis. C7-T1: Mild loss of disc height. Uncovering of the disc posteriorly. Bilateral facet arthropathy. Aforementioned findings contribute to moderate canal and bilateral neural foraminal stenosis.   IMPRESSION: Examination significantly limited secondary to motion. 1.  Odontoid fracture better  appreciated on dedicated CT. Suggestion of interspinous ligamentous injury at C1-C2, C2-C3. 2.  No identifiable defect of the anterior or posterior longitudinal ligament. No identifiable abnormal cord signal or evidence of cord contusion. 3.  Multilevel lumbar spondylosis with potentially significant high-grade canal or neural foraminal stenosis at C4-C5, C5-C6 and C6-C7.    Head CT w/o contrast  Result Date: 9/9/2024  EXAM: CT HEAD W/O CONTRAST, CT CERVICAL SPINE W/O CONTRAST LOCATION: [facility] DATE: 9/9/2024 INDICATION: headache in setting of fall three days ago COMPARISON: MRI brain November 16, 2023. CTA head and neck October 26, 2023. TECHNIQUE: 1) Routine CT Head without IV contrast. Multiplanar reformats. Dose reduction techniques were used. 2) Routine CT Cervical Spine without IV contrast. Multiplanar reformats. Dose reduction techniques were used. FINDINGS: HEAD CT: INTRACRANIAL CONTENTS: Streak artifact from left paraclinoid ICA region coil pack limits evaluation. No intracranial hemorrhage, extraaxial collection, or mass effect.  No CT evidence of acute infarct. Moderate presumed chronic small vessel ischemic changes. Unchanged small region of right occipital encephalomalacia. Moderate generalized volume loss. No hydrocephalus. VISUALIZED ORBITS/SINUSES/MASTOIDS: No intraorbital abnormality. No significant paranasal sinus mucosal disease. No middle ear or mastoid effusion. BONES/SOFT TISSUES: No acute abnormality. CERVICAL SPINE CT: VERTEBRA: Oblique and potentially unstable type I dens fracture. No traumatic malalignment by CT. Unchanged grade 1 anterolisthesis of C3 on C4 and C7 on T1. Unchanged grade 1 anterolisthesis of C4 on C5. CANAL/FORAMINA: Multilevel spondylosis without high grade canal stenosis. Uncovertebral ridging with facet arthrosis results in multilevel severe neural foraminal stenosis. PARASPINAL: No prevertebral edema.   IMPRESSION: HEAD CT: 1.  No acute  intracranial process. CERVICAL SPINE CT: 1.  Acute type I dens fracture, potentially unstable. MR cervical spine is recommended for further evaluation. [Critical Result: Acute cervical spine fracture] Finding was identified on 9/9/2024 6:10 PM CDT. Dr. Castrejon was contacted by me on 9/9/2024 6:16 PM CDT and verbalized understanding of the critical result.    CT Cervical Spine w/o Contrast  Result Date: 9/9/2024  EXAM: CT HEAD W/O CONTRAST, CT CERVICAL SPINE W/O CONTRAST LOCATION: [facility] DATE: 9/9/2024 INDICATION: headache in setting of fall three days ago COMPARISON: MRI brain November 16, 2023. CTA head and neck October 26, 2023. TECHNIQUE: 1) Routine CT Head without IV contrast. Multiplanar reformats. Dose reduction techniques were used. 2) Routine CT Cervical Spine without IV contrast. Multiplanar reformats. Dose reduction techniques were used. FINDINGS: HEAD CT: INTRACRANIAL CONTENTS: Streak artifact from left paraclinoid ICA region coil pack limits evaluation. No intracranial hemorrhage, extraaxial collection, or mass effect.  No CT evidence of acute infarct. Moderate presumed chronic small vessel ischemic changes. Unchanged small region of right occipital encephalomalacia. Moderate generalized volume loss. No hydrocephalus. VISUALIZED ORBITS/SINUSES/MASTOIDS: No intraorbital abnormality. No significant paranasal sinus mucosal disease. No middle ear or mastoid effusion. BONES/SOFT TISSUES: No acute abnormality. CERVICAL SPINE CT: VERTEBRA: Oblique and potentially unstable type I dens fracture. No traumatic malalignment by CT. Unchanged grade 1 anterolisthesis of C3 on C4 and C7 on T1. Unchanged grade 1 anterolisthesis of C4 on C5. CANAL/FORAMINA: Multilevel spondylosis without high grade canal stenosis. Uncovertebral ridging with facet arthrosis results in multilevel severe neural foraminal stenosis. PARASPINAL: No prevertebral edema.   IMPRESSION: HEAD CT: 1.  No acute  "intracranial process. CERVICAL SPINE CT: 1.  Acute type I dens fracture, potentially unstable. MR cervical spine is recommended for further evaluation. [Critical Result: Acute cervical spine fracture] Finding was identified on 9/9/2024 6:10 PM CDT. Dr. Castrejon was contacted by me on 9/9/2024 6:16 PM CDT and verbalized understanding of the critical result.    Physical Exam   /73 (BP Location: Left arm, Patient Position: Sitting)   Pulse 68   Resp 16   Ht 1.575 m (5' 2\")   Wt 69.9 kg (154 lb)   SpO2 100%   BMI 28.17 kg/m      Constitutional: Appears well-developed and well-nourished. Cooperative. No acute distress.   HENT:   Head: Normocephalic and atraumatic.   Eyes: Conjunctivae are normal.  Neck: Neck supple. No tracheal deviation present.  Cardiovascular: Normal rate and regular rhythm.    Pulmonary/Chest: Effort normal and breath sounds normal.  Abdominal: Exhibits no obvious distension.   Musculoskeletal: Able to move all extremities.  No involuntary motor movements.   Skin: Skin is warm, dry and intact.   Psychiatric: Normal mood and affect. Speech is normal and behavior is normal.    Neurological:  Alert, NAD, and oriented to person, place, and time.   No cranial nerve deficit   Gait: antalgic favoring right leg (prior knee surgery, chronic)  Collar applied loosely.      Strength (L/R)  5/5 Deltoid  5/5 Bicep  5/5 Tricep  5/5 Handgrip (right handed)    5/5 Hip Flexion  5/5 Knee Extension  5/5 Ankle Dorsiflexion  5/5 Plantar Flexion    Reflexes (L/R)  2+/2+ Bicep  2+/2+ Brachioradialis  2+/2+ Patellar  2+/2+ Ankle    No Zenaida's   No ankle clonus    Sensation: SILT BUE/BLE      Review of Systems   See HPI     Past Medical History:   Diagnosis Date    Anemia     Aneurysm (H)     Antiplatelet or antithrombotic long-term use     Arthritis     Chemical dependency (H)     Chem Dep RX    Depression     History of blood transfusion     Hypertension     Menopausal symptoms     Other chronic pain     " Lower back and hips    Seizure (H) 2024    Sleep apnea     Sleep disorder     Thyroid disease     Tobacco abuse     Uncomplicated asthma        Past Surgical History:   Procedure Laterality Date    ABDOMEN SURGERY      APPENDECTOMY  1960    APPENDECTOMY      ARTHROPLASTY KNEE Left 2023    Procedure: LEFT TOTAL KNEE ARTHROPLASTY;  Surgeon: Bryan Roque MD;  Location: [facility] Main OR    BACK SURGERY      BIOPSY BREAST      cerebral aneurysm      coiled    CEREBRAL ANEURYSM REPAIR      cholecystectomy      COLON SURGERY      COLONOSCOPY  2005    COLONOSCOPY N/A 2024    Procedure: COLONOSCOPY WITH BIOPSY;  Surgeon: Radha Glez MD;  Location: [place] Main OR    FRACTURE SURGERY      HC EXCISION BREAST LESION, OPEN >=1      core biopsy , lumpectomy 2006    HERNIA REPAIR      LAP VENTRAL HERNIA REPAIR  2017    [place]    LAPAROSCOPIC ASSISTED HYSTERECTOMY VAGINAL  2017    LUMPECTOMY BREAST      orif left femoral neck Left 2016    stress fracture.  done at [facility]    SURGICAL HISTORY OF -       Skin Graft    ZZC PART REMOVAL COLON W ANASTOMOSIS  2005    diverticulitis    ZZC SPINE FUSION,ANTER,8+ SGMTS      Anterior posterior fusion 10 levels, hardware removal and re-fusion        Social History     Socioeconomic History    Marital status:      Spouse name: None    Number of children: None    Years of education: None    Highest education level: None   Tobacco Use    Smoking status: Former     Current packs/day: 0.00     Average packs/day: 1 pack/day for 30.0 years (30.0 ttl pk-yrs)     Types: Cigarettes     Start date: 1974     Quit date: 2004     Years since quittin.8    Smokeless tobacco: Never   Vaping Use    Vaping status: Never Used   Substance and Sexual Activity    Alcohol use: Yes     Comment: Glass of wine a few times a week. ETOH dependency, DUI 3/15/10; 1-2 ETOH per week    Drug use: Never     Comment: percocet for  the past several years    Sexual activity: Not Currently     Partners: Male     Birth control/protection: Surgical     Comment: VAS   Other Topics Concern    Parent/sibling w/ CABG, MI or angioplasty before 65F 55M? No     Social Determinants of Health     Financial Resource Strain: Low Risk  (10/2/2024)    Financial Resource Strain     Within the past 12 months, have you or your family members you live with been unable to get utilities (heat, electricity) when it was really needed?: No   Food Insecurity: Low Risk  (10/2/2024)    Food Insecurity     Within the past 12 months, did you worry that your food would run out before you got money to buy more?: No     Within the past 12 months, did the food you bought just not last and you didn t have money to get more?: No   Transportation Needs: Low Risk  (10/2/2024)    Transportation Needs     Within the past 12 months, has lack of transportation kept you from medical appointments, getting your medicines, non-medical meetings or appointments, work, or from getting things that you need?: No   Physical Activity: Inactive (2/20/2024)    Exercise Vital Sign     Days of Exercise per Week: 0 days     Minutes of Exercise per Session: 0 min   Stress: No Stress Concern Present (2/20/2024)    [employer] - Occupational Stress Questionnaire     Feeling of Stress : Only a little   Social Connections: Unknown (2/20/2024)    Social Connection and Isolation Panel [NHANES]     Frequency of Social Gatherings with Friends and Family: More than three times a week   Interpersonal Safety: Unknown (10/1/2024)    Interpersonal Safety     Do you feel physically and emotionally safe where you currently live?: Patient unable to answer     Within the past 12 months, have you been hit, slapped, kicked or otherwise physically hurt by someone?: Patient unable to answer     Within the past 12 months, have you been humiliated or emotionally abused in other ways by your  partner or ex-partner?: Patient unable to answer   Housing Stability: Low Risk  (10/2/2024)    Housing Stability     Do you have housing? : Yes     Are you worried about losing your housing?: No       family history includes Adrenal Disorder in her brother; Alcohol/Drug in her father and mother; Cancer in her mother and paternal grandfather; Cancer - colorectal in her father; Cerebral palsy in her granddaughter; Congenital Anomalies in her son; Depression in her mother; Diabetes in her maternal grandfather, maternal grandmother, paternal grandfather, and paternal grandmother; Gastrointestinal Disease in her paternal grandfather; Hypertension in her brother.       Maria Del Carmen Iglesias PA-C  Department of Neurosurgery  Office: [phone]

## 2024-10-15 NOTE — PATIENT INSTRUCTIONS
Continue collar at all times 24/7.    Follow up in 6 weeks with CT cervical and F/E cervical XR.

## 2024-10-16 LAB — FOLATE SERPL-MCNC: 39.1 NG/ML (ref 4.6–34.8)

## 2024-10-16 PROCEDURE — 82746 ASSAY OF FOLIC ACID SERUM: CPT | Performed by: NURSE PRACTITIONER

## 2024-10-16 PROCEDURE — P9604 ONE-WAY ALLOW PRORATED TRIP: HCPCS | Performed by: NURSE PRACTITIONER

## 2024-10-17 ENCOUNTER — TELEPHONE (OUTPATIENT)
Dept: NEUROSURGERY | Facility: CLINIC | Age: 74
End: 2024-10-17
Payer: COMMERCIAL

## 2024-10-17 NOTE — TELEPHONE ENCOUNTER
Left Voicemail (1st Attempt) and Sent Mychart (1st Attempt) for the patient to call back and schedule the following:    Appointment type: Return Surgical Spine  Provider: Maria Del Carmen Iglesias  Return date: around 11/26/24  Specialty phone number: [phone]  Additional appointment(s) needed: cervical CT and flexion/Ext cervical XR   Additonal Notes: Follow up with CT and Xrangela Keene on 10/17/2024 at 10:24 AM

## 2024-10-21 ENCOUNTER — TELEPHONE (OUTPATIENT)
Dept: NEUROSURGERY | Facility: CLINIC | Age: 74
End: 2024-10-21
Payer: COMMERCIAL

## 2024-10-21 DIAGNOSIS — Z09 HOSPITAL DISCHARGE FOLLOW-UP: ICD-10-CM

## 2024-10-21 NOTE — TELEPHONE ENCOUNTER
Left Voicemail (2nd Attempt) for the patient to call back and schedule the following:    Appointment type: Return Surgical Spine  Provider: Maria Del Carmen Iglesias  Return date: around 11/26/24  Specialty phone number: [phone]  Additional appointment(s) needed: cervical CT and flexion/Ext cervical XR   Additonal Notes: Follow up with CT and Xrangela Keene on 10/21/2024 at 11:08 AM

## 2024-10-22 ENCOUNTER — PATIENT OUTREACH (OUTPATIENT)
Dept: CARE COORDINATION | Facility: CLINIC | Age: 74
End: 2024-10-22
Payer: COMMERCIAL

## 2024-10-22 ENCOUNTER — TELEPHONE (OUTPATIENT)
Dept: FAMILY MEDICINE | Facility: CLINIC | Age: 74
End: 2024-10-22
Payer: COMMERCIAL

## 2024-10-22 NOTE — PROGRESS NOTES
Clinic Care Coordination Contact  [facility]    Clinical Data: Care Coordinator Outreach    Outreach Documentation Number of Outreach Attempt   10/22/2024   9:06 AM 1       Recent Hospitalization/ED: [facility] stay 9/26 to 10/9.  Date of TCU Admission: 10/9/24.  Date of TCU (anticipated) Discharge: 10/19/24.  Discharged to: previous home.  Cognitive Scores:  SLUMS 18/30.  Physical Function: [place] collar at all times, WBAT.   DME: None    Hospitalization: Jessica presented to [facility] on 9/26 w/ respiratory failure. IT was felt she had overdosed and required field intubation. She was found to have a Dens II fx, and neurosurgery was consulted ([place] collar at all times). She also had a UTI. Hx of opioid and ETOH misuse, and there was concern for suicide attempt. Previous [place] tx program x2. Several medications were changed/clarified, and chemical dependency team was consulted. Psychiatry also consulted.      Rehab: Jessica presented to TCU on 10/9 s/p the above hospitalization.  From the get go, Jessica did not want to be here.  She also did not want to go to [facility], the rehab that was lined up by her family.  She was not ready to treat her alcoholism, and denied having a problem.  She refused therapy on several occasions.  She was seen by in-house psychiatrist, who recommended restart of Cymbalta among other things.  Insurance denied ongoing rehab to Jessica, and a discharge process was initiated despite her appeal.  She lost her appeal, and therefore will be discharging.     Today, Jessica is bummed.  She says she is likely going to go to [facility] after she returns home.  She feels like her family is forcing her to do this.  She denies any new pain, and says she really does not use the Percocet that often at home.  She denies new shortness of breath, nausea, fever and says her bowels and bladder are working well.  She has not noticed any new edema.  She is agreeable to follow-up with her PCP  if she is not in the [facility] system already.    Recommendations to PCP:  Switch from Percocet to buprenorphine.  Consider restart of gabapentin, Cymbalta, and/or Seroquel.  Reestablish care with a pain management specialist.    DISCHARGE PLAN:  Follow up labs: No labs orders/due.  Medical Follow Up:  Follow up with primary care provider in 1-4 weeks  MTM referral needed: Yes, recommended.   Current [facility] scheduled appointments:  None.  Discharge Services: Home Care:  Occupational Therapy, Physical Therapy, Registered Nurse, and Home Health Aide    -------------------------------------------------    Left message on patient's voicemail with call back information and requested return call.    Plan: Care Coordinator will try to reach patient again in 1-2 business days.    Radha Salcedo SOCORRO   Social Work Primary Care Clinic Care Coordinator   [facility]  [phone]  [email or web address]

## 2024-10-22 NOTE — TELEPHONE ENCOUNTER
CC outreached x1 for TCU discharge protocol.     No CTC on file for .     Clinic RN outreached x3 to discuss request from pharmacy per Telephone encounter dated 10/18/24.     If pt is in rehab, CC would not outreach any more at this time.     Routing to clinic RN to see if they need to contact  at all regarding pharmacy needs.     GALLITO Prasad   Social Work Primary Care Clinic Care Coordinator   [facility]

## 2024-10-22 NOTE — TELEPHONE ENCOUNTER
Pts  stopped at clinic today he has received multiple voice mails at home. It appears mostly from care coordination. Pt is currently in rehab. Please contact  miriam on cell phone [phone]  Desiree Berman RN on 10/22/2024 at 1:35 PM

## 2024-10-22 NOTE — TELEPHONE ENCOUNTER
Patient Contact     Attempt # 1     Was call answered?  No.  Left message on voicemail with information to call back.     Patient needs a Hospital Follow up appointment with Dr. Barriga scheduled.     Juliette High RN on 10/22/2024 at 4:34 PM

## 2024-10-23 NOTE — TELEPHONE ENCOUNTER
Pt has been admitted to [facility] - Medical Necessity form completed by Dr. Barriga and faxed back - Copy to scanning.    Note also included to have pt make virtual/video visit with Dr. Linus JURADOP

## 2024-10-23 NOTE — PROGRESS NOTES
See Telephone encounter 10/22/24.     PADMINI Prasad   Social Work Primary Care Clinic Care Coordinator   [facility]  [phone]  [email or web address]

## 2024-10-31 ENCOUNTER — MYC MEDICAL ADVICE (OUTPATIENT)
Dept: FAMILY MEDICINE | Facility: CLINIC | Age: 74
End: 2024-10-31
Payer: COMMERCIAL

## 2024-10-31 DIAGNOSIS — G89.4 CHRONIC PAIN SYNDROME: ICD-10-CM

## 2024-11-01 RX ORDER — OXYCODONE AND ACETAMINOPHEN 10; 325 MG/1; MG/1
1 TABLET ORAL EVERY 6 HOURS PRN
Qty: 30 TABLET | Refills: 0 | Status: SHIPPED | OUTPATIENT
Start: 2024-11-01

## 2024-11-01 NOTE — TELEPHONE ENCOUNTER
I will refill #30, visit needed for hospital/TCU follow up before more refills will be done.    Sarah Barriga M.D.

## 2024-11-11 ENCOUNTER — OFFICE VISIT (OUTPATIENT)
Dept: FAMILY MEDICINE | Facility: CLINIC | Age: 74
End: 2024-11-11
Payer: COMMERCIAL

## 2024-11-11 VITALS
TEMPERATURE: 98.1 F | RESPIRATION RATE: 16 BRPM | HEART RATE: 72 BPM | WEIGHT: 167 LBS | OXYGEN SATURATION: 92 % | HEIGHT: 64 IN | BODY MASS INDEX: 28.51 KG/M2 | DIASTOLIC BLOOD PRESSURE: 70 MMHG | SYSTOLIC BLOOD PRESSURE: 124 MMHG

## 2024-11-11 DIAGNOSIS — I10 ESSENTIAL HYPERTENSION WITH GOAL BLOOD PRESSURE LESS THAN 140/90: ICD-10-CM

## 2024-11-11 DIAGNOSIS — Z98.1 S/P LUMBAR SPINAL FUSION: ICD-10-CM

## 2024-11-11 DIAGNOSIS — S12.100S CLOSED ODONTOID FRACTURE, SEQUELA: Primary | ICD-10-CM

## 2024-11-11 DIAGNOSIS — G89.4 CHRONIC PAIN SYNDROME: ICD-10-CM

## 2024-11-11 DIAGNOSIS — K14.6 TONGUE PAIN: ICD-10-CM

## 2024-11-11 DIAGNOSIS — J44.9 CHRONIC OBSTRUCTIVE PULMONARY DISEASE, UNSPECIFIED COPD TYPE (H): ICD-10-CM

## 2024-11-11 DIAGNOSIS — Q67.5 CONGENITAL MUSCULOSKELETAL DEFORMITY OF SPINE: ICD-10-CM

## 2024-11-11 DIAGNOSIS — F11.20 CONTINUOUS OPIOID DEPENDENCE (H): ICD-10-CM

## 2024-11-11 DIAGNOSIS — E03.9 HYPOTHYROIDISM, UNSPECIFIED TYPE: ICD-10-CM

## 2024-11-11 DIAGNOSIS — F33.1 MAJOR DEPRESSIVE DISORDER, RECURRENT EPISODE, MODERATE (H): ICD-10-CM

## 2024-11-11 PROCEDURE — 99214 OFFICE O/P EST MOD 30 MIN: CPT | Mod: 25 | Performed by: FAMILY MEDICINE

## 2024-11-11 RX ORDER — GABAPENTIN 300 MG/1
300 CAPSULE ORAL 2 TIMES DAILY
Qty: 60 CAPSULE | Refills: 5 | Status: SHIPPED | OUTPATIENT
Start: 2024-11-11

## 2024-11-11 RX ORDER — GABAPENTIN 100 MG/1
CAPSULE ORAL 3 TIMES DAILY
COMMUNITY
End: 2024-11-11

## 2024-11-11 RX ORDER — ALBUTEROL SULFATE 90 UG/1
2 INHALANT RESPIRATORY (INHALATION) EVERY 6 HOURS PRN
Qty: 18 G | Refills: 1 | Status: SHIPPED | OUTPATIENT
Start: 2024-11-11

## 2024-11-11 RX ORDER — QUETIAPINE FUMARATE 50 MG/1
100 TABLET, FILM COATED ORAL AT BEDTIME
Qty: 60 TABLET | Refills: 5 | Status: SHIPPED | OUTPATIENT
Start: 2024-11-11

## 2024-11-11 RX ORDER — AMLODIPINE BESYLATE 5 MG/1
5 TABLET ORAL AT BEDTIME
Qty: 30 TABLET | Refills: 5 | Status: SHIPPED | OUTPATIENT
Start: 2024-11-11

## 2024-11-11 RX ORDER — CELECOXIB 50 MG/1
CAPSULE ORAL 2 TIMES DAILY
COMMUNITY

## 2024-11-11 RX ORDER — DULOXETIN HYDROCHLORIDE 30 MG/1
CAPSULE, DELAYED RELEASE ORAL
COMMUNITY
Start: 2024-10-29 | End: 2024-11-11

## 2024-11-11 RX ORDER — QUETIAPINE FUMARATE 50 MG/1
TABLET, FILM COATED ORAL
COMMUNITY
Start: 2024-10-24 | End: 2024-11-11

## 2024-11-11 RX ORDER — GABAPENTIN 600 MG/1
600 TABLET ORAL AT BEDTIME
Qty: 30 TABLET | Refills: 5 | Status: SHIPPED | OUTPATIENT
Start: 2024-11-11

## 2024-11-11 RX ORDER — DULOXETIN HYDROCHLORIDE 30 MG/1
30 CAPSULE, DELAYED RELEASE ORAL DAILY
Qty: 30 CAPSULE | Refills: 5 | Status: SHIPPED | OUTPATIENT
Start: 2024-11-11

## 2024-11-11 RX ORDER — BUDESONIDE AND FORMOTEROL FUMARATE DIHYDRATE 80; 4.5 UG/1; UG/1
2 AEROSOL RESPIRATORY (INHALATION) 2 TIMES DAILY
Qty: 6.9 G | Refills: 1 | Status: SHIPPED | OUTPATIENT
Start: 2024-11-11

## 2024-11-11 ASSESSMENT — PAIN SCALES - GENERAL: PAINLEVEL_OUTOF10: SEVERE PAIN (7)

## 2024-11-11 ASSESSMENT — PATIENT HEALTH QUESTIONNAIRE - PHQ9
SUM OF ALL RESPONSES TO PHQ QUESTIONS 1-9: 5
SUM OF ALL RESPONSES TO PHQ QUESTIONS 1-9: 5

## 2024-11-11 NOTE — PATIENT INSTRUCTIONS
"Gabapentin:    600 mg at bedtime for 3 days  Then you can add 300 mg in the morning   After 3-4 days you can add in another 300 mg    Seroquel - continue 2 of the 50 mg tablets at bedtime    Duloxetine (cymbalta) - 30 mg in the morning.  This is an anti-depressant that also helps with chronic pain.    ENT referral done.  If [facility] is out a ways for appointments you can check with [facility] - they have clinic in [place].      Pain clinic referral is in - someone should be calling you to schedule.            When you are out of refills or the refills say \"zero\", it is time to schedule your next appointment in clinic!    Labs are released to you almost immediately and sometimes before I have had a chance to review them.  I review labs regularly and once they are all in, you will either be sent a letter with your results or if you are signed up for on-line services, you will be notified that results are available to you on [employer]. If there are serious findings, you typically will be called.    If you have any questions about your visit, your symptoms, your medication, your test results or it is not clear what your diagnosis or treatment plan is please contact me (via [facility]) or call the care team at [phone] and say \"Care Team\"          "

## 2024-11-11 NOTE — PROGRESS NOTES
Assessment & Plan     Closed odontoid fracture, sequela  Following with neurosurgery.  Per patient she is facing a few more months in the [place] collar and possibly fusion (she would like to avoid)    Essential hypertension with goal blood pressure less than 140/90  Well controlled  - amLODIPine (NORVASC) 5 MG tablet; Take 1 tablet (5 mg) by mouth at bedtime.    Major depressive disorder, recurrent episode, moderate (H)  Needs counseling, daughter is looking for this apparently  Continue Seroquel  Restart Duloxetine- has been on in the past, doesn't have any currently, was prescribed at the treatment program she was at but not discharged with this.     - QUEtiapine (SEROQUEL) 50 MG tablet; Take 2 tablets (100 mg) by mouth at bedtime.  - DULoxetine (CYMBALTA) 30 MG capsule; Take 1 capsule (30 mg) by mouth daily.    Chronic obstructive pulmonary disease, unspecified COPD type (H)  Not using her Symbicort regularly - will restart this  Is taking the Tiotropium regularly   - albuterol (VENTOLIN HFA) 108 (90 Base) MCG/ACT inhaler; Inhale 2 puffs into the lungs every 6 hours as needed for shortness of breath or wheezing.    Tongue pain  Longstanding. Would like to see ENT.  With the burning, will likely improve with gabapentin use  - Adult ENT  Referral; Future    Hypothyroidism, unspecified type   On levothyroxine TSH is up to date    S/P lumbar spinal fusion  F11.2 - Continuous opioid dependence (H)  Congenital musculoskeletal deformity of spine  Chronic pain syndrome  Patient has longstanding history of chronic back pain, fusion, etc.   She previously saw Dr. Banerjee through the [facility] and he was doing her injections. He has retired and she is not currently seeing anyone for interventional pain.   She has been on Percocet at various doses over the past several years, with her most recent suicide attempt and overdose of non-narcotic drugs, I am less inclined to continue the oxycodone and we will be  "weaning.  At this time, I have allowed only #30 oxycodone per day.    PDMP reviewed  Referral for pain clinic  Restart the gabapentin and gradually increase dose - discussed this with patient.       - gabapentin (NEURONTIN) 600 MG tablet; Take 1 tablet (600 mg) by mouth at bedtime.  - gabapentin (NEURONTIN) 300 MG capsule; Take 1 capsule (300 mg) by mouth 2 times daily.  - Pain Management  Referral; Future        MED REC REQUIRED  Post Medication Reconciliation Status: discharge medications reconciled and changed, per note/orders  BMI  Estimated body mass index is 28.67 kg/m  as calculated from the following:    Height as of this encounter: 1.626 m (5' 4\").    Weight as of this encounter: 75.8 kg (167 lb).             Miller Lopez is a 74 year old, presenting for the following health issues:  Hospital F/U        11/11/2024     8:39 AM   Additional Questions   Roomed by Clary ELLIS   Accompanied by Self     HPI     Hospital Follow-up Visit:    Hospital/Nursing Home/IP Rehab Facility: [facility]  - and TCU  Date of Admission: 09/26/2024  Date of Discharge: 10/09/2024    Discharged from TCU on 10/18/2024 - admitted 10/9/2024  Reason(s) for Admission: Acute respiratory failure with hypoxia secondary to loss of protective airway reflexes-resolved  Uncomplicated asthma   Centrilobular emphysema   Sleep apnea   Alcohol and opioid overdose with history of dependence   Chronic pain with continuous opioid dependence s/p lumbar spinal fusion  Major depressive disorder   Seizure disorder  Concern for suicide attempt    Urinary tract infection (UTI), resolved  Type II dens fracture following mechanical fall (9/9-9/11/24)   Diarrhea, resolved  Stress-induced hyperglycemia, resolved  Hypertension   Mild thrombocytopenia, resolved  Anemia, normocytic  Hypokalemia-resolved  Hypomagnesemia-resolved  Hypothyroidism   Was the patient in the ICU or did the patient experience delirium during " hospitalization?  No  Do you have any other stressors you would like to discuss with your provider? No    Problems taking medications regularly:  None  Medication changes since discharge: None  Problems adhering to non-medication therapy:  None    Summary of hospitalization:  [facility] hospital discharge summary reviewed  TCU discharge summary also reviewed  Diagnostic Tests/Treatments reviewed.  Follow up needed: CD, neurosurgery  Other Healthcare Providers Involved in Patient s Care:         None  Update since discharge: improved.     Cognitive Scores:  SLUMS 18/30.  Physical Function: [place] collar at all times, WBAT.     Going to  three times a week locally.    Looking for a counselor ?[place]  Has the name of another person as well- can't get into her until January/Feb.        Taking the percocet once a day in the morning.     Not taking Gabapentin regularly - last filled 8/12/2024 - for 600 mg with 90 pills    Duloxetine was prescribed at a rehabilitation facility ([facility]) but she doesn't have it at home.      Plan of care communicated with patient         DISCHARGE DIAGNOSIS/NURSING FACILITY COURSE:   Chronic pain syndrome  Acute respiratory failure with hypoxia (H)  Hypoxia  JOY (obstructive sleep apnea)  Centrilobular emphysema (H)  H/O urinary tract infection  Overdose of undetermined intent, subsequent encounter     Hospitalization: Jessica presented to [facility] on 9/26 w/ respiratory failure. IT was felt she had overdosed and required field intubation. She was found to have a Dens II fx, and neurosurgery was consulted ([place] collar at all times). She also had a UTI. Hx of opioid and ETOH misuse, and there was concern for suicide attempt. Previous [facility] tx program x2. Several medications were changed/clarified, and chemical dependency team was consulted. Psychiatry also consulted.      Rehab: Jessica presented to TCU on 10/9 s/p the above hospitalization.  From the get go, Jessica did not want to  "be here.  She also did not want to go to [facility], the rehab that was lined up by her family.  She was not ready to treat her alcoholism, and denied having a problem.  She refused therapy on several occasions.  She was seen by in-house psychiatrist, who recommended restart of Cymbalta among other things.  Insurance denied ongoing rehab to Jessica, and a discharge process was initiated despite her appeal.  She lost her appeal, and therefore will be discharging.     Today, Jessica is bummed.  She says she is likely going to go to [facility] after she returns home.  She feels like her family is forcing her to do this.  She denies any new pain, and says she really does not use the Percocet that often at home.  She denies new shortness of breath, nausea, fever and says her bowels and bladder are working well.  She has not noticed any new edema.  She is agreeable to follow-up with her PCP if she is not in the [facility] system already.     Recommendations to PCP:  Switch from Percocet to buprenorphine.  Consider restart of gabapentin, Cymbalta, and/or Seroquel.  Reestablish care with a pain management specialist.    PDMP reviewed            Review of Systems  Constitutional, HEENT, cardiovascular, pulmonary, gi and gu systems are negative, except as otherwise noted.    Tongue hurts - last 3rd of her tongue.  Putting her teeth on it hurts.  Burning pain.  Feels like her tongue \"gets in the way\" and then she will bit it when she's chewing.    If she rubs it on her teeth it increases the pain.         Objective    /70 (BP Location: Right arm, Patient Position: Sitting, Cuff Size: Adult Regular)   Pulse 72   Temp 98.1  F (36.7  C) (Tympanic)   Resp 16   Ht 1.626 m (5' 4\")   Wt 75.8 kg (167 lb)   SpO2 92%   BMI 28.67 kg/m    Body mass index is 28.67 kg/m .  Physical Exam   GENERAL: alert and no distress  NECK: no adenopathy, no asymmetry, masses, or scars  RESP: lungs clear to auscultation - no " rales, rhonchi or wheezes  CV: regular rate and rhythm, normal S1 S2, no S3 or S4, no murmur, click or rub, no peripheral edema  ABDOMEN: soft, nontender, no hepatosplenomegaly, no masses and bowel sounds normal  MS: no gross musculoskeletal defects noted, no edema  SKIN: small area of irritation just inside tragus of right ear.  No drainage noted.             Signed Electronically by: Sarah Barriga MD    Answers submitted by the patient for this visit:  Patient Health Questionnaire (Submitted on 11/11/2024)  PHQ9 TOTAL SCORE: 5

## 2024-11-14 ENCOUNTER — MEDICAL CORRESPONDENCE (OUTPATIENT)
Dept: HEALTH INFORMATION MANAGEMENT | Facility: CLINIC | Age: 74
End: 2024-11-14

## 2024-11-15 ENCOUNTER — TELEPHONE (OUTPATIENT)
Dept: FAMILY MEDICINE | Facility: CLINIC | Age: 74
End: 2024-11-15

## 2024-11-15 NOTE — TELEPHONE ENCOUNTER
Call attempt 1/3.    LVM for patient to schedule annual wellness exam due February. On callback, please assist patient in scheduling Medicare AWV.    Please close encounter when scheduled.      Ángel Salcido

## 2024-11-18 ENCOUNTER — MEDICAL CORRESPONDENCE (OUTPATIENT)
Dept: HEALTH INFORMATION MANAGEMENT | Facility: CLINIC | Age: 74
End: 2024-11-18
Payer: COMMERCIAL

## 2024-11-19 ENCOUNTER — MEDICAL CORRESPONDENCE (OUTPATIENT)
Dept: HEALTH INFORMATION MANAGEMENT | Facility: CLINIC | Age: 74
End: 2024-11-19

## 2024-11-19 DIAGNOSIS — Z53.9 DIAGNOSIS NOT YET DEFINED: Primary | ICD-10-CM

## 2024-11-19 PROCEDURE — G0180 MD CERTIFICATION HHA PATIENT: HCPCS | Performed by: FAMILY MEDICINE

## 2024-11-21 ENCOUNTER — TELEPHONE (OUTPATIENT)
Dept: FAMILY MEDICINE | Facility: CLINIC | Age: 74
End: 2024-11-21
Payer: COMMERCIAL

## 2024-11-21 NOTE — TELEPHONE ENCOUNTER
MTM referral from: Transitions of Care (recent hospital discharge, TCU discharge, or ED visit)    MTM referral outreach attempt #2 on November 21, 2024 at 11:27 AM      Outcome: Patient not reachable after several attempts, sent nGAP message    Use   [employer] part d map    for the carrier/Plan on the flowsheet      [employer] Message Sent    GENESIS Gorman   [phone]

## 2024-11-26 ENCOUNTER — MYC MEDICAL ADVICE (OUTPATIENT)
Dept: FAMILY MEDICINE | Facility: CLINIC | Age: 74
End: 2024-11-26

## 2024-11-27 ENCOUNTER — TRANSFERRED RECORDS (OUTPATIENT)
Dept: HEALTH INFORMATION MANAGEMENT | Facility: CLINIC | Age: 74
End: 2024-11-27
Payer: COMMERCIAL

## 2024-12-09 ENCOUNTER — OFFICE VISIT (OUTPATIENT)
Dept: NEUROLOGY | Facility: CLINIC | Age: 74
End: 2024-12-09
Payer: COMMERCIAL

## 2024-12-09 VITALS
DIASTOLIC BLOOD PRESSURE: 83 MMHG | RESPIRATION RATE: 12 BRPM | OXYGEN SATURATION: 94 % | SYSTOLIC BLOOD PRESSURE: 128 MMHG | HEART RATE: 71 BPM

## 2024-12-09 DIAGNOSIS — R56.9 SEIZURE (H): Primary | ICD-10-CM

## 2024-12-09 DIAGNOSIS — G44.209 TENSION HEADACHE: ICD-10-CM

## 2024-12-09 PROCEDURE — 99214 OFFICE O/P EST MOD 30 MIN: CPT | Performed by: INTERNAL MEDICINE

## 2024-12-09 PROCEDURE — G2211 COMPLEX E/M VISIT ADD ON: HCPCS | Performed by: INTERNAL MEDICINE

## 2024-12-09 RX ORDER — TOPIRAMATE 25 MG/1
25 TABLET, FILM COATED ORAL 2 TIMES DAILY
Qty: 60 TABLET | Refills: 5 | Status: SHIPPED | OUTPATIENT
Start: 2024-12-09

## 2024-12-09 ASSESSMENT — PAIN SCALES - GENERAL: PAINLEVEL_OUTOF10: MODERATE PAIN (4)

## 2024-12-09 NOTE — TELEPHONE ENCOUNTER
Call attempt 2/3.    LVM for patient to schedule annual wellness exam due February. On callback, please assist patient in scheduling Medicare AWV.    Please close encounter when scheduled.      Ángel Salcido

## 2024-12-09 NOTE — PROGRESS NOTES
"[facility] Neurology Follow Up Visit    Jessica Ellison MRN# [id number]   Age: 74 year old YOB: 1950     Brief history of symptoms: The patient was initially seen in neurologic consultation on 10/13/2024 for evaluation of loss of consciousness. Please see the comprehensive neurologic consultation note from that date in the Epic records for details.          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 Patient continues to be seizure free. I would recommend she continue Keppra indefinitely.     Headaches are likely related to tension headaches in the setting of neck tightness/pain. Gabapentin hasn't been helpful since starting it. We discussed stopping gabapentin and replacing it with Topamax.      Plan:  - Continue Keppra 500 mg BID  - Wean off of gabapentin as directed and start Topamax 25 mg BID    Follow up in Neurology clinic in 6 months or earlier as needed should new concerns arise.    Jet Greco MD   of Neurology  [facility]  -------------------------------------------------------------------------------------------------------------------------    Interval history:   She hasn't had any seizures since last visit.    She had a neck fracture in September. Currently she has pain more from the neck brace than from the fracture. She is having headaches almost daily since fracture. She has frequent \"big headaches\". There is no light sensitivity. Possible sound sensitivity. There is no nausea or vomiting. She takes Aleve as needed. This helps somewhat. She is taking it multiple times a day. It is also for other pains. She does frequent tylenol too. She takes gabapentin for pain. She doesn't know if it has helped with the headache.       Past Medical History:     Patient Active Problem List   Diagnosis    Insomnia    Back pain    Anxiety    " Major depressive disorder, recurrent episode, moderate (H)    Cerebral aneurysm, nonruptured    Bee allergy status    S/P lumbar spinal fusion    Chronic bilateral low back pain without sciatica    Essential hypertension with goal blood pressure less than 140/90    Hypothyroidism    Spell of change in speech    Centrilobular emphysema (H)    Acute respiratory failure with hypoxia (H)    Hyperlipidemia LDL goal <70    Hypoxia    Congenital musculoskeletal deformity of spine    Chronic pain    JOY (obstructive sleep apnea)    CKD (chronic kidney disease) stage 2, GFR 60-89 ml/min    F11.2 - Continuous opioid dependence (H)    Knee osteoarthritis    Chronic pain syndrome    SOB (shortness of breath)    Alcohol abuse with intoxication (H)    Personal history of tobacco use, presenting hazards to health    Sinus tachycardia    Closed fracture of proximal end of left humerus with routine healing, unspecified fracture morphology, subsequent encounter    Seizure (H)    Acute respiratory failure with hypoxia and hypercapnia (H)    Other closed displaced odontoid fracture, initial encounter (H)    Respiratory failure (H)    Suicide attempt (H)    Odontoid fracture (H)     Past Medical History:   Diagnosis Date    Anemia     Aneurysm (H)     Antiplatelet or antithrombotic long-term use     Arthritis     Chemical dependency (H)     Chem Dep RX    Depression     History of blood transfusion     Hypertension     Menopausal symptoms     Other chronic pain     Lower back and hips    Seizure (H) 02/20/2024    Sleep apnea     Sleep disorder     Thyroid disease     Tobacco abuse     Uncomplicated asthma         Past Surgical History:     Past Surgical History:   Procedure Laterality Date    ABDOMEN SURGERY      APPENDECTOMY  1960    APPENDECTOMY      ARTHROPLASTY KNEE Left 08/16/2023    Procedure: LEFT TOTAL KNEE ARTHROPLASTY;  Surgeon: Bryan Roque MD;  Location: [facility] Main OR    BACK SURGERY      BIOPSY BREAST       cerebral aneurysm      coiled    CEREBRAL ANEURYSM REPAIR      cholecystectomy      COLON SURGERY      COLONOSCOPY  2005    COLONOSCOPY N/A 2024    Procedure: COLONOSCOPY WITH BIOPSY;  Surgeon: Radha Glez MD;  Location: [facility] Main OR    FRACTURE SURGERY      HC EXCISION BREAST LESION, OPEN >=1      core biopsy , lumpectomy 2006    HERNIA REPAIR      LAP VENTRAL HERNIA REPAIR  2017    [facility]    LAPAROSCOPIC ASSISTED HYSTERECTOMY VAGINAL  2017    LUMPECTOMY BREAST      orif left femoral neck Left 2016    stress fracture.  done at [facility]    SURGICAL HISTORY OF -       Skin Graft    ZZC PART REMOVAL COLON W ANASTOMOSIS  2005    diverticulitis    ZZC SPINE FUSION,ANTER,8+ SGMTS      Anterior posterior fusion 10 levels, hardware removal and re-fusion         Social History:     Social History     Tobacco Use    Smoking status: Former     Current packs/day: 0.00     Average packs/day: 1 pack/day for 30.0 years (30.0 ttl pk-yrs)     Types: Cigarettes     Start date: 1974     Quit date: 2004     Years since quittin.9    Smokeless tobacco: Never   Vaping Use    Vaping status: Never Used   Substance Use Topics    Alcohol use: Yes     Comment: Glass of wine a few times a week. ETOH dependency, DUI 3/15/10; 1-2 ETOH per week    Drug use: Never     Comment: percocet for the past several years        Family History:     Family History   Problem Relation Age of Onset    Cancer Mother         breast/lung    Alcohol/Drug Mother     Depression Mother     Cancer - colorectal Father     Alcohol/Drug Father     Diabetes Maternal Grandmother     Diabetes Maternal Grandfather     Diabetes Paternal Grandmother     Diabetes Paternal Grandfather     Cancer Paternal Grandfather     Gastrointestinal Disease Paternal Grandfather     Congenital Anomalies Son     Hypertension Brother     Adrenal Disorder Brother         had adrenalectomies    Cerebral palsy Granddaughter          Medications:     Current Outpatient Medications   Medication Sig Dispense Refill    acetaminophen (TYLENOL) 325 MG tablet Take 2 tablets (650 mg) by mouth or Feeding Tube every 6 hours as needed for mild pain.      albuterol (VENTOLIN HFA) 108 (90 Base) MCG/ACT inhaler Inhale 2 puffs into the lungs every 6 hours as needed for shortness of breath or wheezing. 18 g 1    amLODIPine (NORVASC) 5 MG tablet Take 1 tablet (5 mg) by mouth at bedtime. 30 tablet 5    aspirin (SM ASPIRIN EC) 325 MG EC tablet TAKE ONE TABLET BY MOUTH ONCE DAILY WITH DINNER FOR 30 DAYS 90 tablet 3    atorvastatin (LIPITOR) 40 MG tablet Take 1 tablet (40 mg) by mouth daily 90 tablet 3    budesonide-formoterol (SYMBICORT) 80-4.5 MCG/ACT Inhaler Inhale 2 puffs into the lungs 2 times daily. 6.9 g 1    calcium carbonate 600 mg-vitamin D 400 units (CALTRATE) 600-400 MG-UNIT per tablet Take 1 tablet by mouth every evening      celecoxib (CELEBREX) 200 MG capsule Take 1 capsule (200 mg) by mouth daily.      celecoxib (CELEBREX) 50 MG capsule Take by mouth 2 times daily.      DULoxetine (CYMBALTA) 30 MG capsule Take 1 capsule (30 mg) by mouth daily. 30 capsule 5    folic acid (FOLVITE) 1 MG tablet Take 1 tablet (1 mg) by mouth daily 90 tablet 3    gabapentin (NEURONTIN) 300 MG capsule Take 1 capsule (300 mg) by mouth 2 times daily. 60 capsule 5    gabapentin (NEURONTIN) 600 MG tablet Take 1 tablet (600 mg) by mouth at bedtime. 30 tablet 5    levETIRAcetam (KEPPRA) 500 MG tablet Take 1 tablet (500 mg) by mouth 2 times daily 180 tablet 3    levothyroxine (SYNTHROID/LEVOTHROID) 50 MCG tablet Take 1 tablet (50 mcg) by mouth daily 90 tablet 3    melatonin 5 MG tablet Take 1 tablet (5 mg) by mouth every evening.      metoprolol tartrate (LOPRESSOR) 100 MG tablet Take 1 tablet (100 mg) by mouth 2 times daily. Hold if SBP<100 or HR<55      multivitamin, therapeutic (THERA-VIT) TABS tablet Take 1 tablet by mouth daily      naloxone (NARCAN) 4 MG/0.1ML nasal  spray Spray 1 spray (4 mg) into one nostril alternating nostrils once as needed for opioid reversal every 2-3 minutes until assistance arrives 0.2 mL 1    oxyCODONE-acetaminophen (PERCOCET)  MG per tablet Take 1 tablet by mouth every 6 hours as needed for severe pain. 30 tablet 0    QUEtiapine (SEROQUEL) 50 MG tablet Take 2 tablets (100 mg) by mouth at bedtime. 60 tablet 5    thiamine (B-1) 100 MG tablet Take 1 tablet (100 mg) by mouth daily.      tiotropium (SPIRIVA HANDIHALER) 18 MCG inhaled capsule USING THE HANDIHALER, INHALE THE CONTENTS OF ONE CAPSULE BY MOUTH ONCE DAILY 30 capsule 11     No current facility-administered medications for this visit.        Allergies:     Allergies   Allergen Reactions    Bee Venom     Lisinopril Swelling     Oral swelling        Review of Systems:   As above     Physical Exam:   Vitals: /83 (BP Location: Right arm, Patient Position: Sitting, Cuff Size: Adult Large)   Pulse 71   Resp 12   SpO2 94%    General: Seated comfortably in no acute distress.  Lungs: breathing comfortably  Neurologic:     Mental Status: Fully alert, attentive. Language normal, speech clear and fluent, no paraphasic errors.      Cranial Nerves: Visual fields intact. PERRL. EOMI with normal smooth pursuit. Facial sensation intact/symmetric. Facial movements symmetric. Hearing not formally tested but intact to conversation. No dysarthria. Shoulder shrug strong bilaterally. Tongue protrusion midline.     Motor: No tremors or other abnormal movements observed. Strength 5/5 throughout upper and lower extremities.        Sensory: Intact/symmetric to light touch throughout upper and lower extremities.      Coordination: Finger-nose-finger and heel-shin intact without dysmetria.      Gait: Antalgic, but steady gait     Data reviewed on previous visits    Imaging:  CT head 10/2023  IMPRESSION:  1.  No acute intracranial hemorrhage, extra-axial collection, or midline shift.  2.  Chronic appearing  right occipital infarct, new from 2019. Area of infarct does contain a few areas of scattered calcification.  3.  Mild generalized brain parenchymal volume loss.          MRI brain, MRA head 4/2019  Impression:  1. Stable postembolization changes without evidence of residual  aneurysm. No new aneurysm or significant stenosis of the major  intracranial arteries.  2. Mild generalized parenchymal volume loss and chronic small vessel  ischemic disease.     TTE 10/2023  Interpretation Summary  Global and regional left ventricular function is normal with an EF of 55-60%.  Global right ventricular function is normal.  Both atria appear normal.  No significant valvular abnormalities present.  The atrial septum is intact as assessed by agitated saline bubble study .  No significant changes noted.    Procedures:  EKG sinus rhythm (10/2023)    Laboratory:  CBC with mild anemia, CMP unremarkable (10/2023)  LDL 66 (3/2023)    MRI brain, MRA head/neck 11/2023  Impression:   1. No acute intracranial pathology. Right occipital chronic insult  similar to prior head CT.  2. Status post stent-assisted coil embolization of a paraclinoid left  internal carotid artery aneurysm, without evidence for recurrent  aneurysm.  3. No severe stenosis of the major intracranial or cervical surgical  arteries. Approximately 50% stenosis is noted at the mid V2 segment of  the left vertebral artery.     EEG 12/2023  Impression: This is an abnormal EEG due to diffusely slow background in theta range that suggests a nonspecific generalized cerebral dysfunction. Such slowing can be seen in metabolic or toxic abnormalities, clinical correlation is recommended. No sharp discharges or spikes were recorded during this recording to suggest a seizure disorder.     Normal A1c, INR/PTT / LDL 81 (10/2023)    Cardiac monitor 11/2023 - unrmearkable    Pertinent Investigations since last visit:   None    The longitudinal plan of care for the  diagnosis(es)/condition(s) as documented were addressed during this visit. Due to the added complexity in care, I will continue to support Jessica in the subsequent management and with ongoing continuity of care.

## 2024-12-09 NOTE — LETTER
"12/9/2024       RE: Jessica Ellison  [id number]  [facility] [address]     Dear Colleague,    Thank you for referring your patient, Jessica Ellison, to the [facility]UROLOG[facility] [place] at [facility]. Please see a copy of my visit note below.    [facility] Neurology Follow Up Visit    Jessica Ellison MRN# [id number]   Age: 74 year old YOB: 1950     Brief history of symptoms: The patient was initially seen in neurologic consultation on 10/13/2024 for evaluation of loss of consciousness. Please see the comprehensive neurologic consultation note from that date in the Epic records for details.          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 Patient continues to be seizure free. I would recommend she continue Keppra indefinitely.     Headaches are likely related to tension headaches in the setting of neck tightness/pain. Gabapentin hasn't been helpful since starting it. We discussed stopping gabapentin and replacing it with Topamax.      Plan:  - Continue Keppra 500 mg BID  - Wean off of gabapentin as directed and start Topamax 25 mg BID    Follow up in Neurology clinic in 6 months or earlier as needed should new concerns arise.    Jet Greco MD   of Neurology  [facility]  -------------------------------------------------------------------------------------------------------------------------    Interval history:   She hasn't had any seizures since last visit.    She had a neck fracture in September. Currently she has pain more from the neck brace than from the fracture. She is having headaches almost daily since fracture. She has frequent \"big headaches\". There is no light sensitivity. Possible sound sensitivity. There is no nausea or vomiting. She takes Aleve as " needed. This helps somewhat. She is taking it multiple times a day. It is also for other pains. She does frequent tylenol too. She takes gabapentin for pain. She doesn't know if it has helped with the headache.       Past Medical History:     Patient Active Problem List   Diagnosis     Insomnia     Back pain     Anxiety     Major depressive disorder, recurrent episode, moderate (H)     Cerebral aneurysm, nonruptured     Bee allergy status     S/P lumbar spinal fusion     Chronic bilateral low back pain without sciatica     Essential hypertension with goal blood pressure less than 140/90     Hypothyroidism     Spell of change in speech     Centrilobular emphysema (H)     Acute respiratory failure with hypoxia (H)     Hyperlipidemia LDL goal <70     Hypoxia     Congenital musculoskeletal deformity of spine     Chronic pain     JOY (obstructive sleep apnea)     CKD (chronic kidney disease) stage 2, GFR 60-89 ml/min     F11.2 - Continuous opioid dependence (H)     Knee osteoarthritis     Chronic pain syndrome     SOB (shortness of breath)     Alcohol abuse with intoxication (H)     Personal history of tobacco use, presenting hazards to health     Sinus tachycardia     Closed fracture of proximal end of left humerus with routine healing, unspecified fracture morphology, subsequent encounter     Seizure (H)     Acute respiratory failure with hypoxia and hypercapnia (H)     Other closed displaced odontoid fracture, initial encounter (H)     Respiratory failure (H)     Suicide attempt (H)     Odontoid fracture (H)     Past Medical History:   Diagnosis Date     Anemia      Aneurysm (H)      Antiplatelet or antithrombotic long-term use      Arthritis      Chemical dependency (H)     Chem Dep RX     Depression      History of blood transfusion      Hypertension      Menopausal symptoms      Other chronic pain     Lower back and hips     Seizure (H) 02/20/2024     Sleep apnea      Sleep disorder      Thyroid disease       Tobacco abuse      Uncomplicated asthma         Past Surgical History:     Past Surgical History:   Procedure Laterality Date     ABDOMEN SURGERY       APPENDECTOMY  1960     APPENDECTOMY       ARTHROPLASTY KNEE Left 2023    Procedure: LEFT TOTAL KNEE ARTHROPLASTY;  Surgeon: Bryan Roque MD;  Location: [facility] Main OR     BACK SURGERY       BIOPSY BREAST       cerebral aneurysm      coiled     CEREBRAL ANEURYSM REPAIR       cholecystectomy       COLON SURGERY       COLONOSCOPY  2005     COLONOSCOPY N/A 2024    Procedure: COLONOSCOPY WITH BIOPSY;  Surgeon: Radha Glez MD;  Location: [facility] Main OR     FRACTURE SURGERY       HC EXCISION BREAST LESION, OPEN >=1      core biopsy 2006, lumpectomy 2006     HERNIA REPAIR       LAP VENTRAL HERNIA REPAIR  2017    [employer]     LAPAROSCOPIC ASSISTED HYSTERECTOMY VAGINAL  2017     LUMPECTOMY BREAST       orif left femoral neck Left 2016    stress fracture.  done at [facility]     SURGICAL HISTORY OF -       Skin Graft     ZZC PART REMOVAL COLON W ANASTOMOSIS  2005    diverticulitis     ZZC SPINE FUSION,ANTER,8+ SGMTS      Anterior posterior fusion 10 levels, hardware removal and re-fusion         Social History:     Social History     Tobacco Use     Smoking status: Former     Current packs/day: 0.00     Average packs/day: 1 pack/day for 30.0 years (30.0 ttl pk-yrs)     Types: Cigarettes     Start date: 1974     Quit date: 2004     Years since quittin.9     Smokeless tobacco: Never   Vaping Use     Vaping status: Never Used   Substance Use Topics     Alcohol use: Yes     Comment: Glass of wine a few times a week. ETOH dependency, DUI 3/15/10; 1-2 ETOH per week     Drug use: Never     Comment: percocet for the past several years        Family History:     Family History   Problem Relation Age of Onset     Cancer Mother         breast/lung     Alcohol/Drug Mother      Depression Mother      Cancer -  colorectal Father      Alcohol/Drug Father      Diabetes Maternal Grandmother      Diabetes Maternal Grandfather      Diabetes Paternal Grandmother      Diabetes Paternal Grandfather      Cancer Paternal Grandfather      Gastrointestinal Disease Paternal Grandfather      Congenital Anomalies Son      Hypertension Brother      Adrenal Disorder Brother         had adrenalectomies     Cerebral palsy Granddaughter         Medications:     Current Outpatient Medications   Medication Sig Dispense Refill     acetaminophen (TYLENOL) 325 MG tablet Take 2 tablets (650 mg) by mouth or Feeding Tube every 6 hours as needed for mild pain.       albuterol (VENTOLIN HFA) 108 (90 Base) MCG/ACT inhaler Inhale 2 puffs into the lungs every 6 hours as needed for shortness of breath or wheezing. 18 g 1     amLODIPine (NORVASC) 5 MG tablet Take 1 tablet (5 mg) by mouth at bedtime. 30 tablet 5     aspirin (SM ASPIRIN EC) 325 MG EC tablet TAKE ONE TABLET BY MOUTH ONCE DAILY WITH DINNER FOR 30 DAYS 90 tablet 3     atorvastatin (LIPITOR) 40 MG tablet Take 1 tablet (40 mg) by mouth daily 90 tablet 3     budesonide-formoterol (SYMBICORT) 80-4.5 MCG/ACT Inhaler Inhale 2 puffs into the lungs 2 times daily. 6.9 g 1     calcium carbonate 600 mg-vitamin D 400 units (CALTRATE) 600-400 MG-UNIT per tablet Take 1 tablet by mouth every evening       celecoxib (CELEBREX) 200 MG capsule Take 1 capsule (200 mg) by mouth daily.       celecoxib (CELEBREX) 50 MG capsule Take by mouth 2 times daily.       DULoxetine (CYMBALTA) 30 MG capsule Take 1 capsule (30 mg) by mouth daily. 30 capsule 5     folic acid (FOLVITE) 1 MG tablet Take 1 tablet (1 mg) by mouth daily 90 tablet 3     gabapentin (NEURONTIN) 300 MG capsule Take 1 capsule (300 mg) by mouth 2 times daily. 60 capsule 5     gabapentin (NEURONTIN) 600 MG tablet Take 1 tablet (600 mg) by mouth at bedtime. 30 tablet 5     levETIRAcetam (KEPPRA) 500 MG tablet Take 1 tablet (500 mg) by mouth 2 times daily 180  tablet 3     levothyroxine (SYNTHROID/LEVOTHROID) 50 MCG tablet Take 1 tablet (50 mcg) by mouth daily 90 tablet 3     melatonin 5 MG tablet Take 1 tablet (5 mg) by mouth every evening.       metoprolol tartrate (LOPRESSOR) 100 MG tablet Take 1 tablet (100 mg) by mouth 2 times daily. Hold if SBP<100 or HR<55       multivitamin, therapeutic (THERA-VIT) TABS tablet Take 1 tablet by mouth daily       naloxone (NARCAN) 4 MG/0.1ML nasal spray Spray 1 spray (4 mg) into one nostril alternating nostrils once as needed for opioid reversal every 2-3 minutes until assistance arrives 0.2 mL 1     oxyCODONE-acetaminophen (PERCOCET)  MG per tablet Take 1 tablet by mouth every 6 hours as needed for severe pain. 30 tablet 0     QUEtiapine (SEROQUEL) 50 MG tablet Take 2 tablets (100 mg) by mouth at bedtime. 60 tablet 5     thiamine (B-1) 100 MG tablet Take 1 tablet (100 mg) by mouth daily.       tiotropium (SPIRIVA HANDIHALER) 18 MCG inhaled capsule USING THE HANDIHALER, INHALE THE CONTENTS OF ONE CAPSULE BY MOUTH ONCE DAILY 30 capsule 11     No current facility-administered medications for this visit.        Allergies:     Allergies   Allergen Reactions     Bee Venom      Lisinopril Swelling     Oral swelling        Review of Systems:   As above     Physical Exam:   Vitals: /83 (BP Location: Right arm, Patient Position: Sitting, Cuff Size: Adult Large)   Pulse 71   Resp 12   SpO2 94%    General: Seated comfortably in no acute distress.  Lungs: breathing comfortably  Neurologic:     Mental Status: Fully alert, attentive. Language normal, speech clear and fluent, no paraphasic errors.      Cranial Nerves: Visual fields intact. PERRL. EOMI with normal smooth pursuit. Facial sensation intact/symmetric. Facial movements symmetric. Hearing not formally tested but intact to conversation. No dysarthria. Shoulder shrug strong bilaterally. Tongue protrusion midline.     Motor: No tremors or other abnormal movements observed.  Strength 5/5 throughout upper and lower extremities.        Sensory: Intact/symmetric to light touch throughout upper and lower extremities.      Coordination: Finger-nose-finger and heel-shin intact without dysmetria.      Gait: Antalgic, but steady gait     Data reviewed on previous visits    Imaging:  CT head 10/2023  IMPRESSION:  1.  No acute intracranial hemorrhage, extra-axial collection, or midline shift.  2.  Chronic appearing right occipital infarct, new from 2019. Area of infarct does contain a few areas of scattered calcification.  3.  Mild generalized brain parenchymal volume loss.          MRI brain, MRA head 4/2019  Impression:  1. Stable postembolization changes without evidence of residual  aneurysm. No new aneurysm or significant stenosis of the major  intracranial arteries.  2. Mild generalized parenchymal volume loss and chronic small vessel  ischemic disease.     TTE 10/2023  Interpretation Summary  Global and regional left ventricular function is normal with an EF of 55-60%.  Global right ventricular function is normal.  Both atria appear normal.  No significant valvular abnormalities present.  The atrial septum is intact as assessed by agitated saline bubble study .  No significant changes noted.    Procedures:  EKG sinus rhythm (10/2023)    Laboratory:  CBC with mild anemia, CMP unremarkable (10/2023)  LDL 66 (3/2023)    MRI brain, MRA head/neck 11/2023  Impression:   1. No acute intracranial pathology. Right occipital chronic insult  similar to prior head CT.  2. Status post stent-assisted coil embolization of a paraclinoid left  internal carotid artery aneurysm, without evidence for recurrent  aneurysm.  3. No severe stenosis of the major intracranial or cervical surgical  arteries. Approximately 50% stenosis is noted at the mid V2 segment of  the left vertebral artery.     EEG 12/2023  Impression: This is an abnormal EEG due to diffusely slow background in theta range that suggests a  nonspecific generalized cerebral dysfunction. Such slowing can be seen in metabolic or toxic abnormalities, clinical correlation is recommended. No sharp discharges or spikes were recorded during this recording to suggest a seizure disorder.     Normal A1c, INR/PTT / LDL 81 (10/2023)    Cardiac monitor 11/2023 - unrmearkable    Pertinent Investigations since last visit:   None    The longitudinal plan of care for the diagnosis(es)/condition(s) as documented were addressed during this visit. Due to the added complexity in care, I will continue to support Jessica in the subsequent management and with ongoing continuity of care.      Again, thank you for allowing me to participate in the care of your patient.      Sincerely,    Jet Greco MD

## 2024-12-09 NOTE — NURSING NOTE
Chief Complaint   Patient presents with    RECHECK     /83 (BP Location: Right arm, Patient Position: Sitting, Cuff Size: Adult Large)   Pulse 71   Resp 12   SpO2 94%   Miriam Lopez

## 2024-12-17 NOTE — TELEPHONE ENCOUNTER
Call attempt 3/3.    LVM for patient to schedule annual wellness exam due February. On callback, please assist patient in scheduling Medicare AWV.        Ángel Salcido   Acute Illness That Poses A Threat To Life  Abrupt Change In Neurological Status  Chronic Illness With Severe Exacerbation/Progression

## 2024-12-27 DIAGNOSIS — M54.50 CHRONIC BILATERAL LOW BACK PAIN WITHOUT SCIATICA: ICD-10-CM

## 2024-12-27 DIAGNOSIS — Q67.5 CONGENITAL MUSCULOSKELETAL DEFORMITY OF SPINE: Primary | ICD-10-CM

## 2024-12-27 DIAGNOSIS — G89.29 CHRONIC BILATERAL LOW BACK PAIN WITHOUT SCIATICA: ICD-10-CM

## 2024-12-27 NOTE — TELEPHONE ENCOUNTER
Routing refill request to provider for review/approval because:  Medication is reported/historical    Requested Prescriptions   Pending Prescriptions Disp Refills    celecoxib (CELEBREX) 200 MG capsule       Sig: Take 1 capsule (200 mg) by mouth daily.       NSAID Medications Failed - 12/27/2024  3:58 PM        Failed - Patient is age 6-64 years        Failed - Normal CBC on file in past 12 months     Recent Labs   Lab Test 10/11/24  1004   WBC 4.3   RBC 3.52*   HGB 10.8*   HCT 33.3*                    Failed - Always Fail Criteria - Chart Review Required     Validate Diagnosis. If the medication is requested for an acute flare of a chronic pain associated with a musculoskeletal or rheumatologic condition; okay to authorize if all other criteria are met. If not, then forward to provider for review.          Passed - Most recent blood pressure under 140/90 in past 12 months     BP Readings from Last 3 Encounters:   12/09/24 128/83   11/11/24 124/70   10/18/24 (!) 134/98       No data recorded            Passed - Medication is active on med list        Passed - Normal GFR on file in past 12 months     Recent Labs   Lab Test 10/14/24  0943 08/05/21  1017 05/24/21  1529   GFRESTIMATED 75   < > 45*   GFRESTBLACK  --   --  52*    < > = values in this interval not displayed.             Passed - Recent (12 mo) or future (90 days) visit within the authorizing provider's specialty     The patient must have completed an in-person or virtual visit within the past 12 months or has a future visit scheduled within the next 90 days with the authorizing provider s specialty.  Urgent care and e-visits do not qualify as an office visit for this protocol.          Passed - No active pregnancy on record        Passed - No positive pregnancy test in past 12 months

## 2024-12-30 RX ORDER — CELECOXIB 200 MG/1
200 CAPSULE ORAL DAILY
Qty: 90 CAPSULE | Refills: 1 | Status: SHIPPED | OUTPATIENT
Start: 2024-12-30

## 2025-01-06 ENCOUNTER — TRANSFERRED RECORDS (OUTPATIENT)
Dept: HEALTH INFORMATION MANAGEMENT | Facility: CLINIC | Age: 75
End: 2025-01-06
Payer: COMMERCIAL

## 2025-01-13 ENCOUNTER — MEDICAL CORRESPONDENCE (OUTPATIENT)
Dept: HEALTH INFORMATION MANAGEMENT | Facility: CLINIC | Age: 75
End: 2025-01-13
Payer: COMMERCIAL

## 2025-01-14 ENCOUNTER — MEDICAL CORRESPONDENCE (OUTPATIENT)
Dept: HEALTH INFORMATION MANAGEMENT | Facility: CLINIC | Age: 75
End: 2025-01-14
Payer: COMMERCIAL

## 2025-01-14 ENCOUNTER — MYC MEDICAL ADVICE (OUTPATIENT)
Dept: FAMILY MEDICINE | Facility: CLINIC | Age: 75
End: 2025-01-14
Payer: COMMERCIAL

## 2025-01-14 DIAGNOSIS — I10 ESSENTIAL HYPERTENSION: ICD-10-CM

## 2025-01-15 RX ORDER — METOPROLOL TARTRATE 100 MG/1
100 TABLET ORAL 2 TIMES DAILY
Qty: 180 TABLET | Refills: 3 | Status: SHIPPED | OUTPATIENT
Start: 2025-01-15

## 2025-01-20 DIAGNOSIS — Z53.9 DIAGNOSIS NOT YET DEFINED: Primary | ICD-10-CM

## 2025-01-20 PROCEDURE — G0179 MD RECERTIFICATION HHA PT: HCPCS | Performed by: FAMILY MEDICINE

## 2025-01-21 ENCOUNTER — PATIENT OUTREACH (OUTPATIENT)
Dept: CARE COORDINATION | Facility: CLINIC | Age: 75
End: 2025-01-21
Payer: COMMERCIAL

## 2025-01-22 ENCOUNTER — OFFICE VISIT (OUTPATIENT)
Dept: PULMONOLOGY | Facility: CLINIC | Age: 75
End: 2025-01-22
Payer: COMMERCIAL

## 2025-01-22 VITALS
BODY MASS INDEX: 28.43 KG/M2 | SYSTOLIC BLOOD PRESSURE: 138 MMHG | HEART RATE: 77 BPM | WEIGHT: 165.6 LBS | OXYGEN SATURATION: 90 % | DIASTOLIC BLOOD PRESSURE: 84 MMHG

## 2025-01-22 DIAGNOSIS — R09.02 HYPOXEMIA: ICD-10-CM

## 2025-01-22 DIAGNOSIS — J43.2 CENTRILOBULAR EMPHYSEMA (H): Primary | ICD-10-CM

## 2025-01-22 DIAGNOSIS — R06.09 DYSPNEA ON EXERTION: ICD-10-CM

## 2025-01-22 PROCEDURE — 99214 OFFICE O/P EST MOD 30 MIN: CPT | Performed by: NURSE PRACTITIONER

## 2025-01-22 RX ORDER — BUDESONIDE AND FORMOTEROL FUMARATE DIHYDRATE 160; 4.5 UG/1; UG/1
2 AEROSOL RESPIRATORY (INHALATION) 2 TIMES DAILY
Qty: 10.2 G | Refills: 11 | Status: SHIPPED | OUTPATIENT
Start: 2025-01-22

## 2025-01-22 RX ORDER — IPRATROPIUM BROMIDE AND ALBUTEROL SULFATE 2.5; .5 MG/3ML; MG/3ML
1 SOLUTION RESPIRATORY (INHALATION) EVERY 6 HOURS PRN
Qty: 90 ML | Refills: 11 | Status: SHIPPED | OUTPATIENT
Start: 2025-01-22

## 2025-01-22 NOTE — PATIENT INSTRUCTIONS
It was a pleasure to see you in clinic today.   Here is what we discussed:    Continue Symbicort two puffs twice daily, rinse/gargle after use.  If that inhaler is not enough for your breathing, then start the Duonebs in the nebulizer machine twice daily.  You can also use this as needed every 6 hours.   Continue Albuterol inhaler every 4-6 hours as needed for shortness of breath or wheezing.  Keep checking your oxygen levels at home, if you notice low levels in the 80s, let us know and we will schedule the 6 minute walk test.   Call my nurse, Parviz ([phone]) with any change or worsening of your breathing.    Follow-up in one year.     Cait Hernandez, CNP  Pulmonary Medicine  [facility]  [phone]

## 2025-01-22 NOTE — PROGRESS NOTES
Pulmonary Clinic Follow-up          Assessment/Plan:     74 year old female with a history of COPD, emphysema, JOY on cpap, HTN, alcohol use disorder, chronic pain with opiate use disorder - presenting for annual follow-up.    Emphysema  Dyspnea on exertion  Former smoker, 30 pack year history, quit in 2004.  Previously established care in our clinic in 2019, with Dr Hartley.  At that time her symptoms were mild and she did not require daily inhaler medications, so she was told to follow-up as needed.  Pulmonary function testing at that time showed normal spirometry, air-trapping, and normal diffusion capacity.  Moderate radiographic emphysema noted per chest CT in 3/2022.  Repeat pulmonary function testing 8/2023 shows normal spirometry, lung volumes, and diffusion capacity.    As of last visit she was on Spiriva daily, although she did not remember what prompted her to initiate this and did not know if she finds much benefit, although states her  believes it is helping with her JACKSON.  The inhaler was costly.  She has never had an exacerbation, rare URIs.  Denies cough or mucous production.  Last visit we discussed weaning Spiriva due to cost, and utilizing just Combivent PRN.   She returns today, she has had multiple hospitalizations and ED visits, see HPI.  She is requiring oxygen intermittently, but not currently.  She has been on Spiriva + Symbicort, but insurance will no longer be covering Spiriva.     Plan:  - continue Symbicort two puffs BID, rinse/gargle after use.   - start Duonebs BID and PRN.  Supplied her with a machine today.   - discussed performing a 6 minute walk test due to borderline low oxygen levels and intermittent oxygen use.  She would rather monitor her oxygen saturations at home with oximeter, and she will let us know if she notices low levels in the 80s and we will schedule test.   - she is UTD with annual influenza vaccine, RSV vaccine, and pneumococcal vaccines.  - Action plan:  prednisone 40mg x5 days, + azithromycin x5 days (if increased SOB + sputum)      Follow-up  - annually    Cait Hernandez CNP  Pulmonary Medicine  [facility]  [phone]         CC:     Emphysema/COPD evaluation     HPI:     74 year old female with a history of COPD, emphysema, JOY on cpap, HTN, alcohol use disorder, chronic pain with opiate use disorder - presenting for annual follow-up.    Since last visit (8/2023), she has had multiple ED visits and hospitalizations:  - ED 10/2023 w/syncopal episode, seizure activity. She was hypoxic and discharged on home O2.   - Hospitalized 11/2023 w/SOB.  She was discharged on Symbicort + Spiriva, Duonebs, and encouraged to follow-up with pulmonology.   - ED 1/2024 with COVID infection, unable to take Paxlovid due to medication interactions.   - Hospitalized 9/9/24 with low oxygen after fall 3 days prior.  FTH odontoid fracture and placed in cervical collar.  Treated with steroids and antibiotics for possible pneumonia/asthma exacerbation.  Discharged on oxygen.   - Hospitalized 9/26/24 w/ respiratory failure, intubated in the field after being found down with apparent overdose.     She feels her breathing is stable currently.  Insurance will no longer cover Spiriva.  Only taking Symbicort once daily.  Doesn't rinse mouth.   Going to [place] soon for a couple weeks.     ROS:     A 6-system review was obtained and was negative with the exception of the symptoms endorsed in the HPI.       Medical history:       PMH:  Past Medical History:   Diagnosis Date    Anemia     Aneurysm     Antiplatelet or antithrombotic long-term use     Arthritis     Chemical dependency (H)     Chem Dep RX    Depression     History of blood transfusion     Hypertension     Menopausal symptoms     Other chronic pain     Lower back and hips    Seizure (H) 02/20/2024    Sleep apnea     Sleep disorder     Thyroid disease     Tobacco abuse     Uncomplicated asthma         PSH:  Past Surgical History:   Procedure Laterality Date    ABDOMEN SURGERY      APPENDECTOMY  1960    APPENDECTOMY      ARTHROPLASTY KNEE Left 08/16/2023    Procedure: LEFT TOTAL KNEE ARTHROPLASTY;  Surgeon: Bryan Roque MD;  Location: [facility] Main OR    BACK SURGERY      BIOPSY BREAST      cerebral aneurysm  2014    coiled    CEREBRAL ANEURYSM REPAIR      cholecystectomy      COLON SURGERY      COLONOSCOPY  04/19/2005    COLONOSCOPY N/A 5/13/2024    Procedure: COLONOSCOPY WITH BIOPSY;  Surgeon: Radha Glez MD;  Location: [facility] OR    FRACTURE SURGERY      HC EXCISION BREAST LESION, OPEN >=1      core biopsy 2006, lumpectomy 2006    HERNIA REPAIR      LAP VENTRAL HERNIA REPAIR  12/2017    [facility]    LAPAROSCOPIC ASSISTED HYSTERECTOMY VAGINAL  12/2017    LUMPECTOMY BREAST      orif left femoral neck Left 06/03/2016    stress fracture.  done at [facility]    SURGICAL HISTORY OF -   2004    Skin Graft    ZZC PART REMOVAL COLON W ANASTOMOSIS  05/2005    diverticulitis    ZZC SPINE FUSION,ANTER,8+ SGMTS  2007    Anterior posterior fusion 10 levels, hardware removal and re-fusion 2009       Allergies:  Allergies   Allergen Reactions    Bee Venom     Lisinopril Swelling     Oral swelling       Family Hx:  Family History   Problem Relation Age of Onset    Cancer Mother         breast/lung    Alcohol/Drug Mother     Depression Mother     Cancer - colorectal Father     Alcohol/Drug Father     Diabetes Maternal Grandmother     Diabetes Maternal Grandfather     Diabetes Paternal Grandmother     Diabetes Paternal Grandfather     Cancer Paternal Grandfather     Gastrointestinal Disease Paternal Grandfather     Congenital Anomalies Son     Hypertension Brother     Adrenal Disorder Brother         had adrenalectomies    Cerebral palsy Granddaughter        Social Hx:  Social History     Socioeconomic History    Marital status:      Spouse name: Not on file    Number of children: Not on file     Years of education: Not on file    Highest education level: Not on file   Occupational History    Not on file   Tobacco Use    Smoking status: Former     Current packs/day: 0.00     Average packs/day: 1 pack/day for 30.0 years (30.0 ttl pk-yrs)     Types: Cigarettes     Start date: 1974     Quit date: 2004     Years since quittin.0    Smokeless tobacco: Never   Vaping Use    Vaping status: Never Used   Substance and Sexual Activity    Alcohol use: Yes     Comment: Glass of wine a few times a week. ETOH dependency, DUI 3/15/10; 1-2 ETOH per week    Drug use: Never     Comment: percocet for the past several years    Sexual activity: Not Currently     Partners: Male     Birth control/protection: Surgical     Comment: VAS   Other Topics Concern    Parent/sibling w/ CABG, MI or angioplasty before 65F 55M? No   Social History Narrative    Not on file     Social Drivers of Health     Financial Resource Strain: Low Risk  (10/2/2024)    Financial Resource Strain     Within the past 12 months, have you or your family members you live with been unable to get utilities (heat, electricity) when it was really needed?: No   Food Insecurity: Low Risk  (10/2/2024)    Food Insecurity     Within the past 12 months, did you worry that your food would run out before you got money to buy more?: No     Within the past 12 months, did the food you bought just not last and you didn t have money to get more?: No   Transportation Needs: Low Risk  (10/2/2024)    Transportation Needs     Within the past 12 months, has lack of transportation kept you from medical appointments, getting your medicines, non-medical meetings or appointments, work, or from getting things that you need?: No   Physical Activity: Inactive (2024)    Exercise Vital Sign     Days of Exercise per Week: 0 days     Minutes of Exercise per Session: 0 min   Stress: No Stress Concern Present (2024)    [employer] [place] [employer] - Occupational  Stress Questionnaire     Feeling of Stress : Only a little   Social Connections: Unknown (2/20/2024)    Social Connection and Isolation Panel [NHANES]     Frequency of Communication with Friends and Family: Not on file     Frequency of Social Gatherings with Friends and Family: More than three times a week     Attends [employer] Services: Not on file     Active Member of Clubs or Organizations: Not on file     Attends Club or Organization Meetings: Not on file     Marital Status: Not on file   Interpersonal Safety: Unknown (10/1/2024)    Interpersonal Safety     Do you feel physically and emotionally safe where you currently live?: Patient unable to answer     Within the past 12 months, have you been hit, slapped, kicked or otherwise physically hurt by someone?: Patient unable to answer     Within the past 12 months, have you been humiliated or emotionally abused in other ways by your partner or ex-partner?: Patient unable to answer   Housing Stability: Low Risk  (10/2/2024)    Housing Stability     Do you have housing? : Yes     Are you worried about losing your housing?: No       Current Meds:  Current Outpatient Medications   Medication Sig Dispense Refill    albuterol (VENTOLIN HFA) 108 (90 Base) MCG/ACT inhaler Inhale 2 puffs into the lungs every 6 hours as needed for shortness of breath or wheezing. 18 g 1    amLODIPine (NORVASC) 5 MG tablet Take 1 tablet (5 mg) by mouth at bedtime. 30 tablet 5    aspirin (SM ASPIRIN EC) 325 MG EC tablet TAKE ONE TABLET BY MOUTH ONCE DAILY WITH DINNER FOR 30 DAYS 90 tablet 3    atorvastatin (LIPITOR) 40 MG tablet Take 1 tablet (40 mg) by mouth daily 90 tablet 3    budesonide-formoterol (SYMBICORT) 160-4.5 MCG/ACT Inhaler Inhale 2 puffs into the lungs 2 times daily. 10.2 g 11    calcium carbonate 600 mg-vitamin D 400 units (CALTRATE) 600-400 MG-UNIT per tablet Take 1 tablet by mouth every evening      DULoxetine (CYMBALTA) 30 MG capsule Take 1 capsule (30 mg) by mouth daily. 30  capsule 5    folic acid (FOLVITE) 1 MG tablet Take 1 tablet (1 mg) by mouth daily 90 tablet 3    gabapentin (NEURONTIN) 300 MG capsule 300 mg in AM/300 in afternoon/600 mg at night 120 capsule 2    ipratropium - albuterol 0.5 mg/2.5 mg/3 mL (DUONEB) 0.5-2.5 (3) MG/3ML neb solution Take 1 vial (3 mLs) by nebulization every 6 hours as needed for shortness of breath, wheezing or cough. 90 mL 11    levETIRAcetam (KEPPRA) 500 MG tablet Take 1 tablet (500 mg) by mouth 2 times daily 180 tablet 3    levothyroxine (SYNTHROID/LEVOTHROID) 50 MCG tablet Take 1 tablet (50 mcg) by mouth daily 90 tablet 3    melatonin 5 MG tablet Take 1 tablet (5 mg) by mouth every evening. (Patient taking differently: Take 15 mg by mouth every evening.)      metoprolol tartrate (LOPRESSOR) 100 MG tablet Take 1 tablet (100 mg) by mouth 2 times daily. Hold if SBP<100 or HR<55 180 tablet 3    multivitamin, therapeutic (THERA-VIT) TABS tablet Take 1 tablet by mouth daily      QUEtiapine (SEROQUEL) 50 MG tablet Take 2 tablets (100 mg) by mouth at bedtime. 60 tablet 5    tiotropium (SPIRIVA HANDIHALER) 18 MCG inhaled capsule USING THE HANDIHALER, INHALE THE CONTENTS OF ONE CAPSULE BY MOUTH ONCE DAILY 30 capsule 11    celecoxib (CELEBREX) 200 MG capsule Take 1 capsule (200 mg) by mouth daily. 90 capsule 1    celecoxib (CELEBREX) 50 MG capsule Take by mouth 2 times daily.      naloxone (NARCAN) 4 MG/0.1ML nasal spray Spray 1 spray (4 mg) into one nostril alternating nostrils once as needed for opioid reversal every 2-3 minutes until assistance arrives (Patient not taking: Reported on 1/22/2025) 0.2 mL 1    oxyCODONE-acetaminophen (PERCOCET)  MG per tablet Take 1 tablet by mouth every 6 hours as needed for severe pain. (Patient not taking: Reported on 1/22/2025) 30 tablet 0    thiamine (B-1) 100 MG tablet Take 1 tablet (100 mg) by mouth daily. (Patient not taking: Reported on 1/22/2025)      topiramate (TOPAMAX) 25 MG tablet Take 1 tablet (25 mg) by  mouth 2 times daily. (Patient not taking: Reported on 1/22/2025) 60 tablet 5          Physical Exam:     /84 (BP Location: Left arm, Patient Position: Sitting, Cuff Size: Adult Large)   Pulse 77   Wt 75.1 kg (165 lb 9.6 oz)   SpO2 (!) 90%   BMI 28.43 kg/m    Gen: adult female, appears in NAD  HEENT: clear conjunctivae, moist mucous membranes  CV: RRR, no M/G/R  Resp: CTAB, no focal crackles or wheezes.  Respirations even and unlabored.  On RA.  No cough.  Skin: no apparent rashes on visible skin  Ext: no cyanosis, clubbing or edema  Neuro: alert and answering questions appropriately       Data:     Labs:  reviewed    Imaging studies:  I have personally reviewed all pertinent imaging studies and PFT results unless otherwise noted.    Chest CT 3/2022:  IMPRESSION:  1.  No pulmonary embolism.  2.  Normal caliber aorta without dissection.  3.  Moderate emphysema.  4.  Evidence for old granulomatous disease.  5.  Dense atherosclerotic vascular calcification at the origin of the renal arteries. Recommend correlation for renovascular hypertension.      Pulmonary Function Testing     8/10/23:  FEV1/FVC is normal  FEV1 is normal  FVC is normal  TLC is normal  DLCO is normal when it is corrected for hgb.  Impression:  Full pulmonary function test is normal.      2019:  The spirometry was performed with adequate reproducibility.  The flow volume loop is essentially normal.  FEV1 is 1.98 L (88% predicted) and is normal.  FVC is 2.21 L (77% predicted) and is mildly reduced.  FEV1/FVC is 89% and is normal.  There was no improvement in spirometry after a single inhaled dose of bronchodilator.  TLC is 4.02 L (84% predicted) and is normal.  RV is 2.19 L (111% predicted) and is normal.  RV/TLC is 55% and is increased.  DLCO is 15.21 mL/min/mmHg (74% predicted) and is normal when it is corrected for hemoglobin.  Impression:  This pulmonary function study is abnormal.  Although the forced vital capacity is mildly reduced, the  total lung volume measurement is normal and therefore not indicative of respiratory ventilatory defect. There is no bronchodilator   response.  The elevated RV/TLC ratio is suggestive of air trapping.  Diffusion capacity, when corrected for hemoglobin, is normal.

## 2025-01-22 NOTE — PROGRESS NOTES
Patient instructed in use of nebulizer machine from [facility].  Patient states good understanding of how to use the nebulizer machine.  Nebulizer machine given to patient from [facility].  All paperwork signed and copy scanned to chart. Phone numbers given to patient to call if any questions.

## 2025-02-04 ENCOUNTER — PATIENT OUTREACH (OUTPATIENT)
Dept: CARE COORDINATION | Facility: CLINIC | Age: 75
End: 2025-02-04
Payer: COMMERCIAL

## 2025-02-13 ENCOUNTER — TELEPHONE (OUTPATIENT)
Dept: NEUROLOGY | Facility: CLINIC | Age: 75
End: 2025-02-13
Payer: COMMERCIAL

## 2025-02-13 NOTE — TELEPHONE ENCOUNTER
Left Voicemail (1st Attempt) and Sent Mychart (1st Attempt) for the patient to call back and schedule the following:    Appointment type: Return Neuro  Provider: Dr. Greco  Return date: April 2025  Specialty phone number: [phone]  Additional appointment(s) needed: NA  Additonal Notes:

## 2025-02-25 DIAGNOSIS — J44.9 CHRONIC OBSTRUCTIVE PULMONARY DISEASE, UNSPECIFIED COPD TYPE (H): ICD-10-CM

## 2025-02-27 RX ORDER — TIOTROPIUM BROMIDE 18 UG/1
CAPSULE ORAL; RESPIRATORY (INHALATION)
Qty: 30 CAPSULE | Refills: 9 | Status: SHIPPED | OUTPATIENT
Start: 2025-02-27

## 2025-03-19 ENCOUNTER — TELEPHONE (OUTPATIENT)
Dept: FAMILY MEDICINE | Facility: CLINIC | Age: 75
End: 2025-03-19
Payer: COMMERCIAL

## 2025-03-19 ENCOUNTER — TELEPHONE (OUTPATIENT)
Dept: NEUROLOGY | Facility: CLINIC | Age: 75
End: 2025-03-19

## 2025-03-19 NOTE — TELEPHONE ENCOUNTER
"[facility] calling.     We are reaching out to all Providers as this pt has been flagged for Opioid abuse. \"Can you tell me when and how often she was given Percocet by Dr. Sarah Barriga?..\"    1 order for #30 tabs in chart from November 1. 2024 is all I see.     Renetta Manuel RN    "

## 2025-03-19 NOTE — TELEPHONE ENCOUNTER
Pt sees Dr. Greco at the [facility]. Dr. Garza was covering for Dr. Greco and refilled pt's gabapentin in December. Will route encounter to the general neurology nurses at the [facility].    Yeimy ELLIS RN, BSN  [facility] Neurology

## 2025-03-19 NOTE — TELEPHONE ENCOUNTER
[facility] Call Center    Phone Message    May a detailed message be left on voicemail: yes     Reason for Call: Other: Genna with [employer] is requesting a call back in regards to  patients medication, gabapentin (NEURONTIN) 300 MG capsule prescribed by Jet Garza MD.   No additional information provided. Genna can be reached at [phone]    Action Taken: Message routed to:  Other:  Neurology    Travel Screening: Not Applicable

## 2025-03-20 NOTE — TELEPHONE ENCOUNTER
[facility] Call Center    Phone Message    May a detailed message be left on voicemail: yes     Reason for Call: Other: Genna returned call to TWILA Reese. She can be reached at [phone]. Please review.      Action Taken: Message routed to:  [facility] Neurology    Travel Screening: Not Applicable     Date of Service:

## 2025-03-24 ENCOUNTER — HOSPITAL ENCOUNTER (OUTPATIENT)
Dept: MAMMOGRAPHY | Facility: CLINIC | Age: 75
Discharge: HOME | End: 2025-03-24
Attending: FAMILY MEDICINE | Admitting: FAMILY MEDICINE
Payer: COMMERCIAL

## 2025-03-24 DIAGNOSIS — Z12.31 VISIT FOR SCREENING MAMMOGRAM: ICD-10-CM

## 2025-03-24 PROCEDURE — 77067 SCR MAMMO BI INCL CAD: CPT

## 2025-03-24 PROCEDURE — 77063 BREAST TOMOSYNTHESIS BI: CPT

## 2025-03-29 ENCOUNTER — HEALTH MAINTENANCE LETTER (OUTPATIENT)
Age: 75
End: 2025-03-29

## 2025-04-18 ENCOUNTER — APPOINTMENT (OUTPATIENT)
Dept: CT IMAGING | Facility: CLINIC | Age: 75
DRG: 193 | End: 2025-04-18
Attending: EMERGENCY MEDICINE
Payer: COMMERCIAL

## 2025-04-18 ENCOUNTER — HOSPITAL ENCOUNTER (INPATIENT)
Facility: CLINIC | Age: 75
DRG: 193 | End: 2025-04-18
Attending: EMERGENCY MEDICINE | Admitting: INTERNAL MEDICINE
Payer: COMMERCIAL

## 2025-04-18 DIAGNOSIS — R79.89 ELEVATED BRAIN NATRIURETIC PEPTIDE (BNP) LEVEL: ICD-10-CM

## 2025-04-18 DIAGNOSIS — R09.02 HYPOXIA: ICD-10-CM

## 2025-04-18 DIAGNOSIS — D50.8 OTHER IRON DEFICIENCY ANEMIA: Primary | ICD-10-CM

## 2025-04-18 DIAGNOSIS — M17.12 PRIMARY OSTEOARTHRITIS OF LEFT KNEE: ICD-10-CM

## 2025-04-18 DIAGNOSIS — J96.01 ACUTE RESPIRATORY FAILURE WITH HYPOXIA (H): ICD-10-CM

## 2025-04-18 DIAGNOSIS — J43.2 CENTRILOBULAR EMPHYSEMA (H): Chronic | ICD-10-CM

## 2025-04-18 PROBLEM — J96.90 RESPIRATORY FAILURE (H): Status: RESOLVED | Noted: 2024-09-26 | Resolved: 2025-04-18

## 2025-04-18 PROBLEM — F11.20 CONTINUOUS OPIOID DEPENDENCE (H): Status: ACTIVE | Noted: 2023-07-11

## 2025-04-18 PROBLEM — G89.4 CHRONIC PAIN SYNDROME: Status: ACTIVE | Noted: 2023-11-07

## 2025-04-18 PROBLEM — J96.02 ACUTE RESPIRATORY FAILURE WITH HYPOXIA AND HYPERCAPNIA (H): Status: RESOLVED | Noted: 2024-09-09 | Resolved: 2025-04-18

## 2025-04-18 PROBLEM — E78.5 HYPERLIPIDEMIA LDL GOAL <70: Status: ACTIVE | Noted: 2020-12-22

## 2025-04-18 PROBLEM — G47.33 OSA (OBSTRUCTIVE SLEEP APNEA): Status: ACTIVE | Noted: 2021-08-05

## 2025-04-18 LAB
ANION GAP SERPL CALCULATED.3IONS-SCNC: 15 MMOL/L (ref 7–15)
BASE EXCESS BLDV CALC-SCNC: 0.2 MMOL/L (ref -3–3)
BASOPHILS # BLD AUTO: 0 10E3/UL (ref 0–0.2)
BASOPHILS NFR BLD AUTO: 1 %
BUN SERPL-MCNC: 16.1 MG/DL (ref 8–23)
CALCIUM SERPL-MCNC: 9.2 MG/DL (ref 8.8–10.4)
CHLORIDE SERPL-SCNC: 101 MMOL/L (ref 98–107)
CREAT SERPL-MCNC: 0.82 MG/DL (ref 0.51–0.95)
CRP SERPL-MCNC: 79.14 MG/L
EGFRCR SERPLBLD CKD-EPI 2021: 76 ML/MIN/1.73M2
EOSINOPHIL # BLD AUTO: 0.2 10E3/UL (ref 0–0.7)
EOSINOPHIL NFR BLD AUTO: 5 %
ERYTHROCYTE [DISTWIDTH] IN BLOOD BY AUTOMATED COUNT: 15.7 % (ref 10–15)
EST. AVERAGE GLUCOSE BLD GHB EST-MCNC: 103 MG/DL
FLUAV RNA SPEC QL NAA+PROBE: NEGATIVE
FLUBV RNA RESP QL NAA+PROBE: NEGATIVE
GLUCOSE SERPL-MCNC: 115 MG/DL (ref 70–99)
HBA1C MFR BLD: 5.2 %
HCO3 BLDV-SCNC: 25 MMOL/L (ref 21–28)
HCO3 SERPL-SCNC: 22 MMOL/L (ref 22–29)
HCT VFR BLD AUTO: 32.6 % (ref 35–47)
HGB BLD-MCNC: 10.3 G/DL (ref 11.7–15.7)
HOLD SPECIMEN: NORMAL
IMM GRANULOCYTES # BLD: 0.1 10E3/UL
IMM GRANULOCYTES NFR BLD: 2 %
LACTATE SERPL-SCNC: 1.2 MMOL/L (ref 0.7–2)
LYMPHOCYTES # BLD AUTO: 0.6 10E3/UL (ref 0.8–5.3)
LYMPHOCYTES NFR BLD AUTO: 16 %
MAGNESIUM SERPL-MCNC: 1.9 MG/DL (ref 1.7–2.3)
MCH RBC QN AUTO: 29.5 PG (ref 26.5–33)
MCHC RBC AUTO-ENTMCNC: 31.6 G/DL (ref 31.5–36.5)
MCV RBC AUTO: 93 FL (ref 78–100)
MONOCYTES # BLD AUTO: 0.7 10E3/UL (ref 0–1.3)
MONOCYTES NFR BLD AUTO: 17 %
NEUTROPHILS # BLD AUTO: 2.4 10E3/UL (ref 1.6–8.3)
NEUTROPHILS NFR BLD AUTO: 59 %
NRBC # BLD AUTO: 0 10E3/UL
NRBC BLD AUTO-RTO: 0 /100
NT-PROBNP SERPL-MCNC: 1616 PG/ML (ref 0–900)
O2/TOTAL GAS SETTING VFR VENT: 5 %
OXYHGB MFR BLDV: 71 % (ref 70–75)
PCO2 BLDV: 41 MM HG (ref 40–50)
PH BLDV: 7.39 [PH] (ref 7.32–7.43)
PLATELET # BLD AUTO: 215 10E3/UL (ref 150–450)
PO2 BLDV: 41 MM HG (ref 25–47)
POTASSIUM SERPL-SCNC: 4.4 MMOL/L (ref 3.4–5.3)
PROCALCITONIN SERPL IA-MCNC: 0.1 NG/ML
RBC # BLD AUTO: 3.49 10E6/UL (ref 3.8–5.2)
RSV RNA SPEC NAA+PROBE: NEGATIVE
SAO2 % BLDV: 72.6 % (ref 70–75)
SARS-COV-2 RNA RESP QL NAA+PROBE: NEGATIVE
SODIUM SERPL-SCNC: 138 MMOL/L (ref 135–145)
TROPONIN T SERPL HS-MCNC: 13 NG/L
TROPONIN T SERPL HS-MCNC: 13 NG/L
WBC # BLD AUTO: 4 10E3/UL (ref 4–11)

## 2025-04-18 PROCEDURE — 86140 C-REACTIVE PROTEIN: CPT

## 2025-04-18 PROCEDURE — 250N000009 HC RX 250: Performed by: EMERGENCY MEDICINE

## 2025-04-18 PROCEDURE — 84484 ASSAY OF TROPONIN QUANT: CPT | Performed by: EMERGENCY MEDICINE

## 2025-04-18 PROCEDURE — 83735 ASSAY OF MAGNESIUM: CPT | Performed by: EMERGENCY MEDICINE

## 2025-04-18 PROCEDURE — 87637 SARSCOV2&INF A&B&RSV AMP PRB: CPT | Performed by: EMERGENCY MEDICINE

## 2025-04-18 PROCEDURE — 71275 CT ANGIOGRAPHY CHEST: CPT

## 2025-04-18 PROCEDURE — 96375 TX/PRO/DX INJ NEW DRUG ADDON: CPT | Performed by: EMERGENCY MEDICINE

## 2025-04-18 PROCEDURE — 99285 EMERGENCY DEPT VISIT HI MDM: CPT | Mod: 25 | Performed by: EMERGENCY MEDICINE

## 2025-04-18 PROCEDURE — 36415 COLL VENOUS BLD VENIPUNCTURE: CPT | Performed by: EMERGENCY MEDICINE

## 2025-04-18 PROCEDURE — 83880 ASSAY OF NATRIURETIC PEPTIDE: CPT | Performed by: EMERGENCY MEDICINE

## 2025-04-18 PROCEDURE — 84145 PROCALCITONIN (PCT): CPT | Performed by: EMERGENCY MEDICINE

## 2025-04-18 PROCEDURE — 93010 ELECTROCARDIOGRAM REPORT: CPT | Mod: XU | Performed by: EMERGENCY MEDICINE

## 2025-04-18 PROCEDURE — 93308 TTE F-UP OR LMTD: CPT | Performed by: EMERGENCY MEDICINE

## 2025-04-18 PROCEDURE — 93308 TTE F-UP OR LMTD: CPT | Mod: 26 | Performed by: EMERGENCY MEDICINE

## 2025-04-18 PROCEDURE — 96367 TX/PROPH/DG ADDL SEQ IV INF: CPT | Performed by: EMERGENCY MEDICINE

## 2025-04-18 PROCEDURE — 83605 ASSAY OF LACTIC ACID: CPT | Performed by: EMERGENCY MEDICINE

## 2025-04-18 PROCEDURE — 99223 1ST HOSP IP/OBS HIGH 75: CPT

## 2025-04-18 PROCEDURE — 94640 AIRWAY INHALATION TREATMENT: CPT

## 2025-04-18 PROCEDURE — 85025 COMPLETE CBC W/AUTO DIFF WBC: CPT | Performed by: EMERGENCY MEDICINE

## 2025-04-18 PROCEDURE — 83036 HEMOGLOBIN GLYCOSYLATED A1C: CPT

## 2025-04-18 PROCEDURE — 93005 ELECTROCARDIOGRAM TRACING: CPT | Mod: XU | Performed by: EMERGENCY MEDICINE

## 2025-04-18 PROCEDURE — 96365 THER/PROPH/DIAG IV INF INIT: CPT | Mod: 59 | Performed by: EMERGENCY MEDICINE

## 2025-04-18 PROCEDURE — 82805 BLOOD GASES W/O2 SATURATION: CPT | Performed by: EMERGENCY MEDICINE

## 2025-04-18 PROCEDURE — 80048 BASIC METABOLIC PNL TOTAL CA: CPT | Performed by: EMERGENCY MEDICINE

## 2025-04-18 PROCEDURE — 250N000011 HC RX IP 250 OP 636: Performed by: EMERGENCY MEDICINE

## 2025-04-18 PROCEDURE — 120N000001 HC R&B MED SURG/OB

## 2025-04-18 PROCEDURE — 999N000157 HC STATISTIC RCP TIME EA 10 MIN

## 2025-04-18 PROCEDURE — 250N000012 HC RX MED GY IP 250 OP 636 PS 637: Performed by: EMERGENCY MEDICINE

## 2025-04-18 RX ORDER — CALCIUM CARBONATE 500 MG/1
1000 TABLET, CHEWABLE ORAL 4 TIMES DAILY PRN
Status: DISCONTINUED | OUTPATIENT
Start: 2025-04-18 | End: 2025-04-25 | Stop reason: HOSPADM

## 2025-04-18 RX ORDER — AMOXICILLIN 250 MG
1 CAPSULE ORAL 2 TIMES DAILY PRN
Status: DISCONTINUED | OUTPATIENT
Start: 2025-04-18 | End: 2025-04-25 | Stop reason: HOSPADM

## 2025-04-18 RX ORDER — OXYCODONE HYDROCHLORIDE 5 MG/1
5 TABLET ORAL EVERY 4 HOURS PRN
Status: DISCONTINUED | OUTPATIENT
Start: 2025-04-18 | End: 2025-04-25 | Stop reason: HOSPADM

## 2025-04-18 RX ORDER — IOPAMIDOL 755 MG/ML
71 INJECTION, SOLUTION INTRAVASCULAR ONCE
Status: COMPLETED | OUTPATIENT
Start: 2025-04-18 | End: 2025-04-18

## 2025-04-18 RX ORDER — NALOXONE HYDROCHLORIDE 0.4 MG/ML
0.2 INJECTION, SOLUTION INTRAMUSCULAR; INTRAVENOUS; SUBCUTANEOUS
Status: DISCONTINUED | OUTPATIENT
Start: 2025-04-18 | End: 2025-04-25 | Stop reason: HOSPADM

## 2025-04-18 RX ORDER — AZITHROMYCIN 250 MG/1
250 TABLET, FILM COATED ORAL DAILY
Status: COMPLETED | OUTPATIENT
Start: 2025-04-19 | End: 2025-04-22

## 2025-04-18 RX ORDER — FUROSEMIDE 10 MG/ML
20 INJECTION INTRAMUSCULAR; INTRAVENOUS ONCE
Status: COMPLETED | OUTPATIENT
Start: 2025-04-18 | End: 2025-04-18

## 2025-04-18 RX ORDER — DULOXETIN HYDROCHLORIDE 30 MG/1
30 CAPSULE, DELAYED RELEASE ORAL DAILY
Status: DISCONTINUED | OUTPATIENT
Start: 2025-04-19 | End: 2025-04-25 | Stop reason: HOSPADM

## 2025-04-18 RX ORDER — AZITHROMYCIN MONOHYDRATE 500 MG/5ML
500 INJECTION, POWDER, LYOPHILIZED, FOR SOLUTION INTRAVENOUS EVERY 24 HOURS
Status: DISCONTINUED | OUTPATIENT
Start: 2025-04-18 | End: 2025-04-18

## 2025-04-18 RX ORDER — AMOXICILLIN 250 MG
2 CAPSULE ORAL 2 TIMES DAILY PRN
Status: DISCONTINUED | OUTPATIENT
Start: 2025-04-18 | End: 2025-04-25 | Stop reason: HOSPADM

## 2025-04-18 RX ORDER — CELECOXIB 50 MG/1
50 CAPSULE ORAL 2 TIMES DAILY
Status: DISCONTINUED | OUTPATIENT
Start: 2025-04-19 | End: 2025-04-19

## 2025-04-18 RX ORDER — LIDOCAINE 40 MG/G
CREAM TOPICAL
Status: DISCONTINUED | OUTPATIENT
Start: 2025-04-18 | End: 2025-04-25 | Stop reason: HOSPADM

## 2025-04-18 RX ORDER — LEVETIRACETAM 500 MG/1
500 TABLET ORAL 2 TIMES DAILY
Status: DISCONTINUED | OUTPATIENT
Start: 2025-04-19 | End: 2025-04-25 | Stop reason: HOSPADM

## 2025-04-18 RX ORDER — CEFTRIAXONE 1 G/1
1 INJECTION, POWDER, FOR SOLUTION INTRAMUSCULAR; INTRAVENOUS EVERY 24 HOURS
Status: DISCONTINUED | OUTPATIENT
Start: 2025-04-19 | End: 2025-04-25 | Stop reason: HOSPADM

## 2025-04-18 RX ORDER — QUETIAPINE FUMARATE 100 MG/1
100 TABLET, FILM COATED ORAL AT BEDTIME
Status: DISCONTINUED | OUTPATIENT
Start: 2025-04-19 | End: 2025-04-25 | Stop reason: HOSPADM

## 2025-04-18 RX ORDER — NALOXONE HYDROCHLORIDE 0.4 MG/ML
0.4 INJECTION, SOLUTION INTRAMUSCULAR; INTRAVENOUS; SUBCUTANEOUS
Status: DISCONTINUED | OUTPATIENT
Start: 2025-04-18 | End: 2025-04-25 | Stop reason: HOSPADM

## 2025-04-18 RX ORDER — CEFTRIAXONE 1 G/1
1 INJECTION, POWDER, FOR SOLUTION INTRAMUSCULAR; INTRAVENOUS ONCE
Status: COMPLETED | OUTPATIENT
Start: 2025-04-18 | End: 2025-04-18

## 2025-04-18 RX ORDER — ONDANSETRON 2 MG/ML
4 INJECTION INTRAMUSCULAR; INTRAVENOUS EVERY 6 HOURS PRN
Status: DISCONTINUED | OUTPATIENT
Start: 2025-04-18 | End: 2025-04-25 | Stop reason: HOSPADM

## 2025-04-18 RX ORDER — ACETAMINOPHEN 325 MG/1
650 TABLET ORAL EVERY 4 HOURS PRN
Status: DISCONTINUED | OUTPATIENT
Start: 2025-04-18 | End: 2025-04-25 | Stop reason: HOSPADM

## 2025-04-18 RX ORDER — GABAPENTIN 300 MG/1
600 CAPSULE ORAL AT BEDTIME
Status: DISCONTINUED | OUTPATIENT
Start: 2025-04-19 | End: 2025-04-25 | Stop reason: HOSPADM

## 2025-04-18 RX ORDER — ENOXAPARIN SODIUM 100 MG/ML
40 INJECTION SUBCUTANEOUS EVERY 24 HOURS
Status: DISCONTINUED | OUTPATIENT
Start: 2025-04-19 | End: 2025-04-25 | Stop reason: HOSPADM

## 2025-04-18 RX ORDER — FOLIC ACID 1 MG/1
1 TABLET ORAL DAILY
Status: DISCONTINUED | OUTPATIENT
Start: 2025-04-19 | End: 2025-04-25 | Stop reason: HOSPADM

## 2025-04-18 RX ORDER — METOPROLOL TARTRATE 100 MG/1
100 TABLET ORAL 2 TIMES DAILY
Status: DISCONTINUED | OUTPATIENT
Start: 2025-04-19 | End: 2025-04-25 | Stop reason: HOSPADM

## 2025-04-18 RX ORDER — IPRATROPIUM BROMIDE AND ALBUTEROL SULFATE 2.5; .5 MG/3ML; MG/3ML
3 SOLUTION RESPIRATORY (INHALATION) ONCE
Status: COMPLETED | OUTPATIENT
Start: 2025-04-18 | End: 2025-04-18

## 2025-04-18 RX ORDER — FUROSEMIDE 10 MG/ML
40 INJECTION INTRAMUSCULAR; INTRAVENOUS
Status: DISCONTINUED | OUTPATIENT
Start: 2025-04-19 | End: 2025-04-25 | Stop reason: HOSPADM

## 2025-04-18 RX ORDER — ATORVASTATIN CALCIUM 20 MG/1
40 TABLET, FILM COATED ORAL DAILY
Status: DISCONTINUED | OUTPATIENT
Start: 2025-04-19 | End: 2025-04-25 | Stop reason: HOSPADM

## 2025-04-18 RX ORDER — LEVOTHYROXINE SODIUM 50 UG/1
50 TABLET ORAL
Status: DISCONTINUED | OUTPATIENT
Start: 2025-04-19 | End: 2025-04-25 | Stop reason: HOSPADM

## 2025-04-18 RX ORDER — PREDNISONE 20 MG/1
40 TABLET ORAL ONCE
Status: COMPLETED | OUTPATIENT
Start: 2025-04-18 | End: 2025-04-18

## 2025-04-18 RX ORDER — CALCIUM CARBONATE 500 MG/1
1000 TABLET, CHEWABLE ORAL 4 TIMES DAILY PRN
Status: DISCONTINUED | OUTPATIENT
Start: 2025-04-18 | End: 2025-04-18

## 2025-04-18 RX ORDER — FLUTICASONE FUROATE AND VILANTEROL 200; 25 UG/1; UG/1
1 POWDER RESPIRATORY (INHALATION) DAILY
Status: DISCONTINUED | OUTPATIENT
Start: 2025-04-19 | End: 2025-04-25 | Stop reason: HOSPADM

## 2025-04-18 RX ORDER — GABAPENTIN 300 MG/1
300 CAPSULE ORAL DAILY
Status: DISCONTINUED | OUTPATIENT
Start: 2025-04-19 | End: 2025-04-25 | Stop reason: HOSPADM

## 2025-04-18 RX ORDER — AMLODIPINE BESYLATE 5 MG/1
5 TABLET ORAL AT BEDTIME
Status: DISCONTINUED | OUTPATIENT
Start: 2025-04-19 | End: 2025-04-25 | Stop reason: HOSPADM

## 2025-04-18 RX ORDER — SENNOSIDES 8.6 MG
325 CAPSULE ORAL DAILY
Status: DISCONTINUED | OUTPATIENT
Start: 2025-04-19 | End: 2025-04-25 | Stop reason: HOSPADM

## 2025-04-18 RX ORDER — ONDANSETRON 4 MG/1
4 TABLET, ORALLY DISINTEGRATING ORAL EVERY 6 HOURS PRN
Status: DISCONTINUED | OUTPATIENT
Start: 2025-04-18 | End: 2025-04-25 | Stop reason: HOSPADM

## 2025-04-18 RX ORDER — IPRATROPIUM BROMIDE AND ALBUTEROL SULFATE 2.5; .5 MG/3ML; MG/3ML
1 SOLUTION RESPIRATORY (INHALATION) EVERY 6 HOURS PRN
Status: DISCONTINUED | OUTPATIENT
Start: 2025-04-18 | End: 2025-04-25 | Stop reason: HOSPADM

## 2025-04-18 RX ADMIN — IPRATROPIUM BROMIDE AND ALBUTEROL SULFATE 3 ML: .5; 3 SOLUTION RESPIRATORY (INHALATION) at 17:22

## 2025-04-18 RX ADMIN — SODIUM CHLORIDE 98 ML: 9 INJECTION, SOLUTION INTRAVENOUS at 18:29

## 2025-04-18 RX ADMIN — FUROSEMIDE 20 MG: 10 INJECTION, SOLUTION INTRAVENOUS at 20:17

## 2025-04-18 RX ADMIN — PREDNISONE 40 MG: 20 TABLET ORAL at 20:19

## 2025-04-18 RX ADMIN — CEFTRIAXONE SODIUM 1 G: 1 INJECTION, POWDER, FOR SOLUTION INTRAMUSCULAR; INTRAVENOUS at 19:43

## 2025-04-18 RX ADMIN — IOPAMIDOL 71 ML: 755 INJECTION, SOLUTION INTRAVENOUS at 18:29

## 2025-04-18 RX ADMIN — AZITHROMYCIN MONOHYDRATE 500 MG: 500 INJECTION, POWDER, LYOPHILIZED, FOR SOLUTION INTRAVENOUS at 20:21

## 2025-04-18 ASSESSMENT — COLUMBIA-SUICIDE SEVERITY RATING SCALE - C-SSRS
2. HAVE YOU ACTUALLY HAD ANY THOUGHTS OF KILLING YOURSELF IN THE PAST MONTH?: NO
1. IN THE PAST MONTH, HAVE YOU WISHED YOU WERE DEAD OR WISHED YOU COULD GO TO SLEEP AND NOT WAKE UP?: NO
6. HAVE YOU EVER DONE ANYTHING, STARTED TO DO ANYTHING, OR PREPARED TO DO ANYTHING TO END YOUR LIFE?: NO

## 2025-04-18 ASSESSMENT — ACTIVITIES OF DAILY LIVING (ADL)
ADLS_ACUITY_SCORE: 59

## 2025-04-18 NOTE — ED TRIAGE NOTES
Presents with SOB that started today.   Tried neb at home without relief.   Hx: COPD  Pt states her oximeter at home was reading 58%     Triage Assessment (Adult)       Row Name 04/18/25 5296          Triage Assessment    Airway WDL WDL        Respiratory WDL    Respiratory WDL X;all     Rhythm/Pattern, Respiratory shortness of breath        Skin Circulation/Temperature WDL    Skin Circulation/Temperature WDL WDL        Cardiac WDL    Cardiac WDL WDL        Peripheral/Neurovascular WDL    Peripheral Neurovascular WDL WDL        Cognitive/Neuro/Behavioral WDL    Cognitive/Neuro/Behavioral WDL WDL

## 2025-04-19 ENCOUNTER — APPOINTMENT (OUTPATIENT)
Dept: OCCUPATIONAL THERAPY | Facility: CLINIC | Age: 75
DRG: 193 | End: 2025-04-19
Payer: COMMERCIAL

## 2025-04-19 PROBLEM — Z86.73 HISTORY OF CVA (CEREBROVASCULAR ACCIDENT): Chronic | Status: ACTIVE | Noted: 2025-04-19

## 2025-04-19 PROBLEM — E78.5 HYPERLIPIDEMIA LDL GOAL <70: Chronic | Status: ACTIVE | Noted: 2020-12-22

## 2025-04-19 PROBLEM — S12.120A: Status: RESOLVED | Noted: 2024-09-09 | Resolved: 2025-04-19

## 2025-04-19 PROBLEM — E66.811 CLASS 1 OBESITY DUE TO EXCESS CALORIES WITH SERIOUS COMORBIDITY AND BODY MASS INDEX (BMI) OF 31.0 TO 31.9 IN ADULT: Status: ACTIVE | Noted: 2025-04-19

## 2025-04-19 PROBLEM — S42.202D CLOSED FRACTURE OF PROXIMAL END OF LEFT HUMERUS WITH ROUTINE HEALING, UNSPECIFIED FRACTURE MORPHOLOGY, SUBSEQUENT ENCOUNTER: Status: RESOLVED | Noted: 2024-01-04 | Resolved: 2025-04-19

## 2025-04-19 PROBLEM — D64.9 ANEMIA: Status: ACTIVE | Noted: 2025-04-19

## 2025-04-19 PROBLEM — J43.2 CENTRILOBULAR EMPHYSEMA (H): Chronic | Status: ACTIVE | Noted: 2020-11-04

## 2025-04-19 PROBLEM — G40.909 SEIZURE DISORDER (H): Status: ACTIVE | Noted: 2024-02-20

## 2025-04-19 PROBLEM — G44.209 TENSION HEADACHE: Chronic | Status: ACTIVE | Noted: 2025-04-19

## 2025-04-19 PROBLEM — G47.33 OSA (OBSTRUCTIVE SLEEP APNEA): Chronic | Status: ACTIVE | Noted: 2021-08-05

## 2025-04-19 PROBLEM — R47.89 SPELL OF CHANGE IN SPEECH: Status: RESOLVED | Noted: 2018-04-17 | Resolved: 2025-04-19

## 2025-04-19 PROBLEM — T14.91XA SUICIDE ATTEMPT (H): Status: RESOLVED | Noted: 2024-09-30 | Resolved: 2025-04-19

## 2025-04-19 PROBLEM — G89.4 CHRONIC PAIN SYNDROME: Chronic | Status: ACTIVE | Noted: 2023-11-07

## 2025-04-19 PROBLEM — F10.129 ALCOHOL ABUSE WITH INTOXICATION: Status: RESOLVED | Noted: 2023-11-07 | Resolved: 2025-04-19

## 2025-04-19 PROBLEM — G40.909 SEIZURE DISORDER (H): Chronic | Status: ACTIVE | Noted: 2024-02-20

## 2025-04-19 PROBLEM — E66.09 CLASS 1 OBESITY DUE TO EXCESS CALORIES WITH SERIOUS COMORBIDITY AND BODY MASS INDEX (BMI) OF 31.0 TO 31.9 IN ADULT: Status: ACTIVE | Noted: 2025-04-19

## 2025-04-19 LAB
ANION GAP SERPL CALCULATED.3IONS-SCNC: 13 MMOL/L (ref 7–15)
BASE EXCESS BLDV CALC-SCNC: 3.7 MMOL/L (ref -3–3)
BUN SERPL-MCNC: 15.3 MG/DL (ref 8–23)
C PNEUM DNA SPEC QL NAA+PROBE: NOT DETECTED
CALCIUM SERPL-MCNC: 9.6 MG/DL (ref 8.8–10.4)
CHLORIDE SERPL-SCNC: 101 MMOL/L (ref 98–107)
CREAT SERPL-MCNC: 0.79 MG/DL (ref 0.51–0.95)
CRP SERPL-MCNC: 78.78 MG/L
EGFRCR SERPLBLD CKD-EPI 2021: 79 ML/MIN/1.73M2
ERYTHROCYTE [DISTWIDTH] IN BLOOD BY AUTOMATED COUNT: 15.7 % (ref 10–15)
FERRITIN SERPL-MCNC: 68 NG/ML (ref 11–328)
FLUAV H1 2009 PAND RNA SPEC QL NAA+PROBE: NOT DETECTED
FLUAV H1 RNA SPEC QL NAA+PROBE: NOT DETECTED
FLUAV H3 RNA SPEC QL NAA+PROBE: NOT DETECTED
FLUAV RNA SPEC QL NAA+PROBE: NOT DETECTED
FLUBV RNA SPEC QL NAA+PROBE: NOT DETECTED
GLUCOSE SERPL-MCNC: 152 MG/DL (ref 70–99)
HADV DNA SPEC QL NAA+PROBE: NOT DETECTED
HCO3 BLDV-SCNC: 29 MMOL/L (ref 21–28)
HCO3 SERPL-SCNC: 26 MMOL/L (ref 22–29)
HCOV PNL SPEC NAA+PROBE: NOT DETECTED
HCT VFR BLD AUTO: 33.7 % (ref 35–47)
HGB BLD-MCNC: 10.7 G/DL (ref 11.7–15.7)
HMPV RNA SPEC QL NAA+PROBE: NOT DETECTED
HPIV1 RNA SPEC QL NAA+PROBE: NOT DETECTED
HPIV2 RNA SPEC QL NAA+PROBE: NOT DETECTED
HPIV3 RNA SPEC QL NAA+PROBE: NOT DETECTED
HPIV4 RNA SPEC QL NAA+PROBE: NOT DETECTED
IRON BINDING CAPACITY (ROCHE): 319 UG/DL (ref 240–430)
IRON SATN MFR SERPL: 10 % (ref 15–46)
IRON SERPL-MCNC: 33 UG/DL (ref 37–145)
M PNEUMO DNA SPEC QL NAA+PROBE: NOT DETECTED
MAGNESIUM SERPL-MCNC: 1.8 MG/DL (ref 1.7–2.3)
MCH RBC QN AUTO: 29.2 PG (ref 26.5–33)
MCHC RBC AUTO-ENTMCNC: 31.8 G/DL (ref 31.5–36.5)
MCV RBC AUTO: 92 FL (ref 78–100)
O2/TOTAL GAS SETTING VFR VENT: 36 %
OXYHGB MFR BLDV: 78 % (ref 70–75)
PCO2 BLDV: 45 MM HG (ref 40–50)
PH BLDV: 7.42 [PH] (ref 7.32–7.43)
PLATELET # BLD AUTO: 232 10E3/UL (ref 150–450)
PO2 BLDV: 47 MM HG (ref 25–47)
POTASSIUM SERPL-SCNC: 4.3 MMOL/L (ref 3.4–5.3)
RBC # BLD AUTO: 3.67 10E6/UL (ref 3.8–5.2)
RSV RNA SPEC QL NAA+PROBE: NOT DETECTED
RSV RNA SPEC QL NAA+PROBE: NOT DETECTED
RV+EV RNA SPEC QL NAA+PROBE: NOT DETECTED
SAO2 % BLDV: 80.3 % (ref 70–75)
SODIUM SERPL-SCNC: 140 MMOL/L (ref 135–145)
VIT B12 SERPL-MCNC: 383 PG/ML (ref 232–1245)
WBC # BLD AUTO: 3 10E3/UL (ref 4–11)

## 2025-04-19 PROCEDURE — 85027 COMPLETE CBC AUTOMATED: CPT

## 2025-04-19 PROCEDURE — 83550 IRON BINDING TEST: CPT | Performed by: INTERNAL MEDICINE

## 2025-04-19 PROCEDURE — 80048 BASIC METABOLIC PNL TOTAL CA: CPT

## 2025-04-19 PROCEDURE — 5A09357 ASSISTANCE WITH RESPIRATORY VENTILATION, LESS THAN 24 CONSECUTIVE HOURS, CONTINUOUS POSITIVE AIRWAY PRESSURE: ICD-10-PCS | Performed by: INTERNAL MEDICINE

## 2025-04-19 PROCEDURE — 97165 OT EVAL LOW COMPLEX 30 MIN: CPT | Mod: GO

## 2025-04-19 PROCEDURE — 97530 THERAPEUTIC ACTIVITIES: CPT | Mod: GO

## 2025-04-19 PROCEDURE — 120N000001 HC R&B MED SURG/OB

## 2025-04-19 PROCEDURE — 82728 ASSAY OF FERRITIN: CPT | Performed by: INTERNAL MEDICINE

## 2025-04-19 PROCEDURE — 250N000011 HC RX IP 250 OP 636

## 2025-04-19 PROCEDURE — 87581 M.PNEUMON DNA AMP PROBE: CPT | Performed by: INTERNAL MEDICINE

## 2025-04-19 PROCEDURE — 86140 C-REACTIVE PROTEIN: CPT

## 2025-04-19 PROCEDURE — 36415 COLL VENOUS BLD VENIPUNCTURE: CPT

## 2025-04-19 PROCEDURE — 36415 COLL VENOUS BLD VENIPUNCTURE: CPT | Performed by: INTERNAL MEDICINE

## 2025-04-19 PROCEDURE — 250N000013 HC RX MED GY IP 250 OP 250 PS 637

## 2025-04-19 PROCEDURE — 82805 BLOOD GASES W/O2 SATURATION: CPT | Performed by: INTERNAL MEDICINE

## 2025-04-19 PROCEDURE — 250N000013 HC RX MED GY IP 250 OP 250 PS 637: Performed by: INTERNAL MEDICINE

## 2025-04-19 PROCEDURE — 83735 ASSAY OF MAGNESIUM: CPT

## 2025-04-19 PROCEDURE — 82607 VITAMIN B-12: CPT | Performed by: INTERNAL MEDICINE

## 2025-04-19 PROCEDURE — 99233 SBSQ HOSP IP/OBS HIGH 50: CPT | Performed by: INTERNAL MEDICINE

## 2025-04-19 RX ORDER — GABAPENTIN 300 MG/1
300 CAPSULE ORAL
Status: DISCONTINUED | OUTPATIENT
Start: 2025-04-19 | End: 2025-04-25 | Stop reason: HOSPADM

## 2025-04-19 RX ORDER — PHENOL 1.4 %
20 AEROSOL, SPRAY (ML) MUCOUS MEMBRANE AT BEDTIME
COMMUNITY

## 2025-04-19 RX ORDER — GABAPENTIN 300 MG/1
600 CAPSULE ORAL AT BEDTIME
COMMUNITY

## 2025-04-19 RX ADMIN — FUROSEMIDE 40 MG: 10 INJECTION, SOLUTION INTRAVENOUS at 15:30

## 2025-04-19 RX ADMIN — QUETIAPINE 100 MG: 100 TABLET ORAL at 00:38

## 2025-04-19 RX ADMIN — METOPROLOL TARTRATE 100 MG: 100 TABLET, FILM COATED ORAL at 09:28

## 2025-04-19 RX ADMIN — AMLODIPINE BESYLATE 5 MG: 5 TABLET ORAL at 22:40

## 2025-04-19 RX ADMIN — UMECLIDINIUM 1 PUFF: 62.5 AEROSOL, POWDER ORAL at 12:38

## 2025-04-19 RX ADMIN — FUROSEMIDE 40 MG: 10 INJECTION, SOLUTION INTRAVENOUS at 09:28

## 2025-04-19 RX ADMIN — AMLODIPINE BESYLATE 5 MG: 5 TABLET ORAL at 00:39

## 2025-04-19 RX ADMIN — CEFTRIAXONE SODIUM 1 G: 1 INJECTION, POWDER, FOR SOLUTION INTRAMUSCULAR; INTRAVENOUS at 20:57

## 2025-04-19 RX ADMIN — GABAPENTIN 600 MG: 300 CAPSULE ORAL at 22:40

## 2025-04-19 RX ADMIN — ATORVASTATIN CALCIUM 40 MG: 20 TABLET, FILM COATED ORAL at 09:29

## 2025-04-19 RX ADMIN — GABAPENTIN 300 MG: 300 CAPSULE ORAL at 15:31

## 2025-04-19 RX ADMIN — QUETIAPINE 100 MG: 100 TABLET ORAL at 22:40

## 2025-04-19 RX ADMIN — LEVETIRACETAM 500 MG: 500 TABLET, FILM COATED ORAL at 00:39

## 2025-04-19 RX ADMIN — GABAPENTIN 600 MG: 300 CAPSULE ORAL at 00:38

## 2025-04-19 RX ADMIN — DULOXETINE HYDROCHLORIDE 30 MG: 30 CAPSULE, DELAYED RELEASE ORAL at 09:28

## 2025-04-19 RX ADMIN — Medication 20 MG: at 22:39

## 2025-04-19 RX ADMIN — LEVETIRACETAM 500 MG: 500 TABLET, FILM COATED ORAL at 09:28

## 2025-04-19 RX ADMIN — ASPIRIN 325 MG: 325 TABLET ORAL at 09:29

## 2025-04-19 RX ADMIN — GABAPENTIN 300 MG: 300 CAPSULE ORAL at 09:28

## 2025-04-19 RX ADMIN — METOPROLOL TARTRATE 100 MG: 100 TABLET, FILM COATED ORAL at 20:57

## 2025-04-19 RX ADMIN — OXYCODONE HYDROCHLORIDE 5 MG: 5 TABLET ORAL at 12:43

## 2025-04-19 RX ADMIN — FLUTICASONE FUROATE AND VILANTEROL TRIFENATATE 1 PUFF: 200; 25 POWDER RESPIRATORY (INHALATION) at 12:38

## 2025-04-19 RX ADMIN — LEVETIRACETAM 500 MG: 500 TABLET, FILM COATED ORAL at 20:57

## 2025-04-19 RX ADMIN — OXYCODONE HYDROCHLORIDE 5 MG: 5 TABLET ORAL at 22:40

## 2025-04-19 RX ADMIN — Medication 20 MG: at 00:54

## 2025-04-19 RX ADMIN — THIAMINE HCL TAB 100 MG 100 MG: 100 TAB at 09:28

## 2025-04-19 RX ADMIN — FOLIC ACID 1 MG: 1 TABLET ORAL at 09:29

## 2025-04-19 RX ADMIN — METOPROLOL TARTRATE 100 MG: 100 TABLET, FILM COATED ORAL at 00:39

## 2025-04-19 RX ADMIN — AZITHROMYCIN DIHYDRATE 250 MG: 250 TABLET ORAL at 09:28

## 2025-04-19 RX ADMIN — ENOXAPARIN SODIUM 40 MG: 40 INJECTION SUBCUTANEOUS at 00:39

## 2025-04-19 ASSESSMENT — ACTIVITIES OF DAILY LIVING (ADL)
ADLS_ACUITY_SCORE: 34
ADLS_ACUITY_SCORE: 33
ADLS_ACUITY_SCORE: 33
ADLS_ACUITY_SCORE: 34
IADL_COMMENTS: SHARES IADLS WITH SPOUSE
ADLS_ACUITY_SCORE: 33
ADLS_ACUITY_SCORE: 34
ADLS_ACUITY_SCORE: 33
ADLS_ACUITY_SCORE: 34
ADLS_ACUITY_SCORE: 33
ADLS_ACUITY_SCORE: 34
ADLS_ACUITY_SCORE: 33
ADLS_ACUITY_SCORE: 34
PREVIOUS_RESPONSIBILITIES: MEAL PREP;HOUSEKEEPING;LAUNDRY;SHOPPING;YARDWORK;MEDICATION MANAGEMENT;FINANCES
ADLS_ACUITY_SCORE: 33
ADLS_ACUITY_SCORE: 34
ADLS_ACUITY_SCORE: 34
ADLS_ACUITY_SCORE: 33
ADLS_ACUITY_SCORE: 34
ADLS_ACUITY_SCORE: 33
ADLS_ACUITY_SCORE: 33

## 2025-04-19 NOTE — PLAN OF CARE
Problem: Adult Inpatient Plan of Care  Goal: Plan of Care Review  Description: The Plan of Care Review/Shift note should be completed every shift.  The Outcome Evaluation is a brief statement about your assessment that the patient is improving, declining, or no change.  This information will be displayed automatically on your shiftnote.  Flowsheets (Taken 4/19/2025 6954)  Outcome Evaluation: Patient a/o x4, Pt  remains on 5L O2 via NC, SOB noted with exertion. dieuretics given, commode at bedside. Pt  complains of chronic back/neck of pain.  Lungs have some slight crackles noted, medications given per order.  Continue with plan of care, and will continue to monitor pt throughoout shift.  Plan of Care Reviewed With: patient  Overall Patient Progress: improving  Goal: Absence of Hospital-Acquired Illness or Injury  Intervention: Identify and Manage Fall Risk  Recent Flowsheet Documentation  Taken 4/19/2025 1530 by Siobhan Kirk RN  Safety Promotion/Fall Prevention:   activity supervised   assistive device/personal items within reach   clutter free environment maintained   mobility aid in reach   nonskid shoes/slippers when out of bed   patient and family education   room near nurse's station   room organization consistent   safety round/check completed  Taken 4/19/2025 0928 by Siobhan Kirk RN  Safety Promotion/Fall Prevention:   activity supervised   assistive device/personal items within reach   clutter free environment maintained   mobility aid in reach   nonskid shoes/slippers when out of bed   patient and family education   room near nurse's station   room organization consistent   safety round/check completed  Intervention: Prevent and Manage VTE (Venous Thromboembolism) Risk  Recent Flowsheet Documentation  Taken 4/19/2025 1530 by Siobhan Kirk RN  VTE Prevention/Management: patient refused intervention  Taken 4/19/2025 0928 by Siobhan Kirk RN  VTE Prevention/Management: patient refused  intervention  Intervention: Prevent Infection  Recent Flowsheet Documentation  Taken 4/19/2025 1530 by Siobhan Kirk RN  Infection Prevention:   hand hygiene promoted   rest/sleep promoted   single patient room provided  Taken 4/19/2025 0928 by Siobhan Kirk RN  Infection Prevention:   hand hygiene promoted   rest/sleep promoted   single patient room provided  Goal: Optimal Comfort and Wellbeing  Intervention: Monitor Pain and Promote Comfort  Recent Flowsheet Documentation  Taken 4/19/2025 1241 by Siobhan Kirk RN  Pain Management Interventions:   medication (see MAR)   care clustered   emotional support  Taken 4/19/2025 0928 by Siobhan Kirk RN  Pain Management Interventions:   care clustered   emotional support  Intervention: Provide Person-Centered Care  Recent Flowsheet Documentation  Taken 4/19/2025 1530 by Siobhan Kirk RN  Trust Relationship/Rapport:   care explained   choices provided   empathic listening provided  Taken 4/19/2025 0928 by Siobhan Kirk RN  Trust Relationship/Rapport:   care explained   choices provided   empathic listening provided     Problem: Delirium  Goal: Optimal Coping  Intervention: Optimize Psychosocial Adjustment to Delirium  Recent Flowsheet Documentation  Taken 4/19/2025 1530 by Siobhan Kirk RN  Family/Support System Care: self-care encouraged  Taken 4/19/2025 0928 by Siobhan Kirk RN  Family/Support System Care: self-care encouraged  Goal: Improved Behavioral Control  Intervention: Minimize Safety Risk  Recent Flowsheet Documentation  Taken 4/19/2025 1530 by Siobhan Kirk, RN  Enhanced Safety Measures:   pain management   review medications for side effects with activity  Trust Relationship/Rapport:   care explained   choices provided   empathic listening provided  Taken 4/19/2025 0928 by Siobhan Kirk RN  Enhanced Safety Measures:   pain management   review medications for side effects with activity  Trust Relationship/Rapport:   care explained    "choices provided   empathic listening provided  Goal: Improved Attention and Thought Clarity  Intervention: Maximize Cognitive Function  Recent Flowsheet Documentation  Taken 4/19/2025 1530 by Siobhan Kirk RN  Sensory Stimulation Regulation:   care clustered   quiet environment promoted  Reorientation Measures: clock in view  Taken 4/19/2025 0928 by Siobhan Kirk RN  Sensory Stimulation Regulation:   care clustered   quiet environment promoted  Reorientation Measures: clock in view     Problem: Skin Injury Risk Increased  Goal: Skin Health and Integrity  Intervention: Optimize Skin Protection  Recent Flowsheet Documentation  Taken 4/19/2025 1530 by Siobhan Kirk, RN  Activity Management: activity adjusted per tolerance  Taken 4/19/2025 0928 by Siobhan Kirk, RN  Activity Management: activity adjusted per tolerance   Goal Outcome Evaluation:      Plan of Care Reviewed With: patient    Overall Patient Progress: improvingOverall Patient Progress: improving    Outcome Evaluation: Patient a/o x4, Pt  remains on 5L O2 via NC, SOB noted with exertion. dieuretics given, commode at bedside. Pt  complains of chronic back/neck of pain.  Lungs have some slight crackles noted, medications given per order.  Continue with plan of care, and will continue to monitor pt throughoout shift.  /75 (BP Location: Right arm)   Pulse 88   Temp 97.3  F (36.3  C) (Oral)   Resp 16   Ht 1.575 m (5' 2\")   Wt 76.9 kg (169 lb 8.5 oz)   SpO2 (!) 88%   BMI 31.01 kg/m        "

## 2025-04-19 NOTE — H&P
[facility]    History and Physical - Hospitalist Service       Date of Admission:  4/18/2025    Assessment & Plan    Jessica Ellison is a 74 year old female with past medical hx of CKD2, JOY, COPD, hypothyroidism, HTN, seizures, chronic pain admitted on 4/18/2025. She presented for 2 days of dyspnea.    Acute respiratory failure with hypoxia without hypercapnia  Possible CHF and/or pulmonary artery hypertension    Presented for 1 week of dry cough and progressive dyspnea. CT with mildly enlarged pulmonary artery suggesting pulmonary artery hypertension, and possible pneumonia as below. BNP 1600. Admitted on 3L.    Suspect primary  of hypoxia is fluid overload. Patient has diffuse crackles. Last echo 10/2023 without CHF or PAH. Less likely pneumonia or COPD.     - Lasix 40mg IV BID  - Order echo if still admitted on Monday  - Tele, strict I/O, 2g Na diet, daily weights    Possible pneumonia, bilateral  CT with bilateral groundglass opacities concerning for pneumonia. Afebrile, no leukocytosis, procal negative. CRP 79. Suspect this less likely, but will treat out of abundance of caution.  - Ceftriaxone 1g IV daily  - Z pack PO    Centrilobular emphysema   Followed with pulmonology 1/22. 30 pack year hx, quit in 2004. No wheezing on admission.  - Continued PTA Symbicort, Tiotropium, Duonebs PRN    Hx of CVA  Post-stroke epilepsy  Hyperlipidemia LDL goal <70  Followed with neuro 12/9.  - Continued PTA Aspirin 325mg, Atorvastatin 40mg, Keppra 500mg BID    Essential hypertension with goal blood pressure less than 140/90  - Continued PTA Amlodipine 5mg, Metoprolol 100mg BID    Hypothyroidism  - Continued PTA Levothyroxine 50mcg    JOY (obstructive sleep apnea)  - Continued PTA CPAP if available    CKD (chronic kidney disease) stage 2, GFR 60-89 ml/min  Creatinine 0.82 on admission, baseline 0.8-0.9.    Chronic pain syndrome  F11.2 - Continuous opioid dependence  - Continued PTA Celecoxib  50mg BID, Gabapentin 300mg Qam and 600mg Qpm    Anemia, chronic  Hemoglobin 10.3 on admission, baseline 9-10.    Anxiety  Major depressive disorder, recurrent episode, moderate (H)  - Continued PTA Duloxetine 30mg, Seroquel 100mg at bedtime     Hx of alcohol use disorder  Reports no recent alcohol.  - Continued PTA Folate and thiamine          Diet: Combination Diet 2 gm NA Diet; No Caffeine Diet  DVT Prophylaxis: Enoxaparin (Lovenox) SQ  Bruce Catheter: Not present  Lines: None     Cardiac Monitoring: ACTIVE order. Indication: Acute decompensated heart failure (48 hours)  Code Status: Full Code    Clinically Significant Risk Factors Present on Admission                 # Drug Induced Platelet Defect: home medication list includes an antiplatelet medication   # Hypertension: Noted on problem list      # Anemia: based on hgb <11           # Financial/Environmental Concerns:    # COPD: noted on problem list        Disposition Plan     Medically Ready for Discharge: Anticipated in 2-4 Days         The patient's care was discussed with the Attending Physician, Dr. Lou, Patient, and Patient's Family.    Omer Gutierrez PA-C  Hospitalist Service  [facility]  Securely message with [employer] (more info)  Text page via [facility] Paging/Directory     ______________________________________________________________________    Chief Complaint   Dyspnea    History is obtained from the patient, her     History of Present Illness   Jessica Ellison is a 74 year old female who presented for dyspnea.    Patient states that she developed shortness of breath yesterday, however on further questioning she states that it has been improving over the last week or so with a cough that is nonproductive.  She has also been more fatigued and sleeping more than normal.  She denies orthopnea but uses 2 pillows at baseline.  Denies PND.  Denies fevers or chills, lower extremity swelling.  Her shortness of breath has  progressed to at rest.      Past Medical History    Past Medical History:   Diagnosis Date    Anemia     Aneurysm     Antiplatelet or antithrombotic long-term use     Arthritis     Chemical dependency (H)     Chem Dep RX    Depression     History of blood transfusion     Hypertension     Menopausal symptoms     Other chronic pain     Lower back and hips    Seizure (H) 02/20/2024    Sleep apnea     Sleep disorder     Thyroid disease     Tobacco abuse     Uncomplicated asthma        Past Surgical History   Past Surgical History:   Procedure Laterality Date    ABDOMEN SURGERY      APPENDECTOMY  1960    APPENDECTOMY      ARTHROPLASTY KNEE Left 08/16/2023    Procedure: LEFT TOTAL KNEE ARTHROPLASTY;  Surgeon: Bryan Roque MD;  Location: [facility] Main OR    BACK SURGERY      BIOPSY BREAST      cerebral aneurysm  2014    coiled    CEREBRAL ANEURYSM REPAIR      cholecystectomy      COLON SURGERY      COLONOSCOPY  04/19/2005    COLONOSCOPY N/A 5/13/2024    Procedure: COLONOSCOPY WITH BIOPSY;  Surgeon: Radha Glez MD;  Location: [facility] Main OR    FRACTURE SURGERY      HC EXCISION BREAST LESION, OPEN >=1      core biopsy 2006, lumpectomy 2006    HERNIA REPAIR      LAP VENTRAL HERNIA REPAIR  12/2017    [place]    LAPAROSCOPIC ASSISTED HYSTERECTOMY VAGINAL  12/2017    LUMPECTOMY BREAST      orif left femoral neck Left 06/03/2016    stress fracture.  done at [facility]    SURGICAL HISTORY OF -   2004    Skin Graft    ZZC PART REMOVAL COLON W ANASTOMOSIS  05/2005    diverticulitis    ZZC SPINE FUSION,ANTER,8+ SGMTS  2007    Anterior posterior fusion 10 levels, hardware removal and re-fusion 2009       Prior to Admission Medications   Prior to Admission Medications   Prescriptions Last Dose Informant Patient Reported? Taking?   DULoxetine (CYMBALTA) 30 MG capsule   No No   Sig: Take 1 capsule (30 mg) by mouth daily.   QUEtiapine (SEROQUEL) 50 MG tablet   No No   Sig: Take 2 tablets (100 mg) by mouth at bedtime.    albuterol (VENTOLIN HFA) 108 (90 Base) MCG/ACT inhaler   No No   Sig: Inhale 2 puffs into the lungs every 6 hours as needed for shortness of breath or wheezing.   amLODIPine (NORVASC) 5 MG tablet   No No   Sig: Take 1 tablet (5 mg) by mouth at bedtime.   aspirin (SM ASPIRIN EC) 325 MG EC tablet  Other No No   Sig: TAKE ONE TABLET BY MOUTH ONCE DAILY WITH DINNER FOR 30 DAYS   atorvastatin (LIPITOR) 40 MG tablet  Other No No   Sig: Take 1 tablet (40 mg) by mouth daily   budesonide-formoterol (SYMBICORT) 160-4.5 MCG/ACT Inhaler   No No   Sig: Inhale 2 puffs into the lungs 2 times daily.   calcium carbonate 600 mg-vitamin D 400 units (CALTRATE) 600-400 MG-UNIT per tablet  Other Yes No   Sig: Take 1 tablet by mouth every evening   celecoxib (CELEBREX) 200 MG capsule   No No   Sig: Take 1 capsule (200 mg) by mouth daily.   celecoxib (CELEBREX) 50 MG capsule   Yes No   Sig: Take by mouth 2 times daily.   folic acid (FOLVITE) 1 MG tablet  Other No No   Sig: Take 1 tablet (1 mg) by mouth daily   gabapentin (NEURONTIN) 300 MG capsule   No No   Si mg in AM/300 in afternoon/600 mg at night   ipratropium - albuterol 0.5 mg/2.5 mg/3 mL (DUONEB) 0.5-2.5 (3) MG/3ML neb solution   No No   Sig: Take 1 vial (3 mLs) by nebulization every 6 hours as needed for shortness of breath, wheezing or cough.   levETIRAcetam (KEPPRA) 500 MG tablet  Other No No   Sig: Take 1 tablet (500 mg) by mouth 2 times daily   levothyroxine (SYNTHROID/LEVOTHROID) 50 MCG tablet  Other No No   Sig: Take 1 tablet (50 mcg) by mouth daily   melatonin 5 MG tablet   No No   Sig: Take 1 tablet (5 mg) by mouth every evening.   Patient taking differently: Take 15 mg by mouth every evening.   metoprolol tartrate (LOPRESSOR) 100 MG tablet   No No   Sig: Take 1 tablet (100 mg) by mouth 2 times daily. Hold if SBP<100 or HR<55   multivitamin, therapeutic (THERA-VIT) TABS tablet  Other Yes No   Sig: Take 1 tablet by mouth daily   naloxone (NARCAN) 4 MG/0.1ML nasal  spray  Other No No   Sig: Spray 1 spray (4 mg) into one nostril alternating nostrils once as needed for opioid reversal every 2-3 minutes until assistance arrives   Patient not taking: Reported on 1/22/2025   oxyCODONE-acetaminophen (PERCOCET)  MG per tablet   No No   Sig: Take 1 tablet by mouth every 6 hours as needed for severe pain.   Patient not taking: Reported on 1/22/2025   thiamine (B-1) 100 MG tablet   No No   Sig: Take 1 tablet (100 mg) by mouth daily.   Patient not taking: Reported on 1/22/2025   tiotropium (SPIRIVA HANDIHALER) 18 MCG inhaled capsule   No No   Sig: USING THE HANDIHALER, INHALE THE CONTENTS OF ONE CAPSULE BY MOUTH ONCE DAILY   topiramate (TOPAMAX) 25 MG tablet   No No   Sig: Take 1 tablet (25 mg) by mouth 2 times daily.   Patient not taking: Reported on 1/22/2025      Facility-Administered Medications: None      2023 UPDATE: these sections are not required for  billing, but can be added when medically relevant     Physical Exam   Vital Signs: Temp: 98  F (36.7  C) Temp src: Oral BP: (!) 149/90 Pulse: 94   Resp: 17 SpO2: 93 % O2 Device: Oxymask Oxygen Delivery: 5 LPM  Weight: 160 lbs 0 oz    Constitutional: Alert, oriented, cooperative, in no acute distress, appears nontoxic.  HENT: Oropharynx is clear and moist. No evidence of cranial trauma. Normocephalic. Eyes anicteric.   Cardiovascular: Regular rate and rhythm, normal S1 and S2, and no murmur noted. No lower extremity edema. JVP elevated to lower mandible  Respiratory: Diffuse biphasic crackles  GI: Soft, non-tender, normal bowel sounds, no hepatosplenomegaly, no masses appreciated.  Musculoskeletal: Normal muscle bulk and tone. FROM in all extremities   Skin: Warm and dry, no rashes on exposed skin.   Psych: Normal affect and speech.  Neurologic: Cranial nerves 2-12 are grossly intact.       Medical Decision Making       80 MINUTES SPENT BY ME on the date of service doing chart review, history, exam, documentation & further  activities per the note.      Data     I have personally reviewed the following data over the past 24 hrs:    4.0  \   10.3 (L)   / 215     138 101 16.1 /  115 (H)   4.4 22 0.82 \     Trop: 13 BNP: 1,616 (H)     TSH: N/A T4: N/A A1C: 5.2     Procal: 0.10 CRP: 79.14 (H) Lactic Acid: 1.2         Imaging results reviewed over the past 24 hrs:   Recent Results (from the past 24 hours)   CT Chest Pulmonary Embolism w Contrast    Narrative    EXAM: CT CHEST PULMONARY EMBOLISM W CONTRAST  LOCATION: [facility]  DATE: 4/18/2025    INDICATION: hypoxia x 12 hours, no wheezing, not on blood thinners  COMPARISON: CTA chest 08/18/2023  TECHNIQUE: CT chest pulmonary angiogram during arterial phase injection of IV contrast. Multiplanar reformats and MIP reconstructions were performed. Dose reduction techniques were used.   CONTRAST: 71mL isovue 370    FINDINGS:  ANGIOGRAM CHEST: Pulmonary arteries are negative for pulmonary emboli. Mildly dilated pulmonary arteries suggesting pulmonary arterial hypertension, unchanged. Thoracic aorta is negative for dissection. No CT evidence of right heart strain.    LUNGS AND PLEURA: Advanced emphysema. Scattered hazy groundglass opacities both lungs predominantly within the left upper lobe. Subsegmental atelectasis in the bases. Calcified granuloma medial aspect of the right lower lobe. No pleural effusions. No   pneumothorax.    MEDIASTINUM/AXILLAE: No enlarged mediastinal or hilar lymph nodes.    CORONARY ARTERY CALCIFICATION: Mild.    UPPER ABDOMEN: Cholecystectomy, with intrahepatic bile dilatation and dilatation of the common bile duct, unchanged. Bilateral renal cysts. Small hypodense liver lesion is unchanged compatible with a cyst. Scattered diverticula visualized colon.    MUSCULOSKELETAL: Fat-containing midline ventral wall hernias, incompletely imaged. Osteopenia. Scoliosis. Advanced degenerative disc disease thoracic spine. Lower thoracic spinal fusion  hardware. Degenerative changes both shoulders.      Impression    IMPRESSION:  1.  No evidence for pulmonary emboli.  2.  Advanced emphysema. Mildly enlarged pulmonary arteries suggesting pulmonary arterial hypertension.  3.  Hazy groundglass opacities both lungs concerning for pneumonia.  4.  Cholecystectomy, likely accounting for dilatation of the biliary tree, unchanged.

## 2025-04-19 NOTE — PROGRESS NOTES
[facility]    Medicine Progress Note - Hospitalist Service    Date of Admission:  4/18/2025    Assessment & Plan    Jessica Ellison is a 74 year old female with past medical hx of CKD2, JOY, COPD, hypothyroidism, HTN, seizures, chronic pain admitted on 4/18/2025. She presented for 2 days of dyspnea with hypoxemia    Acute respiratory failure with hypoxia without hypercapnia  Possible CHF and/or pulmonary artery hypertension    Presented for 1 week of dry cough and progressive dyspnea. CT with mildly enlarged pulmonary artery suggesting pulmonary artery hypertension, and possible pneumonia as below. BNP 1600.  Admitted on 3L.    Suspect primary  of hypoxia is fluid overload. Patient has diffuse crackles. Last echo 10/2023 without CHF or PAH. . The  right ventricular systolic pressure is approximated at 25.7 mmHg plus the right atrial pressure.    O2 5 LPM, desats on RA  I/0 not being recorded, weight up 3#  Baseline weight unclear   Reports good diuresis with IV Lasix  - Continue Lasix 40mg IV BID  - Order echo if still admitted on Monday  - Tele, strict I/O, 2g Na diet, daily weights    Possible pneumonia, bilateral  CT with bilateral groundglass opacities concerning for pneumonia. Afebrile, no cough, no leukocytosis, procal negative. CRP 79. Less likely pneumonia or COPD, but will treat out of abundance of caution. Influenza, RSV, covid PCR negative.    Afebrile   - Ceftriaxone 1g IV daily  - Z pack PO  - Check viral respiratory PCR panel  - Trend CRP    Centrilobular emphysema   ?Pulmonary hypertension    30 pack year history, quit in 2004.  PFTs 2019 showed normal spirometry, air-trapping, and normal diffusion capacity.  Moderate radiographic emphysema noted per chest CT in 3/2022.  Repeat pulmonary function testing 8/2023 shows normal spirometry, lung volumes, and diffusion capacity. Followed with pulmonology last visit 1/22/25.     CT on admission shows advanced emphysema.  Mildly enlarged pulmonary arteries suggesting pulmonary arterial hypertension.   No wheezing on admission.  - Continued PTA Symbicort, Tiotropium, Duonebs PRN  - Echocardiogram (could be done as an outpatient if discharged this weekend)    Hx of CVA  Post-stroke epilepsy  Hyperlipidemia LDL goal <70  CT 10/2023 done for loss of consciousness, possible seizure showed - chronic appearing right occipital infarct, new from 2019. Area of infarct does contain a few areas of scattered calcification. Treated as possible seizure.   Followed with neuro, last visit 12/9/24.  - Continued PTA Aspirin 325mg, Atorvastatin 40mg, Keppra 500mg BID    Normocytic anemia, chronic  Hemoglobin 10.3 on admission, baseline 9-10.  No evidence acute blood loss     HGB stable  - Check nutritional studies     Essential hypertension with goal blood pressure less than 140/90  PTA Amlodipine 5mg, Metoprolol 100mg BID    BPs are mildly elevated   - Continue PTA Amlodipine 5mg, Metoprolol 100mg BID    Chronic pain syndrome  Continuous opioid dependence  - Continued PTA Celecoxib 50mg BID, Gabapentin 300mg Qam and 600mg Qpm  - Caution with narcotics given history of substance abuse disorder     Anxiety   Major depressive disorder, recurrent episode, moderate (H)  - Continued PTA Duloxetine 30mg, Seroquel 100mg at bedtime     History of alcohol use disorder  Reports no recent alcohol.  - Continued PTA Folate and thiamine    Hypothyroidism  TSH 0.45 in 9/2024  - Continued PTA Levothyroxine 50mcg    JOY (obstructive sleep apnea)   PTA admission on CPAP 13-15 cmH20 (changed recently from 7-15 cmH20  Reports using PAp most nights  - A follow-up overnight oximetry was ordered bt Sleep medicine, but unclear if completed   - Recommend obtaining home CPAP device for use in hospital     Obesity  Complicates cares and contributes to morbidity           Diet: Combination Diet 2 gm NA Diet; No Caffeine Diet    DVT Prophylaxis: Enoxaparin (Lovenox) SQ  Bruce  "Catheter: Not present  Lines: None     Cardiac Monitoring: ACTIVE order. Indication: Acute decompensated heart failure (48 hours)  Code Status: Full Code      Clinically Significant Risk Factors Present on Admission                 # Drug Induced Platelet Defect: home medication list includes an antiplatelet medication   # Hypertension: Noted on problem list      # Anemia: based on hgb <11       # Obesity: Estimated body mass index is 31.01 kg/m  as calculated from the following:    Height as of this encounter: 1.575 m (5' 2\").    Weight as of this encounter: 76.9 kg (169 lb 8.5 oz).       # Financial/Environmental Concerns:    # COPD: noted on problem list        Social Drivers of Health    Tobacco Use: Medium Risk (4/18/2025)    Patient History     Smoking Tobacco Use: Former     Smokeless Tobacco Use: Never   Physical Activity: Inactive (2/20/2024)    Exercise Vital Sign     Days of Exercise per Week: 0 days     Minutes of Exercise per Session: 0 min   Social Connections: Unknown (2/20/2024)    Social Connection and Isolation Panel [NHANES]     Frequency of Social Gatherings with Friends and Family: More than three times a week          Disposition Plan     Medically Ready for Discharge: Anticipated in 2-4 Days             Gregg Lou MD  Hospitalist Service  [facility]  Securely message with [employer] (more info)  Text page via [employer] Paging/Directory   ______________________________________________________________________    Interval History   No events    SpO2 Readings from Last 6 Encounters:   04/19/25 93%   01/22/25 (!) 90%   12/09/24 94%   11/11/24 92%   10/18/24 93%   10/16/24 92%     No intake or output data in the 24 hours ending 04/19/25 0950     Vitals:    04/18/25 1659 04/18/25 2348 04/19/25 0651   Weight: 72.6 kg (160 lb) 75.6 kg (166 lb 10.7 oz) 76.9 kg (169 lb 8.5 oz)      Wt Readings from Last 6 Encounters:   04/19/25 76.9 kg (169 lb 8.5 oz)   01/22/25 75.1 kg (165 lb 9.6 " oz)   11/11/24 75.8 kg (167 lb)   10/18/24 70.3 kg (155 lb)   10/16/24 70.3 kg (155 lb)   10/15/24 69.9 kg (154 lb)         Physical Exam   Vital Signs: Temp: (!) 96.4  F (35.8  C) Temp src: Axillary BP: (!) 143/90 Pulse: 68   Resp: 20 SpO2: 93 % O2 Device: Oxymask Oxygen Delivery: 5 LPM  Weight: 169 lbs 8.54 oz    Constitutional: awake, alert, cooperative, no apparent distress, and appears stated age    Medical Decision Making       60 MINUTES SPENT BY ME on the date of service doing chart review, history, exam, documentation & further activities per the note.      Data     CMP  Recent Labs   Lab 04/19/25  0544 04/18/25  1746    138   POTASSIUM 4.3 4.4   CHLORIDE 101 101   CO2 26 22   ANIONGAP 13 15   * 115*   BUN 15.3 16.1   CR 0.79 0.82   GFRESTIMATED 79 76   DELILAH 9.6 9.2   MAG 1.8 1.9     CBC  Recent Labs   Lab 04/19/25  0544 04/18/25  1746   WBC 3.0* 4.0   RBC 3.67* 3.49*   HGB 10.7* 10.3*   HCT 33.7* 32.6*   MCV 92 93   MCH 29.2 29.5   MCHC 31.8 31.6   RDW 15.7* 15.7*    215       Venous Blood Gas  Recent Labs   Lab 04/19/25  0804 04/18/25  1746   PHV 7.42 7.39   PCO2V 45 41   PO2V 47 41   HCO3V 29* 25   SHO 3.7* 0.2   O2PER 36 5

## 2025-04-19 NOTE — PROGRESS NOTES
04/19/25 1300   Appointment Info   Signing Clinician's Name / Credentials (OT) Alee Etienne, OTR/L   Living Environment   People in Home spouse   Current Living Arrangements house   Home Accessibility stairs within home   Transportation Anticipated family or friend will provide   Living Environment Comments Lives with spouse in 2 level house, all needs met on main level. Has walk in shower in BR   Self-Care   Usual Activity Tolerance good   Current Activity Tolerance poor   Equipment Currently Used at Home cane, straight;walker, rolling;grab bar, toilet;grab bar, tub/shower;shower chair   Fall history within last six months yes   Number of times patient has fallen within last six months 1   Activity/Exercise/Self-Care Comment At baseline Pt is ind with ADLs, spouse assists with LE dressing prn. Ambulates with 4ww, does not drive.   Instrumental Activities of Daily Living (IADL)   Previous Responsibilities meal prep;housekeeping;laundry;shopping;yardwork;medication management;finances   IADL Comments Shares IADLs with spouse   General Information   Onset of Illness/Injury or Date of Surgery 04/18/25   Referring Physician Omer Gutierrez PA-C   Additional Occupational Profile Info/Pertinent History of Current Problem Jessica Ellison is a 74 year old female with past medical hx of CKD2, JOY, COPD, hypothyroidism, HTN, seizures, chronic pain admitted on 4/18/2025. She presented for 2 days of dyspnea with hypoxemia   Existing Precautions/Restrictions fall;oxygen therapy device and L/min   General Observations and Info On 5L o2 via nasal cannula, O2 93% at rest.   Cognitive Status Examination   Orientation Status orientation to person, place and time   Cognitive Status Comments Alert and oriented x4, appears at baseline. Will cont to monitor   Visual Perception   Visual Impairment/Limitations corrective lenses full-time   Pain Assessment   Patient Currently in Pain Yes, see Vital Sign flowsheet   Posture    Posture forward head position;protracted shoulders   Range of Motion Comprehensive   Comment, General Range of Motion RUE WFL, L shoulder FF limited dt hx of rotator cuff injury   Strength Comprehensive (MMT)   General Manual Muscle Testing (MMT) Assessment no strength deficits identified   Comment, General Manual Muscle Testing (MMT) Assessment BUE grossly 4-/5   Coordination   Upper Extremity Coordination No deficits were identified   Bed Mobility   Bed Mobility scooting/bridging   Scooting/Bridging [place] (Bed Mobility) supervision   Assistive Device (Bed Mobility) bed rails   Transfers   Transfer Comments declined OOB activity   Clinical Impression   Criteria for Skilled Therapeutic Interventions Met (OT) Yes, treatment indicated   OT Diagnosis Decreased functional ind.   Influenced by the following impairments decreased functional ind   OT Problem List-Impairments impacting ADL problems related to;activity tolerance impaired  (SOA)   Assessment of Occupational Performance 1-3 Performance Deficits   Identified Performance Deficits endurance for ADLs/mobility   Planned Therapy Interventions (OT) ADL retraining;progressive activity/exercise  (CHF education)   Clinical Decision Making Complexity (OT) problem focused assessment/low complexity   Risk & Benefits of therapy have been explained evaluation/treatment results reviewed;care plan/treatment goals reviewed;risks/benefits reviewed;current/potential barriers reviewed;participants voiced agreement with care plan;participants included;patient   Clinical Impression Comments Pt will benefit from ongoing OT to maximize safety and ind with ADLs/transfers.   OT Total Evaluation Time   OT Eval, Low Complexity Minutes (39215) 10   OT Goals   Therapy Frequency (OT) 5 times/week   OT Predicted Duration/Target Date for Goal Attainment 04/26/25   OT Goals Cardiac Phase 1;Hygiene/Grooming;Toilet Transfer/Toileting   OT: Hygiene/Grooming supervision/stand-by  assist;while standing   OT: Toilet Transfer/Toileting Supervision/stand-by assist;toilet transfer;cleaning and garment management;using adaptive equipment   OT: Understanding of cardiac education to maximize quality of life, condition management, and health outcomes Patient;Verbalize   Interventions   Interventions Quick Adds Cardiac Rehab;Therapeutic Activity   Therapeutic Activities   Therapeutic Activity Minutes (35792) 10   Symptoms noted during/after treatment fatigue   Treatment Detail/Skilled Intervention Initiated phase I cardiac rehab. Provided Pt with education materials including stoplight tool, OMNI scale, EC/TS handout , and UE HEP. Pt  verbalized understanding of education, will benefit from ongoing training to ensure carryover. Pt refused OOB activity today dt SOA and fatigue. Will assess ADL performance tomorrow.   Cardiac Education   Education Provided Daily weights;Diagnosis;Diet;Energy conservation;Home exercise program;OMNI Scale;Outpatient Cardiac Rehab;Precautions;Resuming home activities;Stop light tool;Signs and symptoms   Education Packet Given to Patient Yes   All Patient Education Handouts Reviewed with Patient and/or Family Yes   OT Discharge Planning   OT Plan Sat 1/5: assess ADLs/transfers, review CHF education   OT Discharge Recommendation (DC Rec) home with assist   OT Rationale for DC Rec Has strong support from spouse. Anticipate she will be safe to dc home when medically ready.   OT Brief overview of current status SBA bed mobility. Did not assess mobility /ADLs dt Pt declined   OT Total Distance Amb During Session (feet) 0   Total Session Time   Timed Code Treatment Minutes 10   Total Session Time (sum of timed and untimed services) 20

## 2025-04-19 NOTE — PROGRESS NOTES
"[facility] ADMISSION NOTE    Patient admitted to room 2215 at approximately 2350 via cart from emergency room. Patient was accompanied by transport tech.     Verbal SBAR report received from TWILA Hawkins prior to patient arrival.     Patient ambulated to bed with one assist. Patient alert and oriented X 4. Pain is controlled without any medications.  . Admission vital signs: Blood pressure (!) 170/95, pulse 89, temperature 97.4  F (36.3  C), temperature source Oral, resp. rate 18, height 1.575 m (5' 2\"), weight 75.6 kg (166 lb 10.7 oz), SpO2 93%, not currently breastfeeding. Patient was oriented to plan of care, call light, bed controls, tv, telephone, bathroom, and visiting hours.     Risk Assessment    The following safety risks were identified during admission: FALLS. Yellow risk band applied: YES.     Skin Initial Assessment    This writer admitted this patient and completed a full skin assessment and Bennett score in the Adult PCS flowsheet.   Photo documentation of skin problem and/or wound competed via Zelgor application (located under Media):  No    Appropriate interventions initiated as needed.              Education    Patient has a [place] to Observation order: No  Observation education completed and documented: N/A      Taylor Antoine RN      "

## 2025-04-19 NOTE — MEDICATION SCRIBE - ADMISSION MEDICATION HISTORY
Medication Scribe Admission Medication History    Admission medication history is complete. The information provided in this note is only as accurate as the sources available at the time of the update.    Information Source(s): Patient via in-person    Pertinent Information: Med rec originally done by nurse and reverified by myself with patient at bedside.     Changes made to PTA medication list:  Added:  Gabapentin doses (300 mg BID and 600 mg at bedtime), Melatonin 10 mg 20 mg qhs  Deleted: Celebrex 50 mg, Melatonin 5 mg, Percocet  mg, Spiriva, Topiramate 25 mg  Changed: None    Allergies reviewed with patient and updates made in EHR: yes    Medication History Completed By: Sonja Clay 4/19/2025 12:23 PM    PTA Med List   Medication Sig Note Last Dose/Taking    albuterol (VENTOLIN HFA) 108 (90 Base) MCG/ACT inhaler Inhale 2 puffs into the lungs every 6 hours as needed for shortness of breath or wheezing.  4/18/2025    amLODIPine (NORVASC) 5 MG tablet Take 1 tablet (5 mg) by mouth at bedtime.  4/17/2025    aspirin ( ASPIRIN EC) 325 MG EC tablet TAKE ONE TABLET BY MOUTH ONCE DAILY WITH DINNER FOR 30 DAYS (Patient taking differently: Take 325 mg by mouth daily.)  4/18/2025    atorvastatin (LIPITOR) 40 MG tablet Take 1 tablet (40 mg) by mouth daily  4/18/2025 Morning    budesonide-formoterol (SYMBICORT) 160-4.5 MCG/ACT Inhaler Inhale 2 puffs into the lungs 2 times daily.  4/18/2025    calcium carbonate 600 mg-vitamin D 400 units (CALTRATE) 600-400 MG-UNIT per tablet Take 1 tablet by mouth every evening  4/17/2025 Noon    celecoxib (CELEBREX) 200 MG capsule Take 1 capsule (200 mg) by mouth daily.  4/18/2025 Morning    DULoxetine (CYMBALTA) 30 MG capsule Take 1 capsule (30 mg) by mouth daily.  4/18/2025 Morning    folic acid (FOLVITE) 1 MG tablet Take 1 tablet (1 mg) by mouth daily  4/18/2025 Morning    gabapentin (NEURONTIN) 300 MG capsule Take 600 mg by mouth at bedtime. Also takes 300 mg in the  AM and Noon  4/17/2025 Bedtime    gabapentin (NEURONTIN) 300 MG capsule 300 mg in AM/300 in afternoon/600 mg at night (Patient taking differently: Take 300 mg by mouth 2 times daily. Also takes 600 mg at bedtime)  4/18/2025 Morning    ipratropium - albuterol 0.5 mg/2.5 mg/3 mL (DUONEB) 0.5-2.5 (3) MG/3ML neb solution Take 1 vial (3 mLs) by nebulization every 6 hours as needed for shortness of breath, wheezing or cough.  4/18/2025    levETIRAcetam (KEPPRA) 500 MG tablet Take 1 tablet (500 mg) by mouth 2 times daily  4/18/2025 Morning    levothyroxine (SYNTHROID/LEVOTHROID) 50 MCG tablet Take 1 tablet (50 mcg) by mouth daily  4/18/2025 Morning    Melatonin 10 MG TABS tablet Take 20 mg by mouth at bedtime.  4/17/2025 Bedtime    metoprolol tartrate (LOPRESSOR) 100 MG tablet Take 1 tablet (100 mg) by mouth 2 times daily. Hold if SBP<100 or HR<55  4/18/2025 Morning    multivitamin, therapeutic (THERA-VIT) TABS tablet Take 1 tablet by mouth daily  4/17/2025 Noon    naloxone (NARCAN) 4 MG/0.1ML nasal spray Spray 1 spray (4 mg) into one nostril alternating nostrils once as needed for opioid reversal every 2-3 minutes until assistance arrives 4/19/2025: On hand Unknown    QUEtiapine (SEROQUEL) 50 MG tablet Take 2 tablets (100 mg) by mouth at bedtime.  4/17/2025 Bedtime    thiamine (B-1) 100 MG tablet Take 1 tablet (100 mg) by mouth daily.  Past Month

## 2025-04-19 NOTE — PLAN OF CARE
Problem: Adult Inpatient Plan of Care  Goal: Plan of Care Review  Description: The Plan of Care Review/Shift note should be completed every shift.  The Outcome Evaluation is a brief statement about your assessment that the patient is improving, declining, or no change.  This information will be displayed automatically on your shiftnote.  Flowsheets (Taken 4/19/2025 0646)  Outcome Evaluation: Patient alert and oriented, able to make needs known.  Remains on 5L O2 via oxymask, SOB noted with exertion and conversation.  Complaints of pain to back r/t fusion.  Lungs have some slight crackles noted, medications given per order.  Continue with plan of care, patient requested to continue to sleep this am and take her levothroxine with her morning medications.  Plan of Care Reviewed With: patient  Overall Patient Progress: no change  Goal: Absence of Hospital-Acquired Illness or Injury  Intervention: Identify and Manage Fall Risk  Recent Flowsheet Documentation  Taken 4/19/2025 0300 by Taylor Antoine RN  Safety Promotion/Fall Prevention:   activity supervised   clutter free environment maintained   lighting adjusted   mobility aid in reach   nonskid shoes/slippers when out of bed   room near nurse's station   safety round/check completed  Intervention: Prevent Infection  Recent Flowsheet Documentation  Taken 4/19/2025 0300 by Taylor Antoine RN  Infection Prevention:   hand hygiene promoted   rest/sleep promoted   single patient room provided  Goal: Readiness for Transition of Care  Intervention: Mutually Develop Transition Plan  Recent Flowsheet Documentation  Taken 4/19/2025 0000 by Taylor Antoine RN  Equipment Currently Used at Home:   cane, straight   walker, standard   shower chair     Problem: Delirium  Goal: Improved Behavioral Control  Intervention: Minimize Safety Risk  Recent Flowsheet Documentation  Taken 4/19/2025 0300 by Taylor Antoine RN  Enhanced Safety Measures: pain management   Goal  Outcome Evaluation:      Plan of Care Reviewed With: patient    Overall Patient Progress: no changeOverall Patient Progress: no change    Outcome Evaluation: Patient alert and oriented, able to make needs known.  Remains on 5L O2 via oxymask, SOB noted with exertion and conversation.  Complaints of pain to back r/t fusion.  Lungs have some slight crackles noted, medications given per order.  Continue with plan of care, patient requested to continue to sleep this am and take her levothroxine with her morning medications.

## 2025-04-19 NOTE — ED PROVIDER NOTES
[facility]  Emergency Department Visit Note    PATIENT:  Jessica Ellison     74 year old     female      [id number]    Chief complaint:  Chief Complaint   Patient presents with    Shortness of Breath        History of present illness:  Patient is a 74 year old female with a medical history significant for COPD, emphysema, JOY on CPAP, hypertension, alcohol use disorder, chronic pain with opioid use disorder presenting for evaluation of shortness of breath over the past 24 hours.  She is a former smoker.  Quit in 2004.  She was recently seen by pulmonology on 1-.  At that time she was recommended to continue Symbicort and then do DuoNebs twice daily and as needed.  She was given a portable nebulizer.  They found most of her symptoms to be related to COPD.  She had pulmonary function testing in August 2023 which showed normal spirometry lung volumes and diffusion capacity.  She has been admitted multiple times and requires oxygen intermittently.  Has been diagnosed with hypoxia in the past and discharged with supplemental oxygen.  Is not currently on oxygen.  At that time they did discuss borderline low oxygen levels and that she was high risk for needing oxygen.  She opted for monitoring and close monitoring.    Review she has been admitted in November 2023, January 2024, September 2020 for and again in September 2024 with hypoxia.    She reports that this episode seems to have started about 24 hours ago.  She tried her nebulizer with no change in her symptoms.  She denies any fevers.  Is coughing a little bit but not much more than normal.  Eating and drinking normally.  Taking all of her medications as prescribed.  At home she tried her oximeter when she was feeling short of breath today and she found her oxygen to be 58%.  Again she does not have oxygen at home.    Review of Systems:  As in HPI above    BP (!) 157/93   Pulse 92   Temp 98  F (36.7  C) (Oral)   Resp 12   Wt 72.6 kg  (160 lb)   SpO2 93%   BMI 27.46 kg/m      Physical Exam  Constitutional: laying in hospital bed, alert, oriented, appears uncomfortable, conversant, and answering questions appropriately  HEENT: normocephalic, atraumatic, pupils 3mm, equal, round, and reactive to light, sclerae anicteric, extraocular motions intact, and moist mucous membranes  Neck: able to fully range, no midline tenderness, and no stridor  Cardiovascular: regular rate and rhythm  Pulmonary: increased work of breathing  Abdominal: soft, non-tender, non-distended  Genitourinary: deferred  Extremities/MSK: no peripheral edema and no cyanosis   Skin: warm, dry and non-diaphoretic  Neurologic: moves all four extremities spontaneously and GCS 15  Psychiatric: calm, appropriate      MDM:  Patient is a 74 year old female with above history presenting for evaluation of increasing shortness of breath over the past 24 hours and hypoxia measured at 58% while at home.    Vitals reassuring and within normal limits aside from hypoxia.  Normal respiratory rate despite oxygen saturations in the mid 80s.  Patient does not seem to be incredibly uncomfortable with oxygen in the mid 80s. Exam notable for no wheezing bilaterally, fine crackles at bilateral bases, patient does appear to be moving air and it sounds like she is moving air with deep breaths.    She is in respiratory distress but this rapidly improves when she is placed on supplemental oxygen.    Differential diagnosis for hypoxia includes was not limited to COPD exacerbation, pulmonary edema, fluid overload, heart failure, pneumonia, viral process, anemia, metabolic acidosis, inaccurate reading, toxic ingestion, ACS.    Plan at this time for VBG, trial of a DuoNeb, basic labs, EKG.     ECG:  - Ventricular rate 85 bpm, regular  - AL, QRS, QT intervals normal, mildly prolonged QTc at 480  - Axis normal  - Comparison to prior ECGs: New artifact today, the EKG read shows a new T wave abnormality in the  anterior leads however this is really difficult to tell.  Does appear to have biphasic waves in the anterior leads, uncertain etiology.  - My independent interpretation: Overall no evidence for STEMI.  Is really hard to read through all the artifact however the beginning of the ST segments do seem to be in line with the [place] segment.    Overall reassuring.  Obtaining laboratory workup in addition.  Troponin values are stable without incline, viral swab is negative, Pro-Carlos is 0.1 however patient does have evidence for bilateral infiltrates on her CT scan.  Elevated BNP at 1600, this is new and acute.     CBC are reassuring.  Hemoglobin of 10.3 which is close to her baseline, no arturo abnormalities.  Lactic acid is reassuring and VBG G is reassuring.  Patient does not have any improvement in her breathing with a DuoNeb so I have a very low concern that this is related to her COPD however this is what pulmonology discussed with her at her visit so planning to treat for this anyways.  PE study is negative for a pulmonary embolism however does show bilateral infiltrates.  I think this may be pulmonary edema.  At this time pulmonary edema versus pneumonia.  Planning to treat for pneumonia anyways.  Will get ceftriaxone and azithromycin.  Plan to give Lasix 20 mg IV.  On my bedside ultrasound patient does appear to have B-lines.     Remainder of ED course below.    ED COURSE:  ED Course as of 04/18/25 2250   Fri Apr 18, 2025 2249 CT Chest Pulmonary Embolism w Contrast  IMPRESSION:  1.  No evidence for pulmonary emboli.  2.  Advanced emphysema. Mildly enlarged pulmonary arteries suggesting pulmonary arterial hypertension.  3.  Hazy groundglass opacities both lungs concerning for pneumonia.  4.  Cholecystectomy, likely accounting for dilatation of the biliary tree, unchanged.     2249 POC US ECHO LIMITED  Very difficult to obtain, no evidence for pericardial effusion    2249 Discussed admission to the hospital with the  hospital medicine team.  They agree.  Patient received IV antibiotics, Lasix, steroids and DuoNeb.  She is still requiring supplemental oxygen and is intermittently hypoxic when she comes off of this.  Patient was accepted to the inpatient team       Encounter Diagnoses:  Final diagnoses:   Hypoxia   Elevated brain natriuretic peptide (BNP) level       Final disposition: [facility] admission    DO ANDREEA Mccray Physician  [facility] ED       Lucrecia Manuel DO  04/18/25 [id number]

## 2025-04-19 NOTE — ED NOTES
Patient reporting significant back pain while laying on the cart.  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  Patient wanting to get upstairs to a different bed for comfort.  Patient sitting on edge of bed and cannot get comfortable.  This RN spoke again with charge RN and house supervisor to try to expedite patient getting upstairs due to significant pain and discomfort.

## 2025-04-19 NOTE — ED NOTES
[facility]   Admission Handoff    The patient is Jessica Ellison, 74 year old who arrived in the ED by CAR from home with a complaint of Shortness of Breath  . The patient's current symptoms are new and during this time the symptoms have remained the same. In the ED, patient was diagnosed with   Final diagnoses:   Hypoxia   Elevated brain natriuretic peptide (BNP) level         Needed?: No    Allergies:    Allergies   Allergen Reactions    Bee Venom     Lisinopril Swelling     Oral swelling       Past Medical Hx:   Past Medical History:   Diagnosis Date    Anemia     Aneurysm     Antiplatelet or antithrombotic long-term use     Arthritis     Chemical dependency (H)     Chem Dep RX    Depression     History of blood transfusion     Hypertension     Menopausal symptoms     Other chronic pain     Lower back and hips    Seizure (H) 02/20/2024    Sleep apnea     Sleep disorder     Thyroid disease     Tobacco abuse     Uncomplicated asthma        Initial vitals were: BP: (!) 146/86  Pulse: 85  Temp: 98  F (36.7  C)  Resp: 22  Weight: 72.6 kg (160 lb)  SpO2: (!) 89 %   Recent vital Signs: BP (!) 157/93   Pulse 90   Temp 98  F (36.7  C) (Oral)   Resp 18   Wt 72.6 kg (160 lb)   SpO2 (!) 91%   BMI 27.46 kg/m    Patient is on 4L oxymask  Elimination Status: Continent: Yes     Activity Level: bedside commode due to desaturation with any amount of activity    Fall Status: Reason for falls risk: Other- resp status  nonskid shoes/slippers when out of bed and patient and family education    Baseline Mental status: WDL  Current Mental Status changes: at basesline    Infection present or suspected this encounter: pneumonia  Sepsis suspected: No    Isolation type: n/a    Bariatric equipment needed?: No    In the ED these meds were given:   Medications   azithromycin (ZITHROMAX) 500 mg vial to attach to  mL bag (500 mg Intravenous $New Bag 4/18/25 2021)   ipratropium - albuterol 0.5  mg/2.5 mg/3 mL (DUONEB) neb solution 3 mL (3 mLs Nebulization $Given 4/18/25 1722)   iopamidol (ISOVUE-370) solution 71 mL (71 mLs Intravenous $Given 4/18/25 1829)   sodium chloride 0.9 % bag for CT scan flush (98 mLs Intravenous $Given 4/18/25 1829)   cefTRIAXone (ROCEPHIN) 1 g vial to attach to  mL bag for ADULTS or NS 50 mL bag for PEDS (0 g Intravenous Stopped 4/18/25 2014)   predniSONE (DELTASONE) tablet 40 mg (40 mg Oral $Given 4/18/25 2019)   furosemide (LASIX) injection 20 mg (20 mg Intravenous $Given 4/18/25 2017)       Drips running?  No    Home pump  No    Current LDAs: Peripheral IV: Site right wrist; Gauge 18g  none     Results:   Labs/Imaging  Ordered and Resulted from Time of ED Arrival Up to the Time of Departure from the ED  Results for orders placed or performed during the hospital encounter of 04/18/25 (from the past 24 hours)   EKG 12-lead, tracing only   Result Value Ref Range    Systolic Blood Pressure  mmHg    Diastolic Blood Pressure  mmHg    Ventricular Rate 85 BPM    Atrial Rate 85 BPM    DC Interval 182 ms    QRS Duration 78 ms     ms    QTc 480 ms    P Axis 56 degrees    R AXIS 0 degrees    T Axis 8 degrees    Interpretation ECG       Sinus rhythm  T wave abnormality, consider anterior ischemia  QTcB >= 480 msec  Abnormal ECG  When compared with ECG of 05-Aug-2015 09:49,  ST now depressed in Anterior leads     [facility]    Narrative    The following orders were created for panel order [facility].  Procedure                               Abnormality         Status                     ---------                               -----------         ------                     Extra Blue Top Tube[[id number]]                             Final result               Extra Green Top (Lithiu...[[id number]]                                                 Extra Purple Top Tube[[id number]]                                                      Extra Heparinized Syringe[[id number]]                                                     Please view results for these tests on the individual orders.   Nt probnp inpatient (BNP)   Result Value Ref Range    N terminal Pro BNP Inpatient 1,616 (H) 0 - 900 pg/mL   Blood gas venous   Result Value Ref Range    pH Venous 7.39 7.32 - 7.43    pCO2 Venous 41 40 - 50 mm Hg    pO2 Venous 41 25 - 47 mm Hg    Bicarbonate Venous 25 21 - 28 mmol/L    Base Excess/Deficit Venous 0.2 -3.0 - 3.0 mmol/L    FIO2 5     Oxyhemoglobin Venous 71 70 - 75 %    O2 Sat, Venous 72.6 70.0 - 75.0 %    Narrative    In healthy individuals, oxyhemoglobin (O2Hb) and oxygen saturation (SO2) are approximately equal. In the presence of dyshemoglobins, oxyhemoglobin can be considerably lower than oxygen saturation.   Magnesium   Result Value Ref Range    Magnesium 1.9 1.7 - 2.3 mg/dL   Troponin T, High Sensitivity   Result Value Ref Range    Troponin T, High Sensitivity 13 <=14 ng/L   Procalcitonin   Result Value Ref Range    Procalcitonin 0.10 <0.50 ng/mL   Lactic acid whole blood with 1x repeat in 2 hr when >2   Result Value Ref Range    Lactic Acid, Initial 1.2 0.7 - 2.0 mmol/L   Basic metabolic panel   Result Value Ref Range    Sodium 138 135 - 145 mmol/L    Potassium 4.4 3.4 - 5.3 mmol/L    Chloride 101 98 - 107 mmol/L    Carbon Dioxide (CO2) 22 22 - 29 mmol/L    Anion Gap 15 7 - 15 mmol/L    Urea Nitrogen 16.1 8.0 - 23.0 mg/dL    Creatinine 0.82 0.51 - 0.95 mg/dL    GFR Estimate 76 >60 mL/min/1.73m2    Calcium 9.2 8.8 - 10.4 mg/dL    Glucose 115 (H) 70 - 99 mg/dL   CBC with platelets differential    Narrative    The following orders were created for panel order CBC with platelets differential.  Procedure                               Abnormality         Status                     ---------                               -----------         ------                     CBC with platelets and ...[[id number]]  Abnormal            Final result                 Please view results for these tests on the  individual orders.   Extra Blue Top Tube   Result Value Ref Range    Hold Specimen JIC    CBC with platelets and differential   Result Value Ref Range    WBC Count 4.0 4.0 - 11.0 10e3/uL    RBC Count 3.49 (L) 3.80 - 5.20 10e6/uL    Hemoglobin 10.3 (L) 11.7 - 15.7 g/dL    Hematocrit 32.6 (L) 35.0 - 47.0 %    MCV 93 78 - 100 fL    MCH 29.5 26.5 - 33.0 pg    MCHC 31.6 31.5 - 36.5 g/dL    RDW 15.7 (H) 10.0 - 15.0 %    Platelet Count 215 150 - 450 10e3/uL    % Neutrophils 59 %    % Lymphocytes 16 %    % Monocytes 17 %    % Eosinophils 5 %    % Basophils 1 %    % Immature Granulocytes 2 %    NRBCs per 100 WBC 0 <1 /100    Absolute Neutrophils 2.4 1.6 - 8.3 10e3/uL    Absolute Lymphocytes 0.6 (L) 0.8 - 5.3 10e3/uL    Absolute Monocytes 0.7 0.0 - 1.3 10e3/uL    Absolute Eosinophils 0.2 0.0 - 0.7 10e3/uL    Absolute Basophils 0.0 0.0 - 0.2 10e3/uL    Absolute Immature Granulocytes 0.1 <=0.4 10e3/uL    Absolute NRBCs 0.0 10e3/uL   Influenza A/B, RSV and SARS-CoV2 PCR (COVID-19) Nasopharyngeal    Specimen: Nasopharyngeal; Swab   Result Value Ref Range    Influenza A PCR Negative Negative    Influenza B PCR Negative Negative    RSV PCR Negative Negative    SARS CoV2 PCR Negative Negative    Narrative    Testing was performed using the Xpert Xpress CoV2/Flu/RSV Assay on the [employer] GeneXpert Instrument. This test should be ordered for the detection of SARS-CoV2, influenza, and RSV viruses in individuals with signs and symptoms of respiratory tract infection. This test is for in vitro diagnostic use under the [place] FDA for laboratories certified under CLIA to perform high or moderate complexity testing. This test has been [place] FDA cleared. A negative result does not rule out the presence of PCR inhibitors in the specimen or target RNA in concentration below the limit of detection for the assay. If only one viral target is positive but coinfection with multiple targets is suspected, the sample should be re-tested with another FDA  cleared, approved, or authorized test, if coninfection would change clinical management. This test was validated by the [facility]. These laboratories are certified under the Clinical Laboratory Improvement Amendments of 1988 (CLIA-88) as qualified to perfom high complexity laboratory testing.   CT Chest Pulmonary Embolism w Contrast    Narrative    EXAM: CT CHEST PULMONARY EMBOLISM W CONTRAST  LOCATION: [facility]  DATE: 4/18/2025    INDICATION: hypoxia x 12 hours, no wheezing, not on blood thinners  COMPARISON: CTA chest 08/18/2023  TECHNIQUE: CT chest pulmonary angiogram during arterial phase injection of IV contrast. Multiplanar reformats and MIP reconstructions were performed. Dose reduction techniques were used.   CONTRAST: 71mL isovue 370    FINDINGS:  ANGIOGRAM CHEST: Pulmonary arteries are negative for pulmonary emboli. Mildly dilated pulmonary arteries suggesting pulmonary arterial hypertension, unchanged. Thoracic aorta is negative for dissection. No CT evidence of right heart strain.    LUNGS AND PLEURA: Advanced emphysema. Scattered hazy groundglass opacities both lungs predominantly within the left upper lobe. Subsegmental atelectasis in the bases. Calcified granuloma medial aspect of the right lower lobe. No pleural effusions. No   pneumothorax.    MEDIASTINUM/AXILLAE: No enlarged mediastinal or hilar lymph nodes.    CORONARY ARTERY CALCIFICATION: Mild.    UPPER ABDOMEN: Cholecystectomy, with intrahepatic bile dilatation and dilatation of the common bile duct, unchanged. Bilateral renal cysts. Small hypodense liver lesion is unchanged compatible with a cyst. Scattered diverticula visualized colon.    MUSCULOSKELETAL: Fat-containing midline ventral wall hernias, incompletely imaged. Osteopenia. Scoliosis. Advanced degenerative disc disease thoracic spine. Lower thoracic spinal fusion hardware. Degenerative changes both shoulders.      Impression     IMPRESSION:  1.  No evidence for pulmonary emboli.  2.  Advanced emphysema. Mildly enlarged pulmonary arteries suggesting pulmonary arterial hypertension.  3.  Hazy groundglass opacities both lungs concerning for pneumonia.  4.  Cholecystectomy, likely accounting for dilatation of the biliary tree, unchanged.   Troponin T, High Sensitivity   Result Value Ref Range    Troponin T, High Sensitivity 13 <=14 ng/L     *Note: Due to a large number of results and/or encounters for the requested time period, some results have not been displayed. A complete set of results can be found in Results Review.       For the majority of the shift this patient's behavior was Green     Cardiac Rhythm: Normal Sinus  Pt needs tele? Yes  Skin/wound Issues: None    Code Status: Full Code    Pain control: good    Nausea control: good    Abnormal labs/tests/findings requiring intervention: n/a    Patient tested for COVID 19 prior to admission: NO     OBS brochure/video discussed/provided to patient/family: No     Family present during ED course? Yes     Family Comments/Social Situation comments: spouse at bedside    Tasks needing completion: None    Shruthi White RN

## 2025-04-20 ENCOUNTER — APPOINTMENT (OUTPATIENT)
Dept: GENERAL RADIOLOGY | Facility: CLINIC | Age: 75
DRG: 193 | End: 2025-04-20
Attending: INTERNAL MEDICINE
Payer: COMMERCIAL

## 2025-04-20 LAB
ALBUMIN SERPL BCG-MCNC: 3.6 G/DL (ref 3.5–5.2)
ALP SERPL-CCNC: 275 U/L (ref 40–150)
ALT SERPL W P-5'-P-CCNC: 31 U/L (ref 0–50)
ANION GAP SERPL CALCULATED.3IONS-SCNC: 12 MMOL/L (ref 7–15)
AST SERPL W P-5'-P-CCNC: 21 U/L (ref 0–45)
BASE EXCESS BLDV CALC-SCNC: 4 MMOL/L (ref -3–3)
BILIRUB SERPL-MCNC: 0.2 MG/DL
BUN SERPL-MCNC: 30.3 MG/DL (ref 8–23)
CALCIUM SERPL-MCNC: 9.2 MG/DL (ref 8.8–10.4)
CHLORIDE SERPL-SCNC: 101 MMOL/L (ref 98–107)
CREAT SERPL-MCNC: 1.27 MG/DL (ref 0.51–0.95)
CRP SERPL-MCNC: 39.76 MG/L
EGFRCR SERPLBLD CKD-EPI 2021: 45 ML/MIN/1.73M2
ERYTHROCYTE [DISTWIDTH] IN BLOOD BY AUTOMATED COUNT: 16.1 % (ref 10–15)
GLUCOSE SERPL-MCNC: 100 MG/DL (ref 70–99)
HCO3 BLDV-SCNC: 30 MMOL/L (ref 21–28)
HCO3 SERPL-SCNC: 28 MMOL/L (ref 22–29)
HCT VFR BLD AUTO: 32.7 % (ref 35–47)
HGB BLD-MCNC: 9.9 G/DL (ref 11.7–15.7)
MCH RBC QN AUTO: 28.7 PG (ref 26.5–33)
MCHC RBC AUTO-ENTMCNC: 30.3 G/DL (ref 31.5–36.5)
MCV RBC AUTO: 95 FL (ref 78–100)
O2/TOTAL GAS SETTING VFR VENT: 40 %
OXYHGB MFR BLDV: 81 % (ref 70–75)
PCO2 BLDV: 54 MM HG (ref 40–50)
PH BLDV: 7.36 [PH] (ref 7.32–7.43)
PLATELET # BLD AUTO: 256 10E3/UL (ref 150–450)
PO2 BLDV: 52 MM HG (ref 25–47)
POTASSIUM SERPL-SCNC: 3.8 MMOL/L (ref 3.4–5.3)
PROT SERPL-MCNC: 6 G/DL (ref 6.4–8.3)
RBC # BLD AUTO: 3.45 10E6/UL (ref 3.8–5.2)
SAO2 % BLDV: 82.7 % (ref 70–75)
SODIUM SERPL-SCNC: 141 MMOL/L (ref 135–145)
WBC # BLD AUTO: 5.8 10E3/UL (ref 4–11)

## 2025-04-20 PROCEDURE — 250N000013 HC RX MED GY IP 250 OP 250 PS 637

## 2025-04-20 PROCEDURE — 85014 HEMATOCRIT: CPT | Performed by: INTERNAL MEDICINE

## 2025-04-20 PROCEDURE — 71046 X-RAY EXAM CHEST 2 VIEWS: CPT

## 2025-04-20 PROCEDURE — 250N000011 HC RX IP 250 OP 636

## 2025-04-20 PROCEDURE — 99233 SBSQ HOSP IP/OBS HIGH 50: CPT | Performed by: INTERNAL MEDICINE

## 2025-04-20 PROCEDURE — 36415 COLL VENOUS BLD VENIPUNCTURE: CPT | Performed by: INTERNAL MEDICINE

## 2025-04-20 PROCEDURE — 82040 ASSAY OF SERUM ALBUMIN: CPT | Performed by: INTERNAL MEDICINE

## 2025-04-20 PROCEDURE — 86140 C-REACTIVE PROTEIN: CPT | Performed by: INTERNAL MEDICINE

## 2025-04-20 PROCEDURE — 120N000001 HC R&B MED SURG/OB

## 2025-04-20 PROCEDURE — 250N000013 HC RX MED GY IP 250 OP 250 PS 637: Performed by: INTERNAL MEDICINE

## 2025-04-20 PROCEDURE — 258N000003 HC RX IP 258 OP 636: Performed by: INTERNAL MEDICINE

## 2025-04-20 PROCEDURE — 82805 BLOOD GASES W/O2 SATURATION: CPT | Performed by: INTERNAL MEDICINE

## 2025-04-20 RX ORDER — FERROUS SULFATE 325(65) MG
325 TABLET ORAL EVERY OTHER DAY
Status: DISCONTINUED | OUTPATIENT
Start: 2025-04-20 | End: 2025-04-25 | Stop reason: HOSPADM

## 2025-04-20 RX ADMIN — FLUTICASONE FUROATE AND VILANTEROL TRIFENATATE 1 PUFF: 200; 25 POWDER RESPIRATORY (INHALATION) at 07:55

## 2025-04-20 RX ADMIN — LEVETIRACETAM 500 MG: 500 TABLET, FILM COATED ORAL at 20:15

## 2025-04-20 RX ADMIN — FOLIC ACID 1 MG: 1 TABLET ORAL at 07:51

## 2025-04-20 RX ADMIN — DULOXETINE HYDROCHLORIDE 30 MG: 30 CAPSULE, DELAYED RELEASE ORAL at 07:51

## 2025-04-20 RX ADMIN — OXYCODONE HYDROCHLORIDE 5 MG: 5 TABLET ORAL at 12:06

## 2025-04-20 RX ADMIN — FERROUS SULFATE TAB 325 MG (65 MG ELEMENTAL FE) 325 MG: 325 (65 FE) TAB at 11:18

## 2025-04-20 RX ADMIN — ATORVASTATIN CALCIUM 40 MG: 20 TABLET, FILM COATED ORAL at 08:07

## 2025-04-20 RX ADMIN — GABAPENTIN 300 MG: 300 CAPSULE ORAL at 07:51

## 2025-04-20 RX ADMIN — METOPROLOL TARTRATE 100 MG: 100 TABLET, FILM COATED ORAL at 20:15

## 2025-04-20 RX ADMIN — CEFTRIAXONE SODIUM 1 G: 1 INJECTION, POWDER, FOR SOLUTION INTRAMUSCULAR; INTRAVENOUS at 20:15

## 2025-04-20 RX ADMIN — LEVOTHYROXINE SODIUM 50 MCG: 50 TABLET ORAL at 07:51

## 2025-04-20 RX ADMIN — Medication 20 MG: at 21:33

## 2025-04-20 RX ADMIN — ENOXAPARIN SODIUM 40 MG: 40 INJECTION SUBCUTANEOUS at 00:42

## 2025-04-20 RX ADMIN — ASPIRIN 325 MG: 325 TABLET ORAL at 07:51

## 2025-04-20 RX ADMIN — GABAPENTIN 600 MG: 300 CAPSULE ORAL at 21:33

## 2025-04-20 RX ADMIN — LEVETIRACETAM 500 MG: 500 TABLET, FILM COATED ORAL at 07:51

## 2025-04-20 RX ADMIN — QUETIAPINE 100 MG: 100 TABLET ORAL at 21:33

## 2025-04-20 RX ADMIN — ACETAMINOPHEN 650 MG: 325 TABLET ORAL at 14:37

## 2025-04-20 RX ADMIN — METOPROLOL TARTRATE 100 MG: 100 TABLET, FILM COATED ORAL at 07:51

## 2025-04-20 RX ADMIN — AZITHROMYCIN DIHYDRATE 250 MG: 250 TABLET ORAL at 07:51

## 2025-04-20 RX ADMIN — THIAMINE HCL TAB 100 MG 100 MG: 100 TAB at 07:50

## 2025-04-20 RX ADMIN — GABAPENTIN 300 MG: 300 CAPSULE ORAL at 15:59

## 2025-04-20 RX ADMIN — UMECLIDINIUM 1 PUFF: 62.5 AEROSOL, POWDER ORAL at 07:56

## 2025-04-20 RX ADMIN — OXYCODONE HYDROCHLORIDE 5 MG: 5 TABLET ORAL at 21:33

## 2025-04-20 RX ADMIN — SODIUM CHLORIDE, SODIUM LACTATE, POTASSIUM CHLORIDE, AND CALCIUM CHLORIDE 500 ML: .6; .31; .03; .02 INJECTION, SOLUTION INTRAVENOUS at 09:59

## 2025-04-20 ASSESSMENT — ACTIVITIES OF DAILY LIVING (ADL)
ADLS_ACUITY_SCORE: 38
ADLS_ACUITY_SCORE: 34
ADLS_ACUITY_SCORE: 36
ADLS_ACUITY_SCORE: 34
ADLS_ACUITY_SCORE: 36
ADLS_ACUITY_SCORE: 34
ADLS_ACUITY_SCORE: 36
ADLS_ACUITY_SCORE: 36
ADLS_ACUITY_SCORE: 34
ADLS_ACUITY_SCORE: 38
DEPENDENT_IADLS:: TRANSPORTATION
ADLS_ACUITY_SCORE: 36
ADLS_ACUITY_SCORE: 34
ADLS_ACUITY_SCORE: 36
ADLS_ACUITY_SCORE: 34
ADLS_ACUITY_SCORE: 34
ADLS_ACUITY_SCORE: 36

## 2025-04-20 NOTE — PLAN OF CARE
Problem: Adult Inpatient Plan of Care  Goal: Plan of Care Review  Description: The Plan of Care Review/Shift note should be completed every shift.  The Outcome Evaluation is a brief statement about your assessment that the patient is improving, declining, or no change.  This information will be displayed automatically on your shiftnote.  Flowsheets (Taken 4/20/2025 0633)  Outcome Evaluation: Patient alert and oriented, able to make needs known.  Weaned down to 4.5L O2 via NC.  Ambulated hallway with  and oxygen.  Pain expressed in back, PRN oxycodone given and was effective.  Continue with plan of care and continue to wean O2 as able  Plan of Care Reviewed With: patient  Overall Patient Progress: improving  Goal: Absence of Hospital-Acquired Illness or Injury  Intervention: Identify and Manage Fall Risk  Recent Flowsheet Documentation  Taken 4/19/2025 2355 by Taylor Antoine RN  Safety Promotion/Fall Prevention:   activity supervised   assistive device/personal items within reach   clutter free environment maintained   mobility aid in reach   nonskid shoes/slippers when out of bed   patient and family education   room near nurse's station   room organization consistent   safety round/check completed  Intervention: Prevent Infection  Recent Flowsheet Documentation  Taken 4/19/2025 2355 by Taylor Antoine, RN  Infection Prevention:   hand hygiene promoted   rest/sleep promoted   single patient room provided     Problem: Delirium  Goal: Improved Behavioral Control  Intervention: Minimize Safety Risk  Recent Flowsheet Documentation  Taken 4/19/2025 2355 by Taylor Antoine, RN  Enhanced Safety Measures:   pain management   review medications for side effects with activity   Goal Outcome Evaluation:      Plan of Care Reviewed With: patient    Overall Patient Progress: improvingOverall Patient Progress: improving    Outcome Evaluation: Patient alert and oriented, able to make needs known.  Weaned down to  4.5L O2 via NC.  Ambulated hallway with  and oxygen.  Pain expressed in back, PRN oxycodone given and was effective.  Continue with plan of care and continue to wean O2 as able

## 2025-04-20 NOTE — PLAN OF CARE
"  Problem: Adult Inpatient Plan of Care  Goal: Plan of Care Review  Description: The Plan of Care Review/Shift note should be completed every shift.  The Outcome Evaluation is a brief statement about your assessment that the patient is improving, declining, or no change.  This information will be displayed automatically on your shiftnote.  Outcome: Not Progressing  Goal: Patient-Specific Goal (Individualized)  Description: You can add care plan individualizations to a care plan. Examples of Individualization might be:  \"Parent requests to be called daily at 9am for status\", \"I have a hard time hearing out of my right ear\", or \"Do not touch me to wake me up as it startlesme\".  Outcome: Not Progressing  Goal: Absence of Hospital-Acquired Illness or Injury  Outcome: Not Progressing  Intervention: Identify and Manage Fall Risk  Recent Flowsheet Documentation  Taken 4/20/2025 0900 by Steven Harp, RN  Safety Promotion/Fall Prevention: activity supervised  Intervention: Prevent Skin Injury  Recent Flowsheet Documentation  Taken 4/20/2025 0900 by Steven Harp, RN  Body Position: position changed independently  Goal: Optimal Comfort and Wellbeing  Outcome: Not Progressing  Goal: Readiness for Transition of Care  Outcome: Not Progressing     Problem: Delirium  Goal: Optimal Coping  Outcome: Not Progressing  Goal: Improved Behavioral Control  Outcome: Not Progressing  Goal: Improved Attention and Thought Clarity  Outcome: Not Progressing  Goal: Improved Sleep  Outcome: Not Progressing     Problem: Skin Injury Risk Increased  Goal: Skin Health and Integrity  Outcome: Not Progressing  Intervention: Optimize Skin Protection  Recent Flowsheet Documentation  Taken 4/20/2025 0900 by Steven Harp, RN  Activity Management: activity adjusted per tolerance   Goal Outcome Evaluation:         A&Ox4  Oxygen needs seem to have increased requiring 6 LPM NC to stay 90-93%  At 5 LPM patient is at 87-90%  SBA for mobility  Patient becomes weak " and dyspneic with exertion, such as transferring to commode or wheel  chair or bed, or even just repositioning herself.  Patient is able to move and position herself in bed independently  Patient calls appropriately  Patient is oriented to own ability  No alarms in use  Pleasant calm raeeashan Harris RN [facility]

## 2025-04-20 NOTE — PLAN OF CARE
Goal Outcome Evaluation:      Plan of Care Reviewed With: patient          Outcome Evaluation: Pt to discharge to home when medically stable.  Declined any needs.

## 2025-04-20 NOTE — CONSULTS
Care Management Initial Consult    General Information  Assessment completed with: Jessica Ramirez  Type of CM/SW Visit: Initial Assessment    Primary Care Provider verified and updated as needed: Yes   Readmission within the last 30 days: no previous admission in last 30 days      Reason for Consult: discharge planning  Advance Care Planning: Advance Care Planning Reviewed: no concerns identified          Communication Assessment  Patient's communication style: spoken language (English or Bilingual)    Hearing Difficulty or Deaf: no   Wear Glasses or Blind: no    Cognitive  Cognitive/Neuro/Behavioral: WDL                      Living Environment:   People in home: spouse  Vj  Current living Arrangements: house      Able to return to prior arrangements: yes       Family/Social Support:  Care provided by: self, spouse/significant other  Provides care for: no one  Marital Status:   Support system: , Children          Description of Support System: Supportive, Involved    Support Assessment: Adequate family and caregiver support, Adequate social supports    Current Resources:   Patient receiving home care services: No        Community Resources: Chemical Dependency Services (Per EMR notes, pt attending AA meetings)  Equipment currently used at home: cane, straight, walker, rolling, grab bar, toilet, grab bar, tub/shower, shower chair  Supplies currently used at home: None    Employment/Financial:  Employment Status: retired     Employment/ Comments: retired RN  Financial Concerns: none   Referral to Financial Worker: No       Does the patient's insurance plan have a 3 day qualifying hospital stay waiver?  Yes     Which insurance plan 3 day waiver is available? Alternative insurance waiver    Will the waiver be used for post-acute placement? No    Lifestyle & Psychosocial Needs:  Social Drivers of Health     Food Insecurity: Low Risk  (4/19/2025)    Food Insecurity     Within the past 12 months,  did you worry that your food would run out before you got money to buy more?: No     Within the past 12 months, did the food you bought just not last and you didn t have money to get more?: No   Depression: Not at risk (11/11/2024)    PHQ-2     PHQ-2 Score: 1   Housing Stability: Low Risk  (4/19/2025)    Housing Stability     Do you have housing? : Yes     Are you worried about losing your housing?: No   Tobacco Use: Medium Risk (4/18/2025)    Patient History     Smoking Tobacco Use: Former     Smokeless Tobacco Use: Never     Passive Exposure: Not on file   Financial Resource Strain: Low Risk  (4/19/2025)    Financial Resource Strain     Within the past 12 months, have you or your family members you live with been unable to get utilities (heat, electricity) when it was really needed?: No   Alcohol Use: Not on file   Transportation Needs: Low Risk  (4/19/2025)    Transportation Needs     Within the past 12 months, has lack of transportation kept you from medical appointments, getting your medicines, non-medical meetings or appointments, work, or from getting things that you need?: No   Physical Activity: Inactive (2/20/2024)    Exercise Vital Sign     Days of Exercise per Week: 0 days     Minutes of Exercise per Session: 0 min   Interpersonal Safety: Low Risk  (4/19/2025)    Interpersonal Safety     Do you feel physically and emotionally safe where you currently live?: Yes     Within the past 12 months, have you been hit, slapped, kicked or otherwise physically hurt by someone?: No     Within the past 12 months, have you been humiliated or emotionally abused in other ways by your partner or ex-partner?: No   Stress: No Stress Concern Present (2/20/2024)    [place] [employer] - Occupational Stress Questionnaire     Feeling of Stress : Only a little   Social Connections: Unknown (2/20/2024)    Social Connection and Isolation Panel [NHANES]     Frequency of Communication with Friends and Family:  Not on file     Frequency of Social Gatherings with Friends and Family: More than three times a week     Attends Caodaism Services: Not on file     Active Member of Clubs or Organizations: Not on file     Attends Club or Organization Meetings: Not on file     Marital Status: Not on file   Health Literacy: Not on file       Functional Status:  Prior to admission patient needed assistance:   Dependent ADLs:: Ambulation-cane, Ambulation-walker  Dependent IADLs:: Transportation       Mental Health Status:  Mental Health Status: Current Concern  Mental Health Management: Medication    Chemical Dependency Status:  Chemical Dependency Status: Current Concern  Chemical Dependency Management: Previous treatment, AA        Values/Beliefs:  Spiritual, Cultural Beliefs, Caodaism Practices, Values that affect care:     No            Discussed  Partnership in Safe Discharge Planning  document with patient/family: No    Additional Information:  Care Management team received referral due to elevated unplanned re-admission risk score.       Per IDT rounds, EMR review, discussion with pt's hospital medical provider, and after a brief discussion with pt at bedside, it has been determined that pt will discharge to home with no identifiable discharge needs.       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     No further care management intervention anticipated at this time.  Please re-consult if further needs arise.  Care management signing off.         GALLITO William

## 2025-04-20 NOTE — PROGRESS NOTES
[facility]    Medicine Progress Note - Hospitalist Service    Date of Admission:  4/18/2025    Assessment & Plan   Jessica Ellison is a 74 year old female with past medical hx of CKD2, JOY, COPD, hypothyroidism, HTN, seizures, chronic pain admitted on 4/18/2025. She presented for 2 days of dyspnea with hypoxemia    Acute respiratory failure with hypoxia without hypercapnia, etiology unclear  Sao2 92-94% in last 4 outpatient visits. Presented for 1 week of dry cough and progressive dyspnea. CT with mildly enlarged pulmonary artery suggesting pulmonary artery hypertension, and possible pneumonia as below. BNP 1600.  Admitted on 3L.    Initially managing as possible pneumonia and/or possible CHF   Repeat CXR 4/20/2025- no definite infiltrates, congestive heart failure. Radiology read pending.   O2 now 5-6 LPM  - See below     Possible CHF and/or pulmonary artery hypertension    Initially suspect primary  of hypoxia is fluid overload. Patient has diffuse crackles. Last echo 10/2023 without CHF or PAH. The  right ventricular systolic pressure is approximated at 25.7 mmHg plus the right atrial pressure.    Started Lasix 40mg IV BID. Reported good diuresis with IV Lasix    O2 5-6 LPM, unimproved   BPs are low this morning, creatinine is up   I/0 not being recorded, weight trending up  Baseline weight unclear   - Hold lasix  - LR bolus x 1  - Echo if still inpatient on Monday  - Tele, strict I/O, 2g Na diet, daily weights    Possible pneumonia, bilateral  CT  4/18- No evidence for pulmonary emboli. Advanced emphysema. Mildly enlarged pulmonary arteries suggesting pulmonary arterial hypertension.  Hazy groundglass opacities both lungs concerning for pneumonia. Afebrile, no cough, no leukocytosis, procal negative. CRP 79. Less likely pneumonia or COPD, but will treat out of abundance of caution. Influenza, RSV, covid PCR negative. Viral respiratory PCR panel negative.     Started on  Ceftriaxone 1g IV daily, Z pack PO    Recheck CRP 39    Afebrile, WBC 5  - Continue ceftriaxone 1g IV daily, Z pack PO  - Trend CRP    Centrilobular emphysema   ?Pulmonary hypertension    30 pack year history, quit in 2004.  PFTs 2019 showed normal spirometry, air-trapping, and normal diffusion capacity.  Moderate radiographic emphysema noted per chest CT in 3/2022.  Repeat pulmonary function testing 8/2023 shows normal spirometry, lung volumes, and diffusion capacity. Followed with pulmonology last visit 1/22/25.     CT on admission shows advanced emphysema. Mildly enlarged pulmonary arteries suggesting pulmonary arterial hypertension.   No wheezing on admission.  - Continued PTA Symbicort, Tiotropium, Duonebs PRN  - Echo if still inpatient on Monday(could be done as an outpatient if discharged this weekend)    Hx of CVA  Post-stroke epilepsy  Hyperlipidemia LDL goal <70  CT 10/2023 done for loss of consciousness, possible seizure showed - chronic appearing right occipital infarct, new from 2019. Area of infarct does contain a few areas of scattered calcification. Treated as possible seizure.   Followed with neuro, last visit 12/9/24.  - Continued PTA Aspirin 325mg, Atorvastatin 40mg, Keppra 500mg BID    Normocytic anemia, chronic  Mild iron deficiency  Hemoglobin 10.3 on admission, baseline 9-10.  No evidence acute blood loss   Ferritin 68. %Fe sat 10, b12 383    HGB stable 9.9  - Start iron  - Recheck labs in 4 months     Essential hypertension with goal blood pressure less than 140/90  PTA Amlodipine 5mg, Metoprolol 100mg BID    BPs are soft  - Continue PTA Metoprolol 100mg BID  - Hold PTA Amlodipine 5mg,     Chronic pain syndrome  Continuous opioid dependence  - Continued PTA Gabapentin 300mg BID and 600mg Qpm  - Caution with narcotics given history of substance abuse disorder     Anxiety   Major depressive disorder, recurrent episode, moderate (H)  - Continued PTA Duloxetine 30mg, Seroquel 100mg at bedtime  "    History of alcohol use disorder  Reports no recent alcohol.  - Continued PTA Folate and thiamine    Hypothyroidism  TSH 0.45 in 9/2024  - Continued PTA Levothyroxine 50mcg    JOY (obstructive sleep apnea)   PTA admission on CPAP 13-15 cmH20 (changed recently from 7-15 cmH20  Reports using PAp most nights  - A follow-up overnight oximetry was ordered by Sleep medicine, but unclear if completed   - Recommend obtaining home CPAP device for use in hospital     Obesity  Complicates cares and contributes to morbidity           Diet: Combination Diet 2 gm NA Diet; No Caffeine Diet    DVT Prophylaxis: Enoxaparin (Lovenox) SQ  Bruce Catheter: Not present  Lines: None     Cardiac Monitoring: ACTIVE order. Indication: Acute decompensated heart failure (48 hours)  Code Status: Full Code      Clinically Significant Risk Factors                  # Acute Kidney Injury, unspecified: based on a >150% or 0.3 mg/dL increase in last creatinine compared to past 90 day average, will monitor renal function  # Hypertension: Noted on problem list            # Obesity: Estimated body mass index is 31.01 kg/m  as calculated from the following:    Height as of this encounter: 1.575 m (5' 2\").    Weight as of this encounter: 76.9 kg (169 lb 8.5 oz)., PRESENT ON ADMISSION     # Financial/Environmental Concerns:           Social Drivers of Health    Tobacco Use: Medium Risk (4/18/2025)    Patient History     Smoking Tobacco Use: Former     Smokeless Tobacco Use: Never   Physical Activity: Inactive (2/20/2024)    Exercise Vital Sign     Days of Exercise per Week: 0 days     Minutes of Exercise per Session: 0 min   Social Connections: Unknown (2/20/2024)    Social Connection and Isolation Panel [NHANES]     Frequency of Social Gatherings with Friends and Family: More than three times a week          Disposition Plan     Medically Ready for Discharge: Anticipated in 2-4 Days             Gregg Lou MD  Hospitalist Service  [facility] " [facility]  Securely message with [employer] (more info)  Text page via [facility] Paging/Directory   ______________________________________________________________________    Interval History   Feels sleepy      Intake/Output Summary (Last 24 hours) at 4/20/2025 0956  Last data filed at 4/20/2025 0900  Gross per 24 hour   Intake 310 ml   Output --   Net 310 ml     Vitals:    04/18/25 1659 04/18/25 2348 04/19/25 0651   Weight: 72.6 kg (160 lb) 75.6 kg (166 lb 10.7 oz) 76.9 kg (169 lb 8.5 oz)        Physical Exam   Vital Signs: Temp: 97.3  F (36.3  C) Temp src: Oral BP: (!) 86/55 Pulse: 61   Resp: 24 SpO2: (!) 89 % O2 Device: Nasal cannula Oxygen Delivery: 5 LPM  Weight: 169 lbs 8.54 oz    Constitutional: awake, alert, cooperative, no apparent distress, and appears stated age  Respiratory: no increased work of breathing, decreased air exchange, and no wheezing, bibasilar rales pan inspiratory  Skin: no rashes    Medical Decision Making       40 MINUTES SPENT BY ME on the date of service doing chart review, history, exam, documentation & further activities per the note.      Data     CMP  Recent Labs   Lab 04/20/25  0535 04/19/25  0544 04/18/25  1746    140 138   POTASSIUM 3.8 4.3 4.4   CHLORIDE 101 101 101   CO2 28 26 22   ANIONGAP 12 13 15   * 152* 115*   BUN 30.3* 15.3 16.1   CR 1.27* 0.79 0.82   GFRESTIMATED 45* 79 76   DELILAH 9.2 9.6 9.2   MAG  --  1.8 1.9   PROTTOTAL 6.0*  --   --    ALBUMIN 3.6  --   --    BILITOTAL 0.2  --   --    ALKPHOS 275*  --   --    AST 21  --   --    ALT 31  --   --      CBC  Recent Labs   Lab 04/20/25  0535 04/19/25  0544 04/18/25  1746   WBC 5.8 3.0* 4.0   RBC 3.45* 3.67* 3.49*   HGB 9.9* 10.7* 10.3*   HCT 32.7* 33.7* 32.6*   MCV 95 92 93   MCH 28.7 29.2 29.5   MCHC 30.3* 31.8 31.6   RDW 16.1* 15.7* 15.7*    232 215

## 2025-04-21 ENCOUNTER — APPOINTMENT (OUTPATIENT)
Dept: CARDIOLOGY | Facility: CLINIC | Age: 75
DRG: 193 | End: 2025-04-21
Attending: INTERNAL MEDICINE
Payer: COMMERCIAL

## 2025-04-21 ENCOUNTER — APPOINTMENT (OUTPATIENT)
Dept: OCCUPATIONAL THERAPY | Facility: CLINIC | Age: 75
DRG: 193 | End: 2025-04-21
Payer: COMMERCIAL

## 2025-04-21 LAB
ALBUMIN SERPL BCG-MCNC: 3.4 G/DL (ref 3.5–5.2)
ALP SERPL-CCNC: 230 U/L (ref 40–150)
ALT SERPL W P-5'-P-CCNC: 24 U/L (ref 0–50)
ANION GAP SERPL CALCULATED.3IONS-SCNC: 10 MMOL/L (ref 7–15)
AST SERPL W P-5'-P-CCNC: 17 U/L (ref 0–45)
BASE EXCESS BLDV CALC-SCNC: 3.7 MMOL/L (ref -3–3)
BILIRUB SERPL-MCNC: 0.2 MG/DL
BUN SERPL-MCNC: 35.8 MG/DL (ref 8–23)
CALCIUM SERPL-MCNC: 9.2 MG/DL (ref 8.8–10.4)
CHLORIDE SERPL-SCNC: 105 MMOL/L (ref 98–107)
CREAT SERPL-MCNC: 1.05 MG/DL (ref 0.51–0.95)
EGFRCR SERPLBLD CKD-EPI 2021: 56 ML/MIN/1.73M2
ERYTHROCYTE [DISTWIDTH] IN BLOOD BY AUTOMATED COUNT: 16.1 % (ref 10–15)
GLUCOSE SERPL-MCNC: 92 MG/DL (ref 70–99)
HCO3 BLDV-SCNC: 30 MMOL/L (ref 21–28)
HCO3 SERPL-SCNC: 27 MMOL/L (ref 22–29)
HCT VFR BLD AUTO: 33.3 % (ref 35–47)
HGB BLD-MCNC: 10.2 G/DL (ref 11.7–15.7)
LVEF ECHO: NORMAL
MCH RBC QN AUTO: 29.4 PG (ref 26.5–33)
MCHC RBC AUTO-ENTMCNC: 30.6 G/DL (ref 31.5–36.5)
MCV RBC AUTO: 96 FL (ref 78–100)
O2/TOTAL GAS SETTING VFR VENT: 0 %
OXYHGB MFR BLDV: 65 % (ref 70–75)
PCO2 BLDV: 56 MM HG (ref 40–50)
PH BLDV: 7.34 [PH] (ref 7.32–7.43)
PLATELET # BLD AUTO: 239 10E3/UL (ref 150–450)
PO2 BLDV: 39 MM HG (ref 25–47)
POTASSIUM SERPL-SCNC: 4.4 MMOL/L (ref 3.4–5.3)
PROT SERPL-MCNC: 5.9 G/DL (ref 6.4–8.3)
RBC # BLD AUTO: 3.47 10E6/UL (ref 3.8–5.2)
SAO2 % BLDV: 66.1 % (ref 70–75)
SODIUM SERPL-SCNC: 142 MMOL/L (ref 135–145)
WBC # BLD AUTO: 5.3 10E3/UL (ref 4–11)

## 2025-04-21 PROCEDURE — 250N000013 HC RX MED GY IP 250 OP 250 PS 637

## 2025-04-21 PROCEDURE — 85014 HEMATOCRIT: CPT | Performed by: INTERNAL MEDICINE

## 2025-04-21 PROCEDURE — 250N000013 HC RX MED GY IP 250 OP 250 PS 637: Performed by: STUDENT IN AN ORGANIZED HEALTH CARE EDUCATION/TRAINING PROGRAM

## 2025-04-21 PROCEDURE — 120N000001 HC R&B MED SURG/OB

## 2025-04-21 PROCEDURE — 80053 COMPREHEN METABOLIC PANEL: CPT | Performed by: INTERNAL MEDICINE

## 2025-04-21 PROCEDURE — 250N000013 HC RX MED GY IP 250 OP 250 PS 637: Performed by: INTERNAL MEDICINE

## 2025-04-21 PROCEDURE — 36415 COLL VENOUS BLD VENIPUNCTURE: CPT | Performed by: INTERNAL MEDICINE

## 2025-04-21 PROCEDURE — 255N000002 HC RX 255 OP 636: Performed by: INTERNAL MEDICINE

## 2025-04-21 PROCEDURE — 99232 SBSQ HOSP IP/OBS MODERATE 35: CPT | Performed by: STUDENT IN AN ORGANIZED HEALTH CARE EDUCATION/TRAINING PROGRAM

## 2025-04-21 PROCEDURE — 97530 THERAPEUTIC ACTIVITIES: CPT | Mod: GO

## 2025-04-21 PROCEDURE — 999N000208 ECHOCARDIOGRAM COMPLETE

## 2025-04-21 PROCEDURE — 250N000011 HC RX IP 250 OP 636

## 2025-04-21 PROCEDURE — 93306 TTE W/DOPPLER COMPLETE: CPT | Mod: 26 | Performed by: INTERNAL MEDICINE

## 2025-04-21 PROCEDURE — 82805 BLOOD GASES W/O2 SATURATION: CPT | Performed by: INTERNAL MEDICINE

## 2025-04-21 RX ORDER — BENZONATATE 100 MG/1
100 CAPSULE ORAL 3 TIMES DAILY PRN
Status: DISCONTINUED | OUTPATIENT
Start: 2025-04-21 | End: 2025-04-24

## 2025-04-21 RX ADMIN — UMECLIDINIUM 1 PUFF: 62.5 AEROSOL, POWDER ORAL at 08:06

## 2025-04-21 RX ADMIN — METOPROLOL TARTRATE 100 MG: 100 TABLET, FILM COATED ORAL at 19:31

## 2025-04-21 RX ADMIN — FOLIC ACID 1 MG: 1 TABLET ORAL at 08:02

## 2025-04-21 RX ADMIN — LEVOTHYROXINE SODIUM 50 MCG: 50 TABLET ORAL at 08:01

## 2025-04-21 RX ADMIN — ASPIRIN 325 MG: 325 TABLET ORAL at 08:02

## 2025-04-21 RX ADMIN — DULOXETINE HYDROCHLORIDE 30 MG: 30 CAPSULE, DELAYED RELEASE ORAL at 08:02

## 2025-04-21 RX ADMIN — OXYCODONE HYDROCHLORIDE 5 MG: 5 TABLET ORAL at 19:31

## 2025-04-21 RX ADMIN — GABAPENTIN 600 MG: 300 CAPSULE ORAL at 21:47

## 2025-04-21 RX ADMIN — CEFTRIAXONE SODIUM 1 G: 1 INJECTION, POWDER, FOR SOLUTION INTRAMUSCULAR; INTRAVENOUS at 19:32

## 2025-04-21 RX ADMIN — LEVETIRACETAM 500 MG: 500 TABLET, FILM COATED ORAL at 08:02

## 2025-04-21 RX ADMIN — GABAPENTIN 300 MG: 300 CAPSULE ORAL at 08:01

## 2025-04-21 RX ADMIN — GABAPENTIN 300 MG: 300 CAPSULE ORAL at 16:02

## 2025-04-21 RX ADMIN — BENZONATATE 100 MG: 100 CAPSULE ORAL at 23:12

## 2025-04-21 RX ADMIN — LEVETIRACETAM 500 MG: 500 TABLET, FILM COATED ORAL at 19:31

## 2025-04-21 RX ADMIN — ATORVASTATIN CALCIUM 40 MG: 20 TABLET, FILM COATED ORAL at 08:01

## 2025-04-21 RX ADMIN — ENOXAPARIN SODIUM 40 MG: 40 INJECTION SUBCUTANEOUS at 23:09

## 2025-04-21 RX ADMIN — QUETIAPINE 100 MG: 100 TABLET ORAL at 21:47

## 2025-04-21 RX ADMIN — HUMAN ALBUMIN MICROSPHERES AND PERFLUTREN 2 ML: 10; .22 INJECTION, SOLUTION INTRAVENOUS at 08:42

## 2025-04-21 RX ADMIN — OXYCODONE HYDROCHLORIDE 5 MG: 5 TABLET ORAL at 12:14

## 2025-04-21 RX ADMIN — AZITHROMYCIN DIHYDRATE 250 MG: 250 TABLET ORAL at 08:01

## 2025-04-21 RX ADMIN — THIAMINE HCL TAB 100 MG 100 MG: 100 TAB at 08:01

## 2025-04-21 RX ADMIN — ACETAMINOPHEN 650 MG: 325 TABLET ORAL at 16:05

## 2025-04-21 RX ADMIN — Medication 20 MG: at 21:49

## 2025-04-21 RX ADMIN — ENOXAPARIN SODIUM 40 MG: 40 INJECTION SUBCUTANEOUS at 00:43

## 2025-04-21 RX ADMIN — ACETAMINOPHEN 650 MG: 325 TABLET ORAL at 12:13

## 2025-04-21 RX ADMIN — OXYCODONE HYDROCHLORIDE 5 MG: 5 TABLET ORAL at 16:05

## 2025-04-21 RX ADMIN — FLUTICASONE FUROATE AND VILANTEROL TRIFENATATE 1 PUFF: 200; 25 POWDER RESPIRATORY (INHALATION) at 08:06

## 2025-04-21 ASSESSMENT — ACTIVITIES OF DAILY LIVING (ADL)
ADLS_ACUITY_SCORE: 31
ADLS_ACUITY_SCORE: 31
ADLS_ACUITY_SCORE: 36
ADLS_ACUITY_SCORE: 31
ADLS_ACUITY_SCORE: 36
ADLS_ACUITY_SCORE: 31
ADLS_ACUITY_SCORE: 31
ADLS_ACUITY_SCORE: 36
ADLS_ACUITY_SCORE: 31
ADLS_ACUITY_SCORE: 36
ADLS_ACUITY_SCORE: 31
ADLS_ACUITY_SCORE: 36
ADLS_ACUITY_SCORE: 31

## 2025-04-21 NOTE — PLAN OF CARE
Problem: Adult Inpatient Plan of Care  Goal: Plan of Care Review  Description: The Plan of Care Review/Shift note should be completed every shift.  The Outcome Evaluation is a brief statement about your assessment that the patient is improving, declining, or no change.  This information will be displayed automatically on your shiftnote.  Flowsheets (Taken 4/21/2025 0546)  Outcome Evaluation: Patient alert and oriented, able to make needs known.  Pain expressed in back, PRN oxycodone given at bedtime.  Patient slept well overnight, awake for cares and to use the bathroom.  Walking with walker and assist x1.  Voiding well, drinking well.  I&O's documented to best ability.  Was able to be weaned back down to 5L overnight, but still get SOA with exertion.  Echo planned today.  Continue with plan of care  Plan of Care Reviewed With: patient  Overall Patient Progress: no change  Goal: Absence of Hospital-Acquired Illness or Injury  Intervention: Identify and Manage Fall Risk  Recent Flowsheet Documentation  Taken 4/21/2025 0146 by Taylor Antoine RN  Safety Promotion/Fall Prevention: activity supervised  Intervention: Prevent and Manage VTE (Venous Thromboembolism) Risk  Recent Flowsheet Documentation  Taken 4/21/2025 0146 by Taylor Antoine RN  VTE Prevention/Management: patient refused intervention  Intervention: Prevent Infection  Recent Flowsheet Documentation  Taken 4/21/2025 0146 by Taylor Antoine RN  Infection Prevention:   equipment surfaces disinfected   personal protective equipment utilized   rest/sleep promoted   single patient room provided     Problem: Delirium  Goal: Improved Behavioral Control  Intervention: Minimize Safety Risk  Recent Flowsheet Documentation  Taken 4/21/2025 0146 by Taylor Antoine RN  Enhanced Safety Measures:   pain management   review medications for side effects with activity     Problem: Skin Injury Risk Increased  Goal: Skin Health and Integrity  Intervention: Plan:  Nurse Driven Intervention: Moisture Management  Recent Flowsheet Documentation  Taken 4/21/2025 0146 by Taylor Antoine, RN  Moisture Interventions: Encourage regular toileting   Goal Outcome Evaluation:      Plan of Care Reviewed With: patient    Overall Patient Progress: no changeOverall Patient Progress: no change    Outcome Evaluation: Patient alert and oriented, able to make needs known.  Pain expressed in back, PRN oxycodone given at bedtime.  Patient slept well overnight, awake for cares and to use the bathroom.  Walking with walker and assist x1.  Voiding well, drinking well.  I&O's documented to best ability.  Was able to be weaned back down to 5L overnight, but still get SOA with exertion.  Echo planned today.  Continue with plan of care

## 2025-04-21 NOTE — PLAN OF CARE
"  Problem: Adult Inpatient Plan of Care  Goal: Plan of Care Review  Description: The Plan of Care Review/Shift note should be completed every shift.  The Outcome Evaluation is a brief statement about your assessment that the patient is improving, declining, or no change.  This information will be displayed automatically on your shiftnote.  Outcome: Progressing  Goal: Patient-Specific Goal (Individualized)  Description: You can add care plan individualizations to a care plan. Examples of Individualization might be:  \"Parent requests to be called daily at 9am for status\", \"I have a hard time hearing out of my right ear\", or \"Do not touch me to wake me up as it startlesme\".  Outcome: Progressing  Goal: Absence of Hospital-Acquired Illness or Injury  Outcome: Progressing  Intervention: Identify and Manage Fall Risk  Recent Flowsheet Documentation  Taken 4/21/2025 0942 by Steven Harp RN  Safety Promotion/Fall Prevention: activity supervised  Intervention: Prevent Skin Injury  Recent Flowsheet Documentation  Taken 4/21/2025 0942 by Steven Harp RN  Body Position: position changed independently  Goal: Optimal Comfort and Wellbeing  Outcome: Progressing  Goal: Readiness for Transition of Care  Outcome: Progressing     Problem: Delirium  Goal: Optimal Coping  Outcome: Progressing  Goal: Improved Behavioral Control  Outcome: Progressing  Goal: Improved Attention and Thought Clarity  Outcome: Progressing  Goal: Improved Sleep  Outcome: Progressing     Problem: Skin Injury Risk Increased  Goal: Skin Health and Integrity  Outcome: Progressing  Intervention: Plan: Nurse Driven Intervention: Moisture Management  Recent Flowsheet Documentation  Taken 4/21/2025 0942 by Steven Harp RN  Moisture Interventions: Encourage regular toileting  Taken 4/21/2025 0800 by Steven Harp RN  Moisture Interventions: No brief in bed  Bathing/Skin Care: patient refused  Intervention: Optimize Skin Protection  Recent Flowsheet " Documentation  Taken 4/21/2025 0942 by Steven Harp, RN  Activity Management: activity adjusted per tolerance   Goal Outcome Evaluation:         A&Ox4  SBA for mobility  Patient is currently weaned down to 4 LPM NC O2 maintaining saturations in low 90s. Writer will continue to wean off of O2 throughout today.  Todd osborne.    Steven MATTSON [facility]

## 2025-04-21 NOTE — PROGRESS NOTES
[facility]    Medicine Progress Note - Hospitalist Service    Date of Admission:  4/18/2025    Assessment & Plan   Jessica Ellison is a 74 year old female with past medical hx of CKD2, JOY, COPD, hypothyroidism, HTN, seizures, chronic pain that presented on 4/18/2025 with dyspnea and acute hypoxic respiratory failure and concern for heart failure exacerbation vs pneumonia. .     # Acute respiratory failure with hypoxia without hypercapnia, etiology unclear  Sao2 92-94% in last 4 outpatient visits. Presented for 1 week of dry cough and progressive dyspnea. CT with mildly enlarged pulmonary artery suggesting pulmonary artery hypertension, and possible pneumonia as below. BNP 1600.  Admitted on 3L.  Initially managing as possible pneumonia and/or possible CHF.   - See below     # Possible CHF and/or pulmonary artery hypertension    Initially suspect primary  of hypoxia is fluid overload. Patient has diffuse crackles. Last echo 10/2023 without CHF or PAH. The right ventricular systolic pressure is approximated at 25.7 mmHg plus the right atrial pressure. Started Lasix 40mg IV BID. Reported good diuresis with IV Lasix. Developed VASHTI that is now improving with holding diuresis and bolus fluids. TTE with EF of 60% but with grade I or early diastolic dysfunction noted.   - Holding diuresis     # VASHTI, improving   Suspect secondary to diuresis. TTE on 4/21 with normal sized IVC and Cr improving with holding diuretics.   - Holding further diuresis     # Possible pneumonia, bilateral  CT  4/18- No evidence for pulmonary emboli. Advanced emphysema. Mildly enlarged pulmonary arteries suggesting pulmonary arterial hypertension. Hazy groundglass opacities both lungs concerning for pneumonia. Afebrile, no cough, no leukocytosis, procal negative. CRP 79. Less likely pneumonia or COPD, but will treat out of abundance of caution. Influenza, RSV, covid PCR negative. Viral respiratory PCR panel  negative.   - Continue ceftriaxone 1g IV daily, Z pack PO  - Trend CRP    # Centrilobular emphysema   # Concern for pulmonary hypertension    30 pack year history, quit in 2004.  PFTs 2019 showed normal spirometry, air-trapping, and normal diffusion capacity. Moderate radiographic emphysema noted per chest CT in 3/2022. Repeat pulmonary function testing 8/2023 shows normal spirometry, lung volumes, and diffusion capacity. Followed with pulmonology last visit 1/22/25.   CT on admission shows advanced emphysema. Mildly enlarged pulmonary arteries suggesting pulmonary arterial hypertension. No wheezing on admission. TTE this admission. TTE this admission with right ventriclar systolic pressure of 46 mmHg.   - Continued PTA Symbicort, Tiotropium, Duonebs PRN    # Hx of CVA  # Post-stroke epilepsy  # Hyperlipidemia LDL goal <70  CT 10/2023 done for loss of consciousness, possible seizure showed - chronic appearing right occipital infarct, new from 2019. Area of infarct does contain a few areas of scattered calcification. Treated as possible seizure. Followed with neuro, last visit 12/9/24.  - Continued PTA Aspirin 325mg, Atorvastatin 40mg, Keppra 500mg BID    # Normocytic anemia, chronic  # Mild iron deficiency  Hemoglobin 10.3 on admission, baseline 9-10. No evidence acute blood loss   Ferritin 68. %Fe sat 10, b12 383. Stable.   - Started Iron this admission iron  - Recheck labs in 4 months     # Essential hypertension with goal blood pressure less than 140/90  PTA Amlodipine 5mg, Metoprolol 100 mg BID. Some softer blood pressures during admission.  - Continued PTA Metoprolol 100 mg BID  - Holding PTA Amlodipine 5 mg,     # Chronic pain syndrome  # Continuous opioid dependence  - Continued PTA Gabapentin 300 mg BID and 600 mg Qpm  - Caution with narcotics given history of substance abuse disorder     # Anxiety   # Major depressive disorder, recurrent episode, moderate (H)  - Continued PTA Duloxetine 30 mg, Seroquel 100  "mg at bedtime     # History of alcohol use disorder  Reports no recent alcohol.  - Continued PTA Folate and thiamine    # Hypothyroidism  TSH 0.45 in 9/2024  - Continued PTA Levothyroxine 50mcg    # JOY (obstructive sleep apnea)   PTA admission on CPAP 13-15 cmH20 (changed recently from 7-15 cm H20  Reports using PAp most nights  - A follow-up overnight oximetry was ordered by Sleep medicine, but unclear if completed   - Recommend obtaining home CPAP device for use in hospital     # Obesity  Complicates cares and contributes to morbidity         Diet: Regular Diet Adult    DVT Prophylaxis: Enoxaparin (Lovenox) SQ  Brcue Catheter: Not present  Lines: None     Cardiac Monitoring: None  Code Status: Full Code      Clinically Significant Risk Factors               # Hypoalbuminemia: Lowest albumin = 3.4 g/dL at 4/21/2025  6:28 AM, will monitor as appropriate     # Hypertension: Noted on problem list            # Overweight: Estimated body mass index is 29.8 kg/m  as calculated from the following:    Height as of this encounter: 1.575 m (5' 2\").    Weight as of this encounter: 73.9 kg (162 lb 14.7 oz)., PRESENT ON ADMISSION     # Financial/Environmental Concerns: none       Social Drivers of Health    Tobacco Use: Medium Risk (4/18/2025)    Patient History     Smoking Tobacco Use: Former     Smokeless Tobacco Use: Never   Physical Activity: Inactive (2/20/2024)    Exercise Vital Sign     Days of Exercise per Week: 0 days     Minutes of Exercise per Session: 0 min   Social Connections: Unknown (2/20/2024)    Social Connection and Isolation Panel [NHANES]     Frequency of Social Gatherings with Friends and Family: More than three times a week          Disposition Plan     Medically Ready for Discharge: Anticipated Tomorrow     Martín Vargas MD  Hospitalist Service  [facility]  Securely message with [employer] (more info)  Text page via [employer] Paging/Directory "   ______________________________________________________________________    Interval History   No acute events overnight. States that her breathing has improved but that she has continued to have a cough that has been bothersome.      Physical Exam   Vital Signs: Temp: 98.5  F (36.9  C) Temp src: Oral BP: (!) 146/88 Pulse: 81   Resp: 18 SpO2: (!) 91 % O2 Device: Nasal cannula Oxygen Delivery: 3 LPM  Weight: 162 lbs 14.72 oz    General Appearance: Awake and alert, lying back in bed in no acute distress   Respiratory: Breathing easily on nasal canula, intermittent cough   Cardiovascular: extremities warm and well perfused, trace edema in bilateral lower extremities   GI: Abdomen non-distended   Other: Appropriate mood and affect      Medical Decision Making       45 MINUTES SPENT BY ME on the date of service doing chart review, history, exam, documentation & further activities per the note.      Data     I have personally reviewed the following data over the past 24 hrs:    5.3  \   10.2 (L)   / 239     142 105 35.8 (H) /  92   4.4 27 1.05 (H) \     ALT: 24 AST: 17 AP: 230 (H) TBILI: 0.2   ALB: 3.4 (L) TOT PROTEIN: 5.9 (L) LIPASE: N/A       Imaging results reviewed over the past 24 hrs:   Recent Results (from the past 24 hours)   Echocardiogram Complete   Result Value    LVEF  60%    Narrative    [id number]  [id number]  [id number]  [id number]^MILAGROS^KARY^W     [facility]  Echocardiography Laboratory  [address] [place] [address]     Name: SHIRA GONZALEZ  MRN: [id number]  : 1950  Study Date: 2025 08:23 AM  Age: 74 yrs  Gender: Female  Patient Location: [facility]  Reason For Study: Pulmonary Hypertension  Ordering Physician: KARY LINARES  Referring Physician: Sarah Barriga  Performed By: Lucero Downey RDCS     BSA: 1.8 m2  Height: 62 in  Weight: 169 lb  HR: 78  BP: 122/72 mmHg  ______________________________________________________________________________  Procedure  Echocardiogram with  two-dimensional, color and spectral Doppler. Optison (NDC  #[id number]) given intravenously. Technically difficult study.  ______________________________________________________________________________  Interpretation Summary     1. Normal left ventricular size and systolic function. Estimated ejection  fraction is 60%.  2. Grade 1 (early) left ventricular diastolic dysfunction consistent with  normal left ventricular filling pressure.  3. Normal right ventricular size and systolic function.  4. Estimated right ventricular systolic pressure is 46 mmHg.  5. Mild mitral regurgitation.  6. Mild tricuspid regurgitation.  7. Compared to prior exam from 10/19/2023, estimated RVSP is higher.  ______________________________________________________________________________  Left Ventricle  The left ventricle is normal in size. There is normal left ventricular wall  thickness. Left ventricular systolic function is normal. The visual ejection  fraction is estimated at 60%. Grade I or early diastolic dysfunction. No  regional wall motion abnormalities noted.     Right Ventricle  The right ventricle is normal in size and function.     Atria  Normal left atrial size. Right atrium not well visualized. Atrial septum not  well-visualized.     Mitral Valve  The mitral valve leaflets are mildly thickened. There is mild (1+) mitral  regurgitation.     Tricuspid Valve  The tricuspid valve is not well visualized, but is grossly normal. There is  mild (1+) tricuspid regurgitation.     Aortic Valve  The aortic valve is trileaflet with aortic valve sclerosis. No aortic stenosis  is present.     Pulmonic Valve  The pulmonic valve is normal in structure and function.     Vessels  The aortic root is normal size. Normal size ascending aorta. The inferior vena  cava was normal in size with preserved respiratory variability.     Pericardium  There is no pericardial effusion.     Rhythm  Sinus rhythm was  noted.  ______________________________________________________________________________  MMode/2D Measurements & Calculations  IVSd: 0.96 cm     LVIDd: 5.2 cm  LVIDs: 3.2 cm  LVPWd: 0.84 cm  FS: 38.0 %  LV mass(C)d: 168.7 grams  LV mass(C)dI: 94.8 grams/m2  Ao root diam: 3.4 cm  LA dimension: 3.8 cm  asc Aorta Diam: 3.6 cm  LA/Ao: 1.1  Ao root diam index Ht(cm/m): 2.2  Ao root diam index BSA (cm/m2): 1.9  Asc Ao diam index BSA (cm/m2): 2.0  Asc Ao diam index Ht(cm/m): 2.3  LA Volume (BP): 50.7 ml     LA Volume Index (BP): 28.5 ml/m2  RWT: 0.32  TAPSE: 2.3 cm     Doppler Measurements & Calculations  MV E max lalo: 62.9 cm/sec  MV A max lalo: 76.5 cm/sec  MV E/A: 0.82  MV dec time: 0.19 sec  Ao V2 max: 142.4 cm/sec  Ao max P.1 mmHg  PA acc time: 0.09 sec  TR max lalo: 328.5 cm/sec  TR max P.2 mmHg  RVSP(TR): 46.2 mmHg  E/E' av.9  Lateral E/e': 5.9  Medial E/e': 11.8  RV S Lalo: 15.0 cm/sec     ______________________________________________________________________________  Report approved by: Mallika Garcia MD on 2025 01:23 PM

## 2025-04-22 LAB
ANION GAP SERPL CALCULATED.3IONS-SCNC: 8 MMOL/L (ref 7–15)
BUN SERPL-MCNC: 24.2 MG/DL (ref 8–23)
CALCIUM SERPL-MCNC: 9.3 MG/DL (ref 8.8–10.4)
CHLORIDE SERPL-SCNC: 106 MMOL/L (ref 98–107)
CREAT SERPL-MCNC: 0.87 MG/DL (ref 0.51–0.95)
EGFRCR SERPLBLD CKD-EPI 2021: 70 ML/MIN/1.73M2
ERYTHROCYTE [DISTWIDTH] IN BLOOD BY AUTOMATED COUNT: 15.8 % (ref 10–15)
GLUCOSE SERPL-MCNC: 91 MG/DL (ref 70–99)
HCO3 SERPL-SCNC: 29 MMOL/L (ref 22–29)
HCT VFR BLD AUTO: 32.9 % (ref 35–47)
HGB BLD-MCNC: 10 G/DL (ref 11.7–15.7)
MCH RBC QN AUTO: 28.7 PG (ref 26.5–33)
MCHC RBC AUTO-ENTMCNC: 30.4 G/DL (ref 31.5–36.5)
MCV RBC AUTO: 94 FL (ref 78–100)
PLATELET # BLD AUTO: 242 10E3/UL (ref 150–450)
POTASSIUM SERPL-SCNC: 4.2 MMOL/L (ref 3.4–5.3)
RBC # BLD AUTO: 3.49 10E6/UL (ref 3.8–5.2)
SODIUM SERPL-SCNC: 143 MMOL/L (ref 135–145)
WBC # BLD AUTO: 5.2 10E3/UL (ref 4–11)

## 2025-04-22 PROCEDURE — 36415 COLL VENOUS BLD VENIPUNCTURE: CPT | Performed by: STUDENT IN AN ORGANIZED HEALTH CARE EDUCATION/TRAINING PROGRAM

## 2025-04-22 PROCEDURE — 250N000012 HC RX MED GY IP 250 OP 636 PS 637: Performed by: STUDENT IN AN ORGANIZED HEALTH CARE EDUCATION/TRAINING PROGRAM

## 2025-04-22 PROCEDURE — 250N000013 HC RX MED GY IP 250 OP 250 PS 637: Performed by: STUDENT IN AN ORGANIZED HEALTH CARE EDUCATION/TRAINING PROGRAM

## 2025-04-22 PROCEDURE — 99232 SBSQ HOSP IP/OBS MODERATE 35: CPT | Performed by: STUDENT IN AN ORGANIZED HEALTH CARE EDUCATION/TRAINING PROGRAM

## 2025-04-22 PROCEDURE — 80048 BASIC METABOLIC PNL TOTAL CA: CPT | Performed by: STUDENT IN AN ORGANIZED HEALTH CARE EDUCATION/TRAINING PROGRAM

## 2025-04-22 PROCEDURE — 250N000013 HC RX MED GY IP 250 OP 250 PS 637: Performed by: INTERNAL MEDICINE

## 2025-04-22 PROCEDURE — 250N000013 HC RX MED GY IP 250 OP 250 PS 637

## 2025-04-22 PROCEDURE — 85014 HEMATOCRIT: CPT | Performed by: STUDENT IN AN ORGANIZED HEALTH CARE EDUCATION/TRAINING PROGRAM

## 2025-04-22 PROCEDURE — 250N000011 HC RX IP 250 OP 636

## 2025-04-22 PROCEDURE — 120N000001 HC R&B MED SURG/OB

## 2025-04-22 RX ORDER — PREDNISONE 20 MG/1
40 TABLET ORAL DAILY
Status: DISCONTINUED | OUTPATIENT
Start: 2025-04-22 | End: 2025-04-25 | Stop reason: HOSPADM

## 2025-04-22 RX ADMIN — GABAPENTIN 300 MG: 300 CAPSULE ORAL at 09:16

## 2025-04-22 RX ADMIN — GABAPENTIN 300 MG: 300 CAPSULE ORAL at 15:49

## 2025-04-22 RX ADMIN — ACETAMINOPHEN 650 MG: 325 TABLET ORAL at 09:24

## 2025-04-22 RX ADMIN — OXYCODONE HYDROCHLORIDE 5 MG: 5 TABLET ORAL at 22:46

## 2025-04-22 RX ADMIN — OXYCODONE HYDROCHLORIDE 5 MG: 5 TABLET ORAL at 09:24

## 2025-04-22 RX ADMIN — FOLIC ACID 1 MG: 1 TABLET ORAL at 09:16

## 2025-04-22 RX ADMIN — DULOXETINE HYDROCHLORIDE 30 MG: 30 CAPSULE, DELAYED RELEASE ORAL at 09:16

## 2025-04-22 RX ADMIN — BENZONATATE 100 MG: 100 CAPSULE ORAL at 09:24

## 2025-04-22 RX ADMIN — BENZONATATE 100 MG: 100 CAPSULE ORAL at 22:46

## 2025-04-22 RX ADMIN — AZITHROMYCIN DIHYDRATE 250 MG: 250 TABLET ORAL at 09:16

## 2025-04-22 RX ADMIN — OXYCODONE HYDROCHLORIDE 5 MG: 5 TABLET ORAL at 13:39

## 2025-04-22 RX ADMIN — QUETIAPINE 100 MG: 100 TABLET ORAL at 22:06

## 2025-04-22 RX ADMIN — UMECLIDINIUM 1 PUFF: 62.5 AEROSOL, POWDER ORAL at 09:17

## 2025-04-22 RX ADMIN — GABAPENTIN 600 MG: 300 CAPSULE ORAL at 22:06

## 2025-04-22 RX ADMIN — LEVETIRACETAM 500 MG: 500 TABLET, FILM COATED ORAL at 19:58

## 2025-04-22 RX ADMIN — LEVETIRACETAM 500 MG: 500 TABLET, FILM COATED ORAL at 09:16

## 2025-04-22 RX ADMIN — Medication 20 MG: at 22:06

## 2025-04-22 RX ADMIN — FLUTICASONE FUROATE AND VILANTEROL TRIFENATATE 1 PUFF: 200; 25 POWDER RESPIRATORY (INHALATION) at 09:17

## 2025-04-22 RX ADMIN — PREDNISONE 40 MG: 20 TABLET ORAL at 09:15

## 2025-04-22 RX ADMIN — CEFTRIAXONE SODIUM 1 G: 1 INJECTION, POWDER, FOR SOLUTION INTRAMUSCULAR; INTRAVENOUS at 19:57

## 2025-04-22 RX ADMIN — ASPIRIN 325 MG: 325 TABLET ORAL at 09:15

## 2025-04-22 RX ADMIN — METOPROLOL TARTRATE 100 MG: 100 TABLET, FILM COATED ORAL at 09:15

## 2025-04-22 RX ADMIN — ATORVASTATIN CALCIUM 40 MG: 20 TABLET, FILM COATED ORAL at 09:15

## 2025-04-22 RX ADMIN — ACETAMINOPHEN 650 MG: 325 TABLET ORAL at 13:39

## 2025-04-22 RX ADMIN — THIAMINE HCL TAB 100 MG 100 MG: 100 TAB at 09:16

## 2025-04-22 RX ADMIN — LEVOTHYROXINE SODIUM 50 MCG: 50 TABLET ORAL at 09:15

## 2025-04-22 RX ADMIN — METOPROLOL TARTRATE 100 MG: 100 TABLET, FILM COATED ORAL at 19:58

## 2025-04-22 RX ADMIN — FERROUS SULFATE TAB 325 MG (65 MG ELEMENTAL FE) 325 MG: 325 (65 FE) TAB at 09:16

## 2025-04-22 ASSESSMENT — ACTIVITIES OF DAILY LIVING (ADL)
ADLS_ACUITY_SCORE: 31

## 2025-04-22 NOTE — PLAN OF CARE
Problem: Gas Exchange Impaired  Goal: Optimal Gas Exchange  Outcome: Progressing  Intervention: Optimize Oxygenation and Ventilation  Recent Flowsheet Documentation  Taken 4/22/2025 0000 by Devick, Karen M, RN  Head of Bed (HOB) Positioning: HOB at 20-30 degrees  Taken 4/21/2025 2000 by Devick, Karen M, RN  Head of Bed (HOB) Positioning: HOB at 20-30 degrees     Goal Outcome Evaluation: Patient alert and oriented. Up with SBA and walker. On 3 L O2. Congested cough.  Oxycodone and Tylenol PRN for  chronic back pain.

## 2025-04-22 NOTE — PLAN OF CARE
Goal Outcome Evaluation:     Patient reports feeling lethargic today with general weakness noted.  Very fatigued and wants to rest.  Receiving oxygen at 3 liters per nasal cannula with oxygen saturation 90%.  Started prednisone this morning, receiving   Tessalon Pearls for cough, Tylenol and Oxycodone for right knee and lumbar discomfort.  Receiving Antibiotics as ordered.

## 2025-04-22 NOTE — PROGRESS NOTES
[facility]    Medicine Progress Note - Hospitalist Service    Date of Admission:  4/18/2025    Assessment & Plan   Jessica Ellison is a 74 year old female with past medical hx of CKD2, JOY, COPD, hypothyroidism, HTN, seizures, chronic pain that presented on 4/18/2025 with dyspnea and acute hypoxic respiratory failure and concern for heart failure exacerbation vs pneumonia. .     # Acute respiratory failure with hypoxia without hypercapnia, etiology unclear  Sao2 92-94% in last 4 outpatient visits. Presented for 1 week of dry cough and progressive dyspnea. CT with mildly enlarged pulmonary artery suggesting pulmonary artery hypertension, and possible pneumonia as below. BNP 1600.  Admitted on 3L.  Initially managing as possible pneumonia and/or possible CHF. Given that patient has remained hypoxic while dry, continued to be hypoxic despite antibiotics, and previously seems to have responded to steroids started prednisone burst.   - See below     # Possible CHF and/or pulmonary artery hypertension    Initially suspect primary  of hypoxia is fluid overload. Patient has diffuse crackles. Last echo 10/2023 without CHF or PAH. The right ventricular systolic pressure is approximated at 25.7 mmHg plus the right atrial pressure. Started Lasix 40mg IV BID. Reported good diuresis with IV Lasix. Developed VASHTI that is now improving with holding diuresis and bolus fluids. TTE with EF of 60% but with grade I or early diastolic dysfunction noted.   - Holding diuresis     # VASHTI, resolved   Suspect secondary to diuresis. TTE on 4/21 with normal sized IVC and Cr improving with holding diuretics.   - Holding further diuresis     # Possible pneumonia, bilateral  CT  4/18- No evidence for pulmonary emboli. Advanced emphysema. Mildly enlarged pulmonary arteries suggesting pulmonary arterial hypertension. Hazy groundglass opacities both lungs concerning for pneumonia. Afebrile, no cough, no leukocytosis,  procal negative. CRP 79. Less likely pneumonia or COPD, but will treat out of abundance of caution. Influenza, RSV, covid PCR negative. Viral respiratory PCR panel negative.   - Continued ceftriaxone 1g IV daily, Z pack PO  - Trend CRP    # Centrilobular emphysema   # Concern for pulmonary hypertension    30 pack year history, quit in 2004.  PFTs 2019 showed normal spirometry, air-trapping, and normal diffusion capacity. Moderate radiographic emphysema noted per chest CT in 3/2022. Repeat pulmonary function testing 8/2023 shows normal spirometry, lung volumes, and diffusion capacity. Followed with pulmonology last visit 1/22/25.   CT on admission shows advanced emphysema. Mildly enlarged pulmonary arteries suggesting pulmonary arterial hypertension. No wheezing on admission. TTE this admission. TTE this admission with right ventriclar systolic pressure of 46 mmHg. TR max velocity 3.3 M/sec suggestive of pulmonary hypertension. It seems like pulmonary hypertension is possible but it is at the very least would not be severe pulmonary hypertension. Given that this is likely type III pulmonary hypertension and is not severe unlikely that targeted therapies would be started. Will continue to trial prednisone burst to see if improvement can be gained in patient's hypoxia and oxygen requirement.  - Continued PTA Symbicort, Tiotropium, Duonebs PRN  - Started prednisone burst with 40 mg prednisone every day.     # Hx of CVA  # Post-stroke epilepsy  # Hyperlipidemia LDL goal <70  CT 10/2023 done for loss of consciousness, possible seizure showed - chronic appearing right occipital infarct, new from 2019. Area of infarct does contain a few areas of scattered calcification. Treated as possible seizure. Followed with neuro, last visit 12/9/24.  - Continued PTA Aspirin 325mg, Atorvastatin 40mg, Keppra 500mg BID    # Normocytic anemia, chronic  # Mild iron deficiency  Hemoglobin 10.3 on admission, baseline 9-10. No evidence acute  "blood loss   Ferritin 68. %Fe sat 10, b12 383. Stable.   - Started Iron this admission iron  - Recheck labs in 4 months     # Essential hypertension with goal blood pressure less than 140/90  PTA Amlodipine 5mg, Metoprolol 100 mg BID. Some softer blood pressures during admission.  - Continued PTA Metoprolol 100 mg BID  - Holding PTA Amlodipine 5 mg,     # Chronic pain syndrome  # Continuous opioid dependence  - Continued PTA Gabapentin 300 mg BID and 600 mg Qpm  - Caution with narcotics given history of substance abuse disorder     # Anxiety   # Major depressive disorder, recurrent episode, moderate (H)  - Continued PTA Duloxetine 30 mg, Seroquel 100 mg at bedtime     # History of alcohol use disorder  Reports no recent alcohol.  - Continued PTA Folate and thiamine    # Hypothyroidism  TSH 0.45 in 9/2024  - Continued PTA Levothyroxine 50 mcg    # JOY (obstructive sleep apnea)   PTA on CPAP 13-15 cmH20 (changed recently from 7-15 cm H20) Reports using PAp most nights  - A follow-up overnight oximetry was ordered by Sleep medicine, but unclear if completed   - Recommend obtaining home CPAP device for use in hospital      # Obesity  BMI 31. Complicates cares and contributes to morbidity         Diet: Regular Diet Adult    DVT Prophylaxis: Enoxaparin (Lovenox) SQ  Bruce Catheter: Not present  Lines: None     Cardiac Monitoring: None  Code Status: Full Code      Clinically Significant Risk Factors               # Hypoalbuminemia: Lowest albumin = 3.4 g/dL at 4/21/2025  6:28 AM, will monitor as appropriate     # Hypertension: Noted on problem list            # Obesity: Estimated body mass index is 31.4 kg/m  as calculated from the following:    Height as of this encounter: 1.575 m (5' 2\").    Weight as of this encounter: 77.9 kg (171 lb 11.2 oz)., PRESENT ON ADMISSION     # Financial/Environmental Concerns: none         Social Drivers of Health    Tobacco Use: Medium Risk (4/18/2025)    Patient History     Smoking Tobacco " Use: Former     Smokeless Tobacco Use: Never   Physical Activity: Inactive (2/20/2024)    Exercise Vital Sign     Days of Exercise per Week: 0 days     Minutes of Exercise per Session: 0 min   Social Connections: Unknown (2/20/2024)    Social Connection and Isolation Panel [NHANES]     Frequency of Social Gatherings with Friends and Family: More than three times a week          Disposition Plan     Medically Ready for Discharge: Anticipated in 2-4 Days, possible tomorrow if hypoxia improves with steroids        Martín Vargas MD  Hospitalist Service  [facility]  Securely message with [employer] (more info)  Text page via [employer] Paging/Directory   ______________________________________________________________________    Interval History   No acute events overnight. Patient noted to feel lethargic and weak today. On My interview she states that she feels similar to prior with no real change in her breathing from yesterday. Open starting prednisone this morning.     Physical Exam   Vital Signs: Temp: 97.3  F (36.3  C) Temp src: Oral BP: (!) 140/80 Pulse: 70   Resp: 18 SpO2: 92 % O2 Device: Nasal cannula Oxygen Delivery: 3 LPM  Weight: 171 lbs 11.2 oz    General Appearance: Awake and alert, lying in bed in no acute distress   Respiratory: Breathing easily on nasal canula   Cardiovascular: Extremities warm and well perfused   GI: Abdomen non-distended   Other: Appropriate mood and affect      Medical Decision Making       47 MINUTES SPENT BY ME on the date of service doing chart review, history, exam, documentation & further activities per the note.      Data     I have personally reviewed the following data over the past 24 hrs:    5.2  \   10.0 (L)   / 242     143 106 24.2 (H) /  91   4.2 29 0.87 \       Imaging results reviewed over the past 24 hrs:   No results found for this or any previous visit (from the past 24 hours).

## 2025-04-22 NOTE — PROGRESS NOTES
Occupational Therapy Discharge Summary    Reason for therapy discharge:    All goals and outcomes met, no further needs identified.    Progress towards therapy goal(s). See goals on Care Plan in [facility] electronic health record for goal details.  Goals met    Therapy recommendation(s):    No further therapy is recommended.    OT originally ordered as part of heart failure order set. Pt is not currently being treated for this. Will discontinue heart failure orders at this time.

## 2025-04-23 ENCOUNTER — APPOINTMENT (OUTPATIENT)
Dept: GENERAL RADIOLOGY | Facility: CLINIC | Age: 75
DRG: 193 | End: 2025-04-23
Attending: PHYSICIAN ASSISTANT
Payer: COMMERCIAL

## 2025-04-23 LAB
ANION GAP SERPL CALCULATED.3IONS-SCNC: 10 MMOL/L (ref 7–15)
BUN SERPL-MCNC: 22.1 MG/DL (ref 8–23)
CALCIUM SERPL-MCNC: 9.4 MG/DL (ref 8.8–10.4)
CHLORIDE SERPL-SCNC: 104 MMOL/L (ref 98–107)
CREAT SERPL-MCNC: 0.76 MG/DL (ref 0.51–0.95)
CRP SERPL-MCNC: 25.59 MG/L
EGFRCR SERPLBLD CKD-EPI 2021: 83 ML/MIN/1.73M2
ERYTHROCYTE [DISTWIDTH] IN BLOOD BY AUTOMATED COUNT: 15.2 % (ref 10–15)
GLUCOSE SERPL-MCNC: 108 MG/DL (ref 70–99)
HCO3 SERPL-SCNC: 26 MMOL/L (ref 22–29)
HCT VFR BLD AUTO: 34 % (ref 35–47)
HGB BLD-MCNC: 10.4 G/DL (ref 11.7–15.7)
MCH RBC QN AUTO: 28.5 PG (ref 26.5–33)
MCHC RBC AUTO-ENTMCNC: 30.6 G/DL (ref 31.5–36.5)
MCV RBC AUTO: 93 FL (ref 78–100)
PLATELET # BLD AUTO: 271 10E3/UL (ref 150–450)
POTASSIUM SERPL-SCNC: 4 MMOL/L (ref 3.4–5.3)
RBC # BLD AUTO: 3.65 10E6/UL (ref 3.8–5.2)
SODIUM SERPL-SCNC: 140 MMOL/L (ref 135–145)
WBC # BLD AUTO: 5 10E3/UL (ref 4–11)

## 2025-04-23 PROCEDURE — 99232 SBSQ HOSP IP/OBS MODERATE 35: CPT | Performed by: STUDENT IN AN ORGANIZED HEALTH CARE EDUCATION/TRAINING PROGRAM

## 2025-04-23 PROCEDURE — 250N000013 HC RX MED GY IP 250 OP 250 PS 637

## 2025-04-23 PROCEDURE — 80048 BASIC METABOLIC PNL TOTAL CA: CPT | Performed by: STUDENT IN AN ORGANIZED HEALTH CARE EDUCATION/TRAINING PROGRAM

## 2025-04-23 PROCEDURE — 85018 HEMOGLOBIN: CPT | Performed by: STUDENT IN AN ORGANIZED HEALTH CARE EDUCATION/TRAINING PROGRAM

## 2025-04-23 PROCEDURE — 250N000013 HC RX MED GY IP 250 OP 250 PS 637: Performed by: PHYSICIAN ASSISTANT

## 2025-04-23 PROCEDURE — 250N000012 HC RX MED GY IP 250 OP 636 PS 637: Performed by: STUDENT IN AN ORGANIZED HEALTH CARE EDUCATION/TRAINING PROGRAM

## 2025-04-23 PROCEDURE — 71045 X-RAY EXAM CHEST 1 VIEW: CPT

## 2025-04-23 PROCEDURE — 250N000011 HC RX IP 250 OP 636: Performed by: PHYSICIAN ASSISTANT

## 2025-04-23 PROCEDURE — 36415 COLL VENOUS BLD VENIPUNCTURE: CPT | Performed by: STUDENT IN AN ORGANIZED HEALTH CARE EDUCATION/TRAINING PROGRAM

## 2025-04-23 PROCEDURE — 250N000013 HC RX MED GY IP 250 OP 250 PS 637: Performed by: STUDENT IN AN ORGANIZED HEALTH CARE EDUCATION/TRAINING PROGRAM

## 2025-04-23 PROCEDURE — 250N000013 HC RX MED GY IP 250 OP 250 PS 637: Performed by: INTERNAL MEDICINE

## 2025-04-23 PROCEDURE — 250N000011 HC RX IP 250 OP 636

## 2025-04-23 PROCEDURE — 86140 C-REACTIVE PROTEIN: CPT | Performed by: STUDENT IN AN ORGANIZED HEALTH CARE EDUCATION/TRAINING PROGRAM

## 2025-04-23 PROCEDURE — 120N000001 HC R&B MED SURG/OB

## 2025-04-23 PROCEDURE — 250N000009 HC RX 250

## 2025-04-23 RX ORDER — FUROSEMIDE 10 MG/ML
40 INJECTION INTRAMUSCULAR; INTRAVENOUS ONCE
Status: COMPLETED | OUTPATIENT
Start: 2025-04-23 | End: 2025-04-23

## 2025-04-23 RX ORDER — DEXTROMETHORPHAN POLISTIREX 30 MG/5ML
30 SUSPENSION ORAL EVERY 12 HOURS SCHEDULED
Status: DISCONTINUED | OUTPATIENT
Start: 2025-04-23 | End: 2025-04-25 | Stop reason: HOSPADM

## 2025-04-23 RX ORDER — GUAIFENESIN 600 MG/1
1200 TABLET, EXTENDED RELEASE ORAL 2 TIMES DAILY
Status: DISCONTINUED | OUTPATIENT
Start: 2025-04-23 | End: 2025-04-24

## 2025-04-23 RX ADMIN — GUAIFENESIN 1200 MG: 600 TABLET ORAL at 22:56

## 2025-04-23 RX ADMIN — IPRATROPIUM BROMIDE AND ALBUTEROL SULFATE 3 ML: .5; 3 SOLUTION RESPIRATORY (INHALATION) at 20:47

## 2025-04-23 RX ADMIN — DULOXETINE HYDROCHLORIDE 30 MG: 30 CAPSULE, DELAYED RELEASE ORAL at 07:59

## 2025-04-23 RX ADMIN — DEXTROMETHORPHAN POLISTIREX 30 MG: 30 SUSPENSION ORAL at 22:38

## 2025-04-23 RX ADMIN — UMECLIDINIUM 1 PUFF: 62.5 AEROSOL, POWDER ORAL at 08:01

## 2025-04-23 RX ADMIN — LEVOTHYROXINE SODIUM 50 MCG: 50 TABLET ORAL at 07:59

## 2025-04-23 RX ADMIN — GABAPENTIN 600 MG: 300 CAPSULE ORAL at 22:39

## 2025-04-23 RX ADMIN — OXYCODONE HYDROCHLORIDE 5 MG: 5 TABLET ORAL at 08:00

## 2025-04-23 RX ADMIN — LEVETIRACETAM 500 MG: 500 TABLET, FILM COATED ORAL at 07:59

## 2025-04-23 RX ADMIN — ATORVASTATIN CALCIUM 40 MG: 20 TABLET, FILM COATED ORAL at 07:59

## 2025-04-23 RX ADMIN — CEFTRIAXONE SODIUM 1 G: 1 INJECTION, POWDER, FOR SOLUTION INTRAMUSCULAR; INTRAVENOUS at 20:14

## 2025-04-23 RX ADMIN — ASPIRIN 325 MG: 325 TABLET ORAL at 07:59

## 2025-04-23 RX ADMIN — OXYCODONE HYDROCHLORIDE 5 MG: 5 TABLET ORAL at 14:27

## 2025-04-23 RX ADMIN — PREDNISONE 40 MG: 20 TABLET ORAL at 07:59

## 2025-04-23 RX ADMIN — LEVETIRACETAM 500 MG: 500 TABLET, FILM COATED ORAL at 20:13

## 2025-04-23 RX ADMIN — GABAPENTIN 300 MG: 300 CAPSULE ORAL at 07:59

## 2025-04-23 RX ADMIN — METOPROLOL TARTRATE 100 MG: 100 TABLET, FILM COATED ORAL at 08:00

## 2025-04-23 RX ADMIN — Medication 20 MG: at 22:39

## 2025-04-23 RX ADMIN — ENOXAPARIN SODIUM 40 MG: 40 INJECTION SUBCUTANEOUS at 22:56

## 2025-04-23 RX ADMIN — ENOXAPARIN SODIUM 40 MG: 40 INJECTION SUBCUTANEOUS at 00:15

## 2025-04-23 RX ADMIN — METOPROLOL TARTRATE 100 MG: 100 TABLET, FILM COATED ORAL at 20:13

## 2025-04-23 RX ADMIN — FUROSEMIDE 40 MG: 10 INJECTION, SOLUTION INTRAVENOUS at 22:20

## 2025-04-23 RX ADMIN — BENZONATATE 100 MG: 100 CAPSULE ORAL at 20:39

## 2025-04-23 RX ADMIN — OXYCODONE HYDROCHLORIDE 5 MG: 5 TABLET ORAL at 22:40

## 2025-04-23 RX ADMIN — FOLIC ACID 1 MG: 1 TABLET ORAL at 08:00

## 2025-04-23 RX ADMIN — BENZOCAINE 6 MG-MENTHOL 10 MG LOZENGES 1 LOZENGE: at 20:13

## 2025-04-23 RX ADMIN — GABAPENTIN 300 MG: 300 CAPSULE ORAL at 16:00

## 2025-04-23 RX ADMIN — THIAMINE HCL TAB 100 MG 100 MG: 100 TAB at 07:59

## 2025-04-23 RX ADMIN — QUETIAPINE 100 MG: 100 TABLET ORAL at 22:40

## 2025-04-23 RX ADMIN — ACETAMINOPHEN 650 MG: 325 TABLET ORAL at 22:40

## 2025-04-23 RX ADMIN — FLUTICASONE FUROATE AND VILANTEROL TRIFENATATE 1 PUFF: 200; 25 POWDER RESPIRATORY (INHALATION) at 08:01

## 2025-04-23 ASSESSMENT — ACTIVITIES OF DAILY LIVING (ADL)
ADLS_ACUITY_SCORE: 33
ADLS_ACUITY_SCORE: 33
ADLS_ACUITY_SCORE: 32
ADLS_ACUITY_SCORE: 31
ADLS_ACUITY_SCORE: 33
ADLS_ACUITY_SCORE: 31
ADLS_ACUITY_SCORE: 31
ADLS_ACUITY_SCORE: 33
ADLS_ACUITY_SCORE: 31
ADLS_ACUITY_SCORE: 33
ADLS_ACUITY_SCORE: 32
ADLS_ACUITY_SCORE: 31
ADLS_ACUITY_SCORE: 32
ADLS_ACUITY_SCORE: 31
ADLS_ACUITY_SCORE: 33
ADLS_ACUITY_SCORE: 31
ADLS_ACUITY_SCORE: 32
ADLS_ACUITY_SCORE: 31
ADLS_ACUITY_SCORE: 32

## 2025-04-23 NOTE — PROVIDER NOTIFICATION
"   04/23/25 0020   Pain/Comfort/Sleep   Patient Currently in Pain denies  (\"not bad right now\". appears very comfortable, quiet and resting)   Preferred Pain Scale DVPRS (Group)   DVPRS Pain Rating Score   (DVPRS) Pain Rating Score  0-No pain   Interfered with your usual ACTIVITY? Does not interfere 0   Interfered with your SLEEP? Does not interfere 0   Affected your MOOD? Does not affect 0   Contributed to your STRESS? Does not contribute 0   RASS (Cha Agitation-Sedation Scale)   RASS (Cha Agitation-Sedation Scale) -1-->drowsy   Analgesia Side Effects Monitoring   Side Effects Monitoring: Respiratory Depth N   Side Effects Monitoring: Sedation Level S   [place] Coma Scale   Best Eye Response 4-->(E4) spontaneous   Best Motor Response 6-->(M6) obeys commands   Best Verbal Response 5-->(V5) oriented   [place] Coma Scale Score 15   Daily Care   Activity Management activity adjusted per tolerance;ambulated to bathroom   Activity Assistance Provided assistance, stand-by   Assistive Device Utilized walker   Hygiene Assistance independent   Ability to Dress Themselves Independent   Positioning   Body Position position changed independently   Head of Bed (HOB) Positioning HOB flat   Positioning/Transfer Devices pillows;in use     Sleeping quietly when entered room to give med. Aroused easily, however returned to sleep while this writer still in room. Appears very comfortable, lying on side, hugging pillow. No cough observed. Respiration non labored with no use of accessory muscles. SATs a steady 90% on 3L/NC.   "

## 2025-04-23 NOTE — PLAN OF CARE
"  Problem: Adult Inpatient Plan of Care  Goal: Plan of Care Review  Description: The Plan of Care Review/Shift note should be completed every shift.  The Outcome Evaluation is a brief statement about your assessment that the patient is improving, declining, or no change.  This information will be displayed automatically on your shiftnote.  4/23/2025 1459 by Sonja Sanchez RN  Outcome: Progressing  4/23/2025 1222 by Sonja Sanchez RN  Outcome: Progressing  Goal: Patient-Specific Goal (Individualized)  Description: You can add care plan individualizations to a care plan. Examples of Individualization might be:  \"Parent requests to be called daily at 9am for status\", \"I have a hard time hearing out of my right ear\", or \"Do not touch me to wake me up as it startlesme\".  4/23/2025 1459 by Sonja Sanchez RN  Outcome: Progressing  4/23/2025 1222 by Sonja Sanchez RN  Outcome: Progressing  Goal: Absence of Hospital-Acquired Illness or Injury  4/23/2025 1459 by Sonja Sanchez RN  Outcome: Progressing  4/23/2025 1222 by Sonja Sanchez RN  Outcome: Progressing  Intervention: Identify and Manage Fall Risk  Recent Flowsheet Documentation  Taken 4/23/2025 0745 by Sonja Sanchez RN  Safety Promotion/Fall Prevention:   activity supervised   nonskid shoes/slippers when out of bed  Intervention: Prevent Skin Injury  Recent Flowsheet Documentation  Taken 4/23/2025 0745 by Sonja Sanchez RN  Body Position: position changed independently  Intervention: Prevent and Manage VTE (Venous Thromboembolism) Risk  Recent Flowsheet Documentation  Taken 4/23/2025 0745 by Sonja Sanchez RN  VTE Prevention/Management: (daily ASA) other (see comments)  Intervention: Prevent Infection  Recent Flowsheet Documentation  Taken 4/23/2025 0745 by Sonja Sanchez RN  Infection Prevention:   rest/sleep promoted   single patient room provided  Goal: Optimal Comfort and Wellbeing  4/23/2025 " 1459 by Sonja Sanchez RN  Outcome: Progressing  4/23/2025 1222 by Sonja Sanchez RN  Outcome: Progressing  Intervention: Provide Person-Centered Care  Recent Flowsheet Documentation  Taken 4/23/2025 0745 by Sonja Sanchez RN  Trust Relationship/Rapport:   care explained   choices provided  Goal: Readiness for Transition of Care  4/23/2025 1459 by Sonja Sanchez RN  Outcome: Progressing  4/23/2025 1222 by Sonja Sanchez RN  Outcome: Progressing     Problem: Delirium  Goal: Optimal Coping  4/23/2025 1459 by Sonja Sanchez RN  Outcome: Progressing  4/23/2025 1222 by Sonaj Sanchez RN  Outcome: Progressing  Goal: Improved Behavioral Control  4/23/2025 1459 by Sonja Sanchez, RN  Outcome: Progressing  4/23/2025 1222 by Sonja Sacnhez RN  Outcome: Progressing  Intervention: Minimize Safety Risk  Recent Flowsheet Documentation  Taken 4/23/2025 0745 by Sonja Sanchez RN  Enhanced Safety Measures:   assistive devices when indicated   pain management  Trust Relationship/Rapport:   care explained   choices provided  Goal: Improved Attention and Thought Clarity  4/23/2025 1459 by Sonja Sanchez RN  Outcome: Progressing  4/23/2025 1222 by Sonja Sanchez RN  Outcome: Progressing  Intervention: Maximize Cognitive Function  Recent Flowsheet Documentation  Taken 4/23/2025 0745 by Sonja Sanchez RN  Sensory Stimulation Regulation:   care clustered   quiet environment promoted  Reorientation Measures: clock in view  Goal: Improved Sleep  4/23/2025 1459 by Sonja Sanchez, RN  Outcome: Progressing  4/23/2025 1222 by Sonja Sanchez RN  Outcome: Progressing     Problem: Skin Injury Risk Increased  Goal: Skin Health and Integrity  4/23/2025 1459 by Sonja Sanchez RN  Outcome: Progressing  4/23/2025 1222 by Sonja Sanchez RN  Outcome: Progressing  Intervention: Plan: Nurse Driven Intervention: Moisture Management  Recent  "Flowsheet Documentation  Taken 4/23/2025 0800 by Sonja Sanchez RN  Moisture Interventions: Encourage regular toileting  Bathing/Skin Care: other (see comments)  Taken 4/23/2025 0745 by Sonja Sanchez RN  Moisture Interventions: Encourage regular toileting  Intervention: Optimize Skin Protection  Recent Flowsheet Documentation  Taken 4/23/2025 0745 by Sonja Sanchez, RN  Activity Management:   activity adjusted per tolerance   ambulated to bathroom  Head of Bed (HOB) Positioning: HOB at 30 degrees     Problem: Gas Exchange Impaired  Goal: Optimal Gas Exchange  4/23/2025 1459 by Sonja Sanchez RN  Outcome: Progressing  4/23/2025 1222 by Sonja Sanchez RN  Outcome: Progressing  Intervention: Optimize Oxygenation and Ventilation  Recent Flowsheet Documentation  Taken 4/23/2025 0745 by Sonja Sanchez RN  Head of Bed (HOB) Positioning: HOB at 30 degrees   Goal Outcome Evaluation:       /74 (BP Location: Right arm)   Pulse 75   Temp 97.6  F (36.4  C) (Oral)   Resp 18   Ht 1.575 m (5' 2\")   Wt 78 kg (171 lb 14.4 oz)   SpO2 (!) 90%   BMI 31.44 kg/m    Pt decreased oxygen needs today only requiring 2L o2 and ambulated throughout the hallways. New IV established                 "

## 2025-04-23 NOTE — PLAN OF CARE
"  Problem: Gas Exchange Impaired  Goal: Optimal Gas Exchange  Outcome: Not Progressing     Problem: Adult Inpatient Plan of Care  Goal: Plan of Care Review  Description: The Plan of Care Review/Shift note should be completed every shift.  The Outcome Evaluation is a brief statement about your assessment that the patient is improving, declining, or no change.  This information will be displayed automatically on your shiftnote.  Outcome: Progressing  Goal: Patient-Specific Goal (Individualized)  Description: You can add care plan individualizations to a care plan. Examples of Individualization might be:  \"Parent requests to be called daily at 9am for status\", \"I have a hard time hearing out of my right ear\", or \"Do not touch me to wake me up as it startlesme\".  Outcome: Progressing  Goal: Absence of Hospital-Acquired Illness or Injury  Outcome: Progressing  Intervention: Identify and Manage Fall Risk  Recent Flowsheet Documentation  Taken 4/22/2025 1600 by Yeimy Kellogg, RN  Safety Promotion/Fall Prevention:   activity supervised   clutter free environment maintained   lighting adjusted   nonskid shoes/slippers when out of bed   room near nurse's station   room organization consistent   supervised activity  Intervention: Prevent Infection  Recent Flowsheet Documentation  Taken 4/22/2025 1600 by Yeimy Kellogg, RN  Infection Prevention:   cohorting utilized   rest/sleep promoted   single patient room provided  Goal: Optimal Comfort and Wellbeing  Outcome: Progressing  Intervention: Monitor Pain and Promote Comfort  Recent Flowsheet Documentation  Taken 4/22/2025 1600 by Yeimy Kellogg, RN  Pain Management Interventions: medication offered but refused  Goal: Readiness for Transition of Care  Outcome: Progressing     Problem: Delirium  Goal: Optimal Coping  Outcome: Progressing  Goal: Improved Behavioral Control  Outcome: Progressing  Intervention: Minimize Safety Risk  Recent Flowsheet Documentation  Taken " 4/22/2025 1600 by Yeimy Kellogg, RN  Enhanced Safety Measures: pain management  Goal: Improved Attention and Thought Clarity  Outcome: Progressing  Intervention: Maximize Cognitive Function  Recent Flowsheet Documentation  Taken 4/22/2025 1600 by Yeimy Kellogg RN  Sensory Stimulation Regulation:   care clustered   quiet environment promoted  Reorientation Measures: clock in view  Goal: Improved Sleep  Outcome: Progressing     Problem: Skin Injury Risk Increased  Goal: Skin Health and Integrity  Outcome: Progressing  Intervention: Plan: Nurse Driven Intervention: Moisture Management  Recent Flowsheet Documentation  Taken 4/22/2025 2000 by Yeimy Kellogg RN  Moisture Interventions: Encourage regular toileting  Bathing/Skin Care: patient refused  Taken 4/22/2025 1600 by Yeimy Kellogg RN  Moisture Interventions: Encourage regular toileting   Goal Outcome Evaluation:       Alert and oriented x4. Up SBA with walker. Oxycodone effective for chronic back pain. On 3L nasal cannula sats between 90-92%. Good appetite. IV was leaking so it was removed, ultrasound needed to place a new one.

## 2025-04-23 NOTE — PROGRESS NOTES
[facility]    Medicine Progress Note - Hospitalist Service    Date of Admission:  4/18/2025    Assessment & Plan   Jessica Ellison is a 74 year old female with past medical hx of CKD2, JOY, COPD, hypothyroidism, HTN, seizures, chronic pain that presented on 4/18/2025 with dyspnea and acute hypoxic respiratory failure and concern for heart failure exacerbation vs pneumonia. .     # Acute respiratory failure with hypoxia without hypercapnia, etiology unclear  Sao2 92-94% in last 4 outpatient visits. Presented for 1 week of dry cough and progressive dyspnea. CT with mildly enlarged pulmonary artery suggesting pulmonary artery hypertension, and possible pneumonia as below. BNP 1600.  Admitted on 3L.  Initially managing as possible pneumonia and/or possible CHF. Given that patient has remained hypoxic while dry, continued to be hypoxic despite antibiotics, and previously seems to have responded to steroids started prednisone burst. Seems to be improving with steroids possible that she could discharge as soon as 4/24.   - See below     # Possible CHF and/or pulmonary artery hypertension    Initially suspect primary  of hypoxia is fluid overload. Patient has diffuse crackles. Last echo 10/2023 without CHF or PAH. The right ventricular systolic pressure is approximated at 25.7 mmHg plus the right atrial pressure. Started Lasix 40mg IV BID. Reported good diuresis with IV Lasix. Developed VASHTI that is now improving with holding diuresis and bolus fluids. TTE with EF of 60% but with grade I or early diastolic dysfunction noted.   - Holding diuresis     # VASHTI, resolved   Suspect secondary to diuresis. TTE on 4/21 with normal sized IVC and Cr improving with holding diuretics.   - Holding further diuresis     # Possible pneumonia, bilateral  CT  4/18- No evidence for pulmonary emboli. Advanced emphysema. Mildly enlarged pulmonary arteries suggesting pulmonary arterial hypertension. Hazy  groundglass opacities both lungs concerning for pneumonia. Afebrile, no cough, no leukocytosis, procal negative. CRP 79. Less likely pneumonia or COPD, but will treat out of abundance of caution. Influenza, RSV, covid PCR negative. Viral respiratory PCR panel negative.   - Continued ceftriaxone 1g IV daily, Z pack PO  - Trend CRP    # Centrilobular emphysema   # Concern for pulmonary hypertension    30 pack year history, quit in 2004.  PFTs 2019 showed normal spirometry, air-trapping, and normal diffusion capacity. Moderate radiographic emphysema noted per chest CT in 3/2022. Repeat pulmonary function testing 8/2023 shows normal spirometry, lung volumes, and diffusion capacity. Followed with pulmonology last visit 1/22/25.   CT on admission shows advanced emphysema. Mildly enlarged pulmonary arteries suggesting pulmonary arterial hypertension. No wheezing on admission. TTE this admission. TTE this admission with right ventriclar systolic pressure of 46 mmHg. TR max velocity 3.3 M/sec suggestive of pulmonary hypertension. It seems like pulmonary hypertension is possible but it is at the very least would not be severe pulmonary hypertension. Given that this is likely type III pulmonary hypertension and is not severe unlikely that targeted therapies would be started. Will continue to trial prednisone burst to see if improvement can be gained in patient's hypoxia and oxygen requirement.  - Continued PTA Symbicort, Tiotropium, Duonebs PRN  - Started prednisone burst with 40 mg prednisone every day 4/22     # Hx of CVA  # Post-stroke epilepsy  # Hyperlipidemia LDL goal <70  CT 10/2023 done for loss of consciousness, possible seizure showed - chronic appearing right occipital infarct, new from 2019. Area of infarct does contain a few areas of scattered calcification. Treated as possible seizure. Followed with neuro, last visit 12/9/24.  - Continued PTA Aspirin 325mg, Atorvastatin 40mg, Keppra 500mg BID    # Normocytic  "anemia, chronic  # Mild iron deficiency  Hemoglobin 10.3 on admission, baseline 9-10. No evidence acute blood loss   Ferritin 68. %Fe sat 10, b12 383. Stable.   - Started Iron this admission iron  - Recheck labs in 4 months     # Essential hypertension with goal blood pressure less than 140/90  PTA Amlodipine 5mg, Metoprolol 100 mg BID. Some softer blood pressures during admission.  - Continued PTA Metoprolol 100 mg BID  - Holding PTA Amlodipine 5 mg,     # Chronic pain syndrome  # Continuous opioid dependence  - Continued PTA Gabapentin 300 mg BID and 600 mg Qpm  - Caution with narcotics given history of substance abuse disorder     # Anxiety   # Major depressive disorder, recurrent episode, moderate (H)  - Continued PTA Duloxetine 30 mg, Seroquel 100 mg at bedtime     # History of alcohol use disorder  Reports no recent alcohol.  - Continued PTA Folate and thiamine    # Hypothyroidism  TSH 0.45 in 9/2024  - Continued PTA Levothyroxine 50 mcg    # JOY (obstructive sleep apnea)   PTA on CPAP 13-15 cmH20 (changed recently from 7-15 cm H20) Reports using PAp most nights  - A follow-up overnight oximetry was ordered by Sleep medicine, but unclear if completed   - Recommend obtaining home CPAP device for use in hospital      # Obesity  BMI 31. Complicates cares and contributes to morbidity         Diet: Regular Diet Adult    DVT Prophylaxis: Enoxaparin (Lovenox) SQ  Bruce Catheter: Not present  Lines: None     Cardiac Monitoring: None  Code Status: Full Code      Clinically Significant Risk Factors               # Hypoalbuminemia: Lowest albumin = 3.4 g/dL at 4/21/2025  6:28 AM, will monitor as appropriate     # Hypertension: Noted on problem list            # Obesity: Estimated body mass index is 31.44 kg/m  as calculated from the following:    Height as of this encounter: 1.575 m (5' 2\").    Weight as of this encounter: 78 kg (171 lb 14.4 oz).      # Financial/Environmental Concerns: none         Social Drivers of " Health    Tobacco Use: Medium Risk (4/18/2025)    Patient History     Smoking Tobacco Use: Former     Smokeless Tobacco Use: Never   Physical Activity: Inactive (2/20/2024)    Exercise Vital Sign     Days of Exercise per Week: 0 days     Minutes of Exercise per Session: 0 min   Social Connections: Unknown (2/20/2024)    Social Connection and Isolation Panel [NHANES]     Frequency of Social Gatherings with Friends and Family: More than three times a week          Disposition Plan     Medically Ready for Discharge: Anticipated Tomorrow     Martín Vargas MD  Hospitalist Service  [facility]  Securely message with [employer] (more info)  Text page via [facility] Paging/Directory   ______________________________________________________________________    Interval History   No acute events overnight. States breathing might be a bit better this morning. Able to wean nasal canula down to 2 liters during the day. Hoping she could go home soon if similar to improved by tomorrow interested in going home with oxygen.     Physical Exam   Vital Signs: Temp: 97.6  F (36.4  C) Temp src: Oral BP: 126/74 Pulse: 75   Resp: 18 SpO2: (!) 90 % O2 Device: Nasal cannula Oxygen Delivery: 2 LPM  Weight: 171 lbs 14.4 oz    General Appearance:  Awake and alert, lying in bed in no acute distress   Respiratory: Breathing easily on nasal canula, some course breath sounds bilaterally but improved from yesterday   Cardiovascular: Extremities warm and well perfused   GI: Abdomen non-distended   Other:  Appropriate mood and affect      Medical Decision Making       45 MINUTES SPENT BY ME on the date of service doing chart review, history, exam, documentation & further activities per the note.      Data     I have personally reviewed the following data over the past 24 hrs:    5.0  \   10.4 (L)   / 271     140 104 22.1 /  108 (H)   4.0 26 0.76 \     Procal: N/A CRP: 25.59 (H) Lactic Acid: N/A         Imaging results reviewed  over the past 24 hrs:   No results found for this or any previous visit (from the past 24 hours).

## 2025-04-24 VITALS
TEMPERATURE: 98.4 F | HEART RATE: 68 BPM | BODY MASS INDEX: 31.56 KG/M2 | RESPIRATION RATE: 20 BRPM | OXYGEN SATURATION: 92 % | WEIGHT: 171.52 LBS | HEIGHT: 62 IN | DIASTOLIC BLOOD PRESSURE: 89 MMHG | SYSTOLIC BLOOD PRESSURE: 149 MMHG

## 2025-04-24 LAB
ANION GAP SERPL CALCULATED.3IONS-SCNC: 8 MMOL/L (ref 7–15)
ATRIAL RATE - MUSE: 85 BPM
BUN SERPL-MCNC: 26.4 MG/DL (ref 8–23)
C PNEUM DNA SPEC QL NAA+PROBE: NOT DETECTED
CALCIUM SERPL-MCNC: 9.4 MG/DL (ref 8.8–10.4)
CHLORIDE SERPL-SCNC: 104 MMOL/L (ref 98–107)
CREAT SERPL-MCNC: 0.88 MG/DL (ref 0.51–0.95)
DIASTOLIC BLOOD PRESSURE - MUSE: NORMAL MMHG
EGFRCR SERPLBLD CKD-EPI 2021: 69 ML/MIN/1.73M2
ERYTHROCYTE [DISTWIDTH] IN BLOOD BY AUTOMATED COUNT: 15.3 % (ref 10–15)
FLUAV H1 2009 PAND RNA SPEC QL NAA+PROBE: NOT DETECTED
FLUAV H1 RNA SPEC QL NAA+PROBE: NOT DETECTED
FLUAV H3 RNA SPEC QL NAA+PROBE: NOT DETECTED
FLUAV RNA SPEC QL NAA+PROBE: NOT DETECTED
FLUBV RNA SPEC QL NAA+PROBE: NOT DETECTED
GLUCOSE SERPL-MCNC: 93 MG/DL (ref 70–99)
HADV DNA SPEC QL NAA+PROBE: NOT DETECTED
HCO3 SERPL-SCNC: 27 MMOL/L (ref 22–29)
HCOV PNL SPEC NAA+PROBE: NOT DETECTED
HCT VFR BLD AUTO: 32.9 % (ref 35–47)
HGB BLD-MCNC: 10.2 G/DL (ref 11.7–15.7)
HMPV RNA SPEC QL NAA+PROBE: NOT DETECTED
HPIV1 RNA SPEC QL NAA+PROBE: NOT DETECTED
HPIV2 RNA SPEC QL NAA+PROBE: NOT DETECTED
HPIV3 RNA SPEC QL NAA+PROBE: NOT DETECTED
HPIV4 RNA SPEC QL NAA+PROBE: NOT DETECTED
INTERPRETATION ECG - MUSE: NORMAL
M PNEUMO DNA SPEC QL NAA+PROBE: NOT DETECTED
MCH RBC QN AUTO: 28.6 PG (ref 26.5–33)
MCHC RBC AUTO-ENTMCNC: 31 G/DL (ref 31.5–36.5)
MCV RBC AUTO: 92 FL (ref 78–100)
P AXIS - MUSE: 56 DEGREES
PLATELET # BLD AUTO: 299 10E3/UL (ref 150–450)
POTASSIUM SERPL-SCNC: 3.6 MMOL/L (ref 3.4–5.3)
PR INTERVAL - MUSE: 182 MS
QRS DURATION - MUSE: 78 MS
QT - MUSE: 404 MS
QTC - MUSE: 480 MS
R AXIS - MUSE: 0 DEGREES
RBC # BLD AUTO: 3.57 10E6/UL (ref 3.8–5.2)
RSV RNA SPEC QL NAA+PROBE: NOT DETECTED
RSV RNA SPEC QL NAA+PROBE: NOT DETECTED
RV+EV RNA SPEC QL NAA+PROBE: NOT DETECTED
SODIUM SERPL-SCNC: 139 MMOL/L (ref 135–145)
SYSTOLIC BLOOD PRESSURE - MUSE: NORMAL MMHG
T AXIS - MUSE: 8 DEGREES
VENTRICULAR RATE- MUSE: 85 BPM
WBC # BLD AUTO: 6.7 10E3/UL (ref 4–11)

## 2025-04-24 PROCEDURE — 99232 SBSQ HOSP IP/OBS MODERATE 35: CPT | Performed by: STUDENT IN AN ORGANIZED HEALTH CARE EDUCATION/TRAINING PROGRAM

## 2025-04-24 PROCEDURE — 250N000012 HC RX MED GY IP 250 OP 636 PS 637: Performed by: STUDENT IN AN ORGANIZED HEALTH CARE EDUCATION/TRAINING PROGRAM

## 2025-04-24 PROCEDURE — 250N000013 HC RX MED GY IP 250 OP 250 PS 637: Performed by: INTERNAL MEDICINE

## 2025-04-24 PROCEDURE — 120N000001 HC R&B MED SURG/OB

## 2025-04-24 PROCEDURE — 85018 HEMOGLOBIN: CPT | Performed by: STUDENT IN AN ORGANIZED HEALTH CARE EDUCATION/TRAINING PROGRAM

## 2025-04-24 PROCEDURE — 250N000013 HC RX MED GY IP 250 OP 250 PS 637

## 2025-04-24 PROCEDURE — 80048 BASIC METABOLIC PNL TOTAL CA: CPT | Performed by: STUDENT IN AN ORGANIZED HEALTH CARE EDUCATION/TRAINING PROGRAM

## 2025-04-24 PROCEDURE — 36415 COLL VENOUS BLD VENIPUNCTURE: CPT | Performed by: STUDENT IN AN ORGANIZED HEALTH CARE EDUCATION/TRAINING PROGRAM

## 2025-04-24 PROCEDURE — 250N000013 HC RX MED GY IP 250 OP 250 PS 637: Performed by: PHYSICIAN ASSISTANT

## 2025-04-24 PROCEDURE — 250N000011 HC RX IP 250 OP 636

## 2025-04-24 PROCEDURE — 250N000013 HC RX MED GY IP 250 OP 250 PS 637: Performed by: STUDENT IN AN ORGANIZED HEALTH CARE EDUCATION/TRAINING PROGRAM

## 2025-04-24 PROCEDURE — 87581 M.PNEUMON DNA AMP PROBE: CPT | Performed by: STUDENT IN AN ORGANIZED HEALTH CARE EDUCATION/TRAINING PROGRAM

## 2025-04-24 RX ORDER — BENZONATATE 100 MG/1
100 CAPSULE ORAL 3 TIMES DAILY
Status: DISCONTINUED | OUTPATIENT
Start: 2025-04-24 | End: 2025-04-25 | Stop reason: HOSPADM

## 2025-04-24 RX ORDER — CODEINE PHOSPHATE AND GUAIFENESIN 10; 100 MG/5ML; MG/5ML
5 SOLUTION ORAL EVERY 4 HOURS PRN
Status: DISCONTINUED | OUTPATIENT
Start: 2025-04-24 | End: 2025-04-25 | Stop reason: HOSPADM

## 2025-04-24 RX ADMIN — GUAIFENESIN AND CODEINE PHOSPHATE 5 ML: 100; 10 SOLUTION ORAL at 16:43

## 2025-04-24 RX ADMIN — THIAMINE HCL TAB 100 MG 100 MG: 100 TAB at 08:21

## 2025-04-24 RX ADMIN — GUAIFENESIN AND CODEINE PHOSPHATE 5 ML: 100; 10 SOLUTION ORAL at 21:01

## 2025-04-24 RX ADMIN — CEFTRIAXONE SODIUM 1 G: 1 INJECTION, POWDER, FOR SOLUTION INTRAMUSCULAR; INTRAVENOUS at 19:58

## 2025-04-24 RX ADMIN — UMECLIDINIUM 1 PUFF: 62.5 AEROSOL, POWDER ORAL at 08:21

## 2025-04-24 RX ADMIN — FLUTICASONE FUROATE AND VILANTEROL TRIFENATATE 1 PUFF: 200; 25 POWDER RESPIRATORY (INHALATION) at 08:21

## 2025-04-24 RX ADMIN — PREDNISONE 40 MG: 20 TABLET ORAL at 08:21

## 2025-04-24 RX ADMIN — GABAPENTIN 300 MG: 300 CAPSULE ORAL at 16:46

## 2025-04-24 RX ADMIN — GABAPENTIN 600 MG: 300 CAPSULE ORAL at 22:21

## 2025-04-24 RX ADMIN — OXYCODONE HYDROCHLORIDE 5 MG: 5 TABLET ORAL at 18:49

## 2025-04-24 RX ADMIN — METOPROLOL TARTRATE 100 MG: 100 TABLET, FILM COATED ORAL at 08:21

## 2025-04-24 RX ADMIN — GUAIFENESIN 1200 MG: 600 TABLET ORAL at 08:20

## 2025-04-24 RX ADMIN — BENZOCAINE 6 MG-MENTHOL 10 MG LOZENGES 1 LOZENGE: at 15:13

## 2025-04-24 RX ADMIN — OXYCODONE HYDROCHLORIDE 5 MG: 5 TABLET ORAL at 08:21

## 2025-04-24 RX ADMIN — LEVETIRACETAM 500 MG: 500 TABLET, FILM COATED ORAL at 08:21

## 2025-04-24 RX ADMIN — FOLIC ACID 1 MG: 1 TABLET ORAL at 08:21

## 2025-04-24 RX ADMIN — LEVETIRACETAM 500 MG: 500 TABLET, FILM COATED ORAL at 19:59

## 2025-04-24 RX ADMIN — Medication 20 MG: at 22:21

## 2025-04-24 RX ADMIN — DEXTROMETHORPHAN POLISTIREX 30 MG: 30 SUSPENSION ORAL at 08:48

## 2025-04-24 RX ADMIN — BENZONATATE 100 MG: 100 CAPSULE ORAL at 12:20

## 2025-04-24 RX ADMIN — GABAPENTIN 300 MG: 300 CAPSULE ORAL at 08:20

## 2025-04-24 RX ADMIN — ATORVASTATIN CALCIUM 40 MG: 20 TABLET, FILM COATED ORAL at 08:20

## 2025-04-24 RX ADMIN — DEXTROMETHORPHAN POLISTIREX 30 MG: 30 SUSPENSION ORAL at 19:58

## 2025-04-24 RX ADMIN — LEVOTHYROXINE SODIUM 50 MCG: 50 TABLET ORAL at 08:20

## 2025-04-24 RX ADMIN — FERROUS SULFATE TAB 325 MG (65 MG ELEMENTAL FE) 325 MG: 325 (65 FE) TAB at 08:21

## 2025-04-24 RX ADMIN — DULOXETINE HYDROCHLORIDE 30 MG: 30 CAPSULE, DELAYED RELEASE ORAL at 08:20

## 2025-04-24 RX ADMIN — ASPIRIN 325 MG: 325 TABLET ORAL at 08:21

## 2025-04-24 RX ADMIN — BENZONATATE 100 MG: 100 CAPSULE ORAL at 19:59

## 2025-04-24 RX ADMIN — QUETIAPINE 100 MG: 100 TABLET ORAL at 22:21

## 2025-04-24 RX ADMIN — BENZOCAINE 6 MG-MENTHOL 10 MG LOZENGES 1 LOZENGE: at 18:49

## 2025-04-24 RX ADMIN — METOPROLOL TARTRATE 100 MG: 100 TABLET, FILM COATED ORAL at 19:59

## 2025-04-24 RX ADMIN — OXYCODONE HYDROCHLORIDE 5 MG: 5 TABLET ORAL at 12:20

## 2025-04-24 ASSESSMENT — ACTIVITIES OF DAILY LIVING (ADL)
ADLS_ACUITY_SCORE: 32
ADLS_ACUITY_SCORE: 33
ADLS_ACUITY_SCORE: 33
ADLS_ACUITY_SCORE: 30
ADLS_ACUITY_SCORE: 32
ADLS_ACUITY_SCORE: 32
ADLS_ACUITY_SCORE: 33
ADLS_ACUITY_SCORE: 32
ADLS_ACUITY_SCORE: 33
ADLS_ACUITY_SCORE: 32
ADLS_ACUITY_SCORE: 33
ADLS_ACUITY_SCORE: 30
ADLS_ACUITY_SCORE: 33
ADLS_ACUITY_SCORE: 32

## 2025-04-24 NOTE — PLAN OF CARE
Problem: Adult Inpatient Plan of Care  Goal: Plan of Care Review  Description: The Plan of Care Review/Shift note should be completed every shift.  The Outcome Evaluation is a brief statement about your assessment that the patient is improving, declining, or no change.  This information will be displayed automatically on your shiftnote.  Outcome: Not Progressing   Goal Outcome Evaluation:    Patient A/Ox 4, able to make needs known. Mild pain in lower back. Independent. SOB increased this evening, with congested, non productive cough. 02 increased to 3L, neb provided by RT, Provider called to bedside. New orders given. Refer to MAR. Pt resting comfortably now. Awaiting chest x-ray results.

## 2025-04-24 NOTE — PROGRESS NOTES
[facility]    Medicine Progress Note - Hospitalist Service    Date of Admission:  4/18/2025    Assessment & Plan   Jessica Ellison is a 74 year old female with past medical hx of CKD2, JOY, COPD, hypothyroidism, HTN, seizures, chronic pain that presented on 4/18/2025 with dyspnea and acute hypoxic respiratory failure and concern for heart failure exacerbation vs pneumonia. .     # Acute respiratory failure with hypoxia without hypercapnia, etiology unclear  Sao2 92-94% in last 4 outpatient visits. Presented for 1 week of dry cough and progressive dyspnea. CT with mildly enlarged pulmonary artery suggesting pulmonary artery hypertension, and possible pneumonia as below. BNP 1600.  Admitted on 3L.  Initially managing as possible pneumonia and/or possible CHF. Given that patient has remained hypoxic while dry, continued to be hypoxic despite antibiotics, and previously seems to have responded to steroids started prednisone burst. Seems to be improving with steroids possible that she could discharge as soon as 4/25.   - See below   - Cough now seems to be a primary , scheduling Tessalon, adding Robitussin with codeine  - Repeat viral panel pending    # Possible CHF and/or pulmonary artery hypertension    Initially suspect primary  of hypoxia is fluid overload. Patient has diffuse crackles. Last echo 10/2023 without CHF or PAH. The right ventricular systolic pressure is approximated at 25.7 mmHg plus the right atrial pressure. Started Lasix 40mg IV BID. Reported good diuresis with IV Lasix. Developed VASHTI that is now improving with holding diuresis and bolus fluids. TTE with EF of 60% but with grade I or early diastolic dysfunction noted. Did get some additional diuresis 4/23-4/24 with sudden episode of coughing and SOB but CXR reassuring with no opacity or edema noted.   - Holding diuresis     # VASHTI, resolved   Suspect secondary to diuresis. TTE on 4/21 with normal sized IVC and  Cr improving with holding diuretics. Did get some additional diuresis 4/23-4/24 with sudden episode of coughing and SOB but CXR reassuring.    - Holding further diuresis     # Possible pneumonia, bilateral  CT  4/18- No evidence for pulmonary emboli. Advanced emphysema. Mildly enlarged pulmonary arteries suggesting pulmonary arterial hypertension. Hazy groundglass opacities both lungs concerning for pneumonia. Afebrile, no cough, no leukocytosis, procal negative. CRP 79. Less likely pneumonia or COPD, but will treat out of abundance of caution. Influenza, RSV, covid PCR negative. Viral respiratory PCR panel negative.   - Continued ceftriaxone 1g IV daily, Z pack PO  - Trend CRP    # Centrilobular emphysema   # Concern for pulmonary hypertension    30 pack year history, quit in 2004.  PFTs 2019 showed normal spirometry, air-trapping, and normal diffusion capacity. Moderate radiographic emphysema noted per chest CT in 3/2022. Repeat pulmonary function testing 8/2023 shows normal spirometry, lung volumes, and diffusion capacity. Followed with pulmonology last visit 1/22/25.   CT on admission shows advanced emphysema. Mildly enlarged pulmonary arteries suggesting pulmonary arterial hypertension. No wheezing on admission. TTE this admission. TTE this admission with right ventriclar systolic pressure of 46 mmHg. TR max velocity 3.3 M/sec suggestive of pulmonary hypertension. It seems like pulmonary hypertension is possible but it is at the very least would not be severe pulmonary hypertension. Given that this is likely type III pulmonary hypertension and is not severe unlikely that targeted therapies would be started. Will continue to trial prednisone burst to see if improvement can be gained in patient's hypoxia and oxygen requirement. With episode of worsened hypoxia overnight 4/23-4/24 will plan to continue hospitalization though 4/25 to ensure stability.   - Continued PTA Symbicort, Tiotropium, Duonebs PRN  - Started  prednisone burst with 40 mg prednisone every day 4/22     # Hx of CVA  # Post-stroke epilepsy  # Hyperlipidemia LDL goal <70  CT 10/2023 done for loss of consciousness, possible seizure showed - chronic appearing right occipital infarct, new from 2019. Area of infarct does contain a few areas of scattered calcification. Treated as possible seizure. Followed with neuro, last visit 12/9/24.  - Continued PTA Aspirin 325mg, Atorvastatin 40mg, Keppra 500mg BID    # Normocytic anemia, chronic  # Mild iron deficiency  Hemoglobin 10.3 on admission, baseline 9-10. No evidence acute blood loss   Ferritin 68. %Fe sat 10, b12 383. Stable.   - Started Iron this admission iron  - Recheck labs in 4 months     # Essential hypertension with goal blood pressure less than 140/90  PTA Amlodipine 5mg, Metoprolol 100 mg BID. Some softer blood pressures during admission.  - Continued PTA Metoprolol 100 mg BID  - Holding PTA Amlodipine 5 mg,     # Chronic pain syndrome  # Continuous opioid dependence  - Continued PTA Gabapentin 300 mg BID and 600 mg Qpm  - Caution with narcotics given history of substance abuse disorder     # Anxiety   # Major depressive disorder, recurrent episode, moderate (H)  - Continued PTA Duloxetine 30 mg, Seroquel 100 mg at bedtime     # History of alcohol use disorder  Reports no recent alcohol.  - Continued PTA Folate and thiamine    # Hypothyroidism  TSH 0.45 in 9/2024  - Continued PTA Levothyroxine 50 mcg    # JOY (obstructive sleep apnea)   PTA on CPAP 13-15 cmH20 (changed recently from 7-15 cm H20) Reports using PAp most nights  - A follow-up overnight oximetry was ordered by Sleep medicine, but unclear if completed   - Recommend obtaining home CPAP device for use in hospital      # Obesity  BMI 31. Complicates cares and contributes to morbidity         Diet: Regular Diet Adult    DVT Prophylaxis: Enoxaparin (Lovenox) SQ  Bruce Catheter: Not present  Lines: None     Cardiac Monitoring: None  Code Status: Full  "Code      Clinically Significant Risk Factors               # Hypoalbuminemia: Lowest albumin = 3.4 g/dL at 4/21/2025  6:28 AM, will monitor as appropriate     # Hypertension: Noted on problem list            # Obesity: Estimated body mass index is 31.37 kg/m  as calculated from the following:    Height as of this encounter: 1.575 m (5' 2\").    Weight as of this encounter: 77.8 kg (171 lb 8.3 oz).      # Financial/Environmental Concerns: none         Social Drivers of Health    Tobacco Use: Medium Risk (4/18/2025)    Patient History     Smoking Tobacco Use: Former     Smokeless Tobacco Use: Never   Physical Activity: Inactive (2/20/2024)    Exercise Vital Sign     Days of Exercise per Week: 0 days     Minutes of Exercise per Session: 0 min   Social Connections: Unknown (2/20/2024)    Social Connection and Isolation Panel [NHANES]     Frequency of Social Gatherings with Friends and Family: More than three times a week          Disposition Plan     Medically Ready for Discharge: Anticipated Tomorrow     Martín Vargas MD  Hospitalist Service  [facility]  Securely message with [employer] (more info)  Text page via [employer] Paging/Directory   ______________________________________________________________________    Interval History   Suddenly became short of breath overnight in setting of increased coughing. Oxygen increased from 2-3 liters. Given extra cough medication, CXR obtained and given diuretics. Reports that her breathing feels similar to yesterday this morning but that she felt \"like she was going to die\" last night.     Physical Exam   Vital Signs: Temp: 97.5  F (36.4  C) Temp src: Oral BP: 127/81 Pulse: 73   Resp: 18 SpO2: 93 % O2 Device: Nasal cannula Oxygen Delivery: 2 LPM  Weight: 171 lbs 8.29 oz    General Appearance: Awaken and alert sitting back in bed, in no acute distress  Respiratory: Breathing easily on nasal canula   Cardiovascular: Extremities warm and well perfused "   GI: Abdomen soft, non-distended   Other: Appropriate mood and affect      Medical Decision Making       45 MINUTES SPENT BY ME on the date of service doing chart review, history, exam, documentation & further activities per the note.      Data     I have personally reviewed the following data over the past 24 hrs:    6.7  \   10.2 (L)   / 299     139 104 26.4 (H) /  93   3.6 27 0.88 \       Imaging results reviewed over the past 24 hrs:   Recent Results (from the past 24 hours)   XR Chest Port 1 View    Narrative    EXAM: XR CHEST PORT 1 VIEW  LOCATION: [facility]  DATE: 4/23/2025    INDICATION: New cough, bibasilar crackles.  COMPARISON: Chest CT from 04/18/2025.      Impression    IMPRESSION:   1.  Emphysema without evidence of pulmonary edema or pneumonia when using the recent chest CT as a baseline.  2.  No pleural effusion or pneumothorax.  3.  Normal heart size.

## 2025-04-24 NOTE — PROGRESS NOTES
[facility]    Hospital Medicine   Cross Cover Note  Date of Service: 4/23/2025     I was called due to increased respiratory needs, increased cough.    Patient was having more difficulty breathing, but O2 sats were stable on 3L.  Has a worsened cough, initially dry but now feels productive, in addition to some accessory muscle use.  Had a neb with minimal improvement.     Exam:  Pulm: Frequent coarse coughing. Slightly labored breathing on 3L O2, slight accessory muscle use. No wheezing or rhonchi appreciated. Bibasilar crackles present.     A&P:  - Chest x-ray pending  - Trial IV furosemide 40 mg x 1 dose  - Addition of Mucinex 1200 mg bid and dextromethorphan 30 mg q12h      Nay Chou PA-C  [facility] Hospitalist Service

## 2025-04-24 NOTE — PLAN OF CARE
Problem: Gas Exchange Impaired  Goal: Optimal Gas Exchange  Outcome: Progressing  Intervention: Optimize Oxygenation and Ventilation  Recent Flowsheet Documentation  Taken 4/24/2025 0000 by Devick, Karen M, RN  Head of Bed (HOB) Positioning: HOB at 20-30 degrees     Goal Outcome Evaluation: Patient alert and oriented. On 3 L O2. Dyspnea on exertion with shortness of breath at rest, received one dose of IV Lasix last evening at 2220. Occasional cough, non-productive; PRN tessalon and Robitussin. Up to bedside commode in room independently, ambulates in hallway with walker and assistance with oxygen tank.

## 2025-04-25 VITALS
DIASTOLIC BLOOD PRESSURE: 88 MMHG | HEIGHT: 62 IN | BODY MASS INDEX: 31.56 KG/M2 | TEMPERATURE: 97.7 F | RESPIRATION RATE: 18 BRPM | OXYGEN SATURATION: 95 % | WEIGHT: 171.52 LBS | SYSTOLIC BLOOD PRESSURE: 162 MMHG | HEART RATE: 67 BPM

## 2025-04-25 LAB
ANION GAP SERPL CALCULATED.3IONS-SCNC: 11 MMOL/L (ref 7–15)
BUN SERPL-MCNC: 31.2 MG/DL (ref 8–23)
CALCIUM SERPL-MCNC: 9.4 MG/DL (ref 8.8–10.4)
CHLORIDE SERPL-SCNC: 106 MMOL/L (ref 98–107)
CREAT SERPL-MCNC: 0.79 MG/DL (ref 0.51–0.95)
EGFRCR SERPLBLD CKD-EPI 2021: 79 ML/MIN/1.73M2
ERYTHROCYTE [DISTWIDTH] IN BLOOD BY AUTOMATED COUNT: 15.2 % (ref 10–15)
GLUCOSE SERPL-MCNC: 108 MG/DL (ref 70–99)
HCO3 SERPL-SCNC: 25 MMOL/L (ref 22–29)
HCT VFR BLD AUTO: 32.4 % (ref 35–47)
HGB BLD-MCNC: 10.1 G/DL (ref 11.7–15.7)
MCH RBC QN AUTO: 29.1 PG (ref 26.5–33)
MCHC RBC AUTO-ENTMCNC: 31.2 G/DL (ref 31.5–36.5)
MCV RBC AUTO: 93 FL (ref 78–100)
PLATELET # BLD AUTO: 307 10E3/UL (ref 150–450)
POTASSIUM SERPL-SCNC: 3.7 MMOL/L (ref 3.4–5.3)
RBC # BLD AUTO: 3.47 10E6/UL (ref 3.8–5.2)
SODIUM SERPL-SCNC: 142 MMOL/L (ref 135–145)
WBC # BLD AUTO: 7.2 10E3/UL (ref 4–11)

## 2025-04-25 PROCEDURE — 250N000013 HC RX MED GY IP 250 OP 250 PS 637

## 2025-04-25 PROCEDURE — 94640 AIRWAY INHALATION TREATMENT: CPT

## 2025-04-25 PROCEDURE — 85018 HEMOGLOBIN: CPT | Performed by: STUDENT IN AN ORGANIZED HEALTH CARE EDUCATION/TRAINING PROGRAM

## 2025-04-25 PROCEDURE — 250N000011 HC RX IP 250 OP 636

## 2025-04-25 PROCEDURE — 250N000013 HC RX MED GY IP 250 OP 250 PS 637: Performed by: PHYSICIAN ASSISTANT

## 2025-04-25 PROCEDURE — 80048 BASIC METABOLIC PNL TOTAL CA: CPT | Performed by: STUDENT IN AN ORGANIZED HEALTH CARE EDUCATION/TRAINING PROGRAM

## 2025-04-25 PROCEDURE — 99239 HOSP IP/OBS DSCHRG MGMT >30: CPT | Performed by: STUDENT IN AN ORGANIZED HEALTH CARE EDUCATION/TRAINING PROGRAM

## 2025-04-25 PROCEDURE — 250N000013 HC RX MED GY IP 250 OP 250 PS 637: Performed by: INTERNAL MEDICINE

## 2025-04-25 PROCEDURE — 250N000013 HC RX MED GY IP 250 OP 250 PS 637: Performed by: STUDENT IN AN ORGANIZED HEALTH CARE EDUCATION/TRAINING PROGRAM

## 2025-04-25 PROCEDURE — 36415 COLL VENOUS BLD VENIPUNCTURE: CPT | Performed by: STUDENT IN AN ORGANIZED HEALTH CARE EDUCATION/TRAINING PROGRAM

## 2025-04-25 PROCEDURE — 250N000009 HC RX 250

## 2025-04-25 PROCEDURE — 250N000012 HC RX MED GY IP 250 OP 636 PS 637: Performed by: STUDENT IN AN ORGANIZED HEALTH CARE EDUCATION/TRAINING PROGRAM

## 2025-04-25 RX ORDER — PREDNISONE 20 MG/1
40 TABLET ORAL DAILY
Qty: 6 TABLET | Refills: 0 | Status: SHIPPED | OUTPATIENT
Start: 2025-04-26

## 2025-04-25 RX ORDER — SENNOSIDES 8.6 MG
325 CAPSULE ORAL DAILY
Qty: 30 TABLET | Refills: 0 | Status: SHIPPED | OUTPATIENT
Start: 2025-04-25

## 2025-04-25 RX ORDER — BENZONATATE 100 MG/1
100 CAPSULE ORAL 3 TIMES DAILY PRN
Qty: 90 CAPSULE | Refills: 0 | Status: SHIPPED | OUTPATIENT
Start: 2025-04-25

## 2025-04-25 RX ORDER — FERROUS SULFATE 325(65) MG
325 TABLET ORAL EVERY OTHER DAY
Qty: 30 TABLET | Refills: 0 | Status: SHIPPED | OUTPATIENT
Start: 2025-04-26

## 2025-04-25 RX ORDER — CODEINE PHOSPHATE AND GUAIFENESIN 10; 100 MG/5ML; MG/5ML
5 SOLUTION ORAL EVERY 4 HOURS PRN
Qty: 237 ML | Refills: 0 | Status: SHIPPED | OUTPATIENT
Start: 2025-04-25

## 2025-04-25 RX ADMIN — FOLIC ACID 1 MG: 1 TABLET ORAL at 08:37

## 2025-04-25 RX ADMIN — BENZONATATE 100 MG: 100 CAPSULE ORAL at 14:46

## 2025-04-25 RX ADMIN — PREDNISONE 40 MG: 20 TABLET ORAL at 08:35

## 2025-04-25 RX ADMIN — DULOXETINE HYDROCHLORIDE 30 MG: 30 CAPSULE, DELAYED RELEASE ORAL at 08:37

## 2025-04-25 RX ADMIN — GABAPENTIN 300 MG: 300 CAPSULE ORAL at 15:50

## 2025-04-25 RX ADMIN — DEXTROMETHORPHAN POLISTIREX 30 MG: 30 SUSPENSION ORAL at 08:37

## 2025-04-25 RX ADMIN — METOPROLOL TARTRATE 100 MG: 100 TABLET, FILM COATED ORAL at 08:36

## 2025-04-25 RX ADMIN — FLUTICASONE FUROATE AND VILANTEROL TRIFENATATE 1 PUFF: 200; 25 POWDER RESPIRATORY (INHALATION) at 08:37

## 2025-04-25 RX ADMIN — LEVETIRACETAM 500 MG: 500 TABLET, FILM COATED ORAL at 08:36

## 2025-04-25 RX ADMIN — THIAMINE HCL TAB 100 MG 100 MG: 100 TAB at 08:37

## 2025-04-25 RX ADMIN — GUAIFENESIN AND CODEINE PHOSPHATE 5 ML: 100; 10 SOLUTION ORAL at 00:33

## 2025-04-25 RX ADMIN — UMECLIDINIUM 1 PUFF: 62.5 AEROSOL, POWDER ORAL at 08:37

## 2025-04-25 RX ADMIN — GUAIFENESIN AND CODEINE PHOSPHATE 5 ML: 100; 10 SOLUTION ORAL at 12:49

## 2025-04-25 RX ADMIN — GABAPENTIN 300 MG: 300 CAPSULE ORAL at 08:35

## 2025-04-25 RX ADMIN — ENOXAPARIN SODIUM 40 MG: 40 INJECTION SUBCUTANEOUS at 00:11

## 2025-04-25 RX ADMIN — IPRATROPIUM BROMIDE AND ALBUTEROL SULFATE 3 ML: .5; 3 SOLUTION RESPIRATORY (INHALATION) at 09:28

## 2025-04-25 RX ADMIN — LEVOTHYROXINE SODIUM 50 MCG: 50 TABLET ORAL at 08:35

## 2025-04-25 RX ADMIN — BENZONATATE 100 MG: 100 CAPSULE ORAL at 08:36

## 2025-04-25 RX ADMIN — ASPIRIN 325 MG: 325 TABLET ORAL at 08:36

## 2025-04-25 RX ADMIN — ATORVASTATIN CALCIUM 40 MG: 20 TABLET, FILM COATED ORAL at 08:35

## 2025-04-25 ASSESSMENT — ACTIVITIES OF DAILY LIVING (ADL)
ADLS_ACUITY_SCORE: 32

## 2025-04-25 NOTE — CARE PLAN
04/25/25 1217   Home Oxygen Assessment (RN/RT ONLY)   Does patient have oxygen at home? No   1. SpO2 on room air at rest while awake 89       Oxygen LPM at rest 2 LPM   3. SpO2 on room air during Activity/with exercise 83   4. SpO2 with oxygen during activity/with exercise 91-92       Oxygen LPM during activity/with exercise 3 lpm   Does patient qualify for Home O2? Yes

## 2025-04-25 NOTE — PLAN OF CARE
Problem: Skin Injury Risk Increased  Goal: Skin Health and Integrity  Outcome: Progressing  Intervention: Plan: Nurse Driven Intervention: Moisture Management  Recent Flowsheet Documentation  Taken 4/25/2025 0218 by Alina Bustamante RN  Moisture Interventions: Encourage regular toileting  Intervention: Optimize Skin Protection  Recent Flowsheet Documentation  Taken 4/25/2025 0218 by Alina Bustamante RN  Activity Management: activity adjusted per tolerance  Head of Bed (HOB) Positioning: HOB at 30-45 degrees     Problem: Gas Exchange Impaired  Goal: Optimal Gas Exchange  Outcome: Progressing  Intervention: Optimize Oxygenation and Ventilation  Recent Flowsheet Documentation  Taken 4/25/2025 0218 by Alina Bustamante RN  Head of Bed (HOB) Positioning: HOB at 30-45 degrees   Goal Outcome Evaluation:         Patient alert and oriented X4.  Independent in the room.  Remained on 2L O2 overnight.  Given Robitussin for cough.  Cough dry/sometimes productive per patient.  Patient stated she would like to discharge - questioning home O2.

## 2025-04-25 NOTE — PROGRESS NOTES
[facility] Called and they are processing the order for home oxygen and said it should be here between 1600 and 1700. Patient updated.

## 2025-04-25 NOTE — INTERIM SUMMARY
Oxygen Documentation  I certify that this patient, Jessica Ellison has been under my care (or a nurse practitioner or physican's assistant working with me). This is the face-to-face encounter for oxygen medical necessity.      At the time of this encounter, I have reviewed the qualifying testing and have determined that supplemental oxygen is reasonable and necessary and is expected to improve the patient's condition in a home setting.         Patient has continued oxygen desaturation due to Emphysema J43.9.    If portability is ordered, is the patient mobile within the home? yes    Was this visit performed as a telehealth visit: No    2-3L of oxygen to maintain over 90%     Martín Vargas MD

## 2025-04-25 NOTE — DISCHARGE SUMMARY
"[facility]  Hospitalist Discharge Summary      Date of Admission:  4/18/2025  Date of Discharge:  4/25/2025  Discharging Provider: aMrtín Vargas MD  Discharge Service: Hospitalist Service    Discharge Diagnoses   # Acute respiratory failure with hypoxia without hypercapnia, etiology unclear  # Cough   # Centrilobular emphysema   # Concern for pulmonary hypertension   # Concern for possible HFpEF and/or pulmonary artery hypertension  # VASHTI, resolved   # Possible pneumonia, bilateral  # Hx of CVA  # Post-stroke epilepsy  # Hyperlipidemia LDL goal <70  # Normocytic anemia, chronic  # Mild iron deficiency  # Essential hypertension with goal blood pressure less than 140/90  # Chronic pain syndrome  # Continuous opioid dependence  # Anxiety   # Major depressive disorder, recurrent episode, moderate (H)  # History of alcohol use disorder  # Hypothyroidism  # JOY (obstructive sleep apnea)   # Obesity    Clinically Significant Risk Factors     # Obesity: Estimated body mass index is 31.37 kg/m  as calculated from the following:    Height as of this encounter: 1.575 m (5' 2\").    Weight as of this encounter: 77.8 kg (171 lb 8.3 oz).       Follow-ups Needed After Discharge   Follow-up Appointments       Hospital Follow-up with Existing Primary Care Provider (PCP)          Schedule Primary Care visit within: 14 Days               Unresulted Labs Ordered in the Past 30 Days of this Admission       No orders found from 3/19/2025 to 4/19/2025.        These results will be followed up by Martín Vargas MD.    Discharge Disposition   Discharged to home  Condition at discharge: Stable    Hospital Course   Jessica Ellison is a 74 year old female with past medical hx of CKD2, JOY, COPD, hypothyroidism, HTN, seizures, chronic pain that presented on 4/18/2025 with dyspnea and acute hypoxic respiratory failure and concern for heart failure exacerbation vs pneumonia. .     # Acute respiratory " failure with hypoxia without hypercapnia, etiology unclear  # Cough   Sao2 92-94% in last 4 outpatient visits. Presented for 1 week of dry cough and progressive dyspnea. CT with mildly enlarged pulmonary artery suggesting pulmonary artery hypertension, and possible pneumonia as below. BNP 1600.  Admitted on 3L. Initially managing as possible pneumonia and/or possible CHF. Given that patient has remained hypoxic while dry, continued to be hypoxic despite antibiotics, and previously seems to have responded to steroids started prednisone burst. With some improvement but continued need for supplemental oxygen. - Discharging with supplemental oxygen   - Discharging with tessalon and robitussin     # Centrilobular emphysema   # Concern for pulmonary hypertension    30 pack year history, quit in 2004.  PFTs 2019 showed normal spirometry, air-trapping, and normal diffusion capacity. Moderate radiographic emphysema noted per chest CT in 3/2022. Repeat pulmonary function testing 8/2023 shows normal spirometry, lung volumes, and diffusion capacity. Followed with pulmonology last visit 1/22/25.   CT on admission shows advanced emphysema. Mildly enlarged pulmonary arteries suggesting pulmonary arterial hypertension. No wheezing on admission. TTE this admission. TTE this admission with right ventriclar systolic pressure of 46 mmHg. TR max velocity 3.3 M/sec suggestive of pulmonary hypertension. It seems like pulmonary hypertension is possible but it is at the very least would not be severe pulmonary hypertension. Given that this is likely type III pulmonary hypertension and is not severe unlikely that targeted therapies would be started. Will continue to trial prednisone burst to see if improvement can be gained in patient's hypoxia and oxygen requirement. With episode of worsened hypoxia overnight 4/23-4/24 will plan to continue hospitalization though 4/25 to ensure stability. With her remaining stable on nasal canula for  several days will plan to discharge home with oxygen.   - Continued PTA Symbicort, Tiotropium, Duonebs PRN  - Completing prednisone burst outpatient   - Referral placed back to pulmonary     # Concern for possible HFpEF and/or pulmonary artery hypertension    Initially suspect primary  of hypoxia is fluid overload. Patient has diffuse crackles. Last echo 10/2023 without CHF or PAH. The right ventricular systolic pressure is approximated at 25.7 mmHg plus the right atrial pressure. Started Lasix 40mg IV BID. Reported good diuresis with IV Lasix. Developed VASHTI that is now improving with holding diuresis and bolus fluids. TTE with EF of 60% but with grade I or early diastolic dysfunction noted. Did get some additional diuresis 4/23-4/24 with sudden episode of coughing and SOB but CXR reassuring with no opacity or edema noted.    # VASHTI, resolved   Suspect secondary to diuresis. TTE on 4/21 with normal sized IVC and Cr improving with holding diuretics. Did get some additional diuresis 4/23-4/24 with sudden episode of coughing and SOB but CXR reassuring.      # Possible pneumonia, bilateral  CT  4/18- No evidence for pulmonary emboli. Advanced emphysema. Mildly enlarged pulmonary arteries suggesting pulmonary arterial hypertension. Hazy groundglass opacities both lungs concerning for pneumonia. Afebrile, no cough, no leukocytosis, procal negative. CRP 79. Less likely pneumonia or COPD, but will treat out of abundance of caution. Influenza, RSV, covid PCR negative. Viral respiratory PCR panel negative. Completed course of antibiotics in the hospital.     # Hx of CVA  # Post-stroke epilepsy  # Hyperlipidemia LDL goal <70  CT 10/2023 done for loss of consciousness, possible seizure showed - chronic appearing right occipital infarct, new from 2019. Area of infarct does contain a few areas of scattered calcification. Treated as possible seizure. Followed with neuro, last visit 12/9/24.  - Continued PTA Aspirin 325mg,  Atorvastatin 40mg, Keppra 500mg BID    # Normocytic anemia, chronic  # Mild iron deficiency  Hemoglobin 10.3 on admission, baseline 9-10. No evidence acute blood loss   Ferritin 68. %Fe sat 10, b12 383. Stable.   - Started Iron this admission iron  - Consider recheck labs in 4 months     # Essential hypertension with goal blood pressure less than 140/90  PTA Amlodipine 5mg, Metoprolol 100 mg BID. Some softer blood pressures during admission.  - Continued PTA Metoprolol 100 mg BID  - Holding PTA Amlodipine 5 mg,     # Chronic pain syndrome  # Continuous opioid dependence  - Continued PTA Gabapentin 300 mg BID and 600 mg Qpm    # Anxiety   # Major depressive disorder, recurrent episode, moderate (H)  - Continued PTA Duloxetine 30 mg, Seroquel 100 mg at bedtime     # History of alcohol use disorder  Reports no recent alcohol.  - Continued PTA Folate and thiamine    # Hypothyroidism  TSH 0.45 in 9/2024  - Continued PTA Levothyroxine 50 mcg    # JOY (obstructive sleep apnea)   PTA on CPAP 13-15 cmH20 (changed recently from 7-15 cm H20) Reports using PAp most nights  - Continue home CPAP     # Obesity  BMI 31. Complicates cares and contributes to morbidity     Consultations This Hospital Stay   OCCUPATIONAL THERAPY ADULT IP CONSULT  CARE MANAGEMENT / SOCIAL WORK IP CONSULT    Code Status   Full Code    Time Spent on this Encounter   I, Martín Vargas MD, personally saw the patient today and spent greater than 30 minutes discharging this patient.       Martín Vargas MD  [facility] MEDICAL SURGICAL  [address] [address]  Phone: [phone]  Fax: [phone]  ______________________________________________________________________    Physical Exam   Vital Signs: Temp: 97.7  F (36.5  C) Temp src: Oral BP: (!) 162/88 Pulse: 67   Resp: 18 SpO2: 95 % O2 Device: Nasal cannula Oxygen Delivery: 2 LPM  Weight: 171 lbs 8.29 oz  General Appearance:  Awaken and alert sitting back in bed,  in no acute distress  Respiratory: Breathing easily on nasal canula   Cardiovascular: Extremities warm and well perfused   GI: Abdomen soft, non-distended   Other:  Appropriate mood and affect         Primary Care Physician   Sarah Barriga    Discharge Orders      Adult Pulmonary Medicine Critical access hospital Referral      Med Therapy Management Referral      Reason for your hospital stay    You were admitted with low oxygen level in your blood and were treated with antibiotics and steroids with some improvement you are being discharged on home oxygen.     Activity    Your activity upon discharge: activity as tolerated     Oxygen Adult/Peds    Oxygen Documentation  I certify that this patient, Jessica Ellison has been under my care (or a nurse practitioner or physican's assistant working with me). This is the face-to-face encounter for oxygen medical necessity.      At the time of this encounter, I have reviewed the qualifying testing and have determined that supplemental oxygen is reasonable and necessary and is expected to improve the patient's condition in a home setting.         Patient has continued oxygen desaturation due to Emphysema J43.9.    If portability is ordered, is the patient mobile within the home? yes    Was this visit performed as a telehealth visit: No     Diet    Follow this diet upon discharge: Current Diet:Orders Placed This Encounter      Regular Diet Adult     Hospital Follow-up with Existing Primary Care Provider (PCP)            Significant Results and Procedures   Results for orders placed or performed during the hospital encounter of 04/18/25   CT Chest Pulmonary Embolism w Contrast    Narrative    EXAM: CT CHEST PULMONARY EMBOLISM W CONTRAST  LOCATION: [facility]  DATE: 4/18/2025    INDICATION: hypoxia x 12 hours, no wheezing, not on blood thinners  COMPARISON: CTA chest 08/18/2023  TECHNIQUE: CT chest pulmonary angiogram during arterial phase injection of IV  contrast. Multiplanar reformats and MIP reconstructions were performed. Dose reduction techniques were used.   CONTRAST: 71mL isovue 370    FINDINGS:  ANGIOGRAM CHEST: Pulmonary arteries are negative for pulmonary emboli. Mildly dilated pulmonary arteries suggesting pulmonary arterial hypertension, unchanged. Thoracic aorta is negative for dissection. No CT evidence of right heart strain.    LUNGS AND PLEURA: Advanced emphysema. Scattered hazy groundglass opacities both lungs predominantly within the left upper lobe. Subsegmental atelectasis in the bases. Calcified granuloma medial aspect of the right lower lobe. No pleural effusions. No   pneumothorax.    MEDIASTINUM/AXILLAE: No enlarged mediastinal or hilar lymph nodes.    CORONARY ARTERY CALCIFICATION: Mild.    UPPER ABDOMEN: Cholecystectomy, with intrahepatic bile dilatation and dilatation of the common bile duct, unchanged. Bilateral renal cysts. Small hypodense liver lesion is unchanged compatible with a cyst. Scattered diverticula visualized colon.    MUSCULOSKELETAL: Fat-containing midline ventral wall hernias, incompletely imaged. Osteopenia. Scoliosis. Advanced degenerative disc disease thoracic spine. Lower thoracic spinal fusion hardware. Degenerative changes both shoulders.      Impression    IMPRESSION:  1.  No evidence for pulmonary emboli.  2.  Advanced emphysema. Mildly enlarged pulmonary arteries suggesting pulmonary arterial hypertension.  3.  Hazy groundglass opacities both lungs concerning for pneumonia.  4.  Cholecystectomy, likely accounting for dilatation of the biliary tree, unchanged.   Chest 2 Views*    Narrative    EXAM: XR CHEST 2 VIEWS  LOCATION: [facility]  DATE: 4/20/2025    INDICATION: worsening hypoxemia  COMPARISON: Chest radiograph 10/2/2024 and CTA chest 4/18/2025.      Impression    IMPRESSION: Heart size and pulmonary vascularity are normal. Interstitial opacities in the right midlung could represent  infection and/or scarring. Streaky left basilar opacities could represent atelectasis and/or pneumonia. No pleural effusion or   pneumothorax. Thoracolumbar posterior fusion hardware.   XR Chest Port 1 View    Narrative    EXAM: XR CHEST PORT 1 VIEW  LOCATION: [facility]  DATE: 2025    INDICATION: New cough, bibasilar crackles.  COMPARISON: Chest CT from 2025.      Impression    IMPRESSION:   1.  Emphysema without evidence of pulmonary edema or pneumonia when using the recent chest CT as a baseline.  2.  No pleural effusion or pneumothorax.  3.  Normal heart size.   Echocardiogram Complete     Value    LVEF  60%    Narrative    [id number]  [id number]  [id number]  [id number]^MILAGROS^KARY^SOCORRO     [facility]  Echocardiography Laboratory  [address] [place] [address]     Name: SHIRA GONZALEZ  MRN: [id number]  : 1950  Study Date: 2025 08:23 AM  Age: 74 yrs  Gender: Female  Patient Location: [facility]  Reason For Study: Pulmonary Hypertension  Ordering Physician: KARY LINARES  Referring Physician: Sarah Barriga  Performed By: Lucero Downey RDCS     BSA: 1.8 m2  Height: 62 in  Weight: 169 lb  HR: 78  BP: 122/72 mmHg  ______________________________________________________________________________  Procedure  Echocardiogram with two-dimensional, color and spectral Doppler. Optison (NDC  #[id number]) given intravenously. Technically difficult study.  ______________________________________________________________________________  Interpretation Summary     1. Normal left ventricular size and systolic function. Estimated ejection  fraction is 60%.  2. Grade 1 (early) left ventricular diastolic dysfunction consistent with  normal left ventricular filling pressure.  3. Normal right ventricular size and systolic function.  4. Estimated right ventricular systolic pressure is 46 mmHg.  5. Mild mitral regurgitation.  6. Mild tricuspid regurgitation.  7. Compared to  prior exam from 10/19/2023, estimated RVSP is higher.  ______________________________________________________________________________  Left Ventricle  The left ventricle is normal in size. There is normal left ventricular wall  thickness. Left ventricular systolic function is normal. The visual ejection  fraction is estimated at 60%. Grade I or early diastolic dysfunction. No  regional wall motion abnormalities noted.     Right Ventricle  The right ventricle is normal in size and function.     Atria  Normal left atrial size. Right atrium not well visualized. Atrial septum not  well-visualized.     Mitral Valve  The mitral valve leaflets are mildly thickened. There is mild (1+) mitral  regurgitation.     Tricuspid Valve  The tricuspid valve is not well visualized, but is grossly normal. There is  mild (1+) tricuspid regurgitation.     Aortic Valve  The aortic valve is trileaflet with aortic valve sclerosis. No aortic stenosis  is present.     Pulmonic Valve  The pulmonic valve is normal in structure and function.     Vessels  The aortic root is normal size. Normal size ascending aorta. The inferior vena  cava was normal in size with preserved respiratory variability.     Pericardium  There is no pericardial effusion.     Rhythm  Sinus rhythm was noted.  ______________________________________________________________________________  MMode/2D Measurements & Calculations  IVSd: 0.96 cm     LVIDd: 5.2 cm  LVIDs: 3.2 cm  LVPWd: 0.84 cm  FS: 38.0 %  LV mass(C)d: 168.7 grams  LV mass(C)dI: 94.8 grams/m2  Ao root diam: 3.4 cm  LA dimension: 3.8 cm  asc Aorta Diam: 3.6 cm  LA/Ao: 1.1  Ao root diam index Ht(cm/m): 2.2  Ao root diam index BSA (cm/m2): 1.9  Asc Ao diam index BSA (cm/m2): 2.0  Asc Ao diam index Ht(cm/m): 2.3  LA Volume (BP): 50.7 ml     LA Volume Index (BP): 28.5 ml/m2  RWT: 0.32  TAPSE: 2.3 cm     Doppler Measurements & Calculations  MV E max carli: 62.9 cm/sec  MV A max carli: 76.5 cm/sec  MV E/A: 0.82  MV dec  time: 0.19 sec  Ao V2 max: 142.4 cm/sec  Ao max P.1 mmHg  PA acc time: 0.09 sec  TR max lalo: 328.5 cm/sec  TR max P.2 mmHg  RVSP(TR): 46.2 mmHg  E/E' av.9  Lateral E/e': 5.9  Medial E/e': 11.8  RV S Lalo: 15.0 cm/sec     ______________________________________________________________________________  Report approved by: Mallika Garcia MD on 2025 01:23 PM           *Note: Due to a large number of results and/or encounters for the requested time period, some results have not been displayed. A complete set of results can be found in Results Review.       Discharge Medications   Current Discharge Medication List        START taking these medications    Details   benzonatate (TESSALON) 100 MG capsule Take 1 capsule (100 mg) by mouth 3 times daily as needed for cough.  Qty: 90 capsule, Refills: 0    Associated Diagnoses: Centrilobular emphysema (H); Acute respiratory failure with hypoxia (H)      ferrous sulfate (FEROSUL) 325 (65 Fe) MG tablet Take 1 tablet (325 mg) by mouth every other day.  Qty: 30 tablet, Refills: 0    Associated Diagnoses: Other iron deficiency anemia      guaiFENesin-codeine (ROBITUSSIN AC) 100-10 MG/5ML solution Take 5 mLs by mouth every 4 hours as needed for cough.  Qty: 237 mL, Refills: 0    Associated Diagnoses: Centrilobular emphysema (H); Acute respiratory failure with hypoxia (H)      predniSONE (DELTASONE) 20 MG tablet Take 2 tablets (40 mg) by mouth daily.  Qty: 6 tablet, Refills: 0    Associated Diagnoses: Centrilobular emphysema (H); Acute respiratory failure with hypoxia (H)           CONTINUE these medications which have CHANGED    Details   aspirin (SM ASPIRIN EC) 325 MG EC tablet Take 1 tablet (325 mg) by mouth daily.  Qty: 30 tablet, Refills: 0    Associated Diagnoses: Primary osteoarthritis of left knee           CONTINUE these medications which have NOT CHANGED    Details   albuterol (VENTOLIN HFA) 108 (90 Base) MCG/ACT inhaler Inhale 2 puffs into the lungs every 6  hours as needed for shortness of breath or wheezing.  Qty: 18 g, Refills: 1    Comments: Pharmacy may dispense brand covered by insurance (Proair, or proventil or ventolin or generic albuterol inhaler). Profile Rx: patient will contact pharmacy when needed  Associated Diagnoses: Chronic obstructive pulmonary disease, unspecified COPD type (H)      amLODIPine (NORVASC) 5 MG tablet Take 1 tablet (5 mg) by mouth at bedtime.  Qty: 30 tablet, Refills: 5    Associated Diagnoses: Essential hypertension with goal blood pressure less than 140/90      atorvastatin (LIPITOR) 40 MG tablet Take 1 tablet (40 mg) by mouth daily  Qty: 90 tablet, Refills: 3    Associated Diagnoses: Hyperlipidemia LDL goal <70      budesonide-formoterol (SYMBICORT) 160-4.5 MCG/ACT Inhaler Inhale 2 puffs into the lungs 2 times daily.  Qty: 10.2 g, Refills: 11    Associated Diagnoses: Centrilobular emphysema (H)      calcium carbonate 600 mg-vitamin D 400 units (CALTRATE) 600-400 MG-UNIT per tablet Take 1 tablet by mouth every evening      celecoxib (CELEBREX) 200 MG capsule Take 1 capsule (200 mg) by mouth daily.  Qty: 90 capsule, Refills: 1    Associated Diagnoses: Congenital musculoskeletal deformity of spine; Chronic bilateral low back pain without sciatica      DULoxetine (CYMBALTA) 30 MG capsule Take 1 capsule (30 mg) by mouth daily.  Qty: 30 capsule, Refills: 5    Associated Diagnoses: Major depressive disorder, recurrent episode, moderate (H)      folic acid (FOLVITE) 1 MG tablet Take 1 tablet (1 mg) by mouth daily  Qty: 90 tablet, Refills: 3    Associated Diagnoses: Alcohol abuse      !! gabapentin (NEURONTIN) 300 MG capsule Take 600 mg by mouth at bedtime. Also takes 300 mg in the AM and Noon      !! gabapentin (NEURONTIN) 300 MG capsule 300 mg in AM/300 in afternoon/600 mg at night  Qty: 120 capsule, Refills: 2    Associated Diagnoses: Tension headache      ipratropium - albuterol 0.5 mg/2.5 mg/3 mL (DUONEB) 0.5-2.5 (3) MG/3ML neb solution  Take 1 vial (3 mLs) by nebulization every 6 hours as needed for shortness of breath, wheezing or cough.  Qty: 90 mL, Refills: 11    Associated Diagnoses: Centrilobular emphysema (H)      levETIRAcetam (KEPPRA) 500 MG tablet Take 1 tablet (500 mg) by mouth 2 times daily  Qty: 180 tablet, Refills: 3    Associated Diagnoses: Seizure (H)      levothyroxine (SYNTHROID/LEVOTHROID) 50 MCG tablet Take 1 tablet (50 mcg) by mouth daily  Qty: 90 tablet, Refills: 3    Associated Diagnoses: Hypothyroidism, unspecified type      Melatonin 10 MG TABS tablet Take 20 mg by mouth at bedtime.      metoprolol tartrate (LOPRESSOR) 100 MG tablet Take 1 tablet (100 mg) by mouth 2 times daily. Hold if SBP<100 or HR<55  Qty: 180 tablet, Refills: 3    Associated Diagnoses: Essential hypertension      multivitamin, therapeutic (THERA-VIT) TABS tablet Take 1 tablet by mouth daily      naloxone (NARCAN) 4 MG/0.1ML nasal spray Spray 1 spray (4 mg) into one nostril alternating nostrils once as needed for opioid reversal every 2-3 minutes until assistance arrives  Qty: 0.2 mL, Refills: 1    Associated Diagnoses: Chronic bilateral low back pain without sciatica      QUEtiapine (SEROQUEL) 50 MG tablet Take 2 tablets (100 mg) by mouth at bedtime.  Qty: 60 tablet, Refills: 5    Associated Diagnoses: Major depressive disorder, recurrent episode, moderate (H)      thiamine (B-1) 100 MG tablet Take 1 tablet (100 mg) by mouth daily.    Associated Diagnoses: Nutritional deficiency       !! - Potential duplicate medications found. Please discuss with provider.        STOP taking these medications       tiotropium (SPIRIVA HANDIHALER) 18 MCG inhaled capsule Comments:   Reason for Stopping:             Allergies   Allergies   Allergen Reactions    Bee Venom     Lisinopril Swelling     Oral swelling

## 2025-04-25 NOTE — PLAN OF CARE
Problem: Gas Exchange Impaired  Goal: Optimal Gas Exchange  Outcome: Progressing  Intervention: Optimize Oxygenation and Ventilation  Recent Flowsheet Documentation  Taken 4/24/2025 1645 by Terence Gill RN  Head of Bed (HOB) Positioning: HOB at 30 degrees     Patient alert and oriented, independent in room. Complains of frequent cough that causes chest discomfort. PRN robitussin with codeine given with relief. Respiratory panel results back. Continues on 2L oxygen nasal canula.

## 2025-04-25 NOTE — PLAN OF CARE
[facility] DISCHARGE NOTE    Patient discharged to home at 4:27 PM via wheel chair. Accompanied by spouse and staff. Discharge instructions reviewed with patient, opportunity offered to ask questions. Prescriptions sent to patients preferred pharmacy. All belongings sent with patient.    Madiha Reddy RN

## 2025-04-25 NOTE — PROVIDER NOTIFICATION
04/25/25 1217   Home Oxygen Assessment (RN/RT ONLY)   Does patient have oxygen at home? No   1. SpO2 on room air at rest while awake 89   2. SpO2 while at rest on oxygen 93       Oxygen LPM at rest 2 LPM   3. SpO2 on room air during Activity/with exercise 83   4. SpO2 with oxygen during activity/with exercise 91-92       Oxygen LPM during activity/with exercise 3 lpm   Does patient qualify for Home O2? Yes

## 2025-04-25 NOTE — PROGRESS NOTES
"Clinical Nutrition Services- Brief Note    Reviewed nutrition risk factors due to LOS. Pt is tolerating a Regular diet, eating well per nursing documentation and patient report. Patient skin noted to be within parameters for nutrition assessment. No nutrition issues identified at this time. RD will continue to follow per nutrition protocol.    Radha Whitney RDN, LD  Clinical Dietitian  Office: [phone]  Hours: M-F 8-3pm   Weekend/Holiday [facility] Vocera - \"Weekend Clinical Dietitian\" (No longer using pager)    "

## 2025-04-28 ENCOUNTER — PATIENT OUTREACH (OUTPATIENT)
Dept: CARE COORDINATION | Facility: CLINIC | Age: 75
End: 2025-04-28
Payer: COMMERCIAL

## 2025-04-28 NOTE — PROGRESS NOTES
"  [facility]: Transitions of Care Outreach  Chief Complaint   Patient presents with    Clinic Care Coordination - Post Hospital       Most Recent Admission Date: 4/18/2025   Most Recent Admission Diagnosis: Hypoxia - R09.02  Elevated brain natriuretic peptide (BNP) level - R79.89     Most Recent Discharge Date: 4/25/2025   Most Recent Discharge Diagnosis: Hypoxia - R09.02  Elevated brain natriuretic peptide (BNP) level - R79.89  Other iron deficiency anemia - D50.8  Centrilobular emphysema (H) - J43.2  Acute respiratory failure with hypoxia (H) - J96.01  Primary osteoarthritis of left knee - M17.12     Transitions of Care Assessment    Discharge Assessment  How are you doing now that you are home?: \" I am doing pretty good \"  How are your symptoms? (Red Flag symptoms escalate to triage hotline per guidelines): Improved  Do you know how to contact your clinic care team if you have future questions or changes to your health status? : Yes  Does the patient have their discharge instructions? : Yes  Does the patient have questions regarding their discharge instructions? : No  Were you started on any new medications or were there changes to any of your previous medications? : Yes  Does the patient have all of their medications?: Yes  Do you have questions regarding any of your medications? : No  Do you have all of your needed medical supplies or equipment (DME)?  (i.e. oxygen tank, CPAP, cane, etc.): Yes    Post-op ([facility] CTA Only)  If the patient had a surgery or procedure, do they have any questions for a nurse?: No         [facility] Explained and offered Care Coordination support to eligible patients: Yes    Patient accepted? No    Follow up Plan     Discharge Follow-Up  Discharge follow up appointment scheduled in alignment with recommended follow up timeframe or Transitions of Risk Category? (Low = within 30 days; Moderate= within 14 days; High= within 7 days): Yes  Discharge Follow Up Appointment Date: " 05/07/25  Discharge Follow Up Appointment Scheduled with?: Primary Care Provider    Future Appointments   Date Time Provider Department Center   5/7/2025  1:30 PM Sarah Barriga MD [facility] FLCL   5/13/2025  9:30 AM Cait Bain, [facility] RFLMTM [facility]   1/23/2026  1:15 PM Cait Hernandez, NP MBPULM [place]       Outpatient Plan as outlined on AVS reviewed with patient.    For any urgent concerns, please contact our 24 hour nurse triage line: [phone] ([phone])       VERONICA Galan

## 2025-05-06 ENCOUNTER — OFFICE VISIT (OUTPATIENT)
Dept: PULMONOLOGY | Facility: CLINIC | Age: 75
End: 2025-05-06
Payer: COMMERCIAL

## 2025-05-06 VITALS
WEIGHT: 172 LBS | SYSTOLIC BLOOD PRESSURE: 152 MMHG | HEART RATE: 74 BPM | DIASTOLIC BLOOD PRESSURE: 76 MMHG | BODY MASS INDEX: 31.46 KG/M2 | OXYGEN SATURATION: 92 %

## 2025-05-06 DIAGNOSIS — J96.01 ACUTE RESPIRATORY FAILURE WITH HYPOXIA (H): ICD-10-CM

## 2025-05-06 DIAGNOSIS — J43.2 CENTRILOBULAR EMPHYSEMA (H): Chronic | ICD-10-CM

## 2025-05-06 PROCEDURE — G2211 COMPLEX E/M VISIT ADD ON: HCPCS | Performed by: INTERNAL MEDICINE

## 2025-05-06 PROCEDURE — 3077F SYST BP >= 140 MM HG: CPT | Performed by: INTERNAL MEDICINE

## 2025-05-06 PROCEDURE — 99214 OFFICE O/P EST MOD 30 MIN: CPT | Performed by: INTERNAL MEDICINE

## 2025-05-06 PROCEDURE — 3078F DIAST BP <80 MM HG: CPT | Performed by: INTERNAL MEDICINE

## 2025-05-06 RX ORDER — AZITHROMYCIN 250 MG/1
250 TABLET, FILM COATED ORAL
Qty: 12 TABLET | Refills: 11 | Status: SHIPPED | OUTPATIENT
Start: 2025-05-07

## 2025-05-06 RX ORDER — PREDNISONE 20 MG/1
40 TABLET ORAL DAILY
Qty: 10 TABLET | Refills: 0 | Status: SHIPPED | OUTPATIENT
Start: 2025-05-06 | End: 2025-05-11

## 2025-05-06 NOTE — PROGRESS NOTES
Pulmonary Clinic Follow-up          Assessment/Plan:     74 year old female with a history of COPD, emphysema, JOY on cpap, HTN, alcohol use disorder, chronic pain with opiate use disorder who presents to clinic for follow-up visit after recent hospitalization with a COPD exacerbation.    Emphysema: Was noted to have normal pulmonary function testing but has extensive emphysema on imaging.  She was recently hospitalized with a COPD exacerbation with concerns for heart failure exacerbation and was treated with steroids antibiotics and DuoNebs.    Continue Symbicort 2 puffs twice daily.  She was reminded to rinse her mouth after using this.  Instructed her to resume her Spiriva which she had discontinued during her hospital stay and had not been instructed clearly to resume.  Due to desaturation of SaO2 less than or equal to 88% on room air at rest from COPD, home oxygen therapy will benefit my patient's condition.  The patient has tried (or considered) other medications with limited success and oxygen is still required. The patient is mobile in the home and requires portability.  I have given her a prescription for prednisone 40 mg for 5 days and azithromycin for 5 days to use in the event of COPD exacerbation.  Prescribed pulmonary rehab.  HCM: Up to date with Influenza.  Followup: 3 months with Maria Antonia Hernandez CNP.    Shannan Reynolds MD  Pulmonary and Critical Care  [facility]    The longitudinal plan of care for the diagnosis(es)/condition(s) as documented were addressed during this visit. Due to the added complexity in care, I will continue to support Jessica in the subsequent management and with ongoing continuity of care.       CC:     Followup-up after hospitalization.     HPI:     74 year old female with a history of COPD, emphysema, JOY on cpap, HTN, alcohol use disorder, chronic pain with opiate use disorder - presenting for follow-up visit after recent hospitalization for asthma versus COPD  exacerbation.  She was treated with a combination of steroids, antibiotics and was also diuresed subsequent improvement of her oxygenation.  She states that since her discharge a couple weeks ago her oxygen saturations remain right around 90 on 2 to 3 L of supplemental oxygen.  She does drop her oxygen saturations down to the mid 70s off oxygen after overnight nasal cannula was dislodged.  She has not been compliant with her CPAP device not sure that she is cleaning it and is scared that she might get an infection from this.  She also states that her nostrils been very dry in the morning ever since she started using supplemental oxygen.  She continues to use her Symbicort inhaler twice a day but has not used this Breo since discharge as this was held while she was in inpatient as she was on DuoNebs and was not clear that she needed to restart this.  She presently denies chest pain, chest tightness, fevers, chills       ROS:     A 6-system review was obtained and was negative with the exception of the symptoms endorsed in the HPI.       Medical history:       PMH:  Past Medical History:   Diagnosis Date    Alcohol abuse with intoxication 11/07/2023    Aneurysm     Chemical dependency (H)     Chronic pain     Class 1 obesity due to excess calories with serious comorbidity and body mass index (BMI) of 31.0 to 31.9 in adult 4/19/2025    Closed fracture of proximal end of left humerus with routine healing, unspecified fracture morphology, subsequent encounter 01/04/2024    Depression     Emphysema lung (H)     History of blood transfusion     Hypertension     Other closed displaced odontoid fracture, initial encounter (H) 09/09/2024    Seizure (H) 02/20/2024    Sleep apnea     Spell of change in speech 04/17/2018    Suicide attempt (H) 09/30/2024    Thyroid disease     Tobacco abuse        PSH:  Past Surgical History:   Procedure Laterality Date    ABDOMEN SURGERY      APPENDECTOMY  1960    APPENDECTOMY      ARTHROPLASTY  KNEE Left 08/16/2023    Procedure: LEFT TOTAL KNEE ARTHROPLASTY;  Surgeon: Bryan Roque MD;  Location: [facility] Main OR    BACK SURGERY      BIOPSY BREAST      cerebral aneurysm  2014    coiled    CEREBRAL ANEURYSM REPAIR      cholecystectomy      COLON SURGERY      COLONOSCOPY  04/19/2005    COLONOSCOPY N/A 5/13/2024    Procedure: COLONOSCOPY WITH BIOPSY;  Surgeon: Radha Glez MD;  Location: [facility] OR    FRACTURE SURGERY      HC EXCISION BREAST LESION, OPEN >=1      core biopsy 2006, lumpectomy 2006    HERNIA REPAIR      LAP VENTRAL HERNIA REPAIR  12/2017    [place]    LAPAROSCOPIC ASSISTED HYSTERECTOMY VAGINAL  12/2017    LUMPECTOMY BREAST      orif left femoral neck Left 06/03/2016    stress fracture.  done at [facility]    SURGICAL HISTORY OF -   2004    Skin Graft    ZZC PART REMOVAL COLON W ANASTOMOSIS  05/2005    diverticulitis    ZZC SPINE FUSION,ANTER,8+ SGMTS  2007    Anterior posterior fusion 10 levels, hardware removal and re-fusion 2009       Allergies:  Allergies   Allergen Reactions    Bee Venom     Lisinopril Swelling     Oral swelling       Family Hx:  Family History   Problem Relation Age of Onset    Cancer Mother         breast/lung    Alcohol/Drug Mother     Depression Mother     Cancer - colorectal Father     Alcohol/Drug Father     Diabetes Maternal Grandmother     Diabetes Maternal Grandfather     Diabetes Paternal Grandmother     Diabetes Paternal Grandfather     Cancer Paternal Grandfather     Gastrointestinal Disease Paternal Grandfather     Congenital Anomalies Son     Hypertension Brother     Adrenal Disorder Brother         had adrenalectomies    Cerebral palsy Granddaughter        Social Hx:  Social History     Socioeconomic History    Marital status:      Spouse name: Not on file    Number of children: Not on file    Years of education: Not on file    Highest education level: Not on file   Occupational History    Not on file   Tobacco Use    Smoking  status: Former     Current packs/day: 0.00     Average packs/day: 1 pack/day for 30.0 years (30.0 ttl pk-yrs)     Types: Cigarettes     Start date: 1974     Quit date: 2004     Years since quittin.3    Smokeless tobacco: Never   Vaping Use    Vaping status: Never Used   Substance and Sexual Activity    Alcohol use: Yes     Comment: Glass of wine a few times a week. ETOH dependency, DUI 3/15/10; 1-2 ETOH per week    Drug use: Never     Comment: percocet for the past several years    Sexual activity: Not Currently     Partners: Male     Birth control/protection: Surgical     Comment: VAS   Other Topics Concern    Parent/sibling w/ CABG, MI or angioplasty before 65F 55M? No   Social History Narrative    Not on file     Social Drivers of Health     Financial Resource Strain: Low Risk  (2025)    Financial Resource Strain     Within the past 12 months, have you or your family members you live with been unable to get utilities (heat, electricity) when it was really needed?: No   Food Insecurity: Low Risk  (2025)    Food Insecurity     Within the past 12 months, did you worry that your food would run out before you got money to buy more?: No     Within the past 12 months, did the food you bought just not last and you didn t have money to get more?: No   Transportation Needs: Low Risk  (2025)    Transportation Needs     Within the past 12 months, has lack of transportation kept you from medical appointments, getting your medicines, non-medical meetings or appointments, work, or from getting things that you need?: No   Physical Activity: Inactive (2024)    Exercise Vital Sign     Days of Exercise per Week: 0 days     Minutes of Exercise per Session: 0 min   Stress: No Stress Concern Present (2024)    [employer] - Occupational Stress Questionnaire     Feeling of Stress : Only a little   Social Connections: Unknown (2024)    Social Connection and Isolation  Panel [NHANES]     Frequency of Communication with Friends and Family: Not on file     Frequency of Social Gatherings with Friends and Family: More than three times a week     Attends [employer] Services: Not on file     Active Member of Clubs or Organizations: Not on file     Attends Club or Organization Meetings: Not on file     Marital Status: Not on file   Interpersonal Safety: Low Risk  (4/19/2025)    Interpersonal Safety     Do you feel physically and emotionally safe where you currently live?: Yes     Within the past 12 months, have you been hit, slapped, kicked or otherwise physically hurt by someone?: No     Within the past 12 months, have you been humiliated or emotionally abused in other ways by your partner or ex-partner?: No   Housing Stability: Low Risk  (4/19/2025)    Housing Stability     Do you have housing? : Yes     Are you worried about losing your housing?: No       Current Meds:  Current Outpatient Medications   Medication Sig Dispense Refill    albuterol (VENTOLIN HFA) 108 (90 Base) MCG/ACT inhaler Inhale 2 puffs into the lungs every 6 hours as needed for shortness of breath or wheezing. 18 g 1    amLODIPine (NORVASC) 5 MG tablet Take 1 tablet (5 mg) by mouth at bedtime. 30 tablet 5    aspirin (SM ASPIRIN EC) 325 MG EC tablet Take 1 tablet (325 mg) by mouth daily. 30 tablet 0    atorvastatin (LIPITOR) 40 MG tablet Take 1 tablet (40 mg) by mouth daily 90 tablet 3    benzonatate (TESSALON) 100 MG capsule Take 1 capsule (100 mg) by mouth 3 times daily as needed for cough. 90 capsule 0    budesonide-formoterol (SYMBICORT) 160-4.5 MCG/ACT Inhaler Inhale 2 puffs into the lungs 2 times daily. 10.2 g 11    calcium carbonate 600 mg-vitamin D 400 units (CALTRATE) 600-400 MG-UNIT per tablet Take 1 tablet by mouth every evening      celecoxib (CELEBREX) 200 MG capsule Take 1 capsule (200 mg) by mouth daily. 90 capsule 1    DULoxetine (CYMBALTA) 30 MG capsule Take 1 capsule (30 mg) by mouth daily. 30  capsule 5    ferrous sulfate (FEROSUL) 325 (65 Fe) MG tablet Take 1 tablet (325 mg) by mouth every other day. 30 tablet 0    folic acid (FOLVITE) 1 MG tablet Take 1 tablet (1 mg) by mouth daily 90 tablet 3    gabapentin (NEURONTIN) 300 MG capsule Take 600 mg by mouth at bedtime. Also takes 300 mg in the AM and Noon      gabapentin (NEURONTIN) 300 MG capsule 300 mg in AM/300 in afternoon/600 mg at night (Patient taking differently: Take 300 mg by mouth 2 times daily. Also takes 600 mg at bedtime) 120 capsule 2    guaiFENesin-codeine (ROBITUSSIN AC) 100-10 MG/5ML solution Take 5 mLs by mouth every 4 hours as needed for cough. 237 mL 0    ipratropium - albuterol 0.5 mg/2.5 mg/3 mL (DUONEB) 0.5-2.5 (3) MG/3ML neb solution Take 1 vial (3 mLs) by nebulization every 6 hours as needed for shortness of breath, wheezing or cough. 90 mL 11    levETIRAcetam (KEPPRA) 500 MG tablet Take 1 tablet (500 mg) by mouth 2 times daily 180 tablet 3    levothyroxine (SYNTHROID/LEVOTHROID) 50 MCG tablet Take 1 tablet (50 mcg) by mouth daily 90 tablet 3    Melatonin 10 MG TABS tablet Take 20 mg by mouth at bedtime.      metoprolol tartrate (LOPRESSOR) 100 MG tablet Take 1 tablet (100 mg) by mouth 2 times daily. Hold if SBP<100 or HR<55 180 tablet 3    multivitamin, therapeutic (THERA-VIT) TABS tablet Take 1 tablet by mouth daily      naloxone (NARCAN) 4 MG/0.1ML nasal spray Spray 1 spray (4 mg) into one nostril alternating nostrils once as needed for opioid reversal every 2-3 minutes until assistance arrives 0.2 mL 1    predniSONE (DELTASONE) 20 MG tablet Take 2 tablets (40 mg) by mouth daily. 6 tablet 0    QUEtiapine (SEROQUEL) 50 MG tablet Take 2 tablets (100 mg) by mouth at bedtime. 60 tablet 5    thiamine (B-1) 100 MG tablet Take 1 tablet (100 mg) by mouth daily.            Physical Exam:     There were no vitals taken for this visit.  Gen: adult female, appears in NAD  HEENT: clear conjunctivae, moist mucous membranes  CV: RRR, no  M/G/R  Resp: CTAB, no focal crackles or wheezes.  Respirations even and unlabored.  On RA.  No cough.  Skin: no apparent rashes on visible skin  Ext: no cyanosis, clubbing or edema  Neuro: alert and answering questions appropriately       Data:     Labs:  reviewed    Imaging studies:  I have personally reviewed all pertinent imaging studies and PFT results unless otherwise noted.    Chest CT 3/2022:  1.  No pulmonary embolism.  2.  Normal caliber aorta without dissection.  3.  Moderate emphysema.  4.  Evidence for old granulomatous disease.  5.  Dense atherosclerotic vascular calcification at the origin of the renal arteries. Recommend correlation for renovascular hypertension.      Pulmonary Function Testing     8/10/23:  FEV1/FVC is normal  FEV1 is normal  FVC is normal  TLC is normal  DLCO is normal when it is corrected for hgb.  Impression:  Full pulmonary function test is normal.      2019:  The spirometry was performed with adequate reproducibility.  The flow volume loop is essentially normal.  FEV1 is 1.98 L (88% predicted) and is normal.  FVC is 2.21 L (77% predicted) and is mildly reduced.  FEV1/FVC is 89% and is normal.  There was no improvement in spirometry after a single inhaled dose of bronchodilator.  TLC is 4.02 L (84% predicted) and is normal.  RV is 2.19 L (111% predicted) and is normal.  RV/TLC is 55% and is increased.  DLCO is 15.21 mL/min/mmHg (74% predicted) and is normal when it is corrected for hemoglobin.    Impression:  This pulmonary function study is abnormal.  Although the forced vital capacity is mildly reduced, the total lung volume measurement is normal and therefore not indicative of respiratory ventilatory defect. There is no bronchodilator response.  The elevated RV/TLC ratio is suggestive of air trapping.  Diffusion capacity, when corrected for hemoglobin, is normal.

## 2025-05-07 ENCOUNTER — TELEPHONE (OUTPATIENT)
Dept: FAMILY MEDICINE | Facility: CLINIC | Age: 75
End: 2025-05-07

## 2025-05-07 NOTE — TELEPHONE ENCOUNTER
"Vj reports Jessica being depressed.      She cancelled her apt today.  Fighting depression, alcoholism.      Bought vodka yesterday, she's \"out of it\" currently.  Seems to go in spurts.     Vj will be coming with her to the apt tomorrow.    Sarah Barriga M.D.    "

## 2025-05-07 NOTE — TELEPHONE ENCOUNTER
Patient Returning Call    Reason for call:  Patient would like a call back regarding his wife's apt.      Information relayed to patient:      Patient has additional questions:  No      Could we send this information to you in [employer] or would you prefer to receive a phone call?:   Patient would prefer a phone call   Okay to leave a detailed message?:  at [phone]

## 2025-05-07 NOTE — TELEPHONE ENCOUNTER
Dr. Barriga:    The patient's spouse Vj called back ( there is a consent on file for neurology in the chart). Vj called to report the patient drank too much vodtka and could not make it to appointment today, it has been now rescheduled for tomorrow at 10:10 am. Vj is requesting you to please call him on his cell phone, he would not divulge reasons why he wanted to speak to you directly, he just asked if you would call him before appointment tomorrow.       TWILA Liu

## 2025-05-08 ENCOUNTER — OFFICE VISIT (OUTPATIENT)
Dept: FAMILY MEDICINE | Facility: CLINIC | Age: 75
End: 2025-05-08
Payer: COMMERCIAL

## 2025-05-08 ENCOUNTER — TELEPHONE (OUTPATIENT)
Dept: FAMILY MEDICINE | Facility: CLINIC | Age: 75
End: 2025-05-08

## 2025-05-08 VITALS
OXYGEN SATURATION: 96 % | SYSTOLIC BLOOD PRESSURE: 120 MMHG | BODY MASS INDEX: 30.55 KG/M2 | WEIGHT: 166 LBS | TEMPERATURE: 98.4 F | RESPIRATION RATE: 26 BRPM | HEART RATE: 92 BPM | DIASTOLIC BLOOD PRESSURE: 86 MMHG | HEIGHT: 62 IN

## 2025-05-08 DIAGNOSIS — D50.9 IRON DEFICIENCY ANEMIA, UNSPECIFIED IRON DEFICIENCY ANEMIA TYPE: ICD-10-CM

## 2025-05-08 DIAGNOSIS — G89.4 CHRONIC PAIN SYNDROME: Chronic | ICD-10-CM

## 2025-05-08 DIAGNOSIS — F33.1 MAJOR DEPRESSIVE DISORDER, RECURRENT EPISODE, MODERATE (H): ICD-10-CM

## 2025-05-08 DIAGNOSIS — J43.2 CENTRILOBULAR EMPHYSEMA (H): Primary | Chronic | ICD-10-CM

## 2025-05-08 DIAGNOSIS — Z98.1 S/P LUMBAR SPINAL FUSION: ICD-10-CM

## 2025-05-08 DIAGNOSIS — F10.20 ALCOHOLISM (H): ICD-10-CM

## 2025-05-08 DIAGNOSIS — Q67.5 CONGENITAL MUSCULOSKELETAL DEFORMITY OF SPINE: ICD-10-CM

## 2025-05-08 DIAGNOSIS — F19.20 CHEMICAL DEPENDENCY (H): ICD-10-CM

## 2025-05-08 RX ORDER — DULOXETIN HYDROCHLORIDE 60 MG/1
60 CAPSULE, DELAYED RELEASE ORAL DAILY
Qty: 30 CAPSULE | Refills: 5 | Status: SHIPPED | OUTPATIENT
Start: 2025-05-08

## 2025-05-08 ASSESSMENT — PATIENT HEALTH QUESTIONNAIRE - PHQ9
SUM OF ALL RESPONSES TO PHQ QUESTIONS 1-9: 10
5. POOR APPETITE OR OVEREATING: SEVERAL DAYS

## 2025-05-08 ASSESSMENT — ANXIETY QUESTIONNAIRES
GAD7 TOTAL SCORE: 9
6. BECOMING EASILY ANNOYED OR IRRITABLE: SEVERAL DAYS
5. BEING SO RESTLESS THAT IT IS HARD TO SIT STILL: NOT AT ALL
2. NOT BEING ABLE TO STOP OR CONTROL WORRYING: MORE THAN HALF THE DAYS
1. FEELING NERVOUS, ANXIOUS, OR ON EDGE: SEVERAL DAYS
GAD7 TOTAL SCORE: 9
3. WORRYING TOO MUCH ABOUT DIFFERENT THINGS: MORE THAN HALF THE DAYS
7. FEELING AFRAID AS IF SOMETHING AWFUL MIGHT HAPPEN: MORE THAN HALF THE DAYS

## 2025-05-08 ASSESSMENT — PAIN SCALES - GENERAL: PAINLEVEL_OUTOF10: SEVERE PAIN (8)

## 2025-05-08 NOTE — PATIENT INSTRUCTIONS
"Increase Duloxetine to 60 mg        When you are out of refills or the refills say \"zero\", it is time to schedule your next appointment in clinic!    Labs are released to you almost immediately and sometimes before I have had a chance to review them.  I review labs regularly and once they are all in, you will either be sent a letter with your results or if you are signed up for on-line services, you will be notified that results are available to you on [employer]. If there are serious findings, you typically will be called.    If you have any questions about your visit, your symptoms, your medication, your test results or it is not clear what your diagnosis or treatment plan is please contact me (via [facility]) or call the care team at [phone] and say \"Care Team\"          "

## 2025-05-08 NOTE — LETTER
My COPD Action Plan     Name: Jessica Ellison    YOB: 1950   Date: 5/8/2025    My doctor: Sarah Barriga MD   My clinic: [facility]    [address] [address]  [phone]  My Controller Medicine: Tiotropium (Spiriva)  Formoterol/Budesonide (Symbicort)   Dose:       My Rescue Medicine: Albuterol nebulizer solution /albuterol inhaler   Dose:       My Flare Up Medicine: Prednisone and Azithromycin (Zithromax or Zithromax Z-carlos)   Dose:       My COPD Severity: Very Severe = FeV1 < 30 %      Use of Oxygen: 2 Liters continuously     Make sure you've had your pneumonia   vaccines.          GREEN ZONE       Doing well today    Usual level of activity and exercise  Usual amount of cough and mucus  No shortness of breath  Usual level of health (thinking clearly, sleeping well, feel like eating) Actions:    Take daily medicines  Use oxygen as prescribed  Follow regular exercise and diet plan  Avoid cigarette smoke and other irritants that harm the lungs           YELLOW ZONE          Having a bad day or flare up    Short of breath more than usual  A lot more sputum (mucus) than usual  Sputum looks yellow, green, tan, brown or bloody  More coughing or wheezing  Fever or chills  Less energy; trouble completing activities  Trouble thinking or focusing  Using quick relief inhaler or nebulizer more often  Poor sleep; symptoms wake me up  Do not feel like eating Actions:    Get plenty of rest  Take daily medicines  Use quick relief inhaler every 2-4 hours  If you use oxygen, call you doctor to see if you should adjust your oxygen  Do breathing exercises or other things to help you relax  Let a loved one, friend or neighbor know you are feeling worse  Call your care team if you have 2 or more symptoms.  Start taking steroids or antibiotics if directed by your care team           RED ZONE       Need medical care now    Severe shortness of breath (feel you can't  breathe)  Fever, chills  Not enough breath to do any activity  Trouble coughing up mucus, walking or talking  Blood in mucus  Frequent coughing Rescue medicines are not working  Not able to sleep because of breathing  Feel confused or drowsy  Chest pain    Actions:    Call your health care team.  If you cannot reach your care team, call 911 or go to the emergency room.        Annual Reminders:  Meet with Care Team, Flu Shot every Fall  Pharmacy:    [facility], [place] - [address] AT [address] [address] [facility] - [address]  [facility] [place], [place] - [address] [facility] [place] [place] - [address] [address]

## 2025-05-08 NOTE — TELEPHONE ENCOUNTER
Patient Quality Outreach    Patient is due for the following:   None    Action(s) Taken:   - Schedule wellness visit with fasting labs  - Covbid vaccine    Type of outreach:    Sent [employer] message.    Questions for provider review:    None         Altagracia Rob CMA  Chart routed to Care Team.

## 2025-05-08 NOTE — PROGRESS NOTES
"  Assessment & Plan     Centrilobular emphysema  Recent hospitalization for acute respiratory failure  Saw Dr. Reynolds in Pulmonology earlier this week   -has not yet refilled the azithro/prednisone and was unclear that this had been ordered for COPD exacerbations  -patient thought that the symbicort was replacing spiriva - we discussed that she should be on BOTH  -I did do a COPD action plan for her today  -continue oxygen.  Discussed that her pulse oximetry should be >91-92 or so.  She would like to be off oxygen, but we discussed the effects of hypoxia on her brain and other organs.     Iron deficiency anemia  Started on iron while hospitalized  Recheck in 3-4 months in clinic    Major depressive disorder, recurrent episode, moderate (H)  Increase duloxetine to 60 mg daily, may need to go higher on that if tolerated.  Hopefully this will help both for the depression as well as her chronic pain.   - DULoxetine (CYMBALTA) 60 MG capsule; Take 1 capsule (60 mg) by mouth daily.    Chronic pain syndrome  S/P lumbar spinal fusion  Congenital musculoskeletal deformity of spine    Looks like last prescription of oxycodone filled by patient was 11/1/2024 - per PDMP.   She previously saw Dr. Banerjee and Dr. Mckay and has had multiple injections/RFA, etc.  She really does need a multimodal approach to her pain management.  With her alcoholism, I'm not willing to continue any narcotics at this time.  Unfortunately, when her pain flares, she tends to self medicate with alcohol to \"dull the pain\" but this is clearly mal-adaptive behavior and risky when combined with other substances.      This is my note from 11/11/2024:  Patient has longstanding history of chronic back pain, fusion, etc.   She previously saw Dr. Banerjee through the [facility] and he was doing her injections. He has retired and she is not currently seeing anyone for interventional pain.   She has been on Percocet at various doses over the past several years, " "with her most recent suicide attempt and overdose of non-narcotic drugs, I am less inclined to continue the oxycodone and we will be weaning.  At this time, I have allowed only #30 oxycodone per day.     PDMP reviewed  Referral for pain clinic  Restart the gabapentin and gradually increase dose - discussed this with patient.       - DULoxetine (CYMBALTA) 60 MG capsule; Take 1 capsule (60 mg) by mouth daily.    Chemical dependency (H)  Alcoholism (H)  Encouraged chem dep referral  - Adult Mental Health  Referral; Future      MED REC REQUIRED  Post Medication Reconciliation Status: discharge medications reconciled and changed, per note/orders  BMI  Estimated body mass index is 30.36 kg/m  as calculated from the following:    Height as of this encounter: 1.575 m (5' 2\").    Weight as of this encounter: 75.3 kg (166 lb).   Weight management plan: Discussed healthy diet and exercise guidelines          Miller Lopez is a 74 year old, presenting for the following health issues:  Depression        5/8/2025    10:14 AM   Additional Questions   Roomed by Altagracia weldon CMA   Accompanied by      HPI      - ETOH dependency. She notes drinking 1 glass of wine daily, more if she goes out. She also notes drinking mixed drinks     Depression and Anxiety   Cymbalta 30mg qd  How are you doing with your depression since your last visit? Worsened   How are you doing with your anxiety since your last visit?  Worsened   Are you having other symptoms that might be associated with depression or anxiety? No  Have you had a significant life event? Stress with health issues   Do you have any concerns with your use of alcohol or other drugs? Yes:  ETOH dependency concerns per husbn    Social History     Tobacco Use    Smoking status: Former     Current packs/day: 0.00     Average packs/day: 1 pack/day for 30.0 years (30.0 ttl pk-yrs)     Types: Cigarettes     Start date: 1/1/1974     Quit date: 1/1/2004     Years since " "quittin.3    Smokeless tobacco: Never   Vaping Use    Vaping status: Never Used   Substance Use Topics    Alcohol use: Yes     Comment: Glass of wine a week, more if she is going out. ETOH dependency, DUI 3/15/10; 1-2 ETOH per week    Drug use: Never     Comment: percocet for the past several years         2024    11:33 AM 2024     2:25 PM 2024     8:32 AM   PHQ   PHQ-9 Total Score 3 10 5    Q9: Thoughts of better off dead/self-harm past 2 weeks Not at all Not at all Not at all       Patient-reported         2023    10:16 AM 2024    11:47 AM 2024     2:25 PM   DAJUAN-7 SCORE   Total Score 4 (minimal anxiety)     Total Score 4 0 2         2024     8:32 AM   Last PHQ-9   1.  Little interest or pleasure in doing things 0   2.  Feeling down, depressed, or hopeless 1   3.  Trouble falling or staying asleep, or sleeping too much 1   4.  Feeling tired or having little energy 1   5.  Poor appetite or overeating 1   6.  Feeling bad about yourself 1   7.  Trouble concentrating 0   8.  Moving slowly or restless 0   Q9: Thoughts of better off dead/self-harm past 2 weeks 0   PHQ-9 Total Score 5        Patient-reported         2024     2:25 PM   DAJUAN-7    1. Feeling nervous, anxious, or on edge 0   2. Not being able to stop or control worrying 0   3. Worrying too much about different things 0   4. Trouble relaxing 0   5. Being so restless that it is hard to sit still 0   6. Becoming easily annoyed or irritable 2   7. Feeling afraid, as if something awful might happen 0   DAJUAN-7 Total Score 2       Suicide Assessment Five-step Evaluation and Treatment (SAFE-T)        Review of Systems  Constitutional, HEENT, cardiovascular, pulmonary, gi and gu systems are negative, except as otherwise noted.      Objective    /86   Pulse 92   Temp 98.4  F (36.9  C) (Tympanic)   Resp 26   Ht 1.575 m (5' 2\")   Wt 75.3 kg (166 lb)   SpO2 96%   BMI 30.36 kg/m    Body mass index is 30.36 " kg/m .  Physical Exam   GENERAL: alert and no distress  NECK: no adenopathy, no asymmetry, masses, or scars  RESP: lungs clear to auscultation - no rales, rhonchi or wheezes  CV: regular rate and rhythm, normal S1 S2, no S3 or S4, no murmur, click or rub, no peripheral edema  MS: no gross musculoskeletal defects noted, no edema  PSYCH: affect flat and judgement and insight intact            Signed Electronically by: Sarah Barriga MD

## 2025-05-12 ENCOUNTER — TRANSFERRED RECORDS (OUTPATIENT)
Dept: HEALTH INFORMATION MANAGEMENT | Facility: CLINIC | Age: 75
End: 2025-05-12
Payer: COMMERCIAL

## 2025-05-12 ENCOUNTER — PATIENT OUTREACH (OUTPATIENT)
Dept: CARE COORDINATION | Facility: CLINIC | Age: 75
End: 2025-05-12
Payer: COMMERCIAL

## 2025-05-13 ENCOUNTER — VIRTUAL VISIT (OUTPATIENT)
Dept: PHARMACY | Facility: CLINIC | Age: 75
End: 2025-05-13
Attending: STUDENT IN AN ORGANIZED HEALTH CARE EDUCATION/TRAINING PROGRAM
Payer: COMMERCIAL

## 2025-05-13 DIAGNOSIS — F33.1 MAJOR DEPRESSIVE DISORDER, RECURRENT EPISODE, MODERATE (H): Chronic | ICD-10-CM

## 2025-05-13 DIAGNOSIS — G89.4 CHRONIC PAIN SYNDROME: Chronic | ICD-10-CM

## 2025-05-13 DIAGNOSIS — Z78.9 TAKES DIETARY SUPPLEMENTS: ICD-10-CM

## 2025-05-13 DIAGNOSIS — J43.2 CENTRILOBULAR EMPHYSEMA (H): Primary | Chronic | ICD-10-CM

## 2025-05-13 DIAGNOSIS — D50.9 IRON DEFICIENCY ANEMIA, UNSPECIFIED IRON DEFICIENCY ANEMIA TYPE: ICD-10-CM

## 2025-05-13 DIAGNOSIS — Z86.73 HISTORY OF CVA (CEREBROVASCULAR ACCIDENT): Chronic | ICD-10-CM

## 2025-05-13 DIAGNOSIS — I10 ESSENTIAL HYPERTENSION WITH GOAL BLOOD PRESSURE LESS THAN 140/90: Chronic | ICD-10-CM

## 2025-05-13 DIAGNOSIS — E78.5 HYPERLIPIDEMIA LDL GOAL <70: Chronic | ICD-10-CM

## 2025-05-13 DIAGNOSIS — F41.9 ANXIETY: Chronic | ICD-10-CM

## 2025-05-13 DIAGNOSIS — G40.909 SEIZURE DISORDER (H): Chronic | ICD-10-CM

## 2025-05-13 DIAGNOSIS — E03.9 HYPOTHYROIDISM, UNSPECIFIED TYPE: Chronic | ICD-10-CM

## 2025-05-13 PROCEDURE — 99607 MTMS BY PHARM ADDL 15 MIN: CPT | Mod: 95 | Performed by: PHARMACIST

## 2025-05-13 PROCEDURE — 1111F DSCHRG MED/CURRENT MED MERGE: CPT | Mod: 95 | Performed by: PHARMACIST

## 2025-05-13 PROCEDURE — 99605 MTMS BY PHARM NP 15 MIN: CPT | Mod: 95 | Performed by: PHARMACIST

## 2025-05-13 RX ORDER — MULTIVITAMIN WITH IRON
1 TABLET ORAL DAILY
COMMUNITY

## 2025-05-13 NOTE — LETTER
May 13, 2025  Jessica M Scot  [id number]  [facility] [address]    Dear [facility]LE[facility]     Thank you for talking with me on May 13, 2025 about your health and medications. As a follow-up to our conversation, I have included two documents:      Your Recommended To-Do List has steps you should take to get the best results from your medications.  Your Medication List will help you keep track of your medications and how to take them.    If you want to talk about these documents, please call Cait Bain RPH at phone: [phone], Monday-Friday 8-4:30pm.    I look forward to working with you and your doctors to make sure your medications work well for you.    Sincerely,  Cait Bain RPH  [facility] Pharmacist, [facility]

## 2025-05-13 NOTE — PROGRESS NOTES
Medication Therapy Management (MTM) Encounter    ASSESSMENT:                            Medication Adherence/Access: See below for considerations.    Hypertension /History of CVA  Stable. Patient is meeting blood pressure goal of < 130/80mmHg.     Hyperlipidemia   On high intensity statin, LDL not at goal of <70. Future consideration to increase atorvastatin dose to 80mg daily if this continues to be elevated on next lipid panel.      COPD - Emphysema  Patient following with pulmonology. Improving post-hospitalization. If patient prefers the Spiriva capsules after trying respimat for 1 month then can switch back as it appears both are on her insurance formulary. Recommend increasing dose of benzonatate to 200mg to see if this is any more effective, if not then discontinue therapy.       Mental Health   Anxiety and Depression  Stable.     Hypothyroidism   Last TSH is within normal limits per lab cutoffs.  If TSH is less than 3 on next lab draw, consider decreasing dose due to normal TSH range increasing with age.    Post-Stroke Epilepsy   Removed Keppra from med list as patient has not been taking this for months. Recommend she reach out to schedule follow up with neurology to discuss need for alternative therapy.     Pain    Current regimen is somewhat effective, potential benefit for pain with recent duloxetine dose increase. Recommend patient continue to follow with ortho as directed.     Anemia  Constipation improved with decreasing iron supplement to every other day. Plan in place to recheck labs in 3-4 months with primary care provider.     Supplements   Last two folic acid levels elevated - recommend patient stop folic acid and repeat lab in 3-6 months. Patient has not had a vitamin B1 lab - recommend adding to next lab draw to monitor on supplement. Magnesium on the lower end of normal range on supplement, recommend continuing. Recommend patient discuss recommendation for repeat DEXA with primary care  provider.      PLAN:                            Spiriva capsule - this is an option you can switch back to after finishing current supply of respimat if you prefer this formulation      Benzonatate - try going up to 200mg per dose to see if this is more effective for cough, if this is not helpful then you do not need to take it     Keppra - discontinued from med list since you have been off of it for months, recommend following up with neurology as directed to discuss this further     DEXA - recommend discussing need for updated bone scan with primary care provider     Folic acid - your last two labs have been elevated, stop this supplement, recommend repeating lab in 3-6 months    Thiamine - recommend ordering lab with next lab draw to monitor on therapy     Follow-up: with MTM pharmacist annually for comprehensive medication review, sooner as needed     SUBJECTIVE/OBJECTIVE:                          Jessica Ellison is a 74 year old female seen for a transitions of care visit. She was discharged from [facility] on 4/25/25 for acute hypoxic respiratory failure.      Reason for visit: comprehensive medication review.    Allergies/ADRs: Reviewed in chart  Past Medical History: Reviewed in chart  Tobacco: She reports that she quit smoking about 21 years ago. Her smoking use included cigarettes. She started smoking about 51 years ago. She has a 30 pack-year smoking history. She has never used smokeless tobacco.  Alcohol: 1-3 beverages / week  Recent Falls: last was in August-September, tripped on a rug     Medication Adherence/Access:   Patient uses pill box(es).  Patient takes medications 2-3 time(s) per day. - 5pm gabapentin is challenging to get in some days.   Medication barriers: none.     Hypertension /History of CVA  Amlodipine 5mg daily   Metoprolol tartrate 100mg twice daily   Aspirin 325mg daily - was told to keep this at this dose, she bruises a lot, denies bleeding that she cannot  stop   Patient reports no current medication side effects  Patient self monitors blood pressure.  Home BP monitoring - has been good lately, she can feel headaches if she has elevated blood pressure.      BP Readings from Last 3 Encounters:   05/08/25 120/86   05/06/25 (!) 152/76   04/25/25 (!) 162/88     Pulse Readings from Last 3 Encounters:   05/08/25 92   05/06/25 74   04/25/25 67        Hyperlipidemia   Atorvastatin 40mg daily  Patient reports no significant myalgias or other side effects.     COPD - Emphysema  Symbicort 160-4.5 mcg/act 2 puffs twice daily   Spiriva 2.5mcg/act 2 puffs daily - she switched from the capsules formulation and does not like it  Albuterol MDI as needed - has it on hand but rare use  Duoneb as needed - has it on hand but only used it once   Prednisone 40mg daily x 5 days as needed for COPD exacerbation  Azithromycin 250mg on Monday, Wednesday, and Friday x 5 days as needed for COPD exacerbation   Benzonatate 100mg three times daily as needed   Patient rinses their mouth after using steroid inhaler.    Patient reports no current medication side effects.    Patient reports the following symptoms: recent hospitalization - symptoms are improved, cough is still present.  Patient follows with pulmonology - last seen 5/6/25  Has an oximeter to monitor her oxygen       Mental Health   Anxiety and Depression  Duloxetine 60mg once daily - also for pain, dose increased earlier this month, no issues with this change    Quetiapine 100mg at bedtime    Melatonin 20mg at bedtime   Patient reports no current medication side effects.  Patient reports symptoms are stable.  Sleep - can be up to 10 hrs/night, sometimes gets distracted by Facebook after she watches the news, current meds generally work well to help her fall asleep         8/12/2024     2:25 PM 11/11/2024     8:32 AM 5/8/2025     1:49 PM   PHQ   PHQ-9 Total Score 10 5  10   Q9: Thoughts of better off dead/self-harm past 2 weeks Not at all  Not at all Not at all       Patient-reported        Hypothyroidism   Levothyroxine 50 mcg daily.   Patient is having the following symptoms: none.        Post-Stroke Epilepsy   Keppra 500mg twice daily - patient reports she has been off for months as she did not like the potential side effects, denies any issues with seizures, only had this happen one time   Patient follows with neurology - last seen 12/9/24    Pain    Celecoxib 200mg daily   Gabapentin 300mg in the AM, 300mg midday (~4-5pm), 600mg at bedtime    Duloxetine 60mg once daily - also for pain, dose increased earlier this month   Naloxone nasal spray as needed - has on hand if needed   Pain type/location: chronic pain syndrome  Patient feels that current therapy is somewhat effective. She got a steroid injection in her right knee, had replacement of left knee, plan is for a gel injection in 6 weeks.   Patient reports the following side effects: None.  Patient follows with [facility]     Anemia  Ferrous sulfate 325mg every other day - started during hospitalization, plan to recheck labs in 3-4 months per last primary care provider visit   Side effects/concerns? Constipation - improved when she decreased to every other day. Has senna she can use as needed.      Supplements   Folic acid 1mg daily - history of alcohol use disorder  Thiamine 100mg daily - history of alcohol use disorder  Multivitamin 1 tab daily - has thiamine 4.5mg per dose   Calcium-vitamin d 600mg - 400 international unit(s) daily ---> patient with DEXA in 2015 that showed osteopenia, has not had one since, relays she fractured her neck in the past as well.    Magnesium 250mg daily   No reported issues at this time.        Today's Vitals: There were no vitals taken for this visit.  ----------------  Post Discharge Medication Reconciliation Status: discharge medications reconciled, continue medications without change.    I spent 60 minutes with this patient today. All changes were made via  collaborative practice agreement with Sarah Barriga MD.     A summary of these recommendations was sent via [employer].    Cait Bain PharmD  Medication Therapy Management (MTM) Pharmacist   [facility]    Telemedicine Visit Details  The patient's medications can be safely assessed via a telemedicine encounter.  Type of service:  Video Conference via [employer]  Originating Location (pt. Location): Home    Distant Location (provider location):  On-site  Start Time: 9:30 am  End Time: 10:30 am     Medication Therapy Recommendations  Centrilobular emphysema (H)   1 Current Medication: benzonatate (TESSALON) 100 MG capsule   Current Medication Sig: Take 1 capsule (100 mg) by mouth 3 times daily as needed for cough.   Rationale: Dose too low - Dosage too low - Effectiveness   Recommendation: Increase Dose   Status: Accepted per CPA   Identified Date: 5/13/2025 Completed Date: 5/13/2025         2 Current Medication: tiotropium (SPIRIVA RESPIMAT) 2.5 MCG/ACT inhaler   Current Medication Sig: Inhale 2 puffs into the lungs daily.   Rationale: Patient prefers not to take - Adherence - Adherence   Recommendation: Change Medication - Spiriva HandiHaler 18 MCG Caps   Status: Contact Provider - Awaiting Response   Identified Date: 5/13/2025         Takes dietary supplements   1 Current Medication: folic acid (FOLVITE) 1 MG tablet (Discontinued)   Current Medication Sig: Take 1 tablet (1 mg) by mouth daily   Rationale: No medical indication at this time - Unnecessary medication therapy - Indication   Recommendation: Discontinue Medication   Status: Accepted - no CPA Needed   Identified Date: 5/13/2025 Completed Date: 5/13/2025

## 2025-05-13 NOTE — LETTER
_  Medication List        Prepared on: May 13, 2025     Bring your Medication List when you go to the doctor, hospital, or   emergency room. And, share it with your family or caregivers.     Note any changes to how you take your medications.  Cross out medications when you no longer use them.    Medication How I take it Why I use it Prescriber   albuterol (VENTOLIN HFA) 108 (90 Base) MCG/ACT inhaler Inhale 2 puffs into the lungs every 6 hours as needed for shortness of breath or wheezing. Chronic obstructive pulmonary disease, unspecified COPD type (H) Sarah Barriga MD   amLODIPine (NORVASC) 5 MG tablet Take 1 tablet (5 mg) by mouth at bedtime. Essential hypertension with goal blood pressure less than 140/90 Sarah Barriga MD   aspirin (SM ASPIRIN EC) 325 MG EC tablet Take 1 tablet (325 mg) by mouth daily. Primary osteoarthritis of left knee Martín Vargas MD   atorvastatin (LIPITOR) 40 MG tablet Take 1 tablet (40 mg) by mouth daily Hyperlipidemia LDL Goal <70 Sarah Barriga MD   azithromycin (ZITHROMAX) 250 MG tablet Take 1 tablet (250 mg) by mouth Every Mon, Wed, Fri Morning. X 5 days as needed for exacerbation Centrilobular emphysema (H) Shannan Reynolds MD   benzonatate (TESSALON) 100 MG capsule Take 1 capsule (100 mg) by mouth 3 times daily as needed for cough. Centrilobular emphysema (H); Acute respiratory failure with hypoxia (H) Martín Vargas MD   budesonide-formoterol (SYMBICORT) 160-4.5 MCG/ACT Inhaler Inhale 2 puffs into the lungs 2 times daily. Centrilobular emphysema (H) Cait Hernandez, ERNESTO   calcium carbonate 600 mg-vitamin D 400 units (CALTRATE) 600-400 MG-UNIT per tablet Take 1 tablet by mouth every evening  Bone health  Patient Reported   celecoxib (CELEBREX) 200 MG capsule Take 1 capsule (200 mg) by mouth daily. Congenital Musculoskeletal Deformity of Spine; Chronic bilateral low back pain without sciatica Sarah Barriga MD   DULoxetine (CYMBALTA) 60 MG capsule Take 1 capsule  (60 mg) by mouth daily. Major Depressive Disorder, Recurrent Episode, Moderate (H); Chronic Pain Syndrome Sarah Barriga MD   ferrous sulfate (FEROSUL) 325 (65 Fe) MG tablet Take 1 tablet (325 mg) by mouth every other day. Other iron deficiency anemia Martín Vargas MD   gabapentin (NEURONTIN) 300 MG capsule 300 mg in AM/300 in afternoon/600 mg at night Tension Headache Jet Garza MD   ipratropium - albuterol 0.5 mg/2.5 mg/3 mL (DUONEB) 0.5-2.5 (3) MG/3ML neb solution Take 1 vial (3 mLs) by nebulization every 6 hours as needed for shortness of breath, wheezing or cough. Centrilobular emphysema (H) aCit Hernandez, NP   levothyroxine (SYNTHROID/LEVOTHROID) 50 MCG tablet Take 1 tablet (50 mcg) by mouth daily Hypothyroidism, unspecified type Sarah Barriga MD   magnesium 250 MG tablet Take 1 tablet by mouth daily.  Supplementation  Patient Reported   Melatonin 10 MG TABS tablet Take 20 mg by mouth at bedtime.  Sleep Patient Reported   metoprolol tartrate (LOPRESSOR) 100 MG tablet Take 1 tablet (100 mg) by mouth 2 times daily. Hold if SBP<100 or HR<55 Essential Hypertension Sarah Barriga MD   multivitamin, therapeutic (THERA-VIT) TABS tablet Take 1 tablet by mouth daily General Health  Patient reported    naloxone (NARCAN) 4 MG/0.1ML nasal spray Spray 1 spray (4 mg) into one nostril alternating nostrils once as needed for opioid reversal every 2-3 minutes until assistance arrives Chronic bilateral low back pain without sciatica Sarah Barriga MD   predniSONE (DELTASONE) 20 MG tablet Take 2 tablets (40 mg) by mouth daily x5 days as needed for exacerbation  Centrilobular emphysema (H); Acute respiratory failure with hypoxia (H) Martín Vargas MD   QUEtiapine (SEROQUEL) 50 MG tablet Take 2 tablets (100 mg) by mouth at bedtime. Major Depressive Disorder, Recurrent Episode, Moderate (H) Sarah Barriga MD   thiamine (B-1) 100 MG tablet Take 1 tablet (100 mg) by mouth daily. Nutritional Deficiency  Rod Kellogg MD   tiotropium (SPIRIVA RESPIMAT) 2.5 MCG/ACT inhaler Inhale 2 puffs into the lungs daily. Centrilobular emphysema (H); Acute respiratory failure with hypoxia (H) Shannan Reynolds MD         Add new medications, over-the-counter drugs, herbals, vitamins, or  minerals in the blank rows below.    Medication How I take it Why I use it Prescriber                                      Allergies:      - Bee Venom  - Lisinopril - Swelling        Side effects I have had:      Not on File        Other Information:              My notes and questions:

## 2025-05-13 NOTE — LETTER
"Recommended To-Do List      Prepared on: May 13, 2025       You can get the best results from your medications by completing the items on this \"To-Do List.\"      Bring your To-Do List when you go to your doctor. And, share it with your family or caregivers.    My To-Do List:    What we talked about: What I should do:   Your medication dosage being too low    Increase your dosage of benzonatate (TESSALON)          What we talked about: What I should do:   A medication that you may no longer need    Stop taking folic acid (FOLVITE)                "

## 2025-05-14 NOTE — PATIENT INSTRUCTIONS
"Recommendations from today's MTM visit:                                                    MTM (medication therapy management) is a service provided by a clinical pharmacist designed to help you get the most of out of your medicines.   Today we reviewed what your medicines are for, how to know if they are working, that your medicines are safe and how to make your medicine regimen as easy as possible.      Spiriva capsule - this is an option you can switch back to after finishing current supply of respimat if you prefer this formulation      Benzonatate - try going up to 200mg per dose to see if this is more effective for cough, if this is not helpful then you do not need to take it     Keppra - discontinued from med list since you have been off of it for months, recommend following up with neurology as directed to discuss this further     DEXA - recommend discussing need for updated bone scan with primary care provider     Folic acid - your last two labs have been elevated, stop this supplement, recommend repeating lab in 3-6 months    Thiamine - recommend ordering lab with next lab draw to monitor on therapy     Follow-up: with MTM pharmacist annually for comprehensive medication review, sooner as needed     It was great speaking with you today.  I value your experience and would be very thankful for your time in providing feedback in our clinic survey. In the next few days, you may receive an email or text message from [employer] with a link to a survey related to your  clinical pharmacist.\"     To schedule another MTM appointment, please call the clinic directly or you may call the MTM scheduling line at [phone].    My Clinical Pharmacist's contact information:                                                      Please feel free to contact me with any questions or concerns you have.      Cait Bain, [place]  Medication Therapy Management (MTM) Pharmacist   [facility]     "

## 2025-05-15 NOTE — TELEPHONE ENCOUNTER
Patient Quality Outreach    Patient is due for the following:   None    Action(s) Taken:   - Schedule wellness viasit with fasting labs  - Covid vaccine    Type of outreach:    Phone, unable to leave message, pt does not have voicemail set up    Questions for provider review:    None         Altagracia Rob, [facility]  Chart routed to None.

## 2025-05-15 NOTE — TELEPHONE ENCOUNTER
Patient Quality Outreach    Patient is due for the following:   None    Action(s) Taken:   - Schedule welnness visit with fasting labs  - Vaccine update    Type of outreach:    Phone, spoke to patient/parent. Scheduled for wellness visit 6/3 @940    Questions for provider review:    None         Altagracia Rob, [facility]  Chart routed to None.

## 2025-05-27 DIAGNOSIS — F33.1 MAJOR DEPRESSIVE DISORDER, RECURRENT EPISODE, MODERATE (H): ICD-10-CM

## 2025-05-27 RX ORDER — FOLIC ACID 1 MG/1
1000 TABLET ORAL
Qty: 30 TABLET | Refills: 0 | OUTPATIENT
Start: 2025-05-27

## 2025-05-28 RX ORDER — QUETIAPINE FUMARATE 50 MG/1
100 TABLET, FILM COATED ORAL AT BEDTIME
Qty: 60 TABLET | Refills: 5 | Status: SHIPPED | OUTPATIENT
Start: 2025-05-28

## 2025-06-03 ENCOUNTER — TELEPHONE (OUTPATIENT)
Dept: FAMILY MEDICINE | Facility: CLINIC | Age: 75
End: 2025-06-03

## 2025-06-03 ENCOUNTER — OFFICE VISIT (OUTPATIENT)
Dept: FAMILY MEDICINE | Facility: CLINIC | Age: 75
End: 2025-06-03
Payer: COMMERCIAL

## 2025-06-03 VITALS
DIASTOLIC BLOOD PRESSURE: 78 MMHG | RESPIRATION RATE: 18 BRPM | BODY MASS INDEX: 31.83 KG/M2 | SYSTOLIC BLOOD PRESSURE: 138 MMHG | OXYGEN SATURATION: 97 % | TEMPERATURE: 97.4 F | WEIGHT: 173 LBS | HEART RATE: 67 BPM | HEIGHT: 62 IN

## 2025-06-03 DIAGNOSIS — G47.33 OSA (OBSTRUCTIVE SLEEP APNEA): Chronic | ICD-10-CM

## 2025-06-03 DIAGNOSIS — M54.50 CHRONIC BILATERAL LOW BACK PAIN WITHOUT SCIATICA: ICD-10-CM

## 2025-06-03 DIAGNOSIS — D50.9 IRON DEFICIENCY ANEMIA, UNSPECIFIED IRON DEFICIENCY ANEMIA TYPE: ICD-10-CM

## 2025-06-03 DIAGNOSIS — Q67.5 CONGENITAL MUSCULOSKELETAL DEFORMITY OF SPINE: ICD-10-CM

## 2025-06-03 DIAGNOSIS — F19.20 CHEMICAL DEPENDENCY (H): ICD-10-CM

## 2025-06-03 DIAGNOSIS — Z00.00 ENCOUNTER FOR MEDICARE ANNUAL WELLNESS EXAM: Primary | ICD-10-CM

## 2025-06-03 DIAGNOSIS — F33.1 MAJOR DEPRESSIVE DISORDER, RECURRENT EPISODE, MODERATE (H): ICD-10-CM

## 2025-06-03 DIAGNOSIS — F10.10 ALCOHOL ABUSE: ICD-10-CM

## 2025-06-03 DIAGNOSIS — G89.29 CHRONIC BILATERAL LOW BACK PAIN WITHOUT SCIATICA: ICD-10-CM

## 2025-06-03 DIAGNOSIS — I10 ESSENTIAL HYPERTENSION WITH GOAL BLOOD PRESSURE LESS THAN 140/90: ICD-10-CM

## 2025-06-03 DIAGNOSIS — E03.9 HYPOTHYROIDISM, UNSPECIFIED TYPE: ICD-10-CM

## 2025-06-03 DIAGNOSIS — G44.209 TENSION HEADACHE: ICD-10-CM

## 2025-06-03 DIAGNOSIS — E78.5 HYPERLIPIDEMIA LDL GOAL <70: ICD-10-CM

## 2025-06-03 DIAGNOSIS — Z23 HIGH PRIORITY FOR 2019-NCOV VACCINE: ICD-10-CM

## 2025-06-03 DIAGNOSIS — J43.2 CENTRILOBULAR EMPHYSEMA (H): Chronic | ICD-10-CM

## 2025-06-03 PROCEDURE — 99214 OFFICE O/P EST MOD 30 MIN: CPT | Mod: 25 | Performed by: FAMILY MEDICINE

## 2025-06-03 PROCEDURE — 90480 ADMN SARSCOV2 VAC 1/ONLY CMP: CPT | Performed by: FAMILY MEDICINE

## 2025-06-03 PROCEDURE — G0439 PPPS, SUBSEQ VISIT: HCPCS | Performed by: FAMILY MEDICINE

## 2025-06-03 PROCEDURE — 3075F SYST BP GE 130 - 139MM HG: CPT | Performed by: FAMILY MEDICINE

## 2025-06-03 PROCEDURE — 91320 SARSCV2 VAC 30MCG TRS-SUC IM: CPT | Performed by: FAMILY MEDICINE

## 2025-06-03 PROCEDURE — 3078F DIAST BP <80 MM HG: CPT | Performed by: FAMILY MEDICINE

## 2025-06-03 PROCEDURE — 1125F AMNT PAIN NOTED PAIN PRSNT: CPT | Performed by: FAMILY MEDICINE

## 2025-06-03 RX ORDER — ATORVASTATIN CALCIUM 40 MG/1
40 TABLET, FILM COATED ORAL DAILY
Qty: 90 TABLET | Refills: 3 | Status: SHIPPED | OUTPATIENT
Start: 2025-06-03

## 2025-06-03 RX ORDER — LEVOTHYROXINE SODIUM 50 UG/1
50 TABLET ORAL DAILY
Qty: 90 TABLET | Refills: 3 | Status: SHIPPED | OUTPATIENT
Start: 2025-06-03

## 2025-06-03 RX ORDER — AMLODIPINE BESYLATE 5 MG/1
5 TABLET ORAL AT BEDTIME
Qty: 30 TABLET | Refills: 5 | Status: SHIPPED | OUTPATIENT
Start: 2025-06-03

## 2025-06-03 RX ORDER — FOLIC ACID 1 MG/1
1 TABLET ORAL DAILY
Qty: 90 TABLET | Refills: 3 | Status: SHIPPED | OUTPATIENT
Start: 2025-06-03

## 2025-06-03 RX ORDER — CELECOXIB 200 MG/1
200 CAPSULE ORAL DAILY
Qty: 90 CAPSULE | Refills: 1 | Status: SHIPPED | OUTPATIENT
Start: 2025-06-03

## 2025-06-03 RX ORDER — GABAPENTIN 300 MG/1
CAPSULE ORAL
Qty: 120 CAPSULE | Refills: 2 | OUTPATIENT
Start: 2025-06-03

## 2025-06-03 SDOH — HEALTH STABILITY: PHYSICAL HEALTH: ON AVERAGE, HOW MANY MINUTES DO YOU ENGAGE IN EXERCISE AT THIS LEVEL?: 0 MIN

## 2025-06-03 SDOH — HEALTH STABILITY: PHYSICAL HEALTH: ON AVERAGE, HOW MANY DAYS PER WEEK DO YOU ENGAGE IN MODERATE TO STRENUOUS EXERCISE (LIKE A BRISK WALK)?: 0 DAYS

## 2025-06-03 ASSESSMENT — SOCIAL DETERMINANTS OF HEALTH (SDOH): HOW OFTEN DO YOU GET TOGETHER WITH FRIENDS OR RELATIVES?: TWICE A WEEK

## 2025-06-03 ASSESSMENT — ANXIETY QUESTIONNAIRES
GAD7 TOTAL SCORE: 4
GAD7 TOTAL SCORE: 4
1. FEELING NERVOUS, ANXIOUS, OR ON EDGE: SEVERAL DAYS
7. FEELING AFRAID AS IF SOMETHING AWFUL MIGHT HAPPEN: NOT AT ALL
6. BECOMING EASILY ANNOYED OR IRRITABLE: NOT AT ALL
2. NOT BEING ABLE TO STOP OR CONTROL WORRYING: SEVERAL DAYS
4. TROUBLE RELAXING: SEVERAL DAYS
GAD7 TOTAL SCORE: 4
3. WORRYING TOO MUCH ABOUT DIFFERENT THINGS: SEVERAL DAYS
IF YOU CHECKED OFF ANY PROBLEMS ON THIS QUESTIONNAIRE, HOW DIFFICULT HAVE THESE PROBLEMS MADE IT FOR YOU TO DO YOUR WORK, TAKE CARE OF THINGS AT HOME, OR GET ALONG WITH OTHER PEOPLE: SOMEWHAT DIFFICULT
8. IF YOU CHECKED OFF ANY PROBLEMS, HOW DIFFICULT HAVE THESE MADE IT FOR YOU TO DO YOUR WORK, TAKE CARE OF THINGS AT HOME, OR GET ALONG WITH OTHER PEOPLE?: SOMEWHAT DIFFICULT
7. FEELING AFRAID AS IF SOMETHING AWFUL MIGHT HAPPEN: NOT AT ALL
5. BEING SO RESTLESS THAT IT IS HARD TO SIT STILL: NOT AT ALL

## 2025-06-03 ASSESSMENT — PAIN SCALES - PAIN ENJOYMENT GENERAL ACTIVITY SCALE (PEG)
INTERFERED_GENERAL_ACTIVITY: 8
INTERFERED_ENJOYMENT_LIFE: 9
AVG_PAIN_PASTWEEK: 9
PEG_TOTALSCORE: 8.67
AVG_PAIN_PASTWEEK: 9
INTERFERED_ENJOYMENT_LIFE: 9
INTERFERED_GENERAL_ACTIVITY: 8
PEG_TOTALSCORE: 8.67

## 2025-06-03 ASSESSMENT — PATIENT HEALTH QUESTIONNAIRE - PHQ9
SUM OF ALL RESPONSES TO PHQ QUESTIONS 1-9: 5
10. IF YOU CHECKED OFF ANY PROBLEMS, HOW DIFFICULT HAVE THESE PROBLEMS MADE IT FOR YOU TO DO YOUR WORK, TAKE CARE OF THINGS AT HOME, OR GET ALONG WITH OTHER PEOPLE: SOMEWHAT DIFFICULT
SUM OF ALL RESPONSES TO PHQ QUESTIONS 1-9: 5

## 2025-06-03 ASSESSMENT — PAIN SCALES - GENERAL: PAINLEVEL_OUTOF10: SEVERE PAIN (7)

## 2025-06-03 NOTE — PROGRESS NOTES
Preventive Care Visit  [facility]  Sarah Barriga MD, Family Medicine  Fan 3, 2025      Assessment & Plan     Encounter for Medicare annual wellness exam     Major depressive disorder, recurrent, moderate  Duloxetine was increased last month, there has been improvement in her mood.     Essential hypertension with goal blood pressure less than 140/90  Well controlled  - amLODIPine (NORVASC) 5 MG tablet; Take 1 tablet (5 mg) by mouth at bedtime.    Hyperlipidemia LDL goal <70  Continue statin  - atorvastatin (LIPITOR) 40 MG tablet; Take 1 tablet (40 mg) by mouth daily.    Congenital musculoskeletal deformity of spine     - celecoxib (CELEBREX) 200 MG capsule; Take 1 capsule (200 mg) by mouth daily.    Chronic bilateral low back pain without sciatica     - celecoxib (CELEBREX) 200 MG capsule; Take 1 capsule (200 mg) by mouth daily.    Chemical dependency (H)  No longer taking chronic narcotics, see my last note regarding this    Hypothyroidism, unspecified type     - levothyroxine (SYNTHROID/LEVOTHROID) 50 MCG tablet; Take 1 tablet (50 mcg) by mouth daily.  - TSH with free T4 reflex; Future    Centrilobular emphysema (H)  Uses home oxygen.  Without oxygen at times her oxygen saturation will drop to 83% on room air.     JOY (obstructive sleep apnea) - severe (AHI 57)  Uses CPAP.  Told to contact her sleep clinic regarding an updated mask to accommodate the oxygen.     Iron deficiency anemia, unspecified iron deficiency anemia type   Recheck in 2 months  - CBC with platelets; Future    High priority for 2019-nCoV vaccine       Patient has been advised of split billing requirements and indicates understanding: Yes        Counseling  Appropriate preventive services were addressed with this patient via screening, questionnaire, or discussion as appropriate for fall prevention, nutrition, physical activity, Tobacco-use cessation, social engagement, weight loss and cognition.  Checklist reviewing  preventive services available has been given to the patient.  Reviewed patient's diet, addressing concerns and/or questions.   Discussed possible causes of fatigue. Updated plan of care.  Patient reported difficulty with activities of daily living were addressed today.Information on urinary incontinence and treatment options given to patient.   The patient's PHQ-9 score is consistent with mild depression. She was provided with information regarding depression.           Miller Lopez is a 74 year old, presenting for the following:  Physical        6/3/2025     9:57 AM   Additional Questions   Roomed by Carla BURNETT  Really bothered by right knee pain.  Had an injection a month ago with limited improvement.        Hypertension Follow-up    Do you check your blood pressure regularly outside of the clinic? No   Are you following a low salt diet? Yes  Are your blood pressures ever more than 140 on the top number (systolic) OR more   than 90 on the bottom number (diastolic), for example 140/90? N/A    BP Readings from Last 2 Encounters:   25 138/78   25 120/86     Depression   How are you doing with your depression since your last visit? Improved  - duloxetine increased 1 month ago  Are you having other symptoms that might be associated with depression? No  Have you had a significant life event?  No   Are you feeling anxious or having panic attacks?   No  Do you have any concerns with your use of alcohol or other drugs? Yes:  alcohol abuse    Social History     Tobacco Use    Smoking status: Former     Current packs/day: 0.00     Average packs/day: 1 pack/day for 30.0 years (30.0 ttl pk-yrs)     Types: Cigarettes     Start date: 1974     Quit date: 2004     Years since quittin.4     Passive exposure: Past    Smokeless tobacco: Never   Vaping Use    Vaping status: Never Used   Substance Use Topics    Alcohol use: Yes     Alcohol/week: 3.0 standard drinks of alcohol     Types: 3 Standard  "drinks or equivalent per week    Drug use: Never     Comment: percocet for the past several years         11/11/2024     8:32 AM 5/8/2025     1:49 PM 6/3/2025     9:49 AM   PHQ   PHQ-9 Total Score 5  10 5    Q9: Thoughts of better off dead/self-harm past 2 weeks Not at all Not at all Not at all       Patient-reported         8/12/2024     2:25 PM 5/8/2025     1:49 PM 6/3/2025    10:02 AM   DAJUAN-7 SCORE   Total Score   4 (minimal anxiety)   Total Score 2 9 4        Patient-reported         Suicide Assessment Five-step Evaluation and Treatment (SAFE-T)    COPD Follow-Up  Overall, how are your COPD symptoms since your last clinic visit?   Waxing/waning  How much fatigue or shortness of breath do you have when you are walking?  Same as usual  How much shortness of breath do you have when you are resting?  Same as usual  How often do you cough? Sometimes  Have you noticed any change in your sputum/phlegm?  No  Have you experienced a recent fever? No  Please describe how far you can walk without stopping to rest:  Less than 1 block    Hasn't been using spiriva    History   Smoking Status    Former    Types: Cigarettes   Smokeless Tobacco    Never     No results found for: \"FEV1\", \"VFY7YDE\"      Chronic Kidney Disease Follow-up    Do you take any over the counter pain medicine?: No  Hypothyroidism Follow-up    Since last visit, patient describes the following symptoms: Weight stable, no hair loss, no skin changes, no constipation, no loose stools  =      11/11/2024     8:32 AM 5/8/2025     1:49 PM 6/3/2025     9:49 AM   PHQ-9 SCORE   PHQ-9 Total Score MyChart 5 (Mild depression)  5 (Mild depression)   PHQ-9 Total Score 5  10 5        Patient-reported           8/12/2024     2:25 PM 5/8/2025     1:49 PM 6/3/2025    10:02 AM   DAJUAN-7 SCORE   Total Score   4 (minimal anxiety)   Total Score 2 9 4        Patient-reported           8/12/2024     2:25 PM 11/11/2024     8:45 AM 6/3/2025    10:11 AM   PEG Score   PEG Total Score 7 " 8.33 8.67       Advance Care Planning    Discussed advance care planning with patient; informed AVS has link to [facility].        6/3/2025   General Health   How would you rate your overall physical health? (!) FAIR         6/3/2025   Nutrition   Diet: Regular (no restrictions)         6/3/2025   Exercise   Days per week of moderate/strenous exercise 0 days   Average minutes spent exercising at this level 0 min   (!) EXERCISE CONCERN      6/3/2025   Social Factors   Frequency of gathering with friends or relatives Twice a week   Worry food won't last until get money to buy more No   Food not last or not have enough money for food? No   Do you have housing? (Housing is defined as stable permanent housing and does not include staying outside in a car, in a tent, in an abandoned building, in an overnight shelter, or couch-surfing.) Yes   Are you worried about losing your housing? No   Lack of transportation? No   Unable to get utilities (heat,electricity)? No         6/3/2025   Fall Risk   Fallen 2 or more times in the past year? Yes   Trouble with walking or balance? Yes   Gait Speed Test (Document in seconds) 5.63   Gait Speed Test Interpretation Greater than 5.01 seconds - ABNORMAL          6/3/2025   Activities of Daily Living- Home Safety   Needs help with the following daily activites Transportation    Shopping    Housework    Laundry    Dressing   Safety concerns in the home None of the above       Multiple values from one day are sorted in reverse-chronological order         6/3/2025   Dental   Dentist two times every year? Yes         6/3/2025   Hearing Screening   Hearing concerns? None of the above         6/3/2025   Driving Risk Screening   Patient/family members have concerns about driving No         6/3/2025   General Alertness/Fatigue Screening   Have you been more tired than usual lately? (!) YES         6/3/2025   Urinary Incontinence Screening   Bothered by leaking urine in past 6 months Yes        Today's PHQ-9 Score:       6/3/2025     9:49 AM   PHQ-9 SCORE   PHQ-9 Total Score MyChart 5 (Mild depression)   PHQ-9 Total Score 5        Patient-reported         6/3/2025   Substance Use   Alcohol more than 3/day or more than 7/wk No   Do you have a current opioid prescription? No   How severe/bad is pain from 1 to 10? 9/10   Do you use any other substances recreationally? No     Social History     Tobacco Use    Smoking status: Former     Current packs/day: 0.00     Average packs/day: 1 pack/day for 30.0 years (30.0 ttl pk-yrs)     Types: Cigarettes     Start date: 1974     Quit date: 2004     Years since quittin.4     Passive exposure: Past    Smokeless tobacco: Never   Vaping Use    Vaping status: Never Used   Substance Use Topics    Alcohol use: Yes     Alcohol/week: 3.0 standard drinks of alcohol     Types: 3 Standard drinks or equivalent per week    Drug use: Never     Comment: percocet for the past several years           3/24/2025   LAST FHS-7 RESULTS   1st degree relative breast or ovarian cancer Yes   Any relative bilateral breast cancer No   Any male have breast cancer No   Any ONE woman have BOTH breast AND ovarian cancer No   Any woman with breast cancer before 50yrs No   2 or more relatives with breast AND/OR ovarian cancer No   2 or more relatives with breast AND/OR bowel cancer Yes        Mammogram Screening - Mammogram every 1-2 years updated in Health Maintenance based on mutual decision making    ASCVD Risk   The ASCVD Risk score (Milton DK, et al., 2019) failed to calculate for the following reasons:    Risk score cannot be calculated because patient has a medical history suggesting prior/existing ASCVD            Reviewed and updated as needed this visit by Provider                      Current providers sharing in care for this patient include:  Patient Care Team:  Sarah Barriga MD as PCP - General  Sarah Barriga MD as Assigned PCP  Cait Hernandez NP  "as Nurse Practitioner (Pulmonary Disease)  Jet Greco MD as MD (Neurology)  Jet Greco MD as Assigned Neuroscience Provider  Maria Del Carmen Iglesias PA-C as Physician Assistant (Neurological Surgery)  Laila Crocker MD as Assigned Pulmonology Provider  Cait Bain RPH as Pharmacist (Pharmacist)  Cait Bain RPH as Assigned MTM Pharmacist    The following health maintenance items are reviewed in Epic and correct as of today:  Health Maintenance   Topic Date Due    HEPATITIS A VACCINE (1 of 2 - Risk 2-dose series) Never done    MICROALBUMIN  08/08/2024    COVID-19 VACCINE (8 - 2024-25 season) 09/01/2024    URINE DRUG SCREEN  02/20/2025    LIPID  08/12/2025    CONTROLLED SUBSTANCE AGREEMENT FOR CHRONIC PAIN MANAGEMENT  08/12/2025    TSH W/FREE T4 REFLEX  09/27/2025    BMP  04/25/2026    ANNUAL REVIEW OF HM ORDERS  05/08/2026    MEDICARE ANNUAL WELLNESS VISIT  06/03/2026    DAJUAN ASSESSMENT  06/03/2026    FALL RISK ASSESSMENT  06/03/2026    PHQ-9  06/03/2026    MAMMO SCREENING  03/24/2027    DTAP/TDAP/TD VACCINE (2 - Td or Tdap) 11/27/2027    DIABETES SCREENING  04/25/2028    ADVANCE CARE PLANNING  06/03/2030    DEXA  12/01/2030    COLORECTAL CANCER SCREENING  05/13/2034    SPIROMETRY  Completed    HEPATITIS C SCREENING  Completed    COPD ACTION PLAN  Completed    DEPRESSION ACTION PLAN  Completed    INFLUENZA VACCINE  Completed    PNEUMOCOCCAL VACCINE 50+ YEARS  Completed    URINALYSIS  Completed    ZOSTER VACCINE  Completed    RSV VACCINE  Completed    Medicare Annual MTM Pharmacist Visit (once per calendar year)  Completed    HPV VACCINE  Aged Out    MENINGITIS VACCINE  Aged Out         Review of Systems  Constitutional, HEENT, cardiovascular, pulmonary, gi and gu systems are negative, except as otherwise noted.     Objective    Exam  /78   Pulse 67   Temp 97.4  F (36.3  C) (Tympanic)   Resp 18   Ht 1.575 m (5' 2\")   Wt 78.5 kg (173 lb)   SpO2 97%   BMI 31.64 kg/m     Estimated " "body mass index is 31.64 kg/m  as calculated from the following:    Height as of this encounter: 1.575 m (5' 2\").    Weight as of this encounter: 78.5 kg (173 lb).    Physical Exam  GENERAL: alert and no distress  NECK: no adenopathy, no asymmetry, masses, or scars  RESP: lungs clear to auscultation - no rales, rhonchi or wheezes  CV: regular rate and rhythm, normal S1 S2, no S3 or S4, no murmur, click or rub, no peripheral edema  ABDOMEN: soft, nontender, no hepatosplenomegaly, no masses and bowel sounds normal  MS: no gross musculoskeletal defects noted, no edema  PSYCH: mentation appears normal and judgement and insight intact         6/3/2025   Mini Cog   Clock Draw Score 2 Normal   3 Item Recall 2 objects recalled   Mini Cog Total Score 4              Signed Electronically by: Sarah Barriga MD    "

## 2025-06-03 NOTE — TELEPHONE ENCOUNTER
Patient would also like the folic acid refilled and the spiriva Handihaler,  she says it works better than the Spiriva Respimat.     Thank you,    Monet Merlos  Certified Pharmacy Technician II, [facility]  Ph: [phone]  Fx: [phone]

## 2025-06-03 NOTE — TELEPHONE ENCOUNTER
Spiriva is ordered by her pulmonologist.     Gabapentin is ordered by neurologist.    I can order the folic acid    Sarah Barriga M.D.

## 2025-06-03 NOTE — TELEPHONE ENCOUNTER
RN called pharmacy and provided update about the Spiriva is ordered by her pulmonologist and     Gabapentin is ordered by neurologist.    Juliette High RN on 6/3/2025 at 11:59 AM

## 2025-06-03 NOTE — PATIENT INSTRUCTIONS
Patient Education   Preventive Care Advice   This is general advice given by our system to help you stay healthy. However, your care team may have specific advice just for you. Please talk to your care team about your preventive care needs.  Nutrition  Eat 5 or more servings of fruits and vegetables each day.  Try wheat bread, brown rice and whole grain pasta (instead of white bread, rice, and pasta).  Get enough calcium and vitamin D. Check the label on foods and aim for 100% of the RDA (recommended daily allowance).  Lifestyle  Exercise at least 150 minutes each week  (30 minutes a day, 5 days a week).  Do muscle strengthening activities 2 days a week. These help control your weight and prevent disease.  No smoking.  Wear sunscreen to prevent skin cancer.  Have a dental exam and cleaning every 6 months.  Yearly exams  See your health care team every year to talk about:  Any changes in your health.  Any medicines your care team has prescribed.  Preventive care, family planning, and ways to prevent chronic diseases.  Shots (vaccines)   HPV shots (up to age 26), if you've never had them before.  Hepatitis B shots (up to age 59), if you've never had them before.  COVID-19 shot: Get this shot when it's due.  Flu shot: Get a flu shot every year.  Tetanus shot: Get a tetanus shot every 10 years.  Pneumococcal, hepatitis A, and RSV shots: Ask your care team if you need these based on your risk.  Shingles shot (for age 50 and up)  General health tests  Diabetes screening:  Starting at age 35, Get screened for diabetes at least every 3 years.  If you are younger than age 35, ask your care team if you should be screened for diabetes.  Cholesterol test: At age 39, start having a cholesterol test every 5 years, or more often if advised.  Bone density scan (DEXA): At age 50, ask your care team if you should have this scan for osteoporosis (brittle bones).  Hepatitis C: Get tested at least once in your life.  STIs (sexually  transmitted infections)  Before age 24: Ask your care team if you should be screened for STIs.  After age 24: Get screened for STIs if you're at risk. You are at risk for STIs (including HIV) if:  You are sexually active with more than one person.  You don't use condoms every time.  You or a partner was diagnosed with a sexually transmitted infection.  If you are at risk for HIV, ask about PrEP medicine to prevent HIV.  Get tested for HIV at least once in your life, whether you are at risk for HIV or not.  Cancer screening tests  Cervical cancer screening: If you have a cervix, begin getting regular cervical cancer screening tests starting at age 21.  Breast cancer scan (mammogram): If you've ever had breasts, begin having regular mammograms starting at age 40. This is a scan to check for breast cancer.  Colon cancer screening: It is important to start screening for colon cancer at age 45.  Have a colonoscopy test every 10 years (or more often if you're at risk) Or, ask your provider about stool tests like a FIT test every year or Cologuard test every 3 years.  To learn more about your testing options, visit:   .  For help making a decision, visit:   https://bit.ly/lq43386.  Prostate cancer screening test: If you have a prostate, ask your care team if a prostate cancer screening test (PSA) at age 55 is right for you.  Lung cancer screening: If you are a current or former smoker ages 50 to 80, ask your care team if ongoing lung cancer screenings are right for you.  For informational purposes only. Not to replace the advice of your health care provider. Copyright   2023 [facility]. All rights reserved. Clinically reviewed by the [facility]. [employer] 708667 - REV 01/24.  Learning About Depression Screening  What is depression screening?  Depression screening is a way to see if you have depression symptoms. It may be done by a doctor or counselor. It's often part of a routine  "checkup. That's because your mental health is just as important as your physical health.  Depression is a mental health condition that affects how you feel, think, and act. You may:  Have less energy.  Lose interest in your daily activities.  Feel sad and grouchy for a long time.  Depression is very common. It affects people of all ages.  Many things can lead to depression. Some people become depressed after they have a stroke or find out they have a major illness like cancer or heart disease. The death of a loved one or a breakup may lead to depression. It can run in families. Most experts believe that a combination of inherited genes and stressful life events can cause it.  What happens during screening?  You may be asked to fill out a form about your depression symptoms. You and the doctor will discuss your answers. The doctor may ask you more questions to learn more about how you think, act, and feel.  What happens after screening?  If you have symptoms of depression, your doctor will talk to you about your options.  Doctors usually treat depression with medicines or counseling. Often, combining the two works best. Many people don't get help because they think that they'll get over the depression on their own. But people with depression may not get better unless they get treatment.  The cause of depression is not well understood. There may be many factors involved. But if you have depression, it's not your fault.  A serious symptom of depression is thinking about death or suicide. If you or someone you care about talks about this or about feeling hopeless, get help right away.  It's important to know that depression can be treated. Medicine, counseling, and self-care may help.  Where can you learn more?  Go to https://www.healthwise.net/patiented  Enter T185 in the search box to learn more about \"Learning About Depression Screening.\"  Current as of: July 31, 2024  Content Version: 14.4    [id number] [employer] " [employer].   Care instructions adapted under license by your healthcare professional. If you have questions about a medical condition or this instruction, always ask your healthcare professional. [facility] [employer] disclaims any warranty or liability for your use of this information.

## 2025-06-13 ENCOUNTER — TELEPHONE (OUTPATIENT)
Dept: FAMILY MEDICINE | Facility: CLINIC | Age: 75
End: 2025-06-13
Payer: COMMERCIAL

## 2025-06-13 DIAGNOSIS — J43.2 CENTRILOBULAR EMPHYSEMA (H): Chronic | ICD-10-CM

## 2025-06-13 DIAGNOSIS — J96.01 ACUTE RESPIRATORY FAILURE WITH HYPOXIA (H): ICD-10-CM

## 2025-06-13 RX ORDER — TIOTROPIUM BROMIDE 18 UG/1
18 CAPSULE ORAL; RESPIRATORY (INHALATION) DAILY
Qty: 30 CAPSULE | Refills: 1 | Status: SHIPPED | OUTPATIENT
Start: 2025-06-13

## 2025-06-13 NOTE — TELEPHONE ENCOUNTER
Patient's call transferred to author    Patient needing the following filled  Spiriva Handihaler 18mcg capsules   Sig: Inhale contents of one capsule daily   Last prescribed by Dr. Barriga     Patient states there was an issue with insurance coverage of the handihaler, but willing to pay out of pocket     Patient does not want the Spiriva Respirmat 2.5mcg filled - this does not work for her  Only wants the Spiriva Handihaler sent to [facility]     Wilder Sanchez, Clinic RN  [facility]

## 2025-06-22 NOTE — TELEPHONE ENCOUNTER
Pt has been waiting since hosp discharge for home O2.  O2 was ordered by hospitalist, Martín Vargas MD on 4/25/25 and also noted by Care Coordinator Minnie Troy.      [facility] placed call to Maranda @  [facility] @ [phone].  I explained that pt has been on O2 at home prior to hospitalization and really needs home O2, especially in light of poor air quality recently.      Here's what has transpired, regarding O2, with  [facility] and [facility] [employer] Co:  5/23 - Order and all info was sent to [facility] and denial received (See pt's copy of denial on [facility] desk.)    5/27 - Someone on O2 Team with  [facility] called [employer] and submitted an appeal and they have not heard anything back.    6/3 - This [facility]  called  [facility] and spoke to Maranda.  She will connect with their O2 Team and have them contact [employer] again.  She will get back to us and  Clinic MD line # was given.    Bev with O2 Team called back and they will again send for appeal to [facility].    Routed to PCP as YUE   show

## 2025-06-24 ENCOUNTER — TRANSFERRED RECORDS (OUTPATIENT)
Dept: HEALTH INFORMATION MANAGEMENT | Facility: CLINIC | Age: 75
End: 2025-06-24
Payer: COMMERCIAL

## 2025-06-30 ENCOUNTER — MYC MEDICAL ADVICE (OUTPATIENT)
Dept: FAMILY MEDICINE | Facility: CLINIC | Age: 75
End: 2025-06-30
Payer: COMMERCIAL

## 2025-06-30 DIAGNOSIS — B37.2 CANDIDAL INTERTRIGO: Primary | ICD-10-CM

## 2025-07-01 RX ORDER — NYSTATIN 100000 [USP'U]/G
POWDER TOPICAL 3 TIMES DAILY PRN
Qty: 30 G | Refills: 1 | Status: SHIPPED | OUTPATIENT
Start: 2025-07-01

## 2025-08-06 ENCOUNTER — OFFICE VISIT (OUTPATIENT)
Dept: PULMONOLOGY | Facility: CLINIC | Age: 75
End: 2025-08-06
Attending: INTERNAL MEDICINE
Payer: COMMERCIAL

## 2025-08-06 VITALS
DIASTOLIC BLOOD PRESSURE: 79 MMHG | WEIGHT: 171.8 LBS | SYSTOLIC BLOOD PRESSURE: 113 MMHG | HEART RATE: 67 BPM | BODY MASS INDEX: 31.42 KG/M2 | OXYGEN SATURATION: 94 %

## 2025-08-06 DIAGNOSIS — J44.1 COPD EXACERBATION (H): ICD-10-CM

## 2025-08-06 DIAGNOSIS — J43.2 CENTRILOBULAR EMPHYSEMA (H): Primary | ICD-10-CM

## 2025-08-06 DIAGNOSIS — R06.09 DYSPNEA ON EXERTION: ICD-10-CM

## 2025-08-06 RX ORDER — PREDNISONE 20 MG/1
40 TABLET ORAL DAILY
Qty: 10 TABLET | Refills: 0 | Status: SHIPPED | OUTPATIENT
Start: 2025-08-06 | End: 2025-08-11

## 2025-08-06 RX ORDER — AZITHROMYCIN 250 MG/1
TABLET, FILM COATED ORAL
Qty: 6 TABLET | Refills: 0 | Status: SHIPPED | OUTPATIENT
Start: 2025-08-06 | End: 2025-08-11

## 2025-08-17 ENCOUNTER — APPOINTMENT (OUTPATIENT)
Dept: ULTRASOUND IMAGING | Facility: CLINIC | Age: 75
End: 2025-08-17
Attending: PHYSICIAN ASSISTANT
Payer: COMMERCIAL

## 2025-08-17 ENCOUNTER — HOSPITAL ENCOUNTER (EMERGENCY)
Facility: CLINIC | Age: 75
Discharge: HOME | End: 2025-08-17
Attending: PHYSICIAN ASSISTANT | Admitting: PHYSICIAN ASSISTANT
Payer: COMMERCIAL

## 2025-08-17 VITALS
HEART RATE: 88 BPM | TEMPERATURE: 98 F | SYSTOLIC BLOOD PRESSURE: 176 MMHG | DIASTOLIC BLOOD PRESSURE: 107 MMHG | OXYGEN SATURATION: 95 % | RESPIRATION RATE: 16 BRPM

## 2025-08-17 DIAGNOSIS — S50.11XA HEMATOMA OF RIGHT FOREARM: ICD-10-CM

## 2025-08-17 DIAGNOSIS — S50.11XA TRAUMATIC ECCHYMOSIS OF RIGHT FOREARM, INITIAL ENCOUNTER: Primary | ICD-10-CM

## 2025-08-17 PROCEDURE — G0463 HOSPITAL OUTPT CLINIC VISIT: HCPCS | Mod: 25 | Performed by: PHYSICIAN ASSISTANT

## 2025-08-17 PROCEDURE — 93971 EXTREMITY STUDY: CPT | Mod: RT

## 2025-08-17 PROCEDURE — 99213 OFFICE O/P EST LOW 20 MIN: CPT | Performed by: PHYSICIAN ASSISTANT

## 2025-08-17 ASSESSMENT — ENCOUNTER SYMPTOMS: CONSTITUTIONAL NEGATIVE: 1

## 2025-08-17 ASSESSMENT — ACTIVITIES OF DAILY LIVING (ADL)
ADLS_ACUITY_SCORE: 58
ADLS_ACUITY_SCORE: 58

## 2025-08-19 ENCOUNTER — TELEPHONE (OUTPATIENT)
Dept: PULMONOLOGY | Facility: CLINIC | Age: 75
End: 2025-08-19
Payer: COMMERCIAL

## 2025-08-20 ENCOUNTER — DOCUMENTATION ONLY (OUTPATIENT)
Dept: PULMONOLOGY | Facility: CLINIC | Age: 75
End: 2025-08-20
Payer: COMMERCIAL

## 2025-08-20 DIAGNOSIS — J96.01 ACUTE RESPIRATORY FAILURE WITH HYPOXIA (H): ICD-10-CM

## 2025-08-20 DIAGNOSIS — R06.09 DYSPNEA ON EXERTION: ICD-10-CM

## 2025-08-20 DIAGNOSIS — R09.02 HYPOXEMIA: ICD-10-CM

## 2025-08-20 DIAGNOSIS — J43.2 CENTRILOBULAR EMPHYSEMA (H): Primary | ICD-10-CM

## 2025-09-02 ENCOUNTER — TRANSCRIBE ORDERS (OUTPATIENT)
Dept: OTHER | Age: 75
End: 2025-09-02

## 2025-09-02 DIAGNOSIS — M17.11 OSTEOARTHRITIS OF RIGHT KNEE: ICD-10-CM

## 2025-09-02 DIAGNOSIS — Z47.1 AFTERCARE FOLLOWING JOINT REPLACEMENT: Primary | ICD-10-CM

## (undated) DEVICE — DECANTER VIAL 2006S

## (undated) DEVICE — SUCTION MANIFOLD NEPTUNE 2 SYS 4 PORT 0702-020-000

## (undated) DEVICE — SOL NACL 0.9% IRRIG 1000ML BOTTLE 2F7124

## (undated) DEVICE — SU STRATAFIX PDS PLUS 2-0 SPIRAL CT-1 30CM SXPP1B410

## (undated) DEVICE — SUTURE VICRYL+ 1 27IN CT-1 UND VCP261H

## (undated) DEVICE — BLADE SAW SAGITTAL STRK DUAL CUT 4118-135-090

## (undated) DEVICE — DRESSING MEPILEX AG SILVER 4X12 395990

## (undated) DEVICE — A3 SUPPLIES- SEE NURSING INFO PAGE

## (undated) DEVICE — CUSTOM PACK TOTAL KNEE SOP5BTKHEC

## (undated) DEVICE — SOL NACL 0.9% INJ 1000ML BAG 2B1324X

## (undated) DEVICE — GLOVE UNDER INDICATOR PI SZ 8.5 LF 41685

## (undated) DEVICE — CUSTOM PACK TOTAL KNEE ACCESSORY SOP5BTAHEA

## (undated) DEVICE — SOL WATER IRRIG 1000ML BOTTLE 2F7114

## (undated) DEVICE — HOLDER LIMB VELCRO OR 0814-1533

## (undated) DEVICE — BANDAGE ELASTIC 6X550 LF DBL 593-96LF

## (undated) DEVICE — SU STRATAFIX PDS PLUS 1 CT-1 18" SXPP1A404

## (undated) DEVICE — GLOVE BIOGEL PI ULTRATOUCH G SZ 8.0 42180

## (undated) DEVICE — GLOVE BIOGEL PI INDICATOR 8.0 LF 41680

## (undated) DEVICE — DRAPE SHEET REV FOLD 3/4 9349

## (undated) DEVICE — CAST PADDING 6" STERILE 9046S

## (undated) DEVICE — SUTURE VICRYL+ 2 27IN CP UNDYED VCP195H

## (undated) DEVICE — PLATE GROUNDING ADULT W/CORD 9165L

## (undated) DEVICE — SU MONOCRYL 3-0 PS-2 18" UND MCP497G

## (undated) DEVICE — GLOVE BIOGEL PI ULTRATOUCH G SZ 8.5 42185

## (undated) RX ORDER — FENTANYL CITRATE 50 UG/ML
INJECTION, SOLUTION INTRAMUSCULAR; INTRAVENOUS
Status: DISPENSED
Start: 2017-07-27

## (undated) RX ORDER — PROPOFOL 10 MG/ML
INJECTION, EMULSION INTRAVENOUS
Status: DISPENSED
Start: 2023-08-16

## (undated) RX ORDER — ONDANSETRON 2 MG/ML
INJECTION INTRAMUSCULAR; INTRAVENOUS
Status: DISPENSED
Start: 2023-08-16

## (undated) RX ORDER — LIDOCAINE HYDROCHLORIDE 10 MG/ML
INJECTION, SOLUTION EPIDURAL; INFILTRATION; INTRACAUDAL; PERINEURAL
Status: DISPENSED
Start: 2017-07-27

## (undated) RX ORDER — DEXAMETHASONE SODIUM PHOSPHATE 10 MG/ML
INJECTION, EMULSION INTRAMUSCULAR; INTRAVENOUS
Status: DISPENSED
Start: 2023-08-16

## (undated) RX ORDER — EPHEDRINE SULFATE 50 MG/ML
INJECTION, SOLUTION INTRAMUSCULAR; INTRAVENOUS; SUBCUTANEOUS
Status: DISPENSED
Start: 2023-08-16

## (undated) RX ORDER — SODIUM CHLORIDE 9 MG/ML
INJECTION, SOLUTION INTRAVENOUS
Status: DISPENSED
Start: 2017-07-27

## (undated) RX ORDER — LIDOCAINE HYDROCHLORIDE 20 MG/ML
JELLY TOPICAL
Status: DISPENSED
Start: 2024-09-26

## (undated) RX ORDER — LIDOCAINE HYDROCHLORIDE 10 MG/ML
INJECTION, SOLUTION EPIDURAL; INFILTRATION; INTRACAUDAL; PERINEURAL
Status: DISPENSED
Start: 2023-08-16

## (undated) RX ORDER — HYDRALAZINE HYDROCHLORIDE 20 MG/ML
INJECTION INTRAMUSCULAR; INTRAVENOUS
Status: DISPENSED
Start: 2017-07-27

## (undated) RX ORDER — OXYCODONE AND ACETAMINOPHEN 5; 325 MG/1; MG/1
TABLET ORAL
Status: DISPENSED
Start: 2017-07-27